# Patient Record
Sex: FEMALE | Race: BLACK OR AFRICAN AMERICAN | NOT HISPANIC OR LATINO | Employment: OTHER | URBAN - METROPOLITAN AREA
[De-identification: names, ages, dates, MRNs, and addresses within clinical notes are randomized per-mention and may not be internally consistent; named-entity substitution may affect disease eponyms.]

---

## 2020-01-06 ENCOUNTER — HOSPITAL ENCOUNTER (EMERGENCY)
Facility: HOSPITAL | Age: 35
Discharge: HOME | End: 2020-01-06
Attending: EMERGENCY MEDICINE
Payer: MEDICAID

## 2020-01-06 VITALS
BODY MASS INDEX: 40.18 KG/M2 | HEIGHT: 66 IN | SYSTOLIC BLOOD PRESSURE: 144 MMHG | TEMPERATURE: 98 F | RESPIRATION RATE: 16 BRPM | WEIGHT: 250 LBS | OXYGEN SATURATION: 100 % | DIASTOLIC BLOOD PRESSURE: 82 MMHG | HEART RATE: 88 BPM

## 2020-01-06 DIAGNOSIS — S80.11XA MULTIPLE LEG CONTUSIONS, RIGHT, INITIAL ENCOUNTER: ICD-10-CM

## 2020-01-06 DIAGNOSIS — S20.211A RIB CONTUSION, RIGHT, INITIAL ENCOUNTER: ICD-10-CM
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
## 2020-01-06 LAB
B-HCG UR QL: NEGATIVE
CTP QC/QA: YES

## 2020-01-06 PROCEDURE — 99284 EMERGENCY DEPT VISIT MOD MDM: CPT | Mod: 25

## 2020-01-06 PROCEDURE — 81025 URINE PREGNANCY TEST: CPT | Performed by: EMERGENCY MEDICINE

## 2020-01-06 RX ORDER — IBUPROFEN 600 MG/1
600 TABLET ORAL EVERY 6 HOURS PRN
Qty: 20 TABLET | Refills: 0 | OUTPATIENT
Start: 2020-01-06 | End: 2021-10-23

## 2020-01-06 RX ORDER — METHOCARBAMOL 750 MG/1
1500 TABLET, FILM COATED ORAL 3 TIMES DAILY
Qty: 30 TABLET | Refills: 0 | Status: SHIPPED | OUTPATIENT
Start: 2020-01-06 | End: 2020-01-11
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
## 2021-10-23 ENCOUNTER — HOSPITAL ENCOUNTER (EMERGENCY)
Facility: HOSPITAL | Age: 36
Discharge: HOME | End: 2021-10-23
Attending: EMERGENCY MEDICINE
Payer: MEDICAID

## 2021-10-23 VITALS
DIASTOLIC BLOOD PRESSURE: 81 MMHG | HEIGHT: 65 IN | HEART RATE: 63 BPM | TEMPERATURE: 99 F | BODY MASS INDEX: 34.82 KG/M2 | WEIGHT: 209 LBS | RESPIRATION RATE: 16 BRPM | OXYGEN SATURATION: 98 % | SYSTOLIC BLOOD PRESSURE: 160 MMHG

## 2021-10-23 DIAGNOSIS — S03.40XA TMJ (SPRAIN OF TEMPOROMANDIBULAR JOINT), INITIAL ENCOUNTER: Primary | ICD-10-CM

## 2021-10-23 LAB
B-HCG UR QL: NEGATIVE
CTP QC/QA: YES

## 2021-10-23 PROCEDURE — 81025 URINE PREGNANCY TEST: CPT | Performed by: EMERGENCY MEDICINE

## 2021-10-23 PROCEDURE — 99284 PR EMERGENCY DEPT VISIT,LEVEL IV: ICD-10-PCS | Mod: ,,, | Performed by: EMERGENCY MEDICINE

## 2021-10-23 PROCEDURE — 99283 EMERGENCY DEPT VISIT LOW MDM: CPT

## 2021-10-23 PROCEDURE — 25000003 PHARM REV CODE 250: Performed by: EMERGENCY MEDICINE

## 2021-10-23 PROCEDURE — 99284 EMERGENCY DEPT VISIT MOD MDM: CPT | Mod: ,,, | Performed by: EMERGENCY MEDICINE

## 2021-10-23 RX ORDER — NAPROXEN 500 MG/1
500 TABLET ORAL 2 TIMES DAILY WITH MEALS
Qty: 30 TABLET | Refills: 0 | Status: SHIPPED | OUTPATIENT
Start: 2021-10-23 | End: 2022-03-21

## 2021-10-23 RX ORDER — NAPROXEN 500 MG/1
500 TABLET ORAL
Status: COMPLETED | OUTPATIENT
Start: 2021-10-23 | End: 2021-10-23

## 2021-10-23 RX ADMIN — NAPROXEN 500 MG: 500 TABLET ORAL at 08:10

## 2021-12-08 ENCOUNTER — HOSPITAL ENCOUNTER (EMERGENCY)
Facility: HOSPITAL | Age: 36
Discharge: HOME | End: 2021-12-08
Attending: EMERGENCY MEDICINE
Payer: MEDICAID

## 2021-12-08 VITALS
RESPIRATION RATE: 16 BRPM | DIASTOLIC BLOOD PRESSURE: 81 MMHG | WEIGHT: 203 LBS | TEMPERATURE: 99 F | SYSTOLIC BLOOD PRESSURE: 139 MMHG | OXYGEN SATURATION: 100 % | BODY MASS INDEX: 33.82 KG/M2 | HEART RATE: 70 BPM | HEIGHT: 65 IN

## 2021-12-08 DIAGNOSIS — S39.012A STRAIN OF LUMBAR REGION, INITIAL ENCOUNTER: ICD-10-CM
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
## 2021-12-08 DIAGNOSIS — S09.90XA INJURY OF HEAD IN ADULT: Primary | ICD-10-CM

## 2021-12-08 PROCEDURE — 87389 HIV-1 AG W/HIV-1&-2 AB AG IA: CPT | Performed by: EMERGENCY MEDICINE

## 2021-12-08 PROCEDURE — 99284 PR EMERGENCY DEPT VISIT,LEVEL IV: ICD-10-PCS | Mod: ,,, | Performed by: EMERGENCY MEDICINE

## 2021-12-08 PROCEDURE — 99284 EMERGENCY DEPT VISIT MOD MDM: CPT | Mod: ,,, | Performed by: EMERGENCY MEDICINE

## 2021-12-08 PROCEDURE — 25000003 PHARM REV CODE 250: Performed by: EMERGENCY MEDICINE

## 2021-12-08 PROCEDURE — 86803 HEPATITIS C AB TEST: CPT | Performed by: EMERGENCY MEDICINE

## 2021-12-08 PROCEDURE — 99283 EMERGENCY DEPT VISIT LOW MDM: CPT

## 2021-12-08 RX ORDER — ACETAMINOPHEN 500 MG
1000 TABLET ORAL
Status: COMPLETED | OUTPATIENT
Start: 2021-12-08 | End: 2021-12-08

## 2021-12-08 RX ADMIN — ACETAMINOPHEN 1000 MG: 500 TABLET ORAL at 07:12

## 2021-12-09 LAB
HCV AB SERPL QL IA: NEGATIVE
HIV 1+2 AB+HIV1 P24 AG SERPL QL IA: NEGATIVE

## 2022-02-23 ENCOUNTER — TELEPHONE (OUTPATIENT)
Dept: SURGERY | Facility: CLINIC | Age: 37
End: 2022-02-23
Payer: MEDICAID

## 2022-02-24 ENCOUNTER — TELEPHONE (OUTPATIENT)
Dept: SURGERY | Facility: CLINIC | Age: 37
End: 2022-02-24
Payer: MEDICAID

## 2022-02-24 NOTE — TELEPHONE ENCOUNTER
----- Message from Vera Soler sent at 2/15/2022  2:30 PM CST -----  Contact: Ciara    ----- Message -----  From: Mariela Bates  Sent: 2/15/2022   2:27 PM CST  To: , Jeremy Urbina  from Dr Cummins  office requesting call back for pt to seen to get a biopsy done please call         Confirmed patient's contact info below:  Contact Name:Ciara [phone]  Phone Number:

## 2022-03-04 ENCOUNTER — HOSPITAL ENCOUNTER (OUTPATIENT)
Dept: RADIOLOGY | Facility: HOSPITAL | Age: 37
Discharge: HOME | End: 2022-03-04
Attending: NURSE PRACTITIONER
Payer: MEDICAID

## 2022-03-04 PROCEDURE — 76642 ULTRASOUND BREAST LIMITED: CPT | Mod: 26,LT,, | Performed by: RADIOLOGY

## 2022-03-04 PROCEDURE — 77066 DX MAMMO INCL CAD BI: CPT | Mod: 26,,, | Performed by: RADIOLOGY

## 2022-03-04 PROCEDURE — 77066 PR MAMMO, CAD, DIAGNOSTIC, BILAT: ICD-10-PCS | Mod: 26,,, | Performed by: RADIOLOGY

## 2022-03-04 PROCEDURE — 76642 PR US BREAST UNILAT LIMITED: ICD-10-PCS | Mod: 26,LT,, | Performed by: RADIOLOGY

## 2022-03-07 ENCOUNTER — TELEPHONE (OUTPATIENT)
Dept: RADIOLOGY | Facility: HOSPITAL | Age: 37
End: 2022-03-07
Payer: MEDICAID

## 2022-03-07 NOTE — TELEPHONE ENCOUNTER
Called patient to schedule double biopsy. Patient stated that she had been waiting for weeks to get scheduled. Report dictated 3/4/2022. Scheduled patient according to her preferences & first availability.

## 2022-03-14 ENCOUNTER — HOSPITAL ENCOUNTER (OUTPATIENT)
Dept: RADIOLOGY | Facility: HOSPITAL | Age: 37
Discharge: HOME | End: 2022-03-14
Attending: NURSE PRACTITIONER
Payer: MEDICAID

## 2022-03-14 DIAGNOSIS — R92.8 ABNORMAL FINDING ON BREAST IMAGING: ICD-10-CM

## 2022-03-14 PROCEDURE — 88305 TISSUE EXAM BY PATHOLOGIST: CPT | Performed by: PATHOLOGY

## 2022-03-14 PROCEDURE — 88305 TISSUE EXAM BY PATHOLOGIST: CPT | Mod: 26,ICN,, | Performed by: PATHOLOGY

## 2022-03-14 PROCEDURE — 88305 TISSUE EXAM BY PATHOLOGIST: ICD-10-PCS | Mod: 26,ICN,, | Performed by: PATHOLOGY

## 2022-03-14 PROCEDURE — 77065 MAMMO DIGITAL DIAGNOSTIC LEFT: ICD-10-PCS | Mod: 26,LT,, | Performed by: RADIOLOGY

## 2022-03-14 PROCEDURE — 19083 US BREAST BIOPSY WITH IMAGING 1ST SITE LEFT: ICD-10-PCS | Mod: LT,,, | Performed by: RADIOLOGY

## 2022-03-14 PROCEDURE — 19083 BX BREAST 1ST LESION US IMAG: CPT | Mod: LT,,, | Performed by: RADIOLOGY

## 2022-03-14 PROCEDURE — 38505 US BIOPSY LYMPH NODE AXILLA: ICD-10-PCS | Mod: LT,,, | Performed by: RADIOLOGY

## 2022-03-14 PROCEDURE — 25000003 PHARM REV CODE 250: Performed by: NURSE PRACTITIONER

## 2022-03-14 PROCEDURE — 88360 TUMOR IMMUNOHISTOCHEM/MANUAL: CPT | Mod: 26,ICN,, | Performed by: PATHOLOGY

## 2022-03-14 PROCEDURE — 77065 DX MAMMO INCL CAD UNI: CPT | Mod: TC,LT

## 2022-03-14 PROCEDURE — 88360 PR  TUMOR IMMUNOHISTOCHEM/MANUAL: ICD-10-PCS | Mod: 26,ICN,, | Performed by: PATHOLOGY

## 2022-03-14 PROCEDURE — 38505 NEEDLE BIOPSY LYMPH NODES: CPT | Mod: LT,,, | Performed by: RADIOLOGY

## 2022-03-14 PROCEDURE — 88360 TUMOR IMMUNOHISTOCHEM/MANUAL: CPT | Performed by: PATHOLOGY

## 2022-03-14 PROCEDURE — 27200939 US BIOPSY LYMPH NODE AXILLA

## 2022-03-14 PROCEDURE — 27200939 US BREAST BIOPSY WITH IMAGING 1ST SITE LEFT

## 2022-03-14 PROCEDURE — 77065 DX MAMMO INCL CAD UNI: CPT | Mod: 26,LT,, | Performed by: RADIOLOGY

## 2022-03-14 RX ORDER — LIDOCAINE HYDROCHLORIDE 10 MG/ML
3 INJECTION INFILTRATION; PERINEURAL ONCE
Status: COMPLETED | OUTPATIENT
Start: 2022-03-14 | End: 2022-03-14

## 2022-03-14 RX ORDER — SODIUM BICARBONATE 42 MG/ML
3 INJECTION, SOLUTION INTRAVENOUS ONCE
Status: COMPLETED | OUTPATIENT
Start: 2022-03-14 | End: 2022-03-14

## 2022-03-14 RX ORDER — LIDOCAINE HYDROCHLORIDE AND EPINEPHRINE 20; 10 MG/ML; UG/ML
10 INJECTION, SOLUTION INFILTRATION; PERINEURAL ONCE
Status: COMPLETED | OUTPATIENT
Start: 2022-03-14 | End: 2022-03-14

## 2022-03-14 RX ADMIN — SODIUM BICARBONATE 3 ML: 42 INJECTION, SOLUTION INTRAVENOUS at 10:03

## 2022-03-14 RX ADMIN — LIDOCAINE HYDROCHLORIDE,EPINEPHRINE BITARTRATE 10 ML: 20; .01 INJECTION, SOLUTION INFILTRATION; PERINEURAL at 10:03

## 2022-03-14 RX ADMIN — LIDOCAINE HYDROCHLORIDE 3 ML: 10 INJECTION, SOLUTION INFILTRATION; PERINEURAL at 10:03

## 2022-03-16 ENCOUNTER — TELEPHONE (OUTPATIENT)
Dept: SURGERY | Facility: CLINIC | Age: 37
End: 2022-03-16
Payer: MEDICAID

## 2022-03-16 DIAGNOSIS — C50.919 BREAST CANCER: Primary | ICD-10-CM

## 2022-03-16 NOTE — NURSING
Oncology Navigation   Intake  Date of Diagnosis: 3/14/2022  Cancer Type: Breast  Internal / External Referral: Internal  Referral Source: Holly Roe  Date of Referral: 3/16/2022  Initial Nurse Navigator Contact: 3/16/2022  Referral to Initial Contact Timeline (days): 0  Date Worked: 3/16/2022  First Appointment Available: 3/21/2022  Appointment Date: 3/21/2022  First Available Date vs. Scheduled Date (days): 0  Multiple appointments: Yes     Treatment  Current Status: Staging work-up    Surgical Oncologist: Dr.Alexa Weiner  Consult Date: 3/21/2022    Medical Oncologist: Dr.Zoe Cruz  Consult Date: 3/21/2022    Radiation Oncologist: Dr.Angela Bess    Procedures: Biopsy  Biopsy Schedule Date: 3/14/2022          Radiation Oncologist: Dr.Angela Bess        Acuity      Follow Up  Follow up in about 5 days (around 3/21/2022) for initial consults.

## 2022-03-16 NOTE — TELEPHONE ENCOUNTER
Called Patient with results of breast & axilla biopsies from 3-14-22. Explained that the biopsies showed Invasive ductal carcinoma. Discussed what this means and that the next step is to meet with a breast surgeon. An appt was made for 3-21-22 with Husam Lee & Maria Alejandra. Reviewed location of breast center. Patient verbalized understanding.

## 2022-03-17 ENCOUNTER — HOSPITAL ENCOUNTER (OUTPATIENT)
Dept: RADIOLOGY | Facility: HOSPITAL | Age: 37
Discharge: HOME | End: 2022-03-17
Attending: SURGERY
Payer: MEDICAID

## 2022-03-17 DIAGNOSIS — C50.919 BREAST CANCER: ICD-10-CM

## 2022-03-17 LAB
FINAL PATHOLOGIC DIAGNOSIS: ABNORMAL
GROSS: ABNORMAL
Lab: ABNORMAL
SUPPLEMENTAL DIAGNOSIS: ABNORMAL

## 2022-03-17 PROCEDURE — A9577 INJ MULTIHANCE: HCPCS | Performed by: SURGERY

## 2022-03-17 PROCEDURE — 25500020 PHARM REV CODE 255: Performed by: SURGERY

## 2022-03-17 PROCEDURE — 77049 MRI BREAST C-+ W/CAD BI: CPT | Mod: 26,,, | Performed by: RADIOLOGY

## 2022-03-17 PROCEDURE — 77049 MRI BREAST W/WO CONTRAST, W/CAD, BILATERAL: ICD-10-PCS | Mod: 26,,, | Performed by: RADIOLOGY

## 2022-03-17 PROCEDURE — 77049 MRI BREAST C-+ W/CAD BI: CPT | Mod: TC

## 2022-03-17 RX ADMIN — GADOBENATE DIMEGLUMINE 20 ML: 529 INJECTION, SOLUTION INTRAVENOUS at 09:03

## 2022-03-21 ENCOUNTER — LAB VISIT (OUTPATIENT)
Dept: LAB | Facility: HOSPITAL | Age: 37
End: 2022-03-21
Attending: INTERNAL MEDICINE
Payer: MEDICAID

## 2022-03-21 ENCOUNTER — DOCUMENTATION ONLY (OUTPATIENT)
Dept: SURGERY | Facility: CLINIC | Age: 37
End: 2022-03-21

## 2022-03-21 ENCOUNTER — OFFICE VISIT (OUTPATIENT)
Dept: SURGERY | Facility: CLINIC | Age: 37
End: 2022-03-21
Payer: MEDICAID

## 2022-03-21 ENCOUNTER — OFFICE VISIT (OUTPATIENT)
Dept: RADIATION ONCOLOGY | Facility: CLINIC | Age: 37
End: 2022-03-21
Payer: MEDICAID

## 2022-03-21 ENCOUNTER — OFFICE VISIT (OUTPATIENT)
Dept: HEMATOLOGY/ONCOLOGY | Facility: CLINIC | Age: 37
End: 2022-03-21
Payer: MEDICAID

## 2022-03-21 VITALS
TEMPERATURE: 98 F | WEIGHT: 222.56 LBS | DIASTOLIC BLOOD PRESSURE: 72 MMHG | HEART RATE: 93 BPM | BODY MASS INDEX: 37.08 KG/M2 | OXYGEN SATURATION: 98 % | HEIGHT: 65 IN | SYSTOLIC BLOOD PRESSURE: 128 MMHG | RESPIRATION RATE: 16 BRPM

## 2022-03-21 VITALS
HEART RATE: 93 BPM | WEIGHT: 222.69 LBS | OXYGEN SATURATION: 98 % | HEIGHT: 65 IN | SYSTOLIC BLOOD PRESSURE: 128 MMHG | BODY MASS INDEX: 37.1 KG/M2 | TEMPERATURE: 98 F | DIASTOLIC BLOOD PRESSURE: 72 MMHG

## 2022-03-21 VITALS
WEIGHT: 222.69 LBS | DIASTOLIC BLOOD PRESSURE: 72 MMHG | BODY MASS INDEX: 37.1 KG/M2 | TEMPERATURE: 98 F | HEIGHT: 65 IN | SYSTOLIC BLOOD PRESSURE: 128 MMHG | RESPIRATION RATE: 16 BRPM | OXYGEN SATURATION: 98 % | HEART RATE: 93 BPM

## 2022-03-21 DIAGNOSIS — C50.912 LEFT BREAST CANCER WITH T3 TUMOR, >5 CM IN GREATEST DIMENSION: Primary | ICD-10-CM

## 2022-03-21 DIAGNOSIS — C50.912 LEFT BREAST CANCER WITH T3 TUMOR, >5 CM IN GREATEST DIMENSION: ICD-10-CM

## 2022-03-21 DIAGNOSIS — Z17.1 MALIGNANT NEOPLASM OF UPPER-OUTER QUADRANT OF LEFT BREAST IN FEMALE, ESTROGEN RECEPTOR NEGATIVE: Primary | ICD-10-CM

## 2022-03-21 DIAGNOSIS — C50.412 MALIGNANT NEOPLASM OF UPPER-OUTER QUADRANT OF LEFT BREAST IN FEMALE, ESTROGEN RECEPTOR NEGATIVE: Primary | ICD-10-CM

## 2022-03-21 DIAGNOSIS — Z17.1 MALIGNANT NEOPLASM OF UPPER-OUTER QUADRANT OF LEFT BREAST IN FEMALE, ESTROGEN RECEPTOR NEGATIVE: ICD-10-CM

## 2022-03-21 DIAGNOSIS — C50.412 MALIGNANT NEOPLASM OF UPPER-OUTER QUADRANT OF LEFT BREAST IN FEMALE, ESTROGEN RECEPTOR NEGATIVE: ICD-10-CM

## 2022-03-21 LAB
ALBUMIN SERPL BCP-MCNC: 3.9 G/DL (ref 3.5–5.2)
ALP SERPL-CCNC: 40 U/L (ref 55–135)
ALT SERPL W/O P-5'-P-CCNC: 11 U/L (ref 10–44)
ANION GAP SERPL CALC-SCNC: 7 MMOL/L (ref 8–16)
AST SERPL-CCNC: 21 U/L (ref 10–40)
BASOPHILS # BLD AUTO: 0.02 K/UL (ref 0–0.2)
BASOPHILS NFR BLD: 0.4 % (ref 0–1.9)
BILIRUB SERPL-MCNC: 0.3 MG/DL (ref 0.1–1)
BUN SERPL-MCNC: 14 MG/DL (ref 6–20)
CALCIUM SERPL-MCNC: 9.5 MG/DL (ref 8.7–10.5)
CHLORIDE SERPL-SCNC: 106 MMOL/L (ref 95–110)
CO2 SERPL-SCNC: 28 MMOL/L (ref 23–29)
CREAT SERPL-MCNC: 0.8 MG/DL (ref 0.5–1.4)
DIFFERENTIAL METHOD: ABNORMAL
EOSINOPHIL # BLD AUTO: 0 K/UL (ref 0–0.5)
EOSINOPHIL NFR BLD: 0.8 % (ref 0–8)
ERYTHROCYTE [DISTWIDTH] IN BLOOD BY AUTOMATED COUNT: 12.2 % (ref 11.5–14.5)
EST. GFR  (AFRICAN AMERICAN): >60 ML/MIN/1.73 M^2
EST. GFR  (NON AFRICAN AMERICAN): >60 ML/MIN/1.73 M^2
GLUCOSE SERPL-MCNC: 75 MG/DL (ref 70–110)
HCG INTACT+B SERPL-ACNC: <7000 MIU/ML
HCT VFR BLD AUTO: 34 % (ref 37–48.5)
HGB BLD-MCNC: 10.7 G/DL (ref 12–16)
IMM GRANULOCYTES # BLD AUTO: 0.01 K/UL (ref 0–0.04)
IMM GRANULOCYTES NFR BLD AUTO: 0.2 % (ref 0–0.5)
LYMPHOCYTES # BLD AUTO: 1.3 K/UL (ref 1–4.8)
LYMPHOCYTES NFR BLD: 26.3 % (ref 18–48)
MCH RBC QN AUTO: 29 PG (ref 27–31)
MCHC RBC AUTO-ENTMCNC: 31.5 G/DL (ref 32–36)
MCV RBC AUTO: 92 FL (ref 82–98)
MONOCYTES # BLD AUTO: 0.4 K/UL (ref 0.3–1)
MONOCYTES NFR BLD: 8.4 % (ref 4–15)
NEUTROPHILS # BLD AUTO: 3.1 K/UL (ref 1.8–7.7)
NEUTROPHILS NFR BLD: 63.9 % (ref 38–73)
NRBC BLD-RTO: 0 /100 WBC
PLATELET # BLD AUTO: 211 K/UL (ref 150–450)
PMV BLD AUTO: 10.2 FL (ref 9.2–12.9)
POTASSIUM SERPL-SCNC: 3.9 MMOL/L (ref 3.5–5.1)
PROT SERPL-MCNC: 6.9 G/DL (ref 6–8.4)
RBC # BLD AUTO: 3.69 M/UL (ref 4–5.4)
SODIUM SERPL-SCNC: 141 MMOL/L (ref 136–145)
WBC # BLD AUTO: 4.91 K/UL (ref 3.9–12.7)

## 2022-03-21 PROCEDURE — 99205 PR OFFICE/OUTPT VISIT, NEW, LEVL V, 60-74 MIN: ICD-10-PCS | Mod: S$PBB,,, | Performed by: INTERNAL MEDICINE

## 2022-03-21 PROCEDURE — 99999 PR PBB SHADOW E&M-EST. PATIENT-LVL IV: ICD-10-PCS | Mod: PBBFAC,,, | Performed by: INTERNAL MEDICINE

## 2022-03-21 PROCEDURE — 99205 OFFICE O/P NEW HI 60 MIN: CPT | Mod: S$PBB,,, | Performed by: SURGERY

## 2022-03-21 PROCEDURE — 3078F DIAST BP <80 MM HG: CPT | Mod: CPTII,,, | Performed by: RADIOLOGY

## 2022-03-21 PROCEDURE — 3074F PR MOST RECENT SYSTOLIC BLOOD PRESSURE < 130 MM HG: ICD-10-PCS | Mod: CPTII,,, | Performed by: RADIOLOGY

## 2022-03-21 PROCEDURE — 99205 OFFICE O/P NEW HI 60 MIN: CPT | Mod: S$PBB,,, | Performed by: INTERNAL MEDICINE

## 2022-03-21 PROCEDURE — 99213 OFFICE O/P EST LOW 20 MIN: CPT | Mod: PBBFAC,27 | Performed by: SURGERY

## 2022-03-21 PROCEDURE — 3074F PR MOST RECENT SYSTOLIC BLOOD PRESSURE < 130 MM HG: ICD-10-PCS | Mod: CPTII,,, | Performed by: INTERNAL MEDICINE

## 2022-03-21 PROCEDURE — 99999 PR PBB SHADOW E&M-EST. PATIENT-LVL III: ICD-10-PCS | Mod: PBBFAC,,, | Performed by: SURGERY

## 2022-03-21 PROCEDURE — 85025 COMPLETE CBC W/AUTO DIFF WBC: CPT | Performed by: INTERNAL MEDICINE

## 2022-03-21 PROCEDURE — 36415 COLL VENOUS BLD VENIPUNCTURE: CPT | Performed by: INTERNAL MEDICINE

## 2022-03-21 PROCEDURE — 99205 OFFICE O/P NEW HI 60 MIN: CPT | Mod: S$PBB,,, | Performed by: RADIOLOGY

## 2022-03-21 PROCEDURE — 99999 PR PBB SHADOW E&M-EST. PATIENT-LVL III: CPT | Mod: PBBFAC,,, | Performed by: SURGERY

## 2022-03-21 PROCEDURE — 3008F PR BODY MASS INDEX (BMI) DOCUMENTED: ICD-10-PCS | Mod: CPTII,,, | Performed by: INTERNAL MEDICINE

## 2022-03-21 PROCEDURE — 3074F PR MOST RECENT SYSTOLIC BLOOD PRESSURE < 130 MM HG: ICD-10-PCS | Mod: CPTII,,, | Performed by: SURGERY

## 2022-03-21 PROCEDURE — 99999 PR PBB SHADOW E&M-EST. PATIENT-LVL III: ICD-10-PCS | Mod: PBBFAC,,, | Performed by: RADIOLOGY

## 2022-03-21 PROCEDURE — 1160F PR REVIEW ALL MEDS BY PRESCRIBER/CLIN PHARMACIST DOCUMENTED: ICD-10-PCS | Mod: CPTII,,, | Performed by: SURGERY

## 2022-03-21 PROCEDURE — 3008F BODY MASS INDEX DOCD: CPT | Mod: CPTII,,, | Performed by: RADIOLOGY

## 2022-03-21 PROCEDURE — 3008F BODY MASS INDEX DOCD: CPT | Mod: CPTII,,, | Performed by: INTERNAL MEDICINE

## 2022-03-21 PROCEDURE — 3074F SYST BP LT 130 MM HG: CPT | Mod: CPTII,,, | Performed by: INTERNAL MEDICINE

## 2022-03-21 PROCEDURE — 3078F DIAST BP <80 MM HG: CPT | Mod: CPTII,,, | Performed by: SURGERY

## 2022-03-21 PROCEDURE — 3078F PR MOST RECENT DIASTOLIC BLOOD PRESSURE < 80 MM HG: ICD-10-PCS | Mod: CPTII,,, | Performed by: RADIOLOGY

## 2022-03-21 PROCEDURE — 1159F MED LIST DOCD IN RCRD: CPT | Mod: CPTII,,, | Performed by: SURGERY

## 2022-03-21 PROCEDURE — 3008F BODY MASS INDEX DOCD: CPT | Mod: CPTII,,, | Performed by: SURGERY

## 2022-03-21 PROCEDURE — 3074F SYST BP LT 130 MM HG: CPT | Mod: CPTII,,, | Performed by: RADIOLOGY

## 2022-03-21 PROCEDURE — 3078F PR MOST RECENT DIASTOLIC BLOOD PRESSURE < 80 MM HG: ICD-10-PCS | Mod: CPTII,,, | Performed by: INTERNAL MEDICINE

## 2022-03-21 PROCEDURE — 1159F PR MEDICATION LIST DOCUMENTED IN MEDICAL RECORD: ICD-10-PCS | Mod: CPTII,,, | Performed by: SURGERY

## 2022-03-21 PROCEDURE — 99205 PR OFFICE/OUTPT VISIT, NEW, LEVL V, 60-74 MIN: ICD-10-PCS | Mod: S$PBB,,, | Performed by: RADIOLOGY

## 2022-03-21 PROCEDURE — 99213 OFFICE O/P EST LOW 20 MIN: CPT | Mod: PBBFAC | Performed by: RADIOLOGY

## 2022-03-21 PROCEDURE — 3008F PR BODY MASS INDEX (BMI) DOCUMENTED: ICD-10-PCS | Mod: CPTII,,, | Performed by: RADIOLOGY

## 2022-03-21 PROCEDURE — 99999 PR PBB SHADOW E&M-EST. PATIENT-LVL III: CPT | Mod: PBBFAC,,, | Performed by: RADIOLOGY

## 2022-03-21 PROCEDURE — 99999 PR PBB SHADOW E&M-EST. PATIENT-LVL IV: CPT | Mod: PBBFAC,,, | Performed by: INTERNAL MEDICINE

## 2022-03-21 PROCEDURE — 3078F DIAST BP <80 MM HG: CPT | Mod: CPTII,,, | Performed by: INTERNAL MEDICINE

## 2022-03-21 PROCEDURE — 1160F RVW MEDS BY RX/DR IN RCRD: CPT | Mod: CPTII,,, | Performed by: SURGERY

## 2022-03-21 PROCEDURE — 99205 PR OFFICE/OUTPT VISIT, NEW, LEVL V, 60-74 MIN: ICD-10-PCS | Mod: S$PBB,,, | Performed by: SURGERY

## 2022-03-21 PROCEDURE — 99214 OFFICE O/P EST MOD 30 MIN: CPT | Mod: PBBFAC,27 | Performed by: INTERNAL MEDICINE

## 2022-03-21 PROCEDURE — 84702 CHORIONIC GONADOTROPIN TEST: CPT | Performed by: INTERNAL MEDICINE

## 2022-03-21 PROCEDURE — 3074F SYST BP LT 130 MM HG: CPT | Mod: CPTII,,, | Performed by: SURGERY

## 2022-03-21 PROCEDURE — 3078F PR MOST RECENT DIASTOLIC BLOOD PRESSURE < 80 MM HG: ICD-10-PCS | Mod: CPTII,,, | Performed by: SURGERY

## 2022-03-21 PROCEDURE — 80053 COMPREHEN METABOLIC PANEL: CPT | Performed by: INTERNAL MEDICINE

## 2022-03-21 PROCEDURE — 3008F PR BODY MASS INDEX (BMI) DOCUMENTED: ICD-10-PCS | Mod: CPTII,,, | Performed by: SURGERY

## 2022-03-21 NOTE — PROGRESS NOTES
Genetics Lay Navigator Note:    Met with patient at her consult with Dr. Weiner today 3/21/2022, to facilitate genetic testing. Set patient up with [facility] genetic counselor over the phone to complete counseling prior to testing. Patient verbalized understanding to all counseling information. [facility] brochure with number to call with questions or concerns provided to patient as well as my card. Encouraged patient to call me or [facility] at any time.     Lab appointment made and patient escorted with [facility] kit to lab for specimen draw and processing. Patient instructed that results will be provided as soon as they are available. No questions or concerns from patient about plan of care.       [employer] Tracking # [id number]    Multi-D Pt. (Nurse Navigator : MERY Roberts RN)  Surgery : Dr. Weiner  Medical Oncology : Dr. Cruz  Radiation Oncology : Dr. Bess

## 2022-03-21 NOTE — PROGRESS NOTES
Subjective:       Patient ID: Laura Kent is a 36 y.o. female.    Chief Complaint: Consult    HPI     Presents for medical oncology opinion for newly diagnosed TNBC    Oncology History:  - self detected an area under her left arm and it initially seemed to go away (noted around winter holidays)  - 3/4/2022 Outside Mammogram:  Findings:  The breasts are heterogeneously dense, which may obscure small masses.   Left  There is a 31 mm x 18 mm x 21 mm irregularly shaped mass with microlobulated margins seen in the left breast at 2 o'clock in the posterior depth with associated left axillary adenopathy. Largest node depicted on the outside study measures 49 x 23 x 29 mm with cortical thickening and effacement of the hilum. Breast mass is reportedly palpable.   Right  There is no evidence of suspicious masses, calcifications, or other abnormal findings in the right breast.  Impression:  Left  Mass: Left breast 31 mm x 18 mm x 21 mm mass at the posterior 2 o'clock position. Assessment: 5 - Highly suggestive of malignancy. Biopsy is recommended.   Right  There is no mammographic evidence of malignancy in the right breast.  BI-RADS Category:   Overall: 5 - Highly Suggestive of Malignancy  Recommendation:  Ultrasound Guided Core Needle Biopsy of the left breast mass and one of the abnormal left axillary nodes is recommended.    - 3/14/2022 Breast biopsy:  1. LEFT BREAST MASS, 2 O'CLOCK, BIOPSY:   Invasive ductal carcinoma, Lev histologic grade 3 (tubule formation:   3, nuclear pleomorphism:  3, mitotic activity:  3).   Comment:  Infiltrating carcinoma occupies the entirety of biopsied material   with the largest continuous focus measuring 13 mm.  Breast biomarkers are   pending and will be issued in a supplemental report.   2. LEFT AXILLARY LYMPH NODE, BIOPSY:   Invasive ductal carcinoma, Lev histologic grade 3 (tubule formation:   3, nuclear pleomorphism:  3, mitotic activity:  3).   Comment:  Infiltrating  carcinoma occupies the entirety of biopsied material   with the largest continuous focus measuring 20 mm.  No lymph node tissue is   identified in the sample.  Tumor histology is essentially identical to that   of part 1.  The findings could represent part of a larger intranodal   metastasis, but an extension from the primary tumor cannot be excluded.   Radiographic correlation is advised.   Note:  Dr. Stephanie Rao also reviewed this case and agrees with the   diagnosis.   BREAST BIOMARKER RESULTS   Estrogen receptor (ER):  Negative (0)   Progesterone receptor (ER):  Negative (0)   HER2 IHC:  Negative (1+)   Ki-67 proliferation index:  80%     - 3/17/2022 Breast MRI:  Findings:  The breasts have heterogeneous fibroglandular tissue. The background parenchymal enhancement is minimal.   Left  There is a 38 mm x 33 mm x 33 mm irregularly shaped mass seen in the left breast at 2 o'clock in the posterior depth, 8.4 cm from the nipple and 1.2 cm from the skin. Associated signal void from a twirl biopsy marker; pathology showed invasive ductal carcinoma.   Posterior to the mass there is abnormal non mass enhancement measuring 29 x 28 mm. The NME is 4 mm from the skin and 2.3 cm from the chest wall. In total the mass and NME measure 54 mm in AP extent.  Overlying skin thickening and edema involving the lateral breast, likely from lymphovascular obstruction. No suspicious skin enhancement. Overall increased vascularity to the left breast.   Four abnormal lymph level 1 and level 2 left axillary lymph nodes. The largest lymph node measures 52 mm x 40 mm x 38 mm which previously underwent biopsy showing metastatic disease. There is an associated radar reflector within the biopsied lymph node.  Right  There is no evidence of suspicious masses, abnormal enhancement, or other abnormal findings in the right breast. No enlarged axillary or internal mammary lymph nodes.   Impression:  Left  Mass: Left breast 38 mm x 33 mm x 33 mm  mass at the posterior 2 o'clock position. Assessment: 6 - Known biopsy, proven malignancy. Associated 29 mm NME extending posteriorly from the mass. In total the mass and NME measure 54 mm in AP extent.  Lymph Node: Abnormal level 1 and 2 left axillary lymph nodes, the largest of which measures 52 mm x 40 mm x 38 mm lymph node and is known biopsy, proven malignancy.   Right  There is no MR evidence of malignancy in the right breast.  BI-RADS Category:   Overall: 6 - Known Biopsy-Proven Malignancy  Recommendation:  Clinical management of known left breast cancer. Patient is established with the breast surgery clinic.     - Additional Imaging needed    PMH:  HTN- around pregnancies; off of blood pressure medication x 2 years  [facility]  Gestational DM  Hyperlipidemia when heavier, managed with diet and followed by cardiology  C- sections x 2  Iron deficiency    GynHx:  Menarche- 7   (16 at 1st pregnancy) (17, 7, 4)  Still with periods - last 2022- last 5-7 days, moderate normal flow  + breast feeding x 1 year  IUD    SH:  Working, , [employer] and [employer]  Single  Good support system  No tobacco  No EtOH  No illicit drugs    FH:  Mom- 57 yo, osteoarthritis   Dad- 58 yo, DM, HTN  Maternal uncle-  of metastatic pancreatic cancer at age 57  Paternal grandmother-  of metastatic bladder cancer- unclear what age but thought to be her 60s  No breast cancer  No uterine cancer, no ovarian cancer  No prostate cancer  No melanoma    Review of Systems   Constitutional: Negative for activity change, appetite change, chills, fatigue, fever and unexpected weight change.   HENT: Negative for nasal congestion, dental problem (wears braces), hearing loss, mouth sores, postnasal drip, rhinorrhea, sinus pressure/congestion, sore throat and trouble swallowing.    Eyes: Negative for visual disturbance.   Respiratory: Positive for cough (for several months, mostly erlin cough). Negative for shortness of  breath and wheezing.    Cardiovascular: Negative for chest pain, palpitations and leg swelling.   Gastrointestinal: Negative for abdominal distention, abdominal pain, blood in stool, change in bowel habit, constipation, diarrhea, nausea, vomiting, reflux and change in bowel habit.   Genitourinary: Negative for bladder incontinence, decreased urine volume, difficulty urinating, dysuria, frequency, menstrual irregularity, menstrual problem and urgency.   Integumentary:  Positive for breast mass and breast tenderness.   Neurological: Positive for dizziness. Negative for weakness and headaches.   Hematological: Positive for adenopathy. Does not bruise/bleed easily.   Psychiatric/Behavioral: Negative for dysphoric mood and sleep disturbance. The patient is not nervous/anxious.    Breast: Positive for mass and tenderness.        Objective:      Physical Exam  Vitals and nursing note reviewed.   Constitutional:       General: She is not in acute distress.     Appearance: Normal appearance. She is well-developed. She is not ill-appearing.      Comments: Presents with her momErin  ECOG= 0  Very pleasant   HENT:      Head: Normocephalic and atraumatic.   Eyes:      General: No scleral icterus.     Extraocular Movements: Extraocular movements intact.      Conjunctiva/sclera: Conjunctivae normal.      Pupils: Pupils are equal, round, and reactive to light.      Right eye: Pupil is round and reactive.      Left eye: Pupil is round and reactive.   Neck:      Thyroid: No thyromegaly.      Vascular: No JVD.      Trachea: No tracheal deviation.   Cardiovascular:      Rate and Rhythm: Normal rate and regular rhythm.      Heart sounds: Normal heart sounds. No murmur heard.    No friction rub. No gallop.   Pulmonary:      Effort: Pulmonary effort is normal. No respiratory distress.      Breath sounds: Normal breath sounds. No wheezing or rales.      Comments: Approx 5 cm left outer quadrant breast mass and 5 cm moveable LN  No  right breast masses or LAD  Chest:      Chest wall: No tenderness.   Breasts:      Right: No supraclavicular adenopathy.      Left: No supraclavicular adenopathy.       Abdominal:      General: Bowel sounds are normal. There is no distension.      Palpations: Abdomen is soft. There is no mass.      Tenderness: There is no abdominal tenderness. There is no guarding or rebound.      Comments: No organomegaly   Musculoskeletal:         General: No swelling or tenderness. Normal range of motion.      Cervical back: Normal range of motion and neck supple.      Right lower leg: No edema.      Left lower leg: No edema.   Lymphadenopathy:      Head:      Right side of head: No submandibular adenopathy.      Left side of head: No submandibular adenopathy.      Cervical: No cervical adenopathy.      Right cervical: No superficial, deep or posterior cervical adenopathy.     Left cervical: No superficial, deep or posterior cervical adenopathy.      Upper Body:      Right upper body: No supraclavicular adenopathy.      Left upper body: No supraclavicular adenopathy.   Skin:     General: Skin is warm and dry.      Coloration: Skin is not jaundiced or pale.      Findings: No erythema, lesion, petechiae or rash.   Neurological:      General: No focal deficit present.      Mental Status: She is alert and oriented to person, place, and time.      Cranial Nerves: No cranial nerve deficit.      Sensory: No sensory deficit.      Motor: No weakness.      Coordination: Coordination normal.      Gait: Gait normal.      Deep Tendon Reflexes: Reflexes are normal and symmetric.   Psychiatric:         Mood and Affect: Mood normal. Mood is not anxious or depressed.         Behavior: Behavior normal.         Thought Content: Thought content normal.         Judgment: Judgment normal.       Labs- pending  Imaging- prending  Assessment:       Problem List Items Addressed This Visit    None     Visit Diagnoses     Left breast cancer with T3 tumor,  >5 cm in greatest dimension    -  Primary    Relevant Orders    Ambulatory referral/consult to Cardiology    CT Chest Abdomen Pelvis W W/O Contrast (XPD)    MRI Brain W WO Contrast    CBC W/ AUTO DIFFERENTIAL    CMP    B-HCG    Echo          Plan:       Counseled on TNBC, large tumor size, + LNs, treiple negative status  She will need further staging and if all negative will proceed with neoadjuvant chemotherapy and immunotherapy  Counseled on rationale and all questions answered    Reviewed next steps:  CT C/A/P, Bone scan  Brain MRI  ECHO. Cardio Onc  Port  Labs including pregnancy and genetics   consult  Dietician referral later date    Route Chart for Scheduling    Med Onc Chart Routing      Follow up with physician . After scans needs to be added in to review and do chemo teaching; cardio onc referral, ECHO   Follow up with ANDREW    Labs B HCG, CBC and CMP   Lab interval:     Imaging CT chest abdomen pelvis, MRI and bone scans   Needs scans asap   Pharmacy appointment    Other referrals

## 2022-03-21 NOTE — PROGRESS NOTES
PATIENT IDENTIFICATION:  Patient Name: Laura Kent  MRN: [id number]  : 1985    DIAGNOSIS: Locally advanced left breast cancer, triple negative    HISTORY OF PRESENT ILLNESS:   The patient is a 36-year-old woman with an advanced left breast cancer.  She presents to multidisciplinary Clinic to discuss management of her malignancy.    The patient palpated a mass in the left breast late .    Outside mammogram was performed on 2022.  Imaging revealed a 3.1 x 1.8 x 2.1 cm mass in the left breast at the 2 o'clock position in the posterior depth with associated left axillary lymphadenopathy.  The largest lymph node measured 4.9 x 2.3 x 2.9 cm with cortical thickening and effacement of the hilum.      The patient underwent biopsy of the left breast mass and axillary lymph node on 3/14/22 which revealed grade 3 invasive ductal carcinoma.  On immunohistochemistry, the tumor was ERPR negative and HER2 Sal negative.  The Ki-67 proliferative index was 80%.    MRI Breast 3/16/22:  Left  There is a 38 mm x 33 mm x 33 mm irregularly shaped mass seen in the left breast at 2 o'clock in the posterior depth, 8.4 cm from the nipple and 1.2 cm from the skin. Associated signal void from a twirl biopsy marker; pathology showed invasive ductal carcinoma.      Posterior to the mass there is abnormal non mass enhancement measuring 29 x 28 mm. The NME is 4 mm from the skin and 2.3 cm from the chest wall. In total the mass and NME measure 54 mm in AP extent.     Overlying skin thickening and edema involving the lateral breast, likely from lymphovascular obstruction. No suspicious skin enhancement. Overall increased vascularity to the left breast.      Four abnormal lymph level 1 and level 2 left axillary lymph nodes. The largest lymph node measures 52 mm x 40 mm x 38 mm which previously underwent biopsy showing metastatic disease. There is an associated radar reflector within the biopsied lymph node.     Right  There is no  evidence of suspicious masses, abnormal enhancement, or other abnormal findings in the right breast. No enlarged axillary or internal mammary lymph nodes.      Impression:  Left  Mass: Left breast 38 mm x 33 mm x 33 mm mass at the posterior 2 o'clock position. Assessment: 6 - Known biopsy, proven malignancy. Associated 29 mm NME extending posteriorly from the mass. In total the mass and NME measure 54 mm in AP extent.  Lymph Node: Abnormal level 1 and 2 left axillary lymph nodes, the largest of which measures 52 mm x 40 mm x 38 mm lymph node and is known biopsy, proven malignancy.      Past GYN Hx: G3, P3. Premenopausal. Has copper IUD in place.    REVIEW OF SYSTEMS:   Review of Systems   Constitutional: Negative for fever, malaise/fatigue and weight loss.   HENT: Negative for ear pain, hearing loss, sinus pain and sore throat.    Eyes: Negative for blurred vision, double vision and pain.   Respiratory: Positive for cough. Negative for hemoptysis, shortness of breath and wheezing.    Cardiovascular: Negative for chest pain, palpitations, orthopnea and leg swelling.   Gastrointestinal: Negative for abdominal pain, blood in stool, constipation, diarrhea, heartburn, nausea and vomiting.   Genitourinary: Negative for dysuria, frequency, hematuria and urgency.   Musculoskeletal: Negative for back pain and joint pain.   Skin: Negative for itching and rash.   Neurological: Positive for dizziness. Negative for tingling, focal weakness, seizures and headaches.   Psychiatric/Behavioral: Negative for depression. The patient is not nervous/anxious.          PAST MEDICAL HISTORY:  Past Medical History:   Diagnosis Date    Anxiety     Breast cancer     Encounter for blood transfusion     Hypertension     Meningitis        PAST SURGICAL HISTORY:  Past Surgical History:   Procedure Laterality Date    BREAST BIOPSY       SECTION         ALLERGIES:   Review of patient's allergies indicates:  No Known  Allergies    MEDICATIONS:  No current outpatient medications on file.     No current facility-administered medications for this visit.       SOCIAL HISTORY:  Social History     Socioeconomic History    Marital status: Single   Tobacco Use    Smoking status: Never Smoker    Smokeless tobacco: Never Used   Substance and Sexual Activity    Alcohol use: Yes     Comment: social    Drug use: Never    Sexual activity: Not Currently       FAMILY HISTORY:  Family History   Problem Relation Age of Onset    Diabetes Father     Hypertension Father     Pancreatic cancer Maternal Uncle     Bladder Cancer Paternal Grandmother          PHYSICAL EXAMINATION:  Vitals:    22 1405   BP: 128/72   Pulse: 93   Resp: 16   Temp: 98.2 °F (36.8 °C)     Body mass index is 37.05 kg/m².    ECO  Physical Exam   Constitutional: She is oriented to person, place, and time. She appears well-developed and well-nourished.   Presents today with her mother   HENT:   Head: Normocephalic and atraumatic.   Eyes: Conjunctivae, EOM and lids are normal.   Neck: Trachea normal.   Cardiovascular: Normal rate and intact distal pulses.    Pulmonary/Chest: Effort normal.   6 cm mass in the upper outer right breast, bulky axillary adenopathy   Abdominal: Soft. Normal appearance.   Musculoskeletal: Normal range of motion.   Neurological: She is alert and oriented to person, place, and time.   Skin: Skin is warm and dry.     Psychiatric: She has a normal mood and affect. Her behavior is normal. Judgment and thought content normal.         ASSESSMENT/PLAN:  Laura was seen today for breast cancer.    Diagnoses and all orders for this visit:    Malignant neoplasm of upper-outer quadrant of left breast in female, estrogen receptor negative      The patient will undergo staging evaluation with CT C/A/P and MRI brain.  If she is non-metastatic then she will proceed with systemic therapy under the care of Dr. Cruz.  She will then undergo surgery with  Dr. Weiner.  The patient should follow up with me post-operatively to discuss radiation plan.  She will require 6 weeks of daily EBRT to the breast/chest wall and lymphatics to prevent recurrence of disease locally and improve survival if she has non-metastatic disease.    The risks and benefits of treatment have been discussed with the patient and she expressed full understanding. she understands the treatment plan and willing to proceed accordingly.    I spent approximately 60 minutes reviewing the available records and evaluating the patient, out of which over 50% of the time was spent face to face with the patient in counseling and coordinating this patient's care.

## 2022-03-21 NOTE — PROGRESS NOTES
Breast Surgery  [facility]  Department of Surgery      REFERRING PROVIDER: Holly Roe, JASMINE  [address] [place] [address]    Chief Complaint: Breast Cancer      Subjective:      Patient ID: Laura Kent is a 36 y.o. female who presents with left breast Invasive Ductal Carcinoma.     She presented for diagnostic breast imaging on 02/02/2022 for new breast symptoms. . This identified mass in the left breast and enlarged axillary lymph nodes.  In the left breast 2 o'clock position there is a mass measuring 3.1 x 1.8 x 02.1 cm and in the left axilla there was adenopathy with the largest node measuring 4.9 x 2.3 x 2.9 cm ultrasound-guided core needle biopsies were performed on 03/14/2022 with pathology revealing invasive ductal carcinoma of the breast and lymph node metastasis.  A radar reflector was placed within the biopsied lymph node  Breast MRI was performed on 03/17/2022 which again identified the 3.8 x 3.3 x 3.3 cm mass in the 2 o'clock position of the left breast as well as associated non mass enhancement for total extent of disease of 5.4 cm.  Abnormal lymph nodes were identified in level 1 and level 2 on the left axilla with the largest node measuring 5.2 x 4.0 x 3.8 cm.     Findings at that time were the following:   Lesion 1:    Location:  Left, 02:00 o'clock   Clip:  Twirl, in expected position  Tumor size:  3.8 cm   Tumor ndgndrndanddndend:nd nd2nd Estrogen Receptor:  Negative   Progesterone Receptor:  Negative   Her-2 scarlet:  Negative   Lymph node status:  Positive, biopsy proven, a radar reflector in the biopsied lymph node     Patient has not noted a change on breast exam.  Patient denies nipple discharge. Patient denies previous breast biopsy. Patient denies a personal history of breast cancer. Family history includes maternal uncle with pancreatic cancer and paternal grandmother with bladder cancer.     GYN History:  Age of menarche was 7. Premenopausal, copper IUD in place.  Patient  "denies hormonal therapy. Patient is . Age of first live birth was 16. Patient did breast feed.    Past Medical History:   Diagnosis Date    Anxiety     Breast cancer     Encounter for blood transfusion     Hypertension     Meningitis      Past Surgical History:   Procedure Laterality Date    BREAST BIOPSY       SECTION       Current Outpatient Medications on File Prior to Visit   Medication Sig Dispense Refill    [DISCONTINUED] naproxen (NAPROSYN) 500 MG tablet Take 1 tablet (500 mg total) by mouth 2 (two) times daily with meals. 30 tablet 0     No current facility-administered medications on file prior to visit.     Social History     Socioeconomic History    Marital status: Single   Tobacco Use    Smoking status: Never Smoker    Smokeless tobacco: Never Used   Substance and Sexual Activity    Alcohol use: Yes     Comment: social    Drug use: Never    Sexual activity: Not Currently     Family History   Problem Relation Age of Onset    Diabetes Father     Hypertension Father     Pancreatic cancer Maternal Uncle     Bladder Cancer Paternal Grandmother         Review of Systems   All other systems reviewed and are negative.    Objective:   /72 (BP Location: Left arm, Patient Position: Sitting, BP Method: Large (Automatic))   Pulse 93   Temp 98.2 °F (36.8 °C) (Oral)   Ht 5' 5" (1.651 m)   Wt 101 kg (222 lb 10.6 oz)   LMP 2022 (Exact Date)   SpO2 98%   BMI 37.05 kg/m²     Physical Exam   Constitutional: She is oriented to person, place, and time. She appears well-developed and well-nourished.   HENT:   Head: Normocephalic and atraumatic.   Cardiovascular: Normal rate.    Pulmonary/Chest: Effort normal. Right breast exhibits no inverted nipple, no mass, no nipple discharge and no skin change. Left breast exhibits mass. Left breast exhibits no inverted nipple, no nipple discharge, no skin change and no tenderness.       Lymphadenopathy:     She has no cervical adenopathy. "     She has axillary adenopathy.        Right axillary: No pectoral and no lateral adenopathy present.        Left axillary: Pectoral and lateral adenopathy present.        Right: No supraclavicular adenopathy present.        Left: No supraclavicular adenopathy present.   Neurological: She is alert and oriented to person, place, and time.   Skin: Skin is warm and dry. No rash noted. No erythema. No pallor.     Psychiatric: She has a normal mood and affect. Her behavior is normal. Judgment and thought content normal.       Radiology review: Images personally reviewed by me in the clinic and shown to the patient during the consultation.     Assessment:       1. Malignant neoplasm of upper-outer quadrant of left breast in female, estrogen receptor negative        Plan:      Laura Kent is a 36 y.o. premenopausal female with left breast cancer, clinical stage IIIC ([id number]), estrogen receptor negative, progesterone receptor negative, HER2 negative    Plan:    Options for management were discussed with the patient and her family.  Her appointment today was part of the multidisciplinary clinic with Medical Oncology and Radiation Oncology.    The due to the large size for breast cancer as well as her involved lymph nodes and triple negative receptor profile she is being recommended for neoadjuvant chemotherapy.  This was discussed by medical oncology in detail.  We discussed that potential benefits of giving chemotherapy before surgery would be potentially decrease in the size of the breast cancer as well as decreasing the size of the lymph node involvement.    We discussed that surgical options would include a lumpectomy versus a mastectomy.  We discussed there is no survival benefit to undergoing a mastectomy compared to lumpectomy.  Based on clinical exam and imaging, she is a borderline candidate for breast conservation.  The mass feels about 5 cm in size and represents about a 4th of her breast.  However she has a  large ptotic breasts.   We discussed that depending on her response to chemotherapy she may become a better lumpectomy candidate.  We will plan to reassess her with a breast MRI on completion of chemotherapy.  Follow-up at the end chemotherapy with MRI to discuss surgical planning.  She is currently considering both options for lumpectomy and mastectomy.  We also discussed the results were genetic testing may impact her decision-making process.     We discussed the option for contralateral prophylactic mastectomy.  We discussed the risk of a consult cancer would be dependent on her genetic testing.  We will revisit this conversation at her follow-up appointment.    Reconstruction after mastectomy was discussed.  Reconstructive generally include implant or autologous tissue reconstruction.  I anticipate she will require radiation.  Thus she would be a poor candidate for implant based reconstruction.  She would likely require a tissue expander and the tissue based reconstruction.  She is unsure if she would like to proceed with mastectomy at this time.  We also discussed the option for oncoplastic reduction.  We will defer plastic surgry consultation until her follow-up appointment.      We also discussed axillary surgery.  We discussed that when the lymph nodes are centrally involved before chemotherapy we often have to proceed with an axilla lymph node dissection as we are unable to adequately determine which lymph nodes a sample of surgery.  We are currently planning to proceed with the next lymph node dissection after neoadjuvant chemotherapy.  However we will re-evaluate the chemotherapy response in the lymph nodes on post chemotherapy MRI.  We discussed that axillary lymph node dissection involved removing all the level 1 and 2 axillary lymph nodes.  This procedure has increased risk of lymphedema upwards of 30%.     Plan for follow-up after chemotherapy with MRI.  We will revisit the discussion for breast  surgery at that time.  She is currently undecided on which surgery she would like and will defer final decision based on post chemotherapy MRI and genetic testing.     Total time spent with the patient: 60 minutes.  40 minutes of face to face consultation and 20 minutes of chart review and coordination of care.

## 2022-03-22 ENCOUNTER — DOCUMENTATION ONLY (OUTPATIENT)
Dept: HEMATOLOGY/ONCOLOGY | Facility: CLINIC | Age: 37
End: 2022-03-22
Payer: MEDICAID

## 2022-03-22 ENCOUNTER — TELEPHONE (OUTPATIENT)
Dept: HEMATOLOGY/ONCOLOGY | Facility: CLINIC | Age: 37
End: 2022-03-22
Payer: MEDICAID

## 2022-03-22 ENCOUNTER — HOSPITAL ENCOUNTER (OUTPATIENT)
Dept: RADIOLOGY | Facility: OTHER | Age: 37
Discharge: HOME | End: 2022-03-22
Attending: INTERNAL MEDICINE
Payer: MEDICAID

## 2022-03-22 DIAGNOSIS — Z32.01 POSITIVE PREGNANCY TEST: Primary | ICD-10-CM

## 2022-03-22 DIAGNOSIS — Z32.01 POSITIVE PREGNANCY TEST: ICD-10-CM

## 2022-03-22 PROCEDURE — 76801 OB US < 14 WKS SINGLE FETUS: CPT | Mod: 26,,, | Performed by: RADIOLOGY

## 2022-03-22 PROCEDURE — 76801 US OB <14 WEEKS, TRANSABDOM & TRANSVAG, SINGLE GESTATION (XPD): ICD-10-PCS | Mod: 26,,, | Performed by: RADIOLOGY

## 2022-03-22 PROCEDURE — 76817 TRANSVAGINAL US OBSTETRIC: CPT | Mod: 26,,, | Performed by: RADIOLOGY

## 2022-03-22 PROCEDURE — 76801 OB US < 14 WKS SINGLE FETUS: CPT | Mod: TC

## 2022-03-22 PROCEDURE — 76817 US OB <14 WEEKS, TRANSABDOM & TRANSVAG, SINGLE GESTATION (XPD): ICD-10-PCS | Mod: 26,,, | Performed by: RADIOLOGY

## 2022-03-22 NOTE — TELEPHONE ENCOUNTER
Spoke with patient and rescheduled ultrasound appointment for 12:45 PM because of expected approaching bad weather.

## 2022-03-22 NOTE — PROGRESS NOTES
Nurse Navigator Note:     Met with patient during her consult with Dr. Weiner.  Patient and I reviewed the information she discussed with Dr. Weiner, including treatment options, diagnosis, and future plans for workup. Patient and I went through the new patient binder, explained some of the information and why it is provided.     Also offered patient consults with our other specialty clinics: Dr. Zepeda for gynecological health during treatment, our breast physical therapy department for pre-op and post-operative assessments, Dr. Reyes for psychological support, and Ashlyn Jernigan for nutritional counseling. Explained to patient that all of these support services are completely optional. Discussed that physical therapy may call patient to offer pre-op appt, and what that appt would entail.     Patient was given a copy of her appointments, Dr. Weiner's card, and my card. Encouraged her to call me if she has any questions or concerns or would like to schedule any additional appointments. Verbalized understanding of all information.

## 2022-03-22 NOTE — TELEPHONE ENCOUNTER
"----- Message from Arturo Reid sent at 3/22/2022 10:54 AM CDT -----  Reschedule Existing Appointment        Appt Date: 3/22    Type of appt: US    Physician: Nancy    Reason for rescheduling? Requesting an earlier time today    Caller: Self    Contact Preference: [phone]              Additional Information:  "Thank you for all that you do for our patients"     "

## 2022-03-22 NOTE — PROGRESS NOTES
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  SW emailed pt resources that will help her to speak with her children about her dx and also called [employer] [facility] to make a referral and LVM.  Sw remains available.

## 2022-03-22 NOTE — NURSING
Oncology Navigation   Intake  Date of Diagnosis: 3/14/2022  Cancer Type: Breast  Internal / External Referral: Internal  Referral Source: Holly Roe  Date of Referral: 3/16/2022  Initial Nurse Navigator Contact: 3/16/2022  Referral to Initial Contact Timeline (days): 0  Date Worked: 3/21/2022  First Appointment Available: 3/21/2022  Appointment Date: 3/21/2022  First Available Date vs. Scheduled Date (days): 0  Multiple appointments: Yes     Treatment  Current Status: Staging work-up    Surgical Oncologist: Dr.Alexa Weiner  Consult Date: 3/21/2022    Medical Oncologist: Dr.Zoe Cruz  Consult Date: 3/21/2022  Chemotherapy: Planned    Radiation Oncologist: Dr.Angela Bess    Procedures: CT; Bone scan; Echo; MRI; Genetic test; Port / PICC  Biopsy Schedule Date: 3/14/2022  Bone Scan Schedule Date: 3/30/2022  CT Schedule Date: 3/30/2022  Echo Schedule Date: 3/30/2022  Genetic Testing Date Sent: 3/21/2022  MRI Schedule Date: 3/17/2022  Port / PICC Schedule Date:  (general surgery referral placed, not scheduled yet)    General Referrals: Social work; Support group  Social Work: message sent to [place]  Social Work Referral Date: 3/22/2022    ER: Negative  FL: Negative  Her2: Negative    Radiation Oncologist: Dr.Angela Bess    Support Systems: Family members     Acuity  Stage: III-IV  Systemic Treatment - predicted or initiated: More than one treatment modality concurrently (chemotherapy, radiation, etc.) (+2)  Treatment Tolerability: Has not started treatment yet/treatment fully completed and side effects resolved  Navigation Acuity: 4     Follow Up  Follow up in about 1 day (around 3/23/2022) for ultrasound results & plan.

## 2022-03-23 ENCOUNTER — HOSPITAL ENCOUNTER (OUTPATIENT)
Dept: RADIOLOGY | Facility: HOSPITAL | Age: 37
Discharge: HOME | End: 2022-03-23
Attending: INTERNAL MEDICINE
Payer: MEDICAID

## 2022-03-23 DIAGNOSIS — C50.912 LEFT BREAST CANCER WITH T3 TUMOR, >5 CM IN GREATEST DIMENSION: ICD-10-CM

## 2022-03-23 PROCEDURE — 70553 MRI BRAIN W WO CONTRAST: ICD-10-PCS | Mod: 26,,, | Performed by: RADIOLOGY

## 2022-03-23 PROCEDURE — A9585 GADOBUTROL INJECTION: HCPCS | Performed by: INTERNAL MEDICINE

## 2022-03-23 PROCEDURE — 70553 MRI BRAIN STEM W/O & W/DYE: CPT | Mod: 26,,, | Performed by: RADIOLOGY

## 2022-03-23 PROCEDURE — 25500020 PHARM REV CODE 255: Performed by: INTERNAL MEDICINE

## 2022-03-23 PROCEDURE — 70553 MRI BRAIN STEM W/O & W/DYE: CPT | Mod: TC

## 2022-03-23 RX ORDER — GADOBUTROL 604.72 MG/ML
10 INJECTION INTRAVENOUS
Status: COMPLETED | OUTPATIENT
Start: 2022-03-23 | End: 2022-03-23

## 2022-03-23 RX ADMIN — GADOBUTROL 10 ML: 604.72 INJECTION INTRAVENOUS at 08:03

## 2022-03-24 ENCOUNTER — TELEPHONE (OUTPATIENT)
Dept: HEMATOLOGY/ONCOLOGY | Facility: CLINIC | Age: 37
End: 2022-03-24
Payer: MEDICAID

## 2022-03-24 ENCOUNTER — LAB VISIT (OUTPATIENT)
Dept: LAB | Facility: HOSPITAL | Age: 37
End: 2022-03-24
Attending: INTERNAL MEDICINE
Payer: MEDICAID

## 2022-03-24 DIAGNOSIS — C50.412 MALIGNANT NEOPLASM OF UPPER-OUTER QUADRANT OF LEFT BREAST IN FEMALE, ESTROGEN RECEPTOR NEGATIVE: ICD-10-CM

## 2022-03-24 DIAGNOSIS — C50.412 MALIGNANT NEOPLASM OF UPPER-OUTER QUADRANT OF LEFT BREAST IN FEMALE, ESTROGEN RECEPTOR NEGATIVE: Primary | ICD-10-CM

## 2022-03-24 DIAGNOSIS — Z17.1 MALIGNANT NEOPLASM OF UPPER-OUTER QUADRANT OF LEFT BREAST IN FEMALE, ESTROGEN RECEPTOR NEGATIVE: ICD-10-CM

## 2022-03-24 DIAGNOSIS — Z17.1 MALIGNANT NEOPLASM OF UPPER-OUTER QUADRANT OF LEFT BREAST IN FEMALE, ESTROGEN RECEPTOR NEGATIVE: Primary | ICD-10-CM

## 2022-03-24 LAB — HCG INTACT+B SERPL-ACNC: <2.4 MIU/ML

## 2022-03-24 PROCEDURE — 84702 CHORIONIC GONADOTROPIN TEST: CPT | Performed by: INTERNAL MEDICINE

## 2022-03-24 PROCEDURE — 36415 COLL VENOUS BLD VENIPUNCTURE: CPT | Performed by: INTERNAL MEDICINE

## 2022-03-24 NOTE — TELEPHONE ENCOUNTER
----- Message from Yessy Cruz MD sent at 3/23/2022  7:40 AM CDT -----  Please make sure patient gets this lab done    ----- Message -----  From: Jennifer Zepeda MD  Sent: 3/22/2022   7:43 PM CDT  To: Yessy Cruz MD    Ultrasound shows no IUP and nothing in adnexa.   HCG >7000 is strange  Can you have her repeat HCG on Thursday and run it stat?  Thanks  ----- Message -----  From: Yessy Cruz MD  Sent: 3/22/2022   7:46 AM CDT  To: Jennifer Zepeda MD    This is the patient with BHG < 7000  Has copper IUD

## 2022-03-30 ENCOUNTER — HOSPITAL ENCOUNTER (OUTPATIENT)
Dept: RADIOLOGY | Facility: HOSPITAL | Age: 37
Discharge: HOME | End: 2022-03-30
Attending: INTERNAL MEDICINE
Payer: MEDICAID

## 2022-03-30 ENCOUNTER — HOSPITAL ENCOUNTER (OUTPATIENT)
Dept: CARDIOLOGY | Facility: HOSPITAL | Age: 37
Discharge: HOME | End: 2022-03-30
Attending: INTERNAL MEDICINE
Payer: MEDICAID

## 2022-03-30 VITALS
BODY MASS INDEX: 36.99 KG/M2 | DIASTOLIC BLOOD PRESSURE: 72 MMHG | HEIGHT: 65 IN | WEIGHT: 222 LBS | SYSTOLIC BLOOD PRESSURE: 130 MMHG | HEART RATE: 75 BPM

## 2022-03-30 DIAGNOSIS — C50.912 LEFT BREAST CANCER WITH T3 TUMOR, >5 CM IN GREATEST DIMENSION: ICD-10-CM

## 2022-03-30 LAB
ASCENDING AORTA: 2.63 CM
AV INDEX (PROSTH): 0.83
AV MEAN GRADIENT: 5 MMHG
AV PEAK GRADIENT: 9 MMHG
AV VALVE AREA: 3.06 CM2
AV VELOCITY RATIO: 0.78
BSA FOR ECHO PROCEDURE: 2.15 M2
CV ECHO LV RWT: 0.34 CM
DOP CALC AO PEAK VEL: 1.53 M/S
DOP CALC AO VTI: 29.19 CM
DOP CALC LVOT AREA: 3.7 CM2
DOP CALC LVOT DIAMETER: 2.17 CM
DOP CALC LVOT PEAK VEL: 1.19 M/S
DOP CALC LVOT STROKE VOLUME: 89.23 CM3
DOP CALCLVOT PEAK VEL VTI: 24.14 CM
E WAVE DECELERATION TIME: 153.66 MSEC
E/A RATIO: 1.62
E/E' RATIO: 7.48 M/S
ECHO LV POSTERIOR WALL: 0.92 CM (ref 0.6–1.1)
EJECTION FRACTION: 60 %
FRACTIONAL SHORTENING: 35 % (ref 28–44)
INTERVENTRICULAR SEPTUM: 0.85 CM (ref 0.6–1.1)
IVRT: 82.78 MSEC
LA MAJOR: 6.05 CM
LA MINOR: 5.88 CM
LA WIDTH: 4.3 CM
LEFT ATRIUM SIZE: 4.1 CM
LEFT ATRIUM VOLUME INDEX MOD: 42.3 ML/M2
LEFT ATRIUM VOLUME INDEX: 43.2 ML/M2
LEFT ATRIUM VOLUME MOD: 87.54 CM3
LEFT ATRIUM VOLUME: 89.37 CM3
LEFT INTERNAL DIMENSION IN SYSTOLE: 3.49 CM (ref 2.1–4)
LEFT VENTRICLE DIASTOLIC VOLUME INDEX: 68.14 ML/M2
LEFT VENTRICLE DIASTOLIC VOLUME: 141.05 ML
LEFT VENTRICLE MASS INDEX: 85 G/M2
LEFT VENTRICLE SYSTOLIC VOLUME INDEX: 24.4 ML/M2
LEFT VENTRICLE SYSTOLIC VOLUME: 50.61 ML
LEFT VENTRICULAR INTERNAL DIMENSION IN DIASTOLE: 5.4 CM (ref 3.5–6)
LEFT VENTRICULAR MASS: 176.27 G
LV LATERAL E/E' RATIO: 7.17 M/S
LV SEPTAL E/E' RATIO: 7.82 M/S
MV A" WAVE DURATION": 8.28 MSEC
MV PEAK A VEL: 0.53 M/S
MV PEAK E VEL: 0.86 M/S
MV STENOSIS PRESSURE HALF TIME: 44.56 MS
MV VALVE AREA P 1/2 METHOD: 4.94 CM2
PISA TR MAX VEL: 1.92 M/S
PULM VEIN S/D RATIO: 0.98
PV PEAK D VEL: 0.65 M/S
PV PEAK S VEL: 0.64 M/S
RA MAJOR: 5.93 CM
RA PRESSURE: 3 MMHG
RA WIDTH: 4.36 CM
RIGHT VENTRICULAR END-DIASTOLIC DIMENSION: 3.71 CM
RV TISSUE DOPPLER FREE WALL SYSTOLIC VELOCITY 1 (APICAL 4 CHAMBER VIEW): 14.45 CM/S
SINUS: 2.77 CM
STJ: 2.26 CM
TDI LATERAL: 0.12 M/S
TDI SEPTAL: 0.11 M/S
TDI: 0.12 M/S
TR MAX PG: 15 MMHG
TRICUSPID ANNULAR PLANE SYSTOLIC EXCURSION: 2.24 CM
TV REST PULMONARY ARTERY PRESSURE: 18 MMHG

## 2022-03-30 PROCEDURE — 93306 ECHO (CUPID ONLY): ICD-10-PCS | Mod: 26,,, | Performed by: INTERNAL MEDICINE

## 2022-03-30 PROCEDURE — 93356 MYOCRD STRAIN IMG SPCKL TRCK: CPT

## 2022-03-30 PROCEDURE — 74177 CT CHEST ABDOMEN PELVIS WITH CONTRAST (XPD): ICD-10-PCS | Mod: 26,,, | Performed by: RADIOLOGY

## 2022-03-30 PROCEDURE — 71260 CT CHEST ABDOMEN PELVIS WITH CONTRAST (XPD): ICD-10-PCS | Mod: 26,,, | Performed by: RADIOLOGY

## 2022-03-30 PROCEDURE — 25500020 PHARM REV CODE 255: Performed by: INTERNAL MEDICINE

## 2022-03-30 PROCEDURE — 93306 TTE W/DOPPLER COMPLETE: CPT | Mod: 26,,, | Performed by: INTERNAL MEDICINE

## 2022-03-30 PROCEDURE — 74177 CT ABD & PELVIS W/CONTRAST: CPT | Mod: TC

## 2022-03-30 PROCEDURE — 93356 MYOCRD STRAIN IMG SPCKL TRCK: CPT | Mod: ,,, | Performed by: INTERNAL MEDICINE

## 2022-03-30 PROCEDURE — 71260 CT THORAX DX C+: CPT | Mod: 26,,, | Performed by: RADIOLOGY

## 2022-03-30 PROCEDURE — 78306 BONE IMAGING WHOLE BODY: CPT | Mod: 26,,, | Performed by: STUDENT IN AN ORGANIZED HEALTH CARE EDUCATION/TRAINING PROGRAM

## 2022-03-30 PROCEDURE — 74177 CT ABD & PELVIS W/CONTRAST: CPT | Mod: 26,,, | Performed by: RADIOLOGY

## 2022-03-30 PROCEDURE — 93356 ECHO (CUPID ONLY): ICD-10-PCS | Mod: ,,, | Performed by: INTERNAL MEDICINE

## 2022-03-30 PROCEDURE — 78306 NM BONE SCAN WHOLE BODY: ICD-10-PCS | Mod: 26,,, | Performed by: STUDENT IN AN ORGANIZED HEALTH CARE EDUCATION/TRAINING PROGRAM

## 2022-03-30 PROCEDURE — 71260 CT THORAX DX C+: CPT | Mod: TC

## 2022-03-30 PROCEDURE — 78306 BONE IMAGING WHOLE BODY: CPT | Mod: TC

## 2022-03-30 PROCEDURE — A9503 TC99M MEDRONATE: HCPCS

## 2022-03-30 RX ADMIN — IOHEXOL 15 ML: 350 INJECTION, SOLUTION INTRAVENOUS at 11:03

## 2022-03-30 RX ADMIN — IOHEXOL 100 ML: 350 INJECTION, SOLUTION INTRAVENOUS at 11:03

## 2022-03-31 ENCOUNTER — TELEPHONE (OUTPATIENT)
Dept: HEMATOLOGY/ONCOLOGY | Facility: CLINIC | Age: 37
End: 2022-03-31
Payer: MEDICAID

## 2022-03-31 ENCOUNTER — OFFICE VISIT (OUTPATIENT)
Dept: HEMATOLOGY/ONCOLOGY | Facility: CLINIC | Age: 37
End: 2022-03-31
Payer: MEDICAID

## 2022-03-31 VITALS
HEART RATE: 74 BPM | WEIGHT: 225.06 LBS | BODY MASS INDEX: 37.5 KG/M2 | TEMPERATURE: 98 F | OXYGEN SATURATION: 98 % | SYSTOLIC BLOOD PRESSURE: 130 MMHG | RESPIRATION RATE: 18 BRPM | HEIGHT: 65 IN | DIASTOLIC BLOOD PRESSURE: 72 MMHG

## 2022-03-31 DIAGNOSIS — C50.412 MALIGNANT NEOPLASM OF UPPER-OUTER QUADRANT OF LEFT BREAST IN FEMALE, ESTROGEN RECEPTOR NEGATIVE: Primary | ICD-10-CM

## 2022-03-31 DIAGNOSIS — Z17.1 MALIGNANT NEOPLASM OF UPPER-OUTER QUADRANT OF LEFT BREAST IN FEMALE, ESTROGEN RECEPTOR NEGATIVE: Primary | ICD-10-CM

## 2022-03-31 PROCEDURE — 1159F PR MEDICATION LIST DOCUMENTED IN MEDICAL RECORD: ICD-10-PCS | Mod: CPTII,,, | Performed by: INTERNAL MEDICINE

## 2022-03-31 PROCEDURE — 99999 PR PBB SHADOW E&M-EST. PATIENT-LVL III: ICD-10-PCS | Mod: PBBFAC,,, | Performed by: INTERNAL MEDICINE

## 2022-03-31 PROCEDURE — 3075F PR MOST RECENT SYSTOLIC BLOOD PRESS GE 130-139MM HG: ICD-10-PCS | Mod: CPTII,,, | Performed by: INTERNAL MEDICINE

## 2022-03-31 PROCEDURE — 3075F SYST BP GE 130 - 139MM HG: CPT | Mod: CPTII,,, | Performed by: INTERNAL MEDICINE

## 2022-03-31 PROCEDURE — 3078F PR MOST RECENT DIASTOLIC BLOOD PRESSURE < 80 MM HG: ICD-10-PCS | Mod: CPTII,,, | Performed by: INTERNAL MEDICINE

## 2022-03-31 PROCEDURE — 99214 PR OFFICE/OUTPT VISIT, EST, LEVL IV, 30-39 MIN: ICD-10-PCS | Mod: S$PBB,,, | Performed by: INTERNAL MEDICINE

## 2022-03-31 PROCEDURE — 99999 PR PBB SHADOW E&M-EST. PATIENT-LVL III: CPT | Mod: PBBFAC,,, | Performed by: INTERNAL MEDICINE

## 2022-03-31 PROCEDURE — 3008F BODY MASS INDEX DOCD: CPT | Mod: CPTII,,, | Performed by: INTERNAL MEDICINE

## 2022-03-31 PROCEDURE — 1159F MED LIST DOCD IN RCRD: CPT | Mod: CPTII,,, | Performed by: INTERNAL MEDICINE

## 2022-03-31 PROCEDURE — 3008F PR BODY MASS INDEX (BMI) DOCUMENTED: ICD-10-PCS | Mod: CPTII,,, | Performed by: INTERNAL MEDICINE

## 2022-03-31 PROCEDURE — 99213 OFFICE O/P EST LOW 20 MIN: CPT | Mod: PBBFAC | Performed by: INTERNAL MEDICINE

## 2022-03-31 PROCEDURE — 3078F DIAST BP <80 MM HG: CPT | Mod: CPTII,,, | Performed by: INTERNAL MEDICINE

## 2022-03-31 PROCEDURE — 99214 OFFICE O/P EST MOD 30 MIN: CPT | Mod: S$PBB,,, | Performed by: INTERNAL MEDICINE

## 2022-03-31 RX ORDER — LIDOCAINE AND PRILOCAINE 25; 25 MG/G; MG/G
CREAM TOPICAL
Qty: 30 G | Refills: 0 | Status: SHIPPED | OUTPATIENT
Start: 2022-03-31 | End: 2022-11-07

## 2022-03-31 RX ORDER — ONDANSETRON HYDROCHLORIDE 8 MG/1
8 TABLET, FILM COATED ORAL EVERY 12 HOURS PRN
Qty: 30 TABLET | Refills: 2 | Status: ON HOLD | OUTPATIENT
Start: 2022-03-31 | End: 2022-11-11 | Stop reason: HOSPADM

## 2022-03-31 RX ORDER — PROMETHAZINE HYDROCHLORIDE 25 MG/1
25 TABLET ORAL EVERY 6 HOURS PRN
Qty: 30 TABLET | Refills: 3 | Status: SHIPPED | OUTPATIENT
Start: 2022-03-31 | End: 2023-02-13 | Stop reason: CLARIF

## 2022-03-31 RX ORDER — DEXAMETHASONE 4 MG/1
4 TABLET ORAL EVERY 6 HOURS
Qty: 120 TABLET | Refills: 0 | Status: SHIPPED | OUTPATIENT
Start: 2022-03-31 | End: 2022-08-16

## 2022-03-31 NOTE — PLAN OF CARE
START ON PATHWAY REGIMEN - Breast    [id number]        Pembrolizumab (Keytruda)       Paclitaxel (Taxol)       Carboplatin (Paraplatin)       Filgrastim-xxxx       Pembrolizumab (Keytruda)       Doxorubicin (Adriamycin)       Cyclophosphamide (Cytoxan)       Pegfilgrastim-xxxx           Additional Orders: In the KEYNOTE-522 trial (Cristiana et al., 2020), G-CSF   was recommended after each chemotherapy cycle beginning 24 hours after the last   dose of chemotherapy. For the paclitaxel + carboplatin cycles, filgrastim was   recommended and it continued daily at least until 72 hours after the last dose   of chemotherapy. For the doxorubicin + cyclophosphamide cycles, pegfilgrastim   could be used. See protocol for details. Assess LVEF prior to initiation of   doxorubicin and as clinically indicated during and after treatment. Doxorubicin   can cause myocardial damage, including acute left ventricular failure. Risk of   cardiomyopathy is generally proportional to cumulative   anthracycline/anthracenedione exposure. Use of concomitant cardiotoxic agents,   previous radiotherapy to the mediastinum, or presence/history of cardiovascular   disease can additionally increase cardiomyopathy risk. See PI for details.   Serious immune-mediated adverse events can occur with pembrolizumab. Please   monitor your patient and refer to the linked immune-mediated adverse reaction   management materials for more information.    **Always confirm dose/schedule in your pharmacy ordering system**    Patient Characteristics:  Preoperative or Nonsurgical Candidate (Clinical Staging), Neoadjuvant Therapy   followed by Surgery, Invasive Disease, Chemotherapy, HER2   Negative/Unknown/Equivocal, ER Negative/Unknown, Platinum Therapy Indicated,   High-Risk Disease Present  Therapeutic Status: Preoperative or Nonsurgical Candidate (Clinical Staging)  AJCC M Category: cM0  AJCC Grade: G3  Breast Surgical Plan: Neoadjuvant Therapy followed by Surgery  ER  Status: Negative (-)  AJCC 8 Stage Grouping: IIIB  HER2 Status: Negative (-)  AJCC T Category: cT2  AJCC N Category: cN1  ID Status: Negative (-)  Type of Therapy: Platinum Therapy Indicated  Intent of Therapy:  Curative Intent, Discussed with Patient

## 2022-03-31 NOTE — PROGRESS NOTES
Subjective:       Patient ID: Laura Kent is a 36 y.o. female.    Chief Complaint: Left breast cancer with T3 tumor, >5 cm in greatest dimensi    HPI     Presents to consent for treatment     - 3/30/2022 ECHO  · Moderate left atrial enlargement.  · The left ventricle is mildly enlarged with normal systolic function.  · The estimated ejection fraction is 60%.  · The left ventricular global longitudinal strain is -17%.  · Normal left ventricular diastolic function.  · Normal right ventricular size with normal right ventricular systolic function.  · Mild mitral regurgitation.  · Normal central venous pressure (3 mmHg).  · The estimated PA systolic pressure is 18 mmHg.    - 3/21/2022 CT C/A/P:  FINDINGS:  Base of Neck: No significant abnormality.  Thoracic soft tissue: A proximally 3.2 x 3.6 x 3.7 cm irregular left breast mass within the lateral aspect with internal biopsy marker, corresponding to findings on prior breast MRI and compatible with malignancy.  Enlarged left axillary lymph nodes, largest measuring approximately 4.2 x 3.6 cm, (series 2, image 27).  CHEST:  -Heart: Normal size. No pericardial effusion.  -Pulmonary vasculature: Pulmonary arteries distribute normally.  There are four pulmonary veins.  -Radha/Mediastinum: No pathologic shelly enlargement.  -Trachea and Proximal airways: Trachea is midline.  Central airways are patent.  No significant bronchial wall thickening or bronchiectasis.  -Lungs/Pleura: Symmetrically expanded without focal consolidation, pneumothorax, or mass.  No pleural effusion or thickening.  -Esophagus: Normal course and caliber.  ABDOMEN:  - Liver: Normal in size and attenuation with no focal hepatic abnormality.  - Gallbladder: No calcified gallstones.  No wall thickening or pericholecystic fluid.  - Bile Ducts: No intra or extrahepatic biliary ductal dilatation.  - Stomach/Duodenum: Small hiatal hernia otherwise unremarkable.  - Spleen: Normal size.  No focal parenchymal  abnormality.  - Pancreas: No mass lesion.  No pancreatic ductal dilatation.  No peripancreatic fat stranding.  - Adrenals: Unremarkable.  - Kidneys/ureters/urinary bladder: Normal in size and location.  Normal enhancement pattern.  No nephrolithiasis.  Ureters are normal in course and caliber.  No hydroureteronephrosis.  - Retroperitoneum: Scattered nonenlarged retroperitoneal lymph nodes.  PELVIS:  - Reproductive: Intrauterine device present.  A proximally 2.8 cm peripherally enhancing right ovarian cyst, likely corpus luteal cyst.  - Other: No pelvic adenopathy, free fluid, or mass.  BOWEL/MESENTERY:  No evidence of bowel obstruction or inflammatory process. Appendix is visualized and unremarkable.  VASCULATURE: Left-sided aortic arch with 3 branch vessels.  No aneurysm and no significant atherosclerosis.  Portal vein, splenic vein, and SMV are patent.  BONES: Lucent approximately 0.6 cm right trochanteric lesion, likely synovial herniation pit.  No acute fracture or bony destructive process.  EXTRATHORACIC/EXTRAPERITONEAL SOFT TISSUES: Unremarkable.  Impression:  In this patient with known breast cancer, there is an approximately 3.7 cm irregular left breast mass with left axillary lymphadenopathy.  No evidence of metastatic disease  Small hiatal hernia.  Additional findings as described above.    - 3/21/2022 Bone scan:  FINDINGS:  There is physiologic distribution of the radiopharmaceutical throughout the skeleton.  Mild degenerative uptake within the bilateral shoulders and knees.  There is normal uptake in the genitourinary system and soft tissues.  Impression:  There is no scintigraphic evidence of osteoblastic metastatic disease.    - 3/21/2022 Brain MRI:  FINDINGS:  Please note there is dental metal artifact distorting the examination.  Allowing for artifacts the brain parenchyma is normal in contour.  Developmental variant with cavum septum pellucidum identified.  The ventricles are otherwise normal in  size without hydrocephalus.  There is no midline shift or significant mass effect.  The major intracranial T2 flow voids are present.  No restricted diffusion within the non distorted brain parenchyma.  There is severe distortion of the susceptibility imaging no parenchymal susceptibility to suggest parenchymal hemorrhage in the non distorted brain parenchyma.  There is suboptimal evaluation of the cerebellum and posterior fossa.  Impression:  Unremarkable MRI brain allowing for artifacts from dental metal as detailed above specifically without abnormal parenchymal enhancement to suggest intracranial metastatic disease in light of history.    Repeat pregnancy test negative    Oncology History:  - self detected an area under her left arm and it initially seemed to go away (noted around winter holidays)  - 3/4/2022 Outside Mammogram:  Findings:  The breasts are heterogeneously dense, which may obscure small masses.   Left  There is a 31 mm x 18 mm x 21 mm irregularly shaped mass with microlobulated margins seen in the left breast at 2 o'clock in the posterior depth with associated left axillary adenopathy. Largest node depicted on the outside study measures 49 x 23 x 29 mm with cortical thickening and effacement of the hilum. Breast mass is reportedly palpable.   Right  There is no evidence of suspicious masses, calcifications, or other abnormal findings in the right breast.  Impression:  Left  Mass: Left breast 31 mm x 18 mm x 21 mm mass at the posterior 2 o'clock position. Assessment: 5 - Highly suggestive of malignancy. Biopsy is recommended.   Right  There is no mammographic evidence of malignancy in the right breast.  BI-RADS Category:   Overall: 5 - Highly Suggestive of Malignancy  Recommendation:  Ultrasound Guided Core Needle Biopsy of the left breast mass and one of the abnormal left axillary nodes is recommended.     - 3/14/2022 Breast biopsy:  1. LEFT BREAST MASS, 2 O'CLOCK, BIOPSY:   Invasive ductal  carcinoma, Lev histologic grade 3 (tubule formation:   3, nuclear pleomorphism:  3, mitotic activity:  3).   Comment:  Infiltrating carcinoma occupies the entirety of biopsied material   with the largest continuous focus measuring 13 mm.  Breast biomarkers are   pending and will be issued in a supplemental report.   2. LEFT AXILLARY LYMPH NODE, BIOPSY:   Invasive ductal carcinoma, Lev histologic grade 3 (tubule formation:   3, nuclear pleomorphism:  3, mitotic activity:  3).   Comment:  Infiltrating carcinoma occupies the entirety of biopsied material   with the largest continuous focus measuring 20 mm.  No lymph node tissue is   identified in the sample.  Tumor histology is essentially identical to that   of part 1.  The findings could represent part of a larger intranodal   metastasis, but an extension from the primary tumor cannot be excluded.   Radiographic correlation is advised.   Note:  Dr. Stephanie Rao also reviewed this case and agrees with the   diagnosis.   BREAST BIOMARKER RESULTS   Estrogen receptor (ER):  Negative (0)   Progesterone receptor (ER):  Negative (0)   HER2 IHC:  Negative (1+)   Ki-67 proliferation index:  80%      - 3/17/2022 Breast MRI:  Findings:  The breasts have heterogeneous fibroglandular tissue. The background parenchymal enhancement is minimal.   Left  There is a 38 mm x 33 mm x 33 mm irregularly shaped mass seen in the left breast at 2 o'clock in the posterior depth, 8.4 cm from the nipple and 1.2 cm from the skin. Associated signal void from a twirl biopsy marker; pathology showed invasive ductal carcinoma.   Posterior to the mass there is abnormal non mass enhancement measuring 29 x 28 mm. The NME is 4 mm from the skin and 2.3 cm from the chest wall. In total the mass and NME measure 54 mm in AP extent.  Overlying skin thickening and edema involving the lateral breast, likely from lymphovascular obstruction. No suspicious skin enhancement. Overall increased  vascularity to the left breast.   Four abnormal lymph level 1 and level 2 left axillary lymph nodes. The largest lymph node measures 52 mm x 40 mm x 38 mm which previously underwent biopsy showing metastatic disease. There is an associated radar reflector within the biopsied lymph node.  Right  There is no evidence of suspicious masses, abnormal enhancement, or other abnormal findings in the right breast. No enlarged axillary or internal mammary lymph nodes.   Impression:  Left  Mass: Left breast 38 mm x 33 mm x 33 mm mass at the posterior 2 o'clock position. Assessment: 6 - Known biopsy, proven malignancy. Associated 29 mm NME extending posteriorly from the mass. In total the mass and NME measure 54 mm in AP extent.  Lymph Node: Abnormal level 1 and 2 left axillary lymph nodes, the largest of which measures 52 mm x 40 mm x 38 mm lymph node and is known biopsy, proven malignancy.   Right  There is no MR evidence of malignancy in the right breast.  BI-RADS Category:   Overall: 6 - Known Biopsy-Proven Malignancy  Recommendation:  Clinical management of known left breast cancer. Patient is established with the breast surgery clinic.      - Additional Imaging needed     PMH:  HTN- around pregnancies; off of blood pressure medication x 2 years  CHF- ECHOs at [facility]  Gestational DM  Hyperlipidemia when heavier, managed with diet and followed by cardiology  C- sections x 2  Iron deficiency     GynHx:  Menarche- 7   (16 at 1st pregnancy) (17, 7, 4)  Still with periods - last 2022- last 5-7 days, moderate normal flow  + breast feeding x 1 year  IUD     SH:  Working, , [employer]  Single  Good support system  No tobacco  No EtOH  No illicit drugs     FH:  Mom- 57 yo, osteoarthritis   Dad- 58 yo, DM, HTN  Maternal uncle-  of metastatic pancreatic cancer at age 57  Paternal grandmother-  of metastatic bladder cancer- unclear what age but thought to be her 60s  No breast cancer  No  uterine cancer, no ovarian cancer  No prostate cancer  No melanoma    Presents to consent     Review of Systems    no changes  Objective:      Physical Exam    No changes  Assessment:       Problem List Items Addressed This Visit     Malignant neoplasm of upper-outer quadrant of left breast in female, estrogen receptor negative - Primary          Plan:       Port placement on Monday    Initiate chemotherapy post  Consented today for below regimen  Reviewed potential side effects and answered all questions  She is not interested in dignicap or fertility preservation    Cardio onc referral  Nausea meds sent      Route Chart for Scheduling  Med Onc Route Chart for Scheduling    Treatment Plan Information   OP PEMBROLIZUMAB CARBOPLATIN (AUC 5) WITH WEEKLY PACLITAXEL FOLLOWED BY PEMBROLIZUMAB DOXORUBICIN CYCLOPHOSPHAMIDE FOLLOWED BY PEMBROLIZUMAB 200 MG Q3W   Yessy Cruz MD   Upcoming Treatment Dates - OP PEMBROLIZUMAB CARBOPLATIN (AUC 5) WITH WEEKLY PACLITAXEL FOLLOWED BY PEMBROLIZUMAB DOXORUBICIN CYCLOPHOSPHAMIDE FOLLOWED BY PEMBROLIZUMAB 200 MG Q3W    4/7/2022       Pre-Medications       DIPHENHYDRAMINE IV ORDERABLE       FAMOTIDINE (PF) 20 MG/2 ML IV SOLN       Chemotherapy       PACLitaxeL (TAXOL) 80 mg/m2 = 174 mg in sodium chloride 0.9% 250 mL chemo infusion       CARBOplatin (PARAPLATIN) in sodium chloride 0.9% 250 mL chemo infusion  4/14/2022       Pre-Medications       DEXAMETHASONE ORDERABLE       DIPHENHYDRAMINE IV ORDERABLE       FAMOTIDINE (PF) 20 MG/2 ML IV SOLN       Chemotherapy       PACLitaxeL (TAXOL) 80 mg/m2 = 174 mg in sodium chloride 0.9% 250 mL chemo infusion  4/21/2022       Pre-Medications       DEXAMETHASONE ORDERABLE       DIPHENHYDRAMINE IV ORDERABLE       FAMOTIDINE (PF) 20 MG/2 ML IV SOLN       Chemotherapy       PACLitaxeL (TAXOL) 80 mg/m2 = 174 mg in sodium chloride 0.9% 250 mL chemo infusion  4/28/2022       Pre-Medications       DIPHENHYDRAMINE IV ORDERABLE       FAMOTIDINE (PF) 20  MG/2 ML IV SOLN       Chemotherapy       PACLitaxeL (TAXOL) 80 mg/m2 = 174 mg in sodium chloride 0.9% 250 mL chemo infusion       CARBOplatin (PARAPLATIN) in sodium chloride 0.9% 250 mL chemo infusion

## 2022-04-01 ENCOUNTER — PATIENT MESSAGE (OUTPATIENT)
Dept: SURGERY | Facility: CLINIC | Age: 37
End: 2022-04-01
Payer: MEDICAID

## 2022-04-01 ENCOUNTER — TELEPHONE (OUTPATIENT)
Dept: SURGERY | Facility: CLINIC | Age: 37
End: 2022-04-01
Payer: MEDICAID

## 2022-04-01 NOTE — TELEPHONE ENCOUNTER
Genetic Lay Navigator Note:    Called patient with the results of genetic testing. Explained that genetic testing resulted with a variant of unknown significance (Intergrated [employer] with [employer] [facility] Report : GENE VARIANT(S) OF UNCERTAIN SIGNIFICANCE INTERPRETATION  AXIN2 c.1615G>A (p.[id number]) Uncertain Clinical Significance RNF43 c.970C>A (p.[id number]) Uncertain Clinical Significance).  Discussed that the results do not indicate any further action is needed at this time, and that the company will notify us if any additional recommendations become available. Explained that we will provide a formal copy of report via [employer] or mail to patient.    Offered patient a phone session with a genetic counselor through [employer], or a in person session with our [facility] genetic counselors. Also discussed that this will remain an available option for patient at any time in the future.     Patient was instructed to call with any additional questions or concerns. Verbalized understanding of all information.    Provider notified of results and that patient was given them.

## 2022-04-03 NOTE — H&P (VIEW-ONLY)
General Surgery Office Visit   History and Physical    Patient Name: Laura Kent  YOB: 1985 (36 y.o.)  MRN: [id number]  Today's Date: 2022    Referring Md:   Yessy Cruz Md  [address] [place] [address]    SUBJECTIVE:     Chief Complaint: Left breast cancer with shelly metastasis     History of Present Illness:  Laura Kent is a 36 y.o. female with medical problems including HLD who presents to the clinic today to arrange for port placement for chemotherapy administration in the setting of left breast cancer. She denies prior history of neck surgery or central line placement.    She denies fever, chills, unintentional weight loss, n/v/d, constipation, hematochezia, dysuria, hematuria, CP, SOB, and all other symptoms.     Patient denies personal history of MI, CVA, lung disease, DM  Denies alcohol, tobacco, and elicit drug use.   Not currently on any anticoagulants    Review of patient's allergies indicates:  No Known Allergies    Past Medical History:   Diagnosis Date    Anxiety     Breast cancer     Encounter for blood transfusion     Hypertension     Meningitis      Past Surgical History:   Procedure Laterality Date    BREAST BIOPSY       SECTION       Family History   Problem Relation Age of Onset    Diabetes Father     Hypertension Father     Pancreatic cancer Maternal Uncle     Bladder Cancer Paternal Grandmother      Social History     Tobacco Use    Smoking status: Never Smoker    Smokeless tobacco: Never Used   Substance Use Topics    Alcohol use: Yes     Comment: social    Drug use: Never        Review of Systems:  Review of Systems   All other systems reviewed and are negative.      OBJECTIVE:     Vital Signs (Most Recent)  There were no vitals taken for this visit.  There is no height or weight on file to calculate BMI.    Physical Exam  Vitals and nursing note reviewed.   Constitutional:       General: She is not in acute distress.      Appearance: Normal appearance. She is obese. She is not ill-appearing.   HENT:      Head: Normocephalic and atraumatic.   Eyes:      General: No scleral icterus.     Extraocular Movements: Extraocular movements intact.      Conjunctiva/sclera: Conjunctivae normal.   Cardiovascular:      Rate and Rhythm: Normal rate.   Pulmonary:      Effort: Pulmonary effort is normal. No respiratory distress.   Abdominal:      General: There is no distension.      Palpations: Abdomen is soft.   Musculoskeletal:      Cervical back: Normal range of motion and neck supple. No rigidity or tenderness.   Lymphadenopathy:      Cervical: No cervical adenopathy.   Skin:     General: Skin is warm and dry.   Neurological:      General: No focal deficit present.      Mental Status: She is alert and oriented to person, place, and time.   Psychiatric:         Mood and Affect: Mood normal.         Behavior: Behavior normal.         Thought Content: Thought content normal.         Judgment: Judgment normal.           Labs: CBC, CMP 3/21: Hb 10.7, otherwise essentially normal     TTE 3/20/22  · Moderate left atrial enlargement.  · The left ventricle is mildly enlarged with normal systolic function.  · The estimated ejection fraction is 60%.  · The left ventricular global longitudinal strain is -17%.  · Normal left ventricular diastolic function.  · Normal right ventricular size with normal right ventricular systolic function.  · Mild mitral regurgitation.  · Normal central venous pressure (3 mmHg).  · The estimated PA systolic pressure is 18 mmHg.    Imaging:   CT CAP 3/21/22: Patent R and L internal jugular veins without evidence of central stenosis      ASSESSMENT/PLAN:     Laura Kent is a 36 y.o. female with medical problems including triple negative breast cancer who presents to the clinic for evaluation for port placement for chemotherapy administration.     -Will plan for right, possible left port-a-cath placement  -Consent obtained in  clinic    Case discussed with Dr. Mcdaniel.    Asael Pringle MD  General Surgery, PGY-1    I have personally taken the history and examined this patient and agree with the resident's note as stated above.         Deo Sin MD

## 2022-04-03 NOTE — PROGRESS NOTES
General Surgery Office Visit   History and Physical    Patient Name: Laura Kent  YOB: 1985 (36 y.o.)  MRN: [id number]  Today's Date: 2022    Referring Md:   Yessy Cruz Md  [address] [place] [address]    SUBJECTIVE:     Chief Complaint: Left breast cancer with shelly metastasis     History of Present Illness:  Laura Kent is a 36 y.o. female with medical problems including HLD who presents to the clinic today to arrange for port placement for chemotherapy administration in the setting of left breast cancer. She denies prior history of neck surgery or central line placement.    She denies fever, chills, unintentional weight loss, n/v/d, constipation, hematochezia, dysuria, hematuria, CP, SOB, and all other symptoms.     Patient denies personal history of MI, CVA, lung disease, DM  Denies alcohol, tobacco, and elicit drug use.   Not currently on any anticoagulants    Review of patient's allergies indicates:  No Known Allergies    Past Medical History:   Diagnosis Date    Anxiety     Breast cancer     Encounter for blood transfusion     Hypertension     Meningitis      Past Surgical History:   Procedure Laterality Date    BREAST BIOPSY       SECTION       Family History   Problem Relation Age of Onset    Diabetes Father     Hypertension Father     Pancreatic cancer Maternal Uncle     Bladder Cancer Paternal Grandmother      Social History     Tobacco Use    Smoking status: Never Smoker    Smokeless tobacco: Never Used   Substance Use Topics    Alcohol use: Yes     Comment: social    Drug use: Never        Review of Systems:  Review of Systems   All other systems reviewed and are negative.      OBJECTIVE:     Vital Signs (Most Recent)  There were no vitals taken for this visit.  There is no height or weight on file to calculate BMI.    Physical Exam  Vitals and nursing note reviewed.   Constitutional:       General: She is not in acute distress.      Appearance: Normal appearance. She is obese. She is not ill-appearing.   HENT:      Head: Normocephalic and atraumatic.   Eyes:      General: No scleral icterus.     Extraocular Movements: Extraocular movements intact.      Conjunctiva/sclera: Conjunctivae normal.   Cardiovascular:      Rate and Rhythm: Normal rate.   Pulmonary:      Effort: Pulmonary effort is normal. No respiratory distress.   Abdominal:      General: There is no distension.      Palpations: Abdomen is soft.   Musculoskeletal:      Cervical back: Normal range of motion and neck supple. No rigidity or tenderness.   Lymphadenopathy:      Cervical: No cervical adenopathy.   Skin:     General: Skin is warm and dry.   Neurological:      General: No focal deficit present.      Mental Status: She is alert and oriented to person, place, and time.   Psychiatric:         Mood and Affect: Mood normal.         Behavior: Behavior normal.         Thought Content: Thought content normal.         Judgment: Judgment normal.           Labs: CBC, CMP 3/21: Hb 10.7, otherwise essentially normal     TTE 3/20/22  · Moderate left atrial enlargement.  · The left ventricle is mildly enlarged with normal systolic function.  · The estimated ejection fraction is 60%.  · The left ventricular global longitudinal strain is -17%.  · Normal left ventricular diastolic function.  · Normal right ventricular size with normal right ventricular systolic function.  · Mild mitral regurgitation.  · Normal central venous pressure (3 mmHg).  · The estimated PA systolic pressure is 18 mmHg.    Imaging:   CT CAP 3/21/22: Patent R and L internal jugular veins without evidence of central stenosis      ASSESSMENT/PLAN:     Laura Kent is a 36 y.o. female with medical problems including triple negative breast cancer who presents to the clinic for evaluation for port placement for chemotherapy administration.     -Will plan for right, possible left port-a-cath placement  -Consent obtained in  clinic    Case discussed with Dr. Mcdaniel.    Asael Pringle MD  General Surgery, PGY-1    I have personally taken the history and examined this patient and agree with the resident's note as stated above.         Deo Sin MD

## 2022-04-04 ENCOUNTER — OFFICE VISIT (OUTPATIENT)
Dept: SURGERY | Facility: CLINIC | Age: 37
End: 2022-04-04
Payer: MEDICAID

## 2022-04-04 VITALS
WEIGHT: 221.81 LBS | SYSTOLIC BLOOD PRESSURE: 135 MMHG | HEART RATE: 69 BPM | HEIGHT: 65 IN | BODY MASS INDEX: 36.96 KG/M2 | DIASTOLIC BLOOD PRESSURE: 72 MMHG

## 2022-04-04 DIAGNOSIS — C50.412 MALIGNANT NEOPLASM OF UPPER-OUTER QUADRANT OF LEFT BREAST IN FEMALE, ESTROGEN RECEPTOR NEGATIVE: Primary | ICD-10-CM

## 2022-04-04 DIAGNOSIS — Z17.1 MALIGNANT NEOPLASM OF UPPER-OUTER QUADRANT OF LEFT BREAST IN FEMALE, ESTROGEN RECEPTOR NEGATIVE: Primary | ICD-10-CM

## 2022-04-04 PROCEDURE — 1159F PR MEDICATION LIST DOCUMENTED IN MEDICAL RECORD: ICD-10-PCS | Mod: CPTII,,, | Performed by: SURGERY

## 2022-04-04 PROCEDURE — 3075F PR MOST RECENT SYSTOLIC BLOOD PRESS GE 130-139MM HG: ICD-10-PCS | Mod: CPTII,,, | Performed by: SURGERY

## 2022-04-04 PROCEDURE — 3008F PR BODY MASS INDEX (BMI) DOCUMENTED: ICD-10-PCS | Mod: CPTII,,, | Performed by: SURGERY

## 2022-04-04 PROCEDURE — 99213 OFFICE O/P EST LOW 20 MIN: CPT | Mod: PBBFAC | Performed by: SURGERY

## 2022-04-04 PROCEDURE — 99214 PR OFFICE/OUTPT VISIT, EST, LEVL IV, 30-39 MIN: ICD-10-PCS | Mod: S$PBB,,, | Performed by: SURGERY

## 2022-04-04 PROCEDURE — 99999 PR PBB SHADOW E&M-EST. PATIENT-LVL III: CPT | Mod: PBBFAC,,, | Performed by: SURGERY

## 2022-04-04 PROCEDURE — 3078F PR MOST RECENT DIASTOLIC BLOOD PRESSURE < 80 MM HG: ICD-10-PCS | Mod: CPTII,,, | Performed by: SURGERY

## 2022-04-04 PROCEDURE — 3078F DIAST BP <80 MM HG: CPT | Mod: CPTII,,, | Performed by: SURGERY

## 2022-04-04 PROCEDURE — 99999 PR PBB SHADOW E&M-EST. PATIENT-LVL III: ICD-10-PCS | Mod: PBBFAC,,, | Performed by: SURGERY

## 2022-04-04 PROCEDURE — 3008F BODY MASS INDEX DOCD: CPT | Mod: CPTII,,, | Performed by: SURGERY

## 2022-04-04 PROCEDURE — 1160F PR REVIEW ALL MEDS BY PRESCRIBER/CLIN PHARMACIST DOCUMENTED: ICD-10-PCS | Mod: CPTII,,, | Performed by: SURGERY

## 2022-04-04 PROCEDURE — 1160F RVW MEDS BY RX/DR IN RCRD: CPT | Mod: CPTII,,, | Performed by: SURGERY

## 2022-04-04 PROCEDURE — 99214 OFFICE O/P EST MOD 30 MIN: CPT | Mod: S$PBB,,, | Performed by: SURGERY

## 2022-04-04 PROCEDURE — 1159F MED LIST DOCD IN RCRD: CPT | Mod: CPTII,,, | Performed by: SURGERY

## 2022-04-04 PROCEDURE — 3075F SYST BP GE 130 - 139MM HG: CPT | Mod: CPTII,,, | Performed by: SURGERY

## 2022-04-05 ENCOUNTER — HOSPITAL ENCOUNTER (OUTPATIENT)
Facility: HOSPITAL | Age: 37
Discharge: HOME | End: 2022-04-05
Attending: SURGERY | Admitting: SURGERY
Payer: MEDICAID

## 2022-04-05 ENCOUNTER — ANESTHESIA EVENT (OUTPATIENT)
Dept: SURGERY | Facility: HOSPITAL | Age: 37
End: 2022-04-05
Payer: MEDICAID

## 2022-04-05 ENCOUNTER — ANESTHESIA (OUTPATIENT)
Dept: SURGERY | Facility: HOSPITAL | Age: 37
End: 2022-04-05
Payer: MEDICAID

## 2022-04-05 VITALS
DIASTOLIC BLOOD PRESSURE: 78 MMHG | TEMPERATURE: 98 F | HEIGHT: 65 IN | BODY MASS INDEX: 36.65 KG/M2 | OXYGEN SATURATION: 94 % | WEIGHT: 220 LBS | HEART RATE: 64 BPM | SYSTOLIC BLOOD PRESSURE: 126 MMHG | RESPIRATION RATE: 20 BRPM

## 2022-04-05 DIAGNOSIS — C50.412 MALIGNANT NEOPLASM OF UPPER-OUTER QUADRANT OF LEFT BREAST IN FEMALE, ESTROGEN RECEPTOR NEGATIVE: Primary | ICD-10-CM

## 2022-04-05 DIAGNOSIS — Z17.1 MALIGNANT NEOPLASM OF UPPER-OUTER QUADRANT OF LEFT BREAST IN FEMALE, ESTROGEN RECEPTOR NEGATIVE: Primary | ICD-10-CM

## 2022-04-05 LAB
B-HCG UR QL: NEGATIVE
CTP QC/QA: YES

## 2022-04-05 PROCEDURE — 71000044 HC DOSC ROUTINE RECOVERY FIRST HOUR: Performed by: SURGERY

## 2022-04-05 PROCEDURE — 71000015 HC POSTOP RECOV 1ST HR: Performed by: SURGERY

## 2022-04-05 PROCEDURE — 81025 URINE PREGNANCY TEST: CPT | Performed by: SURGERY

## 2022-04-05 PROCEDURE — 63600175 PHARM REV CODE 636 W HCPCS: Performed by: SURGERY

## 2022-04-05 PROCEDURE — 36561 PR INSERT TUNNELED CV CATH WITH PORT: ICD-10-PCS | Mod: RT,,, | Performed by: SURGERY

## 2022-04-05 PROCEDURE — 00532 ANES ACCESS CTR VENOUS CRCJ: CPT | Performed by: SURGERY

## 2022-04-05 PROCEDURE — 37000008 HC ANESTHESIA 1ST 15 MINUTES: Performed by: SURGERY

## 2022-04-05 PROCEDURE — 37000009 HC ANESTHESIA EA ADD 15 MINS: Performed by: SURGERY

## 2022-04-05 PROCEDURE — D9220A PRA ANESTHESIA: ICD-10-PCS | Mod: ,,, | Performed by: ANESTHESIOLOGY

## 2022-04-05 PROCEDURE — D9220A PRA ANESTHESIA: Mod: ,,, | Performed by: ANESTHESIOLOGY

## 2022-04-05 PROCEDURE — 63600175 PHARM REV CODE 636 W HCPCS: Performed by: ANESTHESIOLOGY

## 2022-04-05 PROCEDURE — 77001 CHG FLUOROGUIDE CNTRL VEN ACCESS,PLACE,REPLACE,REMOVE: ICD-10-PCS | Mod: 26,,, | Performed by: SURGERY

## 2022-04-05 PROCEDURE — 36000706: Performed by: SURGERY

## 2022-04-05 PROCEDURE — 36000707: Performed by: SURGERY

## 2022-04-05 PROCEDURE — 25000003 PHARM REV CODE 250: Performed by: STUDENT IN AN ORGANIZED HEALTH CARE EDUCATION/TRAINING PROGRAM

## 2022-04-05 PROCEDURE — 25000003 PHARM REV CODE 250: Performed by: SURGERY

## 2022-04-05 PROCEDURE — 63600175 PHARM REV CODE 636 W HCPCS: Performed by: STUDENT IN AN ORGANIZED HEALTH CARE EDUCATION/TRAINING PROGRAM

## 2022-04-05 PROCEDURE — 36561 INSERT TUNNELED CV CATH: CPT | Mod: RT,,, | Performed by: SURGERY

## 2022-04-05 PROCEDURE — 77001 FLUOROGUIDE FOR VEIN DEVICE: CPT | Mod: 26,,, | Performed by: SURGERY

## 2022-04-05 PROCEDURE — C1788 PORT, INDWELLING, IMP: HCPCS | Performed by: SURGERY

## 2022-04-05 DEVICE — KIT POWERPORT SINGLE 8FR: Type: IMPLANTABLE DEVICE | Site: CHEST | Status: NON-FUNCTIONAL

## 2022-04-05 RX ORDER — OXYCODONE HYDROCHLORIDE 5 MG/1
5 TABLET ORAL ONCE AS NEEDED
Status: COMPLETED | OUTPATIENT
Start: 2022-04-05 | End: 2022-04-05

## 2022-04-05 RX ORDER — OXYCODONE HYDROCHLORIDE 5 MG/1
5 TABLET ORAL EVERY 6 HOURS PRN
Qty: 8 TABLET | Refills: 0 | Status: SHIPPED | OUTPATIENT
Start: 2022-04-05 | End: 2022-11-07

## 2022-04-05 RX ORDER — HEPARIN SODIUM (PORCINE) LOCK FLUSH IV SOLN 100 UNIT/ML 100 UNIT/ML
SOLUTION INTRAVENOUS
Status: DISCONTINUED | OUTPATIENT
Start: 2022-04-05 | End: 2022-04-05 | Stop reason: HOSPADM

## 2022-04-05 RX ORDER — LIDOCAINE HYDROCHLORIDE AND EPINEPHRINE 10; 10 MG/ML; UG/ML
INJECTION, SOLUTION INFILTRATION; PERINEURAL
Status: DISCONTINUED | OUTPATIENT
Start: 2022-04-05 | End: 2022-04-05 | Stop reason: HOSPADM

## 2022-04-05 RX ORDER — MIDAZOLAM HYDROCHLORIDE 1 MG/ML
INJECTION, SOLUTION INTRAMUSCULAR; INTRAVENOUS
Status: DISCONTINUED | OUTPATIENT
Start: 2022-04-05 | End: 2022-04-05

## 2022-04-05 RX ORDER — PROPOFOL 10 MG/ML
VIAL (ML) INTRAVENOUS
Status: DISCONTINUED | OUTPATIENT
Start: 2022-04-05 | End: 2022-04-05

## 2022-04-05 RX ORDER — FENTANYL CITRATE 50 UG/ML
INJECTION, SOLUTION INTRAMUSCULAR; INTRAVENOUS
Status: DISCONTINUED | OUTPATIENT
Start: 2022-04-05 | End: 2022-04-05

## 2022-04-05 RX ORDER — PROPOFOL 10 MG/ML
VIAL (ML) INTRAVENOUS CONTINUOUS PRN
Status: DISCONTINUED | OUTPATIENT
Start: 2022-04-05 | End: 2022-04-05

## 2022-04-05 RX ORDER — OXYCODONE HYDROCHLORIDE 5 MG/1
TABLET ORAL
Status: DISCONTINUED
Start: 2022-04-05 | End: 2022-04-05 | Stop reason: HOSPADM

## 2022-04-05 RX ORDER — FENTANYL CITRATE 50 UG/ML
25 INJECTION, SOLUTION INTRAMUSCULAR; INTRAVENOUS EVERY 5 MIN PRN
Status: DISCONTINUED | OUTPATIENT
Start: 2022-04-05 | End: 2022-04-05 | Stop reason: HOSPADM

## 2022-04-05 RX ORDER — BUPIVACAINE HYDROCHLORIDE 2.5 MG/ML
INJECTION, SOLUTION EPIDURAL; INFILTRATION; INTRACAUDAL
Status: DISCONTINUED | OUTPATIENT
Start: 2022-04-05 | End: 2022-04-05 | Stop reason: HOSPADM

## 2022-04-05 RX ORDER — CEFAZOLIN SODIUM/WATER 2 G/20 ML
2 SYRINGE (ML) INTRAVENOUS
Status: COMPLETED | OUTPATIENT
Start: 2022-04-05 | End: 2022-04-05

## 2022-04-05 RX ORDER — KETAMINE HCL IN 0.9 % NACL 50 MG/5 ML
SYRINGE (ML) INTRAVENOUS
Status: DISCONTINUED | OUTPATIENT
Start: 2022-04-05 | End: 2022-04-05

## 2022-04-05 RX ORDER — ONDANSETRON 2 MG/ML
4 INJECTION INTRAMUSCULAR; INTRAVENOUS DAILY PRN
Status: DISCONTINUED | OUTPATIENT
Start: 2022-04-05 | End: 2022-04-05 | Stop reason: HOSPADM

## 2022-04-05 RX ADMIN — Medication 10 MG: at 05:04

## 2022-04-05 RX ADMIN — OXYCODONE 5 MG: 5 TABLET ORAL at 05:04

## 2022-04-05 RX ADMIN — PROPOFOL 100 MCG/KG/MIN: 10 INJECTION, EMULSION INTRAVENOUS at 04:04

## 2022-04-05 RX ADMIN — Medication 2 G: at 04:04

## 2022-04-05 RX ADMIN — SODIUM CHLORIDE: 9 INJECTION, SOLUTION INTRAVENOUS at 04:04

## 2022-04-05 RX ADMIN — ONDANSETRON 4 MG: 2 INJECTION INTRAMUSCULAR; INTRAVENOUS at 05:04

## 2022-04-05 RX ADMIN — MIDAZOLAM HYDROCHLORIDE 1 MG: 1 INJECTION, SOLUTION INTRAMUSCULAR; INTRAVENOUS at 04:04

## 2022-04-05 RX ADMIN — FENTANYL CITRATE 25 MCG: 50 INJECTION, SOLUTION INTRAMUSCULAR; INTRAVENOUS at 05:04

## 2022-04-05 RX ADMIN — FENTANYL CITRATE 25 MCG: 50 INJECTION, SOLUTION INTRAMUSCULAR; INTRAVENOUS at 04:04

## 2022-04-05 RX ADMIN — Medication 20 MG: at 04:04

## 2022-04-05 NOTE — TRANSFER OF CARE
"Anesthesia Transfer of Care Note    Patient: Laura Kent    Procedure(s) Performed: Procedure(s) (LRB):  [id number] (N/A)    Patient location: PACU    Anesthesia Type: MAC    Transport from OR: Transported from OR on 6-10 L/min O2 by face mask with adequate spontaneous ventilation    Post pain: adequate analgesia    Post assessment: no apparent anesthetic complications    Post vital signs: stable    Level of consciousness: awake    Nausea/Vomiting: no nausea/vomiting    Complications: none    Transfer of care protocol was followed      Last vitals:   HR 68 bpm  /76 mmHg  SpO2 100%    Visit Vitals  /68 (BP Location: Right arm, Patient Position: Lying)   Pulse 64   Temp 37 °C (98.6 °F) (Oral)   Resp 16   Ht 5' 5" (1.651 m)   Wt 99.8 kg (220 lb)   SpO2 99%   Breastfeeding No   BMI 36.61 kg/m²     "

## 2022-04-05 NOTE — BRIEF OP NOTE
[facility] - Surgery ([facility])  Brief Operative Note    Surgery Date: 4/5/2022     Surgeon(s) and Role:     * Deo Sin Jr., MD - Primary    Assisting Surgeon: None    Pre-op Diagnosis:  Malignant neoplasm of upper-outer quadrant of left breast in female, estrogen receptor negative [C50.412, Z17.1]    Post-op Diagnosis:  Post-Op Diagnosis Codes:     * Malignant neoplasm of upper-outer quadrant of left breast in female, estrogen receptor negative [C50.412, Z17.1]    Procedure(s) (LRB):  [id number] (N/A)    Anesthesia: Local MAC    Operative Findings: Port placement into right subclavian vein under fluoroscopic guidance without apparent complication.     Estimated Blood Loss: 3 mL         Specimens:   Specimen (24h ago, onward)            None            Discharge Note    OUTCOME: Patient tolerated treatment/procedure well without complication and is now ready for discharge. A chest X-Ray will be performed in PACU prior to discharge.     DISPOSITION: Home or Self Care    FINAL DIAGNOSIS:  Malignant neoplasm of upper-outer quadrant of left breast in female, estrogen receptor negative    FOLLOWUP: With primary care provider    DISCHARGE INSTRUCTIONS:    Discharge Procedure Orders   Diet Adult Regular     Lifting restrictions   Order Comments: Do not lift greater than 10 lbs with the right arm for 2 weeks following surgery     No dressing needed     Notify your health care provider if you experience any of the following:  temperature >100.4     Notify your health care provider if you experience any of the following:  persistent nausea and vomiting or diarrhea     Notify your health care provider if you experience any of the following:  severe uncontrolled pain     Notify your health care provider if you experience any of the following:  redness, tenderness, or signs of infection (pain, swelling, redness, odor or green/yellow discharge around incision site)     Notify your health care provider if you  experience any of the following:  difficulty breathing or increased cough     Notify your health care provider if you experience any of the following:  severe persistent headache     Notify your health care provider if you experience any of the following:  worsening rash     Notify your health care provider if you experience any of the following:  persistent dizziness, light-headedness, or visual disturbances     Notify your health care provider if you experience any of the following:  increased confusion or weakness     Activity as tolerated     Shower on day dressing removed (No bath)        Clinical Reference Documents Added to Patient Instructions       Document    PORTACAT DISCHARGE INSTRUCTIONS (ENGLISH)

## 2022-04-05 NOTE — OP NOTE
DATE: 4/5/2022    PREOPERATIVE DIAGNOSIS: Breast Cancer    POSTOPERATIVE DIAGNOSIS: Breast Cancer    PROCEDURE PERFORMED: Right subclavian Port placement.     ATTENDING SURGEON: Deo Sin M.D.     HOUSESTAFF SURGEON: Asael Pringle M.D. (RES)     ANESTHESIA: Monitored anesthesia care plus local.     ESTIMATED BLOOD LOSS: 10 mL     FINDINGS: A right subclavian port placed with tip at junction of superior   vena cava and right atrium.     SPECIMEN: None.     DRAINS: None.     COMPLICATIONS: None.      OPERATIVE PROCEDURE: The patient was identified in Preoperative Holding,   brought back to the Operating Room, and placed supine on the operating table   and padded appropriately. Monitors were applied. Monitored anesthesia care   was initiated. The left chest was prepped and draped in the standard sterile   surgical fashion. A timeout was performed and all team members present agreed   this was the correct procedure on the correct patient. We also confirmed   administration of appropriate preoperative antibiotics.     After administration of appropriate local anesthesia, the right subclavian vein was cannulated with a hollow-bore needle. A guidewire was placed and confirmed to be in correct position by fluoroscopy. An appropriate area for the pocket was chosen, and the incision was anesthetized with lidocaine. A 4 cm transverse skin incision was made. Subcutaneous tissue was divided with Bovie electrocautery. Additional local was administered for the pocket for the port and then the port pocket was created   with a mixture of Bovie electrocautery and blunt dissection. The port was tunneled from the incision to the cannulation site with the tunneling device. The port was secured to the investing pectoral fascia with two 2-0 Vicryl sutures. The   catheter was measured for length appropriately and cut.  Under fluoroscopic guidance, the split sheath and dilator were placed over the guidewire, and the guidewire and  dilator were then removed. The catheter was placed down the split sheath and the sheath was split and peeled away. Fluoroscopy confirmed appropriate position of the catheter, which aspirated and flushed easily. Heparinized saline was instilled in the catheter. The skin incision was closed in layers with deep buried interrupted 3-0 Vicryl and  running subcuticular 4-0 Monocryl. The cannulation incision was closed with a buried interrupted 4-0 Monocryl stitch. A sterile dressing was applied. The patient was transported to the Recovery Room in stable condition. All sponge, instrument and needle counts were correct at the end of the procedure.  COMPLICATIONS: None.   SPONGE COUNT: Correct.   BLOOD LOSS:10 mL.   FLUIDS: Per Anesthesia.   BLOOD GIVEN: None.   DRAINS: None.   SPECIMENS: None  CONDITION OF THE PATIENT: Good.   I was present for the entire procedure.

## 2022-04-05 NOTE — ANESTHESIA PREPROCEDURE EVALUATION
04/05/2022  Laura Kent is a 36 y.o., female.      Pre-op Assessment    I have reviewed the Patient Summary Reports.     I have reviewed the Nursing Notes.    I have reviewed the Medications.     Review of Systems  Anesthesia Hx:  No problems with previous Anesthesia  History of prior surgery of interest to airway management or planning: Denies Family Hx of Anesthesia complications.   Denies Personal Hx of Anesthesia complications.   Social:  Non-Smoker    Hematology/Oncology:  Hematology Normal      Current/Recent Cancer.   EENT/Dental:EENT/Dental Normal   Cardiovascular:  Cardiovascular Normal     Pulmonary:  Pulmonary Normal    Renal/:  Renal/ Normal     Hepatic/GI:  Hepatic/GI Normal    Musculoskeletal:  Musculoskeletal Normal    Neurological:  Neurology Normal    Endocrine:  Endocrine Normal  Obesity / BMI > 30  Psych:  Psychiatric Normal           Physical Exam  General: Well nourished and Cooperative    Airway:  Mallampati: II   Mouth Opening: Normal  TM Distance: Normal  Tongue: Normal  Neck ROM: Normal ROM    Dental:  Intact, Braces    Chest/Lungs:  Clear to auscultation, Normal Respiratory Rate    Heart:  Rate: Normal  Rhythm: Regular Rhythm  Sounds: Normal        Anesthesia Plan  Type of Anesthesia, risks & benefits discussed:    Anesthesia Type: Gen ETT, Gen Supraglottic Airway, Gen Natural Airway  Intra-op Monitoring Plan: Standard ASA Monitors  Post Op Pain Control Plan: multimodal analgesia  Induction:  IV  Airway Plan: , Post-Induction  Informed Consent: Informed consent signed with the Patient and all parties understand the risks and agree with anesthesia plan.  All questions answered.   ASA Score: 3  Day of Surgery Review of History & Physical: H&P Update referred to the surgeon/provider.    Ready For Surgery From Anesthesia Perspective.     .

## 2022-04-05 NOTE — PATIENT INSTRUCTIONS
No heavy lifting (>10lbs) for 2 weeks from day of surgery.   No pushing or pulling activities such as vacuuming or raking.   Activity as tolerated.   Patient can shower, allow water to run over incisions. No soaking in bath or pools for 2 weeks. Do not pick at surgical glue, it will come off on its own.   No driving while taking narcotics and until you can react quickyl without pain.   No straining, avoid constipation. May take OTC stool softeners as described and laxatives as needed.

## 2022-04-06 ENCOUNTER — TELEPHONE (OUTPATIENT)
Dept: HEMATOLOGY/ONCOLOGY | Facility: CLINIC | Age: 37
End: 2022-04-06
Payer: MEDICAID

## 2022-04-06 NOTE — ANESTHESIA POSTPROCEDURE EVALUATION
Anesthesia Post Evaluation    Patient: Laura Kent    Procedure(s) Performed: Procedure(s) (LRB):  [id number] (N/A)    Final Anesthesia Type: general (Natural airway)      Patient location during evaluation: PACU  Patient participation: Yes- Able to Participate  Level of consciousness: awake and alert  Post-procedure vital signs: reviewed and stable  Pain management: adequate  Airway patency: patent    PONV status at discharge: No PONV  Anesthetic complications: no      Cardiovascular status: hemodynamically stable  Respiratory status: unassisted  Hydration status: euvolemic  Follow-up not needed.          Vitals Value Taken Time   /78 04/05/22 1817   Temp 36.5 °C (97.7 °F) 04/05/22 1830   Pulse 64 04/05/22 1830   Resp 20 04/05/22 1830   SpO2 94 % 04/05/22 1830         No case tracking events are documented in the log.      Pain/Martell Score: Pain Rating Prior to Med Admin: 6 (4/5/2022  5:42 PM)  Pain Rating Post Med Admin: 0 (4/5/2022  6:15 PM)  Martell Score: 10 (4/5/2022  6:00 PM)

## 2022-04-08 ENCOUNTER — TELEPHONE (OUTPATIENT)
Dept: HEMATOLOGY/ONCOLOGY | Facility: CLINIC | Age: 37
End: 2022-04-08
Payer: MEDICAID

## 2022-04-08 ENCOUNTER — PATIENT MESSAGE (OUTPATIENT)
Dept: HEMATOLOGY/ONCOLOGY | Facility: CLINIC | Age: 37
End: 2022-04-08

## 2022-04-08 ENCOUNTER — CLINICAL SUPPORT (OUTPATIENT)
Dept: HEMATOLOGY/ONCOLOGY | Facility: CLINIC | Age: 37
End: 2022-04-08
Payer: MEDICAID

## 2022-04-08 DIAGNOSIS — Z17.1 MALIGNANT NEOPLASM OF UPPER-OUTER QUADRANT OF LEFT BREAST IN FEMALE, ESTROGEN RECEPTOR NEGATIVE: Primary | ICD-10-CM

## 2022-04-08 DIAGNOSIS — C50.412 MALIGNANT NEOPLASM OF UPPER-OUTER QUADRANT OF LEFT BREAST IN FEMALE, ESTROGEN RECEPTOR NEGATIVE: Primary | ICD-10-CM

## 2022-04-08 PROCEDURE — 99211 OFF/OP EST MAY X REQ PHY/QHP: CPT | Mod: PBBFAC

## 2022-04-08 PROCEDURE — 99999 PR PBB SHADOW E&M-EST. PATIENT-LVL I: ICD-10-PCS | Mod: PBBFAC,,,

## 2022-04-08 PROCEDURE — 99999 PR PBB SHADOW E&M-EST. PATIENT-LVL I: CPT | Mod: PBBFAC,,,

## 2022-04-08 NOTE — TELEPHONE ENCOUNTER
----- Message from Holly Roberts RN sent at 4/8/2022  9:36 AM CDT -----  Regarding: New chemo start  Chemo class done, port in, pt is ready to start. Can we get her scheduled please next week?   Thanks  Destiny

## 2022-04-08 NOTE — PROGRESS NOTES
Met with patient in person to discuss upcoming systemic therapy initiation. Discussed patient diagnosis including staging information. Also discussed that therapy regimen prescribed involves the following drugs:  Adriamycin, Cytoxan, Taxol, Carboplatin, & Keytruda, and timeline of therapy administration. Went over what to expect on first day of treatment, including what to bring with you, visitor policy, and infusion suite guidelines. Also discussed supportive and shared services available to patient, including social work, financial counseling, oncology nutrition, and oncology psychology.      Covered with patient potential side effects and symptom management. Reviewed supportive medications that will be given before, during, and after treatment. Also stressed that other side effects are possible outside of those listed. Spent additional time on signs of infection, infection prevention, and proper nutrition/hydration.    Education provided to patient via [email or web address] chemotherapy resource binder & drug specific handouts from [email or web address]. Also provided contact information for clinic and information related to [facility] communication. Discussed communication process for after-hours needs and some common scenarios in which patient should call provider for guidance vs. immediately report to the emergency room.     Finally, patient was given my contact information and role of oncology navigator was discussed. Encouraged patient to call with any questions, concerns, or needs. Patient verbalized understanding of all above information.

## 2022-04-08 NOTE — NURSING
Oncology Navigation   Intake  Date of Diagnosis: 3/14/2022  Cancer Type: Breast  Internal / External Referral: Internal  Referral Source: Holly Roe  Date of Referral: 3/16/2022  Initial Nurse Navigator Contact: 3/16/2022  Referral to Initial Contact Timeline (days): 0  Date Worked: 4/8/2022  First Appointment Available: 3/21/2022  Appointment Date: 3/21/2022  First Available Date vs. Scheduled Date (days): 0  Multiple appointments: Yes     Treatment  Current Status: Active    Surgical Oncologist: Dr.Alexa Weiner  Consult Date: 3/21/2022    Medical Oncologist: Dr.Zoe Cruz  Consult Date: 3/21/2022  Chemotherapy: Planned  Chemotherapy Regimen: AC, Taxol, Carbo  Immunotherapy: Planned  Immunotherapy Name: Keytruda    Radiation Oncologist: Dr.Angela Bess    Procedures: CT; Bone scan; Echo; MRI; Genetic test; Port / PICC  Biopsy Schedule Date: 3/14/2022  Bone Scan Schedule Date: 3/30/2022  CT Schedule Date: 3/30/2022  Echo Schedule Date: 3/30/2022  Genetic Testing Date Sent: 3/21/2022  MRI Schedule Date: 3/17/2022  Port / PICC Schedule Date: 4/5/2022    General Referrals: Integrative medicine  Social Work: message sent to [place]  Social Work Referral Date: 3/22/2022    ER: Negative  MD: Negative  Her2: Negative    Radiation Oncologist: Dr.Angela Bess    Support Systems: Family members     Acuity  Stage: III-IV  Systemic Treatment - predicted or initiated: More than one treatment modality concurrently (chemotherapy, radiation, etc.) (+2)  Treatment Tolerability: Has not started treatment yet/treatment fully completed and side effects resolved  Navigation Acuity: 4     Follow Up  Follow up in about 3 days (around 4/11/2022) for chemo start date.

## 2022-04-11 ENCOUNTER — TELEPHONE (OUTPATIENT)
Dept: HEMATOLOGY/ONCOLOGY | Facility: CLINIC | Age: 37
End: 2022-04-11
Payer: MEDICAID

## 2022-04-13 ENCOUNTER — INFUSION (OUTPATIENT)
Dept: INFUSION THERAPY | Facility: HOSPITAL | Age: 37
End: 2022-04-13
Payer: MEDICAID

## 2022-04-13 VITALS
HEART RATE: 63 BPM | SYSTOLIC BLOOD PRESSURE: 148 MMHG | TEMPERATURE: 99 F | WEIGHT: 224.44 LBS | BODY MASS INDEX: 37.39 KG/M2 | OXYGEN SATURATION: 98 % | HEIGHT: 65 IN | DIASTOLIC BLOOD PRESSURE: 88 MMHG | RESPIRATION RATE: 18 BRPM

## 2022-04-13 DIAGNOSIS — C50.412 MALIGNANT NEOPLASM OF UPPER-OUTER QUADRANT OF LEFT BREAST IN FEMALE, ESTROGEN RECEPTOR NEGATIVE: Primary | ICD-10-CM

## 2022-04-13 DIAGNOSIS — Z17.1 MALIGNANT NEOPLASM OF UPPER-OUTER QUADRANT OF LEFT BREAST IN FEMALE, ESTROGEN RECEPTOR NEGATIVE: Primary | ICD-10-CM

## 2022-04-13 PROCEDURE — 63600175 PHARM REV CODE 636 W HCPCS: Performed by: STUDENT IN AN ORGANIZED HEALTH CARE EDUCATION/TRAINING PROGRAM

## 2022-04-13 PROCEDURE — 96367 TX/PROPH/DG ADDL SEQ IV INF: CPT

## 2022-04-13 PROCEDURE — 25000003 PHARM REV CODE 250: Performed by: STUDENT IN AN ORGANIZED HEALTH CARE EDUCATION/TRAINING PROGRAM

## 2022-04-13 PROCEDURE — 96417 CHEMO IV INFUS EACH ADDL SEQ: CPT

## 2022-04-13 PROCEDURE — 96375 TX/PRO/DX INJ NEW DRUG ADDON: CPT

## 2022-04-13 PROCEDURE — 96413 CHEMO IV INFUSION 1 HR: CPT

## 2022-04-13 RX ORDER — FAMOTIDINE 10 MG/ML
20 INJECTION INTRAVENOUS
Status: CANCELLED | OUTPATIENT
Start: 2022-06-06

## 2022-04-13 RX ORDER — SODIUM CHLORIDE 0.9 % (FLUSH) 0.9 %
10 SYRINGE (ML) INJECTION
Status: DISCONTINUED | OUTPATIENT
Start: 2022-04-13 | End: 2022-04-13 | Stop reason: HOSPADM

## 2022-04-13 RX ORDER — SODIUM CHLORIDE 0.9 % (FLUSH) 0.9 %
10 SYRINGE (ML) INJECTION
Status: CANCELLED | OUTPATIENT
Start: 2022-06-06

## 2022-04-13 RX ORDER — EPINEPHRINE 0.3 MG/.3ML
0.3 INJECTION SUBCUTANEOUS ONCE AS NEEDED
Status: CANCELLED | OUTPATIENT
Start: 2022-05-10

## 2022-04-13 RX ORDER — DIPHENHYDRAMINE HYDROCHLORIDE 50 MG/ML
50 INJECTION INTRAMUSCULAR; INTRAVENOUS ONCE AS NEEDED
Status: DISCONTINUED | OUTPATIENT
Start: 2022-04-13 | End: 2022-04-13 | Stop reason: HOSPADM

## 2022-04-13 RX ORDER — FAMOTIDINE 10 MG/ML
20 INJECTION INTRAVENOUS
Status: CANCELLED | OUTPATIENT
Start: 2022-05-10

## 2022-04-13 RX ORDER — HEPARIN 100 UNIT/ML
500 SYRINGE INTRAVENOUS
Status: DISCONTINUED | OUTPATIENT
Start: 2022-04-13 | End: 2022-04-13 | Stop reason: HOSPADM

## 2022-04-13 RX ORDER — DIPHENHYDRAMINE HYDROCHLORIDE 50 MG/ML
50 INJECTION INTRAMUSCULAR; INTRAVENOUS ONCE AS NEEDED
Status: CANCELLED | OUTPATIENT
Start: 2022-05-10

## 2022-04-13 RX ORDER — EPINEPHRINE 0.3 MG/.3ML
0.3 INJECTION SUBCUTANEOUS ONCE AS NEEDED
Status: DISCONTINUED | OUTPATIENT
Start: 2022-04-13 | End: 2022-04-13 | Stop reason: HOSPADM

## 2022-04-13 RX ORDER — SODIUM CHLORIDE 0.9 % (FLUSH) 0.9 %
10 SYRINGE (ML) INJECTION
Status: CANCELLED | OUTPATIENT
Start: 2022-05-10

## 2022-04-13 RX ORDER — DIPHENHYDRAMINE HYDROCHLORIDE 50 MG/ML
50 INJECTION INTRAMUSCULAR; INTRAVENOUS ONCE AS NEEDED
Status: CANCELLED | OUTPATIENT
Start: 2022-06-06

## 2022-04-13 RX ORDER — FAMOTIDINE 10 MG/ML
20 INJECTION INTRAVENOUS
Status: COMPLETED | OUTPATIENT
Start: 2022-04-13 | End: 2022-04-13

## 2022-04-13 RX ORDER — HEPARIN 100 UNIT/ML
500 SYRINGE INTRAVENOUS
Status: CANCELLED | OUTPATIENT
Start: 2022-05-10

## 2022-04-13 RX ORDER — EPINEPHRINE 0.3 MG/.3ML
0.3 INJECTION SUBCUTANEOUS ONCE AS NEEDED
Status: CANCELLED | OUTPATIENT
Start: 2022-06-06

## 2022-04-13 RX ORDER — HEPARIN 100 UNIT/ML
500 SYRINGE INTRAVENOUS
Status: CANCELLED | OUTPATIENT
Start: 2022-06-06

## 2022-04-13 RX ADMIN — CARBOPLATIN 750 MG: 10 INJECTION INTRAVENOUS at 11:04

## 2022-04-13 RX ADMIN — PACLITAXEL 174 MG: 6 INJECTION, SOLUTION, CONCENTRATE INTRAVENOUS at 09:04

## 2022-04-13 RX ADMIN — SODIUM CHLORIDE 200 MG: 9 INJECTION, SOLUTION INTRAVENOUS at 08:04

## 2022-04-13 RX ADMIN — SODIUM CHLORIDE: 0.9 INJECTION, SOLUTION INTRAVENOUS at 07:04

## 2022-04-13 RX ADMIN — FAMOTIDINE 20 MG: 10 INJECTION INTRAVENOUS at 09:04

## 2022-04-13 RX ADMIN — DIPHENHYDRAMINE HYDROCHLORIDE 50 MG: 50 INJECTION, SOLUTION INTRAMUSCULAR; INTRAVENOUS at 09:04

## 2022-04-13 RX ADMIN — HEPARIN SODIUM (PORCINE) LOCK FLUSH IV SOLN 100 UNIT/ML 500 UNITS: 100 SOLUTION at 12:04

## 2022-04-13 RX ADMIN — APREPITANT 130 MG: 130 INJECTION, EMULSION INTRAVENOUS at 09:04

## 2022-04-13 RX ADMIN — PALONOSETRON HYDROCHLORIDE 0.25 MG: 0.25 INJECTION, SOLUTION INTRAVENOUS at 09:04

## 2022-04-13 NOTE — PLAN OF CARE
Pt ambulatory to clinic today for C1D1 Keytruda/Taxol and Carbo, with mother. Denies any sig complaints today. Port accessed without difficulty. Good blood return. Discussed proper application of EMLA cream. Pt tolerated treatments well. No adverse reaction noted. New Taxol titration parameters used. Port deaccessed after flushing. Pt ambulatory from clinic in [facility].

## 2022-04-18 ENCOUNTER — NURSE TRIAGE (OUTPATIENT)
Dept: ADMINISTRATIVE | Facility: CLINIC | Age: 37
End: 2022-04-18
Payer: MEDICAID

## 2022-04-18 ENCOUNTER — TELEPHONE (OUTPATIENT)
Dept: SURGERY | Facility: CLINIC | Age: 37
End: 2022-04-18
Payer: MEDICAID

## 2022-04-18 NOTE — TELEPHONE ENCOUNTER
"Pt transferred to me from . Pt reports she had chemo on 4/14. States she has "felt like she has the flu" and having intermittent sharp abdominal pains since Friday. Rates pain 8/10. Care advice per protocol. Pt would like provider contacted to see if symptoms are expected as a side effect of chemotherapy. Advised would route high priority asking for outreach from provider, but based on severity of pain, recommend ED. Advised if does not hear back from provider within 30 min, would proceed to ED for eval per protocol.     Reason for Disposition   SEVERE abdominal pain (e.g., excruciating)    Additional Information   Negative: Passed out (i.e., fainted, collapsed and was not responding)   Negative: Shock suspected (e.g., cold/pale/clammy skin, too weak to stand, low BP, rapid pulse)   Negative: Sounds like a life-threatening emergency to the triager    Protocols used: ABDOMINAL PAIN - FEMALE-A-OH      "

## 2022-04-18 NOTE — TELEPHONE ENCOUNTER
Called patient back regarding questions. Notified patient to refrain from lifting anything over 10lbs. For 2 weeks. Patient stated that someone already reached out to her to go over port placement postop instructions.     ----- Message from Holly Roberts RN sent at 4/8/2022  9:37 AM CDT -----  Regarding: Lifting weights after port  Laura had her port placed on the 5th. Is there a limit as to how much she can lift & if so, for how long?   Destiny

## 2022-04-19 ENCOUNTER — TELEPHONE (OUTPATIENT)
Dept: HEMATOLOGY/ONCOLOGY | Facility: CLINIC | Age: 37
End: 2022-04-19
Payer: MEDICAID

## 2022-04-19 ENCOUNTER — HOSPITAL ENCOUNTER (INPATIENT)
Facility: HOSPITAL | Age: 37
LOS: 3 days | DRG: 872 | End: 2022-04-22
Attending: EMERGENCY MEDICINE | Admitting: INTERNAL MEDICINE
Payer: MEDICAID

## 2022-04-19 DIAGNOSIS — R78.81 BACTEREMIA: ICD-10-CM

## 2022-04-19 PROBLEM — J10.1 INFLUENZA A: Status: ACTIVE | Noted: 2022-04-19

## 2022-04-19 LAB
ALBUMIN SERPL BCP-MCNC: 3.7 G/DL (ref 3.5–5.2)
ALP SERPL-CCNC: 40 U/L (ref 55–135)
ALT SERPL W/O P-5'-P-CCNC: 25 U/L (ref 10–44)
ANION GAP SERPL CALC-SCNC: 12 MMOL/L (ref 8–16)
AST SERPL-CCNC: 33 U/L (ref 10–40)
BASOPHILS # BLD AUTO: 0.03 K/UL (ref 0–0.2)
BASOPHILS NFR BLD: 0.9 % (ref 0–1.9)
BILIRUB SERPL-MCNC: 0.5 MG/DL (ref 0.1–1)
BILIRUB UR QL STRIP: NEGATIVE
BUN SERPL-MCNC: 9 MG/DL (ref 6–20)
CALCIUM SERPL-MCNC: 9.2 MG/DL (ref 8.7–10.5)
CHLORIDE SERPL-SCNC: 100 MMOL/L (ref 95–110)
CLARITY UR REFRACT.AUTO: CLEAR
CO2 SERPL-SCNC: 24 MMOL/L (ref 23–29)
COLOR UR AUTO: NORMAL
CREAT SERPL-MCNC: 0.7 MG/DL (ref 0.5–1.4)
CTP QC/QA: YES
DIFFERENTIAL METHOD: ABNORMAL
EOSINOPHIL # BLD AUTO: 0 K/UL (ref 0–0.5)
EOSINOPHIL NFR BLD: 0.6 % (ref 0–8)
ERYTHROCYTE [DISTWIDTH] IN BLOOD BY AUTOMATED COUNT: 11.9 % (ref 11.5–14.5)
EST. GFR  (AFRICAN AMERICAN): >60 ML/MIN/1.73 M^2
EST. GFR  (NON AFRICAN AMERICAN): >60 ML/MIN/1.73 M^2
GLUCOSE SERPL-MCNC: 126 MG/DL (ref 70–110)
GLUCOSE UR QL STRIP: NEGATIVE
HCT VFR BLD AUTO: 32.4 % (ref 37–48.5)
HGB BLD-MCNC: 10.5 G/DL (ref 12–16)
HGB UR QL STRIP: NEGATIVE
IMM GRANULOCYTES # BLD AUTO: 0.03 K/UL (ref 0–0.04)
IMM GRANULOCYTES NFR BLD AUTO: 0.9 % (ref 0–0.5)
KETONES UR QL STRIP: NEGATIVE
LACTATE SERPL-SCNC: 1.2 MMOL/L (ref 0.5–2.2)
LEUKOCYTE ESTERASE UR QL STRIP: NEGATIVE
LYMPHOCYTES # BLD AUTO: 0.4 K/UL (ref 1–4.8)
LYMPHOCYTES NFR BLD: 12 % (ref 18–48)
MCH RBC QN AUTO: 28.5 PG (ref 27–31)
MCHC RBC AUTO-ENTMCNC: 32.4 G/DL (ref 32–36)
MCV RBC AUTO: 88 FL (ref 82–98)
MONOCYTES # BLD AUTO: 0.2 K/UL (ref 0.3–1)
MONOCYTES NFR BLD: 5.9 % (ref 4–15)
NEUTROPHILS # BLD AUTO: 2.6 K/UL (ref 1.8–7.7)
NEUTROPHILS NFR BLD: 79.7 % (ref 38–73)
NITRITE UR QL STRIP: NEGATIVE
NRBC BLD-RTO: 0 /100 WBC
PH UR STRIP: 6 [PH] (ref 5–8)
PLATELET # BLD AUTO: 176 K/UL (ref 150–450)
PMV BLD AUTO: 11.7 FL (ref 9.2–12.9)
POTASSIUM SERPL-SCNC: 3.7 MMOL/L (ref 3.5–5.1)
PROT SERPL-MCNC: 7 G/DL (ref 6–8.4)
PROT UR QL STRIP: NEGATIVE
RBC # BLD AUTO: 3.68 M/UL (ref 4–5.4)
SARS-COV-2 RDRP RESP QL NAA+PROBE: NEGATIVE
SODIUM SERPL-SCNC: 136 MMOL/L (ref 136–145)
SP GR UR STRIP: 1 (ref 1–1.03)
URN SPEC COLLECT METH UR: NORMAL
WBC # BLD AUTO: 3.24 K/UL (ref 3.9–12.7)

## 2022-04-19 PROCEDURE — 85025 COMPLETE CBC W/AUTO DIFF WBC: CPT | Performed by: PHYSICIAN ASSISTANT

## 2022-04-19 PROCEDURE — 81003 URINALYSIS AUTO W/O SCOPE: CPT | Performed by: PHYSICIAN ASSISTANT

## 2022-04-19 PROCEDURE — 25000003 PHARM REV CODE 250: Performed by: PHYSICIAN ASSISTANT

## 2022-04-19 PROCEDURE — 93010 ELECTROCARDIOGRAM REPORT: CPT | Mod: ,,, | Performed by: INTERNAL MEDICINE

## 2022-04-19 PROCEDURE — 99285 EMERGENCY DEPT VISIT HI MDM: CPT | Mod: 25

## 2022-04-19 PROCEDURE — 80053 COMPREHEN METABOLIC PANEL: CPT | Performed by: PHYSICIAN ASSISTANT

## 2022-04-19 PROCEDURE — 87040 BLOOD CULTURE FOR BACTERIA: CPT | Mod: 59 | Performed by: PHYSICIAN ASSISTANT

## 2022-04-19 PROCEDURE — 12000002 HC ACUTE/MED SURGE SEMI-PRIVATE ROOM

## 2022-04-19 PROCEDURE — 96365 THER/PROPH/DIAG IV INF INIT: CPT

## 2022-04-19 PROCEDURE — 99285 PR EMERGENCY DEPT VISIT,LEVEL V: ICD-10-PCS | Mod: ,,, | Performed by: EMERGENCY MEDICINE

## 2022-04-19 PROCEDURE — U0002 COVID-19 LAB TEST NON-CDC: HCPCS | Performed by: PHYSICIAN ASSISTANT

## 2022-04-19 PROCEDURE — 63600175 PHARM REV CODE 636 W HCPCS: Performed by: PHYSICIAN ASSISTANT

## 2022-04-19 PROCEDURE — 96375 TX/PRO/DX INJ NEW DRUG ADDON: CPT

## 2022-04-19 PROCEDURE — 99285 EMERGENCY DEPT VISIT HI MDM: CPT | Mod: ,,, | Performed by: EMERGENCY MEDICINE

## 2022-04-19 PROCEDURE — 83605 ASSAY OF LACTIC ACID: CPT | Performed by: PHYSICIAN ASSISTANT

## 2022-04-19 PROCEDURE — 93010 EKG 12-LEAD: ICD-10-PCS | Mod: ,,, | Performed by: INTERNAL MEDICINE

## 2022-04-19 PROCEDURE — 96366 THER/PROPH/DIAG IV INF ADDON: CPT

## 2022-04-19 PROCEDURE — 93005 ELECTROCARDIOGRAM TRACING: CPT

## 2022-04-19 PROCEDURE — 25000003 PHARM REV CODE 250: Performed by: STUDENT IN AN ORGANIZED HEALTH CARE EDUCATION/TRAINING PROGRAM

## 2022-04-19 RX ORDER — OSELTAMIVIR PHOSPHATE 75 MG/1
75 CAPSULE ORAL 2 TIMES DAILY
Status: DISCONTINUED | OUTPATIENT
Start: 2022-04-19 | End: 2022-04-22 | Stop reason: HOSPADM

## 2022-04-19 RX ORDER — SODIUM CHLORIDE 0.9 % (FLUSH) 0.9 %
10 SYRINGE (ML) INJECTION
Status: DISCONTINUED | OUTPATIENT
Start: 2022-04-19 | End: 2022-04-22 | Stop reason: HOSPADM

## 2022-04-19 RX ORDER — CEFEPIME HYDROCHLORIDE 1 G/50ML
2 INJECTION, SOLUTION INTRAVENOUS
Status: COMPLETED | OUTPATIENT
Start: 2022-04-19 | End: 2022-04-19

## 2022-04-19 RX ORDER — OXYCODONE HYDROCHLORIDE 5 MG/1
5 TABLET ORAL EVERY 6 HOURS PRN
Status: DISCONTINUED | OUTPATIENT
Start: 2022-04-19 | End: 2022-04-22 | Stop reason: HOSPADM

## 2022-04-19 RX ORDER — ACETAMINOPHEN 325 MG/1
650 TABLET ORAL EVERY 6 HOURS PRN
Status: DISCONTINUED | OUTPATIENT
Start: 2022-04-20 | End: 2022-04-22 | Stop reason: HOSPADM

## 2022-04-19 RX ADMIN — VANCOMYCIN HYDROCHLORIDE 2000 MG: 100 INJECTION, POWDER, LYOPHILIZED, FOR SOLUTION INTRAVENOUS at 08:04

## 2022-04-19 RX ADMIN — CEFEPIME HYDROCHLORIDE 2 G: 2 INJECTION, SOLUTION INTRAVENOUS at 10:04

## 2022-04-19 RX ADMIN — SODIUM CHLORIDE, SODIUM LACTATE, POTASSIUM CHLORIDE, AND CALCIUM CHLORIDE 2223 ML: .6; .31; .03; .02 INJECTION, SOLUTION INTRAVENOUS at 07:04

## 2022-04-19 RX ADMIN — OSELTAMIVIR PHOSPHATE 75 MG: 75 CAPSULE ORAL at 11:04

## 2022-04-19 RX ADMIN — ACETAMINOPHEN 650 MG: 325 TABLET ORAL at 11:04

## 2022-04-19 NOTE — TELEPHONE ENCOUNTER
Called patient to check in on her.  She was in bed and drinking tea.  Reminded her to drink plenty of fluids.  She had not yet checked her vital signs but would check in a little while.  Told her I would call her back a little later once she was up to get an update.

## 2022-04-19 NOTE — TELEPHONE ENCOUNTER
----- Message from Adonay Cannon NP sent at 4/19/2022  9:17 AM CDT -----  Nessa, see below.   Also, please make sure she has an appropriate follow up as not scheduled.   Thanks  PJ  ----- Message -----  From: Yessy Cruz MD  Sent: 4/19/2022   7:20 AM CDT  To: Adonay Cannon NP, JONNA Mejia  Please call her this AM and check on her  She tested + for the flu last night and received chemo last week  Please see if she is hydrating, if she has checked her temp and vitals

## 2022-04-19 NOTE — TELEPHONE ENCOUNTER
"----- Message from Arturo Reid sent at 4/19/2022  4:26 PM CDT -----  Consult/Advisory:          Name Of Caller: Self      Contact Preference?: [phone]         Provider Name: Nancy      Does patient feel the need to be seen today? No      What is the nature of the call?: Calling to speak w/ nurse. Stating her fever has been back and forth between 101 and 103. Inquiring if she should go to the ER (again, as she went last night for the flu).          Additional Notes:  "Thank you for all that you do for our patients"      "

## 2022-04-19 NOTE — TELEPHONE ENCOUNTER
Spoke with patient who reports she can't break the fever.  It's been alternating between 101 and 103.  Discussed with Dr. Cruz and advised patient to continue taking Tylenol and drinking lots of fluids.  Patient will keep us posted.

## 2022-04-20 LAB
ALBUMIN SERPL BCP-MCNC: 3.5 G/DL (ref 3.5–5.2)
ALP SERPL-CCNC: 39 U/L (ref 55–135)
ALT SERPL W/O P-5'-P-CCNC: 25 U/L (ref 10–44)
ANION GAP SERPL CALC-SCNC: 9 MMOL/L (ref 8–16)
AST SERPL-CCNC: 30 U/L (ref 10–40)
BASOPHILS # BLD AUTO: 0.01 K/UL (ref 0–0.2)
BASOPHILS NFR BLD: 0.3 % (ref 0–1.9)
BILIRUB SERPL-MCNC: 0.5 MG/DL (ref 0.1–1)
BUN SERPL-MCNC: 7 MG/DL (ref 6–20)
CALCIUM SERPL-MCNC: 9.1 MG/DL (ref 8.7–10.5)
CHLORIDE SERPL-SCNC: 104 MMOL/L (ref 95–110)
CO2 SERPL-SCNC: 26 MMOL/L (ref 23–29)
CREAT SERPL-MCNC: 0.7 MG/DL (ref 0.5–1.4)
DIFFERENTIAL METHOD: ABNORMAL
EOSINOPHIL # BLD AUTO: 0 K/UL (ref 0–0.5)
EOSINOPHIL NFR BLD: 1.3 % (ref 0–8)
ERYTHROCYTE [DISTWIDTH] IN BLOOD BY AUTOMATED COUNT: 12 % (ref 11.5–14.5)
EST. GFR  (AFRICAN AMERICAN): >60 ML/MIN/1.73 M^2
EST. GFR  (NON AFRICAN AMERICAN): >60 ML/MIN/1.73 M^2
GLUCOSE SERPL-MCNC: 110 MG/DL (ref 70–110)
HCT VFR BLD AUTO: 30.2 % (ref 37–48.5)
HGB BLD-MCNC: 9.8 G/DL (ref 12–16)
IMM GRANULOCYTES # BLD AUTO: 0.02 K/UL (ref 0–0.04)
IMM GRANULOCYTES NFR BLD AUTO: 0.7 % (ref 0–0.5)
LACTATE SERPL-SCNC: 0.9 MMOL/L (ref 0.5–2.2)
LYMPHOCYTES # BLD AUTO: 0.5 K/UL (ref 1–4.8)
LYMPHOCYTES NFR BLD: 16.2 % (ref 18–48)
MAGNESIUM SERPL-MCNC: 1.7 MG/DL (ref 1.6–2.6)
MCH RBC QN AUTO: 28 PG (ref 27–31)
MCHC RBC AUTO-ENTMCNC: 32.5 G/DL (ref 32–36)
MCV RBC AUTO: 86 FL (ref 82–98)
MONOCYTES # BLD AUTO: 0.2 K/UL (ref 0.3–1)
MONOCYTES NFR BLD: 6.7 % (ref 4–15)
NEUTROPHILS # BLD AUTO: 2.2 K/UL (ref 1.8–7.7)
NEUTROPHILS NFR BLD: 74.8 % (ref 38–73)
NRBC BLD-RTO: 0 /100 WBC
PLATELET # BLD AUTO: 156 K/UL (ref 150–450)
PMV BLD AUTO: 10.6 FL (ref 9.2–12.9)
POTASSIUM SERPL-SCNC: 3.8 MMOL/L (ref 3.5–5.1)
PROT SERPL-MCNC: 6.5 G/DL (ref 6–8.4)
RBC # BLD AUTO: 3.5 M/UL (ref 4–5.4)
SODIUM SERPL-SCNC: 139 MMOL/L (ref 136–145)
WBC # BLD AUTO: 2.97 K/UL (ref 3.9–12.7)

## 2022-04-20 PROCEDURE — 20600001 HC STEP DOWN PRIVATE ROOM

## 2022-04-20 PROCEDURE — 99223 1ST HOSP IP/OBS HIGH 75: CPT | Mod: ,,, | Performed by: INTERNAL MEDICINE

## 2022-04-20 PROCEDURE — 80053 COMPREHEN METABOLIC PANEL: CPT | Performed by: STUDENT IN AN ORGANIZED HEALTH CARE EDUCATION/TRAINING PROGRAM

## 2022-04-20 PROCEDURE — 85025 COMPLETE CBC W/AUTO DIFF WBC: CPT | Performed by: STUDENT IN AN ORGANIZED HEALTH CARE EDUCATION/TRAINING PROGRAM

## 2022-04-20 PROCEDURE — 36415 COLL VENOUS BLD VENIPUNCTURE: CPT | Performed by: STUDENT IN AN ORGANIZED HEALTH CARE EDUCATION/TRAINING PROGRAM

## 2022-04-20 PROCEDURE — 25000003 PHARM REV CODE 250: Performed by: STUDENT IN AN ORGANIZED HEALTH CARE EDUCATION/TRAINING PROGRAM

## 2022-04-20 PROCEDURE — 25000003 PHARM REV CODE 250: Performed by: INTERNAL MEDICINE

## 2022-04-20 PROCEDURE — 99223 PR INITIAL HOSPITAL CARE,LEVL III: ICD-10-PCS | Mod: ,,, | Performed by: INTERNAL MEDICINE

## 2022-04-20 PROCEDURE — 83605 ASSAY OF LACTIC ACID: CPT | Performed by: PHYSICIAN ASSISTANT

## 2022-04-20 PROCEDURE — 63600175 PHARM REV CODE 636 W HCPCS: Performed by: INTERNAL MEDICINE

## 2022-04-20 PROCEDURE — 27000207 HC ISOLATION

## 2022-04-20 PROCEDURE — 83735 ASSAY OF MAGNESIUM: CPT | Performed by: STUDENT IN AN ORGANIZED HEALTH CARE EDUCATION/TRAINING PROGRAM

## 2022-04-20 RX ADMIN — ACETAMINOPHEN 650 MG: 325 TABLET ORAL at 01:04

## 2022-04-20 RX ADMIN — OSELTAMIVIR PHOSPHATE 75 MG: 75 CAPSULE ORAL at 09:04

## 2022-04-20 RX ADMIN — VANCOMYCIN HYDROCHLORIDE 1500 MG: 1.5 INJECTION, POWDER, LYOPHILIZED, FOR SOLUTION INTRAVENOUS at 09:04

## 2022-04-20 RX ADMIN — VANCOMYCIN HYDROCHLORIDE 1500 MG: 1.5 INJECTION, POWDER, LYOPHILIZED, FOR SOLUTION INTRAVENOUS at 10:04

## 2022-04-20 NOTE — ED NOTES
Telemetry Verification   Patient placed on Telemetry Box  Verified with War Room  Box # [id number]   Monitor Tech Skyla   Rate 95   Rhythm SR

## 2022-04-20 NOTE — PROGRESS NOTES
Pharmacokinetic Initial Assessment: IV Vancomycin    Assessment/Plan:    Initiate intravenous vancomycin with loading dose of 2000 mg once followed by a maintenance dose of vancomycin 1500 mg IV every 12 hours  Desired empiric serum trough concentration is 15 to 20 mcg/mL  Draw vancomycin trough level 60 min prior to fourth dose on 4/21 at approximately 0900  Pharmacy will continue to follow and monitor vancomycin.      Please contact pharmacy at extension [phone] with any questions regarding this assessment.     Thank you for the consult,   Maureen Barrios       Patient brief summary:  Laura Kent is a 36 y.o. female initiated on antimicrobial therapy with IV Vancomycin for treatment of suspected bacteremia    Drug Allergies:   Review of patient's allergies indicates:  No Known Allergies    Actual Body Weight:   99.8 kg    Renal Function:   Estimated Creatinine Clearance: 127.5 mL/min (based on SCr of 0.7 mg/dL).,     Dialysis Method (if applicable):  N/A    CBC (last 72 hours):  Recent Labs   Lab Result Units 04/18/22 2029 04/19/22 1948 04/20/22  0423   WBC K/uL 3.65* 3.24* 2.97*   Hemoglobin g/dL 11.6* 10.5* 9.8*   Hematocrit % 35.0* 32.4* 30.2*   Platelets K/uL 175 176 156   Gran % % 81.2* 79.7* 74.8*   Lymph % % 11.8* 12.0* 16.2*   Mono % % 4.9 5.9 6.7   Eosinophil % % 1.1 0.6 1.3   Basophil % % 0.5 0.9 0.3   Differential Method  Automated Automated Automated       Metabolic Panel (last 72 hours):  Recent Labs   Lab Result Units 04/18/22 2029 04/18/22 2049 04/19/22 1948 04/19/22 2039 04/20/22  0423   Sodium mmol/L 134*  --  136  --  139   Potassium mmol/L 3.7  --  3.7  --  3.8   Chloride mmol/L 99  --  100  --  104   CO2 mmol/L 26  --  24  --  26   Glucose mg/dL 110  --  126*  --  110   Glucose, UA   --  Negative  --  Negative  --    BUN mg/dL 17  --  9  --  7   Creatinine mg/dL 1.0  --  0.7  --  0.7   Albumin g/dL 4.2  --  3.7  --  3.5   Total Bilirubin mg/dL 0.4  --  0.5  --  0.5   Alkaline  Phosphatase U/L 51*  --  40*  --  39*   AST U/L 31  --  33  --  30   ALT U/L 25  --  25  --  25   Magnesium mg/dL  --   --   --   --  1.7       Drug levels (last 3 results):  No results for input(s): VANCOMYCINRA, VANCOMYCINPE, VANCOMYCINTR in the last 72 hours.    Microbiologic Results:  Microbiology Results (last 7 days)       Procedure Component Value Units Date/Time    Blood culture x two cultures. Draw prior to antibiotics. [[id number]] Collected: 04/19/22 1948    Order Status: Completed Specimen: Blood from Peripheral, Antecubital, Right Updated: 04/20/22 0515     Blood Culture, Routine No Growth to date    Narrative:      Aerobic and anaerobic    Blood culture x two cultures. Draw prior to antibiotics. [[id number]] Collected: 04/19/22 1953    Order Status: Completed Specimen: Blood from Peripheral, Forearm, Right Updated: 04/20/22 0515     Blood Culture, Routine No Growth to date    Narrative:      Aerobic and anaerobic

## 2022-04-20 NOTE — ASSESSMENT & PLAN NOTE
Pt presents to the hospital after being called for GPC baccteremia  bcx from 4/18 with GPC in cluster resembling strep  CT abdomen and pelvis on 4/18 w/o acute findings  S/p vanc and cefepime at the ED    Plan:  Will place pt on rocephin  Repeat bcx pending  Follow up sensitivities and specificities and change abx accordingly  If vaginal discharge cont and source of bacteremia undetermined consider perlvic US

## 2022-04-20 NOTE — SUBJECTIVE & OBJECTIVE
Patient information was obtained from patient, relative(s), past medical records, and ER records.     Oncology History:      (Not in a hospital admission)      Patient has no known allergies.     Past Medical History:   Diagnosis Date    Anxiety     Breast cancer     Encounter for blood transfusion     Hypertension     Meningitis      Past Surgical History:   Procedure Laterality Date    BREAST BIOPSY Left      SECTION      INSERTION OF TUNNELED CENTRAL VENOUS CATHETER (CVC) WITH SUBCUTANEOUS PORT N/A 2022    Procedure: [id number]-T-CATH;  Surgeon: Deo Sin Jr., MD;  Location: [facility] OR [address] [place] [address];  Service: General;  Laterality: N/A;     Family History       Problem Relation (Age of Onset)    Bladder Cancer Paternal Grandmother    Diabetes Father    Hypertension Father    Pancreatic cancer Maternal Uncle          Tobacco Use    Smoking status: Never Smoker    Smokeless tobacco: Never Used   Substance and Sexual Activity    Alcohol use: Yes     Comment: social    Drug use: Never    Sexual activity: Not Currently       Review of Systems   Constitutional:  Positive for activity change, chills, fatigue and fever. Negative for diaphoresis.   HENT:  Negative for hearing loss, sinus pressure, sore throat and trouble swallowing.    Eyes:  Negative for photophobia and visual disturbance.   Respiratory:  Positive for shortness of breath. Negative for apnea and chest tightness.    Cardiovascular:  Negative for chest pain and palpitations.   Gastrointestinal:  Positive for abdominal pain. Negative for abdominal distention.   Endocrine: Negative for polyphagia and polyuria.   Genitourinary:  Positive for vaginal discharge. Negative for dysuria, flank pain, frequency and hematuria.   Musculoskeletal:  Negative for arthralgias, back pain and myalgias.   Skin:  Negative for color change, pallor and rash.   Allergic/Immunologic: Negative for immunocompromised state.   Neurological:  Negative for dizziness,  speech difficulty, weakness, numbness and headaches.   Psychiatric/Behavioral:  Negative for agitation and behavioral problems.    All other systems reviewed and are negative.  Objective:     Vital Signs (Most Recent):  Temp: 99.6 °F (37.6 °C) (04/19/22 2011)  Pulse: 90 (04/19/22 2100)  Resp: 16 (04/19/22 2100)  BP: 132/71 (04/19/22 2100)  SpO2: 97 % (04/19/22 2100) Vital Signs (24h Range):  Temp:  [99.6 °F (37.6 °C)-100.6 °F (38.1 °C)] 99.6 °F (37.6 °C)  Pulse:  [] 90  Resp:  [16-18] 16  SpO2:  [96 %-99 %] 97 %  BP: (125-136)/(63-76) 132/71     Weight: 99.8 kg (220 lb)  Body mass index is 36.61 kg/m².  Body surface area is 2.14 meters squared.    ECOG SCORE           [unfilled]    Lines/Drains/Airways       Central Venous Catheter Line  Duration             Port A Cath Single Lumen 04/05/22 1700 right subclavian 14 days              Drain  Duration             Female External Urinary Catheter 04/19/22 2025 <1 day              Peripheral Intravenous Line  Duration                  Peripheral IV - Single Lumen 04/19/22 1948 20 G Right Antecubital <1 day                    Physical Exam  Vitals and nursing note reviewed.   Constitutional:       General: She is not in acute distress.     Appearance: She is well-developed. She is not diaphoretic.   HENT:      Head: Normocephalic and atraumatic.   Eyes:      General: No scleral icterus.     Conjunctiva/sclera: Conjunctivae normal.   Neck:      Vascular: No JVD.      Trachea: No tracheal deviation.   Cardiovascular:      Rate and Rhythm: Normal rate and regular rhythm.      Heart sounds:     No friction rub. No gallop.   Pulmonary:      Effort: Pulmonary effort is normal. No respiratory distress.      Breath sounds: Normal breath sounds. No wheezing.   Abdominal:      General: Bowel sounds are normal. There is no distension.      Palpations: Abdomen is soft. There is no mass.   Musculoskeletal:         General: No deformity. Normal range of motion.      Cervical back:  Normal range of motion and neck supple.   Lymphadenopathy:      Cervical: No cervical adenopathy.   Skin:     General: Skin is warm and dry.      Coloration: Skin is not pale.   Neurological:      Mental Status: She is alert.       Significant Labs:   All pertinent labs from the last 24 hours have been reviewed.    Diagnostic Results:  I have reviewed all pertinent imaging results/findings within the past 24 hours.  I have reviewed and interpreted all pertinent imaging results/findings within the past 24 hours.

## 2022-04-20 NOTE — NURSING
Comment added to dietary order due to patient does not ea pork or beef . New Allergy placed , patient is allergic to strawberries.

## 2022-04-20 NOTE — H&P
[facility] - Emergency Dept  Hematology  Bone Marrow Transplant  H&P    Subjective:     Principal Problem: Bacteremia    HPI: Pt is a 36-year-old female with a history of breast cancer recently initiated on chemotherapy; HTN, anxiety presents to the hospital with positive blood cultures.  Patient was seen in the ED yesterday with fever, body aches, lower abdominal pain.  She tested positive for influenza A.  Blood cultures that were drawn became positive for Gram-positive cocci in chains resembling strep.  Patient was contacted from the ED and advised to return for further management.  On arrival to the ED she was HDS. She reports since last Friday (4/15) she began having intermittent fever ( Tmax 103.1), chills, productive cough,shortness of breath and right lower quadrant abdominal pain that radiates into her back. Also reports occasional clear vaginal discharge since starting of chemotherapy. Denies any change in urination.  Denies nausea, vomiting, diarrhea, new rash.     Pt is currently/recently not sexually active. Denies tobacco, alcohol, or illicit drug use. Lives with her 3 children (older son 18 yrs old). Performs ADLs and IADLs independently.    ED course:  On arrival to the ED she was febrile with temp 100.6, , bp 125/74 and saturating 97% on RA.     Oncology History:  - self detected an area under her left arm and it initially seemed to go away (noted around winter holidays)  - 3/4/2022 Outside Mammogram:  Findings:  The breasts are heterogeneously dense, which may obscure small masses.   Left  There is a 31 mm x 18 mm x 21 mm irregularly shaped mass with microlobulated margins seen in the left breast at 2 o'clock in the posterior depth with associated left axillary adenopathy. Largest node depicted on the outside study measures 49 x 23 x 29 mm with cortical thickening and effacement of the hilum. Breast mass is reportedly palpable.   Right  There is no evidence of suspicious masses, calcifications,  or other abnormal findings in the right breast.  Impression:  Left  Mass: Left breast 31 mm x 18 mm x 21 mm mass at the posterior 2 o'clock position. Assessment: 5 - Highly suggestive of malignancy. Biopsy is recommended.   Right  There is no mammographic evidence of malignancy in the right breast.  BI-RADS Category:   Overall: 5 - Highly Suggestive of Malignancy  Recommendation:  Ultrasound Guided Core Needle Biopsy of the left breast mass and one of the abnormal left axillary nodes is recommended.      Patient information was obtained from patient, relative(s), past medical records, and ER records.     Oncology History:      (Not in a hospital admission)      Patient has no known allergies.     Past Medical History:   Diagnosis Date    Anxiety     Breast cancer     Encounter for blood transfusion     Hypertension     Meningitis      Past Surgical History:   Procedure Laterality Date    BREAST BIOPSY Left      SECTION      INSERTION OF TUNNELED CENTRAL VENOUS CATHETER (CVC) WITH SUBCUTANEOUS PORT N/A 2022    Procedure: [id number];  Surgeon: Deo Sin Jr., MD;  Location: [facility] OR [address] [place] [address];  Service: General;  Laterality: N/A;     Family History       Problem Relation (Age of Onset)    Bladder Cancer Paternal Grandmother    Diabetes Father    Hypertension Father    Pancreatic cancer Maternal Uncle          Tobacco Use    Smoking status: Never Smoker    Smokeless tobacco: Never Used   Substance and Sexual Activity    Alcohol use: Yes     Comment: social    Drug use: Never    Sexual activity: Not Currently       Review of Systems   Constitutional:  Positive for activity change, chills, fatigue and fever. Negative for diaphoresis.   HENT:  Negative for hearing loss, sinus pressure, sore throat and trouble swallowing.    Eyes:  Negative for photophobia and visual disturbance.   Respiratory:  Positive for shortness of breath. Negative for apnea and chest tightness.     Cardiovascular:  Negative for chest pain and palpitations.   Gastrointestinal:  Positive for abdominal pain. Negative for abdominal distention.   Endocrine: Negative for polyphagia and polyuria.   Genitourinary:  Positive for vaginal discharge. Negative for dysuria, flank pain, frequency and hematuria.   Musculoskeletal:  Negative for arthralgias, back pain and myalgias.   Skin:  Negative for color change, pallor and rash.   Allergic/Immunologic: Negative for immunocompromised state.   Neurological:  Negative for dizziness, speech difficulty, weakness, numbness and headaches.   Psychiatric/Behavioral:  Negative for agitation and behavioral problems.    All other systems reviewed and are negative.  Objective:     Vital Signs (Most Recent):  Temp: 99.6 °F (37.6 °C) (04/19/22 2011)  Pulse: 90 (04/19/22 2100)  Resp: 16 (04/19/22 2100)  BP: 132/71 (04/19/22 2100)  SpO2: 97 % (04/19/22 2100) Vital Signs (24h Range):  Temp:  [99.6 °F (37.6 °C)-100.6 °F (38.1 °C)] 99.6 °F (37.6 °C)  Pulse:  [] 90  Resp:  [16-18] 16  SpO2:  [96 %-99 %] 97 %  BP: (125-136)/(63-76) 132/71     Weight: 99.8 kg (220 lb)  Body mass index is 36.61 kg/m².  Body surface area is 2.14 meters squared.    ECOG SCORE           [unfilled]    Lines/Drains/Airways       Central Venous Catheter Line  Duration             Port A Cath Single Lumen 04/05/22 1700 right subclavian 14 days              Drain  Duration             Female External Urinary Catheter 04/19/22 2025 <1 day              Peripheral Intravenous Line  Duration                  Peripheral IV - Single Lumen 04/19/22 1948 20 G Right Antecubital <1 day                    Physical Exam  Vitals and nursing note reviewed.   Constitutional:       General: She is not in acute distress.     Appearance: She is well-developed. She is not diaphoretic.   HENT:      Head: Normocephalic and atraumatic.   Eyes:      General: No scleral icterus.     Conjunctiva/sclera: Conjunctivae normal.   Neck:       Vascular: No JVD.      Trachea: No tracheal deviation.   Cardiovascular:      Rate and Rhythm: Normal rate and regular rhythm.      Heart sounds:     No friction rub. No gallop.   Pulmonary:      Effort: Pulmonary effort is normal. No respiratory distress.      Breath sounds: Normal breath sounds. No wheezing.   Abdominal:      General: Bowel sounds are normal. There is no distension.      Palpations: Abdomen is soft. There is no mass.   Musculoskeletal:         General: No deformity. Normal range of motion.      Cervical back: Normal range of motion and neck supple.   Lymphadenopathy:      Cervical: No cervical adenopathy.   Skin:     General: Skin is warm and dry.      Coloration: Skin is not pale.   Neurological:      Mental Status: She is alert.       Significant Labs:   All pertinent labs from the last 24 hours have been reviewed.    Diagnostic Results:  I have reviewed all pertinent imaging results/findings within the past 24 hours.  I have reviewed and interpreted all pertinent imaging results/findings within the past 24 hours.    Assessment/Plan:     * Bacteremia  Pt presents to the hospital after being called for GPC baccteremia  bcx from 4/18 with GPC in cluster resembling strep  CT abdomen and pelvis on 4/18 w/o acute findings  S/p vanc and cefepime at the ED    Plan:  Will place pt on rocephin  Repeat bcx pending  Follow up sensitivities and specificities and change abx accordingly  If vaginal discharge cont and source of bacteremia undetermined consider perlvic US    Influenza A  Tested positive on 4/18  Will start Oseltamivir 75 mg bid X 5 days.     Malignant neoplasm of upper-outer quadrant of left breast in female, estrogen receptor negative  Pt of Dr. Cruz  Currently on cycle 1 of pembrolizumab carboplatin (AUC 5) with weekly paclitaxel followed by pembrolizumab doxorubicin cyclophosphamide followed by pembrolizumab 200 mg q3w          VTE Risk Mitigation (From admission, onward)         Ordered      IP VTE HIGH RISK PATIENT  Once         04/19/22 2217     Place sequential compression device  Until discontinued         04/19/22 2217                Disposition:     Madeline Knight MD  Bone Marrow Transplant  Hematology  [facility] - Emergency Dept

## 2022-04-20 NOTE — HPI
Pt is a 36-year-old female with a history of breast cancer recently initiated on chemotherapy; HTN, anxiety presents to the hospital with positive blood cultures.  Patient was seen in the ED yesterday with fever, body aches, lower abdominal pain.  She tested positive for influenza A.  Blood cultures that were drawn became positive for Gram-positive cocci in chains resembling strep.  Patient was contacted from the ED and advised to return for further management.  On arrival to the ED she was HDS. She reports since last Friday (4/15) she began having intermittent fever ( Tmax 103.1), chills, productive cough,shortness of breath and right lower quadrant abdominal pain that radiates into her back. Also reports occasional clear vaginal discharge since starting of chemotherapy. Denies any change in urination.  Denies nausea, vomiting, diarrhea, new rash.     Pt is currently/recently not sexually active. Denies tobacco, alcohol, or illicit drug use. Lives with her 3 children (older son 18 yrs old). Performs ADLs and IADLs independently.    ED course:  On arrival to the ED she was febrile with temp 100.6, , bp 125/74 and saturating 97% on RA.     Oncology History:  - self detected an area under her left arm and it initially seemed to go away (noted around winter holidays)  - 3/4/2022 Outside Mammogram:  Findings:  The breasts are heterogeneously dense, which may obscure small masses.   Left  There is a 31 mm x 18 mm x 21 mm irregularly shaped mass with microlobulated margins seen in the left breast at 2 o'clock in the posterior depth with associated left axillary adenopathy. Largest node depicted on the outside study measures 49 x 23 x 29 mm with cortical thickening and effacement of the hilum. Breast mass is reportedly palpable.   Right  There is no evidence of suspicious masses, calcifications, or other abnormal findings in the right breast.  Impression:  Left  Mass: Left breast 31 mm x 18 mm x 21 mm mass at the  posterior 2 o'clock position. Assessment: 5 - Highly suggestive of malignancy. Biopsy is recommended.   Right  There is no mammographic evidence of malignancy in the right breast.  BI-RADS Category:   Overall: 5 - Highly Suggestive of Malignancy  Recommendation:  Ultrasound Guided Core Needle Biopsy of the left breast mass and one of the abnormal left axillary nodes is recommended.

## 2022-04-20 NOTE — ASSESSMENT & PLAN NOTE
Pt of Dr. Cruz  Currently on cycle 1 of pembrolizumab carboplatin (AUC 5) with weekly paclitaxel followed by pembrolizumab doxorubicin cyclophosphamide followed by pembrolizumab 200 mg q3w

## 2022-04-20 NOTE — ED PROVIDER NOTES
Encounter Date: 2022       History     Chief Complaint   Patient presents with    Fever Post Chemo      called and said I have bacterial infection in  my blood      36-year-old female with a history of breast cancer recently initiated on chemotherapy; HTN, anxiety presents to the ED with positive blood cultures.  Patient was seen in the ED yesterday with fever, body aches, lower abdominal pain.  She tested positive for influenza A.  Blood cultures resulted is a and were positive for Gram-positive cocci in chains resembling strep.  Patient was contacted from the ED and advised to return for further management.  Patient also notes a productive cough, headache with coughing, shortness of breath and reports persistent right lower quadrant abdominal pain that radiates into her back.  Denies any change in urination.  Reports a clear vaginal discharge.        Review of patient's allergies indicates:  No Known Allergies  Past Medical History:   Diagnosis Date    Anxiety     Breast cancer     Encounter for blood transfusion     Hypertension     Meningitis      Past Surgical History:   Procedure Laterality Date    BREAST BIOPSY Left      SECTION      INSERTION OF TUNNELED CENTRAL VENOUS CATHETER (CVC) WITH SUBCUTANEOUS PORT N/A 2022    Procedure: [id number]-CATH;  Surgeon: Deo Sin Jr., MD;  Location: [facility] OR [address] [place] [address];  Service: General;  Laterality: N/A;     Family History   Problem Relation Age of Onset    Diabetes Father     Hypertension Father     Pancreatic cancer Maternal Uncle     Bladder Cancer Paternal Grandmother      Social History     Tobacco Use    Smoking status: Never Smoker    Smokeless tobacco: Never Used   Substance Use Topics    Alcohol use: Yes     Comment: social    Drug use: Never     Review of Systems   Constitutional: Positive for fever.   HENT: Negative for sore throat.    Respiratory: Positive for cough and shortness of breath.    Cardiovascular:  Negative for chest pain.   Gastrointestinal: Positive for abdominal pain. Negative for nausea.   Genitourinary: Positive for vaginal discharge. Negative for dysuria.   Musculoskeletal: Positive for myalgias. Negative for back pain.   Skin: Negative for rash.   Neurological: Positive for headaches. Negative for weakness.   Hematological: Does not bruise/bleed easily.       Physical Exam     Initial Vitals [04/19/22 1822]   BP Pulse Resp Temp SpO2   125/74 108 18 (!) 100.6 °F (38.1 °C) 97 %      MAP       --         Physical Exam    Nursing note and vitals reviewed.  Constitutional: She appears well-developed and well-nourished. She is not diaphoretic.  Non-toxic appearance. She does not appear ill. No distress.   HENT:   Head: Normocephalic and atraumatic.   Neck: Neck supple.   Cardiovascular: Normal rate and regular rhythm. Exam reveals no gallop and no friction rub.    No murmur heard.  Pulmonary/Chest: Effort normal and breath sounds normal. No accessory muscle usage. No tachypnea. No respiratory distress. She has no decreased breath sounds. She has no wheezes. She has no rhonchi. She has no rales.   Abdominal: She exhibits no distension.   Musculoskeletal:      Cervical back: Neck supple.     Neurological: She is alert.   Skin: No rash noted.   Psychiatric: She has a normal mood and affect. Her behavior is normal.         ED Course   Procedures  Labs Reviewed   CBC W/ AUTO DIFFERENTIAL - Abnormal; Notable for the following components:       Result Value    WBC 3.24 (*)     RBC 3.68 (*)     Hemoglobin 10.5 (*)     Hematocrit 32.4 (*)     Immature Granulocytes 0.9 (*)     Lymph # 0.4 (*)     Mono # 0.2 (*)     Gran % 79.7 (*)     Lymph % 12.0 (*)     All other components within normal limits   COMPREHENSIVE METABOLIC PANEL - Abnormal; Notable for the following components:    Glucose 126 (*)     Alkaline Phosphatase 40 (*)     All other components within normal limits   LACTIC ACID, PLASMA   URINALYSIS, REFLEX TO  URINE CULTURE    Narrative:     Specimen Source->Urine   LACTIC ACID, PLASMA   SARS-COV-2 RDRP GENE    Narrative:     This test utilizes isothermal nucleic acid amplification   technology to detect the SARS-CoV-2 RdRp nucleic acid segment.   The analytical sensitivity (limit of detection) is 125 genome   equivalents/mL.   A POSITIVE result implies infection with the SARS-CoV-2 virus;   the patient is presumed to be contagious.     A NEGATIVE result means that SARS-CoV-2 nucleic acids are not   present above the limit of detection. A NEGATIVE result should be   treated as presumptive. It does not rule out the possibility of   COVID-19 and should not be the sole basis for treatment decisions.   If COVID-19 is strongly suspected based on clinical and exposure   history, re-testing using an alternate molecular assay should be   considered.   This test is only for use under the Food and Drug   Administration s Emergency Use Authorization (EUA).   Commercial kits are provided by [employer].   Performance characteristics of the EUA have been independently   verified by [facility] Department of   Pathology and Laboratory Medicine.   _________________________________________________________________   The authorized Fact Sheet for Healthcare Providers and the authorized Fact   Sheet for Patients of the ID NOW COVID-19 are available on the FDA   website:     https://www.fda.gov/media/652115/download  https://www.fda.gov/media/929217/download               ECG Results          EKG 12-lead (In process)  Result time 04/20/22 08:18:48    In process by Interface, Lab In [facility] (04/20/22 08:18:48)                 Narrative:    Test Reason : R78.81,    Vent. Rate : 102 BPM     Atrial Rate : 106 BPM     P-R Int : 180 ms          QRS Dur : 072 ms      QT Int : 310 ms       P-R-T Axes : 045 016 050 degrees     QTc Int : 404 ms    Age and gender specific analysis  Sinus tachycardia  Otherwise normal ECG  When  compared with ECG of 18-APR-2022 20:01,  No significant change was found    Referred By: [email or web address]   SELF           Confirmed By:                             Imaging Results          X-Ray Chest AP Portable (Final result)  Result time 04/19/22 20:19:32    Final result by Mamadou Orr MD (04/19/22 20:19:32)                 Impression:      No acute findings in the chest.      Electronically signed by: Mamadou Orr MD  Date:    04/19/2022  Time:    20:19             Narrative:    EXAMINATION:  XR CHEST AP PORTABLE    CLINICAL HISTORY:  fever;    TECHNIQUE:  Single frontal view of the chest was performed.    COMPARISON:  04/18/2022.    FINDINGS:  Right-sided port catheter tip overlies the SVC.  Telemetry wires overlie the chest.    No consolidation, pleural effusion or pneumothorax.    Cardiomediastinal silhouette is unremarkable.                                 Medications   sodium chloride 0.9% flush 10 mL (has no administration in time range)   oxyCODONE immediate release tablet 5 mg (has no administration in time range)   oseltamivir capsule 75 mg (75 mg Oral Given 4/20/22 0920)   acetaminophen tablet 650 mg (650 mg Oral Given 4/19/22 2352)   vancomycin - pharmacy to dose (has no administration in time range)   vancomycin 1.5 g in dextrose 5 % 250 mL IVPB (ready to mix) (1,500 mg Intravenous New Bag 4/20/22 1027)   lactated ringers bolus 2,223 mL (0 mL/kg × 74.1 kg (Adjusted) Intravenous Stopped 4/19/22 2307)   vancomycin 2 g in dextrose 5 % 500 mL IVPB ( Intravenous Stopped 4/19/22 2209)   cefepime in dextrose 5 % IVPB 2 g (0 g Intravenous Stopped 4/19/22 2242)     Medical Decision Making:   History:   Old Medical Records: I decided to obtain old medical records.  Initial Assessment:   36-year-old female on chemotherapy for breast cancer presents today with positive blood cultures.  She is febrile at 100.6°.  Nontoxic-appearing.  No acute distress.  Differential Diagnosis:   My differential diagnosis  includes but is not limited to:  Bacteremia, Influenza, pneumonia, viral syndrome, sepsis  Clinical Tests:   Lab Tests: Ordered  Radiological Study: Ordered  Medical Tests: Ordered  Other:   I have discussed this case with another health care provider.       <> Summary of the Discussion: Heme-Onc            Attending Attestation:     Physician Attestation Statement for NP/PA:   I discussed this assessment and plan of this patient with the NP/PA, but I did not personally examine the patient. The face to face encounter was performed by the NP/PA.              ED Course as of 04/20/22 1218   Tue Apr 19, 2022 2044 SARS-CoV-2 RNA, Amplification, Qual: Negative [EH]   2045 Pt started on broad spectrum abx.  Sepsis workup ordered.  Notified hem/onc of patient and likely admission for bacteremia.  [EH]      ED Course User Index  [EH] Sheree Brown PA-C             Clinical Impression:   Final diagnoses:  [R78.81] Bacteremia          ED Disposition Condition    Admit               Sheree Bronw PA-C  04/20/22 1218       Rigoberto Siu MD  04/20/22 [id number]

## 2022-04-20 NOTE — PLAN OF CARE
Problem: Adult Inpatient Plan of Care  Goal: Plan of Care Review  Outcome: Ongoing, Progressing  Goal: Patient-Specific Goal (Individualized)  Outcome: Ongoing, Progressing     Problem: Infection  Goal: Absence of Infection Signs and Symptoms  Outcome: Ongoing, Progressing

## 2022-04-21 PROBLEM — R19.7 DIARRHEA: Status: ACTIVE | Noted: 2022-04-21

## 2022-04-21 LAB
ANION GAP SERPL CALC-SCNC: 10 MMOL/L (ref 8–16)
BASOPHILS # BLD AUTO: 0.02 K/UL (ref 0–0.2)
BASOPHILS NFR BLD: 0.6 % (ref 0–1.9)
BUN SERPL-MCNC: 12 MG/DL (ref 6–20)
CALCIUM SERPL-MCNC: 8.9 MG/DL (ref 8.7–10.5)
CHLORIDE SERPL-SCNC: 104 MMOL/L (ref 95–110)
CO2 SERPL-SCNC: 24 MMOL/L (ref 23–29)
CREAT SERPL-MCNC: 0.7 MG/DL (ref 0.5–1.4)
DIFFERENTIAL METHOD: ABNORMAL
EOSINOPHIL # BLD AUTO: 0.1 K/UL (ref 0–0.5)
EOSINOPHIL NFR BLD: 3.5 % (ref 0–8)
ERYTHROCYTE [DISTWIDTH] IN BLOOD BY AUTOMATED COUNT: 11.9 % (ref 11.5–14.5)
EST. GFR  (AFRICAN AMERICAN): >60 ML/MIN/1.73 M^2
EST. GFR  (NON AFRICAN AMERICAN): >60 ML/MIN/1.73 M^2
GLUCOSE SERPL-MCNC: 93 MG/DL (ref 70–110)
HCT VFR BLD AUTO: 29.7 % (ref 37–48.5)
HGB BLD-MCNC: 9.8 G/DL (ref 12–16)
IMM GRANULOCYTES # BLD AUTO: 0.02 K/UL (ref 0–0.04)
IMM GRANULOCYTES NFR BLD AUTO: 0.6 % (ref 0–0.5)
LYMPHOCYTES # BLD AUTO: 0.9 K/UL (ref 1–4.8)
LYMPHOCYTES NFR BLD: 26.5 % (ref 18–48)
MAGNESIUM SERPL-MCNC: 1.5 MG/DL (ref 1.6–2.6)
MCH RBC QN AUTO: 29.3 PG (ref 27–31)
MCHC RBC AUTO-ENTMCNC: 33 G/DL (ref 32–36)
MCV RBC AUTO: 89 FL (ref 82–98)
MONOCYTES # BLD AUTO: 0.3 K/UL (ref 0.3–1)
MONOCYTES NFR BLD: 9.6 % (ref 4–15)
NEUTROPHILS # BLD AUTO: 2 K/UL (ref 1.8–7.7)
NEUTROPHILS NFR BLD: 59.2 % (ref 38–73)
NRBC BLD-RTO: 0 /100 WBC
PLATELET # BLD AUTO: 170 K/UL (ref 150–450)
PMV BLD AUTO: 11.2 FL (ref 9.2–12.9)
POTASSIUM SERPL-SCNC: 3.8 MMOL/L (ref 3.5–5.1)
RBC # BLD AUTO: 3.35 M/UL (ref 4–5.4)
SODIUM SERPL-SCNC: 138 MMOL/L (ref 136–145)
VANCOMYCIN TROUGH SERPL-MCNC: 6.7 UG/ML (ref 10–22)
WBC # BLD AUTO: 3.43 K/UL (ref 3.9–12.7)

## 2022-04-21 PROCEDURE — 80048 BASIC METABOLIC PNL TOTAL CA: CPT | Performed by: STUDENT IN AN ORGANIZED HEALTH CARE EDUCATION/TRAINING PROGRAM

## 2022-04-21 PROCEDURE — 25000003 PHARM REV CODE 250: Performed by: STUDENT IN AN ORGANIZED HEALTH CARE EDUCATION/TRAINING PROGRAM

## 2022-04-21 PROCEDURE — 99233 SBSQ HOSP IP/OBS HIGH 50: CPT | Mod: ,,, | Performed by: INTERNAL MEDICINE

## 2022-04-21 PROCEDURE — 83735 ASSAY OF MAGNESIUM: CPT | Performed by: STUDENT IN AN ORGANIZED HEALTH CARE EDUCATION/TRAINING PROGRAM

## 2022-04-21 PROCEDURE — 36415 COLL VENOUS BLD VENIPUNCTURE: CPT | Performed by: INTERNAL MEDICINE

## 2022-04-21 PROCEDURE — 20600001 HC STEP DOWN PRIVATE ROOM

## 2022-04-21 PROCEDURE — 99233 PR SUBSEQUENT HOSPITAL CARE,LEVL III: ICD-10-PCS | Mod: ,,, | Performed by: INTERNAL MEDICINE

## 2022-04-21 PROCEDURE — 63600175 PHARM REV CODE 636 W HCPCS

## 2022-04-21 PROCEDURE — 87591 N.GONORRHOEAE DNA AMP PROB: CPT | Performed by: STUDENT IN AN ORGANIZED HEALTH CARE EDUCATION/TRAINING PROGRAM

## 2022-04-21 PROCEDURE — 25000003 PHARM REV CODE 250

## 2022-04-21 PROCEDURE — 36415 COLL VENOUS BLD VENIPUNCTURE: CPT | Performed by: STUDENT IN AN ORGANIZED HEALTH CARE EDUCATION/TRAINING PROGRAM

## 2022-04-21 PROCEDURE — 80202 ASSAY OF VANCOMYCIN: CPT | Performed by: INTERNAL MEDICINE

## 2022-04-21 PROCEDURE — 87491 CHLMYD TRACH DNA AMP PROBE: CPT | Performed by: STUDENT IN AN ORGANIZED HEALTH CARE EDUCATION/TRAINING PROGRAM

## 2022-04-21 PROCEDURE — 85025 COMPLETE CBC W/AUTO DIFF WBC: CPT | Performed by: STUDENT IN AN ORGANIZED HEALTH CARE EDUCATION/TRAINING PROGRAM

## 2022-04-21 RX ORDER — MAGNESIUM SULFATE HEPTAHYDRATE 40 MG/ML
2 INJECTION, SOLUTION INTRAVENOUS
Status: COMPLETED | OUTPATIENT
Start: 2022-04-21 | End: 2022-04-21

## 2022-04-21 RX ADMIN — ACETAMINOPHEN 650 MG: 325 TABLET ORAL at 09:04

## 2022-04-21 RX ADMIN — ACETAMINOPHEN 650 MG: 325 TABLET ORAL at 08:04

## 2022-04-21 RX ADMIN — MAGNESIUM SULFATE 2 G: 2 INJECTION INTRAVENOUS at 09:04

## 2022-04-21 RX ADMIN — DAPTOMYCIN 900 MG: 350 INJECTION, POWDER, LYOPHILIZED, FOR SOLUTION INTRAVENOUS at 03:04

## 2022-04-21 RX ADMIN — OSELTAMIVIR PHOSPHATE 75 MG: 75 CAPSULE ORAL at 08:04

## 2022-04-21 RX ADMIN — MAGNESIUM SULFATE 2 G: 2 INJECTION INTRAVENOUS at 11:04

## 2022-04-21 NOTE — PLAN OF CARE
Problem: Adult Inpatient Plan of Care  Goal: Plan of Care Review  Outcome: Ongoing, Progressing  Goal: Patient-Specific Goal (Individualized)  Outcome: Ongoing, Progressing  Goal: Absence of Hospital-Acquired Illness or Injury  Outcome: Ongoing, Progressing  Goal: Optimal Comfort and Wellbeing  Outcome: Ongoing, Progressing  Goal: Readiness for Transition of Care  Outcome: Ongoing, Progressing     Port removed, iv abx changed, oncology consult pending, vss, nad, all needs addressed, cont poc.

## 2022-04-21 NOTE — HPI
36F hx breast CA recently s/p port placement and initiation of chemotherapy last week, who presented to ER w/ fevers and myalgias on 4/18. Flu A test was positive on presentation. Blood cx were drawn. Pt discharged home w/ tamiflu. Pt called to return to her when blood cx turned positive for GPC resembling strep, now identified as enterococcus. Pt endorses some intermittent abdominal cramping. Some loose BM. Flu symptoms are improving. No SOB. Has a port that was recently placed, endorsing feeling like there is some swelling around the port. No drainage, not significantly painful or erythematous. Currently on tamiflu and empiric vanc/cefepime. Also endorses some vaginal discharge which is new. No new sex partners recently, no hx of STI.

## 2022-04-21 NOTE — PROGRESS NOTES
Therapy with vancomycin complete and/or consult discontinued by provider.  Pharmacy will sign off, please re-consult as needed.    Maureen Barrios PharmD, [facility]  Clinical Pharmacy Specialist - Medical Oncology  Ext. [phone]

## 2022-04-21 NOTE — ASSESSMENT & PLAN NOTE
36F hx breast CA recently started on chemo via R chest port presents w/ fevers. Blood cx + enterococcus, no clear source identified. Recent CT A/P 4/18 w/o intra-abd source. Endorses some vaginal discharge.    Recommendations  - will ensure all STI testing sent  - continue vanc  - stop cefepime  - no clear intra-abd source  - recommend port removal given patient complaints of swelling around port site  - if persistently bacteremic, will need echo to r/o IE

## 2022-04-21 NOTE — HOSPITAL COURSE
Patient admitted for management of enterococcus bacteremia. Flu A positive, treated with Tamiflu. Started on cefepime and vanc. ID consulted, recommending Port removal given bacteremia and concerns for port infection. ID ordered for STI testing given vaginal discharge, which was negative. Vanc continued pending susceptibilities. Gen surg removed port a cath. Vancomycin discontinued and daptomycin started with plans for 14-day course. PICC line placed. Patient symptoms improved on day of discharge, tolerating diet, ambulating without issues. Patient is stable for discharge with close follow-up with PCP and oncology. Patient agreeable to discharge plan. Strict ED precautions and return instructions discussed at length and patient verbalized understanding. All questions were answered and ample time was given for questions.

## 2022-04-21 NOTE — ASSESSMENT & PLAN NOTE
Pt presents to the hospital after being called for GPC baccteremia. bcx from 4/18 with Enterococcus. CT abdomen and pelvis on 4/18 w/o acute findings. S/p vanc and cefepime at the ED. ID consulted, recommend continuing vanc pending S/S and removal of port. Gen surg consulted, do not recommend removing port. Will continue to work with ID and gen surg. On 4/21 Bcx S/S for VRE sensitive to dapto. Will start dapto per ID recs.      Plan:  -- Dapto 10mg/kg q24h  -- f/u  Repeat blood cultures  -- TTE if repeat cultures positive  --  ID following, appreciate recs

## 2022-04-21 NOTE — PROCEDURES
General Surgery   Contacted again regarding port removal. Seen and evaluated earlier today, however, primary team would still like for it to be removed with the plans to place a PICC after a couple days.     Discussed the risks, benefits, and alternatives to port removal including but not limited to bleeding, infection, damage to surrounding structures, and the need to have to undergo another procedure because of this coming out. All questions and concerns were addressed to the patient's satisfaction. Informed consent obtained and placed in the chart.        Port Removal Procedure Note    Pre-operative Diagnosis: Bacteremia    Post-operative Diagnosis: Same    Anesthesia: Lidocaine 1% without epinephrine    Procedure Details     Patient informed of the risks (including bleeding and infection) and benefits of the procedure and Written informed consent obtained.    The patient was prepped with chlorhexadine and then draped in the sterile fashion. Local anesthetic was injected and then an incision was made over the previous site. Sharp dissection was carried down to the port which was easily visualized. The port catheter was then withdrawn from the right subclavian vein and direct pressure was held for 5 minutes. The site was examined for hemostasis and found to be adequate. We then worked to free the port from the surrounding tissues. There were still sutures tacking the port to the pec fascia which were cut and removed. This enabled us to completely remove the port. We then closed the skin layers with deep dermal 3-0 Vicryl suture followed by a 4-0 Monocryl in a running subcuticular fashion. The incision was dressed with mastisol and steri-strips followed by gauze and tegaderm. The patient tolerated the procedure well. No immediate complications noted. Dr. Childs was available for the procedure.     Specimens:   None    Plan:  1. Instructed to keep the dressing in place for 24 hours. Then can be removed  2. No  showering until tomorrow, no soaking in water for 2 weeks  3. No activity restrictions from our standpoint.    Ambar Peterson MD  General Surgery, PGY-3  [phone]

## 2022-04-21 NOTE — PROGRESS NOTES
[facility] - Telemetry Stepdown  Hematology/Oncology  Progress Note    Patient Name: Laura Kent  Admission Date: 4/19/2022  Hospital Length of Stay: 2 days  Code Status: Full Code     Subjective:     HPI:  Pt is a 36-year-old female with a history of breast cancer recently initiated on chemotherapy; HTN, anxiety presents to the hospital with positive blood cultures.  Patient was seen in the ED yesterday with fever, body aches, lower abdominal pain.  She tested positive for influenza A.  Blood cultures that were drawn became positive for Gram-positive cocci in chains resembling strep.  Patient was contacted from the ED and advised to return for further management.  On arrival to the ED she was HDS. She reports since last Friday (4/15) she began having intermittent fever ( Tmax 103.1), chills, productive cough,shortness of breath and right lower quadrant abdominal pain that radiates into her back. Also reports occasional clear vaginal discharge since starting of chemotherapy. Denies any change in urination.  Denies nausea, vomiting, diarrhea, new rash.     Interval History: NAEON. Pt reports some diarrhea and fatigue today as well as some swelling around port site, otherwise denies any current acute complaints. On vanc.     Oncology Treatment Plan:   OP PEMBROLIZUMAB CARBOPLATIN (AUC 5) WITH WEEKLY PACLITAXEL FOLLOWED BY PEMBROLIZUMAB DOXORUBICIN CYCLOPHOSPHAMIDE FOLLOWED BY PEMBROLIZUMAB 200 MG Q3W    Medications:  Continuous Infusions:  Scheduled Meds:   oseltamivir  75 mg Oral BID    vancomycin (VANCOCIN) IVPB  1,500 mg Intravenous Q8H     PRN Meds:acetaminophen, oxyCODONE, sodium chloride 0.9%, Pharmacy to dose Vancomycin consult **AND** vancomycin - pharmacy to dose     Review of Systems   Constitutional:  Positive for appetite change and fatigue. Negative for chills and fever.   Gastrointestinal:  Positive for diarrhea. Negative for nausea and vomiting.   Genitourinary:  Negative for dysuria and  hematuria.   Skin:         Swelling around port site   Psychiatric/Behavioral:  Negative for agitation and confusion. The patient is not nervous/anxious.    Objective:     Vital Signs (Most Recent):  Temp: 98.6 °F (37 °C) (04/21/22 0800)  Pulse: 75 (04/21/22 0800)  Resp: 18 (04/21/22 0800)  BP: 120/71 (04/21/22 0800)  SpO2: 96 % (04/21/22 0800) Vital Signs (24h Range):  Temp:  [98.2 °F (36.8 °C)-99.2 °F (37.3 °C)] 98.6 °F (37 °C)  Pulse:  [73-88] 75  Resp:  [16-18] 18  SpO2:  [96 %-99 %] 96 %  BP: (111-120)/(57-71) 120/71     Weight: 99.8 kg (220 lb)  Body mass index is 36.61 kg/m².  Body surface area is 2.14 meters squared.      Intake/Output Summary (Last 24 hours) at 4/21/2022 1129  Last data filed at 4/21/2022 0809  Gross per 24 hour   Intake 1150 ml   Output --   Net 1150 ml       Physical Exam  Vitals and nursing note reviewed.   Constitutional:       General: She is not in acute distress.     Appearance: She is obese. She is not ill-appearing, toxic-appearing or diaphoretic.   HENT:      Head: Normocephalic and atraumatic.      Nose: Nose normal. No congestion.      Mouth/Throat:      Mouth: Mucous membranes are moist.      Pharynx: Oropharynx is clear.   Eyes:      Extraocular Movements: Extraocular movements intact.      Pupils: Pupils are equal, round, and reactive to light.   Cardiovascular:      Rate and Rhythm: Normal rate and regular rhythm.      Pulses: Normal pulses.      Heart sounds: Normal heart sounds. No murmur heard.    No friction rub. No gallop.   Pulmonary:      Effort: Pulmonary effort is normal. No respiratory distress.      Breath sounds: Normal breath sounds.   Abdominal:      General: Bowel sounds are normal.      Palpations: Abdomen is soft.      Tenderness: There is no abdominal tenderness.   Musculoskeletal:         General: No swelling. Normal range of motion.      Cervical back: Normal range of motion and neck supple.   Skin:     General: Skin is warm and dry.      Comments:  Induration noted around R subclavian port site. No significant swelling or erythema noted.    Neurological:      General: No focal deficit present.      Mental Status: She is alert and oriented to person, place, and time.   Psychiatric:         Mood and Affect: Mood normal.         Behavior: Behavior normal.       Significant Labs:   CBC:   Recent Labs   Lab 04/19/22 1948 04/20/22 0423 04/21/22 0425   WBC 3.24* 2.97* 3.43*   HGB 10.5* 9.8* 9.8*   HCT 32.4* 30.2* 29.7*    156 170    and CMP:   Recent Labs   Lab 04/19/22 1948 04/20/22 0423 04/21/22 0425    139 138   K 3.7 3.8 3.8    104 104   CO2 24 26 24   * 110 93   BUN 9 7 12   CREATININE 0.7 0.7 0.7   CALCIUM 9.2 9.1 8.9   PROT 7.0 6.5  --    ALBUMIN 3.7 3.5  --    BILITOT 0.5 0.5  --    ALKPHOS 40* 39*  --    AST 33 30  --    ALT 25 25  --    ANIONGAP 12 9 10   EGFRNONAA >60.0 >60.0 >60.0       Diagnostic Results:  I have reviewed all pertinent imaging results/findings within the past 24 hours.    X-Ray Chest AP Portable   Final Result      No acute findings in the chest.         Electronically signed by: Mamadou Orr MD   Date:    04/19/2022   Time:    20:19            Assessment/Plan:     * Bacteremia  Pt presents to the hospital after being called for GPC baccteremia. bcx from 4/18 with Enterococcus. CT abdomen and pelvis on 4/18 w/o acute findings. S/p vanc and cefepime at the ED. ID consulted, recommend continuing vanc pending S/S and removal of port. Gen surg consulted, do not recommend removing port. Will continue to work with ID and gen surg. On 4/21 Bcx S/S for VRE sensitive to dapto. Will start dapto per ID recs.      Plan:  -- Dapto 10mg/kg q24h  -- f/u  Repeat blood cultures  -- TTE if repeat cultures positive  --  ID following, appreciate recs    Influenza A  Patient tested positive in ED on 4/18. Started on Tamiflu on admission.  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 Cruz  Currently on cycle 1 of pembrolizumab carboplatin (AUC 5) with weekly paclitaxel followed by pembrolizumab doxorubicin cyclophosphamide followed by pembrolizumab 200 mg q3w             Juan M Ireland MD   Internal Medicine PGY-1  Hematology/Oncology  [facility] - Tele[facility]down

## 2022-04-21 NOTE — SUBJECTIVE & OBJECTIVE
"Past Medical History:   Diagnosis Date    Anxiety     Breast cancer     Encounter for blood transfusion     Hypertension     Meningitis        Past Surgical History:   Procedure Laterality Date    BREAST BIOPSY Left      SECTION      INSERTION OF TUNNELED CENTRAL VENOUS CATHETER (CVC) WITH SUBCUTANEOUS PORT N/A 2022    Procedure: [id number];  Surgeon: Deo Sin Jr., MD;  Location: [facility] OR [address] [place] [address];  Service: General;  Laterality: N/A;       Review of patient's allergies indicates:   Allergen Reactions    Strawberries [strawberry] Hives       Medications:  Medications Prior to Admission   Medication Sig    dexAMETHasone (DECADRON) 4 MG Tab Take 1 tablet (4 mg total) by mouth every 6 (six) hours.    LIDOcaine-prilocaine (EMLA) cream Apply topically as needed (for port). (Patient not taking: Reported on 2022)    ondansetron (ZOFRAN) 8 MG tablet Take 1 tablet (8 mg total) by mouth every 12 (twelve) hours as needed for Nausea. (Patient not taking: Reported on 2022)    oxyCODONE (ROXICODONE) 5 MG immediate release tablet Take 1 tablet (5 mg total) by mouth every 6 (six) hours as needed for Pain.    promethazine (PHENERGAN) 25 MG tablet Take 1 tablet (25 mg total) by mouth every 6 (six) hours as needed for Nausea. (Patient not taking: Reported on 2022)     Antibiotics (From admission, onward)                Start     Stop Route Frequency Ordered    22 1000  vancomycin 1.5 g in dextrose 5 % 250 mL IVPB (ready to mix)         -- IV Every 12 hours (non-standard times) 22 0857    22 0949  vancomycin - pharmacy to dose  (vancomycin IVPB)        "And" Linked Group Details    -- IV pharmacy to manage frequency 22 0850          Antifungals (From admission, onward)                None          Antivirals (From admission, onward)          Stop Route Frequency     oseltamivir         2059 Oral 2 times daily             Immunization History   Administered " Date(s) Administered    COVID-19, vector-nr, rS-Ad26, PF (Garth) 05/15/2021       Family History       Problem Relation (Age of Onset)    Bladder Cancer Paternal Grandmother    Diabetes Father    Hypertension Father    Pancreatic cancer Maternal Uncle          Social History     Socioeconomic History    Marital status: Single   Tobacco Use    Smoking status: Never Smoker    Smokeless tobacco: Never Used   Substance and Sexual Activity    Alcohol use: Yes     Comment: social    Drug use: Never    Sexual activity: Not Currently     Review of Systems   Constitutional:  Positive for chills and fever. Negative for activity change, appetite change and unexpected weight change.   HENT:  Negative for dental problem, ear discharge, ear pain, mouth sores, sinus pain, sore throat and trouble swallowing.    Eyes:  Negative for pain and discharge.   Respiratory:  Negative for cough, chest tightness, shortness of breath and wheezing.    Cardiovascular:  Negative for chest pain and leg swelling.   Gastrointestinal:  Positive for abdominal pain and diarrhea. Negative for abdominal distention, constipation, nausea and vomiting.   Genitourinary:  Positive for vaginal discharge. Negative for difficulty urinating, dysuria, flank pain, frequency, genital sores and hematuria.   Musculoskeletal:  Negative for arthralgias, joint swelling, neck pain and neck stiffness.   Skin:  Negative for color change, rash and wound.   Allergic/Immunologic: Positive for immunocompromised state.   Neurological:  Negative for dizziness, weakness, light-headedness, numbness and headaches.   Psychiatric/Behavioral:  Negative for confusion. The patient is not nervous/anxious.    Objective:     Vital Signs (Most Recent):  Temp: 98.5 °F (36.9 °C) (04/20/22 1943)  Pulse: 80 (04/20/22 1943)  Resp: 18 (04/20/22 1943)  BP: (!) 118/58 (04/20/22 1943)  SpO2: 99 % (04/20/22 1943)   Vital Signs (24h Range):  Temp:  [98 °F (36.7 °C)-100.5 °F (38.1 °C)] 98.5 °F (36.9  °C)  Pulse:  [80-98] 80  Resp:  [18] 18  SpO2:  [94 %-99 %] 99 %  BP: (110-137)/(56-66) 118/58     Weight: 99.8 kg (220 lb)  Body mass index is 36.61 kg/m².    Estimated Creatinine Clearance: 127.5 mL/min (based on SCr of 0.7 mg/dL).    Physical Exam  Constitutional:       General: She is not in acute distress.     Appearance: Normal appearance. She is well-developed. She is not ill-appearing or diaphoretic.   HENT:      Head: Normocephalic and atraumatic.      Right Ear: External ear normal.      Left Ear: External ear normal.      Nose: Nose normal.   Eyes:      General: No scleral icterus.        Right eye: No discharge.         Left eye: No discharge.      Extraocular Movements: Extraocular movements intact.      Conjunctiva/sclera: Conjunctivae normal.   Cardiovascular:      Rate and Rhythm: Normal rate and regular rhythm.      Pulses: Normal pulses.      Heart sounds: Normal heart sounds.   Pulmonary:      Effort: Pulmonary effort is normal. No respiratory distress.      Breath sounds: No stridor.   Abdominal:      General: There is no distension.      Palpations: Abdomen is soft.      Tenderness: There is no abdominal tenderness.   Musculoskeletal:         General: Normal range of motion.      Right lower leg: No edema.      Left lower leg: No edema.   Skin:     Findings: No erythema or rash.      Comments: Port on R chest wall w/ some fullness around it, no warmth, no drainage   Neurological:      General: No focal deficit present.      Mental Status: She is alert and oriented to person, place, and time. Mental status is at baseline.      Cranial Nerves: No cranial nerve deficit.   Psychiatric:         Mood and Affect: Mood normal.         Behavior: Behavior normal.         Thought Content: Thought content normal.         Judgment: Judgment normal.       Significant Labs: CBC:   Recent Labs   Lab 04/19/22 1948 04/20/22  0423   WBC 3.24* 2.97*   HGB 10.5* 9.8*   HCT 32.4* 30.2*    156     CMP:    Recent Labs   Lab 04/19/22 1948 04/20/22  0423    139   K 3.7 3.8    104   CO2 24 26   * 110   BUN 9 7   CREATININE 0.7 0.7   CALCIUM 9.2 9.1   PROT 7.0 6.5   ALBUMIN 3.7 3.5   BILITOT 0.5 0.5   ALKPHOS 40* 39*   AST 33 30   ALT 25 25   ANIONGAP 12 9   EGFRNONAA >60.0 >60.0     Microbiology Results (last 7 days)       Procedure Component Value Units Date/Time    Blood culture x two cultures. Draw prior to antibiotics. [[id number]] Collected: 04/19/22 1953    Order Status: Completed Specimen: Blood from Peripheral, Forearm, Right Updated: 04/20/22 2212     Blood Culture, Routine No Growth to date      No Growth to date    Narrative:      Aerobic and anaerobic    Blood culture x two cultures. Draw prior to antibiotics. [[id number]] Collected: 04/19/22 1948    Order Status: Completed Specimen: Blood from Peripheral, Antecubital, Right Updated: 04/20/22 2212     Blood Culture, Routine No Growth to date      No Growth to date    Narrative:      Aerobic and anaerobic            Significant Imaging: I have reviewed all pertinent imaging results/findings within the past 24 hours.

## 2022-04-21 NOTE — PROGRESS NOTES
Admit Assessment    Patient Identification  Laura Kent   :  1985  Admit Date:  2022  Attending Provider:  Jessenia Meíja MD              Referral:   Pt was admitted to [facility], medical oncology with a diagnosis of Bacteremia, and was admitted this hospital stay due to Bacteremia [R78.81].       is involved. Ms. Kent was referred to the Social Work Department via routine referal.  Patient presents as a 36 y.o. year old female.    Persons interviewed: Patient    Living Situation:      Resides at [address] [place] [address] [id number] [place] [id number], phone: [phone] (home).  Pt lives with her three children 19, 9, and 6.    (RETIRED) Functional Status Prior  Ambulation Prior: 0-->independent  Transferrin-->independent  Toiletin-->independent    Current or Past Agencies and Description of Services/Supplies    DME: N/A    Home Health: N/A    IV Infusion: N/A    Nutrition: Oral    Outpatient Pharmacy:   No Pharmacies Listed    Patient Preference of agencies include: None noted    Patient informed of right to choose providers or agencies.  Patient provides permission to release any necessary information to [facility] and to Non[facility] agencies as needed to facilitate patient care, treatment planning, and patient discharge planning.  Written and verbal resources provided.    Coping:  Patient has strong support from her 19 year old child and from family and friends.  Patient said she severiano by reading devotional passages and listening to music.       Adjustment to Diagnosis and Treatment:  Patient has had a difficult time adjusting to her illness. She was fiercly independent and said she is the one others relied on.  She said she has a difficult time asking and accepting help from others.  She expressed fear about having to rely on others more and more.      Emotional/Behavioral/Cognitive Issues: Patient was forthcoming and tearful during this  assessment.      History/Current Symptoms of Anxiety/Depression: Patient denied history of anxiety/depression, but is experiencing current symptoms of both.   History/Current Substance Use:   Social History     Tobacco Use    Smoking status: Never Smoker    Smokeless tobacco: Never Used   Substance and Sexual Activity    Alcohol use: Yes     Comment: social    Drug use: Never    Sexual activity: Not Currently       Indications of Abuse/Neglect: No  Abuse Screen: Not indicated.   Feels Unsafe at Home or Work/School: no  Feels Threatened by Someone: no  Does Anyone Try to Keep You From Having Contact with Others or Doing Things Outside Your Home?: no  Physical Signs of Abuse Present: no    Financial:  Payer/Plan Subscr  Sex Relation Sub. Ins. ID Effective Group Num   1. [place] ROBERT VAL[place] 1985 Female Self [id number]* 18                                    [address]   2. [place] ROBERT VALENC[place] ANT* 1985 Female Self [id number]* 2/1/15 [id number]                                   [address]     Other identified concerns/needs:  Patient expressed financial concerns.     Plan: Discharge home.  SW will assist pt in completing a [employer] application which, if she qualifies will alleviate her current financial stress.      Interventions/Referrals:   outlined social work services available through the [facility] and provided name and contact information for any future patient needs.      Patient/caregiver engaged in treatment planning process.     providing psychosocial and supportive counseling, resources, education, assistance and discharge planning as appropriate.  Patient/caregiver state understanding of  available resources,  following, remains available.

## 2022-04-21 NOTE — CONSULTS
"General Surgery consulted for "Pt admitted with enterococcus bacteremia. Recent port placed by Gen surg on 4/5. Would like port removed per ID"  Patient 37 yo female with breast cancer s/p R IJ port placement on 4/5 for chemotherapy. She presented to ED with fevers, body aches and abdominal pain. Positive for the flu being treated for Tamiflu. BCx 4/18 : 1/2 positive for enterococcus .BCx 4/19: 0/2 negative   ID consulted. State 'no clear source identified' for positive blood cultures. They recommend port removal for 'complaints of swelling'    Exam: Right chest portacath in place. Incision well healed. No erythema. No drainage. No swelling. No tenderness. No evidence of infection.     Assessment/Plan:  Agree with ID that unclear etiology of bacteremia, as enterococcus abnormal for port associated infection. In addition, only 1/4 cultures positive and exam benign with no evidence of port infection.   Would not recommend removal of port at this time. Recommend continuing treatment with antibiotics.     Tennille Guzman MD   General Surgery- PGYV  [phone]             "

## 2022-04-21 NOTE — NURSING
Pt does not meet criteria for cdiff test d/t being on Vancomycin. Physician notified, iv antibiotic changed to daptomycin, verbalized will re-evaluate if need to.

## 2022-04-21 NOTE — SUBJECTIVE & OBJECTIVE
Interval History: NAEON. Pt reports some diarrhea and fatigue today as well as some swelling around port site, otherwise denies any current acute complaints. On vanc.     Oncology Treatment Plan:   OP PEMBROLIZUMAB CARBOPLATIN (AUC 5) WITH WEEKLY PACLITAXEL FOLLOWED BY PEMBROLIZUMAB DOXORUBICIN CYCLOPHOSPHAMIDE FOLLOWED BY PEMBROLIZUMAB 200 MG Q3W    Medications:  Continuous Infusions:  Scheduled Meds:   oseltamivir  75 mg Oral BID    vancomycin (VANCOCIN) IVPB  1,500 mg Intravenous Q8H     PRN Meds:acetaminophen, oxyCODONE, sodium chloride 0.9%, Pharmacy to dose Vancomycin consult **AND** vancomycin - pharmacy to dose     Review of Systems   Constitutional:  Positive for appetite change and fatigue. Negative for chills and fever.   Gastrointestinal:  Positive for diarrhea. Negative for nausea and vomiting.   Genitourinary:  Negative for dysuria and hematuria.   Skin:         Swelling around port site   Psychiatric/Behavioral:  Negative for agitation and confusion. The patient is not nervous/anxious.    Objective:     Vital Signs (Most Recent):  Temp: 98.6 °F (37 °C) (04/21/22 0800)  Pulse: 75 (04/21/22 0800)  Resp: 18 (04/21/22 0800)  BP: 120/71 (04/21/22 0800)  SpO2: 96 % (04/21/22 0800) Vital Signs (24h Range):  Temp:  [98.2 °F (36.8 °C)-99.2 °F (37.3 °C)] 98.6 °F (37 °C)  Pulse:  [73-88] 75  Resp:  [16-18] 18  SpO2:  [96 %-99 %] 96 %  BP: (111-120)/(57-71) 120/71     Weight: 99.8 kg (220 lb)  Body mass index is 36.61 kg/m².  Body surface area is 2.14 meters squared.      Intake/Output Summary (Last 24 hours) at 4/21/2022 1129  Last data filed at 4/21/2022 0809  Gross per 24 hour   Intake 1150 ml   Output --   Net 1150 ml       Physical Exam  Vitals and nursing note reviewed.   Constitutional:       General: She is not in acute distress.     Appearance: She is obese. She is not ill-appearing, toxic-appearing or diaphoretic.   HENT:      Head: Normocephalic and atraumatic.      Nose: Nose normal. No congestion.       Mouth/Throat:      Mouth: Mucous membranes are moist.      Pharynx: Oropharynx is clear.   Eyes:      Extraocular Movements: Extraocular movements intact.      Pupils: Pupils are equal, round, and reactive to light.   Cardiovascular:      Rate and Rhythm: Normal rate and regular rhythm.      Pulses: Normal pulses.      Heart sounds: Normal heart sounds. No murmur heard.    No friction rub. No gallop.   Pulmonary:      Effort: Pulmonary effort is normal. No respiratory distress.      Breath sounds: Normal breath sounds.   Abdominal:      General: Bowel sounds are normal.      Palpations: Abdomen is soft.      Tenderness: There is no abdominal tenderness.   Musculoskeletal:         General: No swelling. Normal range of motion.      Cervical back: Normal range of motion and neck supple.   Skin:     General: Skin is warm and dry.      Comments: Induration noted around R subclavian port site. No significant swelling or erythema noted.    Neurological:      General: No focal deficit present.      Mental Status: She is alert and oriented to person, place, and time.   Psychiatric:         Mood and Affect: Mood normal.         Behavior: Behavior normal.       Significant Labs:   CBC:   Recent Labs   Lab 04/19/22 1948 04/20/22 0423 04/21/22 0425   WBC 3.24* 2.97* 3.43*   HGB 10.5* 9.8* 9.8*   HCT 32.4* 30.2* 29.7*    156 170    and CMP:   Recent Labs   Lab 04/19/22 1948 04/20/22 0423 04/21/22 0425    139 138   K 3.7 3.8 3.8    104 104   CO2 24 26 24   * 110 93   BUN 9 7 12   CREATININE 0.7 0.7 0.7   CALCIUM 9.2 9.1 8.9   PROT 7.0 6.5  --    ALBUMIN 3.7 3.5  --    BILITOT 0.5 0.5  --    ALKPHOS 40* 39*  --    AST 33 30  --    ALT 25 25  --    ANIONGAP 12 9 10   EGFRNONAA >60.0 >60.0 >60.0       Diagnostic Results:  I have reviewed all pertinent imaging results/findings within the past 24 hours.    X-Ray Chest AP Portable   Final Result      No acute findings in the chest.          Electronically signed by: Mamadou Orr MD   Date:    04/19/2022   Time:    20:19

## 2022-04-21 NOTE — CONSULTS
[facility] - Telemetry Stepdown  Infectious Disease  Consult Note    Patient Name: Laura Kent  MRN: [id number]  Admission Date: 4/19/2022  Hospital Length of Stay: 1 days  Attending Physician: Jessenia Mejía MD  Primary Care Provider: Primary Doctor No     Isolation Status: Contact and Droplet    Patient information was obtained from patient and ER records.      Inpatient consult to Infectious Diseases  Consult performed by: Klarissa Colorado DO  Consult ordered by: Dharmesh Floyd MD        Assessment/Plan:     * Bacteremia  36F hx breast CA recently started on chemo via R chest port presents w/ fevers. Blood cx + enterococcus, no clear source identified. Recent CT A/P 4/18 w/o intra-abd source. Endorses some vaginal discharge.    Recommendations  - will ensure all STI testing sent  - continue vanc  - stop cefepime  - no clear intra-abd source  - recommend port removal given patient complaints of swelling around port site  - if persistently bacteremic, will need echo to r/o IE    Influenza A  Continue tamiflu x 5 days total        Thank you for your consult. I will follow-up with patient. Please contact us if you have any additional questions.    Patrizia Colorado DO  Transplant Infectious Disease      Subjective:     Principal Problem: Bacteremia    HPI: 36F hx breast CA recently s/p port placement and initiation of chemotherapy last week, who presented to ER w/ fevers and myalgias on 4/18. Flu A test was positive on presentation. Blood cx were drawn. Pt discharged home w/ tamiflu. Pt called to return to her when blood cx turned positive for GPC resembling strep, now identified as enterococcus. Pt endorses some intermittent abdominal cramping. Some loose BM. Flu symptoms are improving. No SOB. Has a port that was recently placed, endorsing feeling like there is some swelling around the port. No drainage, not significantly painful or erythematous. Currently on tamiflu and empiric vanc/cefepime. Also endorses  "some vaginal discharge which is new. No new sex partners recently, no hx of STI.       Past Medical History:   Diagnosis Date    Anxiety     Breast cancer     Encounter for blood transfusion     Hypertension     Meningitis        Past Surgical History:   Procedure Laterality Date    BREAST BIOPSY Left      SECTION      INSERTION OF TUNNELED CENTRAL VENOUS CATHETER (CVC) WITH SUBCUTANEOUS PORT N/A 2022    Procedure: [id number]-[id number];  Surgeon: Deo Sin Jr., MD;  Location: [facility] OR [address] [place] [address];  Service: General;  Laterality: N/A;       Review of patient's allergies indicates:   Allergen Reactions    Strawberries [strawberry] Hives       Medications:  Medications Prior to Admission   Medication Sig    dexAMETHasone (DECADRON) 4 MG Tab Take 1 tablet (4 mg total) by mouth every 6 (six) hours.    LIDOcaine-prilocaine (EMLA) cream Apply topically as needed (for port). (Patient not taking: Reported on 2022)    ondansetron (ZOFRAN) 8 MG tablet Take 1 tablet (8 mg total) by mouth every 12 (twelve) hours as needed for Nausea. (Patient not taking: Reported on 2022)    oxyCODONE (ROXICODONE) 5 MG immediate release tablet Take 1 tablet (5 mg total) by mouth every 6 (six) hours as needed for Pain.    promethazine (PHENERGAN) 25 MG tablet Take 1 tablet (25 mg total) by mouth every 6 (six) hours as needed for Nausea. (Patient not taking: Reported on 2022)     Antibiotics (From admission, onward)                Start     Stop Route Frequency Ordered    22 1000  vancomycin 1.5 g in dextrose 5 % 250 mL IVPB (ready to mix)         -- IV Every 12 hours (non-standard times) 22 0857    22 0949  vancomycin - pharmacy to dose  (vancomycin IVPB)        "And" Linked Group Details    -- IV pharmacy to manage frequency 22 0850          Antifungals (From admission, onward)                None          Antivirals (From admission, onward)          Stop Route Frequency     " oseltamivir         04/24 2059 Oral 2 times daily             Immunization History   Administered Date(s) Administered    COVID-19, vector-nr, rS-Ad26, PF ([employer]) 05/15/2021       Family History       Problem Relation (Age of Onset)    Bladder Cancer Paternal Grandmother    Diabetes Father    Hypertension Father    Pancreatic cancer Maternal Uncle          Social History     Socioeconomic History    Marital status: Single   Tobacco Use    Smoking status: Never Smoker    Smokeless tobacco: Never Used   Substance and Sexual Activity    Alcohol use: Yes     Comment: social    Drug use: Never    Sexual activity: Not Currently     Review of Systems   Constitutional:  Positive for chills and fever. Negative for activity change, appetite change and unexpected weight change.   HENT:  Negative for dental problem, ear discharge, ear pain, mouth sores, sinus pain, sore throat and trouble swallowing.    Eyes:  Negative for pain and discharge.   Respiratory:  Negative for cough, chest tightness, shortness of breath and wheezing.    Cardiovascular:  Negative for chest pain and leg swelling.   Gastrointestinal:  Positive for abdominal pain and diarrhea. Negative for abdominal distention, constipation, nausea and vomiting.   Genitourinary:  Positive for vaginal discharge. Negative for difficulty urinating, dysuria, flank pain, frequency, genital sores and hematuria.   Musculoskeletal:  Negative for arthralgias, joint swelling, neck pain and neck stiffness.   Skin:  Negative for color change, rash and wound.   Allergic/Immunologic: Positive for immunocompromised state.   Neurological:  Negative for dizziness, weakness, light-headedness, numbness and headaches.   Psychiatric/Behavioral:  Negative for confusion. The patient is not nervous/anxious.    Objective:     Vital Signs (Most Recent):  Temp: 98.5 °F (36.9 °C) (04/20/22 1943)  Pulse: 80 (04/20/22 1943)  Resp: 18 (04/20/22 1943)  BP: (!) 118/58 (04/20/22 1943)  SpO2: 99  % (04/20/22 1943)   Vital Signs (24h Range):  Temp:  [98 °F (36.7 °C)-100.5 °F (38.1 °C)] 98.5 °F (36.9 °C)  Pulse:  [80-98] 80  Resp:  [18] 18  SpO2:  [94 %-99 %] 99 %  BP: (110-137)/(56-66) 118/58     Weight: 99.8 kg (220 lb)  Body mass index is 36.61 kg/m².    Estimated Creatinine Clearance: 127.5 mL/min (based on SCr of 0.7 mg/dL).    Physical Exam  Constitutional:       General: She is not in acute distress.     Appearance: Normal appearance. She is well-developed. She is not ill-appearing or diaphoretic.   HENT:      Head: Normocephalic and atraumatic.      Right Ear: External ear normal.      Left Ear: External ear normal.      Nose: Nose normal.   Eyes:      General: No scleral icterus.        Right eye: No discharge.         Left eye: No discharge.      Extraocular Movements: Extraocular movements intact.      Conjunctiva/sclera: Conjunctivae normal.   Cardiovascular:      Rate and Rhythm: Normal rate and regular rhythm.      Pulses: Normal pulses.      Heart sounds: Normal heart sounds.   Pulmonary:      Effort: Pulmonary effort is normal. No respiratory distress.      Breath sounds: No stridor.   Abdominal:      General: There is no distension.      Palpations: Abdomen is soft.      Tenderness: There is no abdominal tenderness.   Musculoskeletal:         General: Normal range of motion.      Right lower leg: No edema.      Left lower leg: No edema.   Skin:     Findings: No erythema or rash.      Comments: Port on R chest wall w/ some fullness around it, no warmth, no drainage   Neurological:      General: No focal deficit present.      Mental Status: She is alert and oriented to person, place, and time. Mental status is at baseline.      Cranial Nerves: No cranial nerve deficit.   Psychiatric:         Mood and Affect: Mood normal.         Behavior: Behavior normal.         Thought Content: Thought content normal.         Judgment: Judgment normal.       Significant Labs: CBC:   Recent Labs   Lab  04/19/22 1948 04/20/22 0423   WBC 3.24* 2.97*   HGB 10.5* 9.8*   HCT 32.4* 30.2*    156     CMP:   Recent Labs   Lab 04/19/22 1948 04/20/22 0423    139   K 3.7 3.8    104   CO2 24 26   * 110   BUN 9 7   CREATININE 0.7 0.7   CALCIUM 9.2 9.1   PROT 7.0 6.5   ALBUMIN 3.7 3.5   BILITOT 0.5 0.5   ALKPHOS 40* 39*   AST 33 30   ALT 25 25   ANIONGAP 12 9   EGFRNONAA >60.0 >60.0     Microbiology Results (last 7 days)       Procedure Component Value Units Date/Time    Blood culture x two cultures. Draw prior to antibiotics. [[id number]] Collected: 04/19/22 1953    Order Status: Completed Specimen: Blood from Peripheral, Forearm, Right Updated: 04/20/22 2212     Blood Culture, Routine No Growth to date      No Growth to date    Narrative:      Aerobic and anaerobic    Blood culture x two cultures. Draw prior to antibiotics. [[id number]] Collected: 04/19/22 1948    Order Status: Completed Specimen: Blood from Peripheral, Antecubital, Right Updated: 04/20/22 2212     Blood Culture, Routine No Growth to date      No Growth to date    Narrative:      Aerobic and anaerobic            Significant Imaging: I have reviewed all pertinent imaging results/findings within the past 24 hours.

## 2022-04-21 NOTE — PROGRESS NOTES
Pharmacokinetic Assessment Follow Up: IV Vancomycin    Vancomycin serum concentration assessment(s):    The trough level was drawn correctly and can be used to guide therapy at this time. The measurement is below the desired definitive target range of 15 to 20 mcg/mL.    Vancomycin Regimen Plan:    Change regimen to Vancomycin 1500 mg mg IV every 8 hours with next serum trough concentration measured at 0900 prior to scheduled dose on 4/22    Drug levels (last 3 results):  Recent Labs   Lab Result Units 04/21/22  0858   Vancomycin-Trough ug/mL 6.7*       Pharmacy will continue to follow and monitor vancomycin.    Please contact pharmacy at extension [phone] for questions regarding this assessment.    Thank you for the consult,   Maureen Barrios       Patient brief summary:  Laura Kent is a 36 y.o. female initiated on antimicrobial therapy with IV Vancomycin for treatment of bacteremia    The patient's current regimen is 1500 mg every 8 hours.     Drug Allergies:   Review of patient's allergies indicates:   Allergen Reactions    Strawberries [strawberry] Hives       Actual Body Weight:   99.8 kg    Renal Function:   Estimated Creatinine Clearance: 127.5 mL/min (based on SCr of 0.7 mg/dL).,     Dialysis Method (if applicable):  N/A    CBC (last 72 hours):  Recent Labs   Lab Result Units 04/18/22 2029 04/19/22 1948 04/20/22 0423 04/21/22  0425   WBC K/uL 3.65* 3.24* 2.97* 3.43*   Hemoglobin g/dL 11.6* 10.5* 9.8* 9.8*   Hematocrit % 35.0* 32.4* 30.2* 29.7*   Platelets K/uL 175 176 156 170   Gran % % 81.2* 79.7* 74.8* 59.2   Lymph % % 11.8* 12.0* 16.2* 26.5   Mono % % 4.9 5.9 6.7 9.6   Eosinophil % % 1.1 0.6 1.3 3.5   Basophil % % 0.5 0.9 0.3 0.6   Differential Method  Automated Automated Automated Automated       Metabolic Panel (last 72 hours):  Recent Labs   Lab Result Units 04/18/22 2029 04/18/22 2049 04/19/22 1948 04/19/22 2039 04/20/22 0423 04/21/22  0425   Sodium mmol/L 134*  --  136  --  139 138    Potassium mmol/L 3.7  --  3.7  --  3.8 3.8   Chloride mmol/L 99  --  100  --  104 104   CO2 mmol/L 26  --  24  --  26 24   Glucose mg/dL 110  --  126*  --  110 93   Glucose, UA   --  Negative  --  Negative  --   --    BUN mg/dL 17  --  9  --  7 12   Creatinine mg/dL 1.0  --  0.7  --  0.7 0.7   Albumin g/dL 4.2  --  3.7  --  3.5  --    Total Bilirubin mg/dL 0.4  --  0.5  --  0.5  --    Alkaline Phosphatase U/L 51*  --  40*  --  39*  --    AST U/L 31  --  33  --  30  --    ALT U/L 25  --  25  --  25  --    Magnesium mg/dL  --   --   --   --  1.7 1.5*       Vancomycin Administrations:  vancomycin given in the last 96 hours                     vancomycin 1.5 g in dextrose 5 % 250 mL IVPB (ready to mix) (mg) 1,500 mg New Bag 04/20/22 2108     1,500 mg New Bag  1027    vancomycin 2 g in dextrose 5 % 500 mL IVPB (mg) 2,000 mg New Bag 04/19/22 2007                    Microbiologic Results:  Microbiology Results (last 7 days)       Procedure Component Value Units Date/Time    Clostridium difficile EIA [[id number]]     Order Status: No result Specimen: Stool     Blood culture x two cultures. Draw prior to antibiotics. [[id number]] Collected: 04/19/22 1953    Order Status: Completed Specimen: Blood from Peripheral, Forearm, Right Updated: 04/20/22 2212     Blood Culture, Routine No Growth to date      No Growth to date    Narrative:      Aerobic and anaerobic    Blood culture x two cultures. Draw prior to antibiotics. [[id number]] Collected: 04/19/22 1948    Order Status: Completed Specimen: Blood from Peripheral, Antecubital, Right Updated: 04/20/22 2212     Blood Culture, Routine No Growth to date      No Growth to date    Narrative:      Aerobic and anaerobic

## 2022-04-21 NOTE — ASSESSMENT & PLAN NOTE
Patient tested positive in ED on 4/18. Started on Tamiflu on admission.  -- continue Tamiflu 75mg BID x5 days

## 2022-04-22 VITALS
HEART RATE: 75 BPM | BODY MASS INDEX: 36.65 KG/M2 | HEIGHT: 65 IN | DIASTOLIC BLOOD PRESSURE: 75 MMHG | OXYGEN SATURATION: 98 % | SYSTOLIC BLOOD PRESSURE: 124 MMHG | RESPIRATION RATE: 18 BRPM | TEMPERATURE: 98 F | WEIGHT: 220 LBS

## 2022-04-22 PROBLEM — F54 PSYCHOLOGICAL FACTORS AFFECTING MEDICAL CONDITION: Status: ACTIVE | Noted: 2022-04-22

## 2022-04-22 LAB
ALBUMIN SERPL BCP-MCNC: 3.6 G/DL (ref 3.5–5.2)
ALP SERPL-CCNC: 51 U/L (ref 55–135)
ALT SERPL W/O P-5'-P-CCNC: 32 U/L (ref 10–44)
ANION GAP SERPL CALC-SCNC: 7 MMOL/L (ref 8–16)
AST SERPL-CCNC: 30 U/L (ref 10–40)
BASOPHILS # BLD AUTO: 0.01 K/UL (ref 0–0.2)
BASOPHILS NFR BLD: 0.3 % (ref 0–1.9)
BILIRUB SERPL-MCNC: 0.3 MG/DL (ref 0.1–1)
BUN SERPL-MCNC: 13 MG/DL (ref 6–20)
C TRACH DNA SPEC QL NAA+PROBE: NOT DETECTED
CALCIUM SERPL-MCNC: 8.9 MG/DL (ref 8.7–10.5)
CHLORIDE SERPL-SCNC: 106 MMOL/L (ref 95–110)
CK SERPL-CCNC: 54 U/L (ref 20–180)
CO2 SERPL-SCNC: 26 MMOL/L (ref 23–29)
CREAT SERPL-MCNC: 0.7 MG/DL (ref 0.5–1.4)
DIFFERENTIAL METHOD: ABNORMAL
EOSINOPHIL # BLD AUTO: 0.1 K/UL (ref 0–0.5)
EOSINOPHIL NFR BLD: 2 % (ref 0–8)
ERYTHROCYTE [DISTWIDTH] IN BLOOD BY AUTOMATED COUNT: 12 % (ref 11.5–14.5)
EST. GFR  (AFRICAN AMERICAN): >60 ML/MIN/1.73 M^2
EST. GFR  (NON AFRICAN AMERICAN): >60 ML/MIN/1.73 M^2
GLUCOSE SERPL-MCNC: 91 MG/DL (ref 70–110)
HCT VFR BLD AUTO: 33.9 % (ref 37–48.5)
HGB BLD-MCNC: 11.1 G/DL (ref 12–16)
IMM GRANULOCYTES # BLD AUTO: 0.02 K/UL (ref 0–0.04)
IMM GRANULOCYTES NFR BLD AUTO: 0.7 % (ref 0–0.5)
LYMPHOCYTES # BLD AUTO: 1 K/UL (ref 1–4.8)
LYMPHOCYTES NFR BLD: 33.6 % (ref 18–48)
MAGNESIUM SERPL-MCNC: 2 MG/DL (ref 1.6–2.6)
MCH RBC QN AUTO: 28.6 PG (ref 27–31)
MCHC RBC AUTO-ENTMCNC: 32.7 G/DL (ref 32–36)
MCV RBC AUTO: 87 FL (ref 82–98)
MONOCYTES # BLD AUTO: 0.3 K/UL (ref 0.3–1)
MONOCYTES NFR BLD: 10.8 % (ref 4–15)
N GONORRHOEA DNA SPEC QL NAA+PROBE: NOT DETECTED
NEUTROPHILS # BLD AUTO: 1.6 K/UL (ref 1.8–7.7)
NEUTROPHILS NFR BLD: 52.6 % (ref 38–73)
NRBC BLD-RTO: 0 /100 WBC
PHOSPHATE SERPL-MCNC: 3.8 MG/DL (ref 2.7–4.5)
PLATELET # BLD AUTO: 188 K/UL (ref 150–450)
PMV BLD AUTO: 10.7 FL (ref 9.2–12.9)
POTASSIUM SERPL-SCNC: 4 MMOL/L (ref 3.5–5.1)
PROT SERPL-MCNC: 7 G/DL (ref 6–8.4)
RBC # BLD AUTO: 3.88 M/UL (ref 4–5.4)
SODIUM SERPL-SCNC: 139 MMOL/L (ref 136–145)
WBC # BLD AUTO: 2.95 K/UL (ref 3.9–12.7)

## 2022-04-22 PROCEDURE — 83735 ASSAY OF MAGNESIUM: CPT

## 2022-04-22 PROCEDURE — C1751 CATH, INF, PER/CENT/MIDLINE: HCPCS

## 2022-04-22 PROCEDURE — 90791 PR PSYCHIATRIC DIAGNOSTIC EVALUATION: ICD-10-PCS | Mod: ,,, | Performed by: PSYCHOLOGIST

## 2022-04-22 PROCEDURE — 36415 COLL VENOUS BLD VENIPUNCTURE: CPT | Performed by: STUDENT IN AN ORGANIZED HEALTH CARE EDUCATION/TRAINING PROGRAM

## 2022-04-22 PROCEDURE — 82550 ASSAY OF CK (CPK): CPT | Performed by: STUDENT IN AN ORGANIZED HEALTH CARE EDUCATION/TRAINING PROGRAM

## 2022-04-22 PROCEDURE — 85025 COMPLETE CBC W/AUTO DIFF WBC: CPT

## 2022-04-22 PROCEDURE — 99239 PR HOSPITAL DISCHARGE DAY,>30 MIN: ICD-10-PCS | Mod: ,,, | Performed by: INTERNAL MEDICINE

## 2022-04-22 PROCEDURE — 90791 PSYCH DIAGNOSTIC EVALUATION: CPT | Mod: ,,, | Performed by: PSYCHOLOGIST

## 2022-04-22 PROCEDURE — 80053 COMPREHEN METABOLIC PANEL: CPT

## 2022-04-22 PROCEDURE — 25000003 PHARM REV CODE 250

## 2022-04-22 PROCEDURE — 25000003 PHARM REV CODE 250: Performed by: STUDENT IN AN ORGANIZED HEALTH CARE EDUCATION/TRAINING PROGRAM

## 2022-04-22 PROCEDURE — 76937 US GUIDE VASCULAR ACCESS: CPT

## 2022-04-22 PROCEDURE — 99239 HOSP IP/OBS DSCHRG MGMT >30: CPT | Mod: ,,, | Performed by: INTERNAL MEDICINE

## 2022-04-22 PROCEDURE — 36573 INSJ PICC RS&I 5 YR+: CPT

## 2022-04-22 PROCEDURE — 63600175 PHARM REV CODE 636 W HCPCS

## 2022-04-22 PROCEDURE — 84100 ASSAY OF PHOSPHORUS: CPT

## 2022-04-22 PROCEDURE — 36415 COLL VENOUS BLD VENIPUNCTURE: CPT

## 2022-04-22 RX ORDER — SODIUM CHLORIDE 0.9 % (FLUSH) 0.9 %
10 SYRINGE (ML) INJECTION
Status: DISCONTINUED | OUTPATIENT
Start: 2022-04-22 | End: 2022-04-22 | Stop reason: HOSPADM

## 2022-04-22 RX ORDER — OSELTAMIVIR PHOSPHATE 75 MG/1
75 CAPSULE ORAL 2 TIMES DAILY
Qty: 3 CAPSULE | Refills: 0 | Status: SHIPPED | OUTPATIENT
Start: 2022-04-22 | End: 2022-04-24

## 2022-04-22 RX ORDER — SODIUM CHLORIDE 0.9 % (FLUSH) 0.9 %
10 SYRINGE (ML) INJECTION EVERY 6 HOURS
Status: DISCONTINUED | OUTPATIENT
Start: 2022-04-22 | End: 2022-04-22 | Stop reason: HOSPADM

## 2022-04-22 RX ORDER — ENOXAPARIN SODIUM 100 MG/ML
40 INJECTION SUBCUTANEOUS EVERY 24 HOURS
Status: DISCONTINUED | OUTPATIENT
Start: 2022-04-22 | End: 2022-04-22 | Stop reason: HOSPADM

## 2022-04-22 RX ADMIN — DAPTOMYCIN 900 MG: 350 INJECTION, POWDER, LYOPHILIZED, FOR SOLUTION INTRAVENOUS at 01:04

## 2022-04-22 RX ADMIN — OSELTAMIVIR PHOSPHATE 75 MG: 75 CAPSULE ORAL at 08:04

## 2022-04-22 NOTE — PROCEDURES
"Laura Kent is a 36 y.o. female patient.    Temp: 98 °F (36.7 °C) (04/22/22 1137)  Pulse: 73 (04/22/22 1137)  Resp: 18 (04/22/22 1137)  BP: 122/74 (04/22/22 1137)  SpO2: 98 % (04/22/22 1137)  Weight: 99.8 kg (220 lb) (04/20/22 0430)  Height: 5' 5" (165.1 cm) (04/20/22 0430)    PICC  Date/Time: 4/22/2022 2:33 PM  Performed by: Peggy Campbell RN  Assisting provider: Phill Hendricks RN  Post-operative diagnosis: PICC  Consent Done: Yes  Time out: Immediately prior to procedure a time out was called to verify the correct patient, procedure, equipment, support staff and site/side marked as required  Indications: vascular access and med administration  Anesthesia: local infiltration  Local anesthetic: lidocaine 1% without epinephrine  Anesthetic Total (mL): 3  Description of findings: PICC  Preparation: skin prepped with chlorhexidine (without alcohol)  Skin prep agent dried: skin prep agent completely dried prior to procedure  Sterile barriers: all five maximum sterile barriers used - cap, mask, sterile gown, sterile gloves, and large sterile sheet  Hand hygiene: hand hygiene performed prior to central venous catheter insertion  Location details: right basilic  Catheter type: double lumen  Catheter size: 5 Fr  Catheter Length: 41cm    Ultrasound guidance: yes  Vessel Caliber: medium and patent, compressibility normal  Needle advanced into vessel with real time Ultrasound guidance.  Guidewire confirmed in vessel.  Image recorded and saved.  Sterile sheath used.  Number of attempts: 1  Post-procedure: blood return through all ports and sterile dressing applied  Technical procedures used: 3CG    Assessment: placement verified by x-ray  Complications: none          Name   4/22/2022    "

## 2022-04-22 NOTE — SUBJECTIVE & OBJECTIVE
Interval History:   NAEON. Patient feeling better today. Port a cath removed 4/21 successfully. Receiving IV Daptomycin now, anticipating 14 days IV abx. Bcx 48h clear, will consult for PICC placement. Anticipating discharge tomorrow 4/23.    Oncology Treatment Plan:   OP PEMBROLIZUMAB CARBOPLATIN (AUC 5) WITH WEEKLY PACLITAXEL FOLLOWED BY PEMBROLIZUMAB DOXORUBICIN CYCLOPHOSPHAMIDE FOLLOWED BY PEMBROLIZUMAB 200 MG Q3W    Medications:  Continuous Infusions:  Scheduled Meds:   DAPTOmycin (CUBICIN)  IV  900 mg Intravenous Q24H    enoxaparin  40 mg Subcutaneous Daily    oseltamivir  75 mg Oral BID     PRN Meds:acetaminophen, oxyCODONE, sodium chloride 0.9%     Review of Systems   Constitutional:  Negative for chills and fatigue.   Respiratory:  Negative for shortness of breath.    Gastrointestinal:  Negative for abdominal pain.   Genitourinary:  Negative for decreased urine volume and dysuria.   Objective:     Vital Signs (Most Recent):  Temp: 98 °F (36.7 °C) (04/22/22 1137)  Pulse: 73 (04/22/22 1137)  Resp: 18 (04/22/22 1137)  BP: 122/74 (04/22/22 1137)  SpO2: 98 % (04/22/22 1137) Vital Signs (24h Range):  Temp:  [97.9 °F (36.6 °C)-98.3 °F (36.8 °C)] 98 °F (36.7 °C)  Pulse:  [72-81] 73  Resp:  [18-20] 18  SpO2:  [95 %-98 %] 98 %  BP: (106-122)/(57-74) 122/74     Weight: 99.8 kg (220 lb)  Body mass index is 36.61 kg/m².  Body surface area is 2.14 meters squared.      Intake/Output Summary (Last 24 hours) at 4/22/2022 1217  Last data filed at 4/22/2022 0800  Gross per 24 hour   Intake 50 ml   Output --   Net 50 ml       Physical Exam  Vitals and nursing note reviewed.   Constitutional:       General: She is not in acute distress.     Appearance: She is obese. She is not ill-appearing, toxic-appearing or diaphoretic.   HENT:      Head: Normocephalic and atraumatic.      Nose: Nose normal. No congestion.      Mouth/Throat:      Mouth: Mucous membranes are moist.      Pharynx: Oropharynx is clear.   Eyes:      Extraocular  Movements: Extraocular movements intact.      Pupils: Pupils are equal, round, and reactive to light.   Cardiovascular:      Rate and Rhythm: Normal rate and regular rhythm.      Pulses: Normal pulses.      Heart sounds: Normal heart sounds. No murmur heard.    No friction rub. No gallop.   Pulmonary:      Effort: Pulmonary effort is normal. No respiratory distress.      Breath sounds: Normal breath sounds.   Abdominal:      General: Bowel sounds are normal.      Palpations: Abdomen is soft.      Tenderness: There is no abdominal tenderness.   Musculoskeletal:         General: No swelling. Normal range of motion.      Cervical back: Normal range of motion and neck supple.   Skin:     General: Skin is warm and dry.      Comments: Clean dressing placed at previous port site   Neurological:      General: No focal deficit present.      Mental Status: She is alert and oriented to person, place, and time.   Psychiatric:         Mood and Affect: Mood normal.         Behavior: Behavior normal.       Significant Labs:   CBC:   Recent Labs   Lab 04/21/22 0425 04/22/22  0832   WBC 3.43* 2.95*   HGB 9.8* 11.1*   HCT 29.7* 33.9*    188   , CMP:   Recent Labs   Lab 04/21/22 0425 04/22/22  0832    139   K 3.8 4.0    106   CO2 24 26   GLU 93 91   BUN 12 13   CREATININE 0.7 0.7   CALCIUM 8.9 8.9   PROT  --  7.0   ALBUMIN  --  3.6   BILITOT  --  0.3   ALKPHOS  --  51*   AST  --  30   ALT  --  32   ANIONGAP 10 7*   EGFRNONAA >60.0 >60.0   , and All pertinent labs from the last 24 hours have been reviewed.    Diagnostic Results:  I have reviewed all pertinent imaging results/findings within the past 24 hours.

## 2022-04-22 NOTE — PROGRESS NOTES
[facility] - Telemetry Stepdown  Hematology/Oncology  Progress Note    Patient Name: Laura Kent  Admission Date: 4/19/2022  Hospital Length of Stay: 3 days  Code Status: Full Code     Subjective:     HPI:  Pt is a 36-year-old female with a history of breast cancer recently initiated on chemotherapy; HTN, anxiety presents to the hospital with positive blood cultures.  Patient was seen in the ED yesterday with fever, body aches, lower abdominal pain.  She tested positive for influenza A.  Blood cultures that were drawn became positive for Gram-positive cocci in chains resembling strep.  Patient was contacted from the ED and advised to return for further management.    On arrival to the ED she was HDS. She reports since last Friday (4/15) she began having intermittent fever ( Tmax 103.1), chills, productive cough,shortness of breath and right lower quadrant abdominal pain that radiates into her back. Also reports occasional clear vaginal discharge since starting of chemotherapy. Denies any change in urination.  Denies nausea, vomiting, diarrhea, new rash.       Interval History:   NAEON. Patient feeling better today. Port a cath removed 4/21 successfully. Receiving IV Daptomycin now, anticipating 14 days IV abx. Bcx 48h clear, will consult for PICC placement. Anticipating discharge tomorrow 4/23.    Oncology Treatment Plan:   OP PEMBROLIZUMAB CARBOPLATIN (AUC 5) WITH WEEKLY PACLITAXEL FOLLOWED BY PEMBROLIZUMAB DOXORUBICIN CYCLOPHOSPHAMIDE FOLLOWED BY PEMBROLIZUMAB 200 MG Q3W    Medications:  Continuous Infusions:  Scheduled Meds:   DAPTOmycin (CUBICIN)  IV  900 mg Intravenous Q24H    enoxaparin  40 mg Subcutaneous Daily    oseltamivir  75 mg Oral BID     PRN Meds:acetaminophen, oxyCODONE, sodium chloride 0.9%     Review of Systems   Constitutional:  Negative for chills and fatigue.   Respiratory:  Negative for shortness of breath.    Gastrointestinal:  Negative for abdominal pain.   Genitourinary:   Negative for decreased urine volume and dysuria.   Objective:     Vital Signs (Most Recent):  Temp: 98 °F (36.7 °C) (04/22/22 1137)  Pulse: 73 (04/22/22 1137)  Resp: 18 (04/22/22 1137)  BP: 122/74 (04/22/22 1137)  SpO2: 98 % (04/22/22 1137) Vital Signs (24h Range):  Temp:  [97.9 °F (36.6 °C)-98.3 °F (36.8 °C)] 98 °F (36.7 °C)  Pulse:  [72-81] 73  Resp:  [18-20] 18  SpO2:  [95 %-98 %] 98 %  BP: (106-122)/(57-74) 122/74     Weight: 99.8 kg (220 lb)  Body mass index is 36.61 kg/m².  Body surface area is 2.14 meters squared.      Intake/Output Summary (Last 24 hours) at 4/22/2022 1217  Last data filed at 4/22/2022 0800  Gross per 24 hour   Intake 50 ml   Output --   Net 50 ml       Physical Exam  Vitals and nursing note reviewed.   Constitutional:       General: She is not in acute distress.     Appearance: She is obese. She is not ill-appearing, toxic-appearing or diaphoretic.   HENT:      Head: Normocephalic and atraumatic.      Nose: Nose normal. No congestion.      Mouth/Throat:      Mouth: Mucous membranes are moist.      Pharynx: Oropharynx is clear.   Eyes:      Extraocular Movements: Extraocular movements intact.      Pupils: Pupils are equal, round, and reactive to light.   Cardiovascular:      Rate and Rhythm: Normal rate and regular rhythm.      Pulses: Normal pulses.      Heart sounds: Normal heart sounds. No murmur heard.    No friction rub. No gallop.   Pulmonary:      Effort: Pulmonary effort is normal. No respiratory distress.      Breath sounds: Normal breath sounds.   Abdominal:      General: Bowel sounds are normal.      Palpations: Abdomen is soft.      Tenderness: There is no abdominal tenderness.   Musculoskeletal:         General: No swelling. Normal range of motion.      Cervical back: Normal range of motion and neck supple.   Skin:     General: Skin is warm and dry.      Comments: Clean dressing placed at previous port site   Neurological:      General: No focal deficit present.      Mental  Status: She is alert and oriented to person, place, and time.   Psychiatric:         Mood and Affect: Mood normal.         Behavior: Behavior normal.       Significant Labs:   CBC:   Recent Labs   Lab 04/21/22 0425 04/22/22  0832   WBC 3.43* 2.95*   HGB 9.8* 11.1*   HCT 29.7* 33.9*    188   , CMP:   Recent Labs   Lab 04/21/22  0425 04/22/22  0832    139   K 3.8 4.0    106   CO2 24 26   GLU 93 91   BUN 12 13   CREATININE 0.7 0.7   CALCIUM 8.9 8.9   PROT  --  7.0   ALBUMIN  --  3.6   BILITOT  --  0.3   ALKPHOS  --  51*   AST  --  30   ALT  --  32   ANIONGAP 10 7*   EGFRNONAA >60.0 >60.0   , and All pertinent labs from the last 24 hours have been reviewed.    Diagnostic Results:  I have reviewed all pertinent imaging results/findings within the past 24 hours.    Assessment/Plan:     * Bacteremia  Pt presents to the hospital after being called for GPC baccteremia. bcx from 4/18 with Enterococcus. CT abdomen and pelvis on 4/18 w/o acute findings. S/p vanc and cefepime at the ED. ID consulted, recommend continuing vanc pending S/S and removal of port. Gen surg consulted, do not recommend removing port. Will continue to work with ID and gen surg. On 4/21 Bcx S/S for VRE sensitive to dapto. Will start dapto per ID recs.      Plan:  -- Continue Dapto 10mg/kg q24h (day 2)  -- Port a cath successfully removed 4/21  -- Anticipating 14 day course IV Daptomycin  -- Repeat Bcx (4/19) NGTD for >48h, will consult PICC team (required for outpatient abx and chemotherapy)  -- TTE if repeat cultures positive  --  ID following, appreciate recs    Influenza A  Patient tested positive in ED on 4/18. Started on Tamiflu on admission.    -- continue Tamiflu 75mg BID x5 days    Malignant neoplasm of upper-outer quadrant of left breast in female, estrogen receptor negative  Pt of Dr. Cruz  Currently on cycle 1 of pembrolizumab carboplatin (AUC 5) with weekly paclitaxel followed by pembrolizumab doxorubicin cyclophosphamide  followed by pembrolizumab 200 mg q3w           Dharmesh Floyd MD PGY-1  Hematology/Oncology  [facility] - Telemetry Stepdown

## 2022-04-22 NOTE — PLAN OF CARE
Problem: Adult Inpatient Plan of Care  Goal: Plan of Care Review  Outcome: Adequate for Care Transition  Goal: Patient-Specific Goal (Individualized)  Outcome: Adequate for Care Transition  Goal: Absence of Hospital-Acquired Illness or Injury  Outcome: Adequate for Care Transition  Goal: Optimal Comfort and Wellbeing  Outcome: Adequate for Care Transition  Goal: Readiness for Transition of Care  Outcome: Adequate for Care Transition

## 2022-04-22 NOTE — ASSESSMENT & PLAN NOTE
Pt presents to the hospital after being called for GPC baccteremia. bcx from 4/18 with Enterococcus. CT abdomen and pelvis on 4/18 w/o acute findings. S/p vanc and cefepime at the ED. ID consulted, recommend continuing vanc pending S/S and removal of port. Gen surg consulted, do not recommend removing port. Will continue to work with ID and gen surg. On 4/21 Bcx S/S for VRE sensitive to dapto. Will start dapto per ID recs.      Plan:  -- Continue Dapto 10mg/kg q24h (day 2)  -- Port a cath successfully removed 4/21  -- Anticipating 14 day course IV Daptomycin  -- Repeat Bcx (4/19) NGTD for >48h, will consult PICC team (required for outpatient abx and chemotherapy)  -- TTE if repeat cultures positive  --  ID following, appreciate recs

## 2022-04-22 NOTE — PROGRESS NOTES
Patient is a potential discharge for the weekend. She will be in need of IV antibiotics. Still awaiting line placement today. Referral made to [facility]. Aware that she is a weekend discharge. They will run the benefits and then coordinate bedside teaching prior to discharge.

## 2022-04-22 NOTE — PROGRESS NOTES
PICC line placed today. Patient is able to go today instead of tomorrow. Notified [facility] to see about getting teaching done today. They will be able to accommodate the patient. Notified team, her nurse and the patient. All aware she can be discharged after education takes place.

## 2022-04-22 NOTE — PLAN OF CARE
[facility]   Infectious Diseases   Fellow Plan of Care Note    Patient had port a cath removal upon rounds. Reviewed vitals - afebrile in 24 hours. Initial BCx on 4/18 with 1/4 aerobic bottle with VRE. Repeat BCx on 4/19 NGTD. Agreed with port removal and change abx to daptomycin. Please check baseline CPK/CK. Okay to complete 5 days of Tamiflu. Also, given reported vaginal discharge, will check urine GC/CT. Transplant ID will follow.       Dimitri Campbell MD, MPH  [facility] Infectious Diseases Fellow

## 2022-04-22 NOTE — PLAN OF CARE
[facility] - Telemetry Stepdown      HOME HEALTH ORDERS  FACE TO FACE ENCOUNTER    Patient Name: Laura Kent  YOB: 1985    PCP: Primary Doctor No   PCP Address: None  PCP Phone Number: None  PCP Fax: None    Encounter Date: 4/19/22    Admit to Home Health    Diagnoses:  Active Hospital Problems    Diagnosis  POA    *Bacteremia [R78.81]  Yes    Influenza A [J10.1]  Unknown    Malignant neoplasm of upper-outer quadrant of left breast in female, estrogen receptor negative [C50.412, Z17.1]  Not Applicable      Resolved Hospital Problems   No resolved problems to display.       Follow Up Appointments:  Future Appointments   Date Time Provider Department Center   5/2/2022  7:00 AM NURSE 6, [facility]   5/9/2022  7:00 AM NURSE 6, [facility]   5/16/2022  7:00 AM NURSE 10, [facility]   5/23/2022  7:00 AM [facility]URSE 6, [facility]   5/30/2022  7:00 AM NURSE 10, [facility]   6/6/2022 11:00 AM NURSE 11, [facility]   6/13/2022  7:00 AM NURSE 10, [facility]   6/20/2022  7:00 AM NURSE 6, [facility]   6/27/2022  7:00 AM NURSE 10, [facility]       Allergies:  Review of patient's allergies indicates:   Allergen Reactions    Strawberries [strawberry] Hives       Medications: Review discharge medications with patient and family and provide education.    Current Facility-Administered Medications   Medication Dose Route Frequency Provider Last Rate Last Admin    acetaminophen tablet 650 mg  650 mg Oral Q6H PRN Madeline Knight MD   650 mg at 04/21/22 2022    DAPTOmycin (CUBICIN) 900 mg in sodium chloride 0.9% IVPB  900 mg Intravenous Q24H Juan M Ireland MD   Stopped at 04/21/22 1556    enoxaparin injection 40 mg  40 mg Subcutaneous Daily Dharmesh Floyd MD        oseltamivir capsule 75 mg  75 mg  Oral BID Madeline Knight MD   75 mg at 04/22/22 0800    oxyCODONE immediate release tablet 5 mg  5 mg Oral Q6H PRN Madeline Knight MD        sodium chloride 0.9% flush 10 mL  10 mL Intravenous PRN Madeline Knight MD         Current Discharge Medication List      CONTINUE these medications which have NOT CHANGED    Details   dexAMETHasone (DECADRON) 4 MG Tab Take 1 tablet (4 mg total) by mouth every 6 (six) hours.  Qty: 120 tablet, Refills: 0    Associated Diagnoses: Malignant neoplasm of upper-outer quadrant of left breast in female, estrogen receptor negative      LIDOcaine-prilocaine (EMLA) cream Apply topically as needed (for port).  Qty: 30 g, Refills: 0    Associated Diagnoses: Malignant neoplasm of upper-outer quadrant of left breast in female, estrogen receptor negative      ondansetron (ZOFRAN) 8 MG tablet Take 1 tablet (8 mg total) by mouth every 12 (twelve) hours as needed for Nausea.  Qty: 30 tablet, Refills: 2    Associated Diagnoses: Malignant neoplasm of upper-outer quadrant of left breast in female, estrogen receptor negative      oxyCODONE (ROXICODONE) 5 MG immediate release tablet Take 1 tablet (5 mg total) by mouth every 6 (six) hours as needed for Pain.  Qty: 8 tablet, Refills: 0    Comments: Quantity prescribed more than 7 day supply? No      promethazine (PHENERGAN) 25 MG tablet Take 1 tablet (25 mg total) by mouth every 6 (six) hours as needed for Nausea.  Qty: 30 tablet, Refills: 3    Associated Diagnoses: Malignant neoplasm of upper-outer quadrant of left breast in female, estrogen receptor negative               I have seen and examined this patient within the last 30 days. My clinical findings that support the need for the home health skilled services and home bound status are the following:no   Medical restrictions requiring assistance of another human to leave home due to  IV infusion Needs.     Diet:   regular diet    Labs:  Weekly CBC, CMP, and CPK   Fax results to [phone]  [phone]    Referrals/ Consults  N/A    Activities:   activity as tolerated    Nursing:   Agency to admit patient within 24 hours of hospital discharge unless specified on physician order or at patient request    SN to complete comprehensive assessment including routine vital signs. Instruct on disease process and s/s of complications to report to MD. Review/verify medication list sent home with the patient at time of discharge  and instruct patient/caregiver as needed. Frequency may be adjusted depending on start of care date.     Skilled nurse to perform up to 3 visits PRN for symptoms related to diagnosis    Notify MD if SBP > 160 or < 90; DBP > 90 or < 50; HR > 120 or < 50; Temp > 101; O2 < 88%    Ok to schedule additional visits based on staff availability and patient request on consecutive days within the home health episode.    When multiple disciplines ordered:    Start of Care occurs on Sunday - Wednesday schedule remaining discipline evaluations as ordered on separate consecutive days following the start of care.    Thursday SOC -schedule subsequent evaluations Friday and Monday the following week.     Friday - Saturday SOC - schedule subsequent discipline evaluations on consecutive days starting Monday of the following week.    For all post-discharge communication and subsequent orders please contact patient's primary care physician. If unable to reach primary care physician or do not receive response within 30 minutes, please contact [facility] for clinical staff order clarification.    Miscellaneous   Home Infusion Therapy:   SN to perform Infusion Therapy/Central Line Care.  Review Central Line Care & Central Line Flush with patient.    Administer (drug and dose): Daptomycin 900 mg IV q24h   Last dose given: 4/22/22  Home dose due: 4/23/22  End date: 5/5/22    Scrub the Hub: Prior to accessing the line, always perform a 30 second alcohol scrub  Each lumen of the central line is to be flushed  at least daily with 10 mL Normal Saline and 3 mL Heparin flush (10 units/mL)  Skilled Nurse (SN) may draw blood from IV access  Blood Draw Procedure:   - Aspirate at least 5 mL of blood   - Discard   - Obtain specimen   - Change injection cap   - Flush with 20 mL Normal Saline followed by a                 3-5 mL Heparin flush (10 units/mL)  Central :   - Sterile dressing changes are done weekly and as needed.   - Use chlor-hexadine scrub to cleanse site, apply Biopatch to insertion site,       apply securement device dressing   - Injection caps are changed weekly and after EVERY lab draw.   - If sterile gauze is under dressing to control oozing,                 dressing change must be performed every 24 hours until gauze is not needed.    Home Health Aide:  Home Health Aide Weekly    Wound Care Orders  no    I certify that this patient is confined to her home and needs IV antibiotic administration.

## 2022-04-22 NOTE — HPI
Pt is a 36-year-old female with a history of breast cancer recently initiated on chemotherapy; HTN, anxiety presents to the hospital with positive blood cultures.  Patient was seen in the ED yesterday with fever, body aches, lower abdominal pain.  She tested positive for influenza A.  Blood cultures that were drawn became positive for Gram-positive cocci in chains resembling strep.  Patient was contacted from the ED and advised to return for further management.    On arrival to the ED she was HDS. She reports since last Friday (4/15) she began having intermittent fever ( Tmax 103.1), chills, productive cough,shortness of breath and right lower quadrant abdominal pain that radiates into her back. Also reports occasional clear vaginal discharge since starting of chemotherapy. Denies any change in urination.  Denies nausea, vomiting, diarrhea, new rash.

## 2022-04-22 NOTE — PROGRESS NOTES
[facility] Home IV ABX Education/Discharge Planning Note:      [facility] educator met with patient and family at bedside and discussed discharge plan for home IVABX. Ms Laura Kent will discharge home with family support, but will self infuse. Patient will infuse medication via Elastomeric Pump. Patient was educated on [facility].A.[facility] procedure & [facility] S.A[facility] mat provided.  Patient education checklist reviewed and acknowledged by patient and she is agreeable to discharge with home infusion plan of care. IV administration process using aspetic technique was reviewed with successful return demonstration by patient. Patient feels comfortable with infusion & is aware she will have to self infuse medication at home without nursing support. Patient will discharge home with IV Daptomycin 900mg Q24 for estimated end of therapy date 5/5/2022. Dosing schedule time is 1:00pm daily; patient's first home dose will be administered by patient on 4/23/22 @ 1pm. Extension set to beplaced to double lumen PICC by bedside nurse, as patient will be self infusing. Heme/Onc clinic will follow patient for weekly dressing changes and lab draws; no home health agency set up.    Additional education materials also provided. Patient also provided with [facility]'s home education video for future referencing. Time allotted for questions; questions addressed appropriately. Patients home IV ABX and supplies will be delivered to the patient's home on 4/22/22. Provided patient with [facility] support number [phone]. Patient is ready discharge home from [facility] perspective. Patient's discharge planning team and bedside nurse updated with the information above.         Please do not hesitiate reach out for any additional needs in the interim.    Joann Worrell MS, RDN, LDN  Clinical Dietitian  [facility]   Desk: [phone]  Other Phone: [phone]  [email or web address]

## 2022-04-22 NOTE — PLAN OF CARE
Problem: Adult Inpatient Plan of Care  Goal: Plan of Care Review  Outcome: Ongoing, Progressing  Goal: Patient-Specific Goal (Individualized)  Outcome: Ongoing, Progressing     Problem: Infection  Goal: Absence of Infection Signs and Symptoms  Outcome: Ongoing, Progressing     No acute events overnight. Urine sample collected and sent to lab. Port incision dressing dry and intact. No complaints of pain. VSS. Family member at bedside.

## 2022-04-22 NOTE — CONSULTS
Double lumen PICC to RIGHT BRACHIAL vein.  41cm in length, 37cm arm circumference and 0cm exposed.   Lot # [id number].

## 2022-04-23 NOTE — ASSESSMENT & PLAN NOTE
Patient reporting some increase in anxiety due to illness/financial burdens of illness.   Discussed coping strategies. Not currently interested in group support programs or meditation.  [facility] interested in [facility] and will be linked.   She knows how to contact me if she is interested in additional individual therapy.

## 2022-04-23 NOTE — HPI
Laura Kent, a 36 y.o. female, for initial evaluation visit.  Met with patient.    Chief Complaint/Reason for Encounter: adaptation to disease and treatment, anxiety

## 2022-04-23 NOTE — DISCHARGE SUMMARY
[facility] - Telemetry Stepdown  Hematology/Oncology  Discharge Summary      Patient Name: Laura Kent  MRN: [id number]  Admission Date: 4/19/2022  Hospital Length of Stay: 3 days  Discharge Date and Time:  04/23/2022 10:25 AM  Attending Physician: Ajnu att. providers found   Discharging Provider: Dharmesh Floyd MD  Primary Care Provider: Primary Doctor Anju    HPI: Pt is a 36-year-old female with a history of breast cancer recently initiated on chemotherapy; HTN, anxiety presents to the hospital with positive blood cultures.  Patient was seen in the ED yesterday with fever, body aches, lower abdominal pain.  She tested positive for influenza A.  Blood cultures that were drawn became positive for Gram-positive cocci in chains resembling strep.  Patient was contacted from the ED and advised to return for further management.    On arrival to the ED she was HDS. She reports since last Friday (4/15) she began having intermittent fever ( Tmax 103.1), chills, productive cough,shortness of breath and right lower quadrant abdominal pain that radiates into her back. Also reports occasional clear vaginal discharge since starting of chemotherapy. Denies any change in urination.  Denies nausea, vomiting, diarrhea, new rash.       * No surgery found *     Hospital Course: Patient admitted for management of enterococcus bacteremia. Started on cefepime and vanc. ID consulted, recommending Port removal given bacteremia and concerns for port infection. ID ordered for STI testing given vaginal discharge, which was negative. Vanc continued pending susceptibilities. Gen surg removed port a cath. Vancomycin discontinued and daptomycin started with plans for 14-day course. PICC line placed. Patient symptoms improved on day of discharge, tolerating diet, ambulating without issues. Patient is stable for discharge with close follow-up with PCP and oncology. Patient agreeable to discharge plan. Strict ED precautions and return instructions  discussed at length and patient verbalized understanding. All questions were answered and ample time was given for questions.         Goals of Care Treatment Preferences:  Code Status: Full Code      Consults:   Consults (From admission, onward)        Status Ordering Provider     Inpatient consult to PICC team ([facility])  Once        Provider:  (Not yet assigned)    Completed YAMILKA MOCK     Inpatient consult to General Surgery  Once        Provider:  (Not yet assigned)    Completed YAMILKA MOCK     Inpatient consult to Midline team  Once        Provider:  (Not yet assigned)    Completed MARCIE GAUTAM     Inpatient consult to Infectious Diseases  Once        Provider:  (Not yet assigned)    Completed PRISCILA HUERTAS          Significant Diagnostic Studies: Labs:   CMP   Recent Labs   Lab 04/22/22  0832      K 4.0      CO2 26   GLU 91   BUN 13   CREATININE 0.7   CALCIUM 8.9   PROT 7.0   ALBUMIN 3.6   BILITOT 0.3   ALKPHOS 51*   AST 30   ALT 32   ANIONGAP 7*   ESTGFRAFRICA >60.0   EGFRNONAA >60.0    and CBC   Recent Labs   Lab 04/22/22  0832   WBC 2.95*   HGB 11.1*   HCT 33.9*          Pending Diagnostic Studies:     None        Final Active Diagnoses:    Diagnosis Date Noted POA    PRINCIPAL PROBLEM:  Bacteremia [R78.81] 04/19/2022 Yes    Psychological factors affecting medical condition [F54] 04/22/2022 Unknown    Influenza A [J10.1] 04/19/2022 Unknown    Malignant neoplasm of upper-outer quadrant of left breast in female, estrogen receptor negative [C50.412, Z17.1] 03/21/2022 Not Applicable      Problems Resolved During this Admission:      Discharged Condition: fair    Disposition: Home or Self Care    Follow Up:    Patient Instructions:   No discharge procedures on file.  Medications:  Reconciled Home Medications:      Medication List      START taking these medications    oseltamivir 75 MG capsule  Commonly known as: TAMIFLU  Take 1 capsule (75 mg total) by mouth 2 (two) times daily. for  2 days     sodium chloride 0.9% SolP 50 mL with DAPTOmycin 500 mg SolR 900 mg  Inject 900 mg into the vein once daily.        CONTINUE taking these medications    dexAMETHasone 4 MG Tab  Commonly known as: DECADRON  Take 1 tablet (4 mg total) by mouth every 6 (six) hours.     LIDOcaine-prilocaine cream  Commonly known as: EMLA  Apply topically as needed (for port).     ondansetron 8 MG tablet  Commonly known as: ZOFRAN  Take 1 tablet (8 mg total) by mouth every 12 (twelve) hours as needed for Nausea.     oxyCODONE 5 MG immediate release tablet  Commonly known as: ROXICODONE  Take 1 tablet (5 mg total) by mouth every 6 (six) hours as needed for Pain.     promethazine 25 MG tablet  Commonly known as: PHENERGAN  Take 1 tablet (25 mg total) by mouth every 6 (six) hours as needed for Nausea.            Dharmesh Floyd MD PGY-1  Hematology/Oncology  [facility] - Telemetry Stepdown

## 2022-04-23 NOTE — PLAN OF CARE
[facility]   Infectious Diseases   Fellow Plan of Care Note     Chart reviewed. Patient is clinically improved after port removal. Will continue daptomycin 900 mg IV q24h until 5/4/22 for VRE faecium bacteremia. Continue Tamiflu 75 mg BID until 4/24. Urine GC/CT pending - please notify us if abnormal.     ID will sign off. OPAT instruction as below:      Infection: Enterococcal faecium bacteremia    Discharge antibiotics:   Daptomycin 900 mg IV q 24 hours     End date of IV antibiotics: 5/4/2022    Weekly outpatient laboratory on Monday or Tuesday while on IV antibiotics.    CBC   CMP    CPK    Fax laboratory results to [facility] at [phone] with attn: Dr. Colorado    Outpatient Infectious Diseases clinic follow up will be arranged and found in patient calendar.    Prior to discharge, please ensure the patient's follow-up has been scheduled.  If there is still no follow-up scheduled in Infectious Diseases clinic, please send an EPIC message to Brenda Banks in Infectious Diseases.      Dimitri Campbell MD, MPH  [facility] Infectious Diseases Fellow  4/22/2022 7:04 PM

## 2022-04-23 NOTE — PROGRESS NOTES
Sent message today ( 4/23) to primary oncologist staff to inform them of her discharge. Let them know that she went home on IV antibiotics. Let them know the need to have weekly labs ( cbc, cmp and cpk) and that results needed to be faxed to Dr. Colorado in infectious disease at [phone]. Patient coming weekly as well as dressing changes for PICC line.
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
## 2022-04-24 LAB
BACTERIA BLD CULT: NORMAL
BACTERIA BLD CULT: NORMAL

## 2022-04-25 ENCOUNTER — TELEPHONE (OUTPATIENT)
Dept: HEMATOLOGY/ONCOLOGY | Facility: CLINIC | Age: 37
End: 2022-04-25
Payer: MEDICAID

## 2022-04-25 ENCOUNTER — PATIENT MESSAGE (OUTPATIENT)
Dept: HEMATOLOGY/ONCOLOGY | Facility: CLINIC | Age: 37
End: 2022-04-25
Payer: MEDICAID

## 2022-04-25 NOTE — TELEPHONE ENCOUNTER
----- Message from Beltran Reyes, PhD sent at 4/22/2022 10:04 PM CDT -----  Please provide patient with info about the yoga program.  Thanks!

## 2022-04-26 DIAGNOSIS — R78.81 BACTEREMIA: Primary | ICD-10-CM

## 2022-04-27 LAB — INTEGRATED BRAC ANALYSIS: NORMAL

## 2022-04-28 ENCOUNTER — DOCUMENTATION ONLY (OUTPATIENT)
Dept: HEMATOLOGY/ONCOLOGY | Facility: CLINIC | Age: 37
End: 2022-04-28
Payer: MEDICAID

## 2022-04-28 NOTE — PROGRESS NOTES
[facility] and called and spoke with Karla BURR.  She said pt needs to have outstanding medical bills --even receipts for over-the-counter medical necessities would suffice.  Pt has Medicaid therefore no bills.  Chanda called pt and explained the above to her.  She will purchase necessities and send SW receipt to provide for [employer] documentation.  Pt's finances are very tight with two young children.  CHANDA offered to reimburse pt for these purchased supplies out of [employer].  SW remains available.

## 2022-04-29 ENCOUNTER — INFUSION (OUTPATIENT)
Dept: INFUSION THERAPY | Facility: HOSPITAL | Age: 37
End: 2022-04-29
Payer: MEDICAID

## 2022-04-29 DIAGNOSIS — O20.0 THREATENED ABORTION: ICD-10-CM

## 2022-04-29 DIAGNOSIS — R78.81 BACTEREMIA: ICD-10-CM

## 2022-04-29 DIAGNOSIS — C50.412 MALIGNANT NEOPLASM OF UPPER-OUTER QUADRANT OF LEFT BREAST IN FEMALE, ESTROGEN RECEPTOR NEGATIVE: Primary | ICD-10-CM

## 2022-04-29 DIAGNOSIS — Z17.1 MALIGNANT NEOPLASM OF UPPER-OUTER QUADRANT OF LEFT BREAST IN FEMALE, ESTROGEN RECEPTOR NEGATIVE: Primary | ICD-10-CM

## 2022-04-29 LAB
ALBUMIN SERPL BCP-MCNC: 3.7 G/DL (ref 3.5–5.2)
ALP SERPL-CCNC: 43 U/L (ref 55–135)
ALT SERPL W/O P-5'-P-CCNC: 23 U/L (ref 10–44)
ANION GAP SERPL CALC-SCNC: 10 MMOL/L (ref 8–16)
AST SERPL-CCNC: 25 U/L (ref 10–40)
BASOPHILS # BLD AUTO: 0.02 K/UL (ref 0–0.2)
BASOPHILS NFR BLD: 0.7 % (ref 0–1.9)
BILIRUB SERPL-MCNC: 0.3 MG/DL (ref 0.1–1)
BUN SERPL-MCNC: 10 MG/DL (ref 6–20)
CALCIUM SERPL-MCNC: 9.1 MG/DL (ref 8.7–10.5)
CHLORIDE SERPL-SCNC: 107 MMOL/L (ref 95–110)
CK SERPL-CCNC: 83 U/L (ref 20–180)
CO2 SERPL-SCNC: 27 MMOL/L (ref 23–29)
CREAT SERPL-MCNC: 0.8 MG/DL (ref 0.5–1.4)
DIFFERENTIAL METHOD: ABNORMAL
EOSINOPHIL # BLD AUTO: 0.1 K/UL (ref 0–0.5)
EOSINOPHIL NFR BLD: 1.7 % (ref 0–8)
ERYTHROCYTE [DISTWIDTH] IN BLOOD BY AUTOMATED COUNT: 12.3 % (ref 11.5–14.5)
EST. GFR  (AFRICAN AMERICAN): >60 ML/MIN/1.73 M^2
EST. GFR  (NON AFRICAN AMERICAN): >60 ML/MIN/1.73 M^2
GLUCOSE SERPL-MCNC: 107 MG/DL (ref 70–110)
HCG INTACT+B SERPL-ACNC: <2.4 MIU/ML
HCT VFR BLD AUTO: 29.8 % (ref 37–48.5)
HGB BLD-MCNC: 9.7 G/DL (ref 12–16)
IMM GRANULOCYTES # BLD AUTO: 0.01 K/UL (ref 0–0.04)
IMM GRANULOCYTES NFR BLD AUTO: 0.3 % (ref 0–0.5)
LYMPHOCYTES # BLD AUTO: 1 K/UL (ref 1–4.8)
LYMPHOCYTES NFR BLD: 34 % (ref 18–48)
MCH RBC QN AUTO: 28.5 PG (ref 27–31)
MCHC RBC AUTO-ENTMCNC: 32.6 G/DL (ref 32–36)
MCV RBC AUTO: 88 FL (ref 82–98)
MONOCYTES # BLD AUTO: 0.3 K/UL (ref 0.3–1)
MONOCYTES NFR BLD: 8.6 % (ref 4–15)
NEUTROPHILS # BLD AUTO: 1.6 K/UL (ref 1.8–7.7)
NEUTROPHILS NFR BLD: 54.7 % (ref 38–73)
NRBC BLD-RTO: 0 /100 WBC
PLATELET # BLD AUTO: 171 K/UL (ref 150–450)
PMV BLD AUTO: 9.8 FL (ref 9.2–12.9)
POTASSIUM SERPL-SCNC: 3.6 MMOL/L (ref 3.5–5.1)
PROT SERPL-MCNC: 6.6 G/DL (ref 6–8.4)
RBC # BLD AUTO: 3.4 M/UL (ref 4–5.4)
SODIUM SERPL-SCNC: 144 MMOL/L (ref 136–145)
WBC # BLD AUTO: 2.91 K/UL (ref 3.9–12.7)

## 2022-04-29 PROCEDURE — A4216 STERILE WATER/SALINE, 10 ML: HCPCS | Performed by: INTERNAL MEDICINE

## 2022-04-29 PROCEDURE — 36592 COLLECT BLOOD FROM PICC: CPT

## 2022-04-29 PROCEDURE — 85025 COMPLETE CBC W/AUTO DIFF WBC: CPT | Performed by: INTERNAL MEDICINE

## 2022-04-29 PROCEDURE — 25000003 PHARM REV CODE 250: Performed by: INTERNAL MEDICINE

## 2022-04-29 PROCEDURE — 84702 CHORIONIC GONADOTROPIN TEST: CPT | Performed by: OBSTETRICS & GYNECOLOGY

## 2022-04-29 PROCEDURE — 36415 COLL VENOUS BLD VENIPUNCTURE: CPT | Performed by: INTERNAL MEDICINE

## 2022-04-29 PROCEDURE — 80053 COMPREHEN METABOLIC PANEL: CPT | Performed by: INTERNAL MEDICINE

## 2022-04-29 PROCEDURE — 82550 ASSAY OF CK (CPK): CPT | Performed by: INTERNAL MEDICINE

## 2022-04-29 RX ORDER — SODIUM CHLORIDE 0.9 % (FLUSH) 0.9 %
10 SYRINGE (ML) INJECTION
Status: DISCONTINUED | OUTPATIENT
Start: 2022-04-29 | End: 2022-04-29 | Stop reason: HOSPADM

## 2022-04-29 RX ADMIN — Medication 10 ML: at 02:04

## 2022-05-02 ENCOUNTER — INFUSION (OUTPATIENT)
Dept: INFUSION THERAPY | Facility: HOSPITAL | Age: 37
End: 2022-05-02
Attending: INTERNAL MEDICINE
Payer: MEDICAID

## 2022-05-02 VITALS
BODY MASS INDEX: 37.69 KG/M2 | DIASTOLIC BLOOD PRESSURE: 71 MMHG | RESPIRATION RATE: 18 BRPM | HEIGHT: 65 IN | WEIGHT: 226.19 LBS | TEMPERATURE: 99 F | HEART RATE: 73 BPM | SYSTOLIC BLOOD PRESSURE: 121 MMHG

## 2022-05-02 NOTE — NURSING
Pt arrived for Taxol C1D8.  Labs done from PICC on 4/29.  Message sent to Dr. Rebollar to sign orders.  Dr. Cruz states pt needs to be seen following hospital discharge for infusion (port was removed and pt now has a PICC line).  Nessa called pt to scheduled MD appt and pt will need to be rescheduled for Taxol C1D8.  Pt discharged to home.

## 2022-05-03 ENCOUNTER — OFFICE VISIT (OUTPATIENT)
Dept: HEMATOLOGY/ONCOLOGY | Facility: CLINIC | Age: 37
End: 2022-05-03
Payer: MEDICAID

## 2022-05-03 VITALS
BODY MASS INDEX: 37.39 KG/M2 | OXYGEN SATURATION: 99 % | HEART RATE: 84 BPM | HEIGHT: 65 IN | RESPIRATION RATE: 18 BRPM | TEMPERATURE: 98 F | DIASTOLIC BLOOD PRESSURE: 87 MMHG | WEIGHT: 224.44 LBS | SYSTOLIC BLOOD PRESSURE: 133 MMHG

## 2022-05-03 DIAGNOSIS — D64.81 ANTINEOPLASTIC CHEMOTHERAPY INDUCED ANEMIA: ICD-10-CM

## 2022-05-03 DIAGNOSIS — D70.1 CHEMOTHERAPY INDUCED NEUTROPENIA: ICD-10-CM

## 2022-05-03 DIAGNOSIS — R78.81 BACTEREMIA: ICD-10-CM

## 2022-05-03 DIAGNOSIS — Z17.1 MALIGNANT NEOPLASM OF UPPER-OUTER QUADRANT OF LEFT BREAST IN FEMALE, ESTROGEN RECEPTOR NEGATIVE: Primary | ICD-10-CM

## 2022-05-03 DIAGNOSIS — K59.00 CONSTIPATION, UNSPECIFIED CONSTIPATION TYPE: ICD-10-CM

## 2022-05-03 DIAGNOSIS — T45.1X5A ANTINEOPLASTIC CHEMOTHERAPY INDUCED ANEMIA: ICD-10-CM

## 2022-05-03 DIAGNOSIS — K21.9 GASTROESOPHAGEAL REFLUX DISEASE WITHOUT ESOPHAGITIS: ICD-10-CM

## 2022-05-03 DIAGNOSIS — C50.412 MALIGNANT NEOPLASM OF UPPER-OUTER QUADRANT OF LEFT BREAST IN FEMALE, ESTROGEN RECEPTOR NEGATIVE: Primary | ICD-10-CM

## 2022-05-03 DIAGNOSIS — T45.1X5A CHEMOTHERAPY INDUCED NEUTROPENIA: ICD-10-CM

## 2022-05-03 PROCEDURE — 3079F PR MOST RECENT DIASTOLIC BLOOD PRESSURE 80-89 MM HG: ICD-10-PCS | Mod: CPTII,,, | Performed by: NURSE PRACTITIONER

## 2022-05-03 PROCEDURE — 1159F MED LIST DOCD IN RCRD: CPT | Mod: CPTII,,, | Performed by: NURSE PRACTITIONER

## 2022-05-03 PROCEDURE — 3008F PR BODY MASS INDEX (BMI) DOCUMENTED: ICD-10-PCS | Mod: CPTII,,, | Performed by: NURSE PRACTITIONER

## 2022-05-03 PROCEDURE — 1111F PR DISCHARGE MEDS RECONCILED W/ CURRENT OUTPATIENT MED LIST: ICD-10-PCS | Mod: CPTII,,, | Performed by: NURSE PRACTITIONER

## 2022-05-03 PROCEDURE — 3075F SYST BP GE 130 - 139MM HG: CPT | Mod: CPTII,,, | Performed by: NURSE PRACTITIONER

## 2022-05-03 PROCEDURE — 99999 PR PBB SHADOW E&M-EST. PATIENT-LVL III: CPT | Mod: PBBFAC,,, | Performed by: NURSE PRACTITIONER

## 2022-05-03 PROCEDURE — 3079F DIAST BP 80-89 MM HG: CPT | Mod: CPTII,,, | Performed by: NURSE PRACTITIONER

## 2022-05-03 PROCEDURE — 99213 OFFICE O/P EST LOW 20 MIN: CPT | Mod: PBBFAC | Performed by: NURSE PRACTITIONER

## 2022-05-03 PROCEDURE — 99215 PR OFFICE/OUTPT VISIT, EST, LEVL V, 40-54 MIN: ICD-10-PCS | Mod: S$PBB,,, | Performed by: NURSE PRACTITIONER

## 2022-05-03 PROCEDURE — 1111F DSCHRG MED/CURRENT MED MERGE: CPT | Mod: CPTII,,, | Performed by: NURSE PRACTITIONER

## 2022-05-03 PROCEDURE — 99999 PR PBB SHADOW E&M-EST. PATIENT-LVL III: ICD-10-PCS | Mod: PBBFAC,,, | Performed by: NURSE PRACTITIONER

## 2022-05-03 PROCEDURE — 3008F BODY MASS INDEX DOCD: CPT | Mod: CPTII,,, | Performed by: NURSE PRACTITIONER

## 2022-05-03 PROCEDURE — 99215 OFFICE O/P EST HI 40 MIN: CPT | Mod: S$PBB,,, | Performed by: NURSE PRACTITIONER

## 2022-05-03 PROCEDURE — 3075F PR MOST RECENT SYSTOLIC BLOOD PRESS GE 130-139MM HG: ICD-10-PCS | Mod: CPTII,,, | Performed by: NURSE PRACTITIONER

## 2022-05-03 PROCEDURE — 1159F PR MEDICATION LIST DOCUMENTED IN MEDICAL RECORD: ICD-10-PCS | Mod: CPTII,,, | Performed by: NURSE PRACTITIONER

## 2022-05-03 NOTE — Clinical Note
Rtc 1 week. virtual- Dr. Cruz to clear/resume and set next chemo. Please cancel all chemo appts for now.  Add tsh and t4 to labs this Friday.  thanks

## 2022-05-03 NOTE — PROGRESS NOTES
"Subjective:       Patient ID: Laura Kent is a 36 y.o. female.    Chief Complaint: Malignant neoplasm of upper-outer quadrant of left breast i    Here for OOH f/u.     Recent admission for flu and positive blood cultures- see below.   Currently receiving IV antibiotics through PICC.   Overall feels well today.   Appetite good.   Feels breast mass smaller.   Occasional heartburn.   +constipated. Tried MOM and a stool softener.   Seeing ID tomorrow - tentative last day of antibiotics is this Friday.   PICC dressing changes done here.       Hospital admit   "HPI: Pt is a 36-year-old female with a history of breast cancer recently initiated on chemotherapy; HTN, anxiety presents to the hospital with positive blood cultures.  Patient was seen in the ED yesterday with fever, body aches, lower abdominal pain.  She tested positive for influenza A.  Blood cultures that were drawn became positive for Gram-positive cocci in chains resembling strep.  Patient was contacted from the ED and advised to return for further management.     On arrival to the ED she was HDS. She reports since last Friday (4/15) she began having intermittent fever ( Tmax 103.1), chills, productive cough,shortness of breath and right lower quadrant abdominal pain that radiates into her back. Also reports occasional clear vaginal discharge since starting of chemotherapy. Denies any change in urination.  Denies nausea, vomiting, diarrhea, new rash.   Hospital Course: Patient admitted for management of enterococcus bacteremia. Started on cefepime and vanc. ID consulted, recommending Port removal given bacteremia and concerns for port infection. ID ordered for STI testing given vaginal discharge, which was negative. Vanc continued pending susceptibilities. Gen surg removed port a cath. Vancomycin discontinued and daptomycin started with plans for 14-day course. PICC line placed. Patient symptoms improved on day of discharge, tolerating diet, " "ambulating without issues. Patient is stable for discharge with close follow-up with PCP and oncology. Patient agreeable to discharge plan. Strict ED precautions and return instructions discussed at length and patient verbalized understanding. All questions were answered and ample time was given for questions. "                 Oncology History:  - self detected an area under her left arm and it initially seemed to go away (noted around winter holidays)  - 3/4/2022 Outside Mammogram:  Findings:  The breasts are heterogeneously dense, which may obscure small masses.   Left  There is a 31 mm x 18 mm x 21 mm irregularly shaped mass with microlobulated margins seen in the left breast at 2 o'clock in the posterior depth with associated left axillary adenopathy. Largest node depicted on the outside study measures 49 x 23 x 29 mm with cortical thickening and effacement of the hilum. Breast mass is reportedly palpable.   Right  There is no evidence of suspicious masses, calcifications, or other abnormal findings in the right breast.  Impression:  Left  Mass: Left breast 31 mm x 18 mm x 21 mm mass at the posterior 2 o'clock position. Assessment: 5 - Highly suggestive of malignancy. Biopsy is recommended.   Right  There is no mammographic evidence of malignancy in the right breast.  BI-RADS Category:   Overall: 5 - Highly Suggestive of Malignancy  Recommendation:  Ultrasound Guided Core Needle Biopsy of the left breast mass and one of the abnormal left axillary nodes is recommended.     - 3/14/2022 Breast biopsy:  1. LEFT BREAST MASS, 2 O'CLOCK, BIOPSY:   Invasive ductal carcinoma, [place] histologic grade 3 (tubule formation:   3, nuclear pleomorphism:  3, mitotic activity:  3).   Comment:  Infiltrating carcinoma occupies the entirety of biopsied material   with the largest continuous focus measuring 13 mm.  Breast biomarkers are   pending and will be issued in a supplemental report.   2. LEFT AXILLARY LYMPH NODE, BIOPSY: "   Invasive ductal carcinoma, [place] histologic grade 3 (tubule formation:   3, nuclear pleomorphism:  3, mitotic activity:  3).   Comment:  Infiltrating carcinoma occupies the entirety of biopsied material   with the largest continuous focus measuring 20 mm.  No lymph node tissue is   identified in the sample.  Tumor histology is essentially identical to that   of part 1.  The findings could represent part of a larger intranodal   metastasis, but an extension from the primary tumor cannot be excluded.   Radiographic correlation is advised.   Note:  Dr. Stephanie Rao also reviewed this case and agrees with the   diagnosis.   BREAST BIOMARKER RESULTS   Estrogen receptor (ER):  Negative (0)   Progesterone receptor (ER):  Negative (0)   HER2 IHC:  Negative (1+)   Ki-67 proliferation index:  80%      - 3/17/2022 Breast MRI:  Findings:  The breasts have heterogeneous fibroglandular tissue. The background parenchymal enhancement is minimal.   Left  There is a 38 mm x 33 mm x 33 mm irregularly shaped mass seen in the left breast at 2 o'clock in the posterior depth, 8.4 cm from the nipple and 1.2 cm from the skin. Associated signal void from a twirl biopsy marker; pathology showed invasive ductal carcinoma.   Posterior to the mass there is abnormal non mass enhancement measuring 29 x 28 mm. The NME is 4 mm from the skin and 2.3 cm from the chest wall. In total the mass and NME measure 54 mm in AP extent.  Overlying skin thickening and edema involving the lateral breast, likely from lymphovascular obstruction. No suspicious skin enhancement. Overall increased vascularity to the left breast.   Four abnormal lymph level 1 and level 2 left axillary lymph nodes. The largest lymph node measures 52 mm x 40 mm x 38 mm which previously underwent biopsy showing metastatic disease. There is an associated radar reflector within the biopsied lymph node.  Right  There is no evidence of suspicious masses, abnormal enhancement, or other  abnormal findings in the right breast. No enlarged axillary or internal mammary lymph nodes.   Impression:  Left  Mass: Left breast 38 mm x 33 mm x 33 mm mass at the posterior 2 o'clock position. Assessment: 6 - Known biopsy, proven malignancy. Associated 29 mm NME extending posteriorly from the mass. In total the mass and NME measure 54 mm in AP extent.  Lymph Node: Abnormal level 1 and 2 left axillary lymph nodes, the largest of which measures 52 mm x 40 mm x 38 mm lymph node and is known biopsy, proven malignancy.   Right  There is no MR evidence of malignancy in the right breast.  BI-RADS Category:   Overall: 6 - Known Biopsy-Proven Malignancy  Recommendation:  Clinical management of known left breast cancer. Patient is established with the breast surgery clinic.      - Additional Imaging needed     - 3/21/2022 CT C/A/P:  FINDINGS:  Base of Neck: No significant abnormality.  Thoracic soft tissue: A proximally 3.2 x 3.6 x 3.7 cm irregular left breast mass within the lateral aspect with internal biopsy marker, corresponding to findings on prior breast MRI and compatible with malignancy.  Enlarged left axillary lymph nodes, largest measuring approximately 4.2 x 3.6 cm, (series 2, image 27).  CHEST:  -Heart: Normal size. No pericardial effusion.  -Pulmonary vasculature: Pulmonary arteries distribute normally.  There are four pulmonary veins.  -Radha/Mediastinum: No pathologic shelly enlargement.  -Trachea and Proximal airways: Trachea is midline.  Central airways are patent.  No significant bronchial wall thickening or bronchiectasis.  -Lungs/Pleura: Symmetrically expanded without focal consolidation, pneumothorax, or mass.  No pleural effusion or thickening.  -Esophagus: Normal course and caliber.  ABDOMEN:  - Liver: Normal in size and attenuation with no focal hepatic abnormality.  - Gallbladder: No calcified gallstones.  No wall thickening or pericholecystic fluid.  - Bile Ducts: No intra or extrahepatic biliary  ductal dilatation.  - Stomach/Duodenum: Small hiatal hernia otherwise unremarkable.  - Spleen: Normal size.  No focal parenchymal abnormality.  - Pancreas: No mass lesion.  No pancreatic ductal dilatation.  No peripancreatic fat stranding.  - Adrenals: Unremarkable.  - Kidneys/ureters/urinary bladder: Normal in size and location.  Normal enhancement pattern.  No nephrolithiasis.  Ureters are normal in course and caliber.  No hydroureteronephrosis.  - Retroperitoneum: Scattered nonenlarged retroperitoneal lymph nodes.  PELVIS:  - Reproductive: Intrauterine device present.  A proximally 2.8 cm peripherally enhancing right ovarian cyst, likely corpus luteal cyst.  - Other: No pelvic adenopathy, free fluid, or mass.  BOWEL/MESENTERY:  No evidence of bowel obstruction or inflammatory process. Appendix is visualized and unremarkable.  VASCULATURE: Left-sided aortic arch with 3 branch vessels.  No aneurysm and no significant atherosclerosis.  Portal vein, splenic vein, and SMV are patent.  BONES: Lucent approximately 0.6 cm right trochanteric lesion, likely synovial herniation pit.  No acute fracture or bony destructive process.  EXTRATHORACIC/EXTRAPERITONEAL SOFT TISSUES: Unremarkable.  Impression:  In this patient with known breast cancer, there is an approximately 3.7 cm irregular left breast mass with left axillary lymphadenopathy.  No evidence of metastatic disease  Small hiatal hernia.  Additional findings as described above.    - 3/21/2022 Bone scan:  FINDINGS:  There is physiologic distribution of the radiopharmaceutical throughout the skeleton.  Mild degenerative uptake within the bilateral shoulders and knees.  There is normal uptake in the genitourinary system and soft tissues.  Impression:  There is no scintigraphic evidence of osteoblastic metastatic disease.    - 3/21/2022 Brain MRI:  FINDINGS:  Please note there is dental metal artifact distorting the examination.  Allowing for artifacts the brain parenchyma  is normal in contour.  Developmental variant with cavum septum pellucidum identified.  The ventricles are otherwise normal in size without hydrocephalus.  There is no midline shift or significant mass effect.  The major intracranial T2 flow voids are present.  No restricted diffusion within the non distorted brain parenchyma.  There is severe distortion of the susceptibility imaging no parenchymal susceptibility to suggest parenchymal hemorrhage in the non distorted brain parenchyma.  There is suboptimal evaluation of the cerebellum and posterior fossa.  Impression:  Unremarkable MRI brain allowing for artifacts from dental metal as detailed above specifically without abnormal parenchymal enhancement to suggest intracranial metastatic disease in light of history.    Repeat pregnancy test negative    - 3/30/2022 ECHO  · Moderate left atrial enlargement.  · The left ventricle is mildly enlarged with normal systolic function.  · The estimated ejection fraction is 60%.  · The left ventricular global longitudinal strain is -17%.  · Normal left ventricular diastolic function.  · Normal right ventricular size with normal right ventricular systolic function.  · Mild mitral regurgitation.  · Normal central venous pressure (3 mmHg).  · The estimated PA systolic pressure is 18 mmHg.    C1D1 Pembro/Carbo/Taxol 2022.    Admitted- see above    PMH:  HTN- around pregnancies; off of blood pressure medication x 2 years  CHF- ECHOs at [facility]  Gestational DM  Hyperlipidemia when heavier, managed with diet and followed by cardiology  C- sections x 2  Iron deficiency     GynHx:  Menarche- 7   (16 at 1st pregnancy) (17, 9, 4)  Still with periods - last 2022- last 5-7 days, moderate normal flow  + breast feeding x 1 year  IUD     SH:  Working, , [employer]  Single  Good support system  No tobacco  No EtOH  No illicit drugs     FH:  Mom- 55 yo, osteoarthritis   Dad- 58 yo, DM, HTN  Maternal uncle-   of metastatic pancreatic cancer at age 57  Paternal grandmother-  of metastatic bladder cancer- unclear what age but thought to be her 60s  No breast cancer  No uterine cancer, no ovarian cancer  No prostate cancer  No melanoma    Presents to consent     Review of Systems   Constitutional:        See above   All other systems reviewed and are negative.    Objective:      Physical Exam  Vitals reviewed.   Constitutional:       General: She is not in acute distress.     Appearance: Normal appearance.   HENT:      Nose: Nose normal.      Mouth/Throat:      Mouth: Mucous membranes are moist.   Eyes:      General: No scleral icterus.     Conjunctiva/sclera: Conjunctivae normal.      Pupils: Pupils are equal, round, and reactive to light.   Cardiovascular:      Rate and Rhythm: Normal rate and regular rhythm.   Pulmonary:      Effort: Pulmonary effort is normal.      Breath sounds: Normal breath sounds. No wheezing.      Comments: +left breast mass  Abdominal:      General: Abdomen is flat. Bowel sounds are normal. There is no distension.      Palpations: Abdomen is soft. There is no mass.      Tenderness: There is no abdominal tenderness.   Musculoskeletal:         General: No swelling. Normal range of motion.      Cervical back: Normal range of motion and neck supple.      Comments: No spinal or paraspinal tenderness.  PICC right arm.    Skin:     General: Skin is warm and dry.   Neurological:      Mental Status: She is alert and oriented to person, place, and time.      Motor: No weakness.   Psychiatric:         Mood and Affect: Mood normal.       Labs: most recent reviewed.   Assessment:       Problem List Items Addressed This Visit        ID    Bacteremia       Oncology    Malignant neoplasm of upper-outer quadrant of left breast in female, estrogen receptor negative - Primary    Relevant Orders    TSH    T4, Free      Other Visit Diagnoses     Chemotherapy induced neutropenia        Antineoplastic chemotherapy  induced anemia        Constipation, unspecified constipation type        Gastroesophageal reflux disease without esophagitis              Plan:         Continue to see ID for IV antibiotics. PICC dressing changes being done here.   Repeat labs this Friday as scheduled.   TSH T4 need to be added to this weeks labs.  Miralax prn  Prilosec x 4 week trial.   Resume chemo when cleared by ID. Will again request to cancel upcoming chemo appts.   RTC in 1 week to see Dr. Cruz- judy ok. Need to clear and set next chemo .       Route Chart for Scheduling    Med Onc Chart Routing  Urgent    Follow up with physician 1 week. Judy- Dr. Cruz. to clear/resume and set next chemo. Please cancel all chemo appts for now.    Follow up with ANDREW    Labs    Lab interval:  Add tsh and t4 to labs this friday. thanks   Imaging    Pharmacy appointment    Other referrals            Urgent Care Oncology Visit    Date and Time: 05/03/2022 5:08 PM   Patient MRN: [id number]   Chief Complaint:   Chief Complaint   Patient presents with    Malignant neoplasm of upper-outer quadrant of left breast i     Reason for Urgent Care Visit: out of hospital  Intervention: education provided and labs ordered  Dispo: return to clinic as previous  UrgOnc appointment addressed via: OOH   This UrgOnc visit was in person  Time spent handling UC issue: 30 minutes   Was this virtual or in person UrgOnc visit appropriate? yes    Patient is in agreement with the proposed treatment plan. All questions were answered to the patient's satisfaction. Pt knows to call clinic for any new or worsening symptoms and if anything is needed before the next clinic visit.      JUICE Hester-MARLON  Hematology & Oncology  [facility] [place] [address]  ph. [phone]  Fax. [phone]     Face to Face time with patient: 30 minutes  45  minutes of total time spent on the encounter, which includes face to face time and non-face to face time preparing to  see the patient (eg, review of tests), Obtaining and/or reviewing separately obtained history, Documenting clinical information in the electronic or other health record, Independently interpreting results (not separately reported) and communicating results to the patient/family/caregiver, or Care coordination (not separately reported).

## 2022-05-04 ENCOUNTER — OFFICE VISIT (OUTPATIENT)
Dept: INFECTIOUS DISEASES | Facility: CLINIC | Age: 37
End: 2022-05-04
Payer: MEDICAID

## 2022-05-04 VITALS
BODY MASS INDEX: 37.57 KG/M2 | TEMPERATURE: 98 F | DIASTOLIC BLOOD PRESSURE: 82 MMHG | SYSTOLIC BLOOD PRESSURE: 119 MMHG | HEIGHT: 65 IN | HEART RATE: 82 BPM | WEIGHT: 225.5 LBS

## 2022-05-04 DIAGNOSIS — H00.15 CHALAZION LEFT LOWER EYELID: Primary | ICD-10-CM

## 2022-05-04 DIAGNOSIS — R78.81 BACTEREMIA: ICD-10-CM

## 2022-05-04 PROCEDURE — 99215 OFFICE O/P EST HI 40 MIN: CPT | Mod: S$PBB,,, | Performed by: INTERNAL MEDICINE

## 2022-05-04 PROCEDURE — 3079F DIAST BP 80-89 MM HG: CPT | Mod: CPTII,,, | Performed by: INTERNAL MEDICINE

## 2022-05-04 PROCEDURE — 99999 PR PBB SHADOW E&M-EST. PATIENT-LVL III: ICD-10-PCS | Mod: PBBFAC,,, | Performed by: INTERNAL MEDICINE

## 2022-05-04 PROCEDURE — 99999 PR PBB SHADOW E&M-EST. PATIENT-LVL III: CPT | Mod: PBBFAC,,, | Performed by: INTERNAL MEDICINE

## 2022-05-04 PROCEDURE — 1159F PR MEDICATION LIST DOCUMENTED IN MEDICAL RECORD: ICD-10-PCS | Mod: CPTII,,, | Performed by: INTERNAL MEDICINE

## 2022-05-04 PROCEDURE — 1111F DSCHRG MED/CURRENT MED MERGE: CPT | Mod: CPTII,,, | Performed by: INTERNAL MEDICINE

## 2022-05-04 PROCEDURE — 3008F PR BODY MASS INDEX (BMI) DOCUMENTED: ICD-10-PCS | Mod: CPTII,,, | Performed by: INTERNAL MEDICINE

## 2022-05-04 PROCEDURE — 3074F PR MOST RECENT SYSTOLIC BLOOD PRESSURE < 130 MM HG: ICD-10-PCS | Mod: CPTII,,, | Performed by: INTERNAL MEDICINE

## 2022-05-04 PROCEDURE — 3008F BODY MASS INDEX DOCD: CPT | Mod: CPTII,,, | Performed by: INTERNAL MEDICINE

## 2022-05-04 PROCEDURE — 3074F SYST BP LT 130 MM HG: CPT | Mod: CPTII,,, | Performed by: INTERNAL MEDICINE

## 2022-05-04 PROCEDURE — 1159F MED LIST DOCD IN RCRD: CPT | Mod: CPTII,,, | Performed by: INTERNAL MEDICINE

## 2022-05-04 PROCEDURE — 99215 PR OFFICE/OUTPT VISIT, EST, LEVL V, 40-54 MIN: ICD-10-PCS | Mod: S$PBB,,, | Performed by: INTERNAL MEDICINE

## 2022-05-04 PROCEDURE — 1111F PR DISCHARGE MEDS RECONCILED W/ CURRENT OUTPATIENT MED LIST: ICD-10-PCS | Mod: CPTII,,, | Performed by: INTERNAL MEDICINE

## 2022-05-04 PROCEDURE — 3079F PR MOST RECENT DIASTOLIC BLOOD PRESSURE 80-89 MM HG: ICD-10-PCS | Mod: CPTII,,, | Performed by: INTERNAL MEDICINE

## 2022-05-04 PROCEDURE — 99213 OFFICE O/P EST LOW 20 MIN: CPT | Mod: PBBFAC | Performed by: INTERNAL MEDICINE

## 2022-05-04 RX ORDER — OMEPRAZOLE 10 MG/1
10 CAPSULE, DELAYED RELEASE ORAL DAILY
Status: ON HOLD | COMMUNITY
End: 2022-09-19 | Stop reason: HOSPADM

## 2022-05-04 RX ORDER — ERYTHROMYCIN 5 MG/G
OINTMENT OPHTHALMIC EVERY 8 HOURS
Qty: 1 G | Refills: 1 | Status: SHIPPED | OUTPATIENT
Start: 2022-05-04 | End: 2022-05-14

## 2022-05-04 RX ORDER — IBUPROFEN 200 MG
200 TABLET ORAL EVERY 6 HOURS PRN
Status: ON HOLD | COMMUNITY
End: 2022-09-19 | Stop reason: HOSPADM

## 2022-05-04 NOTE — PROGRESS NOTES
Subjective:     Patient ID: Laura Kent is a 36 y.o. female    Chief Complaint: bacteremia    HPI: 36F hx breast CA recently started on chemo via R chest port presents w/ fevers. Blood cx + VRE, no clear source identified. Recent CT A/P 4/18 w/o intra-abd source. Endorsed pain and swelling around port site. Port was removed. She presents today for follow up appointment, end of care 5/5/22. She is doing well. Endorses some numbness of her arm that is positional w/ PICC line. Woke up with L lower eyelid swelling, pain and discharge today. Oncology plans to resume chemo once cleared by ID.       Immunization History   Administered Date(s) Administered    COVID-19, vector-nr, [id number], PF (Bellybaloo) 05/15/2021        Review of Systems   Constitutional: Negative for chills, decreased appetite, fever, malaise/fatigue and night sweats.   HENT: Negative for congestion and sore throat.    Eyes: Positive for discharge and redness. Negative for blurred vision, double vision, vision loss in left eye, vision loss in right eye and visual disturbance.   Cardiovascular: Negative for chest pain, dyspnea on exertion, irregular heartbeat and leg swelling.   Respiratory: Negative for cough, hemoptysis, shortness of breath and sputum production.    Hematologic/Lymphatic: Negative for adenopathy. Does not bruise/bleed easily.   Skin: Negative for rash and suspicious lesions.   Musculoskeletal: Negative for arthritis, joint pain, muscle weakness and myalgias.   Gastrointestinal: Negative for abdominal pain, constipation, diarrhea, heartburn, nausea and vomiting.   Genitourinary: Negative for dysuria, flank pain, frequency and genital sores.   Neurological: Negative for dizziness, focal weakness, numbness, paresthesias, sensory change, tremors and weakness.   Psychiatric/Behavioral: Negative for altered mental status and depression. The patient is not nervous/anxious.    Allergic/Immunologic: Negative for environmental allergies and  persistent infections.        Past Medical History:   Diagnosis Date    Anxiety     Breast cancer     Encounter for blood transfusion     Hypertension     Meningitis      Past Surgical History:   Procedure Laterality Date    BREAST BIOPSY Left      SECTION      INSERTION OF TUNNELED CENTRAL VENOUS CATHETER (CVC) WITH SUBCUTANEOUS PORT N/A 2022    Procedure: [id number];  Surgeon: Deo Sin Jr., MD;  Location: [facility] OR [address] [place] [address];  Service: General;  Laterality: N/A;     Family History   Problem Relation Age of Onset    Diabetes Father     Hypertension Father     Pancreatic cancer Maternal Uncle     Bladder Cancer Paternal Grandmother      Social History     Tobacco Use    Smoking status: Never Smoker    Smokeless tobacco: Never Used   Substance Use Topics    Alcohol use: Yes     Comment: social    Drug use: Never       Objective:     Physical Exam  Constitutional:       General: She is not in acute distress.     Appearance: Normal appearance. She is well-developed. She is not ill-appearing or diaphoretic.   HENT:      Head: Normocephalic and atraumatic.      Right Ear: External ear normal.      Left Ear: External ear normal.      Nose: Nose normal.   Eyes:      General: No scleral icterus.        Right eye: No discharge.         Left eye: No discharge.      Extraocular Movements: Extraocular movements intact.      Conjunctiva/sclera: Conjunctivae normal.      Comments: L lower eyelid swelling and redness   Pulmonary:      Effort: Pulmonary effort is normal. No respiratory distress.      Breath sounds: No stridor.   Musculoskeletal:         General: Normal range of motion.   Skin:     Findings: No erythema or rash.   Neurological:      General: No focal deficit present.      Mental Status: She is alert and oriented to person, place, and time. Mental status is at baseline.      Cranial Nerves: No cranial nerve deficit.   Psychiatric:         Mood and Affect: Mood normal.          Behavior: Behavior normal.         Thought Content: Thought content normal.         Judgment: Judgment normal.         Data:    All data, including recent labs, radiology, and pathology, has been independently reviewed.    Labs:    Recent Labs   Lab Result Units 04/20/22  0423 04/21/22  0425 04/22/22  0832 04/29/22  1420   WBC K/uL 2.97* 3.43* 2.95* 2.91*   Hemoglobin g/dL 9.8* 9.8* 11.1* 9.7*   Hematocrit % 30.2* 29.7* 33.9* 29.8*   Sodium mmol/L 139 138 139 144   Potassium mmol/L 3.8 3.8 4.0 3.6   Chloride mmol/L 104 104 106 107   BUN mg/dL 7 12 13 10   Creatinine mg/dL 0.7 0.7 0.7 0.8   AST U/L 30  --  30 25   ALT U/L 25  --  32 23   Alkaline Phosphatase U/L 39*  --  51* 43*   Total Bilirubin mg/dL 0.5  --  0.3 0.3        Radiology:    No results found in the last 24 hours.     Assessment:    1. Chalazion left lower eyelid  erythromycin (ROMYCIN) ophthalmic ointment   2. Bacteremia  Completing final doses of daptomycin  PICC left in place as patient would like to use it for chemo  Ok to proceed w/ chemotherapy from ID standpoint.   Follow up as needed        Follow up as needed    The total time for evaluation and management services performed on 5/4/22 was greater than 40 minutes.     Patrizia Colorado DO  Transplant Infectious Disease

## 2022-05-05 ENCOUNTER — TELEPHONE (OUTPATIENT)
Dept: HEMATOLOGY/ONCOLOGY | Facility: CLINIC | Age: 37
End: 2022-05-05
Payer: MEDICAID

## 2022-05-05 NOTE — TELEPHONE ENCOUNTER
Spoke with patient to let her know that virtual appointment has been scheduled for Monday, 5/9/22 at 7:30 AM.

## 2022-05-06 ENCOUNTER — INFUSION (OUTPATIENT)
Dept: INFUSION THERAPY | Facility: HOSPITAL | Age: 37
End: 2022-05-06
Payer: MEDICAID

## 2022-05-06 DIAGNOSIS — C50.412 MALIGNANT NEOPLASM OF UPPER-OUTER QUADRANT OF LEFT BREAST IN FEMALE, ESTROGEN RECEPTOR NEGATIVE: ICD-10-CM

## 2022-05-06 DIAGNOSIS — R78.81 BACTEREMIA: Primary | ICD-10-CM

## 2022-05-06 DIAGNOSIS — Z17.1 MALIGNANT NEOPLASM OF UPPER-OUTER QUADRANT OF LEFT BREAST IN FEMALE, ESTROGEN RECEPTOR NEGATIVE: ICD-10-CM

## 2022-05-06 LAB
ALBUMIN SERPL BCP-MCNC: 4 G/DL (ref 3.5–5.2)
ALP SERPL-CCNC: 46 U/L (ref 55–135)
ALT SERPL W/O P-5'-P-CCNC: 27 U/L (ref 10–44)
ANION GAP SERPL CALC-SCNC: 6 MMOL/L (ref 8–16)
AST SERPL-CCNC: 26 U/L (ref 10–40)
BASOPHILS # BLD AUTO: 0.03 K/UL (ref 0–0.2)
BASOPHILS NFR BLD: 1.1 % (ref 0–1.9)
BILIRUB SERPL-MCNC: 0.4 MG/DL (ref 0.1–1)
BUN SERPL-MCNC: 14 MG/DL (ref 6–20)
CALCIUM SERPL-MCNC: 9.3 MG/DL (ref 8.7–10.5)
CHLORIDE SERPL-SCNC: 106 MMOL/L (ref 95–110)
CO2 SERPL-SCNC: 26 MMOL/L (ref 23–29)
CREAT SERPL-MCNC: 0.7 MG/DL (ref 0.5–1.4)
DIFFERENTIAL METHOD: ABNORMAL
EOSINOPHIL # BLD AUTO: 0.1 K/UL (ref 0–0.5)
EOSINOPHIL NFR BLD: 2.9 % (ref 0–8)
ERYTHROCYTE [DISTWIDTH] IN BLOOD BY AUTOMATED COUNT: 12.7 % (ref 11.5–14.5)
EST. GFR  (AFRICAN AMERICAN): >60 ML/MIN/1.73 M^2
EST. GFR  (NON AFRICAN AMERICAN): >60 ML/MIN/1.73 M^2
GLUCOSE SERPL-MCNC: 84 MG/DL (ref 70–110)
HCT VFR BLD AUTO: 31.1 % (ref 37–48.5)
HGB BLD-MCNC: 10 G/DL (ref 12–16)
IMM GRANULOCYTES # BLD AUTO: 0.01 K/UL (ref 0–0.04)
IMM GRANULOCYTES NFR BLD AUTO: 0.4 % (ref 0–0.5)
LYMPHOCYTES # BLD AUTO: 1 K/UL (ref 1–4.8)
LYMPHOCYTES NFR BLD: 37.9 % (ref 18–48)
MCH RBC QN AUTO: 28.4 PG (ref 27–31)
MCHC RBC AUTO-ENTMCNC: 32.2 G/DL (ref 32–36)
MCV RBC AUTO: 88 FL (ref 82–98)
MONOCYTES # BLD AUTO: 0.3 K/UL (ref 0.3–1)
MONOCYTES NFR BLD: 12.1 % (ref 4–15)
NEUTROPHILS # BLD AUTO: 1.2 K/UL (ref 1.8–7.7)
NEUTROPHILS NFR BLD: 45.6 % (ref 38–73)
NRBC BLD-RTO: 0 /100 WBC
PLATELET # BLD AUTO: 154 K/UL (ref 150–450)
PMV BLD AUTO: 10.1 FL (ref 9.2–12.9)
POTASSIUM SERPL-SCNC: 3.7 MMOL/L (ref 3.5–5.1)
PROT SERPL-MCNC: 6.8 G/DL (ref 6–8.4)
RBC # BLD AUTO: 3.52 M/UL (ref 4–5.4)
SODIUM SERPL-SCNC: 138 MMOL/L (ref 136–145)
T4 FREE SERPL-MCNC: 0.76 NG/DL (ref 0.71–1.51)
TSH SERPL DL<=0.005 MIU/L-ACNC: 0.97 UIU/ML (ref 0.4–4)
WBC # BLD AUTO: 2.72 K/UL (ref 3.9–12.7)

## 2022-05-06 PROCEDURE — A4216 STERILE WATER/SALINE, 10 ML: HCPCS | Performed by: STUDENT IN AN ORGANIZED HEALTH CARE EDUCATION/TRAINING PROGRAM

## 2022-05-06 PROCEDURE — 84439 ASSAY OF FREE THYROXINE: CPT | Performed by: NURSE PRACTITIONER

## 2022-05-06 PROCEDURE — 36592 COLLECT BLOOD FROM PICC: CPT

## 2022-05-06 PROCEDURE — 85025 COMPLETE CBC W/AUTO DIFF WBC: CPT | Performed by: INTERNAL MEDICINE

## 2022-05-06 PROCEDURE — 25000003 PHARM REV CODE 250: Performed by: STUDENT IN AN ORGANIZED HEALTH CARE EDUCATION/TRAINING PROGRAM

## 2022-05-06 PROCEDURE — 84443 ASSAY THYROID STIM HORMONE: CPT | Performed by: NURSE PRACTITIONER

## 2022-05-06 PROCEDURE — 80053 COMPREHEN METABOLIC PANEL: CPT | Performed by: INTERNAL MEDICINE

## 2022-05-06 PROCEDURE — 63600175 PHARM REV CODE 636 W HCPCS: Performed by: STUDENT IN AN ORGANIZED HEALTH CARE EDUCATION/TRAINING PROGRAM

## 2022-05-06 RX ORDER — HEPARIN 100 UNIT/ML
500 SYRINGE INTRAVENOUS
Status: CANCELLED | OUTPATIENT
Start: 2022-05-06

## 2022-05-06 RX ORDER — SODIUM CHLORIDE 0.9 % (FLUSH) 0.9 %
10 SYRINGE (ML) INJECTION
Status: CANCELLED | OUTPATIENT
Start: 2022-05-06

## 2022-05-06 RX ORDER — HEPARIN 100 UNIT/ML
500 SYRINGE INTRAVENOUS
Status: COMPLETED | OUTPATIENT
Start: 2022-05-06 | End: 2022-05-06

## 2022-05-06 RX ORDER — HEPARIN 100 UNIT/ML
500 SYRINGE INTRAVENOUS
Status: DISCONTINUED | OUTPATIENT
Start: 2022-05-06 | End: 2022-05-06 | Stop reason: HOSPADM

## 2022-05-06 RX ORDER — SODIUM CHLORIDE 0.9 % (FLUSH) 0.9 %
10 SYRINGE (ML) INJECTION
Status: DISCONTINUED | OUTPATIENT
Start: 2022-05-06 | End: 2022-05-06 | Stop reason: HOSPADM

## 2022-05-06 RX ORDER — SODIUM CHLORIDE 0.9 % (FLUSH) 0.9 %
10 SYRINGE (ML) INJECTION
Status: COMPLETED | OUTPATIENT
Start: 2022-05-06 | End: 2022-05-06

## 2022-05-06 RX ADMIN — Medication 10 ML: at 03:05

## 2022-05-06 RX ADMIN — HEPARIN SODIUM (PORCINE) LOCK FLUSH IV SOLN 100 UNIT/ML 500 UNITS: 100 SOLUTION at 03:05

## 2022-05-06 NOTE — NURSING
Pt here for lab draw and sterile dressing change from PICC line. Labs drawn, picc line flushed and hep locked. Sterile dressing change done per protocol. Tolerated procedure without difficulty.

## 2022-05-09 ENCOUNTER — OFFICE VISIT (OUTPATIENT)
Dept: HEMATOLOGY/ONCOLOGY | Facility: CLINIC | Age: 37
End: 2022-05-09
Payer: MEDICAID

## 2022-05-09 DIAGNOSIS — Z17.1 MALIGNANT NEOPLASM OF UPPER-OUTER QUADRANT OF LEFT BREAST IN FEMALE, ESTROGEN RECEPTOR NEGATIVE: ICD-10-CM

## 2022-05-09 DIAGNOSIS — H01.015 ULCERATIVE BLEPHARITIS OF LEFT LOWER EYELID: Primary | ICD-10-CM

## 2022-05-09 DIAGNOSIS — C50.412 MALIGNANT NEOPLASM OF UPPER-OUTER QUADRANT OF LEFT BREAST IN FEMALE, ESTROGEN RECEPTOR NEGATIVE: ICD-10-CM

## 2022-05-09 DIAGNOSIS — R78.81 BACTEREMIA: ICD-10-CM

## 2022-05-09 PROCEDURE — 1159F MED LIST DOCD IN RCRD: CPT | Mod: CPTII,95,, | Performed by: INTERNAL MEDICINE

## 2022-05-09 PROCEDURE — 1160F RVW MEDS BY RX/DR IN RCRD: CPT | Mod: CPTII,95,, | Performed by: INTERNAL MEDICINE

## 2022-05-09 PROCEDURE — 1111F PR DISCHARGE MEDS RECONCILED W/ CURRENT OUTPATIENT MED LIST: ICD-10-PCS | Mod: CPTII,95,, | Performed by: INTERNAL MEDICINE

## 2022-05-09 PROCEDURE — 99214 OFFICE O/P EST MOD 30 MIN: CPT | Mod: 95,,, | Performed by: INTERNAL MEDICINE

## 2022-05-09 PROCEDURE — 99214 PR OFFICE/OUTPT VISIT, EST, LEVL IV, 30-39 MIN: ICD-10-PCS | Mod: 95,,, | Performed by: INTERNAL MEDICINE

## 2022-05-09 PROCEDURE — 1111F DSCHRG MED/CURRENT MED MERGE: CPT | Mod: CPTII,95,, | Performed by: INTERNAL MEDICINE

## 2022-05-09 PROCEDURE — 1159F PR MEDICATION LIST DOCUMENTED IN MEDICAL RECORD: ICD-10-PCS | Mod: CPTII,95,, | Performed by: INTERNAL MEDICINE

## 2022-05-09 PROCEDURE — 1160F PR REVIEW ALL MEDS BY PRESCRIBER/CLIN PHARMACIST DOCUMENTED: ICD-10-PCS | Mod: CPTII,95,, | Performed by: INTERNAL MEDICINE

## 2022-05-09 NOTE — PROGRESS NOTES
Subjective:       Patient ID: Laura Kent is a 36 y.o. female.    Chief Complaint: No chief complaint on file.    HPI     The patient location is: home  The chief complaint leading to consultation is: follow up hospital    Visit type: audiovisual    Face to Face time with patient: 20 minutes  30 minutes of total time spent on the encounter, which includes face to face time and non-face to face time preparing to see the patient (eg, review of tests), Obtaining and/or reviewing separately obtained history, Documenting clinical information in the electronic or other health record, Independently interpreting results (not separately reported) and communicating results to the patient/family/caregiver, or Care coordination (not separately reported).   Each patient to whom he or she provides medical services by telemedicine is:  (1) informed of the relationship between the physician and patient and the respective role of any other health care provider with respect to management of the patient; and (2) notified that he or she may decline to receive medical services by telemedicine and may withdraw from such care at any time.    Notes:     Finished IV abx Thurs  Dry cough- mild mucous production  Hoarseness  Constipation- Miralax, MOM  blepharitis    Admitted 4/19- 4/23/2022 with bacteremia from port    Hospital Course: Patient admitted for management of enterococcus bacteremia. Started on cefepime and vanc. ID consulted, recommending Port removal given bacteremia and concerns for port infection. ID ordered for STI testing given vaginal discharge, which was negative. Vanc continued pending susceptibilities. Gen surg removed port a cath. Vancomycin discontinued and daptomycin started with plans for 14-day course. PICC line placed.     She hopes to maintain PICC    Review of Systems   Constitutional: Negative for appetite change.   HENT: Negative for mouth sores.    Eyes: Negative for visual disturbance.    Respiratory: Positive for cough. Negative for shortness of breath.    Cardiovascular: Negative for chest pain.   Gastrointestinal: Negative for abdominal pain and diarrhea.   Musculoskeletal: Negative for back pain.   Integumentary:  Negative for rash.   Neurological: Positive for headaches.   Hematological: Negative for adenopathy.   Psychiatric/Behavioral: The patient is not nervous/anxious.          Objective:      Physical Exam  Virtual visit  Assessment:       Problem List Items Addressed This Visit     Malignant neoplasm of upper-outer quadrant of left breast in female, estrogen receptor negative    Bacteremia      Other Visit Diagnoses     Ulcerative blepharitis of left lower eyelid    -  Primary    Relevant Medications    ciprofloxacin HCl (CILOXAN) 0.3 % Oint          Plan:     Has completed IV antibiotics  Can restart chemotherapy and will schedule   Plan is to use PICC line for now  Cleared by ID    Route Chart for Scheduling    Med Onc Chart Routing      Follow up with physician . Restart chemo this week   Follow up with ANDREW . 3 weeks after chemo restart see ANDREW and labs and chemo   Labs    Imaging    Pharmacy appointment    Other referrals          Treatment Plan Information   OP PEMBROLIZUMAB CARBOPLATIN (AUC 5) WITH WEEKLY PACLITAXEL FOLLOWED BY PEMBROLIZUMAB DOXORUBICIN CYCLOPHOSPHAMIDE FOLLOWED BY PEMBROLIZUMAB 200 MG Q3W   Yessy Cruz MD   Upcoming Treatment Dates - OP PEMBROLIZUMAB CARBOPLATIN (AUC 5) WITH WEEKLY PACLITAXEL FOLLOWED BY PEMBROLIZUMAB DOXORUBICIN CYCLOPHOSPHAMIDE FOLLOWED BY PEMBROLIZUMAB 200 MG Q3W    5/10/2022       Pre-Medications       dexamethasone (DECADRON) 10 mg in sodium chloride 0.9% 50 mL IVPB       diphenhydramine (BENADRYL) 50 mg in NS 50 mL IVPB       famotidine (PF) injection 20 mg       Chemotherapy       PACLitaxeL (TAXOL) 80 mg/m2 = 174 mg in sodium chloride 0.9% 250 mL chemo infusion  5/17/2022       Pre-Medications       dexamethasone (DECADRON) 10 mg in  sodium chloride 0.9% 50 mL IVPB       diphenhydramine (BENADRYL) 50 mg in NS 50 mL IVPB       famotidine (PF) injection 20 mg       Chemotherapy       PACLitaxeL (TAXOL) 80 mg/m2 = 174 mg in sodium chloride 0.9% 250 mL chemo infusion  5/24/2022       Pre-Medications       diphenhydramine (BENADRYL) 50 mg in NS 50 mL IVPB       famotidine (PF) injection 20 mg       Chemotherapy       PACLitaxeL (TAXOL) 80 mg/m2 = 174 mg in sodium chloride 0.9% 250 mL chemo infusion       CARBOplatin (PARAPLATIN) in sodium chloride 0.9% 250 mL chemo infusion  5/31/2022       Pre-Medications       dexamethasone (DECADRON) 10 mg in sodium chloride 0.9% 50 mL IVPB       diphenhydramine (BENADRYL) 50 mg in NS 50 mL IVPB       famotidine (PF) injection 20 mg       Chemotherapy       PACLitaxeL (TAXOL) 80 mg/m2 = 174 mg in sodium chloride 0.9% 250 mL chemo infusion    Therapy Plan Information  Flushes  heparin, porcine (PF) 100 unit/mL injection flush 500 Units  500 Units, Intravenous, Every visit  sodium chloride 0.9% flush 10 mL  10 mL, Intravenous, Every visit  heparin, porcine (PF) 100 unit/mL injection flush 500 Units  500 Units, Intravenous, Every visit  sodium chloride 0.9% flush 10 mL  10 mL, Intravenous, Every visit

## 2022-05-10 ENCOUNTER — PATIENT MESSAGE (OUTPATIENT)
Dept: HEMATOLOGY/ONCOLOGY | Facility: CLINIC | Age: 37
End: 2022-05-10
Payer: MEDICAID

## 2022-05-10 ENCOUNTER — TELEPHONE (OUTPATIENT)
Dept: HEMATOLOGY/ONCOLOGY | Facility: CLINIC | Age: 37
End: 2022-05-10
Payer: MEDICAID

## 2022-05-11 ENCOUNTER — PATIENT MESSAGE (OUTPATIENT)
Dept: HEMATOLOGY/ONCOLOGY | Facility: CLINIC | Age: 37
End: 2022-05-11
Payer: MEDICAID

## 2022-05-18 ENCOUNTER — TELEPHONE (OUTPATIENT)
Dept: HEMATOLOGY/ONCOLOGY | Facility: CLINIC | Age: 37
End: 2022-05-18
Payer: MEDICAID

## 2022-05-18 DIAGNOSIS — Z17.1 MALIGNANT NEOPLASM OF UPPER-OUTER QUADRANT OF LEFT BREAST IN FEMALE, ESTROGEN RECEPTOR NEGATIVE: Primary | ICD-10-CM

## 2022-05-18 DIAGNOSIS — C50.412 MALIGNANT NEOPLASM OF UPPER-OUTER QUADRANT OF LEFT BREAST IN FEMALE, ESTROGEN RECEPTOR NEGATIVE: Primary | ICD-10-CM

## 2022-05-18 DIAGNOSIS — T45.1X5A CHEMOTHERAPY INDUCED NEUTROPENIA: ICD-10-CM

## 2022-05-18 DIAGNOSIS — D70.1 CHEMOTHERAPY INDUCED NEUTROPENIA: ICD-10-CM

## 2022-05-20 ENCOUNTER — PATIENT MESSAGE (OUTPATIENT)
Dept: HEMATOLOGY/ONCOLOGY | Facility: CLINIC | Age: 37
End: 2022-05-20
Payer: MEDICAID

## 2022-05-20 ENCOUNTER — INFUSION (OUTPATIENT)
Dept: INFUSION THERAPY | Facility: HOSPITAL | Age: 37
End: 2022-05-20
Attending: INTERNAL MEDICINE
Payer: MEDICAID

## 2022-05-20 DIAGNOSIS — Z17.1 MALIGNANT NEOPLASM OF UPPER-OUTER QUADRANT OF LEFT BREAST IN FEMALE, ESTROGEN RECEPTOR NEGATIVE: Primary | ICD-10-CM

## 2022-05-20 DIAGNOSIS — C50.412 MALIGNANT NEOPLASM OF UPPER-OUTER QUADRANT OF LEFT BREAST IN FEMALE, ESTROGEN RECEPTOR NEGATIVE: Primary | ICD-10-CM

## 2022-05-20 LAB
ALBUMIN SERPL BCP-MCNC: 4.1 G/DL (ref 3.5–5.2)
ALP SERPL-CCNC: 44 U/L (ref 55–135)
ALT SERPL W/O P-5'-P-CCNC: 18 U/L (ref 10–44)
ANION GAP SERPL CALC-SCNC: 6 MMOL/L (ref 8–16)
AST SERPL-CCNC: 20 U/L (ref 10–40)
BASOPHILS # BLD AUTO: 0.02 K/UL (ref 0–0.2)
BASOPHILS NFR BLD: 0.5 % (ref 0–1.9)
BILIRUB SERPL-MCNC: 0.3 MG/DL (ref 0.1–1)
BUN SERPL-MCNC: 17 MG/DL (ref 6–20)
CALCIUM SERPL-MCNC: 9.1 MG/DL (ref 8.7–10.5)
CHLORIDE SERPL-SCNC: 107 MMOL/L (ref 95–110)
CO2 SERPL-SCNC: 24 MMOL/L (ref 23–29)
CREAT SERPL-MCNC: 0.8 MG/DL (ref 0.5–1.4)
DIFFERENTIAL METHOD: ABNORMAL
EOSINOPHIL # BLD AUTO: 0.2 K/UL (ref 0–0.5)
EOSINOPHIL NFR BLD: 3.8 % (ref 0–8)
ERYTHROCYTE [DISTWIDTH] IN BLOOD BY AUTOMATED COUNT: 13.1 % (ref 11.5–14.5)
EST. GFR  (AFRICAN AMERICAN): >60 ML/MIN/1.73 M^2
EST. GFR  (NON AFRICAN AMERICAN): >60 ML/MIN/1.73 M^2
GLUCOSE SERPL-MCNC: 88 MG/DL (ref 70–110)
HCT VFR BLD AUTO: 34.8 % (ref 37–48.5)
HGB BLD-MCNC: 11.7 G/DL (ref 12–16)
IMM GRANULOCYTES # BLD AUTO: 0.01 K/UL (ref 0–0.04)
IMM GRANULOCYTES NFR BLD AUTO: 0.3 % (ref 0–0.5)
LYMPHOCYTES # BLD AUTO: 1.2 K/UL (ref 1–4.8)
LYMPHOCYTES NFR BLD: 30 % (ref 18–48)
MCH RBC QN AUTO: 29.7 PG (ref 27–31)
MCHC RBC AUTO-ENTMCNC: 33.6 G/DL (ref 32–36)
MCV RBC AUTO: 88 FL (ref 82–98)
MONOCYTES # BLD AUTO: 0.3 K/UL (ref 0.3–1)
MONOCYTES NFR BLD: 7.3 % (ref 4–15)
NEUTROPHILS # BLD AUTO: 2.3 K/UL (ref 1.8–7.7)
NEUTROPHILS NFR BLD: 58.1 % (ref 38–73)
NRBC BLD-RTO: 0 /100 WBC
PLATELET # BLD AUTO: 164 K/UL (ref 150–450)
PMV BLD AUTO: 10.2 FL (ref 9.2–12.9)
POTASSIUM SERPL-SCNC: 3.8 MMOL/L (ref 3.5–5.1)
PROT SERPL-MCNC: 7.2 G/DL (ref 6–8.4)
RBC # BLD AUTO: 3.94 M/UL (ref 4–5.4)
SODIUM SERPL-SCNC: 137 MMOL/L (ref 136–145)
T4 SERPL-MCNC: 6.5 UG/DL (ref 4.5–11.5)
TSH SERPL DL<=0.005 MIU/L-ACNC: 0.83 UIU/ML (ref 0.4–4)
WBC # BLD AUTO: 3.97 K/UL (ref 3.9–12.7)

## 2022-05-20 PROCEDURE — 25000003 PHARM REV CODE 250: Performed by: STUDENT IN AN ORGANIZED HEALTH CARE EDUCATION/TRAINING PROGRAM

## 2022-05-20 PROCEDURE — 80053 COMPREHEN METABOLIC PANEL: CPT | Performed by: INTERNAL MEDICINE

## 2022-05-20 PROCEDURE — 84436 ASSAY OF TOTAL THYROXINE: CPT | Performed by: INTERNAL MEDICINE

## 2022-05-20 PROCEDURE — A4216 STERILE WATER/SALINE, 10 ML: HCPCS | Performed by: STUDENT IN AN ORGANIZED HEALTH CARE EDUCATION/TRAINING PROGRAM

## 2022-05-20 PROCEDURE — 85025 COMPLETE CBC W/AUTO DIFF WBC: CPT | Performed by: INTERNAL MEDICINE

## 2022-05-20 PROCEDURE — 84443 ASSAY THYROID STIM HORMONE: CPT | Performed by: INTERNAL MEDICINE

## 2022-05-20 PROCEDURE — 36592 COLLECT BLOOD FROM PICC: CPT

## 2022-05-20 PROCEDURE — 63600175 PHARM REV CODE 636 W HCPCS: Performed by: STUDENT IN AN ORGANIZED HEALTH CARE EDUCATION/TRAINING PROGRAM

## 2022-05-20 RX ORDER — SODIUM CHLORIDE 0.9 % (FLUSH) 0.9 %
10 SYRINGE (ML) INJECTION
Status: CANCELLED | OUTPATIENT
Start: 2022-05-20

## 2022-05-20 RX ORDER — HEPARIN 100 UNIT/ML
500 SYRINGE INTRAVENOUS
Status: COMPLETED | OUTPATIENT
Start: 2022-05-20 | End: 2022-05-20

## 2022-05-20 RX ORDER — SODIUM CHLORIDE 0.9 % (FLUSH) 0.9 %
10 SYRINGE (ML) INJECTION
Status: COMPLETED | OUTPATIENT
Start: 2022-05-20 | End: 2022-05-20

## 2022-05-20 RX ORDER — HEPARIN 100 UNIT/ML
500 SYRINGE INTRAVENOUS
Status: CANCELLED | OUTPATIENT
Start: 2022-05-20

## 2022-05-20 RX ADMIN — HEPARIN 500 UNITS: 100 SYRINGE at 08:05

## 2022-05-20 RX ADMIN — Medication 10 ML: at 08:05

## 2022-05-20 NOTE — NURSING
Pt here for labs from PICC and dressing change.  David ordered blood work and sent to lab.  Changed PICC dressing using sterile technique.  Both lumens flushed with NS and heparin.  Pt ambulated out of unit unassisted.

## 2022-05-23 ENCOUNTER — INFUSION (OUTPATIENT)
Dept: INFUSION THERAPY | Facility: HOSPITAL | Age: 37
End: 2022-05-23
Payer: MEDICAID

## 2022-05-23 VITALS
RESPIRATION RATE: 18 BRPM | DIASTOLIC BLOOD PRESSURE: 78 MMHG | WEIGHT: 225.5 LBS | BODY MASS INDEX: 37.57 KG/M2 | TEMPERATURE: 98 F | HEIGHT: 65 IN | SYSTOLIC BLOOD PRESSURE: 136 MMHG | HEART RATE: 79 BPM

## 2022-05-23 DIAGNOSIS — Z17.1 MALIGNANT NEOPLASM OF UPPER-OUTER QUADRANT OF LEFT BREAST IN FEMALE, ESTROGEN RECEPTOR NEGATIVE: Primary | ICD-10-CM

## 2022-05-23 DIAGNOSIS — C50.412 MALIGNANT NEOPLASM OF UPPER-OUTER QUADRANT OF LEFT BREAST IN FEMALE, ESTROGEN RECEPTOR NEGATIVE: Primary | ICD-10-CM

## 2022-05-23 PROCEDURE — 63600175 PHARM REV CODE 636 W HCPCS: Performed by: INTERNAL MEDICINE

## 2022-05-23 PROCEDURE — A4216 STERILE WATER/SALINE, 10 ML: HCPCS | Performed by: STUDENT IN AN ORGANIZED HEALTH CARE EDUCATION/TRAINING PROGRAM

## 2022-05-23 PROCEDURE — 25000003 PHARM REV CODE 250: Performed by: STUDENT IN AN ORGANIZED HEALTH CARE EDUCATION/TRAINING PROGRAM

## 2022-05-23 PROCEDURE — 96367 TX/PROPH/DG ADDL SEQ IV INF: CPT

## 2022-05-23 PROCEDURE — 63600175 PHARM REV CODE 636 W HCPCS: Performed by: STUDENT IN AN ORGANIZED HEALTH CARE EDUCATION/TRAINING PROGRAM

## 2022-05-23 PROCEDURE — 96375 TX/PRO/DX INJ NEW DRUG ADDON: CPT

## 2022-05-23 PROCEDURE — 96413 CHEMO IV INFUSION 1 HR: CPT

## 2022-05-23 PROCEDURE — 25000003 PHARM REV CODE 250: Performed by: INTERNAL MEDICINE

## 2022-05-23 RX ORDER — FAMOTIDINE 10 MG/ML
20 INJECTION INTRAVENOUS
Status: COMPLETED | OUTPATIENT
Start: 2022-05-23 | End: 2022-05-23

## 2022-05-23 RX ORDER — SODIUM CHLORIDE 0.9 % (FLUSH) 0.9 %
10 SYRINGE (ML) INJECTION
Status: DISCONTINUED | OUTPATIENT
Start: 2022-05-23 | End: 2022-05-23 | Stop reason: HOSPADM

## 2022-05-23 RX ORDER — DIPHENHYDRAMINE HYDROCHLORIDE 50 MG/ML
50 INJECTION INTRAMUSCULAR; INTRAVENOUS ONCE AS NEEDED
Status: DISCONTINUED | OUTPATIENT
Start: 2022-05-23 | End: 2022-05-23 | Stop reason: HOSPADM

## 2022-05-23 RX ORDER — EPINEPHRINE 0.3 MG/.3ML
0.3 INJECTION SUBCUTANEOUS ONCE AS NEEDED
Status: DISCONTINUED | OUTPATIENT
Start: 2022-05-23 | End: 2022-05-23 | Stop reason: HOSPADM

## 2022-05-23 RX ORDER — HEPARIN 100 UNIT/ML
500 SYRINGE INTRAVENOUS
Status: DISCONTINUED | OUTPATIENT
Start: 2022-05-23 | End: 2022-05-23 | Stop reason: HOSPADM

## 2022-05-23 RX ADMIN — DEXAMETHASONE SODIUM PHOSPHATE 10 MG: 4 INJECTION, SOLUTION INTRA-ARTICULAR; INTRALESIONAL; INTRAMUSCULAR; INTRAVENOUS; SOFT TISSUE at 09:05

## 2022-05-23 RX ADMIN — HEPARIN 500 UNITS: 100 SYRINGE at 11:05

## 2022-05-23 RX ADMIN — PACLITAXEL 174 MG: 6 INJECTION, SOLUTION, CONCENTRATE INTRAVENOUS at 10:05

## 2022-05-23 RX ADMIN — DIPHENHYDRAMINE HYDROCHLORIDE 50 MG: 50 INJECTION, SOLUTION INTRAMUSCULAR; INTRAVENOUS at 08:05

## 2022-05-23 RX ADMIN — SODIUM CHLORIDE: 0.9 INJECTION, SOLUTION INTRAVENOUS at 08:05

## 2022-05-23 RX ADMIN — Medication 10 ML: at 11:05

## 2022-05-23 RX ADMIN — FAMOTIDINE 20 MG: 10 INJECTION INTRAVENOUS at 08:05

## 2022-05-27 ENCOUNTER — INFUSION (OUTPATIENT)
Dept: INFUSION THERAPY | Facility: HOSPITAL | Age: 37
End: 2022-05-27
Attending: INTERNAL MEDICINE
Payer: MEDICAID

## 2022-05-27 ENCOUNTER — OFFICE VISIT (OUTPATIENT)
Dept: HEMATOLOGY/ONCOLOGY | Facility: CLINIC | Age: 37
End: 2022-05-27
Payer: MEDICAID

## 2022-05-27 VITALS
TEMPERATURE: 98 F | HEART RATE: 85 BPM | DIASTOLIC BLOOD PRESSURE: 74 MMHG | BODY MASS INDEX: 37.2 KG/M2 | SYSTOLIC BLOOD PRESSURE: 125 MMHG | HEIGHT: 65 IN | WEIGHT: 223.31 LBS

## 2022-05-27 DIAGNOSIS — C50.412 MALIGNANT NEOPLASM OF UPPER-OUTER QUADRANT OF LEFT BREAST IN FEMALE, ESTROGEN RECEPTOR NEGATIVE: ICD-10-CM

## 2022-05-27 DIAGNOSIS — F41.9 ANXIETY: Primary | ICD-10-CM

## 2022-05-27 DIAGNOSIS — Z17.1 MALIGNANT NEOPLASM OF UPPER-OUTER QUADRANT OF LEFT BREAST IN FEMALE, ESTROGEN RECEPTOR NEGATIVE: Primary | ICD-10-CM

## 2022-05-27 DIAGNOSIS — N95.1 MENOPAUSAL SYMPTOMS: ICD-10-CM

## 2022-05-27 DIAGNOSIS — Z17.1 MALIGNANT NEOPLASM OF UPPER-OUTER QUADRANT OF LEFT BREAST IN FEMALE, ESTROGEN RECEPTOR NEGATIVE: ICD-10-CM

## 2022-05-27 DIAGNOSIS — C50.412 MALIGNANT NEOPLASM OF UPPER-OUTER QUADRANT OF LEFT BREAST IN FEMALE, ESTROGEN RECEPTOR NEGATIVE: Primary | ICD-10-CM

## 2022-05-27 LAB
ALBUMIN SERPL BCP-MCNC: 4.1 G/DL (ref 3.5–5.2)
ALP SERPL-CCNC: 43 U/L (ref 55–135)
ALT SERPL W/O P-5'-P-CCNC: 17 U/L (ref 10–44)
ANION GAP SERPL CALC-SCNC: 6 MMOL/L (ref 8–16)
AST SERPL-CCNC: 24 U/L (ref 10–40)
BILIRUB SERPL-MCNC: 0.4 MG/DL (ref 0.1–1)
BUN SERPL-MCNC: 19 MG/DL (ref 6–20)
CALCIUM SERPL-MCNC: 9.2 MG/DL (ref 8.7–10.5)
CHLORIDE SERPL-SCNC: 104 MMOL/L (ref 95–110)
CO2 SERPL-SCNC: 26 MMOL/L (ref 23–29)
CREAT SERPL-MCNC: 0.7 MG/DL (ref 0.5–1.4)
ERYTHROCYTE [DISTWIDTH] IN BLOOD BY AUTOMATED COUNT: 12.9 % (ref 11.5–14.5)
EST. GFR  (AFRICAN AMERICAN): >60 ML/MIN/1.73 M^2
EST. GFR  (NON AFRICAN AMERICAN): >60 ML/MIN/1.73 M^2
GLUCOSE SERPL-MCNC: 81 MG/DL (ref 70–110)
HCT VFR BLD AUTO: 30.2 % (ref 37–48.5)
HGB BLD-MCNC: 10.6 G/DL (ref 12–16)
IMM GRANULOCYTES # BLD AUTO: 0.01 K/UL (ref 0–0.04)
MCH RBC QN AUTO: 30.3 PG (ref 27–31)
MCHC RBC AUTO-ENTMCNC: 35.1 G/DL (ref 32–36)
MCV RBC AUTO: 86 FL (ref 82–98)
NEUTROPHILS # BLD AUTO: 0.9 K/UL (ref 1.8–7.7)
PLATELET # BLD AUTO: 225 K/UL (ref 150–450)
PMV BLD AUTO: 10.3 FL (ref 9.2–12.9)
POTASSIUM SERPL-SCNC: 3.8 MMOL/L (ref 3.5–5.1)
PROT SERPL-MCNC: 6.9 G/DL (ref 6–8.4)
RBC # BLD AUTO: 3.5 M/UL (ref 4–5.4)
SODIUM SERPL-SCNC: 136 MMOL/L (ref 136–145)
T4 SERPL-MCNC: 7.5 UG/DL (ref 4.5–11.5)
TSH SERPL DL<=0.005 MIU/L-ACNC: 0.66 UIU/ML (ref 0.4–4)
WBC # BLD AUTO: 2.01 K/UL (ref 3.9–12.7)

## 2022-05-27 PROCEDURE — 80053 COMPREHEN METABOLIC PANEL: CPT | Performed by: INTERNAL MEDICINE

## 2022-05-27 PROCEDURE — 3074F SYST BP LT 130 MM HG: CPT | Mod: CPTII,,, | Performed by: OBSTETRICS & GYNECOLOGY

## 2022-05-27 PROCEDURE — 84443 ASSAY THYROID STIM HORMONE: CPT | Performed by: INTERNAL MEDICINE

## 2022-05-27 PROCEDURE — 99999 PR PBB SHADOW E&M-EST. PATIENT-LVL IV: ICD-10-PCS | Mod: PBBFAC,,, | Performed by: OBSTETRICS & GYNECOLOGY

## 2022-05-27 PROCEDURE — 25000003 PHARM REV CODE 250: Performed by: STUDENT IN AN ORGANIZED HEALTH CARE EDUCATION/TRAINING PROGRAM

## 2022-05-27 PROCEDURE — 3078F DIAST BP <80 MM HG: CPT | Mod: CPTII,,, | Performed by: OBSTETRICS & GYNECOLOGY

## 2022-05-27 PROCEDURE — 99214 OFFICE O/P EST MOD 30 MIN: CPT | Mod: PBBFAC | Performed by: OBSTETRICS & GYNECOLOGY

## 2022-05-27 PROCEDURE — 1159F MED LIST DOCD IN RCRD: CPT | Mod: CPTII,,, | Performed by: OBSTETRICS & GYNECOLOGY

## 2022-05-27 PROCEDURE — 36591 DRAW BLOOD OFF VENOUS DEVICE: CPT

## 2022-05-27 PROCEDURE — 3078F PR MOST RECENT DIASTOLIC BLOOD PRESSURE < 80 MM HG: ICD-10-PCS | Mod: CPTII,,, | Performed by: OBSTETRICS & GYNECOLOGY

## 2022-05-27 PROCEDURE — 3074F PR MOST RECENT SYSTOLIC BLOOD PRESSURE < 130 MM HG: ICD-10-PCS | Mod: CPTII,,, | Performed by: OBSTETRICS & GYNECOLOGY

## 2022-05-27 PROCEDURE — 99203 PR OFFICE/OUTPT VISIT, NEW, LEVL III, 30-44 MIN: ICD-10-PCS | Mod: S$PBB,,, | Performed by: OBSTETRICS & GYNECOLOGY

## 2022-05-27 PROCEDURE — 99203 OFFICE O/P NEW LOW 30 MIN: CPT | Mod: S$PBB,,, | Performed by: OBSTETRICS & GYNECOLOGY

## 2022-05-27 PROCEDURE — 1159F PR MEDICATION LIST DOCUMENTED IN MEDICAL RECORD: ICD-10-PCS | Mod: CPTII,,, | Performed by: OBSTETRICS & GYNECOLOGY

## 2022-05-27 PROCEDURE — A4216 STERILE WATER/SALINE, 10 ML: HCPCS | Performed by: STUDENT IN AN ORGANIZED HEALTH CARE EDUCATION/TRAINING PROGRAM

## 2022-05-27 PROCEDURE — 99999 PR PBB SHADOW E&M-EST. PATIENT-LVL IV: CPT | Mod: PBBFAC,,, | Performed by: OBSTETRICS & GYNECOLOGY

## 2022-05-27 PROCEDURE — 84436 ASSAY OF TOTAL THYROXINE: CPT | Performed by: INTERNAL MEDICINE

## 2022-05-27 PROCEDURE — 85027 COMPLETE CBC AUTOMATED: CPT | Performed by: INTERNAL MEDICINE

## 2022-05-27 PROCEDURE — 36415 COLL VENOUS BLD VENIPUNCTURE: CPT | Performed by: INTERNAL MEDICINE

## 2022-05-27 PROCEDURE — 3008F PR BODY MASS INDEX (BMI) DOCUMENTED: ICD-10-PCS | Mod: CPTII,,, | Performed by: OBSTETRICS & GYNECOLOGY

## 2022-05-27 PROCEDURE — 63600175 PHARM REV CODE 636 W HCPCS: Performed by: STUDENT IN AN ORGANIZED HEALTH CARE EDUCATION/TRAINING PROGRAM

## 2022-05-27 PROCEDURE — 3008F BODY MASS INDEX DOCD: CPT | Mod: CPTII,,, | Performed by: OBSTETRICS & GYNECOLOGY

## 2022-05-27 RX ORDER — SODIUM CHLORIDE 0.9 % (FLUSH) 0.9 %
10 SYRINGE (ML) INJECTION
Status: COMPLETED | OUTPATIENT
Start: 2022-05-27 | End: 2022-05-27

## 2022-05-27 RX ORDER — HEPARIN 100 UNIT/ML
500 SYRINGE INTRAVENOUS
Status: COMPLETED | OUTPATIENT
Start: 2022-05-27 | End: 2022-05-27

## 2022-05-27 RX ORDER — SODIUM CHLORIDE 0.9 % (FLUSH) 0.9 %
10 SYRINGE (ML) INJECTION
Status: CANCELLED | OUTPATIENT
Start: 2022-05-27

## 2022-05-27 RX ORDER — HEPARIN 100 UNIT/ML
500 SYRINGE INTRAVENOUS
Status: CANCELLED | OUTPATIENT
Start: 2022-05-27

## 2022-05-27 RX ORDER — VENLAFAXINE HYDROCHLORIDE 37.5 MG/1
37.5 CAPSULE, EXTENDED RELEASE ORAL DAILY
Qty: 30 CAPSULE | Refills: 6 | Status: SHIPPED | OUTPATIENT
Start: 2022-05-27 | End: 2023-01-18

## 2022-05-27 RX ADMIN — HEPARIN 500 UNITS: 100 SYRINGE at 02:05

## 2022-05-27 RX ADMIN — Medication 10 ML: at 02:05

## 2022-05-27 NOTE — NURSING
Pt tolerated Labs from PICC and dressing changed today. NAD. PICC lumens flushed + blood return present, flushed. Hep locked capped and clamped. declined AVS. Uses my [facility]. Discharged home. Ambulated independently.

## 2022-05-27 NOTE — PROGRESS NOTES
"Integrative Health and Medicine Initial Visit    This 36 y.o.  female seeks an integrative approach to discuss what she can do to address side effects related to her breast cancer treatment. She was referred by her Oncologist.    HPI  She has a Triple negative breast cancer. She was admitted for bacteremia after port placement.   She reports hot flashes during the day and at night time. She reports broken sleep and gets up multiple times per night   She is having regular periods  She has been treated for anxiety n the past- medication made her sleepy. Uncertain what the medication was      Sleep  How many hours of sleep per night? 5 hours  Do you have trouble falling asleep, staying asleep or waking up earlier than you need to? yes- having hot flashes 2 per night  Do you have daytime fatigue? yes  Do you need medication for sleep? no  Do you use any supplements or other interventions for sleep? no    Resilience  Rate your current level of stress- moderate  How do you manage stress? "put it out of my head"   Purpose  Do you feel you have a vision or a life purpose? Have 3 kids, 19, 9, and 6        Nutrition   Food allergies or sensitivities: no  Do you adhere to a particular type of diet? no  What type of diet do you follow? none  Do you have any concerns with your eating habits? no  Are you concerned with your level of alcohol intake? no    Exercise  How would you describe your physical activity level? moderate  Do you work at a sedentary job? Yes- was an [employer]   What do you do for physical activity? Cardio- step and dance    Past Medical History  Past Medical History:   Diagnosis Date    Anxiety     Breast cancer     Encounter for blood transfusion     Hypertension     Meningitis         Past Surgical History   Past Surgical History:   Procedure Laterality Date    BREAST BIOPSY Left      SECTION      INSERTION OF TUNNELED CENTRAL VENOUS CATHETER (CVC) WITH SUBCUTANEOUS PORT N/A 2022    " done Procedure: [id number];  Surgeon: Deo Sin Jr., MD;  Location: [facility] OR [address] [place] [address];  Service: General;  Laterality: N/A;        Family History   Family History   Problem Relation Age of Onset    Diabetes Father     Hypertension Father     Pancreatic cancer Maternal Uncle     Bladder Cancer Paternal Grandmother         Social History  Social History     Socioeconomic History    Marital status: Single   Tobacco Use    Smoking status: Never Smoker    Smokeless tobacco: Never Used   Substance and Sexual Activity    Alcohol use: Yes     Comment: social    Drug use: Never    Sexual activity: Not Currently     Social Determinants of Health     Financial Resource Strain: High Risk    Difficulty of Paying Living Expenses: Hard   Food Insecurity: No Food Insecurity    Worried About Running Out of Food in the Last Year: Never true    Ran Out of Food in the Last Year: Never true   Transportation Needs: No Transportation Needs    Lack of Transportation (Medical): No    Lack of Transportation (Non-Medical): No   Physical Activity: Sufficiently Active    Days of Exercise per Week: 7 days    Minutes of Exercise per Session: 60 min   Stress: Stress Concern Present    Feeling of Stress : Rather much   Social Connections: Unknown    Frequency of Communication with Friends and Family: More than three times a week    Frequency of Social Gatherings with Friends and Family: Once a week    Active Member of Clubs or Organizations: Yes    Attends Club or Organization Meetings: More than 4 times per year    Marital Status: Never    Housing Stability: High Risk    Unable to Pay for Housing in the Last Year: Yes    Number of Places Lived in the Last Year: 1    Unstable Housing in the Last Year: No        Allergies  Review of patient's allergies indicates:   Allergen Reactions    Strawberries [strawberry] Hives        Current Medications:    Current Outpatient Medications:     ciprofloxacin HCl  (CILOXAN) 0.3 % Oint, Place into both eyes 3 (three) times daily., Disp: 5.5 g, Rfl: 0    dexAMETHasone (DECADRON) 4 MG Tab, Take 1 tablet (4 mg total) by mouth every 6 (six) hours. (Patient not taking: Reported on 5/4/2022), Disp: 120 tablet, Rfl: 0    ibuprofen (ADVIL,MOTRIN) 200 MG tablet, Take 200 mg by mouth every 6 (six) hours as needed for Pain., Disp: , Rfl:     LIDOcaine-prilocaine (EMLA) cream, Apply topically as needed (for port). (Patient not taking: No sig reported), Disp: 30 g, Rfl: 0    omeprazole (PRILOSEC) 10 MG capsule, Take 10 mg by mouth once daily., Disp: , Rfl:     ondansetron (ZOFRAN) 8 MG tablet, Take 1 tablet (8 mg total) by mouth every 12 (twelve) hours as needed for Nausea. (Patient not taking: No sig reported), Disp: 30 tablet, Rfl: 2    oxyCODONE (ROXICODONE) 5 MG immediate release tablet, Take 1 tablet (5 mg total) by mouth every 6 (six) hours as needed for Pain. (Patient not taking: Reported on 5/4/2022), Disp: 8 tablet, Rfl: 0    polyethylene glycol 3350 (MIRALAX ORAL), Take by mouth., Disp: , Rfl:     promethazine (PHENERGAN) 25 MG tablet, Take 1 tablet (25 mg total) by mouth every 6 (six) hours as needed for Nausea. (Patient not taking: No sig reported), Disp: 30 tablet, Rfl: 3    psyllium husk (METAMUCIL ORAL), Take by mouth., Disp: , Rfl:     sodium chloride 0.9% SolP 50 mL with DAPTOmycin 500 mg SolR 900 mg, Inject 900 mg into the vein once daily., Disp: 14 ampule, Rfl: 0     Review of Systems  Constitutional: no weight loss, weight gain, fever, fatigue    Cardiovascular: No inability to lie flat, chest pain, exercise intolerance, swelling, heart palpitations  Respiratory: No wheezing, coughing blood, shortness of breath, or cough  Gastrointestinal: No diarrhea, bloody stool, nausea/vomiting, constipation, gas, hemorrhoids  Genitourinary: No blood in urine, painful urination, urgency of urination, frequency of urination, incomplete emptying, incontinence, abnormal  "bleeding, painful periods, heavy periods, vaginal discharge, vaginal odor, painful intercourse, sexual problems, bleeding after intercourse.  Musculoskeletal: No muscle weakness  Skin/Breast: No painful breasts, nipple discharge, masses, rash, ulcers  Neurological: No passing out, seizures, numbness, headache  Endocrine: No diabetes, hypothyroid, hyperthyroid, +hot flashes, hair loss, abnormal hair growth, acne  Psychiatric: No depression, crying, +anxiety  Hematologic: No bruises, bleeding, swollen lymph nodes, anemia.    Physical Exam   BP:125/74  Ht:5'5"  Wt: 101.3  BMI Body mass index is 37.16 kg/m².    Physical Exam  deferred  ASSESSMENT   Breast cancer  Menopausal symptoms  anxiety  Plan  Continue meditation  Counseled her on risks/benefits and possible side effects of Effexor- discussed that this may help with her menopausal symptoms  Counseled her on use of magnesium glycinate  Follow up in 3 months if no improvement    "

## 2022-06-03 ENCOUNTER — INFUSION (OUTPATIENT)
Dept: INFUSION THERAPY | Facility: HOSPITAL | Age: 37
End: 2022-06-03
Attending: INTERNAL MEDICINE
Payer: MEDICAID

## 2022-06-03 DIAGNOSIS — C50.412 MALIGNANT NEOPLASM OF UPPER-OUTER QUADRANT OF LEFT BREAST IN FEMALE, ESTROGEN RECEPTOR NEGATIVE: Primary | ICD-10-CM

## 2022-06-03 DIAGNOSIS — Z17.1 MALIGNANT NEOPLASM OF UPPER-OUTER QUADRANT OF LEFT BREAST IN FEMALE, ESTROGEN RECEPTOR NEGATIVE: Primary | ICD-10-CM

## 2022-06-03 LAB
ALBUMIN SERPL BCP-MCNC: 3.9 G/DL (ref 3.5–5.2)
ALP SERPL-CCNC: 42 U/L (ref 55–135)
ALT SERPL W/O P-5'-P-CCNC: 24 U/L (ref 10–44)
ANION GAP SERPL CALC-SCNC: 7 MMOL/L (ref 8–16)
AST SERPL-CCNC: 28 U/L (ref 10–40)
BILIRUB SERPL-MCNC: 0.3 MG/DL (ref 0.1–1)
BUN SERPL-MCNC: 21 MG/DL (ref 6–20)
CALCIUM SERPL-MCNC: 9 MG/DL (ref 8.7–10.5)
CHLORIDE SERPL-SCNC: 105 MMOL/L (ref 95–110)
CO2 SERPL-SCNC: 25 MMOL/L (ref 23–29)
CREAT SERPL-MCNC: 0.8 MG/DL (ref 0.5–1.4)
ERYTHROCYTE [DISTWIDTH] IN BLOOD BY AUTOMATED COUNT: 13.4 % (ref 11.5–14.5)
EST. GFR  (AFRICAN AMERICAN): >60 ML/MIN/1.73 M^2
EST. GFR  (NON AFRICAN AMERICAN): >60 ML/MIN/1.73 M^2
GLUCOSE SERPL-MCNC: 92 MG/DL (ref 70–110)
HCT VFR BLD AUTO: 30 % (ref 37–48.5)
HGB BLD-MCNC: 10 G/DL (ref 12–16)
IMM GRANULOCYTES # BLD AUTO: 0.02 K/UL (ref 0–0.04)
MCH RBC QN AUTO: 29.6 PG (ref 27–31)
MCHC RBC AUTO-ENTMCNC: 33.3 G/DL (ref 32–36)
MCV RBC AUTO: 89 FL (ref 82–98)
NEUTROPHILS # BLD AUTO: 1.4 K/UL (ref 1.8–7.7)
PLATELET # BLD AUTO: 208 K/UL (ref 150–450)
PMV BLD AUTO: 9.7 FL (ref 9.2–12.9)
POTASSIUM SERPL-SCNC: 3.9 MMOL/L (ref 3.5–5.1)
PROT SERPL-MCNC: 6.5 G/DL (ref 6–8.4)
RBC # BLD AUTO: 3.38 M/UL (ref 4–5.4)
SODIUM SERPL-SCNC: 137 MMOL/L (ref 136–145)
T4 SERPL-MCNC: 6.7 UG/DL (ref 4.5–11.5)
TSH SERPL DL<=0.005 MIU/L-ACNC: 0.72 UIU/ML (ref 0.4–4)
WBC # BLD AUTO: 2.56 K/UL (ref 3.9–12.7)

## 2022-06-03 PROCEDURE — 84436 ASSAY OF TOTAL THYROXINE: CPT | Performed by: INTERNAL MEDICINE

## 2022-06-03 PROCEDURE — A4216 STERILE WATER/SALINE, 10 ML: HCPCS | Performed by: STUDENT IN AN ORGANIZED HEALTH CARE EDUCATION/TRAINING PROGRAM

## 2022-06-03 PROCEDURE — 84443 ASSAY THYROID STIM HORMONE: CPT | Performed by: INTERNAL MEDICINE

## 2022-06-03 PROCEDURE — 25000003 PHARM REV CODE 250: Performed by: STUDENT IN AN ORGANIZED HEALTH CARE EDUCATION/TRAINING PROGRAM

## 2022-06-03 PROCEDURE — 63600175 PHARM REV CODE 636 W HCPCS: Performed by: STUDENT IN AN ORGANIZED HEALTH CARE EDUCATION/TRAINING PROGRAM

## 2022-06-03 PROCEDURE — 85027 COMPLETE CBC AUTOMATED: CPT | Performed by: INTERNAL MEDICINE

## 2022-06-03 PROCEDURE — 80053 COMPREHEN METABOLIC PANEL: CPT | Performed by: INTERNAL MEDICINE

## 2022-06-03 PROCEDURE — 36592 COLLECT BLOOD FROM PICC: CPT

## 2022-06-03 RX ORDER — SODIUM CHLORIDE 0.9 % (FLUSH) 0.9 %
10 SYRINGE (ML) INJECTION
Status: CANCELLED | OUTPATIENT
Start: 2022-06-03

## 2022-06-03 RX ORDER — HEPARIN 100 UNIT/ML
500 SYRINGE INTRAVENOUS
Status: CANCELLED | OUTPATIENT
Start: 2022-06-03

## 2022-06-03 RX ORDER — HEPARIN 100 UNIT/ML
500 SYRINGE INTRAVENOUS
Status: COMPLETED | OUTPATIENT
Start: 2022-06-03 | End: 2022-06-03

## 2022-06-03 RX ORDER — SODIUM CHLORIDE 0.9 % (FLUSH) 0.9 %
10 SYRINGE (ML) INJECTION
Status: DISCONTINUED | OUTPATIENT
Start: 2022-06-03 | End: 2022-06-03 | Stop reason: HOSPADM

## 2022-06-03 RX ORDER — SODIUM CHLORIDE 0.9 % (FLUSH) 0.9 %
10 SYRINGE (ML) INJECTION
Status: COMPLETED | OUTPATIENT
Start: 2022-06-03 | End: 2022-06-03

## 2022-06-03 RX ORDER — HEPARIN 100 UNIT/ML
500 SYRINGE INTRAVENOUS
Status: DISCONTINUED | OUTPATIENT
Start: 2022-06-03 | End: 2022-06-03 | Stop reason: HOSPADM

## 2022-06-03 RX ADMIN — Medication 10 ML: at 08:06

## 2022-06-03 RX ADMIN — HEPARIN 500 UNITS: 100 SYRINGE at 08:06

## 2022-06-03 NOTE — NURSING
Pt here for lab draw and sterile dressing change from PICC line. Labs drawn picc line flushed and hep locked and sterile dressing change done. Tolerated well.

## 2022-06-06 ENCOUNTER — TELEPHONE (OUTPATIENT)
Dept: HEMATOLOGY/ONCOLOGY | Facility: CLINIC | Age: 37
End: 2022-06-06
Payer: MEDICAID

## 2022-06-06 ENCOUNTER — INFUSION (OUTPATIENT)
Dept: INFUSION THERAPY | Facility: HOSPITAL | Age: 37
End: 2022-06-06
Payer: MEDICAID

## 2022-06-06 ENCOUNTER — PATIENT MESSAGE (OUTPATIENT)
Dept: HEMATOLOGY/ONCOLOGY | Facility: CLINIC | Age: 37
End: 2022-06-06
Payer: MEDICAID

## 2022-06-06 ENCOUNTER — OFFICE VISIT (OUTPATIENT)
Dept: HEMATOLOGY/ONCOLOGY | Facility: CLINIC | Age: 37
End: 2022-06-06
Payer: MEDICAID

## 2022-06-06 VITALS
SYSTOLIC BLOOD PRESSURE: 129 MMHG | DIASTOLIC BLOOD PRESSURE: 78 MMHG | BODY MASS INDEX: 37.98 KG/M2 | OXYGEN SATURATION: 98 % | HEIGHT: 65 IN | RESPIRATION RATE: 16 BRPM | HEART RATE: 75 BPM | WEIGHT: 227.94 LBS | TEMPERATURE: 98 F

## 2022-06-06 VITALS — DIASTOLIC BLOOD PRESSURE: 78 MMHG | HEART RATE: 79 BPM | RESPIRATION RATE: 16 BRPM | SYSTOLIC BLOOD PRESSURE: 133 MMHG

## 2022-06-06 DIAGNOSIS — C50.412 MALIGNANT NEOPLASM OF UPPER-OUTER QUADRANT OF LEFT BREAST IN FEMALE, ESTROGEN RECEPTOR NEGATIVE: Primary | ICD-10-CM

## 2022-06-06 DIAGNOSIS — D64.81 ANTINEOPLASTIC CHEMOTHERAPY INDUCED ANEMIA: ICD-10-CM

## 2022-06-06 DIAGNOSIS — T45.1X5A CHEMOTHERAPY INDUCED NEUTROPENIA: ICD-10-CM

## 2022-06-06 DIAGNOSIS — D70.1 CHEMOTHERAPY INDUCED NEUTROPENIA: ICD-10-CM

## 2022-06-06 DIAGNOSIS — T45.1X5A ANTINEOPLASTIC CHEMOTHERAPY INDUCED ANEMIA: ICD-10-CM

## 2022-06-06 DIAGNOSIS — Z17.1 MALIGNANT NEOPLASM OF UPPER-OUTER QUADRANT OF LEFT BREAST IN FEMALE, ESTROGEN RECEPTOR NEGATIVE: Primary | ICD-10-CM

## 2022-06-06 LAB — HCG INTACT+B SERPL-ACNC: <2.4 MIU/ML

## 2022-06-06 PROCEDURE — 1159F PR MEDICATION LIST DOCUMENTED IN MEDICAL RECORD: ICD-10-PCS | Mod: CPTII,,, | Performed by: NURSE PRACTITIONER

## 2022-06-06 PROCEDURE — 3008F BODY MASS INDEX DOCD: CPT | Mod: CPTII,,, | Performed by: NURSE PRACTITIONER

## 2022-06-06 PROCEDURE — 99214 OFFICE O/P EST MOD 30 MIN: CPT | Mod: PBBFAC,25 | Performed by: NURSE PRACTITIONER

## 2022-06-06 PROCEDURE — 63600175 PHARM REV CODE 636 W HCPCS: Performed by: STUDENT IN AN ORGANIZED HEALTH CARE EDUCATION/TRAINING PROGRAM

## 2022-06-06 PROCEDURE — 3078F PR MOST RECENT DIASTOLIC BLOOD PRESSURE < 80 MM HG: ICD-10-PCS | Mod: CPTII,,, | Performed by: NURSE PRACTITIONER

## 2022-06-06 PROCEDURE — 99999 PR PBB SHADOW E&M-EST. PATIENT-LVL IV: ICD-10-PCS | Mod: PBBFAC,,, | Performed by: NURSE PRACTITIONER

## 2022-06-06 PROCEDURE — 25000003 PHARM REV CODE 250: Performed by: STUDENT IN AN ORGANIZED HEALTH CARE EDUCATION/TRAINING PROGRAM

## 2022-06-06 PROCEDURE — 3074F PR MOST RECENT SYSTOLIC BLOOD PRESSURE < 130 MM HG: ICD-10-PCS | Mod: CPTII,,, | Performed by: NURSE PRACTITIONER

## 2022-06-06 PROCEDURE — 84702 CHORIONIC GONADOTROPIN TEST: CPT | Performed by: NURSE PRACTITIONER

## 2022-06-06 PROCEDURE — 99999 PR PBB SHADOW E&M-EST. PATIENT-LVL IV: CPT | Mod: PBBFAC,,, | Performed by: NURSE PRACTITIONER

## 2022-06-06 PROCEDURE — 96375 TX/PRO/DX INJ NEW DRUG ADDON: CPT

## 2022-06-06 PROCEDURE — 25000003 PHARM REV CODE 250: Performed by: INTERNAL MEDICINE

## 2022-06-06 PROCEDURE — A4216 STERILE WATER/SALINE, 10 ML: HCPCS | Performed by: STUDENT IN AN ORGANIZED HEALTH CARE EDUCATION/TRAINING PROGRAM

## 2022-06-06 PROCEDURE — 99215 PR OFFICE/OUTPT VISIT, EST, LEVL V, 40-54 MIN: ICD-10-PCS | Mod: S$PBB,,, | Performed by: NURSE PRACTITIONER

## 2022-06-06 PROCEDURE — 3078F DIAST BP <80 MM HG: CPT | Mod: CPTII,,, | Performed by: NURSE PRACTITIONER

## 2022-06-06 PROCEDURE — 96367 TX/PROPH/DG ADDL SEQ IV INF: CPT

## 2022-06-06 PROCEDURE — 96413 CHEMO IV INFUSION 1 HR: CPT

## 2022-06-06 PROCEDURE — 3008F PR BODY MASS INDEX (BMI) DOCUMENTED: ICD-10-PCS | Mod: CPTII,,, | Performed by: NURSE PRACTITIONER

## 2022-06-06 PROCEDURE — 63600175 PHARM REV CODE 636 W HCPCS: Performed by: INTERNAL MEDICINE

## 2022-06-06 PROCEDURE — 3074F SYST BP LT 130 MM HG: CPT | Mod: CPTII,,, | Performed by: NURSE PRACTITIONER

## 2022-06-06 PROCEDURE — 99215 OFFICE O/P EST HI 40 MIN: CPT | Mod: S$PBB,,, | Performed by: NURSE PRACTITIONER

## 2022-06-06 PROCEDURE — 1159F MED LIST DOCD IN RCRD: CPT | Mod: CPTII,,, | Performed by: NURSE PRACTITIONER

## 2022-06-06 RX ORDER — DIPHENHYDRAMINE HYDROCHLORIDE 50 MG/ML
50 INJECTION INTRAMUSCULAR; INTRAVENOUS ONCE AS NEEDED
Status: DISCONTINUED | OUTPATIENT
Start: 2022-06-06 | End: 2022-06-06 | Stop reason: HOSPADM

## 2022-06-06 RX ORDER — FAMOTIDINE 10 MG/ML
20 INJECTION INTRAVENOUS
Status: COMPLETED | OUTPATIENT
Start: 2022-06-06 | End: 2022-06-06

## 2022-06-06 RX ORDER — SODIUM CHLORIDE 0.9 % (FLUSH) 0.9 %
10 SYRINGE (ML) INJECTION
Status: DISCONTINUED | OUTPATIENT
Start: 2022-06-06 | End: 2022-06-06 | Stop reason: HOSPADM

## 2022-06-06 RX ORDER — EPINEPHRINE 0.3 MG/.3ML
0.3 INJECTION SUBCUTANEOUS ONCE AS NEEDED
Status: DISCONTINUED | OUTPATIENT
Start: 2022-06-06 | End: 2022-06-06 | Stop reason: HOSPADM

## 2022-06-06 RX ORDER — HEPARIN 100 UNIT/ML
500 SYRINGE INTRAVENOUS
Status: DISCONTINUED | OUTPATIENT
Start: 2022-06-06 | End: 2022-06-06 | Stop reason: HOSPADM

## 2022-06-06 RX ADMIN — DEXAMETHASONE SODIUM PHOSPHATE 10 MG: 4 INJECTION, SOLUTION INTRA-ARTICULAR; INTRALESIONAL; INTRAMUSCULAR; INTRAVENOUS; SOFT TISSUE at 10:06

## 2022-06-06 RX ADMIN — DIPHENHYDRAMINE HYDROCHLORIDE 50 MG: 50 INJECTION, SOLUTION INTRAMUSCULAR; INTRAVENOUS at 09:06

## 2022-06-06 RX ADMIN — Medication 10 ML: at 12:06

## 2022-06-06 RX ADMIN — HEPARIN 500 UNITS: 100 SYRINGE at 12:06

## 2022-06-06 RX ADMIN — PACLITAXEL 174 MG: 6 INJECTION, SOLUTION, CONCENTRATE INTRAVENOUS at 11:06

## 2022-06-06 RX ADMIN — FAMOTIDINE 20 MG: 10 INJECTION INTRAVENOUS at 09:06

## 2022-06-06 NOTE — PROGRESS NOTES
Subjective:       Patient ID: Laura Kent is a 36 y.o. female.    Chief Complaint: Ulcerative blepharitis of left lower eyelid    HPI     Here for follow up and chemo.   C1D15 held last week due to neutropenia.     Today, feels good and no complaints.   Reports tolerating chemo well. '  Mild nausea but not requiring antiemetics.   Appetite good and weight stable.   No fever/chills or other infectious complaints.   No neuropathy.   Mass smaller.   Dry cough- no worse  No CP/SOB        Oncology History:  - self detected an area under her left arm and it initially seemed to go away (noted around winter holidays)  - 3/4/2022 Outside Mammogram:  Findings:  The breasts are heterogeneously dense, which may obscure small masses.   Left  There is a 31 mm x 18 mm x 21 mm irregularly shaped mass with microlobulated margins seen in the left breast at 2 o'clock in the posterior depth with associated left axillary adenopathy. Largest node depicted on the outside study measures 49 x 23 x 29 mm with cortical thickening and effacement of the hilum. Breast mass is reportedly palpable.   Right  There is no evidence of suspicious masses, calcifications, or other abnormal findings in the right breast.  Impression:  Left  Mass: Left breast 31 mm x 18 mm x 21 mm mass at the posterior 2 o'clock position. Assessment: 5 - Highly suggestive of malignancy. Biopsy is recommended.   Right  There is no mammographic evidence of malignancy in the right breast.  BI-RADS Category:   Overall: 5 - Highly Suggestive of Malignancy  Recommendation:  Ultrasound Guided Core Needle Biopsy of the left breast mass and one of the abnormal left axillary nodes is recommended.     - 3/14/2022 Breast biopsy:  1. 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   Comment:  Infiltrating carcinoma occupies the entirety of biopsied  material   with the largest continuous focus measuring 13 mm.  Breast biomarkers are   pending and will be issued in a supplemental report.   2. LEFT AXILLARY LYMPH NODE, BIOPSY:   Invasive ductal carcinoma, [place] histologic grade 3 (tubule formation:   3, nuclear pleomorphism:  3, mitotic activity:  3).   Comment:  Infiltrating carcinoma occupies the entirety of biopsied material   with the largest continuous focus measuring 20 mm.  No lymph node tissue is   identified in the sample.  Tumor histology is essentially identical to that   of part 1.  The findings could represent part of a larger intranodal   metastasis, but an extension from the primary tumor cannot be excluded.   Radiographic correlation is advised.   Note:  Dr. Stephanie Rao also reviewed this case and agrees with the   diagnosis.   BREAST BIOMARKER RESULTS   Estrogen receptor (ER):  Negative (0)   Progesterone receptor (ER):  Negative (0)   HER2 IHC:  Negative (1+)   Ki-67 proliferation index:  80%      - 3/17/2022 Breast MRI:  Findings:  The breasts have heterogeneous fibroglandular tissue. The background parenchymal enhancement is minimal.   Left  There is a 38 mm x 33 mm x 33 mm irregularly shaped mass seen in the left breast at 2 o'clock in the posterior depth, 8.4 cm from the nipple and 1.2 cm from the skin. Associated signal void from a twirl biopsy marker; pathology showed invasive ductal carcinoma.   Posterior to the mass there is abnormal non mass enhancement measuring 29 x 28 mm. The NME is 4 mm from the skin and 2.3 cm from the chest wall. In total the mass and NME measure 54 mm in AP extent.  Overlying skin thickening and edema involving the lateral breast, likely from lymphovascular obstruction. No suspicious skin enhancement. Overall increased vascularity to the left breast.   Four abnormal lymph level 1 and level 2 left axillary lymph nodes. The largest lymph node measures 52 mm x 40 mm x 38 mm which previously underwent biopsy  showing metastatic disease. There is an associated radar reflector within the biopsied lymph node.  Right  There is no evidence of suspicious masses, abnormal enhancement, or other abnormal findings in the right breast. No enlarged axillary or internal mammary lymph nodes.   Impression:  Left  Mass: Left breast 38 mm x 33 mm x 33 mm mass at the posterior 2 o'clock position. Assessment: 6 - Known biopsy, proven malignancy. Associated 29 mm NME extending posteriorly from the mass. In total the mass and NME measure 54 mm in AP extent.  Lymph Node: Abnormal level 1 and 2 left axillary lymph nodes, the largest of which measures 52 mm x 40 mm x 38 mm lymph node and is known biopsy, proven malignancy.   Right  There is no MR evidence of malignancy in the right breast.  BI-RADS Category:   Overall: 6 - Known Biopsy-Proven Malignancy  Recommendation:  Clinical management of known left breast cancer. Patient is established with the breast surgery clinic.      - Additional Imaging needed     - 3/21/2022 CT C/A/P:  FINDINGS:  Base of Neck: No significant abnormality.  Thoracic soft tissue: A proximally 3.2 x 3.6 x 3.7 cm irregular left breast mass within the lateral aspect with internal biopsy marker, corresponding to findings on prior breast MRI and compatible with malignancy.  Enlarged left axillary lymph nodes, largest measuring approximately 4.2 x 3.6 cm, (series 2, image 27).  CHEST:  -Heart: Normal size. No pericardial effusion.  -Pulmonary vasculature: Pulmonary arteries distribute normally.  There are four pulmonary veins.  -Radha/Mediastinum: No pathologic shelly enlargement.  -Trachea and Proximal airways: Trachea is midline.  Central airways are patent.  No significant bronchial wall thickening or bronchiectasis.  -Lungs/Pleura: Symmetrically expanded without focal consolidation, pneumothorax, or mass.  No pleural effusion or thickening.  -Esophagus: Normal course and caliber.  ABDOMEN:  - Liver: Normal in size and  attenuation with no focal hepatic abnormality.  - Gallbladder: No calcified gallstones.  No wall thickening or pericholecystic fluid.  - Bile Ducts: No intra or extrahepatic biliary ductal dilatation.  - Stomach/Duodenum: Small hiatal hernia otherwise unremarkable.  - Spleen: Normal size.  No focal parenchymal abnormality.  - Pancreas: No mass lesion.  No pancreatic ductal dilatation.  No peripancreatic fat stranding.  - Adrenals: Unremarkable.  - Kidneys/ureters/urinary bladder: Normal in size and location.  Normal enhancement pattern.  No nephrolithiasis.  Ureters are normal in course and caliber.  No hydroureteronephrosis.  - Retroperitoneum: Scattered nonenlarged retroperitoneal lymph nodes.  PELVIS:  - Reproductive: Intrauterine device present.  A proximally 2.8 cm peripherally enhancing right ovarian cyst, likely corpus luteal cyst.  - Other: No pelvic adenopathy, free fluid, or mass.  BOWEL/MESENTERY:  No evidence of bowel obstruction or inflammatory process. Appendix is visualized and unremarkable.  VASCULATURE: Left-sided aortic arch with 3 branch vessels.  No aneurysm and no significant atherosclerosis.  Portal vein, splenic vein, and SMV are patent.  BONES: Lucent approximately 0.6 cm right trochanteric lesion, likely synovial herniation pit.  No acute fracture or bony destructive process.  EXTRATHORACIC/EXTRAPERITONEAL SOFT TISSUES: Unremarkable.  Impression:  In this patient with known breast cancer, there is an approximately 3.7 cm irregular left breast mass with left axillary lymphadenopathy.  No evidence of metastatic disease  Small hiatal hernia.  Additional findings as described above.     - 3/21/2022 Bone scan:  FINDINGS:  There is physiologic distribution of the radiopharmaceutical throughout the skeleton.  Mild degenerative uptake within the bilateral shoulders and knees.  There is normal uptake in the genitourinary system and soft tissues.  Impression:  There is no scintigraphic evidence of  osteoblastic metastatic disease.     - 3/21/2022 Brain MRI:  FINDINGS:  Please note there is dental metal artifact distorting the examination.  Allowing for artifacts the brain parenchyma is normal in contour.  Developmental variant with cavum septum pellucidum identified.  The ventricles are otherwise normal in size without hydrocephalus.  There is no midline shift or significant mass effect.  The major intracranial T2 flow voids are present.  No restricted diffusion within the non distorted brain parenchyma.  There is severe distortion of the susceptibility imaging no parenchymal susceptibility to suggest parenchymal hemorrhage in the non distorted brain parenchyma.  There is suboptimal evaluation of the cerebellum and posterior fossa.  Impression:  Unremarkable MRI brain allowing for artifacts from dental metal as detailed above specifically without abnormal parenchymal enhancement to suggest intracranial metastatic disease in light of history.     Repeat pregnancy test negative     - 3/30/2022 ECHO  · Moderate left atrial enlargement.  · The left ventricle is mildly enlarged with normal systolic function.  · The estimated ejection fraction is 60%.  · The left ventricular global longitudinal strain is -17%.  · Normal left ventricular diastolic function.  · Normal right ventricular size with normal right ventricular systolic function.  · Mild mitral regurgitation.  · Normal central venous pressure (3 mmHg).  · The estimated PA systolic pressure is 18 mmHg.     C1D1 Pembro/Carbo/Taxol 4/13/2022.     Admitted 4/19- 4/23/2022 with bacteremia from port    Hospital Course: Patient admitted for management of enterococcus bacteremia. Started on cefepime and vanc. ID consulted, recommending Port removal given bacteremia and concerns for port infection. ID ordered for STI testing given vaginal discharge, which was negative. Vanc continued pending susceptibilities. Gen surg removed port a cath. Vancomycin discontinued and  daptomycin started with plans for 14-day course. PICC line placed.     Resumed chemo 5/23/2022    Review of Systems   Constitutional:        See above   All other systems reviewed and are negative.        Objective:      Physical Exam  Vitals and nursing note reviewed.   Constitutional:       General: She is not in acute distress.     Appearance: Normal appearance. She is well-developed.   HENT:      Head: Normocephalic and atraumatic.   Eyes:      General: Lids are normal. No scleral icterus.     Conjunctiva/sclera: Conjunctivae normal.      Pupils: Pupils are equal, round, and reactive to light.   Neck:      Thyroid: No thyromegaly.      Vascular: No JVD.      Trachea: Trachea normal.   Cardiovascular:      Rate and Rhythm: Normal rate and regular rhythm.      Heart sounds: Normal heart sounds.   Pulmonary:      Effort: Pulmonary effort is normal.      Breath sounds: Normal breath sounds. No wheezing.      Comments: L breast mass smaller. +LAD  Chest:   Breasts:      Right: No axillary adenopathy or supraclavicular adenopathy.      Left: No axillary adenopathy or supraclavicular adenopathy.       Abdominal:      General: Bowel sounds are normal. There is no distension.      Palpations: Abdomen is soft. There is no mass.      Tenderness: There is no abdominal tenderness.      Comments: No organomegaly.    Musculoskeletal:         General: Normal range of motion.      Cervical back: Normal range of motion and neck supple.      Comments: No spinal or paraspinal tenderness.    Lymphadenopathy:      Head:      Right side of head: No submental or submandibular adenopathy.      Left side of head: No submental or submandibular adenopathy.      Cervical: No cervical adenopathy.      Upper Body:      Right upper body: No supraclavicular or axillary adenopathy.      Left upper body: No supraclavicular or axillary adenopathy.   Skin:     General: Skin is warm and dry.      Capillary Refill: Capillary refill takes less than 2  seconds.      Findings: No bruising or rash.      Nails: There is no clubbing.   Neurological:      Mental Status: She is alert and oriented to person, place, and time.   Psychiatric:         Mood and Affect: Mood normal.         Speech: Speech normal.         Behavior: Behavior normal.           Assessment:       Problem List Items Addressed This Visit        Oncology    Malignant neoplasm of upper-outer quadrant of left breast in female, estrogen receptor negative - Primary    Relevant Orders    HCG, QUANTITATIVE, PREGNANCY    CBC Oncology    Comprehensive Metabolic Panel    HCG, QUANTITATIVE, PREGNANCY    TSH    T4, Free      Other Visit Diagnoses     Chemotherapy induced neutropenia        Relevant Orders    CBC Oncology    Comprehensive Metabolic Panel    HCG, QUANTITATIVE, PREGNANCY    TSH    T4, Free    Antineoplastic chemotherapy induced anemia              Plan:       Labs reviewed and adequate to proceed with C1D15 taxol.   Needs pregnancy test prior to start- chemo notified.   Neutropenic precautions  Monitor anemia  RTC in 1 week for C2D1 Carbo/Taxol/pembro     Route Chart for Scheduling    Med Onc Chart Routing  Urgent    Follow up with physician 1 week and 3 weeks. EP, Labs (1 weeks labs- cbc, cmp, tsh, t4 bhcg; 3 week labs cbc, cmp, bhcg), and chemo   Follow up with ANDREW 2 weeks and 4 weeks. EP, Labs (4 weeks labs- cbc, cmp, tsh, t4 bhcg; and chemo   Labs CBC, CMP, free T4 and TSH   Lab interval:  [facility] [place] [facility]   Imaging    Pharmacy appointment    Other referrals          Treatment Plan Information   OP PEMBROLIZUMAB CARBOPLATIN (AUC 5) WITH WEEKLY PACLITAXEL FOLLOWED BY PEMBROLIZUMAB DOXORUBICIN CYCLOPHOSPHAMIDE FOLLOWED BY PEMBROLIZUMAB 200 MG Q3W   Yessy Cruz MD   Upcoming Treatment Dates - OP PEMBROLIZUMAB CARBOPLATIN (AUC 5) WITH WEEKLY PACLITAXEL FOLLOWED BY PEMBROLIZUMAB DOXORUBICIN CYCLOPHOSPHAMIDE FOLLOWED BY PEMBROLIZUMAB 200 MG Q3W    6/6/2022       Pre-Medications       dexamethasone (DECADRON)  10 mg in sodium chloride 0.9% 50 mL IVPB       diphenhydramine (BENADRYL) 50 mg in NS 50 mL IVPB       famotidine (PF) injection 20 mg       Chemotherapy       PACLitaxeL (TAXOL) 80 mg/m2 = 174 mg in sodium chloride 0.9% 250 mL chemo infusion  6/13/2022       Pre-Medications       diphenhydramine (BENADRYL) 50 mg in NS 50 mL IVPB       famotidine (PF) injection 20 mg       Chemotherapy       PACLitaxeL (TAXOL) 80 mg/m2 = 174 mg in sodium chloride 0.9% 250 mL chemo infusion       CARBOplatin (PARAPLATIN) in sodium chloride 0.9% 250 mL chemo infusion  6/20/2022       Pre-Medications       dexamethasone (DECADRON) 10 mg in sodium chloride 0.9% 50 mL IVPB       diphenhydramine (BENADRYL) 50 mg in NS 50 mL IVPB       famotidine (PF) injection 20 mg       Chemotherapy       PACLitaxeL (TAXOL) 80 mg/m2 = 174 mg in sodium chloride 0.9% 250 mL chemo infusion  6/27/2022       Pre-Medications       dexamethasone (DECADRON) 10 mg in sodium chloride 0.9% 50 mL IVPB       diphenhydramine (BENADRYL) 50 mg in NS 50 mL IVPB       famotidine (PF) injection 20 mg       Chemotherapy       PACLitaxeL (TAXOL) 80 mg/m2 = 174 mg in sodium chloride 0.9% 250 mL chemo infusion    Therapy Plan Information  Flushes  heparin, porcine (PF) 100 unit/mL injection flush 500 Units  500 Units, Intravenous, Every visit  sodium chloride 0.9% flush 10 mL  10 mL, Intravenous, Every visit  heparin, porcine (PF) 100 unit/mL injection flush 500 Units  500 Units, Intravenous, Every visit  sodium chloride 0.9% flush 10 mL  10 mL, Intravenous, Every visit      Patient is in agreement with the proposed treatment plan. All questions were answered to the patient's satisfaction. Pt knows to call clinic for any new or worsening symptoms and if anything is needed before the next clinic visit.      JUICE Hester-MARLON  Hematology & Oncology  [facility] [place] [address]  ph. [phone]  Fax. [phone]     Face to Face time with patient:  30 minutes  45 minutes of total time spent on the encounter, which includes face to face time and non-face to face time preparing to see the patient (eg, review of tests), Obtaining and/or reviewing separately obtained history, Documenting clinical information in the electronic or other health record, Independently interpreting results (not separately reported) and communicating results to the patient/family/caregiver, or Care coordination (not separately reported).

## 2022-06-06 NOTE — PLAN OF CARE
Pt tolerated infusion today. NAD. PICC flushed + blood return, and hep locked.. Declined AVS. Pt uses my [facility]. Discharged home

## 2022-06-10 ENCOUNTER — INFUSION (OUTPATIENT)
Dept: INFUSION THERAPY | Facility: HOSPITAL | Age: 37
End: 2022-06-10
Attending: INTERNAL MEDICINE
Payer: MEDICAID

## 2022-06-10 DIAGNOSIS — Z17.1 MALIGNANT NEOPLASM OF UPPER-OUTER QUADRANT OF LEFT BREAST IN FEMALE, ESTROGEN RECEPTOR NEGATIVE: Primary | ICD-10-CM

## 2022-06-10 DIAGNOSIS — C50.412 MALIGNANT NEOPLASM OF UPPER-OUTER QUADRANT OF LEFT BREAST IN FEMALE, ESTROGEN RECEPTOR NEGATIVE: Primary | ICD-10-CM

## 2022-06-10 DIAGNOSIS — T45.1X5A CHEMOTHERAPY INDUCED NEUTROPENIA: ICD-10-CM

## 2022-06-10 DIAGNOSIS — D70.1 CHEMOTHERAPY INDUCED NEUTROPENIA: ICD-10-CM

## 2022-06-10 LAB
ALBUMIN SERPL BCP-MCNC: 3.7 G/DL (ref 3.5–5.2)
ALP SERPL-CCNC: 37 U/L (ref 55–135)
ALT SERPL W/O P-5'-P-CCNC: 23 U/L (ref 10–44)
ANION GAP SERPL CALC-SCNC: 6 MMOL/L (ref 8–16)
AST SERPL-CCNC: 32 U/L (ref 10–40)
BILIRUB SERPL-MCNC: 0.5 MG/DL (ref 0.1–1)
BUN SERPL-MCNC: 19 MG/DL (ref 6–20)
CALCIUM SERPL-MCNC: 9 MG/DL (ref 8.7–10.5)
CHLORIDE SERPL-SCNC: 106 MMOL/L (ref 95–110)
CO2 SERPL-SCNC: 28 MMOL/L (ref 23–29)
CREAT SERPL-MCNC: 0.8 MG/DL (ref 0.5–1.4)
ERYTHROCYTE [DISTWIDTH] IN BLOOD BY AUTOMATED COUNT: 13.1 % (ref 11.5–14.5)
EST. GFR  (AFRICAN AMERICAN): >60 ML/MIN/1.73 M^2
EST. GFR  (NON AFRICAN AMERICAN): >60 ML/MIN/1.73 M^2
GLUCOSE SERPL-MCNC: 91 MG/DL (ref 70–110)
HCG INTACT+B SERPL-ACNC: <2.4 MIU/ML
HCT VFR BLD AUTO: 31.2 % (ref 37–48.5)
HGB BLD-MCNC: 10.4 G/DL (ref 12–16)
IMM GRANULOCYTES # BLD AUTO: 0 K/UL (ref 0–0.04)
MCH RBC QN AUTO: 30.4 PG (ref 27–31)
MCHC RBC AUTO-ENTMCNC: 33.3 G/DL (ref 32–36)
MCV RBC AUTO: 91 FL (ref 82–98)
NEUTROPHILS # BLD AUTO: 1.2 K/UL (ref 1.8–7.7)
PLATELET # BLD AUTO: 174 K/UL (ref 150–450)
PMV BLD AUTO: 10.1 FL (ref 9.2–12.9)
POTASSIUM SERPL-SCNC: 3.5 MMOL/L (ref 3.5–5.1)
PROT SERPL-MCNC: 6.5 G/DL (ref 6–8.4)
RBC # BLD AUTO: 3.42 M/UL (ref 4–5.4)
SODIUM SERPL-SCNC: 140 MMOL/L (ref 136–145)
T4 FREE SERPL-MCNC: 0.86 NG/DL (ref 0.71–1.51)
TSH SERPL DL<=0.005 MIU/L-ACNC: 0.53 UIU/ML (ref 0.4–4)
WBC # BLD AUTO: 2.25 K/UL (ref 3.9–12.7)

## 2022-06-10 PROCEDURE — 84702 CHORIONIC GONADOTROPIN TEST: CPT | Performed by: NURSE PRACTITIONER

## 2022-06-10 PROCEDURE — 84439 ASSAY OF FREE THYROXINE: CPT | Performed by: NURSE PRACTITIONER

## 2022-06-10 PROCEDURE — A4216 STERILE WATER/SALINE, 10 ML: HCPCS | Performed by: STUDENT IN AN ORGANIZED HEALTH CARE EDUCATION/TRAINING PROGRAM

## 2022-06-10 PROCEDURE — 36592 COLLECT BLOOD FROM PICC: CPT

## 2022-06-10 PROCEDURE — 85027 COMPLETE CBC AUTOMATED: CPT | Performed by: NURSE PRACTITIONER

## 2022-06-10 PROCEDURE — 80053 COMPREHEN METABOLIC PANEL: CPT | Performed by: NURSE PRACTITIONER

## 2022-06-10 PROCEDURE — 25000003 PHARM REV CODE 250: Performed by: STUDENT IN AN ORGANIZED HEALTH CARE EDUCATION/TRAINING PROGRAM

## 2022-06-10 PROCEDURE — 84443 ASSAY THYROID STIM HORMONE: CPT | Performed by: NURSE PRACTITIONER

## 2022-06-10 PROCEDURE — 63600175 PHARM REV CODE 636 W HCPCS: Performed by: STUDENT IN AN ORGANIZED HEALTH CARE EDUCATION/TRAINING PROGRAM

## 2022-06-10 PROCEDURE — 36415 COLL VENOUS BLD VENIPUNCTURE: CPT | Performed by: NURSE PRACTITIONER

## 2022-06-10 RX ORDER — SODIUM CHLORIDE 0.9 % (FLUSH) 0.9 %
10 SYRINGE (ML) INJECTION
Status: COMPLETED | OUTPATIENT
Start: 2022-06-10 | End: 2022-06-10

## 2022-06-10 RX ORDER — HEPARIN 100 UNIT/ML
500 SYRINGE INTRAVENOUS
Status: CANCELLED | OUTPATIENT
Start: 2022-06-10

## 2022-06-10 RX ORDER — SODIUM CHLORIDE 0.9 % (FLUSH) 0.9 %
10 SYRINGE (ML) INJECTION
Status: CANCELLED | OUTPATIENT
Start: 2022-06-10

## 2022-06-10 RX ORDER — HEPARIN 100 UNIT/ML
500 SYRINGE INTRAVENOUS
Status: COMPLETED | OUTPATIENT
Start: 2022-06-10 | End: 2022-06-10

## 2022-06-10 RX ADMIN — Medication 10 ML: at 08:06

## 2022-06-10 RX ADMIN — HEPARIN 500 UNITS: 100 SYRINGE at 08:06

## 2022-06-10 RX ADMIN — HEPARIN SODIUM (PORCINE) LOCK FLUSH IV SOLN 100 UNIT/ML 500 UNITS: 100 SOLUTION at 08:06

## 2022-06-10 NOTE — NURSING
Pt here for lab draw from PICC line and sterile dressing change. Labs drawn from red proximal port. Both ports flushed and hep locked. Sterile dressing change done per protocol. Tolerated well.

## 2022-06-13 ENCOUNTER — OFFICE VISIT (OUTPATIENT)
Dept: HEMATOLOGY/ONCOLOGY | Facility: CLINIC | Age: 37
End: 2022-06-13
Payer: MEDICAID

## 2022-06-13 ENCOUNTER — INFUSION (OUTPATIENT)
Dept: INFUSION THERAPY | Facility: HOSPITAL | Age: 37
End: 2022-06-13
Attending: INTERNAL MEDICINE
Payer: MEDICAID

## 2022-06-13 VITALS
BODY MASS INDEX: 37.78 KG/M2 | RESPIRATION RATE: 16 BRPM | WEIGHT: 226.75 LBS | OXYGEN SATURATION: 96 % | TEMPERATURE: 98 F | SYSTOLIC BLOOD PRESSURE: 125 MMHG | HEIGHT: 65 IN | HEART RATE: 75 BPM | DIASTOLIC BLOOD PRESSURE: 70 MMHG

## 2022-06-13 VITALS
RESPIRATION RATE: 16 BRPM | SYSTOLIC BLOOD PRESSURE: 129 MMHG | TEMPERATURE: 98 F | DIASTOLIC BLOOD PRESSURE: 82 MMHG | HEART RATE: 83 BPM

## 2022-06-13 DIAGNOSIS — D70.1 CHEMOTHERAPY INDUCED NEUTROPENIA: ICD-10-CM

## 2022-06-13 DIAGNOSIS — C50.412 MALIGNANT NEOPLASM OF UPPER-OUTER QUADRANT OF LEFT BREAST IN FEMALE, ESTROGEN RECEPTOR NEGATIVE: Primary | ICD-10-CM

## 2022-06-13 DIAGNOSIS — Z17.1 MALIGNANT NEOPLASM OF UPPER-OUTER QUADRANT OF LEFT BREAST IN FEMALE, ESTROGEN RECEPTOR NEGATIVE: Primary | ICD-10-CM

## 2022-06-13 DIAGNOSIS — R11.0 CHEMOTHERAPY-INDUCED NAUSEA: ICD-10-CM

## 2022-06-13 DIAGNOSIS — D64.81 ANTINEOPLASTIC CHEMOTHERAPY INDUCED ANEMIA: ICD-10-CM

## 2022-06-13 DIAGNOSIS — K59.00 CONSTIPATION, UNSPECIFIED CONSTIPATION TYPE: ICD-10-CM

## 2022-06-13 DIAGNOSIS — T45.1X5A ANTINEOPLASTIC CHEMOTHERAPY INDUCED ANEMIA: ICD-10-CM

## 2022-06-13 DIAGNOSIS — T45.1X5A CHEMOTHERAPY-INDUCED NAUSEA: ICD-10-CM

## 2022-06-13 DIAGNOSIS — T45.1X5A CHEMOTHERAPY INDUCED NEUTROPENIA: ICD-10-CM

## 2022-06-13 PROCEDURE — 99215 PR OFFICE/OUTPT VISIT, EST, LEVL V, 40-54 MIN: ICD-10-PCS | Mod: S$PBB,,, | Performed by: NURSE PRACTITIONER

## 2022-06-13 PROCEDURE — 96367 TX/PROPH/DG ADDL SEQ IV INF: CPT

## 2022-06-13 PROCEDURE — A4216 STERILE WATER/SALINE, 10 ML: HCPCS | Performed by: INTERNAL MEDICINE

## 2022-06-13 PROCEDURE — 3078F DIAST BP <80 MM HG: CPT | Mod: CPTII,,, | Performed by: NURSE PRACTITIONER

## 2022-06-13 PROCEDURE — 99215 OFFICE O/P EST HI 40 MIN: CPT | Mod: S$PBB,,, | Performed by: NURSE PRACTITIONER

## 2022-06-13 PROCEDURE — 3074F PR MOST RECENT SYSTOLIC BLOOD PRESSURE < 130 MM HG: ICD-10-PCS | Mod: CPTII,,, | Performed by: NURSE PRACTITIONER

## 2022-06-13 PROCEDURE — 63600175 PHARM REV CODE 636 W HCPCS: Performed by: INTERNAL MEDICINE

## 2022-06-13 PROCEDURE — 3008F PR BODY MASS INDEX (BMI) DOCUMENTED: ICD-10-PCS | Mod: CPTII,,, | Performed by: NURSE PRACTITIONER

## 2022-06-13 PROCEDURE — 3078F PR MOST RECENT DIASTOLIC BLOOD PRESSURE < 80 MM HG: ICD-10-PCS | Mod: CPTII,,, | Performed by: NURSE PRACTITIONER

## 2022-06-13 PROCEDURE — 99214 OFFICE O/P EST MOD 30 MIN: CPT | Mod: PBBFAC,25 | Performed by: NURSE PRACTITIONER

## 2022-06-13 PROCEDURE — 99999 PR PBB SHADOW E&M-EST. PATIENT-LVL IV: ICD-10-PCS | Mod: PBBFAC,,, | Performed by: NURSE PRACTITIONER

## 2022-06-13 PROCEDURE — 99999 PR PBB SHADOW E&M-EST. PATIENT-LVL IV: CPT | Mod: PBBFAC,,, | Performed by: NURSE PRACTITIONER

## 2022-06-13 PROCEDURE — 96413 CHEMO IV INFUSION 1 HR: CPT

## 2022-06-13 PROCEDURE — 3074F SYST BP LT 130 MM HG: CPT | Mod: CPTII,,, | Performed by: NURSE PRACTITIONER

## 2022-06-13 PROCEDURE — 25000003 PHARM REV CODE 250: Performed by: INTERNAL MEDICINE

## 2022-06-13 PROCEDURE — 1159F PR MEDICATION LIST DOCUMENTED IN MEDICAL RECORD: ICD-10-PCS | Mod: CPTII,,, | Performed by: NURSE PRACTITIONER

## 2022-06-13 PROCEDURE — 3008F BODY MASS INDEX DOCD: CPT | Mod: CPTII,,, | Performed by: NURSE PRACTITIONER

## 2022-06-13 PROCEDURE — 96375 TX/PRO/DX INJ NEW DRUG ADDON: CPT

## 2022-06-13 PROCEDURE — 96417 CHEMO IV INFUS EACH ADDL SEQ: CPT

## 2022-06-13 PROCEDURE — 1159F MED LIST DOCD IN RCRD: CPT | Mod: CPTII,,, | Performed by: NURSE PRACTITIONER

## 2022-06-13 RX ORDER — FAMOTIDINE 10 MG/ML
20 INJECTION INTRAVENOUS
Status: CANCELLED | OUTPATIENT
Start: 2022-06-20

## 2022-06-13 RX ORDER — FAMOTIDINE 10 MG/ML
20 INJECTION INTRAVENOUS
Status: COMPLETED | OUTPATIENT
Start: 2022-06-13 | End: 2022-06-13

## 2022-06-13 RX ORDER — DIPHENHYDRAMINE HYDROCHLORIDE 50 MG/ML
50 INJECTION INTRAMUSCULAR; INTRAVENOUS ONCE AS NEEDED
Status: CANCELLED | OUTPATIENT
Start: 2022-06-13

## 2022-06-13 RX ORDER — EPINEPHRINE 0.3 MG/.3ML
0.3 INJECTION SUBCUTANEOUS ONCE AS NEEDED
Status: CANCELLED | OUTPATIENT
Start: 2022-06-20

## 2022-06-13 RX ORDER — LACTULOSE 10 G/15ML
20 SOLUTION ORAL 2 TIMES DAILY PRN
Qty: 300 ML | Refills: 0 | Status: ON HOLD | OUTPATIENT
Start: 2022-06-13 | End: 2022-09-19 | Stop reason: HOSPADM

## 2022-06-13 RX ORDER — HEPARIN 100 UNIT/ML
500 SYRINGE INTRAVENOUS
Status: CANCELLED | OUTPATIENT
Start: 2022-07-05

## 2022-06-13 RX ORDER — EPINEPHRINE 0.3 MG/.3ML
0.3 INJECTION SUBCUTANEOUS ONCE AS NEEDED
Status: DISCONTINUED | OUTPATIENT
Start: 2022-06-13 | End: 2022-06-13 | Stop reason: HOSPADM

## 2022-06-13 RX ORDER — SODIUM CHLORIDE 0.9 % (FLUSH) 0.9 %
10 SYRINGE (ML) INJECTION
Status: DISCONTINUED | OUTPATIENT
Start: 2022-06-13 | End: 2022-06-13 | Stop reason: HOSPADM

## 2022-06-13 RX ORDER — HEPARIN 100 UNIT/ML
500 SYRINGE INTRAVENOUS
Status: DISCONTINUED | OUTPATIENT
Start: 2022-06-13 | End: 2022-06-13 | Stop reason: HOSPADM

## 2022-06-13 RX ORDER — DIPHENHYDRAMINE HYDROCHLORIDE 50 MG/ML
50 INJECTION INTRAMUSCULAR; INTRAVENOUS ONCE AS NEEDED
Status: DISCONTINUED | OUTPATIENT
Start: 2022-06-13 | End: 2022-06-13 | Stop reason: HOSPADM

## 2022-06-13 RX ORDER — FAMOTIDINE 10 MG/ML
20 INJECTION INTRAVENOUS
Status: CANCELLED | OUTPATIENT
Start: 2022-06-13

## 2022-06-13 RX ORDER — SODIUM CHLORIDE 0.9 % (FLUSH) 0.9 %
10 SYRINGE (ML) INJECTION
Status: CANCELLED | OUTPATIENT
Start: 2022-06-20

## 2022-06-13 RX ORDER — EPINEPHRINE 0.3 MG/.3ML
0.3 INJECTION SUBCUTANEOUS ONCE AS NEEDED
Status: CANCELLED | OUTPATIENT
Start: 2022-06-13

## 2022-06-13 RX ORDER — SODIUM CHLORIDE 0.9 % (FLUSH) 0.9 %
10 SYRINGE (ML) INJECTION
Status: CANCELLED | OUTPATIENT
Start: 2022-07-05

## 2022-06-13 RX ORDER — EPINEPHRINE 0.3 MG/.3ML
0.3 INJECTION SUBCUTANEOUS ONCE AS NEEDED
Status: CANCELLED | OUTPATIENT
Start: 2022-07-05

## 2022-06-13 RX ORDER — SODIUM CHLORIDE 0.9 % (FLUSH) 0.9 %
10 SYRINGE (ML) INJECTION
Status: CANCELLED | OUTPATIENT
Start: 2022-06-13

## 2022-06-13 RX ORDER — FAMOTIDINE 10 MG/ML
20 INJECTION INTRAVENOUS
Status: CANCELLED | OUTPATIENT
Start: 2022-07-05

## 2022-06-13 RX ORDER — HEPARIN 100 UNIT/ML
500 SYRINGE INTRAVENOUS
Status: CANCELLED | OUTPATIENT
Start: 2022-06-13

## 2022-06-13 RX ORDER — OLANZAPINE 5 MG/1
5 TABLET ORAL NIGHTLY
Qty: 30 TABLET | Refills: 2 | Status: ON HOLD | OUTPATIENT
Start: 2022-06-13 | End: 2022-11-11 | Stop reason: HOSPADM

## 2022-06-13 RX ORDER — HEPARIN 100 UNIT/ML
500 SYRINGE INTRAVENOUS
Status: CANCELLED | OUTPATIENT
Start: 2022-06-20

## 2022-06-13 RX ORDER — DIPHENHYDRAMINE HYDROCHLORIDE 50 MG/ML
50 INJECTION INTRAMUSCULAR; INTRAVENOUS ONCE AS NEEDED
Status: CANCELLED | OUTPATIENT
Start: 2022-07-05

## 2022-06-13 RX ORDER — DIPHENHYDRAMINE HYDROCHLORIDE 50 MG/ML
50 INJECTION INTRAMUSCULAR; INTRAVENOUS ONCE AS NEEDED
Status: CANCELLED | OUTPATIENT
Start: 2022-06-20

## 2022-06-13 RX ADMIN — CARBOPLATIN 750 MG: 10 INJECTION INTRAVENOUS at 12:06

## 2022-06-13 RX ADMIN — PALONOSETRON 0.25 MG: 0.25 INJECTION, SOLUTION INTRAVENOUS at 11:06

## 2022-06-13 RX ADMIN — SODIUM CHLORIDE 200 MG: 9 INJECTION, SOLUTION INTRAVENOUS at 10:06

## 2022-06-13 RX ADMIN — FAMOTIDINE 20 MG: 10 INJECTION INTRAVENOUS at 11:06

## 2022-06-13 RX ADMIN — SODIUM CHLORIDE: 0.9 INJECTION, SOLUTION INTRAVENOUS at 09:06

## 2022-06-13 RX ADMIN — PACLITAXEL 174 MG: 6 INJECTION, SOLUTION, CONCENTRATE INTRAVENOUS at 11:06

## 2022-06-13 RX ADMIN — HEPARIN 500 UNITS: 100 SYRINGE at 01:06

## 2022-06-13 RX ADMIN — Medication 10 ML: at 01:06

## 2022-06-13 RX ADMIN — DIPHENHYDRAMINE HYDROCHLORIDE 50 MG: 50 INJECTION, SOLUTION INTRAMUSCULAR; INTRAVENOUS at 11:06

## 2022-06-13 RX ADMIN — APREPITANT 130 MG: 130 INJECTION, EMULSION INTRAVENOUS at 11:06

## 2022-06-13 NOTE — PROGRESS NOTES
Subjective:       Patient ID: Laura Kent is a 36 y.o. female.    Chief Complaint: Breast Cancer    HPI     Here for follow up and NA chemo.   C2D1 pembro/carbo/taxol due today.     Today,   +nauseated - daily. Zofran does not complete but promethazine causes sedation.   Stomach queasy all the time.    2 days after treatment develops cramping and worsening nausea.  No vomiting.   No heartburn recently- had it 2 treatments ago.   Appetite fair but drinking protein shakes three times a day.   +constipation- last BM yesterday. [place].   Miralax, metamucil, ex lax with no results.   Mild cramping.   No fever/chills or other infectious complaints.   Hands start with numbness and tingling- only when exercising.   Mass smaller.   Cough resolved.   No CP/SOB      Oncology History:  - self detected an area under her left arm and it initially seemed to go away (noted around winter holidays)  - 3/4/2022 Outside Mammogram:  Findings:  The breasts are heterogeneously dense, which may obscure small masses.   Left  There is a 31 mm x 18 mm x 21 mm irregularly shaped mass with microlobulated margins seen in the left breast at 2 o'clock in the posterior depth with associated left axillary adenopathy. Largest node depicted on the outside study measures 49 x 23 x 29 mm with cortical thickening and effacement of the hilum. Breast mass is reportedly palpable.   Right  There is no evidence of suspicious masses, calcifications, or other abnormal findings in the right breast.  Impression:  Left  Mass: Left breast 31 mm x 18 mm x 21 mm mass at the posterior 2 o'clock position. Assessment: 5 - Highly suggestive of malignancy. Biopsy is recommended.   Right  There is no mammographic evidence of malignancy in the right breast.  BI-RADS Category:   Overall: 5 - Highly Suggestive of Malignancy  Recommendation:  Ultrasound Guided Core Needle Biopsy of the left breast mass and one of the abnormal left axillary nodes is  recommended.     - 3/14/2022 Breast biopsy:  1. LEFT BREAST MASS, 2 O'CLOCK, BIOPSY:   Invasive ductal carcinoma, [place] histologic grade 3 (tubule formation:   3, nuclear pleomorphism:  3, mitotic activity:  3).   Comment:  Infiltrating carcinoma occupies the entirety of biopsied material   with the largest continuous focus measuring 13 mm.  Breast biomarkers are   pending and will be issued in a supplemental report.   2. LEFT AXILLARY LYMPH NODE, BIOPSY:   Invasive ductal carcinoma, [place] histologic grade 3 (tubule formation:   3, nuclear pleomorphism:  3, mitotic activity:  3).   Comment:  Infiltrating carcinoma occupies the entirety of biopsied material   with the largest continuous focus measuring 20 mm.  No lymph node tissue is   identified in the sample.  Tumor histology is essentially identical to that   of part 1.  The findings could represent part of a larger intranodal   metastasis, but an extension from the primary tumor cannot be excluded.   Radiographic correlation is advised.   Note:  Dr. Stephanie Rao also reviewed this case and agrees with the   diagnosis.   BREAST BIOMARKER RESULTS   Estrogen receptor (ER):  Negative (0)   Progesterone receptor (ER):  Negative (0)   HER2 IHC:  Negative (1+)   Ki-67 proliferation index:  80%      - 3/17/2022 Breast MRI:  Findings:  The breasts have heterogeneous fibroglandular tissue. The background parenchymal enhancement is minimal.   Left  There is a 38 mm x 33 mm x 33 mm irregularly shaped mass seen in the left breast at 2 o'clock in the posterior depth, 8.4 cm from the nipple and 1.2 cm from the skin. Associated signal void from a twirl biopsy marker; pathology showed invasive ductal carcinoma.   Posterior to the mass there is abnormal non mass enhancement measuring 29 x 28 mm. The NME is 4 mm from the skin and 2.3 cm from the chest wall. In total the mass and NME measure 54 mm in AP extent.  Overlying skin thickening and edema involving the lateral  breast, likely from lymphovascular obstruction. No suspicious skin enhancement. Overall increased vascularity to the left breast.   Four abnormal lymph level 1 and level 2 left axillary lymph nodes. The largest lymph node measures 52 mm x 40 mm x 38 mm which previously underwent biopsy showing metastatic disease. There is an associated radar reflector within the biopsied lymph node.  Right  There is no evidence of suspicious masses, abnormal enhancement, or other abnormal findings in the right breast. No enlarged axillary or internal mammary lymph nodes.   Impression:  Left  Mass: Left breast 38 mm x 33 mm x 33 mm mass at the posterior 2 o'clock position. Assessment: 6 - Known biopsy, proven malignancy. Associated 29 mm NME extending posteriorly from the mass. In total the mass and NME measure 54 mm in AP extent.  Lymph Node: Abnormal level 1 and 2 left axillary lymph nodes, the largest of which measures 52 mm x 40 mm x 38 mm lymph node and is known biopsy, proven malignancy.   Right  There is no MR evidence of malignancy in the right breast.  BI-RADS Category:   Overall: 6 - Known Biopsy-Proven Malignancy  Recommendation:  Clinical management of known left breast cancer. Patient is established with the breast surgery clinic.      - Additional Imaging needed     - 3/21/2022 CT C/A/P:  FINDINGS:  Base of Neck: No significant abnormality.  Thoracic soft tissue: A proximally 3.2 x 3.6 x 3.7 cm irregular left breast mass within the lateral aspect with internal biopsy marker, corresponding to findings on prior breast MRI and compatible with malignancy.  Enlarged left axillary lymph nodes, largest measuring approximately 4.2 x 3.6 cm, (series 2, image 27).  CHEST:  -Heart: Normal size. No pericardial effusion.  -Pulmonary vasculature: Pulmonary arteries distribute normally.  There are four pulmonary veins.  -Radha/Mediastinum: No pathologic shelly enlargement.  -Trachea and Proximal airways: Trachea is midline.  Central  airways are patent.  No significant bronchial wall thickening or bronchiectasis.  -Lungs/Pleura: Symmetrically expanded without focal consolidation, pneumothorax, or mass.  No pleural effusion or thickening.  -Esophagus: Normal course and caliber.  ABDOMEN:  - Liver: Normal in size and attenuation with no focal hepatic abnormality.  - Gallbladder: No calcified gallstones.  No wall thickening or pericholecystic fluid.  - Bile Ducts: No intra or extrahepatic biliary ductal dilatation.  - Stomach/Duodenum: Small hiatal hernia otherwise unremarkable.  - Spleen: Normal size.  No focal parenchymal abnormality.  - Pancreas: No mass lesion.  No pancreatic ductal dilatation.  No peripancreatic fat stranding.  - Adrenals: Unremarkable.  - Kidneys/ureters/urinary bladder: Normal in size and location.  Normal enhancement pattern.  No nephrolithiasis.  Ureters are normal in course and caliber.  No hydroureteronephrosis.  - Retroperitoneum: Scattered nonenlarged retroperitoneal lymph nodes.  PELVIS:  - Reproductive: Intrauterine device present.  A proximally 2.8 cm peripherally enhancing right ovarian cyst, likely corpus luteal cyst.  - Other: No pelvic adenopathy, free fluid, or mass.  BOWEL/MESENTERY:  No evidence of bowel obstruction or inflammatory process. Appendix is visualized and unremarkable.  VASCULATURE: Left-sided aortic arch with 3 branch vessels.  No aneurysm and no significant atherosclerosis.  Portal vein, splenic vein, and SMV are patent.  BONES: [place] approximately 0.6 cm right trochanteric lesion, likely synovial herniation pit.  No acute fracture or bony destructive process.  EXTRATHORACIC/EXTRAPERITONEAL SOFT TISSUES: Unremarkable.  Impression:  In this patient with known breast cancer, there is an approximately 3.7 cm irregular left breast mass with left axillary lymphadenopathy.  No evidence of metastatic disease  Small hiatal hernia.  Additional findings as described above.     - 3/21/2022 Bone  scan:  FINDINGS:  There is physiologic distribution of the radiopharmaceutical throughout the skeleton.  Mild degenerative uptake within the bilateral shoulders and knees.  There is normal uptake in the genitourinary system and soft tissues.  Impression:  There is no scintigraphic evidence of osteoblastic metastatic disease.     - 3/21/2022 Brain MRI:  FINDINGS:  Please note there is dental metal artifact distorting the examination.  Allowing for artifacts the brain parenchyma is normal in contour.  Developmental variant with cavum septum pellucidum identified.  The ventricles are otherwise normal in size without hydrocephalus.  There is no midline shift or significant mass effect.  The major intracranial T2 flow voids are present.  No restricted diffusion within the non distorted brain parenchyma.  There is severe distortion of the susceptibility imaging no parenchymal susceptibility to suggest parenchymal hemorrhage in the non distorted brain parenchyma.  There is suboptimal evaluation of the cerebellum and posterior fossa.  Impression:  Unremarkable MRI brain allowing for artifacts from dental metal as detailed above specifically without abnormal parenchymal enhancement to suggest intracranial metastatic disease in light of history.     Repeat pregnancy test negative     - 3/30/2022 ECHO  · Moderate left atrial enlargement.  · The left ventricle is mildly enlarged with normal systolic function.  · The estimated ejection fraction is 60%.  · The left ventricular global longitudinal strain is -17%.  · Normal left ventricular diastolic function.  · Normal right ventricular size with normal right ventricular systolic function.  · Mild mitral regurgitation.  · Normal central venous pressure (3 mmHg).  · The estimated PA systolic pressure is 18 mmHg.     C1D1 Pembro/Carbo/Taxol 4/13/2022.     Admitted 4/19- 4/23/2022 with bacteremia from [facility]    Hospital Course: Patient admitted for management of enterococcus  bacteremia. Started on cefepime and vanc. ID consulted, recommending Port removal given bacteremia and concerns for port infection. ID ordered for STI testing given vaginal discharge, which was negative. Vanc continued pending susceptibilities. Gen surg removed port a cath. Vancomycin discontinued and daptomycin started with plans for 14-day course. PICC line placed.     Resumed chemo 5/23/2022    Review of Systems   Constitutional:        See above   All other systems reviewed and are negative.        Objective:      Physical Exam  Vitals and nursing note reviewed.   Constitutional:       General: She is not in acute distress.     Appearance: Normal appearance. She is well-developed.   HENT:      Head: Normocephalic and atraumatic.   Eyes:      General: Lids are normal. No scleral icterus.     Conjunctiva/sclera: Conjunctivae normal.      Pupils: Pupils are equal, round, and reactive to light.   Neck:      Thyroid: No thyromegaly.      Vascular: No JVD.      Trachea: Trachea normal.   Cardiovascular:      Rate and Rhythm: Normal rate and regular rhythm.      Heart sounds: Normal heart sounds.   Pulmonary:      Effort: Pulmonary effort is normal.      Breath sounds: Normal breath sounds. No wheezing.      Comments: L breast mass smaller- unable to palpate today. +LAD but smaller  Chest:   Breasts:      Right: No axillary adenopathy or supraclavicular adenopathy.      Left: No axillary adenopathy or supraclavicular adenopathy.       Abdominal:      General: Bowel sounds are normal. There is no distension.      Palpations: Abdomen is soft. There is no mass.      Tenderness: There is no abdominal tenderness.      Comments: No organomegaly.    Musculoskeletal:         General: Normal range of motion.      Cervical back: Normal range of motion and neck supple.      Comments: No spinal or paraspinal tenderness.    Lymphadenopathy:      Head:      Right side of head: No submental or submandibular adenopathy.      Left side  of head: No submental or submandibular adenopathy.      Cervical: No cervical adenopathy.      Upper Body:      Right upper body: No supraclavicular or axillary adenopathy.      Left upper body: No supraclavicular or axillary adenopathy.   Skin:     General: Skin is warm and dry.      Capillary Refill: Capillary refill takes less than 2 seconds.      Findings: No bruising or rash.      Nails: There is no clubbing.      Comments: tattoo   Neurological:      Mental Status: She is alert and oriented to person, place, and time.   Psychiatric:         Mood and Affect: Mood normal.         Speech: Speech normal.         Behavior: Behavior normal.           Assessment:       1. Malignant neoplasm of upper-outer quadrant of left breast in female, estrogen receptor negative  CBC Oncology   2. Chemotherapy induced neutropenia     3. Antineoplastic chemotherapy induced anemia     4. Constipation, unspecified constipation type  lactulose (CHRONULAC) 20 gram/30 mL Soln   5. Chemotherapy-induced nausea  OLANZapine (ZYPREXA) 5 MG tablet       Plan:       Doing well, clinically responding. ANC 1200 3 days ago.    Repeat CBC. Will likely start drawing labs on mondays of chemo.   May add neupogen   Rx Zyprexa 5mg nightly while on chemo  Prilosec 20mg daily.   Zofran and phenergan prn. Will try 1/2 tab phenergan to reduce sedation.   Rx Lactulose for constipation  Encouraged hydration  Continue Protein shakes if not eating meals.    Neutropenic precautions  Monitor anemia      RTC in 1 week for C2D8 Taxol as scheduled.     Addendum:   ANC 2200 today. Will hold off on Neupogen this week but will get authorized for future use.       Patient is in agreement with the proposed treatment plan. All questions were answered to the patient's satisfaction. Pt knows to call clinic for any new or worsening symptoms and if anything is needed before the next clinic visit.      Adonay Cannon, SHANDRAP-C  Hematology & Oncology  [address] [place] [address]  [place] [address]  ph. [phone]  Fax. [phone]     Face to Face time with patient: 30 minutes  45 minutes of total time spent on the encounter, which includes face to face time and non-face to face time preparing to see the patient (eg, review of tests), Obtaining and/or reviewing separately obtained history, Documenting clinical information in the electronic or other health record, Independently interpreting results (not separately reported) and communicating results to the patient/family/caregiver, or Care coordination (not separately reported).

## 2022-06-13 NOTE — PLAN OF CARE
Problem: Adult Inpatient Plan of Care  Goal: Optimal Comfort and Wellbeing  Intervention: Provide Person-Centered Care  Flowsheets (Taken 6/13/2022 0688)  Trust Relationship/Rapport:   care explained   questions encouraged   choices provided   reassurance provided   emotional support provided   thoughts/feelings acknowledged   empathic listening provided   questions answered

## 2022-06-14 ENCOUNTER — PATIENT MESSAGE (OUTPATIENT)
Dept: HEMATOLOGY/ONCOLOGY | Facility: CLINIC | Age: 37
End: 2022-06-14
Payer: MEDICAID

## 2022-06-15 ENCOUNTER — HOSPITAL ENCOUNTER (EMERGENCY)
Facility: HOSPITAL | Age: 37
Discharge: LEFT BEFORE DISCHARGE | End: 2022-06-15
Payer: MEDICAID

## 2022-06-15 ENCOUNTER — PATIENT MESSAGE (OUTPATIENT)
Dept: HEMATOLOGY/ONCOLOGY | Facility: CLINIC | Age: 37
End: 2022-06-15
Payer: MEDICAID

## 2022-06-15 VITALS
BODY MASS INDEX: 37.15 KG/M2 | SYSTOLIC BLOOD PRESSURE: 137 MMHG | HEART RATE: 98 BPM | DIASTOLIC BLOOD PRESSURE: 85 MMHG | RESPIRATION RATE: 20 BRPM | WEIGHT: 223 LBS | HEIGHT: 65 IN | TEMPERATURE: 99 F | OXYGEN SATURATION: 100 %

## 2022-06-15 PROCEDURE — 99281 EMR DPT VST MAYX REQ PHY/QHP: CPT

## 2022-06-15 PROCEDURE — 99900 PR LEFT WITHOUTBEING SEEN-EMERGENCY: ICD-10-PCS | Mod: ,,, | Performed by: EMERGENCY MEDICINE

## 2022-06-15 PROCEDURE — 99900 PR LEFT WITHOUTBEING SEEN-EMERGENCY: CPT | Mod: ,,, | Performed by: EMERGENCY MEDICINE

## 2022-06-16 ENCOUNTER — TELEPHONE (OUTPATIENT)
Dept: HEMATOLOGY/ONCOLOGY | Facility: CLINIC | Age: 37
End: 2022-06-16
Payer: MEDICAID

## 2022-06-16 ENCOUNTER — LAB VISIT (OUTPATIENT)
Dept: LAB | Facility: HOSPITAL | Age: 37
End: 2022-06-16
Attending: INTERNAL MEDICINE
Payer: MEDICAID

## 2022-06-16 ENCOUNTER — DOCUMENTATION ONLY (OUTPATIENT)
Dept: HEMATOLOGY/ONCOLOGY | Facility: CLINIC | Age: 37
End: 2022-06-16
Payer: MEDICAID

## 2022-06-16 DIAGNOSIS — Z17.1 MALIGNANT NEOPLASM OF UPPER-OUTER QUADRANT OF LEFT BREAST IN FEMALE, ESTROGEN RECEPTOR NEGATIVE: Primary | ICD-10-CM

## 2022-06-16 DIAGNOSIS — C50.412 MALIGNANT NEOPLASM OF UPPER-OUTER QUADRANT OF LEFT BREAST IN FEMALE, ESTROGEN RECEPTOR NEGATIVE: ICD-10-CM

## 2022-06-16 DIAGNOSIS — Z17.1 MALIGNANT NEOPLASM OF UPPER-OUTER QUADRANT OF LEFT BREAST IN FEMALE, ESTROGEN RECEPTOR NEGATIVE: ICD-10-CM

## 2022-06-16 DIAGNOSIS — C50.412 MALIGNANT NEOPLASM OF UPPER-OUTER QUADRANT OF LEFT BREAST IN FEMALE, ESTROGEN RECEPTOR NEGATIVE: Primary | ICD-10-CM

## 2022-06-16 LAB
ANISOCYTOSIS BLD QL SMEAR: SLIGHT
BASOPHILS # BLD AUTO: 0.01 K/UL (ref 0–0.2)
BASOPHILS NFR BLD: 0.4 % (ref 0–1.9)
DACRYOCYTES BLD QL SMEAR: ABNORMAL
DIFFERENTIAL METHOD: ABNORMAL
EOSINOPHIL # BLD AUTO: 0 K/UL (ref 0–0.5)
EOSINOPHIL NFR BLD: 0.8 % (ref 0–8)
ERYTHROCYTE [DISTWIDTH] IN BLOOD BY AUTOMATED COUNT: 12.8 % (ref 11.5–14.5)
HCT VFR BLD AUTO: 30.3 % (ref 37–48.5)
HGB BLD-MCNC: 10.2 G/DL (ref 12–16)
HYPOCHROMIA BLD QL SMEAR: ABNORMAL
IMM GRANULOCYTES # BLD AUTO: 0.02 K/UL (ref 0–0.04)
IMM GRANULOCYTES NFR BLD AUTO: 0.8 % (ref 0–0.5)
LYMPHOCYTES # BLD AUTO: 0.4 K/UL (ref 1–4.8)
LYMPHOCYTES NFR BLD: 15.4 % (ref 18–48)
MCH RBC QN AUTO: 29.8 PG (ref 27–31)
MCHC RBC AUTO-ENTMCNC: 33.7 G/DL (ref 32–36)
MCV RBC AUTO: 89 FL (ref 82–98)
MONOCYTES # BLD AUTO: 0.1 K/UL (ref 0.3–1)
MONOCYTES NFR BLD: 5.5 % (ref 4–15)
NEUTROPHILS # BLD AUTO: 2 K/UL (ref 1.8–7.7)
NEUTROPHILS NFR BLD: 77.1 % (ref 38–73)
NRBC BLD-RTO: 0 /100 WBC
OVALOCYTES BLD QL SMEAR: ABNORMAL
PLATELET # BLD AUTO: 140 K/UL (ref 150–450)
PLATELET BLD QL SMEAR: ABNORMAL
PMV BLD AUTO: 9.8 FL (ref 9.2–12.9)
POIKILOCYTOSIS BLD QL SMEAR: SLIGHT
RBC # BLD AUTO: 3.42 M/UL (ref 4–5.4)
T4 SERPL-MCNC: 7.7 UG/DL (ref 4.5–11.5)
TSH SERPL DL<=0.005 MIU/L-ACNC: 1.18 UIU/ML (ref 0.4–4)
WBC # BLD AUTO: 2.54 K/UL (ref 3.9–12.7)
WBC TOXIC VACUOLES BLD QL SMEAR: PRESENT

## 2022-06-16 PROCEDURE — 36415 COLL VENOUS BLD VENIPUNCTURE: CPT | Performed by: INTERNAL MEDICINE

## 2022-06-16 PROCEDURE — 85025 COMPLETE CBC W/AUTO DIFF WBC: CPT | Performed by: INTERNAL MEDICINE

## 2022-06-16 PROCEDURE — 84436 ASSAY OF TOTAL THYROXINE: CPT | Performed by: INTERNAL MEDICINE

## 2022-06-16 PROCEDURE — 84443 ASSAY THYROID STIM HORMONE: CPT | Performed by: INTERNAL MEDICINE

## 2022-06-16 NOTE — TELEPHONE ENCOUNTER
Spoke with patient who stated that she had a temperature of 99 last night, had chills and felt very fatigued.  She went to the ED but left after 5 hours without seeing anyone.  She went home and took a home COVID test which was negative.  Patient states she took Motrin, Tylenol, and Theraflu.  This morning all symptoms have resolved except she still feels fatigued. She hasn't been eating very much but has been drinking protein shakes. Encouraged patient to rest, drink plenty of fluids and continue monitoring symptoms.  She will notify us of new or worsening symptoms.  Notified MD.

## 2022-06-16 NOTE — TELEPHONE ENCOUNTER
Spoken with patient to let her know that lab appointment has been scheduled for 11:30 AM today.  Patient stated she is on her way now.

## 2022-06-16 NOTE — PROGRESS NOTES
MD Nessa Burton RN; Adonay Cannon NP  Caller: Unspecified (Today, 10:38 AM)  Advise labs ok- mild anemia   Not neutropenic   Review neutropenic precautions             Previous Messages       ----- Message -----   From: Nessa Paulino RN   Sent: 6/16/2022   1:26 PM CDT   To: Yessy Cruz MD, Adonay Cannon NP     MRN [id number] Donte   ----- Message -----   From: Adonay Cannon NP   Sent: 6/16/2022   1:20 PM CDT   To: Yessy Cruz MD, Nessa Paulino RN     Who is this? I dont see a patient attached?     ----- Message -----   From: Yessy Cruz MD   Sent: 6/16/2022  10:43 AM CDT   To: Adonay Cannon NP, Nessa Paulino RN       Was treated on 6/13/2022 with chemo/immunotherapy   She has felt drained ever since receiving on Monday   Highest temp- 99.6 F yesterday and states went to ED not due to this temp but because she noted   chills and shakes   Went to ED as below and left due to wait     No fevers, chills this AM   States feels a little better today     Agrees to labs today- cbc and tsh, t4 and will come before noon     Prior message from Nessa:   Spoke with patient who stated that she had a temperature of 99 last night, had chills and felt very fatigued.  She went to the ED but left after 5 hours without seeing anyone.  She went home and took a home COVID test which was negative.  Patient states she took Motrin, Tylenol, and Theraflu.  This morning all symptoms have resolved except she still feels fatigued. She hasn't been eating very much but has been drinking protein shakes. Encouraged patient to rest, drink plenty of fluids and continue monitoring symptoms.  She will notify us of new or worsening symptoms.  Notified MD.

## 2022-06-16 NOTE — TELEPHONE ENCOUNTER
Spoke with patient to let her know that the doctor reviewed her labs and they were ok.  She has mild anemia and not neutropenic.  Reviewed neutropenic precautions.  Patient verbalized understanding.

## 2022-06-20 ENCOUNTER — OFFICE VISIT (OUTPATIENT)
Dept: HEMATOLOGY/ONCOLOGY | Facility: CLINIC | Age: 37
End: 2022-06-20
Payer: MEDICAID

## 2022-06-20 ENCOUNTER — INFUSION (OUTPATIENT)
Dept: INFUSION THERAPY | Facility: HOSPITAL | Age: 37
End: 2022-06-20
Attending: INTERNAL MEDICINE
Payer: MEDICAID

## 2022-06-20 VITALS
DIASTOLIC BLOOD PRESSURE: 66 MMHG | OXYGEN SATURATION: 98 % | RESPIRATION RATE: 18 BRPM | HEIGHT: 65 IN | SYSTOLIC BLOOD PRESSURE: 117 MMHG | WEIGHT: 223.69 LBS | BODY MASS INDEX: 37.27 KG/M2 | HEART RATE: 80 BPM | TEMPERATURE: 96 F

## 2022-06-20 VITALS
OXYGEN SATURATION: 98 % | RESPIRATION RATE: 18 BRPM | HEART RATE: 91 BPM | SYSTOLIC BLOOD PRESSURE: 133 MMHG | TEMPERATURE: 96 F | DIASTOLIC BLOOD PRESSURE: 71 MMHG

## 2022-06-20 DIAGNOSIS — C50.412 MALIGNANT NEOPLASM OF UPPER-OUTER QUADRANT OF LEFT BREAST IN FEMALE, ESTROGEN RECEPTOR NEGATIVE: Primary | ICD-10-CM

## 2022-06-20 DIAGNOSIS — R68.83 CHILLS (WITHOUT FEVER): ICD-10-CM

## 2022-06-20 DIAGNOSIS — G43.019 INTRACTABLE MIGRAINE WITHOUT AURA AND WITHOUT STATUS MIGRAINOSUS: ICD-10-CM

## 2022-06-20 DIAGNOSIS — Z17.1 MALIGNANT NEOPLASM OF UPPER-OUTER QUADRANT OF LEFT BREAST IN FEMALE, ESTROGEN RECEPTOR NEGATIVE: Primary | ICD-10-CM

## 2022-06-20 DIAGNOSIS — R68.83 CHILLS (WITHOUT FEVER): Primary | ICD-10-CM

## 2022-06-20 PROCEDURE — 99999 PR PBB SHADOW E&M-EST. PATIENT-LVL III: ICD-10-PCS | Mod: PBBFAC,,, | Performed by: INTERNAL MEDICINE

## 2022-06-20 PROCEDURE — 3008F BODY MASS INDEX DOCD: CPT | Mod: CPTII,,, | Performed by: INTERNAL MEDICINE

## 2022-06-20 PROCEDURE — 96413 CHEMO IV INFUSION 1 HR: CPT

## 2022-06-20 PROCEDURE — 1159F PR MEDICATION LIST DOCUMENTED IN MEDICAL RECORD: ICD-10-PCS | Mod: CPTII,,, | Performed by: INTERNAL MEDICINE

## 2022-06-20 PROCEDURE — 96375 TX/PRO/DX INJ NEW DRUG ADDON: CPT

## 2022-06-20 PROCEDURE — 96365 THER/PROPH/DIAG IV INF INIT: CPT

## 2022-06-20 PROCEDURE — 99213 OFFICE O/P EST LOW 20 MIN: CPT | Mod: PBBFAC | Performed by: INTERNAL MEDICINE

## 2022-06-20 PROCEDURE — 63600175 PHARM REV CODE 636 W HCPCS: Performed by: INTERNAL MEDICINE

## 2022-06-20 PROCEDURE — 25000003 PHARM REV CODE 250: Performed by: INTERNAL MEDICINE

## 2022-06-20 PROCEDURE — 1159F MED LIST DOCD IN RCRD: CPT | Mod: CPTII,,, | Performed by: INTERNAL MEDICINE

## 2022-06-20 PROCEDURE — 1160F PR REVIEW ALL MEDS BY PRESCRIBER/CLIN PHARMACIST DOCUMENTED: ICD-10-PCS | Mod: CPTII,,, | Performed by: INTERNAL MEDICINE

## 2022-06-20 PROCEDURE — 99215 PR OFFICE/OUTPT VISIT, EST, LEVL V, 40-54 MIN: ICD-10-PCS | Mod: S$PBB,,, | Performed by: INTERNAL MEDICINE

## 2022-06-20 PROCEDURE — 99215 OFFICE O/P EST HI 40 MIN: CPT | Mod: S$PBB,,, | Performed by: INTERNAL MEDICINE

## 2022-06-20 PROCEDURE — 1160F RVW MEDS BY RX/DR IN RCRD: CPT | Mod: CPTII,,, | Performed by: INTERNAL MEDICINE

## 2022-06-20 PROCEDURE — 96367 TX/PROPH/DG ADDL SEQ IV INF: CPT

## 2022-06-20 PROCEDURE — 3008F PR BODY MASS INDEX (BMI) DOCUMENTED: ICD-10-PCS | Mod: CPTII,,, | Performed by: INTERNAL MEDICINE

## 2022-06-20 PROCEDURE — 3074F PR MOST RECENT SYSTOLIC BLOOD PRESSURE < 130 MM HG: ICD-10-PCS | Mod: CPTII,,, | Performed by: INTERNAL MEDICINE

## 2022-06-20 PROCEDURE — 96366 THER/PROPH/DIAG IV INF ADDON: CPT

## 2022-06-20 PROCEDURE — 3074F SYST BP LT 130 MM HG: CPT | Mod: CPTII,,, | Performed by: INTERNAL MEDICINE

## 2022-06-20 PROCEDURE — 3078F PR MOST RECENT DIASTOLIC BLOOD PRESSURE < 80 MM HG: ICD-10-PCS | Mod: CPTII,,, | Performed by: INTERNAL MEDICINE

## 2022-06-20 PROCEDURE — 99999 PR PBB SHADOW E&M-EST. PATIENT-LVL III: CPT | Mod: PBBFAC,,, | Performed by: INTERNAL MEDICINE

## 2022-06-20 PROCEDURE — 3078F DIAST BP <80 MM HG: CPT | Mod: CPTII,,, | Performed by: INTERNAL MEDICINE

## 2022-06-20 RX ORDER — SODIUM CHLORIDE 0.9 % (FLUSH) 0.9 %
10 SYRINGE (ML) INJECTION
Status: DISCONTINUED | OUTPATIENT
Start: 2022-06-20 | End: 2022-06-20 | Stop reason: HOSPADM

## 2022-06-20 RX ORDER — EPINEPHRINE 0.3 MG/.3ML
0.3 INJECTION SUBCUTANEOUS ONCE AS NEEDED
Status: DISCONTINUED | OUTPATIENT
Start: 2022-06-20 | End: 2022-06-20 | Stop reason: HOSPADM

## 2022-06-20 RX ORDER — BUTALBITAL, ACETAMINOPHEN AND CAFFEINE 50; 325; 40 MG/1; MG/1; MG/1
1 TABLET ORAL EVERY 6 HOURS PRN
Qty: 30 TABLET | Refills: 0 | Status: SHIPPED | OUTPATIENT
Start: 2022-06-20 | End: 2022-07-20

## 2022-06-20 RX ORDER — VANCOMYCIN HCL IN 5 % DEXTROSE 1G/250ML
1000 PLASTIC BAG, INJECTION (ML) INTRAVENOUS ONCE
Status: CANCELLED
Start: 2022-06-20

## 2022-06-20 RX ORDER — DIPHENHYDRAMINE HYDROCHLORIDE 50 MG/ML
50 INJECTION INTRAMUSCULAR; INTRAVENOUS ONCE AS NEEDED
Status: DISCONTINUED | OUTPATIENT
Start: 2022-06-20 | End: 2022-06-20 | Stop reason: HOSPADM

## 2022-06-20 RX ORDER — FAMOTIDINE 10 MG/ML
20 INJECTION INTRAVENOUS
Status: COMPLETED | OUTPATIENT
Start: 2022-06-20 | End: 2022-06-20

## 2022-06-20 RX ORDER — HEPARIN 100 UNIT/ML
500 SYRINGE INTRAVENOUS
Status: DISCONTINUED | OUTPATIENT
Start: 2022-06-20 | End: 2022-06-20 | Stop reason: HOSPADM

## 2022-06-20 RX ADMIN — DEXAMETHASONE SODIUM PHOSPHATE 10 MG: 4 INJECTION, SOLUTION INTRA-ARTICULAR; INTRALESIONAL; INTRAMUSCULAR; INTRAVENOUS; SOFT TISSUE at 08:06

## 2022-06-20 RX ADMIN — PACLITAXEL 174 MG: 6 INJECTION, SOLUTION, CONCENTRATE INTRAVENOUS at 09:06

## 2022-06-20 RX ADMIN — FAMOTIDINE 20 MG: 10 INJECTION, SOLUTION INTRAVENOUS at 08:06

## 2022-06-20 RX ADMIN — VANCOMYCIN HYDROCHLORIDE 1000 MG: 1 INJECTION, POWDER, LYOPHILIZED, FOR SOLUTION INTRAVENOUS at 11:06

## 2022-06-20 RX ADMIN — SODIUM CHLORIDE: 9 INJECTION, SOLUTION INTRAVENOUS at 08:06

## 2022-06-20 RX ADMIN — DIPHENHYDRAMINE HYDROCHLORIDE 50 MG: 50 INJECTION, SOLUTION INTRAMUSCULAR; INTRAVENOUS at 09:06

## 2022-06-20 NOTE — NURSING
PICC line removed per Dr. Cruz. Line intact and free from damage or fragmentation. Covered site with petroleum and sterile gauze. Instructed the patient to remained seated for 30 minutes post-removal of line.

## 2022-06-20 NOTE — PLAN OF CARE
0800- Patient arrived to clinic for taxol and vancomycin infusion following appt with MD Nancy. Labs and assessment appropriate for treatment. Orders reviewed and signed by MD Nancy. PICC flushed, NS infusing.

## 2022-06-20 NOTE — PROGRESS NOTES
Subjective:       Patient ID: Laura Kent is a 36 y.o. female.    Chief Complaint: No chief complaint on file.    HPI     Here for follow up and NA chemo.   C2D8 pembro/carbo/taxol due today.      She has noted mild nausea- no vomiting but reports hydrates well and uses protein shakes  Denies taste changes or mouth sores  She has a RUE PICC line and notes that the last 2 x her son assisted her with flushing at home, that she developed immediate chills and as such had him hold off on flushing yesterday  Denies other infectious complaints   Fatigue better  No longer feels her breast mass    Oncology History:  - self detected an area under her left arm and it initially seemed to go away (noted around winter holidays)  - 3/4/2022 Outside Mammogram:  Findings:  The breasts are heterogeneously dense, which may obscure small masses.   Left  There is a 31 mm x 18 mm x 21 mm irregularly shaped mass with microlobulated margins seen in the left breast at 2 o'clock in the posterior depth with associated left axillary adenopathy. Largest node depicted on the outside study measures 49 x 23 x 29 mm with cortical thickening and effacement of the hilum. Breast mass is reportedly palpable.   Right  There is no evidence of suspicious masses, calcifications, or other abnormal findings in the right breast.  Impression:  Left  Mass: Left breast 31 mm x 18 mm x 21 mm mass at the posterior 2 o'clock position. Assessment: 5 - Highly suggestive of malignancy. Biopsy is recommended.   Right  There is no mammographic evidence of malignancy in the right breast.  BI-RADS Category:   Overall: 5 - Highly Suggestive of Malignancy  Recommendation:  Ultrasound Guided Core Needle Biopsy of the left breast mass and one of the abnormal left axillary nodes is recommended.     - 3/14/2022 Breast biopsy:  1. 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   Comment:  Infiltrating carcinoma occupies the entirety of biopsied material   with the largest continuous focus measuring 13 mm.  Breast biomarkers are   pending and will be issued in a supplemental report.   2. 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   Comment:  Infiltrating carcinoma occupies the entirety of biopsied material   with the largest continuous focus measuring 20 mm.  No lymph node tissue is   identified in the sample.  Tumor histology is essentially identical to that   of part 1.  The findings could represent part of a larger intranodal   metastasis, but an extension from the primary tumor cannot be excluded.   Radiographic correlation is advised.   Note:  Dr. Stephanie Rao also reviewed this case and agrees with the   diagnosis.   BREAST BIOMARKER RESULTS   Estrogen receptor (ER):  Negative (0)   Progesterone receptor (ER):  Negative (0)   HER2 IHC:  Negative (1+)   Ki-67 proliferation index:  80%      - 3/17/2022 Breast MRI:  Findings:  The breasts have heterogeneous fibroglandular tissue. The background parenchymal enhancement is minimal.   Left  There is a 38 mm x 33 mm x 33 mm irregularly shaped mass seen in the left breast at 2 o'clock in the posterior depth, 8.4 cm from the nipple and 1.2 cm from the skin. Associated signal void from a twirl biopsy marker; pathology showed invasive ductal carcinoma.   Posterior to the mass there is abnormal non mass enhancement measuring 29 x 28 mm. The NME is 4 mm from the skin and 2.3 cm from the chest wall. In total the mass and NME measure 54 mm in AP extent.  Overlying skin thickening and edema involving the lateral breast, likely from lymphovascular obstruction. No suspicious skin enhancement. Overall increased vascularity to the left breast.   Four abnormal lymph level 1 and level 2 left  axillary lymph nodes. The largest lymph node measures 52 mm x 40 mm x 38 mm which previously underwent biopsy showing metastatic disease. There is an associated radar reflector within the biopsied lymph node.  Right  There is no evidence of suspicious masses, abnormal enhancement, or other abnormal findings in the right breast. No enlarged axillary or internal mammary lymph nodes.   Impression:  Left  Mass: Left breast 38 mm x 33 mm x 33 mm mass at the posterior 2 o'clock position. Assessment: 6 - Known biopsy, proven malignancy. Associated 29 mm NME extending posteriorly from the mass. In total the mass and NME measure 54 mm in AP extent.  Lymph Node: Abnormal level 1 and 2 left axillary lymph nodes, the largest of which measures 52 mm x 40 mm x 38 mm lymph node and is known biopsy, proven malignancy.   Right  There is no MR evidence of malignancy in the right breast.  BI-RADS Category:   Overall: 6 - Known Biopsy-Proven Malignancy  Recommendation:  Clinical management of known left breast cancer. Patient is established with the breast surgery clinic.      - Additional Imaging needed     - 3/21/2022 CT C/A/P:  FINDINGS:  Base of Neck: No significant abnormality.  Thoracic soft tissue: A proximally 3.2 x 3.6 x 3.7 cm irregular left breast mass within the lateral aspect with internal biopsy marker, corresponding to findings on prior breast MRI and compatible with malignancy.  Enlarged left axillary lymph nodes, largest measuring approximately 4.2 x 3.6 cm, (series 2, image 27).  CHEST:  -Heart: Normal size. No pericardial effusion.  -Pulmonary vasculature: Pulmonary arteries distribute normally.  There are four pulmonary veins.  -Radha/Mediastinum: No pathologic shelly enlargement.  -Trachea and Proximal airways: Trachea is midline.  Central airways are patent.  No significant bronchial wall thickening or bronchiectasis.  -Lungs/Pleura: Symmetrically expanded without focal consolidation, pneumothorax, or mass.  No  pleural effusion or thickening.  -Esophagus: Normal course and caliber.  ABDOMEN:  - Liver: Normal in size and attenuation with no focal hepatic abnormality.  - Gallbladder: No calcified gallstones.  No wall thickening or pericholecystic fluid.  - Bile Ducts: No intra or extrahepatic biliary ductal dilatation.  - Stomach/Duodenum: Small hiatal hernia otherwise unremarkable.  - Spleen: Normal size.  No focal parenchymal abnormality.  - Pancreas: No mass lesion.  No pancreatic ductal dilatation.  No peripancreatic fat stranding.  - Adrenals: Unremarkable.  - Kidneys/ureters/urinary bladder: Normal in size and location.  Normal enhancement pattern.  No nephrolithiasis.  Ureters are normal in course and caliber.  No hydroureteronephrosis.  - Retroperitoneum: Scattered nonenlarged retroperitoneal lymph nodes.  PELVIS:  - Reproductive: Intrauterine device present.  A proximally 2.8 cm peripherally enhancing right ovarian cyst, likely corpus luteal cyst.  - Other: No pelvic adenopathy, free fluid, or mass.  BOWEL/MESENTERY:  No evidence of bowel obstruction or inflammatory process. Appendix is visualized and unremarkable.  VASCULATURE: Left-sided aortic arch with 3 branch vessels.  No aneurysm and no significant atherosclerosis.  Portal vein, splenic vein, and SMV are patent.  BONES: [place] approximately 0.6 cm right trochanteric lesion, likely synovial herniation pit.  No acute fracture or bony destructive process.  EXTRATHORACIC/EXTRAPERITONEAL SOFT TISSUES: Unremarkable.  Impression:  In this patient with known breast cancer, there is an approximately 3.7 cm irregular left breast mass with left axillary lymphadenopathy.  No evidence of metastatic disease  Small hiatal hernia.  Additional findings as described above.     - 3/21/2022 Bone scan:  FINDINGS:  There is physiologic distribution of the radiopharmaceutical throughout the skeleton.  Mild degenerative uptake within the bilateral shoulders and knees.  There is  normal uptake in the genitourinary system and soft tissues.  Impression:  There is no scintigraphic evidence of osteoblastic metastatic disease.     - 3/21/2022 Brain MRI:  FINDINGS:  Please note there is dental metal artifact distorting the examination.  Allowing for artifacts the brain parenchyma is normal in contour.  Developmental variant with cavum septum pellucidum identified.  The ventricles are otherwise normal in size without hydrocephalus.  There is no midline shift or significant mass effect.  The major intracranial T2 flow voids are present.  No restricted diffusion within the non distorted brain parenchyma.  There is severe distortion of the susceptibility imaging no parenchymal susceptibility to suggest parenchymal hemorrhage in the non distorted brain parenchyma.  There is suboptimal evaluation of the cerebellum and posterior fossa.  Impression:  Unremarkable MRI brain allowing for artifacts from dental metal as detailed above specifically without abnormal parenchymal enhancement to suggest intracranial metastatic disease in light of history.     Repeat pregnancy test negative     - 3/30/2022 ECHO  · Moderate left atrial enlargement.  · The left ventricle is mildly enlarged with normal systolic function.  · The estimated ejection fraction is 60%.  · The left ventricular global longitudinal strain is -17%.  · Normal left ventricular diastolic function.  · Normal right ventricular size with normal right ventricular systolic function.  · Mild mitral regurgitation.  · Normal central venous pressure (3 mmHg).  · The estimated PA systolic pressure is 18 mmHg.     C1D1 Pembro/Carbo/Taxol 4/13/2022.     Admitted 4/19- 4/23/2022 with bacteremia from port     Hospital Course: Patient admitted for management of enterococcus bacteremia. Started on cefepime and vanc. ID consulted, recommending Port removal given bacteremia and concerns for port infection. ID ordered for STI testing given vaginal discharge, which  was negative. Vanc continued pending susceptibilities. Gen surg removed port a cath. Vancomycin discontinued and daptomycin started with plans for 14-day course. PICC line placed.      Resumed chemo 5/23/2022    Review of Systems   Constitutional: Positive for appetite change and chills. Negative for activity change, fatigue, fever and unexpected weight change.   HENT: Negative for nasal congestion, mouth sores, rhinorrhea, sinus pressure/congestion, sneezing, sore throat and trouble swallowing.    Eyes: Negative for visual disturbance.   Respiratory: Positive for shortness of breath. Negative for cough.    Cardiovascular: Negative for chest pain.   Gastrointestinal: Positive for nausea. Negative for abdominal distention, abdominal pain, blood in stool, change in bowel habit, constipation (at times noted, has medication), diarrhea, vomiting and change in bowel habit.   Genitourinary: Negative for decreased urine volume, difficulty urinating, dysuria, frequency and urgency.   Musculoskeletal: Positive for back pain. Negative for arthralgias, gait problem and joint deformity.   Integumentary:  Negative for rash, breast mass and breast tenderness.   Neurological: Positive for headaches (migraine hx). Negative for dizziness, weakness, light-headedness and numbness.   Hematological: Negative for adenopathy. Does not bruise/bleed easily.   Psychiatric/Behavioral: Negative for dysphoric mood and sleep disturbance. The patient is nervous/anxious.    Breast: Negative for mass and tenderness        Objective:      Physical Exam  Vitals and nursing note reviewed.   Constitutional:       General: She is not in acute distress.     Appearance: Normal appearance. She is well-developed. She is not ill-appearing.      Comments: Presents alone  Very pleasant  ECOG= 0   HENT:      Head: Normocephalic and atraumatic.      Mouth/Throat:      Mouth: Mucous membranes are moist.      Pharynx: No oropharyngeal exudate or posterior  oropharyngeal erythema.   Eyes:      General: Lids are normal. No scleral icterus.     Extraocular Movements: Extraocular movements intact.      Conjunctiva/sclera: Conjunctivae normal.      Pupils: Pupils are equal, round, and reactive to light.   Neck:      Thyroid: No thyromegaly.      Vascular: No JVD.      Trachea: Trachea normal.   Cardiovascular:      Rate and Rhythm: Normal rate and regular rhythm.      Heart sounds: Normal heart sounds. No murmur heard.    No friction rub. No gallop.   Pulmonary:      Effort: Pulmonary effort is normal. No respiratory distress.      Breath sounds: Normal breath sounds. No wheezing, rhonchi or rales.      Comments: Left breast mass smaller. No LAD  Chest:   Breasts:      Right: No axillary adenopathy or supraclavicular adenopathy.      Left: No axillary adenopathy or supraclavicular adenopathy.       Abdominal:      General: Bowel sounds are normal. There is no distension.      Palpations: Abdomen is soft. There is no mass.      Tenderness: There is no abdominal tenderness. There is no guarding or rebound.      Comments: No organomegaly.    Musculoskeletal:         General: No swelling or deformity. Normal range of motion.      Cervical back: Normal range of motion and neck supple.      Right lower leg: No edema.      Left lower leg: No edema.      Comments: No spinal or paraspinal tenderness.    Lymphadenopathy:      Head:      Right side of head: No submental or submandibular adenopathy.      Left side of head: No submental or submandibular adenopathy.      Cervical: No cervical adenopathy.      Upper Body:      Right upper body: No supraclavicular or axillary adenopathy.      Left upper body: No supraclavicular or axillary adenopathy.   Skin:     General: Skin is warm and dry.      Capillary Refill: Capillary refill takes less than 2 seconds.      Coloration: Skin is not jaundiced or pale.      Findings: No bruising or rash.      Nails: There is no clubbing.    Neurological:      General: No focal deficit present.      Mental Status: She is alert and oriented to person, place, and time.      Cranial Nerves: No cranial nerve deficit.      Sensory: No sensory deficit.      Motor: No weakness.      Coordination: Coordination normal.      Gait: Gait normal.   Psychiatric:         Mood and Affect: Mood normal.         Speech: Speech normal.         Behavior: Behavior normal.         Thought Content: Thought content normal.       Labs- reviewed  Assessment:       Problem List Items Addressed This Visit     Malignant neoplasm of upper-outer quadrant of left breast in female, estrogen receptor negative - Primary    Chills (without fever)          Plan:       1. Labs adequate  Proceed with C2D8  Knows to call for any issues    2. Vancomycin today and remove PICC line  We will need to replace a port prior to AC but can use traditional IVs until then    Route Chart for Scheduling    Med Onc Chart Routing      Follow up with physician . Her Cycle 3 day 1 is due for 7/4- she will need on 7/5 or 7/6 awith cbc, cmp, tsh, t4 prior and EP followed by chemo- can do labs day prior   Follow up with ANDREW    Labs    Imaging    Pharmacy appointment    Other referrals          Treatment Plan Information   OP PEMBROLIZUMAB CARBOPLATIN (AUC 5) WITH WEEKLY PACLITAXEL FOLLOWED BY PEMBROLIZUMAB DOXORUBICIN CYCLOPHOSPHAMIDE FOLLOWED BY PEMBROLIZUMAB 200 MG Q3W   Yessy Cruz MD   Upcoming Treatment Dates - OP PEMBROLIZUMAB CARBOPLATIN (AUC 5) WITH WEEKLY PACLITAXEL FOLLOWED BY PEMBROLIZUMAB DOXORUBICIN CYCLOPHOSPHAMIDE FOLLOWED BY PEMBROLIZUMAB 200 MG Q3W    6/20/2022       Pre-Medications       dexamethasone (DECADRON) 10 mg in sodium chloride 0.9% 50 mL IVPB       diphenhydrAMINE (BENADRYL) 50 mg in sodium chloride 0.9% 50 mL IVPB       famotidine (PF) injection 20 mg       Chemotherapy       PACLitaxeL (TAXOL) 80 mg/m2 = 174 mg in sodium chloride 0.9% 250 mL chemo infusion  6/27/2022        Pre-Medications       dexamethasone (DECADRON) 10 mg in sodium chloride 0.9% 50 mL IVPB       diphenhydrAMINE (BENADRYL) 50 mg in sodium chloride 0.9% 50 mL IVPB       famotidine (PF) injection 20 mg       Chemotherapy       PACLitaxeL (TAXOL) 80 mg/m2 = 174 mg in sodium chloride 0.9% 250 mL chemo infusion  7/4/2022       Pre-Medications       diphenhydrAMINE (BENADRYL) 50 mg in sodium chloride 0.9% 50 mL IVPB       famotidine (PF) injection 20 mg       Chemotherapy       PACLitaxeL (TAXOL) 80 mg/m2 = 174 mg in sodium chloride 0.9% 250 mL chemo infusion       CARBOplatin (PARAPLATIN) 750 mg in sodium chloride 0.9% 250 mL chemo infusion  7/11/2022       Pre-Medications       dexamethasone (DECADRON) 10 mg in sodium chloride 0.9% 50 mL IVPB       diphenhydrAMINE (BENADRYL) 50 mg in sodium chloride 0.9% 50 mL IVPB       famotidine (PF) injection 20 mg       Chemotherapy       PACLitaxeL (TAXOL) 80 mg/m2 = 174 mg in sodium chloride 0.9% 250 mL chemo infusion    Supportive Plan Information  OP FILGRASTIM 300 MCG   Yessy Cruz MD   Upcoming Treatment Dates - OP FILGRASTIM 300 MCG    6/20/2022       Medications       filgrastim-sndz (ZARXIO) injection 300 mcg/0.5 mL (Preferred Regimen)  6/21/2022       Medications       filgrastim-sndz (ZARXIO) injection 300 mcg/0.5 mL (Preferred Regimen)  6/22/2022       Medications       filgrastim-sndz (ZARXIO) injection 300 mcg/0.5 mL (Preferred Regimen)  6/23/2022       Medications       filgrastim-sndz (ZARXIO) injection 300 mcg/0.5 mL (Preferred Regimen)    Therapy Plan Information  Flushes  heparin, porcine (PF) 100 unit/mL injection flush 500 Units  500 Units, Intravenous, Every visit  sodium chloride 0.9% flush 10 mL  10 mL, Intravenous, Every visit  heparin, porcine (PF) 100 unit/mL injection flush 500 Units  500 Units, Intravenous, Every visit  sodium chloride 0.9% flush 10 mL  10 mL, Intravenous, Every visit

## 2022-06-20 NOTE — PLAN OF CARE
Patient tolerated treatment with no complications. PICC removed at chairside by Jeny Mckenzie RN. Patient discharged in NAD post 30 min period following PICC removal. Patient ambulated away independently.

## 2022-06-21 ENCOUNTER — PATIENT MESSAGE (OUTPATIENT)
Dept: HEMATOLOGY/ONCOLOGY | Facility: CLINIC | Age: 37
End: 2022-06-21
Payer: MEDICAID

## 2022-06-21 ENCOUNTER — TELEPHONE (OUTPATIENT)
Dept: HEMATOLOGY/ONCOLOGY | Facility: CLINIC | Age: 37
End: 2022-06-21
Payer: MEDICAID

## 2022-06-21 NOTE — TELEPHONE ENCOUNTER
"----- Message from Arturo Reid sent at 6/20/2022 12:54 PM CDT -----  Consult/Advisory:          Name Of Caller: [facility] #[id number] - [place], [place]          Contact Preference?: [phone]       Fax: [phone]      Provider Name: Alker      Does patient feel the need to be seen today? No      What is the nature of the call?: Calling to speak w/ nurse about diagnosis code errors for OLANZapine (ZYPREXA) 5 MG tablet refill. Stating neither R11.0 nor T45.1X5A are working in their system.        Additional Notes:  "Thank you for all that you do for our patients"      "

## 2022-06-22 ENCOUNTER — PATIENT MESSAGE (OUTPATIENT)
Dept: HEMATOLOGY/ONCOLOGY | Facility: CLINIC | Age: 37
End: 2022-06-22

## 2022-06-22 ENCOUNTER — TELEPHONE (OUTPATIENT)
Dept: HEMATOLOGY/ONCOLOGY | Facility: CLINIC | Age: 37
End: 2022-06-22
Payer: MEDICAID

## 2022-06-22 NOTE — TELEPHONE ENCOUNTER
"Spoke with patient who reports that her urine has been darker and she feels a "weird" sensation when she urinates. Denies urgency/frequency, pain, fever, chills, or blood in urine.  Patient received IV vancomycin two days ago.  Suggested patient contact gynecologist for HIV/STD concerns.    "

## 2022-06-23 ENCOUNTER — PATIENT MESSAGE (OUTPATIENT)
Dept: HEMATOLOGY/ONCOLOGY | Facility: CLINIC | Age: 37
End: 2022-06-23
Payer: MEDICAID

## 2022-06-24 ENCOUNTER — TELEPHONE (OUTPATIENT)
Dept: HEMATOLOGY/ONCOLOGY | Facility: CLINIC | Age: 37
End: 2022-06-24
Payer: MEDICAID

## 2022-06-24 ENCOUNTER — INFUSION (OUTPATIENT)
Dept: INFUSION THERAPY | Facility: HOSPITAL | Age: 37
End: 2022-06-24
Attending: INTERNAL MEDICINE
Payer: MEDICAID

## 2022-06-24 DIAGNOSIS — Z17.1 MALIGNANT NEOPLASM OF UPPER-OUTER QUADRANT OF LEFT BREAST IN FEMALE, ESTROGEN RECEPTOR NEGATIVE: Primary | ICD-10-CM

## 2022-06-24 DIAGNOSIS — C50.412 MALIGNANT NEOPLASM OF UPPER-OUTER QUADRANT OF LEFT BREAST IN FEMALE, ESTROGEN RECEPTOR NEGATIVE: Primary | ICD-10-CM

## 2022-06-24 LAB
ERYTHROCYTE [DISTWIDTH] IN BLOOD BY AUTOMATED COUNT: 13.1 % (ref 11.5–14.5)
HCT VFR BLD AUTO: 28.2 % (ref 37–48.5)
HGB BLD-MCNC: 9.2 G/DL (ref 12–16)
IMM GRANULOCYTES # BLD AUTO: 0.01 K/UL (ref 0–0.04)
MCH RBC QN AUTO: 30.5 PG (ref 27–31)
MCHC RBC AUTO-ENTMCNC: 32.6 G/DL (ref 32–36)
MCV RBC AUTO: 93 FL (ref 82–98)
NEUTROPHILS # BLD AUTO: 1 K/UL (ref 1.8–7.7)
PLATELET # BLD AUTO: 228 K/UL (ref 150–450)
PMV BLD AUTO: 10.2 FL (ref 9.2–12.9)
RBC # BLD AUTO: 3.02 M/UL (ref 4–5.4)
WBC # BLD AUTO: 2.03 K/UL (ref 3.9–12.7)

## 2022-06-24 PROCEDURE — 85027 COMPLETE CBC AUTOMATED: CPT | Performed by: INTERNAL MEDICINE

## 2022-06-24 PROCEDURE — 36415 COLL VENOUS BLD VENIPUNCTURE: CPT

## 2022-06-24 RX ORDER — SODIUM CHLORIDE 0.9 % (FLUSH) 0.9 %
10 SYRINGE (ML) INJECTION
Status: DISCONTINUED | OUTPATIENT
Start: 2022-06-24 | End: 2022-06-24 | Stop reason: HOSPADM

## 2022-06-24 RX ORDER — HEPARIN 100 UNIT/ML
500 SYRINGE INTRAVENOUS
Status: CANCELLED | OUTPATIENT
Start: 2022-06-24

## 2022-06-24 RX ORDER — HEPARIN 100 UNIT/ML
500 SYRINGE INTRAVENOUS
Status: DISCONTINUED | OUTPATIENT
Start: 2022-06-24 | End: 2022-06-24 | Stop reason: HOSPADM

## 2022-06-24 RX ORDER — SODIUM CHLORIDE 0.9 % (FLUSH) 0.9 %
10 SYRINGE (ML) INJECTION
Status: CANCELLED | OUTPATIENT
Start: 2022-06-24

## 2022-06-24 NOTE — TELEPHONE ENCOUNTER
Received call from Sisi in lab that sample was contaminated.  CMP, tsh, t4 needs to be re-drawn.  Patient contacted but she is no longer nearby.  Will come at 7 AM on Monday 6/27 prior to EP visit for labs.  Appointment scheduled.

## 2022-06-27 ENCOUNTER — OFFICE VISIT (OUTPATIENT)
Dept: HEMATOLOGY/ONCOLOGY | Facility: CLINIC | Age: 37
End: 2022-06-27
Payer: MEDICAID

## 2022-06-27 VITALS
HEART RATE: 79 BPM | OXYGEN SATURATION: 98 % | RESPIRATION RATE: 16 BRPM | DIASTOLIC BLOOD PRESSURE: 66 MMHG | SYSTOLIC BLOOD PRESSURE: 122 MMHG | TEMPERATURE: 98 F | HEIGHT: 65 IN | BODY MASS INDEX: 38.24 KG/M2 | WEIGHT: 229.5 LBS

## 2022-06-27 DIAGNOSIS — Z17.1 MALIGNANT NEOPLASM OF UPPER-OUTER QUADRANT OF LEFT BREAST IN FEMALE, ESTROGEN RECEPTOR NEGATIVE: Primary | ICD-10-CM

## 2022-06-27 DIAGNOSIS — D64.81 ANTINEOPLASTIC CHEMOTHERAPY INDUCED ANEMIA: ICD-10-CM

## 2022-06-27 DIAGNOSIS — R74.01 ELEVATED TRANSAMINASE LEVEL: ICD-10-CM

## 2022-06-27 DIAGNOSIS — D70.1 CHEMOTHERAPY INDUCED NEUTROPENIA: ICD-10-CM

## 2022-06-27 DIAGNOSIS — T45.1X5A CHEMOTHERAPY INDUCED NEUTROPENIA: ICD-10-CM

## 2022-06-27 DIAGNOSIS — T45.1X5A ANTINEOPLASTIC CHEMOTHERAPY INDUCED ANEMIA: ICD-10-CM

## 2022-06-27 DIAGNOSIS — C50.412 MALIGNANT NEOPLASM OF UPPER-OUTER QUADRANT OF LEFT BREAST IN FEMALE, ESTROGEN RECEPTOR NEGATIVE: Primary | ICD-10-CM

## 2022-06-27 PROCEDURE — 99999 PR PBB SHADOW E&M-EST. PATIENT-LVL IV: ICD-10-PCS | Mod: PBBFAC,,, | Performed by: NURSE PRACTITIONER

## 2022-06-27 PROCEDURE — 99215 PR OFFICE/OUTPT VISIT, EST, LEVL V, 40-54 MIN: ICD-10-PCS | Mod: S$PBB,,, | Performed by: NURSE PRACTITIONER

## 2022-06-27 PROCEDURE — 99215 OFFICE O/P EST HI 40 MIN: CPT | Mod: S$PBB,,, | Performed by: NURSE PRACTITIONER

## 2022-06-27 PROCEDURE — 1159F PR MEDICATION LIST DOCUMENTED IN MEDICAL RECORD: ICD-10-PCS | Mod: CPTII,,, | Performed by: NURSE PRACTITIONER

## 2022-06-27 PROCEDURE — 3078F PR MOST RECENT DIASTOLIC BLOOD PRESSURE < 80 MM HG: ICD-10-PCS | Mod: CPTII,,, | Performed by: NURSE PRACTITIONER

## 2022-06-27 PROCEDURE — 3074F PR MOST RECENT SYSTOLIC BLOOD PRESSURE < 130 MM HG: ICD-10-PCS | Mod: CPTII,,, | Performed by: NURSE PRACTITIONER

## 2022-06-27 PROCEDURE — 1159F MED LIST DOCD IN RCRD: CPT | Mod: CPTII,,, | Performed by: NURSE PRACTITIONER

## 2022-06-27 PROCEDURE — 3074F SYST BP LT 130 MM HG: CPT | Mod: CPTII,,, | Performed by: NURSE PRACTITIONER

## 2022-06-27 PROCEDURE — 3008F BODY MASS INDEX DOCD: CPT | Mod: CPTII,,, | Performed by: NURSE PRACTITIONER

## 2022-06-27 PROCEDURE — 3078F DIAST BP <80 MM HG: CPT | Mod: CPTII,,, | Performed by: NURSE PRACTITIONER

## 2022-06-27 PROCEDURE — 99999 PR PBB SHADOW E&M-EST. PATIENT-LVL IV: CPT | Mod: PBBFAC,,, | Performed by: NURSE PRACTITIONER

## 2022-06-27 PROCEDURE — 99214 OFFICE O/P EST MOD 30 MIN: CPT | Mod: PBBFAC | Performed by: NURSE PRACTITIONER

## 2022-06-27 PROCEDURE — 3008F PR BODY MASS INDEX (BMI) DOCUMENTED: ICD-10-PCS | Mod: CPTII,,, | Performed by: NURSE PRACTITIONER

## 2022-06-27 NOTE — PROGRESS NOTES
Subjective:       Patient ID: Laura Kent is a 36 y.o. female.    Chief Complaint: Malignant neoplasm of upper-outer quadrant of left breast i    HPI     Here for follow up and NA chemo.   C2D15 pembro/carbo/taxol due today.   PICC removed   Tolerated last weeks treatment well.   Had more energy.   No f/c.   No n/v.   +constipation relieved with OTC softeners. Last BM was yesterday.   No mouth sores  +numbness and tingling in fingers and right big toe.   Neuropathy Not limiting ADL's  No trips or falls.   Appetite good.   Fiorcet helped with HA. No headaches since last week.        Oncology History:  - self detected an area under her left arm and it initially seemed to go away (noted around winter holidays)  - 3/4/2022 Outside Mammogram:  Findings:  The breasts are heterogeneously dense, which may obscure small masses.   Left  There is a 31 mm x 18 mm x 21 mm irregularly shaped mass with microlobulated margins seen in the left breast at 2 o'clock in the posterior depth with associated left axillary adenopathy. Largest node depicted on the outside study measures 49 x 23 x 29 mm with cortical thickening and effacement of the hilum. Breast mass is reportedly palpable.   Right  There is no evidence of suspicious masses, calcifications, or other abnormal findings in the right breast.  Impression:  Left  Mass: Left breast 31 mm x 18 mm x 21 mm mass at the posterior 2 o'clock position. Assessment: 5 - Highly suggestive of malignancy. Biopsy is recommended.   Right  There is no mammographic evidence of malignancy in the right breast.  BI-RADS Category:   Overall: 5 - Highly Suggestive of Malignancy  Recommendation:  Ultrasound Guided Core Needle Biopsy of the left breast mass and one of the abnormal left axillary nodes is recommended.     - 3/14/2022 Breast biopsy:  1. LEFT BREAST MASS, 2 O'CLOCK, BIOPSY:   Invasive ductal carcinoma, Lev histologic grade 3 (tubule formation:   3, nuclear pleomorphism:  3,  mitotic activity:  3).   Comment:  Infiltrating carcinoma occupies the entirety of biopsied material   with the largest continuous focus measuring 13 mm.  Breast biomarkers are   pending and will be issued in a supplemental report.   2. LEFT AXILLARY LYMPH NODE, BIOPSY:   Invasive ductal carcinoma, Hancock histologic grade 3 (tubule formation:   3, nuclear pleomorphism:  3, mitotic activity:  3).   Comment:  Infiltrating carcinoma occupies the entirety of biopsied material   with the largest continuous focus measuring 20 mm.  No lymph node tissue is   identified in the sample.  Tumor histology is essentially identical to that   of part 1.  The findings could represent part of a larger intranodal   metastasis, but an extension from the primary tumor cannot be excluded.   Radiographic correlation is advised.   Note:  Dr. Stephanie Rao also reviewed this case and agrees with the   diagnosis.   BREAST BIOMARKER RESULTS   Estrogen receptor (ER):  Negative (0)   Progesterone receptor (ER):  Negative (0)   HER2 IHC:  Negative (1+)   Ki-67 proliferation index:  80%      - 3/17/2022 Breast MRI:  Findings:  The breasts have heterogeneous fibroglandular tissue. The background parenchymal enhancement is minimal.   Left  There is a 38 mm x 33 mm x 33 mm irregularly shaped mass seen in the left breast at 2 o'clock in the posterior depth, 8.4 cm from the nipple and 1.2 cm from the skin. Associated signal void from a twirl biopsy marker; pathology showed invasive ductal carcinoma.   Posterior to the mass there is abnormal non mass enhancement measuring 29 x 28 mm. The NME is 4 mm from the skin and 2.3 cm from the chest wall. In total the mass and NME measure 54 mm in AP extent.  Overlying skin thickening and edema involving the lateral breast, likely from lymphovascular obstruction. No suspicious skin enhancement. Overall increased vascularity to the left breast.   Four abnormal lymph level 1 and level 2 left axillary lymph  nodes. The largest lymph node measures 52 mm x 40 mm x 38 mm which previously underwent biopsy showing metastatic disease. There is an associated radar reflector within the biopsied lymph node.  Right  There is no evidence of suspicious masses, abnormal enhancement, or other abnormal findings in the right breast. No enlarged axillary or internal mammary lymph nodes.   Impression:  Left  Mass: Left breast 38 mm x 33 mm x 33 mm mass at the posterior 2 o'clock position. Assessment: 6 - Known biopsy, proven malignancy. Associated 29 mm NME extending posteriorly from the mass. In total the mass and NME measure 54 mm in AP extent.  Lymph Node: Abnormal level 1 and 2 left axillary lymph nodes, the largest of which measures 52 mm x 40 mm x 38 mm lymph node and is known biopsy, proven malignancy.   Right  There is no MR evidence of malignancy in the right breast.  BI-RADS Category:   Overall: 6 - Known Biopsy-Proven Malignancy  Recommendation:  Clinical management of known left breast cancer. Patient is established with the breast surgery clinic.      - Additional Imaging needed     - 3/21/2022 CT C/A/P:  FINDINGS:  Base of Neck: No significant abnormality.  Thoracic soft tissue: A proximally 3.2 x 3.6 x 3.7 cm irregular left breast mass within the lateral aspect with internal biopsy marker, corresponding to findings on prior breast MRI and compatible with malignancy.  Enlarged left axillary lymph nodes, largest measuring approximately 4.2 x 3.6 cm, (series 2, image 27).  CHEST:  -Heart: Normal size. No pericardial effusion.  -Pulmonary vasculature: Pulmonary arteries distribute normally.  There are four pulmonary veins.  -Radha/Mediastinum: No pathologic shelly enlargement.  -Trachea and Proximal airways: Trachea is midline.  Central airways are patent.  No significant bronchial wall thickening or bronchiectasis.  -Lungs/Pleura: Symmetrically expanded without focal consolidation, pneumothorax, or mass.  No pleural effusion or  thickening.  -Esophagus: Normal course and caliber.  ABDOMEN:  - Liver: Normal in size and attenuation with no focal hepatic abnormality.  - Gallbladder: No calcified gallstones.  No wall thickening or pericholecystic fluid.  - Bile Ducts: No intra or extrahepatic biliary ductal dilatation.  - Stomach/Duodenum: Small hiatal hernia otherwise unremarkable.  - Spleen: Normal size.  No focal parenchymal abnormality.  - Pancreas: No mass lesion.  No pancreatic ductal dilatation.  No peripancreatic fat stranding.  - Adrenals: Unremarkable.  - Kidneys/ureters/urinary bladder: Normal in size and location.  Normal enhancement pattern.  No nephrolithiasis.  Ureters are normal in course and caliber.  No hydroureteronephrosis.  - Retroperitoneum: Scattered nonenlarged retroperitoneal lymph nodes.  PELVIS:  - Reproductive: Intrauterine device present.  A proximally 2.8 cm peripherally enhancing right ovarian cyst, likely corpus luteal cyst.  - Other: No pelvic adenopathy, free fluid, or mass.  BOWEL/MESENTERY:  No evidence of bowel obstruction or inflammatory process. Appendix is visualized and unremarkable.  VASCULATURE: Left-sided aortic arch with 3 branch vessels.  No aneurysm and no significant atherosclerosis.  Portal vein, splenic vein, and SMV are patent.  BONES: Lucent approximately 0.6 cm right trochanteric lesion, likely synovial herniation pit.  No acute fracture or bony destructive process.  EXTRATHORACIC/EXTRAPERITONEAL SOFT TISSUES: Unremarkable.  Impression:  In this patient with known breast cancer, there is an approximately 3.7 cm irregular left breast mass with left axillary lymphadenopathy.  No evidence of metastatic disease  Small hiatal hernia.  Additional findings as described above.     - 3/21/2022 Bone scan:  FINDINGS:  There is physiologic distribution of the radiopharmaceutical throughout the skeleton.  Mild degenerative uptake within the bilateral shoulders and knees.  There is normal uptake in the  genitourinary system and soft tissues.  Impression:  There is no scintigraphic evidence of osteoblastic metastatic disease.     - 3/21/2022 Brain MRI:  FINDINGS:  Please note there is dental metal artifact distorting the examination.  Allowing for artifacts the brain parenchyma is normal in contour.  Developmental variant with cavum septum pellucidum identified.  The ventricles are otherwise normal in size without hydrocephalus.  There is no midline shift or significant mass effect.  The major intracranial T2 flow voids are present.  No restricted diffusion within the non distorted brain parenchyma.  There is severe distortion of the susceptibility imaging no parenchymal susceptibility to suggest parenchymal hemorrhage in the non distorted brain parenchyma.  There is suboptimal evaluation of the cerebellum and posterior fossa.  Impression:  Unremarkable MRI brain allowing for artifacts from dental metal as detailed above specifically without abnormal parenchymal enhancement to suggest intracranial metastatic disease in light of history.     Repeat pregnancy test negative     - 3/30/2022 ECHO  · Moderate left atrial enlargement.  · The left ventricle is mildly enlarged with normal systolic function.  · The estimated ejection fraction is 60%.  · The left ventricular global longitudinal strain is -17%.  · Normal left ventricular diastolic function.  · Normal right ventricular size with normal right ventricular systolic function.  · Mild mitral regurgitation.  · Normal central venous pressure (3 mmHg).  · The estimated PA systolic pressure is 18 mmHg.     C1D1 Pembro/Carbo/Taxol 4/13/2022.     Admitted 4/19- 4/23/2022 with bacteremia from port     Hospital Course: Patient admitted for management of enterococcus bacteremia. Started on cefepime and vanc. ID consulted, recommending Port removal given bacteremia and concerns for port infection. ID ordered for STI testing given vaginal discharge, which was negative. Vanc  continued pending susceptibilities. Gen surg removed port a cath. Vancomycin discontinued and daptomycin started with plans for 14-day course. PICC line placed.      Resumed chemo 5/23/2022    PICC removed 6/2022    Review of Systems   Constitutional:        See above   All other systems reviewed and are negative.        Objective:      Physical Exam  Vitals and nursing note reviewed.   Constitutional:       General: She is not in acute distress.     Appearance: Normal appearance. She is well-developed. She is not ill-appearing.      Comments: Presents alone  Very pleasant  ECOG= 0   HENT:      Head: Normocephalic and atraumatic.      Comments: alopecia     Mouth/Throat:      Mouth: Mucous membranes are moist.      Pharynx: No oropharyngeal exudate or posterior oropharyngeal erythema.   Eyes:      General: Lids are normal. No scleral icterus.     Extraocular Movements: Extraocular movements intact.      Conjunctiva/sclera: Conjunctivae normal.      Pupils: Pupils are equal, round, and reactive to light.   Neck:      Thyroid: No thyromegaly.      Vascular: No JVD.      Trachea: Trachea normal.   Cardiovascular:      Rate and Rhythm: Normal rate and regular rhythm.      Heart sounds: Normal heart sounds. No murmur heard.    No friction rub. No gallop.   Pulmonary:      Effort: Pulmonary effort is normal. No respiratory distress.      Breath sounds: Normal breath sounds. No wheezing, rhonchi or rales.      Comments: Left breast mass smaller. No LAD  Chest:   Breasts:      Right: No axillary adenopathy or supraclavicular adenopathy.      Left: No axillary adenopathy or supraclavicular adenopathy.       Abdominal:      General: Bowel sounds are normal. There is no distension.      Palpations: Abdomen is soft. There is no mass.      Tenderness: There is no abdominal tenderness. There is no guarding or rebound.      Comments: No organomegaly.    Musculoskeletal:         General: No swelling or deformity. Normal range of  motion.      Cervical back: Normal range of motion and neck supple.      Right lower leg: No edema.      Left lower leg: No edema.      Comments: No spinal or paraspinal tenderness.    Lymphadenopathy:      Head:      Right side of head: No submental or submandibular adenopathy.      Left side of head: No submental or submandibular adenopathy.      Cervical: No cervical adenopathy.      Upper Body:      Right upper body: No supraclavicular or axillary adenopathy.      Left upper body: No supraclavicular or axillary adenopathy.   Skin:     General: Skin is warm and dry.      Capillary Refill: Capillary refill takes less than 2 seconds.      Coloration: Skin is not jaundiced or pale.      Findings: No bruising or rash.      Nails: There is no clubbing.   Neurological:      General: No focal deficit present.      Mental Status: She is alert and oriented to person, place, and time.      Cranial Nerves: No cranial nerve deficit.      Sensory: No sensory deficit.      Motor: No weakness.      Coordination: Coordination normal.      Gait: Gait normal.   Psychiatric:         Mood and Affect: Mood normal.         Speech: Speech normal.         Behavior: Behavior normal.         Thought Content: Thought content normal.       Labs- reviewed  Assessment:       Problem List Items Addressed This Visit        Oncology    Malignant neoplasm of upper-outer quadrant of left breast in female, estrogen receptor negative - Primary    Relevant Orders    CBC Oncology    Comprehensive Metabolic Panel    TSH    T4, Free    HCG, Quantitative    Chemotherapy induced neutropenia    Relevant Orders    CBC Oncology    Comprehensive Metabolic Panel    TSH    T4, Free    HCG, Quantitative      Other Visit Diagnoses     Antineoplastic chemotherapy induced anemia        Relevant Orders    CBC Oncology    Comprehensive Metabolic Panel    TSH    T4, Free    Ambulatory Referral/Consult to Oncology Social Work    HCG, Quantitative    Elevated  transaminase level              Plan:       Overall doing well. Reports feeling better since PICC removed.   Labs reviewed.   Hold C2D15 due to elevated transaminases.   Avoid tylenol.   We will need to replace a port prior to AC but can use traditional IVs until then.   Recheck labs next week and resume treatment if ok.     Route Chart for Scheduling    Med Onc Chart Routing  Urgent    Follow up with physician . Her Cycle 2 day 15 is due for 7/4- she will need on 7/5 or 7/6 with cbc, cmp, hcg prior and EP followed by chemo- can do labs day prior   Follow up with ANDREW    Infusion scheduling note    Injection scheduling note    Labs    Imaging    Pharmacy appointment    Other referrals          Treatment Plan Information   OP PEMBROLIZUMAB CARBOPLATIN (AUC 5) WITH WEEKLY PACLITAXEL FOLLOWED BY PEMBROLIZUMAB DOXORUBICIN CYCLOPHOSPHAMIDE FOLLOWED BY PEMBROLIZUMAB 200 MG Q3W   Yessy Cruz MD   Upcoming Treatment Dates - OP PEMBROLIZUMAB CARBOPLATIN (AUC 5) WITH WEEKLY PACLITAXEL FOLLOWED BY PEMBROLIZUMAB DOXORUBICIN CYCLOPHOSPHAMIDE FOLLOWED BY PEMBROLIZUMAB 200 MG Q3W    7/5/2022       Pre-Medications       dexamethasone (DECADRON) 10 mg in sodium chloride 0.9% 50 mL IVPB       diphenhydrAMINE (BENADRYL) 50 mg in sodium chloride 0.9% 50 mL IVPB       famotidine (PF) injection 20 mg       Chemotherapy       PACLitaxeL (TAXOL) 80 mg/m2 = 174 mg in sodium chloride 0.9% 250 mL chemo infusion  7/12/2022       Pre-Medications       diphenhydrAMINE (BENADRYL) 50 mg in sodium chloride 0.9% 50 mL IVPB       famotidine (PF) injection 20 mg       Chemotherapy       PACLitaxeL (TAXOL) 80 mg/m2 = 174 mg in sodium chloride 0.9% 250 mL chemo infusion       CARBOplatin (PARAPLATIN) 750 mg in sodium chloride 0.9% 250 mL chemo infusion       Antiemetics       aprepitant (CINVANTI) injection 130 mg       palonosetron (ALOXI) 0.25 mg with Dexamethasone (DECADRON) 12 mg in NS 50 mL IVPB       Immunotherapy       pembrolizumab (KEYTRUDA)  200 mg in sodium chloride 0.9% 108 mL infusion  7/19/2022       Pre-Medications       dexamethasone (DECADRON) 10 mg in sodium chloride 0.9% 50 mL IVPB       diphenhydrAMINE (BENADRYL) 50 mg in sodium chloride 0.9% 50 mL IVPB       famotidine (PF) injection 20 mg       Chemotherapy       PACLitaxeL (TAXOL) 80 mg/m2 = 174 mg in sodium chloride 0.9% 250 mL chemo infusion  7/26/2022       Pre-Medications       dexamethasone (DECADRON) 10 mg in sodium chloride 0.9% 50 mL IVPB       diphenhydrAMINE (BENADRYL) 50 mg in sodium chloride 0.9% 50 mL IVPB       famotidine (PF) injection 20 mg       Chemotherapy       PACLitaxeL (TAXOL) 80 mg/m2 = 174 mg in sodium chloride 0.9% 250 mL chemo infusion    Supportive Plan Information  OP FILGRASTIM 300 MCG   Yessy Cruz MD   Upcoming Treatment Dates - OP FILGRASTIM 300 MCG    6/20/2022       Medications       tbo-filgrastim (GRANIX) injection 480 mcg/0.8 mL  6/21/2022       Medications       tbo-filgrastim (GRANIX) injection 480 mcg/0.8 mL  6/22/2022       Medications       tbo-filgrastim (GRANIX) injection 480 mcg/0.8 mL  6/23/2022       Medications       tbo-filgrastim (GRANIX) injection 480 mcg/0.8 mL    Therapy Plan Information  Flushes  heparin, porcine (PF) 100 unit/mL injection flush 500 Units  500 Units, Intravenous, Every visit  sodium chloride 0.9% flush 10 mL  10 mL, Intravenous, Every visit  heparin, porcine (PF) 100 unit/mL injection flush 500 Units  500 Units, Intravenous, Every visit  sodium chloride 0.9% flush 10 mL  10 mL, Intravenous, Every visit        Patient is in agreement with the proposed treatment plan. All questions were answered to the patient's satisfaction. Pt knows to call clinic for any new or worsening symptoms and if anything is needed before the next clinic visit.      SHANDRA HesterP-C  Hematology & Oncology  [facility] [place] [address]  ph. [phone]  Fax. [phone]     Face to Face time with patient: 25  minutes  30 minutes of total time spent on the encounter, which includes face to face time and non-face to face time preparing to see the patient (eg, review of tests), Obtaining and/or reviewing separately obtained history, Documenting clinical information in the electronic or other health record, Independently interpreting results (not separately reported) and communicating results to the patient/family/caregiver, or Care coordination (not separately reported).

## 2022-06-29 ENCOUNTER — DOCUMENTATION ONLY (OUTPATIENT)
Dept: HEMATOLOGY/ONCOLOGY | Facility: CLINIC | Age: 37
End: 2022-06-29
Payer: MEDICAID

## 2022-06-29 NOTE — PROGRESS NOTES
"In response to workque message CHANDA reached out to pt.  She said she needed to know if [employer] was going to pay next month's rent as it did in June.  CHANDA explained to pt that she would email Karla to try to get the answer to her question.  Chanda sent email. In addition, pt asked for a dr's letter saying she cannot work right now due to treatment so she can submit it as documentation for [employer]NAP benefits.  Chanda explained that dr can write a letter noting diagnosis and treatment course but letter will not explicatly state "unable to work."  Pt verbalized understanding and expressed gratitude.  CHANDA submitted letter to Dr. Cruz for signature.      "

## 2022-07-01 ENCOUNTER — DOCUMENTATION ONLY (OUTPATIENT)
Dept: HEMATOLOGY/ONCOLOGY | Facility: CLINIC | Age: 37
End: 2022-07-01
Payer: MEDICAID

## 2022-07-05 ENCOUNTER — LAB VISIT (OUTPATIENT)
Dept: LAB | Facility: HOSPITAL | Age: 37
End: 2022-07-05
Payer: MEDICAID

## 2022-07-05 ENCOUNTER — TELEPHONE (OUTPATIENT)
Dept: HEMATOLOGY/ONCOLOGY | Facility: CLINIC | Age: 37
End: 2022-07-05
Payer: MEDICAID

## 2022-07-05 DIAGNOSIS — C50.412 MALIGNANT NEOPLASM OF UPPER-OUTER QUADRANT OF LEFT BREAST IN FEMALE, ESTROGEN RECEPTOR NEGATIVE: ICD-10-CM

## 2022-07-05 DIAGNOSIS — Z17.1 MALIGNANT NEOPLASM OF UPPER-OUTER QUADRANT OF LEFT BREAST IN FEMALE, ESTROGEN RECEPTOR NEGATIVE: ICD-10-CM

## 2022-07-05 DIAGNOSIS — T45.1X5A ANTINEOPLASTIC CHEMOTHERAPY INDUCED ANEMIA: ICD-10-CM

## 2022-07-05 DIAGNOSIS — D70.1 CHEMOTHERAPY INDUCED NEUTROPENIA: ICD-10-CM

## 2022-07-05 DIAGNOSIS — D64.81 ANTINEOPLASTIC CHEMOTHERAPY INDUCED ANEMIA: ICD-10-CM

## 2022-07-05 DIAGNOSIS — T45.1X5A CHEMOTHERAPY INDUCED NEUTROPENIA: ICD-10-CM

## 2022-07-05 LAB
ALBUMIN SERPL BCP-MCNC: 4 G/DL (ref 3.5–5.2)
ALP SERPL-CCNC: 57 U/L (ref 55–135)
ALT SERPL W/O P-5'-P-CCNC: 117 U/L (ref 10–44)
ANION GAP SERPL CALC-SCNC: 8 MMOL/L (ref 8–16)
AST SERPL-CCNC: 31 U/L (ref 10–40)
BILIRUB SERPL-MCNC: 0.3 MG/DL (ref 0.1–1)
BUN SERPL-MCNC: 18 MG/DL (ref 6–20)
CALCIUM SERPL-MCNC: 9.4 MG/DL (ref 8.7–10.5)
CHLORIDE SERPL-SCNC: 105 MMOL/L (ref 95–110)
CO2 SERPL-SCNC: 26 MMOL/L (ref 23–29)
CREAT SERPL-MCNC: 0.8 MG/DL (ref 0.5–1.4)
ERYTHROCYTE [DISTWIDTH] IN BLOOD BY AUTOMATED COUNT: 14.7 % (ref 11.5–14.5)
EST. GFR  (AFRICAN AMERICAN): >60 ML/MIN/1.73 M^2
EST. GFR  (NON AFRICAN AMERICAN): >60 ML/MIN/1.73 M^2
GLUCOSE SERPL-MCNC: 99 MG/DL (ref 70–110)
HCG INTACT+B SERPL-ACNC: <2.4 MIU/ML
HCT VFR BLD AUTO: 30.8 % (ref 37–48.5)
HGB BLD-MCNC: 10.4 G/DL (ref 12–16)
IMM GRANULOCYTES # BLD AUTO: 0.01 K/UL (ref 0–0.04)
MCH RBC QN AUTO: 30.4 PG (ref 27–31)
MCHC RBC AUTO-ENTMCNC: 33.8 G/DL (ref 32–36)
MCV RBC AUTO: 90 FL (ref 82–98)
NEUTROPHILS # BLD AUTO: 1.1 K/UL (ref 1.8–7.7)
PLATELET # BLD AUTO: 218 K/UL (ref 150–450)
PMV BLD AUTO: 9.6 FL (ref 9.2–12.9)
POTASSIUM SERPL-SCNC: 4.2 MMOL/L (ref 3.5–5.1)
PROT SERPL-MCNC: 6.9 G/DL (ref 6–8.4)
RBC # BLD AUTO: 3.42 M/UL (ref 4–5.4)
SODIUM SERPL-SCNC: 139 MMOL/L (ref 136–145)
WBC # BLD AUTO: 2.99 K/UL (ref 3.9–12.7)

## 2022-07-05 PROCEDURE — 85027 COMPLETE CBC AUTOMATED: CPT | Performed by: NURSE PRACTITIONER

## 2022-07-05 PROCEDURE — 36415 COLL VENOUS BLD VENIPUNCTURE: CPT | Performed by: NURSE PRACTITIONER

## 2022-07-05 PROCEDURE — 80053 COMPREHEN METABOLIC PANEL: CPT | Performed by: NURSE PRACTITIONER

## 2022-07-05 PROCEDURE — 84702 CHORIONIC GONADOTROPIN TEST: CPT | Performed by: NURSE PRACTITIONER

## 2022-07-05 NOTE — TELEPHONE ENCOUNTER
Spoke with pt regarding canceled appts for meditation. Pt declined offer for rescheduling due to chemo treatments date changed to Wednesday. And will keep next two session scheduled.

## 2022-07-05 NOTE — TELEPHONE ENCOUNTER
----- Message from Adonay Cannon NP sent at 7/5/2022 11:24 AM CDT -----  Regarding: appts  Her appts have not been scheduled appropriately. Please assist.   PJ

## 2022-07-06 ENCOUNTER — INFUSION (OUTPATIENT)
Dept: INFUSION THERAPY | Facility: HOSPITAL | Age: 37
End: 2022-07-06
Attending: INTERNAL MEDICINE
Payer: MEDICAID

## 2022-07-06 ENCOUNTER — DOCUMENTATION ONLY (OUTPATIENT)
Dept: HEMATOLOGY/ONCOLOGY | Facility: CLINIC | Age: 37
End: 2022-07-06
Payer: MEDICAID

## 2022-07-06 VITALS
DIASTOLIC BLOOD PRESSURE: 88 MMHG | WEIGHT: 225.44 LBS | HEIGHT: 65 IN | RESPIRATION RATE: 18 BRPM | HEART RATE: 65 BPM | SYSTOLIC BLOOD PRESSURE: 155 MMHG | BODY MASS INDEX: 37.56 KG/M2 | TEMPERATURE: 98 F

## 2022-07-06 DIAGNOSIS — C50.412 MALIGNANT NEOPLASM OF UPPER-OUTER QUADRANT OF LEFT BREAST IN FEMALE, ESTROGEN RECEPTOR NEGATIVE: Primary | ICD-10-CM

## 2022-07-06 DIAGNOSIS — Z17.1 MALIGNANT NEOPLASM OF UPPER-OUTER QUADRANT OF LEFT BREAST IN FEMALE, ESTROGEN RECEPTOR NEGATIVE: Primary | ICD-10-CM

## 2022-07-06 PROCEDURE — 96375 TX/PRO/DX INJ NEW DRUG ADDON: CPT

## 2022-07-06 PROCEDURE — 96413 CHEMO IV INFUSION 1 HR: CPT

## 2022-07-06 PROCEDURE — 63600175 PHARM REV CODE 636 W HCPCS: Performed by: INTERNAL MEDICINE

## 2022-07-06 PROCEDURE — 25000003 PHARM REV CODE 250: Performed by: INTERNAL MEDICINE

## 2022-07-06 PROCEDURE — 96367 TX/PROPH/DG ADDL SEQ IV INF: CPT

## 2022-07-06 RX ORDER — SODIUM CHLORIDE 0.9 % (FLUSH) 0.9 %
10 SYRINGE (ML) INJECTION
Status: DISCONTINUED | OUTPATIENT
Start: 2022-07-06 | End: 2022-07-06 | Stop reason: HOSPADM

## 2022-07-06 RX ORDER — FAMOTIDINE 10 MG/ML
20 INJECTION INTRAVENOUS
Status: COMPLETED | OUTPATIENT
Start: 2022-07-06 | End: 2022-07-06

## 2022-07-06 RX ORDER — DIPHENHYDRAMINE HYDROCHLORIDE 50 MG/ML
50 INJECTION INTRAMUSCULAR; INTRAVENOUS ONCE AS NEEDED
Status: DISCONTINUED | OUTPATIENT
Start: 2022-07-06 | End: 2022-07-06 | Stop reason: HOSPADM

## 2022-07-06 RX ORDER — EPINEPHRINE 0.3 MG/.3ML
0.3 INJECTION SUBCUTANEOUS ONCE AS NEEDED
Status: DISCONTINUED | OUTPATIENT
Start: 2022-07-06 | End: 2022-07-06 | Stop reason: HOSPADM

## 2022-07-06 RX ORDER — HEPARIN 100 UNIT/ML
500 SYRINGE INTRAVENOUS
Status: DISCONTINUED | OUTPATIENT
Start: 2022-07-06 | End: 2022-07-06 | Stop reason: HOSPADM

## 2022-07-06 RX ADMIN — PACLITAXEL 174 MG: 6 INJECTION, SOLUTION, CONCENTRATE INTRAVENOUS at 10:07

## 2022-07-06 RX ADMIN — DIPHENHYDRAMINE HYDROCHLORIDE 50 MG: 50 INJECTION, SOLUTION INTRAMUSCULAR; INTRAVENOUS at 09:07

## 2022-07-06 RX ADMIN — FAMOTIDINE 20 MG: 10 INJECTION, SOLUTION INTRAVENOUS at 09:07

## 2022-07-06 RX ADMIN — SODIUM CHLORIDE: 9 INJECTION, SOLUTION INTRAVENOUS at 09:07

## 2022-07-06 RX ADMIN — DEXAMETHASONE SODIUM PHOSPHATE 10 MG: 4 INJECTION, SOLUTION INTRA-ARTICULAR; INTRALESIONAL; INTRAMUSCULAR; INTRAVENOUS; SOFT TISSUE at 09:07

## 2022-07-06 NOTE — PROGRESS NOTES
Labs reviewed and discussed with Dr. Cruz and pharmacy  Maureen Barrios. Plan to proceed with day 15 taxol. If liver enzymes elevate, then will need dose reduction.   NATE

## 2022-07-06 NOTE — PLAN OF CARE
0824-Labs , hx, and medications reviewed. Assessment completed. Currently waiting on provider to confirm labs okay for treatment today. Chair reclined and warm blanket and snack offered.

## 2022-07-06 NOTE — PLAN OF CARE
1133-Patient tolerated treatment well. PIV removed and site dressed. Discharged without complaints or S/S of adverse event.   Instructed to call provider for any questions or concerns. AVS declined, reviewed upcoming appointments- patient states will be out of town next week returning on 7/18.

## 2022-07-14 ENCOUNTER — TELEPHONE (OUTPATIENT)
Dept: HEMATOLOGY/ONCOLOGY | Facility: CLINIC | Age: 37
End: 2022-07-14
Payer: MEDICAID

## 2022-07-17 NOTE — PROGRESS NOTES
Subjective:       Patient ID: Laura Kent is a 36 y.o. female.    Chief Complaint: Breast Cancer    HPI  Coming in for C3D1, no new complaints today.  Needs port placed prior to Cycle 5.     Here for follow up and NA chemo.   C3D1 pembro/carbo/taxol due today.   PICC removed 0620/22  Last chemo was July 6th, tolerated this well  Mild fatigue. Able to do her adls  No f/c.   No n/v.   No sob, chest pain, leg swelling  +constipation relieved with OTC softeners  No mouth sores  +numbness and tingling in fingers and right big toe.   Neuropathy Not limiting ADL's  No trips or falls.   Appetite good. Drinking well  Fiorcet helped with HA.     Per Dr. Cruz's note:  Oncology History:  - self detected an area under her left arm and it initially seemed to go away (noted around winter holidays)  - 3/4/2022 Outside Mammogram:  Findings:  The breasts are heterogeneously dense, which may obscure small masses.   Left  There is a 31 mm x 18 mm x 21 mm irregularly shaped mass with microlobulated margins seen in the left breast at 2 o'clock in the posterior depth with associated left axillary adenopathy. Largest node depicted on the outside study measures 49 x 23 x 29 mm with cortical thickening and effacement of the hilum. Breast mass is reportedly palpable.   Right  There is no evidence of suspicious masses, calcifications, or other abnormal findings in the right breast.  Impression:  Left  Mass: Left breast 31 mm x 18 mm x 21 mm mass at the posterior 2 o'clock position. Assessment: 5 - Highly suggestive of malignancy. Biopsy is recommended.   Right  There is no mammographic evidence of malignancy in the right breast.  BI-RADS Category:   Overall: 5 - Highly Suggestive of Malignancy  Recommendation:  Ultrasound Guided Core Needle Biopsy of the left breast mass and one of the abnormal left axillary nodes is recommended.     - 3/14/2022 Breast biopsy:  1. LEFT BREAST MASS, 2 O'CLOCK, BIOPSY:   Invasive ductal carcinoma,  [place] histologic grade 3 (tubule formation:   3, nuclear pleomorphism:  3, mitotic activity:  3).   Comment:  Infiltrating carcinoma occupies the entirety of biopsied material   with the largest continuous focus measuring 13 mm.  Breast biomarkers are   pending and will be issued in a supplemental report.   2. LEFT AXILLARY LYMPH NODE, BIOPSY:   Invasive ductal carcinoma, [place] histologic grade 3 (tubule formation:   3, nuclear pleomorphism:  3, mitotic activity:  3).   Comment:  Infiltrating carcinoma occupies the entirety of biopsied material   with the largest continuous focus measuring 20 mm.  No lymph node tissue is   identified in the sample.  Tumor histology is essentially identical to that   of part 1.  The findings could represent part of a larger intranodal   metastasis, but an extension from the primary tumor cannot be excluded.   Radiographic correlation is advised.   Note:  Dr. Stephanie Rao also reviewed this case and agrees with the   diagnosis.   BREAST BIOMARKER RESULTS   Estrogen receptor (ER):  Negative (0)   Progesterone receptor (ER):  Negative (0)   HER2 IHC:  Negative (1+)   Ki-67 proliferation index:  80%      - 3/17/2022 Breast MRI:  Findings:  The breasts have heterogeneous fibroglandular tissue. The background parenchymal enhancement is minimal.   Left  There is a 38 mm x 33 mm x 33 mm irregularly shaped mass seen in the left breast at 2 o'clock in the posterior depth, 8.4 cm from the nipple and 1.2 cm from the skin. Associated signal void from a twirl biopsy marker; pathology showed invasive ductal carcinoma.   Posterior to the mass there is abnormal non mass enhancement measuring 29 x 28 mm. The NME is 4 mm from the skin and 2.3 cm from the chest wall. In total the mass and NME measure 54 mm in AP extent.  Overlying skin thickening and edema involving the lateral breast, likely from lymphovascular obstruction. No suspicious skin enhancement. Overall increased vascularity to the  left breast.   Four abnormal lymph level 1 and level 2 left axillary lymph nodes. The largest lymph node measures 52 mm x 40 mm x 38 mm which previously underwent biopsy showing metastatic disease. There is an associated radar reflector within the biopsied lymph node.  Right  There is no evidence of suspicious masses, abnormal enhancement, or other abnormal findings in the right breast. No enlarged axillary or internal mammary lymph nodes.   Impression:  Left  Mass: Left breast 38 mm x 33 mm x 33 mm mass at the posterior 2 o'clock position. Assessment: 6 - Known biopsy, proven malignancy. Associated 29 mm NME extending posteriorly from the mass. In total the mass and NME measure 54 mm in AP extent.  Lymph Node: Abnormal level 1 and 2 left axillary lymph nodes, the largest of which measures 52 mm x 40 mm x 38 mm lymph node and is known biopsy, proven malignancy.   Right  There is no MR evidence of malignancy in the right breast.  BI-RADS Category:   Overall: 6 - Known Biopsy-Proven Malignancy  Recommendation:  Clinical management of known left breast cancer. Patient is established with the breast surgery clinic.      - Additional Imaging needed     - 3/21/2022 CT C/A/P:  FINDINGS:  Base of Neck: No significant abnormality.  Thoracic soft tissue: A proximally 3.2 x 3.6 x 3.7 cm irregular left breast mass within the lateral aspect with internal biopsy marker, corresponding to findings on prior breast MRI and compatible with malignancy.  Enlarged left axillary lymph nodes, largest measuring approximately 4.2 x 3.6 cm, (series 2, image 27).  CHEST:  -Heart: Normal size. No pericardial effusion.  -Pulmonary vasculature: Pulmonary arteries distribute normally.  There are four pulmonary veins.  -Radha/Mediastinum: No pathologic shelly enlargement.  -Trachea and Proximal airways: Trachea is midline.  Central airways are patent.  No significant bronchial wall thickening or bronchiectasis.  -Lungs/Pleura: Symmetrically expanded  without focal consolidation, pneumothorax, or mass.  No pleural effusion or thickening.  -Esophagus: Normal course and caliber.  ABDOMEN:  - Liver: Normal in size and attenuation with no focal hepatic abnormality.  - Gallbladder: No calcified gallstones.  No wall thickening or pericholecystic fluid.  - Bile Ducts: No intra or extrahepatic biliary ductal dilatation.  - Stomach/Duodenum: Small hiatal hernia otherwise unremarkable.  - Spleen: Normal size.  No focal parenchymal abnormality.  - Pancreas: No mass lesion.  No pancreatic ductal dilatation.  No peripancreatic fat stranding.  - Adrenals: Unremarkable.  - Kidneys/ureters/urinary bladder: Normal in size and location.  Normal enhancement pattern.  No nephrolithiasis.  Ureters are normal in course and caliber.  No hydroureteronephrosis.  - Retroperitoneum: Scattered nonenlarged retroperitoneal lymph nodes.  PELVIS:  - Reproductive: Intrauterine device present.  A proximally 2.8 cm peripherally enhancing right ovarian cyst, likely corpus luteal cyst.  - Other: No pelvic adenopathy, free fluid, or mass.  BOWEL/MESENTERY:  No evidence of bowel obstruction or inflammatory process. Appendix is visualized and unremarkable.  VASCULATURE: Left-sided aortic arch with 3 branch vessels.  No aneurysm and no significant atherosclerosis.  Portal vein, splenic vein, and SMV are patent.  BONES: Lucent approximately 0.6 cm right trochanteric lesion, likely synovial herniation pit.  No acute fracture or bony destructive process.  EXTRATHORACIC/EXTRAPERITONEAL SOFT TISSUES: Unremarkable.  Impression:  In this patient with known breast cancer, there is an approximately 3.7 cm irregular left breast mass with left axillary lymphadenopathy.  No evidence of metastatic disease  Small hiatal hernia.  Additional findings as described above.     - 3/21/2022 Bone scan:  FINDINGS:  There is physiologic distribution of the radiopharmaceutical throughout the skeleton.  Mild degenerative uptake  within the bilateral shoulders and knees.  There is normal uptake in the genitourinary system and soft tissues.  Impression:  There is no scintigraphic evidence of osteoblastic metastatic disease.     - 3/21/2022 Brain MRI:  FINDINGS:  Please note there is dental metal artifact distorting the examination.  Allowing for artifacts the brain parenchyma is normal in contour.  Developmental variant with cavum septum pellucidum identified.  The ventricles are otherwise normal in size without hydrocephalus.  There is no midline shift or significant mass effect.  The major intracranial T2 flow voids are present.  No restricted diffusion within the non distorted brain parenchyma.  There is severe distortion of the susceptibility imaging no parenchymal susceptibility to suggest parenchymal hemorrhage in the non distorted brain parenchyma.  There is suboptimal evaluation of the cerebellum and posterior fossa.  Impression:  Unremarkable MRI brain allowing for artifacts from dental metal as detailed above specifically without abnormal parenchymal enhancement to suggest intracranial metastatic disease in light of history.     Repeat pregnancy test negative     - 3/30/2022 ECHO  · Moderate left atrial enlargement.  · The left ventricle is mildly enlarged with normal systolic function.  · The estimated ejection fraction is 60%.  · The left ventricular global longitudinal strain is -17%.  · Normal left ventricular diastolic function.  · Normal right ventricular size with normal right ventricular systolic function.  · Mild mitral regurgitation.  · Normal central venous pressure (3 mmHg).  · The estimated PA systolic pressure is 18 mmHg.     C1D1 Pembro/Carbo/Taxol 4/13/2022.     Admitted 4/19- 4/23/2022 with bacteremia from port     Hospital Course: Patient admitted for management of enterococcus bacteremia. Started on cefepime and vanc. ID consulted, recommending Port removal given bacteremia and concerns for port infection. ID  ordered for STI testing given vaginal discharge, which was negative. Vanc continued pending susceptibilities. Gen surg removed port a cath. Vancomycin discontinued and daptomycin started with plans for 14-day course. PICC line placed.      Resumed chemo 5/23/2022    PICC removed 6/2022    Review of Systems   Constitutional:        See above   All other systems reviewed and are negative.        Objective:      Physical Exam  Vitals and nursing note reviewed.   Constitutional:       General: She is not in acute distress.     Appearance: Normal appearance. She is well-developed. She is not ill-appearing.      Comments: Presents alone  Very pleasant  ECOG= 0   HENT:      Head: Normocephalic and atraumatic.      Comments: alopecia     Mouth/Throat:      Mouth: Mucous membranes are moist.   Eyes:      General: Lids are normal. No scleral icterus.     Extraocular Movements: Extraocular movements intact.      Conjunctiva/sclera: Conjunctivae normal.      Pupils: Pupils are equal, round, and reactive to light.   Neck:      Vascular: No JVD.      Trachea: Trachea normal.   Cardiovascular:      Rate and Rhythm: Normal rate and regular rhythm.      Heart sounds: Normal heart sounds. No murmur heard.    No friction rub. No gallop.      Comments: Normal rate to auscultation  Pulmonary:      Effort: Pulmonary effort is normal. No respiratory distress.      Breath sounds: Normal breath sounds. No wheezing, rhonchi or rales.      Comments: Left breast mass smaller. No LAD  Chest:   Breasts:      Right: No axillary adenopathy or supraclavicular adenopathy.      Left: No axillary adenopathy or supraclavicular adenopathy.       Abdominal:      General: Bowel sounds are normal. There is no distension.      Palpations: Abdomen is soft. There is no mass.      Tenderness: There is no abdominal tenderness. There is no guarding or rebound.      Comments: No organomegaly.    Musculoskeletal:         General: No swelling or deformity. Normal  range of motion.      Cervical back: Normal range of motion and neck supple.      Right lower leg: No edema.      Left lower leg: No edema.      Comments:     Lymphadenopathy:      Head:      Right side of head: No submental or submandibular adenopathy.      Left side of head: No submental or submandibular adenopathy.      Cervical: No cervical adenopathy.      Upper Body:      Right upper body: No supraclavicular or axillary adenopathy.      Left upper body: No supraclavicular or axillary adenopathy.   Skin:     General: Skin is warm and dry.      Coloration: Skin is not jaundiced or pale.      Findings: No rash.      Nails: There is no clubbing.   Neurological:      General: No focal deficit present.      Mental Status: She is alert and oriented to person, place, and time.      Cranial Nerves: No cranial nerve deficit.      Sensory: No sensory deficit.      Motor: No weakness.      Coordination: Coordination normal.      Gait: Gait normal.   Psychiatric:         Mood and Affect: Mood normal.         Speech: Speech normal.         Behavior: Behavior normal.         Thought Content: Thought content normal.       Labs- reviewed  Assessment:       Problem List Items Addressed This Visit        Oncology    Malignant neoplasm of upper-outer quadrant of left breast in female, estrogen receptor negative - Primary          Plan:       Overall doing well with no new complaints  Labs reviewed.    Cleared for Cycle 3, Day 1   Avoid tylenol d/t hx of elevated lfts   We will need to replace a port prior to AC but can use traditional IVs until then.     Route Chart for Scheduling    Med Onc Chart Routing      Follow up with physician 3 weeks. Already scheduled with Dr. Cruz for 08/08/22   Follow up with ANDREW    Infusion scheduling note cycle 3, day 8 already scheduled for 07/25/22   Injection scheduling note    Labs    Lab interval:  Next labs scheduled for 07/25/22 already   Imaging    Pharmacy appointment    Other referrals           Treatment Plan Information   OP PEMBROLIZUMAB CARBOPLATIN (AUC 5) WITH WEEKLY PACLITAXEL FOLLOWED BY PEMBROLIZUMAB DOXORUBICIN CYCLOPHOSPHAMIDE FOLLOWED BY PEMBROLIZUMAB 200 MG Q3W   Yessy Cruz MD   Upcoming Treatment Dates - OP PEMBROLIZUMAB CARBOPLATIN (AUC 5) WITH WEEKLY PACLITAXEL FOLLOWED BY PEMBROLIZUMAB DOXORUBICIN CYCLOPHOSPHAMIDE FOLLOWED BY PEMBROLIZUMAB 200 MG Q3W    7/25/2022       Pre-Medications       dexamethasone (DECADRON) 10 mg in sodium chloride 0.9% 50 mL IVPB       diphenhydrAMINE (BENADRYL) 50 mg in sodium chloride 0.9% 50 mL IVPB       famotidine (PF) injection 20 mg       Chemotherapy       PACLitaxeL (TAXOL) 80 mg/m2 = 174 mg in sodium chloride 0.9% 250 mL chemo infusion  8/1/2022       Pre-Medications       dexamethasone (DECADRON) 10 mg in sodium chloride 0.9% 50 mL IVPB       diphenhydrAMINE (BENADRYL) 50 mg in sodium chloride 0.9% 50 mL IVPB       famotidine (PF) injection 20 mg       Chemotherapy       PACLitaxeL (TAXOL) 80 mg/m2 = 174 mg in sodium chloride 0.9% 250 mL chemo infusion  8/8/2022       Pre-Medications       diphenhydrAMINE (BENADRYL) 50 mg in sodium chloride 0.9% 50 mL IVPB       famotidine (PF) injection 20 mg       Chemotherapy       PACLitaxeL (TAXOL) 80 mg/m2 = 174 mg in sodium chloride 0.9% 250 mL chemo infusion       CARBOplatin (PARAPLATIN) 750 mg in sodium chloride 0.9% 250 mL chemo infusion       Antiemetics       aprepitant (CINVANTI) injection 130 mg       palonosetron (ALOXI) 0.25 mg with Dexamethasone (DECADRON) 12 mg in NS 50 mL IVPB       Immunotherapy       pembrolizumab (KEYTRUDA) 200 mg in sodium chloride 0.9% 108 mL infusion  8/15/2022       Pre-Medications       dexamethasone (DECADRON) 10 mg in sodium chloride 0.9% 50 mL IVPB       diphenhydrAMINE (BENADRYL) 50 mg in sodium chloride 0.9% 50 mL IVPB       famotidine (PF) injection 20 mg       Chemotherapy       PACLitaxeL (TAXOL) 80 mg/m2 = 174 mg in sodium chloride 0.9% 250 mL chemo  infusion    Supportive Plan Information  OP FILGRASTIM 300 MCG   Yessy Cruz MD   Upcoming Treatment Dates - OP FILGRASTIM 300 MCG    6/20/2022       Medications       tbo-filgrastim (GRANIX) injection 480 mcg/0.8 mL  6/21/2022       Medications       tbo-filgrastim (GRANIX) injection 480 mcg/0.8 mL  6/22/2022       Medications       tbo-filgrastim (GRANIX) injection 480 mcg/0.8 mL  6/23/2022       Medications       tbo-filgrastim (GRANIX) injection 480 mcg/0.8 mL    Therapy Plan Information  Flushes  heparin, porcine (PF) 100 unit/mL injection flush 500 Units  500 Units, Intravenous, Every visit  sodium chloride 0.9% flush 10 mL  10 mL, Intravenous, Every visit  heparin, porcine (PF) 100 unit/mL injection flush 500 Units  500 Units, Intravenous, Every visit  sodium chloride 0.9% flush 10 mL  10 mL, Intravenous, Every visit      Patient is in agreement with the proposed treatment plan. All questions were answered to the patient's satisfaction. Pt knows to call clinic for any new or worsening symptoms and if anything is needed before the next clinic visit.    Edna Barnard NP    MDM includes  :    - Acute or chronic illness or injury that poses a threat to life or bodily function  - Independent review and explanation of 3+ results from unique tests  - Discussion of management and ordering 3+ unique tests  - Extensive discussion of treatment and management  - Prescription drug management  - Drug therapy requiring intensive monitoring for toxicity

## 2022-07-18 ENCOUNTER — INFUSION (OUTPATIENT)
Dept: INFUSION THERAPY | Facility: HOSPITAL | Age: 37
End: 2022-07-18
Attending: INTERNAL MEDICINE
Payer: MEDICAID

## 2022-07-18 ENCOUNTER — TELEPHONE (OUTPATIENT)
Dept: HEMATOLOGY/ONCOLOGY | Facility: CLINIC | Age: 37
End: 2022-07-18

## 2022-07-18 ENCOUNTER — OFFICE VISIT (OUTPATIENT)
Dept: HEMATOLOGY/ONCOLOGY | Facility: CLINIC | Age: 37
End: 2022-07-18
Payer: MEDICAID

## 2022-07-18 VITALS
WEIGHT: 227.5 LBS | HEIGHT: 65 IN | RESPIRATION RATE: 18 BRPM | OXYGEN SATURATION: 98 % | BODY MASS INDEX: 37.9 KG/M2 | TEMPERATURE: 98 F | SYSTOLIC BLOOD PRESSURE: 110 MMHG | HEART RATE: 110 BPM | DIASTOLIC BLOOD PRESSURE: 63 MMHG

## 2022-07-18 VITALS
OXYGEN SATURATION: 98 % | RESPIRATION RATE: 18 BRPM | TEMPERATURE: 98 F | SYSTOLIC BLOOD PRESSURE: 126 MMHG | HEART RATE: 84 BPM | DIASTOLIC BLOOD PRESSURE: 78 MMHG

## 2022-07-18 DIAGNOSIS — C50.412 MALIGNANT NEOPLASM OF UPPER-OUTER QUADRANT OF LEFT BREAST IN FEMALE, ESTROGEN RECEPTOR NEGATIVE: Primary | ICD-10-CM

## 2022-07-18 DIAGNOSIS — Z17.1 MALIGNANT NEOPLASM OF UPPER-OUTER QUADRANT OF LEFT BREAST IN FEMALE, ESTROGEN RECEPTOR NEGATIVE: Primary | ICD-10-CM

## 2022-07-18 PROCEDURE — 99999 PR PBB SHADOW E&M-EST. PATIENT-LVL IV: CPT | Mod: PBBFAC,,, | Performed by: NURSE PRACTITIONER

## 2022-07-18 PROCEDURE — 96375 TX/PRO/DX INJ NEW DRUG ADDON: CPT

## 2022-07-18 PROCEDURE — 63600175 PHARM REV CODE 636 W HCPCS: Mod: JG | Performed by: STUDENT IN AN ORGANIZED HEALTH CARE EDUCATION/TRAINING PROGRAM

## 2022-07-18 PROCEDURE — 99214 OFFICE O/P EST MOD 30 MIN: CPT | Mod: PBBFAC,25 | Performed by: NURSE PRACTITIONER

## 2022-07-18 PROCEDURE — 96367 TX/PROPH/DG ADDL SEQ IV INF: CPT

## 2022-07-18 PROCEDURE — 3008F PR BODY MASS INDEX (BMI) DOCUMENTED: ICD-10-PCS | Mod: CPTII,,, | Performed by: NURSE PRACTITIONER

## 2022-07-18 PROCEDURE — 3078F DIAST BP <80 MM HG: CPT | Mod: CPTII,,, | Performed by: NURSE PRACTITIONER

## 2022-07-18 PROCEDURE — 1159F PR MEDICATION LIST DOCUMENTED IN MEDICAL RECORD: ICD-10-PCS | Mod: CPTII,,, | Performed by: NURSE PRACTITIONER

## 2022-07-18 PROCEDURE — 3074F PR MOST RECENT SYSTOLIC BLOOD PRESSURE < 130 MM HG: ICD-10-PCS | Mod: CPTII,,, | Performed by: NURSE PRACTITIONER

## 2022-07-18 PROCEDURE — 1160F PR REVIEW ALL MEDS BY PRESCRIBER/CLIN PHARMACIST DOCUMENTED: ICD-10-PCS | Mod: CPTII,,, | Performed by: NURSE PRACTITIONER

## 2022-07-18 PROCEDURE — 96413 CHEMO IV INFUSION 1 HR: CPT

## 2022-07-18 PROCEDURE — 3074F SYST BP LT 130 MM HG: CPT | Mod: CPTII,,, | Performed by: NURSE PRACTITIONER

## 2022-07-18 PROCEDURE — 3078F PR MOST RECENT DIASTOLIC BLOOD PRESSURE < 80 MM HG: ICD-10-PCS | Mod: CPTII,,, | Performed by: NURSE PRACTITIONER

## 2022-07-18 PROCEDURE — 99215 PR OFFICE/OUTPT VISIT, EST, LEVL V, 40-54 MIN: ICD-10-PCS | Mod: S$PBB,,, | Performed by: NURSE PRACTITIONER

## 2022-07-18 PROCEDURE — 25000003 PHARM REV CODE 250: Performed by: STUDENT IN AN ORGANIZED HEALTH CARE EDUCATION/TRAINING PROGRAM

## 2022-07-18 PROCEDURE — 1160F RVW MEDS BY RX/DR IN RCRD: CPT | Mod: CPTII,,, | Performed by: NURSE PRACTITIONER

## 2022-07-18 PROCEDURE — 3008F BODY MASS INDEX DOCD: CPT | Mod: CPTII,,, | Performed by: NURSE PRACTITIONER

## 2022-07-18 PROCEDURE — 1159F MED LIST DOCD IN RCRD: CPT | Mod: CPTII,,, | Performed by: NURSE PRACTITIONER

## 2022-07-18 PROCEDURE — 99999 PR PBB SHADOW E&M-EST. PATIENT-LVL IV: ICD-10-PCS | Mod: PBBFAC,,, | Performed by: NURSE PRACTITIONER

## 2022-07-18 PROCEDURE — 99215 OFFICE O/P EST HI 40 MIN: CPT | Mod: S$PBB,,, | Performed by: NURSE PRACTITIONER

## 2022-07-18 RX ORDER — FAMOTIDINE 10 MG/ML
20 INJECTION INTRAVENOUS
Status: COMPLETED | OUTPATIENT
Start: 2022-07-18 | End: 2022-07-18

## 2022-07-18 RX ORDER — FAMOTIDINE 10 MG/ML
20 INJECTION INTRAVENOUS
Status: CANCELLED | OUTPATIENT
Start: 2022-07-18

## 2022-07-18 RX ORDER — SODIUM CHLORIDE 0.9 % (FLUSH) 0.9 %
10 SYRINGE (ML) INJECTION
Status: CANCELLED | OUTPATIENT
Start: 2022-07-18

## 2022-07-18 RX ORDER — HEPARIN 100 UNIT/ML
500 SYRINGE INTRAVENOUS
Status: CANCELLED | OUTPATIENT
Start: 2022-07-18

## 2022-07-18 RX ORDER — HEPARIN 100 UNIT/ML
500 SYRINGE INTRAVENOUS
Status: DISCONTINUED | OUTPATIENT
Start: 2022-07-18 | End: 2022-07-18 | Stop reason: HOSPADM

## 2022-07-18 RX ORDER — EPINEPHRINE 0.3 MG/.3ML
0.3 INJECTION SUBCUTANEOUS ONCE AS NEEDED
Status: CANCELLED | OUTPATIENT
Start: 2022-07-18

## 2022-07-18 RX ORDER — DIPHENHYDRAMINE HYDROCHLORIDE 50 MG/ML
50 INJECTION INTRAMUSCULAR; INTRAVENOUS ONCE AS NEEDED
Status: CANCELLED | OUTPATIENT
Start: 2022-07-18

## 2022-07-18 RX ORDER — EPINEPHRINE 0.3 MG/.3ML
0.3 INJECTION SUBCUTANEOUS ONCE AS NEEDED
Status: DISCONTINUED | OUTPATIENT
Start: 2022-07-18 | End: 2022-07-18 | Stop reason: HOSPADM

## 2022-07-18 RX ORDER — DIPHENHYDRAMINE HYDROCHLORIDE 50 MG/ML
50 INJECTION INTRAMUSCULAR; INTRAVENOUS ONCE AS NEEDED
Status: DISCONTINUED | OUTPATIENT
Start: 2022-07-18 | End: 2022-07-18 | Stop reason: HOSPADM

## 2022-07-18 RX ORDER — SODIUM CHLORIDE 0.9 % (FLUSH) 0.9 %
10 SYRINGE (ML) INJECTION
Status: DISCONTINUED | OUTPATIENT
Start: 2022-07-18 | End: 2022-07-18 | Stop reason: HOSPADM

## 2022-07-18 RX ADMIN — PACLITAXEL 174 MG: 6 INJECTION, SOLUTION, CONCENTRATE INTRAVENOUS at 10:07

## 2022-07-18 RX ADMIN — DIPHENHYDRAMINE HYDROCHLORIDE 50 MG: 50 INJECTION, SOLUTION INTRAMUSCULAR; INTRAVENOUS at 10:07

## 2022-07-18 RX ADMIN — APREPITANT 130 MG: 130 INJECTION, EMULSION INTRAVENOUS at 10:07

## 2022-07-18 RX ADMIN — SODIUM CHLORIDE 200 MG: 9 INJECTION, SOLUTION INTRAVENOUS at 09:07

## 2022-07-18 RX ADMIN — FAMOTIDINE 20 MG: 10 INJECTION, SOLUTION INTRAVENOUS at 10:07

## 2022-07-18 RX ADMIN — SODIUM CHLORIDE: 9 INJECTION, SOLUTION INTRAVENOUS at 09:07

## 2022-07-18 RX ADMIN — PALONOSETRON 0.25 MG: 0.25 INJECTION, SOLUTION INTRAVENOUS at 10:07

## 2022-07-18 NOTE — PLAN OF CARE
0900- Patient arrived to clinic for keytruda/taxol/carbo following labs and appt with JASMINE Barnard. Labs and assessment appropriate for treatment. Orders reviewed and signed by MD Keturah. PIV started, flushes easily, NS infusing.

## 2022-07-18 NOTE — TELEPHONE ENCOUNTER
Patient not tolerating taxol infusion through peripheral iv.  Continued discomfort at iv site.  Nursing staff unable to obtain another piv.   Only received the full dose of Keytruda today 07/18. Orders for port placement placed.      Edna Barnard NP

## 2022-07-18 NOTE — NURSING
1059- Taxol started, patient started complaining of pain at IV site. Taxol infusion stopped. PIV flushed easily and blood return was noted. Site was assessed with no abnormalities noted. Patient said that PIV felt much better after being flushed. Taxol and NS restarted.    1105- Taxol and NS infusions restarted. Patient started to c/o same pain as before. Infusions stopped, PIV removed.     Multiple attempts made to regain IV access with no success. JASMINE Cannon, JASMINE Barnard, and MD Keturah notified. Per Kassandra, patient will be set up for port placement before next infusion. Patient made aware.

## 2022-07-18 NOTE — PLAN OF CARE
Patient received Keytruda and pre-meds only. (See nursing note). Patient given time to sleep-off benadryl before driving self home. After 1.5 hours, patient awake and alert, stated that she was no longer drowsy and able to drive self home. Patient aware that she should be contacted for port placement soon, ambulated away from clinic in NAD.

## 2022-07-20 ENCOUNTER — TELEPHONE (OUTPATIENT)
Dept: VASCULAR SURGERY | Facility: CLINIC | Age: 37
End: 2022-07-20
Payer: MEDICAID

## 2022-07-20 DIAGNOSIS — C50.412 MALIGNANT NEOPLASM OF UPPER-OUTER QUADRANT OF LEFT BREAST IN FEMALE, ESTROGEN RECEPTOR NEGATIVE: Primary | ICD-10-CM

## 2022-07-20 DIAGNOSIS — Z17.1 MALIGNANT NEOPLASM OF UPPER-OUTER QUADRANT OF LEFT BREAST IN FEMALE, ESTROGEN RECEPTOR NEGATIVE: Primary | ICD-10-CM

## 2022-07-20 NOTE — TELEPHONE ENCOUNTER
Contacted patient to schedule portacath placement with Dr. Dinero. Procedure scheduled Monday 8/1/22 @ 1015. Pre-op instructions given including time and location of arrival, no need for COVID-19 testing, fasting starting at midnight before procedure, two showers with antibacterial soap, morning medications, updates to visitor policy, etc. Pt repeated instructions to nurse and verbalized understanding.

## 2022-07-21 ENCOUNTER — TELEPHONE (OUTPATIENT)
Dept: HEMATOLOGY/ONCOLOGY | Facility: CLINIC | Age: 37
End: 2022-07-21
Payer: MEDICAID

## 2022-07-21 NOTE — TELEPHONE ENCOUNTER
Pt was unable to complete cycle 3, day one of keytruda, carbo, taxol due to piv pain.  She only completed the keytruda.  See nursing note.  It was decided she needed a port.   Scheduled for port placement 08/01/22.  Will see if we can get this moved up and then reschedule her next chemo adding in the carbo with next infusion and will message pharmacy to see about how to get patient back on track with her schedule.  This plan was discussed with Dr. Arielle Barnard, NP

## 2022-07-22 ENCOUNTER — TELEPHONE (OUTPATIENT)
Dept: HEMATOLOGY/ONCOLOGY | Facility: CLINIC | Age: 37
End: 2022-07-22
Payer: MEDICAID

## 2022-07-22 NOTE — TELEPHONE ENCOUNTER
Called patient and spoke with her.  She is doing generally well today with no new complaints.  She has her port placement scheduled for 08/01/22.  Said she could not get it done sooner d/t lack of help with kids and them starting school.  She is ok with trying to get her taxol through a piv this coming Monday 07/25 as she says she had tolerated that fine in the past.  Spoke with Dr. Guzmán about this. Ok to try to give taxol through piv on 07/25.   Will keep labs and infusion scheduled for that day.   She missed her carbo and taxol this week.  Per Dr Guzmán, could possibly change schedule to 07/25 C3,D1 just taxol, 08/01 (port placed) 08/02: C3, D8  taxol,  08/08 C3, D15 taxol  08/15 C4, D1 (keytruda, carbo, taxol).      Edna Barnard, NP

## 2022-07-25 ENCOUNTER — INFUSION (OUTPATIENT)
Dept: INFUSION THERAPY | Facility: HOSPITAL | Age: 37
End: 2022-07-25
Attending: INTERNAL MEDICINE
Payer: MEDICAID

## 2022-07-25 VITALS
RESPIRATION RATE: 18 BRPM | DIASTOLIC BLOOD PRESSURE: 70 MMHG | WEIGHT: 231.69 LBS | HEART RATE: 66 BPM | HEIGHT: 65 IN | SYSTOLIC BLOOD PRESSURE: 124 MMHG | BODY MASS INDEX: 38.6 KG/M2 | TEMPERATURE: 98 F

## 2022-07-25 DIAGNOSIS — Z17.1 MALIGNANT NEOPLASM OF UPPER-OUTER QUADRANT OF LEFT BREAST IN FEMALE, ESTROGEN RECEPTOR NEGATIVE: Primary | ICD-10-CM

## 2022-07-25 DIAGNOSIS — C50.412 MALIGNANT NEOPLASM OF UPPER-OUTER QUADRANT OF LEFT BREAST IN FEMALE, ESTROGEN RECEPTOR NEGATIVE: Primary | ICD-10-CM

## 2022-07-25 PROCEDURE — 25000003 PHARM REV CODE 250: Performed by: NURSE PRACTITIONER

## 2022-07-25 PROCEDURE — 96367 TX/PROPH/DG ADDL SEQ IV INF: CPT

## 2022-07-25 PROCEDURE — 63600175 PHARM REV CODE 636 W HCPCS: Performed by: NURSE PRACTITIONER

## 2022-07-25 PROCEDURE — 96375 TX/PRO/DX INJ NEW DRUG ADDON: CPT

## 2022-07-25 PROCEDURE — 96413 CHEMO IV INFUSION 1 HR: CPT

## 2022-07-25 RX ORDER — HEPARIN 100 UNIT/ML
500 SYRINGE INTRAVENOUS
Status: DISCONTINUED | OUTPATIENT
Start: 2022-07-25 | End: 2022-07-25 | Stop reason: HOSPADM

## 2022-07-25 RX ORDER — EPINEPHRINE 0.3 MG/.3ML
0.3 INJECTION SUBCUTANEOUS ONCE AS NEEDED
Status: CANCELLED | OUTPATIENT
Start: 2022-07-25

## 2022-07-25 RX ORDER — DIPHENHYDRAMINE HYDROCHLORIDE 50 MG/ML
50 INJECTION INTRAMUSCULAR; INTRAVENOUS ONCE AS NEEDED
Status: DISCONTINUED | OUTPATIENT
Start: 2022-07-25 | End: 2022-07-25 | Stop reason: HOSPADM

## 2022-07-25 RX ORDER — EPINEPHRINE 0.3 MG/.3ML
0.3 INJECTION SUBCUTANEOUS ONCE AS NEEDED
Status: DISCONTINUED | OUTPATIENT
Start: 2022-07-25 | End: 2022-07-25 | Stop reason: HOSPADM

## 2022-07-25 RX ORDER — SODIUM CHLORIDE 0.9 % (FLUSH) 0.9 %
10 SYRINGE (ML) INJECTION
Status: DISCONTINUED | OUTPATIENT
Start: 2022-07-25 | End: 2022-07-25 | Stop reason: HOSPADM

## 2022-07-25 RX ORDER — HEPARIN 100 UNIT/ML
500 SYRINGE INTRAVENOUS
Status: CANCELLED | OUTPATIENT
Start: 2022-07-25

## 2022-07-25 RX ORDER — DIPHENHYDRAMINE HYDROCHLORIDE 50 MG/ML
50 INJECTION INTRAMUSCULAR; INTRAVENOUS ONCE AS NEEDED
Status: CANCELLED | OUTPATIENT
Start: 2022-07-25

## 2022-07-25 RX ORDER — FAMOTIDINE 10 MG/ML
20 INJECTION INTRAVENOUS
Status: COMPLETED | OUTPATIENT
Start: 2022-07-25 | End: 2022-07-25

## 2022-07-25 RX ORDER — SODIUM CHLORIDE 0.9 % (FLUSH) 0.9 %
10 SYRINGE (ML) INJECTION
Status: CANCELLED | OUTPATIENT
Start: 2022-07-25

## 2022-07-25 RX ORDER — FAMOTIDINE 10 MG/ML
20 INJECTION INTRAVENOUS
Status: CANCELLED | OUTPATIENT
Start: 2022-07-25

## 2022-07-25 RX ADMIN — DEXAMETHASONE SODIUM PHOSPHATE 10 MG: 4 INJECTION, SOLUTION INTRA-ARTICULAR; INTRALESIONAL; INTRAMUSCULAR; INTRAVENOUS; SOFT TISSUE at 10:07

## 2022-07-25 RX ADMIN — FAMOTIDINE 20 MG: 10 INJECTION, SOLUTION INTRAVENOUS at 10:07

## 2022-07-25 RX ADMIN — SODIUM CHLORIDE: 9 INJECTION, SOLUTION INTRAVENOUS at 10:07

## 2022-07-25 RX ADMIN — PACLITAXEL 174 MG: 6 INJECTION, SOLUTION, CONCENTRATE INTRAVENOUS at 11:07

## 2022-07-25 RX ADMIN — DIPHENHYDRAMINE HYDROCHLORIDE 50 MG: 50 INJECTION, SOLUTION INTRAMUSCULAR; INTRAVENOUS at 10:07

## 2022-07-25 NOTE — PLAN OF CARE
1242 pt tolerated taxol through a PIV. Pt is scheduled for a port placement next month. VSS, pt voiced no new complaints or concerns at this time. NAD noted. Pt d/c home.

## 2022-08-01 ENCOUNTER — HOSPITAL ENCOUNTER (OUTPATIENT)
Facility: HOSPITAL | Age: 37
Discharge: HOME | End: 2022-08-01
Attending: SURGERY | Admitting: SURGERY
Payer: MEDICAID

## 2022-08-01 VITALS
HEART RATE: 96 BPM | OXYGEN SATURATION: 100 % | WEIGHT: 230 LBS | TEMPERATURE: 98 F | DIASTOLIC BLOOD PRESSURE: 69 MMHG | BODY MASS INDEX: 38.32 KG/M2 | HEIGHT: 65 IN | SYSTOLIC BLOOD PRESSURE: 133 MMHG | RESPIRATION RATE: 18 BRPM

## 2022-08-01 DIAGNOSIS — C50.919 BREAST CANCER: ICD-10-CM

## 2022-08-01 LAB
B-HCG UR QL: NEGATIVE
CTP QC/QA: YES

## 2022-08-01 PROCEDURE — 77001 CHG FLUOROGUIDE CNTRL VEN ACCESS,PLACE,REPLACE,REMOVE: ICD-10-PCS | Mod: 26,,, | Performed by: SURGERY

## 2022-08-01 PROCEDURE — 25000003 PHARM REV CODE 250: Performed by: SURGERY

## 2022-08-01 PROCEDURE — 63600175 PHARM REV CODE 636 W HCPCS: Performed by: SURGERY

## 2022-08-01 PROCEDURE — 36561 PR INSERT TUNNELED CV CATH WITH PORT: ICD-10-PCS | Mod: RT,,, | Performed by: SURGERY

## 2022-08-01 PROCEDURE — 36561 INSERT TUNNELED CV CATH: CPT | Performed by: SURGERY

## 2022-08-01 PROCEDURE — C1788 PORT, INDWELLING, IMP: HCPCS | Performed by: SURGERY

## 2022-08-01 PROCEDURE — 81025 URINE PREGNANCY TEST: CPT | Performed by: SURGERY

## 2022-08-01 PROCEDURE — 77001 FLUOROGUIDE FOR VEIN DEVICE: CPT | Mod: 26,,, | Performed by: SURGERY

## 2022-08-01 PROCEDURE — 99152 MOD SED SAME PHYS/QHP 5/>YRS: CPT | Performed by: SURGERY

## 2022-08-01 PROCEDURE — 36561 INSERT TUNNELED CV CATH: CPT | Mod: RT,,, | Performed by: SURGERY

## 2022-08-01 PROCEDURE — 77001 FLUOROGUIDE FOR VEIN DEVICE: CPT | Performed by: SURGERY

## 2022-08-01 DEVICE — POWERPORT M.R.I. IMPLANTABLE PORT WITH ATTACHABLE 8F CHRONOFLEX OPEN-ENDED SINGLE-LUMEN VENOUS CATHETER INTERMEDIATE KIT  (WITH SUTURE PLUGS)
Type: IMPLANTABLE DEVICE | Site: CHEST | Status: NON-FUNCTIONAL
Brand: POWERPORT M.R.I., CHRONOFLEX

## 2022-08-01 RX ORDER — CEFAZOLIN SODIUM/WATER 2 G/20 ML
2 SYRINGE (ML) INTRAVENOUS
Status: DISCONTINUED | OUTPATIENT
Start: 2022-08-01 | End: 2022-08-01 | Stop reason: HOSPADM

## 2022-08-01 RX ORDER — LIDOCAINE HYDROCHLORIDE 20 MG/ML
INJECTION, SOLUTION INFILTRATION; PERINEURAL
Status: DISCONTINUED | OUTPATIENT
Start: 2022-08-01 | End: 2022-08-01 | Stop reason: HOSPADM

## 2022-08-01 RX ORDER — FENTANYL CITRATE 50 UG/ML
INJECTION, SOLUTION INTRAMUSCULAR; INTRAVENOUS
Status: DISCONTINUED | OUTPATIENT
Start: 2022-08-01 | End: 2022-08-01 | Stop reason: HOSPADM

## 2022-08-01 RX ORDER — HEPARIN SOD,PORCINE/0.9 % NACL 1000/500ML
INTRAVENOUS SOLUTION INTRAVENOUS
Status: DISCONTINUED | OUTPATIENT
Start: 2022-08-01 | End: 2022-08-01 | Stop reason: HOSPADM

## 2022-08-01 RX ORDER — MIDAZOLAM HYDROCHLORIDE 1 MG/ML
INJECTION, SOLUTION INTRAMUSCULAR; INTRAVENOUS
Status: DISCONTINUED | OUTPATIENT
Start: 2022-08-01 | End: 2022-08-01 | Stop reason: HOSPADM

## 2022-08-01 RX ORDER — CEFAZOLIN SODIUM 2 G/50ML
SOLUTION INTRAVENOUS
Status: DISCONTINUED | OUTPATIENT
Start: 2022-08-01 | End: 2022-08-01 | Stop reason: HOSPADM

## 2022-08-01 RX ORDER — SODIUM CHLORIDE 9 MG/ML
INJECTION, SOLUTION INTRAVENOUS CONTINUOUS
Status: DISCONTINUED | OUTPATIENT
Start: 2022-08-01 | End: 2022-08-01 | Stop reason: HOSPADM

## 2022-08-01 RX ORDER — HEPARIN SODIUM 1000 [USP'U]/ML
INJECTION, SOLUTION INTRAVENOUS; SUBCUTANEOUS
Status: DISCONTINUED | OUTPATIENT
Start: 2022-08-01 | End: 2022-08-01 | Stop reason: HOSPADM

## 2022-08-01 NOTE — NURSING
Patient returned from procedure via wheelchair, ambulated to recliner without difficulty, reoriented to environment, nutrition offered, will continue to monitor.

## 2022-08-01 NOTE — PATIENT INSTRUCTIONS
VASCULAR SURGERY DISCHARGE INSTRUCTIONS    Woundcare:  - No dressing needed. Shower daily and pad your incision site dry  - Leave sutures in place  - It is normal to notice some bruising at your incision site in the first 1-3 days after surgery    Activity:  - Activity is good for you. Try to walk frequently and increase walking distance every day  - No heavy lifting for 2 weeks  - No baths, swimming in pools, lakes, jacuzzi etc. for 2 weeks     Diet:  -Resume your pre-operative home diet    Call Vascular Surgery Office at [phone] if you experience:  -Increased redness, warmth, tenderness, or draining pus from your incision  -Increased pain/swelling at your incision  -Worsening fevers, chills, nausea/vomiting  -Pain, weakness, coldness, or numbness in your legs  -Uncontrolled pain  -Your call will be returned within 24 hours and further instructions will be provided    Go to ER/Urgent Care if you experience:  -Worsening shortness of breath or chest pain  -Sudden severe pain and swelling at your incision site

## 2022-08-01 NOTE — OP NOTE
[facility] - Cath Lab  Surgery Department  Operative Note    SUMMARY     Date of Procedure: 8/1/2022     Procedure: Procedure(s) (LRB):  INSERTION, SINGLE LUMEN CATHETER WITH PORT, WITH FLUOROSCOPIC GUIDANCE (N/A)     Surgeon(s) and Role:     * Ryder Dinero MD - Primary    Assisting Surgeon: None    Pre-Operative Diagnosis: Malignant neoplasm of upper-outer quadrant of left breast in female, estrogen receptor negative [C50.412, Z17.1]    Post-Operative Diagnosis: Post-Op Diagnosis Codes:     * Malignant neoplasm of upper-outer quadrant of left breast in female, estrogen receptor negative [C50.412, Z17.1]    Anesthesia: RN IV Sedation    Operative Findings (including complications, if any): Portacath placed in right chest via RIJ access site. Catheter tip in appropriate position based off of intraoperative fluoroscopy. Port draws back and flushes well. Hemostasis achieved.    Description of Technical Procedures: Our attention was first turned to the right chest. Ultrasound was used to ensure the right internal jugular vein was patent. Ultrasound was used for guidance and an 18-gauge hollow needle was used to access the vessel. Satisfactory blood return was noted. A guide wire was inserted through the access needle and fluoroscopy and ultrasound were used to confirm the wire was in the correct position. Following this, the anterior right chest was inspected for an adequate port site. Local anesthetic was injected into the chosen site. A 3cm incision was made with a 15-blade scalpel and the port pocket was created using both electrocautery and blunt dissection. A skin nick was made along the wire in the neck using an 11-blade scalpel. The catheter was then connected to the port and tunneled from the port pocket to the left neck incision. The port was then sewn into place using two interrupted 3-0 Vicryl sutures. The catheter was measured and cut to length using an over the chest method and fluoroscopy. A dilator and  [facility] Medicine  Discharge Summary    Discharge to [facility].    Patient Name: Anabella Aranda  MRN: [id number]  Admission Date: 11/25/2019  Hospital Length of Stay: 2 days  Discharge Date and Time: No discharge date for patient encounter.  Attending Physician: Herberth Leblanc DO   Discharging Provider: Herberth Leblanc DO  Primary Care Provider: Shon Brambila MD      HPI:   70 y.o. female with past medical history of arthritis, C. difficile colitis, chronic kidney disease, stage 3, dementia, hepatitis b, hypertension, major depression, chron's  Disease, s/p left TKA with Dr. Lindsay on 11/16/19 with wound dehiscence complication.  The patient presents to ED complaining of intermittent, aching mid abdominal pain with associated feces from vagina.  Per patient's son reports symptoms started about three days ago.  Patient's GI physician, Dr. Carrera, was notified of patient symptoms and recommended patient report to ED.  Denies nausea, vomiting, fever, chills, cough, similar past episodes, aggravating or alleviating factors.  Patient's son also noticed that wound vac to left knee is not functioning properly.  ED findings: H/H 10.0/31.2, Sed rate 79, glucose 144, Alk Phos 150, Lipase 64, CRP 86.3, CT Abdomen showed no acute intra-abdominal abnormalities identified. On exam, patient AAOx3 with intermittent confusion, states she lives alone and her son lives close and helps with her care.  She does report having a home health nurse also. She currently reports no abdominal pain. Patient admitted to hospital medicine, under observation status, for further medical management.    Procedure(s) (LRB):  EGD (ESOPHAGOGASTRODUODENOSCOPY) (N/A)  SIGMOIDOSCOPY, FLEXIBLE (N/A)      Hospital Course:   11/26 pt seen, will place a picc line and consult nutrition for possible need for TPN, await GI input regarding scope to evaluate for possible fistula. Ortho on the case, will place a  sheath were inserted into the vein over the existing guidewire under direct fluoroscopy. The catheter was passed into the vessel through the introducer sheath. The catheter was confirmed to be in proper position in the SVC using fluoroscopy. The introducer sheath was then peeled away and the catheter insured to be in good position. The catheter was aspirated and blood return was good. Injectable saline was used to flush the catheter, followed by 5cc of heparinized saline. The port incision was closed using interrupted 3-0 vicryl sutures followed by a running 4-0 monocryl subcuticular suture. The neck incision was closed with an interrupted 4-0 monocryl. Dermabond glue was applied to both incisions. All counts were correct at the end of the procedure. Patient tolerated the procedure well. An upright chest xray was performed in the cath lab which confirmed adequate catheter tip placement and no evidence of pneumothorax.      Estimated Blood Loss (EBL): 5mL           Implants:   Implant Name Type Inv. Item Serial No.  Lot No. LRB No. Used Action   KIT POWERPORT SINGLE 8FR - [id number]  KIT POWERPORT SINGLE 8FR  [employer] [id number] N/A 1 Implanted       Specimens:   Specimen (24h ago, onward)            None                  Condition: Good    Disposition: PACU - hemodynamically stable.   wound vac on the pt as well.. Adding flagyl to cover for intestinal micros  11/27 pt seen doing ok, got PRBC transfused, had EGD today, will f/u on official results  11/28 pt seen, she had an EGD that was normal and a sigmoidoscopy, that showed:\  Impression:           - Decubitus ulceration found on perianal exam.                        - Inflammation was found in the rectum. This was                         moderate in severity, improved compared to previous                         examinations. Biopsied.                        - The monie-terminal ileum appears endoscopically                         much less inflammed and much better. This was                         biopsied to assess disease activty.                        - The rectum is characterized by linear ulcers and                         inflammatory polyps, however the endoscopic                         appearance is much improved.                        - no evidence of fistula found despite instillation                         of methylene blue.  Recommendation:                             - Refer to a colo-rectal surgeon (Dr. Worley) at                         appointment to be scheduled for consideration of                         end ileostomy creation.    Will consult [facility] because pt is on tpn and wound vac, discussed with pt and will discuss also with family.  Replace pos and mg    Pt seen on day of discharge, doing well, wound vac being changed today by ortho.  Discussed case with the pt's son, he is on board with the plan to discharge his mother to [facility] for continuing care, TPN, and the GI service will arrange for colorectal F/U appt as discussed and recommended after her EGD and sigmoidoscopy.     Consults:   Consults (From admission, onward)        Status Ordering Provider     Inpatient consult to Orthopedic Surgery  Once     Provider:  Ghassan Lindsay MD    Acknowledged ALLEN LENZ     Inpatient consult to PICC team ([facility])   Once     Provider:  (Not yet assigned)    Acknowledged ENID TREADWELL     Inpatient consult to Registered Dietitian/Nutritionist  Once     Provider:  (Not yet assigned)    Completed ENID TREADWELL     Inpatient consult to Social Work  Once     Provider:  (Not yet assigned)    Acknowledged ENID TREADWELL     IP consult to case management  Once     Provider:  (Not yet assigned)    Acknowledged INNOCENT-DARONBARBARAEMILEVADIM.          No new Assessment & Plan notes have been filed under this hospital service since the last note was generated.  Service: Hospital Medicine    Final Active Diagnoses:    Diagnosis Date Noted POA    PRINCIPAL PROBLEM:  Vaginal fistula [N82.8] 11/25/2019 Yes    Crohn's disease of small and large intestines with complication [K50.819] 10/25/2019 Yes     Chronic    Open wound of left knee [S81.002A] 11/26/2019 Yes    Tachycardia [R00.0] 11/25/2019 Yes    Wound dehiscence, surgical, sequela [T81.31XS]  Not Applicable    History of left knee replacement [Z96.652] 10/16/2019 Not Applicable    Alzheimers disease [G30.9, F02.80]  Yes     Chronic    Chronic kidney disease, stage 3 [N18.3]  Yes     Chronic    GERD (gastroesophageal reflux disease) [K21.9] 09/24/2019 Yes      Problems Resolved During this Admission:       Discharged Condition: stable    Disposition: Long Term Care    Follow Up:    Patient Instructions:   No discharge procedures on file.    Significant Diagnostic Studies: Labs:   BMP:   Recent Labs   Lab 11/28/19 0530 11/29/19 0540   GLU  --  71   NA  --  143   K  --  3.1*   CL  --  114*   CO2  --  20*   BUN  --  4*   CREATININE  --  0.7   CALCIUM  --  6.9*   MG 1.5* 2.0   , CMP   Recent Labs   Lab 11/29/19  0540      K 3.1*   *   CO2 20*   GLU 71   BUN 4*   CREATININE 0.7   CALCIUM 6.9*   ANIONGAP 9   ESTGFRAFRICA >60   EGFRNONAA >60   , CBC   Recent Labs   Lab 11/28/19 0530 11/29/19  0540   WBC 3.62* 3.17*   HGB 9.5* 9.5*   HCT 29.9* 29.7*    273   , Lipid  Panel   Lab Results   Component Value Date    CHOL 223 (H) 08/10/2019    HDL 62 08/10/2019    LDLCALC 145.2 08/10/2019    TRIG 79 08/10/2019    CHOLHDL 27.8 08/10/2019   , Troponin No results for input(s): TROPONINI in the last 168 hours. and A1C:   Recent Labs   Lab 11/01/19  1150   HGBA1C 5.7*       Pending Diagnostic Studies:     Procedure Component Value Units Date/Time    Specimen to Pathology, Surgery Gastrointestinal tract [[id number]] Collected:  11/27/19 1700    Order Status:  Sent Lab Status:  In process Updated:  11/27/19 1700         Medications:  Transfer Medications (for Discharge Readmit only):   Current Facility-Administered Medications   Medication Dose Route Frequency Provider Last Rate Last Dose    0.9%  NaCl infusion (for blood administration)   Intravenous Q24H PRN Herberth Leblanc DO        0.9%  NaCl infusion   Intravenous Continuous Leonila De La O NP 75 mL/hr at 11/28/19 1958      acetaminophen tablet 650 mg  650 mg Oral Q4H PRN Leonila De La O NP        Amino acid 4.25% - dextrose 10% (CLINIMIX-E) solution with additives (1L provides 42.5 gm AA, 100 gm CHO (340 kcal/L dextrose), Na 35, K 30, Mg 5, Ca 4.5, Acetate 70, Cl 39, Phos 15)   Intravenous Continuous Herberth Leblanc DO        aspirin EC tablet 81 mg  81 mg Oral Daily Leonila De La O NP   81 mg at 11/29/19 0945    azaTHIOprine tablet 50 mg  50 mg Oral Daily Herberth Leblanc DO   50 mg at 11/29/19 0945    cefazolin (ANCEF) 2 gram in dextrose 5% 50 mL IVPB (premix)  2 g Intravenous Q8H Ghassan Lindsay  mL/hr at 11/29/19 0614 2 g at 11/29/19 0614    donepezil tablet 10 mg  10 mg Oral QHS Leonila De La O NP   10 mg at 11/28/19 2058    enoxaparin injection 40 mg  40 mg Subcutaneous Daily Leonila De La O NP   40 mg at 11/28/19 1725    fat emulsion 20% infusion 250 mL  250 mL Intravenous Daily Herberth Leblanc DO        ferrous sulfate EC tablet 325 mg  325 mg Oral BID Leonila De La O, NP   325 mg at 11/29/19 1751    lorazepam  (ATIVAN) injection 0.5 mg  0.5 mg Intravenous On Call Procedure Herberth Cheeksushant, DO   0.5 mg at 11/26/19 1757    magnesium sulfate 2g in water 50mL IVPB (premix)  2 g Intravenous Once Herberthannel Sotoyung, DO   2 g at 11/29/19 1219    memantine tablet 10 mg  10 mg Oral Daily Leonila De La O NP   10 mg at 11/29/19 0946    metoprolol injection 5 mg  5 mg Intravenous Q12H PRN Leonila De La O NP   5 mg at 11/26/19 2106    metroNIDAZOLE tablet 500 mg  500 mg Oral Q8H Herberth Deana, DO   500 mg at 11/29/19 0945    ondansetron injection 4 mg  4 mg Intravenous Q8H PRN Leonila De La O NP        pantoprazole EC tablet 40 mg  40 mg Oral Daily Leonila De La O NP   40 mg at 11/29/19 0945    potassium phosphate 15 mmol in dextrose 5 % 250 mL infusion  15 mmol Intravenous BID Herberth Leblnac, DO 62.5 mL/hr at 11/29/19 1219 15 mmol at 11/29/19 1219    predniSONE tablet 10 mg  10 mg Oral Daily Og Carrera MD   10 mg at 11/29/19 0945    predniSONE tablet 10 mg  10 mg Oral Daily Herberth Brittanyyung, DO   10 mg at 11/29/19 0946    [START ON 12/3/2019] predniSONE tablet 5 mg  5 mg Oral Daily Herberth Leblanc, DO        QUEtiapine tablet 100 mg  100 mg Oral QHS Leonila De La O NP   100 mg at 11/28/19 2058    sodium chloride 0.9% flush 10 mL  10 mL Intravenous PRN Leonila De La O NP        sodium chloride 0.9% flush 10 mL  10 mL Intravenous Q6H Herberth Cheekni, DO   10 mL at 11/29/19 1200    And    sodium chloride 0.9% flush 10 mL  10 mL Intravenous PRN Herberth Cheekni, DO   10 mL at 11/28/19 1418       Indwelling Lines/Drains at time of discharge:   Lines/Drains/Airways     Peripherally Inserted Central Catheter Line                 PICC Double Lumen 11/26/19 1149 right brachial 3 days          Drain                 Urethral Catheter 11/25/19 1850 Non-latex;Straight-tip 3 days          Pressure Ulcer                 Negative Pressure Wound Therapy  10 days         Pressure Injury 11/26/19 0435 Right Buttocks Stage 2 3 days                Time  spent on the discharge of patient: 65 minutes  Patient was seen and examined on the date of discharge and determined to be suitable for discharge.       Herberth CORNELIUS. RICHA. CPE.  Department of Hospital Medicine  [facility]  Department of [facility] Medicine  [facility]

## 2022-08-01 NOTE — DISCHARGE INSTRUCTIONS
Vascular Access Port Implantation     Port implantation is surgery to place (implant) a port under the skin. For vascular access, it is placed into a vein. The port allows medicines or nutrition to be sent right into your bloodstream. Blood can also be taken or given through the port. During the procedure, a long, thin tube called a catheter is threaded into one of your large veins. The tube is then attached to the port. This usually sits under the skin of your chest and causes a small bump. To use the port, a special needle is passed through your skin and into the port. The needle can stay in your skin for up to 7 days, if needed. A port can stay in place for weeks or months or longer.      Why is a vascular access port needed?  A vascular access port may allow healthcare providers to give you:  Chemotherapy or other cancer-fighting drugs  IV treatments, such as antibiotics or nutrition  Hemodialysis (for kidney failure)  The port may also be used to draw blood.    Before the procedure  Follow any instructions you are given on how to prepare.  Tell your provider about any medicines you are taking. This includes:  All prescription medicines  Over-the-counter medicines such as aspirin or ibuprofen  Herbs, vitamins, and other supplements  Also be sure your provider knows:  If you are pregnant or think you may be pregnant  If you are allergic to any medicines or substances, especially local anesthetics or iodine  Your full medical history, including why you will need the port  If you plan on doing any contact sports    During the procedure  Before the procedure, an IV may be put into a vein in your arm or hand. This gives you fluids and medicines. You may be given medicine through the IV to help you relax during the procedure. This is called sedation. But some surgeons place ports using general anesthesia.  The chest is used most often for the port. In some cases, your belly (abdomen) or arm will be used instead.  The  skin over the insertion area is numbed with local anesthetic.  Ultrasound or X-rays are used to help the healthcare provider guide the catheter into the proper location during the procedure.  A cut (incision) is made in the skin where the port will be placed. A small pocket for the port is formed under the skin.  A second small incision is made in the skin near the first incision. A tunnel under the skin is created. The catheter is put through the tunnel and into the blood vessel.  The skin is closed over the port. It is held shut with stitches (sutures) or surgical glue or tape. The second small incision is also closed.  A chest X-ray may be done to make sure the port is placed properly.    After the procedure  You may be taken to a recovery room where youll recover from the sedation. Nurses will check on you as you rest. If you have pain, nurses can give you medicine. If you are not staying in the hospital overnight, you will be sent home a few hours after the procedure is done. A healthcare provider will tell you when you can go home. An adult family member or friend will need to drive you home.    Recovering at home  Take pain medicine as directed by your healthcare provider.  Take it easy for 24 hours after the procedure. Avoid physical activity and heavy lifting until your healthcare provider says its OK.  Keep the port clean and dry. Ask when you can shower again. You will need to keep the port dry by covering it when you shower.  Care for the insertion site as you are directed.  Dont swim, bathe, or do other activities that cause water to cover the insertion site.  To keep the port from getting blocked with blood clots, flush it as often as directed. You should be shown the proper way to flush the port before you go home. It is important to follow these directions.     Risks and possible complications of implantation  Bleeding  Infection of the insertion site  Damage to a blood vessel  Nerve injury or  irritation  Collapsed lung (for chest port placements)  Skin breakdown over the port  Risks and possible complications of having a port  Blocked  port or catheter  Leakage or breakage of the port or catheter  The port moves out of position  Blood clot  Skin or bloodstream infection  Skin breakdown over the port      When to seek medical care  Call your healthcare provider right away if you have any of the following:  A fever of 100.4°F (38.0°C) or higher  You can't access or use the port properly  You can't flush the port or get a blood return  The skin near the port is red, warm, swollen, or broken  You have shoulder pain on the side where the port is located  You feel a heart flutter or racing heart   Swollen arm, if the port is placed in your arm

## 2022-08-01 NOTE — H&P
[facility] - Short Stay Cardiac Unit  General Surgery  History & Physical    Patient Name: Laura Kent  MRN: [id number]  Admission Date: 8/1/2022  Attending Physician: Ryder Dinero MD   Primary Care Provider: Primary Doctor No    Patient information was obtained from patient and past medical records.     Subjective:     Chief Complaint/Reason for Admission: Portacath placement    History of Present Illness:  Patient is a 36 y.o. female with a PMH of HTN, anxiety, and left sided breast cancer known to involve the left axillary lymph node basin. She has now received neoadjuvant chemotherapy via a right sided subclavian vein portacath which was placed in March of 2021. It was subsequently removed two weeks later due to a bloodstream infection. After this she received her treatments via a right arm PICC line, but this was remoevd one month ago due to evidence of another bloodstream infection. Has not been on any antibiotics since that time. Is in need of port placement for further therapy. Denies fever, chills, N/V, or recent illness.    No current facility-administered medications on file prior to encounter.     Current Outpatient Medications on File Prior to Encounter   Medication Sig    ciprofloxacin HCl (CILOXAN) 0.3 % Oint Place into both eyes 3 (three) times daily.    dexAMETHasone (DECADRON) 4 MG Tab Take 1 tablet (4 mg total) by mouth every 6 (six) hours.    ibuprofen (ADVIL,MOTRIN) 200 MG tablet Take 200 mg by mouth every 6 (six) hours as needed for Pain.    lactulose (CHRONULAC) 20 gram/30 mL Soln Take 30 mLs (20 g total) by mouth 2 (two) times daily as needed (constipation).    LIDOcaine-prilocaine (EMLA) cream Apply topically as needed (for port).    OLANZapine (ZYPREXA) 5 MG tablet Take 1 tablet (5 mg total) by mouth every evening. While on chemotherapy    omeprazole (PRILOSEC) 10 MG capsule Take 10 mg by mouth once daily.    ondansetron (ZOFRAN) 8 MG tablet Take 1 tablet (8 mg total) by  mouth every 12 (twelve) hours as needed for Nausea.    oxyCODONE (ROXICODONE) 5 MG immediate release tablet Take 1 tablet (5 mg total) by mouth every 6 (six) hours as needed for Pain.    polyethylene glycol 3350 (MIRALAX ORAL) Take by mouth.    promethazine (PHENERGAN) 25 MG tablet Take 1 tablet (25 mg total) by mouth every 6 (six) hours as needed for Nausea.    psyllium husk (METAMUCIL ORAL) Take by mouth.    venlafaxine (EFFEXOR XR) 37.5 MG 24 hr capsule Take 1 capsule (37.5 mg total) by mouth once daily.       Review of patient's allergies indicates:   Allergen Reactions    Strawberries [strawberry] Hives       Past Medical History:   Diagnosis Date    Anxiety     Breast cancer     Encounter for blood transfusion     Hypertension     Meningitis      Past Surgical History:   Procedure Laterality Date    BREAST BIOPSY Left      SECTION      INSERTION OF TUNNELED CENTRAL VENOUS CATHETER (CVC) WITH SUBCUTANEOUS PORT N/A 2022    Procedure: [id number];  Surgeon: Deo Sin Jr., MD;  Location: [facility] OR [address] [place] [address];  Service: General;  Laterality: N/A;     Family History     Problem Relation (Age of Onset)    Bladder Cancer Paternal Grandmother    Diabetes Father    Hypertension Father    Pancreatic cancer Maternal Uncle        Tobacco Use    Smoking status: Never Smoker    Smokeless tobacco: Never Used   Substance and Sexual Activity    Alcohol use: Yes     Comment: social    Drug use: Never    Sexual activity: Not Currently     Review of Systems   Constitutional: Negative.    HENT: Negative.    Respiratory: Negative.    Cardiovascular: Negative.    Gastrointestinal: Negative.    Endocrine: Negative.    Genitourinary: Negative.    Musculoskeletal: Negative.    Neurological: Negative.    Hematological: Negative.    Psychiatric/Behavioral: Negative.      Objective:     Vital Signs (Most Recent):    Vital Signs (24h Range):           There is no height or weight on file to  calculate BMI.    Physical Exam  Vitals and nursing note reviewed.   Constitutional:       Appearance: Normal appearance. She is not ill-appearing.   HENT:      Head: Normocephalic.      Mouth/Throat:      Mouth: Mucous membranes are moist.      Pharynx: Oropharynx is clear.   Eyes:      General: No scleral icterus.     Extraocular Movements: Extraocular movements intact.      Conjunctiva/sclera: Conjunctivae normal.   Cardiovascular:      Rate and Rhythm: Normal rate.      Pulses: Normal pulses.   Pulmonary:      Effort: Pulmonary effort is normal. No respiratory distress.      Breath sounds: No wheezing.   Chest:      Comments: Scar in right chest from previous port placement  Abdominal:      General: Abdomen is flat. Bowel sounds are normal. There is no distension.      Palpations: Abdomen is soft.   Musculoskeletal:         General: No swelling or tenderness. Normal range of motion.      Cervical back: Normal range of motion.   Skin:     General: Skin is warm and dry.      Coloration: Skin is not jaundiced or pale.   Neurological:      General: No focal deficit present.      Mental Status: She is alert and oriented to person, place, and time.   Psychiatric:         Mood and Affect: Mood normal.         Significant Labs:  I have reviewed all pertinent lab results within the past 24 hours.    Significant Diagnostics:  I have reviewed all pertinent imaging results/findings within the past 24 hours.    Assessment/Plan:     There are no hospital problems to display for this patient.    VTE Risk Mitigation (From admission, onward)         Ordered     IP VTE HIGH RISK PATIENT  Once         08/01/22 0854     Place sequential compression device  Until discontinued         08/01/22 0854              Assessment / Plan: 36 year old female with left sided breast cancer. Here today for portacath placement    - Plan for right sided port placement today in the cath lab  - Consent in chart  - NPO  - COVID testing  [facility]      Kumar Farrar MD  General Surgery  [facility] - Short Stay Cardiac Unit

## 2022-08-01 NOTE — BRIEF OP NOTE
[facility] - Cath Lab  Brief Operative Note    Surgery Date: 8/1/2022     Surgeon(s) and Role:     * Ryder Dinero MD - Primary    Assisting Surgeon: None    Pre-op Diagnosis:  Malignant neoplasm of upper-outer quadrant of left breast in female, estrogen receptor negative [C50.412, Z17.1]    Post-op Diagnosis:  Post-Op Diagnosis Codes:     * Malignant neoplasm of upper-outer quadrant of left breast in female, estrogen receptor negative [C50.412, Z17.1]    Procedure(s) (LRB):  INSERTION, SINGLE LUMEN CATHETER WITH PORT, WITH FLUOROSCOPIC GUIDANCE (N/A)    Anesthesia: RN IV Sedation    Operative Findings: Portacath placed in right chest via RIJ access site. Catheter tip in appropriate position based off of intraoperative fluoroscopy. Port draws back and flushes well. Hemostasis achieved.    Estimated Blood Loss: 5mL         Specimens:   Specimen (24h ago, onward)            None            Discharge Note    OUTCOME: Patient tolerated treatment/procedure well without complication and is now ready for discharge.    DISPOSITION: Home or Self Care    FINAL DIAGNOSIS:  Malignant neoplasm of upper-outer quadrant of left breast in female, estrogen receptor negative    FOLLOWUP: None    DISCHARGE INSTRUCTIONS:    Discharge Procedure Orders   Diet Adult Regular     No driving until:   Order Comments: No Driving while taking narcotic pain medication     Notify your health care provider if you experience any of the following:  temperature >100.4     Notify your health care provider if you experience any of the following:  persistent nausea and vomiting or diarrhea     Notify your health care provider if you experience any of the following:  severe uncontrolled pain     Notify your health care provider if you experience any of the following:  redness, tenderness, or signs of infection (pain, swelling, redness, odor or green/yellow discharge around incision site)     Activity as tolerated

## 2022-08-02 ENCOUNTER — INFUSION (OUTPATIENT)
Dept: INFUSION THERAPY | Facility: HOSPITAL | Age: 37
End: 2022-08-02
Payer: MEDICAID

## 2022-08-02 VITALS
OXYGEN SATURATION: 100 % | TEMPERATURE: 98 F | RESPIRATION RATE: 18 BRPM | DIASTOLIC BLOOD PRESSURE: 77 MMHG | HEART RATE: 71 BPM | SYSTOLIC BLOOD PRESSURE: 148 MMHG

## 2022-08-02 DIAGNOSIS — Z17.1 MALIGNANT NEOPLASM OF UPPER-OUTER QUADRANT OF LEFT BREAST IN FEMALE, ESTROGEN RECEPTOR NEGATIVE: Primary | ICD-10-CM

## 2022-08-02 DIAGNOSIS — C50.412 MALIGNANT NEOPLASM OF UPPER-OUTER QUADRANT OF LEFT BREAST IN FEMALE, ESTROGEN RECEPTOR NEGATIVE: Primary | ICD-10-CM

## 2022-08-02 PROCEDURE — 25000003 PHARM REV CODE 250: Performed by: STUDENT IN AN ORGANIZED HEALTH CARE EDUCATION/TRAINING PROGRAM

## 2022-08-02 PROCEDURE — 96413 CHEMO IV INFUSION 1 HR: CPT

## 2022-08-02 PROCEDURE — A4216 STERILE WATER/SALINE, 10 ML: HCPCS | Performed by: STUDENT IN AN ORGANIZED HEALTH CARE EDUCATION/TRAINING PROGRAM

## 2022-08-02 PROCEDURE — 63600175 PHARM REV CODE 636 W HCPCS: Performed by: STUDENT IN AN ORGANIZED HEALTH CARE EDUCATION/TRAINING PROGRAM

## 2022-08-02 PROCEDURE — 96367 TX/PROPH/DG ADDL SEQ IV INF: CPT

## 2022-08-02 PROCEDURE — 96375 TX/PRO/DX INJ NEW DRUG ADDON: CPT

## 2022-08-02 RX ORDER — SODIUM CHLORIDE 0.9 % (FLUSH) 0.9 %
10 SYRINGE (ML) INJECTION
Status: DISCONTINUED | OUTPATIENT
Start: 2022-08-02 | End: 2022-08-02 | Stop reason: HOSPADM

## 2022-08-02 RX ORDER — FAMOTIDINE 10 MG/ML
20 INJECTION INTRAVENOUS
Status: COMPLETED | OUTPATIENT
Start: 2022-08-02 | End: 2022-08-02

## 2022-08-02 RX ORDER — EPINEPHRINE 0.3 MG/.3ML
0.3 INJECTION SUBCUTANEOUS ONCE AS NEEDED
Status: DISCONTINUED | OUTPATIENT
Start: 2022-08-02 | End: 2022-08-02 | Stop reason: HOSPADM

## 2022-08-02 RX ORDER — DIPHENHYDRAMINE HYDROCHLORIDE 50 MG/ML
50 INJECTION INTRAMUSCULAR; INTRAVENOUS ONCE AS NEEDED
Status: DISCONTINUED | OUTPATIENT
Start: 2022-08-02 | End: 2022-08-02 | Stop reason: HOSPADM

## 2022-08-02 RX ORDER — HEPARIN 100 UNIT/ML
500 SYRINGE INTRAVENOUS
Status: DISCONTINUED | OUTPATIENT
Start: 2022-08-02 | End: 2022-08-02 | Stop reason: HOSPADM

## 2022-08-02 RX ORDER — LIDOCAINE AND PRILOCAINE 25; 25 MG/G; MG/G
CREAM TOPICAL
Qty: 30 G | Refills: 0 | Status: ON HOLD | OUTPATIENT
Start: 2022-08-02 | End: 2022-09-19 | Stop reason: HOSPADM

## 2022-08-02 RX ADMIN — FAMOTIDINE 20 MG: 10 INJECTION, SOLUTION INTRAVENOUS at 08:08

## 2022-08-02 RX ADMIN — HEPARIN 500 UNITS: 100 SYRINGE at 11:08

## 2022-08-02 RX ADMIN — DEXAMETHASONE SODIUM PHOSPHATE 10 MG: 4 INJECTION, SOLUTION INTRA-ARTICULAR; INTRALESIONAL; INTRAMUSCULAR; INTRAVENOUS; SOFT TISSUE at 09:08

## 2022-08-02 RX ADMIN — Medication 10 ML: at 11:08

## 2022-08-02 RX ADMIN — DIPHENHYDRAMINE HYDROCHLORIDE 50 MG: 50 INJECTION, SOLUTION INTRAMUSCULAR; INTRAVENOUS at 08:08

## 2022-08-02 RX ADMIN — PACLITAXEL 174 MG: 6 INJECTION, SOLUTION, CONCENTRATE INTRAVENOUS at 10:08

## 2022-08-02 NOTE — PLAN OF CARE
Patient tolerated treatment with no complications. Port flushed and deaccessed. Patient ambulated away from clinic in [facility].

## 2022-08-02 NOTE — PLAN OF CARE
0800- Patient arrived to clinic for taxol infusion following lab appt. Assessment appropriate for treatment, labs pending. Port accessed, + blood return noted, flushes easily, NS infusing.

## 2022-08-05 NOTE — PROGRESS NOTES
Subjective:       Patient ID: Laura Kent is a 36 y.o. female.    Chief Complaint: No chief complaint on file.    HPI     Here for follow up and NA chemo. Reports increased fatigue yesterday and today- otherwise had been feeling well  Slept more as a result  No fevers, chills or infectious complaints  Denies pain issues    C2D15 pembro/carbo/taxol due today.   PICC removed   Tolerated last weeks treatment well.   Had more energy.   No f/c.   No n/v.   +constipation relieved with OTC softeners. Last BM was yesterday.   No mouth sores  +numbness and tingling in fingers and right big toe.   Neuropathy Not limiting ADL's  No trips or falls.   Appetite good.   Fiorcet helped with HA. No headaches since last week.           Oncology History:  - self detected an area under her left arm and it initially seemed to go away (noted around winter holidays)  - 3/4/2022 Outside Mammogram:  Findings:  The breasts are heterogeneously dense, which may obscure small masses.   Left  There is a 31 mm x 18 mm x 21 mm irregularly shaped mass with microlobulated margins seen in the left breast at 2 o'clock in the posterior depth with associated left axillary adenopathy. Largest node depicted on the outside study measures 49 x 23 x 29 mm with cortical thickening and effacement of the hilum. Breast mass is reportedly palpable.   Right  There is no evidence of suspicious masses, calcifications, or other abnormal findings in the right breast.  Impression:  Left  Mass: Left breast 31 mm x 18 mm x 21 mm mass at the posterior 2 o'clock position. Assessment: 5 - Highly suggestive of malignancy. Biopsy is recommended.   Right  There is no mammographic evidence of malignancy in the right breast.  BI-RADS Category:   Overall: 5 - Highly Suggestive of Malignancy  Recommendation:  Ultrasound Guided Core Needle Biopsy of the left breast mass and one of the abnormal left axillary nodes is recommended.     - 3/14/2022 Breast biopsy:  1. LEFT  BREAST MASS, 2 O'CLOCK, BIOPSY:   Invasive ductal carcinoma, [place] histologic grade 3 (tubule formation:   3, nuclear pleomorphism:  3, mitotic activity:  3).   Comment:  Infiltrating carcinoma occupies the entirety of biopsied material   with the largest continuous focus measuring 13 mm.  Breast biomarkers are   pending and will be issued in a supplemental report.   2. LEFT AXILLARY LYMPH NODE, BIOPSY:   Invasive ductal carcinoma, [place] histologic grade 3 (tubule formation:   3, nuclear pleomorphism:  3, mitotic activity:  3).   Comment:  Infiltrating carcinoma occupies the entirety of biopsied material   with the largest continuous focus measuring 20 mm.  No lymph node tissue is   identified in the sample.  Tumor histology is essentially identical to that   of part 1.  The findings could represent part of a larger intranodal   metastasis, but an extension from the primary tumor cannot be excluded.   Radiographic correlation is advised.   Note:  Dr. Stephanie Rao also reviewed this case and agrees with the   diagnosis.   BREAST BIOMARKER RESULTS   Estrogen receptor (ER):  Negative (0)   Progesterone receptor (ER):  Negative (0)   HER2 IHC:  Negative (1+)   Ki-67 proliferation index:  80%      - 3/17/2022 Breast MRI:  Findings:  The breasts have heterogeneous fibroglandular tissue. The background parenchymal enhancement is minimal.   Left  There is a 38 mm x 33 mm x 33 mm irregularly shaped mass seen in the left breast at 2 o'clock in the posterior depth, 8.4 cm from the nipple and 1.2 cm from the skin. Associated signal void from a twirl biopsy marker; pathology showed invasive ductal carcinoma.   Posterior to the mass there is abnormal non mass enhancement measuring 29 x 28 mm. The NME is 4 mm from the skin and 2.3 cm from the chest wall. In total the mass and NME measure 54 mm in AP extent.  Overlying skin thickening and edema involving the lateral breast, likely from lymphovascular obstruction. No  suspicious skin enhancement. Overall increased vascularity to the left breast.   Four abnormal lymph level 1 and level 2 left axillary lymph nodes. The largest lymph node measures 52 mm x 40 mm x 38 mm which previously underwent biopsy showing metastatic disease. There is an associated radar reflector within the biopsied lymph node.  Right  There is no evidence of suspicious masses, abnormal enhancement, or other abnormal findings in the right breast. No enlarged axillary or internal mammary lymph nodes.   Impression:  Left  Mass: Left breast 38 mm x 33 mm x 33 mm mass at the posterior 2 o'clock position. Assessment: 6 - Known biopsy, proven malignancy. Associated 29 mm NME extending posteriorly from the mass. In total the mass and NME measure 54 mm in AP extent.  Lymph Node: Abnormal level 1 and 2 left axillary lymph nodes, the largest of which measures 52 mm x 40 mm x 38 mm lymph node and is known biopsy, proven malignancy.   Right  There is no MR evidence of malignancy in the right breast.  BI-RADS Category:   Overall: 6 - Known Biopsy-Proven Malignancy  Recommendation:  Clinical management of known left breast cancer. Patient is established with the breast surgery clinic.      - Additional Imaging needed     - 3/21/2022 CT C/A/P:  FINDINGS:  Base of Neck: No significant abnormality.  Thoracic soft tissue: A proximally 3.2 x 3.6 x 3.7 cm irregular left breast mass within the lateral aspect with internal biopsy marker, corresponding to findings on prior breast MRI and compatible with malignancy.  Enlarged left axillary lymph nodes, largest measuring approximately 4.2 x 3.6 cm, (series 2, image 27).  CHEST:  -Heart: Normal size. No pericardial effusion.  -Pulmonary vasculature: Pulmonary arteries distribute normally.  There are four pulmonary veins.  -Radha/Mediastinum: No pathologic shelly enlargement.  -Trachea and Proximal airways: Trachea is midline.  Central airways are patent.  No significant bronchial wall  thickening or bronchiectasis.  -Lungs/Pleura: Symmetrically expanded without focal consolidation, pneumothorax, or mass.  No pleural effusion or thickening.  -Esophagus: Normal course and caliber.  ABDOMEN:  - Liver: Normal in size and attenuation with no focal hepatic abnormality.  - Gallbladder: No calcified gallstones.  No wall thickening or pericholecystic fluid.  - Bile Ducts: No intra or extrahepatic biliary ductal dilatation.  - Stomach/Duodenum: Small hiatal hernia otherwise unremarkable.  - Spleen: Normal size.  No focal parenchymal abnormality.  - Pancreas: No mass lesion.  No pancreatic ductal dilatation.  No peripancreatic fat stranding.  - Adrenals: Unremarkable.  - Kidneys/ureters/urinary bladder: Normal in size and location.  Normal enhancement pattern.  No nephrolithiasis.  Ureters are normal in course and caliber.  No hydroureteronephrosis.  - Retroperitoneum: Scattered nonenlarged retroperitoneal lymph nodes.  PELVIS:  - Reproductive: Intrauterine device present.  A proximally 2.8 cm peripherally enhancing right ovarian cyst, likely corpus luteal cyst.  - Other: No pelvic adenopathy, free fluid, or mass.  BOWEL/MESENTERY:  No evidence of bowel obstruction or inflammatory process. Appendix is visualized and unremarkable.  VASCULATURE: Left-sided aortic arch with 3 branch vessels.  No aneurysm and no significant atherosclerosis.  Portal vein, splenic vein, and SMV are patent.  BONES: [place] approximately 0.6 cm right trochanteric lesion, likely synovial herniation pit.  No acute fracture or bony destructive process.  EXTRATHORACIC/EXTRAPERITONEAL SOFT TISSUES: Unremarkable.  Impression:  In this patient with known breast cancer, there is an approximately 3.7 cm irregular left breast mass with left axillary lymphadenopathy.  No evidence of metastatic disease  Small hiatal hernia.  Additional findings as described above.     - 3/21/2022 Bone scan:  FINDINGS:  There is physiologic distribution of the  radiopharmaceutical throughout the skeleton.  Mild degenerative uptake within the bilateral shoulders and knees.  There is normal uptake in the genitourinary system and soft tissues.  Impression:  There is no scintigraphic evidence of osteoblastic metastatic disease.     - 3/21/2022 Brain MRI:  FINDINGS:  Please note there is dental metal artifact distorting the examination.  Allowing for artifacts the brain parenchyma is normal in contour.  Developmental variant with cavum septum pellucidum identified.  The ventricles are otherwise normal in size without hydrocephalus.  There is no midline shift or significant mass effect.  The major intracranial T2 flow voids are present.  No restricted diffusion within the non distorted brain parenchyma.  There is severe distortion of the susceptibility imaging no parenchymal susceptibility to suggest parenchymal hemorrhage in the non distorted brain parenchyma.  There is suboptimal evaluation of the cerebellum and posterior fossa.  Impression:  Unremarkable MRI brain allowing for artifacts from dental metal as detailed above specifically without abnormal parenchymal enhancement to suggest intracranial metastatic disease in light of history.     Repeat pregnancy test negative     - 3/30/2022 ECHO  · Moderate left atrial enlargement.  · The left ventricle is mildly enlarged with normal systolic function.  · The estimated ejection fraction is 60%.  · The left ventricular global longitudinal strain is -17%.  · Normal left ventricular diastolic function.  · Normal right ventricular size with normal right ventricular systolic function.  · Mild mitral regurgitation.  · Normal central venous pressure (3 mmHg).  · The estimated PA systolic pressure is 18 mmHg.     C1D1 Pembro/Carbo/Taxol 4/13/2022.     Admitted 4/19- 4/23/2022 with bacteremia from [facility] Course: Patient admitted for management of enterococcus bacteremia. Started on cefepime and vanc. ID consulted, recommending  Port removal given bacteremia and concerns for port infection. ID ordered for STI testing given vaginal discharge, which was negative. Vanc continued pending susceptibilities. Gen surg removed port a cath. Vancomycin discontinued and daptomycin started with plans for 14-day course. PICC line placed.      Resumed chemo 5/23/2022     PICC removed 6/2022    Review of Systems   Constitutional: Positive for fatigue. Negative for activity change, appetite change, chills, fever and unexpected weight change.   HENT: Negative for nasal congestion, mouth sores, rhinorrhea, sinus pressure/congestion, sneezing, sore throat and trouble swallowing.    Eyes: Negative for visual disturbance.   Respiratory: Negative for cough and shortness of breath.    Cardiovascular: Negative for chest pain.   Gastrointestinal: Negative for abdominal distention, abdominal pain, blood in stool, change in bowel habit, constipation, diarrhea, nausea, vomiting, reflux and change in bowel habit.   Genitourinary: Negative for decreased urine volume, difficulty urinating, dysuria, frequency and urgency.   Musculoskeletal: Negative for arthralgias, back pain, gait problem and joint deformity.   Integumentary:  Positive for rash. Negative for breast mass and breast tenderness.   Neurological: Negative for dizziness, weakness, light-headedness, numbness and headaches.   Hematological: Negative for adenopathy. Does not bruise/bleed easily.   Psychiatric/Behavioral: Negative for dysphoric mood and sleep disturbance. The patient is nervous/anxious.    Breast: Negative for mass and tenderness        Objective:      Physical Exam  Vitals and nursing note reviewed.   Constitutional:       General: She is not in acute distress.     Appearance: Normal appearance. She is well-developed. She is not ill-appearing.      Comments: Presents alone  Very pleasant  ECOG= 0   HENT:      Head: Normocephalic and atraumatic.   Eyes:      General: Lids are normal. No scleral  icterus.     Extraocular Movements: Extraocular movements intact.      Conjunctiva/sclera: Conjunctivae normal.      Pupils: Pupils are equal, round, and reactive to light.   Neck:      Thyroid: No thyromegaly.      Vascular: No JVD.      Trachea: Trachea normal.   Cardiovascular:      Rate and Rhythm: Normal rate and regular rhythm.      Heart sounds: Normal heart sounds. No murmur heard.    No friction rub. No gallop.   Pulmonary:      Effort: Pulmonary effort is normal. No respiratory distress.      Breath sounds: Normal breath sounds. No wheezing, rhonchi or rales.      Comments: Left breast mass smaller. No LAD  Right sided new port clean and dry  Chest:   Breasts:      Right: No axillary adenopathy or supraclavicular adenopathy.      Left: No axillary adenopathy or supraclavicular adenopathy.       Abdominal:      General: Bowel sounds are normal. There is no distension.      Palpations: Abdomen is soft. There is no mass.      Tenderness: There is no abdominal tenderness. There is no guarding or rebound.      Comments: No organomegaly.    Musculoskeletal:         General: No swelling or deformity. Normal range of motion.      Cervical back: Normal range of motion and neck supple.      Right lower leg: No edema.      Left lower leg: No edema.      Comments: No spinal or paraspinal tenderness.    Lymphadenopathy:      Head:      Right side of head: No submental or submandibular adenopathy.      Left side of head: No submental or submandibular adenopathy.      Cervical: No cervical adenopathy.      Upper Body:      Right upper body: No supraclavicular or axillary adenopathy.      Left upper body: No supraclavicular or axillary adenopathy.   Skin:     General: Skin is warm and dry.      Capillary Refill: Capillary refill takes less than 2 seconds.      Coloration: Skin is not jaundiced or pale.      Findings: No bruising or rash.      Nails: There is no clubbing.   Neurological:      General: No focal deficit  present.      Mental Status: She is alert and oriented to person, place, and time.      Cranial Nerves: No cranial nerve deficit.      Sensory: No sensory deficit.      Motor: No weakness.      Coordination: Coordination normal.      Gait: Gait normal.   Psychiatric:         Mood and Affect: Mood normal.         Speech: Speech normal.         Behavior: Behavior normal.         Thought Content: Thought content normal.       Labs- reviewed  Assessment:       Problem List Items Addressed This Visit     Malignant neoplasm of upper-outer quadrant of left breast in female, estrogen receptor negative - Primary      Other Visit Diagnoses     Other acne        Relevant Medications    doxycycline (VIBRAMYCIN) 100 MG Cap          Plan:       Labs adequate to proceed with chemotherapy  Overall tolerating well  Fatigue explained by ANC= 1000- adequate for treatment, may need growth factor next cycle  Completes C3 today    RTC 1 week to initiate C4    Acne - doxycycline sent for face and back    Route Chart for Scheduling    Med Onc Chart Routing  Urgent    Follow up with physician    Follow up with ANDREW . Needs a cbc, cmp, preg test, tsh and t4 abd EP with PJ for start of C4- chemo needs to reflect C4d1 which is Carbo, taxol and pembro   Infusion scheduling note    Injection scheduling note    Labs    Imaging    Pharmacy appointment    Other referrals          Treatment Plan Information   OP PEMBROLIZUMAB CARBOPLATIN (AUC 5) WITH WEEKLY PACLITAXEL FOLLOWED BY PEMBROLIZUMAB DOXORUBICIN CYCLOPHOSPHAMIDE FOLLOWED BY PEMBROLIZUMAB 200 MG Q3W   Yessy Cruz MD   Upcoming Treatment Dates - OP PEMBROLIZUMAB CARBOPLATIN (AUC 5) WITH WEEKLY PACLITAXEL FOLLOWED BY PEMBROLIZUMAB DOXORUBICIN CYCLOPHOSPHAMIDE FOLLOWED BY PEMBROLIZUMAB 200 MG Q3W    8/8/2022       Pre-Medications       dexamethasone (DECADRON) 10 mg in sodium chloride 0.9% 50 mL IVPB       diphenhydramine (BENADRYL) 50 mg in NS 50 mL IVPB       famotidine (PF) injection 20  mg       Chemotherapy       PACLitaxeL (TAXOL) 80 mg/m2 = 174 mg in sodium chloride 0.9% 250 mL chemo infusion  8/15/2022       Pre-Medications       diphenhydrAMINE (BENADRYL) 50 mg in sodium chloride 0.9% 50 mL IVPB       famotidine (PF) injection 20 mg       Chemotherapy       PACLitaxeL (TAXOL) 80 mg/m2 = 174 mg in sodium chloride 0.9% 250 mL chemo infusion       CARBOplatin (PARAPLATIN) 750 mg in sodium chloride 0.9% 250 mL chemo infusion       Antiemetics       aprepitant (CINVANTI) injection 130 mg       palonosetron (ALOXI) 0.25 mg with Dexamethasone (DECADRON) 12 mg in NS 50 mL IVPB       Immunotherapy       pembrolizumab (KEYTRUDA) 200 mg in sodium chloride 0.9% 108 mL infusion  8/22/2022       Pre-Medications       dexamethasone (DECADRON) 10 mg in sodium chloride 0.9% 50 mL IVPB       diphenhydrAMINE (BENADRYL) 50 mg in sodium chloride 0.9% 50 mL IVPB       famotidine (PF) injection 20 mg       Chemotherapy       PACLitaxeL (TAXOL) 80 mg/m2 = 174 mg in sodium chloride 0.9% 250 mL chemo infusion  8/29/2022       Pre-Medications       dexamethasone (DECADRON) 10 mg in sodium chloride 0.9% 50 mL IVPB       diphenhydrAMINE (BENADRYL) 50 mg in sodium chloride 0.9% 50 mL IVPB       famotidine (PF) injection 20 mg       Chemotherapy       PACLitaxeL (TAXOL) 80 mg/m2 = 174 mg in sodium chloride 0.9% 250 mL chemo infusion    Supportive Plan Information  OP FILGRASTIM 300 MCG   Yessy Cruz MD   Upcoming Treatment Dates - OP FILGRASTIM 300 MCG    6/20/2022       Medications       tbo-filgrastim (GRANIX) injection 480 mcg/0.8 mL  6/21/2022       Medications       tbo-filgrastim (GRANIX) injection 480 mcg/0.8 mL  6/22/2022       Medications       tbo-filgrastim (GRANIX) injection 480 mcg/0.8 mL  6/23/2022       Medications       tbo-filgrastim (GRANIX) injection 480 mcg/0.8 mL    Therapy Plan Information  Flushes  heparin, porcine (PF) 100 unit/mL injection flush 500 Units  500 Units, Intravenous, Every  visit  sodium chloride 0.9% flush 10 mL  10 mL, Intravenous, Every visit  heparin, porcine (PF) 100 unit/mL injection flush 500 Units  500 Units, Intravenous, Every visit  sodium chloride 0.9% flush 10 mL  10 mL, Intravenous, Every visit

## 2022-08-08 ENCOUNTER — INFUSION (OUTPATIENT)
Dept: INFUSION THERAPY | Facility: HOSPITAL | Age: 37
End: 2022-08-08
Payer: MEDICAID

## 2022-08-08 ENCOUNTER — OFFICE VISIT (OUTPATIENT)
Dept: HEMATOLOGY/ONCOLOGY | Facility: CLINIC | Age: 37
End: 2022-08-08
Payer: MEDICAID

## 2022-08-08 VITALS
SYSTOLIC BLOOD PRESSURE: 126 MMHG | OXYGEN SATURATION: 99 % | HEART RATE: 74 BPM | DIASTOLIC BLOOD PRESSURE: 65 MMHG | RESPIRATION RATE: 16 BRPM | TEMPERATURE: 98 F

## 2022-08-08 VITALS
HEIGHT: 65 IN | OXYGEN SATURATION: 99 % | SYSTOLIC BLOOD PRESSURE: 121 MMHG | RESPIRATION RATE: 17 BRPM | BODY MASS INDEX: 38.91 KG/M2 | DIASTOLIC BLOOD PRESSURE: 65 MMHG | WEIGHT: 233.56 LBS | HEART RATE: 67 BPM | TEMPERATURE: 98 F

## 2022-08-08 DIAGNOSIS — L70.8 OTHER ACNE: ICD-10-CM

## 2022-08-08 DIAGNOSIS — C50.412 MALIGNANT NEOPLASM OF UPPER-OUTER QUADRANT OF LEFT BREAST IN FEMALE, ESTROGEN RECEPTOR NEGATIVE: Primary | ICD-10-CM

## 2022-08-08 DIAGNOSIS — Z17.1 MALIGNANT NEOPLASM OF UPPER-OUTER QUADRANT OF LEFT BREAST IN FEMALE, ESTROGEN RECEPTOR NEGATIVE: Primary | ICD-10-CM

## 2022-08-08 PROCEDURE — 96413 CHEMO IV INFUSION 1 HR: CPT

## 2022-08-08 PROCEDURE — 96375 TX/PRO/DX INJ NEW DRUG ADDON: CPT

## 2022-08-08 PROCEDURE — 3078F PR MOST RECENT DIASTOLIC BLOOD PRESSURE < 80 MM HG: ICD-10-PCS | Mod: CPTII,,, | Performed by: INTERNAL MEDICINE

## 2022-08-08 PROCEDURE — 1159F PR MEDICATION LIST DOCUMENTED IN MEDICAL RECORD: ICD-10-PCS | Mod: CPTII,,, | Performed by: INTERNAL MEDICINE

## 2022-08-08 PROCEDURE — 96367 TX/PROPH/DG ADDL SEQ IV INF: CPT

## 2022-08-08 PROCEDURE — 1160F PR REVIEW ALL MEDS BY PRESCRIBER/CLIN PHARMACIST DOCUMENTED: ICD-10-PCS | Mod: CPTII,,, | Performed by: INTERNAL MEDICINE

## 2022-08-08 PROCEDURE — 99999 PR PBB SHADOW E&M-EST. PATIENT-LVL IV: ICD-10-PCS | Mod: PBBFAC,,, | Performed by: INTERNAL MEDICINE

## 2022-08-08 PROCEDURE — 3078F DIAST BP <80 MM HG: CPT | Mod: CPTII,,, | Performed by: INTERNAL MEDICINE

## 2022-08-08 PROCEDURE — 99214 OFFICE O/P EST MOD 30 MIN: CPT | Mod: PBBFAC,25 | Performed by: INTERNAL MEDICINE

## 2022-08-08 PROCEDURE — 3008F PR BODY MASS INDEX (BMI) DOCUMENTED: ICD-10-PCS | Mod: CPTII,,, | Performed by: INTERNAL MEDICINE

## 2022-08-08 PROCEDURE — 99999 PR PBB SHADOW E&M-EST. PATIENT-LVL IV: CPT | Mod: PBBFAC,,, | Performed by: INTERNAL MEDICINE

## 2022-08-08 PROCEDURE — 3074F SYST BP LT 130 MM HG: CPT | Mod: CPTII,,, | Performed by: INTERNAL MEDICINE

## 2022-08-08 PROCEDURE — 3074F PR MOST RECENT SYSTOLIC BLOOD PRESSURE < 130 MM HG: ICD-10-PCS | Mod: CPTII,,, | Performed by: INTERNAL MEDICINE

## 2022-08-08 PROCEDURE — 81025 URINE PREGNANCY TEST: CPT | Mod: PBBFAC | Performed by: INTERNAL MEDICINE

## 2022-08-08 PROCEDURE — 1159F MED LIST DOCD IN RCRD: CPT | Mod: CPTII,,, | Performed by: INTERNAL MEDICINE

## 2022-08-08 PROCEDURE — 63600175 PHARM REV CODE 636 W HCPCS: Performed by: INTERNAL MEDICINE

## 2022-08-08 PROCEDURE — 99214 OFFICE O/P EST MOD 30 MIN: CPT | Mod: S$PBB,,, | Performed by: INTERNAL MEDICINE

## 2022-08-08 PROCEDURE — 99214 PR OFFICE/OUTPT VISIT, EST, LEVL IV, 30-39 MIN: ICD-10-PCS | Mod: S$PBB,,, | Performed by: INTERNAL MEDICINE

## 2022-08-08 PROCEDURE — 1160F RVW MEDS BY RX/DR IN RCRD: CPT | Mod: CPTII,,, | Performed by: INTERNAL MEDICINE

## 2022-08-08 PROCEDURE — A4216 STERILE WATER/SALINE, 10 ML: HCPCS | Performed by: INTERNAL MEDICINE

## 2022-08-08 PROCEDURE — 25000003 PHARM REV CODE 250: Performed by: INTERNAL MEDICINE

## 2022-08-08 PROCEDURE — 3008F BODY MASS INDEX DOCD: CPT | Mod: CPTII,,, | Performed by: INTERNAL MEDICINE

## 2022-08-08 RX ORDER — FAMOTIDINE 10 MG/ML
20 INJECTION INTRAVENOUS
Status: CANCELLED | OUTPATIENT
Start: 2022-08-08

## 2022-08-08 RX ORDER — DIPHENHYDRAMINE HYDROCHLORIDE 50 MG/ML
50 INJECTION INTRAMUSCULAR; INTRAVENOUS ONCE AS NEEDED
Status: CANCELLED | OUTPATIENT
Start: 2022-08-08

## 2022-08-08 RX ORDER — DIPHENHYDRAMINE HYDROCHLORIDE 50 MG/ML
50 INJECTION INTRAMUSCULAR; INTRAVENOUS ONCE AS NEEDED
Status: DISCONTINUED | OUTPATIENT
Start: 2022-08-08 | End: 2022-08-08 | Stop reason: HOSPADM

## 2022-08-08 RX ORDER — SODIUM CHLORIDE 0.9 % (FLUSH) 0.9 %
10 SYRINGE (ML) INJECTION
Status: DISCONTINUED | OUTPATIENT
Start: 2022-08-08 | End: 2022-08-08 | Stop reason: HOSPADM

## 2022-08-08 RX ORDER — EPINEPHRINE 0.3 MG/.3ML
0.3 INJECTION SUBCUTANEOUS ONCE AS NEEDED
Status: CANCELLED | OUTPATIENT
Start: 2022-08-08

## 2022-08-08 RX ORDER — SODIUM CHLORIDE 0.9 % (FLUSH) 0.9 %
10 SYRINGE (ML) INJECTION
Status: CANCELLED | OUTPATIENT
Start: 2022-08-08

## 2022-08-08 RX ORDER — HEPARIN 100 UNIT/ML
500 SYRINGE INTRAVENOUS
Status: CANCELLED | OUTPATIENT
Start: 2022-08-08

## 2022-08-08 RX ORDER — DOXYCYCLINE 100 MG/1
100 CAPSULE ORAL 2 TIMES DAILY
Qty: 14 CAPSULE | Refills: 1 | Status: ON HOLD | OUTPATIENT
Start: 2022-08-08 | End: 2022-09-19 | Stop reason: HOSPADM

## 2022-08-08 RX ORDER — HEPARIN 100 UNIT/ML
500 SYRINGE INTRAVENOUS
Status: DISCONTINUED | OUTPATIENT
Start: 2022-08-08 | End: 2022-08-08 | Stop reason: HOSPADM

## 2022-08-08 RX ORDER — FAMOTIDINE 10 MG/ML
20 INJECTION INTRAVENOUS
Status: COMPLETED | OUTPATIENT
Start: 2022-08-08 | End: 2022-08-08

## 2022-08-08 RX ORDER — EPINEPHRINE 0.3 MG/.3ML
0.3 INJECTION SUBCUTANEOUS ONCE AS NEEDED
Status: DISCONTINUED | OUTPATIENT
Start: 2022-08-08 | End: 2022-08-08 | Stop reason: HOSPADM

## 2022-08-08 RX ADMIN — HEPARIN 500 UNITS: 100 SYRINGE at 11:08

## 2022-08-08 RX ADMIN — SODIUM CHLORIDE: 0.9 INJECTION, SOLUTION INTRAVENOUS at 09:08

## 2022-08-08 RX ADMIN — Medication 10 ML: at 11:08

## 2022-08-08 RX ADMIN — PACLITAXEL 174 MG: 6 INJECTION, SOLUTION, CONCENTRATE INTRAVENOUS at 10:08

## 2022-08-08 RX ADMIN — DEXAMETHASONE SODIUM PHOSPHATE 10 MG: 4 INJECTION, SOLUTION INTRA-ARTICULAR; INTRALESIONAL; INTRAMUSCULAR; INTRAVENOUS; SOFT TISSUE at 09:08

## 2022-08-08 RX ADMIN — FAMOTIDINE 20 MG: 10 INJECTION, SOLUTION INTRAVENOUS at 09:08

## 2022-08-08 RX ADMIN — DIPHENHYDRAMINE HYDROCHLORIDE 50 MG: 50 INJECTION, SOLUTION INTRAMUSCULAR; INTRAVENOUS at 09:08

## 2022-08-08 NOTE — PLAN OF CARE
1135-Pt tolerated Taxol infusion well, no complications or side effects, POC and meds discussed with pt, pt aware of upcoming appts, pt knows to call MD with any questions or concerns. Pt ambulated off unit, no distress noted.

## 2022-08-15 ENCOUNTER — LAB VISIT (OUTPATIENT)
Dept: LAB | Facility: HOSPITAL | Age: 37
End: 2022-08-15
Attending: INTERNAL MEDICINE
Payer: MEDICAID

## 2022-08-15 ENCOUNTER — OFFICE VISIT (OUTPATIENT)
Dept: HEMATOLOGY/ONCOLOGY | Facility: CLINIC | Age: 37
End: 2022-08-15
Payer: MEDICAID

## 2022-08-15 ENCOUNTER — INFUSION (OUTPATIENT)
Dept: INFUSION THERAPY | Facility: HOSPITAL | Age: 37
End: 2022-08-15
Attending: INTERNAL MEDICINE
Payer: MEDICAID

## 2022-08-15 VITALS
DIASTOLIC BLOOD PRESSURE: 85 MMHG | WEIGHT: 233.56 LBS | HEART RATE: 80 BPM | SYSTOLIC BLOOD PRESSURE: 134 MMHG | HEIGHT: 65 IN | BODY MASS INDEX: 38.91 KG/M2 | TEMPERATURE: 99 F | OXYGEN SATURATION: 97 %

## 2022-08-15 VITALS
RESPIRATION RATE: 18 BRPM | HEIGHT: 65 IN | HEART RATE: 75 BPM | WEIGHT: 231.69 LBS | SYSTOLIC BLOOD PRESSURE: 132 MMHG | BODY MASS INDEX: 38.6 KG/M2 | DIASTOLIC BLOOD PRESSURE: 72 MMHG | TEMPERATURE: 98 F

## 2022-08-15 DIAGNOSIS — D70.1 CHEMOTHERAPY INDUCED NEUTROPENIA: ICD-10-CM

## 2022-08-15 DIAGNOSIS — G62.0 CHEMOTHERAPY-INDUCED NEUROPATHY: ICD-10-CM

## 2022-08-15 DIAGNOSIS — R11.0 CHEMOTHERAPY-INDUCED NAUSEA: ICD-10-CM

## 2022-08-15 DIAGNOSIS — T45.1X5A CHEMOTHERAPY INDUCED NEUTROPENIA: ICD-10-CM

## 2022-08-15 DIAGNOSIS — Z17.1 MALIGNANT NEOPLASM OF UPPER-OUTER QUADRANT OF LEFT BREAST IN FEMALE, ESTROGEN RECEPTOR NEGATIVE: ICD-10-CM

## 2022-08-15 DIAGNOSIS — C50.412 MALIGNANT NEOPLASM OF UPPER-OUTER QUADRANT OF LEFT BREAST IN FEMALE, ESTROGEN RECEPTOR NEGATIVE: Primary | ICD-10-CM

## 2022-08-15 DIAGNOSIS — T45.1X5A CHEMOTHERAPY-INDUCED NEUROPATHY: ICD-10-CM

## 2022-08-15 DIAGNOSIS — T45.1X5A CHEMOTHERAPY-INDUCED NAUSEA: ICD-10-CM

## 2022-08-15 DIAGNOSIS — Z17.1 MALIGNANT NEOPLASM OF UPPER-OUTER QUADRANT OF LEFT BREAST IN FEMALE, ESTROGEN RECEPTOR NEGATIVE: Primary | ICD-10-CM

## 2022-08-15 DIAGNOSIS — C50.412 MALIGNANT NEOPLASM OF UPPER-OUTER QUADRANT OF LEFT BREAST IN FEMALE, ESTROGEN RECEPTOR NEGATIVE: ICD-10-CM

## 2022-08-15 DIAGNOSIS — D64.81 ANTINEOPLASTIC CHEMOTHERAPY INDUCED ANEMIA: ICD-10-CM

## 2022-08-15 DIAGNOSIS — T45.1X5A ANTINEOPLASTIC CHEMOTHERAPY INDUCED ANEMIA: ICD-10-CM

## 2022-08-15 LAB
ALBUMIN SERPL BCP-MCNC: 4 G/DL (ref 3.5–5.2)
ALP SERPL-CCNC: 46 U/L (ref 55–135)
ALT SERPL W/O P-5'-P-CCNC: 20 U/L (ref 10–44)
ANION GAP SERPL CALC-SCNC: 8 MMOL/L (ref 8–16)
AST SERPL-CCNC: 22 U/L (ref 10–40)
BILIRUB SERPL-MCNC: 0.4 MG/DL (ref 0.1–1)
BUN SERPL-MCNC: 19 MG/DL (ref 6–20)
CALCIUM SERPL-MCNC: 9.4 MG/DL (ref 8.7–10.5)
CHLORIDE SERPL-SCNC: 108 MMOL/L (ref 95–110)
CO2 SERPL-SCNC: 24 MMOL/L (ref 23–29)
CREAT SERPL-MCNC: 0.8 MG/DL (ref 0.5–1.4)
ERYTHROCYTE [DISTWIDTH] IN BLOOD BY AUTOMATED COUNT: 13.7 % (ref 11.5–14.5)
EST. GFR  (NO RACE VARIABLE): >60 ML/MIN/1.73 M^2
GLUCOSE SERPL-MCNC: 100 MG/DL (ref 70–110)
HCT VFR BLD AUTO: 29.9 % (ref 37–48.5)
HGB BLD-MCNC: 10.5 G/DL (ref 12–16)
IMM GRANULOCYTES # BLD AUTO: 0.06 K/UL (ref 0–0.04)
MCH RBC QN AUTO: 31.7 PG (ref 27–31)
MCHC RBC AUTO-ENTMCNC: 35.1 G/DL (ref 32–36)
MCV RBC AUTO: 90 FL (ref 82–98)
NEUTROPHILS # BLD AUTO: 2 K/UL (ref 1.8–7.7)
PLATELET # BLD AUTO: 221 K/UL (ref 150–450)
PMV BLD AUTO: 9.7 FL (ref 9.2–12.9)
POTASSIUM SERPL-SCNC: 4.2 MMOL/L (ref 3.5–5.1)
PROT SERPL-MCNC: 6.8 G/DL (ref 6–8.4)
RBC # BLD AUTO: 3.31 M/UL (ref 4–5.4)
SODIUM SERPL-SCNC: 140 MMOL/L (ref 136–145)
T4 SERPL-MCNC: 7.9 UG/DL (ref 4.5–11.5)
TSH SERPL DL<=0.005 MIU/L-ACNC: 0.84 UIU/ML (ref 0.4–4)
WBC # BLD AUTO: 3.65 K/UL (ref 3.9–12.7)

## 2022-08-15 PROCEDURE — 85027 COMPLETE CBC AUTOMATED: CPT | Performed by: INTERNAL MEDICINE

## 2022-08-15 PROCEDURE — 3079F DIAST BP 80-89 MM HG: CPT | Mod: CPTII,,, | Performed by: NURSE PRACTITIONER

## 2022-08-15 PROCEDURE — 3079F PR MOST RECENT DIASTOLIC BLOOD PRESSURE 80-89 MM HG: ICD-10-PCS | Mod: CPTII,,, | Performed by: NURSE PRACTITIONER

## 2022-08-15 PROCEDURE — 84436 ASSAY OF TOTAL THYROXINE: CPT | Performed by: INTERNAL MEDICINE

## 2022-08-15 PROCEDURE — 25000003 PHARM REV CODE 250: Performed by: NURSE PRACTITIONER

## 2022-08-15 PROCEDURE — 96417 CHEMO IV INFUS EACH ADDL SEQ: CPT

## 2022-08-15 PROCEDURE — 84443 ASSAY THYROID STIM HORMONE: CPT | Performed by: INTERNAL MEDICINE

## 2022-08-15 PROCEDURE — 80053 COMPREHEN METABOLIC PANEL: CPT | Performed by: INTERNAL MEDICINE

## 2022-08-15 PROCEDURE — 36415 COLL VENOUS BLD VENIPUNCTURE: CPT | Performed by: INTERNAL MEDICINE

## 2022-08-15 PROCEDURE — 99999 PR PBB SHADOW E&M-EST. PATIENT-LVL III: CPT | Mod: PBBFAC,,, | Performed by: NURSE PRACTITIONER

## 2022-08-15 PROCEDURE — 3075F SYST BP GE 130 - 139MM HG: CPT | Mod: CPTII,,, | Performed by: NURSE PRACTITIONER

## 2022-08-15 PROCEDURE — 99213 OFFICE O/P EST LOW 20 MIN: CPT | Mod: PBBFAC,25 | Performed by: NURSE PRACTITIONER

## 2022-08-15 PROCEDURE — 96367 TX/PROPH/DG ADDL SEQ IV INF: CPT

## 2022-08-15 PROCEDURE — 96413 CHEMO IV INFUSION 1 HR: CPT

## 2022-08-15 PROCEDURE — 63600175 PHARM REV CODE 636 W HCPCS: Performed by: NURSE PRACTITIONER

## 2022-08-15 PROCEDURE — 3008F PR BODY MASS INDEX (BMI) DOCUMENTED: ICD-10-PCS | Mod: CPTII,,, | Performed by: NURSE PRACTITIONER

## 2022-08-15 PROCEDURE — 99215 PR OFFICE/OUTPT VISIT, EST, LEVL V, 40-54 MIN: ICD-10-PCS | Mod: S$PBB,,, | Performed by: NURSE PRACTITIONER

## 2022-08-15 PROCEDURE — 99999 PR PBB SHADOW E&M-EST. PATIENT-LVL III: ICD-10-PCS | Mod: PBBFAC,,, | Performed by: NURSE PRACTITIONER

## 2022-08-15 PROCEDURE — 3008F BODY MASS INDEX DOCD: CPT | Mod: CPTII,,, | Performed by: NURSE PRACTITIONER

## 2022-08-15 PROCEDURE — 96375 TX/PRO/DX INJ NEW DRUG ADDON: CPT

## 2022-08-15 PROCEDURE — 99215 OFFICE O/P EST HI 40 MIN: CPT | Mod: S$PBB,,, | Performed by: NURSE PRACTITIONER

## 2022-08-15 PROCEDURE — A4216 STERILE WATER/SALINE, 10 ML: HCPCS | Performed by: NURSE PRACTITIONER

## 2022-08-15 PROCEDURE — 3075F PR MOST RECENT SYSTOLIC BLOOD PRESS GE 130-139MM HG: ICD-10-PCS | Mod: CPTII,,, | Performed by: NURSE PRACTITIONER

## 2022-08-15 RX ORDER — DIPHENHYDRAMINE HYDROCHLORIDE 50 MG/ML
50 INJECTION INTRAMUSCULAR; INTRAVENOUS ONCE AS NEEDED
Status: CANCELLED | OUTPATIENT
Start: 2022-08-15

## 2022-08-15 RX ORDER — DIPHENHYDRAMINE HYDROCHLORIDE 50 MG/ML
50 INJECTION INTRAMUSCULAR; INTRAVENOUS ONCE AS NEEDED
Status: DISCONTINUED | OUTPATIENT
Start: 2022-08-15 | End: 2022-08-15 | Stop reason: HOSPADM

## 2022-08-15 RX ORDER — FAMOTIDINE 10 MG/ML
20 INJECTION INTRAVENOUS
Status: CANCELLED | OUTPATIENT
Start: 2022-08-15

## 2022-08-15 RX ORDER — EPINEPHRINE 0.3 MG/.3ML
0.3 INJECTION SUBCUTANEOUS ONCE AS NEEDED
Status: DISCONTINUED | OUTPATIENT
Start: 2022-08-15 | End: 2022-08-15 | Stop reason: HOSPADM

## 2022-08-15 RX ORDER — FAMOTIDINE 10 MG/ML
20 INJECTION INTRAVENOUS
Status: COMPLETED | OUTPATIENT
Start: 2022-08-15 | End: 2022-08-15

## 2022-08-15 RX ORDER — SODIUM CHLORIDE 0.9 % (FLUSH) 0.9 %
10 SYRINGE (ML) INJECTION
Status: DISCONTINUED | OUTPATIENT
Start: 2022-08-15 | End: 2022-08-15 | Stop reason: HOSPADM

## 2022-08-15 RX ORDER — SODIUM CHLORIDE 0.9 % (FLUSH) 0.9 %
10 SYRINGE (ML) INJECTION
Status: CANCELLED | OUTPATIENT
Start: 2022-08-15

## 2022-08-15 RX ORDER — HEPARIN 100 UNIT/ML
500 SYRINGE INTRAVENOUS
Status: CANCELLED | OUTPATIENT
Start: 2022-08-15

## 2022-08-15 RX ORDER — HEPARIN 100 UNIT/ML
500 SYRINGE INTRAVENOUS
Status: DISCONTINUED | OUTPATIENT
Start: 2022-08-15 | End: 2022-08-15 | Stop reason: HOSPADM

## 2022-08-15 RX ORDER — EPINEPHRINE 0.3 MG/.3ML
0.3 INJECTION SUBCUTANEOUS ONCE AS NEEDED
Status: CANCELLED | OUTPATIENT
Start: 2022-08-15

## 2022-08-15 RX ADMIN — SODIUM CHLORIDE 200 MG: 9 INJECTION, SOLUTION INTRAVENOUS at 10:08

## 2022-08-15 RX ADMIN — Medication 10 ML: at 01:08

## 2022-08-15 RX ADMIN — DIPHENHYDRAMINE HYDROCHLORIDE 50 MG: 50 INJECTION, SOLUTION INTRAMUSCULAR; INTRAVENOUS at 10:08

## 2022-08-15 RX ADMIN — CARBOPLATIN 750 MG: 10 INJECTION INTRAVENOUS at 12:08

## 2022-08-15 RX ADMIN — HEPARIN 500 UNITS: 100 SYRINGE at 01:08

## 2022-08-15 RX ADMIN — APREPITANT 130 MG: 130 INJECTION, EMULSION INTRAVENOUS at 11:08

## 2022-08-15 RX ADMIN — FAMOTIDINE 20 MG: 10 INJECTION INTRAVENOUS at 10:08

## 2022-08-15 RX ADMIN — PACLITAXEL 174 MG: 6 INJECTION, SOLUTION, CONCENTRATE INTRAVENOUS at 11:08

## 2022-08-15 RX ADMIN — DEXAMETHASONE SODIUM PHOSPHATE 0.25 MG: 4 INJECTION, SOLUTION INTRA-ARTICULAR; INTRALESIONAL; INTRAMUSCULAR; INTRAVENOUS; SOFT TISSUE at 11:08

## 2022-08-15 NOTE — PROGRESS NOTES
"Subjective:       Patient ID: Laura Kent is a 36 y.o. female.    Chief Complaint: Malignant neoplasm of upper-outer quadrant of left breast i    HPI     Here for follow up and NA chemo.   Tolerating treatments fairly well.   Today - reports mild nausea and occasional dizziness - usually if stands too quickly.   She feels "over did it" yesterday as worked. [employer]   Reports hydrating well.   Softer stools - started a few days ago when started antibiotic. Took doxy for acne to face and back.   Good energy level but tired today form working yesterday and 17 hours Saturday.   Sleeping well.   No fevers, chills or infectious complaints  Denies pain issues  minimal numbness and tingling in fingers and right toe- this has improved and not limiting ADL's  No trips or falls.   Appetite good.   No further HA reported.             Oncology History:  - self detected an area under her left arm and it initially seemed to go away (noted around winter holidays)  - 3/4/2022 Outside Mammogram:  Findings:  The breasts are heterogeneously dense, which may obscure small masses.   Left  There is a 31 mm x 18 mm x 21 mm irregularly shaped mass with microlobulated margins seen in the left breast at 2 o'clock in the posterior depth with associated left axillary adenopathy. Largest node depicted on the outside study measures 49 x 23 x 29 mm with cortical thickening and effacement of the hilum. Breast mass is reportedly palpable.   Right  There is no evidence of suspicious masses, calcifications, or other abnormal findings in the right breast.  Impression:  Left  Mass: Left breast 31 mm x 18 mm x 21 mm mass at the posterior 2 o'clock position. Assessment: 5 - Highly suggestive of malignancy. Biopsy is recommended.   Right  There is no mammographic evidence of malignancy in the right breast.  BI-RADS Category:   Overall: 5 - Highly Suggestive of Malignancy  Recommendation:  Ultrasound Guided Core Needle Biopsy of the left " breast mass and one of the abnormal left axillary nodes is recommended.     - 3/14/2022 Breast biopsy:  1. LEFT BREAST MASS, 2 O'CLOCK, BIOPSY:   Invasive ductal carcinoma, [place] histologic grade 3 (tubule formation:   3, nuclear pleomorphism:  3, mitotic activity:  3).   Comment:  Infiltrating carcinoma occupies the entirety of biopsied material   with the largest continuous focus measuring 13 mm.  Breast biomarkers are   pending and will be issued in a supplemental report.   2. LEFT AXILLARY LYMPH NODE, BIOPSY:   Invasive ductal carcinoma, [place] histologic grade 3 (tubule formation:   3, nuclear pleomorphism:  3, mitotic activity:  3).   Comment:  Infiltrating carcinoma occupies the entirety of biopsied material   with the largest continuous focus measuring 20 mm.  No lymph node tissue is   identified in the sample.  Tumor histology is essentially identical to that   of part 1.  The findings could represent part of a larger intranodal   metastasis, but an extension from the primary tumor cannot be excluded.   Radiographic correlation is advised.   Note:  Dr. Stephanie Rao also reviewed this case and agrees with the   diagnosis.   BREAST BIOMARKER RESULTS   Estrogen receptor (ER):  Negative (0)   Progesterone receptor (ER):  Negative (0)   HER2 IHC:  Negative (1+)   Ki-67 proliferation index:  80%      - 3/17/2022 Breast MRI:  Findings:  The breasts have heterogeneous fibroglandular tissue. The background parenchymal enhancement is minimal.   Left  There is a 38 mm x 33 mm x 33 mm irregularly shaped mass seen in the left breast at 2 o'clock in the posterior depth, 8.4 cm from the nipple and 1.2 cm from the skin. Associated signal void from a twirl biopsy marker; pathology showed invasive ductal carcinoma.   Posterior to the mass there is abnormal non mass enhancement measuring 29 x 28 mm. The NME is 4 mm from the skin and 2.3 cm from the chest wall. In total the mass and NME measure 54 mm in AP  extent.  Overlying skin thickening and edema involving the lateral breast, likely from lymphovascular obstruction. No suspicious skin enhancement. Overall increased vascularity to the left breast.   Four abnormal lymph level 1 and level 2 left axillary lymph nodes. The largest lymph node measures 52 mm x 40 mm x 38 mm which previously underwent biopsy showing metastatic disease. There is an associated radar reflector within the biopsied lymph node.  Right  There is no evidence of suspicious masses, abnormal enhancement, or other abnormal findings in the right breast. No enlarged axillary or internal mammary lymph nodes.   Impression:  Left  Mass: Left breast 38 mm x 33 mm x 33 mm mass at the posterior 2 o'clock position. Assessment: 6 - Known biopsy, proven malignancy. Associated 29 mm NME extending posteriorly from the mass. In total the mass and NME measure 54 mm in AP extent.  Lymph Node: Abnormal level 1 and 2 left axillary lymph nodes, the largest of which measures 52 mm x 40 mm x 38 mm lymph node and is known biopsy, proven malignancy.   Right  There is no MR evidence of malignancy in the right breast.  BI-RADS Category:   Overall: 6 - Known Biopsy-Proven Malignancy  Recommendation:  Clinical management of known left breast cancer. Patient is established with the breast surgery clinic.      - Additional Imaging needed     - 3/21/2022 CT C/A/P:  FINDINGS:  Base of Neck: No significant abnormality.  Thoracic soft tissue: A proximally 3.2 x 3.6 x 3.7 cm irregular left breast mass within the lateral aspect with internal biopsy marker, corresponding to findings on prior breast MRI and compatible with malignancy.  Enlarged left axillary lymph nodes, largest measuring approximately 4.2 x 3.6 cm, (series 2, image 27).  CHEST:  -Heart: Normal size. No pericardial effusion.  -Pulmonary vasculature: Pulmonary arteries distribute normally.  There are four pulmonary veins.  -Radha/Mediastinum: No pathologic shelly  enlargement.  -Trachea and Proximal airways: Trachea is midline.  Central airways are patent.  No significant bronchial wall thickening or bronchiectasis.  -Lungs/Pleura: Symmetrically expanded without focal consolidation, pneumothorax, or mass.  No pleural effusion or thickening.  -Esophagus: Normal course and caliber.  ABDOMEN:  - Liver: Normal in size and attenuation with no focal hepatic abnormality.  - Gallbladder: No calcified gallstones.  No wall thickening or pericholecystic fluid.  - Bile Ducts: No intra or extrahepatic biliary ductal dilatation.  - Stomach/Duodenum: Small hiatal hernia otherwise unremarkable.  - Spleen: Normal size.  No focal parenchymal abnormality.  - Pancreas: No mass lesion.  No pancreatic ductal dilatation.  No peripancreatic fat stranding.  - Adrenals: Unremarkable.  - Kidneys/ureters/urinary bladder: Normal in size and location.  Normal enhancement pattern.  No nephrolithiasis.  Ureters are normal in course and caliber.  No hydroureteronephrosis.  - Retroperitoneum: Scattered nonenlarged retroperitoneal lymph nodes.  PELVIS:  - Reproductive: Intrauterine device present.  A proximally 2.8 cm peripherally enhancing right ovarian cyst, likely corpus luteal cyst.  - Other: No pelvic adenopathy, free fluid, or mass.  BOWEL/MESENTERY:  No evidence of bowel obstruction or inflammatory process. Appendix is visualized and unremarkable.  VASCULATURE: Left-sided aortic arch with 3 branch vessels.  No aneurysm and no significant atherosclerosis.  Portal vein, splenic vein, and SMV are patent.  BONES: Lucent approximately 0.6 cm right trochanteric lesion, likely synovial herniation pit.  No acute fracture or bony destructive process.  EXTRATHORACIC/EXTRAPERITONEAL SOFT TISSUES: Unremarkable.  Impression:  In this patient with known breast cancer, there is an approximately 3.7 cm irregular left breast mass with left axillary lymphadenopathy.  No evidence of metastatic disease  Small hiatal  hernia.  Additional findings as described above.     - 3/21/2022 Bone scan:  FINDINGS:  There is physiologic distribution of the radiopharmaceutical throughout the skeleton.  Mild degenerative uptake within the bilateral shoulders and knees.  There is normal uptake in the genitourinary system and soft tissues.  Impression:  There is no scintigraphic evidence of osteoblastic metastatic disease.     - 3/21/2022 Brain MRI:  FINDINGS:  Please note there is dental metal artifact distorting the examination.  Allowing for artifacts the brain parenchyma is normal in contour.  Developmental variant with cavum septum pellucidum identified.  The ventricles are otherwise normal in size without hydrocephalus.  There is no midline shift or significant mass effect.  The major intracranial T2 flow voids are present.  No restricted diffusion within the non distorted brain parenchyma.  There is severe distortion of the susceptibility imaging no parenchymal susceptibility to suggest parenchymal hemorrhage in the non distorted brain parenchyma.  There is suboptimal evaluation of the cerebellum and posterior fossa.  Impression:  Unremarkable MRI brain allowing for artifacts from dental metal as detailed above specifically without abnormal parenchymal enhancement to suggest intracranial metastatic disease in light of history.     Repeat pregnancy test negative     - 3/30/2022 ECHO  · Moderate left atrial enlargement.  · The left ventricle is mildly enlarged with normal systolic function.  · The estimated ejection fraction is 60%.  · The left ventricular global longitudinal strain is -17%.  · Normal left ventricular diastolic function.  · Normal right ventricular size with normal right ventricular systolic function.  · Mild mitral regurgitation.  · Normal central venous pressure (3 mmHg).  · The estimated PA systolic pressure is 18 mmHg.     C1D1 Pembro/Carbo/Taxol 4/13/2022.     Admitted 4/19- 4/23/2022 with bacteremia from  port     Hospital Course: Patient admitted for management of enterococcus bacteremia. Started on cefepime and vanc. ID consulted, recommending Port removal given bacteremia and concerns for port infection. ID ordered for STI testing given vaginal discharge, which was negative. Vanc continued pending susceptibilities. Gen surg removed port a cath. Vancomycin discontinued and daptomycin started with plans for 14-day course. PICC line placed.      Resumed chemo 5/23/2022     PICC removed 6/2022    Review of Systems   Constitutional:        See above   All other systems reviewed and are negative.        Objective:      Physical Exam  Vitals and nursing note reviewed.   Constitutional:       General: She is not in acute distress.     Appearance: Normal appearance. She is well-developed. She is not ill-appearing.      Comments: Presents alone  Very pleasant  ECOG= 0  No dizziness upon standing - tranfers to exam table independently and with ease.   BP sitting- 122/88 (manual)  BP standing 135/88 (manual)    HENT:      Head: Normocephalic and atraumatic.   Eyes:      General: Lids are normal. No scleral icterus.     Extraocular Movements: Extraocular movements intact.      Conjunctiva/sclera: Conjunctivae normal.      Pupils: Pupils are equal, round, and reactive to light.   Neck:      Thyroid: No thyromegaly.      Vascular: No JVD.      Trachea: Trachea normal.   Cardiovascular:      Rate and Rhythm: Normal rate and regular rhythm.      Heart sounds: Normal heart sounds. No murmur heard.    No friction rub. No gallop.   Pulmonary:      Effort: Pulmonary effort is normal. No respiratory distress.      Breath sounds: Normal breath sounds. No wheezing, rhonchi or rales.      Comments: Left breast mass smaller. No LAD  Right sided  port clean and dry - incision healed.   Chest:   Breasts:      Right: No axillary adenopathy or supraclavicular adenopathy.      Left: No axillary adenopathy or supraclavicular adenopathy.        Abdominal:      General: Bowel sounds are normal. There is no distension.      Palpations: Abdomen is soft. There is no mass.      Tenderness: There is no abdominal tenderness. There is no guarding or rebound.      Comments: No organomegaly.    Musculoskeletal:         General: No swelling or deformity. Normal range of motion.      Cervical back: Normal range of motion and neck supple.      Right lower leg: No edema.      Left lower leg: No edema.      Comments: No spinal or paraspinal tenderness.    Lymphadenopathy:      Head:      Right side of head: No submental or submandibular adenopathy.      Left side of head: No submental or submandibular adenopathy.      Cervical: No cervical adenopathy.      Upper Body:      Right upper body: No supraclavicular or axillary adenopathy.      Left upper body: No supraclavicular or axillary adenopathy.   Skin:     General: Skin is warm and dry.      Capillary Refill: Capillary refill takes less than 2 seconds.      Coloration: Skin is not jaundiced or pale.      Findings: No bruising or rash.      Nails: There is no clubbing.      Comments: acne to back   Neurological:      General: No focal deficit present.      Mental Status: She is alert and oriented to person, place, and time.      Sensory: Sensory deficit present.      Motor: No weakness.      Coordination: Coordination normal.      Gait: Gait normal.   Psychiatric:         Mood and Affect: Mood normal.         Speech: Speech normal.         Behavior: Behavior normal.         Thought Content: Thought content normal.       Labs- reviewed  Assessment:       Problem List Items Addressed This Visit        Oncology    Malignant neoplasm of upper-outer quadrant of left breast in female, estrogen receptor negative - Primary    Relevant Orders    CBC Oncology    Comprehensive Metabolic Panel    TSH    T4, Free    Chemotherapy induced neutropenia      Other Visit Diagnoses     Chemotherapy-induced neuropathy         Antineoplastic chemotherapy induced anemia        Chemotherapy-induced nausea        Relevant Medications    dexAMETHasone (DECADRON) 4 MG Tab          Plan:         Overall tolerating treatment well.   Labs reviewed.   Proceed with C4D1 Carbo, Taxol, pembro   RTC in 1 week and 2 weeks for taxol.   AC portion to start in 3 weeks- Rx decadron to [facility]  Nutrition and hydration discussed.   Neutropenic precautions discussed  Monitor anemia  Will need to set up with plastics as well as breast surgery when we closer to completion.     Route Chart for Scheduling    Med Onc Chart Routing  Urgent    Follow up with physician . See Dr. Cruz in 3 weeks with cbc, cmp, tsh, t4 and for AC/pembro   Follow up with ANDREW    Infusion scheduling note 1 week, 2 weeks for taxol; 3 weeks for AC-pembro   Injection scheduling note    Labs    Lab interval:  Cbc, cmp, 1 week and 2 weeks; cbc, cmp, tsh, t 4 in 3 weeks.    Imaging    Pharmacy appointment    Other referrals          Treatment Plan Information   OP PEMBROLIZUMAB CARBOPLATIN (AUC 5) WITH WEEKLY PACLITAXEL FOLLOWED BY PEMBROLIZUMAB DOXORUBICIN CYCLOPHOSPHAMIDE FOLLOWED BY PEMBROLIZUMAB 200 MG Q3W   Yessy Cruz MD   Upcoming Treatment Dates - OP PEMBROLIZUMAB CARBOPLATIN (AUC 5) WITH WEEKLY PACLITAXEL FOLLOWED BY PEMBROLIZUMAB DOXORUBICIN CYCLOPHOSPHAMIDE FOLLOWED BY PEMBROLIZUMAB 200 MG Q3W    8/22/2022       Pre-Medications       dexamethasone (DECADRON) 10 mg in sodium chloride 0.9% 50 mL IVPB       diphenhydramine (BENADRYL) 50 mg in NS 50 mL IVPB       famotidine (PF) injection 20 mg       Chemotherapy       PACLitaxeL (TAXOL) 80 mg/m2 = 174 mg in sodium chloride 0.9% 250 mL chemo infusion  8/29/2022       Pre-Medications       dexamethasone (DECADRON) 10 mg in sodium chloride 0.9% 50 mL IVPB       diphenhydramine (BENADRYL) 50 mg in NS 50 mL IVPB       famotidine (PF) injection 20 mg       Chemotherapy       PACLitaxeL (TAXOL) 80 mg/m2 = 174 mg in sodium chloride 0.9%  250 mL chemo infusion  9/5/2022       Chemotherapy       DOXOrubicin chemo injection 130 mg       cyclophosphamide 600 mg/m2 = 1,300 mg in sodium chloride 0.9% 250 mL chemo infusion       Antiemetics       aprepitant (CINVANTI) injection 130 mg       palonosetron 0.25mg/dexamethasone 12mg in NS IVPB       Immunotherapy       pembrolizumab (KEYTRUDA) 200 mg in sodium chloride 0.9% 108 mL infusion  9/26/2022       Chemotherapy       DOXOrubicin chemo injection 130 mg       cyclophosphamide 600 mg/m2 = 1,300 mg in sodium chloride 0.9% 250 mL chemo infusion       Antiemetics       aprepitant (CINVANTI) injection 130 mg       palonosetron 0.25mg/dexamethasone 12mg in NS IVPB       Immunotherapy       pembrolizumab (KEYTRUDA) 200 mg in sodium chloride 0.9% 108 mL infusion    Supportive Plan Information  OP FILGRASTIM 300 MCG   Yessy Cruz MD   Upcoming Treatment Dates - OP FILGRASTIM 300 MCG    6/20/2022       Medications       tbo-filgrastim (GRANIX) injection 480 mcg/0.8 mL  6/21/2022       Medications       tbo-filgrastim (GRANIX) injection 480 mcg/0.8 mL  6/22/2022       Medications       tbo-filgrastim (GRANIX) injection 480 mcg/0.8 mL  6/23/2022       Medications       tbo-filgrastim (GRANIX) injection 480 mcg/0.8 mL    Therapy Plan Information  Flushes  heparin, porcine (PF) 100 unit/mL injection flush 500 Units  500 Units, Intravenous, Every visit  sodium chloride 0.9% flush 10 mL  10 mL, Intravenous, Every visit  heparin, porcine (PF) 100 unit/mL injection flush 500 Units  500 Units, Intravenous, Every visit  sodium chloride 0.9% flush 10 mL  10 mL, Intravenous, Every visit    Patient is in agreement with the proposed treatment plan. All questions were answered to the patient's satisfaction. Pt knows to call clinic for any new or worsening symptoms and if anything is needed before the next clinic visit.      Adonay Cannon, FNP-C  Hematology & Oncology  [facility] [place] [address]  ph.  [phone]  Fax. [phone]     Face to Face time with patient: 30minutes  45minutes of total time spent on the encounter, which includes face to face time and non-face to face time preparing to see the patient (eg, review of tests), Obtaining and/or reviewing separately obtained history, Documenting clinical information in the electronic or other health record, Independently interpreting results (not separately reported) and communicating results to the patient/family/caregiver, or Care coordination (not separately reported).

## 2022-08-16 RX ORDER — DEXAMETHASONE 4 MG/1
4 TABLET ORAL 2 TIMES DAILY
Qty: 24 TABLET | Refills: 0 | Status: ON HOLD | OUTPATIENT
Start: 2022-08-16 | End: 2022-09-19 | Stop reason: HOSPADM

## 2022-08-19 ENCOUNTER — PATIENT MESSAGE (OUTPATIENT)
Dept: HEMATOLOGY/ONCOLOGY | Facility: CLINIC | Age: 37
End: 2022-08-19
Payer: MEDICAID

## 2022-08-19 ENCOUNTER — TELEPHONE (OUTPATIENT)
Dept: HEMATOLOGY/ONCOLOGY | Facility: CLINIC | Age: 37
End: 2022-08-19
Payer: MEDICAID

## 2022-08-19 NOTE — TELEPHONE ENCOUNTER
Spoke with patient who reports that she has had some nausea, dizziness and everything has a metallic taste.  She states she is taking zofran for the nausea and it helps.  Suggested drinking plenty of fluids, chewing gum or mints, using plastic utensils and eating citrus to help with foods having a metallic taste.  She's not sure what to do about the dizziness.  It's not constant but she says a few times while standing to cook she started to feel lightheaded and overheated.  Denies headaches, heart palpitations. Told her I would discuss with the NP to advise.  Reminded her that if she develops new or worsening symptoms she should go to the ER for evaluation.

## 2022-08-22 ENCOUNTER — LAB VISIT (OUTPATIENT)
Dept: LAB | Facility: HOSPITAL | Age: 37
End: 2022-08-22
Attending: INTERNAL MEDICINE
Payer: MEDICAID

## 2022-08-22 ENCOUNTER — INFUSION (OUTPATIENT)
Dept: INFUSION THERAPY | Facility: HOSPITAL | Age: 37
End: 2022-08-22
Attending: INTERNAL MEDICINE
Payer: MEDICAID

## 2022-08-22 VITALS
TEMPERATURE: 99 F | BODY MASS INDEX: 38.44 KG/M2 | DIASTOLIC BLOOD PRESSURE: 77 MMHG | RESPIRATION RATE: 18 BRPM | HEART RATE: 75 BPM | WEIGHT: 230.69 LBS | SYSTOLIC BLOOD PRESSURE: 132 MMHG | HEIGHT: 65 IN

## 2022-08-22 DIAGNOSIS — Z17.1 MALIGNANT NEOPLASM OF UPPER-OUTER QUADRANT OF LEFT BREAST IN FEMALE, ESTROGEN RECEPTOR NEGATIVE: Primary | ICD-10-CM

## 2022-08-22 DIAGNOSIS — C50.412 MALIGNANT NEOPLASM OF UPPER-OUTER QUADRANT OF LEFT BREAST IN FEMALE, ESTROGEN RECEPTOR NEGATIVE: Primary | ICD-10-CM

## 2022-08-22 DIAGNOSIS — C50.412 MALIGNANT NEOPLASM OF UPPER-OUTER QUADRANT OF LEFT BREAST IN FEMALE, ESTROGEN RECEPTOR NEGATIVE: ICD-10-CM

## 2022-08-22 DIAGNOSIS — Z17.1 MALIGNANT NEOPLASM OF UPPER-OUTER QUADRANT OF LEFT BREAST IN FEMALE, ESTROGEN RECEPTOR NEGATIVE: ICD-10-CM

## 2022-08-22 LAB
ALBUMIN SERPL BCP-MCNC: 4.1 G/DL (ref 3.5–5.2)
ALP SERPL-CCNC: 47 U/L (ref 55–135)
ALT SERPL W/O P-5'-P-CCNC: 24 U/L (ref 10–44)
ANION GAP SERPL CALC-SCNC: 7 MMOL/L (ref 8–16)
AST SERPL-CCNC: 26 U/L (ref 10–40)
BASOPHILS # BLD AUTO: 0.02 K/UL (ref 0–0.2)
BASOPHILS NFR BLD: 0.7 % (ref 0–1.9)
BILIRUB SERPL-MCNC: 0.3 MG/DL (ref 0.1–1)
BUN SERPL-MCNC: 13 MG/DL (ref 6–20)
CALCIUM SERPL-MCNC: 9.3 MG/DL (ref 8.7–10.5)
CHLORIDE SERPL-SCNC: 105 MMOL/L (ref 95–110)
CO2 SERPL-SCNC: 27 MMOL/L (ref 23–29)
CREAT SERPL-MCNC: 0.8 MG/DL (ref 0.5–1.4)
DIFFERENTIAL METHOD: ABNORMAL
EOSINOPHIL # BLD AUTO: 0 K/UL (ref 0–0.5)
EOSINOPHIL NFR BLD: 0.4 % (ref 0–8)
ERYTHROCYTE [DISTWIDTH] IN BLOOD BY AUTOMATED COUNT: 13.1 % (ref 11.5–14.5)
EST. GFR  (NO RACE VARIABLE): >60 ML/MIN/1.73 M^2
GLUCOSE SERPL-MCNC: 99 MG/DL (ref 70–110)
HCG INTACT+B SERPL-ACNC: <2.4 MIU/ML
HCT VFR BLD AUTO: 30.1 % (ref 37–48.5)
HGB BLD-MCNC: 10.3 G/DL (ref 12–16)
IMM GRANULOCYTES # BLD AUTO: 0.02 K/UL (ref 0–0.04)
IMM GRANULOCYTES NFR BLD AUTO: 0.7 % (ref 0–0.5)
LYMPHOCYTES # BLD AUTO: 1.2 K/UL (ref 1–4.8)
LYMPHOCYTES NFR BLD: 42.4 % (ref 18–48)
MCH RBC QN AUTO: 31.7 PG (ref 27–31)
MCHC RBC AUTO-ENTMCNC: 34.2 G/DL (ref 32–36)
MCV RBC AUTO: 93 FL (ref 82–98)
MONOCYTES # BLD AUTO: 0.2 K/UL (ref 0.3–1)
MONOCYTES NFR BLD: 8.6 % (ref 4–15)
NEUTROPHILS # BLD AUTO: 1.3 K/UL (ref 1.8–7.7)
NEUTROPHILS NFR BLD: 47.2 % (ref 38–73)
NRBC BLD-RTO: 0 /100 WBC
PLATELET # BLD AUTO: 220 K/UL (ref 150–450)
PMV BLD AUTO: 10 FL (ref 9.2–12.9)
POTASSIUM SERPL-SCNC: 4.2 MMOL/L (ref 3.5–5.1)
PROT SERPL-MCNC: 6.9 G/DL (ref 6–8.4)
RBC # BLD AUTO: 3.25 M/UL (ref 4–5.4)
SODIUM SERPL-SCNC: 139 MMOL/L (ref 136–145)
WBC # BLD AUTO: 2.78 K/UL (ref 3.9–12.7)

## 2022-08-22 PROCEDURE — 96367 TX/PROPH/DG ADDL SEQ IV INF: CPT

## 2022-08-22 PROCEDURE — 63600175 PHARM REV CODE 636 W HCPCS: Performed by: NURSE PRACTITIONER

## 2022-08-22 PROCEDURE — 85025 COMPLETE CBC W/AUTO DIFF WBC: CPT | Performed by: INTERNAL MEDICINE

## 2022-08-22 PROCEDURE — 25000003 PHARM REV CODE 250: Performed by: NURSE PRACTITIONER

## 2022-08-22 PROCEDURE — 80053 COMPREHEN METABOLIC PANEL: CPT | Performed by: INTERNAL MEDICINE

## 2022-08-22 PROCEDURE — 96413 CHEMO IV INFUSION 1 HR: CPT

## 2022-08-22 PROCEDURE — A4216 STERILE WATER/SALINE, 10 ML: HCPCS | Performed by: NURSE PRACTITIONER

## 2022-08-22 PROCEDURE — 96375 TX/PRO/DX INJ NEW DRUG ADDON: CPT

## 2022-08-22 PROCEDURE — 36415 COLL VENOUS BLD VENIPUNCTURE: CPT | Performed by: INTERNAL MEDICINE

## 2022-08-22 PROCEDURE — 84702 CHORIONIC GONADOTROPIN TEST: CPT | Performed by: INTERNAL MEDICINE

## 2022-08-22 RX ORDER — FAMOTIDINE 10 MG/ML
20 INJECTION INTRAVENOUS
Status: COMPLETED | OUTPATIENT
Start: 2022-08-22 | End: 2022-08-22

## 2022-08-22 RX ORDER — EPINEPHRINE 0.3 MG/.3ML
0.3 INJECTION SUBCUTANEOUS ONCE AS NEEDED
Status: DISCONTINUED | OUTPATIENT
Start: 2022-08-22 | End: 2022-08-22 | Stop reason: HOSPADM

## 2022-08-22 RX ORDER — DIPHENHYDRAMINE HYDROCHLORIDE 50 MG/ML
50 INJECTION INTRAMUSCULAR; INTRAVENOUS ONCE AS NEEDED
Status: DISCONTINUED | OUTPATIENT
Start: 2022-08-22 | End: 2022-08-22 | Stop reason: HOSPADM

## 2022-08-22 RX ORDER — HEPARIN 100 UNIT/ML
500 SYRINGE INTRAVENOUS
Status: DISCONTINUED | OUTPATIENT
Start: 2022-08-22 | End: 2022-08-22 | Stop reason: HOSPADM

## 2022-08-22 RX ORDER — FAMOTIDINE 10 MG/ML
20 INJECTION INTRAVENOUS
Status: CANCELLED | OUTPATIENT
Start: 2022-08-22

## 2022-08-22 RX ORDER — EPINEPHRINE 0.3 MG/.3ML
0.3 INJECTION SUBCUTANEOUS ONCE AS NEEDED
Status: CANCELLED | OUTPATIENT
Start: 2022-08-22

## 2022-08-22 RX ORDER — HEPARIN 100 UNIT/ML
500 SYRINGE INTRAVENOUS
Status: CANCELLED | OUTPATIENT
Start: 2022-08-22

## 2022-08-22 RX ORDER — SODIUM CHLORIDE 0.9 % (FLUSH) 0.9 %
10 SYRINGE (ML) INJECTION
Status: DISCONTINUED | OUTPATIENT
Start: 2022-08-22 | End: 2022-08-22 | Stop reason: HOSPADM

## 2022-08-22 RX ORDER — DIPHENHYDRAMINE HYDROCHLORIDE 50 MG/ML
50 INJECTION INTRAMUSCULAR; INTRAVENOUS ONCE AS NEEDED
Status: CANCELLED | OUTPATIENT
Start: 2022-08-22

## 2022-08-22 RX ORDER — SODIUM CHLORIDE 0.9 % (FLUSH) 0.9 %
10 SYRINGE (ML) INJECTION
Status: CANCELLED | OUTPATIENT
Start: 2022-08-22

## 2022-08-22 RX ADMIN — PACLITAXEL 174 MG: 6 INJECTION, SOLUTION, CONCENTRATE INTRAVENOUS at 11:08

## 2022-08-22 RX ADMIN — DIPHENHYDRAMINE HYDROCHLORIDE 50 MG: 50 INJECTION, SOLUTION INTRAMUSCULAR; INTRAVENOUS at 10:08

## 2022-08-22 RX ADMIN — Medication 10 ML: at 01:08

## 2022-08-22 RX ADMIN — HEPARIN 500 UNITS: 100 SYRINGE at 01:08

## 2022-08-22 RX ADMIN — DEXAMETHASONE SODIUM PHOSPHATE 10 MG: 4 INJECTION, SOLUTION INTRA-ARTICULAR; INTRALESIONAL; INTRAMUSCULAR; INTRAVENOUS; SOFT TISSUE at 11:08

## 2022-08-22 RX ADMIN — FAMOTIDINE 20 MG: 10 INJECTION INTRAVENOUS at 10:08

## 2022-08-22 RX ADMIN — SODIUM CHLORIDE: 0.9 INJECTION, SOLUTION INTRAVENOUS at 10:08

## 2022-08-22 NOTE — PLAN OF CARE
1301-Patient tolerated treatment well. Port was flushed and heparin locked, then de-accessed.  Discharged without complaints or S/S of adverse event.   Instructed to call provider for any questions or concerns.

## 2022-08-22 NOTE — PLAN OF CARE
1008-Labs , hx, and medications reviewed. Assessment completed. Discussed plan of care with patient. Patient in agreement. Chair reclined and warm blanket and snack offered.

## 2022-08-29 ENCOUNTER — INFUSION (OUTPATIENT)
Dept: INFUSION THERAPY | Facility: HOSPITAL | Age: 37
End: 2022-08-29
Attending: INTERNAL MEDICINE
Payer: MEDICAID

## 2022-08-29 ENCOUNTER — OFFICE VISIT (OUTPATIENT)
Dept: HEMATOLOGY/ONCOLOGY | Facility: CLINIC | Age: 37
End: 2022-08-29
Payer: MEDICAID

## 2022-08-29 VITALS
HEART RATE: 96 BPM | DIASTOLIC BLOOD PRESSURE: 73 MMHG | SYSTOLIC BLOOD PRESSURE: 124 MMHG | WEIGHT: 233.44 LBS | TEMPERATURE: 98 F | BODY MASS INDEX: 38.89 KG/M2 | RESPIRATION RATE: 18 BRPM | HEIGHT: 65 IN | OXYGEN SATURATION: 97 %

## 2022-08-29 VITALS
OXYGEN SATURATION: 98 % | HEART RATE: 85 BPM | SYSTOLIC BLOOD PRESSURE: 137 MMHG | DIASTOLIC BLOOD PRESSURE: 85 MMHG | TEMPERATURE: 99 F | RESPIRATION RATE: 16 BRPM

## 2022-08-29 DIAGNOSIS — D64.81 ANTINEOPLASTIC CHEMOTHERAPY INDUCED ANEMIA: ICD-10-CM

## 2022-08-29 DIAGNOSIS — T45.1X5A CHEMOTHERAPY-INDUCED NEUROPATHY: ICD-10-CM

## 2022-08-29 DIAGNOSIS — T45.1X5A ANTINEOPLASTIC CHEMOTHERAPY INDUCED ANEMIA: ICD-10-CM

## 2022-08-29 DIAGNOSIS — R21 RASH: ICD-10-CM

## 2022-08-29 DIAGNOSIS — D70.1 CHEMOTHERAPY INDUCED NEUTROPENIA: ICD-10-CM

## 2022-08-29 DIAGNOSIS — Z17.1 MALIGNANT NEOPLASM OF UPPER-OUTER QUADRANT OF LEFT BREAST IN FEMALE, ESTROGEN RECEPTOR NEGATIVE: Primary | ICD-10-CM

## 2022-08-29 DIAGNOSIS — C50.412 MALIGNANT NEOPLASM OF UPPER-OUTER QUADRANT OF LEFT BREAST IN FEMALE, ESTROGEN RECEPTOR NEGATIVE: Primary | ICD-10-CM

## 2022-08-29 DIAGNOSIS — G62.0 CHEMOTHERAPY-INDUCED NEUROPATHY: ICD-10-CM

## 2022-08-29 DIAGNOSIS — T45.1X5A CHEMOTHERAPY INDUCED NEUTROPENIA: ICD-10-CM

## 2022-08-29 PROCEDURE — 99215 OFFICE O/P EST HI 40 MIN: CPT | Mod: S$PBB,,, | Performed by: NURSE PRACTITIONER

## 2022-08-29 PROCEDURE — 3008F BODY MASS INDEX DOCD: CPT | Mod: CPTII,,, | Performed by: NURSE PRACTITIONER

## 2022-08-29 PROCEDURE — 3008F PR BODY MASS INDEX (BMI) DOCUMENTED: ICD-10-PCS | Mod: CPTII,,, | Performed by: NURSE PRACTITIONER

## 2022-08-29 PROCEDURE — 3078F DIAST BP <80 MM HG: CPT | Mod: CPTII,,, | Performed by: NURSE PRACTITIONER

## 2022-08-29 PROCEDURE — 25000003 PHARM REV CODE 250: Performed by: NURSE PRACTITIONER

## 2022-08-29 PROCEDURE — 1159F MED LIST DOCD IN RCRD: CPT | Mod: CPTII,,, | Performed by: NURSE PRACTITIONER

## 2022-08-29 PROCEDURE — 99214 OFFICE O/P EST MOD 30 MIN: CPT | Mod: PBBFAC,25 | Performed by: NURSE PRACTITIONER

## 2022-08-29 PROCEDURE — A4216 STERILE WATER/SALINE, 10 ML: HCPCS | Performed by: NURSE PRACTITIONER

## 2022-08-29 PROCEDURE — 99215 PR OFFICE/OUTPT VISIT, EST, LEVL V, 40-54 MIN: ICD-10-PCS | Mod: S$PBB,,, | Performed by: NURSE PRACTITIONER

## 2022-08-29 PROCEDURE — 1159F PR MEDICATION LIST DOCUMENTED IN MEDICAL RECORD: ICD-10-PCS | Mod: CPTII,,, | Performed by: NURSE PRACTITIONER

## 2022-08-29 PROCEDURE — 3078F PR MOST RECENT DIASTOLIC BLOOD PRESSURE < 80 MM HG: ICD-10-PCS | Mod: CPTII,,, | Performed by: NURSE PRACTITIONER

## 2022-08-29 PROCEDURE — 99999 PR PBB SHADOW E&M-EST. PATIENT-LVL IV: CPT | Mod: PBBFAC,,, | Performed by: NURSE PRACTITIONER

## 2022-08-29 PROCEDURE — 99999 PR PBB SHADOW E&M-EST. PATIENT-LVL IV: ICD-10-PCS | Mod: PBBFAC,,, | Performed by: NURSE PRACTITIONER

## 2022-08-29 PROCEDURE — 3074F PR MOST RECENT SYSTOLIC BLOOD PRESSURE < 130 MM HG: ICD-10-PCS | Mod: CPTII,,, | Performed by: NURSE PRACTITIONER

## 2022-08-29 PROCEDURE — 96375 TX/PRO/DX INJ NEW DRUG ADDON: CPT

## 2022-08-29 PROCEDURE — 3074F SYST BP LT 130 MM HG: CPT | Mod: CPTII,,, | Performed by: NURSE PRACTITIONER

## 2022-08-29 PROCEDURE — 63600175 PHARM REV CODE 636 W HCPCS: Performed by: NURSE PRACTITIONER

## 2022-08-29 PROCEDURE — 96367 TX/PROPH/DG ADDL SEQ IV INF: CPT

## 2022-08-29 PROCEDURE — 96413 CHEMO IV INFUSION 1 HR: CPT

## 2022-08-29 RX ORDER — EPINEPHRINE 0.3 MG/.3ML
0.3 INJECTION SUBCUTANEOUS ONCE AS NEEDED
Status: CANCELLED | OUTPATIENT
Start: 2022-08-29

## 2022-08-29 RX ORDER — HEPARIN 100 UNIT/ML
500 SYRINGE INTRAVENOUS
Status: CANCELLED | OUTPATIENT
Start: 2022-08-29

## 2022-08-29 RX ORDER — SODIUM CHLORIDE 0.9 % (FLUSH) 0.9 %
10 SYRINGE (ML) INJECTION
Status: CANCELLED | OUTPATIENT
Start: 2022-08-29

## 2022-08-29 RX ORDER — HEPARIN 100 UNIT/ML
500 SYRINGE INTRAVENOUS
Status: DISCONTINUED | OUTPATIENT
Start: 2022-08-29 | End: 2022-08-29 | Stop reason: HOSPADM

## 2022-08-29 RX ORDER — FLUCONAZOLE 150 MG/1
150 TABLET ORAL ONCE
Qty: 1 TABLET | Refills: 0 | Status: SHIPPED | OUTPATIENT
Start: 2022-08-29 | End: 2022-08-29

## 2022-08-29 RX ORDER — DIPHENHYDRAMINE HYDROCHLORIDE 50 MG/ML
50 INJECTION INTRAMUSCULAR; INTRAVENOUS ONCE AS NEEDED
Status: DISCONTINUED | OUTPATIENT
Start: 2022-08-29 | End: 2022-08-29 | Stop reason: HOSPADM

## 2022-08-29 RX ORDER — FAMOTIDINE 10 MG/ML
20 INJECTION INTRAVENOUS
Status: COMPLETED | OUTPATIENT
Start: 2022-08-29 | End: 2022-08-29

## 2022-08-29 RX ORDER — FAMOTIDINE 10 MG/ML
20 INJECTION INTRAVENOUS
Status: CANCELLED | OUTPATIENT
Start: 2022-08-29

## 2022-08-29 RX ORDER — EPINEPHRINE 0.3 MG/.3ML
0.3 INJECTION SUBCUTANEOUS ONCE AS NEEDED
Status: DISCONTINUED | OUTPATIENT
Start: 2022-08-29 | End: 2022-08-29 | Stop reason: HOSPADM

## 2022-08-29 RX ORDER — MUPIROCIN 20 MG/G
OINTMENT TOPICAL DAILY
Qty: 30 G | Refills: 0 | Status: SHIPPED | OUTPATIENT
Start: 2022-08-29 | End: 2022-11-07

## 2022-08-29 RX ORDER — DIPHENHYDRAMINE HYDROCHLORIDE 50 MG/ML
50 INJECTION INTRAMUSCULAR; INTRAVENOUS ONCE AS NEEDED
Status: CANCELLED | OUTPATIENT
Start: 2022-08-29

## 2022-08-29 RX ORDER — SODIUM CHLORIDE 0.9 % (FLUSH) 0.9 %
10 SYRINGE (ML) INJECTION
Status: DISCONTINUED | OUTPATIENT
Start: 2022-08-29 | End: 2022-08-29 | Stop reason: HOSPADM

## 2022-08-29 RX ADMIN — DIPHENHYDRAMINE HYDROCHLORIDE 50 MG: 50 INJECTION, SOLUTION INTRAMUSCULAR; INTRAVENOUS at 10:08

## 2022-08-29 RX ADMIN — DEXAMETHASONE SODIUM PHOSPHATE 10 MG: 4 INJECTION, SOLUTION INTRA-ARTICULAR; INTRALESIONAL; INTRAMUSCULAR; INTRAVENOUS; SOFT TISSUE at 10:08

## 2022-08-29 RX ADMIN — HEPARIN SODIUM (PORCINE) LOCK FLUSH IV SOLN 100 UNIT/ML 500 UNITS: 100 SOLUTION at 01:08

## 2022-08-29 RX ADMIN — SODIUM CHLORIDE: 0.9 INJECTION, SOLUTION INTRAVENOUS at 10:08

## 2022-08-29 RX ADMIN — PACLITAXEL 174 MG: 6 INJECTION, SOLUTION, CONCENTRATE INTRAVENOUS at 12:08

## 2022-08-29 RX ADMIN — FAMOTIDINE 20 MG: 10 INJECTION INTRAVENOUS at 10:08

## 2022-08-29 RX ADMIN — Medication 10 ML: at 01:08

## 2022-08-29 NOTE — PROGRESS NOTES
Subjective:       Patient ID: Laura Kent is a 36 y.o. female.    Chief Complaint: Malignant neoplasm of upper-outer quadrant of left breast i    HPI     Here for follow up and NA chemo.   Due for C4D15 Taxol    Headache today. Restarted last week after chemo. Of note, they had improved the last visit. Headaches seems to come after initial chemo. Taking Fiorcet with relief.   Runny nose started this morning- emotional today and feels related to that.   Dry nasal passages.    No fever/chills. No other infectious complaints.   No sore throat.   Not sleeping at night. Does not want a Rx.   Rash to back not improved. Back itches. No other rashes.   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 Having a down day today. No suicidal or homicidal ideation.   No further dizziness.          Oncology History:  - self detected an area under her left arm and it initially seemed to go away (noted around winter holidays)  - 3/4/2022 Outside Mammogram:  Findings:  The breasts are heterogeneously dense, which may obscure small masses.   Left  There is a 31 mm x 18 mm x 21 mm irregularly shaped mass with microlobulated margins seen in the left breast at 2 o'clock in the posterior depth with associated left axillary adenopathy. Largest node depicted on the outside study measures 49 x 23 x 29 mm with cortical thickening and effacement of the hilum. Breast mass is reportedly palpable.   Right  There is no evidence of suspicious masses, calcifications, or other abnormal findings in the right breast.  Impression:  Left  Mass: Left breast 31 mm x 18 mm x 21 mm mass at the posterior 2 o'clock position. Assessment: 5 - Highly suggestive of malignancy. Biopsy is recommended.   Right  There is no mammographic evidence of malignancy in the right breast.  BI-RADS Category:   Overall: 5 - Highly Suggestive of Malignancy  Recommendation:  Ultrasound Guided Core Needle Biopsy of the left breast mass and one of the  abnormal left axillary nodes is recommended.     - 3/14/2022 Breast biopsy:  1. LEFT BREAST MASS, 2 O'CLOCK, BIOPSY:   Invasive ductal carcinoma, Tad histologic grade 3 (tubule formation:   3, nuclear pleomorphism:  3, mitotic activity:  3).   Comment:  Infiltrating carcinoma occupies the entirety of biopsied material   with the largest continuous focus measuring 13 mm.  Breast biomarkers are   pending and will be issued in a supplemental report.   2. LEFT AXILLARY LYMPH NODE, BIOPSY:   Invasive ductal carcinoma, Tad histologic grade 3 (tubule formation:   3, nuclear pleomorphism:  3, mitotic activity:  3).   Comment:  Infiltrating carcinoma occupies the entirety of biopsied material   with the largest continuous focus measuring 20 mm.  No lymph node tissue is   identified in the sample.  Tumor histology is essentially identical to that   of part 1.  The findings could represent part of a larger intranodal   metastasis, but an extension from the primary tumor cannot be excluded.   Radiographic correlation is advised.   Note:  Dr. Stephanie Rao also reviewed this case and agrees with the   diagnosis.   BREAST BIOMARKER RESULTS   Estrogen receptor (ER):  Negative (0)   Progesterone receptor (ER):  Negative (0)   HER2 IHC:  Negative (1+)   Ki-67 proliferation index:  80%      - 3/17/2022 Breast MRI:  Findings:  The breasts have heterogeneous fibroglandular tissue. The background parenchymal enhancement is minimal.   Left  There is a 38 mm x 33 mm x 33 mm irregularly shaped mass seen in the left breast at 2 o'clock in the posterior depth, 8.4 cm from the nipple and 1.2 cm from the skin. Associated signal void from a twirl biopsy marker; pathology showed invasive ductal carcinoma.   Posterior to the mass there is abnormal non mass enhancement measuring 29 x 28 mm. The NME is 4 mm from the skin and 2.3 cm from the chest wall. In total the mass and NME measure 54 mm in AP extent.  Overlying skin thickening  and edema involving the lateral breast, likely from lymphovascular obstruction. No suspicious skin enhancement. Overall increased vascularity to the left breast.   Four abnormal lymph level 1 and level 2 left axillary lymph nodes. The largest lymph node measures 52 mm x 40 mm x 38 mm which previously underwent biopsy showing metastatic disease. There is an associated radar reflector within the biopsied lymph node.  Right  There is no evidence of suspicious masses, abnormal enhancement, or other abnormal findings in the right breast. No enlarged axillary or internal mammary lymph nodes.   Impression:  Left  Mass: Left breast 38 mm x 33 mm x 33 mm mass at the posterior 2 o'clock position. Assessment: 6 - Known biopsy, proven malignancy. Associated 29 mm NME extending posteriorly from the mass. In total the mass and NME measure 54 mm in AP extent.  Lymph Node: Abnormal level 1 and 2 left axillary lymph nodes, the largest of which measures 52 mm x 40 mm x 38 mm lymph node and is known biopsy, proven malignancy.   Right  There is no MR evidence of malignancy in the right breast.  BI-RADS Category:   Overall: 6 - Known Biopsy-Proven Malignancy  Recommendation:  Clinical management of known left breast cancer. Patient is established with the breast surgery clinic.      - Additional Imaging needed     - 3/21/2022 CT C/A/P:  FINDINGS:  Base of Neck: No significant abnormality.  Thoracic soft tissue: A proximally 3.2 x 3.6 x 3.7 cm irregular left breast mass within the lateral aspect with internal biopsy marker, corresponding to findings on prior breast MRI and compatible with malignancy.  Enlarged left axillary lymph nodes, largest measuring approximately 4.2 x 3.6 cm, (series 2, image 27).  CHEST:  -Heart: Normal size. No pericardial effusion.  -Pulmonary vasculature: Pulmonary arteries distribute normally.  There are four pulmonary veins.  -Radha/Mediastinum: No pathologic shelly enlargement.  -Trachea and Proximal airways:  Trachea is midline.  Central airways are patent.  No significant bronchial wall thickening or bronchiectasis.  -Lungs/Pleura: Symmetrically expanded without focal consolidation, pneumothorax, or mass.  No pleural effusion or thickening.  -Esophagus: Normal course and caliber.  ABDOMEN:  - Liver: Normal in size and attenuation with no focal hepatic abnormality.  - Gallbladder: No calcified gallstones.  No wall thickening or pericholecystic fluid.  - Bile Ducts: No intra or extrahepatic biliary ductal dilatation.  - Stomach/Duodenum: Small hiatal hernia otherwise unremarkable.  - Spleen: Normal size.  No focal parenchymal abnormality.  - Pancreas: No mass lesion.  No pancreatic ductal dilatation.  No peripancreatic fat stranding.  - Adrenals: Unremarkable.  - Kidneys/ureters/urinary bladder: Normal in size and location.  Normal enhancement pattern.  No nephrolithiasis.  Ureters are normal in course and caliber.  No hydroureteronephrosis.  - Retroperitoneum: Scattered nonenlarged retroperitoneal lymph nodes.  PELVIS:  - Reproductive: Intrauterine device present.  A proximally 2.8 cm peripherally enhancing right ovarian cyst, likely corpus luteal cyst.  - Other: No pelvic adenopathy, free fluid, or mass.  BOWEL/MESENTERY:  No evidence of bowel obstruction or inflammatory process. Appendix is visualized and unremarkable.  VASCULATURE: Left-sided aortic arch with 3 branch vessels.  No aneurysm and no significant atherosclerosis.  Portal vein, splenic vein, and SMV are patent.  BONES: Lucent approximately 0.6 cm right trochanteric lesion, likely synovial herniation pit.  No acute fracture or bony destructive process.  EXTRATHORACIC/EXTRAPERITONEAL SOFT TISSUES: Unremarkable.  Impression:  In this patient with known breast cancer, there is an approximately 3.7 cm irregular left breast mass with left axillary lymphadenopathy.  No evidence of metastatic disease  Small hiatal hernia.  Additional findings as described above.      - 3/21/2022 Bone scan:  FINDINGS:  There is physiologic distribution of the radiopharmaceutical throughout the skeleton.  Mild degenerative uptake within the bilateral shoulders and knees.  There is normal uptake in the genitourinary system and soft tissues.  Impression:  There is no scintigraphic evidence of osteoblastic metastatic disease.     - 3/21/2022 Brain MRI:  FINDINGS:  Please note there is dental metal artifact distorting the examination.  Allowing for artifacts the brain parenchyma is normal in contour.  Developmental variant with cavum septum pellucidum identified.  The ventricles are otherwise normal in size without hydrocephalus.  There is no midline shift or significant mass effect.  The major intracranial T2 flow voids are present.  No restricted diffusion within the non distorted brain parenchyma.  There is severe distortion of the susceptibility imaging no parenchymal susceptibility to suggest parenchymal hemorrhage in the non distorted brain parenchyma.  There is suboptimal evaluation of the cerebellum and posterior fossa.  Impression:  Unremarkable MRI brain allowing for artifacts from dental metal as detailed above specifically without abnormal parenchymal enhancement to suggest intracranial metastatic disease in light of history.     Repeat pregnancy test negative     - 3/30/2022 ECHO  Moderate left atrial enlargement.  The left ventricle is mildly enlarged with normal systolic function.  The estimated ejection fraction is 60%.  The left ventricular global longitudinal strain is -17%.  Normal left ventricular diastolic function.  Normal right ventricular size with normal right ventricular systolic function.  Mild mitral regurgitation.  Normal central venous pressure (3 mmHg).  The estimated PA systolic pressure is 18 mmHg.     C1D1 Pembro/Carbo/Taxol 4/13/2022.     Admitted 4/19- 4/23/2022 with bacteremia from [facility]     Hospital Course: Patient admitted for management of enterococcus  bacteremia. Started on cefepime and vanc. ID consulted, recommending Port removal given bacteremia and concerns for port infection. ID ordered for STI testing given vaginal discharge, which was negative. Vanc continued pending susceptibilities. Gen surg removed port a cath. Vancomycin discontinued and daptomycin started with plans for 14-day course. PICC line placed.      Resumed chemo 5/23/2022     PICC removed 6/2022    Review of Systems   Constitutional:         See above   All other systems reviewed and are negative.      Objective:      Physical Exam  Vitals and nursing note reviewed.   Constitutional:       General: She is not in acute distress.     Appearance: Normal appearance. She is well-developed. She is not ill-appearing.      Comments: Presents alone  Very pleasant  ECOG= 0  Tearful today.    HENT:      Head: Normocephalic and atraumatic.   Eyes:      General: Lids are normal. No scleral icterus.     Extraocular Movements: Extraocular movements intact.      Conjunctiva/sclera: Conjunctivae normal.      Pupils: Pupils are equal, round, and reactive to light.   Neck:      Thyroid: No thyromegaly.      Vascular: No JVD.      Trachea: Trachea normal.   Cardiovascular:      Rate and Rhythm: Normal rate and regular rhythm.      Heart sounds: Normal heart sounds. No murmur heard.    No friction rub. No gallop.   Pulmonary:      Effort: Pulmonary effort is normal. No respiratory distress.      Breath sounds: Normal breath sounds. No wheezing, rhonchi or rales.      Comments: Left breast mass smaller. No LAD  Right sided  port clean and dry - incision healed.   Abdominal:      General: Bowel sounds are normal. There is no distension.      Palpations: Abdomen is soft. There is no mass.      Tenderness: There is no abdominal tenderness. There is no guarding or rebound.      Comments: No organomegaly.    Musculoskeletal:         General: No swelling or deformity. Normal range of motion.      Cervical back: Normal  range of motion and neck supple.      Right lower leg: No edema.      Left lower leg: No edema.      Comments: No spinal or paraspinal tenderness.    Lymphadenopathy:      Head:      Right side of head: No submental or submandibular adenopathy.      Left side of head: No submental or submandibular adenopathy.      Cervical: No cervical adenopathy.      Upper Body:      Right upper body: No supraclavicular or axillary adenopathy.      Left upper body: No supraclavicular or axillary adenopathy.   Skin:     General: Skin is warm and dry.      Capillary Refill: Capillary refill takes less than 2 seconds.      Coloration: Skin is not jaundiced or pale.      Findings: No bruising or rash.      Nails: There is no clubbing.      Comments: acne to back   Neurological:      General: No focal deficit present.      Mental Status: She is alert and oriented to person, place, and time.      Sensory: Sensory deficit present.      Motor: No weakness.      Coordination: Coordination normal.      Gait: Gait normal.   Psychiatric:         Mood and Affect: Mood normal.         Speech: Speech normal.         Behavior: Behavior normal.         Thought Content: Thought content normal.         Labs- reviewed  Assessment:       Problem List Items Addressed This Visit          Oncology    Malignant neoplasm of upper-outer quadrant of left breast in female, estrogen receptor negative - Primary    Relevant Medications    mupirocin (BACTROBAN) 2 % ointment    fluconazole (DIFLUCAN) 150 MG Tab    Other Relevant Orders    CBC Oncology    Comprehensive Metabolic Panel    HCG, Quantitative    Chemotherapy induced neutropenia     Other Visit Diagnoses       Chemotherapy-induced neuropathy        Antineoplastic chemotherapy induced anemia        Rash                Plan:       Overall tolerating treatment well. Mood dysphoric today.   Labs reviewed.   Proceed with C4D15 Taxol  AC portion to start in 1 weeks- Rx decadron to [facility]  Trial diflucan for  rash  Cortaid to rash as well.   Bactroban to nasal passages.   Nutrition and hydration discussed.   Neutropenic precautions discussed  Monitor anemia  Will need to set up with plastics as well as breast surgery when we closer to completion.   Offered referral to psych but opts out. Reassurance given.      Route Chart for Scheduling    Med Onc Chart Routing  Urgent    Follow up with physician . See Dr. Cruz (needs MD)  in 1 weeks with cbc, cmp, hcg for AC/pembro   Follow up with ANDREW    Infusion scheduling note    Injection scheduling note    Labs    Imaging    Pharmacy appointment    Other referrals        Patient is in agreement with the proposed treatment plan. All questions were answered to the patient's satisfaction. Pt knows to call clinic for any new or worsening symptoms and if anything is needed before the next clinic visit.      JUICE Hester-MARLON  Hematology & Oncology  [address] [place] [id number]  ph. [phone]  Fax. [phone]     Face to Face time with patient: 30 minutes  45 minutes of total time spent on the encounter, which includes face to face time and non-face to face time preparing to see the patient (eg, review of tests), Obtaining and/or reviewing separately obtained history, Documenting clinical information in the electronic or other health record, Independently interpreting results (not separately reported) and communicating results to the patient/family/caregiver, or Care coordination (not separately reported).

## 2022-08-29 NOTE — PLAN OF CARE
1315-Pt tolerated Taxol infusion well, no complications or side effects, POC and meds discussed with pt, pt aware of upcoming appts, pt knows to call MD with any questions or concerns. Pt ambulated off unit, no distress noted.

## 2022-09-03 ENCOUNTER — HOSPITAL ENCOUNTER (INPATIENT)
Facility: HOSPITAL | Age: 37
LOS: 16 days | Discharge: HOME | DRG: 439 | End: 2022-09-19
Attending: STUDENT IN AN ORGANIZED HEALTH CARE EDUCATION/TRAINING PROGRAM | Admitting: INTERNAL MEDICINE
Payer: MEDICAID

## 2022-09-03 ENCOUNTER — PATIENT MESSAGE (OUTPATIENT)
Dept: HEMATOLOGY/ONCOLOGY | Facility: CLINIC | Age: 37
End: 2022-09-03
Payer: MEDICAID

## 2022-09-03 DIAGNOSIS — R07.9 CHEST PAIN: ICD-10-CM

## 2022-09-03 DIAGNOSIS — Z17.1 MALIGNANT NEOPLASM OF UPPER-OUTER QUADRANT OF LEFT BREAST IN FEMALE, ESTROGEN RECEPTOR NEGATIVE: ICD-10-CM

## 2022-09-03 DIAGNOSIS — C50.412 MALIGNANT NEOPLASM OF UPPER-OUTER QUADRANT OF LEFT BREAST IN FEMALE, ESTROGEN RECEPTOR NEGATIVE: ICD-10-CM

## 2022-09-03 DIAGNOSIS — K85.90 ACUTE PANCREATITIS: ICD-10-CM

## 2022-09-03 DIAGNOSIS — K85.90 ACUTE PANCREATITIS WITHOUT INFECTION OR NECROSIS, UNSPECIFIED PANCREATITIS TYPE: Primary | ICD-10-CM

## 2022-09-03 DIAGNOSIS — R19.7 DIARRHEA, UNSPECIFIED TYPE: ICD-10-CM

## 2022-09-03 DIAGNOSIS — K52.9 COLITIS: ICD-10-CM

## 2022-09-03 DIAGNOSIS — Z91.89 AT RISK FOR LONG QT SYNDROME: ICD-10-CM

## 2022-09-03 DIAGNOSIS — R94.31 QT PROLONGATION: ICD-10-CM

## 2022-09-03 LAB
ALBUMIN SERPL BCP-MCNC: 4.1 G/DL (ref 3.5–5.2)
ALP SERPL-CCNC: 55 U/L (ref 55–135)
ALT SERPL W/O P-5'-P-CCNC: 24 U/L (ref 10–44)
ANION GAP SERPL CALC-SCNC: 11 MMOL/L (ref 8–16)
ANISOCYTOSIS BLD QL SMEAR: SLIGHT
AST SERPL-CCNC: 22 U/L (ref 10–40)
B-HCG UR QL: NEGATIVE
BACTERIA #/AREA URNS AUTO: ABNORMAL /HPF
BASOPHILS # BLD AUTO: 0.02 K/UL (ref 0–0.2)
BASOPHILS NFR BLD: 0.6 % (ref 0–1.9)
BILIRUB SERPL-MCNC: 0.6 MG/DL (ref 0.1–1)
BILIRUB UR QL STRIP: NEGATIVE
BUN SERPL-MCNC: 14 MG/DL (ref 6–20)
BUN SERPL-MCNC: 15 MG/DL (ref 6–30)
CALCIUM SERPL-MCNC: 9.7 MG/DL (ref 8.7–10.5)
CHLORIDE SERPL-SCNC: 100 MMOL/L (ref 95–110)
CHLORIDE SERPL-SCNC: 102 MMOL/L (ref 95–110)
CLARITY UR REFRACT.AUTO: ABNORMAL
CO2 SERPL-SCNC: 22 MMOL/L (ref 23–29)
COLOR UR AUTO: YELLOW
CREAT SERPL-MCNC: 0.7 MG/DL (ref 0.5–1.4)
CREAT SERPL-MCNC: 0.8 MG/DL (ref 0.5–1.4)
CTP QC/QA: YES
DIFFERENTIAL METHOD: ABNORMAL
DOHLE BOD BLD QL SMEAR: PRESENT
EOSINOPHIL # BLD AUTO: 0 K/UL (ref 0–0.5)
EOSINOPHIL NFR BLD: 0.3 % (ref 0–8)
ERYTHROCYTE [DISTWIDTH] IN BLOOD BY AUTOMATED COUNT: 13.8 % (ref 11.5–14.5)
EST. GFR  (NO RACE VARIABLE): >60 ML/MIN/1.73 M^2
GLUCOSE SERPL-MCNC: 129 MG/DL (ref 70–110)
GLUCOSE SERPL-MCNC: 134 MG/DL (ref 70–110)
GLUCOSE UR QL STRIP: NEGATIVE
HCG INTACT+B SERPL-ACNC: 2.5 MIU/ML
HCT VFR BLD AUTO: 30 % (ref 37–48.5)
HCT VFR BLD CALC: 30 %PCV (ref 36–54)
HCV AB SERPL QL IA: NORMAL
HGB BLD-MCNC: 10.9 G/DL (ref 12–16)
HGB UR QL STRIP: NEGATIVE
HIV 1+2 AB+HIV1 P24 AG SERPL QL IA: NORMAL
IMM GRANULOCYTES # BLD AUTO: 0.02 K/UL (ref 0–0.04)
IMM GRANULOCYTES NFR BLD AUTO: 0.6 % (ref 0–0.5)
KETONES UR QL STRIP: NEGATIVE
LEUKOCYTE ESTERASE UR QL STRIP: ABNORMAL
LIPASE SERPL-CCNC: 219 U/L (ref 4–60)
LYMPHOCYTES # BLD AUTO: 0.9 K/UL (ref 1–4.8)
LYMPHOCYTES NFR BLD: 26.1 % (ref 18–48)
MCH RBC QN AUTO: 32.5 PG (ref 27–31)
MCHC RBC AUTO-ENTMCNC: 36.3 G/DL (ref 32–36)
MCV RBC AUTO: 90 FL (ref 82–98)
MICROSCOPIC COMMENT: ABNORMAL
MONOCYTES # BLD AUTO: 0.2 K/UL (ref 0.3–1)
MONOCYTES NFR BLD: 7.3 % (ref 4–15)
NEUTROPHILS # BLD AUTO: 2.1 K/UL (ref 1.8–7.7)
NEUTROPHILS NFR BLD: 65.1 % (ref 38–73)
NITRITE UR QL STRIP: NEGATIVE
NRBC BLD-RTO: 0 /100 WBC
PH UR STRIP: 6 [PH] (ref 5–8)
PLATELET # BLD AUTO: 220 K/UL (ref 150–450)
PLATELET BLD QL SMEAR: ABNORMAL
PMV BLD AUTO: 10.3 FL (ref 9.2–12.9)
POC IONIZED CALCIUM: 1.17 MMOL/L (ref 1.06–1.42)
POC TCO2 (MEASURED): 24 MMOL/L (ref 23–29)
POTASSIUM BLD-SCNC: 3.6 MMOL/L (ref 3.5–5.1)
POTASSIUM SERPL-SCNC: 3.6 MMOL/L (ref 3.5–5.1)
PROT SERPL-MCNC: 7.6 G/DL (ref 6–8.4)
PROT UR QL STRIP: NEGATIVE
RBC # BLD AUTO: 3.35 M/UL (ref 4–5.4)
RBC #/AREA URNS AUTO: 1 /HPF (ref 0–4)
SAMPLE: ABNORMAL
SODIUM BLD-SCNC: 136 MMOL/L (ref 136–145)
SODIUM SERPL-SCNC: 135 MMOL/L (ref 136–145)
SP GR UR STRIP: 1.02 (ref 1–1.03)
SQUAMOUS #/AREA URNS AUTO: 9 /HPF
URN SPEC COLLECT METH UR: ABNORMAL
WBC # BLD AUTO: 3.29 K/UL (ref 3.9–12.7)
WBC #/AREA URNS AUTO: 13 /HPF (ref 0–5)

## 2022-09-03 PROCEDURE — 86803 HEPATITIS C AB TEST: CPT | Performed by: PHYSICIAN ASSISTANT

## 2022-09-03 PROCEDURE — 80047 BASIC METABLC PNL IONIZED CA: CPT

## 2022-09-03 PROCEDURE — 87086 URINE CULTURE/COLONY COUNT: CPT | Performed by: EMERGENCY MEDICINE

## 2022-09-03 PROCEDURE — 87389 HIV-1 AG W/HIV-1&-2 AB AG IA: CPT | Performed by: PHYSICIAN ASSISTANT

## 2022-09-03 PROCEDURE — 25500020 PHARM REV CODE 255: Performed by: STUDENT IN AN ORGANIZED HEALTH CARE EDUCATION/TRAINING PROGRAM

## 2022-09-03 PROCEDURE — 80053 COMPREHEN METABOLIC PANEL: CPT | Performed by: EMERGENCY MEDICINE

## 2022-09-03 PROCEDURE — 81025 URINE PREGNANCY TEST: CPT | Performed by: STUDENT IN AN ORGANIZED HEALTH CARE EDUCATION/TRAINING PROGRAM

## 2022-09-03 PROCEDURE — 83690 ASSAY OF LIPASE: CPT | Performed by: EMERGENCY MEDICINE

## 2022-09-03 PROCEDURE — 84702 CHORIONIC GONADOTROPIN TEST: CPT | Performed by: STUDENT IN AN ORGANIZED HEALTH CARE EDUCATION/TRAINING PROGRAM

## 2022-09-03 PROCEDURE — 85025 COMPLETE CBC W/AUTO DIFF WBC: CPT | Performed by: EMERGENCY MEDICINE

## 2022-09-03 PROCEDURE — 81001 URINALYSIS AUTO W/SCOPE: CPT | Performed by: EMERGENCY MEDICINE

## 2022-09-03 PROCEDURE — 99285 EMERGENCY DEPT VISIT HI MDM: CPT | Mod: 25

## 2022-09-03 PROCEDURE — 12000002 HC ACUTE/MED SURGE SEMI-PRIVATE ROOM

## 2022-09-03 PROCEDURE — 99285 EMERGENCY DEPT VISIT HI MDM: CPT | Mod: ,,, | Performed by: STUDENT IN AN ORGANIZED HEALTH CARE EDUCATION/TRAINING PROGRAM

## 2022-09-03 PROCEDURE — 99285 PR EMERGENCY DEPT VISIT,LEVEL V: ICD-10-PCS | Mod: ,,, | Performed by: STUDENT IN AN ORGANIZED HEALTH CARE EDUCATION/TRAINING PROGRAM

## 2022-09-03 RX ORDER — IBUPROFEN 200 MG
16 TABLET ORAL
Status: DISCONTINUED | OUTPATIENT
Start: 2022-09-04 | End: 2022-09-19 | Stop reason: HOSPADM

## 2022-09-03 RX ORDER — NALOXONE HCL 0.4 MG/ML
0.02 VIAL (ML) INJECTION
Status: DISCONTINUED | OUTPATIENT
Start: 2022-09-04 | End: 2022-09-19 | Stop reason: HOSPADM

## 2022-09-03 RX ORDER — VENLAFAXINE HYDROCHLORIDE 37.5 MG/1
37.5 CAPSULE, EXTENDED RELEASE ORAL DAILY
Status: DISCONTINUED | OUTPATIENT
Start: 2022-09-04 | End: 2022-09-06

## 2022-09-03 RX ORDER — MORPHINE SULFATE 4 MG/ML
4 INJECTION, SOLUTION INTRAMUSCULAR; INTRAVENOUS
Status: COMPLETED | OUTPATIENT
Start: 2022-09-03 | End: 2022-09-04

## 2022-09-03 RX ORDER — SODIUM CHLORIDE 9 MG/ML
INJECTION, SOLUTION INTRAVENOUS CONTINUOUS
Status: DISCONTINUED | OUTPATIENT
Start: 2022-09-04 | End: 2022-09-12

## 2022-09-03 RX ORDER — ONDANSETRON 2 MG/ML
4 INJECTION INTRAMUSCULAR; INTRAVENOUS EVERY 6 HOURS PRN
Status: DISCONTINUED | OUTPATIENT
Start: 2022-09-04 | End: 2022-09-05

## 2022-09-03 RX ORDER — ONDANSETRON 2 MG/ML
4 INJECTION INTRAMUSCULAR; INTRAVENOUS
Status: COMPLETED | OUTPATIENT
Start: 2022-09-03 | End: 2022-09-04

## 2022-09-03 RX ORDER — ENOXAPARIN SODIUM 100 MG/ML
40 INJECTION SUBCUTANEOUS EVERY 24 HOURS
Status: DISCONTINUED | OUTPATIENT
Start: 2022-09-04 | End: 2022-09-19 | Stop reason: HOSPADM

## 2022-09-03 RX ORDER — GLUCAGON 1 MG
1 KIT INJECTION
Status: DISCONTINUED | OUTPATIENT
Start: 2022-09-04 | End: 2022-09-19 | Stop reason: HOSPADM

## 2022-09-03 RX ORDER — IBUPROFEN 200 MG
24 TABLET ORAL
Status: DISCONTINUED | OUTPATIENT
Start: 2022-09-04 | End: 2022-09-19 | Stop reason: HOSPADM

## 2022-09-03 RX ORDER — SODIUM CHLORIDE 0.9 % (FLUSH) 0.9 %
10 SYRINGE (ML) INJECTION EVERY 12 HOURS PRN
Status: DISCONTINUED | OUTPATIENT
Start: 2022-09-04 | End: 2022-09-19 | Stop reason: HOSPADM

## 2022-09-03 RX ORDER — MORPHINE SULFATE 2 MG/ML
2 INJECTION, SOLUTION INTRAMUSCULAR; INTRAVENOUS EVERY 6 HOURS PRN
Status: DISCONTINUED | OUTPATIENT
Start: 2022-09-04 | End: 2022-09-04

## 2022-09-03 RX ADMIN — IOHEXOL 100 ML: 350 INJECTION, SOLUTION INTRAVENOUS at 10:09

## 2022-09-04 PROBLEM — K85.90 ACUTE PANCREATITIS: Status: ACTIVE | Noted: 2022-09-04

## 2022-09-04 PROBLEM — R10.9 ABDOMINAL PAIN: Status: ACTIVE | Noted: 2022-09-04

## 2022-09-04 LAB
ALBUMIN SERPL BCP-MCNC: 3.7 G/DL (ref 3.5–5.2)
ALP SERPL-CCNC: 47 U/L (ref 55–135)
ALT SERPL W/O P-5'-P-CCNC: 21 U/L (ref 10–44)
ANION GAP SERPL CALC-SCNC: 9 MMOL/L (ref 8–16)
AST SERPL-CCNC: 18 U/L (ref 10–40)
BASOPHILS # BLD AUTO: 0.01 K/UL (ref 0–0.2)
BASOPHILS NFR BLD: 0.3 % (ref 0–1.9)
BILIRUB SERPL-MCNC: 0.6 MG/DL (ref 0.1–1)
BUN SERPL-MCNC: 12 MG/DL (ref 6–20)
CALCIUM SERPL-MCNC: 9.3 MG/DL (ref 8.7–10.5)
CHLORIDE SERPL-SCNC: 103 MMOL/L (ref 95–110)
CHOLEST SERPL-MCNC: 189 MG/DL (ref 120–199)
CHOLEST/HDLC SERPL: 6.3 {RATIO} (ref 2–5)
CO2 SERPL-SCNC: 24 MMOL/L (ref 23–29)
CREAT SERPL-MCNC: 0.8 MG/DL (ref 0.5–1.4)
DIFFERENTIAL METHOD: ABNORMAL
EOSINOPHIL # BLD AUTO: 0 K/UL (ref 0–0.5)
EOSINOPHIL NFR BLD: 0.7 % (ref 0–8)
ERYTHROCYTE [DISTWIDTH] IN BLOOD BY AUTOMATED COUNT: 13.6 % (ref 11.5–14.5)
EST. GFR  (NO RACE VARIABLE): >60 ML/MIN/1.73 M^2
GLUCOSE SERPL-MCNC: 118 MG/DL (ref 70–110)
HCT VFR BLD AUTO: 26.4 % (ref 37–48.5)
HDLC SERPL-MCNC: 30 MG/DL (ref 40–75)
HDLC SERPL: 15.9 % (ref 20–50)
HGB BLD-MCNC: 9.5 G/DL (ref 12–16)
IMM GRANULOCYTES # BLD AUTO: 0.01 K/UL (ref 0–0.04)
IMM GRANULOCYTES NFR BLD AUTO: 0.3 % (ref 0–0.5)
LDLC SERPL CALC-MCNC: 143.4 MG/DL (ref 63–159)
LYMPHOCYTES # BLD AUTO: 0.9 K/UL (ref 1–4.8)
LYMPHOCYTES NFR BLD: 28.1 % (ref 18–48)
MAGNESIUM SERPL-MCNC: 1.8 MG/DL (ref 1.6–2.6)
MCH RBC QN AUTO: 32.4 PG (ref 27–31)
MCHC RBC AUTO-ENTMCNC: 36 G/DL (ref 32–36)
MCV RBC AUTO: 90 FL (ref 82–98)
MONOCYTES # BLD AUTO: 0.4 K/UL (ref 0.3–1)
MONOCYTES NFR BLD: 13.2 % (ref 4–15)
NEUTROPHILS # BLD AUTO: 1.7 K/UL (ref 1.8–7.7)
NEUTROPHILS NFR BLD: 57.4 % (ref 38–73)
NONHDLC SERPL-MCNC: 159 MG/DL
NRBC BLD-RTO: 0 /100 WBC
PHOSPHATE SERPL-MCNC: 3.7 MG/DL (ref 2.7–4.5)
PLATELET # BLD AUTO: 172 K/UL (ref 150–450)
PMV BLD AUTO: 9.8 FL (ref 9.2–12.9)
POTASSIUM SERPL-SCNC: 3.6 MMOL/L (ref 3.5–5.1)
PROT SERPL-MCNC: 6.7 G/DL (ref 6–8.4)
RBC # BLD AUTO: 2.93 M/UL (ref 4–5.4)
SODIUM SERPL-SCNC: 136 MMOL/L (ref 136–145)
TRIGL SERPL-MCNC: 78 MG/DL (ref 30–150)
WBC # BLD AUTO: 3.02 K/UL (ref 3.9–12.7)

## 2022-09-04 PROCEDURE — 25000003 PHARM REV CODE 250

## 2022-09-04 PROCEDURE — 20600001 HC STEP DOWN PRIVATE ROOM

## 2022-09-04 PROCEDURE — 25000003 PHARM REV CODE 250: Performed by: STUDENT IN AN ORGANIZED HEALTH CARE EDUCATION/TRAINING PROGRAM

## 2022-09-04 PROCEDURE — 87040 BLOOD CULTURE FOR BACTERIA: CPT | Mod: 59 | Performed by: STUDENT IN AN ORGANIZED HEALTH CARE EDUCATION/TRAINING PROGRAM

## 2022-09-04 PROCEDURE — 93010 EKG 12-LEAD: ICD-10-PCS | Mod: ,,, | Performed by: INTERNAL MEDICINE

## 2022-09-04 PROCEDURE — 93005 ELECTROCARDIOGRAM TRACING: CPT

## 2022-09-04 PROCEDURE — 83735 ASSAY OF MAGNESIUM: CPT | Performed by: STUDENT IN AN ORGANIZED HEALTH CARE EDUCATION/TRAINING PROGRAM

## 2022-09-04 PROCEDURE — 80053 COMPREHEN METABOLIC PANEL: CPT | Performed by: STUDENT IN AN ORGANIZED HEALTH CARE EDUCATION/TRAINING PROGRAM

## 2022-09-04 PROCEDURE — 99223 1ST HOSP IP/OBS HIGH 75: CPT | Mod: ,,, | Performed by: INTERNAL MEDICINE

## 2022-09-04 PROCEDURE — 87040 BLOOD CULTURE FOR BACTERIA: CPT | Mod: 59

## 2022-09-04 PROCEDURE — 63600175 PHARM REV CODE 636 W HCPCS

## 2022-09-04 PROCEDURE — 84100 ASSAY OF PHOSPHORUS: CPT | Performed by: STUDENT IN AN ORGANIZED HEALTH CARE EDUCATION/TRAINING PROGRAM

## 2022-09-04 PROCEDURE — 80061 LIPID PANEL: CPT | Performed by: STUDENT IN AN ORGANIZED HEALTH CARE EDUCATION/TRAINING PROGRAM

## 2022-09-04 PROCEDURE — 93010 ELECTROCARDIOGRAM REPORT: CPT | Mod: ,,, | Performed by: INTERNAL MEDICINE

## 2022-09-04 PROCEDURE — 85025 COMPLETE CBC W/AUTO DIFF WBC: CPT | Performed by: STUDENT IN AN ORGANIZED HEALTH CARE EDUCATION/TRAINING PROGRAM

## 2022-09-04 PROCEDURE — 63600175 PHARM REV CODE 636 W HCPCS: Performed by: STUDENT IN AN ORGANIZED HEALTH CARE EDUCATION/TRAINING PROGRAM

## 2022-09-04 PROCEDURE — 99223 PR INITIAL HOSPITAL CARE,LEVL III: ICD-10-PCS | Mod: ,,, | Performed by: INTERNAL MEDICINE

## 2022-09-04 RX ORDER — CEFEPIME HYDROCHLORIDE 1 G/50ML
2 INJECTION, SOLUTION INTRAVENOUS
Status: DISCONTINUED | OUTPATIENT
Start: 2022-09-04 | End: 2022-09-07

## 2022-09-04 RX ORDER — MORPHINE SULFATE 4 MG/ML
4 INJECTION, SOLUTION INTRAMUSCULAR; INTRAVENOUS EVERY 4 HOURS PRN
Status: DISCONTINUED | OUTPATIENT
Start: 2022-09-04 | End: 2022-09-05

## 2022-09-04 RX ORDER — ACETAMINOPHEN 325 MG/1
650 TABLET ORAL EVERY 6 HOURS PRN
Status: DISCONTINUED | OUTPATIENT
Start: 2022-09-04 | End: 2022-09-07

## 2022-09-04 RX ADMIN — MORPHINE SULFATE 4 MG: 4 INJECTION INTRAVENOUS at 09:09

## 2022-09-04 RX ADMIN — ACETAMINOPHEN 650 MG: 325 TABLET ORAL at 11:09

## 2022-09-04 RX ADMIN — CEFEPIME HYDROCHLORIDE 2 G: 2 INJECTION, SOLUTION INTRAVENOUS at 05:09

## 2022-09-04 RX ADMIN — ENOXAPARIN SODIUM 40 MG: 100 INJECTION SUBCUTANEOUS at 05:09

## 2022-09-04 RX ADMIN — SODIUM CHLORIDE: 0.9 INJECTION, SOLUTION INTRAVENOUS at 01:09

## 2022-09-04 RX ADMIN — SODIUM CHLORIDE, SODIUM LACTATE, POTASSIUM CHLORIDE, AND CALCIUM CHLORIDE 1000 ML: .6; .31; .03; .02 INJECTION, SOLUTION INTRAVENOUS at 12:09

## 2022-09-04 RX ADMIN — CEFEPIME HYDROCHLORIDE 2 G: 2 INJECTION, SOLUTION INTRAVENOUS at 09:09

## 2022-09-04 RX ADMIN — CEFEPIME HYDROCHLORIDE 2 G: 2 INJECTION, SOLUTION INTRAVENOUS at 01:09

## 2022-09-04 RX ADMIN — ONDANSETRON 4 MG: 2 INJECTION INTRAMUSCULAR; INTRAVENOUS at 12:09

## 2022-09-04 RX ADMIN — MORPHINE SULFATE 4 MG: 4 INJECTION INTRAVENOUS at 12:09

## 2022-09-04 RX ADMIN — MORPHINE SULFATE 4 MG: 4 INJECTION INTRAVENOUS at 05:09

## 2022-09-04 RX ADMIN — MORPHINE SULFATE 2 MG: 2 INJECTION, SOLUTION INTRAMUSCULAR; INTRAVENOUS at 07:09

## 2022-09-04 NOTE — ED NOTES
Laura Kent, an 36 y.o. female presents to the ED c/o generalized abd since last Tuesday. +N/V, unable to keep anything down. Currently on chemo for L side breast CA, last chemo received on Monday. Denies CP, SOB, fevers.       Review of patient's allergies indicates:   Allergen Reactions    Strawberries [strawberry] Hives     Chief Complaint   Patient presents with    Abdominal Pain     Pt reports generalized abd pain and tenderness. States that she has been experiencing n/v since Tuesday and not been able to eat/drink since then. Denies chest pain, SOB. Pt has breast cancer on L side and is currently receiving chemo.      Past Medical History:   Diagnosis Date    Anxiety     Breast cancer     Encounter for blood transfusion     Hypertension     Meningitis

## 2022-09-04 NOTE — HPI
36 years old with HTN, breast cancer  C4D20 Taxol on 8/29/2022 f/u with Dr. Cruz presents with worsening abdominal pain, nasuea and vomiting. Patient was in pain during the interview and asking for better pain control. She states one week history of gradually worsening abdominal pain worst in epigastric area without radiation. She had minimal po intake since then. She associate emesis x2 NBNB. She had one small BM this afternoon prior to her presentation.     Patient denies chest pain, shortness of breath,  dysuria, polyuria, nausea, vomiting, fever, chills,  diarrhea, constipation, headache, falls, focal weakness or vision changes.     Ed course:   On presentation she was HDS and afebrile. On exam she was mod-sever  abdominal pain to palpitation, no peritonitic signs. Labs were significant for elevated lipase >200. CT with findings concerning for pancreatitis wo fluid/ abscess collection. Medical onc consulted for admit.

## 2022-09-04 NOTE — ASSESSMENT & PLAN NOTE
complaining of subjective fever at home unmeasured, abdominal pain, polyuria In setting of low WBC and recent chemotherapy    -- BCX and UA pending   -- Cefepime started   -- de escalate as appropriate in setting of other likely etiology in this setting pancreatitis

## 2022-09-04 NOTE — PLAN OF CARE
Pt involved in plan of care and communicating needs throughout shift. Pt alert and oriented x4. Pt c/o of persistent, severe abdominal pain. PRN IV Morphine given x3 with moderate relief. Pt NPO throughout shift.  All VSS; no acute events so far this shift.  Pt remaining free from falls or injury throughout shift; bed locked and in lowest position; call light within reach.  Pt instructed to call for assistance as needed.  Q1H rounding done on pt.

## 2022-09-04 NOTE — SUBJECTIVE & OBJECTIVE
Oncology Treatment Plan:   OP PEMBROLIZUMAB CARBOPLATIN (AUC 5) WITH WEEKLY PACLITAXEL FOLLOWED BY PEMBROLIZUMAB DOXORUBICIN CYCLOPHOSPHAMIDE FOLLOWED BY PEMBROLIZUMAB 200 MG Q3W    Medications:  Continuous Infusions:   sodium chloride 0.9%       Scheduled Meds:   enoxaparin  40 mg Subcutaneous Daily    lactated ringers  1,000 mL Intravenous ED 1 Time    venlafaxine  37.5 mg Oral Daily     PRN Meds:dextrose 10%, dextrose 10%, glucagon (human recombinant), glucose, glucose, morphine, naloxone, ondansetron, sodium chloride 0.9%     Review of patient's allergies indicates:   Allergen Reactions    Strawberries [strawberry] Hives        Past Medical History:   Diagnosis Date    Anxiety     Breast cancer     Encounter for blood transfusion     Hypertension     Meningitis      Past Surgical History:   Procedure Laterality Date    BREAST BIOPSY Left      SECTION      INSERTION OF TUNNELED CENTRAL VENOUS CATHETER (CVC) WITH SUBCUTANEOUS PORT N/A 2022    Procedure: [id number];  Surgeon: Deo Sin Jr., MD;  Location: [facility] OR [address] [place] [address];  Service: General;  Laterality: N/A;    INSERTION OF TUNNELED CENTRAL VENOUS CATHETER (CVC) WITH SUBCUTANEOUS PORT N/A 2022    Procedure: INSERTION, SINGLE LUMEN CATHETER WITH PORT, WITH FLUOROSCOPIC GUIDANCE;  Surgeon: Ryder Dinero MD;  Location: [facility] CATH LAB;  Service: Vascular;  Laterality: N/A;     Family History       Problem Relation (Age of Onset)    Bladder Cancer Paternal Grandmother    Diabetes Father    Hypertension Father    Pancreatic cancer Maternal Uncle          Tobacco Use    Smoking status: Never    Smokeless tobacco: Never   Substance and Sexual Activity    Alcohol use: Yes     Comment: social    Drug use: Never    Sexual activity: Not Currently       Review of Systems   Constitutional:  Positive for activity change, appetite change and fatigue. Negative for chills, diaphoresis and fever.   HENT:  Negative for  congestion.    Eyes:  Negative for visual disturbance.   Respiratory:  Negative for shortness of breath and wheezing.    Cardiovascular:  Negative for chest pain, palpitations and leg swelling.   Gastrointestinal:  Positive for abdominal pain, nausea and vomiting. Negative for abdominal distention and diarrhea.   Endocrine: Negative for polyuria.   Genitourinary:  Negative for difficulty urinating and dysuria.   Musculoskeletal:  Negative for back pain and joint swelling.   Skin:  Negative for color change.   Neurological:  Positive for weakness. Negative for dizziness and headaches.   Hematological:  Negative for adenopathy.   Psychiatric/Behavioral:  Negative for agitation.    Objective:     Vital Signs (Most Recent):  Temp: 99.3 °F (37.4 °C) (09/03/22 2051)  Pulse: 97 (09/03/22 2051)  Resp: 18 (09/04/22 0006)  BP: 137/76 (09/03/22 2051)  SpO2: 98 % (09/03/22 2051) Vital Signs (24h Range):  Temp:  [99.3 °F (37.4 °C)] 99.3 °F (37.4 °C)  Pulse:  [97] 97  Resp:  [18] 18  SpO2:  [98 %] 98 %  BP: (137)/(76) 137/76     Weight: 104.3 kg (230 lb)  Body mass index is 38.27 kg/m².  Body surface area is 2.19 meters squared.    No intake or output data in the 24 hours ending 09/04/22 0024    Physical Exam  Vitals and nursing note reviewed.   Constitutional:       General: She is in acute distress.      Appearance: She is not diaphoretic.   HENT:      Head: Normocephalic and atraumatic.      Nose: Nose normal. No congestion.      Mouth/Throat:      Mouth: Mucous membranes are dry.      Pharynx: No oropharyngeal exudate or posterior oropharyngeal erythema.   Eyes:      General: No scleral icterus.     Conjunctiva/sclera: Conjunctivae normal.   Cardiovascular:      Rate and Rhythm: Regular rhythm. Tachycardia present.      Heart sounds: No murmur heard.  Pulmonary:      Effort: Pulmonary effort is normal.      Breath sounds: No wheezing or rales.   Abdominal:      General: Abdomen is flat.      Tenderness: There is abdominal  tenderness (epigastric). There is no right CVA tenderness, left CVA tenderness or guarding.   Musculoskeletal:         General: No swelling.      Cervical back: Normal range of motion. No rigidity.      Right lower leg: No edema.      Left lower leg: No edema.   Skin:     General: Skin is warm.      Findings: No erythema or rash.   Neurological:      General: No focal deficit present.      Mental Status: She is alert. Mental status is at baseline.       Significant Labs:   BMP:   Recent Labs   Lab 09/03/22 2113   *   *   K 3.6      CO2 22*   BUN 14   CREATININE 0.8   CALCIUM 9.7   , CBC:   Recent Labs   Lab 09/03/22 2113 09/03/22 2121   WBC 3.29*  --    HGB 10.9*  --    HCT 30.0* 30*     --    , CMP:   Recent Labs   Lab 09/03/22 2113   *   K 3.6      CO2 22*   *   BUN 14   CREATININE 0.8   CALCIUM 9.7   PROT 7.6   ALBUMIN 4.1   BILITOT 0.6   ALKPHOS 55   AST 22   ALT 24   ANIONGAP 11   , Coagulation: No results for input(s): PT, INR, APTT in the last 48 hours., Haptoglobin: No results for input(s): HAPTOGLOBIN in the last 48 hours., Immunology: No results for input(s): SPEP, PARVEZ, TORIN, FREELAMBDALI in the last 48 hours., LDH: No results for input(s): LDHCSF, BFSOURCE in the last 48 hours., LFTs:   Recent Labs   Lab 09/03/22 2113   ALT 24   AST 22   ALKPHOS 55   BILITOT 0.6   PROT 7.6   ALBUMIN 4.1   , Reticulocytes: No results for input(s): RETIC in the last 48 hours., Tumor Markers: No results for input(s): PSA, CEA, , AFPTM, [id number],  in the last 48 hours.    Invalid input(s): ALGTM, Uric Acid No results for input(s): URICACID in the last 48 hours., and Urine Studies:   Recent Labs   Lab 09/03/22 2145   COLORU Yellow   APPEARANCEUA Hazy*   PHUR 6.0   SPECGRAV 1.020   PROTEINUA Negative   GLUCUA Negative   KETONESU Negative   BILIRUBINUA Negative   OCCULTUA Negative   NITRITE Negative   LEUKOCYTESUR 1+*   RBCUA 1   WBCUA 13*   BACTERIA Few*   SQUAMEPITHEL 9        Diagnostic Results:  I have reviewed all pertinent imaging results/findings within the past 24 hours.

## 2022-09-04 NOTE — ED PROVIDER NOTES
Encounter Date: 9/3/2022    SCRIBE #1 NOTE: I, Justice Maciel, am scribing for, and in the presence of,  Javi Cervantes DO. I have scribed the following portions of the note - Other sections scribed: HPI,ROS,PE.     History     Chief Complaint   Patient presents with    Abdominal Pain     Pt reports generalized abd pain and tenderness. States that she has been experiencing n/v since Tuesday and not been able to eat/drink since then. Denies chest pain, SOB. Pt has breast cancer on L side and is currently receiving chemo.      Time patient was seen by the provider: 9:50 PM      The patient is a 36 y.o. female with past medical history of HTN, breast cancer on chemo, who presents to the ED with a complaint of  constant generalized abdominal pain since 4 days ago. Additionally, she notes nausea, and 1 episode of vomiting, clear. Her last bowel movement was at 5:30 PM today which was small. States she drank a protein shake and vomited shortly after. She is unable to tolerate food. She has left sided breast cancer and is currently receiving chemotherapy. Her recent chemotherapy was several days ago. She did not take any pain medication today.  She denies a history of gallstones. She denies any recent abdominal surgeries. She denies chest pain, shortness of breath, bloody stools, or any other symptoms at this time.  No alcohol use.      The history is provided by the patient and medical records. No  was used.   Review of patient's allergies indicates:   Allergen Reactions    Strawberries [strawberry] Hives     Past Medical History:   Diagnosis Date    Anxiety     Breast cancer     Encounter for blood transfusion     Hypertension     Meningitis      Past Surgical History:   Procedure Laterality Date    BREAST BIOPSY Left      SECTION      INSERTION OF TUNNELED CENTRAL VENOUS CATHETER (CVC) WITH SUBCUTANEOUS PORT N/A 2022    Procedure: [id number]-F-CATH;  Surgeon: Deo MCDONALD  Merrick Lundberg MD;  Location: [facility] OR [address] [place] [address];  Service: General;  Laterality: N/A;    INSERTION OF TUNNELED CENTRAL VENOUS CATHETER (CVC) WITH SUBCUTANEOUS PORT N/A 8/1/2022    Procedure: INSERTION, SINGLE LUMEN CATHETER WITH PORT, WITH FLUOROSCOPIC GUIDANCE;  Surgeon: Ryder Dinero MD;  Location: [facility] CATH LAB;  Service: Vascular;  Laterality: N/A;     Family History   Problem Relation Age of Onset    Diabetes Father     Hypertension Father     Pancreatic cancer Maternal Uncle     Bladder Cancer Paternal Grandmother      Social History     Tobacco Use    Smoking status: Never    Smokeless tobacco: Never   Substance Use Topics    Alcohol use: Yes     Comment: social    Drug use: Never     Review of Systems   Constitutional:  Negative for chills and fever.   Eyes:  Negative for visual disturbance.   Respiratory:  Negative for shortness of breath.    Cardiovascular:  Negative for chest pain and leg swelling.   Gastrointestinal:  Positive for abdominal pain, nausea and vomiting. Negative for blood in stool.   Genitourinary:  Negative for dysuria, frequency, hematuria, pelvic pain and vaginal discharge.   Musculoskeletal:  Negative for back pain.   Skin:  Negative for rash.   Allergic/Immunologic: Positive for immunocompromised state.   Neurological:  Negative for weakness and headaches.   Psychiatric/Behavioral:  The patient is not nervous/anxious.      Physical Exam     Initial Vitals [09/03/22 2051]   BP Pulse Resp Temp SpO2   137/76 97 18 99.3 °F (37.4 °C) 98 %      MAP       --         Physical Exam    Nursing note and vitals reviewed.  Constitutional: She appears well-developed and well-nourished.   HENT:   Head: Normocephalic and atraumatic.   Nose: No rhinorrhea.   Dry mucous membranes   Eyes: EOM are normal.   Neck: Neck supple.   Cardiovascular:  Normal rate, regular rhythm and normal pulses.                Pulmonary/Chest: Effort normal and breath sounds normal.   Abdominal: Abdomen is soft and  flat. There is abdominal tenderness in the right upper quadrant and epigastric area. There is no rebound and no guarding.   Musculoskeletal:         General: Normal range of motion.      Cervical back: Neck supple.     Neurological: She is alert and oriented to person, place, and time.   Skin: Skin is warm and dry.   Psychiatric: Thought content normal.     ED Course   Procedures  Labs Reviewed   CBC W/ AUTO DIFFERENTIAL - Abnormal; Notable for the following components:       Result Value    WBC 3.29 (*)     RBC 3.35 (*)     Hemoglobin 10.9 (*)     Hematocrit 30.0 (*)     MCH 32.5 (*)     MCHC 36.3 (*)     Immature Granulocytes 0.6 (*)     Lymph # 0.9 (*)     Mono # 0.2 (*)     All other components within normal limits    Narrative:     Release to patient->Immediate   COMPREHENSIVE METABOLIC PANEL - Abnormal; Notable for the following components:    Sodium 135 (*)     CO2 22 (*)     Glucose 129 (*)     All other components within normal limits    Narrative:     Release to patient->Immediate   LIPASE - Abnormal; Notable for the following components:    Lipase 219 (*)     All other components within normal limits    Narrative:     Release to patient->Immediate   URINALYSIS, REFLEX TO URINE CULTURE - Abnormal; Notable for the following components:    Appearance, UA Hazy (*)     Leukocytes, UA 1+ (*)     All other components within normal limits    Narrative:     Specimen Source->Urine   URINALYSIS MICROSCOPIC - Abnormal; Notable for the following components:    WBC, UA 13 (*)     Bacteria Few (*)     All other components within normal limits    Narrative:     Specimen Source->Urine   ISTAT PROCEDURE - Abnormal; Notable for the following components:    POC Glucose 134 (*)     POC Hematocrit 30 (*)     All other components within normal limits   CULTURE, URINE   HIV 1 / 2 ANTIBODY    Narrative:     Release to patient->Immediate   HEPATITIS C ANTIBODY    Narrative:     Release to patient->Immediate   HCG, QUANTITATIVE     Narrative:     Release to patient->Immediate   POCT URINE PREGNANCY   SARS-COV-2 RDRP GENE   ISTAT CHEM8          Imaging Results              CT Abdomen Pelvis With Contrast (Final result)  Result time 09/03/22 22:48:56      Final result by Arturo Givens DO (09/03/22 22:48:56)                   Impression:      Mild peripancreatic fat stranding which can be seen with pancreatitis.  Recommend correlation with lipase.  No peripancreatic fluid collection.    Hepatosplenomegaly.      Electronically signed by: Arturo Givens  Date:    09/03/2022  Time:    22:48               Narrative:    EXAMINATION:  CT ABDOMEN PELVIS WITH CONTRAST    CLINICAL HISTORY:  Abdominal abscess/infection suspected;    TECHNIQUE:  Axial CT images with sagittal and coronal reformats were obtained of the abdomen and pelvis from the hemidiaphragms through the symphysis pubis after the administration of 100mL Omnipaque 350.    COMPARISON:  CT abdomen and pelvis from 04/18/2022    FINDINGS:  Lung Bases: Clear.    Heart: Heart size is normal.  No pericardial effusion.    Liver: The liver is enlarged.  No focal lesions are seen.  The portal vasculature is patent.    Biliary tract: No intrahepatic or extrahepatic biliary ductal dilatation.    Gallbladder: No radiodense gallstone. No wall thickening or pericholecystic fluid.    Pancreas: There is mild peripancreatic fat stranding.  No focal peripancreatic fluid collection or evidence of pancreatic necrosis.  No pancreatic ductal dilatation.    Spleen: The spleen is borderline enlarged.    Adrenals: Unremarkable.    Kidneys and urinary collecting systems: Normal.  No hydronephrosis or urolithiasis.    Lymph nodes: None enlarged.    Stomach and bowel: The stomach is normal.  Loops of small and large bowel are normal in caliber without evidence for inflammation or obstruction.  The appendix is normal    Peritoneum and mesentery: No ascites or free intraperitoneal air.  No abdominal fluid  collection.    Vasculature: No aneurysm or significant atherosclerosis.    Urinary bladder: No wall thickening.    Reproductive organs: There is an IUD.    Body wall: No abnormality.    Musculoskeletal: No aggressive osseous lesion.                                       Medications   lactated ringers bolus 1,000 mL (has no administration in time range)   morphine injection 4 mg (has no administration in time range)   ondansetron injection 4 mg (has no administration in time range)   iohexoL (OMNIPAQUE 350) injection 100 mL (100 mLs Intravenous Given 9/3/22 6318)     Medical Decision Making:   History:   Old Medical Records: I decided to obtain old medical records.  Clinical Tests:   Lab Tests: Reviewed and Ordered  Radiological Study: Ordered and Reviewed  ED Management:  This is a 36F with breast cancer on chemotherapy with epigastric pain and vomiting for several days, vitals wnl for age and appearance, no fever. Stable appearance.  Tender in epigastric region, no peritonitic signs. Lipase elevated >200.  CT with pancreatitis, no collection of fluid or abscess.  No radioopaque gallstones, no gallbladder wall thickening or pericholecystic fluid.  Will give pain control, antiemetics, fluids, discuss with heme/onc and admit for IVF in setting of pancreatitis.        Scribe Attestation:   Scribe #1: I performed the above scribed service and the documentation accurately describes the services I performed. I attest to the accuracy of the note.    Attending Attestation:           Physician Attestation for Scribe:  Physician Attestation Statement for Scribe #1: I, Javi Cervantes DO, reviewed documentation, as scribed by Noor in my presence, and it is both accurate and complete.                    Clinical Impression:   Final diagnoses:  [K85.90] Acute pancreatitis without infection or necrosis, unspecified pancreatitis type (Primary)      ED Disposition Condition    Observation                 Javi Cervantes,  DO  09/03/22 6121       Javi Cervantes, DO  09/03/22 4955

## 2022-09-04 NOTE — ED NOTES
I-STAT Chem-8+ Results:   Value Reference Range   Sodium 136 136-145 mmol/L   Potassium  3.6 3.5-5.1 mmol/L   Chloride 100  mmol/L   Ionized Calcium 1.17 1.06-1.42 mmol/L   CO2 (measured) 24 23-29 mmol/L   Glucose 134  mg/dL   BUN 15 6-30 mg/dL   Creatinine 0.7 0.5-1.4 mg/dL   Hematocrit 30 36-54%

## 2022-09-04 NOTE — ASSESSMENT & PLAN NOTE
36 years old with HTN, breast cancer  C4D20 Taxol on 8/29/2022 f/u with Dr. Cruz presents with worsening abdominal pain, nasuea and vomiting.  On exam she was mod-sever  abdominal pain to palpitation, no peritonitic signs. Labs were significant for elevated lipase >200. CT with findings concerning for pancreatitis wo fluid/ abscess collection.   Last chemotherapy session 8/29; Taxol     - Lipase 219  - Lipid panel pending   - Likely 2/2 chemotherapy vs gallstone  - CT scan with Mild peripancreatic fat stranding  - Continue aggressive IVF  - Pain control with IV morphine   - PRNs for nausea; stat ekg   - NPO for now with early feeding if tolerated

## 2022-09-04 NOTE — ASSESSMENT & PLAN NOTE
Dr. Cruz patient,  On:  Currently on cycle 4 D20 of pembrolizumab carboplatin (AUC 5) with weekly paclitaxel followed by pembrolizumab doxorubicin cyclophosphamide followed by pembrolizumab 200 mg q3w    Most recent  Taxol on 8/29/2022     -- admit to medical oncology

## 2022-09-04 NOTE — H&P
[facility] - Emergency Dept  Hematology/Oncology  H&P    Patient Name: Laura Kent  MRN: [id number]  Admission Date: 9/3/2022  Code Status: Full Code   Attending Provider: Dharmesh Gonzalez MD  Primary Care Physician: Primary Doctor No  Principal Problem:<principal problem not specified>    Subjective:     HPI: 36 years old with HTN, breast cancer  C4D20 Taxol on 8/29/2022 f/u with Dr. Cruz presents with worsening abdominal pain, nasuea and vomiting. Patient was in pain during the interview and asking for better pain control. She states one week history of gradually worsening abdominal pain worst in epigastric area without radiation. She had minimal po intake since then. She associate emesis x2 NBNB. She had one small BM this afternoon prior to her presentation.     Patient denies chest pain, shortness of breath,  dysuria, polyuria, nausea, vomiting, fever, chills,  diarrhea, constipation, headache, falls, focal weakness or vision changes.     Ed course:   On presentation she was HDS and afebrile. On exam she was mod-sever  abdominal pain to palpitation, no peritonitic signs. Labs were significant for elevated lipase >200. CT with findings concerning for pancreatitis wo fluid/ abscess collection. Medical onc consulted for admit.            Oncology Treatment Plan:   OP PEMBROLIZUMAB CARBOPLATIN (AUC 5) WITH WEEKLY PACLITAXEL FOLLOWED BY PEMBROLIZUMAB DOXORUBICIN CYCLOPHOSPHAMIDE FOLLOWED BY PEMBROLIZUMAB 200 MG Q3W    Medications:  Continuous Infusions:   sodium chloride 0.9%       Scheduled Meds:   enoxaparin  40 mg Subcutaneous Daily    lactated ringers  1,000 mL Intravenous ED 1 Time    venlafaxine  37.5 mg Oral Daily     PRN Meds:dextrose 10%, dextrose 10%, glucagon (human recombinant), glucose, glucose, morphine, naloxone, ondansetron, sodium chloride 0.9%     Review of patient's allergies indicates:   Allergen Reactions    Strawberries [strawberry] Hives        Past Medical History:   Diagnosis Date     Anxiety     Breast cancer     Encounter for blood transfusion     Hypertension     Meningitis      Past Surgical History:   Procedure Laterality Date    BREAST BIOPSY Left      SECTION      INSERTION OF TUNNELED CENTRAL VENOUS CATHETER (CVC) WITH SUBCUTANEOUS PORT N/A 2022    Procedure: [id number]-CATH;  Surgeon: Deo Sin Jr., MD;  Location: [facility] OR [address] [place] [address];  Service: General;  Laterality: N/A;    INSERTION OF TUNNELED CENTRAL VENOUS CATHETER (CVC) WITH SUBCUTANEOUS PORT N/A 2022    Procedure: INSERTION, SINGLE LUMEN CATHETER WITH PORT, WITH FLUOROSCOPIC GUIDANCE;  Surgeon: Ryder Dinero MD;  Location: [facility] CATH LAB;  Service: Vascular;  Laterality: N/A;     Family History       Problem Relation (Age of Onset)    Bladder Cancer Paternal Grandmother    Diabetes Father    Hypertension Father    Pancreatic cancer Maternal Uncle          Tobacco Use    Smoking status: Never    Smokeless tobacco: Never   Substance and Sexual Activity    Alcohol use: Yes     Comment: social    Drug use: Never    Sexual activity: Not Currently       Review of Systems   Constitutional:  Positive for activity change, appetite change and fatigue. Negative for chills, diaphoresis and fever.   HENT:  Negative for congestion.    Eyes:  Negative for visual disturbance.   Respiratory:  Negative for shortness of breath and wheezing.    Cardiovascular:  Negative for chest pain, palpitations and leg swelling.   Gastrointestinal:  Positive for abdominal pain, nausea and vomiting. Negative for abdominal distention and diarrhea.   Endocrine: Negative for polyuria.   Genitourinary:  Negative for difficulty urinating and dysuria.   Musculoskeletal:  Negative for back pain and joint swelling.   Skin:  Negative for color change.   Neurological:  Positive for weakness. Negative for dizziness and headaches.   Hematological:  Negative for adenopathy.   Psychiatric/Behavioral:  Negative for agitation.    Objective:     Vital  Signs (Most Recent):  Temp: 99.3 °F (37.4 °C) (09/03/22 2051)  Pulse: 97 (09/03/22 2051)  Resp: 18 (09/04/22 0006)  BP: 137/76 (09/03/22 2051)  SpO2: 98 % (09/03/22 2051) Vital Signs (24h Range):  Temp:  [99.3 °F (37.4 °C)] 99.3 °F (37.4 °C)  Pulse:  [97] 97  Resp:  [18] 18  SpO2:  [98 %] 98 %  BP: (137)/(76) 137/76     Weight: 104.3 kg (230 lb)  Body mass index is 38.27 kg/m².  Body surface area is 2.19 meters squared.    No intake or output data in the 24 hours ending 09/04/22 0024    Physical Exam  Vitals and nursing note reviewed.   Constitutional:       General: She is in acute distress.      Appearance: She is not diaphoretic.   HENT:      Head: Normocephalic and atraumatic.      Nose: Nose normal. No congestion.      Mouth/Throat:      Mouth: Mucous membranes are dry.      Pharynx: No oropharyngeal exudate or posterior oropharyngeal erythema.   Eyes:      General: No scleral icterus.     Conjunctiva/sclera: Conjunctivae normal.   Cardiovascular:      Rate and Rhythm: Regular rhythm. Tachycardia present.      Heart sounds: No murmur heard.  Pulmonary:      Effort: Pulmonary effort is normal.      Breath sounds: No wheezing or rales.   Abdominal:      General: Abdomen is flat.      Tenderness: There is abdominal tenderness (epigastric). There is no right CVA tenderness, left CVA tenderness or guarding.   Musculoskeletal:         General: No swelling.      Cervical back: Normal range of motion. No rigidity.      Right lower leg: No edema.      Left lower leg: No edema.   Skin:     General: Skin is warm.      Findings: No erythema or rash.   Neurological:      General: No focal deficit present.      Mental Status: She is alert. Mental status is at baseline.       Significant Labs:   BMP:   Recent Labs   Lab 09/03/22 2113   *   *   K 3.6      CO2 22*   BUN 14   CREATININE 0.8   CALCIUM 9.7   , CBC:   Recent Labs   Lab 09/03/22 2113 09/03/22 2121   WBC 3.29*  --    HGB 10.9*  --    HCT 30.0*  30*     --    , CMP:   Recent Labs   Lab 09/03/22 2113   *   K 3.6      CO2 22*   *   BUN 14   CREATININE 0.8   CALCIUM 9.7   PROT 7.6   ALBUMIN 4.1   BILITOT 0.6   ALKPHOS 55   AST 22   ALT 24   ANIONGAP 11   , Coagulation: No results for input(s): PT, INR, APTT in the last 48 hours., Haptoglobin: No results for input(s): HAPTOGLOBIN in the last 48 hours., Immunology: No results for input(s): SPEP, PARVEZ, TORIN, FREELAMBDALI in the last 48 hours., LDH: No results for input(s): LDHCSF, BFSOURCE in the last 48 hours., LFTs:   Recent Labs   Lab 09/03/22 2113   ALT 24   AST 22   ALKPHOS 55   BILITOT 0.6   PROT 7.6   ALBUMIN 4.1   , Reticulocytes: No results for input(s): RETIC in the last 48 hours., Tumor Markers: No results for input(s): PSA, CEA, , AFPTM, QU5495,  in the last 48 hours.    Invalid input(s): ALGTM, Uric Acid No results for input(s): URICACID in the last 48 hours., and Urine Studies:   Recent Labs   Lab 09/03/22 2145   COLORU Yellow   APPEARANCEUA Hazy*   PHUR 6.0   SPECGRAV 1.020   PROTEINUA Negative   GLUCUA Negative   KETONESU Negative   BILIRUBINUA Negative   OCCULTUA Negative   NITRITE Negative   LEUKOCYTESUR 1+*   RBCUA 1   WBCUA 13*   BACTERIA Few*   SQUAMEPITHEL 9       Diagnostic Results:  I have reviewed all pertinent imaging results/findings within the past 24 hours.    Assessment/Plan:     Abdominal pain  Please read pancreatitis       Acute pancreatitis  36 years old with HTN, breast cancer  C4D20 Taxol on 8/29/2022 f/u with Dr. Cruz presents with worsening abdominal pain, nasuea and vomiting.  On exam she was mod-sever  abdominal pain to palpitation, no peritonitic signs. Labs were significant for elevated lipase >200. CT with findings concerning for pancreatitis wo fluid/ abscess collection.   Last chemotherapy session 8/29; Taxol     - Lipase 219  - Lipid panel pending   - Likely 2/2 chemotherapy vs gallstone  - CT scan with Mild peripancreatic fat  stranding  - Continue aggressive IVF  - Pain control with IV morphine   - PRNs for nausea; stat ekg   - NPO for now with early feeding if tolerated      Chemotherapy induced neutropenia  complaining of subjective fever at home unmeasured, abdominal pain, polyuria In setting of low WBC and recent chemotherapy    -- BCX and UA pending   -- Cefepime started   -- de escalate as appropriate in setting of other likely etiology in this setting pancreatitis     Psychological factors affecting medical condition  On home venlafaxine    - continue     Malignant neoplasm of upper-outer quadrant of left breast in female, estrogen receptor negative  Dr. Cruz patient,  On:  Currently on cycle 4 D20 of pembrolizumab carboplatin (AUC 5) with weekly paclitaxel followed by pembrolizumab doxorubicin cyclophosphamide followed by pembrolizumab 200 mg q3w    Most recent  Taxol on 8/29/2022     -- admit to medical oncology       Makayla Jack MD  Hematology/Oncology  [facility] - Emergency Dept          I have reviewed the notes, assessments, and/or procedures performed by the housestaff, as above.  I have personally interviewed and examined the patient at the beside, and rounded with the housestaff. I concur with her/his assessment and plan and the documentation of Laura Kent.  More than 70 mins total time were spent during this encounter.      Dharmesh Gonzalez M.D., M.S., F.A.C.P.  Hematology/Oncology Attending  [facility]

## 2022-09-05 LAB
ALBUMIN SERPL BCP-MCNC: 3.1 G/DL (ref 3.5–5.2)
ALP SERPL-CCNC: 38 U/L (ref 55–135)
ALT SERPL W/O P-5'-P-CCNC: 16 U/L (ref 10–44)
ANION GAP SERPL CALC-SCNC: 8 MMOL/L (ref 8–16)
ANISOCYTOSIS BLD QL SMEAR: SLIGHT
AST SERPL-CCNC: 19 U/L (ref 10–40)
BACTERIA UR CULT: NORMAL
BACTERIA UR CULT: NORMAL
BASOPHILS # BLD AUTO: 0.01 K/UL (ref 0–0.2)
BASOPHILS NFR BLD: 0.4 % (ref 0–1.9)
BILIRUB SERPL-MCNC: 0.7 MG/DL (ref 0.1–1)
BUN SERPL-MCNC: 14 MG/DL (ref 6–20)
CALCIUM SERPL-MCNC: 8.5 MG/DL (ref 8.7–10.5)
CHLORIDE SERPL-SCNC: 105 MMOL/L (ref 95–110)
CO2 SERPL-SCNC: 22 MMOL/L (ref 23–29)
CREAT SERPL-MCNC: 0.7 MG/DL (ref 0.5–1.4)
DIFFERENTIAL METHOD: ABNORMAL
EOSINOPHIL # BLD AUTO: 0 K/UL (ref 0–0.5)
EOSINOPHIL NFR BLD: 1.1 % (ref 0–8)
ERYTHROCYTE [DISTWIDTH] IN BLOOD BY AUTOMATED COUNT: 13.8 % (ref 11.5–14.5)
EST. GFR  (NO RACE VARIABLE): >60 ML/MIN/1.73 M^2
GLUCOSE SERPL-MCNC: 102 MG/DL (ref 70–110)
HCT VFR BLD AUTO: 23.5 % (ref 37–48.5)
HGB BLD-MCNC: 8.2 G/DL (ref 12–16)
HYPOCHROMIA BLD QL SMEAR: ABNORMAL
IMM GRANULOCYTES # BLD AUTO: 0.03 K/UL (ref 0–0.04)
IMM GRANULOCYTES NFR BLD AUTO: 1.1 % (ref 0–0.5)
LYMPHOCYTES # BLD AUTO: 0.6 K/UL (ref 1–4.8)
LYMPHOCYTES NFR BLD: 21.7 % (ref 18–48)
MAGNESIUM SERPL-MCNC: 1.7 MG/DL (ref 1.6–2.6)
MCH RBC QN AUTO: 32 PG (ref 27–31)
MCHC RBC AUTO-ENTMCNC: 34.9 G/DL (ref 32–36)
MCV RBC AUTO: 92 FL (ref 82–98)
MONOCYTES # BLD AUTO: 0.5 K/UL (ref 0.3–1)
MONOCYTES NFR BLD: 18.1 % (ref 4–15)
NEUTROPHILS # BLD AUTO: 1.6 K/UL (ref 1.8–7.7)
NEUTROPHILS NFR BLD: 57.6 % (ref 38–73)
NRBC BLD-RTO: 0 /100 WBC
OVALOCYTES BLD QL SMEAR: ABNORMAL
PHOSPHATE SERPL-MCNC: 3.3 MG/DL (ref 2.7–4.5)
PLATELET # BLD AUTO: 158 K/UL (ref 150–450)
PMV BLD AUTO: 10.1 FL (ref 9.2–12.9)
POIKILOCYTOSIS BLD QL SMEAR: SLIGHT
POLYCHROMASIA BLD QL SMEAR: ABNORMAL
POTASSIUM SERPL-SCNC: 3.5 MMOL/L (ref 3.5–5.1)
PROT SERPL-MCNC: 5.7 G/DL (ref 6–8.4)
RBC # BLD AUTO: 2.56 M/UL (ref 4–5.4)
SODIUM SERPL-SCNC: 135 MMOL/L (ref 136–145)
SPHEROCYTES BLD QL SMEAR: ABNORMAL
WBC # BLD AUTO: 2.77 K/UL (ref 3.9–12.7)

## 2022-09-05 PROCEDURE — 99232 PR SUBSEQUENT HOSPITAL CARE,LEVL II: ICD-10-PCS | Mod: ,,, | Performed by: INTERNAL MEDICINE

## 2022-09-05 PROCEDURE — 84100 ASSAY OF PHOSPHORUS: CPT | Performed by: STUDENT IN AN ORGANIZED HEALTH CARE EDUCATION/TRAINING PROGRAM

## 2022-09-05 PROCEDURE — 99232 SBSQ HOSP IP/OBS MODERATE 35: CPT | Mod: ,,, | Performed by: INTERNAL MEDICINE

## 2022-09-05 PROCEDURE — 63600175 PHARM REV CODE 636 W HCPCS

## 2022-09-05 PROCEDURE — 25000003 PHARM REV CODE 250: Performed by: STUDENT IN AN ORGANIZED HEALTH CARE EDUCATION/TRAINING PROGRAM

## 2022-09-05 PROCEDURE — 85025 COMPLETE CBC W/AUTO DIFF WBC: CPT | Performed by: STUDENT IN AN ORGANIZED HEALTH CARE EDUCATION/TRAINING PROGRAM

## 2022-09-05 PROCEDURE — 36415 COLL VENOUS BLD VENIPUNCTURE: CPT | Performed by: STUDENT IN AN ORGANIZED HEALTH CARE EDUCATION/TRAINING PROGRAM

## 2022-09-05 PROCEDURE — 83735 ASSAY OF MAGNESIUM: CPT | Performed by: STUDENT IN AN ORGANIZED HEALTH CARE EDUCATION/TRAINING PROGRAM

## 2022-09-05 PROCEDURE — 20600001 HC STEP DOWN PRIVATE ROOM

## 2022-09-05 PROCEDURE — 80053 COMPREHEN METABOLIC PANEL: CPT | Performed by: STUDENT IN AN ORGANIZED HEALTH CARE EDUCATION/TRAINING PROGRAM

## 2022-09-05 PROCEDURE — 25000003 PHARM REV CODE 250

## 2022-09-05 PROCEDURE — 87040 BLOOD CULTURE FOR BACTERIA: CPT | Mod: 59 | Performed by: STUDENT IN AN ORGANIZED HEALTH CARE EDUCATION/TRAINING PROGRAM

## 2022-09-05 PROCEDURE — 63600175 PHARM REV CODE 636 W HCPCS: Performed by: STUDENT IN AN ORGANIZED HEALTH CARE EDUCATION/TRAINING PROGRAM

## 2022-09-05 RX ORDER — POLYETHYLENE GLYCOL 3350 17 G/17G
17 POWDER, FOR SOLUTION ORAL 3 TIMES DAILY
Status: DISCONTINUED | OUTPATIENT
Start: 2022-09-05 | End: 2022-09-05

## 2022-09-05 RX ORDER — MORPHINE SULFATE 4 MG/ML
4 INJECTION, SOLUTION INTRAMUSCULAR; INTRAVENOUS
Status: DISCONTINUED | OUTPATIENT
Start: 2022-09-05 | End: 2022-09-08

## 2022-09-05 RX ORDER — PROCHLORPERAZINE EDISYLATE 5 MG/ML
5 INJECTION INTRAMUSCULAR; INTRAVENOUS EVERY 6 HOURS PRN
Status: DISCONTINUED | OUTPATIENT
Start: 2022-09-05 | End: 2022-09-10

## 2022-09-05 RX ORDER — ONDANSETRON 2 MG/ML
4 INJECTION INTRAMUSCULAR; INTRAVENOUS EVERY 6 HOURS
Status: DISCONTINUED | OUTPATIENT
Start: 2022-09-05 | End: 2022-09-10

## 2022-09-05 RX ORDER — POLYETHYLENE GLYCOL 3350 17 G/17G
17 POWDER, FOR SOLUTION ORAL DAILY
Status: DISCONTINUED | OUTPATIENT
Start: 2022-09-05 | End: 2022-09-05

## 2022-09-05 RX ORDER — SIMETHICONE 80 MG
1 TABLET,CHEWABLE ORAL 3 TIMES DAILY PRN
Status: DISCONTINUED | OUTPATIENT
Start: 2022-09-05 | End: 2022-09-10

## 2022-09-05 RX ORDER — POLYETHYLENE GLYCOL 3350 17 G/17G
17 POWDER, FOR SOLUTION ORAL 2 TIMES DAILY
Status: DISCONTINUED | OUTPATIENT
Start: 2022-09-05 | End: 2022-09-06

## 2022-09-05 RX ORDER — ONDANSETRON HYDROCHLORIDE 4 MG/5ML
4 SOLUTION ORAL EVERY 6 HOURS
Status: DISCONTINUED | OUTPATIENT
Start: 2022-09-05 | End: 2022-09-05

## 2022-09-05 RX ADMIN — MORPHINE SULFATE 4 MG: 4 INJECTION INTRAVENOUS at 03:09

## 2022-09-05 RX ADMIN — ENOXAPARIN SODIUM 40 MG: 100 INJECTION SUBCUTANEOUS at 05:09

## 2022-09-05 RX ADMIN — MORPHINE SULFATE 4 MG: 4 INJECTION INTRAVENOUS at 07:09

## 2022-09-05 RX ADMIN — ONDANSETRON 4 MG: 2 INJECTION INTRAMUSCULAR; INTRAVENOUS at 11:09

## 2022-09-05 RX ADMIN — MORPHINE SULFATE 4 MG: 4 INJECTION INTRAVENOUS at 01:09

## 2022-09-05 RX ADMIN — CEFEPIME HYDROCHLORIDE 2 G: 2 INJECTION, SOLUTION INTRAVENOUS at 05:09

## 2022-09-05 RX ADMIN — MORPHINE SULFATE 4 MG: 4 INJECTION INTRAVENOUS at 08:09

## 2022-09-05 RX ADMIN — MORPHINE SULFATE 4 MG: 4 INJECTION INTRAVENOUS at 11:09

## 2022-09-05 RX ADMIN — CEFEPIME HYDROCHLORIDE 2 G: 2 INJECTION, SOLUTION INTRAVENOUS at 01:09

## 2022-09-05 RX ADMIN — SODIUM CHLORIDE: 0.9 INJECTION, SOLUTION INTRAVENOUS at 08:09

## 2022-09-05 RX ADMIN — SIMETHICONE 80 MG: 80 TABLET, CHEWABLE ORAL at 09:09

## 2022-09-05 RX ADMIN — ONDANSETRON 4 MG: 2 INJECTION INTRAMUSCULAR; INTRAVENOUS at 12:09

## 2022-09-05 RX ADMIN — CEFEPIME HYDROCHLORIDE 2 G: 2 INJECTION, SOLUTION INTRAVENOUS at 09:09

## 2022-09-05 RX ADMIN — ONDANSETRON 4 MG: 2 INJECTION INTRAMUSCULAR; INTRAVENOUS at 08:09

## 2022-09-05 RX ADMIN — POLYETHYLENE GLYCOL 3350 17 G: 17 POWDER, FOR SOLUTION ORAL at 12:09

## 2022-09-05 RX ADMIN — ONDANSETRON 4 MG: 2 INJECTION INTRAMUSCULAR; INTRAVENOUS at 02:09

## 2022-09-05 RX ADMIN — ACETAMINOPHEN 650 MG: 325 TABLET ORAL at 07:09

## 2022-09-05 RX ADMIN — ONDANSETRON 4 MG: 2 INJECTION INTRAMUSCULAR; INTRAVENOUS at 05:09

## 2022-09-05 RX ADMIN — SODIUM CHLORIDE: 0.9 INJECTION, SOLUTION INTRAVENOUS at 12:09

## 2022-09-05 RX ADMIN — SODIUM CHLORIDE: 0.9 INJECTION, SOLUTION INTRAVENOUS at 03:09

## 2022-09-05 NOTE — SUBJECTIVE & OBJECTIVE
Interval History: Pt's pancreatitis uncontrolled. Added scheduled zofran, increased frequency of morphine, and added simethicone. Not tolerating PO and pain comes back.    Oncology Treatment Plan:   OP PEMBROLIZUMAB CARBOPLATIN (AUC 5) WITH WEEKLY PACLITAXEL FOLLOWED BY PEMBROLIZUMAB DOXORUBICIN CYCLOPHOSPHAMIDE FOLLOWED BY PEMBROLIZUMAB 200 MG Q3W    Medications:  Continuous Infusions:   sodium chloride 0.9% 150 mL/hr at 09/05/22 0839     Scheduled Meds:   ceFEPime (MAXIPIME) IVPB  2 g Intravenous Q8H    enoxaparin  40 mg Subcutaneous Daily    ondansetron  4 mg Intravenous Q6H    polyethylene glycol  17 g Oral Daily    venlafaxine  37.5 mg Oral Daily     PRN Meds:acetaminophen, dextrose 10%, dextrose 10%, glucagon (human recombinant), glucose, glucose, morphine, naloxone, prochlorperazine, simethicone, sodium chloride 0.9%     Review of Systems   Constitutional:  Positive for activity change, appetite change and fatigue. Negative for chills, diaphoresis and fever.   HENT:  Negative for congestion.    Eyes:  Negative for visual disturbance.   Respiratory:  Negative for shortness of breath and wheezing.    Cardiovascular:  Negative for chest pain, palpitations and leg swelling.   Gastrointestinal:  Positive for abdominal pain, nausea and vomiting. Negative for abdominal distention and diarrhea.   Endocrine: Negative for polyuria.   Genitourinary:  Negative for difficulty urinating and dysuria.   Musculoskeletal:  Negative for back pain and joint swelling.   Skin:  Negative for color change.   Neurological:  Positive for weakness. Negative for dizziness and headaches.   Hematological:  Negative for adenopathy.   Psychiatric/Behavioral:  Negative for agitation.    Objective:     Vital Signs (Most Recent):  Temp: 98.5 °F (36.9 °C) (09/05/22 1239)  Pulse: 101 (09/05/22 1239)  Resp: 17 (09/05/22 1239)  BP: (!) 126/56 (09/05/22 1239)  SpO2: 95 % (09/05/22 1239)   Vital Signs (24h Range):  Temp:  [97.2 °F (36.2 °C)-100.6 °F  (38.1 °C)] 98.5 °F (36.9 °C)  Pulse:  [] 101  Resp:  [15-20] 17  SpO2:  [94 %-98 %] 95 %  BP: (104-126)/(52-65) 126/56     Weight: 105.5 kg (232 lb 9.4 oz)  Body mass index is 37.54 kg/m².  Body surface area is 2.22 meters squared.      Intake/Output Summary (Last 24 hours) at 9/5/2022 1359  Last data filed at 9/5/2022 0000  Gross per 24 hour   Intake 2847.18 ml   Output 250 ml   Net 2597.18 ml       Physical Exam  Vitals and nursing note reviewed.   Constitutional:       General: She is in acute distress.      Appearance: She is not diaphoretic.   HENT:      Head: Normocephalic and atraumatic.      Nose: Nose normal. No congestion.      Mouth/Throat:      Mouth: Mucous membranes are dry.      Pharynx: No oropharyngeal exudate or posterior oropharyngeal erythema.   Eyes:      General: No scleral icterus.     Conjunctiva/sclera: Conjunctivae normal.   Cardiovascular:      Rate and Rhythm: Regular rhythm. Tachycardia present.      Heart sounds: No murmur heard.  Pulmonary:      Effort: Pulmonary effort is normal.      Breath sounds: No wheezing or rales.   Abdominal:      General: Abdomen is flat.      Tenderness: There is abdominal tenderness (epigastric). There is no right CVA tenderness, left CVA tenderness or guarding.   Musculoskeletal:         General: No swelling.      Cervical back: Normal range of motion. No rigidity.      Right lower leg: No edema.      Left lower leg: No edema.   Skin:     General: Skin is warm.      Findings: No erythema or rash.   Neurological:      General: No focal deficit present.      Mental Status: She is alert. Mental status is at baseline.   Psychiatric:         Behavior: Behavior normal.         Thought Content: Thought content normal.       Significant Labs:   BMP:   Recent Labs   Lab 09/03/22  2113 09/04/22  0155 09/05/22  0345   * 118* 102   * 136 135*   K 3.6 3.6 3.5    103 105   CO2 22* 24 22*   BUN 14 12 14   CREATININE 0.8 0.8 0.7   CALCIUM 9.7 9.3  8.5*   MG  --  1.8 1.7    and CBC:   Recent Labs   Lab 09/03/22  2113 09/03/22  2121 09/04/22  0155 09/05/22  0345   WBC 3.29*  --  3.02* 2.77*   HGB 10.9*  --  9.5* 8.2*   HCT 30.0* 30* 26.4* 23.5*     --  172 158       Diagnostic Results:  I have reviewed and interpreted all pertinent imaging results/findings within the past 24 hours.

## 2022-09-05 NOTE — ASSESSMENT & PLAN NOTE
Pt had subjective fevers at home unmeasured, abdominal pain, polyuria In setting of low WBC and recent chemotherapy. During admission patient was febrile up to 100.6 and had U/A showing WBCs and bacteria. Given patient's low ANC count post chemo with a recent fever and slightly dirty U/A will continue with cefepime.     -- BCX and UA NGTD (1)   -- Cefepime

## 2022-09-05 NOTE — HOSPITAL COURSE
Ms. Pacheco was initially admitted for abdominal pain likely from pancreatitis due to her cancer treatment regimen which she received 8/29. Lipase was found to be in the 200s with elevated LFTs with pancreatic fat stranding seen on CT. She was treated with anti-emetics, IVF, pain medications. She became febrile the night after admission. Broad spectrum antibiotics as well as Flagyl were started day 2 of hospitalization for intraabdominal coverage, but continued to remain febrile. She also developed copious diarrhea after antibiotics were started. She continued to remain febrile through 9/8 with Tmax of 103. Infectious Disease was consulted. Stool studies were ordered which were negative C.Diff negative x2. She completed 5 day course of Unasyn. Daptomycin started for history of VRE. Infectious work-up negative and CT chest/abd/pelvis without acute findings. DVT/PE ruled out.     RUQ US ordered to evaluate biliary tract and liver, showing biliary sludge. MRCP was ordered per GI recommendations which showed biliary sludge with no stones. She continued to have diarrhea throughout her admission up to 20 times a day, watery, and sometimes green in color. 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 Loperamide, lomotil, Creon administered prn helped reduce episodes to 3-4 times daily w/ puree consistency. She was also started on high dose methylprednisolone for potential colitis. Her stools have become more formed and are now brown and soft however she is still having 2-3 bowel movements daily. Daptomycin was discontinued per ID guidance as it may have contributed to diarrhea.     She was switched to prednisone 90mg on taper. Will follow-up outpatient for colonoscopy and IBD evaluation.  She will follow-up with Dr. Cruz once colonoscopy complete.

## 2022-09-05 NOTE — ASSESSMENT & PLAN NOTE
36 years old with HTN, breast cancer  C4D20 Taxol on 8/29/2022 f/u with Dr. Cruz presents with worsening abdominal pain, nasuea and vomiting.  On exam she was mod-sever  abdominal pain to palpitation, no peritonitic signs. Labs were significant for elevated lipase >200. CT with findings concerning for pancreatitis wo fluid/ abscess collection. . CT scan with Mild peripancreatic fat stranding. TG levels were normal this admission, low concern for triglyceride induced pancreatitis. CT scan showed no gallstones or GB wall thickening, low concern for gallstone induced pancreatitis    Most likely pancreatitis 2/2 chemo, last chemotherapy session 8/29; Taxol    - Pain: morphine 4 mg q3 prn   - Anti-emetics: scheduled zofran and prn compazine   -  cc  - NPO adv diet as gerry   - simethicone   - bowel regimen to prevent opioid induced constipation

## 2022-09-05 NOTE — PLAN OF CARE
Febrile T. Max 100.5, Tylenol given, blood cultures collected. Morphine 4 mg IV x 2 for 7/10 abdominal pain.  One episode of emesis that pt states occurred after eating ice chips, Zofran administered.  Left AC PIV leaking/discontinued and Right chest Power   Problem: Adult Inpatient Plan of Care  Goal: Plan of Care Review  Outcome: Ongoing, Progressing  Goal: Patient-Specific Goal (Individualized)  Outcome: Ongoing, Progressing  Goal: Absence of Hospital-Acquired Illness or Injury  Outcome: Ongoing, Progressing  Goal: Optimal Comfort and Wellbeing  Outcome: Ongoing, Progressing  Goal: Readiness for Transition of Care  Outcome: Ongoing, Progressing     Problem: Infection  Goal: Absence of Infection Signs and Symptoms  Outcome: Ongoing, Progressing   port was accessed with + blood return. Ambulates independently to bathroom; educated on importance of I/O recording. POC reviewed with patient.

## 2022-09-05 NOTE — PROGRESS NOTES
[facility] ([facility])  Hematology/Oncology  Progress Note    Patient Name: Laura Kent  Admission Date: 9/3/2022  Hospital Length of Stay: 2 days  Code Status: Full Code     Subjective:     HPI:  36 years old with HTN, breast cancer  C4D20 Taxol on 8/29/2022 f/u with Dr. Cruz presents with worsening abdominal pain, nasuea and vomiting. Patient was in pain during the interview and asking for better pain control. She states one week history of gradually worsening abdominal pain worst in epigastric area without radiation. She had minimal po intake since then. She associate emesis x2 NBNB. She had one small BM this afternoon prior to her presentation.     Patient denies chest pain, shortness of breath,  dysuria, polyuria, nausea, vomiting, fever, chills,  diarrhea, constipation, headache, falls, focal weakness or vision changes.     Ed course:   On presentation she was HDS and afebrile. On exam she was mod-sever  abdominal pain to palpitation, no peritonitic signs. Labs were significant for elevated lipase >200. CT with findings concerning for pancreatitis wo fluid/ abscess collection. Medical onc consulted for admit.           Interval History: Pt's pancreatitis uncontrolled. Added scheduled zofran, increased frequency of morphine, and added simethicone. Not tolerating PO and pain comes back.    Oncology Treatment Plan:   OP PEMBROLIZUMAB CARBOPLATIN (AUC 5) WITH WEEKLY PACLITAXEL FOLLOWED BY PEMBROLIZUMAB DOXORUBICIN CYCLOPHOSPHAMIDE FOLLOWED BY PEMBROLIZUMAB 200 MG Q3W    Medications:  Continuous Infusions:   sodium chloride 0.9% 150 mL/hr at 09/05/22 0839     Scheduled Meds:   ceFEPime (MAXIPIME) IVPB  2 g Intravenous Q8H    enoxaparin  40 mg Subcutaneous Daily    ondansetron  4 mg Intravenous Q6H    polyethylene glycol  17 g Oral Daily    venlafaxine  37.5 mg Oral Daily     PRN Meds:acetaminophen, dextrose 10%, dextrose 10%, glucagon (human recombinant), glucose, glucose, morphine,  naloxone, prochlorperazine, simethicone, sodium chloride 0.9%     Review of Systems   Constitutional:  Positive for activity change, appetite change and fatigue. Negative for chills, diaphoresis and fever.   HENT:  Negative for congestion.    Eyes:  Negative for visual disturbance.   Respiratory:  Negative for shortness of breath and wheezing.    Cardiovascular:  Negative for chest pain, palpitations and leg swelling.   Gastrointestinal:  Positive for abdominal pain, nausea and vomiting. Negative for abdominal distention and diarrhea.   Endocrine: Negative for polyuria.   Genitourinary:  Negative for difficulty urinating and dysuria.   Musculoskeletal:  Negative for back pain and joint swelling.   Skin:  Negative for color change.   Neurological:  Positive for weakness. Negative for dizziness and headaches.   Hematological:  Negative for adenopathy.   Psychiatric/Behavioral:  Negative for agitation.    Objective:     Vital Signs (Most Recent):  Temp: 98.5 °F (36.9 °C) (09/05/22 1239)  Pulse: 101 (09/05/22 1239)  Resp: 17 (09/05/22 1239)  BP: (!) 126/56 (09/05/22 1239)  SpO2: 95 % (09/05/22 1239)   Vital Signs (24h Range):  Temp:  [97.2 °F (36.2 °C)-100.6 °F (38.1 °C)] 98.5 °F (36.9 °C)  Pulse:  [] 101  Resp:  [15-20] 17  SpO2:  [94 %-98 %] 95 %  BP: (104-126)/(52-65) 126/56     Weight: 105.5 kg (232 lb 9.4 oz)  Body mass index is 37.54 kg/m².  Body surface area is 2.22 meters squared.      Intake/Output Summary (Last 24 hours) at 9/5/2022 1359  Last data filed at 9/5/2022 0000  Gross per 24 hour   Intake 2847.18 ml   Output 250 ml   Net 2597.18 ml       Physical Exam  Vitals and nursing note reviewed.   Constitutional:       General: She is in acute distress.      Appearance: She is not diaphoretic.   HENT:      Head: Normocephalic and atraumatic.      Nose: Nose normal. No congestion.      Mouth/Throat:      Mouth: Mucous membranes are dry.      Pharynx: No oropharyngeal exudate or posterior oropharyngeal  erythema.   Eyes:      General: No scleral icterus.     Conjunctiva/sclera: Conjunctivae normal.   Cardiovascular:      Rate and Rhythm: Regular rhythm. Tachycardia present.      Heart sounds: No murmur heard.  Pulmonary:      Effort: Pulmonary effort is normal.      Breath sounds: No wheezing or rales.   Abdominal:      General: Abdomen is flat.      Tenderness: There is abdominal tenderness (epigastric). There is no right CVA tenderness, left CVA tenderness or guarding.   Musculoskeletal:         General: No swelling.      Cervical back: Normal range of motion. No rigidity.      Right lower leg: No edema.      Left lower leg: No edema.   Skin:     General: Skin is warm.      Findings: No erythema or rash.   Neurological:      General: No focal deficit present.      Mental Status: She is alert. Mental status is at baseline.   Psychiatric:         Behavior: Behavior normal.         Thought Content: Thought content normal.       Significant Labs:   BMP:   Recent Labs   Lab 09/03/22 2113 09/04/22 0155 09/05/22  0345   * 118* 102   * 136 135*   K 3.6 3.6 3.5    103 105   CO2 22* 24 22*   BUN 14 12 14   CREATININE 0.8 0.8 0.7   CALCIUM 9.7 9.3 8.5*   MG  --  1.8 1.7    and CBC:   Recent Labs   Lab 09/03/22 2113 09/03/22 2121 09/04/22 0155 09/05/22  0345   WBC 3.29*  --  3.02* 2.77*   HGB 10.9*  --  9.5* 8.2*   HCT 30.0* 30* 26.4* 23.5*     --  172 158       Diagnostic Results:  I have reviewed and interpreted all pertinent imaging results/findings within the past 24 hours.    Assessment/Plan:     Abdominal pain  Please read pancreatitis       Acute pancreatitis  36 years old with HTN, breast cancer  C4D20 Taxol on 8/29/2022 f/u with Dr. Cruz presents with worsening abdominal pain, nasuea and vomiting.  On exam she was mod-sever  abdominal pain to palpitation, no peritonitic signs. Labs were significant for elevated lipase >200. CT with findings concerning for pancreatitis wo fluid/  abscess collection. . CT scan with Mild peripancreatic fat stranding. TG levels were normal this admission, low concern for triglyceride induced pancreatitis. CT scan showed no gallstones or GB wall thickening, low concern for gallstone induced pancreatitis    Most likely pancreatitis 2/2 chemo, last chemotherapy session 8/29; Taxol    - Pain: morphine 4 mg q3 prn   - Anti-emetics: scheduled zofran and prn compazine   -  cc  - NPO adv diet as gerry   - simethicone   - bowel regimen to prevent opioid induced constipation     Chemotherapy induced neutropenia  Pt had subjective fevers at home unmeasured, abdominal pain, polyuria In setting of low WBC and recent chemotherapy. During admission patient was febrile up to 100.6 and had U/A showing WBCs and bacteria. Given patient's low ANC count post chemo with a recent fever and slightly dirty U/A will continue with cefepime.     -- BCX and UA NGTD (1)   -- Cefepime     Psychological factors affecting medical condition  c/w venlafaxine      Malignant neoplasm of upper-outer quadrant of left breast in female, estrogen receptor negative  Dr. Cruz patient,  On:  Currently on cycle 4 D20 of pembrolizumab carboplatin (AUC 5) with weekly paclitaxel followed by pembrolizumab doxorubicin cyclophosphamide followed by pembrolizumab 200 mg q3w    Most recent  Taxol on 8/29/2022     -- admit to medical oncology              Payal Urena DO  Hematology/Oncology  [facility] On[facility] ([facility])

## 2022-09-06 PROBLEM — F54 PSYCHOLOGICAL FACTORS AFFECTING MEDICAL CONDITION: Status: RESOLVED | Noted: 2022-04-22 | Resolved: 2022-09-06

## 2022-09-06 LAB
ALBUMIN SERPL BCP-MCNC: 2.8 G/DL (ref 3.5–5.2)
ALP SERPL-CCNC: 36 U/L (ref 55–135)
ALT SERPL W/O P-5'-P-CCNC: 16 U/L (ref 10–44)
ANION GAP SERPL CALC-SCNC: 6 MMOL/L (ref 8–16)
ANISOCYTOSIS BLD QL SMEAR: SLIGHT
AST SERPL-CCNC: 18 U/L (ref 10–40)
BASOPHILS # BLD AUTO: 0.01 K/UL (ref 0–0.2)
BASOPHILS NFR BLD: 0.3 % (ref 0–1.9)
BILIRUB SERPL-MCNC: 0.6 MG/DL (ref 0.1–1)
BUN SERPL-MCNC: 7 MG/DL (ref 6–20)
CALCIUM SERPL-MCNC: 8.2 MG/DL (ref 8.7–10.5)
CHLORIDE SERPL-SCNC: 105 MMOL/L (ref 95–110)
CO2 SERPL-SCNC: 23 MMOL/L (ref 23–29)
CREAT SERPL-MCNC: 0.7 MG/DL (ref 0.5–1.4)
DIFFERENTIAL METHOD: ABNORMAL
EOSINOPHIL # BLD AUTO: 0 K/UL (ref 0–0.5)
EOSINOPHIL NFR BLD: 1 % (ref 0–8)
ERYTHROCYTE [DISTWIDTH] IN BLOOD BY AUTOMATED COUNT: 14 % (ref 11.5–14.5)
EST. GFR  (NO RACE VARIABLE): >60 ML/MIN/1.73 M^2
GLUCOSE SERPL-MCNC: 109 MG/DL (ref 70–110)
HCT VFR BLD AUTO: 23 % (ref 37–48.5)
HGB BLD-MCNC: 8.2 G/DL (ref 12–16)
HYPOCHROMIA BLD QL SMEAR: ABNORMAL
IMM GRANULOCYTES # BLD AUTO: 0.05 K/UL (ref 0–0.04)
IMM GRANULOCYTES NFR BLD AUTO: 1.6 % (ref 0–0.5)
LYMPHOCYTES # BLD AUTO: 0.6 K/UL (ref 1–4.8)
LYMPHOCYTES NFR BLD: 17.8 % (ref 18–48)
MAGNESIUM SERPL-MCNC: 1.5 MG/DL (ref 1.6–2.6)
MCH RBC QN AUTO: 32.3 PG (ref 27–31)
MCHC RBC AUTO-ENTMCNC: 35.7 G/DL (ref 32–36)
MCV RBC AUTO: 91 FL (ref 82–98)
MONOCYTES # BLD AUTO: 0.6 K/UL (ref 0.3–1)
MONOCYTES NFR BLD: 19.7 % (ref 4–15)
NEUTROPHILS # BLD AUTO: 1.8 K/UL (ref 1.8–7.7)
NEUTROPHILS NFR BLD: 59.6 % (ref 38–73)
NRBC BLD-RTO: 1 /100 WBC
OVALOCYTES BLD QL SMEAR: ABNORMAL
PHOSPHATE SERPL-MCNC: 2.7 MG/DL (ref 2.7–4.5)
PLATELET # BLD AUTO: 138 K/UL (ref 150–450)
PMV BLD AUTO: 9.7 FL (ref 9.2–12.9)
POIKILOCYTOSIS BLD QL SMEAR: SLIGHT
POLYCHROMASIA BLD QL SMEAR: ABNORMAL
POTASSIUM SERPL-SCNC: 3.4 MMOL/L (ref 3.5–5.1)
PROT SERPL-MCNC: 5.4 G/DL (ref 6–8.4)
RBC # BLD AUTO: 2.54 M/UL (ref 4–5.4)
SODIUM SERPL-SCNC: 134 MMOL/L (ref 136–145)
SPHEROCYTES BLD QL SMEAR: ABNORMAL
WBC # BLD AUTO: 3.09 K/UL (ref 3.9–12.7)

## 2022-09-06 PROCEDURE — 84100 ASSAY OF PHOSPHORUS: CPT | Performed by: STUDENT IN AN ORGANIZED HEALTH CARE EDUCATION/TRAINING PROGRAM

## 2022-09-06 PROCEDURE — 63600175 PHARM REV CODE 636 W HCPCS

## 2022-09-06 PROCEDURE — 25000003 PHARM REV CODE 250

## 2022-09-06 PROCEDURE — 99233 PR SUBSEQUENT HOSPITAL CARE,LEVL III: ICD-10-PCS | Mod: ,,, | Performed by: STUDENT IN AN ORGANIZED HEALTH CARE EDUCATION/TRAINING PROGRAM

## 2022-09-06 PROCEDURE — 83735 ASSAY OF MAGNESIUM: CPT | Performed by: STUDENT IN AN ORGANIZED HEALTH CARE EDUCATION/TRAINING PROGRAM

## 2022-09-06 PROCEDURE — 80053 COMPREHEN METABOLIC PANEL: CPT | Performed by: STUDENT IN AN ORGANIZED HEALTH CARE EDUCATION/TRAINING PROGRAM

## 2022-09-06 PROCEDURE — 85025 COMPLETE CBC W/AUTO DIFF WBC: CPT | Performed by: STUDENT IN AN ORGANIZED HEALTH CARE EDUCATION/TRAINING PROGRAM

## 2022-09-06 PROCEDURE — 99233 SBSQ HOSP IP/OBS HIGH 50: CPT | Mod: ,,, | Performed by: STUDENT IN AN ORGANIZED HEALTH CARE EDUCATION/TRAINING PROGRAM

## 2022-09-06 PROCEDURE — S0030 INJECTION, METRONIDAZOLE: HCPCS

## 2022-09-06 PROCEDURE — 20600001 HC STEP DOWN PRIVATE ROOM

## 2022-09-06 PROCEDURE — 25000003 PHARM REV CODE 250: Performed by: STUDENT IN AN ORGANIZED HEALTH CARE EDUCATION/TRAINING PROGRAM

## 2022-09-06 PROCEDURE — 63600175 PHARM REV CODE 636 W HCPCS: Performed by: STUDENT IN AN ORGANIZED HEALTH CARE EDUCATION/TRAINING PROGRAM

## 2022-09-06 RX ORDER — POTASSIUM CHLORIDE 20 MEQ/1
40 TABLET, EXTENDED RELEASE ORAL ONCE
Status: DISCONTINUED | OUTPATIENT
Start: 2022-09-06 | End: 2022-09-08

## 2022-09-06 RX ORDER — POLYETHYLENE GLYCOL 3350 17 G/17G
17 POWDER, FOR SOLUTION ORAL 2 TIMES DAILY PRN
Status: DISCONTINUED | OUTPATIENT
Start: 2022-09-06 | End: 2022-09-19

## 2022-09-06 RX ORDER — METRONIDAZOLE 500 MG/100ML
500 INJECTION, SOLUTION INTRAVENOUS
Status: DISCONTINUED | OUTPATIENT
Start: 2022-09-06 | End: 2022-09-07

## 2022-09-06 RX ORDER — MAGNESIUM SULFATE HEPTAHYDRATE 40 MG/ML
2 INJECTION, SOLUTION INTRAVENOUS ONCE
Status: DISCONTINUED | OUTPATIENT
Start: 2022-09-06 | End: 2022-09-16

## 2022-09-06 RX ADMIN — MORPHINE SULFATE 4 MG: 4 INJECTION INTRAVENOUS at 01:09

## 2022-09-06 RX ADMIN — ONDANSETRON 4 MG: 2 INJECTION INTRAMUSCULAR; INTRAVENOUS at 11:09

## 2022-09-06 RX ADMIN — MORPHINE SULFATE 4 MG: 4 INJECTION INTRAVENOUS at 05:09

## 2022-09-06 RX ADMIN — CEFEPIME HYDROCHLORIDE 2 G: 2 INJECTION, SOLUTION INTRAVENOUS at 09:09

## 2022-09-06 RX ADMIN — ONDANSETRON 4 MG: 2 INJECTION INTRAMUSCULAR; INTRAVENOUS at 05:09

## 2022-09-06 RX ADMIN — SODIUM CHLORIDE: 0.9 INJECTION, SOLUTION INTRAVENOUS at 09:09

## 2022-09-06 RX ADMIN — ACETAMINOPHEN 650 MG: 325 TABLET ORAL at 11:09

## 2022-09-06 RX ADMIN — ONDANSETRON 4 MG: 2 INJECTION INTRAMUSCULAR; INTRAVENOUS at 12:09

## 2022-09-06 RX ADMIN — MORPHINE SULFATE 4 MG: 4 INJECTION INTRAVENOUS at 10:09

## 2022-09-06 RX ADMIN — METRONIDAZOLE 500 MG: 500 INJECTION, SOLUTION INTRAVENOUS at 12:09

## 2022-09-06 RX ADMIN — CEFEPIME HYDROCHLORIDE 2 G: 2 INJECTION, SOLUTION INTRAVENOUS at 10:09

## 2022-09-06 RX ADMIN — SIMETHICONE 80 MG: 80 TABLET, CHEWABLE ORAL at 10:09

## 2022-09-06 RX ADMIN — MORPHINE SULFATE 4 MG: 4 INJECTION INTRAVENOUS at 06:09

## 2022-09-06 RX ADMIN — CEFEPIME HYDROCHLORIDE 2 G: 2 INJECTION, SOLUTION INTRAVENOUS at 01:09

## 2022-09-06 RX ADMIN — ENOXAPARIN SODIUM 40 MG: 100 INJECTION SUBCUTANEOUS at 06:09

## 2022-09-06 RX ADMIN — ONDANSETRON 4 MG: 2 INJECTION INTRAMUSCULAR; INTRAVENOUS at 06:09

## 2022-09-06 RX ADMIN — ACETAMINOPHEN 650 MG: 325 TABLET ORAL at 01:09

## 2022-09-06 RX ADMIN — METRONIDAZOLE 500 MG: 500 INJECTION, SOLUTION INTRAVENOUS at 06:09

## 2022-09-06 NOTE — ASSESSMENT & PLAN NOTE
36 years old with HTN, breast cancer  C4D20 Taxol on 8/29/2022 f/u with Dr. Cruz presents with worsening abdominal pain, nasuea and vomiting.  On exam she was mod-sever  abdominal pain to palpitation, no peritonitic signs. Labs were significant for elevated lipase >200. CT with findings concerning for pancreatitis wo fluid/ abscess collection. . CT scan with Mild peripancreatic fat stranding. TG levels were normal this admission, low concern for triglyceride induced pancreatitis. CT scan showed no gallstones or GB wall thickening, low concern for gallstone induced pancreatitis    Most likely pancreatitis 2/2 chemo, last chemotherapy session 8/29; Taxol    - Pain: morphine 4 mg q3 prn   - Anti-emetics: scheduled zofran and prn compazine   -  cc  - CLD adv diet as gerry   - simethicone   - bowel regimen to prevent opioid induced constipation

## 2022-09-06 NOTE — PROGRESS NOTES
[facility] - Oncology ([facility])  Hematology/Oncology  Progress Note    Patient Name: Laura Kent  Admission Date: 9/3/2022  Hospital Length of Stay: 3 days  Code Status: Full Code     Subjective:     HPI:  36 years old with HTN, breast cancer  C4D20 Taxol on 8/29/2022 f/u with Dr. Cruz presents with worsening abdominal pain, nasuea and vomiting. Patient was in pain during the interview and asking for better pain control. She states one week history of gradually worsening abdominal pain worst in epigastric area without radiation. She had minimal po intake since then. She associate emesis x2 NBNB. She had one small BM this afternoon prior to her presentation.     Patient denies chest pain, shortness of breath,  dysuria, polyuria, nausea, vomiting, fever, chills,  diarrhea, constipation, headache, falls, focal weakness or vision changes.     Ed course:   On presentation she was HDS and afebrile. On exam she was mod-sever  abdominal pain to palpitation, no peritonitic signs. Labs were significant for elevated lipase >200. CT with findings concerning for pancreatitis wo fluid/ abscess collection. Medical onc consulted for admit.           Interval History: N/V improving, pain better controlled, increased PO intake. Will adv diet as gerry. Patient febrile overnight and again this AM. Increased coverage with flagyl for possible intraabdominal infection. Will monitor fevers post flagyl.     Oncology Treatment Plan:   OP PEMBROLIZUMAB CARBOPLATIN (AUC 5) WITH WEEKLY PACLITAXEL FOLLOWED BY PEMBROLIZUMAB DOXORUBICIN CYCLOPHOSPHAMIDE FOLLOWED BY PEMBROLIZUMAB 200 MG Q3W    Medications:  Continuous Infusions:   sodium chloride 0.9% 150 mL/hr at 09/06/22 0911     Scheduled Meds:   ceFEPime (MAXIPIME) IVPB  2 g Intravenous Q8H    enoxaparin  40 mg Subcutaneous Daily    metronidazole  500 mg Intravenous Q8H    ondansetron  4 mg Intravenous Q6H     PRN Meds:acetaminophen, dextrose 10%, dextrose 10%, glucagon (human  recombinant), glucose, glucose, morphine, naloxone, polyethylene glycol, prochlorperazine, simethicone, sodium chloride 0.9%     Review of Systems   Constitutional:  Positive for activity change, appetite change and fatigue. Negative for chills, diaphoresis and fever.   HENT:  Negative for congestion.    Eyes:  Negative for visual disturbance.   Respiratory:  Negative for shortness of breath and wheezing.    Cardiovascular:  Negative for chest pain, palpitations and leg swelling.   Gastrointestinal:  Positive for abdominal pain, nausea and vomiting. Negative for abdominal distention and diarrhea.   Endocrine: Negative for polyuria.   Genitourinary:  Negative for difficulty urinating and dysuria.   Musculoskeletal:  Negative for back pain and joint swelling.   Skin:  Negative for color change.   Neurological:  Positive for weakness. Negative for dizziness and headaches.   Hematological:  Negative for adenopathy.   Psychiatric/Behavioral:  Negative for agitation.    Objective:     Vital Signs (Most Recent):  Temp: (!) 101.2 °F (38.4 °C) (09/06/22 1306)  Pulse: 94 (09/06/22 1130)  Resp: 19 (09/06/22 1306)  BP: 128/60 (09/06/22 1130)  SpO2: 100 % (09/06/22 1130)   Vital Signs (24h Range):  Temp:  [99.2 °F (37.3 °C)-102.2 °F (39 °C)] 101.2 °F (38.4 °C)  Pulse:  [] 94  Resp:  [16-19] 19  SpO2:  [95 %-100 %] 100 %  BP: (113-129)/(59-68) 128/60     Weight: 105.5 kg (232 lb 9.4 oz)  Body mass index is 37.54 kg/m².  Body surface area is 2.22 meters squared.      Intake/Output Summary (Last 24 hours) at 9/6/2022 1446  Last data filed at 9/6/2022 0911  Gross per 24 hour   Intake 100 ml   Output 1800 ml   Net -1700 ml       Physical Exam    Significant Labs:   BMP:   Recent Labs   Lab 09/05/22  0345 09/06/22  0352    109   * 134*   K 3.5 3.4*    105   CO2 22* 23   BUN 14 7   CREATININE 0.7 0.7   CALCIUM 8.5* 8.2*   MG 1.7 1.5*    and CBC:   Recent Labs   Lab 09/05/22  0345 09/06/22  0352   WBC 2.77*  3.09*   HGB 8.2* 8.2*   HCT 23.5* 23.0*    138*       Diagnostic Results:  I have reviewed and interpreted all pertinent imaging results/findings within the past 24 hours.    Assessment/Plan:     * Acute pancreatitis  36 years old with HTN, breast cancer  C4D20 Taxol on 8/29/2022 f/u with Dr. Cruz presents with worsening abdominal pain, nasuea and vomiting.  On exam she was mod-sever  abdominal pain to palpitation, no peritonitic signs. Labs were significant for elevated lipase >200. CT with findings concerning for pancreatitis wo fluid/ abscess collection. . CT scan with Mild peripancreatic fat stranding. TG levels were normal this admission, low concern for triglyceride induced pancreatitis. CT scan showed no gallstones or GB wall thickening, low concern for gallstone induced pancreatitis    Most likely pancreatitis 2/2 chemo, last chemotherapy session 8/29; Taxol    - Pain: morphine 4 mg q3 prn   - Anti-emetics: scheduled zofran and prn compazine   -  cc  - CLD adv diet as gerry   - simethicone   - bowel regimen to prevent opioid induced constipation     Abdominal pain  Please read pancreatitis       Chemotherapy induced neutropenia  Pt had subjective fevers at home unmeasured, abdominal pain, polyuria In setting of low WBC and recent chemotherapy. During admission patient was febrile intermittently and had U/A showing WBCs and bacteria. Given patient's low ANC count post chemo with a recent fever and slightly dirty U/A will continue with cefepime and add flagyl    -- BCX and UA NGTD (1)   -- Cefepime   -- flagyl IV    Malignant neoplasm of upper-outer quadrant of left breast in female, estrogen receptor negative  Dr. Crzu patient,  On:  Currently on cycle 4 D20 of pembrolizumab carboplatin (AUC 5) with weekly paclitaxel followed by pembrolizumab doxorubicin cyclophosphamide followed by pembrolizumab 200 mg q3w    Most recent  Taxol on 8/29/2022     -- admit to medical oncology              [facility]  DO Romel  Hematology/Oncology  [facility] - Oncology ([facility])

## 2022-09-06 NOTE — SUBJECTIVE & OBJECTIVE
Interval History: N/V improving, pain better controlled, increased PO intake. Will adv diet as gerry. Patient febrile overnight and again this AM. Increased coverage with flagyl for possible intraabdominal infection. Will monitor fevers post flagyl.     Oncology Treatment Plan:   OP PEMBROLIZUMAB CARBOPLATIN (AUC 5) WITH WEEKLY PACLITAXEL FOLLOWED BY PEMBROLIZUMAB DOXORUBICIN CYCLOPHOSPHAMIDE FOLLOWED BY PEMBROLIZUMAB 200 MG Q3W    Medications:  Continuous Infusions:   sodium chloride 0.9% 150 mL/hr at 09/06/22 0911     Scheduled Meds:   ceFEPime (MAXIPIME) IVPB  2 g Intravenous Q8H    enoxaparin  40 mg Subcutaneous Daily    metronidazole  500 mg Intravenous Q8H    ondansetron  4 mg Intravenous Q6H     PRN Meds:acetaminophen, dextrose 10%, dextrose 10%, glucagon (human recombinant), glucose, glucose, morphine, naloxone, polyethylene glycol, prochlorperazine, simethicone, sodium chloride 0.9%     Review of Systems   Constitutional:  Positive for activity change, appetite change and fatigue. Negative for chills, diaphoresis and fever.   HENT:  Negative for congestion.    Eyes:  Negative for visual disturbance.   Respiratory:  Negative for shortness of breath and wheezing.    Cardiovascular:  Negative for chest pain, palpitations and leg swelling.   Gastrointestinal:  Positive for abdominal pain, nausea and vomiting. Negative for abdominal distention and diarrhea.   Endocrine: Negative for polyuria.   Genitourinary:  Negative for difficulty urinating and dysuria.   Musculoskeletal:  Negative for back pain and joint swelling.   Skin:  Negative for color change.   Neurological:  Positive for weakness. Negative for dizziness and headaches.   Hematological:  Negative for adenopathy.   Psychiatric/Behavioral:  Negative for agitation.    Objective:     Vital Signs (Most Recent):  Temp: (!) 101.2 °F (38.4 °C) (09/06/22 1306)  Pulse: 94 (09/06/22 1130)  Resp: 19 (09/06/22 1306)  BP: 128/60 (09/06/22 1130)  SpO2: 100 % (09/06/22  1130)   Vital Signs (24h Range):  Temp:  [99.2 °F (37.3 °C)-102.2 °F (39 °C)] 101.2 °F (38.4 °C)  Pulse:  [] 94  Resp:  [16-19] 19  SpO2:  [95 %-100 %] 100 %  BP: (113-129)/(59-68) 128/60     Weight: 105.5 kg (232 lb 9.4 oz)  Body mass index is 37.54 kg/m².  Body surface area is 2.22 meters squared.      Intake/Output Summary (Last 24 hours) at 9/6/2022 1446  Last data filed at 9/6/2022 0911  Gross per 24 hour   Intake 100 ml   Output 1800 ml   Net -1700 ml       Physical Exam    Significant Labs:   BMP:   Recent Labs   Lab 09/05/22  0345 09/06/22  0352    109   * 134*   K 3.5 3.4*    105   CO2 22* 23   BUN 14 7   CREATININE 0.7 0.7   CALCIUM 8.5* 8.2*   MG 1.7 1.5*    and CBC:   Recent Labs   Lab 09/05/22  0345 09/06/22  0352   WBC 2.77* 3.09*   HGB 8.2* 8.2*   HCT 23.5* 23.0*    138*       Diagnostic Results:  I have reviewed and interpreted all pertinent imaging results/findings within the past 24 hours.

## 2022-09-06 NOTE — PLAN OF CARE
Plan of care reviewed with pt at the start of shift. Tmax 102.2; medical staff notified. Pt has redness to right inner eye and left outer eye; medical staff into evaluate. Pt requiring pain medication for abdominal pain. Pt tolerating ice chips. Pt ambulating to the bathroom with out difficult. No complaints of nausea and not vomiting this shift

## 2022-09-06 NOTE — CARE UPDATE
"RAPID RESPONSE NURSE CHART REVIEW        Chart Reviewed: 09/06/2022, 4:36 AM    MRN: [id number]  Bed: 859/859 A    Dx: <principal problem not specified>    Laura Kent has a past medical history of Anxiety, Breast cancer, Encounter for blood transfusion, Hypertension, and Meningitis.    Last VS: BP (!) 118/59 (BP Location: Left arm, Patient Position: Lying)   Pulse 96   Temp 99.5 °F (37.5 °C) (Oral)   Resp 17   Ht 5' 6" (1.676 m)   Wt 105.5 kg (232 lb 9.4 oz)   SpO2 97%   Breastfeeding No   BMI 37.54 kg/m²     24H Vital Sign Range:  Temp:  [97.2 °F (36.2 °C)-102.2 °F (39 °C)]   Pulse:  []   Resp:  [16-18]   BP: (113-129)/(56-68)   SpO2:  [95 %-99 %]     Level of Consciousness (AVPU): alert    Recent Labs     09/04/22  0155 09/05/22  0345 09/06/22  0352   WBC 3.02* 2.77* 3.09*   HGB 9.5* 8.2* 8.2*   HCT 26.4* 23.5* 23.0*    158 138*       Recent Labs     09/03/22 2113 09/04/22  0155 09/05/22  0345 09/06/22  0352   * 136 135*  --    K 3.6 3.6 3.5 3.4*    103 105 105   CO2 22* 24 22* 23   CREATININE 0.8 0.8 0.7 0.7   * 118* 102 109   PHOS  --  3.7 3.3 2.7   MG  --  1.8 1.7 1.5*        No results for input(s): PH, PCO2, PO2, HCO3, POCSATURATED, BE in the last 72 hours.     OXYGEN:        O2 Device (Oxygen Therapy): room air    MEWS score: 2    Charge RNJesika and Bedside RN Traci  contacted for fever and tachycardia. No additional concerns verbalized at this time. Instructed to call [phone] for further concerns or assistance.    Lisa Cutler RN      "

## 2022-09-06 NOTE — NURSING
Temperature obtained 101.2 oral. This RN notified MD regarding increasing temp. 101.2 obtained orally. DR. Urena notified and no new orders noted at this time.

## 2022-09-06 NOTE — ASSESSMENT & PLAN NOTE
Pt had subjective fevers at home unmeasured, abdominal pain, polyuria In setting of low WBC and recent chemotherapy. During admission patient was febrile intermittently and had U/A showing WBCs and bacteria. Given patient's low ANC count post chemo with a recent fever and slightly dirty U/A will continue with cefepime and add flagyl    -- BCX and UA NGTD (1)   -- Cefepime   -- flagyl IV

## 2022-09-07 ENCOUNTER — TELEPHONE (OUTPATIENT)
Dept: HEMATOLOGY/ONCOLOGY | Facility: CLINIC | Age: 37
End: 2022-09-07
Payer: MEDICAID

## 2022-09-07 LAB
ALBUMIN SERPL BCP-MCNC: 3.1 G/DL (ref 3.5–5.2)
ALP SERPL-CCNC: 39 U/L (ref 55–135)
ALT SERPL W/O P-5'-P-CCNC: 17 U/L (ref 10–44)
ANION GAP SERPL CALC-SCNC: 8 MMOL/L (ref 8–16)
ANISOCYTOSIS BLD QL SMEAR: SLIGHT
AST SERPL-CCNC: 21 U/L (ref 10–40)
BASOPHILS # BLD AUTO: ABNORMAL K/UL (ref 0–0.2)
BASOPHILS NFR BLD: 0 % (ref 0–1.9)
BILIRUB SERPL-MCNC: 0.6 MG/DL (ref 0.1–1)
BUN SERPL-MCNC: 4 MG/DL (ref 6–20)
CALCIUM SERPL-MCNC: 8.6 MG/DL (ref 8.7–10.5)
CHLORIDE SERPL-SCNC: 106 MMOL/L (ref 95–110)
CO2 SERPL-SCNC: 23 MMOL/L (ref 23–29)
CREAT SERPL-MCNC: 0.7 MG/DL (ref 0.5–1.4)
DIFFERENTIAL METHOD: ABNORMAL
DOHLE BOD BLD QL SMEAR: PRESENT
EOSINOPHIL # BLD AUTO: ABNORMAL K/UL (ref 0–0.5)
EOSINOPHIL NFR BLD: 0 % (ref 0–8)
ERYTHROCYTE [DISTWIDTH] IN BLOOD BY AUTOMATED COUNT: 14.2 % (ref 11.5–14.5)
EST. GFR  (NO RACE VARIABLE): >60 ML/MIN/1.73 M^2
GLUCOSE SERPL-MCNC: 127 MG/DL (ref 70–110)
HCT VFR BLD AUTO: 25.8 % (ref 37–48.5)
HGB BLD-MCNC: 9 G/DL (ref 12–16)
HYPOCHROMIA BLD QL SMEAR: ABNORMAL
IMM GRANULOCYTES # BLD AUTO: ABNORMAL K/UL (ref 0–0.04)
IMM GRANULOCYTES NFR BLD AUTO: ABNORMAL % (ref 0–0.5)
LYMPHOCYTES # BLD AUTO: ABNORMAL K/UL (ref 1–4.8)
LYMPHOCYTES NFR BLD: 25 % (ref 18–48)
MAGNESIUM SERPL-MCNC: 1.6 MG/DL (ref 1.6–2.6)
MCH RBC QN AUTO: 31.7 PG (ref 27–31)
MCHC RBC AUTO-ENTMCNC: 34.9 G/DL (ref 32–36)
MCV RBC AUTO: 91 FL (ref 82–98)
METAMYELOCYTES NFR BLD MANUAL: 8 %
MONOCYTES # BLD AUTO: ABNORMAL K/UL (ref 0.3–1)
MONOCYTES NFR BLD: 10 % (ref 4–15)
MYELOCYTES NFR BLD MANUAL: 2 %
NEUTROPHILS NFR BLD: 31 % (ref 38–73)
NEUTS BAND NFR BLD MANUAL: 22 %
NRBC BLD-RTO: 1 /100 WBC
OVALOCYTES BLD QL SMEAR: ABNORMAL
PLATELET # BLD AUTO: 171 K/UL (ref 150–450)
PLATELET BLD QL SMEAR: ABNORMAL
PMV BLD AUTO: 10 FL (ref 9.2–12.9)
POIKILOCYTOSIS BLD QL SMEAR: SLIGHT
POLYCHROMASIA BLD QL SMEAR: ABNORMAL
POTASSIUM SERPL-SCNC: 3.3 MMOL/L (ref 3.5–5.1)
PROMYELOCYTES NFR BLD MANUAL: 2 %
PROT SERPL-MCNC: 6 G/DL (ref 6–8.4)
RBC # BLD AUTO: 2.84 M/UL (ref 4–5.4)
SARS-COV-2 RDRP RESP QL NAA+PROBE: NEGATIVE
SCHISTOCYTES BLD QL SMEAR: PRESENT
SMUDGE CELLS BLD QL SMEAR: PRESENT
SODIUM SERPL-SCNC: 137 MMOL/L (ref 136–145)
TOXIC GRANULES BLD QL SMEAR: PRESENT
WBC # BLD AUTO: 3.53 K/UL (ref 3.9–12.7)

## 2022-09-07 PROCEDURE — 85027 COMPLETE CBC AUTOMATED: CPT | Performed by: STUDENT IN AN ORGANIZED HEALTH CARE EDUCATION/TRAINING PROGRAM

## 2022-09-07 PROCEDURE — 25000003 PHARM REV CODE 250: Performed by: STUDENT IN AN ORGANIZED HEALTH CARE EDUCATION/TRAINING PROGRAM

## 2022-09-07 PROCEDURE — U0002 COVID-19 LAB TEST NON-CDC: HCPCS

## 2022-09-07 PROCEDURE — 63600175 PHARM REV CODE 636 W HCPCS

## 2022-09-07 PROCEDURE — 63600175 PHARM REV CODE 636 W HCPCS: Performed by: STUDENT IN AN ORGANIZED HEALTH CARE EDUCATION/TRAINING PROGRAM

## 2022-09-07 PROCEDURE — 25000003 PHARM REV CODE 250

## 2022-09-07 PROCEDURE — 99233 SBSQ HOSP IP/OBS HIGH 50: CPT | Mod: ,,, | Performed by: STUDENT IN AN ORGANIZED HEALTH CARE EDUCATION/TRAINING PROGRAM

## 2022-09-07 PROCEDURE — 20600001 HC STEP DOWN PRIVATE ROOM

## 2022-09-07 PROCEDURE — 83735 ASSAY OF MAGNESIUM: CPT | Performed by: STUDENT IN AN ORGANIZED HEALTH CARE EDUCATION/TRAINING PROGRAM

## 2022-09-07 PROCEDURE — 80053 COMPREHEN METABOLIC PANEL: CPT | Performed by: STUDENT IN AN ORGANIZED HEALTH CARE EDUCATION/TRAINING PROGRAM

## 2022-09-07 PROCEDURE — 99233 PR SUBSEQUENT HOSPITAL CARE,LEVL III: ICD-10-PCS | Mod: ,,, | Performed by: STUDENT IN AN ORGANIZED HEALTH CARE EDUCATION/TRAINING PROGRAM

## 2022-09-07 PROCEDURE — 85007 BL SMEAR W/DIFF WBC COUNT: CPT | Performed by: STUDENT IN AN ORGANIZED HEALTH CARE EDUCATION/TRAINING PROGRAM

## 2022-09-07 PROCEDURE — S0030 INJECTION, METRONIDAZOLE: HCPCS

## 2022-09-07 RX ORDER — IBUPROFEN 200 MG
400 TABLET ORAL EVERY 6 HOURS PRN
Status: DISCONTINUED | OUTPATIENT
Start: 2022-09-07 | End: 2022-09-10

## 2022-09-07 RX ORDER — CIPROFLOXACIN 500 MG/1
500 TABLET ORAL EVERY 12 HOURS
Status: DISCONTINUED | OUTPATIENT
Start: 2022-09-07 | End: 2022-09-08

## 2022-09-07 RX ORDER — METRONIDAZOLE 500 MG/1
500 TABLET ORAL EVERY 8 HOURS
Status: DISCONTINUED | OUTPATIENT
Start: 2022-09-07 | End: 2022-09-08

## 2022-09-07 RX ADMIN — CEFEPIME HYDROCHLORIDE 2 G: 2 INJECTION, SOLUTION INTRAVENOUS at 09:09

## 2022-09-07 RX ADMIN — ENOXAPARIN SODIUM 40 MG: 100 INJECTION SUBCUTANEOUS at 05:09

## 2022-09-07 RX ADMIN — MORPHINE SULFATE 4 MG: 4 INJECTION INTRAVENOUS at 06:09

## 2022-09-07 RX ADMIN — IBUPROFEN 400 MG: 200 TABLET, FILM COATED ORAL at 04:09

## 2022-09-07 RX ADMIN — CEFEPIME HYDROCHLORIDE 2 G: 2 INJECTION, SOLUTION INTRAVENOUS at 01:09

## 2022-09-07 RX ADMIN — MORPHINE SULFATE 4 MG: 4 INJECTION INTRAVENOUS at 09:09

## 2022-09-07 RX ADMIN — MORPHINE SULFATE 4 MG: 4 INJECTION INTRAVENOUS at 03:09

## 2022-09-07 RX ADMIN — METRONIDAZOLE 500 MG: 500 TABLET ORAL at 09:09

## 2022-09-07 RX ADMIN — ONDANSETRON 4 MG: 2 INJECTION INTRAMUSCULAR; INTRAVENOUS at 05:09

## 2022-09-07 RX ADMIN — ACETAMINOPHEN 650 MG: 325 TABLET ORAL at 07:09

## 2022-09-07 RX ADMIN — CIPROFLOXACIN HYDROCHLORIDE 500 MG: 500 TABLET, FILM COATED ORAL at 10:09

## 2022-09-07 RX ADMIN — METRONIDAZOLE 500 MG: 500 INJECTION, SOLUTION INTRAVENOUS at 01:09

## 2022-09-07 RX ADMIN — MORPHINE SULFATE 4 MG: 4 INJECTION INTRAVENOUS at 12:09

## 2022-09-07 RX ADMIN — CIPROFLOXACIN HYDROCHLORIDE 500 MG: 500 TABLET, FILM COATED ORAL at 11:09

## 2022-09-07 RX ADMIN — METRONIDAZOLE 500 MG: 500 TABLET ORAL at 02:09

## 2022-09-07 RX ADMIN — MORPHINE SULFATE 4 MG: 4 INJECTION INTRAVENOUS at 07:09

## 2022-09-07 RX ADMIN — MORPHINE SULFATE 4 MG: 4 INJECTION INTRAVENOUS at 04:09

## 2022-09-07 RX ADMIN — SODIUM CHLORIDE: 0.9 INJECTION, SOLUTION INTRAVENOUS at 11:09

## 2022-09-07 RX ADMIN — ONDANSETRON 4 MG: 2 INJECTION INTRAMUSCULAR; INTRAVENOUS at 12:09

## 2022-09-07 NOTE — PLAN OF CARE
"Pt involved in plan of care and communicating needs throughout shift. Pt alert and oriented x4. Pt had several BM during shift. M.D. aware. Pt c/o of abdominal cramping and pain. Pt states "cramping is worse after having BM." PRN IV Morphine given every 3 hours as needed. US of bilateral lower extremity veins done today. TMAX 101.1. All VSS; no acute events so far this shift.  Pt remaining free from falls or injury throughout shift; bed locked and in lowest position; call light within reach.  Pt instructed to call for assistance as needed.  Q1H rounding done on pt. WCTM.   "

## 2022-09-07 NOTE — TELEPHONE ENCOUNTER
Returned call to pt, pt currently admitted and is in room 859, reassured her I would let Dr Cruz know if she did not already and that we would reschedule her appointments upon discharge.

## 2022-09-07 NOTE — ASSESSMENT & PLAN NOTE
36 years old with HTN, breast cancer  C4D20 Taxol on 8/29/2022 f/u with Dr. Cruz presents with worsening abdominal pain, nasuea and vomiting.  On exam she was mod-sever  abdominal pain to palpitation, no peritonitic signs. Labs were significant for elevated lipase >200. CT with findings concerning for pancreatitis wo fluid/ abscess collection. . CT scan with Mild peripancreatic fat stranding. TG levels were normal this admission, low concern for triglyceride induced pancreatitis. CT scan showed no gallstones or GB wall thickening, low concern for gallstone induced pancreatitis. Patient is clinically improving with n/v, po intake, and pain.     Most likely pancreatitis 2/2 chemo, last chemotherapy session 8/29; Taxol    - Pain: morphine 4 mg q3 prn   - Anti-emetics: scheduled zofran and prn compazine   -  cc  - Soft diet, adv diet as gerry   - simethicone   - bowel regimen to prevent opioid induced constipation

## 2022-09-07 NOTE — PROGRESS NOTES
Admit Assessment    Patient Identification  Laura Kent   :  1985  Admit Date:  9/3/2022  Attending Provider:  Aj Rebollar MD              Referral:   Pt was admitted to  with a diagnosis of Acute pancreatitis, and was admitted this hospital stay due to Chest pain [R07.9]  QT prolongation [R94.31]  Acute pancreatitis without infection or necrosis, unspecified pancreatitis type [K85.90].   is involved was referred to the Social Work Department via (Ref[facility]).  Patient presents as a 36 y.o. year old not  female.    Persons interviewed: Patient    Living Situation:      Resides at [address] [place] [address], phone: [phone] (home).      (RETIRED) Functional Status Prior  Ambulation Prior: 0-->independent  Transferrin-->independent  Toiletin-->independent    Current or Past Agencies and Description of Services/Supplies    DME  Agency Name: None   Agency Phone Number: N/A       Home Health  Agency Name: None  Agency Phone Number: N/A  Services: N/A    IV Infusion  Agency Name:  Unsure  Agency Phone Number: N/A    Nutrition: Minimal diet at this time, Soft diet as tolerated.    Outpatient Pharmacy:     [id number] [address] AT [facility] [address]  [address]  [facility] [address]  Phone: [phone] Fax: [phone]    [facility] [address] [address]  Phone: [phone] Fax: [phone]      Patient Preference of agencies: None at this time. Infusion needed in the past, 18 y/o son able to provide cleaning ot equipment.     Patient/Caregiver informed of right to choose providers or agencies.  Patient provides permission to release any necessary information to [facility] and to Non[facility] agencies as needed to facilitate patient care, treatment planning, and patient discharge planning.  Written and verbal resources  provided.      Coping   Patient reports feeling overwhelmed at times, currently with a flat affect.        Adjustment to Diagnosis and Treatment  Patient feels like she has significant support. Sister is able to assist with children and financially at times.    Emotional/Behavioral/Cognitive Issues   No concerns at this time.         History/Current Symptoms of Anxiety/Depression: No  History/Current Substance Use:   Social History     Tobacco Use    Smoking status: Never    Smokeless tobacco: Never   Substance and Sexual Activity    Alcohol use: Yes     Comment: social    Drug use: Never    Sexual activity: Not Currently       Indications of Abuse/Neglect: No  Abuse Screen (yes response referral indicated)  Feels Unsafe at Home or Work/School: no  Physical Signs of Abuse Present: no    Financial:  Payer/Plan Subscr  Sex Relation Sub. Ins. ID Effective Group Num   1. [place] ROBERT VALENC[place] ANT* 1985 Female Self [id number]* 16                                    [address]   2. [place] ROBERT VALENC[place] ANT* 1985 Female Self [id number]* 2/1/15 [id number]                                   [address]          Other identified concerns/needs: Financial assistance with bills    Plan: Patient will d/c to home.    Interventions/Referrals: Possible referrals to financial coordinators, SSDI assistance.    Patient/caregiver engaged in treatment planning process.     providing psychosocial and supportive counseling, resources, education, assistance and discharge planning as appropriate.  Patient/caregiver state understanding of  available resources,  following, remains available.

## 2022-09-07 NOTE — PLAN OF CARE
Plan of care reviewed with patient at the start of shift. Tmax 101 this shift and controlled with tylenol. Pt receiving pain medication on request for abdominal pain. No nausea or vomiting. Pt ambulating independently to the bathroom. Pt asking family and friends not to come and visit her as she does not feel up to visiting with people..

## 2022-09-07 NOTE — ASSESSMENT & PLAN NOTE
Pt had subjective fevers at home unmeasured, abdominal pain, polyuria In setting of low WBC and recent chemotherapy. During admission patient was febrile intermittently and had U/A showing WBCs and bacteria. Given patient's low ANC count post chemo with a recent fever and slightly dirty U/A will continue with cefepime and add flagyl    Overall fever curve is downtrending, and cultures have been negative. Can consider drug fever with antibiotic use. Patient is clinically improving with negative infectious work up so far. High concern would be staph however given negative cultures and no symptoms, of low concern. Port site does not look infected. Given pt is tolerating PO will transition to PO antibiotics and monitor diet.    -- BCX NGTD   -- cipro PO   -- flagyl PO

## 2022-09-07 NOTE — PROGRESS NOTES
[facility] ([facility])  Hematology/Oncology  Progress Note    Patient Name: Laura Kent  Admission Date: 9/3/2022  Hospital Length of Stay: 4 days  Code Status: Full Code     Subjective:     HPI:  36 years old with HTN, breast cancer  C4D20 Taxol on 8/29/2022 f/u with Dr. Cruz presents with worsening abdominal pain, nasuea and vomiting. Patient was in pain during the interview and asking for better pain control. She states one week history of gradually worsening abdominal pain worst in epigastric area without radiation. She had minimal po intake since then. She associate emesis x2 NBNB. She had one small BM this afternoon prior to her presentation.     Patient denies chest pain, shortness of breath,  dysuria, polyuria, nausea, vomiting, fever, chills,  diarrhea, constipation, headache, falls, focal weakness or vision changes.     Ed course:   On presentation she was HDS and afebrile. On exam she was mod-sever  abdominal pain to palpitation, no peritonitic signs. Labs were significant for elevated lipase >200. CT with findings concerning for pancreatitis wo fluid/ abscess collection. Medical onc consulted for admit.           No new subjective & objective note has been filed under this hospital service since the last note was generated.    Assessment/Plan:     * Acute pancreatitis  36 years old with HTN, breast cancer  C4D20 Taxol on 8/29/2022 f/u with Dr. Cruz presents with worsening abdominal pain, nasuea and vomiting.  On exam she was mod-sever  abdominal pain to palpitation, no peritonitic signs. Labs were significant for elevated lipase >200. CT with findings concerning for pancreatitis wo fluid/ abscess collection. . CT scan with Mild peripancreatic fat stranding. TG levels were normal this admission, low concern for triglyceride induced pancreatitis. CT scan showed no gallstones or GB wall thickening, low concern for gallstone induced pancreatitis. Patient is clinically improving with  n/v, po intake, and pain.     Most likely pancreatitis 2/2 chemo, last chemotherapy session 8/29; Taxol    - Pain: morphine 4 mg q3 prn   - Anti-emetics: scheduled zofran and prn compazine   -  cc  - Soft diet, adv diet as gerry   - simethicone   - bowel regimen to prevent opioid induced constipation     Abdominal pain  Please read pancreatitis       Chemotherapy induced neutropenia  Pt had subjective fevers at home unmeasured, abdominal pain, polyuria In setting of low WBC and recent chemotherapy. During admission patient was febrile intermittently and had U/A showing WBCs and bacteria. Given patient's low ANC count post chemo with a recent fever and slightly dirty U/A will continue with cefepime and add flagyl    Overall fever curve is downtrending, and cultures have been negative. Can consider drug fever with antibiotic use. Patient is clinically improving with negative infectious work up so far. High concern would be staph however given negative cultures and no symptoms, of low concern. Port site does not look infected. Given pt is tolerating PO will transition to PO antibiotics and monitor diet.    -- BCX NGTD   -- cipro PO   -- flagyl PO    Malignant neoplasm of upper-outer quadrant of left breast in female, estrogen receptor negative  Dr. Cruz patient,  On:  Currently on cycle 4 D20 of pembrolizumab carboplatin (AUC 5) with weekly paclitaxel followed by pembrolizumab doxorubicin cyclophosphamide followed by pembrolizumab 200 mg q3w    Most recent  Taxol on 8/29/2022     -- admit to medical oncology              Payal Urena DO  Hematology/Oncology  [facility] ([facility])

## 2022-09-08 PROBLEM — R19.5 LOOSE STOOLS: Status: ACTIVE | Noted: 2022-09-08

## 2022-09-08 LAB
ALBUMIN SERPL BCP-MCNC: 2.9 G/DL (ref 3.5–5.2)
ALP SERPL-CCNC: 37 U/L (ref 55–135)
ALT SERPL W/O P-5'-P-CCNC: 38 U/L (ref 10–44)
ANION GAP SERPL CALC-SCNC: 7 MMOL/L (ref 8–16)
ANISOCYTOSIS BLD QL SMEAR: SLIGHT
AST SERPL-CCNC: 64 U/L (ref 10–40)
BASOPHILS # BLD AUTO: 0.02 K/UL (ref 0–0.2)
BASOPHILS NFR BLD: 0.6 % (ref 0–1.9)
BILIRUB SERPL-MCNC: 0.5 MG/DL (ref 0.1–1)
BUN SERPL-MCNC: 4 MG/DL (ref 6–20)
C DIFF GDH STL QL: NEGATIVE
C DIFF TOX A+B STL QL IA: NEGATIVE
CALCIUM SERPL-MCNC: 8.4 MG/DL (ref 8.7–10.5)
CHLORIDE SERPL-SCNC: 107 MMOL/L (ref 95–110)
CO2 SERPL-SCNC: 22 MMOL/L (ref 23–29)
CREAT SERPL-MCNC: 0.7 MG/DL (ref 0.5–1.4)
D DIMER PPP IA.FEU-MCNC: 1.93 MG/L FEU
DIFFERENTIAL METHOD: ABNORMAL
DOHLE BOD BLD QL SMEAR: PRESENT
EOSINOPHIL # BLD AUTO: 0 K/UL (ref 0–0.5)
EOSINOPHIL NFR BLD: 0.9 % (ref 0–8)
ERYTHROCYTE [DISTWIDTH] IN BLOOD BY AUTOMATED COUNT: 14.6 % (ref 11.5–14.5)
EST. GFR  (NO RACE VARIABLE): >60 ML/MIN/1.73 M^2
GLUCOSE SERPL-MCNC: 118 MG/DL (ref 70–110)
HCT VFR BLD AUTO: 24.8 % (ref 37–48.5)
HGB BLD-MCNC: 8.9 G/DL (ref 12–16)
HYPOCHROMIA BLD QL SMEAR: ABNORMAL
IMM GRANULOCYTES # BLD AUTO: 0.1 K/UL (ref 0–0.04)
IMM GRANULOCYTES NFR BLD AUTO: 2.9 % (ref 0–0.5)
LYMPHOCYTES # BLD AUTO: 0.4 K/UL (ref 1–4.8)
LYMPHOCYTES NFR BLD: 12.1 % (ref 18–48)
MAGNESIUM SERPL-MCNC: 1.6 MG/DL (ref 1.6–2.6)
MCH RBC QN AUTO: 32.6 PG (ref 27–31)
MCHC RBC AUTO-ENTMCNC: 35.9 G/DL (ref 32–36)
MCV RBC AUTO: 91 FL (ref 82–98)
MONOCYTES # BLD AUTO: 0.5 K/UL (ref 0.3–1)
MONOCYTES NFR BLD: 15 % (ref 4–15)
NEUTROPHILS # BLD AUTO: 2.3 K/UL (ref 1.8–7.7)
NEUTROPHILS NFR BLD: 68.5 % (ref 38–73)
NRBC BLD-RTO: 0 /100 WBC
PLATELET # BLD AUTO: 146 K/UL (ref 150–450)
PLATELET BLD QL SMEAR: ABNORMAL
PMV BLD AUTO: 9.5 FL (ref 9.2–12.9)
POIKILOCYTOSIS BLD QL SMEAR: SLIGHT
POLYCHROMASIA BLD QL SMEAR: ABNORMAL
POTASSIUM SERPL-SCNC: 3.3 MMOL/L (ref 3.5–5.1)
PROT SERPL-MCNC: 5.7 G/DL (ref 6–8.4)
RBC # BLD AUTO: 2.73 M/UL (ref 4–5.4)
SODIUM SERPL-SCNC: 136 MMOL/L (ref 136–145)
TOXIC GRANULES BLD QL SMEAR: PRESENT
WBC # BLD AUTO: 3.4 K/UL (ref 3.9–12.7)

## 2022-09-08 PROCEDURE — 80053 COMPREHEN METABOLIC PANEL: CPT | Performed by: STUDENT IN AN ORGANIZED HEALTH CARE EDUCATION/TRAINING PROGRAM

## 2022-09-08 PROCEDURE — 36415 COLL VENOUS BLD VENIPUNCTURE: CPT | Performed by: STUDENT IN AN ORGANIZED HEALTH CARE EDUCATION/TRAINING PROGRAM

## 2022-09-08 PROCEDURE — 99233 PR SUBSEQUENT HOSPITAL CARE,LEVL III: ICD-10-PCS | Mod: ,,, | Performed by: STUDENT IN AN ORGANIZED HEALTH CARE EDUCATION/TRAINING PROGRAM

## 2022-09-08 PROCEDURE — 20600001 HC STEP DOWN PRIVATE ROOM

## 2022-09-08 PROCEDURE — 25000003 PHARM REV CODE 250

## 2022-09-08 PROCEDURE — 27000207 HC ISOLATION

## 2022-09-08 PROCEDURE — 87449 NOS EACH ORGANISM AG IA: CPT

## 2022-09-08 PROCEDURE — 99233 SBSQ HOSP IP/OBS HIGH 50: CPT | Mod: ,,, | Performed by: STUDENT IN AN ORGANIZED HEALTH CARE EDUCATION/TRAINING PROGRAM

## 2022-09-08 PROCEDURE — 25000003 PHARM REV CODE 250: Performed by: STUDENT IN AN ORGANIZED HEALTH CARE EDUCATION/TRAINING PROGRAM

## 2022-09-08 PROCEDURE — 85025 COMPLETE CBC W/AUTO DIFF WBC: CPT | Performed by: STUDENT IN AN ORGANIZED HEALTH CARE EDUCATION/TRAINING PROGRAM

## 2022-09-08 PROCEDURE — 63600175 PHARM REV CODE 636 W HCPCS: Performed by: STUDENT IN AN ORGANIZED HEALTH CARE EDUCATION/TRAINING PROGRAM

## 2022-09-08 PROCEDURE — 87040 BLOOD CULTURE FOR BACTERIA: CPT | Mod: 59

## 2022-09-08 PROCEDURE — 87040 BLOOD CULTURE FOR BACTERIA: CPT | Mod: 59 | Performed by: STUDENT IN AN ORGANIZED HEALTH CARE EDUCATION/TRAINING PROGRAM

## 2022-09-08 PROCEDURE — 85379 FIBRIN DEGRADATION QUANT: CPT

## 2022-09-08 PROCEDURE — 81001 URINALYSIS AUTO W/SCOPE: CPT

## 2022-09-08 PROCEDURE — 63600175 PHARM REV CODE 636 W HCPCS

## 2022-09-08 PROCEDURE — 83735 ASSAY OF MAGNESIUM: CPT | Performed by: STUDENT IN AN ORGANIZED HEALTH CARE EDUCATION/TRAINING PROGRAM

## 2022-09-08 RX ORDER — CIPROFLOXACIN 500 MG/1
500 TABLET ORAL EVERY 12 HOURS
Status: DISCONTINUED | OUTPATIENT
Start: 2022-09-08 | End: 2022-09-08

## 2022-09-08 RX ORDER — DICYCLOMINE HYDROCHLORIDE 10 MG/1
10 CAPSULE ORAL 4 TIMES DAILY PRN
Status: DISCONTINUED | OUTPATIENT
Start: 2022-09-08 | End: 2022-09-10

## 2022-09-08 RX ORDER — POTASSIUM CHLORIDE 750 MG/1
30 CAPSULE, EXTENDED RELEASE ORAL ONCE
Status: COMPLETED | OUTPATIENT
Start: 2022-09-08 | End: 2022-09-08

## 2022-09-08 RX ORDER — CEFEPIME HYDROCHLORIDE 1 G/50ML
2 INJECTION, SOLUTION INTRAVENOUS
Status: DISCONTINUED | OUTPATIENT
Start: 2022-09-08 | End: 2022-09-08

## 2022-09-08 RX ORDER — OXYCODONE HYDROCHLORIDE 10 MG/1
10 TABLET ORAL EVERY 6 HOURS PRN
Status: DISCONTINUED | OUTPATIENT
Start: 2022-09-08 | End: 2022-09-12

## 2022-09-08 RX ADMIN — ENOXAPARIN SODIUM 40 MG: 100 INJECTION SUBCUTANEOUS at 05:09

## 2022-09-08 RX ADMIN — MORPHINE SULFATE 4 MG: 4 INJECTION INTRAVENOUS at 07:09

## 2022-09-08 RX ADMIN — MORPHINE SULFATE 4 MG: 4 INJECTION INTRAVENOUS at 01:09

## 2022-09-08 RX ADMIN — CEFEPIME HYDROCHLORIDE 2 G: 2 INJECTION, SOLUTION INTRAVENOUS at 03:09

## 2022-09-08 RX ADMIN — SODIUM CHLORIDE: 0.9 INJECTION, SOLUTION INTRAVENOUS at 06:09

## 2022-09-08 RX ADMIN — POTASSIUM CHLORIDE 30 MEQ: 10 CAPSULE, COATED, EXTENDED RELEASE ORAL at 06:09

## 2022-09-08 RX ADMIN — ONDANSETRON 4 MG: 2 INJECTION INTRAMUSCULAR; INTRAVENOUS at 11:09

## 2022-09-08 RX ADMIN — CEFEPIME HYDROCHLORIDE 2 G: 2 INJECTION, SOLUTION INTRAVENOUS at 11:09

## 2022-09-08 RX ADMIN — ONDANSETRON 4 MG: 2 INJECTION INTRAMUSCULAR; INTRAVENOUS at 06:09

## 2022-09-08 RX ADMIN — PIPERACILLIN SODIUM AND TAZOBACTAM SODIUM 4.5 G: 4; .5 INJECTION, POWDER, LYOPHILIZED, FOR SOLUTION INTRAVENOUS at 06:09

## 2022-09-08 RX ADMIN — POTASSIUM CHLORIDE 30 MEQ: 10 CAPSULE, COATED, EXTENDED RELEASE ORAL at 07:09

## 2022-09-08 RX ADMIN — IBUPROFEN 400 MG: 200 TABLET, FILM COATED ORAL at 11:09

## 2022-09-08 RX ADMIN — OXYCODONE HYDROCHLORIDE 10 MG: 10 TABLET ORAL at 05:09

## 2022-09-08 RX ADMIN — ONDANSETRON 4 MG: 2 INJECTION INTRAMUSCULAR; INTRAVENOUS at 05:09

## 2022-09-08 RX ADMIN — IBUPROFEN 400 MG: 200 TABLET, FILM COATED ORAL at 01:09

## 2022-09-08 RX ADMIN — IBUPROFEN 400 MG: 200 TABLET, FILM COATED ORAL at 03:09

## 2022-09-08 RX ADMIN — ONDANSETRON 4 MG: 2 INJECTION INTRAMUSCULAR; INTRAVENOUS at 12:09

## 2022-09-08 RX ADMIN — PROCHLORPERAZINE EDISYLATE 5 MG: 5 INJECTION INTRAMUSCULAR; INTRAVENOUS at 12:09

## 2022-09-08 RX ADMIN — PROCHLORPERAZINE EDISYLATE 5 MG: 5 INJECTION INTRAMUSCULAR; INTRAVENOUS at 10:09

## 2022-09-08 RX ADMIN — METRONIDAZOLE 500 MG: 500 TABLET ORAL at 06:09

## 2022-09-08 RX ADMIN — METRONIDAZOLE 500 MG: 500 TABLET ORAL at 01:09

## 2022-09-08 RX ADMIN — OXYCODONE HYDROCHLORIDE 10 MG: 10 TABLET ORAL at 11:09

## 2022-09-08 NOTE — ASSESSMENT & PLAN NOTE
36 years old with HTN, breast cancer  C4D20 Taxol on 8/29/2022 f/u with Dr. Cruz presents with worsening abdominal pain, nasuea and vomiting.  On exam she was mod-sever  abdominal pain to palpitation, no peritonitic signs. Labs were significant for elevated lipase >200. CT with findings concerning for pancreatitis wo fluid/ abscess collection. . CT scan with Mild peripancreatic fat stranding. TG levels were normal this admission, low concern for triglyceride induced pancreatitis. CT scan showed no gallstones or GB wall thickening, low concern for gallstone induced pancreatitis. Patient is clinically improving with n/v, po intake, and pain.     Most likely pancreatitis 2/2 chemo, last chemotherapy session 8/29; Taxol    - Pain: morphine IV changed to oxy PO. Added dicyclomine   - Anti-emetics: scheduled zofran and prn compazine   -  cc  - Pureed diet, adv diet as gerry   - simethicone   - bowel regimen to prevent opioid induced constipation

## 2022-09-08 NOTE — PROGRESS NOTES
[facility] - Oncology ([facility])  Hematology/Oncology  Progress Note    Patient Name: Laura Kent  Admission Date: 9/3/2022  Hospital Length of Stay: 5 days  Code Status: Full Code     Subjective:     HPI:  36 years old with HTN, breast cancer  C4D20 Taxol on 8/29/2022 f/u with Dr. Cruz presents with worsening abdominal pain, nasuea and vomiting. Patient was in pain during the interview and asking for better pain control. She states one week history of gradually worsening abdominal pain worst in epigastric area without radiation. She had minimal po intake since then. She associate emesis x2 NBNB. She had one small BM this afternoon prior to her presentation.     Patient denies chest pain, shortness of breath,  dysuria, polyuria, nausea, vomiting, fever, chills,  diarrhea, constipation, headache, falls, focal weakness or vision changes.     Ed course:   On presentation she was HDS and afebrile. On exam she was mod-sever  abdominal pain to palpitation, no peritonitic signs. Labs were significant for elevated lipase >200. CT with findings concerning for pancreatitis wo fluid/ abscess collection. Medical onc consulted for admit.           Interval History: DVT PE was negative, and fever curve down trending. Started on dicyclomine for abdominal pain. Patient having loose stools since starting antibiotics, c diff studies ordered. Patient's n/v is also downtrending and amenable to advancing diet.     Oncology Treatment Plan:   OP PEMBROLIZUMAB CARBOPLATIN (AUC 5) WITH WEEKLY PACLITAXEL FOLLOWED BY PEMBROLIZUMAB DOXORUBICIN CYCLOPHOSPHAMIDE FOLLOWED BY PEMBROLIZUMAB 200 MG Q3W    Medications:  Continuous Infusions:   sodium chloride 0.9% 150 mL/hr at 09/08/22 0628     Scheduled Meds:   ciprofloxacin HCl  500 mg Oral Q12H    enoxaparin  40 mg Subcutaneous Daily    magnesium sulfate IVPB  2 g Intravenous Once    metroNIDAZOLE  500 mg Oral Q8H    ondansetron  4 mg Intravenous Q6H    potassium chloride  40  mEq Oral Once     PRN Meds:dextrose 10%, dextrose 10%, dicyclomine, glucagon (human recombinant), glucose, glucose, ibuprofen, naloxone, oxyCODONE, polyethylene glycol, prochlorperazine, simethicone, sodium chloride 0.9%     Review of Systems   Constitutional:  Positive for activity change, appetite change and fatigue. Negative for chills, diaphoresis and fever.   HENT:  Negative for congestion.    Eyes:  Negative for visual disturbance.   Respiratory:  Negative for shortness of breath and wheezing.    Cardiovascular:  Negative for chest pain, palpitations and leg swelling.   Gastrointestinal:  Positive for abdominal pain, nausea and vomiting. Negative for abdominal distention and diarrhea.   Endocrine: Negative for polyuria.   Genitourinary:  Negative for difficulty urinating and dysuria.   Musculoskeletal:  Negative for back pain and joint swelling.   Skin:  Negative for color change.   Neurological:  Positive for weakness. Negative for dizziness and headaches.   Hematological:  Negative for adenopathy.   Psychiatric/Behavioral:  Negative for agitation.    Objective:     Vital Signs (Most Recent):  Temp: 100 °F (37.8 °C) (09/08/22 1159)  Pulse: 102 (09/08/22 1159)  Resp: 16 (09/08/22 1159)  BP: 128/64 (09/08/22 1159)  SpO2: 95 % (09/08/22 1159) Vital Signs (24h Range):  Temp:  [98.9 °F (37.2 °C)-101.1 °F (38.4 °C)] 100 °F (37.8 °C)  Pulse:  [] 102  Resp:  [16-19] 16  SpO2:  [93 %-99 %] 95 %  BP: (107-128)/(55-66) 128/64     Weight: 105.5 kg (232 lb 9.4 oz)  Body mass index is 37.54 kg/m².  Body surface area is 2.22 meters squared.      Intake/Output Summary (Last 24 hours) at 9/8/2022 1357  Last data filed at 9/8/2022 0345  Gross per 24 hour   Intake 3013.47 ml   Output 400 ml   Net 2613.47 ml       Physical Exam  Vitals and nursing note reviewed.   Constitutional:       General: She is in acute distress.      Appearance: She is not diaphoretic.   HENT:      Head: Normocephalic and atraumatic.      Nose:  Nose normal. No congestion.      Mouth/Throat:      Mouth: Mucous membranes are dry.      Pharynx: No oropharyngeal exudate or posterior oropharyngeal erythema.   Eyes:      General: No scleral icterus.     Conjunctiva/sclera: Conjunctivae normal.   Cardiovascular:      Rate and Rhythm: Regular rhythm. Tachycardia present.      Heart sounds: No murmur heard.  Pulmonary:      Effort: Pulmonary effort is normal.      Breath sounds: No wheezing or rales.   Abdominal:      General: Abdomen is flat.      Tenderness: There is abdominal tenderness (epigastric). There is no right CVA tenderness, left CVA tenderness or guarding.   Musculoskeletal:         General: No swelling.      Cervical back: Normal range of motion. No rigidity.      Right lower leg: No edema.      Left lower leg: No edema.   Skin:     General: Skin is warm.      Findings: No erythema or rash.   Neurological:      General: No focal deficit present.      Mental Status: She is alert. Mental status is at baseline.   Psychiatric:         Behavior: Behavior normal.         Thought Content: Thought content normal.       Significant Labs:   BMP:   Recent Labs   Lab 09/07/22  0404 09/08/22  1146   * 118*    136   K 3.3* 3.3*    107   CO2 23 22*   BUN 4* 4*   CREATININE 0.7 0.7   CALCIUM 8.6* 8.4*   MG 1.6 1.6    and CBC:   Recent Labs   Lab 09/07/22  0404 09/08/22  1146   WBC 3.53* 3.40*   HGB 9.0* 8.9*   HCT 25.8* 24.8*    146*       Diagnostic Results:  I have reviewed and interpreted all pertinent imaging results/findings within the past 24 hours.      DVT PE WNL    Assessment/Plan:     * Acute pancreatitis  36 years old with HTN, breast cancer  C4D20 Taxol on 8/29/2022 f/u with Dr. Cruz presents with worsening abdominal pain, nasuea and vomiting.  On exam she was mod-sever  abdominal pain to palpitation, no peritonitic signs. Labs were significant for elevated lipase >200. CT with findings concerning for pancreatitis wo fluid/  abscess collection. . CT scan with Mild peripancreatic fat stranding. TG levels were normal this admission, low concern for triglyceride induced pancreatitis. CT scan showed no gallstones or GB wall thickening, low concern for gallstone induced pancreatitis. Patient is clinically improving with n/v, po intake, and pain.     Most likely pancreatitis 2/2 chemo, last chemotherapy session 8/29; Taxol    - Pain: morphine IV changed to oxy PO. Added dicyclomine   - Anti-emetics: scheduled zofran and prn compazine   -  cc  - Pureed diet, adv diet as gerry   - simethicone   - bowel regimen to prevent opioid induced constipation     Abdominal pain  Please read pancreatitis       Chemotherapy induced neutropenia  Pt had subjective fevers at home unmeasured, abdominal pain, polyuria In setting of low WBC and recent chemotherapy. During admission patient was febrile intermittently and had U/A showing WBCs and bacteria. Given patient's low ANC count post chemo with a recent fever and slightly dirty U/A will continue with cefepime and add flagyl    Overall fever curve is downtrending, and cultures have been negative. Can consider drug fever with antibiotic use. Patient is clinically improving with negative infectious work up so far. High concern would be staph however given negative cultures and no symptoms, of low concern. Port site does not look infected. Given pt is tolerating PO will transition to PO antibiotics and monitor diet.    -- BCX NGTD   -- cipro PO   -- flagyl PO    Malignant neoplasm of upper-outer quadrant of left breast in female, estrogen receptor negative  Dr. Cruz patient,  On:  Currently on cycle 4 D20 of pembrolizumab carboplatin (AUC 5) with weekly paclitaxel followed by pembrolizumab doxorubicin cyclophosphamide followed by pembrolizumab 200 mg q3w    Most recent  Taxol on 8/29/2022     -- admit to medical oncology              Payal Urena, DO  Hematology/Oncology  [facility] - Oncology  ([facility])

## 2022-09-08 NOTE — PLAN OF CARE
Pt involved in plan of care and communicating needs throughout shift. Pt alert and oriented x4. TMAX 103. Ibuprofen given. PRN OXY given with moderate relief. PRN Compazine given for breakthrough nausea. C-diff sample collected and sent to lab. IV Abx given as ordered. Potassium replaced. All VSS; no acute events so far this shift.  Pt remaining free from falls or injury throughout shift; bed locked and in lowest position; call light within reach.  Pt instructed to call for assistance as needed.  Q1H rounding done on pt. WCTM.

## 2022-09-08 NOTE — SUBJECTIVE & OBJECTIVE
Interval History: DVT PE was negative, and fever curve down trending. Started on dicyclomine for abdominal pain. Patient having loose stools since starting antibiotics, c diff studies ordered. Patient's n/v is also downtrending and amenable to advancing diet.     Oncology Treatment Plan:   OP PEMBROLIZUMAB CARBOPLATIN (AUC 5) WITH WEEKLY PACLITAXEL FOLLOWED BY PEMBROLIZUMAB DOXORUBICIN CYCLOPHOSPHAMIDE FOLLOWED BY PEMBROLIZUMAB 200 MG Q3W    Medications:  Continuous Infusions:   sodium chloride 0.9% 150 mL/hr at 09/08/22 0628     Scheduled Meds:   ciprofloxacin HCl  500 mg Oral Q12H    enoxaparin  40 mg Subcutaneous Daily    magnesium sulfate IVPB  2 g Intravenous Once    metroNIDAZOLE  500 mg Oral Q8H    ondansetron  4 mg Intravenous Q6H    potassium chloride  40 mEq Oral Once     PRN Meds:dextrose 10%, dextrose 10%, dicyclomine, glucagon (human recombinant), glucose, glucose, ibuprofen, naloxone, oxyCODONE, polyethylene glycol, prochlorperazine, simethicone, sodium chloride 0.9%     Review of Systems   Constitutional:  Positive for activity change, appetite change and fatigue. Negative for chills, diaphoresis and fever.   HENT:  Negative for congestion.    Eyes:  Negative for visual disturbance.   Respiratory:  Negative for shortness of breath and wheezing.    Cardiovascular:  Negative for chest pain, palpitations and leg swelling.   Gastrointestinal:  Positive for abdominal pain, nausea and vomiting. Negative for abdominal distention and diarrhea.   Endocrine: Negative for polyuria.   Genitourinary:  Negative for difficulty urinating and dysuria.   Musculoskeletal:  Negative for back pain and joint swelling.   Skin:  Negative for color change.   Neurological:  Positive for weakness. Negative for dizziness and headaches.   Hematological:  Negative for adenopathy.   Psychiatric/Behavioral:  Negative for agitation.    Objective:     Vital Signs (Most Recent):  Temp: 100 °F (37.8 °C) (09/08/22 1159)  Pulse: 102  (09/08/22 1159)  Resp: 16 (09/08/22 1159)  BP: 128/64 (09/08/22 1159)  SpO2: 95 % (09/08/22 1159) Vital Signs (24h Range):  Temp:  [98.9 °F (37.2 °C)-101.1 °F (38.4 °C)] 100 °F (37.8 °C)  Pulse:  [] 102  Resp:  [16-19] 16  SpO2:  [93 %-99 %] 95 %  BP: (107-128)/(55-66) 128/64     Weight: 105.5 kg (232 lb 9.4 oz)  Body mass index is 37.54 kg/m².  Body surface area is 2.22 meters squared.      Intake/Output Summary (Last 24 hours) at 9/8/2022 1357  Last data filed at 9/8/2022 0345  Gross per 24 hour   Intake 3013.47 ml   Output 400 ml   Net 2613.47 ml       Physical Exam  Vitals and nursing note reviewed.   Constitutional:       General: She is in acute distress.      Appearance: She is not diaphoretic.   HENT:      Head: Normocephalic and atraumatic.      Nose: Nose normal. No congestion.      Mouth/Throat:      Mouth: Mucous membranes are dry.      Pharynx: No oropharyngeal exudate or posterior oropharyngeal erythema.   Eyes:      General: No scleral icterus.     Conjunctiva/sclera: Conjunctivae normal.   Cardiovascular:      Rate and Rhythm: Regular rhythm. Tachycardia present.      Heart sounds: No murmur heard.  Pulmonary:      Effort: Pulmonary effort is normal.      Breath sounds: No wheezing or rales.   Abdominal:      General: Abdomen is flat.      Tenderness: There is abdominal tenderness (epigastric). There is no right CVA tenderness, left CVA tenderness or guarding.   Musculoskeletal:         General: No swelling.      Cervical back: Normal range of motion. No rigidity.      Right lower leg: No edema.      Left lower leg: No edema.   Skin:     General: Skin is warm.      Findings: No erythema or rash.   Neurological:      General: No focal deficit present.      Mental Status: She is alert. Mental status is at baseline.   Psychiatric:         Behavior: Behavior normal.         Thought Content: Thought content normal.       Significant Labs:   BMP:   Recent Labs   Lab 09/07/22  0404 09/08/22  1146    * 118*    136   K 3.3* 3.3*    107   CO2 23 22*   BUN 4* 4*   CREATININE 0.7 0.7   CALCIUM 8.6* 8.4*   MG 1.6 1.6    and CBC:   Recent Labs   Lab 09/07/22  0404 09/08/22  1146   WBC 3.53* 3.40*   HGB 9.0* 8.9*   HCT 25.8* 24.8*    146*       Diagnostic Results:  I have reviewed and interpreted all pertinent imaging results/findings within the past 24 hours.      DVT PE WNL

## 2022-09-08 NOTE — PLAN OF CARE
No acute events this shift. AAAOx4. Voids without difficulty. T-max of 100.9. MD notified. PRN ibuprofen administered. Did not sustain fever. All other VSS. Patient complaint of pain. PRN morphine administered x2. Moderate relief obtained. All abx and fluids continued as ordered. Bed in lowest position. Side rails up x2. All possessions and call light within reach. Non-skid socks worn when out of bed. Patient instructed to call for assistance as needed and verbalized understanding. All needs of patient currently met. Will continue to monitor with frequent rounding.

## 2022-09-08 NOTE — PROGRESS NOTES
Pharmacokinetic Initial Assessment: IV Vancomycin    Assessment/Plan:    Initiate intravenous vancomycin with loading dose of 2000 mg once followed by a maintenance dose of vancomycin 1500 mg IV every 12 hours  Desired empiric serum trough concentration is 10 to 20 mcg/mL  Draw vancomycin trough level 60 min prior to fourth dose on 09/10/22 at approximately 07:00.  Pharmacy will continue to follow and monitor vancomycin.      Please contact pharmacy at extension [phone] with any questions regarding this assessment.     Thank you for the consult,   Srini Underwood       Patient brief summary:  Laura Kent is a 36 y.o. female initiated on antimicrobial therapy with IV Vancomycin for treatment of suspected intra-abdominal infection    Drug Allergies:   Review of patient's allergies indicates:   Allergen Reactions    Strawberries [strawberry] Hives       Actual Body Weight:   105.5 kg    Renal Function:   Estimated Creatinine Clearance: 133.8 mL/min (based on SCr of 0.7 mg/dL).,       CBC (last 72 hours):  Recent Labs   Lab Result Units 09/06/22  0352 09/07/22  0404 09/08/22  1146   WBC K/uL 3.09* 3.53* 3.40*   Hemoglobin g/dL 8.2* 9.0* 8.9*   Hematocrit % 23.0* 25.8* 24.8*   Platelets K/uL 138* 171 146*   Gran % % 59.6 31.0* 68.5   Lymph % % 17.8* 25.0 12.1*   Mono % % 19.7* 10.0 15.0   Eosinophil % % 1.0 0.0 0.9   Basophil % % 0.3 0.0 0.6   Differential Method  Automated Manual Automated       Metabolic Panel (last 72 hours):  Recent Labs   Lab Result Units 09/06/22  0352 09/07/22  0404 09/08/22  1146   Sodium mmol/L 134* 137 136   Potassium mmol/L 3.4* 3.3* 3.3*   Chloride mmol/L 105 106 107   CO2 mmol/L 23 23 22*   Glucose mg/dL 109 127* 118*   BUN mg/dL 7 4* 4*   Creatinine mg/dL 0.7 0.7 0.7   Albumin g/dL 2.8* 3.1* 2.9*   Total Bilirubin mg/dL 0.6 0.6 0.5   Alkaline Phosphatase U/L 36* 39* 37*   AST U/L 18 21 64*   ALT U/L 16 17 38   Magnesium mg/dL 1.5* 1.6 1.6   Phosphorus mg/dL 2.7  --   --        Drug  levels (last 3 results):  No results for input(s): VANCOMYCINRA, VANCORANDOM, VANCOMYCINPE, VANCOPEAK, VANCOMYCINTR, VANCOTROUGH in the last 72 hours.    Microbiologic Results:  Microbiology Results (last 7 days)       Procedure Component Value Units Date/Time    Blood culture [[id number]] Collected: 09/08/22 1800    Order Status: Sent Specimen: Blood Updated: 09/08/22 1807    Blood culture [[id number]] Collected: 09/08/22 1756    Order Status: Sent Specimen: Blood Updated: 09/08/22 1807    Clostridium difficile EIA [[id number]] Collected: 09/08/22 1151    Order Status: Sent Specimen: Stool Updated: 09/08/22 1425    Blood culture [[id number]] Collected: 09/08/22 1147    Order Status: Sent Specimen: Blood Updated: 09/08/22 1155    Narrative:      Rt wrist    Blood culture [[id number]] Collected: 09/08/22 1147    Order Status: Sent Specimen: Blood Updated: 09/08/22 1155    Narrative:      Lft AC    Blood culture [[id number]] Collected: 09/05/22 2347    Order Status: Completed Specimen: Blood Updated: 09/08/22 0613     Blood Culture, Routine No Growth to date      No Growth to date      No Growth to date    Blood culture [[id number]] Collected: 09/05/22 2347    Order Status: Completed Specimen: Blood Updated: 09/08/22 0613     Blood Culture, Routine No Growth to date      No Growth to date      No Growth to date    Blood culture [[id number]] Collected: 09/04/22 0155    Order Status: Completed Specimen: Blood Updated: 09/08/22 0612     Blood Culture, Routine No Growth to date      No Growth to date      No Growth to date      No Growth to date      No Growth to date    Blood culture [[id number]] Collected: 09/04/22 0156    Order Status: Completed Specimen: Blood Updated: 09/08/22 0612     Blood Culture, Routine No Growth to date      No Growth to date      No Growth to date      No Growth to date      No Growth to date    Blood culture [[id number]] Collected: 09/04/22 2146    Order Status: Completed Specimen: Blood Updated:  09/07/22 2312     Blood Culture, Routine No Growth to date      No Growth to date      No Growth to date      No Growth to date    Blood culture [[id number]] Collected: 09/04/22 2148    Order Status: Completed Specimen: Blood Updated: 09/07/22 2312     Blood Culture, Routine No Growth to date      No Growth to date      No Growth to date      No Growth to date    Urine culture [[id number]] Collected: 09/03/22 2145    Order Status: Completed Specimen: Urine Updated: 09/05/22 1210     Urine Culture, Routine Multiple organisms isolated. None in predominance.  Repeat if      clinically necessary.    Narrative:      Specimen Source->Urine

## 2022-09-09 LAB
ALBUMIN SERPL BCP-MCNC: 2.6 G/DL (ref 3.5–5.2)
ALP SERPL-CCNC: 34 U/L (ref 55–135)
ALT SERPL W/O P-5'-P-CCNC: 64 U/L (ref 10–44)
ANION GAP SERPL CALC-SCNC: 6 MMOL/L (ref 8–16)
ANISOCYTOSIS BLD QL SMEAR: SLIGHT
AST SERPL-CCNC: 106 U/L (ref 10–40)
BACTERIA #/AREA URNS AUTO: NORMAL /HPF
BACTERIA BLD CULT: NORMAL
BASOPHILS # BLD AUTO: 0.02 K/UL (ref 0–0.2)
BASOPHILS NFR BLD: 0.7 % (ref 0–1.9)
BILIRUB SERPL-MCNC: 0.6 MG/DL (ref 0.1–1)
BILIRUB UR QL STRIP: NEGATIVE
BUN SERPL-MCNC: 4 MG/DL (ref 6–20)
CALCIUM SERPL-MCNC: 8.3 MG/DL (ref 8.7–10.5)
CHLORIDE SERPL-SCNC: 107 MMOL/L (ref 95–110)
CLARITY UR REFRACT.AUTO: CLEAR
CO2 SERPL-SCNC: 23 MMOL/L (ref 23–29)
COLOR UR AUTO: YELLOW
CREAT SERPL-MCNC: 0.6 MG/DL (ref 0.5–1.4)
DIFFERENTIAL METHOD: ABNORMAL
DOHLE BOD BLD QL SMEAR: PRESENT
EOSINOPHIL # BLD AUTO: 0 K/UL (ref 0–0.5)
EOSINOPHIL NFR BLD: 1 % (ref 0–8)
ERYTHROCYTE [DISTWIDTH] IN BLOOD BY AUTOMATED COUNT: 14.6 % (ref 11.5–14.5)
EST. GFR  (NO RACE VARIABLE): >60 ML/MIN/1.73 M^2
GLUCOSE SERPL-MCNC: 110 MG/DL (ref 70–110)
GLUCOSE UR QL STRIP: NEGATIVE
HCT VFR BLD AUTO: 21.8 % (ref 37–48.5)
HGB BLD-MCNC: 7.7 G/DL (ref 12–16)
HGB UR QL STRIP: NEGATIVE
HYALINE CASTS UR QL AUTO: 1 /LPF
HYPOCHROMIA BLD QL SMEAR: ABNORMAL
IMM GRANULOCYTES # BLD AUTO: 0.06 K/UL (ref 0–0.04)
IMM GRANULOCYTES NFR BLD AUTO: 2 % (ref 0–0.5)
KETONES UR QL STRIP: ABNORMAL
LEUKOCYTE ESTERASE UR QL STRIP: ABNORMAL
LYMPHOCYTES # BLD AUTO: 0.4 K/UL (ref 1–4.8)
LYMPHOCYTES NFR BLD: 13.6 % (ref 18–48)
MAGNESIUM SERPL-MCNC: 1.6 MG/DL (ref 1.6–2.6)
MCH RBC QN AUTO: 31.4 PG (ref 27–31)
MCHC RBC AUTO-ENTMCNC: 35.3 G/DL (ref 32–36)
MCV RBC AUTO: 89 FL (ref 82–98)
MICROSCOPIC COMMENT: NORMAL
MONOCYTES # BLD AUTO: 0.5 K/UL (ref 0.3–1)
MONOCYTES NFR BLD: 15.9 % (ref 4–15)
NEUTROPHILS # BLD AUTO: 2 K/UL (ref 1.8–7.7)
NEUTROPHILS NFR BLD: 66.8 % (ref 38–73)
NITRITE UR QL STRIP: NEGATIVE
NRBC BLD-RTO: 1 /100 WBC
PH UR STRIP: 6 [PH] (ref 5–8)
PLATELET # BLD AUTO: 131 K/UL (ref 150–450)
PLATELET BLD QL SMEAR: ABNORMAL
PMV BLD AUTO: 9.4 FL (ref 9.2–12.9)
POIKILOCYTOSIS BLD QL SMEAR: SLIGHT
POLYCHROMASIA BLD QL SMEAR: ABNORMAL
POTASSIUM SERPL-SCNC: 3.3 MMOL/L (ref 3.5–5.1)
PROT SERPL-MCNC: 5.2 G/DL (ref 6–8.4)
PROT UR QL STRIP: NEGATIVE
RBC # BLD AUTO: 2.45 M/UL (ref 4–5.4)
RBC #/AREA URNS AUTO: 1 /HPF (ref 0–4)
SODIUM SERPL-SCNC: 136 MMOL/L (ref 136–145)
SP GR UR STRIP: 1.01 (ref 1–1.03)
SPHEROCYTES BLD QL SMEAR: ABNORMAL
SQUAMOUS #/AREA URNS AUTO: 1 /HPF
TOXIC GRANULES BLD QL SMEAR: PRESENT
URN SPEC COLLECT METH UR: ABNORMAL
WBC # BLD AUTO: 2.95 K/UL (ref 3.9–12.7)
WBC #/AREA URNS AUTO: 4 /HPF (ref 0–5)

## 2022-09-09 PROCEDURE — 25000003 PHARM REV CODE 250: Performed by: STUDENT IN AN ORGANIZED HEALTH CARE EDUCATION/TRAINING PROGRAM

## 2022-09-09 PROCEDURE — 25000003 PHARM REV CODE 250

## 2022-09-09 PROCEDURE — 99233 PR SUBSEQUENT HOSPITAL CARE,LEVL III: ICD-10-PCS | Mod: ,,, | Performed by: INTERNAL MEDICINE

## 2022-09-09 PROCEDURE — 80053 COMPREHEN METABOLIC PANEL: CPT | Performed by: STUDENT IN AN ORGANIZED HEALTH CARE EDUCATION/TRAINING PROGRAM

## 2022-09-09 PROCEDURE — 63600175 PHARM REV CODE 636 W HCPCS: Performed by: STUDENT IN AN ORGANIZED HEALTH CARE EDUCATION/TRAINING PROGRAM

## 2022-09-09 PROCEDURE — 99233 SBSQ HOSP IP/OBS HIGH 50: CPT | Mod: ,,, | Performed by: INTERNAL MEDICINE

## 2022-09-09 PROCEDURE — 83735 ASSAY OF MAGNESIUM: CPT | Performed by: STUDENT IN AN ORGANIZED HEALTH CARE EDUCATION/TRAINING PROGRAM

## 2022-09-09 PROCEDURE — 85025 COMPLETE CBC W/AUTO DIFF WBC: CPT | Performed by: STUDENT IN AN ORGANIZED HEALTH CARE EDUCATION/TRAINING PROGRAM

## 2022-09-09 PROCEDURE — 20600001 HC STEP DOWN PRIVATE ROOM

## 2022-09-09 PROCEDURE — 63600175 PHARM REV CODE 636 W HCPCS

## 2022-09-09 RX ORDER — LOPERAMIDE HYDROCHLORIDE 2 MG/1
2 CAPSULE ORAL 4 TIMES DAILY PRN
Status: DISCONTINUED | OUTPATIENT
Start: 2022-09-09 | End: 2022-09-12

## 2022-09-09 RX ORDER — DICYCLOMINE HYDROCHLORIDE 10 MG/1
10 CAPSULE ORAL 3 TIMES DAILY
Status: DISCONTINUED | OUTPATIENT
Start: 2022-09-09 | End: 2022-09-17

## 2022-09-09 RX ORDER — POTASSIUM CHLORIDE 20 MEQ/1
40 TABLET, EXTENDED RELEASE ORAL ONCE
Status: COMPLETED | OUTPATIENT
Start: 2022-09-09 | End: 2022-09-09

## 2022-09-09 RX ADMIN — ENOXAPARIN SODIUM 40 MG: 100 INJECTION SUBCUTANEOUS at 04:09

## 2022-09-09 RX ADMIN — VANCOMYCIN HYDROCHLORIDE 1500 MG: 1.5 INJECTION, POWDER, LYOPHILIZED, FOR SOLUTION INTRAVENOUS at 08:09

## 2022-09-09 RX ADMIN — DICYCLOMINE HYDROCHLORIDE 10 MG: 10 CAPSULE ORAL at 04:09

## 2022-09-09 RX ADMIN — PROCHLORPERAZINE EDISYLATE 5 MG: 5 INJECTION INTRAMUSCULAR; INTRAVENOUS at 06:09

## 2022-09-09 RX ADMIN — PIPERACILLIN SODIUM AND TAZOBACTAM SODIUM 4.5 G: 4; .5 INJECTION, POWDER, LYOPHILIZED, FOR SOLUTION INTRAVENOUS at 02:09

## 2022-09-09 RX ADMIN — PIPERACILLIN SODIUM AND TAZOBACTAM SODIUM 4.5 G: 4; .5 INJECTION, POWDER, LYOPHILIZED, FOR SOLUTION INTRAVENOUS at 06:09

## 2022-09-09 RX ADMIN — IBUPROFEN 400 MG: 200 TABLET, FILM COATED ORAL at 05:09

## 2022-09-09 RX ADMIN — DICYCLOMINE HYDROCHLORIDE 10 MG: 10 CAPSULE ORAL at 08:09

## 2022-09-09 RX ADMIN — LOPERAMIDE HYDROCHLORIDE 2 MG: 2 CAPSULE ORAL at 05:09

## 2022-09-09 RX ADMIN — SODIUM CHLORIDE: 0.9 INJECTION, SOLUTION INTRAVENOUS at 05:09

## 2022-09-09 RX ADMIN — POTASSIUM CHLORIDE 40 MEQ: 1500 TABLET, EXTENDED RELEASE ORAL at 08:09

## 2022-09-09 RX ADMIN — OXYCODONE HYDROCHLORIDE 10 MG: 10 TABLET ORAL at 08:09

## 2022-09-09 NOTE — PROGRESS NOTES
[facility] - Oncology ([facility])  Hematology/Oncology  Progress Note    Patient Name: Laura Kent  Admission Date: 9/3/2022  Hospital Length of Stay: 6 days  Code Status: Full Code     Subjective:     HPI:  36 years old with HTN, breast cancer  C4D20 Taxol on 8/29/2022 f/u with Dr. Cruz presents with worsening abdominal pain, nasuea and vomiting. Patient was in pain during the interview and asking for better pain control. She states one week history of gradually worsening abdominal pain worst in epigastric area without radiation. She had minimal po intake since then. She associate emesis x2 NBNB. She had one small BM this afternoon prior to her presentation.     Patient denies chest pain, shortness of breath,  dysuria, polyuria, nausea, vomiting, fever, chills,  diarrhea, constipation, headache, falls, focal weakness or vision changes.     Ed course:   On presentation she was HDS and afebrile. On exam she was mod-sever  abdominal pain to palpitation, no peritonitic signs. Labs were significant for elevated lipase >200. CT with findings concerning for pancreatitis wo fluid/ abscess collection. Medical onc consulted for admit.           Interval History: Patient with fever of 103 last night. Re ordered septic work up with CT chest abdo pelvis, repeat blood cultures and c diff studies.     Oncology Treatment Plan:   OP PEMBROLIZUMAB CARBOPLATIN (AUC 5) WITH WEEKLY PACLITAXEL FOLLOWED BY PEMBROLIZUMAB DOXORUBICIN CYCLOPHOSPHAMIDE FOLLOWED BY PEMBROLIZUMAB 200 MG Q3W    Medications:  Continuous Infusions:   sodium chloride 0.9% 150 mL/hr at 09/08/22 0628     Scheduled Meds:   enoxaparin  40 mg Subcutaneous Daily    magnesium sulfate IVPB  2 g Intravenous Once    ondansetron  4 mg Intravenous Q6H    piperacillin-tazobactam (ZOSYN) IVPB  4.5 g Intravenous Q8H    [START ON 9/9/2022] vancomycin (VANCOCIN) IVPB  15 mg/kg Intravenous Q12H    vancomycin (VANCOCIN) IVPB  20 mg/kg Intravenous Once     PRN  Meds:dextrose 10%, dextrose 10%, dicyclomine, glucagon (human recombinant), glucose, glucose, ibuprofen, naloxone, oxyCODONE, polyethylene glycol, prochlorperazine, simethicone, sodium chloride 0.9%, Pharmacy to dose Vancomycin consult **AND** vancomycin - pharmacy to dose     Review of Systems   Constitutional:  Positive for activity change, appetite change and fatigue. Negative for chills, diaphoresis and fever.   HENT:  Negative for congestion.    Eyes:  Negative for visual disturbance.   Respiratory:  Negative for shortness of breath and wheezing.    Cardiovascular:  Negative for chest pain, palpitations and leg swelling.   Gastrointestinal:  Positive for abdominal pain (improving), diarrhea and nausea (improving). Negative for abdominal distention and vomiting.   Endocrine: Negative for polyuria.   Genitourinary:  Negative for difficulty urinating and dysuria.   Musculoskeletal:  Negative for back pain and joint swelling.   Skin:  Negative for color change.   Neurological:  Positive for weakness. Negative for dizziness and headaches.   Hematological:  Negative for adenopathy.   Psychiatric/Behavioral:  Negative for agitation.    Objective:     Vital Signs (Most Recent):  Temp: 99.6 °F (37.6 °C) (09/08/22 1722)  Pulse: 110 (09/08/22 1543)  Resp: 17 (09/08/22 1722)  BP: 134/63 (09/08/22 1543)  SpO2: (!) 94 % (09/08/22 1543)   Vital Signs (24h Range):  Temp:  [99.2 °F (37.3 °C)-103 °F (39.4 °C)] 99.6 °F (37.6 °C)  Pulse:  [] 110  Resp:  [16-19] 17  SpO2:  [94 %-99 %] 94 %  BP: (113-134)/(56-64) 134/63     Weight: 105.5 kg (232 lb 9.4 oz)  Body mass index is 37.54 kg/m².  Body surface area is 2.22 meters squared.      Intake/Output Summary (Last 24 hours) at 9/8/2022 2137  Last data filed at 9/8/2022 1819  Gross per 24 hour   Intake 1560 ml   Output 900 ml   Net 660 ml       Physical Exam  Vitals and nursing note reviewed.   Constitutional:       General: She is not in acute distress.     Appearance: She is  not diaphoretic.   HENT:      Head: Normocephalic and atraumatic.      Nose: Nose normal. No congestion.      Mouth/Throat:      Mouth: Mucous membranes are dry.      Pharynx: No oropharyngeal exudate or posterior oropharyngeal erythema.   Eyes:      General: No scleral icterus.     Conjunctiva/sclera: Conjunctivae normal.   Cardiovascular:      Rate and Rhythm: Regular rhythm. Tachycardia present.      Heart sounds: No murmur heard.  Pulmonary:      Effort: Pulmonary effort is normal.      Breath sounds: No wheezing or rales.   Abdominal:      General: Abdomen is flat.      Tenderness: There is abdominal tenderness (epigastric). There is no right CVA tenderness, left CVA tenderness or guarding.   Musculoskeletal:         General: No swelling.      Cervical back: Normal range of motion. No rigidity.      Right lower leg: No edema.      Left lower leg: No edema.   Skin:     General: Skin is warm.      Findings: No erythema or rash.   Neurological:      General: No focal deficit present.      Mental Status: She is alert. Mental status is at baseline.   Psychiatric:         Behavior: Behavior normal.         Thought Content: Thought content normal.       Significant Labs:   All pertinent labs from the last 24 hours have been reviewed.    Diagnostic Results:  I have reviewed all pertinent imaging results/findings within the past 24 hours.    Assessment/Plan:     * Acute pancreatitis  36 years old with HTN, breast cancer  C4D20 Taxol on 8/29/2022 f/u with Dr. Cruz presents with worsening abdominal pain, nasuea and vomiting.  On exam she was mod-sever  abdominal pain to palpitation, no peritonitic signs. Labs were significant for elevated lipase >200. CT with findings concerning for pancreatitis wo fluid/ abscess collection. . CT scan with Mild peripancreatic fat stranding. TG levels were normal this admission, low concern for triglyceride induced pancreatitis. CT scan showed no gallstones or GB wall thickening, low  concern for gallstone induced pancreatitis. Patient is clinically improving with n/v, po intake, and pain.     Most likely pancreatitis 2/2 chemo, last chemotherapy session 8/29; Taxol    - Pain: morphine IV changed to oxy PO.    - Anti-emetics: scheduled zofran and prn compazine   -  cc  - Pureed diet, adv diet as gerry   - simethicone   - bowel regimen to prevent opioid induced constipation     Loose stools  - See chemotherapy induced neutropenia    Abdominal pain  Please read pancreatitis       Chemotherapy induced neutropenia  Pt had subjective fevers at home unmeasured, abdominal pain, polyuria In setting of low WBC and recent chemotherapy. During admission patient was febrile intermittently and had U/A showing WBCs and bacteria. Initially started treatment with cefepime. Flagyl added due to ongoing fevers. Fever curve was initially downtrending with negative cultures. However she had fever as high as 103. Will restart septic work up as fever is currently of unknown origin and add vancomycin.     - Broad spectrum antibiotics  - CT chest abdo pelvis  - Cdiff studies negative  - Repeat blood cultures, NGTD  - If current work up unrevealing, will consult infectious disease    Malignant neoplasm of upper-outer quadrant of left breast in female, estrogen receptor negative  Dr. Cruz patient,  On:  Currently on cycle 4 D20 of pembrolizumab carboplatin (AUC 5) with weekly paclitaxel followed by pembrolizumab doxorubicin cyclophosphamide followed by pembrolizumab 200 mg q3w    Most recent  Taxol on 8/29/2022     -- admit to medical oncology              Garth Rodgers MD  Hematology/Oncology  [facility] - Oncology ([facility])      ATTENDING NOTE, ONCOLOGY INPATIENT TEAM    As above; events of last 24 hours noted.  Patient seen and examined, chart reviewed.  She spiked to 103 last night.  Cultures were drawn.  Today she appears slightly uncomfortable, complaining of cramps with defecation.  She also complains of  loose BMs.  Lungs are clear to auscultation.  Abdomen is soft, nontender.  Bowel sounds are present  Labs reviewed.   C diff is negative.  Hemoglobin is 7.7 grams/dL to get the new patient here [place] she is the 1 that the acetabulum was recovery so she had a large lytic lesion on left by but then she had something on the acetabulum on the right side was that her    PLAN  Follow CBC and electrolytes daily  Follow blood cultures  Continue broad-spectrum antibiotics for now until the culture results are available  Patient is and was advised to remain ambulatory.  If she remains ambulatory we will discontinue the enoxaparin tomorrow  We will transfuse 1 unit of PRBCs today    Enoch Goodwin MD

## 2022-09-09 NOTE — SUBJECTIVE & OBJECTIVE
Interval History: Patient with fever of 103 last night. Re ordered septic work up with CT chest abdo pelvis, repeat blood cultures and c diff studies.     Oncology Treatment Plan:   OP PEMBROLIZUMAB CARBOPLATIN (AUC 5) WITH WEEKLY PACLITAXEL FOLLOWED BY PEMBROLIZUMAB DOXORUBICIN CYCLOPHOSPHAMIDE FOLLOWED BY PEMBROLIZUMAB 200 MG Q3W    Medications:  Continuous Infusions:   sodium chloride 0.9% 150 mL/hr at 09/08/22 0628     Scheduled Meds:   enoxaparin  40 mg Subcutaneous Daily    magnesium sulfate IVPB  2 g Intravenous Once    ondansetron  4 mg Intravenous Q6H    piperacillin-tazobactam (ZOSYN) IVPB  4.5 g Intravenous Q8H    [START ON 9/9/2022] vancomycin (VANCOCIN) IVPB  15 mg/kg Intravenous Q12H    vancomycin (VANCOCIN) IVPB  20 mg/kg Intravenous Once     PRN Meds:dextrose 10%, dextrose 10%, dicyclomine, glucagon (human recombinant), glucose, glucose, ibuprofen, naloxone, oxyCODONE, polyethylene glycol, prochlorperazine, simethicone, sodium chloride 0.9%, Pharmacy to dose Vancomycin consult **AND** vancomycin - pharmacy to dose     Review of Systems   Constitutional:  Positive for activity change, appetite change and fatigue. Negative for chills, diaphoresis and fever.   HENT:  Negative for congestion.    Eyes:  Negative for visual disturbance.   Respiratory:  Negative for shortness of breath and wheezing.    Cardiovascular:  Negative for chest pain, palpitations and leg swelling.   Gastrointestinal:  Positive for abdominal pain (improving), diarrhea and nausea (improving). Negative for abdominal distention and vomiting.   Endocrine: Negative for polyuria.   Genitourinary:  Negative for difficulty urinating and dysuria.   Musculoskeletal:  Negative for back pain and joint swelling.   Skin:  Negative for color change.   Neurological:  Positive for weakness. Negative for dizziness and headaches.   Hematological:  Negative for adenopathy.   Psychiatric/Behavioral:  Negative for agitation.    Objective:     Vital  Signs (Most Recent):  Temp: 99.6 °F (37.6 °C) (09/08/22 1722)  Pulse: 110 (09/08/22 1543)  Resp: 17 (09/08/22 1722)  BP: 134/63 (09/08/22 1543)  SpO2: (!) 94 % (09/08/22 1543)   Vital Signs (24h Range):  Temp:  [99.2 °F (37.3 °C)-103 °F (39.4 °C)] 99.6 °F (37.6 °C)  Pulse:  [] 110  Resp:  [16-19] 17  SpO2:  [94 %-99 %] 94 %  BP: (113-134)/(56-64) 134/63     Weight: 105.5 kg (232 lb 9.4 oz)  Body mass index is 37.54 kg/m².  Body surface area is 2.22 meters squared.      Intake/Output Summary (Last 24 hours) at 9/8/2022 2138  Last data filed at 9/8/2022 1819  Gross per 24 hour   Intake 1560 ml   Output 900 ml   Net 660 ml       Physical Exam  Vitals and nursing note reviewed.   Constitutional:       General: She is not in acute distress.     Appearance: She is not diaphoretic.   HENT:      Head: Normocephalic and atraumatic.      Nose: Nose normal. No congestion.      Mouth/Throat:      Mouth: Mucous membranes are dry.      Pharynx: No oropharyngeal exudate or posterior oropharyngeal erythema.   Eyes:      General: No scleral icterus.     Conjunctiva/sclera: Conjunctivae normal.   Cardiovascular:      Rate and Rhythm: Regular rhythm. Tachycardia present.      Heart sounds: No murmur heard.  Pulmonary:      Effort: Pulmonary effort is normal.      Breath sounds: No wheezing or rales.   Abdominal:      General: Abdomen is flat.      Tenderness: There is abdominal tenderness (epigastric). There is no right CVA tenderness, left CVA tenderness or guarding.   Musculoskeletal:         General: No swelling.      Cervical back: Normal range of motion. No rigidity.      Right lower leg: No edema.      Left lower leg: No edema.   Skin:     General: Skin is warm.      Findings: No erythema or rash.   Neurological:      General: No focal deficit present.      Mental Status: She is alert. Mental status is at baseline.   Psychiatric:         Behavior: Behavior normal.         Thought Content: Thought content normal.        Significant Labs:   All pertinent labs from the last 24 hours have been reviewed.    Diagnostic Results:  I have reviewed all pertinent imaging results/findings within the past 24 hours.

## 2022-09-09 NOTE — PLAN OF CARE
No acute events this shift. Patient AAOx4. T-max of 100.6. MD notified. PRN ibuprofen administered. Did not sustain fever. All other VSS. Patient complaint of nausea. PRN compazine administered. Moderate relief obtained. All abx and fluids continued as ordered. Bed in lowest position. Side rails up x2. All possessions and call light within reach. Non-skid socks worn when out of bed. Patient instructed to call for assistance and verbalized understanding. All needs of patient currently met. Will continue to monitor with frequent rounding.

## 2022-09-09 NOTE — ASSESSMENT & PLAN NOTE
Pt had subjective fevers at home unmeasured, abdominal pain, polyuria In setting of low WBC and recent chemotherapy. During admission patient was febrile intermittently and had U/A showing WBCs and bacteria. Initially started treatment with cefepime. Flagyl added due to ongoing fevers. Fever curve was initially downtrending with negative cultures. However she had fever as high as 103. Will restart septic work up as fever is currently of unknown origin and add vancomycin.     - Broad spectrum antibiotics  - CT chest abdo pelvis  - Cdiff studies negative  - Repeat blood cultures, NGTD  - If current work up unrevealing, will consult infectious disease

## 2022-09-09 NOTE — ASSESSMENT & PLAN NOTE
36 years old with HTN, breast cancer  C4D20 Taxol on 8/29/2022 f/u with Dr. Cruz presents with worsening abdominal pain, nasuea and vomiting.  On exam she was mod-sever  abdominal pain to palpitation, no peritonitic signs. Labs were significant for elevated lipase >200. CT with findings concerning for pancreatitis wo fluid/ abscess collection. . CT scan with Mild peripancreatic fat stranding. TG levels were normal this admission, low concern for triglyceride induced pancreatitis. CT scan showed no gallstones or GB wall thickening, low concern for gallstone induced pancreatitis. Patient is clinically improving with n/v, po intake, and pain.     Most likely pancreatitis 2/2 chemo, last chemotherapy session 8/29; Taxol    - Pain: morphine IV changed to oxy PO.    - Anti-emetics: scheduled zofran and prn compazine   -  cc  - Pureed diet, adv diet as gerry   - simethicone   - bowel regimen to prevent opioid induced constipation

## 2022-09-10 PROBLEM — R79.89 ELEVATED LFTS: Status: ACTIVE | Noted: 2022-09-10

## 2022-09-10 LAB
ALBUMIN SERPL BCP-MCNC: 2.7 G/DL (ref 3.5–5.2)
ALP SERPL-CCNC: 40 U/L (ref 55–135)
ALT SERPL W/O P-5'-P-CCNC: 231 U/L (ref 10–44)
ANION GAP SERPL CALC-SCNC: 9 MMOL/L (ref 8–16)
ANISOCYTOSIS BLD QL SMEAR: SLIGHT
AST SERPL-CCNC: 391 U/L (ref 10–40)
BASOPHILS # BLD AUTO: 0.03 K/UL (ref 0–0.2)
BASOPHILS NFR BLD: 0.8 % (ref 0–1.9)
BILIRUB SERPL-MCNC: 0.6 MG/DL (ref 0.1–1)
BUN SERPL-MCNC: 4 MG/DL (ref 6–20)
CALCIUM SERPL-MCNC: 8.2 MG/DL (ref 8.7–10.5)
CHLORIDE SERPL-SCNC: 108 MMOL/L (ref 95–110)
CO2 SERPL-SCNC: 18 MMOL/L (ref 23–29)
CREAT SERPL-MCNC: 0.6 MG/DL (ref 0.5–1.4)
DIFFERENTIAL METHOD: ABNORMAL
EOSINOPHIL # BLD AUTO: 0 K/UL (ref 0–0.5)
EOSINOPHIL NFR BLD: 0.8 % (ref 0–8)
ERYTHROCYTE [DISTWIDTH] IN BLOOD BY AUTOMATED COUNT: 14.6 % (ref 11.5–14.5)
EST. GFR  (NO RACE VARIABLE): >60 ML/MIN/1.73 M^2
GLUCOSE SERPL-MCNC: 115 MG/DL (ref 70–110)
HBV CORE AB SERPL QL IA: NORMAL
HBV SURFACE AG SERPL QL IA: NORMAL
HCT VFR BLD AUTO: 22.2 % (ref 37–48.5)
HGB BLD-MCNC: 7.9 G/DL (ref 12–16)
HYPOCHROMIA BLD QL SMEAR: ABNORMAL
IMM GRANULOCYTES # BLD AUTO: 0.17 K/UL (ref 0–0.04)
IMM GRANULOCYTES NFR BLD AUTO: 4.4 % (ref 0–0.5)
LYMPHOCYTES # BLD AUTO: 0.5 K/UL (ref 1–4.8)
LYMPHOCYTES NFR BLD: 13.2 % (ref 18–48)
MAGNESIUM SERPL-MCNC: 1.6 MG/DL (ref 1.6–2.6)
MCH RBC QN AUTO: 32 PG (ref 27–31)
MCHC RBC AUTO-ENTMCNC: 35.6 G/DL (ref 32–36)
MCV RBC AUTO: 90 FL (ref 82–98)
MONOCYTES # BLD AUTO: 0.6 K/UL (ref 0.3–1)
MONOCYTES NFR BLD: 14.8 % (ref 4–15)
NEUTROPHILS # BLD AUTO: 2.5 K/UL (ref 1.8–7.7)
NEUTROPHILS NFR BLD: 66 % (ref 38–73)
NRBC BLD-RTO: 1 /100 WBC
OVALOCYTES BLD QL SMEAR: ABNORMAL
PLATELET # BLD AUTO: 122 K/UL (ref 150–450)
PLATELET BLD QL SMEAR: ABNORMAL
PMV BLD AUTO: 10 FL (ref 9.2–12.9)
POIKILOCYTOSIS BLD QL SMEAR: SLIGHT
POLYCHROMASIA BLD QL SMEAR: ABNORMAL
POTASSIUM SERPL-SCNC: 3.3 MMOL/L (ref 3.5–5.1)
PROT SERPL-MCNC: 5.2 G/DL (ref 6–8.4)
RBC # BLD AUTO: 2.47 M/UL (ref 4–5.4)
SODIUM SERPL-SCNC: 135 MMOL/L (ref 136–145)
WBC # BLD AUTO: 3.85 K/UL (ref 3.9–12.7)

## 2022-09-10 PROCEDURE — 85025 COMPLETE CBC W/AUTO DIFF WBC: CPT | Performed by: STUDENT IN AN ORGANIZED HEALTH CARE EDUCATION/TRAINING PROGRAM

## 2022-09-10 PROCEDURE — 63600175 PHARM REV CODE 636 W HCPCS: Performed by: STUDENT IN AN ORGANIZED HEALTH CARE EDUCATION/TRAINING PROGRAM

## 2022-09-10 PROCEDURE — 63600175 PHARM REV CODE 636 W HCPCS

## 2022-09-10 PROCEDURE — 83735 ASSAY OF MAGNESIUM: CPT | Performed by: STUDENT IN AN ORGANIZED HEALTH CARE EDUCATION/TRAINING PROGRAM

## 2022-09-10 PROCEDURE — 80053 COMPREHEN METABOLIC PANEL: CPT | Performed by: STUDENT IN AN ORGANIZED HEALTH CARE EDUCATION/TRAINING PROGRAM

## 2022-09-10 PROCEDURE — 87040 BLOOD CULTURE FOR BACTERIA: CPT | Mod: 59

## 2022-09-10 PROCEDURE — 87340 HEPATITIS B SURFACE AG IA: CPT

## 2022-09-10 PROCEDURE — 99233 SBSQ HOSP IP/OBS HIGH 50: CPT | Mod: ,,, | Performed by: INTERNAL MEDICINE

## 2022-09-10 PROCEDURE — 86038 ANTINUCLEAR ANTIBODIES: CPT

## 2022-09-10 PROCEDURE — 86039 ANTINUCLEAR ANTIBODIES (ANA): CPT

## 2022-09-10 PROCEDURE — 25000003 PHARM REV CODE 250

## 2022-09-10 PROCEDURE — 99233 PR SUBSEQUENT HOSPITAL CARE,LEVL III: ICD-10-PCS | Mod: ,,, | Performed by: INTERNAL MEDICINE

## 2022-09-10 PROCEDURE — 86704 HEP B CORE ANTIBODY TOTAL: CPT

## 2022-09-10 PROCEDURE — 25000003 PHARM REV CODE 250: Performed by: STUDENT IN AN ORGANIZED HEALTH CARE EDUCATION/TRAINING PROGRAM

## 2022-09-10 PROCEDURE — 86256 FLUORESCENT ANTIBODY TITER: CPT

## 2022-09-10 PROCEDURE — 86235 NUCLEAR ANTIGEN ANTIBODY: CPT

## 2022-09-10 PROCEDURE — 99223 1ST HOSP IP/OBS HIGH 75: CPT | Mod: ,,, | Performed by: INTERNAL MEDICINE

## 2022-09-10 PROCEDURE — 20600001 HC STEP DOWN PRIVATE ROOM

## 2022-09-10 PROCEDURE — 86592 SYPHILIS TEST NON-TREP QUAL: CPT

## 2022-09-10 PROCEDURE — 99223 PR INITIAL HOSPITAL CARE,LEVL III: ICD-10-PCS | Mod: ,,, | Performed by: INTERNAL MEDICINE

## 2022-09-10 PROCEDURE — 36415 COLL VENOUS BLD VENIPUNCTURE: CPT

## 2022-09-10 RX ORDER — SIMETHICONE 80 MG
1 TABLET,CHEWABLE ORAL
Status: DISCONTINUED | OUTPATIENT
Start: 2022-09-10 | End: 2022-09-19 | Stop reason: HOSPADM

## 2022-09-10 RX ORDER — IBUPROFEN 600 MG/1
600 TABLET ORAL EVERY 6 HOURS PRN
Status: DISCONTINUED | OUTPATIENT
Start: 2022-09-10 | End: 2022-09-12

## 2022-09-10 RX ORDER — PROCHLORPERAZINE EDISYLATE 5 MG/ML
5 INJECTION INTRAMUSCULAR; INTRAVENOUS EVERY 6 HOURS
Status: DISCONTINUED | OUTPATIENT
Start: 2022-09-10 | End: 2022-09-11

## 2022-09-10 RX ORDER — ONDANSETRON 2 MG/ML
4 INJECTION INTRAMUSCULAR; INTRAVENOUS EVERY 6 HOURS PRN
Status: DISCONTINUED | OUTPATIENT
Start: 2022-09-10 | End: 2022-09-11

## 2022-09-10 RX ADMIN — SODIUM CHLORIDE: 0.9 INJECTION, SOLUTION INTRAVENOUS at 05:09

## 2022-09-10 RX ADMIN — PROCHLORPERAZINE EDISYLATE 5 MG: 5 INJECTION INTRAMUSCULAR; INTRAVENOUS at 12:09

## 2022-09-10 RX ADMIN — PROCHLORPERAZINE EDISYLATE 5 MG: 5 INJECTION INTRAMUSCULAR; INTRAVENOUS at 04:09

## 2022-09-10 RX ADMIN — OXYCODONE HYDROCHLORIDE 10 MG: 10 TABLET ORAL at 10:09

## 2022-09-10 RX ADMIN — SODIUM CHLORIDE: 0.9 INJECTION, SOLUTION INTRAVENOUS at 08:09

## 2022-09-10 RX ADMIN — AMPICILLIN AND SULBACTAM 3 G: 2; 1 INJECTION, POWDER, FOR SOLUTION INTRAMUSCULAR; INTRAVENOUS at 09:09

## 2022-09-10 RX ADMIN — ENOXAPARIN SODIUM 40 MG: 100 INJECTION SUBCUTANEOUS at 05:09

## 2022-09-10 RX ADMIN — DICYCLOMINE HYDROCHLORIDE 10 MG: 10 CAPSULE ORAL at 08:09

## 2022-09-10 RX ADMIN — PROCHLORPERAZINE EDISYLATE 5 MG: 5 INJECTION INTRAMUSCULAR; INTRAVENOUS at 05:09

## 2022-09-10 RX ADMIN — IBUPROFEN 600 MG: 600 TABLET ORAL at 04:09

## 2022-09-10 RX ADMIN — LOPERAMIDE HYDROCHLORIDE 2 MG: 2 CAPSULE ORAL at 09:09

## 2022-09-10 RX ADMIN — OXYCODONE HYDROCHLORIDE 10 MG: 10 TABLET ORAL at 04:09

## 2022-09-10 RX ADMIN — SIMETHICONE 80 MG: 80 TABLET, CHEWABLE ORAL at 02:09

## 2022-09-10 RX ADMIN — PIPERACILLIN SODIUM AND TAZOBACTAM SODIUM 4.5 G: 4; .5 INJECTION, POWDER, LYOPHILIZED, FOR SOLUTION INTRAVENOUS at 10:09

## 2022-09-10 RX ADMIN — PIPERACILLIN SODIUM AND TAZOBACTAM SODIUM 4.5 G: 4; .5 INJECTION, POWDER, LYOPHILIZED, FOR SOLUTION INTRAVENOUS at 02:09

## 2022-09-10 RX ADMIN — LOPERAMIDE HYDROCHLORIDE 2 MG: 2 CAPSULE ORAL at 08:09

## 2022-09-10 RX ADMIN — LOPERAMIDE HYDROCHLORIDE 2 MG: 2 CAPSULE ORAL at 02:09

## 2022-09-10 RX ADMIN — DICYCLOMINE HYDROCHLORIDE 10 MG: 10 CAPSULE ORAL at 02:09

## 2022-09-10 RX ADMIN — VANCOMYCIN HYDROCHLORIDE 1500 MG: 1.5 INJECTION, POWDER, LYOPHILIZED, FOR SOLUTION INTRAVENOUS at 08:09

## 2022-09-10 RX ADMIN — AMPICILLIN AND SULBACTAM 3 G: 2; 1 INJECTION, POWDER, FOR SOLUTION INTRAMUSCULAR; INTRAVENOUS at 03:09

## 2022-09-10 RX ADMIN — SIMETHICONE 80 MG: 80 TABLET, CHEWABLE ORAL at 06:09

## 2022-09-10 RX ADMIN — DICYCLOMINE HYDROCHLORIDE 10 MG: 10 CAPSULE ORAL at 09:09

## 2022-09-10 RX ADMIN — SIMETHICONE 80 MG: 80 TABLET, CHEWABLE ORAL at 10:09

## 2022-09-10 NOTE — ASSESSMENT & PLAN NOTE
Patient has been having more loose stools this admission since starting antibiotics.     - c diff negative   - bowel regimen held       - See chemotherapy induced neutropenia

## 2022-09-10 NOTE — PLAN OF CARE
Rounded on pt Q1H and pt ambulating throughout room w/o complications. VSS throughout shift. Changes to the POC include:   -zofran d/c'd and scheduled compazine initiated   -pt has a fever of 102.2F during shift - motrin increased and provided to pt and 2x BC ordered for pt collection pending  -ID d/c'd zosyn and vanco - ampicillin started  -Hep B labs drawn - negative  -pt continues to have dry heaving and diarrhea throughout shift  -liver enzymes elevated - ultrasound ordered to view liver

## 2022-09-10 NOTE — SUBJECTIVE & OBJECTIVE
Interval History: Patient still fevering so ID was consulted given negative infectious workup so far. Recommended to de-escalate antibiotics given negative MRSA and psuedomonas workup. Placed on Unasyn. Patient with increasing transminitis, RUQ US to evaluate biliary tracks and liver.     Oncology Treatment Plan:   OP PEMBROLIZUMAB CARBOPLATIN (AUC 5) WITH WEEKLY PACLITAXEL FOLLOWED BY PEMBROLIZUMAB DOXORUBICIN CYCLOPHOSPHAMIDE FOLLOWED BY PEMBROLIZUMAB 200 MG Q3W    Medications:  Continuous Infusions:   sodium chloride 0.9% 150 mL/hr at 09/10/22 1023     Scheduled Meds:   ampicillin-sulbactim (UNASYN) IVPB  3 g Intravenous Q6H    dicyclomine  10 mg Oral TID    enoxaparin  40 mg Subcutaneous Daily    magnesium sulfate IVPB  2 g Intravenous Once    prochlorperazine  5 mg Intravenous Q6H    simethicone  1 tablet Oral TID PC     PRN Meds:dextrose 10%, dextrose 10%, glucagon (human recombinant), glucose, glucose, ibuprofen, loperamide, naloxone, ondansetron, oxyCODONE, polyethylene glycol, sodium chloride 0.9%     Review of Systems   Constitutional:  Positive for activity change, appetite change and fatigue. Negative for chills, diaphoresis and fever.   HENT:  Negative for congestion.    Eyes:  Negative for visual disturbance.   Respiratory:  Negative for shortness of breath and wheezing.    Cardiovascular:  Negative for chest pain, palpitations and leg swelling.   Gastrointestinal:  Positive for abdominal pain (improving), diarrhea and nausea (improving). Negative for abdominal distention and vomiting.   Endocrine: Negative for polyuria.   Genitourinary:  Negative for difficulty urinating and dysuria.   Musculoskeletal:  Negative for back pain and joint swelling.   Skin:  Negative for color change.   Neurological:  Positive for weakness. Negative for dizziness and headaches.   Hematological:  Negative for adenopathy.   Psychiatric/Behavioral:  Negative for agitation.    Objective:     Vital Signs (Most Recent):  Temp:  99.5 °F (37.5 °C) (09/10/22 1216)  Pulse: 102 (09/10/22 1216)  Resp: 20 (09/10/22 1216)  BP: (!) 128/59 (09/10/22 1216)  SpO2: 96 % (09/10/22 1216)   Vital Signs (24h Range):  Temp:  [98.8 °F (37.1 °C)-102.8 °F (39.3 °C)] 99.5 °F (37.5 °C)  Pulse:  [] 102  Resp:  [16-20] 20  SpO2:  [94 %-97 %] 96 %  BP: (117-139)/(56-68) 128/59     Weight: 105.5 kg (232 lb 9.4 oz)  Body mass index is 37.54 kg/m².  Body surface area is 2.22 meters squared.      Intake/Output Summary (Last 24 hours) at 9/10/2022 1426  Last data filed at 9/10/2022 1023  Gross per 24 hour   Intake 3014.61 ml   Output 3 ml   Net 3011.61 ml       Physical Exam  Vitals and nursing note reviewed.   Constitutional:       General: She is not in acute distress.     Appearance: She is not diaphoretic.   HENT:      Head: Normocephalic and atraumatic.      Nose: Nose normal. No congestion.      Mouth/Throat:      Mouth: Mucous membranes are dry.      Pharynx: No oropharyngeal exudate or posterior oropharyngeal erythema.   Eyes:      General: No scleral icterus.     Conjunctiva/sclera: Conjunctivae normal.   Cardiovascular:      Rate and Rhythm: Regular rhythm. Tachycardia present.      Heart sounds: No murmur heard.  Pulmonary:      Effort: Pulmonary effort is normal.      Breath sounds: No wheezing or rales.   Abdominal:      General: Abdomen is flat.      Tenderness: There is abdominal tenderness (epigastric). There is no right CVA tenderness, left CVA tenderness or guarding.   Musculoskeletal:         General: No swelling.      Cervical back: Normal range of motion. No rigidity.      Right lower leg: No edema.      Left lower leg: No edema.   Skin:     General: Skin is warm.      Findings: No erythema or rash.   Neurological:      General: No focal deficit present.      Mental Status: She is alert. Mental status is at baseline.   Psychiatric:         Behavior: Behavior normal.         Thought Content: Thought content normal.       Significant Labs:    CBC:   Recent Labs   Lab 09/09/22 0334 09/10/22  0422   WBC 2.95* 3.85*   HGB 7.7* 7.9*   HCT 21.8* 22.2*   * 122*    and CMP:   Recent Labs   Lab 09/09/22  0334 09/10/22  0422    135*   K 3.3* 3.3*    108   CO2 23 18*    115*   BUN 4* 4*   CREATININE 0.6 0.6   CALCIUM 8.3* 8.2*   PROT 5.2* 5.2*   ALBUMIN 2.6* 2.7*   BILITOT 0.6 0.6   ALKPHOS 34* 40*   * 391*   ALT 64* 231*   ANIONGAP 6* 9       Diagnostic Results:  I have reviewed and interpreted all pertinent imaging results/findings within the past 24 hours.

## 2022-09-10 NOTE — PLAN OF CARE
Pt AAOx4.  C/o abdomen pain, relieved with prn meds.  Educated on medications given.  IV fluids and antibiotics given.  Two episodes of emesis.  Pt notified nurse of slight blood in emesis.  Physician notified.  Will continue to monitor.

## 2022-09-10 NOTE — ASSESSMENT & PLAN NOTE
Pt had subjective fevers at home unmeasured, abdominal pain, polyuria In setting of low WBC and recent chemotherapy. During admission patient was febrile intermittently and had U/A showing WBCs and bacteria. Initially started treatment with cefepime. Flagyl added due to ongoing fevers. Fever curve was initially downtrending with negative cultures. However she had fever as high as 103. Will restart septic work up as fever is currently of unknown origin and add vancomycin. Consulted ID and per recommendations have deescalated abx to unasyn given pt has has negative tersting for pseudomonas and MRSA. Can also consider chemo causing fever.     - Unasyn   - CT chest abdo pelvis  - Cdiff studies negative  - Repeat blood cultures, NGTD  - ID contsulted, appreciate recs

## 2022-09-10 NOTE — ASSESSMENT & PLAN NOTE
36 years old with HTN, breast cancer  C4D20 Taxol on 8/29/2022 f/u with Dr. Cruz presents with worsening abdominal pain, nasuea and vomiting.  On exam she was mod-sever  abdominal pain to palpitation, no peritonitic signs. Labs were significant for elevated lipase >200. CT with findings concerning for pancreatitis wo fluid/ abscess collection. . CT scan with Mild peripancreatic fat stranding. TG levels were normal this admission, low concern for triglyceride induced pancreatitis. CT scan showed no gallstones or GB wall thickening, low concern for gallstone induced pancreatitis. Patient is clinically improving with n/v, po intake, and pain.     Most likely pancreatitis 2/2 chemo, last chemotherapy session 8/29; Taxol    - Pain: morphine IV changed to oxy PO.    - Anti-emetics: scheduled compazine and prn zofran    -  cc  - Diet fully advanced  - simethicone   - bowel regimen to prevent opioid induced constipation

## 2022-09-10 NOTE — PROGRESS NOTES
VANCOMYCIN DOSING BY PHARMACY DISCONTINUATION NOTE     The pharmacy consult for vancomycin dosing has been discontinued.      Vancomycin Dosing by Pharmacy Consult will sign-off. Please reconsult if necessary. Thank you for allowing us to participate in this patient's care.         Addis Oliver, ChynaD

## 2022-09-10 NOTE — PROGRESS NOTES
[facility] ([facility])  Hematology/Oncology  Progress Note    Patient Name: Laura Kent  Admission Date: 9/3/2022  Hospital Length of Stay: 7 days  Code Status: Full Code     Subjective:     HPI:  36 years old with HTN, breast cancer  C4D20 Taxol on 8/29/2022 f/u with Dr. Cruz presents with worsening abdominal pain, nasuea and vomiting. Patient was in pain during the interview and asking for better pain control. She states one week history of gradually worsening abdominal pain worst in epigastric area without radiation. She had minimal po intake since then. She associate emesis x2 NBNB. She had one small BM this afternoon prior to her presentation.     Patient denies chest pain, shortness of breath,  dysuria, polyuria, nausea, vomiting, fever, chills,  diarrhea, constipation, headache, falls, focal weakness or vision changes.     Ed course:   On presentation she was HDS and afebrile. On exam she was mod-sever  abdominal pain to palpitation, no peritonitic signs. Labs were significant for elevated lipase >200. CT with findings concerning for pancreatitis wo fluid/ abscess collection. Medical onc consulted for admit.           Interval History: Patient still fevering so ID was consulted given negative infectious workup so far. Recommended to de-escalate antibiotics given negative MRSA and psuedomonas workup. Placed on Unasyn. Patient with increasing transminitis, RUQ US to evaluate biliary tracks and liver.     Oncology Treatment Plan:   OP PEMBROLIZUMAB CARBOPLATIN (AUC 5) WITH WEEKLY PACLITAXEL FOLLOWED BY PEMBROLIZUMAB DOXORUBICIN CYCLOPHOSPHAMIDE FOLLOWED BY PEMBROLIZUMAB 200 MG Q3W    Medications:  Continuous Infusions:   sodium chloride 0.9% 150 mL/hr at 09/10/22 1023     Scheduled Meds:   ampicillin-sulbactim (UNASYN) IVPB  3 g Intravenous Q6H    dicyclomine  10 mg Oral TID    enoxaparin  40 mg Subcutaneous Daily    magnesium sulfate IVPB  2 g Intravenous Once    prochlorperazine  5  mg Intravenous Q6H    simethicone  1 tablet Oral TID PC     PRN Meds:dextrose 10%, dextrose 10%, glucagon (human recombinant), glucose, glucose, ibuprofen, loperamide, naloxone, ondansetron, oxyCODONE, polyethylene glycol, sodium chloride 0.9%     Review of Systems   Constitutional:  Positive for activity change, appetite change and fatigue. Negative for chills, diaphoresis and fever.   HENT:  Negative for congestion.    Eyes:  Negative for visual disturbance.   Respiratory:  Negative for shortness of breath and wheezing.    Cardiovascular:  Negative for chest pain, palpitations and leg swelling.   Gastrointestinal:  Positive for abdominal pain (improving), diarrhea and nausea (improving). Negative for abdominal distention and vomiting.   Endocrine: Negative for polyuria.   Genitourinary:  Negative for difficulty urinating and dysuria.   Musculoskeletal:  Negative for back pain and joint swelling.   Skin:  Negative for color change.   Neurological:  Positive for weakness. Negative for dizziness and headaches.   Hematological:  Negative for adenopathy.   Psychiatric/Behavioral:  Negative for agitation.    Objective:     Vital Signs (Most Recent):  Temp: 99.5 °F (37.5 °C) (09/10/22 1216)  Pulse: 102 (09/10/22 1216)  Resp: 20 (09/10/22 1216)  BP: (!) 128/59 (09/10/22 1216)  SpO2: 96 % (09/10/22 1216)   Vital Signs (24h Range):  Temp:  [98.8 °F (37.1 °C)-102.8 °F (39.3 °C)] 99.5 °F (37.5 °C)  Pulse:  [] 102  Resp:  [16-20] 20  SpO2:  [94 %-97 %] 96 %  BP: (117-139)/(56-68) 128/59     Weight: 105.5 kg (232 lb 9.4 oz)  Body mass index is 37.54 kg/m².  Body surface area is 2.22 meters squared.      Intake/Output Summary (Last 24 hours) at 9/10/2022 1426  Last data filed at 9/10/2022 1023  Gross per 24 hour   Intake 3014.61 ml   Output 3 ml   Net 3011.61 ml       Physical Exam  Vitals and nursing note reviewed.   Constitutional:       General: She is not in acute distress.     Appearance: She is not diaphoretic.    HENT:      Head: Normocephalic and atraumatic.      Nose: Nose normal. No congestion.      Mouth/Throat:      Mouth: Mucous membranes are dry.      Pharynx: No oropharyngeal exudate or posterior oropharyngeal erythema.   Eyes:      General: No scleral icterus.     Conjunctiva/sclera: Conjunctivae normal.   Cardiovascular:      Rate and Rhythm: Regular rhythm. Tachycardia present.      Heart sounds: No murmur heard.  Pulmonary:      Effort: Pulmonary effort is normal.      Breath sounds: No wheezing or rales.   Abdominal:      General: Abdomen is flat.      Tenderness: There is abdominal tenderness (epigastric). There is no right CVA tenderness, left CVA tenderness or guarding.   Musculoskeletal:         General: No swelling.      Cervical back: Normal range of motion. No rigidity.      Right lower leg: No edema.      Left lower leg: No edema.   Skin:     General: Skin is warm.      Findings: No erythema or rash.   Neurological:      General: No focal deficit present.      Mental Status: She is alert. Mental status is at baseline.   Psychiatric:         Behavior: Behavior normal.         Thought Content: Thought content normal.       Significant Labs:   CBC:   Recent Labs   Lab 09/09/22  0334 09/10/22  0422   WBC 2.95* 3.85*   HGB 7.7* 7.9*   HCT 21.8* 22.2*   * 122*    and CMP:   Recent Labs   Lab 09/09/22 0334 09/10/22  0422    135*   K 3.3* 3.3*    108   CO2 23 18*    115*   BUN 4* 4*   CREATININE 0.6 0.6   CALCIUM 8.3* 8.2*   PROT 5.2* 5.2*   ALBUMIN 2.6* 2.7*   BILITOT 0.6 0.6   ALKPHOS 34* 40*   * 391*   ALT 64* 231*   ANIONGAP 6* 9       Diagnostic Results:  I have reviewed and interpreted all pertinent imaging results/findings within the past 24 hours.    Assessment/Plan:     * Acute pancreatitis  36 years old with HTN, breast cancer  C4D20 Taxol on 8/29/2022 f/u with Dr. Cruz presents with worsening abdominal pain, nasuea and vomiting.  On exam she was mod-sever   abdominal pain to palpitation, no peritonitic signs. Labs were significant for elevated lipase >200. CT with findings concerning for pancreatitis wo fluid/ abscess collection. . CT scan with Mild peripancreatic fat stranding. TG levels were normal this admission, low concern for triglyceride induced pancreatitis. CT scan showed no gallstones or GB wall thickening, low concern for gallstone induced pancreatitis. Patient is clinically improving with n/v, po intake, and pain.     Most likely pancreatitis 2/2 chemo, last chemotherapy session 8/29; Taxol    - Pain: morphine IV changed to oxy PO.    - Anti-emetics: scheduled compazine and prn zofran    -  cc  - Diet fully advanced  - simethicone   - bowel regimen to prevent opioid induced constipation     Loose stools  Patient has been having more loose stools this admission since starting antibiotics.     - c diff negative   - bowel regimen held       - See chemotherapy induced neutropenia    Abdominal pain  Please read pancreatitis       Chemotherapy induced neutropenia  Pt had subjective fevers at home unmeasured, abdominal pain, polyuria In setting of low WBC and recent chemotherapy. During admission patient was febrile intermittently and had U/A showing WBCs and bacteria. Initially started treatment with cefepime. Flagyl added due to ongoing fevers. Fever curve was initially downtrending with negative cultures. However she had fever as high as 103. Will restart septic work up as fever is currently of unknown origin and add vancomycin. Consulted ID and per recommendations have deescalated abx to unasyn given pt has has negative tersting for pseudomonas and MRSA. Can also consider chemo causing fever.     - Unasyn   - CT chest abdo pelvis  - Cdiff studies negative  - Repeat blood cultures, NGTD  - ID contsulted, appreciate recs     Malignant neoplasm of upper-outer quadrant of left breast in female, estrogen receptor negative  Dr. Cruz patient,  On:  Currently  on cycle 4 D20 of pembrolizumab carboplatin (AUC 5) with weekly paclitaxel followed by pembrolizumab doxorubicin cyclophosphamide followed by pembrolizumab 200 mg q3w    Most recent  Taxol on 8/29/2022     -- admit to medical oncology              Payal Urena DO  Hematology/Oncology  [facility] ([facility])

## 2022-09-11 LAB
ALBUMIN SERPL BCP-MCNC: 2.5 G/DL (ref 3.5–5.2)
ALP SERPL-CCNC: 39 U/L (ref 55–135)
ALT SERPL W/O P-5'-P-CCNC: 234 U/L (ref 10–44)
ANION GAP SERPL CALC-SCNC: 10 MMOL/L (ref 8–16)
ANISOCYTOSIS BLD QL SMEAR: SLIGHT
AST SERPL-CCNC: 263 U/L (ref 10–40)
BACTERIA BLD CULT: NORMAL
BACTERIA BLD CULT: NORMAL
BASOPHILS # BLD AUTO: 0.01 K/UL (ref 0–0.2)
BASOPHILS NFR BLD: 0.2 % (ref 0–1.9)
BILIRUB SERPL-MCNC: 0.6 MG/DL (ref 0.1–1)
BUN SERPL-MCNC: 4 MG/DL (ref 6–20)
CALCIUM SERPL-MCNC: 7.9 MG/DL (ref 8.7–10.5)
CHLORIDE SERPL-SCNC: 110 MMOL/L (ref 95–110)
CO2 SERPL-SCNC: 22 MMOL/L (ref 23–29)
CREAT SERPL-MCNC: 1 MG/DL (ref 0.5–1.4)
DIFFERENTIAL METHOD: ABNORMAL
DOHLE BOD BLD QL SMEAR: PRESENT
EOSINOPHIL # BLD AUTO: 0 K/UL (ref 0–0.5)
EOSINOPHIL NFR BLD: 0.2 % (ref 0–8)
ERYTHROCYTE [DISTWIDTH] IN BLOOD BY AUTOMATED COUNT: 14.7 % (ref 11.5–14.5)
EST. GFR  (NO RACE VARIABLE): >60 ML/MIN/1.73 M^2
GLUCOSE SERPL-MCNC: 105 MG/DL (ref 70–110)
HCT VFR BLD AUTO: 22.7 % (ref 37–48.5)
HGB BLD-MCNC: 7.7 G/DL (ref 12–16)
HYPOCHROMIA BLD QL SMEAR: ABNORMAL
IMM GRANULOCYTES # BLD AUTO: 0.06 K/UL (ref 0–0.04)
IMM GRANULOCYTES NFR BLD AUTO: 1.5 % (ref 0–0.5)
LIPASE SERPL-CCNC: 54 U/L (ref 4–60)
LYMPHOCYTES # BLD AUTO: 0.4 K/UL (ref 1–4.8)
LYMPHOCYTES NFR BLD: 10.7 % (ref 18–48)
MAGNESIUM SERPL-MCNC: 1.6 MG/DL (ref 1.6–2.6)
MCH RBC QN AUTO: 31.3 PG (ref 27–31)
MCHC RBC AUTO-ENTMCNC: 33.9 G/DL (ref 32–36)
MCV RBC AUTO: 92 FL (ref 82–98)
MONOCYTES # BLD AUTO: 0.6 K/UL (ref 0.3–1)
MONOCYTES NFR BLD: 15.1 % (ref 4–15)
NEUTROPHILS # BLD AUTO: 3 K/UL (ref 1.8–7.7)
NEUTROPHILS NFR BLD: 72.3 % (ref 38–73)
NRBC BLD-RTO: 0 /100 WBC
OVALOCYTES BLD QL SMEAR: ABNORMAL
PLATELET # BLD AUTO: 118 K/UL (ref 150–450)
PMV BLD AUTO: 9.8 FL (ref 9.2–12.9)
POIKILOCYTOSIS BLD QL SMEAR: SLIGHT
POLYCHROMASIA BLD QL SMEAR: ABNORMAL
POTASSIUM SERPL-SCNC: 3.3 MMOL/L (ref 3.5–5.1)
PROT SERPL-MCNC: 5 G/DL (ref 6–8.4)
RBC # BLD AUTO: 2.46 M/UL (ref 4–5.4)
SODIUM SERPL-SCNC: 142 MMOL/L (ref 136–145)
TOXIC GRANULES BLD QL SMEAR: PRESENT
WBC # BLD AUTO: 4.11 K/UL (ref 3.9–12.7)

## 2022-09-11 PROCEDURE — 85025 COMPLETE CBC W/AUTO DIFF WBC: CPT | Performed by: STUDENT IN AN ORGANIZED HEALTH CARE EDUCATION/TRAINING PROGRAM

## 2022-09-11 PROCEDURE — 25000003 PHARM REV CODE 250

## 2022-09-11 PROCEDURE — 25000003 PHARM REV CODE 250: Performed by: STUDENT IN AN ORGANIZED HEALTH CARE EDUCATION/TRAINING PROGRAM

## 2022-09-11 PROCEDURE — 99233 SBSQ HOSP IP/OBS HIGH 50: CPT | Mod: ,,, | Performed by: INTERNAL MEDICINE

## 2022-09-11 PROCEDURE — 63600175 PHARM REV CODE 636 W HCPCS: Performed by: STUDENT IN AN ORGANIZED HEALTH CARE EDUCATION/TRAINING PROGRAM

## 2022-09-11 PROCEDURE — 20600001 HC STEP DOWN PRIVATE ROOM

## 2022-09-11 PROCEDURE — 99233 PR SUBSEQUENT HOSPITAL CARE,LEVL III: ICD-10-PCS | Mod: ,,, | Performed by: INTERNAL MEDICINE

## 2022-09-11 PROCEDURE — 83735 ASSAY OF MAGNESIUM: CPT | Performed by: STUDENT IN AN ORGANIZED HEALTH CARE EDUCATION/TRAINING PROGRAM

## 2022-09-11 PROCEDURE — 87529 HSV DNA AMP PROBE: CPT | Performed by: INTERNAL MEDICINE

## 2022-09-11 PROCEDURE — 83690 ASSAY OF LIPASE: CPT | Performed by: INTERNAL MEDICINE

## 2022-09-11 PROCEDURE — 80053 COMPREHEN METABOLIC PANEL: CPT | Performed by: STUDENT IN AN ORGANIZED HEALTH CARE EDUCATION/TRAINING PROGRAM

## 2022-09-11 PROCEDURE — 36415 COLL VENOUS BLD VENIPUNCTURE: CPT | Performed by: INTERNAL MEDICINE

## 2022-09-11 PROCEDURE — 63600175 PHARM REV CODE 636 W HCPCS

## 2022-09-11 RX ORDER — ONDANSETRON 2 MG/ML
4 INJECTION INTRAMUSCULAR; INTRAVENOUS EVERY 6 HOURS
Status: DISCONTINUED | OUTPATIENT
Start: 2022-09-11 | End: 2022-09-17

## 2022-09-11 RX ORDER — PROCHLORPERAZINE EDISYLATE 5 MG/ML
5 INJECTION INTRAMUSCULAR; INTRAVENOUS EVERY 6 HOURS PRN
Status: DISCONTINUED | OUTPATIENT
Start: 2022-09-11 | End: 2022-09-19 | Stop reason: HOSPADM

## 2022-09-11 RX ORDER — ONDANSETRON 2 MG/ML
4 INJECTION INTRAMUSCULAR; INTRAVENOUS EVERY 6 HOURS
Status: DISCONTINUED | OUTPATIENT
Start: 2022-09-12 | End: 2022-09-11

## 2022-09-11 RX ADMIN — OXYCODONE HYDROCHLORIDE 10 MG: 10 TABLET ORAL at 09:09

## 2022-09-11 RX ADMIN — LOPERAMIDE HYDROCHLORIDE 2 MG: 2 CAPSULE ORAL at 02:09

## 2022-09-11 RX ADMIN — SODIUM CHLORIDE: 0.9 INJECTION, SOLUTION INTRAVENOUS at 12:09

## 2022-09-11 RX ADMIN — PROCHLORPERAZINE EDISYLATE 5 MG: 5 INJECTION INTRAMUSCULAR; INTRAVENOUS at 12:09

## 2022-09-11 RX ADMIN — DICYCLOMINE HYDROCHLORIDE 10 MG: 10 CAPSULE ORAL at 09:09

## 2022-09-11 RX ADMIN — IBUPROFEN 600 MG: 600 TABLET ORAL at 09:09

## 2022-09-11 RX ADMIN — SIMETHICONE 80 MG: 80 TABLET, CHEWABLE ORAL at 06:09

## 2022-09-11 RX ADMIN — LOPERAMIDE HYDROCHLORIDE 2 MG: 2 CAPSULE ORAL at 09:09

## 2022-09-11 RX ADMIN — PROCHLORPERAZINE EDISYLATE 5 MG: 5 INJECTION INTRAMUSCULAR; INTRAVENOUS at 11:09

## 2022-09-11 RX ADMIN — AMPICILLIN AND SULBACTAM 3 G: 2; 1 INJECTION, POWDER, FOR SOLUTION INTRAMUSCULAR; INTRAVENOUS at 09:09

## 2022-09-11 RX ADMIN — ENOXAPARIN SODIUM 40 MG: 100 INJECTION SUBCUTANEOUS at 04:09

## 2022-09-11 RX ADMIN — DICYCLOMINE HYDROCHLORIDE 10 MG: 10 CAPSULE ORAL at 02:09

## 2022-09-11 RX ADMIN — AMPICILLIN AND SULBACTAM 3 G: 2; 1 INJECTION, POWDER, FOR SOLUTION INTRAMUSCULAR; INTRAVENOUS at 02:09

## 2022-09-11 RX ADMIN — OXYCODONE HYDROCHLORIDE 10 MG: 10 TABLET ORAL at 02:09

## 2022-09-11 RX ADMIN — IBUPROFEN 600 MG: 600 TABLET ORAL at 06:09

## 2022-09-11 RX ADMIN — PROCHLORPERAZINE EDISYLATE 5 MG: 5 INJECTION INTRAMUSCULAR; INTRAVENOUS at 06:09

## 2022-09-11 RX ADMIN — SODIUM CHLORIDE: 0.9 INJECTION, SOLUTION INTRAVENOUS at 09:09

## 2022-09-11 RX ADMIN — SIMETHICONE 80 MG: 80 TABLET, CHEWABLE ORAL at 02:09

## 2022-09-11 RX ADMIN — SIMETHICONE 80 MG: 80 TABLET, CHEWABLE ORAL at 09:09

## 2022-09-11 NOTE — ASSESSMENT & PLAN NOTE
Prevention Guidelines, Women Ages 65 and Older  Screening tests and vaccines are an important part of managing your health. Health counseling is essential, too. Below are guidelines for these, for women ages 65 and older. Talk with your healthcare provider to make sure youre up to date on what you need.  Screening Who needs it How often   Type 2 diabetes or prediabetes All adults beginning at age 45 and adults without symptoms at any age who are overweight or obese and have 1 or more additional risk factors for diabetes At least every 3 years   Alcohol misuse All women in this age group At routine exams   Blood pressure All women in this age group Every 2 years if your blood pressure is less than 120/80 mm Hg; yearly if your systolic blood pressure is 120 to 139 mm Hg, or your diastolic blood pressure reading is 80 to 89 mm Hg   Breast cancer All women in this age group Yearly mammogram and clinical breast exam1   Cervical cancer Only women who had abnormal screening results before age 65 Talk with your healthcare provider   Chlamydia Women at increased risk for infection At routine exams   Colorectal cancer All women in this age group1 Flexible sigmoidoscopy every 5 years, or colonoscopy every 10 years, or double-contrast barium enema every 5 years; yearly fecal occult blood test or fecal immunochemical test; or a stool DNA test as often as your healthcare provider advises; talk with your healthcare provider about which tests are best for you   Depression All women in this age group At routine exams   Gonorrhea Sexually active women at increased risk for infection At routine exams   Hepatitis C Anyone at increased risk; 1 time for those born between 1945 and 1965 At routine exams   High cholesterol or triglycerides All women in this age group who are at risk for coronary artery disease At least every 5 years   HIV Women at increased risk for infection - talk with your healthcare provider At routine exams   Lung  36-year-old female with history of breast cancer, last taxol 8/29/2022, presented with nausea, vomiting, abdominal pain 9/3/2022, diagnosed with acute pancreatitis. Patient with ongoing fevers with new transaminitis - negative blood/urine cultures, HIV, HepC, CT CAP notable for peripancreatic fat stranding, but no areas of necrosis.     Recommendations:  - Discontinue vancomycin IV given no cultures with MRSA  - Discontinue pip-tazo IV given no cultures with Pseudomonas  - De-escalate to amp-sulbactam IV  - RUQ US   - Sent HepBsAg, HepBcAb, RPR, CMV PCR, HSV PCR   cancer Adults age 55 to 80 who have smoked Yearly screening in smokers with 30 pack-year history of smoking or who quit within 15 years   Obesity All women in this age group At routine exams   Osteoporosis All women in this age group Bone density test at age 65, then follow-up as advised by your healthcare provider   Syphilis Women at increased risk for infection - talk with your healthcare provider At routine exams   Thyroid-Stimulating Hormone (TSH) All women in this age group Every 5 years   Tuberculosis Women at increased risk for infection - talk with your healthcare provider Ask your healthcare provider   Vision All women in this age group Every 1 to 2 years; if you have a chronic health condition, ask your healthcare provider if you need exams more often   Vaccine Who needs it How often   Chickenpox (varicella) All women in this age group who have no record of this infection or vaccine 2 doses; second dose should be given at least 4 weeks after the first dose   Hepatitis A Women at increased risk for infection - talk with your healthcare provider 2 doses given 6 months apart   Hepatitis B Women at increased risk for infection - talk with your healthcare provider 3 doses over 6 months; second dose should be given 1 month after the first dose; the third dose should be given at least 2 months after the second dose and at least 4 months after the first dose   Haemophilus influenza Type B (HIB) Women at increased risk for infection - talk with your healthcare provider 1 to 3 doses   Influenza (flu) All women in this age group Once a year   Pneumococcal conjugate vaccine (PCV13) and pneumococcal polysaccharide vaccine (PPSV23) All women in this age group 1 dose of each vaccine   Tetanus/diphtheria/pertussis (Td/Tdap) booster All women in this age group Td every 10 years, or a one-time dose of Tdap instead of a Td booster after age 18, then Td every 10 years   Zoster All women in this age group 1 dose   Counseling  Who needs it How often   Diet and exercise Women who are overweight or obese When diagnosed, and then at routine exams   Fall prevention (exercise and vitamin D supplements) All women in this age group At routine exams   Sexually transmitted infection prevention Women at increased risk for infection - talk with your healthcare provider At routine exams   Use of daily aspirin Women ages 55 and up in this age group who are at risk for cardiovascular health problems such as stroke When your risk is known   Use of tobacco and the health effects it can cause All women in this age group Every exam   1[employer]  Date Last Reviewed: 8/9/2015  © 1701-0760 [employer]. [address] [place] [address], [place] [address]. All rights reserved. This information is not intended as a substitute for professional medical care. Always follow your healthcare professional's instructions.

## 2022-09-11 NOTE — ASSESSMENT & PLAN NOTE
Pt had subjective fevers at home unmeasured, abdominal pain, polyuria In setting of low WBC and recent chemotherapy. During admission patient was febrile intermittently and had U/A showing WBCs and bacteria. Initially started treatment with cefepime. Flagyl added due to ongoing fevers. Fever curve was initially downtrending with negative cultures. However she had fever as high as 103. Will restart septic work up as fever is currently of unknown origin and add vancomycin. Consulted ID and per recommendations have deescalated abx to unasyn given pt has has negative testing for pseudomonas and MRSA. Can also consider chemo causing fever.     - Unasyn   - Cdiff studies negative  - Repeat blood cultures, NGTD  - ID contsulted, appreciate recs

## 2022-09-11 NOTE — SUBJECTIVE & OBJECTIVE
"Interval History: Ms. Kent feels "about the same" today. She states her epigastric pain resolved, but has lower abdominal pain. She reports loose bowel movement, but needs to strain and has dyschezia, associated with some nausea and emesis.     Review of Systems   Constitutional:  Positive for appetite change. Negative for chills and fever.   HENT:  Negative for rhinorrhea and sore throat.    Respiratory:  Negative for cough and shortness of breath.    Cardiovascular:  Negative for chest pain and leg swelling.   Gastrointestinal:  Positive for abdominal pain, diarrhea, nausea and vomiting.   Genitourinary:  Negative for dysuria and hematuria.   Musculoskeletal:  Negative for arthralgias, back pain and myalgias.   Skin:  Negative for rash.   Allergic/Immunologic: Positive for immunocompromised state.   Neurological:  Negative for headaches.   Hematological:  Does not bruise/bleed easily.   Psychiatric/Behavioral:  Negative for behavioral problems.    All other systems reviewed and are negative.  Objective:     Vital Signs (Most Recent):  Temp: 98.5 °F (36.9 °C) (09/11/22 1127)  Pulse: 99 (09/11/22 1127)  Resp: 16 (09/11/22 1127)  BP: 123/62 (09/11/22 1127)  SpO2: 95 % (09/11/22 1127) Vital Signs (24h Range):  Temp:  [98.5 °F (36.9 °C)-102.2 °F (39 °C)] 98.5 °F (36.9 °C)  Pulse:  [] 99  Resp:  [16-18] 16  SpO2:  [94 %-95 %] 95 %  BP: (120-145)/(62-73) 123/62     Weight: 105.5 kg (232 lb 9.4 oz)  Body mass index is 37.54 kg/m².    Estimated Creatinine Clearance: 93.7 mL/min (based on SCr of 1 mg/dL).    Physical Exam  Vitals and nursing note reviewed.   Constitutional:       General: She is not in acute distress.     Appearance: She is well-developed. She is obese. She is not toxic-appearing.   HENT:      Head: Normocephalic and atraumatic.      Right Ear: External ear normal.      Left Ear: External ear normal.      Nose: Nose normal.      Mouth/Throat:      Mouth: Mucous membranes are moist.   Eyes:      " General: No scleral icterus.     Extraocular Movements: Extraocular movements intact.      Conjunctiva/sclera: Conjunctivae normal.   Cardiovascular:      Rate and Rhythm: Normal rate and regular rhythm.      Heart sounds: No murmur heard.  Pulmonary:      Effort: Pulmonary effort is normal. No respiratory distress.      Breath sounds: Normal breath sounds.   Abdominal:      General: There is no distension.      Palpations: Abdomen is soft.      Tenderness: There is no abdominal tenderness.      Comments: Hyperactive bowel sounds   Musculoskeletal:         General: Normal range of motion.      Cervical back: Neck supple.      Right lower leg: No edema.      Left lower leg: No edema.   Skin:     General: Skin is warm and dry.      Capillary Refill: Capillary refill takes less than 2 seconds.      Findings: No erythema or rash.   Neurological:      Mental Status: She is alert and oriented to person, place, and time. Mental status is at baseline.   Psychiatric:         Mood and Affect: Mood normal.         Behavior: Behavior normal.       Significant Labs:   Microbiology Results (last 7 days)       Procedure Component Value Units Date/Time    Blood culture [[id number]] Collected: 09/08/22 1147    Order Status: Completed Specimen: Blood Updated: 09/11/22 1412     Blood Culture, Routine No Growth to date      No Growth to date      No Growth to date      No Growth to date    Narrative:      Rt wrist    Blood culture [[id number]] Collected: 09/08/22 1147    Order Status: Completed Specimen: Blood Updated: 09/11/22 1412     Blood Culture, Routine No Growth to date      No Growth to date      No Growth to date      No Growth to date    Narrative:      Lft AC    Blood culture [[id number]] Collected: 09/05/22 2347    Order Status: Completed Specimen: Blood Updated: 09/11/22 0612     Blood Culture, Routine No growth after 5 days.    Blood culture [[id number]] Collected: 09/05/22 2347    Order Status: Completed Specimen: Blood  Updated: 09/11/22 0612     Blood Culture, Routine No growth after 5 days.    Blood culture [[id number]] Collected: 09/10/22 1746    Order Status: Completed Specimen: Blood Updated: 09/11/22 0115     Blood Culture, Routine No Growth to date    Narrative:      Please collect from 2 different sites    Blood culture [[id number]] Collected: 09/10/22 1747    Order Status: Completed Specimen: Blood Updated: 09/11/22 0115     Blood Culture, Routine No Growth to date    Narrative:      Please collect from 2 different sites    Blood culture [[id number]] Collected: 09/08/22 1800    Order Status: Completed Specimen: Blood Updated: 09/10/22 2022     Blood Culture, Routine No Growth to date      No Growth to date      No Growth to date    Narrative:      Please collect from 2 different sites    Blood culture [[id number]] Collected: 09/08/22 1756    Order Status: Completed Specimen: Blood Updated: 09/10/22 2022     Blood Culture, Routine No Growth to date      No Growth to date      No Growth to date    Narrative:      Please collect from 2 different sites    Blood culture [[id number]] Collected: 09/04/22 2146    Order Status: Completed Specimen: Blood Updated: 09/09/22 2312     Blood Culture, Routine No growth after 5 days.    Blood culture [[id number]] Collected: 09/04/22 2148    Order Status: Completed Specimen: Blood Updated: 09/09/22 2312     Blood Culture, Routine No growth after 5 days.    Blood culture [[id number]] Collected: 09/04/22 0155    Order Status: Completed Specimen: Blood Updated: 09/09/22 0612     Blood Culture, Routine No growth after 5 days.    Blood culture [[id number]] Collected: 09/04/22 0156    Order Status: Completed Specimen: Blood Updated: 09/09/22 0612     Blood Culture, Routine No growth after 5 days.    Clostridium difficile EIA [[id number]] Collected: 09/08/22 1151    Order Status: Completed Specimen: Stool Updated: 09/08/22 2244     C. diff Antigen Negative     C difficile Toxins A+B, EIA Negative      Comment: Testing not recommended for children <24 months old.       Urine culture [[id number]] Collected: 09/03/22 2145    Order Status: Completed Specimen: Urine Updated: 09/05/22 1210     Urine Culture, Routine Multiple organisms isolated. None in predominance.  Repeat if      clinically necessary.    Narrative:      Specimen Source->Urine          All pertinent labs within the past 24 hours have been reviewed.  Recent Lab Results         09/11/22  0515   09/10/22  1747   09/10/22  1746   09/10/22  1503        Albumin 2.5             Alkaline Phosphatase 39                          Anion Gap 10             Aniso Slight                          Baso # 0.01             Basophil % 0.2             BILIRUBIN TOTAL 0.6  Comment: For infants and newborns, interpretation of results should be based  on gestational age, weight and in agreement with clinical  observations.    Premature Infant recommended reference ranges:  Up to 24 hours.............<8.0 mg/dL  Up to 48 hours............<12.0 mg/dL  3-5 days..................<15.0 mg/dL  6-29 days.................<15.0 mg/dL               Blood Culture, Routine   No Growth to date  [P]   No Growth to date  [P]         BUN 4             Calcium 7.9             Chloride 110             CO2 22             Creatinine 1.0             Differential Method Automated             Dohle Bodies Present             eGFR >60.0             Eos # 0.0             Eosinophil % 0.2             Glucose 105             Gran # (ANC) 3.0             Gran % 72.3             Hematocrit 22.7             Hemoglobin 7.7             Hep B Core Total Ab       Non-reactive       Hepatitis B Surface Ag       Non-reactive       Hypo Occasional             Immature Grans (Abs) 0.06  Comment: Mild elevation in immature granulocytes is non specific and   can be seen in a variety of conditions including stress response,   acute inflammation, trauma and pregnancy. Correlation with other   laboratory  and clinical findings is essential.               Immature Granulocytes 1.5             Lipase 54             Lymph # 0.4             Lymph % 10.7             Magnesium 1.6             MCH 31.3             MCHC 33.9             MCV 92             Mono # 0.6             Mono % 15.1             MPV 9.8             nRBC 0             Ovalocytes Occasional             Platelets 118             Poikilocytosis Slight             Poly Occasional             Potassium 3.3             PROTEIN TOTAL 5.0             RBC 2.46             RDW 14.7             Sodium 142             Toxic Granulation Present             WBC 4.11                      [P] - Preliminary Result               Significant Imaging: I have reviewed all pertinent imaging results/findings within the past 24 hours.

## 2022-09-11 NOTE — PROGRESS NOTES
[facility] ([facility])  Hematology/Oncology  Progress Note    Patient Name: Laura Kent  Admission Date: 9/3/2022  Hospital Length of Stay: 8 days  Code Status: Full Code     Subjective:     HPI:  36 years old with HTN, breast cancer  C4D20 Taxol on 8/29/2022 f/u with Dr. Cruz presents with worsening abdominal pain, nasuea and vomiting. Patient was in pain during the interview and asking for better pain control. She states one week history of gradually worsening abdominal pain worst in epigastric area without radiation. She had minimal po intake since then. She associate emesis x2 NBNB. She had one small BM this afternoon prior to her presentation.     Patient denies chest pain, shortness of breath,  dysuria, polyuria, nausea, vomiting, fever, chills,  diarrhea, constipation, headache, falls, focal weakness or vision changes.     Ed course:   On presentation she was HDS and afebrile. On exam she was mod-sever  abdominal pain to palpitation, no peritonitic signs. Labs were significant for elevated lipase >200. CT with findings concerning for pancreatitis wo fluid/ abscess collection. Medical onc consulted for admit.           Interval History: Patient having green BM's and emesis, pain better but still on IVF. US abdomen showed biliary sludge, consulted GI for comment on biliary sludge per ID recs.     Oncology Treatment Plan:   OP PEMBROLIZUMAB CARBOPLATIN (AUC 5) WITH WEEKLY PACLITAXEL FOLLOWED BY PEMBROLIZUMAB DOXORUBICIN CYCLOPHOSPHAMIDE FOLLOWED BY PEMBROLIZUMAB 200 MG Q3W    Medications:  Continuous Infusions:   sodium chloride 0.9% 150 mL/hr at 09/11/22 1242     Scheduled Meds:   ampicillin-sulbactim (UNASYN) IVPB  3 g Intravenous Q6H    dicyclomine  10 mg Oral TID    enoxaparin  40 mg Subcutaneous Daily    magnesium sulfate IVPB  2 g Intravenous Once    prochlorperazine  5 mg Intravenous Q6H    simethicone  1 tablet Oral TID PC     PRN Meds:dextrose 10%, dextrose 10%, glucagon  (human recombinant), glucose, glucose, ibuprofen, loperamide, naloxone, ondansetron, oxyCODONE, polyethylene glycol, sodium chloride 0.9%     Review of Systems   Constitutional:  Positive for activity change, appetite change and fatigue. Negative for chills, diaphoresis and fever.   HENT:  Negative for congestion.    Eyes:  Negative for visual disturbance.   Respiratory:  Negative for shortness of breath and wheezing.    Cardiovascular:  Negative for chest pain, palpitations and leg swelling.   Gastrointestinal:  Positive for abdominal pain (improving), diarrhea and nausea (improving). Negative for abdominal distention and vomiting.   Endocrine: Negative for polyuria.   Genitourinary:  Negative for difficulty urinating and dysuria.   Musculoskeletal:  Negative for back pain and joint swelling.   Skin:  Negative for color change.   Neurological:  Positive for weakness. Negative for dizziness and headaches.   Hematological:  Negative for adenopathy.   Psychiatric/Behavioral:  Negative for agitation.    Objective:     Vital Signs (Most Recent):  Temp: 98.5 °F (36.9 °C) (09/11/22 1127)  Pulse: 99 (09/11/22 1127)  Resp: 16 (09/11/22 1127)  BP: 123/62 (09/11/22 1127)  SpO2: 95 % (09/11/22 1127) Vital Signs (24h Range):  Temp:  [98.5 °F (36.9 °C)-102.2 °F (39 °C)] 98.5 °F (36.9 °C)  Pulse:  [] 99  Resp:  [16-18] 16  SpO2:  [94 %-95 %] 95 %  BP: (120-145)/(62-73) 123/62     Weight: 105.5 kg (232 lb 9.4 oz)  Body mass index is 37.54 kg/m².  Body surface area is 2.22 meters squared.      Intake/Output Summary (Last 24 hours) at 9/11/2022 1351  Last data filed at 9/11/2022 0600  Gross per 24 hour   Intake 2716.8 ml   Output --   Net 2716.8 ml       Physical Exam    Significant Labs:   BMP:   Recent Labs   Lab 09/10/22  0422 09/11/22  0515   * 105   * 142   K 3.3* 3.3*    110   CO2 18* 22*   BUN 4* 4*   CREATININE 0.6 1.0   CALCIUM 8.2* 7.9*   MG 1.6 1.6   , CBC:   Recent Labs   Lab 09/10/22  9061  09/11/22  0515   WBC 3.85* 4.11   HGB 7.9* 7.7*   HCT 22.2* 22.7*   * 118*   , and LFTs:   Recent Labs   Lab 09/10/22  0422 09/11/22  0515   * 234*   * 263*   ALKPHOS 40* 39*   BILITOT 0.6 0.6   PROT 5.2* 5.0*   ALBUMIN 2.7* 2.5*       Diagnostic Results:  I have reviewed and interpreted all pertinent imaging results/findings within the past 24 hours.    US Abdomen    Impression:     1. Biliary sludge.  No sonographic evidence for cholecystitis.  2. Mild hepatosplenomegaly was better demonstrated on previous CT.     Electronically signed by resident: Asael Weinstein  Date:                                            09/11/2022  Time:                                           09:38     Electronically signed by: Edna Cohen MD  Date:                                            09/11/2022  Time:                                           10:26    Assessment/Plan:     * Acute pancreatitis  36 years old with HTN, breast cancer  C4D20 Taxol on 8/29/2022 f/u with Dr. Cruz presents with worsening abdominal pain, nasuea and vomiting.  On exam she was mod-sever  abdominal pain to palpitation, no peritonitic signs. Labs were significant for elevated lipase >200. CT with findings concerning for pancreatitis wo fluid/ abscess collection. . CT scan with Mild peripancreatic fat stranding. TG levels were normal this admission, low concern for triglyceride induced pancreatitis. CT scan showed no gallstones or GB wall thickening, low concern for gallstone induced pancreatitis. Patient is clinically improving with n/v, po intake, and pain.     Most likely pancreatitis 2/2 chemo, last chemotherapy session 8/29; Taxol    - Pain: morphine IV changed to oxy PO.    - Anti-emetics: scheduled compazine and prn zofran. Patient still having n/v     -  cc  - Diet fully advanced  - simethicone     Elevated LFTs  Patient having increasing LFTs this admission. CTAP this admission did not show liver mets or  biliary obstruction. US abdomen ordered to better visualize biliary tree. AST: ALT ratio < 2 so low concern for EtOH hepatitis. Patient switched from acetaminophen to advil. HBV surface and core ab negative    - US abdomen showed biliary sludge, consulted GI per ID recs   - RPR pending  - TORIN and anti-smooth muscle pending       Loose stools  Patient has been having more loose stools this admission since starting antibiotics. Now stools are causing intense abdominal cramps and stools are now green     - c diff negative   - bowel regimen held       - See chemotherapy induced neutropenia    Abdominal pain  Please read pancreatitis       Chemotherapy induced neutropenia  Pt had subjective fevers at home unmeasured, abdominal pain, polyuria In setting of low WBC and recent chemotherapy. During admission patient was febrile intermittently and had U/A showing WBCs and bacteria. Initially started treatment with cefepime. Flagyl added due to ongoing fevers. Fever curve was initially downtrending with negative cultures. However she had fever as high as 103. Will restart septic work up as fever is currently of unknown origin and add vancomycin. Consulted ID and per recommendations have deescalated abx to unasyn given pt has has negative testing for pseudomonas and MRSA. Can also consider chemo causing fever.     - Unasyn   - Cdiff studies negative  - Repeat blood cultures, NGTD  - ID contsulted, appreciate recs     Malignant neoplasm of upper-outer quadrant of left breast in female, estrogen receptor negative  Dr. Cruz patient,  On:  Currently on cycle 4 D20 of pembrolizumab carboplatin (AUC 5) with weekly paclitaxel followed by pembrolizumab doxorubicin cyclophosphamide followed by pembrolizumab 200 mg q3w    Most recent Taxol on 8/29/2022     -- admit to medical oncology              Payal Urena DO  Hematology/Oncology  [facility] ([facility])

## 2022-09-11 NOTE — SUBJECTIVE & OBJECTIVE
Past Medical History:   Diagnosis Date    Anxiety     Breast cancer     Encounter for blood transfusion     Hypertension     Meningitis        Past Surgical History:   Procedure Laterality Date    BREAST BIOPSY Left      SECTION      INSERTION OF TUNNELED CENTRAL VENOUS CATHETER (CVC) WITH SUBCUTANEOUS PORT N/A 2022    Procedure: [id number];  Surgeon: Deo Sin Jr., MD;  Location: [facility] OR [address] [place] [address];  Service: General;  Laterality: N/A;    INSERTION OF TUNNELED CENTRAL VENOUS CATHETER (CVC) WITH SUBCUTANEOUS PORT N/A 2022    Procedure: INSERTION, SINGLE LUMEN CATHETER WITH PORT, WITH FLUOROSCOPIC GUIDANCE;  Surgeon: Ryder Dinero MD;  Location: [facility] CATH LAB;  Service: Vascular;  Laterality: N/A;       Review of patient's allergies indicates:   Allergen Reactions    Strawberries [strawberry] Hives       Medications:  Medications Prior to Admission   Medication Sig    ciprofloxacin HCl (CILOXAN) 0.3 % Oint Place into both eyes 3 (three) times daily.    dexAMETHasone (DECADRON) 4 MG Tab Take 1 tablet (4 mg total) by mouth 2 (two) times daily. For 3 days after each chemo (when starts adriamycin).    doxycycline (VIBRAMYCIN) 100 MG Cap Take 1 capsule (100 mg total) by mouth 2 (two) times daily.    ibuprofen (ADVIL,MOTRIN) 200 MG tablet Take 200 mg by mouth every 6 (six) hours as needed for Pain.    lactulose (CHRONULAC) 20 gram/30 mL Soln Take 30 mLs (20 g total) by mouth 2 (two) times daily as needed (constipation).    LIDOcaine-prilocaine (EMLA) cream Apply topically as needed (for port).    LIDOcaine-prilocaine (EMLA) cream Apply topically as needed (discomfort).    mupirocin (BACTROBAN) 2 % ointment Apply topically once daily.    OLANZapine (ZYPREXA) 5 MG tablet Take 1 tablet (5 mg total) by mouth every evening. While on chemotherapy    omeprazole (PRILOSEC) 10 MG capsule Take 10 mg by mouth once daily.    ondansetron (ZOFRAN) 8 MG tablet Take 1 tablet (8 mg total) by mouth every  12 (twelve) hours as needed for Nausea.    oxyCODONE (ROXICODONE) 5 MG immediate release tablet Take 1 tablet (5 mg total) by mouth every 6 (six) hours as needed for Pain.    polyethylene glycol 3350 (MIRALAX ORAL) Take by mouth.    promethazine (PHENERGAN) 25 MG tablet Take 1 tablet (25 mg total) by mouth every 6 (six) hours as needed for Nausea.    psyllium husk (METAMUCIL ORAL) Take by mouth.    venlafaxine (EFFEXOR XR) 37.5 MG 24 hr capsule Take 1 capsule (37.5 mg total) by mouth once daily.     Antibiotics (From admission, onward)      Start     Stop Route Frequency Ordered    09/10/22 1515  ampicillin-sulbactam 3 g in sodium chloride 0.9 % 100 mL IVPB (ready to mix system)         -- IV Every 6 hours (non-standard times) 09/10/22 1405          Antifungals (From admission, onward)      None          Antivirals (From admission, onward)      None             Immunization History   Administered Date(s) Administered    COVID-19, vector-nr, [id number], PF ([employer]) 05/15/2021       Family History       Problem Relation (Age of Onset)    Bladder Cancer Paternal Grandmother    Diabetes Father    Hypertension Father    Pancreatic cancer Maternal Uncle          Social History     Socioeconomic History    Marital status: Single   Tobacco Use    Smoking status: Never    Smokeless tobacco: Never   Substance and Sexual Activity    Alcohol use: Yes     Comment: social    Drug use: Never    Sexual activity: Not Currently     Social Determinants of Health     Financial Resource Strain: High Risk    Difficulty of Paying Living Expenses: Very hard   Food Insecurity: No Food Insecurity    Worried About Running Out of Food in the Last Year: Never true    Ran Out of Food in the Last Year: Never true   Transportation Needs: No Transportation Needs    Lack of Transportation (Medical): No    Lack of Transportation (Non-Medical): No   Physical Activity: Inactive    Days of Exercise per Week: 0 days    Minutes of Exercise per Session: 0  min   Stress: Stress Concern Present    Feeling of Stress : Rather much   Social Connections: Unknown    Frequency of Communication with Friends and Family: More than three times a week    Frequency of Social Gatherings with Friends and Family: Never    Active Member of Clubs or Organizations: Yes    Attends Club or Organization Meetings: More than 4 times per year    Marital Status: Never    Housing Stability: High Risk    Unable to Pay for Housing in the Last Year: Yes    Number of Places Lived in the Last Year: 1    Unstable Housing in the Last Year: No     Review of Systems   Constitutional:  Positive for appetite change and fever. Negative for chills and diaphoresis.   HENT:  Negative for rhinorrhea and sore throat.    Respiratory:  Negative for cough and shortness of breath.    Cardiovascular:  Negative for chest pain and leg swelling.   Gastrointestinal:  Positive for abdominal pain and diarrhea. Negative for nausea and vomiting.   Genitourinary:  Negative for dysuria and hematuria.   Musculoskeletal:  Negative for arthralgias and myalgias.   Skin:  Negative for rash.   Neurological:  Negative for headaches.   Objective:     Vital Signs (Most Recent):  Temp: 100.1 °F (37.8 °C) (09/10/22 1918)  Pulse: 98 (09/10/22 1918)  Resp: 18 (09/10/22 1918)  BP: 120/66 (09/10/22 1918)  SpO2: 95 % (09/10/22 1918) Vital Signs (24h Range):  Temp:  [99 °F (37.2 °C)-102.2 °F (39 °C)] 100.1 °F (37.8 °C)  Pulse:  [] 98  Resp:  [16-20] 18  SpO2:  [94 %-97 %] 95 %  BP: (117-145)/(56-73) 120/66     Weight: 105.5 kg (232 lb 9.4 oz)  Body mass index is 37.54 kg/m².    Estimated Creatinine Clearance: 156.1 mL/min (based on SCr of 0.6 mg/dL).    Physical Exam  Vitals reviewed.   Constitutional:       General: She is not in acute distress.     Appearance: She is well-developed. She is obese. She is ill-appearing. She is not toxic-appearing or diaphoretic.   HENT:      Head: Normocephalic and atraumatic.   Eyes:       Conjunctiva/sclera: Conjunctivae normal.   Pulmonary:      Effort: Pulmonary effort is normal. No respiratory distress.   Abdominal:      General: There is no distension.      Palpations: Abdomen is soft.      Tenderness: There is no abdominal tenderness.   Musculoskeletal:         General: Normal range of motion.   Skin:     General: Skin is warm and dry.      Findings: No erythema or rash.   Neurological:      Mental Status: She is alert and oriented to person, place, and time.   Psychiatric:         Behavior: Behavior normal.       Significant Labs: All pertinent labs within the past 24 hours have been reviewed.    Significant Imaging: I have reviewed all pertinent imaging results/findings within the past 24 hours.

## 2022-09-11 NOTE — ASSESSMENT & PLAN NOTE
36 years old with HTN, breast cancer  C4D20 Taxol on 8/29/2022 f/u with Dr. Cruz presents with worsening abdominal pain, nasuea and vomiting.  On exam she was mod-sever  abdominal pain to palpitation, no peritonitic signs. Labs were significant for elevated lipase >200. CT with findings concerning for pancreatitis wo fluid/ abscess collection. . CT scan with Mild peripancreatic fat stranding. TG levels were normal this admission, low concern for triglyceride induced pancreatitis. CT scan showed no gallstones or GB wall thickening, low concern for gallstone induced pancreatitis. Patient is clinically improving with n/v, po intake, and pain.     Most likely pancreatitis 2/2 chemo, last chemotherapy session 8/29; Taxol    - Pain: morphine IV changed to oxy PO.    - Anti-emetics: scheduled compazine and prn zofran. Patient still having n/v     -  cc  - Diet fully advanced  - simethicone

## 2022-09-11 NOTE — NURSING
Shift summary    Patient complained of vomiting.  She stated she had 4 episodes of green vomitus.  She also informed staff of 4 watery green bowel movements.as well.  She complained of pain as well.  Medication was effective. Patients tmax 100.1.  Temp began to decrease.  It dropped to 99.  During 0400 vitals it was 100.  Will continue to monitor.

## 2022-09-11 NOTE — PROGRESS NOTES
[facility]   Infectious Diseases  Progress Note    Patient Name: Laura Kent  MRN: [id number]  Admission Date: 9/3/2022  Length of Stay: 8 days  Attending Physician: Aj Rebollar MD  Primary Care Provider: Primary Doctor No    Isolation Status: No active isolations  Assessment/Plan:      * Acute pancreatitis  36-year-old female with history of breast cancer, last taxol 8/29/2022, presented with nausea, vomiting, abdominal pain 9/3/2022, diagnosed with acute pancreatitis. Patient with ongoing fevers with new transaminitis - negative blood/urine cultures, HIV, HepC, CT CAP notable for peripancreatic fat stranding, but no areas of necrosis.     Recommendations:  - Continue ampicillin-sulbactam while in house. If discharge, can switch to Amoxicillin-clavulanate 875 mg PO BID. End date 9/21/22.  - Given biliary sludge on US, recent pancreatitis, and new liver injury, consider GI/hepatology consult. Would review current medications with clinical pharmacy.    - Follow up RPR, CMV PCR, HSV PCR (viral etiology for hepatitis). Hep B and Hep C negative.         Anticipated Disposition: pending clinical improvement.    Thank you for your consult. I will sign off. Please contact us if you have any additional questions.    Dimitri Campbell MD  Infectious Diseases  [facility]     Subjective:     Principal Problem:Acute pancreatitis    HPI: Ms. Laura Kent is a 36 y.o.  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 Patient with persistent fevers - she was initially on cefepime, then vanc / metronidazole added, then transitioned to pip-tazo / vancomycin. All cultures have  "been NGTD. CT CAP notable for increasing peripancreatic fat stranding with multiple prominent lymph nodes. Labs notable for transaminitis. Patient reports feeling fevers. She has a right chest port, but no surrounding pain, redness, swelling. Reports abdominal pain was initially epigastric, now bilateral lower abdominal. She is having multiple episodes of diarrhea. Otherwise, no headaches, neck stiffness, SOB, cough, hematuria, dysuria, rashes.    Interval History: Ms. Kent feels "about the same" today. She states her epigastric pain resolved, but has lower abdominal pain. She reports loose bowel movement, but needs to strain and has dyschezia, associated with some nausea and emesis.     Review of Systems   Constitutional:  Positive for appetite change. Negative for chills and fever.   HENT:  Negative for rhinorrhea and sore throat.    Respiratory:  Negative for cough and shortness of breath.    Cardiovascular:  Negative for chest pain and leg swelling.   Gastrointestinal:  Positive for abdominal pain, diarrhea, nausea and vomiting.   Genitourinary:  Negative for dysuria and hematuria.   Musculoskeletal:  Negative for arthralgias, back pain and myalgias.   Skin:  Negative for rash.   Allergic/Immunologic: Positive for immunocompromised state.   Neurological:  Negative for headaches.   Hematological:  Does not bruise/bleed easily.   Psychiatric/Behavioral:  Negative for behavioral problems.    All other systems reviewed and are negative.  Objective:     Vital Signs (Most Recent):  Temp: 98.5 °F (36.9 °C) (09/11/22 1127)  Pulse: 99 (09/11/22 1127)  Resp: 16 (09/11/22 1127)  BP: 123/62 (09/11/22 1127)  SpO2: 95 % (09/11/22 1127) Vital Signs (24h Range):  Temp:  [98.5 °F (36.9 °C)-102.2 °F (39 °C)] 98.5 °F (36.9 °C)  Pulse:  [] 99  Resp:  [16-18] 16  SpO2:  [94 %-95 %] 95 %  BP: (120-145)/(62-73) 123/62     Weight: 105.5 kg (232 lb 9.4 oz)  Body mass index is 37.54 kg/m².    Estimated Creatinine Clearance: 93.7 " mL/min (based on SCr of 1 mg/dL).    Physical Exam  Vitals and nursing note reviewed.   Constitutional:       General: She is not in acute distress.     Appearance: She is well-developed. She is obese. She is not toxic-appearing.   HENT:      Head: Normocephalic and atraumatic.      Right Ear: External ear normal.      Left Ear: External ear normal.      Nose: Nose normal.      Mouth/Throat:      Mouth: Mucous membranes are moist.   Eyes:      General: No scleral icterus.     Extraocular Movements: Extraocular movements intact.      Conjunctiva/sclera: Conjunctivae normal.   Cardiovascular:      Rate and Rhythm: Normal rate and regular rhythm.      Heart sounds: No murmur heard.  Pulmonary:      Effort: Pulmonary effort is normal. No respiratory distress.      Breath sounds: Normal breath sounds.   Abdominal:      General: There is no distension.      Palpations: Abdomen is soft.      Tenderness: There is no abdominal tenderness.      Comments: Hyperactive bowel sounds   Musculoskeletal:         General: Normal range of motion.      Cervical back: Neck supple.      Right lower leg: No edema.      Left lower leg: No edema.   Skin:     General: Skin is warm and dry.      Capillary Refill: Capillary refill takes less than 2 seconds.      Findings: No erythema or rash.   Neurological:      Mental Status: She is alert and oriented to person, place, and time. Mental status is at baseline.   Psychiatric:         Mood and Affect: Mood normal.         Behavior: Behavior normal.       Significant Labs:   Microbiology Results (last 7 days)       Procedure Component Value Units Date/Time    Blood culture [[id number]] Collected: 09/08/22 1147    Order Status: Completed Specimen: Blood Updated: 09/11/22 1412     Blood Culture, Routine No Growth to date      No Growth to date      No Growth to date      No Growth to date    Narrative:      Rt wrist    Blood culture [[id number]] Collected: 09/08/22 1147    Order Status: Completed  Specimen: Blood Updated: 09/11/22 1412     Blood Culture, Routine No Growth to date      No Growth to date      No Growth to date      No Growth to date    Narrative:      Lft AC    Blood culture [[id number]] Collected: 09/05/22 2347    Order Status: Completed Specimen: Blood Updated: 09/11/22 0612     Blood Culture, Routine No growth after 5 days.    Blood culture [[id number]] Collected: 09/05/22 2347    Order Status: Completed Specimen: Blood Updated: 09/11/22 0612     Blood Culture, Routine No growth after 5 days.    Blood culture [[id number]] Collected: 09/10/22 1746    Order Status: Completed Specimen: Blood Updated: 09/11/22 0115     Blood Culture, Routine No Growth to date    Narrative:      Please collect from 2 different sites    Blood culture [[id number]] Collected: 09/10/22 1747    Order Status: Completed Specimen: Blood Updated: 09/11/22 0115     Blood Culture, Routine No Growth to date    Narrative:      Please collect from 2 different sites    Blood culture [[id number]] Collected: 09/08/22 1800    Order Status: Completed Specimen: Blood Updated: 09/10/22 2022     Blood Culture, Routine No Growth to date      No Growth to date      No Growth to date    Narrative:      Please collect from 2 different sites    Blood culture [[id number]] Collected: 09/08/22 1756    Order Status: Completed Specimen: Blood Updated: 09/10/22 2022     Blood Culture, Routine No Growth to date      No Growth to date      No Growth to date    Narrative:      Please collect from 2 different sites    Blood culture [[id number]] Collected: 09/04/22 2146    Order Status: Completed Specimen: Blood Updated: 09/09/22 2312     Blood Culture, Routine No growth after 5 days.    Blood culture [[id number]] Collected: 09/04/22 2148    Order Status: Completed Specimen: Blood Updated: 09/09/22 2312     Blood Culture, Routine No growth after 5 days.    Blood culture [[id number]] Collected: 09/04/22 0155    Order Status: Completed Specimen: Blood Updated:  09/09/22 0612     Blood Culture, Routine No growth after 5 days.    Blood culture [[id number]] Collected: 09/04/22 0156    Order Status: Completed Specimen: Blood Updated: 09/09/22 0612     Blood Culture, Routine No growth after 5 days.    Clostridium difficile EIA [[id number]] Collected: 09/08/22 1151    Order Status: Completed Specimen: Stool Updated: 09/08/22 2244     C. diff Antigen Negative     C difficile Toxins A+B, EIA Negative     Comment: Testing not recommended for children <24 months old.       Urine culture [[id number]] Collected: 09/03/22 2145    Order Status: Completed Specimen: Urine Updated: 09/05/22 1210     Urine Culture, Routine Multiple organisms isolated. None in predominance.  Repeat if      clinically necessary.    Narrative:      Specimen Source->Urine          All pertinent labs within the past 24 hours have been reviewed.  Recent Lab Results         09/11/22  0515   09/10/22  1747   09/10/22  1746   09/10/22  1503        Albumin 2.5             Alkaline Phosphatase 39                          Anion Gap 10             Aniso Slight                          Baso # 0.01             Basophil % 0.2             BILIRUBIN TOTAL 0.6  Comment: For infants and newborns, interpretation of results should be based  on gestational age, weight and in agreement with clinical  observations.    Premature Infant recommended reference ranges:  Up to 24 hours.............<8.0 mg/dL  Up to 48 hours............<12.0 mg/dL  3-5 days..................<15.0 mg/dL  6-29 days.................<15.0 mg/dL               Blood Culture, Routine   No Growth to date  [P]   No Growth to date  [P]         BUN 4             Calcium 7.9             Chloride 110             CO2 22             Creatinine 1.0             Differential Method Automated             Dohle Bodies Present             eGFR >60.0             Eos # 0.0             Eosinophil % 0.2             Glucose 105             Gran # (ANC) 3.0              Gran % 72.3             Hematocrit 22.7             Hemoglobin 7.7             Hep B Core Total Ab       Non-reactive       Hepatitis B Surface Ag       Non-reactive       Hypo Occasional             Immature Grans (Abs) 0.06  Comment: Mild elevation in immature granulocytes is non specific and   can be seen in a variety of conditions including stress response,   acute inflammation, trauma and pregnancy. Correlation with other   laboratory and clinical findings is essential.               Immature Granulocytes 1.5             Lipase 54             Lymph # 0.4             Lymph % 10.7             Magnesium 1.6             MCH 31.3             MCHC 33.9             MCV 92             Mono # 0.6             Mono % 15.1             MPV 9.8             nRBC 0             Ovalocytes Occasional             Platelets 118             Poikilocytosis Slight             Poly Occasional             Potassium 3.3             PROTEIN TOTAL 5.0             RBC 2.46             RDW 14.7             Sodium 142             Toxic Granulation Present             WBC 4.11                      [P] - Preliminary Result               Significant Imaging: I have reviewed all pertinent imaging results/findings within the past 24 hours.

## 2022-09-11 NOTE — SUBJECTIVE & OBJECTIVE
Interval History: Patient having green BM's and emesis, pain better but still on IVF. US abdomen showed biliary sludge, consulted GI for comment on biliary sludge per ID recs.     Oncology Treatment Plan:   OP PEMBROLIZUMAB CARBOPLATIN (AUC 5) WITH WEEKLY PACLITAXEL FOLLOWED BY PEMBROLIZUMAB DOXORUBICIN CYCLOPHOSPHAMIDE FOLLOWED BY PEMBROLIZUMAB 200 MG Q3W    Medications:  Continuous Infusions:   sodium chloride 0.9% 150 mL/hr at 09/11/22 1242     Scheduled Meds:   ampicillin-sulbactim (UNASYN) IVPB  3 g Intravenous Q6H    dicyclomine  10 mg Oral TID    enoxaparin  40 mg Subcutaneous Daily    magnesium sulfate IVPB  2 g Intravenous Once    prochlorperazine  5 mg Intravenous Q6H    simethicone  1 tablet Oral TID PC     PRN Meds:dextrose 10%, dextrose 10%, glucagon (human recombinant), glucose, glucose, ibuprofen, loperamide, naloxone, ondansetron, oxyCODONE, polyethylene glycol, sodium chloride 0.9%     Review of Systems   Constitutional:  Positive for activity change, appetite change and fatigue. Negative for chills, diaphoresis and fever.   HENT:  Negative for congestion.    Eyes:  Negative for visual disturbance.   Respiratory:  Negative for shortness of breath and wheezing.    Cardiovascular:  Negative for chest pain, palpitations and leg swelling.   Gastrointestinal:  Positive for abdominal pain (improving), diarrhea and nausea (improving). Negative for abdominal distention and vomiting.   Endocrine: Negative for polyuria.   Genitourinary:  Negative for difficulty urinating and dysuria.   Musculoskeletal:  Negative for back pain and joint swelling.   Skin:  Negative for color change.   Neurological:  Positive for weakness. Negative for dizziness and headaches.   Hematological:  Negative for adenopathy.   Psychiatric/Behavioral:  Negative for agitation.    Objective:     Vital Signs (Most Recent):  Temp: 98.5 °F (36.9 °C) (09/11/22 1127)  Pulse: 99 (09/11/22 1127)  Resp: 16 (09/11/22 1127)  BP: 123/62 (09/11/22  1127)  SpO2: 95 % (09/11/22 1127) Vital Signs (24h Range):  Temp:  [98.5 °F (36.9 °C)-102.2 °F (39 °C)] 98.5 °F (36.9 °C)  Pulse:  [] 99  Resp:  [16-18] 16  SpO2:  [94 %-95 %] 95 %  BP: (120-145)/(62-73) 123/62     Weight: 105.5 kg (232 lb 9.4 oz)  Body mass index is 37.54 kg/m².  Body surface area is 2.22 meters squared.      Intake/Output Summary (Last 24 hours) at 9/11/2022 1351  Last data filed at 9/11/2022 0600  Gross per 24 hour   Intake 2716.8 ml   Output --   Net 2716.8 ml       Physical Exam    Significant Labs:   BMP:   Recent Labs   Lab 09/10/22  0422 09/11/22  0515   * 105   * 142   K 3.3* 3.3*    110   CO2 18* 22*   BUN 4* 4*   CREATININE 0.6 1.0   CALCIUM 8.2* 7.9*   MG 1.6 1.6   , CBC:   Recent Labs   Lab 09/10/22  0422 09/11/22  0515   WBC 3.85* 4.11   HGB 7.9* 7.7*   HCT 22.2* 22.7*   * 118*   , and LFTs:   Recent Labs   Lab 09/10/22  0422 09/11/22  0515   * 234*   * 263*   ALKPHOS 40* 39*   BILITOT 0.6 0.6   PROT 5.2* 5.0*   ALBUMIN 2.7* 2.5*       Diagnostic Results:  I have reviewed and interpreted all pertinent imaging results/findings within the past 24 hours.    US Abdomen    Impression:     1. Biliary sludge.  No sonographic evidence for cholecystitis.  2. Mild hepatosplenomegaly was better demonstrated on previous CT.     Electronically signed by resident: Asael Weinstein  Date:                                            09/11/2022  Time:                                           09:38     Electronically signed by: Edna Cohen MD  Date:                                            09/11/2022  Time:                                           10:26

## 2022-09-11 NOTE — PLAN OF CARE
Rounded on pt Q1H and pt ambulating throughout the room. Changes to the POC include:   -pt had a fever during shift - ibuprofen provided, ID following, no new interventions  -pt has results of ultrasound showing sludge in bilary duct (GI/Hepatology consulted)  -pt continues on pain medication and nausea control w/ imodium for loose stools  -no pending d/c plans

## 2022-09-11 NOTE — CONSULTS
[facility])  Infectious Disease  Consult Note    Patient Name: Laura Kent  MRN: [id number]  Admission Date: 9/3/2022  Hospital Length of Stay: 7 days  Attending Physician: Aj Rebollar MD  Primary Care Provider: Primary Doctor No     Isolation Status: No active isolations    Patient information was obtained from patient and past medical records.      Inpatient consult to Infectious Diseases  Consult performed by: Naima Chen MD  Consult ordered by: Payal Urena DO        Assessment/Plan:     * Acute pancreatitis  36-year-old female with history of breast cancer, last taxol 8/29/2022, presented with nausea, vomiting, abdominal pain 9/3/2022, diagnosed with acute pancreatitis. Patient with ongoing fevers with new transaminitis - negative blood/urine cultures, HIV, HepC, CT CAP notable for peripancreatic fat stranding, but no areas of necrosis.     Recommendations:  - Discontinue vancomycin IV given no cultures with MRSA  - Discontinue pip-tazo IV given no cultures with Pseudomonas  - De-escalate to amp-sulbactam IV  - RUQ US   - Sent HepBsAg, HepBcAb, RPR, CMV PCR, HSV PCR        Thank you for your consult. I will follow-up with patient. Please contact us if you have any additional questions.    Naima Chen MD  Infectious Disease  [facility])    Subjective:     Principal Problem: Acute pancreatitis    HPI: 36-year-old female with history of breast cancer, last taxol 8/29/2022, presented with nausea, vomiting, abdominal pain 9/3/2022, diagnosed with acute pancreatitis. Patient with persistent fevers - she was initially on cefepime, then vanc / metronidazole added, then transitioned to pip-tazo / vancomycin. All cultures have been NGTD. CT CAP notable for increasing peripancreatic fat stranding with multiple prominent lymph nodes. Labs notable for transaminitis. Patient reports feeling fevers. She has a right chest port, but no surrounding pain, redness, swelling.  Reports abdominal pain was initially epigastric, now bilateral lower abdominal. She is having multiple episodes of diarrhea. Otherwise, no headaches, neck stiffness, SOB, cough, hematuria, dysuria, rashes.      Past Medical History:   Diagnosis Date    Anxiety     Breast cancer     Encounter for blood transfusion     Hypertension     Meningitis        Past Surgical History:   Procedure Laterality Date    BREAST BIOPSY Left      SECTION      INSERTION OF TUNNELED CENTRAL VENOUS CATHETER (CVC) WITH SUBCUTANEOUS PORT N/A 2022    Procedure: [id number];  Surgeon: Deo Sin Jr., MD;  Location: [facility] OR [address] [place] [address];  Service: General;  Laterality: N/A;    INSERTION OF TUNNELED CENTRAL VENOUS CATHETER (CVC) WITH SUBCUTANEOUS PORT N/A 2022    Procedure: INSERTION, SINGLE LUMEN CATHETER WITH PORT, WITH FLUOROSCOPIC GUIDANCE;  Surgeon: Ryder Dinero MD;  Location: [facility] CATH LAB;  Service: Vascular;  Laterality: N/A;       Review of patient's allergies indicates:   Allergen Reactions    Strawberries [strawberry] Hives       Medications:  Medications Prior to Admission   Medication Sig    ciprofloxacin HCl (CILOXAN) 0.3 % Oint Place into both eyes 3 (three) times daily.    dexAMETHasone (DECADRON) 4 MG Tab Take 1 tablet (4 mg total) by mouth 2 (two) times daily. For 3 days after each chemo (when starts adriamycin).    doxycycline (VIBRAMYCIN) 100 MG Cap Take 1 capsule (100 mg total) by mouth 2 (two) times daily.    ibuprofen (ADVIL,MOTRIN) 200 MG tablet Take 200 mg by mouth every 6 (six) hours as needed for Pain.    lactulose (CHRONULAC) 20 gram/30 mL Soln Take 30 mLs (20 g total) by mouth 2 (two) times daily as needed (constipation).    LIDOcaine-prilocaine (EMLA) cream Apply topically as needed (for port).    LIDOcaine-prilocaine (EMLA) cream Apply topically as needed (discomfort).    mupirocin (BACTROBAN) 2 % ointment Apply topically once daily.    OLANZapine (ZYPREXA) 5 MG  tablet Take 1 tablet (5 mg total) by mouth every evening. While on chemotherapy    omeprazole (PRILOSEC) 10 MG capsule Take 10 mg by mouth once daily.    ondansetron (ZOFRAN) 8 MG tablet Take 1 tablet (8 mg total) by mouth every 12 (twelve) hours as needed for Nausea.    oxyCODONE (ROXICODONE) 5 MG immediate release tablet Take 1 tablet (5 mg total) by mouth every 6 (six) hours as needed for Pain.    polyethylene glycol 3350 (MIRALAX ORAL) Take by mouth.    promethazine (PHENERGAN) 25 MG tablet Take 1 tablet (25 mg total) by mouth every 6 (six) hours as needed for Nausea.    psyllium husk (METAMUCIL ORAL) Take by mouth.    venlafaxine (EFFEXOR XR) 37.5 MG 24 hr capsule Take 1 capsule (37.5 mg total) by mouth once daily.     Antibiotics (From admission, onward)      Start     Stop Route Frequency Ordered    09/10/22 1515  ampicillin-sulbactam 3 g in sodium chloride 0.9 % 100 mL IVPB (ready to mix system)         -- IV Every 6 hours (non-standard times) 09/10/22 1405          Antifungals (From admission, onward)      None          Antivirals (From admission, onward)      None             Immunization History   Administered Date(s) Administered    COVID-19, vector-nr, [id number], PF ([employer]) 05/15/2021       Family History       Problem Relation (Age of Onset)    Bladder Cancer Paternal Grandmother    Diabetes Father    Hypertension Father    Pancreatic cancer Maternal Uncle          Social History     Socioeconomic History    Marital status: Single   Tobacco Use    Smoking status: Never    Smokeless tobacco: Never   Substance and Sexual Activity    Alcohol use: Yes     Comment: social    Drug use: Never    Sexual activity: Not Currently     Social Determinants of Health     Financial Resource Strain: High Risk    Difficulty of Paying Living Expenses: Very hard   Food Insecurity: No Food Insecurity    Worried About Running Out of Food in the Last Year: Never true    Ran Out of Food in the Last Year:  Never true   Transportation Needs: No Transportation Needs    Lack of Transportation (Medical): No    Lack of Transportation (Non-Medical): No   Physical Activity: Inactive    Days of Exercise per Week: 0 days    Minutes of Exercise per Session: 0 min   Stress: Stress Concern Present    Feeling of Stress : Rather much   Social Connections: Unknown    Frequency of Communication with Friends and Family: More than three times a week    Frequency of Social Gatherings with Friends and Family: Never    Active Member of Clubs or Organizations: Yes    Attends Club or Organization Meetings: More than 4 times per year    Marital Status: Never    Housing Stability: High Risk    Unable to Pay for Housing in the Last Year: Yes    Number of Places Lived in the Last Year: 1    Unstable Housing in the Last Year: No     Review of Systems   Constitutional:  Positive for appetite change and fever. Negative for chills and diaphoresis.   HENT:  Negative for rhinorrhea and sore throat.    Respiratory:  Negative for cough and shortness of breath.    Cardiovascular:  Negative for chest pain and leg swelling.   Gastrointestinal:  Positive for abdominal pain and diarrhea. Negative for nausea and vomiting.   Genitourinary:  Negative for dysuria and hematuria.   Musculoskeletal:  Negative for arthralgias and myalgias.   Skin:  Negative for rash.   Neurological:  Negative for headaches.   Objective:     Vital Signs (Most Recent):  Temp: 100.1 °F (37.8 °C) (09/10/22 1918)  Pulse: 98 (09/10/22 1918)  Resp: 18 (09/10/22 1918)  BP: 120/66 (09/10/22 1918)  SpO2: 95 % (09/10/22 1918) Vital Signs (24h Range):  Temp:  [99 °F (37.2 °C)-102.2 °F (39 °C)] 100.1 °F (37.8 °C)  Pulse:  [] 98  Resp:  [16-20] 18  SpO2:  [94 %-97 %] 95 %  BP: (117-145)/(56-73) 120/66     Weight: 105.5 kg (232 lb 9.4 oz)  Body mass index is 37.54 kg/m².    Estimated Creatinine Clearance: 156.1 mL/min (based on SCr of 0.6 mg/dL).    Physical Exam  Vitals  reviewed.   Constitutional:       General: She is not in acute distress.     Appearance: She is well-developed. She is obese. She is ill-appearing. She is not toxic-appearing or diaphoretic.   HENT:      Head: Normocephalic and atraumatic.   Eyes:      Conjunctiva/sclera: Conjunctivae normal.   Pulmonary:      Effort: Pulmonary effort is normal. No respiratory distress.   Abdominal:      General: There is no distension.      Palpations: Abdomen is soft.      Tenderness: There is no abdominal tenderness.   Musculoskeletal:         General: Normal range of motion.   Skin:     General: Skin is warm and dry.      Findings: No erythema or rash.   Neurological:      Mental Status: She is alert and oriented to person, place, and time.   Psychiatric:         Behavior: Behavior normal.       Significant Labs: All pertinent labs within the past 24 hours have been reviewed.    Significant Imaging: I have reviewed all pertinent imaging results/findings within the past 24 hours.

## 2022-09-11 NOTE — HPI
Ms. Laura Kent is a 36 y.o.  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 Patient with persistent fevers - she was initially on cefepime, then vanc / metronidazole added, then transitioned to pip-tazo / vancomycin. All cultures have been NGTD. CT CAP notable for increasing peripancreatic fat stranding with multiple prominent lymph nodes. Labs notable for transaminitis. Patient reports feeling fevers. She has a right chest port, but no surrounding pain, redness, swelling. Reports abdominal pain was initially epigastric, now bilateral lower abdominal. She is having multiple episodes of diarrhea. Otherwise, no headaches, neck stiffness, SOB, cough, hematuria, dysuria, rashes.

## 2022-09-11 NOTE — ASSESSMENT & PLAN NOTE
Patient has been having more loose stools this admission since starting antibiotics. Now stools are causing intense abdominal cramps and stools are now green     - c diff negative   - bowel regimen held       - See chemotherapy induced neutropenia

## 2022-09-11 NOTE — ASSESSMENT & PLAN NOTE
Patient having increasing LFTs this admission. CTAP this admission did not show liver mets or biliary obstruction. US abdomen ordered to better visualize biliary tree. AST: ALT ratio < 2 so low concern for EtOH hepatitis. Patient switched from acetaminophen to advil. HBV surface and core ab negative    - US abdomen showed biliary sludge, consulted GI per ID recs   - RPR pending  - TORIN and anti-smooth muscle pending

## 2022-09-12 ENCOUNTER — TELEPHONE (OUTPATIENT)
Dept: GASTROENTEROLOGY | Facility: HOSPITAL | Age: 37
End: 2022-09-12
Payer: MEDICAID

## 2022-09-12 LAB
ALBUMIN SERPL BCP-MCNC: 2.6 G/DL (ref 3.5–5.2)
ALP SERPL-CCNC: 38 U/L (ref 55–135)
ALT SERPL W/O P-5'-P-CCNC: 226 U/L (ref 10–44)
ANA PATTERN 1: NORMAL
ANA SER QL IF: POSITIVE
ANA TITR SER IF: NORMAL {TITER}
ANION GAP SERPL CALC-SCNC: 10 MMOL/L (ref 8–16)
ANISOCYTOSIS BLD QL SMEAR: SLIGHT
AST SERPL-CCNC: 188 U/L (ref 10–40)
BASOPHILS # BLD AUTO: 0.01 K/UL (ref 0–0.2)
BASOPHILS NFR BLD: 0.3 % (ref 0–1.9)
BILIRUB SERPL-MCNC: 0.6 MG/DL (ref 0.1–1)
BUN SERPL-MCNC: 4 MG/DL (ref 6–20)
CALCIUM SERPL-MCNC: 8.2 MG/DL (ref 8.7–10.5)
CHLORIDE SERPL-SCNC: 110 MMOL/L (ref 95–110)
CMV DNA SPEC QL NAA+PROBE: NOT DETECTED
CO2 SERPL-SCNC: 23 MMOL/L (ref 23–29)
CREAT SERPL-MCNC: 0.8 MG/DL (ref 0.5–1.4)
CYTOMEGALOVIRUS LOG (IU/ML): NOT DETECTED LOGIU/ML
CYTOMEGALOVIRUS PCR, QUANT: NOT DETECTED IU/ML
DACRYOCYTES BLD QL SMEAR: ABNORMAL
DIFFERENTIAL METHOD: ABNORMAL
DOHLE BOD BLD QL SMEAR: PRESENT
EOSINOPHIL # BLD AUTO: 0 K/UL (ref 0–0.5)
EOSINOPHIL NFR BLD: 0.5 % (ref 0–8)
ERYTHROCYTE [DISTWIDTH] IN BLOOD BY AUTOMATED COUNT: 14.9 % (ref 11.5–14.5)
EST. GFR  (NO RACE VARIABLE): >60 ML/MIN/1.73 M^2
GLUCOSE SERPL-MCNC: 122 MG/DL (ref 70–110)
HCT VFR BLD AUTO: 22.7 % (ref 37–48.5)
HGB BLD-MCNC: 7.7 G/DL (ref 12–16)
HYPOCHROMIA BLD QL SMEAR: ABNORMAL
IMM GRANULOCYTES # BLD AUTO: 0.07 K/UL (ref 0–0.04)
IMM GRANULOCYTES NFR BLD AUTO: 1.8 % (ref 0–0.5)
LYMPHOCYTES # BLD AUTO: 0.5 K/UL (ref 1–4.8)
LYMPHOCYTES NFR BLD: 13.3 % (ref 18–48)
MAGNESIUM SERPL-MCNC: 1.7 MG/DL (ref 1.6–2.6)
MCH RBC QN AUTO: 31.6 PG (ref 27–31)
MCHC RBC AUTO-ENTMCNC: 33.9 G/DL (ref 32–36)
MCV RBC AUTO: 93 FL (ref 82–98)
MONOCYTES # BLD AUTO: 0.6 K/UL (ref 0.3–1)
MONOCYTES NFR BLD: 15.4 % (ref 4–15)
NEUTROPHILS # BLD AUTO: 2.7 K/UL (ref 1.8–7.7)
NEUTROPHILS NFR BLD: 68.7 % (ref 38–73)
NRBC BLD-RTO: 0 /100 WBC
OVALOCYTES BLD QL SMEAR: ABNORMAL
PLATELET # BLD AUTO: 120 K/UL (ref 150–450)
PLATELET BLD QL SMEAR: ABNORMAL
PMV BLD AUTO: 10.2 FL (ref 9.2–12.9)
POIKILOCYTOSIS BLD QL SMEAR: SLIGHT
POLYCHROMASIA BLD QL SMEAR: ABNORMAL
POTASSIUM SERPL-SCNC: 3.2 MMOL/L (ref 3.5–5.1)
PROT SERPL-MCNC: 5.3 G/DL (ref 6–8.4)
RBC # BLD AUTO: 2.44 M/UL (ref 4–5.4)
RPR SER QL: NORMAL
SMOOTH MUSCLE AB TITR SER IF: NORMAL {TITER}
SODIUM SERPL-SCNC: 143 MMOL/L (ref 136–145)
SPHEROCYTES BLD QL SMEAR: ABNORMAL
TOXIC GRANULES BLD QL SMEAR: PRESENT
WBC # BLD AUTO: 3.9 K/UL (ref 3.9–12.7)

## 2022-09-12 PROCEDURE — 25000003 PHARM REV CODE 250

## 2022-09-12 PROCEDURE — 99233 SBSQ HOSP IP/OBS HIGH 50: CPT | Mod: ,,, | Performed by: INTERNAL MEDICINE

## 2022-09-12 PROCEDURE — 86753 PROTOZOA ANTIBODY NOS: CPT | Performed by: INTERNAL MEDICINE

## 2022-09-12 PROCEDURE — 99222 1ST HOSP IP/OBS MODERATE 55: CPT | Mod: ,,, | Performed by: INTERNAL MEDICINE

## 2022-09-12 PROCEDURE — 85025 COMPLETE CBC W/AUTO DIFF WBC: CPT | Performed by: STUDENT IN AN ORGANIZED HEALTH CARE EDUCATION/TRAINING PROGRAM

## 2022-09-12 PROCEDURE — 36415 COLL VENOUS BLD VENIPUNCTURE: CPT | Performed by: STUDENT IN AN ORGANIZED HEALTH CARE EDUCATION/TRAINING PROGRAM

## 2022-09-12 PROCEDURE — 87507 IADNA-DNA/RNA PROBE TQ 12-25: CPT | Performed by: INTERNAL MEDICINE

## 2022-09-12 PROCEDURE — 20600001 HC STEP DOWN PRIVATE ROOM

## 2022-09-12 PROCEDURE — 87209 SMEAR COMPLEX STAIN: CPT | Performed by: INTERNAL MEDICINE

## 2022-09-12 PROCEDURE — 25000003 PHARM REV CODE 250: Performed by: INTERNAL MEDICINE

## 2022-09-12 PROCEDURE — 25000003 PHARM REV CODE 250: Performed by: STUDENT IN AN ORGANIZED HEALTH CARE EDUCATION/TRAINING PROGRAM

## 2022-09-12 PROCEDURE — 99233 PR SUBSEQUENT HOSPITAL CARE,LEVL III: ICD-10-PCS | Mod: ,,, | Performed by: INTERNAL MEDICINE

## 2022-09-12 PROCEDURE — 25500020 PHARM REV CODE 255: Performed by: INTERNAL MEDICINE

## 2022-09-12 PROCEDURE — A9585 GADOBUTROL INJECTION: HCPCS | Performed by: INTERNAL MEDICINE

## 2022-09-12 PROCEDURE — 63600175 PHARM REV CODE 636 W HCPCS: Performed by: STUDENT IN AN ORGANIZED HEALTH CARE EDUCATION/TRAINING PROGRAM

## 2022-09-12 PROCEDURE — 63600175 PHARM REV CODE 636 W HCPCS

## 2022-09-12 PROCEDURE — 86682 HELMINTH ANTIBODY: CPT | Performed by: INTERNAL MEDICINE

## 2022-09-12 PROCEDURE — 80053 COMPREHEN METABOLIC PANEL: CPT | Performed by: STUDENT IN AN ORGANIZED HEALTH CARE EDUCATION/TRAINING PROGRAM

## 2022-09-12 PROCEDURE — 87177 OVA AND PARASITES SMEARS: CPT | Performed by: INTERNAL MEDICINE

## 2022-09-12 PROCEDURE — 63600175 PHARM REV CODE 636 W HCPCS: Performed by: INTERNAL MEDICINE

## 2022-09-12 PROCEDURE — 99222 PR INITIAL HOSPITAL CARE,LEVL II: ICD-10-PCS | Mod: ,,, | Performed by: INTERNAL MEDICINE

## 2022-09-12 PROCEDURE — 87329 GIARDIA AG IA: CPT | Performed by: INTERNAL MEDICINE

## 2022-09-12 PROCEDURE — 83735 ASSAY OF MAGNESIUM: CPT | Performed by: STUDENT IN AN ORGANIZED HEALTH CARE EDUCATION/TRAINING PROGRAM

## 2022-09-12 RX ORDER — OXYCODONE HYDROCHLORIDE 10 MG/1
10 TABLET ORAL EVERY 4 HOURS PRN
Status: DISCONTINUED | OUTPATIENT
Start: 2022-09-12 | End: 2022-09-19 | Stop reason: HOSPADM

## 2022-09-12 RX ORDER — SODIUM CHLORIDE, SODIUM LACTATE, POTASSIUM CHLORIDE, CALCIUM CHLORIDE 600; 310; 30; 20 MG/100ML; MG/100ML; MG/100ML; MG/100ML
INJECTION, SOLUTION INTRAVENOUS CONTINUOUS
Status: ACTIVE | OUTPATIENT
Start: 2022-09-12 | End: 2022-09-13

## 2022-09-12 RX ORDER — IBUPROFEN 600 MG/1
600 TABLET ORAL EVERY 6 HOURS PRN
Status: DISCONTINUED | OUTPATIENT
Start: 2022-09-12 | End: 2022-09-19

## 2022-09-12 RX ORDER — GADOBUTROL 604.72 MG/ML
10 INJECTION INTRAVENOUS
Status: COMPLETED | OUTPATIENT
Start: 2022-09-12 | End: 2022-09-12

## 2022-09-12 RX ORDER — LOPERAMIDE HYDROCHLORIDE 2 MG/1
2 CAPSULE ORAL EVERY 4 HOURS PRN
Status: DISCONTINUED | OUTPATIENT
Start: 2022-09-12 | End: 2022-09-13

## 2022-09-12 RX ORDER — POTASSIUM CHLORIDE 20 MEQ/1
40 TABLET, EXTENDED RELEASE ORAL
Status: COMPLETED | OUTPATIENT
Start: 2022-09-12 | End: 2022-09-12

## 2022-09-12 RX ADMIN — SODIUM CHLORIDE, SODIUM LACTATE, POTASSIUM CHLORIDE, AND CALCIUM CHLORIDE: .6; .31; .03; .02 INJECTION, SOLUTION INTRAVENOUS at 06:09

## 2022-09-12 RX ADMIN — POTASSIUM CHLORIDE 40 MEQ: 1500 TABLET, EXTENDED RELEASE ORAL at 09:09

## 2022-09-12 RX ADMIN — AMPICILLIN AND SULBACTAM 3 G: 2; 1 INJECTION, POWDER, FOR SOLUTION INTRAMUSCULAR; INTRAVENOUS at 09:09

## 2022-09-12 RX ADMIN — IBUPROFEN 600 MG: 600 TABLET ORAL at 04:09

## 2022-09-12 RX ADMIN — DICYCLOMINE HYDROCHLORIDE 10 MG: 10 CAPSULE ORAL at 03:09

## 2022-09-12 RX ADMIN — AMPICILLIN AND SULBACTAM 3 G: 2; 1 INJECTION, POWDER, FOR SOLUTION INTRAMUSCULAR; INTRAVENOUS at 04:09

## 2022-09-12 RX ADMIN — OXYCODONE HYDROCHLORIDE 10 MG: 10 TABLET ORAL at 10:09

## 2022-09-12 RX ADMIN — ONDANSETRON 4 MG: 2 INJECTION INTRAMUSCULAR; INTRAVENOUS at 01:09

## 2022-09-12 RX ADMIN — OXYCODONE HYDROCHLORIDE 10 MG: 10 TABLET ORAL at 09:09

## 2022-09-12 RX ADMIN — DICYCLOMINE HYDROCHLORIDE 10 MG: 10 CAPSULE ORAL at 08:09

## 2022-09-12 RX ADMIN — GADOBUTROL 10 ML: 604.72 INJECTION INTRAVENOUS at 10:09

## 2022-09-12 RX ADMIN — ONDANSETRON 4 MG: 2 INJECTION INTRAMUSCULAR; INTRAVENOUS at 06:09

## 2022-09-12 RX ADMIN — SIMETHICONE 80 MG: 80 TABLET, CHEWABLE ORAL at 09:09

## 2022-09-12 RX ADMIN — ENOXAPARIN SODIUM 40 MG: 100 INJECTION SUBCUTANEOUS at 05:09

## 2022-09-12 RX ADMIN — ONDANSETRON 4 MG: 2 INJECTION INTRAMUSCULAR; INTRAVENOUS at 12:09

## 2022-09-12 RX ADMIN — SODIUM CHLORIDE: 0.9 INJECTION, SOLUTION INTRAVENOUS at 05:09

## 2022-09-12 RX ADMIN — LOPERAMIDE HYDROCHLORIDE 2 MG: 2 CAPSULE ORAL at 08:09

## 2022-09-12 RX ADMIN — DAPTOMYCIN 1055 MG: 350 INJECTION, POWDER, LYOPHILIZED, FOR SOLUTION INTRAVENOUS at 05:09

## 2022-09-12 RX ADMIN — AMPICILLIN AND SULBACTAM 3 G: 2; 1 INJECTION, POWDER, FOR SOLUTION INTRAMUSCULAR; INTRAVENOUS at 03:09

## 2022-09-12 RX ADMIN — OXYCODONE HYDROCHLORIDE 10 MG: 10 TABLET ORAL at 01:09

## 2022-09-12 RX ADMIN — ONDANSETRON 4 MG: 2 INJECTION INTRAMUSCULAR; INTRAVENOUS at 05:09

## 2022-09-12 RX ADMIN — OXYCODONE HYDROCHLORIDE 10 MG: 10 TABLET ORAL at 03:09

## 2022-09-12 RX ADMIN — DICYCLOMINE HYDROCHLORIDE 10 MG: 10 CAPSULE ORAL at 09:09

## 2022-09-12 RX ADMIN — LOPERAMIDE HYDROCHLORIDE 2 MG: 2 CAPSULE ORAL at 09:09

## 2022-09-12 RX ADMIN — POTASSIUM CHLORIDE 40 MEQ: 1500 TABLET, EXTENDED RELEASE ORAL at 12:09

## 2022-09-12 RX ADMIN — AMPICILLIN AND SULBACTAM 3 G: 2; 1 INJECTION, POWDER, FOR SOLUTION INTRAMUSCULAR; INTRAVENOUS at 10:09

## 2022-09-12 RX ADMIN — SODIUM CHLORIDE: 0.9 INJECTION, SOLUTION INTRAVENOUS at 01:09

## 2022-09-12 RX ADMIN — ONDANSETRON 4 MG: 2 INJECTION INTRAMUSCULAR; INTRAVENOUS at 11:09

## 2022-09-12 NOTE — HPI
Ms Kent is a 37yo F with PMH of non-metastatic breast cancer treated with paclitaxel (C4D15 on 8/29/2022) who presented with nausea, vomiting, abdominal pain 9/3/2022 and diagnosed with  acute pancreatitis, lipase elevated at 219. Pt with transient fevers and with new transaminitis on 9/08/2022. AST/ALT peaked at 391/234 (9/10-9/11). Pt stated that she was experiencing acute/generalized 10/10 intensity, abdominal pain s/p prior Paclitaxel infusion. BMs described as liquid and green in color with 10-15 motions daily. Appetite is decreased , secondary to abdominal discomfort. HIV and hepatitis panel negative. Ucx and Bcx negtaive. CT CAP notable for peripancreatic fat stranding, but no areas of necrosis. No notable Gall bladder pathology or ductal dilatation. US liver positive for gall bladder sludge without wall thickening, visible stones, or ductal dilatation. Since admission, pt noted that her pain has decreased to 7/10 and she is tolerating a lite PO diet. Pancreatic enzymes normalized. Liver enzymes now normalizing. GI was consulted to comment on US findings of GB sludge.

## 2022-09-12 NOTE — PROGRESS NOTES
ATTENDING NOTE, ONCOLOGY INPATIENT TEAM    Events of last 72 hours noted.  Patient seen and examined, chart reviewed.  Appears comfortable, in NAD, however, she continues to have loose bowel movement and states that she has been to the bathroom 5 times since midnight so far today.  Lungs are clear to auscultation.  Abdomen is soft, with mild periumbilical tenderness  Labs reviewed.  Bilirubin is 0.6 mg/dL, AST is 188 units/liter and ALT is 226 units/liter    PLAN  Continue daily CBC and CMP given the elevated transaminases  Imodium p.r.n.  Will continue Unasyn for another 48 hours.  If she continues to spike we will recommend that her port be removed  Continue enoxaparin  We will follow    Enoch Goodwin MD

## 2022-09-12 NOTE — SUBJECTIVE & OBJECTIVE
Interval History: Ms. Kent feels about the same. She continues to have lower abdominal pain and loose bowel movement.     Review of Systems   Constitutional:  Positive for appetite change. Negative for chills and fever.   HENT:  Negative for rhinorrhea and sore throat.    Respiratory:  Negative for cough and shortness of breath.    Cardiovascular:  Negative for chest pain and leg swelling.   Gastrointestinal:  Positive for abdominal pain, diarrhea, nausea and vomiting.   Genitourinary:  Negative for dysuria and hematuria.   Musculoskeletal:  Negative for arthralgias, back pain and myalgias.   Skin:  Negative for rash.   Allergic/Immunologic: Positive for immunocompromised state.   Neurological:  Negative for headaches.   Hematological:  Does not bruise/bleed easily.   Psychiatric/Behavioral:  Negative for behavioral problems.    All other systems reviewed and are negative.  Objective:     Vital Signs (Most Recent):  Temp: 97.9 °F (36.6 °C) (09/12/22 1148)  Pulse: 82 (09/12/22 1148)  Resp: 16 (09/12/22 1148)  BP: 128/73 (09/12/22 1148)  SpO2: 96 % (09/12/22 1148) Vital Signs (24h Range):  Temp:  [97.9 °F (36.6 °C)-101.4 °F (38.6 °C)] 97.9 °F (36.6 °C)  Pulse:  [] 82  Resp:  [16-18] 16  SpO2:  [95 %-99 %] 96 %  BP: (114-135)/(58-73) 128/73     Weight: 105.5 kg (232 lb 9.4 oz)  Body mass index is 37.54 kg/m².    Estimated Creatinine Clearance: 117.1 mL/min (based on SCr of 0.8 mg/dL).    Physical Exam  Vitals and nursing note reviewed.   Constitutional:       General: She is not in acute distress.     Appearance: She is well-developed. She is obese. She is not toxic-appearing.   HENT:      Head: Normocephalic and atraumatic.      Right Ear: External ear normal.      Left Ear: External ear normal.      Nose: Nose normal.      Mouth/Throat:      Mouth: Mucous membranes are moist.   Eyes:      General: No scleral icterus.     Extraocular Movements: Extraocular movements intact.      Conjunctiva/sclera: Conjunctivae  normal.   Cardiovascular:      Rate and Rhythm: Normal rate and regular rhythm.      Heart sounds: No murmur heard.  Pulmonary:      Effort: Pulmonary effort is normal. No respiratory distress.      Breath sounds: Normal breath sounds.   Abdominal:      General: There is no distension.      Palpations: Abdomen is soft.      Tenderness: There is no abdominal tenderness.      Comments: Hyperactive bowel sounds   Musculoskeletal:         General: Normal range of motion.      Cervical back: Neck supple.      Right lower leg: No edema.      Left lower leg: No edema.   Skin:     General: Skin is warm and dry.      Capillary Refill: Capillary refill takes less than 2 seconds.      Findings: No erythema or rash.   Neurological:      Mental Status: She is alert and oriented to person, place, and time. Mental status is at baseline.   Psychiatric:         Mood and Affect: Mood normal.         Behavior: Behavior normal.       Significant Labs:   Microbiology Results (last 7 days)       Procedure Component Value Units Date/Time    Blood culture [[id number]] Collected: 09/08/22 1147    Order Status: Completed Specimen: Blood Updated: 09/12/22 1412     Blood Culture, Routine No Growth to date      No Growth to date      No Growth to date      No Growth to date      No Growth to date    Narrative:      Lft AC    Blood culture [[id number]] Collected: 09/08/22 1147    Order Status: Completed Specimen: Blood Updated: 09/12/22 1412     Blood Culture, Routine No Growth to date      No Growth to date      No Growth to date      No Growth to date      No Growth to date    Narrative:      Rt wrist    Blood culture [[id number]] Collected: 09/08/22 1800    Order Status: Completed Specimen: Blood Updated: 09/11/22 2022     Blood Culture, Routine No Growth to date      No Growth to date      No Growth to date      No Growth to date    Narrative:      Please collect from 2 different sites    Blood culture [[id number]] Collected: 09/08/22 8106     Order Status: Completed Specimen: Blood Updated: 09/11/22 2022     Blood Culture, Routine No Growth to date      No Growth to date      No Growth to date      No Growth to date    Narrative:      Please collect from 2 different sites    Blood culture [[id number]] Collected: 09/10/22 1746    Order Status: Completed Specimen: Blood Updated: 09/11/22 2012     Blood Culture, Routine No Growth to date      No Growth to date    Narrative:      Please collect from 2 different sites    Blood culture [[id number]] Collected: 09/10/22 1747    Order Status: Completed Specimen: Blood Updated: 09/11/22 2012     Blood Culture, Routine No Growth to date      No Growth to date    Narrative:      Please collect from 2 different sites    Blood culture [[id number]] Collected: 09/05/22 2347    Order Status: Completed Specimen: Blood Updated: 09/11/22 0612     Blood Culture, Routine No growth after 5 days.    Blood culture [[id number]] Collected: 09/05/22 2347    Order Status: Completed Specimen: Blood Updated: 09/11/22 0612     Blood Culture, Routine No growth after 5 days.    Blood culture [[id number]] Collected: 09/04/22 2146    Order Status: Completed Specimen: Blood Updated: 09/09/22 2312     Blood Culture, Routine No growth after 5 days.    Blood culture [[id number]] Collected: 09/04/22 2148    Order Status: Completed Specimen: Blood Updated: 09/09/22 2312     Blood Culture, Routine No growth after 5 days.    Blood culture [[id number]] Collected: 09/04/22 0155    Order Status: Completed Specimen: Blood Updated: 09/09/22 0612     Blood Culture, Routine No growth after 5 days.    Blood culture [[id number]] Collected: 09/04/22 0156    Order Status: Completed Specimen: Blood Updated: 09/09/22 0612     Blood Culture, Routine No growth after 5 days.    Clostridium difficile EIA [[id number]] Collected: 09/08/22 1151    Order Status: Completed Specimen: Stool Updated: 09/08/22 2244     C. diff Antigen Negative     C difficile Toxins A+B, EIA  Negative     Comment: Testing not recommended for children <24 months old.             All pertinent labs within the past 24 hours have been reviewed.  Recent Lab Results         09/12/22  0625        Albumin 2.6       Alkaline Phosphatase 38              Anion Gap 10       Aniso Slight              Baso # 0.01       Basophil % 0.3       BILIRUBIN TOTAL 0.6  Comment: For infants and newborns, interpretation of results should be based  on gestational age, weight and in agreement with clinical  observations.    Premature Infant recommended reference ranges:  Up to 24 hours.............<8.0 mg/dL  Up to 48 hours............<12.0 mg/dL  3-5 days..................<15.0 mg/dL  6-29 days.................<15.0 mg/dL         BUN 4       Calcium 8.2       Chloride 110       CO2 23       Creatinine 0.8       Differential Method Automated       Dohle Bodies Present       eGFR >60.0       Eos # 0.0       Eosinophil % 0.5       Glucose 122       Gran # (ANC) 2.7       Gran % 68.7       Hematocrit 22.7       Hemoglobin 7.7       Hypo Occasional       Immature Grans (Abs) 0.07  Comment: Mild elevation in immature granulocytes is non specific and   can be seen in a variety of conditions including stress response,   acute inflammation, trauma and pregnancy. Correlation with other   laboratory and clinical findings is essential.         Immature Granulocytes 1.8       Lymph # 0.5       Lymph % 13.3       Magnesium 1.7       MCH 31.6       MCHC 33.9       MCV 93       Mono # 0.6       Mono % 15.4       MPV 10.2       nRBC 0       Ovalocytes Occasional       Platelet Estimate Decreased       Platelets 120       Poikilocytosis Slight       Poly Occasional       Potassium 3.2       PROTEIN TOTAL 5.3       RBC 2.44       RDW 14.9       Sodium 143       Spherocytes Occasional       Teardrop Cells Occasional       Toxic Granulation Present       WBC 3.90               Significant Imaging: I have reviewed all pertinent imaging  results/findings within the past 24 hours.

## 2022-09-12 NOTE — PROGRESS NOTES
[facility] ([facility])  Hematology/Oncology  Progress Note    Patient Name: Laura Kent  Admission Date: 9/3/2022  Hospital Length of Stay: 9 days  Code Status: Full Code     Subjective:     HPI:  36 years old with HTN, breast cancer  C4D20 Taxol on 8/29/2022 f/u with Dr. Cruz presents with worsening abdominal pain, nasuea and vomiting. Patient was in pain during the interview and asking for better pain control. She states one week history of gradually worsening abdominal pain worst in epigastric area without radiation. She had minimal po intake since then. She associate emesis x2 NBNB. She had one small BM this afternoon prior to her presentation.     Patient denies chest pain, shortness of breath,  dysuria, polyuria, nausea, vomiting, fever, chills,  diarrhea, constipation, headache, falls, focal weakness or vision changes.     Ed course:   On presentation she was HDS and afebrile. On exam she was mod-sever  abdominal pain to palpitation, no peritonitic signs. Labs were significant for elevated lipase >200. CT with findings concerning for pancreatitis wo fluid/ abscess collection. Medical onc consulted for admit.           Interval History: Appears comfortable at bedside, states her abdominal pain is improving, however still gets occasional myalgia, fever, chills. Her temperature overnight was 100.8. She was also experiencing around 5-8 episodes of loose stools overnight, green in appearance. States the loperamide has cut it down from 15 times a day to around 5-8. Denies any headache, neck pain, chest pain, cough, dyspnea, urinary symptoms.     Oncology Treatment Plan:   OP PEMBROLIZUMAB CARBOPLATIN (AUC 5) WITH WEEKLY PACLITAXEL FOLLOWED BY PEMBROLIZUMAB DOXORUBICIN CYCLOPHOSPHAMIDE FOLLOWED BY PEMBROLIZUMAB 200 MG Q3W    Medications:  Continuous Infusions:   sodium chloride 0.9% 150 mL/hr at 09/12/22 1342     Scheduled Meds:   ampicillin-sulbactim (UNASYN) IVPB  3 g Intravenous Q6H     dicyclomine  10 mg Oral TID    enoxaparin  40 mg Subcutaneous Daily    magnesium sulfate IVPB  2 g Intravenous Once    ondansetron  4 mg Intravenous Q6H    simethicone  1 tablet Oral TID PC     PRN Meds:dextrose 10%, dextrose 10%, glucagon (human recombinant), glucose, glucose, ibuprofen, loperamide, naloxone, oxyCODONE, polyethylene glycol, prochlorperazine, sodium chloride 0.9%       Objective:     Vital Signs (Most Recent):  Temp: 97.9 °F (36.6 °C) (09/12/22 1148)  Pulse: 82 (09/12/22 1148)  Resp: 16 (09/12/22 1148)  BP: 128/73 (09/12/22 1148)  SpO2: 96 % (09/12/22 1148) Vital Signs (24h Range):  Temp:  [97.9 °F (36.6 °C)-101.4 °F (38.6 °C)] 97.9 °F (36.6 °C)  Pulse:  [] 82  Resp:  [16-18] 16  SpO2:  [95 %-99 %] 96 %  BP: (114-135)/(58-73) 128/73     Weight: 105.5 kg (232 lb 9.4 oz)  Body mass index is 37.54 kg/m².  Body surface area is 2.22 meters squared.      Intake/Output Summary (Last 24 hours) at 9/12/2022 1405  Last data filed at 9/12/2022 0211  Gross per 24 hour   Intake 2572.81 ml   Output --   Net 2572.81 ml       Physical Exam  HENT:      Head: Normocephalic.      Nose: Nose normal.      Mouth/Throat:      Mouth: Mucous membranes are moist.   Eyes:      Pupils: Pupils are equal, round, and reactive to light.   Cardiovascular:      Rate and Rhythm: Normal rate.   Pulmonary:      Effort: Pulmonary effort is normal.   Abdominal:      General: Abdomen is flat.      Tenderness: There is abdominal tenderness.   Musculoskeletal:         General: Normal range of motion.      Cervical back: Normal range of motion.   Skin:     General: Skin is warm.   Neurological:      General: No focal deficit present.      Mental Status: She is alert and oriented to person, place, and time.   Psychiatric:         Mood and Affect: Mood normal.       Significant Labs:   All pertinent labs from the last 24 hours have been reviewed.    Diagnostic Results:  I have reviewed all pertinent imaging results/findings within the  past 24 hours.    Assessment/Plan:     * Acute pancreatitis  36 years old with HTN, breast cancer  C4D20 Taxol on 8/29/2022 f/u with Dr. Cruz presents with worsening abdominal pain, nasuea and vomiting.  On exam she was mod-sever  abdominal pain to palpitation, no peritonitic signs. Labs were significant for elevated lipase >200. CT with findings concerning for pancreatitis wo fluid/ abscess collection. . CT scan with Mild peripancreatic fat stranding. TG levels were normal this admission, low concern for triglyceride induced pancreatitis. CT scan showed no gallstones or GB wall thickening, low concern for gallstone induced pancreatitis. Patient is clinically improving with n/v, po intake, and pain.     Most likely pancreatitis 2/2 chemo, last chemotherapy session 8/29; Taxol    - Pain: morphine IV changed to oxy PO.    - Anti-emetics: scheduled compazine and prn zofran. Improving  -  cc  - Diet advancing as tolerated  - simethicone     Elevated LFTs  Patient having increasing LFTs this admission. CTAP this admission did not show liver mets or biliary obstruction. US abdomen ordered to better visualize biliary tree. AST: ALT ratio < 2 so low concern for EtOH hepatitis. Patient switched from acetaminophen to advil. HBV surface and core ab negative    - US abdomen showed biliary sludge, consulted GI per ID recs   - Advance diet as tolerated  - Per GI recs, Unasyn through admission, augmentin 875 po bid through 8/21/22 on discharge  - RPR pending  - TORIN and anti-smooth muscle pending     Loose stools  Patient has been having more loose stools this admission since starting antibiotics. Now stools are causing intense abdominal cramps and stools are now green     - c diff negative   - bowel regimen held   - See chemotherapy induced neutropenia    Abdominal pain  Please read pancreatitis       Chemotherapy induced neutropenia  Pt had subjective fevers at home unmeasured, abdominal pain, polyuria In setting of low  WBC and recent chemotherapy. During admission patient was febrile intermittently and had U/A showing WBCs and bacteria. Initially started treatment with cefepime. Flagyl added due to ongoing fevers. Fever curve was initially downtrending with negative cultures. However she had fever as high as 103. Will restart septic work up as fever is currently of unknown origin and add vancomycin. Consulted ID and per recommendations have deescalated abx to unasyn given pt has has negative testing for pseudomonas and MRSA. Can also consider chemo causing fever.     - Unasyn   - Cdiff studies negative  - Repeat blood cultures, NGTD  - ID contsulted, appreciate recs   - If she continues to fever over the next 24 hours on Unasyn will consider port removal    Malignant neoplasm of upper-outer quadrant of left breast in female, estrogen receptor negative  Dr. Cruz patient,  On:  Currently on cycle 4 D20 of pembrolizumab carboplatin (AUC 5) with weekly paclitaxel followed by pembrolizumab doxorubicin cyclophosphamide followed by pembrolizumab 200 mg q3w    Most recent Taxol on 8/29/2022     -- admit to medical oncology              Cristina Wilkerson DO  Hematology/Oncology  [facility] ([facility])      ATTENDING NOTE, ONCOLOGY INPATIENT TEAM    Patient seen and examined, chart reviewed.  Please refer to my note of same day for our assessment and plan.      Enoch Goodwin MD

## 2022-09-12 NOTE — NURSING
Shift Summary    Patients tmax was 100.8.  Treated medication effective.  Patient complained of pain once.  Pain medication effective.  Rested fairly well.  Small loose stools.  No emesis reported.  Will continue to monitor

## 2022-09-12 NOTE — ASSESSMENT & PLAN NOTE
36-year-old female with history of breast cancer, last taxol 8/29/2022, presented with nausea, vomiting, abdominal pain 9/3/2022, diagnosed with acute pancreatitis. Patient with ongoing fevers with new transaminitis - negative blood/urine cultures, HIV, HepC, CT CAP notable for peripancreatic fat stranding, but no areas of necrosis, suggestive of pancreatitis. Patient had persistent fever, but now with lower abdominal and diarrhea that are not well explained by pancreatitis (CMV viral load not detected).     Recommendations:  - Continue ampicillin-sulbactam for now.   - Follow up HSV PCR (viral etiology for hepatitis). RPR, CMV, Hep B, and Hep C negative. Autoimmune work up in process.

## 2022-09-12 NOTE — PROGRESS NOTES
[facility]   Infectious Disease  Progress Note    Patient Name: Laura Kent  MRN: [id number]  Admission Date: 9/3/2022  Length of Stay: 9 days  Attending Physician: Enoch Goodwin MD  Primary Care Provider: Primary Doctor No    Isolation Status: No active isolations  Assessment/Plan:      * Acute pancreatitis  36-year-old female with history of breast cancer, last taxol 8/29/2022, presented with nausea, vomiting, abdominal pain 9/3/2022, diagnosed with acute pancreatitis. Patient with ongoing fevers with new transaminitis - negative blood/urine cultures, HIV, HepC, CT CAP notable for peripancreatic fat stranding, but no areas of necrosis, suggestive of pancreatitis. Patient had persistent fever, but now with lower abdominal and diarrhea that are not well explained by pancreatitis (CMV viral load not detected).     Recommendations:  - Continue ampicillin-sulbactam for now.   - Follow up HSV PCR (viral etiology for hepatitis). RPR, CMV, Hep B, and Hep C negative. Autoimmune work up in process.         Anticipated Disposition: Pending clinical improvement.     Thank you for your consult. I will follow-up with patient. Please contact us if you have any additional questions.    Dimitri Campbell MD  Infectious Diseases  [facility]     Subjective:     Principal Problem:Acute pancreatitis    HPI: Ms. Laura Kent is a 36 y.o.  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 Patient with persistent fevers - she was initially on cefepime, then vanc / metronidazole added, then transitioned to pip-tazo / vancomycin. All cultures have been NGTD. CT CAP notable for  increasing peripancreatic fat stranding with multiple prominent lymph nodes. Labs notable for transaminitis. Patient reports feeling fevers. She has a right chest port, but no surrounding pain, redness, swelling. Reports abdominal pain was initially epigastric, now bilateral lower abdominal. She is having multiple episodes of diarrhea. Otherwise, no headaches, neck stiffness, SOB, cough, hematuria, dysuria, rashes.    Interval History: Ms. Kent feels about the same. She continues to have lower abdominal pain and loose bowel movement.     Review of Systems   Constitutional:  Positive for appetite change. Negative for chills and fever.   HENT:  Negative for rhinorrhea and sore throat.    Respiratory:  Negative for cough and shortness of breath.    Cardiovascular:  Negative for chest pain and leg swelling.   Gastrointestinal:  Positive for abdominal pain, diarrhea, nausea and vomiting.   Genitourinary:  Negative for dysuria and hematuria.   Musculoskeletal:  Negative for arthralgias, back pain and myalgias.   Skin:  Negative for rash.   Allergic/Immunologic: Positive for immunocompromised state.   Neurological:  Negative for headaches.   Hematological:  Does not bruise/bleed easily.   Psychiatric/Behavioral:  Negative for behavioral problems.    All other systems reviewed and are negative.  Objective:     Vital Signs (Most Recent):  Temp: 97.9 °F (36.6 °C) (09/12/22 1148)  Pulse: 82 (09/12/22 1148)  Resp: 16 (09/12/22 1148)  BP: 128/73 (09/12/22 1148)  SpO2: 96 % (09/12/22 1148) Vital Signs (24h Range):  Temp:  [97.9 °F (36.6 °C)-101.4 °F (38.6 °C)] 97.9 °F (36.6 °C)  Pulse:  [] 82  Resp:  [16-18] 16  SpO2:  [95 %-99 %] 96 %  BP: (114-135)/(58-73) 128/73     Weight: 105.5 kg (232 lb 9.4 oz)  Body mass index is 37.54 kg/m².    Estimated Creatinine Clearance: 117.1 mL/min (based on SCr of 0.8 mg/dL).    Physical Exam  Vitals and nursing note reviewed.   Constitutional:       General: She is not in acute  distress.     Appearance: She is well-developed. She is obese. She is not toxic-appearing.   HENT:      Head: Normocephalic and atraumatic.      Right Ear: External ear normal.      Left Ear: External ear normal.      Nose: Nose normal.      Mouth/Throat:      Mouth: Mucous membranes are moist.   Eyes:      General: No scleral icterus.     Extraocular Movements: Extraocular movements intact.      Conjunctiva/sclera: Conjunctivae normal.   Cardiovascular:      Rate and Rhythm: Normal rate and regular rhythm.      Heart sounds: No murmur heard.  Pulmonary:      Effort: Pulmonary effort is normal. No respiratory distress.      Breath sounds: Normal breath sounds.   Abdominal:      General: There is no distension.      Palpations: Abdomen is soft.      Tenderness: There is no abdominal tenderness.      Comments: Hyperactive bowel sounds   Musculoskeletal:         General: Normal range of motion.      Cervical back: Neck supple.      Right lower leg: No edema.      Left lower leg: No edema.   Skin:     General: Skin is warm and dry.      Capillary Refill: Capillary refill takes less than 2 seconds.      Findings: No erythema or rash.   Neurological:      Mental Status: She is alert and oriented to person, place, and time. Mental status is at baseline.   Psychiatric:         Mood and Affect: Mood normal.         Behavior: Behavior normal.       Significant Labs:   Microbiology Results (last 7 days)       Procedure Component Value Units Date/Time    Blood culture [[id number]] Collected: 09/08/22 1147    Order Status: Completed Specimen: Blood Updated: 09/12/22 1412     Blood Culture, Routine No Growth to date      No Growth to date      No Growth to date      No Growth to date      No Growth to date    Narrative:      Lft AC    Blood culture [[id number]] Collected: 09/08/22 1147    Order Status: Completed Specimen: Blood Updated: 09/12/22 1412     Blood Culture, Routine No Growth to date      No Growth to date      No Growth  to date      No Growth to date      No Growth to date    Narrative:      Rt wrist    Blood culture [[id number]] Collected: 09/08/22 1800    Order Status: Completed Specimen: Blood Updated: 09/11/22 2022     Blood Culture, Routine No Growth to date      No Growth to date      No Growth to date      No Growth to date    Narrative:      Please collect from 2 different sites    Blood culture [[id number]] Collected: 09/08/22 1756    Order Status: Completed Specimen: Blood Updated: 09/11/22 2022     Blood Culture, Routine No Growth to date      No Growth to date      No Growth to date      No Growth to date    Narrative:      Please collect from 2 different sites    Blood culture [[id number]] Collected: 09/10/22 1746    Order Status: Completed Specimen: Blood Updated: 09/11/22 2012     Blood Culture, Routine No Growth to date      No Growth to date    Narrative:      Please collect from 2 different sites    Blood culture [[id number]] Collected: 09/10/22 1747    Order Status: Completed Specimen: Blood Updated: 09/11/22 2012     Blood Culture, Routine No Growth to date      No Growth to date    Narrative:      Please collect from 2 different sites    Blood culture [[id number]] Collected: 09/05/22 2347    Order Status: Completed Specimen: Blood Updated: 09/11/22 0612     Blood Culture, Routine No growth after 5 days.    Blood culture [[id number]] Collected: 09/05/22 2347    Order Status: Completed Specimen: Blood Updated: 09/11/22 0612     Blood Culture, Routine No growth after 5 days.    Blood culture [[id number]] Collected: 09/04/22 2146    Order Status: Completed Specimen: Blood Updated: 09/09/22 2312     Blood Culture, Routine No growth after 5 days.    Blood culture [[id number]] Collected: 09/04/22 2148    Order Status: Completed Specimen: Blood Updated: 09/09/22 2312     Blood Culture, Routine No growth after 5 days.    Blood culture [[id number]] Collected: 09/04/22 0155    Order Status: Completed Specimen: Blood Updated:  09/09/22 0612     Blood Culture, Routine No growth after 5 days.    Blood culture [[id number]] Collected: 09/04/22 0156    Order Status: Completed Specimen: Blood Updated: 09/09/22 0612     Blood Culture, Routine No growth after 5 days.    Clostridium difficile EIA [[id number]] Collected: 09/08/22 1151    Order Status: Completed Specimen: Stool Updated: 09/08/22 2244     C. diff Antigen Negative     C difficile Toxins A+B, EIA Negative     Comment: Testing not recommended for children <24 months old.             All pertinent labs within the past 24 hours have been reviewed.  Recent Lab Results         09/12/22  0625        Albumin 2.6       Alkaline Phosphatase 38              Anion Gap 10       Aniso Slight              Baso # 0.01       Basophil % 0.3       BILIRUBIN TOTAL 0.6  Comment: For infants and newborns, interpretation of results should be based  on gestational age, weight and in agreement with clinical  observations.    Premature Infant recommended reference ranges:  Up to 24 hours.............<8.0 mg/dL  Up to 48 hours............<12.0 mg/dL  3-5 days..................<15.0 mg/dL  6-29 days.................<15.0 mg/dL         BUN 4       Calcium 8.2       Chloride 110       CO2 23       Creatinine 0.8       Differential Method Automated       Dohle Bodies Present       eGFR >60.0       Eos # 0.0       Eosinophil % 0.5       Glucose 122       Gran # (ANC) 2.7       Gran % 68.7       Hematocrit 22.7       Hemoglobin 7.7       Hypo Occasional       Immature Grans (Abs) 0.07  Comment: Mild elevation in immature granulocytes is non specific and   can be seen in a variety of conditions including stress response,   acute inflammation, trauma and pregnancy. Correlation with other   laboratory and clinical findings is essential.         Immature Granulocytes 1.8       Lymph # 0.5       Lymph % 13.3       Magnesium 1.7       MCH 31.6       MCHC 33.9       MCV 93       Mono # 0.6       Mono % 15.4        MPV 10.2       nRBC 0       Ovalocytes Occasional       Platelet Estimate Decreased       Platelets 120       Poikilocytosis Slight       Poly Occasional       Potassium 3.2       PROTEIN TOTAL 5.3       RBC 2.44       RDW 14.9       Sodium 143       Spherocytes Occasional       Teardrop Cells Occasional       Toxic Granulation Present       WBC 3.90               Significant Imaging: I have reviewed all pertinent imaging results/findings within the past 24 hours.

## 2022-09-12 NOTE — ASSESSMENT & PLAN NOTE
35yo F presenting with N/V/D in setting of elevated lipases and transaminases due to and acute pancreatitis likely secondary to recent Paclitaxel infusion. Physical exam findings of epigastric pain. CT CAP notable for peripancreatic fat stranding, but no areas of necrosis. US Liver negative for findings suspicious of cholecystitis, cholelithiasis, and choledocholithiasis. GB sludge seen on imaging without presence of gallstones. Lipase now corrected into normal range. Transaminitis trending down.     Recommendations:   - Recommend IV fluids and bowel rests  - Advance diet as tolerated   - Monitor/trend CMP and transaminases  - Continue ampicillin-sulbactam while in house. Amoxicillin-clavulanate 875 mg PO BID on discharge through 9/21/22.  - Avoid fatty foods, fried foods and alcohol.  - Monitor/maintain hydration status.

## 2022-09-12 NOTE — ASSESSMENT & PLAN NOTE
Pt had subjective fevers at home unmeasured, abdominal pain, polyuria In setting of low WBC and recent chemotherapy. During admission patient was febrile intermittently and had U/A showing WBCs and bacteria. Initially started treatment with cefepime. Flagyl added due to ongoing fevers. Fever curve was initially downtrending with negative cultures. However she had fever as high as 103. Will restart septic work up as fever is currently of unknown origin and add vancomycin. Consulted ID and per recommendations have deescalated abx to unasyn given pt has has negative testing for pseudomonas and MRSA. Can also consider chemo causing fever.     - Unasyn   - Cdiff studies negative  - Repeat blood cultures, NGTD  - ID contsulted, appreciate recs   - If she continues to fever over the next 24 hours on Unasyn will consider port removal

## 2022-09-12 NOTE — SUBJECTIVE & OBJECTIVE
Interval History: Appears comfortable at bedside, states her abdominal pain is improving, however still gets occasional myalgia, fever, chills. Her temperature overnight was 100.8. She was also experiencing around 5-8 episodes of loose stools overnight, green in appearance. States the loperamide has cut it down from 15 times a day to around 5-8. Denies any headache, neck pain, chest pain, cough, dyspnea, urinary symptoms.     Oncology Treatment Plan:   OP PEMBROLIZUMAB CARBOPLATIN (AUC 5) WITH WEEKLY PACLITAXEL FOLLOWED BY PEMBROLIZUMAB DOXORUBICIN CYCLOPHOSPHAMIDE FOLLOWED BY PEMBROLIZUMAB 200 MG Q3W    Medications:  Continuous Infusions:   sodium chloride 0.9% 150 mL/hr at 09/12/22 1342     Scheduled Meds:   ampicillin-sulbactim (UNASYN) IVPB  3 g Intravenous Q6H    dicyclomine  10 mg Oral TID    enoxaparin  40 mg Subcutaneous Daily    magnesium sulfate IVPB  2 g Intravenous Once    ondansetron  4 mg Intravenous Q6H    simethicone  1 tablet Oral TID PC     PRN Meds:dextrose 10%, dextrose 10%, glucagon (human recombinant), glucose, glucose, ibuprofen, loperamide, naloxone, oxyCODONE, polyethylene glycol, prochlorperazine, sodium chloride 0.9%       Objective:     Vital Signs (Most Recent):  Temp: 97.9 °F (36.6 °C) (09/12/22 1148)  Pulse: 82 (09/12/22 1148)  Resp: 16 (09/12/22 1148)  BP: 128/73 (09/12/22 1148)  SpO2: 96 % (09/12/22 1148) Vital Signs (24h Range):  Temp:  [97.9 °F (36.6 °C)-101.4 °F (38.6 °C)] 97.9 °F (36.6 °C)  Pulse:  [] 82  Resp:  [16-18] 16  SpO2:  [95 %-99 %] 96 %  BP: (114-135)/(58-73) 128/73     Weight: 105.5 kg (232 lb 9.4 oz)  Body mass index is 37.54 kg/m².  Body surface area is 2.22 meters squared.      Intake/Output Summary (Last 24 hours) at 9/12/2022 1405  Last data filed at 9/12/2022 0211  Gross per 24 hour   Intake 2572.81 ml   Output --   Net 2572.81 ml       Physical Exam  HENT:      Head: Normocephalic.      Nose: Nose normal.      Mouth/Throat:      Mouth: Mucous membranes  are moist.   Eyes:      Pupils: Pupils are equal, round, and reactive to light.   Cardiovascular:      Rate and Rhythm: Normal rate.   Pulmonary:      Effort: Pulmonary effort is normal.   Abdominal:      General: Abdomen is flat.      Tenderness: There is abdominal tenderness.   Musculoskeletal:         General: Normal range of motion.      Cervical back: Normal range of motion.   Skin:     General: Skin is warm.   Neurological:      General: No focal deficit present.      Mental Status: She is alert and oriented to person, place, and time.   Psychiatric:         Mood and Affect: Mood normal.       Significant Labs:   All pertinent labs from the last 24 hours have been reviewed.    Diagnostic Results:  I have reviewed all pertinent imaging results/findings within the past 24 hours.

## 2022-09-12 NOTE — CONSULTS
[facility] - [facility] ([facility])  Gastroenterology  Consult Note    Patient Name: Laura Kent  MRN: [id number]  Admission Date: 9/3/2022  Hospital Length of Stay: 9 days  Code Status: Full Code   Attending Provider: Enoch Goodwin MD   Consulting Provider: Yousuf Carr MD  Primary Care Physician: Primary Doctor No  Principal Problem:Acute pancreatitis    Inpatient consult to Gastroenterology  Consult performed by: Yousuf Carr MD  Consult ordered by: Payal Urena DO  Reason for consult: GB sludge  Assessment/Recommendations: GB Sludge present on imaging with no additional concerning pathology. Pancreatitis likely related to recent Paclitaxel infusion. See recommendations.        Subjective:     HPI:  Ms Kent is a 35yo F with PMH of non-metastatic breast cancer treated with paclitaxel (C4D15 on 8/29/2022) who presented with nausea, vomiting, abdominal pain 9/3/2022 and diagnosed with  acute pancreatitis, lipase elevated at 219. Pt with transient fevers and with new transaminitis on 9/08/2022. AST/ALT peaked at 391/234 (9/10-9/11). Pt stated that she was experiencing acute/generalized 10/10 intensity, abdominal pain s/p prior Paclitaxel infusion. BMs described as liquid and green in color with 10-15 motions daily. Appetite is decreased , secondary to abdominal discomfort. HIV and hepatitis panel negative. Ucx and Bcx negtaive. CT CAP notable for peripancreatic fat stranding, but no areas of necrosis. No notable Gall bladder pathology or ductal dilatation. US liver positive for gall bladder sludge without wall thickening, visible stones, or ductal dilatation. Since admission, pt noted that her pain has decreased to 7/10 and she is tolerating a lite PO diet. Pancreatic enzymes normalized. Liver enzymes now normalizing. GI was consulted to comment on US findings of GB sludge.       Past Medical History:   Diagnosis Date    Anxiety     Breast cancer     Encounter for blood transfusion     Hypertension      Meningitis        Past Surgical History:   Procedure Laterality Date    BREAST BIOPSY Left      SECTION      INSERTION OF TUNNELED CENTRAL VENOUS CATHETER (CVC) WITH SUBCUTANEOUS PORT N/A 2022    Procedure: [id number];  Surgeon: Deo Sin Jr., MD;  Location: [facility] OR [address] [place] [address];  Service: General;  Laterality: N/A;    INSERTION OF TUNNELED CENTRAL VENOUS CATHETER (CVC) WITH SUBCUTANEOUS PORT N/A 2022    Procedure: INSERTION, SINGLE LUMEN CATHETER WITH PORT, WITH FLUOROSCOPIC GUIDANCE;  Surgeon: Ryder Dinero MD;  Location: [facility] CATH LAB;  Service: Vascular;  Laterality: N/A;       Review of patient's allergies indicates:   Allergen Reactions    Strawberries [strawberry] Hives     Family History       Problem Relation (Age of Onset)    Bladder Cancer Paternal Grandmother    Diabetes Father    Hypertension Father    Pancreatic cancer Maternal Uncle          Tobacco Use    Smoking status: Never    Smokeless tobacco: Never   Substance and Sexual Activity    Alcohol use: Yes     Comment: social    Drug use: Never    Sexual activity: Not Currently     Review of Systems   Constitutional:  Positive for appetite change. Negative for chills and fever.   HENT:  Negative for rhinorrhea and sore throat.    Respiratory:  Negative for cough and shortness of breath.    Cardiovascular:  Negative for chest pain and leg swelling.   Gastrointestinal:  Positive for abdominal pain, diarrhea and nausea. Negative for vomiting.   Genitourinary:  Negative for dysuria and hematuria.   Musculoskeletal:  Negative for arthralgias, back pain and myalgias.   Skin:  Negative for rash.   Allergic/Immunologic: Positive for immunocompromised state.   Neurological:  Negative for headaches.   Hematological:  Does not bruise/bleed easily.   Psychiatric/Behavioral:  Negative for behavioral problems.    All other systems reviewed and are negative.  Objective:     Vital Signs (Most Recent):  Temp: 98.4 °F (36.9 °C)  (09/12/22 0726)  Pulse: 85 (09/12/22 0726)  Resp: 16 (09/12/22 0924)  BP: 114/60 (09/12/22 0726)  SpO2: 98 % (09/12/22 0726) Vital Signs (24h Range):  Temp:  [98.4 °F (36.9 °C)-101.4 °F (38.6 °C)] 98.4 °F (36.9 °C)  Pulse:  [] 85  Resp:  [16-18] 16  SpO2:  [95 %-99 %] 98 %  BP: (114-135)/(58-72) 114/60     Weight: 105.5 kg (232 lb 9.4 oz) (09/04/22 0220)  Body mass index is 37.54 kg/m².      Intake/Output Summary (Last 24 hours) at 9/12/2022 1052  Last data filed at 9/12/2022 0211  Gross per 24 hour   Intake 2572.81 ml   Output no documentation   Net 2572.81 ml       Lines/Drains/Airways       Central Venous Catheter Line       Name Duration    Port A Cath Single Lumen right atrial No Data         PowerPort A Cath Single Lumen 09/05/22 0323 right subclavian 7 days                    Physical Exam  Vitals and nursing note reviewed.   Constitutional:       General: She is not in acute distress.     Appearance: She is well-developed. She is obese. She is not toxic-appearing.   HENT:      Head: Normocephalic and atraumatic.      Right Ear: External ear normal.      Left Ear: External ear normal.      Nose: Nose normal.      Mouth/Throat:      Mouth: Mucous membranes are moist.   Eyes:      General: No scleral icterus.     Extraocular Movements: Extraocular movements intact.      Conjunctiva/sclera: Conjunctivae normal.   Cardiovascular:      Rate and Rhythm: Normal rate and regular rhythm.      Heart sounds: No murmur heard.  Pulmonary:      Effort: Pulmonary effort is normal. No respiratory distress.      Breath sounds: Normal breath sounds.   Abdominal:      General: Bowel sounds are normal. There is no distension.      Palpations: Abdomen is soft. There is no mass.      Tenderness: There is abdominal tenderness. There is no right CVA tenderness, left CVA tenderness or guarding.      Comments: Hyperactive bowel sounds   Musculoskeletal:         General: Normal range of motion.      Cervical back: Neck supple.       Right lower leg: No edema.      Left lower leg: No edema.   Skin:     General: Skin is warm and dry.      Capillary Refill: Capillary refill takes less than 2 seconds.      Findings: No erythema or rash.   Neurological:      Mental Status: She is alert and oriented to person, place, and time. Mental status is at baseline.   Psychiatric:         Mood and Affect: Mood normal.         Behavior: Behavior normal.       Significant Labs:  CBC:   Recent Labs   Lab 09/11/22  0515 09/12/22  0625   WBC 4.11 3.90   HGB 7.7* 7.7*   HCT 22.7* 22.7*   * 120*     CMP:   Recent Labs   Lab 09/12/22  0625   *   CALCIUM 8.2*   ALBUMIN 2.6*   PROT 5.3*      K 3.2*   CO2 23      BUN 4*   CREATININE 0.8   ALKPHOS 38*   *   *   BILITOT 0.6         Assessment/Plan:     * Acute pancreatitis  37yo F presenting with N/V/D in setting of elevated lipases and transaminases due to and acute pancreatitis likely secondary to recent Paclitaxel infusion. Diarrhea occurrences reported >10/day. Physical exam findings of epigastric pain. CT CAP notable for peripancreatic fat stranding, but no areas of necrosis. US Liver negative for findings suspicious of cholecystitis, cholelithiasis, and choledocholithiasis. GB sludge seen on imaging without presence of gallstones. Lipase now corrected into normal range. Transaminitis trending down.   Recommendations:   - Recommend IV fluids and bowel rest  - Clear liquids and advance diet as tolerated   - Recommend loperamide 2mg 2-4hr daily for severe chemo related diarrhea.  - Trend/maintain hydration status.  - Trend CMP and transaminases  - Continue ampicillin-sulbactam while in house. Amoxicillin-clavulanate 875 mg PO BID on discharge through 9/21/22.  - Avoid fatty foods, fried foods and alcohol.        Thank you for your consult. I will sign off. Please contact us if you have any additional questions.    Yousuf Carr MD  Gastroenterology  [facility] - Oncology  ([facility])

## 2022-09-12 NOTE — ASSESSMENT & PLAN NOTE
Patient having increasing LFTs this admission. CTAP this admission did not show liver mets or biliary obstruction. US abdomen ordered to better visualize biliary tree. AST: ALT ratio < 2 so low concern for EtOH hepatitis. Patient switched from acetaminophen to advil. HBV surface and core ab negative    - US abdomen showed biliary sludge, consulted GI per ID recs   - Advance diet as tolerated  - Per GI recs, Unasyn through admission, augmentin 875 po bid through 8/21/22 on discharge  - RPR pending  - TORIN and anti-smooth muscle pending

## 2022-09-12 NOTE — SUBJECTIVE & OBJECTIVE
Past Medical History:   Diagnosis Date    Anxiety     Breast cancer     Encounter for blood transfusion     Hypertension     Meningitis        Past Surgical History:   Procedure Laterality Date    BREAST BIOPSY Left      SECTION      INSERTION OF TUNNELED CENTRAL VENOUS CATHETER (CVC) WITH SUBCUTANEOUS PORT N/A 2022    Procedure: [id number];  Surgeon: Deo Sin Jr., MD;  Location: [facility] OR [address] [place] [address];  Service: General;  Laterality: N/A;    INSERTION OF TUNNELED CENTRAL VENOUS CATHETER (CVC) WITH SUBCUTANEOUS PORT N/A 2022    Procedure: INSERTION, SINGLE LUMEN CATHETER WITH PORT, WITH FLUOROSCOPIC GUIDANCE;  Surgeon: Ryder Dinero MD;  Location: [facility] CATH LAB;  Service: Vascular;  Laterality: N/A;       Review of patient's allergies indicates:   Allergen Reactions    Strawberries [strawberry] Hives     Family History       Problem Relation (Age of Onset)    Bladder Cancer Paternal Grandmother    Diabetes Father    Hypertension Father    Pancreatic cancer Maternal Uncle          Tobacco Use    Smoking status: Never    Smokeless tobacco: Never   Substance and Sexual Activity    Alcohol use: Yes     Comment: social    Drug use: Never    Sexual activity: Not Currently     Review of Systems   Constitutional:  Positive for appetite change. Negative for chills and fever.   HENT:  Negative for rhinorrhea and sore throat.    Respiratory:  Negative for cough and shortness of breath.    Cardiovascular:  Negative for chest pain and leg swelling.   Gastrointestinal:  Positive for abdominal pain, diarrhea and nausea. Negative for vomiting.   Genitourinary:  Negative for dysuria and hematuria.   Musculoskeletal:  Negative for arthralgias, back pain and myalgias.   Skin:  Negative for rash.   Allergic/Immunologic: Positive for immunocompromised state.   Neurological:  Negative for headaches.   Hematological:  Does not bruise/bleed easily.   Psychiatric/Behavioral:  Negative for behavioral  problems.    All other systems reviewed and are negative.  Objective:     Vital Signs (Most Recent):  Temp: 98.4 °F (36.9 °C) (09/12/22 0726)  Pulse: 85 (09/12/22 0726)  Resp: 16 (09/12/22 0924)  BP: 114/60 (09/12/22 0726)  SpO2: 98 % (09/12/22 0726) Vital Signs (24h Range):  Temp:  [98.4 °F (36.9 °C)-101.4 °F (38.6 °C)] 98.4 °F (36.9 °C)  Pulse:  [] 85  Resp:  [16-18] 16  SpO2:  [95 %-99 %] 98 %  BP: (114-135)/(58-72) 114/60     Weight: 105.5 kg (232 lb 9.4 oz) (09/04/22 0220)  Body mass index is 37.54 kg/m².      Intake/Output Summary (Last 24 hours) at 9/12/2022 1052  Last data filed at 9/12/2022 0211  Gross per 24 hour   Intake 2572.81 ml   Output no documentation   Net 2572.81 ml       Lines/Drains/Airways       Central Venous Catheter Line       Name Duration    Port A Cath Single Lumen right atrial No Data         PowerPort A Cath Single Lumen 09/05/22 0323 right subclavian 7 days                    Physical Exam  Vitals and nursing note reviewed.   Constitutional:       General: She is not in acute distress.     Appearance: She is well-developed. She is obese. She is not toxic-appearing.   HENT:      Head: Normocephalic and atraumatic.      Right Ear: External ear normal.      Left Ear: External ear normal.      Nose: Nose normal.      Mouth/Throat:      Mouth: Mucous membranes are moist.   Eyes:      General: No scleral icterus.     Extraocular Movements: Extraocular movements intact.      Conjunctiva/sclera: Conjunctivae normal.   Cardiovascular:      Rate and Rhythm: Normal rate and regular rhythm.      Heart sounds: No murmur heard.  Pulmonary:      Effort: Pulmonary effort is normal. No respiratory distress.      Breath sounds: Normal breath sounds.   Abdominal:      General: Bowel sounds are normal. There is no distension.      Palpations: Abdomen is soft. There is no mass.      Tenderness: There is abdominal tenderness. There is no right CVA tenderness, left CVA tenderness or guarding.       Comments: Hyperactive bowel sounds   Musculoskeletal:         General: Normal range of motion.      Cervical back: Neck supple.      Right lower leg: No edema.      Left lower leg: No edema.   Skin:     General: Skin is warm and dry.      Capillary Refill: Capillary refill takes less than 2 seconds.      Findings: No erythema or rash.   Neurological:      Mental Status: She is alert and oriented to person, place, and time. Mental status is at baseline.   Psychiatric:         Mood and Affect: Mood normal.         Behavior: Behavior normal.       Significant Labs:  CBC:   Recent Labs   Lab 09/11/22  0515 09/12/22  0625   WBC 4.11 3.90   HGB 7.7* 7.7*   HCT 22.7* 22.7*   * 120*     CMP:   Recent Labs   Lab 09/12/22  0625   *   CALCIUM 8.2*   ALBUMIN 2.6*   PROT 5.3*      K 3.2*   CO2 23      BUN 4*   CREATININE 0.8   ALKPHOS 38*   *   *   BILITOT 0.6

## 2022-09-12 NOTE — PLAN OF CARE
Rounded on pt Q1H and ambulates self around the room. VSS throughout shift. 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

## 2022-09-12 NOTE — PROGRESS NOTES
Chanda sent a message to Karla Landers of [facility] asking her to consider paying October rent for this pt.  SW remains available.

## 2022-09-13 LAB
ALBUMIN SERPL BCP-MCNC: 2.5 G/DL (ref 3.5–5.2)
ALP SERPL-CCNC: 34 U/L (ref 55–135)
ALT SERPL W/O P-5'-P-CCNC: 191 U/L (ref 10–44)
ANION GAP SERPL CALC-SCNC: 12 MMOL/L (ref 8–16)
ANION GAP SERPL CALC-SCNC: 8 MMOL/L (ref 8–16)
ANTI SM ANTIBODY: 0.07 RATIO (ref 0–0.99)
ANTI SM/RNP ANTIBODY: 0.06 RATIO (ref 0–0.99)
ANTI-SM INTERPRETATION: NEGATIVE
ANTI-SM/RNP INTERPRETATION: NEGATIVE
ANTI-SSA ANTIBODY: 0.06 RATIO (ref 0–0.99)
ANTI-SSA INTERPRETATION: NEGATIVE
ANTI-SSB ANTIBODY: 0.04 RATIO (ref 0–0.99)
ANTI-SSB INTERPRETATION: NEGATIVE
AST SERPL-CCNC: 130 U/L (ref 10–40)
BACTERIA BLD CULT: NORMAL
BASOPHILS # BLD AUTO: 0 K/UL (ref 0–0.2)
BASOPHILS NFR BLD: 0 % (ref 0–1.9)
BILIRUB SERPL-MCNC: 0.5 MG/DL (ref 0.1–1)
BUN SERPL-MCNC: 4 MG/DL (ref 6–20)
BUN SERPL-MCNC: 4 MG/DL (ref 6–20)
CALCIUM SERPL-MCNC: 8.4 MG/DL (ref 8.7–10.5)
CALCIUM SERPL-MCNC: 8.9 MG/DL (ref 8.7–10.5)
CHLORIDE SERPL-SCNC: 108 MMOL/L (ref 95–110)
CHLORIDE SERPL-SCNC: 109 MMOL/L (ref 95–110)
CK SERPL-CCNC: 49 U/L (ref 20–180)
CO2 SERPL-SCNC: 23 MMOL/L (ref 23–29)
CO2 SERPL-SCNC: 23 MMOL/L (ref 23–29)
CREAT SERPL-MCNC: 0.8 MG/DL (ref 0.5–1.4)
CREAT SERPL-MCNC: 0.8 MG/DL (ref 0.5–1.4)
CRYPTOSP AG STL QL IA: NEGATIVE
DIFFERENTIAL METHOD: ABNORMAL
DSDNA AB SER-ACNC: NORMAL [IU]/ML
EOSINOPHIL # BLD AUTO: 0 K/UL (ref 0–0.5)
EOSINOPHIL NFR BLD: 0.3 % (ref 0–8)
ERYTHROCYTE [DISTWIDTH] IN BLOOD BY AUTOMATED COUNT: 15.2 % (ref 11.5–14.5)
EST. GFR  (NO RACE VARIABLE): >60 ML/MIN/1.73 M^2
EST. GFR  (NO RACE VARIABLE): >60 ML/MIN/1.73 M^2
G LAMBLIA AG STL QL IA: NEGATIVE
GLUCOSE SERPL-MCNC: 110 MG/DL (ref 70–110)
GLUCOSE SERPL-MCNC: 99 MG/DL (ref 70–110)
HCT VFR BLD AUTO: 21.1 % (ref 37–48.5)
HGB BLD-MCNC: 7.3 G/DL (ref 12–16)
IMM GRANULOCYTES # BLD AUTO: 0.07 K/UL (ref 0–0.04)
IMM GRANULOCYTES NFR BLD AUTO: 1.8 % (ref 0–0.5)
LYMPHOCYTES # BLD AUTO: 0.7 K/UL (ref 1–4.8)
LYMPHOCYTES NFR BLD: 16.8 % (ref 18–48)
MAGNESIUM SERPL-MCNC: 1.7 MG/DL (ref 1.6–2.6)
MCH RBC QN AUTO: 32 PG (ref 27–31)
MCHC RBC AUTO-ENTMCNC: 34.6 G/DL (ref 32–36)
MCV RBC AUTO: 93 FL (ref 82–98)
MONOCYTES # BLD AUTO: 0.7 K/UL (ref 0.3–1)
MONOCYTES NFR BLD: 17 % (ref 4–15)
NEUTROPHILS # BLD AUTO: 2.6 K/UL (ref 1.8–7.7)
NEUTROPHILS NFR BLD: 64.1 % (ref 38–73)
NRBC BLD-RTO: 1 /100 WBC
PLATELET # BLD AUTO: 116 K/UL (ref 150–450)
PLATELET BLD QL SMEAR: ABNORMAL
PMV BLD AUTO: 9.9 FL (ref 9.2–12.9)
POTASSIUM SERPL-SCNC: 3.2 MMOL/L (ref 3.5–5.1)
POTASSIUM SERPL-SCNC: 3.2 MMOL/L (ref 3.5–5.1)
PROT SERPL-MCNC: 5.2 G/DL (ref 6–8.4)
RBC # BLD AUTO: 2.28 M/UL (ref 4–5.4)
SODIUM SERPL-SCNC: 140 MMOL/L (ref 136–145)
SODIUM SERPL-SCNC: 143 MMOL/L (ref 136–145)
WBC # BLD AUTO: 3.99 K/UL (ref 3.9–12.7)

## 2022-09-13 PROCEDURE — 63600175 PHARM REV CODE 636 W HCPCS: Performed by: STUDENT IN AN ORGANIZED HEALTH CARE EDUCATION/TRAINING PROGRAM

## 2022-09-13 PROCEDURE — 80053 COMPREHEN METABOLIC PANEL: CPT | Performed by: STUDENT IN AN ORGANIZED HEALTH CARE EDUCATION/TRAINING PROGRAM

## 2022-09-13 PROCEDURE — 85025 COMPLETE CBC W/AUTO DIFF WBC: CPT | Performed by: STUDENT IN AN ORGANIZED HEALTH CARE EDUCATION/TRAINING PROGRAM

## 2022-09-13 PROCEDURE — 99233 PR SUBSEQUENT HOSPITAL CARE,LEVL III: ICD-10-PCS | Mod: ,,, | Performed by: INTERNAL MEDICINE

## 2022-09-13 PROCEDURE — 25000003 PHARM REV CODE 250: Performed by: INTERNAL MEDICINE

## 2022-09-13 PROCEDURE — 82550 ASSAY OF CK (CPK): CPT | Performed by: STUDENT IN AN ORGANIZED HEALTH CARE EDUCATION/TRAINING PROGRAM

## 2022-09-13 PROCEDURE — 25000003 PHARM REV CODE 250: Performed by: STUDENT IN AN ORGANIZED HEALTH CARE EDUCATION/TRAINING PROGRAM

## 2022-09-13 PROCEDURE — 94761 N-INVAS EAR/PLS OXIMETRY MLT: CPT

## 2022-09-13 PROCEDURE — 99233 SBSQ HOSP IP/OBS HIGH 50: CPT | Mod: ,,, | Performed by: INTERNAL MEDICINE

## 2022-09-13 PROCEDURE — 63600175 PHARM REV CODE 636 W HCPCS: Performed by: INTERNAL MEDICINE

## 2022-09-13 PROCEDURE — 83735 ASSAY OF MAGNESIUM: CPT | Performed by: STUDENT IN AN ORGANIZED HEALTH CARE EDUCATION/TRAINING PROGRAM

## 2022-09-13 PROCEDURE — 20600001 HC STEP DOWN PRIVATE ROOM

## 2022-09-13 PROCEDURE — 25000003 PHARM REV CODE 250

## 2022-09-13 PROCEDURE — 80048 BASIC METABOLIC PNL TOTAL CA: CPT | Mod: XB

## 2022-09-13 PROCEDURE — 63600175 PHARM REV CODE 636 W HCPCS

## 2022-09-13 RX ORDER — NYSTATIN 100000 [USP'U]/ML
500000 SUSPENSION ORAL 4 TIMES DAILY
Status: DISCONTINUED | OUTPATIENT
Start: 2022-09-13 | End: 2022-09-19

## 2022-09-13 RX ORDER — POTASSIUM CHLORIDE 20 MEQ/1
40 TABLET, EXTENDED RELEASE ORAL ONCE
Status: COMPLETED | OUTPATIENT
Start: 2022-09-13 | End: 2022-09-13

## 2022-09-13 RX ORDER — LOPERAMIDE HYDROCHLORIDE 2 MG/1
2 CAPSULE ORAL EVERY 4 HOURS PRN
Status: DISCONTINUED | OUTPATIENT
Start: 2022-09-13 | End: 2022-09-15

## 2022-09-13 RX ADMIN — DICYCLOMINE HYDROCHLORIDE 10 MG: 10 CAPSULE ORAL at 08:09

## 2022-09-13 RX ADMIN — NYSTATIN 500000 UNITS: 500000 SUSPENSION ORAL at 08:09

## 2022-09-13 RX ADMIN — ENOXAPARIN SODIUM 40 MG: 100 INJECTION SUBCUTANEOUS at 04:09

## 2022-09-13 RX ADMIN — NYSTATIN 500000 UNITS: 500000 SUSPENSION ORAL at 04:09

## 2022-09-13 RX ADMIN — LOPERAMIDE HYDROCHLORIDE 2 MG: 2 CAPSULE ORAL at 08:09

## 2022-09-13 RX ADMIN — AMPICILLIN AND SULBACTAM 3 G: 2; 1 INJECTION, POWDER, FOR SOLUTION INTRAMUSCULAR; INTRAVENOUS at 02:09

## 2022-09-13 RX ADMIN — OXYCODONE HYDROCHLORIDE 10 MG: 10 TABLET ORAL at 08:09

## 2022-09-13 RX ADMIN — PROCHLORPERAZINE EDISYLATE 5 MG: 5 INJECTION INTRAMUSCULAR; INTRAVENOUS at 09:09

## 2022-09-13 RX ADMIN — ONDANSETRON 4 MG: 2 INJECTION INTRAMUSCULAR; INTRAVENOUS at 12:09

## 2022-09-13 RX ADMIN — LOPERAMIDE HYDROCHLORIDE 4 MG: 2 CAPSULE ORAL at 06:09

## 2022-09-13 RX ADMIN — ONDANSETRON 4 MG: 2 INJECTION INTRAMUSCULAR; INTRAVENOUS at 05:09

## 2022-09-13 RX ADMIN — ONDANSETRON 4 MG: 2 INJECTION INTRAMUSCULAR; INTRAVENOUS at 06:09

## 2022-09-13 RX ADMIN — OXYCODONE HYDROCHLORIDE 10 MG: 10 TABLET ORAL at 02:09

## 2022-09-13 RX ADMIN — OXYCODONE HYDROCHLORIDE 10 MG: 10 TABLET ORAL at 11:09

## 2022-09-13 RX ADMIN — LOPERAMIDE HYDROCHLORIDE 2 MG: 2 CAPSULE ORAL at 02:09

## 2022-09-13 RX ADMIN — POTASSIUM CHLORIDE 40 MEQ: 1500 TABLET, EXTENDED RELEASE ORAL at 02:09

## 2022-09-13 RX ADMIN — DICYCLOMINE HYDROCHLORIDE 10 MG: 10 CAPSULE ORAL at 02:09

## 2022-09-13 RX ADMIN — DAPTOMYCIN 1055 MG: 350 INJECTION, POWDER, LYOPHILIZED, FOR SOLUTION INTRAVENOUS at 04:09

## 2022-09-13 RX ADMIN — AMPICILLIN AND SULBACTAM 3 G: 2; 1 INJECTION, POWDER, FOR SOLUTION INTRAMUSCULAR; INTRAVENOUS at 03:09

## 2022-09-13 RX ADMIN — POTASSIUM BICARBONATE 60 MEQ: 391 TABLET, EFFERVESCENT ORAL at 08:09

## 2022-09-13 RX ADMIN — OXYCODONE HYDROCHLORIDE 10 MG: 10 TABLET ORAL at 06:09

## 2022-09-13 RX ADMIN — AMPICILLIN AND SULBACTAM 3 G: 2; 1 INJECTION, POWDER, FOR SOLUTION INTRAMUSCULAR; INTRAVENOUS at 10:09

## 2022-09-13 RX ADMIN — ALUMINUM HYDROXIDE, MAGNESIUM HYDROXIDE, AND DIMETHICONE 10 ML: 400; 400; 40 SUSPENSION ORAL at 08:09

## 2022-09-13 RX ADMIN — ONDANSETRON 4 MG: 2 INJECTION INTRAMUSCULAR; INTRAVENOUS at 11:09

## 2022-09-13 RX ADMIN — AMPICILLIN AND SULBACTAM 3 G: 2; 1 INJECTION, POWDER, FOR SOLUTION INTRAMUSCULAR; INTRAVENOUS at 08:09

## 2022-09-13 NOTE — PROGRESS NOTES
[facility] ([facility])  Hematology/Oncology  Progress Note    Patient Name: Laura Kent  Admission Date: 9/3/2022  Hospital Length of Stay: 10 days  Code Status: Full Code     Subjective:     HPI:  36 years old with HTN, breast cancer  C4D20 Taxol on 8/29/2022 f/u with Dr. Cruz presents with worsening abdominal pain, nasuea and vomiting. Patient was in pain during the interview and asking for better pain control. She states one week history of gradually worsening abdominal pain worst in epigastric area without radiation. She had minimal po intake since then. She associate emesis x2 NBNB. She had one small BM this afternoon prior to her presentation.     Patient denies chest pain, shortness of breath,  dysuria, polyuria, nausea, vomiting, fever, chills,  diarrhea, constipation, headache, falls, focal weakness or vision changes.     Ed course:   On presentation she was HDS and afebrile. On exam she was mod-sever  abdominal pain to palpitation, no peritonitic signs. Labs were significant for elevated lipase >200. CT with findings concerning for pancreatitis wo fluid/ abscess collection. Medical onc consulted for admit.           Interval History: No acute events overnight. Patient still with multiple stool studies pending as well as GI evaluation. However she is doing well on current antibiotics. Will keep intermittent IVF.     Oncology Treatment Plan:   OP PEMBROLIZUMAB CARBOPLATIN (AUC 5) WITH WEEKLY PACLITAXEL FOLLOWED BY PEMBROLIZUMAB DOXORUBICIN CYCLOPHOSPHAMIDE FOLLOWED BY PEMBROLIZUMAB 200 MG Q3W    Medications:  Continuous Infusions:   lactated ringers 100 mL/hr at 09/12/22 1821     Scheduled Meds:   ampicillin-sulbactim (UNASYN) IVPB  3 g Intravenous Q6H    DAPTOmycin (CUBICIN) IV (PEDS and ADULTS)  10 mg/kg Intravenous Q24H    dicyclomine  10 mg Oral TID    enoxaparin  40 mg Subcutaneous Daily    magnesium sulfate IVPB  2 g Intravenous Once    ondansetron  4 mg Intravenous Q6H     simethicone  1 tablet Oral TID PC     PRN Meds:dextrose 10%, dextrose 10%, glucagon (human recombinant), glucose, glucose, ibuprofen, loperamide, naloxone, oxyCODONE, polyethylene glycol, prochlorperazine, sodium chloride 0.9%     Review of Systems   Constitutional:  Positive for appetite change. Negative for chills and fever.   HENT:  Negative for rhinorrhea and sore throat.    Respiratory:  Negative for cough and shortness of breath.    Cardiovascular:  Negative for chest pain and leg swelling.   Gastrointestinal:  Positive for abdominal pain, diarrhea, nausea and vomiting.   Genitourinary:  Negative for dysuria and hematuria.   Musculoskeletal:  Negative for arthralgias, back pain and myalgias.   Skin:  Negative for rash.   Allergic/Immunologic: Positive for immunocompromised state.   Neurological:  Negative for headaches.   Hematological:  Does not bruise/bleed easily.   Psychiatric/Behavioral:  Negative for behavioral problems.    All other systems reviewed and are negative.  Objective:     Vital Signs (Most Recent):  Temp: 100.2 °F (37.9 °C) (09/12/22 1959)  Pulse: 95 (09/12/22 1959)  Resp: 19 (09/12/22 1959)  BP: (!) 145/67 (09/12/22 1959)  SpO2: (!) 94 % (09/12/22 1959)   Vital Signs (24h Range):  Temp:  [97.9 °F (36.6 °C)-100.8 °F (38.2 °C)] 100.2 °F (37.9 °C)  Pulse:  [] 95  Resp:  [16-19] 19  SpO2:  [94 %-99 %] 94 %  BP: (114-145)/(60-73) 145/67     Weight: 105.5 kg (232 lb 9.4 oz)  Body mass index is 37.54 kg/m².  Body surface area is 2.22 meters squared.      Intake/Output Summary (Last 24 hours) at 9/12/2022 2152  Last data filed at 9/12/2022 1821  Gross per 24 hour   Intake 3103.22 ml   Output --   Net 3103.22 ml       Physical Exam  HENT:      Head: Normocephalic.      Nose: Nose normal.      Mouth/Throat:      Mouth: Mucous membranes are moist.   Eyes:      Pupils: Pupils are equal, round, and reactive to light.   Cardiovascular:      Rate and Rhythm: Normal rate.   Pulmonary:      Effort:  Pulmonary effort is normal.   Abdominal:      General: Abdomen is flat.      Tenderness: There is abdominal tenderness.   Musculoskeletal:         General: Normal range of motion.      Cervical back: Normal range of motion.   Skin:     General: Skin is warm.   Neurological:      General: No focal deficit present.      Mental Status: She is alert and oriented to person, place, and time.   Psychiatric:         Mood and Affect: Mood normal.       Significant Labs:   All pertinent labs from the last 24 hours have been reviewed.    Diagnostic Results:  I have reviewed all pertinent imaging results/findings within the past 24 hours.    Assessment/Plan:     * Acute pancreatitis  36 years old with HTN, breast cancer  C4D20 Taxol on 8/29/2022 f/u with Dr. Cruz presents with worsening abdominal pain, nasuea and vomiting.  On exam she was mod-sever  abdominal pain to palpitation, no peritonitic signs. Labs were significant for elevated lipase >200. CT with findings concerning for pancreatitis wo fluid/ abscess collection. . CT scan with Mild peripancreatic fat stranding. TG levels were normal this admission, low concern for triglyceride induced pancreatitis. CT scan showed no gallstones or GB wall thickening, low concern for gallstone induced pancreatitis. Patient is clinically improving with n/v, po intake, and pain.     Most likely pancreatitis 2/2 chemo, last chemotherapy session 8/29; Taxol    - Pain: morphine IV changed to oxy PO.    - Anti-emetics: scheduled compazine and prn zofran. Improving  -  cc  - Diet advancing as tolerated  - simethicone     Elevated LFTs  Patient having increasing LFTs this admission. CTAP this admission did not show liver mets or biliary obstruction. US abdomen ordered to better visualize biliary tree. AST: ALT ratio < 2 so low concern for EtOH hepatitis. Patient switched from acetaminophen to advil. HBV surface and core ab negative    - US abdomen showed biliary sludge, consulted GI  per ID recs   - Advance diet as tolerated  - Per GI recs, Unasyn through admission, augmentin 875 po bid through 8/21/22 on discharge  - RPR pending  - TORIN and anti-smooth muscle pending     Loose stools  Patient has been having more loose stools this admission since starting antibiotics. Now stools are causing intense abdominal cramps and stools are now green     - c diff negative   - bowel regimen held   - See chemotherapy induced neutropenia  - O&P, culture, giardia, cryptosporidium, entamoeba, GI pathogen panel, strongy IgG per ID    Abdominal pain  Please read pancreatitis       Chemotherapy induced neutropenia  Pt had subjective fevers at home unmeasured, abdominal pain, polyuria In setting of low WBC and recent chemotherapy. During admission patient was febrile intermittently and had U/A showing WBCs and bacteria. Initially started treatment with cefepime. Flagyl added due to ongoing fevers. Fever curve was initially downtrending with negative cultures. However she had fever as high as 103. Will restart septic work up as fever is currently of unknown origin and add vancomycin. Consulted ID and per recommendations have deescalated abx to unasyn given pt has has negative testing for pseudomonas and MRSA. Can also consider chemo causing fever.     - Unasyn   - Cdiff studies negative  - Repeat blood cultures, NGTD  - ID contsulted, appreciate recs   - If she continues to fever over the next 24 hours on Unasyn will consider port removal    Malignant neoplasm of upper-outer quadrant of left breast in female, estrogen receptor negative  Dr. Cruz patient,  On:  Currently on cycle 4 D20 of pembrolizumab carboplatin (AUC 5) with weekly paclitaxel followed by pembrolizumab doxorubicin cyclophosphamide followed by pembrolizumab 200 mg q3w    Most recent Taxol on 8/29/2022     -- admit to medical oncology              Garth Rodgers MD  Hematology/Oncology  [facility] ([facility])      ATTENDING NOTE, ONCOLOGY  INPATIENT TEAM    As above; events of last 24 hours noted.  Patient seen and examined, chart reviewed.  Appears comfortable, in NAD, however, she complains of watery diarrhea.  Lungs are clear to auscultation.  Abdomen is soft, nontender.  Labs reviewed.    PLAN  Follow CBC and CMP daily  Recheck stools for C diff  Will talk to ID regarding her antibiotic coverage  DVT prophylaxis  PT/OT  Continue Imodium and Bentyl for symptoms  We will follow.    Enoch Goodwin MD

## 2022-09-13 NOTE — ASSESSMENT & PLAN NOTE
36-year-old female with history of breast cancer, last taxol 8/29/2022, presented with nausea, vomiting, abdominal pain 9/3/2022, diagnosed with acute pancreatitis. Patient with ongoing fevers with new transaminitis - negative blood/urine cultures, HIV, HepC, CT CAP notable for peripancreatic fat stranding, but no areas of necrosis, suggestive of pancreatitis. Patient had persistent fever, but now with lower abdominal and diarrhea that are not well explained by pancreatitis (CMV viral load not detected). Afebrile since addition of daptomycin.     Recommendations:  - Started datpomycin yesterday and now afebrile. Will continue.   - Continue ampicillin-sulbactam.   - Follow up HSV PCR (viral etiology for hepatitis). RPR, CMV, Hep B, and Hep C negative. Autoimmune work up in process.   - Will discuss with GI to determine if colonoscopy is helpful.

## 2022-09-13 NOTE — PLAN OF CARE
Rounded on pt Q1H and pt ambulating in hallway and in room. VSS throughout shift. Changes to the POC include:   -imodium increased to 2 tablets up to 8 tablets daily  -IVF d/c'd  -no emesis on shift  -PO K+ replaced w/o difficulty  -MRCP negative for surgical intervention  -pt has thrush - nystatin and dukes solution ordered  -pt has been refusing gas x   -pt continues to request pain medication and heat packs  Pending d/c once diarrhea stabilized, afebrile during shift

## 2022-09-13 NOTE — SUBJECTIVE & OBJECTIVE
Subjective:     Interval History: No acute events overnight. Pt continued to have multiple episodes diarrhea but remained afebrile over past 24hrs. Continued breakthrough abdominal pain reported.     Review of Systems   Constitutional:  Positive for appetite change. Negative for chills and fever.   HENT:  Negative for rhinorrhea and sore throat.    Respiratory:  Negative for cough and shortness of breath.    Cardiovascular:  Negative for chest pain and leg swelling.   Gastrointestinal:  Positive for abdominal pain, diarrhea, nausea and vomiting.   Genitourinary:  Negative for dysuria and hematuria.   Musculoskeletal:  Negative for arthralgias, back pain and myalgias.   Skin:  Negative for rash.   Allergic/Immunologic: Positive for immunocompromised state.   Neurological:  Negative for headaches.   Hematological:  Does not bruise/bleed easily.   Psychiatric/Behavioral:  Negative for behavioral problems.    All other systems reviewed and are negative.  Objective:     Vital Signs (Most Recent):  Temp: 98.3 °F (36.8 °C) (09/13/22 1136)  Pulse: 105 (09/13/22 1136)  Resp: 17 (09/13/22 1136)  BP: 124/71 (09/13/22 1136)  SpO2: 96 % (09/13/22 1136) Vital Signs (24h Range):  Temp:  [97.5 °F (36.4 °C)-100.2 °F (37.9 °C)] 98.3 °F (36.8 °C)  Pulse:  [] 105  Resp:  [15-20] 17  SpO2:  [94 %-99 %] 96 %  BP: (124-145)/(67-85) 124/71     Weight: 105.5 kg (232 lb 9.4 oz) (09/04/22 0220)  Body mass index is 37.54 kg/m².      Intake/Output Summary (Last 24 hours) at 9/13/2022 1140  Last data filed at 9/12/2022 1821  Gross per 24 hour   Intake 2005.27 ml   Output no documentation   Net 2005.27 ml       Lines/Drains/Airways       Central Venous Catheter Line       Name Duration    Port A Cath Single Lumen right atrial No Data         PowerPort A Cath Single Lumen 09/05/22 0323 right subclavian 8 days                    Physical Exam  HENT:      Head: Normocephalic.      Nose: Nose normal.      Mouth/Throat:      Mouth: Mucous  membranes are moist.   Eyes:      Pupils: Pupils are equal, round, and reactive to light.   Cardiovascular:      Rate and Rhythm: Normal rate.   Pulmonary:      Effort: Pulmonary effort is normal.   Abdominal:      General: Abdomen is flat.      Tenderness: There is abdominal tenderness.   Musculoskeletal:         General: Normal range of motion.      Cervical back: Normal range of motion.   Skin:     General: Skin is warm.   Neurological:      General: No focal deficit present.      Mental Status: She is alert and oriented to person, place, and time.   Psychiatric:         Mood and Affect: Mood normal.       Significant Labs:  CBC:   Recent Labs   Lab 09/12/22  0625 09/13/22  0325   WBC 3.90 3.99   HGB 7.7* 7.3*   HCT 22.7* 21.1*   * 116*     CMP:   Recent Labs   Lab 09/13/22 0325      CALCIUM 8.4*   ALBUMIN 2.5*   PROT 5.2*      K 3.2*   CO2 23      BUN 4*   CREATININE 0.8   ALKPHOS 34*   *   *   BILITOT 0.5

## 2022-09-13 NOTE — PROGRESS NOTES
[facility]   Infectious Diseases  Progress Note    Patient Name: Laura Kent  MRN: [id number]  Admission Date: 9/3/2022  Length of Stay: 10 days  Attending Physician: Enoch Goodwin MD  Primary Care Provider: Primary Doctor No    Isolation Status: No active isolations  Assessment/Plan:      * Acute pancreatitis  36-year-old female with history of breast cancer, last taxol 8/29/2022, presented with nausea, vomiting, abdominal pain 9/3/2022, diagnosed with acute pancreatitis. Patient with ongoing fevers with new transaminitis - negative blood/urine cultures, HIV, HepC, CT CAP notable for peripancreatic fat stranding, but no areas of necrosis, suggestive of pancreatitis. Patient had persistent fever, but now with lower abdominal and diarrhea that are not well explained by pancreatitis (CMV viral load not detected). Afebrile since addition of daptomycin.     Recommendations:  - Started datpomycin yesterday and now afebrile. Will continue.   - Continue ampicillin-sulbactam.   - Follow up HSV PCR (viral etiology for hepatitis). RPR, CMV, Hep B, and Hep C negative. Autoimmune work up in process.   - Will discuss with GI to determine if colonoscopy is helpful.         Anticipated Disposition: Overall improving. TBD.    Thank you for your consult. I will follow-up with patient. Please contact us if you have any additional questions.    Dimitri Campbell MD  Infectious Diseases  [facility]     Subjective:     Principal Problem:Acute pancreatitis    HPI: Ms. Laura Kent is a 36 y.o.  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 Patient with persistent fevers - she  was initially on cefepime, then vanc / metronidazole added, then transitioned to pip-tazo / vancomycin. All cultures have been NGTD. CT CAP notable for increasing peripancreatic fat stranding with multiple prominent lymph nodes. Labs notable for transaminitis. Patient reports feeling fevers. She has a right chest port, but no surrounding pain, redness, swelling. Reports abdominal pain was initially epigastric, now bilateral lower abdominal. She is having multiple episodes of diarrhea. Otherwise, no headaches, neck stiffness, SOB, cough, hematuria, dysuria, rashes.    Interval History: Patient feels better today. She continues to have abdominal pain and diarrhea, but has been afebrile since addition of daptomycin.     Review of Systems   Constitutional:  Positive for appetite change. Negative for chills and fever.   HENT:  Negative for rhinorrhea and sore throat.    Respiratory:  Negative for cough and shortness of breath.    Cardiovascular:  Negative for chest pain and leg swelling.   Gastrointestinal:  Positive for abdominal pain, diarrhea, nausea and vomiting.   Genitourinary:  Negative for dysuria and hematuria.   Musculoskeletal:  Negative for arthralgias, back pain and myalgias.   Skin:  Negative for rash.   Allergic/Immunologic: Positive for immunocompromised state.   Neurological:  Negative for headaches.   Hematological:  Does not bruise/bleed easily.   Psychiatric/Behavioral:  Negative for behavioral problems.    All other systems reviewed and are negative.  Objective:     Vital Signs (Most Recent):  Temp: 99.1 °F (37.3 °C) (09/13/22 1513)  Pulse: 89 (09/13/22 1513)  Resp: 18 (09/13/22 1513)  BP: 134/67 (09/13/22 1513)  SpO2: 97 % (09/13/22 1513) Vital Signs (24h Range):  Temp:  [97.5 °F (36.4 °C)-100.2 °F (37.9 °C)] 99.1 °F (37.3 °C)  Pulse:  [] 89  Resp:  [15-20] 18  SpO2:  [94 %-99 %] 97 %  BP: (124-145)/(67-85) 134/67     Weight: 105.5 kg (232 lb 9.4 oz)  Body mass index is 37.54  kg/m².    Estimated Creatinine Clearance: 117.1 mL/min (based on SCr of 0.8 mg/dL).    Physical Exam  Vitals and nursing note reviewed.   Constitutional:       General: She is not in acute distress.     Appearance: She is well-developed. She is obese. She is not toxic-appearing.   HENT:      Head: Normocephalic and atraumatic.      Right Ear: External ear normal.      Left Ear: External ear normal.      Nose: Nose normal.      Mouth/Throat:      Mouth: Mucous membranes are moist.   Eyes:      General: No scleral icterus.     Extraocular Movements: Extraocular movements intact.      Conjunctiva/sclera: Conjunctivae normal.   Cardiovascular:      Rate and Rhythm: Normal rate and regular rhythm.      Heart sounds: No murmur heard.  Pulmonary:      Effort: Pulmonary effort is normal. No respiratory distress.      Breath sounds: Normal breath sounds.   Abdominal:      General: There is no distension.      Palpations: Abdomen is soft.      Tenderness: There is no abdominal tenderness.      Comments: Hyperactive bowel sounds   Musculoskeletal:         General: Normal range of motion.      Cervical back: Neck supple.      Right lower leg: No edema.      Left lower leg: No edema.   Skin:     General: Skin is warm and dry.      Capillary Refill: Capillary refill takes less than 2 seconds.      Findings: No erythema or rash.   Neurological:      Mental Status: She is alert and oriented to person, place, and time. Mental status is at baseline.   Psychiatric:         Mood and Affect: Mood normal.         Behavior: Behavior normal.       Significant Labs:   Microbiology Results (last 7 days)       Procedure Component Value Units Date/Time    Blood culture [[id number]] Collected: 09/08/22 1147    Order Status: Completed Specimen: Blood Updated: 09/13/22 1412     Blood Culture, Routine No growth after 5 days.    Narrative:      Rt wrist    Blood culture [[id number]] Collected: 09/08/22 1147    Order Status: Completed Specimen: Blood  Updated: 09/13/22 1412     Blood Culture, Routine No growth after 5 days.    Narrative:      Lft AC    Blood culture [[id number]] Collected: 09/08/22 1800    Order Status: Completed Specimen: Blood Updated: 09/12/22 2022     Blood Culture, Routine No Growth to date      No Growth to date      No Growth to date      No Growth to date      No Growth to date    Narrative:      Please collect from 2 different sites    Blood culture [[id number]] Collected: 09/08/22 1756    Order Status: Completed Specimen: Blood Updated: 09/12/22 2022     Blood Culture, Routine No Growth to date      No Growth to date      No Growth to date      No Growth to date      No Growth to date    Narrative:      Please collect from 2 different sites    Blood culture [[id number]] Collected: 09/10/22 1746    Order Status: Completed Specimen: Blood Updated: 09/12/22 2012     Blood Culture, Routine No Growth to date      No Growth to date      No Growth to date    Narrative:      Please collect from 2 different sites    Blood culture [[id number]] Collected: 09/10/22 1747    Order Status: Completed Specimen: Blood Updated: 09/12/22 2012     Blood Culture, Routine No Growth to date      No Growth to date      No Growth to date    Narrative:      Please collect from 2 different sites    E. coli 0157 antigen [[id number]] Collected: 09/12/22 1732    Order Status: No result Specimen: Stool Updated: 09/12/22 1733    Stool culture [[id number]] Collected: 09/12/22 1732    Order Status: Sent Specimen: Stool Updated: 09/12/22 1732    Blood culture [[id number]] Collected: 09/05/22 2347    Order Status: Completed Specimen: Blood Updated: 09/11/22 0612     Blood Culture, Routine No growth after 5 days.    Blood culture [[id number]] Collected: 09/05/22 2347    Order Status: Completed Specimen: Blood Updated: 09/11/22 0612     Blood Culture, Routine No growth after 5 days.    Blood culture [[id number]] Collected: 09/04/22 2146    Order Status: Completed Specimen: Blood  Updated: 09/09/22 2312     Blood Culture, Routine No growth after 5 days.    Blood culture [[id number]] Collected: 09/04/22 2148    Order Status: Completed Specimen: Blood Updated: 09/09/22 2312     Blood Culture, Routine No growth after 5 days.    Blood culture [[id number]] Collected: 09/04/22 0155    Order Status: Completed Specimen: Blood Updated: 09/09/22 0612     Blood Culture, Routine No growth after 5 days.    Blood culture [[id number]] Collected: 09/04/22 0156    Order Status: Completed Specimen: Blood Updated: 09/09/22 0612     Blood Culture, Routine No growth after 5 days.    Clostridium difficile EIA [[id number]] Collected: 09/08/22 1151    Order Status: Completed Specimen: Stool Updated: 09/08/22 2244     C. diff Antigen Negative     C difficile Toxins A+B, EIA Negative     Comment: Testing not recommended for children <24 months old.             All pertinent labs within the past 24 hours have been reviewed.  Recent Lab Results         09/13/22  0325   09/12/22  1723        Albumin 2.5         Alkaline Phosphatase 34                  Anion Gap 8                  Baso # 0.00         Basophil % 0.0         BILIRUBIN TOTAL 0.5  Comment: For infants and newborns, interpretation of results should be based  on gestational age, weight and in agreement with clinical  observations.    Premature Infant recommended reference ranges:  Up to 24 hours.............<8.0 mg/dL  Up to 48 hours............<12.0 mg/dL  3-5 days..................<15.0 mg/dL  6-29 days.................<15.0 mg/dL           BUN 4         Calcium 8.4         Chloride 109         CO2 23         CPK 49         Creatinine 0.8         Cryptosporidium Antigen   Negative       Differential Method Automated         eGFR >60.0         Eos # 0.0         Eosinophil % 0.3         Giardia Antigen - EIA   Negative       Glucose 110         Gran # (ANC) 2.6         Gran % 64.1         Hematocrit 21.1         Hemoglobin 7.3         Immature Grans  (Abs) 0.07  Comment: Mild elevation in immature granulocytes is non specific and   can be seen in a variety of conditions including stress response,   acute inflammation, trauma and pregnancy. Correlation with other   laboratory and clinical findings is essential.           Immature Granulocytes 1.8         Lymph # 0.7         Lymph % 16.8         Magnesium 1.7         MCH 32.0         MCHC 34.6         MCV 93         Mono # 0.7         Mono % 17.0         MPV 9.9         nRBC 1         Platelet Estimate Decreased         Platelets 116         Potassium 3.2         PROTEIN TOTAL 5.2         RBC 2.28         RDW 15.2         Sodium 140         WBC 3.99                 Significant Imaging: I have reviewed all pertinent imaging results/findings within the past 24 hours.

## 2022-09-13 NOTE — ASSESSMENT & PLAN NOTE
35yo F presenting with N/V/D in setting of elevated lipases and transaminases due to and acute pancreatitis likely secondary to recent Paclitaxel infusion. Physical exam findings of epigastric pain. CT CAP notable for peripancreatic fat stranding, but no areas of necrosis. US Liver negative for findings suspicious of cholecystitis, cholelithiasis, and choledocholithiasis. GB sludge seen on imaging without presence of gallstones. Lipase now corrected into normal range. Transaminitis trending down. MRCP negative for gallstones or signs of biliary obstruction.   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  - Avoid fatty foods, fried foods and alcohol.  - Monitor/maintain hydration status.  - Outpatient referral to [facility]

## 2022-09-13 NOTE — SUBJECTIVE & OBJECTIVE
Interval History: Patient feels better today. She continues to have abdominal pain and diarrhea, but has been afebrile since addition of daptomycin.     Review of Systems   Constitutional:  Positive for appetite change. Negative for chills and fever.   HENT:  Negative for rhinorrhea and sore throat.    Respiratory:  Negative for cough and shortness of breath.    Cardiovascular:  Negative for chest pain and leg swelling.   Gastrointestinal:  Positive for abdominal pain, diarrhea, nausea and vomiting.   Genitourinary:  Negative for dysuria and hematuria.   Musculoskeletal:  Negative for arthralgias, back pain and myalgias.   Skin:  Negative for rash.   Allergic/Immunologic: Positive for immunocompromised state.   Neurological:  Negative for headaches.   Hematological:  Does not bruise/bleed easily.   Psychiatric/Behavioral:  Negative for behavioral problems.    All other systems reviewed and are negative.  Objective:     Vital Signs (Most Recent):  Temp: 99.1 °F (37.3 °C) (09/13/22 1513)  Pulse: 89 (09/13/22 1513)  Resp: 18 (09/13/22 1513)  BP: 134/67 (09/13/22 1513)  SpO2: 97 % (09/13/22 1513) Vital Signs (24h Range):  Temp:  [97.5 °F (36.4 °C)-100.2 °F (37.9 °C)] 99.1 °F (37.3 °C)  Pulse:  [] 89  Resp:  [15-20] 18  SpO2:  [94 %-99 %] 97 %  BP: (124-145)/(67-85) 134/67     Weight: 105.5 kg (232 lb 9.4 oz)  Body mass index is 37.54 kg/m².    Estimated Creatinine Clearance: 117.1 mL/min (based on SCr of 0.8 mg/dL).    Physical Exam  Vitals and nursing note reviewed.   Constitutional:       General: She is not in acute distress.     Appearance: She is well-developed. She is obese. She is not toxic-appearing.   HENT:      Head: Normocephalic and atraumatic.      Right Ear: External ear normal.      Left Ear: External ear normal.      Nose: Nose normal.      Mouth/Throat:      Mouth: Mucous membranes are moist.   Eyes:      General: No scleral icterus.     Extraocular Movements: Extraocular movements intact.       Conjunctiva/sclera: Conjunctivae normal.   Cardiovascular:      Rate and Rhythm: Normal rate and regular rhythm.      Heart sounds: No murmur heard.  Pulmonary:      Effort: Pulmonary effort is normal. No respiratory distress.      Breath sounds: Normal breath sounds.   Abdominal:      General: There is no distension.      Palpations: Abdomen is soft.      Tenderness: There is no abdominal tenderness.      Comments: Hyperactive bowel sounds   Musculoskeletal:         General: Normal range of motion.      Cervical back: Neck supple.      Right lower leg: No edema.      Left lower leg: No edema.   Skin:     General: Skin is warm and dry.      Capillary Refill: Capillary refill takes less than 2 seconds.      Findings: No erythema or rash.   Neurological:      Mental Status: She is alert and oriented to person, place, and time. Mental status is at baseline.   Psychiatric:         Mood and Affect: Mood normal.         Behavior: Behavior normal.       Significant Labs:   Microbiology Results (last 7 days)       Procedure Component Value Units Date/Time    Blood culture [[id number]] Collected: 09/08/22 1147    Order Status: Completed Specimen: Blood Updated: 09/13/22 1412     Blood Culture, Routine No growth after 5 days.    Narrative:      Rt wrist    Blood culture [[id number]] Collected: 09/08/22 1147    Order Status: Completed Specimen: Blood Updated: 09/13/22 1412     Blood Culture, Routine No growth after 5 days.    Narrative:      Lft AC    Blood culture [[id number]] Collected: 09/08/22 1800    Order Status: Completed Specimen: Blood Updated: 09/12/22 2022     Blood Culture, Routine No Growth to date      No Growth to date      No Growth to date      No Growth to date      No Growth to date    Narrative:      Please collect from 2 different sites    Blood culture [[id number]] Collected: 09/08/22 1756    Order Status: Completed Specimen: Blood Updated: 09/12/22 2022     Blood Culture, Routine No Growth to date      No  Growth to date      No Growth to date      No Growth to date      No Growth to date    Narrative:      Please collect from 2 different sites    Blood culture [[id number]] Collected: 09/10/22 1746    Order Status: Completed Specimen: Blood Updated: 09/12/22 2012     Blood Culture, Routine No Growth to date      No Growth to date      No Growth to date    Narrative:      Please collect from 2 different sites    Blood culture [[id number]] Collected: 09/10/22 1747    Order Status: Completed Specimen: Blood Updated: 09/12/22 2012     Blood Culture, Routine No Growth to date      No Growth to date      No Growth to date    Narrative:      Please collect from 2 different sites    E. coli 0157 antigen [[id number]] Collected: 09/12/22 1732    Order Status: No result Specimen: Stool Updated: 09/12/22 1733    Stool culture [[id number]] Collected: 09/12/22 1732    Order Status: Sent Specimen: Stool Updated: 09/12/22 1732    Blood culture [[id number]] Collected: 09/05/22 2347    Order Status: Completed Specimen: Blood Updated: 09/11/22 0612     Blood Culture, Routine No growth after 5 days.    Blood culture [[id number]] Collected: 09/05/22 2347    Order Status: Completed Specimen: Blood Updated: 09/11/22 0612     Blood Culture, Routine No growth after 5 days.    Blood culture [[id number]] Collected: 09/04/22 2146    Order Status: Completed Specimen: Blood Updated: 09/09/22 2312     Blood Culture, Routine No growth after 5 days.    Blood culture [[id number]] Collected: 09/04/22 2148    Order Status: Completed Specimen: Blood Updated: 09/09/22 2312     Blood Culture, Routine No growth after 5 days.    Blood culture [[id number]] Collected: 09/04/22 0155    Order Status: Completed Specimen: Blood Updated: 09/09/22 0612     Blood Culture, Routine No growth after 5 days.    Blood culture [[id number]] Collected: 09/04/22 0156    Order Status: Completed Specimen: Blood Updated: 09/09/22 0612     Blood Culture, Routine No growth after 5 days.     Clostridium difficile EIA [[id number]] Collected: 09/08/22 1151    Order Status: Completed Specimen: Stool Updated: 09/08/22 7534     C. diff Antigen Negative     C difficile Toxins A+B, EIA Negative     Comment: Testing not recommended for children <24 months old.             All pertinent labs within the past 24 hours have been reviewed.  Recent Lab Results         09/13/22  0325   09/12/22  1723        Albumin 2.5         Alkaline Phosphatase 34                  Anion Gap 8                  Baso # 0.00         Basophil % 0.0         BILIRUBIN TOTAL 0.5  Comment: For infants and newborns, interpretation of results should be based  on gestational age, weight and in agreement with clinical  observations.    Premature Infant recommended reference ranges:  Up to 24 hours.............<8.0 mg/dL  Up to 48 hours............<12.0 mg/dL  3-5 days..................<15.0 mg/dL  6-29 days.................<15.0 mg/dL           BUN 4         Calcium 8.4         Chloride 109         CO2 23         CPK 49         Creatinine 0.8         Cryptosporidium Antigen   Negative       Differential Method Automated         eGFR >60.0         Eos # 0.0         Eosinophil % 0.3         Giardia Antigen - EIA   Negative       Glucose 110         Gran # (ANC) 2.6         Gran % 64.1         Hematocrit 21.1         Hemoglobin 7.3         Immature Grans (Abs) 0.07  Comment: Mild elevation in immature granulocytes is non specific and   can be seen in a variety of conditions including stress response,   acute inflammation, trauma and pregnancy. Correlation with other   laboratory and clinical findings is essential.           Immature Granulocytes 1.8         Lymph # 0.7         Lymph % 16.8         Magnesium 1.7         MCH 32.0         MCHC 34.6         MCV 93         Mono # 0.7         Mono % 17.0         MPV 9.9         nRBC 1         Platelet Estimate Decreased         Platelets 116         Potassium 3.2         PROTEIN TOTAL 5.2          RBC 2.28         RDW 15.2         Sodium 140         WBC 3.99                 Significant Imaging: I have reviewed all pertinent imaging results/findings within the past 24 hours.

## 2022-09-13 NOTE — PLAN OF CARE
Patient A/O x4 during shift. VSS on RAIR. PRN pain medication given see eMAR. Scheduled IV zofran given per order see eMAR. Patient went down for MRI of abdomen during this shift. Results still pending. Patient independent and ambulatory to bathroom/BSC. Call light and personal belongings remain in reach. Bed locked and in lowest position throughout shift.     Problem: Adult Inpatient Plan of Care  Goal: Absence of Hospital-Acquired Illness or Injury  Outcome: Ongoing, Progressing  Goal: Readiness for Transition of Care  Outcome: Ongoing, Progressing     Problem: Infection  Goal: Absence of Infection Signs and Symptoms  Outcome: Ongoing, Progressing

## 2022-09-13 NOTE — SUBJECTIVE & OBJECTIVE
Interval History: No acute events overnight. Patient still with multiple stool studies pending as well as GI evaluation. However she is doing well on current antibiotics. Will keep intermittent IVF.     Oncology Treatment Plan:   OP PEMBROLIZUMAB CARBOPLATIN (AUC 5) WITH WEEKLY PACLITAXEL FOLLOWED BY PEMBROLIZUMAB DOXORUBICIN CYCLOPHOSPHAMIDE FOLLOWED BY PEMBROLIZUMAB 200 MG Q3W    Medications:  Continuous Infusions:   lactated ringers 100 mL/hr at 09/12/22 1821     Scheduled Meds:   ampicillin-sulbactim (UNASYN) IVPB  3 g Intravenous Q6H    DAPTOmycin (CUBICIN) IV (PEDS and ADULTS)  10 mg/kg Intravenous Q24H    dicyclomine  10 mg Oral TID    enoxaparin  40 mg Subcutaneous Daily    magnesium sulfate IVPB  2 g Intravenous Once    ondansetron  4 mg Intravenous Q6H    simethicone  1 tablet Oral TID PC     PRN Meds:dextrose 10%, dextrose 10%, glucagon (human recombinant), glucose, glucose, ibuprofen, loperamide, naloxone, oxyCODONE, polyethylene glycol, prochlorperazine, sodium chloride 0.9%     Review of Systems   Constitutional:  Positive for appetite change. Negative for chills and fever.   HENT:  Negative for rhinorrhea and sore throat.    Respiratory:  Negative for cough and shortness of breath.    Cardiovascular:  Negative for chest pain and leg swelling.   Gastrointestinal:  Positive for abdominal pain, diarrhea, nausea and vomiting.   Genitourinary:  Negative for dysuria and hematuria.   Musculoskeletal:  Negative for arthralgias, back pain and myalgias.   Skin:  Negative for rash.   Allergic/Immunologic: Positive for immunocompromised state.   Neurological:  Negative for headaches.   Hematological:  Does not bruise/bleed easily.   Psychiatric/Behavioral:  Negative for behavioral problems.    All other systems reviewed and are negative.  Objective:     Vital Signs (Most Recent):  Temp: 100.2 °F (37.9 °C) (09/12/22 1959)  Pulse: 95 (09/12/22 1959)  Resp: 19 (09/12/22 1959)  BP: (!) 145/67 (09/12/22 1959)  SpO2:  (!) 94 % (09/12/22 1959)   Vital Signs (24h Range):  Temp:  [97.9 °F (36.6 °C)-100.8 °F (38.2 °C)] 100.2 °F (37.9 °C)  Pulse:  [] 95  Resp:  [16-19] 19  SpO2:  [94 %-99 %] 94 %  BP: (114-145)/(60-73) 145/67     Weight: 105.5 kg (232 lb 9.4 oz)  Body mass index is 37.54 kg/m².  Body surface area is 2.22 meters squared.      Intake/Output Summary (Last 24 hours) at 9/12/2022 2152  Last data filed at 9/12/2022 1821  Gross per 24 hour   Intake 3103.22 ml   Output --   Net 3103.22 ml       Physical Exam  HENT:      Head: Normocephalic.      Nose: Nose normal.      Mouth/Throat:      Mouth: Mucous membranes are moist.   Eyes:      Pupils: Pupils are equal, round, and reactive to light.   Cardiovascular:      Rate and Rhythm: Normal rate.   Pulmonary:      Effort: Pulmonary effort is normal.   Abdominal:      General: Abdomen is flat.      Tenderness: There is abdominal tenderness.   Musculoskeletal:         General: Normal range of motion.      Cervical back: Normal range of motion.   Skin:     General: Skin is warm.   Neurological:      General: No focal deficit present.      Mental Status: She is alert and oriented to person, place, and time.   Psychiatric:         Mood and Affect: Mood normal.       Significant Labs:   All pertinent labs from the last 24 hours have been reviewed.    Diagnostic Results:  I have reviewed all pertinent imaging results/findings within the past 24 hours.

## 2022-09-13 NOTE — ASSESSMENT & PLAN NOTE
Patient has been having more loose stools this admission since starting antibiotics. Now stools are causing intense abdominal cramps and stools are now green     - c diff negative   - bowel regimen held   - See chemotherapy induced neutropenia  - O&P, culture, giardia, cryptosporidium, entamoeba, GI pathogen panel, strongy IgG per ID

## 2022-09-13 NOTE — ASSESSMENT & PLAN NOTE
36 years old with HTN, breast cancer  C4D20 Taxol on 8/29/2022 f/u with Dr. Cruz presents with worsening abdominal pain, nasuea and vomiting.  On exam she was mod-sever  abdominal pain to palpitation, no peritonitic signs. Labs were significant for elevated lipase >200. CT with findings concerning for pancreatitis wo fluid/ abscess collection. . CT scan with Mild peripancreatic fat stranding. TG levels were normal this admission, low concern for triglyceride induced pancreatitis. CT scan showed no gallstones or GB wall thickening, low concern for gallstone induced pancreatitis. Patient is clinically improving with n/v, po intake, and pain.     Most likely pancreatitis 2/2 chemo, last chemotherapy session 8/29; Taxol    - Pain: morphine IV changed to oxy PO.    - Anti-emetics: scheduled compazine and prn zofran. Improving  -  cc  - Diet advancing as tolerated  - simethicone

## 2022-09-13 NOTE — PROGRESS NOTES
[facility] ([facility])  Gastroenterology  Progress Note    Patient Name: Laura Kent  MRN: [id number]  Admission Date: 9/3/2022  Hospital Length of Stay: 10 days  Code Status: Full Code   Attending Provider: Enoch Goodwin MD  Consulting Provider: Yousuf Carr MD  Primary Care Physician: Primary Doctor No  Principal Problem: Acute pancreatitis      Subjective:     Interval History: No acute events overnight. Pt continued to have multiple episodes diarrhea but remained afebrile over past 24hrs. Continued breakthrough abdominal pain reported.     Review of Systems   Constitutional:  Positive for appetite change. Negative for chills and fever.   HENT:  Negative for rhinorrhea and sore throat.    Respiratory:  Negative for cough and shortness of breath.    Cardiovascular:  Negative for chest pain and leg swelling.   Gastrointestinal:  Positive for abdominal pain, diarrhea, nausea and vomiting.   Genitourinary:  Negative for dysuria and hematuria.   Musculoskeletal:  Negative for arthralgias, back pain and myalgias.   Skin:  Negative for rash.   Allergic/Immunologic: Positive for immunocompromised state.   Neurological:  Negative for headaches.   Hematological:  Does not bruise/bleed easily.   Psychiatric/Behavioral:  Negative for behavioral problems.    All other systems reviewed and are negative.  Objective:     Vital Signs (Most Recent):  Temp: 98.3 °F (36.8 °C) (09/13/22 1136)  Pulse: 105 (09/13/22 1136)  Resp: 17 (09/13/22 1136)  BP: 124/71 (09/13/22 1136)  SpO2: 96 % (09/13/22 1136) Vital Signs (24h Range):  Temp:  [97.5 °F (36.4 °C)-100.2 °F (37.9 °C)] 98.3 °F (36.8 °C)  Pulse:  [] 105  Resp:  [15-20] 17  SpO2:  [94 %-99 %] 96 %  BP: (124-145)/(67-85) 124/71     Weight: 105.5 kg (232 lb 9.4 oz) (09/04/22 0220)  Body mass index is 37.54 kg/m².      Intake/Output Summary (Last 24 hours) at 9/13/2022 1140  Last data filed at 9/12/2022 1821  Gross per 24 hour   Intake 2005.27 ml   Output no  documentation   Net 2005.27 ml       Lines/Drains/Airways       Central Venous Catheter Line       Name Duration    Port A Cath Single Lumen right atrial No Data         PowerPort A Cath Single Lumen 09/05/22 0323 right subclavian 8 days                    Physical Exam  HENT:      Head: Normocephalic.      Nose: Nose normal.      Mouth/Throat:      Mouth: Mucous membranes are moist.   Eyes:      Pupils: Pupils are equal, round, and reactive to light.   Cardiovascular:      Rate and Rhythm: Normal rate.   Pulmonary:      Effort: Pulmonary effort is normal.   Abdominal:      General: Abdomen is flat.      Tenderness: There is abdominal tenderness.   Musculoskeletal:         General: Normal range of motion.      Cervical back: Normal range of motion.   Skin:     General: Skin is warm.   Neurological:      General: No focal deficit present.      Mental Status: She is alert and oriented to person, place, and time.   Psychiatric:         Mood and Affect: Mood normal.       Significant Labs:  CBC:   Recent Labs   Lab 09/12/22  0625 09/13/22  0325   WBC 3.90 3.99   HGB 7.7* 7.3*   HCT 22.7* 21.1*   * 116*     CMP:   Recent Labs   Lab 09/13/22  0325      CALCIUM 8.4*   ALBUMIN 2.5*   PROT 5.2*      K 3.2*   CO2 23      BUN 4*   CREATININE 0.8   ALKPHOS 34*   *   *   BILITOT 0.5             Assessment/Plan:     * Acute pancreatitis  37yo F presenting with N/V/D in setting of elevated lipases and transaminases due to and acute pancreatitis likely secondary to recent Paclitaxel infusion. Physical exam findings of epigastric pain. CT CAP notable for peripancreatic fat stranding, but no areas of necrosis. US Liver negative for findings suspicious of cholecystitis, cholelithiasis, and choledocholithiasis. GB sludge seen on imaging without presence of gallstones. Lipase now corrected into normal range. Transaminitis trending down. MRCP negative for gallstones or signs of biliary obstruction.    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  - Avoid fatty foods, fried foods and alcohol.  - Monitor/maintain hydration status.  - Outpatient referral to [facility]        Thank you for your consult. I will sign off. Please contact us if you have any additional questions.    Yousuf Carr MD  Gastroenterology  [facility] - Oncology ([facility])

## 2022-09-14 LAB
ALBUMIN SERPL BCP-MCNC: 2.7 G/DL (ref 3.5–5.2)
ALP SERPL-CCNC: 35 U/L (ref 55–135)
ALT SERPL W/O P-5'-P-CCNC: 169 U/L (ref 10–44)
ANION GAP SERPL CALC-SCNC: 13 MMOL/L (ref 8–16)
ANISOCYTOSIS BLD QL SMEAR: SLIGHT
AST SERPL-CCNC: 86 U/L (ref 10–40)
BASOPHILS # BLD AUTO: 0.01 K/UL (ref 0–0.2)
BASOPHILS NFR BLD: 0.2 % (ref 0–1.9)
BILIRUB SERPL-MCNC: 0.7 MG/DL (ref 0.1–1)
BUN SERPL-MCNC: 5 MG/DL (ref 6–20)
C DIFF GDH STL QL: NEGATIVE
C DIFF TOX A+B STL QL IA: NEGATIVE
CALCIUM SERPL-MCNC: 8.7 MG/DL (ref 8.7–10.5)
CHLORIDE SERPL-SCNC: 106 MMOL/L (ref 95–110)
CO2 SERPL-SCNC: 23 MMOL/L (ref 23–29)
CREAT SERPL-MCNC: 0.8 MG/DL (ref 0.5–1.4)
DIFFERENTIAL METHOD: ABNORMAL
EOSINOPHIL # BLD AUTO: 0 K/UL (ref 0–0.5)
EOSINOPHIL NFR BLD: 0.5 % (ref 0–8)
ERYTHROCYTE [DISTWIDTH] IN BLOOD BY AUTOMATED COUNT: 15.7 % (ref 11.5–14.5)
EST. GFR  (NO RACE VARIABLE): >60 ML/MIN/1.73 M^2
GLUCOSE SERPL-MCNC: 107 MG/DL (ref 70–110)
HCT VFR BLD AUTO: 21.8 % (ref 37–48.5)
HGB BLD-MCNC: 7.5 G/DL (ref 12–16)
HSV-1 DNA BY PCR: NEGATIVE
HSV-2 DNA BY PCR: NEGATIVE
HYPOCHROMIA BLD QL SMEAR: ABNORMAL
IMM GRANULOCYTES # BLD AUTO: 0.05 K/UL (ref 0–0.04)
IMM GRANULOCYTES NFR BLD AUTO: 1.1 % (ref 0–0.5)
LYMPHOCYTES # BLD AUTO: 0.5 K/UL (ref 1–4.8)
LYMPHOCYTES NFR BLD: 12.1 % (ref 18–48)
MAGNESIUM SERPL-MCNC: 1.7 MG/DL (ref 1.6–2.6)
MCH RBC QN AUTO: 31.6 PG (ref 27–31)
MCHC RBC AUTO-ENTMCNC: 34.4 G/DL (ref 32–36)
MCV RBC AUTO: 92 FL (ref 82–98)
MONOCYTES # BLD AUTO: 0.7 K/UL (ref 0.3–1)
MONOCYTES NFR BLD: 15.7 % (ref 4–15)
NEUTROPHILS # BLD AUTO: 3.1 K/UL (ref 1.8–7.7)
NEUTROPHILS NFR BLD: 70.4 % (ref 38–73)
NRBC BLD-RTO: 0 /100 WBC
OVALOCYTES BLD QL SMEAR: ABNORMAL
PLATELET # BLD AUTO: 130 K/UL (ref 150–450)
PMV BLD AUTO: 9.9 FL (ref 9.2–12.9)
POIKILOCYTOSIS BLD QL SMEAR: SLIGHT
POLYCHROMASIA BLD QL SMEAR: ABNORMAL
POTASSIUM SERPL-SCNC: 3.2 MMOL/L (ref 3.5–5.1)
PROT SERPL-MCNC: 5.6 G/DL (ref 6–8.4)
RBC # BLD AUTO: 2.37 M/UL (ref 4–5.4)
SODIUM SERPL-SCNC: 142 MMOL/L (ref 136–145)
SPHEROCYTES BLD QL SMEAR: ABNORMAL
STRONGYLOIDES ANTIBODY IGG: NEGATIVE
WBC # BLD AUTO: 4.39 K/UL (ref 3.9–12.7)

## 2022-09-14 PROCEDURE — 93010 ELECTROCARDIOGRAM REPORT: CPT | Mod: ,,, | Performed by: INTERNAL MEDICINE

## 2022-09-14 PROCEDURE — 99233 SBSQ HOSP IP/OBS HIGH 50: CPT | Mod: ,,, | Performed by: INTERNAL MEDICINE

## 2022-09-14 PROCEDURE — 25000003 PHARM REV CODE 250

## 2022-09-14 PROCEDURE — 99233 PR SUBSEQUENT HOSPITAL CARE,LEVL III: ICD-10-PCS | Mod: ,,, | Performed by: INTERNAL MEDICINE

## 2022-09-14 PROCEDURE — 63600175 PHARM REV CODE 636 W HCPCS: Performed by: STUDENT IN AN ORGANIZED HEALTH CARE EDUCATION/TRAINING PROGRAM

## 2022-09-14 PROCEDURE — 93005 ELECTROCARDIOGRAM TRACING: CPT

## 2022-09-14 PROCEDURE — 87449 NOS EACH ORGANISM AG IA: CPT | Performed by: STUDENT IN AN ORGANIZED HEALTH CARE EDUCATION/TRAINING PROGRAM

## 2022-09-14 PROCEDURE — 63600175 PHARM REV CODE 636 W HCPCS

## 2022-09-14 PROCEDURE — 80053 COMPREHEN METABOLIC PANEL: CPT | Performed by: STUDENT IN AN ORGANIZED HEALTH CARE EDUCATION/TRAINING PROGRAM

## 2022-09-14 PROCEDURE — 82656 EL-1 FECAL QUAL/SEMIQ: CPT | Performed by: STUDENT IN AN ORGANIZED HEALTH CARE EDUCATION/TRAINING PROGRAM

## 2022-09-14 PROCEDURE — 83735 ASSAY OF MAGNESIUM: CPT | Performed by: STUDENT IN AN ORGANIZED HEALTH CARE EDUCATION/TRAINING PROGRAM

## 2022-09-14 PROCEDURE — 63600175 PHARM REV CODE 636 W HCPCS: Performed by: INTERNAL MEDICINE

## 2022-09-14 PROCEDURE — 25000003 PHARM REV CODE 250: Performed by: INTERNAL MEDICINE

## 2022-09-14 PROCEDURE — 85025 COMPLETE CBC W/AUTO DIFF WBC: CPT | Performed by: STUDENT IN AN ORGANIZED HEALTH CARE EDUCATION/TRAINING PROGRAM

## 2022-09-14 PROCEDURE — 27000207 HC ISOLATION

## 2022-09-14 PROCEDURE — 93010 EKG 12-LEAD: ICD-10-PCS | Mod: ,,, | Performed by: INTERNAL MEDICINE

## 2022-09-14 PROCEDURE — 25000003 PHARM REV CODE 250: Performed by: STUDENT IN AN ORGANIZED HEALTH CARE EDUCATION/TRAINING PROGRAM

## 2022-09-14 PROCEDURE — 20600001 HC STEP DOWN PRIVATE ROOM

## 2022-09-14 RX ORDER — POTASSIUM CHLORIDE 7.45 MG/ML
10 INJECTION INTRAVENOUS
Status: COMPLETED | OUTPATIENT
Start: 2022-09-14 | End: 2022-09-14

## 2022-09-14 RX ADMIN — NYSTATIN 500000 UNITS: 500000 SUSPENSION ORAL at 09:09

## 2022-09-14 RX ADMIN — ENOXAPARIN SODIUM 40 MG: 100 INJECTION SUBCUTANEOUS at 04:09

## 2022-09-14 RX ADMIN — PROCHLORPERAZINE EDISYLATE 5 MG: 5 INJECTION INTRAMUSCULAR; INTRAVENOUS at 02:09

## 2022-09-14 RX ADMIN — NYSTATIN 500000 UNITS: 500000 SUSPENSION ORAL at 08:09

## 2022-09-14 RX ADMIN — DICYCLOMINE HYDROCHLORIDE 10 MG: 10 CAPSULE ORAL at 08:09

## 2022-09-14 RX ADMIN — OXYCODONE HYDROCHLORIDE 10 MG: 10 TABLET ORAL at 07:09

## 2022-09-14 RX ADMIN — ONDANSETRON 4 MG: 2 INJECTION INTRAMUSCULAR; INTRAVENOUS at 05:09

## 2022-09-14 RX ADMIN — AMPICILLIN AND SULBACTAM 3 G: 2; 1 INJECTION, POWDER, FOR SOLUTION INTRAMUSCULAR; INTRAVENOUS at 03:09

## 2022-09-14 RX ADMIN — LOPERAMIDE HYDROCHLORIDE 2 MG: 2 CAPSULE ORAL at 07:09

## 2022-09-14 RX ADMIN — POTASSIUM CHLORIDE 10 MEQ: 7.46 INJECTION, SOLUTION INTRAVENOUS at 04:09

## 2022-09-14 RX ADMIN — POTASSIUM CHLORIDE 10 MEQ: 7.46 INJECTION, SOLUTION INTRAVENOUS at 05:09

## 2022-09-14 RX ADMIN — ONDANSETRON 4 MG: 2 INJECTION INTRAMUSCULAR; INTRAVENOUS at 12:09

## 2022-09-14 RX ADMIN — ALUMINUM HYDROXIDE, MAGNESIUM HYDROXIDE, AND DIMETHICONE 10 ML: 400; 400; 40 SUSPENSION ORAL at 09:09

## 2022-09-14 RX ADMIN — NYSTATIN 500000 UNITS: 500000 SUSPENSION ORAL at 12:09

## 2022-09-14 RX ADMIN — POTASSIUM CHLORIDE 10 MEQ: 7.46 INJECTION, SOLUTION INTRAVENOUS at 01:09

## 2022-09-14 RX ADMIN — OXYCODONE HYDROCHLORIDE 10 MG: 10 TABLET ORAL at 01:09

## 2022-09-14 RX ADMIN — AMPICILLIN AND SULBACTAM 3 G: 2; 1 INJECTION, POWDER, FOR SOLUTION INTRAMUSCULAR; INTRAVENOUS at 10:09

## 2022-09-14 RX ADMIN — NYSTATIN 500000 UNITS: 500000 SUSPENSION ORAL at 04:09

## 2022-09-14 RX ADMIN — DAPTOMYCIN 1055 MG: 350 INJECTION, POWDER, LYOPHILIZED, FOR SOLUTION INTRAVENOUS at 04:09

## 2022-09-14 RX ADMIN — POTASSIUM CHLORIDE 10 MEQ: 7.46 INJECTION, SOLUTION INTRAVENOUS at 07:09

## 2022-09-14 RX ADMIN — DICYCLOMINE HYDROCHLORIDE 10 MG: 10 CAPSULE ORAL at 09:09

## 2022-09-14 RX ADMIN — PROCHLORPERAZINE EDISYLATE 5 MG: 5 INJECTION INTRAMUSCULAR; INTRAVENOUS at 09:09

## 2022-09-14 RX ADMIN — DICYCLOMINE HYDROCHLORIDE 10 MG: 10 CAPSULE ORAL at 04:09

## 2022-09-14 NOTE — ASSESSMENT & PLAN NOTE
Patient has been having more loose stools this admission since starting antibiotics. Now stools are causing intense abdominal cramps and stools are now green     - c diff negative, repeat today  - bowel regimen held   - See chemotherapy induced neutropenia  - O&P, culture, giardia, cryptosporidium, entamoeba, GI pathogen panel, strongy IgG per ID  - Fecal elastin pending

## 2022-09-14 NOTE — PLAN OF CARE
Patient A/Ox4 during shift. VSS on RAIR. Patient ambulatory and independent with ADL's. 1 episode of emesis throughout the night. PRN medication given with relief see eMAR. PRN pain medication given see eMAR. Potassium administered per order see eMAR. No further complaints vocalized during shift. Personal belongings and call light remain in reach. Bed locked and in lowest position throughout night.     Problem: Adult Inpatient Plan of Care  Goal: Plan of Care Review  Outcome: Ongoing, Progressing  Goal: Absence of Hospital-Acquired Illness or Injury  Outcome: Ongoing, Progressing  Goal: Readiness for Transition of Care  Outcome: Ongoing, Progressing     Problem: Infection  Goal: Absence of Infection Signs and Symptoms  Outcome: Ongoing, Progressing

## 2022-09-14 NOTE — ASSESSMENT & PLAN NOTE
Pt had subjective fevers at home unmeasured, abdominal pain, polyuria In setting of low WBC and recent chemotherapy. During admission patient was febrile intermittently and had U/A showing WBCs and bacteria. Initially started treatment with cefepime. Flagyl added due to ongoing fevers. Fever curve was initially downtrending with negative cultures. However she had fever as high as 103. Will restart septic work up as fever is currently of unknown origin and add vancomycin. Consulted ID and per recommendations have deescalated abx to unasyn given pt has has negative testing for pseudomonas and MRSA. Can also consider chemo causing fever.     - Unasyn   - Cdiff studies negative  - Repeat blood cultures, NGTD  - ID contsulted, appreciate recs   - No port removal needed at this time per ID  - D/C Unasyn (9/14), continue Dapto

## 2022-09-14 NOTE — PROGRESS NOTES
ATTENDING NOTE, ONCOLOGY INPATIENT TEAM    As above; events of last 24 hours noted.  Patient seen and examined, chart reviewed.  Appears comfortable, in NAD, states that the diarrhea is somewhat better today  Lungs are clear to auscultation.  Abdomen is soft, nontender.  Labs reviewed.  K is 3.2 mEq per L creatinine is 0.8 mg per dL, bilirubin is 0.7 mg/dL, AST is 86 units/liter and ALT is 169 units/liter.    PLAN  Recheck C diff today  Follow CMP daily  Unasyn discontinued per ID service  Continue daptomycin  Enoxaparin for DVT prophylaxis   We will follow    Enoch Goodwin MD

## 2022-09-14 NOTE — ASSESSMENT & PLAN NOTE
36 years old with HTN, breast cancer  C4D20 Taxol on 8/29/2022 f/u with Dr. Cruz presents with worsening abdominal pain, nasuea and vomiting.  On exam she was mod-sever  abdominal pain to palpitation, no peritonitic signs. Labs were significant for elevated lipase >200. CT with findings concerning for pancreatitis wo fluid/ abscess collection. . CT scan with Mild peripancreatic fat stranding. TG levels were normal this admission, low concern for triglyceride induced pancreatitis. CT scan showed no gallstones or GB wall thickening, low concern for gallstone induced pancreatitis. Patient is clinically improving with n/v, po intake, and pain.     Most likely pancreatitis 2/2 chemo, last chemotherapy session 8/29; Taxol    - Pain: morphine IV changed to oxy PO.    - Anti-emetics: scheduled compazine and prn zofran. Able to tolerate juice and fluids.  -  cc as needed  - Diet advancing as tolerated  - simethicone

## 2022-09-14 NOTE — TREATMENT PLAN
Gastroenterology Treatment Plan    Laura Kent is a 36 y.o. female admitted to hospital 9/3/2022 (Hospital Day: 12) due to Acute pancreatitis.     Interval History  Lab findings significant for continued improvement in transaminitis. MRCP performed on 9/13/22 negative for ductal dilatation & filling defects.     Objective  Temp:  [98.3 °F (36.8 °C)-100 °F (37.8 °C)] 98.6 °F (37 °C) (09/14 1100)  Pulse:  [89-99] 95 (09/14 1100)  BP: (129-143)/(67-79) 132/79 (09/14 1100)  Resp:  [14-20] 18 (09/14 1344)  SpO2:  [95 %-98 %] 98 % (09/14 1100)      Laboratory    Recent Labs   Lab 09/12/22  0625 09/13/22  0325 09/14/22  0332   HGB 7.7* 7.3* 7.5*         Assessment and Plan  Pt has been afebrile for >48hrs. Continue bowel regimen of loperamide for ongoing diarrhea. Consider addition of cholestyramine. If diarrhea continues to be refractory to loperamide, consider switching to octreotide. Continue fluid resuscitation and follow up stool labs [facility] ID recs  - We are signing-off. Please call with any questions.    Thank you for involving us in the care of Laura Kent. Please call with any additional questions, concerns or changes in the patient's clinical status.        Yousuf Carr MD, PGY-2  GI Consults  [facility]

## 2022-09-14 NOTE — PROGRESS NOTES
[facility] ([facility])  Hematology/Oncology  Progress Note    Patient Name: Laura Kent  Admission Date: 9/3/2022  Hospital Length of Stay: 11 days  Code Status: Full Code     Subjective:     HPI:  36 years old with HTN, breast cancer  C4D20 Taxol on 8/29/2022 f/u with Dr. Cruz presents with worsening abdominal pain, nasuea and vomiting. Patient was in pain during the interview and asking for better pain control. She states one week history of gradually worsening abdominal pain worst in epigastric area without radiation. She had minimal po intake since then. She associate emesis x2 NBNB. She had one small BM this afternoon prior to her presentation.     Patient denies chest pain, shortness of breath,  dysuria, polyuria, nausea, vomiting, fever, chills,  diarrhea, constipation, headache, falls, focal weakness or vision changes.     Ed course:   On presentation she was HDS and afebrile. On exam she was mod-sever  abdominal pain to palpitation, no peritonitic signs. Labs were significant for elevated lipase >200. CT with findings concerning for pancreatitis wo fluid/ abscess collection. Medical onc consulted for admit.           Interval History: No acute events overnight, she received loperamide twice yesterday evening as well as this morning. She had 3-4 soft bowel movements since last night, states her stools are more formed now. Had some mild epigastric aching pain this morning which improved with pain medications. Denies any other symptoms. ID will deescalate Unasyn today as she has remained afebrile.     Oncology Treatment Plan:   OP PEMBROLIZUMAB CARBOPLATIN (AUC 5) WITH WEEKLY PACLITAXEL FOLLOWED BY PEMBROLIZUMAB DOXORUBICIN CYCLOPHOSPHAMIDE FOLLOWED BY PEMBROLIZUMAB 200 MG Q3W    Medications:  Continuous Infusions:  Scheduled Meds:   DAPTOmycin (CUBICIN) IV (PEDS and ADULTS)  10 mg/kg Intravenous Q24H    dicyclomine  10 mg Oral TID    duke's soln (benadryl 30 mL, mylanta 30 mL, LIDOcaine  30 mL, nystatin 30 mL) 120 mL  10 mL Oral QID    enoxaparin  40 mg Subcutaneous Daily    magnesium sulfate IVPB  2 g Intravenous Once    nystatin  500,000 Units Oral QID    ondansetron  4 mg Intravenous Q6H    simethicone  1 tablet Oral TID PC     PRN Meds:dextrose 10%, dextrose 10%, glucagon (human recombinant), glucose, glucose, ibuprofen, loperamide, naloxone, oxyCODONE, polyethylene glycol, prochlorperazine, sodium chloride 0.9%       Objective:     Vital Signs (Most Recent):  Temp: 98.6 °F (37 °C) (09/14/22 1100)  Pulse: 95 (09/14/22 1100)  Resp: 16 (09/14/22 1100)  BP: 132/79 (09/14/22 1100)  SpO2: 98 % (09/14/22 1100) Vital Signs (24h Range):  Temp:  [98.3 °F (36.8 °C)-100 °F (37.8 °C)] 98.6 °F (37 °C)  Pulse:  [89-99] 95  Resp:  [14-20] 16  SpO2:  [95 %-98 %] 98 %  BP: (129-143)/(67-79) 132/79     Weight: 105.5 kg (232 lb 9.4 oz)  Body mass index is 37.54 kg/m².  Body surface area is 2.22 meters squared.      Intake/Output Summary (Last 24 hours) at 9/14/2022 1240  Last data filed at 9/13/2022 2353  Gross per 24 hour   Intake 579.5 ml   Output 400 ml   Net 179.5 ml       Physical Exam  HENT:      Head: Normocephalic.      Mouth/Throat:      Mouth: Mucous membranes are moist.   Eyes:      Pupils: Pupils are equal, round, and reactive to light.   Cardiovascular:      Rate and Rhythm: Normal rate.   Pulmonary:      Effort: Pulmonary effort is normal.   Abdominal:      General: Bowel sounds are normal. There is distension (slightly).      Palpations: Abdomen is soft.      Tenderness: There is abdominal tenderness (diffuse generalized on palpation).   Musculoskeletal:         General: Normal range of motion.      Cervical back: Normal range of motion.   Skin:     General: Skin is warm.   Neurological:      General: No focal deficit present.      Mental Status: She is alert.       Significant Labs:   All pertinent labs from the last 24 hours have been reviewed.    Diagnostic Results:  I have reviewed all pertinent  imaging results/findings within the past 24 hours.    Assessment/Plan:     * Acute pancreatitis  36 years old with HTN, breast cancer  C4D20 Taxol on 8/29/2022 f/u with Dr. Cruz presents with worsening abdominal pain, nasuea and vomiting.  On exam she was mod-sever  abdominal pain to palpitation, no peritonitic signs. Labs were significant for elevated lipase >200. CT with findings concerning for pancreatitis wo fluid/ abscess collection. . CT scan with Mild peripancreatic fat stranding. TG levels were normal this admission, low concern for triglyceride induced pancreatitis. CT scan showed no gallstones or GB wall thickening, low concern for gallstone induced pancreatitis. Patient is clinically improving with n/v, po intake, and pain.     Most likely pancreatitis 2/2 chemo, last chemotherapy session 8/29; Taxol    - Pain: morphine IV changed to oxy PO.    - Anti-emetics: scheduled compazine and prn zofran. Able to tolerate juice and fluids.  -  cc as needed  - Diet advancing as tolerated  - simethicone     Elevated LFTs  Patient having increasing LFTs this admission. CTAP this admission did not show liver mets or biliary obstruction. US abdomen ordered to better visualize biliary tree. AST: ALT ratio < 2 so low concern for EtOH hepatitis. Patient switched from acetaminophen to advil. HBV surface and core ab negative    - US abdomen showed biliary sludge, consulted GI per ID recs   - Advance diet as tolerated  - Per GI recs, Unasyn through admission, augmentin 875 po bid through 8/21/22 on discharge  - [facility] outpatient referral  - RPR negative  - TORIN and anti-smooth muscle negative    Loose stools  Patient has been having more loose stools this admission since starting antibiotics. Now stools are causing intense abdominal cramps and stools are now green     - c diff negative, repeat today  - bowel regimen held   - See chemotherapy induced neutropenia  - O&P, culture, giardia, cryptosporidium, entamoeba, GI  pathogen panel, strongy IgG per ID  - Fecal elastin pending    Abdominal pain  Please read pancreatitis. Resolving with pain medications, stool studies negative.    Chemotherapy induced neutropenia  Pt had subjective fevers at home unmeasured, abdominal pain, polyuria In setting of low WBC and recent chemotherapy. During admission patient was febrile intermittently and had U/A showing WBCs and bacteria. Initially started treatment with cefepime. Flagyl added due to ongoing fevers. Fever curve was initially downtrending with negative cultures. However she had fever as high as 103. Will restart septic work up as fever is currently of unknown origin and add vancomycin. Consulted ID and per recommendations have deescalated abx to unasyn given pt has has negative testing for pseudomonas and MRSA. Can also consider chemo causing fever.     - Unasyn   - Cdiff studies negative  - Repeat blood cultures, NGTD  - ID contsulted, appreciate recs   - No port removal needed at this time per ID  - D/C Unasyn (9/14), continue Dapto     Malignant neoplasm of upper-outer quadrant of left breast in female, estrogen receptor negative  Dr. Cruz patient,  On:  Currently on cycle 4 D20 of pembrolizumab carboplatin (AUC 5) with weekly paclitaxel followed by pembrolizumab doxorubicin cyclophosphamide followed by pembrolizumab 200 mg q3w    Most recent Taxol on 8/29/2022     -- admit to medical oncology              Cristina Wilkerson DO  Hematology/Oncology  [facility] ([facility])        ATTENDING NOTE, ONCOLOGY INPATIENT TEAM    Patient seen and examined, chart reviewed.  Please refer to my note of same day for our assessment and plan    Enoch Goodwin MD

## 2022-09-14 NOTE — SUBJECTIVE & OBJECTIVE
Interval History: No acute events overnight, she received loperamide twice yesterday evening as well as this morning. She had 3-4 soft bowel movements since last night, states her stools are more formed now. Had some mild epigastric aching pain this morning which improved with pain medications. Denies any other symptoms. ID will deescalate Unasyn today as she has remained afebrile.     Oncology Treatment Plan:   OP PEMBROLIZUMAB CARBOPLATIN (AUC 5) WITH WEEKLY PACLITAXEL FOLLOWED BY PEMBROLIZUMAB DOXORUBICIN CYCLOPHOSPHAMIDE FOLLOWED BY PEMBROLIZUMAB 200 MG Q3W    Medications:  Continuous Infusions:  Scheduled Meds:   DAPTOmycin (CUBICIN) IV (PEDS and ADULTS)  10 mg/kg Intravenous Q24H    dicyclomine  10 mg Oral TID    duke's soln (benadryl 30 mL, mylanta 30 mL, LIDOcaine 30 mL, nystatin 30 mL) 120 mL  10 mL Oral QID    enoxaparin  40 mg Subcutaneous Daily    magnesium sulfate IVPB  2 g Intravenous Once    nystatin  500,000 Units Oral QID    ondansetron  4 mg Intravenous Q6H    simethicone  1 tablet Oral TID PC     PRN Meds:dextrose 10%, dextrose 10%, glucagon (human recombinant), glucose, glucose, ibuprofen, loperamide, naloxone, oxyCODONE, polyethylene glycol, prochlorperazine, sodium chloride 0.9%       Objective:     Vital Signs (Most Recent):  Temp: 98.6 °F (37 °C) (09/14/22 1100)  Pulse: 95 (09/14/22 1100)  Resp: 16 (09/14/22 1100)  BP: 132/79 (09/14/22 1100)  SpO2: 98 % (09/14/22 1100) Vital Signs (24h Range):  Temp:  [98.3 °F (36.8 °C)-100 °F (37.8 °C)] 98.6 °F (37 °C)  Pulse:  [89-99] 95  Resp:  [14-20] 16  SpO2:  [95 %-98 %] 98 %  BP: (129-143)/(67-79) 132/79     Weight: 105.5 kg (232 lb 9.4 oz)  Body mass index is 37.54 kg/m².  Body surface area is 2.22 meters squared.      Intake/Output Summary (Last 24 hours) at 9/14/2022 1240  Last data filed at 9/13/2022 2353  Gross per 24 hour   Intake 579.5 ml   Output 400 ml   Net 179.5 ml       Physical Exam  HENT:      Head: Normocephalic.      Mouth/Throat:       Mouth: Mucous membranes are moist.   Eyes:      Pupils: Pupils are equal, round, and reactive to light.   Cardiovascular:      Rate and Rhythm: Normal rate.   Pulmonary:      Effort: Pulmonary effort is normal.   Abdominal:      General: Bowel sounds are normal. There is distension (slightly).      Palpations: Abdomen is soft.      Tenderness: There is abdominal tenderness (diffuse generalized on palpation).   Musculoskeletal:         General: Normal range of motion.      Cervical back: Normal range of motion.   Skin:     General: Skin is warm.   Neurological:      General: No focal deficit present.      Mental Status: She is alert.       Significant Labs:   All pertinent labs from the last 24 hours have been reviewed.    Diagnostic Results:  I have reviewed all pertinent imaging results/findings within the past 24 hours.

## 2022-09-15 LAB
ALBUMIN SERPL BCP-MCNC: 2.6 G/DL (ref 3.5–5.2)
ALP SERPL-CCNC: 38 U/L (ref 55–135)
ALT SERPL W/O P-5'-P-CCNC: 133 U/L (ref 10–44)
ANION GAP SERPL CALC-SCNC: 9 MMOL/L (ref 8–16)
ANISOCYTOSIS BLD QL SMEAR: SLIGHT
AST SERPL-CCNC: 54 U/L (ref 10–40)
BACTERIA BLD CULT: NORMAL
BACTERIA BLD CULT: NORMAL
BASOPHILS # BLD AUTO: ABNORMAL K/UL (ref 0–0.2)
BASOPHILS NFR BLD: 0 % (ref 0–1.9)
BILIRUB SERPL-MCNC: 0.8 MG/DL (ref 0.1–1)
BUN SERPL-MCNC: 6 MG/DL (ref 6–20)
CALCIUM SERPL-MCNC: 8.6 MG/DL (ref 8.7–10.5)
CAMPY SP DNA.DIARRHEA STL QL NAA+PROBE: NOT DETECTED
CHLORIDE SERPL-SCNC: 103 MMOL/L (ref 95–110)
CO2 SERPL-SCNC: 22 MMOL/L (ref 23–29)
CREAT SERPL-MCNC: 0.8 MG/DL (ref 0.5–1.4)
CRYPTOSP DNA SPEC QL NAA+PROBE: NOT DETECTED
DIFFERENTIAL METHOD: ABNORMAL
DOHLE BOD BLD QL SMEAR: PRESENT
E COLI O157H7 DNA SPEC QL NAA+PROBE: NOT DETECTED
E HISTOLYT AB SER IA-ACNC: NEGATIVE
E HISTOLYT DNA SPEC QL NAA+PROBE: NOT DETECTED
EOSINOPHIL # BLD AUTO: ABNORMAL K/UL (ref 0–0.5)
EOSINOPHIL NFR BLD: 0 % (ref 0–8)
ERYTHROCYTE [DISTWIDTH] IN BLOOD BY AUTOMATED COUNT: 15.4 % (ref 11.5–14.5)
EST. GFR  (NO RACE VARIABLE): >60 ML/MIN/1.73 M^2
G LAMBLIA DNA SPEC QL NAA+PROBE: NOT DETECTED
GIANT PLATELETS BLD QL SMEAR: PRESENT
GLUCOSE SERPL-MCNC: 102 MG/DL (ref 70–110)
GPP - ADENOVIRUS 40/41: NOT DETECTED
GPP - ENTEROTOXIGENIC E COLI (ETEC): NOT DETECTED
GPP - SHIGELLA: NOT DETECTED
HCT VFR BLD AUTO: 21.2 % (ref 37–48.5)
HGB BLD-MCNC: 7.3 G/DL (ref 12–16)
HYPOCHROMIA BLD QL SMEAR: ABNORMAL
IMM GRANULOCYTES # BLD AUTO: ABNORMAL K/UL (ref 0–0.04)
IMM GRANULOCYTES NFR BLD AUTO: ABNORMAL % (ref 0–0.5)
LACTATE PLASV-SCNC: NOT DETECTED MMOL/L
LYMPHOCYTES # BLD AUTO: ABNORMAL K/UL (ref 1–4.8)
LYMPHOCYTES NFR BLD: 15 % (ref 18–48)
MAGNESIUM SERPL-MCNC: 1.7 MG/DL (ref 1.6–2.6)
MCH RBC QN AUTO: 31.9 PG (ref 27–31)
MCHC RBC AUTO-ENTMCNC: 34.4 G/DL (ref 32–36)
MCV RBC AUTO: 93 FL (ref 82–98)
METAMYELOCYTES NFR BLD MANUAL: 1 %
MONOCYTES # BLD AUTO: ABNORMAL K/UL (ref 0.3–1)
MONOCYTES NFR BLD: 11 % (ref 4–15)
NEUTROPHILS NFR BLD: 56 % (ref 38–73)
NEUTS BAND NFR BLD MANUAL: 17 %
NOROVIRUS RNA STL QL NAA+PROBE: NOT DETECTED
NRBC BLD-RTO: 0 /100 WBC
O+P STL MICRO: NORMAL
OVALOCYTES BLD QL SMEAR: ABNORMAL
PLATELET # BLD AUTO: 151 K/UL (ref 150–450)
PLATELET BLD QL SMEAR: ABNORMAL
PMV BLD AUTO: 9.9 FL (ref 9.2–12.9)
POIKILOCYTOSIS BLD QL SMEAR: SLIGHT
POLYCHROMASIA BLD QL SMEAR: ABNORMAL
POTASSIUM SERPL-SCNC: 3.4 MMOL/L (ref 3.5–5.1)
PROT SERPL-MCNC: 5.6 G/DL (ref 6–8.4)
RBC # BLD AUTO: 2.29 M/UL (ref 4–5.4)
RV DSRNA STL QL NAA+PROBE: NOT DETECTED
SALMONELLA DNA SPEC QL NAA+PROBE: NOT DETECTED
SCHISTOCYTES BLD QL SMEAR: PRESENT
SODIUM SERPL-SCNC: 134 MMOL/L (ref 136–145)
SPHEROCYTES BLD QL SMEAR: ABNORMAL
TOXIC GRANULES BLD QL SMEAR: PRESENT
V CHOLERAE DNA SPEC QL NAA+PROBE: NOT DETECTED
WBC # BLD AUTO: 4.49 K/UL (ref 3.9–12.7)
Y ENTERO RECN STL QL NAA+PROBE: NOT DETECTED

## 2022-09-15 PROCEDURE — 99233 PR SUBSEQUENT HOSPITAL CARE,LEVL III: ICD-10-PCS | Mod: ,,, | Performed by: INTERNAL MEDICINE

## 2022-09-15 PROCEDURE — 99233 SBSQ HOSP IP/OBS HIGH 50: CPT | Mod: ,,, | Performed by: INTERNAL MEDICINE

## 2022-09-15 PROCEDURE — 83735 ASSAY OF MAGNESIUM: CPT | Performed by: STUDENT IN AN ORGANIZED HEALTH CARE EDUCATION/TRAINING PROGRAM

## 2022-09-15 PROCEDURE — 63600175 PHARM REV CODE 636 W HCPCS: Performed by: STUDENT IN AN ORGANIZED HEALTH CARE EDUCATION/TRAINING PROGRAM

## 2022-09-15 PROCEDURE — 85007 BL SMEAR W/DIFF WBC COUNT: CPT | Performed by: STUDENT IN AN ORGANIZED HEALTH CARE EDUCATION/TRAINING PROGRAM

## 2022-09-15 PROCEDURE — 25000003 PHARM REV CODE 250: Performed by: STUDENT IN AN ORGANIZED HEALTH CARE EDUCATION/TRAINING PROGRAM

## 2022-09-15 PROCEDURE — 25000003 PHARM REV CODE 250: Performed by: INTERNAL MEDICINE

## 2022-09-15 PROCEDURE — 63600175 PHARM REV CODE 636 W HCPCS

## 2022-09-15 PROCEDURE — 25000003 PHARM REV CODE 250

## 2022-09-15 PROCEDURE — 80053 COMPREHEN METABOLIC PANEL: CPT | Performed by: STUDENT IN AN ORGANIZED HEALTH CARE EDUCATION/TRAINING PROGRAM

## 2022-09-15 PROCEDURE — 85027 COMPLETE CBC AUTOMATED: CPT | Performed by: STUDENT IN AN ORGANIZED HEALTH CARE EDUCATION/TRAINING PROGRAM

## 2022-09-15 PROCEDURE — 20600001 HC STEP DOWN PRIVATE ROOM

## 2022-09-15 PROCEDURE — 63600175 PHARM REV CODE 636 W HCPCS: Performed by: INTERNAL MEDICINE

## 2022-09-15 RX ORDER — LOPERAMIDE HYDROCHLORIDE 2 MG/1
4 CAPSULE ORAL EVERY 6 HOURS PRN
Status: DISCONTINUED | OUTPATIENT
Start: 2022-09-15 | End: 2022-09-15

## 2022-09-15 RX ORDER — LOPERAMIDE HYDROCHLORIDE 2 MG/1
4 CAPSULE ORAL 4 TIMES DAILY PRN
Status: DISCONTINUED | OUTPATIENT
Start: 2022-09-15 | End: 2022-09-16

## 2022-09-15 RX ADMIN — OXYCODONE HYDROCHLORIDE 10 MG: 10 TABLET ORAL at 11:09

## 2022-09-15 RX ADMIN — DICYCLOMINE HYDROCHLORIDE 10 MG: 10 CAPSULE ORAL at 09:09

## 2022-09-15 RX ADMIN — ONDANSETRON 4 MG: 2 INJECTION INTRAMUSCULAR; INTRAVENOUS at 11:09

## 2022-09-15 RX ADMIN — NYSTATIN 500000 UNITS: 500000 SUSPENSION ORAL at 09:09

## 2022-09-15 RX ADMIN — ONDANSETRON 4 MG: 2 INJECTION INTRAMUSCULAR; INTRAVENOUS at 12:09

## 2022-09-15 RX ADMIN — OXYCODONE HYDROCHLORIDE 10 MG: 10 TABLET ORAL at 06:09

## 2022-09-15 RX ADMIN — LOPERAMIDE HYDROCHLORIDE 2 MG: 2 CAPSULE ORAL at 02:09

## 2022-09-15 RX ADMIN — ENOXAPARIN SODIUM 40 MG: 100 INJECTION SUBCUTANEOUS at 04:09

## 2022-09-15 RX ADMIN — DICYCLOMINE HYDROCHLORIDE 10 MG: 10 CAPSULE ORAL at 04:09

## 2022-09-15 RX ADMIN — LOPERAMIDE HYDROCHLORIDE 4 MG: 2 CAPSULE ORAL at 11:09

## 2022-09-15 RX ADMIN — ONDANSETRON 4 MG: 2 INJECTION INTRAMUSCULAR; INTRAVENOUS at 06:09

## 2022-09-15 RX ADMIN — LOPERAMIDE HYDROCHLORIDE 4 MG: 2 CAPSULE ORAL at 09:09

## 2022-09-15 RX ADMIN — LOPERAMIDE HYDROCHLORIDE 4 MG: 2 CAPSULE ORAL at 03:09

## 2022-09-15 RX ADMIN — DAPTOMYCIN 1055 MG: 350 INJECTION, POWDER, LYOPHILIZED, FOR SOLUTION INTRAVENOUS at 05:09

## 2022-09-15 RX ADMIN — ALUMINUM HYDROXIDE, MAGNESIUM HYDROXIDE, AND DIMETHICONE 10 ML: 400; 400; 40 SUSPENSION ORAL at 09:09

## 2022-09-15 NOTE — SUBJECTIVE & OBJECTIVE
Interval History: Reports feeling slightly better, denies new symptoms, denies fever, still has some diarrhea    Review of Systems    Review of Systems   Constitutional:  Positive for appetite change. Negative for chills and fever.   HENT:  Negative for rhinorrhea and sore throat.    Respiratory:  Negative for cough and shortness of breath.    Cardiovascular:  Negative for chest pain and leg swelling.   Gastrointestinal:  Positive for abdominal pain, diarrhea  Genitourinary:  Negative for dysuria and hematuria.   Musculoskeletal:  Negative for arthralgias, back pain and myalgias.   Skin:  Negative for rash.   Allergic/Immunologic: Positive for immunocompromised state.   Neurological:  Negative for headaches.   Hematological:  Does not bruise/bleed easily.   Psychiatric/Behavioral:  Negative for behavioral problems.    All other systems reviewed and are negative.  Objective:     Vital Signs (Most Recent):  Temp: 100 °F (37.8 °C) (09/14/22 1939)  Pulse: 80 (09/14/22 1939)  Resp: 20 (09/14/22 1939)  BP: 138/74 (09/14/22 1939)  SpO2: 96 % (09/14/22 1939) Vital Signs (24h Range):  Temp:  [98.4 °F (36.9 °C)-100 °F (37.8 °C)] 100 °F (37.8 °C)  Pulse:  [80-99] 80  Resp:  [14-20] 20  SpO2:  [94 %-98 %] 96 %  BP: (129-143)/(69-79) 138/74     Weight: 105.5 kg (232 lb 9.4 oz)  Body mass index is 37.54 kg/m².    Estimated Creatinine Clearance: 117.1 mL/min (based on SCr of 0.8 mg/dL).    Physical Exam  Constitutional:       Appearance: Normal appearance. She is not ill-appearing or toxic-appearing.   HENT:      Head: Normocephalic and atraumatic.      Nose: Nose normal.      Mouth/Throat:      Mouth: Mucous membranes are moist.      Pharynx: Oropharynx is clear.   Eyes:      Extraocular Movements: Extraocular movements intact.      Conjunctiva/sclera: Conjunctivae normal.      Pupils: Pupils are equal, round, and reactive to light.   Cardiovascular:      Rate and Rhythm: Normal rate and regular rhythm.      Pulses: Normal  pulses.      Heart sounds: Normal heart sounds.   Pulmonary:      Effort: Pulmonary effort is normal.      Breath sounds: Normal breath sounds.   Abdominal:      General: Bowel sounds are normal. There is distension.      Tenderness: There is abdominal tenderness. There is no guarding or rebound.   Musculoskeletal:         General: No swelling or deformity. Normal range of motion.      Cervical back: Normal range of motion and neck supple.   Skin:     General: Skin is warm and dry.   Neurological:      Mental Status: She is alert and oriented to person, place, and time. Mental status is at baseline.   Psychiatric:         Mood and Affect: Mood normal.         Behavior: Behavior normal.         Thought Content: Thought content normal.         Judgment: Judgment normal.       Significant Labs: Blood Culture:   Recent Labs   Lab 09/08/22  1147 09/08/22  1756 09/08/22  1800 09/10/22  1746 09/10/22  1747   LABBLOO No growth after 5 days.  No growth after 5 days. No growth after 5 days. No growth after 5 days. No Growth to date  No Growth to date  No Growth to date  No Growth to date  No Growth to date No Growth to date  No Growth to date  No Growth to date  No Growth to date  No Growth to date     BMP:   Recent Labs   Lab 09/14/22  0332         K 3.2*      CO2 23   BUN 5*   CREATININE 0.8   CALCIUM 8.7   MG 1.7     CBC:   Recent Labs   Lab 09/13/22  0325 09/14/22  0332   WBC 3.99 4.39   HGB 7.3* 7.5*   HCT 21.1* 21.8*   * 130*     CMP:   Recent Labs   Lab 09/13/22  0325 09/13/22  1839 09/14/22  0332    143 142   K 3.2* 3.2* 3.2*    108 106   CO2 23 23 23    99 107   BUN 4* 4* 5*   CREATININE 0.8 0.8 0.8   CALCIUM 8.4* 8.9 8.7   PROT 5.2*  --  5.6*   ALBUMIN 2.5*  --  2.7*   BILITOT 0.5  --  0.7   ALKPHOS 34*  --  35*   *  --  86*   *  --  169*   ANIONGAP 8 12 13     Microbiology Results (last 7 days)       Procedure Component Value Units Date/Time     Clostridium difficile EIA [[id number]] Collected: 09/14/22 1353    Order Status: Completed Specimen: Stool Updated: 09/14/22 2107     C. diff Antigen Negative     C difficile Toxins A+B, EIA Negative     Comment: Testing not recommended for children <24 months old.       Narrative:      [id number]    Blood culture [[id number]] Collected: 09/10/22 1746    Order Status: Completed Specimen: Blood Updated: 09/14/22 2012     Blood Culture, Routine No Growth to date      No Growth to date      No Growth to date      No Growth to date      No Growth to date    Narrative:      Please collect from 2 different sites    Blood culture [[id number]] Collected: 09/10/22 1747    Order Status: Completed Specimen: Blood Updated: 09/14/22 2012     Blood Culture, Routine No Growth to date      No Growth to date      No Growth to date      No Growth to date      No Growth to date    Narrative:      Please collect from 2 different sites    Blood culture [[id number]] Collected: 09/08/22 1756    Order Status: Completed Specimen: Blood Updated: 09/13/22 2022     Blood Culture, Routine No growth after 5 days.    Narrative:      Please collect from 2 different sites    Blood culture [[id number]] Collected: 09/08/22 1800    Order Status: Completed Specimen: Blood Updated: 09/13/22 2022     Blood Culture, Routine No growth after 5 days.    Narrative:      Please collect from 2 different sites    Blood culture [[id number]] Collected: 09/08/22 1147    Order Status: Completed Specimen: Blood Updated: 09/13/22 1412     Blood Culture, Routine No growth after 5 days.    Narrative:      Rt wrist    Blood culture [[id number]] Collected: 09/08/22 1147    Order Status: Completed Specimen: Blood Updated: 09/13/22 1412     Blood Culture, Routine No growth after 5 days.    Narrative:      Lft AC    E. coli 0157 antigen [[id number]] Collected: 09/12/22 1732    Order Status: No result Specimen: Stool Updated: 09/12/22 1733    Stool culture [[id number]] Collected: 09/12/22  1732    Order Status: Sent Specimen: Stool Updated: 09/12/22 1732    Blood culture [[id number]] Collected: 09/05/22 2347    Order Status: Completed Specimen: Blood Updated: 09/11/22 0612     Blood Culture, Routine No growth after 5 days.    Blood culture [[id number]] Collected: 09/05/22 2347    Order Status: Completed Specimen: Blood Updated: 09/11/22 0612     Blood Culture, Routine No growth after 5 days.    Blood culture [[id number]] Collected: 09/04/22 2146    Order Status: Completed Specimen: Blood Updated: 09/09/22 2312     Blood Culture, Routine No growth after 5 days.    Blood culture [[id number]] Collected: 09/04/22 2148    Order Status: Completed Specimen: Blood Updated: 09/09/22 2312     Blood Culture, Routine No growth after 5 days.    Blood culture [[id number]] Collected: 09/04/22 0155    Order Status: Completed Specimen: Blood Updated: 09/09/22 0612     Blood Culture, Routine No growth after 5 days.    Blood culture [[id number]] Collected: 09/04/22 0156    Order Status: Completed Specimen: Blood Updated: 09/09/22 0612     Blood Culture, Routine No growth after 5 days.    Clostridium difficile EIA [[id number]] Collected: 09/08/22 1151    Order Status: Completed Specimen: Stool Updated: 09/08/22 2244     C. diff Antigen Negative     C difficile Toxins A+B, EIA Negative     Comment: Testing not recommended for children <24 months old.             Pathology Results  (Last 10 years)                 03/14/22 1057  Specimen to Pathology, Radiology Breast, needle biopsy (Abnormal) Edited Result - FINAL    Narrative:  Left breast mass 2 o'clock   Formalin:  9:55a   Retrieved: 9:53a   Left axillary node   Formalin: 10:01a   Retrieved: 10:00a   Release to patient->Immediate             All pertinent labs within the past 24 hours have been reviewed.  Recent Lab Results         09/14/22  1353   09/14/22  0332        Albumin   2.7       Alkaline Phosphatase   35       ALT   169       Anion Gap   13       Aniso   Slight        AST   86       Baso #   0.01       Basophil %   0.2       BILIRUBIN TOTAL   0.7  Comment: For infants and newborns, interpretation of results should be based  on gestational age, weight and in agreement with clinical  observations.    Premature Infant recommended reference ranges:  Up to 24 hours.............<8.0 mg/dL  Up to 48 hours............<12.0 mg/dL  3-5 days..................<15.0 mg/dL  6-29 days.................<15.0 mg/dL         BUN   5       C difficile Toxins A+B, EIA Negative  Comment: Testing not recommended for children <24 months old.         C. diff Antigen Negative         Calcium   8.7       Chloride   106       CO2   23       Creatinine   0.8       Differential Method   Automated       eGFR   >60.0       Eos #   0.0       Eosinophil %   0.5       Glucose   107       Gran # (ANC)   3.1       Gran %   70.4       Hematocrit   21.8       Hemoglobin   7.5       Hypo   Occasional       Immature Grans (Abs)   0.05  Comment: Mild elevation in immature granulocytes is non specific and   can be seen in a variety of conditions including stress response,   acute inflammation, trauma and pregnancy. Correlation with other   laboratory and clinical findings is essential.         Immature Granulocytes   1.1       Lymph #   0.5       Lymph %   12.1       Magnesium   1.7       MCH   31.6       MCHC   34.4       MCV   92       Mono #   0.7       Mono %   15.7       MPV   9.9       nRBC   0       Ovalocytes   Occasional       Platelets   130       Poikilocytosis   Slight       Poly   Occasional       Potassium   3.2       PROTEIN TOTAL   5.6       RBC   2.37       RDW   15.7       Sodium   142       Spherocytes   Occasional       WBC   4.39               Significant Imaging: I have reviewed all pertinent imaging results/findings within the past 24 hours.

## 2022-09-15 NOTE — ASSESSMENT & PLAN NOTE
36 years old with HTN, breast cancer  C4D20 Taxol on 8/29/2022 f/u with Dr. Cruz presents with worsening abdominal pain, nasuea and vomiting.  On exam she was mod-sever  abdominal pain to palpitation, no peritonitic signs. Labs were significant for elevated lipase >200. CT with findings concerning for pancreatitis wo fluid/ abscess collection. . CT scan with Mild peripancreatic fat stranding. TG levels were normal this admission, low concern for triglyceride induced pancreatitis. CT scan showed no gallstones or GB wall thickening, low concern for gallstone induced pancreatitis. Patient is clinically improving with n/v, po intake, and pain.     Most likely pancreatitis 2/2 chemo, last chemotherapy session 8/29; Taxol    - Pain: morphine IV changed to oxy PO.    - Anti-emetics: scheduled compazine and prn zofran. Able to tolerate juice and fluids.  - Diet advancing as tolerated, tolerating liquids only right now.  - simethicone

## 2022-09-15 NOTE — SUBJECTIVE & OBJECTIVE
Interval History: Ms. Kent feels better today. She denies any diarrhea, but has periumbilical abdominal pain (unlike previous lower abdominal pain).     Review of Systems   Constitutional:  Negative for chills and fever.   HENT:  Negative for rhinorrhea and sore throat.    Respiratory:  Negative for cough and shortness of breath.    Cardiovascular:  Negative for chest pain and leg swelling.   Gastrointestinal:  Positive for abdominal pain. Negative for diarrhea, nausea and vomiting.   Genitourinary:  Negative for dysuria and hematuria.   Musculoskeletal:  Negative for arthralgias, back pain and myalgias.   Skin:  Negative for rash.   Allergic/Immunologic: Positive for immunocompromised state.   Neurological:  Negative for headaches.   Hematological:  Does not bruise/bleed easily.   Psychiatric/Behavioral:  Negative for behavioral problems.    All other systems reviewed and are negative.  Objective:     Vital Signs (Most Recent):  Temp: 98.1 °F (36.7 °C) (09/15/22 1555)  Pulse: 88 (09/15/22 1555)  Resp: 16 (09/15/22 1555)  BP: 139/82 (09/15/22 1555)  SpO2: 99 % (09/15/22 1555) Vital Signs (24h Range):  Temp:  [98.1 °F (36.7 °C)-100 °F (37.8 °C)] 98.1 °F (36.7 °C)  Pulse:  [] 88  Resp:  [16-20] 16  SpO2:  [94 %-99 %] 99 %  BP: (130-139)/(65-82) 139/82     Weight: 105.5 kg (232 lb 9.4 oz)  Body mass index is 37.54 kg/m².    Estimated Creatinine Clearance: 117.1 mL/min (based on SCr of 0.8 mg/dL).    Physical Exam  Vitals and nursing note reviewed.   Constitutional:       General: She is not in acute distress.     Appearance: She is well-developed. She is obese. She is not toxic-appearing.   HENT:      Head: Normocephalic and atraumatic.      Right Ear: External ear normal.      Left Ear: External ear normal.      Nose: Nose normal.      Mouth/Throat:      Mouth: Mucous membranes are moist.   Eyes:      General: No scleral icterus.     Extraocular Movements: Extraocular movements intact.      Conjunctiva/sclera:  Conjunctivae normal.   Cardiovascular:      Rate and Rhythm: Normal rate and regular rhythm.      Heart sounds: No murmur heard.  Pulmonary:      Effort: Pulmonary effort is normal. No respiratory distress.      Breath sounds: Normal breath sounds.   Abdominal:      General: There is no distension.      Palpations: Abdomen is soft.      Tenderness: There is abdominal tenderness.      Comments: Bowel sounds present   Musculoskeletal:         General: Normal range of motion.      Cervical back: Neck supple.      Right lower leg: No edema.      Left lower leg: No edema.   Skin:     General: Skin is warm and dry.      Capillary Refill: Capillary refill takes less than 2 seconds.      Findings: No erythema or rash.   Neurological:      Mental Status: She is alert and oriented to person, place, and time. Mental status is at baseline.   Psychiatric:         Mood and Affect: Mood normal.         Behavior: Behavior normal.         Thought Content: Thought content normal.       Significant Labs:   Microbiology Results (last 7 days)       Procedure Component Value Units Date/Time    Clostridium difficile EIA [[id number]] Collected: 09/14/22 1353    Order Status: Completed Specimen: Stool Updated: 09/14/22 2107     C. diff Antigen Negative     C difficile Toxins A+B, EIA Negative     Comment: Testing not recommended for children <24 months old.       Narrative:      [id number]    Blood culture [[id number]] Collected: 09/10/22 1746    Order Status: Completed Specimen: Blood Updated: 09/14/22 2012     Blood Culture, Routine No Growth to date      No Growth to date      No Growth to date      No Growth to date      No Growth to date    Narrative:      Please collect from 2 different sites    Blood culture [[id number]] Collected: 09/10/22 1747    Order Status: Completed Specimen: Blood Updated: 09/14/22 2012     Blood Culture, Routine No Growth to date      No Growth to date      No Growth to date      No Growth to date      No Growth to  date    Narrative:      Please collect from 2 different sites    Blood culture [[id number]] Collected: 09/08/22 1756    Order Status: Completed Specimen: Blood Updated: 09/13/22 2022     Blood Culture, Routine No growth after 5 days.    Narrative:      Please collect from 2 different sites    Blood culture [[id number]] Collected: 09/08/22 1800    Order Status: Completed Specimen: Blood Updated: 09/13/22 2022     Blood Culture, Routine No growth after 5 days.    Narrative:      Please collect from 2 different sites    Blood culture [[id number]] Collected: 09/08/22 1147    Order Status: Completed Specimen: Blood Updated: 09/13/22 1412     Blood Culture, Routine No growth after 5 days.    Narrative:      Rt wrist    Blood culture [[id number]] Collected: 09/08/22 1147    Order Status: Completed Specimen: Blood Updated: 09/13/22 1412     Blood Culture, Routine No growth after 5 days.    Narrative:      Lft AC    E. coli 0157 antigen [[id number]] Collected: 09/12/22 1732    Order Status: No result Specimen: Stool Updated: 09/12/22 1733    Stool culture [[id number]] Collected: 09/12/22 1732    Order Status: Sent Specimen: Stool Updated: 09/12/22 1732    Blood culture [[id number]] Collected: 09/05/22 2347    Order Status: Completed Specimen: Blood Updated: 09/11/22 0612     Blood Culture, Routine No growth after 5 days.    Blood culture [[id number]] Collected: 09/05/22 2347    Order Status: Completed Specimen: Blood Updated: 09/11/22 0612     Blood Culture, Routine No growth after 5 days.    Blood culture [[id number]] Collected: 09/04/22 2146    Order Status: Completed Specimen: Blood Updated: 09/09/22 2312     Blood Culture, Routine No growth after 5 days.    Blood culture [[id number]] Collected: 09/04/22 2148    Order Status: Completed Specimen: Blood Updated: 09/09/22 2312     Blood Culture, Routine No growth after 5 days.    Blood culture [[id number]] Collected: 09/04/22 0155    Order Status: Completed Specimen: Blood Updated:  09/09/22 0612     Blood Culture, Routine No growth after 5 days.    Blood culture [[id number]] Collected: 09/04/22 0156    Order Status: Completed Specimen: Blood Updated: 09/09/22 0612     Blood Culture, Routine No growth after 5 days.    Clostridium difficile EIA [[id number]] Collected: 09/08/22 1151    Order Status: Completed Specimen: Stool Updated: 09/08/22 2244     C. diff Antigen Negative     C difficile Toxins A+B, EIA Negative     Comment: Testing not recommended for children <24 months old.             All pertinent labs within the past 24 hours have been reviewed.  Recent Lab Results         09/15/22  0408        Albumin 2.6       Alkaline Phosphatase 38              Anion Gap 9       Aniso Slight       AST 54       Bands 17.0       Baso # CANCELED  Comment: Result canceled by the ancillary.       Basophil % 0.0       BILIRUBIN TOTAL 0.8  Comment: For infants and newborns, interpretation of results should be based  on gestational age, weight and in agreement with clinical  observations.    Premature Infant recommended reference ranges:  Up to 24 hours.............<8.0 mg/dL  Up to 48 hours............<12.0 mg/dL  3-5 days..................<15.0 mg/dL  6-29 days.................<15.0 mg/dL         BUN 6       Calcium 8.6       Chloride 103       CO2 22       Creatinine 0.8       Differential Method Manual  Comment: CORRECTED RESULT; previously reported as Automated on 09/15/2022 at   05:30.    [C]       Dohle Bodies Present       eGFR >60.0       Eos # CANCELED  Comment: Result canceled by the ancillary.       Eosinophil % 0.0       Large/Giant Platelets Present       Glucose 102       Gran % 56.0       Hematocrit 21.2       Hemoglobin 7.3       Hypo Occasional       Immature Grans (Abs) CANCELED  Comment: Mild elevation in immature granulocytes is non specific and   can be seen in a variety of conditions including stress response,   acute inflammation, trauma and pregnancy. Correlation with other    laboratory and clinical findings is essential.    Result canceled by the ancillary.         Immature Granulocytes CANCELED  Comment: Result canceled by the ancillary.       Lymph # CANCELED  Comment: Result canceled by the ancillary.       Lymph % 15.0       Magnesium 1.7       MCH 31.9       MCHC 34.4       MCV 93       Metamyelocytes 1.0       Mono # CANCELED  Comment: Result canceled by the ancillary.       Mono % 11.0       MPV 9.9       nRBC 0       Ovalocytes Occasional       Platelet Estimate Appears normal       Platelets 151       Poikilocytosis Slight       Poly Occasional       Potassium 3.4       PROTEIN TOTAL 5.6       RBC 2.29       RDW 15.4       Schistocytes Present       Sodium 134       Spherocytes Occasional       Toxic Granulation Present       WBC 4.49                [C] - Corrected Result               Significant Imaging: I have reviewed all pertinent imaging results/findings within the past 24 hours.

## 2022-09-15 NOTE — SUBJECTIVE & OBJECTIVE
Interval History: No acute events overnight, had one episode of vomiting yesterday after eating watermelon, no episodes overnight but has been nauseous w/ water. Abdominal pain localized to eduard-umbilical region described as soreness, worse with straining when using bathroom. Has only received imodium once yesterday. Will change order today to insure she gets it after each episode of diarrhea. C.Diff negative again.    Oncology Treatment Plan:   OP PEMBROLIZUMAB CARBOPLATIN (AUC 5) WITH WEEKLY PACLITAXEL FOLLOWED BY PEMBROLIZUMAB DOXORUBICIN CYCLOPHOSPHAMIDE FOLLOWED BY PEMBROLIZUMAB 200 MG Q3W    Medications:  Continuous Infusions:  Scheduled Meds:   DAPTOmycin (CUBICIN) IV (PEDS and ADULTS)  10 mg/kg Intravenous Q24H    dicyclomine  10 mg Oral TID    duke's soln (benadryl 30 mL, mylanta 30 mL, LIDOcaine 30 mL, nystatin 30 mL) 120 mL  10 mL Oral QID    enoxaparin  40 mg Subcutaneous Daily    magnesium sulfate IVPB  2 g Intravenous Once    nystatin  500,000 Units Oral QID    ondansetron  4 mg Intravenous Q6H    simethicone  1 tablet Oral TID PC     PRN Meds:dextrose 10%, dextrose 10%, glucagon (human recombinant), glucose, glucose, ibuprofen, loperamide, naloxone, oxyCODONE, polyethylene glycol, prochlorperazine, sodium chloride 0.9%       Objective:     Vital Signs (Most Recent):  Temp: 98.8 °F (37.1 °C) (09/15/22 1136)  Pulse: 87 (09/15/22 1136)  Resp: 16 (09/15/22 1155)  BP: 130/67 (09/15/22 1136)  SpO2: 97 % (09/15/22 1136) Vital Signs (24h Range):  Temp:  [98.1 °F (36.7 °C)-100 °F (37.8 °C)] 98.8 °F (37.1 °C)  Pulse:  [] 87  Resp:  [16-20] 16  SpO2:  [94 %-97 %] 97 %  BP: (130-143)/(65-77) 130/67     Weight: 105.5 kg (232 lb 9.4 oz)  Body mass index is 37.54 kg/m².  Body surface area is 2.22 meters squared.      Intake/Output Summary (Last 24 hours) at 9/15/2022 1336  Last data filed at 9/15/2022 0359  Gross per 24 hour   Intake 640 ml   Output --   Net 640 ml       Physical Exam  HENT:      Head:  Normocephalic.      Nose: Nose normal.      Mouth/Throat:      Mouth: Mucous membranes are moist.   Eyes:      Pupils: Pupils are equal, round, and reactive to light.   Cardiovascular:      Rate and Rhythm: Normal rate.   Pulmonary:      Effort: Pulmonary effort is normal.   Abdominal:      General: Bowel sounds are normal. There is no distension.      Tenderness: There is abdominal tenderness (periumbilical, LLQ).   Musculoskeletal:         General: Normal range of motion.   Skin:     General: Skin is warm.   Neurological:      General: No focal deficit present.      Mental Status: She is alert.       Significant Labs:   All pertinent labs from the last 24 hours have been reviewed.    Diagnostic Results:  I have reviewed all pertinent imaging results/findings within the past 24 hours.

## 2022-09-15 NOTE — ASSESSMENT & PLAN NOTE
36-year-old female with history of breast cancer, last taxol 8/29/2022, presented with nausea, vomiting, abdominal pain 9/3/2022, diagnosed with acute pancreatitis. Patient with ongoing fevers with new transaminitis - negative blood/urine cultures, HIV, HepC, CT CAP notable for peripancreatic fat stranding, but no areas of necrosis, suggestive of pancreatitis. Patient had persistent fever, but now with lower abdominal and diarrhea that are not well explained by pancreatitis (CMV viral load not detected). Afebrile since addition of daptomycin. Infectious work up so far unrevealing.      Recommendations:  - Can transition daptomycin to linezolid 600 mg PO BID to complete two week course of antibiotic, end date: 9/26/22.  - RPR, CMV, HSV PCR, Hep B, and Hep C negative.   - Defer to Primary team/GI to further manage GI symptoms.

## 2022-09-15 NOTE — ASSESSMENT & PLAN NOTE
36-year-old female with history of breast cancer, last taxol 8/29/2022, presented with nausea, vomiting, abdominal pain 9/3/2022, diagnosed with acute pancreatitis. Patient with ongoing fevers with new transaminitis - negative blood/urine cultures, HIV, HepC, CT CAP notable for peripancreatic fat stranding, but no areas of necrosis, suggestive of pancreatitis. Patient had persistent fever, but now with lower abdominal and diarrhea that are not well explained by pancreatitis (CMV viral load not detected). Afebrile since addition of daptomycin. C diff negative, Bcx with NGTD. No new symptoms of infection.     Recommendations:  - Continue daptomycin, discontinue ampicillin-sulbactam, C diff negative. No need to remove port.   - Pending HSV PCR (viral etiology for hepatitis). RPR, CMV, Hep B, and Hep C negative.

## 2022-09-15 NOTE — PROGRESS NOTES
[facility] - Oncology \[facility] [place] [facility]\"")  Infectious Disease  Progress Note    Patient Name: Laura Kent  MRN: [id number]  Admission Date: 9/3/2022  Length of Stay: 11 days  Attending Physician: Enoch Goodwin MD  Primary Care Provider: Primary Doctor No    Isolation Status: Special Contact  Assessment/Plan:      * Acute pancreatitis  36-year-old female with history of breast cancer, last taxol 8/29/2022, presented with nausea, vomiting, abdominal pain 9/3/2022, diagnosed with acute pancreatitis. Patient with ongoing fevers with new transaminitis - negative blood/urine cultures, HIV, HepC, CT CAP notable for peripancreatic fat stranding, but no areas of necrosis, suggestive of pancreatitis. Patient had persistent fever, but now with lower abdominal and diarrhea that are not well explained by pancreatitis (CMV viral load not detected). Afebrile since addition of daptomycin. C diff negative, Bcx with NGTD. No new symptoms of infection.     Recommendations:  - Continue daptomycin, discontinue ampicillin-sulbactam, C diff negative. No need to remove port.   - Pending HSV PCR (viral etiology for hepatitis). RPR, CMV, Hep B, and Hep C negative.         Anticipated Disposition: per primary     Thank you for your consult. I will follow-up with patient. Please contact us if you have any additional questions.    Wilian Trujillo MD  Infectious Disease  [facility] - Oncology ([facility])    Subjective:     Principal Problem:Acute pancreatitis    HPI: Ms. Laura Kent is a 36 y.o.  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 Patient with persistent fevers - she was initially on cefepime, then vanc / metronidazole added, then transitioned to pip-tazo /  vancomycin. All cultures have been NGTD. CT CAP notable for increasing peripancreatic fat stranding with multiple prominent lymph nodes. Labs notable for transaminitis. Patient reports feeling fevers. She has a right chest port, but no surrounding pain, redness, swelling. Reports abdominal pain was initially epigastric, now bilateral lower abdominal. She is having multiple episodes of diarrhea. Otherwise, no headaches, neck stiffness, SOB, cough, hematuria, dysuria, rashes.    Interval History: Reports feeling slightly better, denies new symptoms, denies fever, still has some diarrhea    Review of Systems    Review of Systems   Constitutional:  Positive for appetite change. Negative for chills and fever.   HENT:  Negative for rhinorrhea and sore throat.    Respiratory:  Negative for cough and shortness of breath.    Cardiovascular:  Negative for chest pain and leg swelling.   Gastrointestinal:  Positive for abdominal pain, diarrhea  Genitourinary:  Negative for dysuria and hematuria.   Musculoskeletal:  Negative for arthralgias, back pain and myalgias.   Skin:  Negative for rash.   Allergic/Immunologic: Positive for immunocompromised state.   Neurological:  Negative for headaches.   Hematological:  Does not bruise/bleed easily.   Psychiatric/Behavioral:  Negative for behavioral problems.    All other systems reviewed and are negative.  Objective:     Vital Signs (Most Recent):  Temp: 100 °F (37.8 °C) (09/14/22 1939)  Pulse: 80 (09/14/22 1939)  Resp: 20 (09/14/22 1939)  BP: 138/74 (09/14/22 1939)  SpO2: 96 % (09/14/22 1939) Vital Signs (24h Range):  Temp:  [98.4 °F (36.9 °C)-100 °F (37.8 °C)] 100 °F (37.8 °C)  Pulse:  [80-99] 80  Resp:  [14-20] 20  SpO2:  [94 %-98 %] 96 %  BP: (129-143)/(69-79) 138/74     Weight: 105.5 kg (232 lb 9.4 oz)  Body mass index is 37.54 kg/m².    Estimated Creatinine Clearance: 117.1 mL/min (based on SCr of 0.8 mg/dL).    Physical Exam  Constitutional:       Appearance: Normal appearance.  She is not ill-appearing or toxic-appearing.   HENT:      Head: Normocephalic and atraumatic.      Nose: Nose normal.      Mouth/Throat:      Mouth: Mucous membranes are moist.      Pharynx: Oropharynx is clear.   Eyes:      Extraocular Movements: Extraocular movements intact.      Conjunctiva/sclera: Conjunctivae normal.      Pupils: Pupils are equal, round, and reactive to light.   Cardiovascular:      Rate and Rhythm: Normal rate and regular rhythm.      Pulses: Normal pulses.      Heart sounds: Normal heart sounds.   Pulmonary:      Effort: Pulmonary effort is normal.      Breath sounds: Normal breath sounds.   Abdominal:      General: Bowel sounds are normal. There is distension.      Tenderness: There is abdominal tenderness. There is no guarding or rebound.   Musculoskeletal:         General: No swelling or deformity. Normal range of motion.      Cervical back: Normal range of motion and neck supple.   Skin:     General: Skin is warm and dry.   Neurological:      Mental Status: She is alert and oriented to person, place, and time. Mental status is at baseline.   Psychiatric:         Mood and Affect: Mood normal.         Behavior: Behavior normal.         Thought Content: Thought content normal.         Judgment: Judgment normal.       Significant Labs: Blood Culture:   Recent Labs   Lab 09/08/22  1147 09/08/22  1756 09/08/22  1800 09/10/22  1746 09/10/22  1747   LABBLOO No growth after 5 days.  No growth after 5 days. No growth after 5 days. No growth after 5 days. No Growth to date  No Growth to date  No Growth to date  No Growth to date  No Growth to date No Growth to date  No Growth to date  No Growth to date  No Growth to date  No Growth to date     BMP:   Recent Labs   Lab 09/14/22  0332         K 3.2*      CO2 23   BUN 5*   CREATININE 0.8   CALCIUM 8.7   MG 1.7     CBC:   Recent Labs   Lab 09/13/22  0325 09/14/22  0332   WBC 3.99 4.39   HGB 7.3* 7.5*   HCT 21.1* 21.8*   PLT  116* 130*     CMP:   Recent Labs   Lab 09/13/22  0325 09/13/22  1839 09/14/22  0332    143 142   K 3.2* 3.2* 3.2*    108 106   CO2 23 23 23    99 107   BUN 4* 4* 5*   CREATININE 0.8 0.8 0.8   CALCIUM 8.4* 8.9 8.7   PROT 5.2*  --  5.6*   ALBUMIN 2.5*  --  2.7*   BILITOT 0.5  --  0.7   ALKPHOS 34*  --  35*   *  --  86*   *  --  169*   ANIONGAP 8 12 13     Microbiology Results (last 7 days)       Procedure Component Value Units Date/Time    Clostridium difficile EIA [[id number]] Collected: 09/14/22 1353    Order Status: Completed Specimen: Stool Updated: 09/14/22 2107     C. diff Antigen Negative     C difficile Toxins A+B, EIA Negative     Comment: Testing not recommended for children <24 months old.       Narrative:      [id number]    Blood culture [[id number]] Collected: 09/10/22 1746    Order Status: Completed Specimen: Blood Updated: 09/14/22 2012     Blood Culture, Routine No Growth to date      No Growth to date      No Growth to date      No Growth to date      No Growth to date    Narrative:      Please collect from 2 different sites    Blood culture [[id number]] Collected: 09/10/22 1747    Order Status: Completed Specimen: Blood Updated: 09/14/22 2012     Blood Culture, Routine No Growth to date      No Growth to date      No Growth to date      No Growth to date      No Growth to date    Narrative:      Please collect from 2 different sites    Blood culture [[id number]] Collected: 09/08/22 1756    Order Status: Completed Specimen: Blood Updated: 09/13/22 2022     Blood Culture, Routine No growth after 5 days.    Narrative:      Please collect from 2 different sites    Blood culture [[id number]] Collected: 09/08/22 1800    Order Status: Completed Specimen: Blood Updated: 09/13/22 2022     Blood Culture, Routine No growth after 5 days.    Narrative:      Please collect from 2 different sites    Blood culture [[id number]] Collected: 09/08/22 1147    Order Status: Completed Specimen: Blood  Updated: 09/13/22 1412     Blood Culture, Routine No growth after 5 days.    Narrative:      Rt wrist    Blood culture [[id number]] Collected: 09/08/22 1147    Order Status: Completed Specimen: Blood Updated: 09/13/22 1412     Blood Culture, Routine No growth after 5 days.    Narrative:      Lft AC    E. coli 0157 antigen [[id number]] Collected: 09/12/22 1732    Order Status: No result Specimen: Stool Updated: 09/12/22 1733    Stool culture [[id number]] Collected: 09/12/22 1732    Order Status: Sent Specimen: Stool Updated: 09/12/22 1732    Blood culture [[id number]] Collected: 09/05/22 2347    Order Status: Completed Specimen: Blood Updated: 09/11/22 0612     Blood Culture, Routine No growth after 5 days.    Blood culture [[id number]] Collected: 09/05/22 2347    Order Status: Completed Specimen: Blood Updated: 09/11/22 0612     Blood Culture, Routine No growth after 5 days.    Blood culture [[id number]] Collected: 09/04/22 2146    Order Status: Completed Specimen: Blood Updated: 09/09/22 2312     Blood Culture, Routine No growth after 5 days.    Blood culture [[id number]] Collected: 09/04/22 2148    Order Status: Completed Specimen: Blood Updated: 09/09/22 2312     Blood Culture, Routine No growth after 5 days.    Blood culture [[id number]] Collected: 09/04/22 0155    Order Status: Completed Specimen: Blood Updated: 09/09/22 0612     Blood Culture, Routine No growth after 5 days.    Blood culture [[id number]] Collected: 09/04/22 0156    Order Status: Completed Specimen: Blood Updated: 09/09/22 0612     Blood Culture, Routine No growth after 5 days.    Clostridium difficile EIA [[id number]] Collected: 09/08/22 1151    Order Status: Completed Specimen: Stool Updated: 09/08/22 2244     C. diff Antigen Negative     C difficile Toxins A+B, EIA Negative     Comment: Testing not recommended for children <24 months old.             Pathology Results  (Last 10 years)                 03/14/22 1057  Specimen to Pathology, Radiology  Breast, needle biopsy (Abnormal) Edited Result - FINAL    Narrative:  Left breast mass 2 o'clock   Formalin:  9:55a   Retrieved: 9:53a   Left axillary node   Formalin: 10:01a   Retrieved: 10:00a   Release to patient->Immediate             All pertinent labs within the past 24 hours have been reviewed.  Recent Lab Results         09/14/22  1353   09/14/22  0332        Albumin   2.7       Alkaline Phosphatase   35       ALT   169       Anion Gap   13       Aniso   Slight       AST   86       Baso #   0.01       Basophil %   0.2       BILIRUBIN TOTAL   0.7  Comment: For infants and newborns, interpretation of results should be based  on gestational age, weight and in agreement with clinical  observations.    Premature Infant recommended reference ranges:  Up to 24 hours.............<8.0 mg/dL  Up to 48 hours............<12.0 mg/dL  3-5 days..................<15.0 mg/dL  6-29 days.................<15.0 mg/dL         BUN   5       C difficile Toxins A+B, EIA Negative  Comment: Testing not recommended for children <24 months old.         C. diff Antigen Negative         Calcium   8.7       Chloride   106       CO2   23       Creatinine   0.8       Differential Method   Automated       eGFR   >60.0       Eos #   0.0       Eosinophil %   0.5       Glucose   107       Gran # (ANC)   3.1       Gran %   70.4       Hematocrit   21.8       Hemoglobin   7.5       Hypo   Occasional       Immature Grans (Abs)   0.05  Comment: Mild elevation in immature granulocytes is non specific and   can be seen in a variety of conditions including stress response,   acute inflammation, trauma and pregnancy. Correlation with other   laboratory and clinical findings is essential.         Immature Granulocytes   1.1       Lymph #   0.5       Lymph %   12.1       Magnesium   1.7       MCH   31.6       MCHC   34.4       MCV   92       Mono #   0.7       Mono %   15.7       MPV   9.9       nRBC   0       Ovalocytes   Occasional       Platelets    130       Poikilocytosis   Slight       Poly   Occasional       Potassium   3.2       PROTEIN TOTAL   5.6       RBC   2.37       RDW   15.7       Sodium   142       Spherocytes   Occasional       WBC   4.39               Significant Imaging: I have reviewed all pertinent imaging results/findings within the past 24 hours.

## 2022-09-15 NOTE — ASSESSMENT & PLAN NOTE
Patient has been having more loose stools this admission since starting antibiotics. Now stools are causing intense abdominal cramps and stools are now green     - c diff negative, repeat negative  - bowel regimen held   - See chemotherapy induced neutropenia  - O&P, culture, giardia, cryptosporidium, entamoeba, GI pathogen panel, strongy IgG per ID  - Fecal elastin pending

## 2022-09-15 NOTE — PROGRESS NOTES
[facility] ([facility])  Hematology/Oncology  Progress Note    Patient Name: Laura Kent  Admission Date: 9/3/2022  Hospital Length of Stay: 12 days  Code Status: Full Code     Subjective:     HPI:  36 years old with HTN, breast cancer  C4D20 Taxol on 8/29/2022 f/u with Dr. Cruz presents with worsening abdominal pain, nasuea and vomiting. Patient was in pain during the interview and asking for better pain control. She states one week history of gradually worsening abdominal pain worst in epigastric area without radiation. She had minimal po intake since then. She associate emesis x2 NBNB. She had one small BM this afternoon prior to her presentation.     Patient denies chest pain, shortness of breath,  dysuria, polyuria, nausea, vomiting, fever, chills,  diarrhea, constipation, headache, falls, focal weakness or vision changes.     Ed course:   On presentation she was HDS and afebrile. On exam she was mod-sever  abdominal pain to palpitation, no peritonitic signs. Labs were significant for elevated lipase >200. CT with findings concerning for pancreatitis wo fluid/ abscess collection. Medical onc consulted for admit.           Interval History: No acute events overnight, had one episode of vomiting yesterday after eating watermelon, no episodes overnight but has been nauseous w/ water. Abdominal pain localized to eduard-umbilical region described as soreness, worse with straining when using bathroom. Has only received imodium once yesterday. Will change order today to insure she gets it after each episode of diarrhea. C.Diff negative again.    Oncology Treatment Plan:   OP PEMBROLIZUMAB CARBOPLATIN (AUC 5) WITH WEEKLY PACLITAXEL FOLLOWED BY PEMBROLIZUMAB DOXORUBICIN CYCLOPHOSPHAMIDE FOLLOWED BY PEMBROLIZUMAB 200 MG Q3W    Medications:  Continuous Infusions:  Scheduled Meds:   DAPTOmycin (CUBICIN) IV (PEDS and ADULTS)  10 mg/kg Intravenous Q24H    dicyclomine  10 mg Oral TID    duke's soln  (benadryl 30 mL, mylanta 30 mL, LIDOcaine 30 mL, nystatin 30 mL) 120 mL  10 mL Oral QID    enoxaparin  40 mg Subcutaneous Daily    magnesium sulfate IVPB  2 g Intravenous Once    nystatin  500,000 Units Oral QID    ondansetron  4 mg Intravenous Q6H    simethicone  1 tablet Oral TID PC     PRN Meds:dextrose 10%, dextrose 10%, glucagon (human recombinant), glucose, glucose, ibuprofen, loperamide, naloxone, oxyCODONE, polyethylene glycol, prochlorperazine, sodium chloride 0.9%       Objective:     Vital Signs (Most Recent):  Temp: 98.8 °F (37.1 °C) (09/15/22 1136)  Pulse: 87 (09/15/22 1136)  Resp: 16 (09/15/22 1155)  BP: 130/67 (09/15/22 1136)  SpO2: 97 % (09/15/22 1136) Vital Signs (24h Range):  Temp:  [98.1 °F (36.7 °C)-100 °F (37.8 °C)] 98.8 °F (37.1 °C)  Pulse:  [] 87  Resp:  [16-20] 16  SpO2:  [94 %-97 %] 97 %  BP: (130-143)/(65-77) 130/67     Weight: 105.5 kg (232 lb 9.4 oz)  Body mass index is 37.54 kg/m².  Body surface area is 2.22 meters squared.      Intake/Output Summary (Last 24 hours) at 9/15/2022 1336  Last data filed at 9/15/2022 0359  Gross per 24 hour   Intake 640 ml   Output --   Net 640 ml       Physical Exam  HENT:      Head: Normocephalic.      Nose: Nose normal.      Mouth/Throat:      Mouth: Mucous membranes are moist.   Eyes:      Pupils: Pupils are equal, round, and reactive to light.   Cardiovascular:      Rate and Rhythm: Normal rate.   Pulmonary:      Effort: Pulmonary effort is normal.   Abdominal:      General: Bowel sounds are normal. There is no distension.      Tenderness: There is abdominal tenderness (periumbilical, LLQ).   Musculoskeletal:         General: Normal range of motion.   Skin:     General: Skin is warm.   Neurological:      General: No focal deficit present.      Mental Status: She is alert.       Significant Labs:   All pertinent labs from the last 24 hours have been reviewed.    Diagnostic Results:  I have reviewed all pertinent imaging results/findings within the  past 24 hours.    Assessment/Plan:     * Acute pancreatitis  36 years old with HTN, breast cancer  C4D20 Taxol on 8/29/2022 f/u with Dr. Cruz presents with worsening abdominal pain, nasuea and vomiting.  On exam she was mod-sever  abdominal pain to palpitation, no peritonitic signs. Labs were significant for elevated lipase >200. CT with findings concerning for pancreatitis wo fluid/ abscess collection. . CT scan with Mild peripancreatic fat stranding. TG levels were normal this admission, low concern for triglyceride induced pancreatitis. CT scan showed no gallstones or GB wall thickening, low concern for gallstone induced pancreatitis. Patient is clinically improving with n/v, po intake, and pain.     Most likely pancreatitis 2/2 chemo, last chemotherapy session 8/29; Taxol    - Pain: morphine IV changed to oxy PO.    - Anti-emetics: scheduled compazine and prn zofran. Able to tolerate juice and fluids.  - Diet advancing as tolerated, tolerating liquids only right now.  - simethicone     Elevated LFTs  Patient having increasing LFTs this admission. CTAP this admission did not show liver mets or biliary obstruction. US abdomen ordered to better visualize biliary tree. AST: ALT ratio < 2 so low concern for EtOH hepatitis. Patient switched from acetaminophen to advil. HBV surface and core ab negative    - US abdomen showed biliary sludge, consulted GI per ID recs   - Advance diet as tolerated  - [facility] outpatient referral  - RPR negative  - TORIN and anti-smooth muscle negative    Loose stools  Patient has been having more loose stools this admission since starting antibiotics. Now stools are causing intense abdominal cramps and stools are now green     - c diff negative, repeat negative  - bowel regimen held   - See chemotherapy induced neutropenia  - O&P, culture, giardia, cryptosporidium, entamoeba, GI pathogen panel, strongy IgG per ID  - Fecal elastin pending    Abdominal pain  Please read pancreatitis. Resolving  with pain medications, stool studies negative.    Chemotherapy induced neutropenia  Pt had subjective fevers at home unmeasured, abdominal pain, polyuria In setting of low WBC and recent chemotherapy. During admission patient was febrile intermittently and had U/A showing WBCs and bacteria. Initially started treatment with cefepime. Flagyl added due to ongoing fevers. Fever curve was initially downtrending with negative cultures. However she had fever as high as 103. Will restart septic work up as fever is currently of unknown origin and add vancomycin. Consulted ID and per recommendations have deescalated abx to unasyn given pt has has negative testing for pseudomonas and MRSA. Can also consider chemo causing fever.     - Cdiff studies negative x2  - Repeat blood cultures, NGTD  - ID contsulted, appreciate recs   - No port removal needed at this time per ID  - D/C Unasyn (9/14), continue Dapto will switch to Linezolid at discharge for VRE coverage    Malignant neoplasm of upper-outer quadrant of left breast in female, estrogen receptor negative  Dr. Cruz patient,  On:  Currently on cycle 4 D20 of pembrolizumab carboplatin (AUC 5) with weekly paclitaxel followed by pembrolizumab doxorubicin cyclophosphamide followed by pembrolizumab 200 mg q3w    Most recent Taxol on 8/29/2022     -- admit to medical oncology              Cristina Wilkerson DO  Hematology/Oncology  [facility] ([facility])      ATTENDING NOTE, ONCOLOGY INPATIENT TEAM      Patient seen and examined, chart reviewed.  Please refer to my note of same day.    Enoch Goodwin MD

## 2022-09-15 NOTE — ASSESSMENT & PLAN NOTE
Pt had subjective fevers at home unmeasured, abdominal pain, polyuria In setting of low WBC and recent chemotherapy. During admission patient was febrile intermittently and had U/A showing WBCs and bacteria. Initially started treatment with cefepime. Flagyl added due to ongoing fevers. Fever curve was initially downtrending with negative cultures. However she had fever as high as 103. Will restart septic work up as fever is currently of unknown origin and add vancomycin. Consulted ID and per recommendations have deescalated abx to unasyn given pt has has negative testing for pseudomonas and MRSA. Can also consider chemo causing fever.     - Cdiff studies negative x2  - Repeat blood cultures, NGTD  - ID contsulted, appreciate recs   - No port removal needed at this time per ID  - D/C Unasyn (9/14), continue Dapto will switch to Linezolid at discharge for VRE coverage

## 2022-09-15 NOTE — ASSESSMENT & PLAN NOTE
Patient having increasing LFTs this admission. CTAP this admission did not show liver mets or biliary obstruction. US abdomen ordered to better visualize biliary tree. AST: ALT ratio < 2 so low concern for EtOH hepatitis. Patient switched from acetaminophen to advil. HBV surface and core ab negative    - US abdomen showed biliary sludge, consulted GI per ID recs   - Advance diet as tolerated  - [facility] outpatient referral  - RPR negative  - TORIN and anti-smooth muscle negative

## 2022-09-15 NOTE — PROGRESS NOTES
[facility]   Infectious Disease  Progress Note    Patient Name: Laura Kent  MRN: [id number]  Admission Date: 9/3/2022  Length of Stay: 12 days  Attending Physician: Enoch Goodwin MD  Primary Care Provider: Primary Doctor No    Isolation Status: No active isolations  Assessment/Plan:      * Acute pancreatitis  36-year-old female with history of breast cancer, last taxol 8/29/2022, presented with nausea, vomiting, abdominal pain 9/3/2022, diagnosed with acute pancreatitis. Patient with ongoing fevers with new transaminitis - negative blood/urine cultures, HIV, HepC, CT CAP notable for peripancreatic fat stranding, but no areas of necrosis, suggestive of pancreatitis. Patient had persistent fever, but now with lower abdominal and diarrhea that are not well explained by pancreatitis (CMV viral load not detected). Afebrile since addition of daptomycin. Infectious work up so far unrevealing.      Recommendations:  - Can transition daptomycin to linezolid 600 mg PO BID to complete two week course of antibiotic, end date: 9/26/22.  - RPR, CMV, HSV PCR, Hep B, and Hep C negative.   - Defer to Primary team/GI to further manage GI symptoms.      Anticipated Disposition: Clinically unchanged.     Thank you for your consult. I will sign off. Please contact us if you have any additional questions.    Dimitri Campbell MD  Infectious Diseases  [facility]     Subjective:     Principal Problem:Acute pancreatitis    HPI: Ms. Laura Kent is a 36 y.o.  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 Patient with persistent fevers - she was initially on cefepime, then vanc /  metronidazole added, then transitioned to pip-tazo / vancomycin. All cultures have been NGTD. CT CAP notable for increasing peripancreatic fat stranding with multiple prominent lymph nodes. Labs notable for transaminitis. Patient reports feeling fevers. She has a right chest port, but no surrounding pain, redness, swelling. Reports abdominal pain was initially epigastric, now bilateral lower abdominal. She is having multiple episodes of diarrhea. Otherwise, no headaches, neck stiffness, SOB, cough, hematuria, dysuria, rashes.    Interval History: Ms. Kent feels better today. She denies any diarrhea, but has periumbilical abdominal pain (unlike previous lower abdominal pain).     Review of Systems   Constitutional:  Negative for chills and fever.   HENT:  Negative for rhinorrhea and sore throat.    Respiratory:  Negative for cough and shortness of breath.    Cardiovascular:  Negative for chest pain and leg swelling.   Gastrointestinal:  Positive for abdominal pain. Negative for diarrhea, nausea and vomiting.   Genitourinary:  Negative for dysuria and hematuria.   Musculoskeletal:  Negative for arthralgias, back pain and myalgias.   Skin:  Negative for rash.   Allergic/Immunologic: Positive for immunocompromised state.   Neurological:  Negative for headaches.   Hematological:  Does not bruise/bleed easily.   Psychiatric/Behavioral:  Negative for behavioral problems.    All other systems reviewed and are negative.  Objective:     Vital Signs (Most Recent):  Temp: 98.1 °F (36.7 °C) (09/15/22 1555)  Pulse: 88 (09/15/22 1555)  Resp: 16 (09/15/22 1555)  BP: 139/82 (09/15/22 1555)  SpO2: 99 % (09/15/22 1555) Vital Signs (24h Range):  Temp:  [98.1 °F (36.7 °C)-100 °F (37.8 °C)] 98.1 °F (36.7 °C)  Pulse:  [] 88  Resp:  [16-20] 16  SpO2:  [94 %-99 %] 99 %  BP: (130-139)/(65-82) 139/82     Weight: 105.5 kg (232 lb 9.4 oz)  Body mass index is 37.54 kg/m².    Estimated Creatinine Clearance: 117.1 mL/min (based on SCr of  0.8 mg/dL).    Physical Exam  Vitals and nursing note reviewed.   Constitutional:       General: She is not in acute distress.     Appearance: She is well-developed. She is obese. She is not toxic-appearing.   HENT:      Head: Normocephalic and atraumatic.      Right Ear: External ear normal.      Left Ear: External ear normal.      Nose: Nose normal.      Mouth/Throat:      Mouth: Mucous membranes are moist.   Eyes:      General: No scleral icterus.     Extraocular Movements: Extraocular movements intact.      Conjunctiva/sclera: Conjunctivae normal.   Cardiovascular:      Rate and Rhythm: Normal rate and regular rhythm.      Heart sounds: No murmur heard.  Pulmonary:      Effort: Pulmonary effort is normal. No respiratory distress.      Breath sounds: Normal breath sounds.   Abdominal:      General: There is no distension.      Palpations: Abdomen is soft.      Tenderness: There is abdominal tenderness.      Comments: Bowel sounds present   Musculoskeletal:         General: Normal range of motion.      Cervical back: Neck supple.      Right lower leg: No edema.      Left lower leg: No edema.   Skin:     General: Skin is warm and dry.      Capillary Refill: Capillary refill takes less than 2 seconds.      Findings: No erythema or rash.   Neurological:      Mental Status: She is alert and oriented to person, place, and time. Mental status is at baseline.   Psychiatric:         Mood and Affect: Mood normal.         Behavior: Behavior normal.         Thought Content: Thought content normal.       Significant Labs:   Microbiology Results (last 7 days)       Procedure Component Value Units Date/Time    Clostridium difficile EIA [[id number]] Collected: 09/14/22 1353    Order Status: Completed Specimen: Stool Updated: 09/14/22 2088     C. diff Antigen Negative     C difficile Toxins A+B, EIA Negative     Comment: Testing not recommended for children <24 months old.       Narrative:      [id number]    Blood culture [[id number]]  Collected: 09/10/22 1746    Order Status: Completed Specimen: Blood Updated: 09/14/22 2012     Blood Culture, Routine No Growth to date      No Growth to date      No Growth to date      No Growth to date      No Growth to date    Narrative:      Please collect from 2 different sites    Blood culture [[id number]] Collected: 09/10/22 1747    Order Status: Completed Specimen: Blood Updated: 09/14/22 2012     Blood Culture, Routine No Growth to date      No Growth to date      No Growth to date      No Growth to date      No Growth to date    Narrative:      Please collect from 2 different sites    Blood culture [[id number]] Collected: 09/08/22 1756    Order Status: Completed Specimen: Blood Updated: 09/13/22 2022     Blood Culture, Routine No growth after 5 days.    Narrative:      Please collect from 2 different sites    Blood culture [[id number]] Collected: 09/08/22 1800    Order Status: Completed Specimen: Blood Updated: 09/13/22 2022     Blood Culture, Routine No growth after 5 days.    Narrative:      Please collect from 2 different sites    Blood culture [[id number]] Collected: 09/08/22 1147    Order Status: Completed Specimen: Blood Updated: 09/13/22 1412     Blood Culture, Routine No growth after 5 days.    Narrative:      Rt wrist    Blood culture [[id number]] Collected: 09/08/22 1147    Order Status: Completed Specimen: Blood Updated: 09/13/22 1412     Blood Culture, Routine No growth after 5 days.    Narrative:      Lft AC    E. coli 0157 antigen [[id number]] Collected: 09/12/22 1732    Order Status: No result Specimen: Stool Updated: 09/12/22 1733    Stool culture [[id number]] Collected: 09/12/22 1732    Order Status: Sent Specimen: Stool Updated: 09/12/22 1732    Blood culture [[id number]] Collected: 09/05/22 2347    Order Status: Completed Specimen: Blood Updated: 09/11/22 0612     Blood Culture, Routine No growth after 5 days.    Blood culture [[id number]] Collected: 09/05/22 2347    Order Status: Completed  Specimen: Blood Updated: 09/11/22 0612     Blood Culture, Routine No growth after 5 days.    Blood culture [[id number]] Collected: 09/04/22 2146    Order Status: Completed Specimen: Blood Updated: 09/09/22 2312     Blood Culture, Routine No growth after 5 days.    Blood culture [[id number]] Collected: 09/04/22 2148    Order Status: Completed Specimen: Blood Updated: 09/09/22 2312     Blood Culture, Routine No growth after 5 days.    Blood culture [[id number]] Collected: 09/04/22 0155    Order Status: Completed Specimen: Blood Updated: 09/09/22 0612     Blood Culture, Routine No growth after 5 days.    Blood culture [[id number]] Collected: 09/04/22 0156    Order Status: Completed Specimen: Blood Updated: 09/09/22 0612     Blood Culture, Routine No growth after 5 days.    Clostridium difficile EIA [[id number]] Collected: 09/08/22 1151    Order Status: Completed Specimen: Stool Updated: 09/08/22 2244     C. diff Antigen Negative     C difficile Toxins A+B, EIA Negative     Comment: Testing not recommended for children <24 months old.             All pertinent labs within the past 24 hours have been reviewed.  Recent Lab Results         09/15/22  0408        Albumin 2.6       Alkaline Phosphatase 38              Anion Gap 9       Aniso Slight       AST 54       Bands 17.0       Baso # CANCELED  Comment: Result canceled by the ancillary.       Basophil % 0.0       BILIRUBIN TOTAL 0.8  Comment: For infants and newborns, interpretation of results should be based  on gestational age, weight and in agreement with clinical  observations.    Premature Infant recommended reference ranges:  Up to 24 hours.............<8.0 mg/dL  Up to 48 hours............<12.0 mg/dL  3-5 days..................<15.0 mg/dL  6-29 days.................<15.0 mg/dL         BUN 6       Calcium 8.6       Chloride 103       CO2 22       Creatinine 0.8       Differential Method Manual  Comment: CORRECTED RESULT; previously reported as Automated on  09/15/2022 at   05:30.    [C]       Dohle Bodies Present       eGFR >60.0       Eos # CANCELED  Comment: Result canceled by the ancillary.       Eosinophil % 0.0       Large/Giant Platelets Present       Glucose 102       Gran % 56.0       Hematocrit 21.2       Hemoglobin 7.3       Hypo Occasional       Immature Grans (Abs) CANCELED  Comment: Mild elevation in immature granulocytes is non specific and   can be seen in a variety of conditions including stress response,   acute inflammation, trauma and pregnancy. Correlation with other   laboratory and clinical findings is essential.    Result canceled by the ancillary.         Immature Granulocytes CANCELED  Comment: Result canceled by the ancillary.       Lymph # CANCELED  Comment: Result canceled by the ancillary.       Lymph % 15.0       Magnesium 1.7       MCH 31.9       MCHC 34.4       MCV 93       Metamyelocytes 1.0       Mono # CANCELED  Comment: Result canceled by the ancillary.       Mono % 11.0       MPV 9.9       nRBC 0       Ovalocytes Occasional       Platelet Estimate Appears normal       Platelets 151       Poikilocytosis Slight       Poly Occasional       Potassium 3.4       PROTEIN TOTAL 5.6       RBC 2.29       RDW 15.4       Schistocytes Present       Sodium 134       Spherocytes Occasional       Toxic Granulation Present       WBC 4.49                [C] - Corrected Result               Significant Imaging: I have reviewed all pertinent imaging results/findings within the past 24 hours.

## 2022-09-15 NOTE — PROGRESS NOTES
ATTENDING NOTE, ONCOLOGY INPATIENT TEAM    Events of last 24 hours noted.  Patient seen and examined, chart reviewed.  Appears comfortable, in NAD, however, she is still complaining of diarrhea.  She states that she has not been receiving the Imodium as frequently as it is intended to be  Lungs are clear to auscultation.  Abdomen is soft, with minimal periumbilical tenderness.  No rebound.  Labs reviewed.    PLAN    Repeat CBC and CMP daily for now.  I suspect she will need blood transfusions by tomorrow.  Patient should be on loperamide 2 pills after each loose bowel movement, up to 8 pills a day.  Continue daptomycin and will talk to the ID service regarding switching to lanezolide.  DVT prophylaxis with enoxaparin  We will follow      Enoch Goodwin MD

## 2022-09-16 LAB
ALBUMIN SERPL BCP-MCNC: 2.6 G/DL (ref 3.5–5.2)
ALP SERPL-CCNC: 41 U/L (ref 55–135)
ALT SERPL W/O P-5'-P-CCNC: 107 U/L (ref 10–44)
ANION GAP SERPL CALC-SCNC: 14 MMOL/L (ref 8–16)
AST SERPL-CCNC: 51 U/L (ref 10–40)
BASOPHILS # BLD AUTO: 0.01 K/UL (ref 0–0.2)
BASOPHILS NFR BLD: 0.2 % (ref 0–1.9)
BILIRUB SERPL-MCNC: 0.9 MG/DL (ref 0.1–1)
BUN SERPL-MCNC: 6 MG/DL (ref 6–20)
CALCIUM SERPL-MCNC: 8.8 MG/DL (ref 8.7–10.5)
CHLORIDE SERPL-SCNC: 104 MMOL/L (ref 95–110)
CO2 SERPL-SCNC: 21 MMOL/L (ref 23–29)
CREAT SERPL-MCNC: 0.8 MG/DL (ref 0.5–1.4)
DIFFERENTIAL METHOD: ABNORMAL
EOSINOPHIL # BLD AUTO: 0 K/UL (ref 0–0.5)
EOSINOPHIL NFR BLD: 0.5 % (ref 0–8)
ERYTHROCYTE [DISTWIDTH] IN BLOOD BY AUTOMATED COUNT: 15.2 % (ref 11.5–14.5)
EST. GFR  (NO RACE VARIABLE): >60 ML/MIN/1.73 M^2
GLUCOSE SERPL-MCNC: 94 MG/DL (ref 70–110)
HCT VFR BLD AUTO: 21.6 % (ref 37–48.5)
HGB BLD-MCNC: 7.4 G/DL (ref 12–16)
HYPOCHROMIA BLD QL SMEAR: ABNORMAL
IMM GRANULOCYTES # BLD AUTO: 0.06 K/UL (ref 0–0.04)
IMM GRANULOCYTES NFR BLD AUTO: 1.4 % (ref 0–0.5)
LYMPHOCYTES # BLD AUTO: 0.6 K/UL (ref 1–4.8)
LYMPHOCYTES NFR BLD: 13 % (ref 18–48)
MAGNESIUM SERPL-MCNC: 1.6 MG/DL (ref 1.6–2.6)
MCH RBC QN AUTO: 31.6 PG (ref 27–31)
MCHC RBC AUTO-ENTMCNC: 34.3 G/DL (ref 32–36)
MCV RBC AUTO: 92 FL (ref 82–98)
MONOCYTES # BLD AUTO: 0.7 K/UL (ref 0.3–1)
MONOCYTES NFR BLD: 16.2 % (ref 4–15)
NEUTROPHILS # BLD AUTO: 3 K/UL (ref 1.8–7.7)
NEUTROPHILS NFR BLD: 68.7 % (ref 38–73)
NRBC BLD-RTO: 0 /100 WBC
PLATELET # BLD AUTO: 177 K/UL (ref 150–450)
PLATELET BLD QL SMEAR: ABNORMAL
PMV BLD AUTO: 10 FL (ref 9.2–12.9)
POTASSIUM SERPL-SCNC: 3.2 MMOL/L (ref 3.5–5.1)
PROT SERPL-MCNC: 5.5 G/DL (ref 6–8.4)
RBC # BLD AUTO: 2.34 M/UL (ref 4–5.4)
ROULEAUX BLD QL SMEAR: PRESENT
SODIUM SERPL-SCNC: 139 MMOL/L (ref 136–145)
TOXIC GRANULES BLD QL SMEAR: PRESENT
WBC # BLD AUTO: 4.32 K/UL (ref 3.9–12.7)

## 2022-09-16 PROCEDURE — 63600175 PHARM REV CODE 636 W HCPCS

## 2022-09-16 PROCEDURE — 63600175 PHARM REV CODE 636 W HCPCS: Performed by: STUDENT IN AN ORGANIZED HEALTH CARE EDUCATION/TRAINING PROGRAM

## 2022-09-16 PROCEDURE — 63600175 PHARM REV CODE 636 W HCPCS: Performed by: INTERNAL MEDICINE

## 2022-09-16 PROCEDURE — 83993 ASSAY FOR CALPROTECTIN FECAL: CPT | Performed by: STUDENT IN AN ORGANIZED HEALTH CARE EDUCATION/TRAINING PROGRAM

## 2022-09-16 PROCEDURE — 99233 SBSQ HOSP IP/OBS HIGH 50: CPT | Mod: ,,, | Performed by: INTERNAL MEDICINE

## 2022-09-16 PROCEDURE — 83735 ASSAY OF MAGNESIUM: CPT | Performed by: STUDENT IN AN ORGANIZED HEALTH CARE EDUCATION/TRAINING PROGRAM

## 2022-09-16 PROCEDURE — 25000003 PHARM REV CODE 250: Performed by: STUDENT IN AN ORGANIZED HEALTH CARE EDUCATION/TRAINING PROGRAM

## 2022-09-16 PROCEDURE — 85025 COMPLETE CBC W/AUTO DIFF WBC: CPT | Performed by: STUDENT IN AN ORGANIZED HEALTH CARE EDUCATION/TRAINING PROGRAM

## 2022-09-16 PROCEDURE — 99233 PR SUBSEQUENT HOSPITAL CARE,LEVL III: ICD-10-PCS | Mod: ,,, | Performed by: INTERNAL MEDICINE

## 2022-09-16 PROCEDURE — 25000003 PHARM REV CODE 250

## 2022-09-16 PROCEDURE — 25000003 PHARM REV CODE 250: Performed by: INTERNAL MEDICINE

## 2022-09-16 PROCEDURE — 80053 COMPREHEN METABOLIC PANEL: CPT | Performed by: STUDENT IN AN ORGANIZED HEALTH CARE EDUCATION/TRAINING PROGRAM

## 2022-09-16 PROCEDURE — 20600001 HC STEP DOWN PRIVATE ROOM

## 2022-09-16 RX ORDER — HEPARIN 100 UNIT/ML
300 SYRINGE INTRAVENOUS
Status: DISCONTINUED | OUTPATIENT
Start: 2022-09-16 | End: 2022-09-19 | Stop reason: HOSPADM

## 2022-09-16 RX ORDER — MAGNESIUM SULFATE 1 G/100ML
1 INJECTION INTRAVENOUS ONCE
Status: COMPLETED | OUTPATIENT
Start: 2022-09-16 | End: 2022-09-16

## 2022-09-16 RX ORDER — POTASSIUM CHLORIDE 7.45 MG/ML
10 INJECTION INTRAVENOUS
Status: COMPLETED | OUTPATIENT
Start: 2022-09-16 | End: 2022-09-16

## 2022-09-16 RX ORDER — METHYLPREDNISOLONE SOD SUCC 125 MG
1 VIAL (EA) INJECTION DAILY
Status: DISCONTINUED | OUTPATIENT
Start: 2022-09-16 | End: 2022-09-19

## 2022-09-16 RX ADMIN — POTASSIUM CHLORIDE 10 MEQ: 10 INJECTION INTRAVENOUS at 10:09

## 2022-09-16 RX ADMIN — MAGNESIUM SULFATE HEPTAHYDRATE 1 G: 500 INJECTION, SOLUTION INTRAMUSCULAR; INTRAVENOUS at 09:09

## 2022-09-16 RX ADMIN — DICYCLOMINE HYDROCHLORIDE 10 MG: 10 CAPSULE ORAL at 03:09

## 2022-09-16 RX ADMIN — ONDANSETRON 4 MG: 2 INJECTION INTRAMUSCULAR; INTRAVENOUS at 06:09

## 2022-09-16 RX ADMIN — OXYCODONE HYDROCHLORIDE 10 MG: 10 TABLET ORAL at 09:09

## 2022-09-16 RX ADMIN — NYSTATIN 500000 UNITS: 500000 SUSPENSION ORAL at 08:09

## 2022-09-16 RX ADMIN — ONDANSETRON 4 MG: 2 INJECTION INTRAMUSCULAR; INTRAVENOUS at 11:09

## 2022-09-16 RX ADMIN — ENOXAPARIN SODIUM 40 MG: 100 INJECTION SUBCUTANEOUS at 05:09

## 2022-09-16 RX ADMIN — DAPTOMYCIN 1055 MG: 350 INJECTION, POWDER, LYOPHILIZED, FOR SOLUTION INTRAVENOUS at 05:09

## 2022-09-16 RX ADMIN — POTASSIUM CHLORIDE 10 MEQ: 10 INJECTION INTRAVENOUS at 08:09

## 2022-09-16 RX ADMIN — ONDANSETRON 4 MG: 2 INJECTION INTRAMUSCULAR; INTRAVENOUS at 12:09

## 2022-09-16 RX ADMIN — NYSTATIN 500000 UNITS: 500000 SUSPENSION ORAL at 12:09

## 2022-09-16 RX ADMIN — DICYCLOMINE HYDROCHLORIDE 10 MG: 10 CAPSULE ORAL at 08:09

## 2022-09-16 RX ADMIN — POTASSIUM CHLORIDE 10 MEQ: 10 INJECTION INTRAVENOUS at 09:09

## 2022-09-16 RX ADMIN — METHYLPREDNISOLONE SODIUM SUCCINATE 105.5 MG: 125 INJECTION, POWDER, FOR SOLUTION INTRAMUSCULAR; INTRAVENOUS at 12:09

## 2022-09-16 RX ADMIN — POTASSIUM CHLORIDE 10 MEQ: 10 INJECTION INTRAVENOUS at 12:09

## 2022-09-16 NOTE — SUBJECTIVE & OBJECTIVE
Interval History: No acute events overnight, she had 4 episodes of diarrhea again, puree consistency. Abdominalpain still present. VSS, labs without severe abnormality. She appears comfortable, ambulating around her room.     Oncology Treatment Plan:   OP PEMBROLIZUMAB CARBOPLATIN (AUC 5) WITH WEEKLY PACLITAXEL FOLLOWED BY PEMBROLIZUMAB DOXORUBICIN CYCLOPHOSPHAMIDE FOLLOWED BY PEMBROLIZUMAB 200 MG Q3W    Medications:  Continuous Infusions:  Scheduled Meds:   DAPTOmycin (CUBICIN) IV (PEDS and ADULTS)  10 mg/kg Intravenous Q24H    dicyclomine  10 mg Oral TID    duke's soln (benadryl 30 mL, mylanta 30 mL, LIDOcaine 30 mL, nystatin 30 mL) 120 mL  10 mL Oral QID    enoxaparin  40 mg Subcutaneous Daily    methylPREDNISolone sodium succinate injection  1 mg/kg Intravenous Daily    nystatin  500,000 Units Oral QID    ondansetron  4 mg Intravenous Q6H    simethicone  1 tablet Oral TID PC     PRN Meds:dextrose 10%, dextrose 10%, glucagon (human recombinant), glucose, glucose, heparin, porcine (PF), ibuprofen, naloxone, oxyCODONE, polyethylene glycol, prochlorperazine, sodium chloride 0.9%       Objective:     Vital Signs (Most Recent):  Temp: 98.9 °F (37.2 °C) (09/16/22 0826)  Pulse: 102 (09/16/22 0826)  Resp: 17 (09/16/22 0953)  BP: 131/75 (09/16/22 0826)  SpO2: 98 % (09/16/22 0826) Vital Signs (24h Range):  Temp:  [98.1 °F (36.7 °C)-99.6 °F (37.6 °C)] 98.9 °F (37.2 °C)  Pulse:  [] 102  Resp:  [16-20] 17  SpO2:  [94 %-99 %] 98 %  BP: (122-145)/(62-82) 131/75     Weight: 105.5 kg (232 lb 9.4 oz)  Body mass index is 37.54 kg/m².  Body surface area is 2.22 meters squared.      Intake/Output Summary (Last 24 hours) at 9/16/2022 1142  Last data filed at 9/16/2022 0600  Gross per 24 hour   Intake 383.47 ml   Output --   Net 383.47 ml       Physical Exam  Constitutional:       Comments: Standing, ambulating on exam   HENT:      Head: Normocephalic.      Right Ear: Tympanic membrane normal.      Nose: Nose normal.       Mouth/Throat:      Mouth: Mucous membranes are moist.   Eyes:      Pupils: Pupils are equal, round, and reactive to light.   Cardiovascular:      Rate and Rhythm: Normal rate.   Pulmonary:      Effort: Pulmonary effort is normal.   Abdominal:      General: Abdomen is flat.      Tenderness: There is abdominal tenderness (midline).   Musculoskeletal:         General: Normal range of motion.      Cervical back: Normal range of motion.   Skin:     General: Skin is warm.      Capillary Refill: Capillary refill takes less than 2 seconds.   Neurological:      General: No focal deficit present.      Mental Status: She is alert.       Significant Labs:   All pertinent labs from the last 24 hours have been reviewed.    Diagnostic Results:  I have reviewed all pertinent imaging results/findings within the past 24 hours.

## 2022-09-16 NOTE — PROGRESS NOTES
[facility] ([facility])  Hematology/Oncology  Progress Note    Patient Name: Laura Kent  Admission Date: 9/3/2022  Hospital Length of Stay: 13 days  Code Status: Full Code     Subjective:     HPI:  36 years old with HTN, breast cancer  C4D20 Taxol on 8/29/2022 f/u with Dr. Cruz presents with worsening abdominal pain, nasuea and vomiting. Patient was in pain during the interview and asking for better pain control. She states one week history of gradually worsening abdominal pain worst in epigastric area without radiation. She had minimal po intake since then. She associate emesis x2 NBNB. She had one small BM this afternoon prior to her presentation.     Patient denies chest pain, shortness of breath,  dysuria, polyuria, nausea, vomiting, fever, chills,  diarrhea, constipation, headache, falls, focal weakness or vision changes.     Ed course:   On presentation she was HDS and afebrile. On exam she was mod-sever  abdominal pain to palpitation, no peritonitic signs. Labs were significant for elevated lipase >200. CT with findings concerning for pancreatitis wo fluid/ abscess collection. Medical onc consulted for admit.           Interval History: No acute events overnight, she had 4 episodes of diarrhea again, puree consistency. Abdominalpain still present. VSS, labs without severe abnormality. She appears comfortable, ambulating around her room.     Oncology Treatment Plan:   OP PEMBROLIZUMAB CARBOPLATIN (AUC 5) WITH WEEKLY PACLITAXEL FOLLOWED BY PEMBROLIZUMAB DOXORUBICIN CYCLOPHOSPHAMIDE FOLLOWED BY PEMBROLIZUMAB 200 MG Q3W    Medications:  Continuous Infusions:  Scheduled Meds:   DAPTOmycin (CUBICIN) IV (PEDS and ADULTS)  10 mg/kg Intravenous Q24H    dicyclomine  10 mg Oral TID    duke's soln (benadryl 30 mL, mylanta 30 mL, LIDOcaine 30 mL, nystatin 30 mL) 120 mL  10 mL Oral QID    enoxaparin  40 mg Subcutaneous Daily    methylPREDNISolone sodium succinate injection  1 mg/kg Intravenous  Daily    nystatin  500,000 Units Oral QID    ondansetron  4 mg Intravenous Q6H    simethicone  1 tablet Oral TID PC     PRN Meds:dextrose 10%, dextrose 10%, glucagon (human recombinant), glucose, glucose, heparin, porcine (PF), ibuprofen, naloxone, oxyCODONE, polyethylene glycol, prochlorperazine, sodium chloride 0.9%       Objective:     Vital Signs (Most Recent):  Temp: 98.9 °F (37.2 °C) (09/16/22 0826)  Pulse: 102 (09/16/22 0826)  Resp: 17 (09/16/22 0953)  BP: 131/75 (09/16/22 0826)  SpO2: 98 % (09/16/22 0826) Vital Signs (24h Range):  Temp:  [98.1 °F (36.7 °C)-99.6 °F (37.6 °C)] 98.9 °F (37.2 °C)  Pulse:  [] 102  Resp:  [16-20] 17  SpO2:  [94 %-99 %] 98 %  BP: (122-145)/(62-82) 131/75     Weight: 105.5 kg (232 lb 9.4 oz)  Body mass index is 37.54 kg/m².  Body surface area is 2.22 meters squared.      Intake/Output Summary (Last 24 hours) at 9/16/2022 1142  Last data filed at 9/16/2022 0600  Gross per 24 hour   Intake 383.47 ml   Output --   Net 383.47 ml       Physical Exam  Constitutional:       Comments: Standing, ambulating on exam   HENT:      Head: Normocephalic.      Right Ear: Tympanic membrane normal.      Nose: Nose normal.      Mouth/Throat:      Mouth: Mucous membranes are moist.   Eyes:      Pupils: Pupils are equal, round, and reactive to light.   Cardiovascular:      Rate and Rhythm: Normal rate.   Pulmonary:      Effort: Pulmonary effort is normal.   Abdominal:      General: Abdomen is flat.      Tenderness: There is abdominal tenderness (midline).   Musculoskeletal:         General: Normal range of motion.      Cervical back: Normal range of motion.   Skin:     General: Skin is warm.      Capillary Refill: Capillary refill takes less than 2 seconds.   Neurological:      General: No focal deficit present.      Mental Status: She is alert.       Significant Labs:   All pertinent labs from the last 24 hours have been reviewed.    Diagnostic Results:  I have reviewed all pertinent imaging  results/findings within the past 24 hours.    Assessment/Plan:     * Acute pancreatitis  36 years old with HTN, breast cancer  C4D20 Taxol on 8/29/2022 f/u with Dr. Cruz presents with worsening abdominal pain, nasuea and vomiting.  On exam she was mod-sever  abdominal pain to palpitation, no peritonitic signs. Labs were significant for elevated lipase >200. CT with findings concerning for pancreatitis wo fluid/ abscess collection. . CT scan with Mild peripancreatic fat stranding. TG levels were normal this admission, low concern for triglyceride induced pancreatitis. CT scan showed no gallstones or GB wall thickening, low concern for gallstone induced pancreatitis. Patient is clinically improving with n/v, po intake, and pain.     Most likely pancreatitis 2/2 chemo, last chemotherapy session 8/29; Taxol    - Pain: morphine IV changed to oxy PO.    - Anti-emetics: scheduled compazine and prn zofran. Able to tolerate juice and fluids.  - Diet advancing as tolerated, tolerating liquids only right now.  - simethicone     Elevated LFTs  Patient having increasing LFTs this admission. CTAP this admission did not show liver mets or biliary obstruction. US abdomen ordered to better visualize biliary tree. AST: ALT ratio < 2 so low concern for EtOH hepatitis. Patient switched from acetaminophen to advil. HBV surface and core ab negative    - US abdomen showed biliary sludge, consulted GI per ID recs   - Advance diet as tolerated  - [facility] outpatient referral  - RPR negative  - TORIN and anti-smooth muscle negative  - Trending down    Loose stools  Patient has been having more loose stools this admission since starting antibiotics. Now stools are causing intense abdominal cramps and stools are now green     - c diff negative, repeat negative  - bowel regimen held   - See chemotherapy induced neutropenia  - O&P, culture, giardia, cryptosporidium, entamoeba, GI pathogen panel, strongy IgG all within normal limits.   - Fecal elastin  WNL  - Continues to have diarrhea through 9/16 up to 4 times daily. Concern for chemotherapy induced colitis. Will reconsult GI for possible scope, have IBD team on board. Stopping loperamide 9/16  - Starting Methylprednisolone 1mg/kg daily for empiric treatment  - Tentative colonoscopy next week    Abdominal pain  Please read pancreatitis. Resolving with pain medications, stool studies negative.    Chemotherapy induced neutropenia  Pt had subjective fevers at home unmeasured, abdominal pain, polyuria In setting of low WBC and recent chemotherapy. During admission patient was febrile intermittently and had U/A showing WBCs and bacteria. Initially started treatment with cefepime. Flagyl added due to ongoing fevers. Fever curve was initially downtrending with negative cultures. However she had fever as high as 103. Will restart septic work up as fever is currently of unknown origin and add vancomycin. Consulted ID and per recommendations have deescalated abx to unasyn given pt has has negative testing for pseudomonas and MRSA. Can also consider chemo causing fever.     - Cdiff studies negative x2  - Repeat blood cultures, NGTD  - ID contsulted, appreciate recs   - No port removal needed at this time per ID  - D/C Unasyn (9/14), continue Dapto will switch to Linezolid at discharge for VRE coverage until 9/26    Malignant neoplasm of upper-outer quadrant of left breast in female, estrogen receptor negative  Dr. Cruz patient,  On:  Currently on cycle 4 D20 of pembrolizumab carboplatin (AUC 5) with weekly paclitaxel followed by pembrolizumab doxorubicin cyclophosphamide followed by pembrolizumab 200 mg q3w    Most recent Taxol on 8/29/2022     -- admit to medical oncology              Cristina Wilkerson,   Hematology/Oncology  [facility] ([facility])

## 2022-09-16 NOTE — ASSESSMENT & PLAN NOTE
Patient has been having more loose stools this admission since starting antibiotics. Now stools are causing intense abdominal cramps and stools are now green     - c diff negative, repeat negative  - bowel regimen held   - See chemotherapy induced neutropenia  - O&P, culture, giardia, cryptosporidium, entamoeba, GI pathogen panel, strongy IgG all within normal limits.   - Fecal elastin WNL  - Continues to have diarrhea through 9/16 up to 4 times daily. Concern for chemotherapy induced colitis. Will reconsult GI for possible scope, have IBD team on board. Stopping loperamide 9/16  - Starting Methylprednisolone 1mg/kg daily for empiric treatment  - Tentative colonoscopy next week

## 2022-09-16 NOTE — PLAN OF CARE
Patient AAOx4. Complaints of abd pain; PRN oxycodone administered x1. Started on solu-medrol today. Imodium discontinued. Reported multiple loose BM this shift. Stool sample collected to test for calprotectin. K and mag replaced via IV. Daptomycin continued. Tolerating regular diet. Ambulates independently. Bed in low locked position, call light and personal items within reach. Instructed to call if needed.

## 2022-09-16 NOTE — ASSESSMENT & PLAN NOTE
Patient having increasing LFTs this admission. CTAP this admission did not show liver mets or biliary obstruction. US abdomen ordered to better visualize biliary tree. AST: ALT ratio < 2 so low concern for EtOH hepatitis. Patient switched from acetaminophen to advil. HBV surface and core ab negative    - US abdomen showed biliary sludge, consulted GI per ID recs   - Advance diet as tolerated  - [facility] outpatient referral  - RPR negative  - TORIN and anti-smooth muscle negative  - Trending down

## 2022-09-16 NOTE — ASSESSMENT & PLAN NOTE
Pt had subjective fevers at home unmeasured, abdominal pain, polyuria In setting of low WBC and recent chemotherapy. During admission patient was febrile intermittently and had U/A showing WBCs and bacteria. Initially started treatment with cefepime. Flagyl added due to ongoing fevers. Fever curve was initially downtrending with negative cultures. However she had fever as high as 103. Will restart septic work up as fever is currently of unknown origin and add vancomycin. Consulted ID and per recommendations have deescalated abx to unasyn given pt has has negative testing for pseudomonas and MRSA. Can also consider chemo causing fever.     - Cdiff studies negative x2  - Repeat blood cultures, NGTD  - ID contsulted, appreciate recs   - No port removal needed at this time per ID  - D/C Unasyn (9/14), continue Dapto will switch to Linezolid at discharge for VRE coverage until 9/26

## 2022-09-16 NOTE — PLAN OF CARE
Pt VSS.  Pt had one episode of nausea.  Scheduled ondansetron IV given.  No c/o diarrhea.  Call light within reach, bed in lowest position, wheels locked.

## 2022-09-17 PROBLEM — K52.9 COLITIS: Status: ACTIVE | Noted: 2022-09-17

## 2022-09-17 PROBLEM — D63.0 ANEMIA IN NEOPLASTIC DISEASE: Status: ACTIVE | Noted: 2022-09-17

## 2022-09-17 LAB
ALBUMIN SERPL BCP-MCNC: 2.8 G/DL (ref 3.5–5.2)
ALP SERPL-CCNC: 52 U/L (ref 55–135)
ALT SERPL W/O P-5'-P-CCNC: 117 U/L (ref 10–44)
ANION GAP SERPL CALC-SCNC: 11 MMOL/L (ref 8–16)
ANISOCYTOSIS BLD QL SMEAR: SLIGHT
AST SERPL-CCNC: 65 U/L (ref 10–40)
BASO STIPL BLD QL SMEAR: ABNORMAL
BASOPHILS # BLD AUTO: 0.03 K/UL (ref 0–0.2)
BASOPHILS NFR BLD: 0.5 % (ref 0–1.9)
BILIRUB SERPL-MCNC: 0.6 MG/DL (ref 0.1–1)
BUN SERPL-MCNC: 6 MG/DL (ref 6–20)
CALCIUM SERPL-MCNC: 8.9 MG/DL (ref 8.7–10.5)
CHLORIDE SERPL-SCNC: 106 MMOL/L (ref 95–110)
CO2 SERPL-SCNC: 22 MMOL/L (ref 23–29)
CREAT SERPL-MCNC: 0.8 MG/DL (ref 0.5–1.4)
DIFFERENTIAL METHOD: ABNORMAL
EOSINOPHIL # BLD AUTO: 0 K/UL (ref 0–0.5)
EOSINOPHIL NFR BLD: 0 % (ref 0–8)
ERYTHROCYTE [DISTWIDTH] IN BLOOD BY AUTOMATED COUNT: 15.3 % (ref 11.5–14.5)
EST. GFR  (NO RACE VARIABLE): >60 ML/MIN/1.73 M^2
GLUCOSE SERPL-MCNC: 165 MG/DL (ref 70–110)
HCT VFR BLD AUTO: 23.7 % (ref 37–48.5)
HGB BLD-MCNC: 8.2 G/DL (ref 12–16)
HYPOCHROMIA BLD QL SMEAR: ABNORMAL
IMM GRANULOCYTES # BLD AUTO: 0.19 K/UL (ref 0–0.04)
IMM GRANULOCYTES NFR BLD AUTO: 3.1 % (ref 0–0.5)
LYMPHOCYTES # BLD AUTO: 0.6 K/UL (ref 1–4.8)
LYMPHOCYTES NFR BLD: 9 % (ref 18–48)
MAGNESIUM SERPL-MCNC: 2 MG/DL (ref 1.6–2.6)
MCH RBC QN AUTO: 31.8 PG (ref 27–31)
MCHC RBC AUTO-ENTMCNC: 34.6 G/DL (ref 32–36)
MCV RBC AUTO: 92 FL (ref 82–98)
MONOCYTES # BLD AUTO: 0.6 K/UL (ref 0.3–1)
MONOCYTES NFR BLD: 9.5 % (ref 4–15)
NEUTROPHILS # BLD AUTO: 4.8 K/UL (ref 1.8–7.7)
NEUTROPHILS NFR BLD: 77.9 % (ref 38–73)
NRBC BLD-RTO: 0 /100 WBC
PLATELET # BLD AUTO: 227 K/UL (ref 150–450)
PLATELET BLD QL SMEAR: ABNORMAL
PMV BLD AUTO: 9.9 FL (ref 9.2–12.9)
POLYCHROMASIA BLD QL SMEAR: ABNORMAL
POTASSIUM SERPL-SCNC: 3.8 MMOL/L (ref 3.5–5.1)
PROT SERPL-MCNC: 6.3 G/DL (ref 6–8.4)
RBC # BLD AUTO: 2.58 M/UL (ref 4–5.4)
SODIUM SERPL-SCNC: 139 MMOL/L (ref 136–145)
WBC # BLD AUTO: 6.19 K/UL (ref 3.9–12.7)

## 2022-09-17 PROCEDURE — 85025 COMPLETE CBC W/AUTO DIFF WBC: CPT | Performed by: STUDENT IN AN ORGANIZED HEALTH CARE EDUCATION/TRAINING PROGRAM

## 2022-09-17 PROCEDURE — 25000003 PHARM REV CODE 250

## 2022-09-17 PROCEDURE — 83735 ASSAY OF MAGNESIUM: CPT | Performed by: STUDENT IN AN ORGANIZED HEALTH CARE EDUCATION/TRAINING PROGRAM

## 2022-09-17 PROCEDURE — 20600001 HC STEP DOWN PRIVATE ROOM

## 2022-09-17 PROCEDURE — 63600175 PHARM REV CODE 636 W HCPCS

## 2022-09-17 PROCEDURE — 99233 SBSQ HOSP IP/OBS HIGH 50: CPT | Mod: ,,, | Performed by: STUDENT IN AN ORGANIZED HEALTH CARE EDUCATION/TRAINING PROGRAM

## 2022-09-17 PROCEDURE — 25000003 PHARM REV CODE 250: Performed by: STUDENT IN AN ORGANIZED HEALTH CARE EDUCATION/TRAINING PROGRAM

## 2022-09-17 PROCEDURE — 25000003 PHARM REV CODE 250: Performed by: INTERNAL MEDICINE

## 2022-09-17 PROCEDURE — 94761 N-INVAS EAR/PLS OXIMETRY MLT: CPT

## 2022-09-17 PROCEDURE — 80053 COMPREHEN METABOLIC PANEL: CPT | Performed by: STUDENT IN AN ORGANIZED HEALTH CARE EDUCATION/TRAINING PROGRAM

## 2022-09-17 PROCEDURE — 63600175 PHARM REV CODE 636 W HCPCS: Performed by: STUDENT IN AN ORGANIZED HEALTH CARE EDUCATION/TRAINING PROGRAM

## 2022-09-17 PROCEDURE — 99233 PR SUBSEQUENT HOSPITAL CARE,LEVL III: ICD-10-PCS | Mod: ,,, | Performed by: STUDENT IN AN ORGANIZED HEALTH CARE EDUCATION/TRAINING PROGRAM

## 2022-09-17 PROCEDURE — 63600175 PHARM REV CODE 636 W HCPCS: Performed by: INTERNAL MEDICINE

## 2022-09-17 RX ORDER — ONDANSETRON 2 MG/ML
4 INJECTION INTRAMUSCULAR; INTRAVENOUS EVERY 6 HOURS PRN
Status: DISCONTINUED | OUTPATIENT
Start: 2022-09-17 | End: 2022-09-19 | Stop reason: HOSPADM

## 2022-09-17 RX ORDER — LOPERAMIDE HYDROCHLORIDE 2 MG/1
2 CAPSULE ORAL 3 TIMES DAILY PRN
Status: DISCONTINUED | OUTPATIENT
Start: 2022-09-17 | End: 2022-09-18

## 2022-09-17 RX ORDER — PANTOPRAZOLE SODIUM 40 MG/1
40 TABLET, DELAYED RELEASE ORAL DAILY
Status: DISCONTINUED | OUTPATIENT
Start: 2022-09-17 | End: 2022-09-19 | Stop reason: HOSPADM

## 2022-09-17 RX ORDER — DICYCLOMINE HYDROCHLORIDE 10 MG/1
10 CAPSULE ORAL 3 TIMES DAILY PRN
Status: DISCONTINUED | OUTPATIENT
Start: 2022-09-17 | End: 2022-09-19 | Stop reason: HOSPADM

## 2022-09-17 RX ADMIN — PANTOPRAZOLE SODIUM 40 MG: 40 TABLET, DELAYED RELEASE ORAL at 11:09

## 2022-09-17 RX ADMIN — OXYCODONE HYDROCHLORIDE 10 MG: 10 TABLET ORAL at 03:09

## 2022-09-17 RX ADMIN — DAPTOMYCIN 1055 MG: 350 INJECTION, POWDER, LYOPHILIZED, FOR SOLUTION INTRAVENOUS at 04:09

## 2022-09-17 RX ADMIN — ENOXAPARIN SODIUM 40 MG: 100 INJECTION SUBCUTANEOUS at 04:09

## 2022-09-17 RX ADMIN — DICYCLOMINE HYDROCHLORIDE 10 MG: 10 CAPSULE ORAL at 09:09

## 2022-09-17 RX ADMIN — ONDANSETRON 4 MG: 2 INJECTION INTRAMUSCULAR; INTRAVENOUS at 05:09

## 2022-09-17 RX ADMIN — METHYLPREDNISOLONE SODIUM SUCCINATE 105.5 MG: 125 INJECTION, POWDER, FOR SOLUTION INTRAMUSCULAR; INTRAVENOUS at 09:09

## 2022-09-17 NOTE — SUBJECTIVE & OBJECTIVE
Interval History: She had a couple episodes of diarrhea yesterday, all of  since has resolved overnight. She slept well and did not have any diarrhea throughout the night. Was also able to eat fruit, grilled cheese sandwich without nausea and vomiting.     Oncology Treatment Plan:   OP PEMBROLIZUMAB CARBOPLATIN (AUC 5) WITH WEEKLY PACLITAXEL FOLLOWED BY PEMBROLIZUMAB DOXORUBICIN CYCLOPHOSPHAMIDE FOLLOWED BY PEMBROLIZUMAB 200 MG Q3W    Medications:  Continuous Infusions:  Scheduled Meds:   DAPTOmycin (CUBICIN) IV (PEDS and ADULTS)  10 mg/kg Intravenous Q24H    dicyclomine  10 mg Oral TID    duke's soln (benadryl 30 mL, mylanta 30 mL, LIDOcaine 30 mL, nystatin 30 mL) 120 mL  10 mL Oral QID    enoxaparin  40 mg Subcutaneous Daily    methylPREDNISolone sodium succinate injection  1 mg/kg Intravenous Daily    nystatin  500,000 Units Oral QID    ondansetron  4 mg Intravenous Q6H    simethicone  1 tablet Oral TID PC     PRN Meds:dextrose 10%, dextrose 10%, glucagon (human recombinant), glucose, glucose, heparin, porcine (PF), ibuprofen, naloxone, oxyCODONE, polyethylene glycol, prochlorperazine, sodium chloride 0.9%       Objective:     Vital Signs (Most Recent):  Temp: 98.3 °F (36.8 °C) (09/17/22 0734)  Pulse: 65 (09/17/22 0734)  Resp: 17 (09/17/22 0734)  BP: (!) 158/94 (09/17/22 0734)  SpO2: 97 % (09/17/22 0734)   Vital Signs (24h Range):  Temp:  [97.9 °F (36.6 °C)-98.8 °F (37.1 °C)] 98.3 °F (36.8 °C)  Pulse:  [] 65  Resp:  [17-18] 17  SpO2:  [93 %-97 %] 97 %  BP: (130-158)/(75-94) 158/94     Weight: 105.5 kg (232 lb 9.4 oz)  Body mass index is 37.54 kg/m².  Body surface area is 2.22 meters squared.      Intake/Output Summary (Last 24 hours) at 9/17/2022 0832  Last data filed at 9/17/2022 0500  Gross per 24 hour   Intake 750 ml   Output 100 ml   Net 650 ml       Physical Exam  HENT:      Head: Normocephalic.      Nose: Nose normal.      Mouth/Throat:      Mouth: Mucous membranes are moist.   Cardiovascular:       Rate and Rhythm: Normal rate.   Pulmonary:      Effort: Pulmonary effort is normal.   Abdominal:      General: Abdomen is flat.      Tenderness: There is abdominal tenderness (mild midline).   Musculoskeletal:         General: Normal range of motion.      Cervical back: Normal range of motion.   Skin:     General: Skin is warm.   Neurological:      General: No focal deficit present.      Mental Status: She is alert and oriented to person, place, and time.   Psychiatric:         Mood and Affect: Mood normal.       Significant Labs:   All pertinent labs from the last 24 hours have been reviewed.    Diagnostic Results:  I have reviewed all pertinent imaging results/findings within the past 24 hours.

## 2022-09-17 NOTE — ASSESSMENT & PLAN NOTE
Patient has been having more loose stools this admission since starting antibiotics. Now stools are causing intense abdominal cramps and stools are now green     - c diff negative, repeat negative  - bowel regimen held   - See chemotherapy induced neutropenia  - O&P, culture, giardia, cryptosporidium, entamoeba, GI pathogen panel, strongy IgG all within normal limits.   - Fecal elastin WNL  - Continues to have diarrhea through 9/16 up to 4 times daily. Concern for chemotherapy induced colitis. Will reconsult GI for possible scope, have IBD team on board. Stopping loperamide 9/16.  - Starting Methylprednisolone 1mg/kg daily for empiric treatment(9/16), diarrhea resolving  - Fecal calprotectin and lactoferrin pending  - Tentative colonoscopy next week

## 2022-09-17 NOTE — PROGRESS NOTES
[facility] ([facility])  Hematology/Oncology  Progress Note    Patient Name: Laura Kent  Admission Date: 9/3/2022  Hospital Length of Stay: 14 days  Code Status: Full Code     Subjective:     HPI:  36 years old with HTN, breast cancer  C4D20 Taxol on 8/29/2022 f/u with Dr. Cruz presents with worsening abdominal pain, nasuea and vomiting. Patient was in pain during the interview and asking for better pain control. She states one week history of gradually worsening abdominal pain worst in epigastric area without radiation. She had minimal po intake since then. She associate emesis x2 NBNB. She had one small BM this afternoon prior to her presentation.     Patient denies chest pain, shortness of breath,  dysuria, polyuria, nausea, vomiting, fever, chills,  diarrhea, constipation, headache, falls, focal weakness or vision changes.     Ed course:   On presentation she was HDS and afebrile. On exam she was mod-sever  abdominal pain to palpitation, no peritonitic signs. Labs were significant for elevated lipase >200. CT with findings concerning for pancreatitis wo fluid/ abscess collection. Medical onc consulted for admit.           Interval History: She had a couple episodes of diarrhea yesterday, all of  since has resolved overnight. She slept well and did not have any diarrhea throughout the night. Was also able to eat fruit, grilled cheese sandwich without nausea and vomiting.     Oncology Treatment Plan:   OP PEMBROLIZUMAB CARBOPLATIN (AUC 5) WITH WEEKLY PACLITAXEL FOLLOWED BY PEMBROLIZUMAB DOXORUBICIN CYCLOPHOSPHAMIDE FOLLOWED BY PEMBROLIZUMAB 200 MG Q3W    Medications:  Continuous Infusions:  Scheduled Meds:   DAPTOmycin (CUBICIN) IV (PEDS and ADULTS)  10 mg/kg Intravenous Q24H    dicyclomine  10 mg Oral TID    duke's soln (benadryl 30 mL, mylanta 30 mL, LIDOcaine 30 mL, nystatin 30 mL) 120 mL  10 mL Oral QID    enoxaparin  40 mg Subcutaneous Daily    methylPREDNISolone sodium succinate  injection  1 mg/kg Intravenous Daily    nystatin  500,000 Units Oral QID    ondansetron  4 mg Intravenous Q6H    simethicone  1 tablet Oral TID PC     PRN Meds:dextrose 10%, dextrose 10%, glucagon (human recombinant), glucose, glucose, heparin, porcine (PF), ibuprofen, naloxone, oxyCODONE, polyethylene glycol, prochlorperazine, sodium chloride 0.9%       Objective:     Vital Signs (Most Recent):  Temp: 98.3 °F (36.8 °C) (09/17/22 0734)  Pulse: 65 (09/17/22 0734)  Resp: 17 (09/17/22 0734)  BP: (!) 158/94 (09/17/22 0734)  SpO2: 97 % (09/17/22 0734)   Vital Signs (24h Range):  Temp:  [97.9 °F (36.6 °C)-98.8 °F (37.1 °C)] 98.3 °F (36.8 °C)  Pulse:  [] 65  Resp:  [17-18] 17  SpO2:  [93 %-97 %] 97 %  BP: (130-158)/(75-94) 158/94     Weight: 105.5 kg (232 lb 9.4 oz)  Body mass index is 37.54 kg/m².  Body surface area is 2.22 meters squared.      Intake/Output Summary (Last 24 hours) at 9/17/2022 0832  Last data filed at 9/17/2022 0500  Gross per 24 hour   Intake 750 ml   Output 100 ml   Net 650 ml       Physical Exam  HENT:      Head: Normocephalic.      Nose: Nose normal.      Mouth/Throat:      Mouth: Mucous membranes are moist.   Cardiovascular:      Rate and Rhythm: Normal rate.   Pulmonary:      Effort: Pulmonary effort is normal.   Abdominal:      General: Abdomen is flat.      Tenderness: There is abdominal tenderness (mild midline).   Musculoskeletal:         General: Normal range of motion.      Cervical back: Normal range of motion.   Skin:     General: Skin is warm.   Neurological:      General: No focal deficit present.      Mental Status: She is alert and oriented to person, place, and time.   Psychiatric:         Mood and Affect: Mood normal.       Significant Labs:   All pertinent labs from the last 24 hours have been reviewed.    Diagnostic Results:  I have reviewed all pertinent imaging results/findings within the past 24 hours.    Assessment/Plan:     * Acute pancreatitis  36 years old with HTN,  breast cancer  C4D20 Taxol on 8/29/2022 f/u with Dr. Cruz presents with worsening abdominal pain, nasuea and vomiting.  On exam she was mod-sever  abdominal pain to palpitation, no peritonitic signs. Labs were significant for elevated lipase >200. CT with findings concerning for pancreatitis wo fluid/ abscess collection. . CT scan with Mild peripancreatic fat stranding. TG levels were normal this admission, low concern for triglyceride induced pancreatitis. CT scan showed no gallstones or GB wall thickening, low concern for gallstone induced pancreatitis. Patient is clinically improving with n/v, po intake, and pain.     Most likely pancreatitis 2/2 chemo, last chemotherapy session 8/29; Taxol    - Pain: morphine IV changed to oxy PO.    - Anti-emetics: scheduled compazine and prn zofran. Able to tolerate juice and fluids.  - Diet advancing as tolerated, tolerating liquids only right now.  - simethicone     Elevated LFTs  Patient having increasing LFTs this admission. CTAP this admission did not show liver mets or biliary obstruction. US abdomen ordered to better visualize biliary tree. AST: ALT ratio < 2 so low concern for EtOH hepatitis. Patient switched from acetaminophen to advil. HBV surface and core ab negative    - US abdomen showed biliary sludge, consulted GI per ID recs   - Advance diet as tolerated  - [facility] outpatient referral  - RPR negative  - TORIN and anti-smooth muscle negative  - Trending down    Loose stools  Patient has been having more loose stools this admission since starting antibiotics. Now stools are causing intense abdominal cramps and stools are now green     - c diff negative, repeat negative  - bowel regimen held   - See chemotherapy induced neutropenia  - O&P, culture, giardia, cryptosporidium, entamoeba, GI pathogen panel, strongy IgG all within normal limits.   - Fecal elastin WNL  - Continues to have diarrhea through 9/16 up to 4 times daily. Concern for chemotherapy induced colitis.  Will reconsult GI for possible scope, have IBD team on board. Stopping loperamide 9/16.  - Starting Methylprednisolone 1mg/kg daily for empiric treatment(9/16), diarrhea resolving  - Fecal calprotectin and lactoferrin pending  - Tentative colonoscopy next week    Abdominal pain  Please read pancreatitis. Resolving with pain medications and steroids, stool studies negative.     Chemotherapy induced neutropenia  Pt had subjective fevers at home unmeasured, abdominal pain, polyuria In setting of low WBC and recent chemotherapy. During admission patient was febrile intermittently and had U/A showing WBCs and bacteria. Initially started treatment with cefepime. Flagyl added due to ongoing fevers. Fever curve was initially downtrending with negative cultures. However she had fever as high as 103. Will restart septic work up as fever is currently of unknown origin and add vancomycin. Consulted ID and per recommendations have deescalated abx to unasyn given pt has has negative testing for pseudomonas and MRSA. Can also consider chemo causing fever.     - Cdiff studies negative x2  - Repeat blood cultures, NGTD  - ID contsulted, appreciate recs   - No port removal needed at this time per ID  - D/C Unasyn (9/14), continue Dapto will switch to Linezolid at discharge for VRE coverage until 9/26    Malignant neoplasm of upper-outer quadrant of left breast in female, estrogen receptor negative  Dr. Cruz patient,  On:  Currently on cycle 4 D20 of pembrolizumab carboplatin (AUC 5) with weekly paclitaxel followed by pembrolizumab doxorubicin cyclophosphamide followed by pembrolizumab 200 mg q3w    Most recent Taxol on 8/29/2022     -- admit to medical oncology              Cristina Wilkerson,   Hematology/Oncology  [facility] ([facility])

## 2022-09-17 NOTE — PLAN OF CARE
Patient AAOx4. No complaints of N/V. Antibiotics and steroids continued. Multiple BMs today but reports that they are more formed than yesterday. Imodium and dicyclomine changed to PRN. Tolerating regular diet. Ambulates independently. Bed in low locked position, call light and personal items within reach. Instructed to call if needed.

## 2022-09-17 NOTE — ASSESSMENT & PLAN NOTE
Patient has been having more loose stools this admission since starting antibiotics. Now stools are causing intense abdominal cramps and stools are now green     - c diff negative, repeat negative  - bowel regimen held   - See chemotherapy induced neutropenia  - O&P, culture, giardia, cryptosporidium, entamoeba, GI pathogen panel, strongy IgG all within normal limits.   - Fecal elastin WNL  - Continues to have diarrhea through 9/16 up to 4 times daily. Concern for chemotherapy induced colitis. Will reconsult GI for possible scope, have IBD team on board.  - Starting Methylprednisolone 1mg/kg daily for empiric treatment(9/16), initially resolving and appetite returning however still having 4-7 episodes of diarrhea.   - Fecal calprotectin and lactoferrin pending  - Tentative colonoscopy next week  - PPI for high dose steroids

## 2022-09-17 NOTE — PLAN OF CARE
Patient A/O x4. VSS on RAIR. Patient ambulatory and independent. PRN pain medication given this am. No vomiting reported this shift. Personal belongings and call light within reach. Bed locked and in lowest position throughout shift.     Problem: Adult Inpatient Plan of Care  Goal: Optimal Comfort and Wellbeing  Outcome: Ongoing, Progressing  Goal: Readiness for Transition of Care  Outcome: Ongoing, Progressing     Problem: Infection  Goal: Absence of Infection Signs and Symptoms  Outcome: Ongoing, Progressing     Problem: Skin Injury Risk Increased  Goal: Skin Health and Integrity  Outcome: Ongoing, Progressing

## 2022-09-18 LAB
ALBUMIN SERPL BCP-MCNC: 2.8 G/DL (ref 3.5–5.2)
ALP SERPL-CCNC: 75 U/L (ref 55–135)
ALT SERPL W/O P-5'-P-CCNC: 192 U/L (ref 10–44)
ANION GAP SERPL CALC-SCNC: 8 MMOL/L (ref 8–16)
ANISOCYTOSIS BLD QL SMEAR: SLIGHT
AST SERPL-CCNC: 135 U/L (ref 10–40)
BASO STIPL BLD QL SMEAR: ABNORMAL
BASOPHILS # BLD AUTO: 0.02 K/UL (ref 0–0.2)
BASOPHILS NFR BLD: 0.3 % (ref 0–1.9)
BILIRUB SERPL-MCNC: 0.6 MG/DL (ref 0.1–1)
BUN SERPL-MCNC: 10 MG/DL (ref 6–20)
CALCIUM SERPL-MCNC: 9 MG/DL (ref 8.7–10.5)
CHLORIDE SERPL-SCNC: 107 MMOL/L (ref 95–110)
CK SERPL-CCNC: 67 U/L (ref 20–180)
CO2 SERPL-SCNC: 26 MMOL/L (ref 23–29)
CREAT SERPL-MCNC: 0.9 MG/DL (ref 0.5–1.4)
DIFFERENTIAL METHOD: ABNORMAL
DOHLE BOD BLD QL SMEAR: PRESENT
EOSINOPHIL # BLD AUTO: 0 K/UL (ref 0–0.5)
EOSINOPHIL NFR BLD: 0 % (ref 0–8)
ERYTHROCYTE [DISTWIDTH] IN BLOOD BY AUTOMATED COUNT: 15.8 % (ref 11.5–14.5)
EST. GFR  (NO RACE VARIABLE): >60 ML/MIN/1.73 M^2
GLUCOSE SERPL-MCNC: 125 MG/DL (ref 70–110)
HCT VFR BLD AUTO: 24.8 % (ref 37–48.5)
HGB BLD-MCNC: 8.5 G/DL (ref 12–16)
HYPOCHROMIA BLD QL SMEAR: ABNORMAL
IMM GRANULOCYTES # BLD AUTO: 0.3 K/UL (ref 0–0.04)
IMM GRANULOCYTES NFR BLD AUTO: 4.5 % (ref 0–0.5)
LYMPHOCYTES # BLD AUTO: 0.8 K/UL (ref 1–4.8)
LYMPHOCYTES NFR BLD: 12.1 % (ref 18–48)
MAGNESIUM SERPL-MCNC: 1.8 MG/DL (ref 1.6–2.6)
MCH RBC QN AUTO: 32.2 PG (ref 27–31)
MCHC RBC AUTO-ENTMCNC: 34.3 G/DL (ref 32–36)
MCV RBC AUTO: 94 FL (ref 82–98)
MONOCYTES # BLD AUTO: 0.7 K/UL (ref 0.3–1)
MONOCYTES NFR BLD: 10.4 % (ref 4–15)
NEUTROPHILS # BLD AUTO: 4.9 K/UL (ref 1.8–7.7)
NEUTROPHILS NFR BLD: 72.7 % (ref 38–73)
NRBC BLD-RTO: 0 /100 WBC
OVALOCYTES BLD QL SMEAR: ABNORMAL
PLATELET # BLD AUTO: 244 K/UL (ref 150–450)
PLATELET BLD QL SMEAR: ABNORMAL
PMV BLD AUTO: 10 FL (ref 9.2–12.9)
POIKILOCYTOSIS BLD QL SMEAR: SLIGHT
POLYCHROMASIA BLD QL SMEAR: ABNORMAL
POTASSIUM SERPL-SCNC: 3.4 MMOL/L (ref 3.5–5.1)
PROT SERPL-MCNC: 6 G/DL (ref 6–8.4)
RBC # BLD AUTO: 2.64 M/UL (ref 4–5.4)
SODIUM SERPL-SCNC: 141 MMOL/L (ref 136–145)
TOXIC GRANULES BLD QL SMEAR: PRESENT
WBC # BLD AUTO: 6.7 K/UL (ref 3.9–12.7)

## 2022-09-18 PROCEDURE — 83735 ASSAY OF MAGNESIUM: CPT | Performed by: STUDENT IN AN ORGANIZED HEALTH CARE EDUCATION/TRAINING PROGRAM

## 2022-09-18 PROCEDURE — 99233 PR SUBSEQUENT HOSPITAL CARE,LEVL III: ICD-10-PCS | Mod: ,,, | Performed by: STUDENT IN AN ORGANIZED HEALTH CARE EDUCATION/TRAINING PROGRAM

## 2022-09-18 PROCEDURE — 25000003 PHARM REV CODE 250: Performed by: STUDENT IN AN ORGANIZED HEALTH CARE EDUCATION/TRAINING PROGRAM

## 2022-09-18 PROCEDURE — 63600175 PHARM REV CODE 636 W HCPCS: Performed by: STUDENT IN AN ORGANIZED HEALTH CARE EDUCATION/TRAINING PROGRAM

## 2022-09-18 PROCEDURE — 99233 SBSQ HOSP IP/OBS HIGH 50: CPT | Mod: ,,, | Performed by: STUDENT IN AN ORGANIZED HEALTH CARE EDUCATION/TRAINING PROGRAM

## 2022-09-18 PROCEDURE — 82550 ASSAY OF CK (CPK): CPT | Performed by: STUDENT IN AN ORGANIZED HEALTH CARE EDUCATION/TRAINING PROGRAM

## 2022-09-18 PROCEDURE — 85025 COMPLETE CBC W/AUTO DIFF WBC: CPT | Performed by: STUDENT IN AN ORGANIZED HEALTH CARE EDUCATION/TRAINING PROGRAM

## 2022-09-18 PROCEDURE — 20600001 HC STEP DOWN PRIVATE ROOM

## 2022-09-18 PROCEDURE — 80053 COMPREHEN METABOLIC PANEL: CPT | Performed by: STUDENT IN AN ORGANIZED HEALTH CARE EDUCATION/TRAINING PROGRAM

## 2022-09-18 RX ORDER — DIPHENOXYLATE HYDROCHLORIDE AND ATROPINE SULFATE 2.5; .025 MG/1; MG/1
1 TABLET ORAL 4 TIMES DAILY PRN
Status: DISCONTINUED | OUTPATIENT
Start: 2022-09-18 | End: 2022-09-19

## 2022-09-18 RX ORDER — LOPERAMIDE HYDROCHLORIDE 2 MG/1
4 CAPSULE ORAL EVERY 6 HOURS
Status: DISCONTINUED | OUTPATIENT
Start: 2022-09-18 | End: 2022-09-19

## 2022-09-18 RX ORDER — POTASSIUM CHLORIDE 20 MEQ/1
40 TABLET, EXTENDED RELEASE ORAL ONCE
Status: COMPLETED | OUTPATIENT
Start: 2022-09-18 | End: 2022-09-18

## 2022-09-18 RX ADMIN — LOPERAMIDE HYDROCHLORIDE 2 MG: 2 CAPSULE ORAL at 08:09

## 2022-09-18 RX ADMIN — PANCRELIPASE 2 CAPSULE: 24000; 76000; 120000 CAPSULE, DELAYED RELEASE PELLETS ORAL at 01:09

## 2022-09-18 RX ADMIN — METHYLPREDNISOLONE SODIUM SUCCINATE 105.5 MG: 125 INJECTION, POWDER, FOR SOLUTION INTRAMUSCULAR; INTRAVENOUS at 08:09

## 2022-09-18 RX ADMIN — LOPERAMIDE HYDROCHLORIDE 4 MG: 2 CAPSULE ORAL at 05:09

## 2022-09-18 RX ADMIN — OXYCODONE HYDROCHLORIDE 10 MG: 10 TABLET ORAL at 04:09

## 2022-09-18 RX ADMIN — LOPERAMIDE HYDROCHLORIDE 2 MG: 2 CAPSULE ORAL at 04:09

## 2022-09-18 RX ADMIN — PANTOPRAZOLE SODIUM 40 MG: 40 TABLET, DELAYED RELEASE ORAL at 08:09

## 2022-09-18 RX ADMIN — PANCRELIPASE 2 CAPSULE: 24000; 76000; 120000 CAPSULE, DELAYED RELEASE PELLETS ORAL at 04:09

## 2022-09-18 RX ADMIN — LOPERAMIDE HYDROCHLORIDE 4 MG: 2 CAPSULE ORAL at 11:09

## 2022-09-18 RX ADMIN — POTASSIUM CHLORIDE 40 MEQ: 1500 TABLET, EXTENDED RELEASE ORAL at 08:09

## 2022-09-18 RX ADMIN — ENOXAPARIN SODIUM 40 MG: 100 INJECTION SUBCUTANEOUS at 04:09

## 2022-09-18 NOTE — ASSESSMENT & PLAN NOTE
Patient having increasing LFTs this admission. CTAP this admission did not show liver mets or biliary obstruction. US abdomen ordered to better visualize biliary tree. AST: ALT ratio < 2 so low concern for EtOH hepatitis. Patient switched from acetaminophen to advil. HBV surface and core ab negative    - US abdomen showed biliary sludge, consulted GI per ID recs   - Advance diet as tolerated  - [facility] outpatient referral  - RPR negative  - TORIN and anti-smooth muscle negative  - Elevated 9/18, possible antibiotic vs high dose steroids

## 2022-09-18 NOTE — PLAN OF CARE
Rounded on pt Q1H and prompted to turn Q2H, pt did not walk in hallway during shift. VSS throughout shift. Changes to the POC include:   -scheduled imodium  -no pain meds needed throughout shift  -IV steroids provided  -IV antibiotics d/c'd d/t pt being afebrile  -pt to d/c once diarrhea under control    Problem: Adult Inpatient Plan of Care  Goal: Plan of Care Review  Outcome: Ongoing, Progressing  Goal: Patient-Specific Goal (Individualized)  Outcome: Ongoing, Progressing  Goal: Absence of Hospital-Acquired Illness or Injury  Outcome: Ongoing, Progressing  Goal: Optimal Comfort and Wellbeing  Outcome: Ongoing, Progressing  Goal: Readiness for Transition of Care  Outcome: Ongoing, Progressing     Problem: Infection  Goal: Absence of Infection Signs and Symptoms  Outcome: Ongoing, Progressing     Problem: Skin Injury Risk Increased  Goal: Skin Health and Integrity  Outcome: Ongoing, Progressing

## 2022-09-18 NOTE — SUBJECTIVE & OBJECTIVE
Interval History: Had another 7 episodes of first formed then watery diarrhea. Abdominal pain improving, has been eating more. Denies any fevers or chills. VSS, labs with no significant abnormalities.    Oncology Treatment Plan:   OP PEMBROLIZUMAB CARBOPLATIN (AUC 5) WITH WEEKLY PACLITAXEL FOLLOWED BY PEMBROLIZUMAB DOXORUBICIN CYCLOPHOSPHAMIDE FOLLOWED BY PEMBROLIZUMAB 200 MG Q3W    Medications:  Continuous Infusions:  Scheduled Meds:   DAPTOmycin (CUBICIN) IV (PEDS and ADULTS)  10 mg/kg Intravenous Q24H    duke's soln (benadryl 30 mL, mylanta 30 mL, LIDOcaine 30 mL, nystatin 30 mL) 120 mL  10 mL Oral QID    enoxaparin  40 mg Subcutaneous Daily    methylPREDNISolone sodium succinate injection  1 mg/kg Intravenous Daily    nystatin  500,000 Units Oral QID    pantoprazole  40 mg Oral Daily    simethicone  1 tablet Oral TID PC     PRN Meds:dextrose 10%, dextrose 10%, dicyclomine, glucagon (human recombinant), glucose, glucose, heparin, porcine (PF), ibuprofen, loperamide, naloxone, ondansetron, oxyCODONE, polyethylene glycol, prochlorperazine, sodium chloride 0.9%       Objective:     Vital Signs (Most Recent):  Temp: 99.1 °F (37.3 °C) (09/18/22 0752)  Pulse: 77 (09/18/22 0752)  Resp: 15 (09/18/22 0752)  BP: 119/65 (09/18/22 0752)  SpO2: 98 % (09/18/22 0752) Vital Signs (24h Range):  Temp:  [98.1 °F (36.7 °C)-99.1 °F (37.3 °C)] 99.1 °F (37.3 °C)  Pulse:  [61-90] 77  Resp:  [15-18] 15  SpO2:  [95 %-98 %] 98 %  BP: (119-168)/(65-81) 119/65     Weight: 105.5 kg (232 lb 9.4 oz)  Body mass index is 37.54 kg/m².  Body surface area is 2.22 meters squared.      Intake/Output Summary (Last 24 hours) at 9/18/2022 0901  Last data filed at 9/17/2022 1809  Gross per 24 hour   Intake 446.9 ml   Output --   Net 446.9 ml       Physical Exam  Constitutional:       Comments: Comfortable in bed   HENT:      Head: Normocephalic.      Mouth/Throat:      Mouth: Mucous membranes are moist.   Eyes:      Pupils: Pupils are equal, round, and  reactive to light.   Cardiovascular:      Rate and Rhythm: Normal rate.      Pulses: Normal pulses.   Pulmonary:      Effort: Pulmonary effort is normal.   Abdominal:      Palpations: Abdomen is soft.      Tenderness: There is abdominal tenderness (mild midline).   Musculoskeletal:         General: Normal range of motion.      Cervical back: Normal range of motion.   Skin:     General: Skin is warm.   Neurological:      General: No focal deficit present.      Mental Status: She is alert and oriented to person, place, and time.       Significant Labs:   All pertinent labs from the last 24 hours have been reviewed.    Diagnostic Results:  I have reviewed all pertinent imaging results/findings within the past 24 hours.

## 2022-09-18 NOTE — ASSESSMENT & PLAN NOTE
Patient has been having more loose stools this admission since starting antibiotics. Now stools are causing intense abdominal cramps and stools are now green     - c diff negative, repeat negative  - bowel regimen held   - See chemotherapy induced neutropenia  - O&P, culture, giardia, cryptosporidium, entamoeba, GI pathogen panel, strongy IgG all within normal limits.   - Fecal elastin WNL  - Continues to have diarrhea through 9/16 up to 4 times daily. Concern for chemotherapy induced colitis. Will reconsult GI for possible scope, have IBD team on board.  - Starting Methylprednisolone 1mg/kg daily for empiric treatment(9/16), initially resolving and appetite returning however still having 4-7 episodes of diarrhea.   - Scheduled loperamide 4 times daily with lomotil prn, adding creon for possible pancreatic insufficiency, cholestyramine per GI recs  - PPI for high dose steroids  - Fecal calprotectin and lactoferrin pending  - Tentative colonoscopy this week  - Will obtain TB test in anticipation for TNF-a inhibitor

## 2022-09-18 NOTE — PROGRESS NOTES
[facility] - Oncology ([facility])  Hematology/Oncology  Progress Note    Patient Name: Laura Kent  Admission Date: 9/3/2022  Hospital Length of Stay: 15 days  Code Status: Full Code     Subjective:     HPI:  36 years old with HTN, breast cancer  C4D20 Taxol on 8/29/2022 f/u with Dr. Cruz presents with worsening abdominal pain, nasuea and vomiting. Patient was in pain during the interview and asking for better pain control. She states one week history of gradually worsening abdominal pain worst in epigastric area without radiation. She had minimal po intake since then. She associate emesis x2 NBNB. She had one small BM this afternoon prior to her presentation.     Patient denies chest pain, shortness of breath,  dysuria, polyuria, nausea, vomiting, fever, chills,  diarrhea, constipation, headache, falls, focal weakness or vision changes.     Ed course:   On presentation she was HDS and afebrile. On exam she was mod-sever  abdominal pain to palpitation, no peritonitic signs. Labs were significant for elevated lipase >200. CT with findings concerning for pancreatitis wo fluid/ abscess collection. Medical onc consulted for admit.           Interval History: Had another 7 episodes of first formed then watery diarrhea. Abdominal pain improving, has been eating more. Denies any fevers or chills. VSS, labs with no significant abnormalities.    Oncology Treatment Plan:   OP PEMBROLIZUMAB CARBOPLATIN (AUC 5) WITH WEEKLY PACLITAXEL FOLLOWED BY PEMBROLIZUMAB DOXORUBICIN CYCLOPHOSPHAMIDE FOLLOWED BY PEMBROLIZUMAB 200 MG Q3W    Medications:  Continuous Infusions:  Scheduled Meds:   DAPTOmycin (CUBICIN) IV (PEDS and ADULTS)  10 mg/kg Intravenous Q24H    duke's soln (benadryl 30 mL, mylanta 30 mL, LIDOcaine 30 mL, nystatin 30 mL) 120 mL  10 mL Oral QID    enoxaparin  40 mg Subcutaneous Daily    methylPREDNISolone sodium succinate injection  1 mg/kg Intravenous Daily    nystatin  500,000 Units Oral QID    pantoprazole   40 mg Oral Daily    simethicone  1 tablet Oral TID PC     PRN Meds:dextrose 10%, dextrose 10%, dicyclomine, glucagon (human recombinant), glucose, glucose, heparin, porcine (PF), ibuprofen, loperamide, naloxone, ondansetron, oxyCODONE, polyethylene glycol, prochlorperazine, sodium chloride 0.9%       Objective:     Vital Signs (Most Recent):  Temp: 99.1 °F (37.3 °C) (09/18/22 0752)  Pulse: 77 (09/18/22 0752)  Resp: 15 (09/18/22 0752)  BP: 119/65 (09/18/22 0752)  SpO2: 98 % (09/18/22 0752) Vital Signs (24h Range):  Temp:  [98.1 °F (36.7 °C)-99.1 °F (37.3 °C)] 99.1 °F (37.3 °C)  Pulse:  [61-90] 77  Resp:  [15-18] 15  SpO2:  [95 %-98 %] 98 %  BP: (119-168)/(65-81) 119/65     Weight: 105.5 kg (232 lb 9.4 oz)  Body mass index is 37.54 kg/m².  Body surface area is 2.22 meters squared.      Intake/Output Summary (Last 24 hours) at 9/18/2022 0901  Last data filed at 9/17/2022 1809  Gross per 24 hour   Intake 446.9 ml   Output --   Net 446.9 ml       Physical Exam  Constitutional:       Comments: Comfortable in bed   HENT:      Head: Normocephalic.      Mouth/Throat:      Mouth: Mucous membranes are moist.   Eyes:      Pupils: Pupils are equal, round, and reactive to light.   Cardiovascular:      Rate and Rhythm: Normal rate.      Pulses: Normal pulses.   Pulmonary:      Effort: Pulmonary effort is normal.   Abdominal:      Palpations: Abdomen is soft.      Tenderness: There is abdominal tenderness (mild midline).   Musculoskeletal:         General: Normal range of motion.      Cervical back: Normal range of motion.   Skin:     General: Skin is warm.   Neurological:      General: No focal deficit present.      Mental Status: She is alert and oriented to person, place, and time.       Significant Labs:   All pertinent labs from the last 24 hours have been reviewed.    Diagnostic Results:  I have reviewed all pertinent imaging results/findings within the past 24 hours.    Assessment/Plan:     * Acute pancreatitis  36 years old  with HTN, breast cancer  C4D20 Taxol on 8/29/2022 f/u with Dr. Cruz presents with worsening abdominal pain, nasuea and vomiting.  On exam she was mod-sever  abdominal pain to palpitation, no peritonitic signs. Labs were significant for elevated lipase >200. CT with findings concerning for pancreatitis wo fluid/ abscess collection. . CT scan with Mild peripancreatic fat stranding. TG levels were normal this admission, low concern for triglyceride induced pancreatitis. CT scan showed no gallstones or GB wall thickening, low concern for gallstone induced pancreatitis. Patient is clinically improving with n/v, po intake, and pain.     Most likely pancreatitis 2/2 chemo, last chemotherapy session 8/29; Taxol    - Pain: morphine IV changed to oxy PO.    - Anti-emetics: scheduled compazine and prn zofran. Able to tolerate juice and fluids.  - Diet advancing as tolerated, tolerating liquids only right now.  - simethicone     Colitis  See loose stools    Elevated LFTs  Patient having increasing LFTs this admission. CTAP this admission did not show liver mets or biliary obstruction. US abdomen ordered to better visualize biliary tree. AST: ALT ratio < 2 so low concern for EtOH hepatitis. Patient switched from acetaminophen to advil. HBV surface and core ab negative    - US abdomen showed biliary sludge, consulted GI per ID recs   - Advance diet as tolerated  - [facility] outpatient referral  - RPR negative  - TORIN and anti-smooth muscle negative  - Elevated 9/18, possible antibiotic vs high dose steroids. Will hold Dapto and see how the patient does (9/18)    Loose stools  Patient has been having more loose stools this admission since starting antibiotics. Now stools are causing intense abdominal cramps and stools are now green     - c diff negative, repeat negative  - bowel regimen held   - See chemotherapy induced neutropenia  - O&P, culture, giardia, cryptosporidium, entamoeba, GI pathogen panel, strongy IgG all within normal  limits.   - Fecal elastin WNL  - Continues to have diarrhea through 9/16 up to 4 times daily. Concern for chemotherapy induced colitis. Will reconsult GI for possible scope, have IBD team on board.  - Scheduled loperamide 4 times daily with lomotil prn, adding creon for possible pancreatic insufficiency   - Starting Methylprednisolone 1mg/kg daily for empiric treatment(9/16), initially resolving and appetite returning however still having 4-7 episodes of diarrhea.   - Fecal calprotectin and lactoferrin pending  - Tentative colonoscopy next week  - PPI for high dose steroids    Abdominal pain  Please read pancreatitis. Resolving with pain medications and steroids, stool studies negative.     Chemotherapy induced neutropenia  Pt had subjective fevers at home unmeasured, abdominal pain, polyuria In setting of low WBC and recent chemotherapy. During admission patient was febrile intermittently and had U/A showing WBCs and bacteria. Initially started treatment with cefepime. Flagyl added due to ongoing fevers. Fever curve was initially downtrending with negative cultures. However she had fever as high as 103. Will restart septic work up as fever is currently of unknown origin and add vancomycin. Consulted ID and per recommendations have deescalated abx to unasyn given pt has has negative testing for pseudomonas and MRSA. Can also consider chemo causing fever.     - Cdiff studies negative x2  - Repeat blood cultures, NGTD  - ID contsulted, appreciate recs   - No port removal needed at this time per ID  - Completed course of Unasyn (9/14)  - D/C Dapto as her cultures have been negative, WBC wnl, afebrile. Will monitor for signs of infection.    Malignant neoplasm of upper-outer quadrant of left breast in female, estrogen receptor negative  Dr. Cruz patient,  On:  Currently on cycle 4 D20 of pembrolizumab carboplatin (AUC 5) with weekly paclitaxel followed by pembrolizumab doxorubicin cyclophosphamide followed by  pembrolizumab 200 mg q3w    Most recent Taxol on 8/29/2022     -- admit to medical oncology            Cristina Wilkerson DO  Hematology/Oncology  [facility] - Oncology ([facility])

## 2022-09-18 NOTE — ASSESSMENT & PLAN NOTE
Pt had subjective fevers at home unmeasured, abdominal pain, polyuria In setting of low WBC and recent chemotherapy. During admission patient was febrile intermittently and had U/A showing WBCs and bacteria. Initially started treatment with cefepime. Flagyl added due to ongoing fevers. Fever curve was initially downtrending with negative cultures. However she had fever as high as 103. Will restart septic work up as fever is currently of unknown origin and add vancomycin. Consulted ID and per recommendations have deescalated abx to unasyn given pt has has negative testing for pseudomonas and MRSA. Can also consider chemo causing fever.     - Cdiff studies negative x2  - Repeat blood cultures, NGTD  - ID contsulted, appreciate recs   - No port removal needed at this time per ID  - Completed course of Unasyn (9/14)  - D/C Dapto as her cultures have been negative and can cause diarrhea, WBC wnl, afebrile. Will monitor for signs of infection.

## 2022-09-18 NOTE — ASSESSMENT & PLAN NOTE
Patient having increasing LFTs this admission. CTAP this admission did not show liver mets or biliary obstruction. US abdomen ordered to better visualize biliary tree. AST: ALT ratio < 2 so low concern for EtOH hepatitis. Patient switched from acetaminophen to advil. HBV surface and core ab negative    - US abdomen showed biliary sludge, consulted GI per ID recs   - Advance diet as tolerated  - [facility] outpatient referral  - RPR negative  - TORIN and anti-smooth muscle negative  - Elevated 9/18, possible antibiotic vs high dose steroids. Will hold Dapto and see how the patient does (9/18)

## 2022-09-18 NOTE — PLAN OF CARE
Patient A/O x4 during shift. VSS on RAIR. Patient ambulatory and independent with ADLS. Three BM's reported during shift. Patient states BM are more formed and less liquid. PRN medications given for pain and loose stools see EMAR. Call light and personal items in reach. Bed locked and in lowest position throughout night.     Problem: Adult Inpatient Plan of Care  Goal: Readiness for Transition of Care  Outcome: Ongoing, Progressing     Problem: Infection  Goal: Absence of Infection Signs and Symptoms  Outcome: Ongoing, Progressing     Problem: Skin Injury Risk Increased  Goal: Skin Health and Integrity  Outcome: Ongoing, Progressing

## 2022-09-19 VITALS
WEIGHT: 232.56 LBS | BODY MASS INDEX: 37.38 KG/M2 | TEMPERATURE: 99 F | HEIGHT: 66 IN | RESPIRATION RATE: 18 BRPM | OXYGEN SATURATION: 98 % | SYSTOLIC BLOOD PRESSURE: 127 MMHG | DIASTOLIC BLOOD PRESSURE: 74 MMHG | HEART RATE: 92 BPM

## 2022-09-19 DIAGNOSIS — K52.9 COLITIS: ICD-10-CM

## 2022-09-19 DIAGNOSIS — R19.7 DIARRHEA, UNSPECIFIED TYPE: Primary | ICD-10-CM

## 2022-09-19 LAB
ALBUMIN SERPL BCP-MCNC: 2.7 G/DL (ref 3.5–5.2)
ALP SERPL-CCNC: 63 U/L (ref 55–135)
ALT SERPL W/O P-5'-P-CCNC: 156 U/L (ref 10–44)
ANION GAP SERPL CALC-SCNC: 8 MMOL/L (ref 8–16)
ANISOCYTOSIS BLD QL SMEAR: SLIGHT
AST SERPL-CCNC: 63 U/L (ref 10–40)
BASOPHILS NFR BLD: 0 % (ref 0–1.9)
BILIRUB SERPL-MCNC: 0.4 MG/DL (ref 0.1–1)
BUN SERPL-MCNC: 11 MG/DL (ref 6–20)
CALCIUM SERPL-MCNC: 8.6 MG/DL (ref 8.7–10.5)
CHLORIDE SERPL-SCNC: 106 MMOL/L (ref 95–110)
CO2 SERPL-SCNC: 26 MMOL/L (ref 23–29)
CREAT SERPL-MCNC: 0.8 MG/DL (ref 0.5–1.4)
DACRYOCYTES BLD QL SMEAR: ABNORMAL
DIFFERENTIAL METHOD: ABNORMAL
ELASTASE 1, FECAL: 94 MCG/G
EOSINOPHIL NFR BLD: 0 % (ref 0–8)
ERYTHROCYTE [DISTWIDTH] IN BLOOD BY AUTOMATED COUNT: 15.8 % (ref 11.5–14.5)
EST. GFR  (NO RACE VARIABLE): >60 ML/MIN/1.73 M^2
GLUCOSE SERPL-MCNC: 112 MG/DL (ref 70–110)
HCT VFR BLD AUTO: 24.3 % (ref 37–48.5)
HGB BLD-MCNC: 8.1 G/DL (ref 12–16)
HYPOCHROMIA BLD QL SMEAR: ABNORMAL
IMM GRANULOCYTES # BLD AUTO: ABNORMAL K/UL (ref 0–0.04)
IMM GRANULOCYTES NFR BLD AUTO: ABNORMAL % (ref 0–0.5)
LYMPHOCYTES NFR BLD: 27 % (ref 18–48)
MAGNESIUM SERPL-MCNC: 1.7 MG/DL (ref 1.6–2.6)
MCH RBC QN AUTO: 31.5 PG (ref 27–31)
MCHC RBC AUTO-ENTMCNC: 33.3 G/DL (ref 32–36)
MCV RBC AUTO: 95 FL (ref 82–98)
METAMYELOCYTES NFR BLD MANUAL: 2 %
MONOCYTES NFR BLD: 2 % (ref 4–15)
MYELOCYTES NFR BLD MANUAL: 2 %
NEUTROPHILS NFR BLD: 61 % (ref 38–73)
NEUTS BAND NFR BLD MANUAL: 6 %
NRBC BLD-RTO: 1 /100 WBC
OVALOCYTES BLD QL SMEAR: ABNORMAL
PLATELET # BLD AUTO: 222 K/UL (ref 150–450)
PLATELET BLD QL SMEAR: ABNORMAL
PMV BLD AUTO: 10.4 FL (ref 9.2–12.9)
POIKILOCYTOSIS BLD QL SMEAR: SLIGHT
POLYCHROMASIA BLD QL SMEAR: ABNORMAL
POTASSIUM SERPL-SCNC: 3.3 MMOL/L (ref 3.5–5.1)
PROT SERPL-MCNC: 5.7 G/DL (ref 6–8.4)
RBC # BLD AUTO: 2.57 M/UL (ref 4–5.4)
SODIUM SERPL-SCNC: 140 MMOL/L (ref 136–145)
SPHEROCYTES BLD QL SMEAR: ABNORMAL
TOXIC GRANULES BLD QL SMEAR: PRESENT
WBC # BLD AUTO: 6.71 K/UL (ref 3.9–12.7)

## 2022-09-19 PROCEDURE — 85027 COMPLETE CBC AUTOMATED: CPT | Performed by: STUDENT IN AN ORGANIZED HEALTH CARE EDUCATION/TRAINING PROGRAM

## 2022-09-19 PROCEDURE — 63600175 PHARM REV CODE 636 W HCPCS: Performed by: STUDENT IN AN ORGANIZED HEALTH CARE EDUCATION/TRAINING PROGRAM

## 2022-09-19 PROCEDURE — 99238 PR HOSPITAL DISCHARGE DAY,<30 MIN: ICD-10-PCS | Mod: ,,, | Performed by: INTERNAL MEDICINE

## 2022-09-19 PROCEDURE — 80053 COMPREHEN METABOLIC PANEL: CPT | Performed by: STUDENT IN AN ORGANIZED HEALTH CARE EDUCATION/TRAINING PROGRAM

## 2022-09-19 PROCEDURE — 25000003 PHARM REV CODE 250: Performed by: STUDENT IN AN ORGANIZED HEALTH CARE EDUCATION/TRAINING PROGRAM

## 2022-09-19 PROCEDURE — 85007 BL SMEAR W/DIFF WBC COUNT: CPT | Performed by: STUDENT IN AN ORGANIZED HEALTH CARE EDUCATION/TRAINING PROGRAM

## 2022-09-19 PROCEDURE — 86481 TB AG RESPONSE T-CELL SUSP: CPT | Performed by: STUDENT IN AN ORGANIZED HEALTH CARE EDUCATION/TRAINING PROGRAM

## 2022-09-19 PROCEDURE — 99238 HOSP IP/OBS DSCHRG MGMT 30/<: CPT | Mod: ,,, | Performed by: INTERNAL MEDICINE

## 2022-09-19 PROCEDURE — 83735 ASSAY OF MAGNESIUM: CPT | Performed by: STUDENT IN AN ORGANIZED HEALTH CARE EDUCATION/TRAINING PROGRAM

## 2022-09-19 RX ORDER — PANTOPRAZOLE SODIUM 40 MG/1
40 TABLET, DELAYED RELEASE ORAL DAILY
Qty: 30 TABLET | Refills: 11 | Status: ON HOLD | OUTPATIENT
Start: 2022-09-20 | End: 2022-11-11 | Stop reason: HOSPADM

## 2022-09-19 RX ORDER — DICYCLOMINE HYDROCHLORIDE 10 MG/1
10 CAPSULE ORAL 3 TIMES DAILY PRN
Qty: 30 CAPSULE | Refills: 1 | Status: SHIPPED | OUTPATIENT
Start: 2022-09-19 | End: 2022-10-19

## 2022-09-19 RX ORDER — PREDNISONE 20 MG/1
TABLET ORAL
Qty: 68 TABLET | Refills: 0 | Status: SHIPPED | OUTPATIENT
Start: 2022-09-19 | End: 2022-10-03 | Stop reason: SDUPTHER

## 2022-09-19 RX ORDER — POTASSIUM CHLORIDE 20 MEQ/1
40 TABLET, EXTENDED RELEASE ORAL
Status: COMPLETED | OUTPATIENT
Start: 2022-09-19 | End: 2022-09-19

## 2022-09-19 RX ORDER — LOPERAMIDE HYDROCHLORIDE 2 MG/1
4 CAPSULE ORAL EVERY 6 HOURS PRN
Qty: 90 CAPSULE | Refills: 3 | Status: SHIPPED | OUTPATIENT
Start: 2022-09-19 | End: 2022-09-19 | Stop reason: SDUPTHER

## 2022-09-19 RX ORDER — LOPERAMIDE HYDROCHLORIDE 2 MG/1
4 CAPSULE ORAL EVERY 6 HOURS PRN
Status: DISCONTINUED | OUTPATIENT
Start: 2022-09-19 | End: 2022-09-19

## 2022-09-19 RX ORDER — CHOLESTYRAMINE 4 G/9G
1 POWDER, FOR SUSPENSION ORAL 2 TIMES DAILY
Status: DISCONTINUED | OUTPATIENT
Start: 2022-09-19 | End: 2022-09-19

## 2022-09-19 RX ORDER — LOPERAMIDE HYDROCHLORIDE 2 MG/1
4 CAPSULE ORAL EVERY 6 HOURS PRN
Qty: 90 CAPSULE | Refills: 3 | Status: SHIPPED | OUTPATIENT
Start: 2022-09-19 | End: 2022-11-07

## 2022-09-19 RX ORDER — PREDNISONE 20 MG/1
TABLET ORAL
Qty: 68 TABLET | Refills: 0 | Status: SHIPPED | OUTPATIENT
Start: 2022-09-19 | End: 2022-09-19 | Stop reason: SDUPTHER

## 2022-09-19 RX ORDER — DICYCLOMINE HYDROCHLORIDE 10 MG/1
10 CAPSULE ORAL 3 TIMES DAILY PRN
Qty: 30 CAPSULE | Refills: 1 | Status: SHIPPED | OUTPATIENT
Start: 2022-09-19 | End: 2022-09-19 | Stop reason: SDUPTHER

## 2022-09-19 RX ORDER — PANTOPRAZOLE SODIUM 40 MG/1
40 TABLET, DELAYED RELEASE ORAL DAILY
Qty: 30 TABLET | Refills: 11 | Status: SHIPPED | OUTPATIENT
Start: 2022-09-20 | End: 2022-09-19 | Stop reason: SDUPTHER

## 2022-09-19 RX ADMIN — HEPARIN 300 UNITS: 100 SYRINGE at 03:09

## 2022-09-19 RX ADMIN — PANTOPRAZOLE SODIUM 40 MG: 40 TABLET, DELAYED RELEASE ORAL at 08:09

## 2022-09-19 RX ADMIN — POTASSIUM CHLORIDE 40 MEQ: 1500 TABLET, EXTENDED RELEASE ORAL at 08:09

## 2022-09-19 RX ADMIN — POTASSIUM CHLORIDE 40 MEQ: 1500 TABLET, EXTENDED RELEASE ORAL at 10:09

## 2022-09-19 RX ADMIN — METHYLPREDNISOLONE SODIUM SUCCINATE 105.5 MG: 125 INJECTION, POWDER, FOR SOLUTION INTRAMUSCULAR; INTRAVENOUS at 08:09

## 2022-09-19 RX ADMIN — PANCRELIPASE 2 CAPSULE: 24000; 76000; 120000 CAPSULE, DELAYED RELEASE PELLETS ORAL at 08:09

## 2022-09-19 RX ADMIN — LOPERAMIDE HYDROCHLORIDE 4 MG: 2 CAPSULE ORAL at 05:09

## 2022-09-19 RX ADMIN — PANCRELIPASE 2 CAPSULE: 24000; 76000; 120000 CAPSULE, DELAYED RELEASE PELLETS ORAL at 01:09

## 2022-09-19 NOTE — PROGRESS NOTES
[facility] - Oncology ([facility])  Hematology/Oncology  Progress Note    Patient Name: Laura Kent  Admission Date: 9/3/2022  Hospital Length of Stay: 16 days  Code Status: Full Code     Subjective:     HPI:  36 years old with HTN, breast cancer  C4D20 Taxol on 8/29/2022 f/u with Dr. Cruz presents with worsening abdominal pain, nasuea and vomiting. Patient was in pain during the interview and asking for better pain control. She states one week history of gradually worsening abdominal pain worst in epigastric area without radiation. She had minimal po intake since then. She associate emesis x2 NBNB. She had one small BM this afternoon prior to her presentation.     Patient denies chest pain, shortness of breath,  dysuria, polyuria, nausea, vomiting, fever, chills,  diarrhea, constipation, headache, falls, focal weakness or vision changes.     Ed course:   On presentation she was HDS and afebrile. On exam she was mod-sever  abdominal pain to palpitation, no peritonitic signs. Labs were significant for elevated lipase >200. CT with findings concerning for pancreatitis wo fluid/ abscess collection. Medical onc consulted for admit.           Interval History: Similar diarrhea to previous days while on imodium, 3-4 soft bowel movements. Denies any abdominal pain, denies any fevers or chills. Able to eat without vomiting. She would like to go home. GI following, plan for scope early this week.     Oncology Treatment Plan:   OP PEMBROLIZUMAB CARBOPLATIN (AUC 5) WITH WEEKLY PACLITAXEL FOLLOWED BY PEMBROLIZUMAB DOXORUBICIN CYCLOPHOSPHAMIDE FOLLOWED BY PEMBROLIZUMAB 200 MG Q3W    Medications:  Continuous Infusions:  Scheduled Meds:   duke's soln (benadryl 30 mL, mylanta 30 mL, LIDOcaine 30 mL, nystatin 30 mL) 120 mL  10 mL Oral QID    enoxaparin  40 mg Subcutaneous Daily    lipase-protease-amylase 24,000-76,000-120,000 units  2 capsule Oral TID WM    loperamide  4 mg Oral Q6H    methylPREDNISolone sodium succinate  injection  1 mg/kg Intravenous Daily    nystatin  500,000 Units Oral QID    pantoprazole  40 mg Oral Daily    potassium chloride  40 mEq Oral Q2H    simethicone  1 tablet Oral TID PC     PRN Meds:dextrose 10%, dextrose 10%, dicyclomine, diphenoxylate-atropine 2.5-0.025 mg, glucagon (human recombinant), glucose, glucose, heparin, porcine (PF), ibuprofen, naloxone, ondansetron, oxyCODONE, polyethylene glycol, prochlorperazine, sodium chloride 0.9%       Objective:     Vital Signs (Most Recent):  Temp: 98.1 °F (36.7 °C) (09/19/22 0803)  Pulse: 85 (09/19/22 0803)  Resp: 17 (09/19/22 0803)  BP: 137/77 (09/19/22 0803)  SpO2: 98 % (09/19/22 0803) Vital Signs (24h Range):  Temp:  [97.8 °F (36.6 °C)-98.6 °F (37 °C)] 98.1 °F (36.7 °C)  Pulse:  [75-90] 85  Resp:  [15-18] 17  SpO2:  [94 %-99 %] 98 %  BP: (123-147)/(71-85) 137/77     Weight: 105.5 kg (232 lb 9.4 oz)  Body mass index is 37.54 kg/m².  Body surface area is 2.22 meters squared.    No intake or output data in the 24 hours ending 09/19/22 0804    Physical Exam  HENT:      Head: Normocephalic.      Nose: Nose normal.      Mouth/Throat:      Mouth: Mucous membranes are moist.   Eyes:      Pupils: Pupils are equal, round, and reactive to light.   Cardiovascular:      Rate and Rhythm: Normal rate.      Comments: Chest port in place  Abdominal:      General: Abdomen is flat. Bowel sounds are normal. There is no distension.   Musculoskeletal:         General: Normal range of motion.      Cervical back: Normal range of motion.   Skin:     General: Skin is warm.   Neurological:      General: No focal deficit present.      Mental Status: She is alert and oriented to person, place, and time.   Psychiatric:         Mood and Affect: Mood normal.       Significant Labs:   All pertinent labs from the last 24 hours have been reviewed.    Diagnostic Results:  I have reviewed all pertinent imaging results/findings within the past 24 hours.    Assessment/Plan:     * Acute  pancreatitis  36 years old with HTN, breast cancer  C4D20 Taxol on 8/29/2022 f/u with Dr. Cruz presents with worsening abdominal pain, nasuea and vomiting.  On exam she was mod-sever  abdominal pain to palpitation, no peritonitic signs. Labs were significant for elevated lipase >200. CT with findings concerning for pancreatitis wo fluid/ abscess collection. . CT scan with Mild peripancreatic fat stranding. TG levels were normal this admission, low concern for triglyceride induced pancreatitis. CT scan showed no gallstones or GB wall thickening, low concern for gallstone induced pancreatitis. Patient is clinically improving with n/v, po intake, and pain.    Most likely pancreatitis 2/2 chemo, last chemotherapy session 8/29; Taxol    - Pain: morphine IV changed to oxy PO.    - Anti-emetics: scheduled compazine and prn zofran. Able to tolerate juice and fluids.  - Diet advancing as tolerated, tolerating liquids only right now.  - simethicone     Colitis  See diarrhea    Elevated LFTs  Patient having increasing LFTs this admission. CTAP this admission did not show liver mets or biliary obstruction. US abdomen ordered to better visualize biliary tree. AST: ALT ratio < 2 so low concern for EtOH hepatitis. Patient switched from acetaminophen to advil. HBV surface and core ab negative    - US abdomen showed biliary sludge, consulted GI per ID recs   - Advance diet as tolerated  - [facility] outpatient referral  - RPR negative  - TORIN and anti-smooth muscle negative  - Elevated 9/18, possible antibiotic vs high dose steroids. Will hold Dapto and see how the patient does (9/18)    Diarrhea  Patient has been having more loose stools this admission since starting antibiotics. Now stools are causing intense abdominal cramps and stools are now green     - c diff negative, repeat negative  - bowel regimen held   - See chemotherapy induced neutropenia  - O&P, culture, giardia, cryptosporidium, entamoeba, GI pathogen panel, strongy IgG  all within normal limits.   - Fecal elastin WNL  - Continues to have diarrhea through 9/16 up to 4 times daily. Concern for chemotherapy induced colitis. Will reconsult GI for possible scope, have IBD team on board.  - Starting Methylprednisolone 1mg/kg daily for empiric treatment(9/16), initially resolving and appetite returning however still having 4-7 episodes of diarrhea. Will start steroid taper 90mg prednisone tapering by 10mg q3d  - Scheduled loperamide 4 times daily with lomotil prn, adding creon for possible pancreatic insufficiency,   - Cholestyramine per GI recs, however pt refused  - PPI for high dose steroids  - Fecal calprotectin and lactoferrin pending  - Tentative colonoscopy this week  - Will obtain TB test in anticipation for TNF-a inhibitor       Abdominal pain  Please read pancreatitis. Resolving with pain medications and steroids, stool studies negative.     Chemotherapy induced neutropenia  Pt had subjective fevers at home unmeasured, abdominal pain, polyuria In setting of low WBC and recent chemotherapy. During admission patient was febrile intermittently and had U/A showing WBCs and bacteria. Initially started treatment with cefepime. Flagyl added due to ongoing fevers. Fever curve was initially downtrending with negative cultures. However she had fever as high as 103. Will restart septic work up as fever is currently of unknown origin and add vancomycin. Consulted ID and per recommendations have deescalated abx to unasyn given pt has has negative testing for pseudomonas and MRSA. Can also consider chemo causing fever.     - Cdiff studies negative x2  - Repeat blood cultures, NGTD  - ID contsulted, appreciate recs   - No port removal needed at this time per ID  - Completed course of Unasyn (9/14)  - D/C Dapto as her cultures have been negative and can cause diarrhea, WBC wnl, afebrile. Will monitor for signs of infection.    Malignant neoplasm of upper-outer quadrant of left breast in female,  estrogen receptor negative  Dr. Cruz patient,  On:  Currently on cycle 4 D20 of pembrolizumab carboplatin (AUC 5) with weekly paclitaxel followed by pembrolizumab doxorubicin cyclophosphamide followed by pembrolizumab 200 mg q3w    Most recent Taxol on 8/29/2022     -- admit to medical oncology            Cristina Wilkerson DO  Hematology/Oncology  [facility] - Oncology ([facility])

## 2022-09-19 NOTE — PLAN OF CARE
Pt adequate for d/c. Pt received orders for d/c. VERITO printed and reviewed with pt, no further questions at this time. Port central line d/c'd per policy - catheter intact - no s/sx of infection at site. Pt belongings collected and d/c'd with pt with the assist of transport. Pt in stable condition on d/c.

## 2022-09-19 NOTE — PROGRESS NOTES
[facility] - Oncology ([facility])  Gastroenterology  Progress Note    Patient Name: Laura Kent  MRN: [id number]  Admission Date: 9/3/2022  Hospital Length of Stay: 16 days  Code Status: Full Code   Attending Provider: Niko Smith MD  Consulting Provider: Rafa Burrows MD  Primary Care Physician: Primary Doctor No  Principal Problem: Acute pancreatitis      Subjective:     Interval History: GI re-contacted with regards to evaluation of suspected immunotherapy induced colitis. Patient reports onset of diarrhea upon admission to the hospital associated with passage of up to 20 non-bloody liquid stools daily with lower abdominal cramping. She denies prior diarrhea associated with chemotherapy regimen, family history of IBD, gastric, or colon cancer, and undergoing prior colonoscopy. Since initiation of steroids on 09/16, patient reports decreased frequency of stools. She is currently having 3-4 semi-formed stools daily without blood. Patient would like to be discharged if possible.     Review of Systems   Constitutional:  Positive for fatigue. Negative for fever.   Gastrointestinal:  Positive for diarrhea. Negative for blood in stool, constipation, nausea, rectal pain and vomiting.   Neurological:  Positive for weakness.   Objective:     Vital Signs (Most Recent):  Temp: 98.6 °F (37 °C) (09/19/22 1109)  Pulse: 92 (09/19/22 1109)  Resp: 18 (09/19/22 1109)  BP: 127/74 (09/19/22 1109)  SpO2: 98 % (09/19/22 1109) Vital Signs (24h Range):  Temp:  [97.8 °F (36.6 °C)-98.6 °F (37 °C)] 98.6 °F (37 °C)  Pulse:  [79-92] 92  Resp:  [15-18] 18  SpO2:  [95 %-99 %] 98 %  BP: (123-142)/(71-83) 127/74     Weight: 105.5 kg (232 lb 9.4 oz) (09/04/22 0220)  Body mass index is 37.54 kg/m².    No intake or output data in the 24 hours ending 09/19/22 1241    Lines/Drains/Airways       Central Venous Catheter Line  Duration             Port A Cath Single Lumen right atrial -- days         PowerPort A Cath Single Lumen  09/05/22 0323 right subclavian 14 days                    Physical Exam  Constitutional:       Appearance: Normal appearance.   Eyes:      Conjunctiva/sclera: Conjunctivae normal.   Cardiovascular:      Rate and Rhythm: Normal rate and regular rhythm.      Pulses: Normal pulses.      Heart sounds: Normal heart sounds.   Pulmonary:      Effort: Pulmonary effort is normal. No respiratory distress.      Breath sounds: Normal breath sounds.   Abdominal:      General: Bowel sounds are normal. There is no distension.      Palpations: Abdomen is soft.      Tenderness: There is abdominal tenderness in the right lower quadrant and left lower quadrant.   Skin:     General: Skin is warm and dry.      Findings: No bruising or rash.   Neurological:      Mental Status: She is alert and oriented to person, place, and time.       Significant Labs:  All pertinent lab results from the last 24 hours have been reviewed.      Significant Imaging:  Imaging results within the past 24 hours have been reviewed.    Assessment/Plan:     Diarrhea  This is a 36 year old female with PMH significant for invasive ductal carcinoma of left breast (diagnosed in 03/2022, status post initiation of KEYTRUDA and PACLITAXEL in 04/2022, with last infusion on 08/29 and treatment course complicated by Enterococcus bacteremia in 04/2022 attributed to chemotherapy port infection) and obesity who was admitted to [facility] on 09/03/2022 for management of acute pancreatitis secondary to chemotherapy agents (imaging and labratory work-up without evidence of undelrying gallstones, biliary/pancreatic mass, lipid abnromalities, or hypercalcemia). 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 She is status post initiation of SOLUMEDROL on 09/16 with improvement in symptoms.     Recommendations:     -Will arrange outpatient  colonoscopy for further evaluation of immunotherapy induced colitis.   -Continuation of steroids per medical oncology.   -Consider addition of IMODIUM QID PRN and/or BENTYL PRN for additional relief of diarrhea and cramping.         Thank you for your consult. I will sign off. Please contact us if you have any additional questions.    Rafa Burrows MD  Gastroenterology  [facility] - Oncology ([facility])

## 2022-09-19 NOTE — PROGRESS NOTES
SW informed by rounding SW that pt may d/c today. SW met with pt bedside.  She has no d/c needs.  She plans to call SW when she is settled at home to discuss financial resources.  SW remains available.

## 2022-09-19 NOTE — ASSESSMENT & PLAN NOTE
Patient having increasing LFTs this admission. CTAP this admission did not show liver mets or biliary obstruction. US abdomen ordered to better visualize biliary tree. AST: ALT ratio < 2 so low concern for EtOH hepatitis. Patient switched from acetaminophen to advil. HBV surface and core ab negative    - US abdomen showed biliary sludge, consulted GI per ID recs   - Advance diet as tolerated  - RPR negative  - TORIN and anti-smooth muscle negative  - Elevated 9/18, possible antibiotic vs high dose steroids. D/c Dapto 9/18, LFTs improving

## 2022-09-19 NOTE — PLAN OF CARE
Patient A/O x 4. VSS on RAIR. Patient ambulatory and independent with ADLs. No PRN medications given at this time. Scheduled imodium given per order. Call light and personal belongings within reach. Bed locked and in lowest position throughout night.     Problem: Adult Inpatient Plan of Care  Goal: Optimal Comfort and Wellbeing  Outcome: Ongoing, Progressing  Goal: Readiness for Transition of Care  Outcome: Ongoing, Progressing     Problem: Infection  Goal: Absence of Infection Signs and Symptoms  Outcome: Ongoing, Progressing

## 2022-09-19 NOTE — ASSESSMENT & PLAN NOTE
This is a 36 year old female with PMH significant for invasive ductal carcinoma of left breast (diagnosed in 03/2022, status post initiation of KEYTRUDA and PACLITAXEL in 04/2022, with last infusion on 08/29 and treatment course complicated by Enterococcus bacteremia in 04/2022 attributed to chemotherapy port infection) and obesity who was admitted to [facility] on 09/03/2022 for management of acute pancreatitis secondary to chemotherapy agents (imaging and labratory work-up without evidence of undelrying gallstones, biliary/pancreatic mass, lipid abnromalities, or hypercalcemia). 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 She is status post initiation of SOLUMEDROL on 09/16 with improvement in symptoms.     Recommendations:     -Will arrange outpatient colonoscopy for further evaluation of immunotherapy induced colitis.   -Continuation of steroids per medical oncology.   -Consider addition of IMODIUM QID PRN and/or BENTYL PRN for additional relief of diarrhea and cramping.

## 2022-09-19 NOTE — PLAN OF CARE
Rounded on pt Q1H and prompted to reposition/walk in hallway; pt is ambulatory by self. VSS throughout shift. Changes to the POC include:   -pt to get scope performed of GI system in outpatient setting  -pt to d/c on 9/19/22.               Problem: Adult Inpatient Plan of Care  Goal: Plan of Care Review  9/19/2022 1449 by Nita Telles RN  Outcome: Ongoing, Progressing  9/19/2022 1446 by Nita Telles RN  Outcome: Ongoing, Progressing  Goal: Patient-Specific Goal (Individualized)  9/19/2022 1449 by Nita Telles RN  Outcome: Ongoing, Progressing  9/19/2022 1446 by Nita Telles RN  Outcome: Ongoing, Progressing  Goal: Absence of Hospital-Acquired Illness or Injury  9/19/2022 1449 by Nita Telles RN  Outcome: Ongoing, Progressing  9/19/2022 1446 by Nita Telles RN  Outcome: Ongoing, Progressing  Goal: Optimal Comfort and Wellbeing  9/19/2022 1449 by Nita Telles RN  Outcome: Ongoing, Progressing  9/19/2022 1446 by Nita Telles RN  Outcome: Ongoing, Progressing  Goal: Readiness for Transition of Care  9/19/2022 1449 by Nita Telles RN  Outcome: Ongoing, Progressing  9/19/2022 1446 by Nita Telles RN  Outcome: Ongoing, Progressing     Problem: Infection  Goal: Absence of Infection Signs and Symptoms  9/19/2022 1449 by Nita Telles RN  Outcome: Ongoing, Progressing  9/19/2022 1446 by Nita Telles RN  Outcome: Ongoing, Progressing     Problem: Skin Injury Risk Increased  Goal: Skin Health and Integrity  9/19/2022 1449 by Nita Telles RN  Outcome: Ongoing, Progressing  9/19/2022 1446 by Nita Telles RN  Outcome: Ongoing, Progressing

## 2022-09-19 NOTE — ASSESSMENT & PLAN NOTE
Patient has been having more loose stools this admission since starting antibiotics. Now stools are causing intense abdominal cramps and stools are now green     - c diff negative, repeat negative  - bowel regimen held   - See chemotherapy induced neutropenia  - O&P, culture, giardia, cryptosporidium, entamoeba, GI pathogen panel, strongy IgG all within normal limits.   - Fecal elastin WNL  - Continues to have diarrhea through 9/16 up to 4 times daily. Concern for chemotherapy induced colitis. Will reconsult GI for possible scope, have IBD team on board.  - Starting Methylprednisolone 1mg/kg daily for empiric treatment(9/16), initially resolving and appetite returning however still having 4-7 episodes of diarrhea. Will start steroid taper 90mg prednisone tapering by 10mg q3d  - Scheduled loperamide 4 times daily with lomotil prn, adding creon for possible pancreatic insufficiency.   - Cholestyramine per GI recs, however pt refused  - PPI for high dose steroids  - Fecal calprotectin and lactoferrin pending  - Tentative colonoscopy this week  - Will obtain TB test in anticipation for TNF-a inhibitor

## 2022-09-19 NOTE — DISCHARGE SUMMARY
[facility] - Oncology ([facility])  Hematology/Oncology  Discharge Summary      Patient Name: Laura Kent  MRN: [id number]  Admission Date: 9/3/2022  Hospital Length of Stay: 16 days  Discharge Date and Time:  09/19/2022 1:41 PM  Attending Physician: Niko Smith MD   Discharging Provider: Cristina Wilkerson DO  Primary Care Provider: Primary Doctor No    HPI: 36 years old with HTN, breast cancer  [id number] Taxol on 8/29/2022 f/u with Dr. Cruz presents with worsening abdominal pain, nasuea and vomiting. Patient was in pain during the interview and asking for better pain control. She states one week history of gradually worsening abdominal pain worst in epigastric area without radiation. She had minimal po intake since then. She associate emesis x2 NBNB. She had one small BM this afternoon prior to her presentation.     Patient denies chest pain, shortness of breath,  dysuria, polyuria, nausea, vomiting, fever, chills,  diarrhea, constipation, headache, falls, focal weakness or vision changes.     Ed course:   On presentation she was HDS and afebrile. On exam she was mod-sever  abdominal pain to palpitation, no peritonitic signs. Labs were significant for elevated lipase >200. CT with findings concerning for pancreatitis wo fluid/ abscess collection. Medical onc consulted for admit.           * No surgery found *     Hospital Course: Ms. Pacheco was initially admitted for abdominal pain likely from pancreatitis due to her cancer treatment regimen which she received 8/29. Lipase was found to be in the 200s with elevated LFTs with pancreatic fat stranding seen on CT. She was treated with anti-emetics, IVF, pain medications. She became febrile the night after admission. Broad spectrum antibiotics as well as Flagyl were started day 2 of hospitalization for intraabdominal coverage, but continued to remain febrile. She also developed copious diarrhea after antibiotics were started. She continued to remain febrile through  9/8 with Tmax of 103. Infectious Disease was consulted. Stool studies were ordered which were negative C.Diff negative x2. She completed 5 day course of Unasyn. Daptomycin started for history of VRE. Infectious work-up negative and CT chest/abd/pelvis without acute findings. DVT/PE ruled out.     RUQ US ordered to evaluate biliary tract and liver, showing biliary sludge. MRCP was ordered per GI recommendations which showed biliary sludge with no stones. She continued to have diarrhea throughout her admission up to 20 times a day, watery, and sometimes green in color. 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 Loperamide, lomotil, Creon administered prn helped reduce episodes to 3-4 times daily w/ puree consistency. She was also started on high dose methylprednisolone for potential colitis. Her stools have become more formed and are now brown and soft however she is still having 2-3 bowel movements daily. Daptomycin was discontinued per ID guidance as it may have contributed to diarrhea.     She was switched to prednisone 90mg on taper. Will follow-up outpatient for colonoscopy and IBD evaluation.  She will follow-up with Dr. Cruz once colonoscopy complete.              Goals of Care Treatment Preferences:  Code Status: Full Code    Vitals:    09/19/22 1109   BP: 127/74   Pulse: 92   Resp: 18   Temp: 98.6 °F (37 °C)     Weight: 105.5 kg (232 lb 9.4 oz)  Body mass index is 37.54 kg/m².  Body surface area is 2.22 meters squared.     No intake or output data in the 24 hours ending 09/19/22 0804     Physical Exam  HENT:      Head: Normocephalic.      Nose: Nose normal.      Mouth/Throat:      Mouth: Mucous membranes are moist.   Eyes:      Pupils: Pupils are equal, round, and reactive to light.   Cardiovascular:      Rate and Rhythm: Normal rate.      Comments: Chest port in place  Abdominal:      General: Abdomen is flat. Bowel sounds are normal. There is no distension.    Musculoskeletal:         General: Normal range of motion.      Cervical back: Normal range of motion.   Skin:     General: Skin is warm.   Neurological:      General: No focal deficit present.      Mental Status: She is alert and oriented to person, place, and time.   Psychiatric:         Mood and Affect: Mood normal.     Consults:   Consults (From admission, onward)        Status Ordering Provider     Inpatient consult to Gastroenterology  Once        Provider:  (Not yet assigned)    Completed AMANDA PAPPAS     Inpatient consult to Infectious Diseases  Once        Provider:  (Not yet assigned)    Completed AMANDA PAPPAS          Significant Diagnostic Studies:     MRCP:     Mildly distended gallbladder. No cholelithiasis.  Normal caliber intra and extrahepatic biliary ducts.  Normal tapering of common bile duct at the level of the ampulla.  No focal stricture or filling defect.     Pancreatic duct measures within normal limits. No ductal strictures present.     Impression:     Satisfactory MRCP evaluation.  No abnormal biliary duct dilatation.  No cholelithiasis or choledocholithiasis.     Multiple prominent loops of air/fluid-filled small bowel in the mid abdomen, incompletely characterized.  Could represent ileus.  Recommend correlation with clinical history and exam.     Hepatosplenomegaly.     Small bilateral dependent pleural effusions.    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]  09/17/2022 Yes    Elevated LFTs [R79.89] 09/10/2022 Yes    Diarrhea [R19.7] 09/08/2022 Yes    Abdominal pain [R10.9] 09/04/2022 Yes    Chemotherapy induced neutropenia [D70.1, T45.1X5A] 06/13/2022 Yes    Malignant neoplasm of upper-outer quadrant of left breast in female, estrogen receptor negative [C50.412, Z17.1] 03/21/2022 Not Applicable      Problems Resolved During this Admission:    Diagnosis Date Noted Date Resolved POA    Psychological factors affecting medical condition [F54] 04/22/2022 09/06/2022 Yes      Discharged Condition: good    Disposition: Home or Self Care    Follow Up:    Patient Instructions:      Ambulatory referral/consult to Gastroenterology   Standing Status: Future   Referral Priority: Routine Referral Type: Consultation   Referral Reason: Specialty Services Required   Referred to Provider: SIDRA HERNANDEZ Requested Specialty: Gastroenterology   Number of Visits Requested: 1     Medications:  Reconciled Home Medications:      Medication List      START taking these medications    dicyclomine 10 MG capsule  Commonly known as: BENTYL  Take 1 capsule (10 mg total) by mouth 3 (three) times daily as needed (abdominal cramping).     lipase-protease-amylase 24,000-76,000-120,000 units 24,000-76,000 -120,000 unit capsule  Commonly known as: PANLIPASE  Take 2 capsules by mouth 3 (three) times daily with meals.     loperamide 2 mg capsule  Commonly known as: IMODIUM  Take 2 capsules (4 mg total) by mouth every 6 (six) hours as needed for Diarrhea (Use as second choice).     pantoprazole 40 MG tablet  Commonly known as: PROTONIX  Take 1 tablet (40 mg total) by mouth once daily while you are taking prednisone. You can stop when you finish your prednisone.  Start taking on: September 20, 2022     predniSONE 20 MG tablet  Commonly known as: DELTASONE  Take 4.5 tabs(90mg) by mouth daily x3 days, THEN 4 tabs(80mg) daily x3 days, THEN 3.5 tabs(70mg) daily x3 days, THEN 3 tabs(60mg) daily x3 days,  THEN 2.5 tabs(50mg) daily x3 days, THEN 2 tabs(40mg) daily x3 days, THEN 1.5 tabs(30mg) daily x3 days, THEN 1 tab(20mg) daily x3 days, THEN 0.5 tab(10mg) daily x3 days.  Start taking on: September 19, 2022        CHANGE how you take these medications    LIDOcaine-prilocaine cream  Commonly known as: EMLA  Apply topically as needed (for port).  What changed: Another medication with the same name was removed. Continue taking this medication, and follow the directions you see here.        CONTINUE taking these medications    ibuprofen 200 MG tablet  Commonly known as: ADVIL,MOTRIN  Take 200 mg by mouth every 6 (six) hours as needed for Pain.     mupirocin 2 % ointment  Commonly known as: BACTROBAN  Apply topically once daily.     OLANZapine 5 MG tablet  Commonly known as: ZyPREXA  Take 1 tablet (5 mg total) by mouth every evening. While on chemotherapy     ondansetron 8 MG tablet  Commonly known as: ZOFRAN  Take 1 tablet (8 mg total) by mouth every 12 (twelve) hours as needed for Nausea.     oxyCODONE 5 MG immediate release tablet  Commonly known as: ROXICODONE  Take 1 tablet (5 mg total) by mouth every 6 (six) hours as needed for Pain.     promethazine 25 MG tablet  Commonly known as: PHENERGAN  Take 1 tablet (25 mg total) by mouth every 6 (six) hours as needed for Nausea.     venlafaxine 37.5 MG 24 hr capsule  Commonly known as: EFFEXOR XR  Take 1 capsule (37.5 mg total) by mouth once daily.        STOP taking these medications    ciprofloxacin HCl 0.3 % Oint  Commonly known as: CILOXAN     dexAMETHasone 4 MG Tab  Commonly known as: DECADRON     doxycycline 100 MG Cap  Commonly known as: VIBRAMYCIN     lactulose 20 gram/30 mL Soln  Commonly known as: CHRONULAC     METAMUCIL ORAL     MIRALAX ORAL     omeprazole 10 MG capsule  Commonly known as: PRILOSEC            Cristina Wilkerson DO  Hematology/Oncology  [facility] - Oncology ([facility])

## 2022-09-19 NOTE — SUBJECTIVE & OBJECTIVE
Interval History: Similar diarrhea to previous days while on imodium, 3-4 soft bowel movements. Denies any abdominal pain, denies any fevers or chills. Able to eat without vomiting. She would like to go home. GI following, plan for scope early this week.     Oncology Treatment Plan:   OP PEMBROLIZUMAB CARBOPLATIN (AUC 5) WITH WEEKLY PACLITAXEL FOLLOWED BY PEMBROLIZUMAB DOXORUBICIN CYCLOPHOSPHAMIDE FOLLOWED BY PEMBROLIZUMAB 200 MG Q3W    Medications:  Continuous Infusions:  Scheduled Meds:   duke's soln (benadryl 30 mL, mylanta 30 mL, LIDOcaine 30 mL, nystatin 30 mL) 120 mL  10 mL Oral QID    enoxaparin  40 mg Subcutaneous Daily    lipase-protease-amylase 24,000-76,000-120,000 units  2 capsule Oral TID WM    loperamide  4 mg Oral Q6H    methylPREDNISolone sodium succinate injection  1 mg/kg Intravenous Daily    nystatin  500,000 Units Oral QID    pantoprazole  40 mg Oral Daily    potassium chloride  40 mEq Oral Q2H    simethicone  1 tablet Oral TID PC     PRN Meds:dextrose 10%, dextrose 10%, dicyclomine, diphenoxylate-atropine 2.5-0.025 mg, glucagon (human recombinant), glucose, glucose, heparin, porcine (PF), ibuprofen, naloxone, ondansetron, oxyCODONE, polyethylene glycol, prochlorperazine, sodium chloride 0.9%       Objective:     Vital Signs (Most Recent):  Temp: 98.1 °F (36.7 °C) (09/19/22 0803)  Pulse: 85 (09/19/22 0803)  Resp: 17 (09/19/22 0803)  BP: 137/77 (09/19/22 0803)  SpO2: 98 % (09/19/22 0803) Vital Signs (24h Range):  Temp:  [97.8 °F (36.6 °C)-98.6 °F (37 °C)] 98.1 °F (36.7 °C)  Pulse:  [75-90] 85  Resp:  [15-18] 17  SpO2:  [94 %-99 %] 98 %  BP: (123-147)/(71-85) 137/77     Weight: 105.5 kg (232 lb 9.4 oz)  Body mass index is 37.54 kg/m².  Body surface area is 2.22 meters squared.    No intake or output data in the 24 hours ending 09/19/22 0804    Physical Exam  HENT:      Head: Normocephalic.      Nose: Nose normal.      Mouth/Throat:      Mouth: Mucous membranes are moist.   Eyes:      Pupils: Pupils  are equal, round, and reactive to light.   Cardiovascular:      Rate and Rhythm: Normal rate.      Comments: Chest port in place  Abdominal:      General: Abdomen is flat. Bowel sounds are normal. There is no distension.   Musculoskeletal:         General: Normal range of motion.      Cervical back: Normal range of motion.   Skin:     General: Skin is warm.   Neurological:      General: No focal deficit present.      Mental Status: She is alert and oriented to person, place, and time.   Psychiatric:         Mood and Affect: Mood normal.       Significant Labs:   All pertinent labs from the last 24 hours have been reviewed.    Diagnostic Results:  I have reviewed all pertinent imaging results/findings within the past 24 hours.

## 2022-09-19 NOTE — SUBJECTIVE & OBJECTIVE
Subjective:     Interval History: GI re-contacted with regards to evaluation of suspected immunotherapy induced colitis. Patient reports onset of diarrhea upon admission to the hospital associated with passage of up to 20 non-bloody liquid stools daily with lower abdominal cramping. She denies prior diarrhea associated with chemotherapy regimen, family history of IBD, gastric, or colon cancer, and undergoing prior colonoscopy. Since initiation of steroids on 09/16, patient reports decreased frequency of stools. She is currently having 3-4 semi-formed stools daily without blood. Patient would like to be discharged if possible.     Review of Systems   Constitutional:  Positive for fatigue. Negative for fever.   Gastrointestinal:  Positive for diarrhea. Negative for blood in stool, constipation, nausea, rectal pain and vomiting.   Neurological:  Positive for weakness.   Objective:     Vital Signs (Most Recent):  Temp: 98.6 °F (37 °C) (09/19/22 1109)  Pulse: 92 (09/19/22 1109)  Resp: 18 (09/19/22 1109)  BP: 127/74 (09/19/22 1109)  SpO2: 98 % (09/19/22 1109) Vital Signs (24h Range):  Temp:  [97.8 °F (36.6 °C)-98.6 °F (37 °C)] 98.6 °F (37 °C)  Pulse:  [79-92] 92  Resp:  [15-18] 18  SpO2:  [95 %-99 %] 98 %  BP: (123-142)/(71-83) 127/74     Weight: 105.5 kg (232 lb 9.4 oz) (09/04/22 0220)  Body mass index is 37.54 kg/m².    No intake or output data in the 24 hours ending 09/19/22 1241    Lines/Drains/Airways       Central Venous Catheter Line  Duration             Port A Cath Single Lumen right atrial -- days         PowerPort A Cath Single Lumen 09/05/22 0323 right subclavian 14 days                    Physical Exam  Constitutional:       Appearance: Normal appearance.   Eyes:      Conjunctiva/sclera: Conjunctivae normal.   Cardiovascular:      Rate and Rhythm: Normal rate and regular rhythm.      Pulses: Normal pulses.      Heart sounds: Normal heart sounds.   Pulmonary:      Effort: Pulmonary effort is normal. No  respiratory distress.      Breath sounds: Normal breath sounds.   Abdominal:      General: Bowel sounds are normal. There is no distension.      Palpations: Abdomen is soft.      Tenderness: There is abdominal tenderness in the right lower quadrant and left lower quadrant.   Skin:     General: Skin is warm and dry.      Findings: No bruising or rash.   Neurological:      Mental Status: She is alert and oriented to person, place, and time.       Significant Labs:  All pertinent lab results from the last 24 hours have been reviewed.      Significant Imaging:  Imaging results within the past 24 hours have been reviewed.

## 2022-09-20 ENCOUNTER — TELEPHONE (OUTPATIENT)
Dept: ENDOSCOPY | Facility: HOSPITAL | Age: 37
End: 2022-09-20
Payer: MEDICAID

## 2022-09-20 ENCOUNTER — PATIENT MESSAGE (OUTPATIENT)
Dept: ENDOSCOPY | Facility: HOSPITAL | Age: 37
End: 2022-09-20
Payer: MEDICAID

## 2022-09-20 DIAGNOSIS — R19.7 DIARRHEA, UNSPECIFIED TYPE: Primary | ICD-10-CM

## 2022-09-20 DIAGNOSIS — K52.9 COLITIS: ICD-10-CM

## 2022-09-20 RX ORDER — SOD SULF/POT CHLORIDE/MAG SULF 1.479 G
12 TABLET ORAL DAILY
Qty: 24 TABLET | Refills: 0 | Status: SHIPPED | OUTPATIENT
Start: 2022-09-20 | End: 2022-11-07

## 2022-09-20 NOTE — TELEPHONE ENCOUNTER
----- Message from Rafa Burrows MD sent at 9/19/2022  4:34 PM CDT -----  Hello,     Can you assist with patient with scheduling an urgent outpatient colonoscopy?    Thanks,     Rafa

## 2022-09-21 LAB
CALPROTECTIN STL-MCNT: 699.4 MCG/G
M TB IFN-G BLD-IMP: NEGATIVE

## 2022-09-22 ENCOUNTER — ANESTHESIA (OUTPATIENT)
Dept: ENDOSCOPY | Facility: HOSPITAL | Age: 37
End: 2022-09-22
Payer: MEDICAID

## 2022-09-22 ENCOUNTER — ANESTHESIA EVENT (OUTPATIENT)
Dept: ENDOSCOPY | Facility: HOSPITAL | Age: 37
End: 2022-09-22
Payer: MEDICAID

## 2022-09-22 ENCOUNTER — HOSPITAL ENCOUNTER (OUTPATIENT)
Facility: HOSPITAL | Age: 37
Discharge: HOME | End: 2022-09-22
Attending: INTERNAL MEDICINE | Admitting: INTERNAL MEDICINE
Payer: MEDICAID

## 2022-09-22 VITALS
BODY MASS INDEX: 40.84 KG/M2 | HEIGHT: 60 IN | DIASTOLIC BLOOD PRESSURE: 85 MMHG | TEMPERATURE: 98 F | RESPIRATION RATE: 16 BRPM | SYSTOLIC BLOOD PRESSURE: 152 MMHG | WEIGHT: 208 LBS | HEART RATE: 57 BPM | OXYGEN SATURATION: 99 %

## 2022-09-22 DIAGNOSIS — K52.9 COLITIS: Primary | ICD-10-CM

## 2022-09-22 LAB
B-HCG UR QL: NEGATIVE
CTP QC/QA: YES

## 2022-09-22 PROCEDURE — 45380 COLONOSCOPY AND BIOPSY: CPT | Mod: ,,, | Performed by: INTERNAL MEDICINE

## 2022-09-22 PROCEDURE — 88305 TISSUE EXAM BY PATHOLOGIST: CPT | Mod: 26,,, | Performed by: STUDENT IN AN ORGANIZED HEALTH CARE EDUCATION/TRAINING PROGRAM

## 2022-09-22 PROCEDURE — 37000008 HC ANESTHESIA 1ST 15 MINUTES: Performed by: INTERNAL MEDICINE

## 2022-09-22 PROCEDURE — 27201012 HC FORCEPS, HOT/COLD, DISP: Performed by: INTERNAL MEDICINE

## 2022-09-22 PROCEDURE — 88305 TISSUE EXAM BY PATHOLOGIST: CPT | Mod: 59 | Performed by: STUDENT IN AN ORGANIZED HEALTH CARE EDUCATION/TRAINING PROGRAM

## 2022-09-22 PROCEDURE — 25000003 PHARM REV CODE 250: Performed by: STUDENT IN AN ORGANIZED HEALTH CARE EDUCATION/TRAINING PROGRAM

## 2022-09-22 PROCEDURE — 45380 PR COLONOSCOPY,BIOPSY: ICD-10-PCS | Mod: ,,, | Performed by: INTERNAL MEDICINE

## 2022-09-22 PROCEDURE — 63600175 PHARM REV CODE 636 W HCPCS: Performed by: STUDENT IN AN ORGANIZED HEALTH CARE EDUCATION/TRAINING PROGRAM

## 2022-09-22 PROCEDURE — 88305 TISSUE EXAM BY PATHOLOGIST: ICD-10-PCS | Mod: 26,,, | Performed by: STUDENT IN AN ORGANIZED HEALTH CARE EDUCATION/TRAINING PROGRAM

## 2022-09-22 PROCEDURE — 37000009 HC ANESTHESIA EA ADD 15 MINS: Performed by: INTERNAL MEDICINE

## 2022-09-22 PROCEDURE — E9220 PRA ENDO ANESTHESIA: ICD-10-PCS | Mod: ,,, | Performed by: STUDENT IN AN ORGANIZED HEALTH CARE EDUCATION/TRAINING PROGRAM

## 2022-09-22 PROCEDURE — 45380 COLONOSCOPY AND BIOPSY: CPT | Performed by: INTERNAL MEDICINE

## 2022-09-22 PROCEDURE — E9220 PRA ENDO ANESTHESIA: HCPCS | Mod: ,,, | Performed by: STUDENT IN AN ORGANIZED HEALTH CARE EDUCATION/TRAINING PROGRAM

## 2022-09-22 RX ORDER — SODIUM CHLORIDE 9 MG/ML
INJECTION, SOLUTION INTRAVENOUS CONTINUOUS
Status: DISCONTINUED | OUTPATIENT
Start: 2022-09-22 | End: 2022-09-22 | Stop reason: HOSPADM

## 2022-09-22 RX ORDER — LIDOCAINE HYDROCHLORIDE 10 MG/ML
INJECTION, SOLUTION INTRAVENOUS
Status: DISCONTINUED | OUTPATIENT
Start: 2022-09-22 | End: 2022-09-22

## 2022-09-22 RX ORDER — HEPARIN 100 UNIT/ML
5 SYRINGE INTRAVENOUS ONCE
Status: DISCONTINUED | OUTPATIENT
Start: 2022-09-22 | End: 2022-09-22

## 2022-09-22 RX ORDER — PROPOFOL 10 MG/ML
VIAL (ML) INTRAVENOUS CONTINUOUS PRN
Status: DISCONTINUED | OUTPATIENT
Start: 2022-09-22 | End: 2022-09-22

## 2022-09-22 RX ORDER — PROPOFOL 10 MG/ML
VIAL (ML) INTRAVENOUS
Status: DISCONTINUED | OUTPATIENT
Start: 2022-09-22 | End: 2022-09-22

## 2022-09-22 RX ADMIN — Medication 100 MG: at 01:09

## 2022-09-22 RX ADMIN — LIDOCAINE HYDROCHLORIDE 50 MG: 10 INJECTION, SOLUTION INTRAVENOUS at 01:09

## 2022-09-22 RX ADMIN — PROPOFOL 200 MCG/KG/MIN: 10 INJECTION, EMULSION INTRAVENOUS at 01:09

## 2022-09-22 RX ADMIN — SODIUM CHLORIDE: 9 INJECTION, SOLUTION INTRAVENOUS at 01:09

## 2022-09-22 NOTE — PROVATION PATIENT INSTRUCTIONS
Discharge Summary/Instructions after an Endoscopic Procedure  Patient Name: Laura Kent  Patient MRN: [id number]  Patient YOB: 1985  Thursday, September 22, 2022  Davi Chery MD  Dear patient,  As a result of recent federal legislation (The Federal Cures Act), you may   receive lab or pathology results from your procedure in your [facility]   account before your physician is able to contact you. Your physician or   their representative will relay the results to you with their   recommendations at their soonest availability.  Thank you,  RESTRICTIONS:  During your procedure today, you received medications for sedation.  These   medications may affect your judgment, balance and coordination.  Therefore,   for 24 hours, you have the following restrictions:   - DO NOT drive a car, operate machinery, make legal/financial decisions,   sign important papers or drink alcohol.    ACTIVITY:  Today: no heavy lifting, straining or running due to procedural   sedation/anesthesia.  The following day: return to full activity including work.  DIET:  Eat and drink normally unless instructed otherwise.     TREATMENT FOR COMMON SIDE EFFECTS:  - Mild abdominal pain, nausea, belching, bloating or excessive gas:  rest,   eat lightly and use a heating pad.  - Sore Throat: treat with throat lozenges and/or gargle with warm salt   water.  - Because air was used during the procedure, expelling large amounts of air   from your rectum or belching is normal.  - If a bowel prep was taken, you may not have a bowel movement for 1-3 days.    This is normal.  SYMPTOMS TO WATCH FOR AND REPORT TO YOUR PHYSICIAN:  1. Abdominal pain or bloating, other than gas cramps.  2. Chest pain.  3. Back pain.  4. Signs of infection such as: chills or fever occurring within 24 hours   after the procedure.  5. Rectal bleeding, which would show as bright red, maroon, or black stools.   (A tablespoon of blood from the rectum is not serious, especially  if   hemorrhoids are present.)  6. Vomiting.  7. Weakness or dizziness.  GO DIRECTLY TO THE NEAREST EMERGENCY ROOM IF YOU HAVE ANY OF THE FOLLOWING:      Difficulty breathing              Chills and/or fever over 101 F   Persistent vomiting and/or vomiting blood   Severe abdominal pain   Severe chest pain   Black, tarry stools   Bleeding- more than one tablespoon   Any other symptom or condition that you feel may need urgent attention  Your doctor recommends these additional instructions:  If any biopsies were taken, your doctors clinic will contact you in 1 to 2   weeks with any results.  - Discharge patient to home.   - Patient has a contact number available for emergencies.  The signs and   symptoms of potential delayed complications were discussed with the   patient.  Return to normal activities tomorrow.  Written discharge   instructions were provided to the patient.   - Resume previous diet.   - Continue present medications.   - Await pathology results.   - Repeat colonoscopy (date not yet determined) to assess disease activity.  For questions, problems or results please call your physician - Davi Chery MD at Work:  [phone].  [facility], EMERGENCY ROOM PHONE NUMBER: [phone]  IF A COMPLICATION OR EMERGENCY SITUATION ARISES AND YOU ARE UNABLE TO REACH   YOUR PHYSICIAN - GO DIRECTLY TO THE EMERGENCY ROOM.  Davi Chery MD  9/22/2022 2:01:16 PM  This report has been verified and signed electronically.  Dear patient,  As a result of recent federal legislation (The Federal Cures Act), you may   receive lab or pathology results from your procedure in your [facility]   account before your physician is able to contact you. Your physician or   their representative will relay the results to you with their   recommendations at their soonest availability.  Thank you,  PROVATION

## 2022-09-22 NOTE — TRANSFER OF CARE
Anesthesia Transfer of Care Note    Patient: Laura Kent    Procedure(s) Performed: Procedure(s) (LRB):  COLONOSCOPY (N/A)    Patient location: PACU    Anesthesia Type: general    Transport from OR: Transported from OR on room air with adequate spontaneous ventilation    Post pain: adequate analgesia    Post assessment: no apparent anesthetic complications    Post vital signs: stable    Level of consciousness: responds to stimulation    Nausea/Vomiting: no nausea/vomiting    Complications: none    Transfer of care protocol was followed      Last vitals:   Visit Vitals  /79 (BP Location: Left arm, Patient Position: Lying)   Pulse 76   Temp 36.7 °C (98.1 °F) (Oral)   Resp 16   Ht 5' (1.524 m)   Wt 94.3 kg (208 lb)   LMP 08/17/2022 (Approximate)   SpO2 100%   Breastfeeding No   BMI 40.62 kg/m²

## 2022-09-22 NOTE — ANESTHESIA POSTPROCEDURE EVALUATION
Anesthesia Post Evaluation    Patient: Laura Kent    Procedure(s) Performed: Procedure(s) (LRB):  COLONOSCOPY (N/A)    Final Anesthesia Type: general      Patient location during evaluation: PACU  Patient participation: Yes- Able to Participate  Level of consciousness: awake and alert  Post-procedure vital signs: reviewed and stable  Pain management: adequate  Airway patency: patent    PONV status at discharge: No PONV  Anesthetic complications: no      Cardiovascular status: blood pressure returned to baseline and hemodynamically stable  Respiratory status: unassisted, spontaneous ventilation and room air  Hydration status: euvolemic  Follow-up not needed.          Vitals Value Taken Time   /85 09/22/22 1428   Temp 36.6 °C (97.9 °F) 09/22/22 1357   Pulse 57 09/22/22 1428   Resp 16 09/22/22 1428   SpO2 99 % 09/22/22 1428         Event Time   Out of Recovery 14:56:34         Pain/Martell Score: Martell Score: 10 (9/22/2022  2:28 PM)

## 2022-09-22 NOTE — H&P
Short Stay Endoscopy History and Physical    PCP - Primary Doctor No  Referring Physician - Rafa Burrows MD  [address] [place] [address]    Procedure - Colonoscopy  ASA - per anesthesia  Mallampati - per anesthesia  History of Anesthesia problems - no  Family history Anesthesia problems -  no   Plan of anesthesia - General    HPI  36 y.o. female  Reason for procedure: Colitis- ICI, f/u      ROS:  Constitutional: No fevers, chills, No weight loss  CV: No chest pain  Pulm: No cough, No shortness of breath  GI: see HPI    Medical History:  has a past medical history of Anxiety, Breast cancer, Encounter for blood transfusion, Hypertension, and Meningitis.    Surgical History:  has a past surgical history that includes Breast biopsy (Left);  section; Insertion of tunneled central venous catheter (CVC) with subcutaneous port (N/A, 2022); and Insertion of tunneled central venous catheter (CVC) with subcutaneous port (N/A, 2022).    Family History: family history includes Bladder Cancer in her paternal grandmother; Diabetes in her father; Hypertension in her father; Pancreatic cancer in her maternal uncle..    Social History:  reports that she has never smoked. She has never used smokeless tobacco. She reports current alcohol use. She reports that she does not use drugs.    Review of patient's allergies indicates:   Allergen Reactions    Strawberries [strawberry] Hives       Medications:   Medications Prior to Admission   Medication Sig Dispense Refill Last Dose    dicyclomine (BENTYL) 10 MG capsule Take 1 capsule (10 mg total) by mouth 3 (three) times daily as needed (abdominal cramping). 30 capsule 1     LIDOcaine-prilocaine (EMLA) cream Apply topically as needed (for port). 30 g 0     lipase-protease-amylase 24,000-76,000-120,000 units (PANLIPASE) 24,000-76,000 -120,000 unit capsule Take 2 capsules by mouth 3 (three) times daily with meals. 180 capsule 11     loperamide  (IMODIUM) 2 mg capsule Take 2 capsules (4 mg total) by mouth every 6 (six) hours as needed for Diarrhea (Use as second choice). 90 capsule 3     mupirocin (BACTROBAN) 2 % ointment Apply topically once daily. 30 g 0     OLANZapine (ZYPREXA) 5 MG tablet Take 1 tablet (5 mg total) by mouth every evening. While on chemotherapy 30 tablet 2     ondansetron (ZOFRAN) 8 MG tablet Take 1 tablet (8 mg total) by mouth every 12 (twelve) hours as needed for Nausea. 30 tablet 2     oxyCODONE (ROXICODONE) 5 MG immediate release tablet Take 1 tablet (5 mg total) by mouth every 6 (six) hours as needed for Pain. 8 tablet 0     pantoprazole (PROTONIX) 40 MG tablet Take 1 tablet (40 mg total) by mouth once daily while you are taking prednisone. You can stop when you finish your prednisone. 30 tablet 11     predniSONE (DELTASONE) 20 MG tablet Take 4.5 tabs(90mg) by mouth daily x3 days, THEN 4 tabs(80mg) daily x3 days, THEN 3.5 tabs(70mg) daily x3 days, THEN 3 tabs(60mg) daily x3 days, THEN 2.5 tabs(50mg) daily x3 days, THEN 2 tabs(40mg) daily x3 days, THEN 1.5 tabs(30mg) daily x3 days, THEN 1 tab(20mg) daily x3 days, THEN 0.5 tab(10mg) daily x3 days. 68 tablet 0     promethazine (PHENERGAN) 25 MG tablet Take 1 tablet (25 mg total) by mouth every 6 (six) hours as needed for Nausea. 30 tablet 3     sod sulf-pot chloride-mag sulf (SUTAB) 1.479-0.188- 0.225 gram tablet Take 12 tablets by mouth once daily. Take according to package instructions with indicated amount of water. 24 tablet 0     venlafaxine (EFFEXOR XR) 37.5 MG 24 hr capsule Take 1 capsule (37.5 mg total) by mouth once daily. 30 capsule 6        Physical Exam:    Vital Signs: There were no vitals filed for this visit.    General Appearance: Well appearing in no acute distress  Abdomen: Soft, non tender, non distended with normal bowel sounds, no masses    Labs:  Lab Results   Component Value Date    WBC 6.71 09/19/2022    HGB 8.1 (L) 09/19/2022    HCT 24.3 (L) 09/19/2022    PLT  222 09/19/2022    CHOL 189 09/04/2022    TRIG 78 09/04/2022    HDL 30 (L) 09/04/2022     (H) 09/19/2022    AST 63 (H) 09/19/2022     09/19/2022    K 3.3 (L) 09/19/2022     09/19/2022    CREATININE 0.8 09/19/2022    BUN 11 09/19/2022    CO2 26 09/19/2022    TSH 1.489 08/29/2022       I have explained the risks and benefits of this endoscopic procedure to the patient including but not limited to bleeding, inflammation, infection, perforation, and death.      Davi Chery MD

## 2022-09-22 NOTE — PROGRESS NOTES
Attempted port access multiple times. Patient reports difficulty in chemo lab at times. Patient informed to tell surgeon of issues regarding port. IV attempt per Anes.

## 2022-09-22 NOTE — ANESTHESIA PREPROCEDURE EVALUATION
2022  Laura Kent is a 36 y.o., female.      Past Medical History:   Diagnosis Date    Anxiety     Breast cancer     Encounter for blood transfusion     Hypertension     Meningitis      Past Surgical History:   Procedure Laterality Date    BREAST BIOPSY Left      SECTION      INSERTION OF TUNNELED CENTRAL VENOUS CATHETER (CVC) WITH SUBCUTANEOUS PORT N/A 2022    Procedure: [id number];  Surgeon: Deo Sin Jr., MD;  Location: [facility] OR [address] [place] [address];  Service: General;  Laterality: N/A;    INSERTION OF TUNNELED CENTRAL VENOUS CATHETER (CVC) WITH SUBCUTANEOUS PORT N/A 2022    Procedure: INSERTION, SINGLE LUMEN CATHETER WITH PORT, WITH FLUOROSCOPIC GUIDANCE;  Surgeon: Ryder Dinero MD;  Location: [facility] CATH LAB;  Service: Vascular;  Laterality: N/A;           Pre-op Assessment    I have reviewed the Patient Summary Reports.     I have reviewed the Nursing Notes. I have reviewed the NPO Status.   I have reviewed the Medications.     Review of Systems  Anesthesia Hx:  No problems with previous Anesthesia  Denies Family Hx of Anesthesia complications.   Denies Personal Hx of Anesthesia complications.   Social:  Non-Smoker    Hematology/Oncology:        Hematology Comments: Breast Cx   EENT/Dental:EENT/Dental Normal   Cardiovascular:   Hypertension    Pulmonary:  Pulmonary Normal    Renal/:  Renal/ Normal     Hepatic/GI:   Elevated liver enzymes   Musculoskeletal:  Musculoskeletal Normal    Neurological:  Neurology Normal    Endocrine:  Endocrine Normal    Dermatological:  Skin Normal    Psych:  Psychiatric Normal           Physical Exam  General: Well nourished, Cooperative, Alert and Oriented    Airway:  Mallampati: II / I  Mouth Opening: Normal  TM Distance: Normal  Tongue: Normal  Neck ROM: Normal ROM    Dental:  Intact        Anesthesia Plan  Type of  Anesthesia, risks & benefits discussed:    Anesthesia Type: Gen Natural Airway  Intra-op Monitoring Plan: Standard ASA Monitors  Post Op Pain Control Plan: multimodal analgesia  Induction:  IV  Informed Consent: Informed consent signed with the Patient and all parties understand the risks and agree with anesthesia plan.  All questions answered.   ASA Score: 2  Day of Surgery Review of History & Physical: H&P Update referred to the surgeon/provider.    Ready For Surgery From Anesthesia Perspective.     .

## 2022-09-26 ENCOUNTER — TELEPHONE (OUTPATIENT)
Dept: HEMATOLOGY/ONCOLOGY | Facility: CLINIC | Age: 37
End: 2022-09-26

## 2022-09-26 NOTE — TELEPHONE ENCOUNTER
"----- Message from Arturo Jeanette sent at 9/26/2022  9:17 AM CDT -----  Reschedule Existing Appointment        Appt Date: 9/26    Type of appt: F/u    Physician: Nancy    Reason for rescheduling? Requesting to r/s today's appt time for much earlier. Stating she has an eye appt at 2 pm in [facility].    Caller: Self    Contact Preference: [phone]             Additional Information:  "Thank you for all that you do for our patients"     "

## 2022-09-26 NOTE — TELEPHONE ENCOUNTER
Called pt , informed her that we could not accommodate an earlier appt on Dr. Cruz's schedule today, she replied ok, she felt her eye appt more important at this time, informed her I would talk with Dr Cruz and then call her back.   Pt agreeable

## 2022-09-27 ENCOUNTER — OFFICE VISIT (OUTPATIENT)
Dept: GASTROENTEROLOGY | Facility: CLINIC | Age: 37
End: 2022-09-27
Payer: MEDICAID

## 2022-09-27 VITALS
BODY MASS INDEX: 36.99 KG/M2 | WEIGHT: 222 LBS | SYSTOLIC BLOOD PRESSURE: 123 MMHG | HEART RATE: 101 BPM | DIASTOLIC BLOOD PRESSURE: 82 MMHG | HEIGHT: 65 IN

## 2022-09-27 DIAGNOSIS — K85.30 DRUG-INDUCED ACUTE PANCREATITIS WITHOUT INFECTION OR NECROSIS: Primary | ICD-10-CM

## 2022-09-27 LAB
COMMENT: NORMAL
FINAL PATHOLOGIC DIAGNOSIS: NORMAL
GROSS: NORMAL
Lab: NORMAL
MICROSCOPIC EXAM: NORMAL

## 2022-09-27 PROCEDURE — 1111F DSCHRG MED/CURRENT MED MERGE: CPT | Mod: CPTII,,, | Performed by: INTERNAL MEDICINE

## 2022-09-27 PROCEDURE — 3074F SYST BP LT 130 MM HG: CPT | Mod: CPTII,,, | Performed by: INTERNAL MEDICINE

## 2022-09-27 PROCEDURE — 3074F PR MOST RECENT SYSTOLIC BLOOD PRESSURE < 130 MM HG: ICD-10-PCS | Mod: CPTII,,, | Performed by: INTERNAL MEDICINE

## 2022-09-27 PROCEDURE — 3079F PR MOST RECENT DIASTOLIC BLOOD PRESSURE 80-89 MM HG: ICD-10-PCS | Mod: CPTII,,, | Performed by: INTERNAL MEDICINE

## 2022-09-27 PROCEDURE — 99999 PR PBB SHADOW E&M-EST. PATIENT-LVL III: ICD-10-PCS | Mod: PBBFAC,,, | Performed by: INTERNAL MEDICINE

## 2022-09-27 PROCEDURE — 99215 PR OFFICE/OUTPT VISIT, EST, LEVL V, 40-54 MIN: ICD-10-PCS | Mod: S$PBB,,, | Performed by: INTERNAL MEDICINE

## 2022-09-27 PROCEDURE — 3079F DIAST BP 80-89 MM HG: CPT | Mod: CPTII,,, | Performed by: INTERNAL MEDICINE

## 2022-09-27 PROCEDURE — 99213 OFFICE O/P EST LOW 20 MIN: CPT | Mod: PBBFAC | Performed by: INTERNAL MEDICINE

## 2022-09-27 PROCEDURE — 3008F PR BODY MASS INDEX (BMI) DOCUMENTED: ICD-10-PCS | Mod: CPTII,,, | Performed by: INTERNAL MEDICINE

## 2022-09-27 PROCEDURE — 3008F BODY MASS INDEX DOCD: CPT | Mod: CPTII,,, | Performed by: INTERNAL MEDICINE

## 2022-09-27 PROCEDURE — 99999 PR PBB SHADOW E&M-EST. PATIENT-LVL III: CPT | Mod: PBBFAC,,, | Performed by: INTERNAL MEDICINE

## 2022-09-27 PROCEDURE — 1159F PR MEDICATION LIST DOCUMENTED IN MEDICAL RECORD: ICD-10-PCS | Mod: CPTII,,, | Performed by: INTERNAL MEDICINE

## 2022-09-27 PROCEDURE — 1111F PR DISCHARGE MEDS RECONCILED W/ CURRENT OUTPATIENT MED LIST: ICD-10-PCS | Mod: CPTII,,, | Performed by: INTERNAL MEDICINE

## 2022-09-27 PROCEDURE — 1159F MED LIST DOCD IN RCRD: CPT | Mod: CPTII,,, | Performed by: INTERNAL MEDICINE

## 2022-09-27 PROCEDURE — 99215 OFFICE O/P EST HI 40 MIN: CPT | Mod: S$PBB,,, | Performed by: INTERNAL MEDICINE

## 2022-09-27 RX ORDER — FLUCONAZOLE 150 MG/1
150 TABLET ORAL
COMMUNITY
Start: 2022-08-29 | End: 2022-11-07

## 2022-09-27 NOTE — TELEPHONE ENCOUNTER
Returned call to patient , stated she was having difficulty seeing and is why she wanted to keep her appt. Physician mentioned to her that the steroids and or blood sugar may be affecting her eyesight .  Laura was  given a new eyewear rx, and was told to wait 2 weeks after completing steroids ,if vision not better she may need to change RX again.    Informed her I would update Dr. Cruz and that I rescheduled her appointment to Monday 10/3 at 2pm, stressed importance of keeping her appointment especially since she was just discharged from hospital and her health and care was very important to us. She agreed to appointment date and time.

## 2022-09-27 NOTE — PROGRESS NOTES
Advanced Endoscopy / Pancreaticobiliary Service    Reason for visit (Chief Complaint): Recent acute pancreatitis possibly related to chemotherapy medications    Referring provider/PCP: Brian Macias MD  [address] [place] [address]    History of Present Illness: Ms. Kent is a 37yo female with a h/o invasive ductal carcinoma of left breast (diagnosed in 03/2022, status post initiation of KEYTRUDA and PACLITAXEL in 04/2022, with last infusion on 08/29 and treatment course complicated by Enterococcus bacteremia in 04/2022 attributed to chemotherapy port infection) and obesity who was admitted to [facility] on 09/03/2022 for management of acute pancreatitis likely secondary to chemotherapy agents (imaging and labratory work-up without evidence of undelrying gallstones, biliary/pancreatic mass, lipid abnromalities, or hypercalcemia - though one abdo US revealed GB sludge). 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     She had a colonoscopy that suggested response to steroids.    Regarding her pancreatitis episode, it was deemed likely medication related given onset soon after her last infusion of chemotherapy in the end of August 2022. She demonstrated CT imaging of pancreatic inflammation as well as had elevated lipase 3x ULN. She had severe upper abdominal pain consistent with pancreas type of pain. She has since felt well without any further episodes of similar pain she had felt. She has now developed some irregular bowel patterns and is tapering down on her prednisone.     A fecal elastase was tested as well, which was low at 94 and she has been on Creon. Given her pancreas imaging looks unremarkable, she may not require this medication in the setting of immune mediated colitis, and this may be contributing to her now irregular BMs.    Largely negative  Upper GI Endoscopy 11/09/2021  1:22 PM [facility]   [address] [place] [address] [phone]     Endoscopy Department   _______________________________________________________________________________   Patient Name: Edson Thornton           Procedure Date: 11/9/2021 1:22 PM   MRN: [id number]                       Account Number: [id number]   YOB: 1967              Admit Type: Inpatient   Age: 54                               Room: Travel Cart   Gender: Male                          Note Status: Finalized   Attending MD: Hernando Rodriguez MD       Pause for the Cause: time out performed   Total Sedation Time:                     _______________________________________________________________________________       Procedure:             Upper GI endoscopy   Indications:           Replace PEG-J tube due to malfunctioning gastrostomy                          tube   Providers:             Hernando Rodriguez MD, Pamela Patel RN   Referring MD:             Requesting Provider:   Celine Ball   Medicines:             Midazolam 3 mg IV, Fentanyl 100 micrograms IV   Complications:         No immediate complications. Estimated blood loss:                          Minimal.   _______________________________________________________________________________   Procedure:             Pre-Anesthesia Assessment:                          - Prior to the procedure, a History and Physical was                          performed, and patient medications and allergies were                          reviewed. The patient is unable to give consent                          secondary to the patient's altered mental status. The                          risks and benefits of the procedure and the sedation                          options and risks were discussed with the patient's                          spouse. All questions were answered and informed                           consent was obtained. Patient identification and                          proposed procedure were verified by the physician and                          the nurse ICU. Mental Status Examination: alert but                          confused. Airway Examination: tracheostomy via                          ventilator. Respiratory Examination: poor air                          movement. CV Examination: normal. Prophylactic                          Antibiotics: The patient does not require prophylactic                          antibiotics. Prior Anticoagulants: The patient has                          taken no anticoagulant or antiplatelet agents. ASA                          Grade Assessment: III - A patient with severe systemic                          disease. After reviewing the risks and benefits, the                          patient was deemed in satisfactory condition to                          undergo the procedure. The anesthesia plan was to use                          moderate sedation / analgesia (conscious sedation).                          Immediately prior to administration of medications,                          the patient was re-assessed for adequacy to receive                          sedatives. The heart rate, respiratory rate, oxygen                          saturations, blood pressure, adequacy of pulmonary                          ventilation, and response to care were monitored                          throughout the procedure. The physical status of the                          patient was re-assessed after the procedure.                          After obtaining informed consent, the endoscope was                          passed under direct vision. Throughout the procedure,                          the patient's blood pressure, pulse, and oxygen                          saturations were monitored continuously. The Endoscope                          was introduced through the mouth, and  work up for pancreatitis: HIV and hepatitis panel negative. Ucx and Bcx negtaive. CT CAP notable for peripancreatic fat stranding, but no areas of necrosis. No notable Gall bladder pathology or ductal dilatation. US liver positive for gall bladder sludge without wall thickening, visible stones, or ductal dilatation.    Family h/o pancreas cancer in maternal uncle; son had 2 episodes of pancreatitis as an infant at 7 and 11 months old, none since then.     Social hx: no history of smoking or alcohol use/abuse.       Past Medical History:   Diagnosis Date    Anxiety     Breast cancer     Encounter for blood transfusion     Hypertension     Meningitis        Past Surgical History:   Procedure Laterality Date    BREAST BIOPSY Left      SECTION      COLONOSCOPY N/A 2022    Procedure: COLONOSCOPY;  Surgeon: Davi Chery MD;  Location: [facility] (4TH FLR);  Service: Endoscopy;  Laterality: N/A;  case request entered from referral - Per DR. KATHLEEN Chery Pt to be scheduled for urgent colonoscopy on 22/ fully vaccinated / prep ins for Sutab on portal - ERW  see micro for negative C-diff- ERW    INSERTION OF TUNNELED CENTRAL VENOUS CATHETER (CVC) WITH SUBCUTANEOUS PORT N/A 2022    Procedure: [id number];  Surgeon: Deo Sin Jr., MD;  Location: [facility] OR [address] [place] [address];  Service: General;  Laterality: N/A;    INSERTION OF TUNNELED CENTRAL VENOUS CATHETER (CVC) WITH SUBCUTANEOUS PORT N/A 2022    Procedure: INSERTION, SINGLE LUMEN CATHETER WITH PORT, WITH FLUOROSCOPIC GUIDANCE;  Surgeon: Ryder Dinero MD;  Location: [facility] CATH LAB;  Service: Vascular;  Laterality: N/A;       Family History   Problem Relation Age of Onset    Diabetes Father     Hypertension Father     Pancreatic cancer Maternal Uncle     Bladder Cancer Paternal Grandmother        Social History     Socioeconomic History    Marital status: Single   Tobacco Use    Smoking status: Never    Smokeless tobacco: Never   Substance and  Sexual Activity    Alcohol use: Not Currently     Comment: social    Drug use: Never    Sexual activity: Not Currently     Social Determinants of Health     Financial Resource Strain: High Risk    Difficulty of Paying Living Expenses: Very hard   Food Insecurity: No Food Insecurity    Worried About Running Out of Food in the Last Year: Never true    Ran Out of Food in the Last Year: Never true   Transportation Needs: No Transportation Needs    Lack of Transportation (Medical): No    Lack of Transportation (Non-Medical): No   Physical Activity: Inactive    Days of Exercise per Week: 0 days    Minutes of Exercise per Session: 0 min   Stress: Stress Concern Present    Feeling of Stress : Rather much   Social Connections: Unknown    Frequency of Communication with Friends and Family: More than three times a week    Frequency of Social Gatherings with Friends and Family: Never    Active Member of Clubs or Organizations: Yes    Attends Club or Organization Meetings: More than 4 times per year    Marital Status: Never    Housing Stability: High Risk    Unable to Pay for Housing in the Last Year: Yes    Number of Places Lived in the Last Year: 1    Unstable Housing in the Last Year: No       ROS otherwise unremarkable outside of the aforementioned symptoms in HPI.    There were no vitals filed for this visit.      Physical Exam:  General: Well-developed, well-appearing, no acute distress  Neuro: alert and oriented to person, place, time; normal appearing gait  Eyes: No scleral icterus  CVS: regular  Abdomen: soft, non-distended, non-tender, no rebound tenderness or guarding      Laboratory:   Reviewed in chart/records and relevant findings are summarized above in HPI.    Imaging:  Reviewed in chart/records and relevant findings are summarized above in HPI.      Assessment/Plan:    37yo female with a h/o invasive ductal carcinoma of left breast (diagnosed in 03/2022, status post initiation of KEYTRUDA and PACLITAXEL  advanced to the                          proximal jejunum. After obtaining informed consent,                          the endoscope was passed under direct vision.                          Throughout the procedure, the patient's blood                          pressure, pulse, and oxygen saturations were monitored                          continuously.The upper GI endoscopy was accomplished                          without difficulty. The patient tolerated the                          procedure well.                                                                                     Findings:        The esophagus was normal.        There was evidence of an intact gastrostomy with a coiled GJ-tube        present in the stomach. Clips seen in the gastric body adjacent to the        gastrostomy site. Using fluoroscopic and endoscopic guidance, three        quadrant gastropexy performed using T-tags.        The prevoiusly placed gastrojejujunal feeding tube was removed. A        pediatric upper endoscope was advanced through gastrostomy tract to the        proximal jejunum. A 0.035 inch Glide wire was advanced into the jejunum        and the endoscope removed. An 18 Fr by 45 cm gastrojejunal feeding tube        was advanced into the jejunum over the wire and position confirmed        fluoroscopically. The internal balloon was inflated with 10 mL of water.        The external bumper was cinched to 3 cm at the skin.        The examined duodenum was normal.                                                                                     Impression:            - Diagnostic EGD performed showing coiled GJ tube in                          the stomach and previously placed clips in the gastric                          body near the gastrostomy                          - Three quadrant gastropexy performed with T-tags to                          secure the imature tract first                          - GJ tube exchanged for a  in 04/2022, with last infusion on 08/29 and treatment course complicated by Enterococcus bacteremia in 04/2022 attributed to chemotherapy port infection) and obesity who was admitted to [facility] on 09/03/2022 for management of acute pancreatitis likely secondary to chemotherapy agents (imaging and labratory work-up without evidence of undelrying gallstones, biliary/pancreatic mass, lipid abnromalities, or hypercalcemia - though one abdo US revealed GB sludge) and developed immune mediated colitis s/p steroid initiation currently on taper.    # One episode of acute pancreatitis possibly secondary to chemotherapy agents, only finding otherwise was gallbladder sludge on US  # Immune mediated colitis on steroid taper  # Marginally decreased fecal elastase now with irregular bowel habits leaning toward constipation    Recs:  - given recent bout of pancreatitis with largely unremarkable work up for first episode may have been induced by chemotherapy agents, would recommend avoidance of those most recently used agents as per her Oncologist  - we reviewed the several possible causes of pancreatitis though time was limited due to the patient's late arrival.  - given her marginally decreased fecal elastase level and development of mild constipation with steroid therapy for immune mediated colitis, I think she unlikely has clinically relevant pancreas insufficiency and can consider discontinuing the Creon.  - given her son's history of pancreatitis as an infant, if the patient develops another bout of pancreatitis in the future, one can test her for the hereditary pancreatitis gene panel  - if another bout of pancreatitis occurs, can check IgG4 levels and do EUS to evaluate for gallstones/sludge and image the pancreas parenchyma (fortunately the imaging completed did not reveal significant abnormalities of the pancreas outside of evidence of inflammation).  - can f/up as needed and if another bout of pancreatitis occurs in her  new 18 Fr x 45 cm one piece                          GJ tube with the tip in the jejunum confirmed                          fluoroscopically                          - No specimens collected.   Recommendation:        - GJ tube may be used immediately as before                          - T-tag buttons around gastrostomy site should remain                          in place for 10-14 days and then the suture should be                          cut underneath the white buttons and discarded                          - Heparin can be resumed                                                                                       Hernando Rodriguez MD         future.      Total visit time was 40 minutes, more than 50% of which was spent in face-to-face counseling with patient regarding the evaluation, management, and treatment options for first bout of pancreatitis possibly chemotherapy induced, and for coordinating care.    Isaac Arias MD  [facility] [place] [facility]

## 2022-10-03 ENCOUNTER — PATIENT MESSAGE (OUTPATIENT)
Dept: HEMATOLOGY/ONCOLOGY | Facility: CLINIC | Age: 37
End: 2022-10-03
Payer: MEDICAID

## 2022-10-03 ENCOUNTER — LAB VISIT (OUTPATIENT)
Dept: LAB | Facility: HOSPITAL | Age: 37
End: 2022-10-03
Attending: INTERNAL MEDICINE
Payer: MEDICAID

## 2022-10-03 ENCOUNTER — PATIENT MESSAGE (OUTPATIENT)
Dept: GASTROENTEROLOGY | Facility: CLINIC | Age: 37
End: 2022-10-03
Payer: MEDICAID

## 2022-10-03 ENCOUNTER — OFFICE VISIT (OUTPATIENT)
Dept: HEMATOLOGY/ONCOLOGY | Facility: CLINIC | Age: 37
End: 2022-10-03
Payer: MEDICAID

## 2022-10-03 VITALS
WEIGHT: 217.13 LBS | OXYGEN SATURATION: 99 % | DIASTOLIC BLOOD PRESSURE: 78 MMHG | SYSTOLIC BLOOD PRESSURE: 122 MMHG | BODY MASS INDEX: 36.18 KG/M2 | TEMPERATURE: 98 F | HEIGHT: 65 IN | RESPIRATION RATE: 19 BRPM | HEART RATE: 110 BPM

## 2022-10-03 DIAGNOSIS — C50.412 MALIGNANT NEOPLASM OF UPPER-OUTER QUADRANT OF LEFT BREAST IN FEMALE, ESTROGEN RECEPTOR NEGATIVE: ICD-10-CM

## 2022-10-03 DIAGNOSIS — Z87.19 HX OF PANCREATITIS: ICD-10-CM

## 2022-10-03 DIAGNOSIS — Z17.1 MALIGNANT NEOPLASM OF UPPER-OUTER QUADRANT OF LEFT BREAST IN FEMALE, ESTROGEN RECEPTOR NEGATIVE: ICD-10-CM

## 2022-10-03 DIAGNOSIS — D70.1 CHEMOTHERAPY INDUCED NEUTROPENIA: ICD-10-CM

## 2022-10-03 DIAGNOSIS — K85.30 DRUG-INDUCED ACUTE PANCREATITIS, UNSPECIFIED COMPLICATION STATUS: ICD-10-CM

## 2022-10-03 DIAGNOSIS — D63.0 ANEMIA IN NEOPLASTIC DISEASE: ICD-10-CM

## 2022-10-03 DIAGNOSIS — Z87.19 HX OF PANCREATITIS: Primary | ICD-10-CM

## 2022-10-03 DIAGNOSIS — R10.13 EPIGASTRIC PAIN: Primary | ICD-10-CM

## 2022-10-03 DIAGNOSIS — T45.1X5A CHEMOTHERAPY INDUCED NEUTROPENIA: ICD-10-CM

## 2022-10-03 LAB
ALBUMIN SERPL BCP-MCNC: 3.7 G/DL (ref 3.5–5.2)
ALBUMIN SERPL BCP-MCNC: 3.7 G/DL (ref 3.5–5.2)
ALP SERPL-CCNC: 58 U/L (ref 55–135)
ALP SERPL-CCNC: 58 U/L (ref 55–135)
ALT SERPL W/O P-5'-P-CCNC: 44 U/L (ref 10–44)
ALT SERPL W/O P-5'-P-CCNC: 44 U/L (ref 10–44)
AMYLASE SERPL-CCNC: 541 U/L (ref 20–110)
ANION GAP SERPL CALC-SCNC: 9 MMOL/L (ref 8–16)
ANION GAP SERPL CALC-SCNC: 9 MMOL/L (ref 8–16)
AST SERPL-CCNC: 28 U/L (ref 10–40)
AST SERPL-CCNC: 28 U/L (ref 10–40)
BILIRUB SERPL-MCNC: 0.7 MG/DL (ref 0.1–1)
BILIRUB SERPL-MCNC: 0.7 MG/DL (ref 0.1–1)
BUN SERPL-MCNC: 10 MG/DL (ref 6–20)
BUN SERPL-MCNC: 10 MG/DL (ref 6–20)
CALCIUM SERPL-MCNC: 9.3 MG/DL (ref 8.7–10.5)
CALCIUM SERPL-MCNC: 9.3 MG/DL (ref 8.7–10.5)
CHLORIDE SERPL-SCNC: 103 MMOL/L (ref 95–110)
CHLORIDE SERPL-SCNC: 103 MMOL/L (ref 95–110)
CO2 SERPL-SCNC: 25 MMOL/L (ref 23–29)
CO2 SERPL-SCNC: 25 MMOL/L (ref 23–29)
CREAT SERPL-MCNC: 0.8 MG/DL (ref 0.5–1.4)
CREAT SERPL-MCNC: 0.8 MG/DL (ref 0.5–1.4)
ERYTHROCYTE [DISTWIDTH] IN BLOOD BY AUTOMATED COUNT: 15.2 % (ref 11.5–14.5)
EST. GFR  (NO RACE VARIABLE): >60 ML/MIN/1.73 M^2
EST. GFR  (NO RACE VARIABLE): >60 ML/MIN/1.73 M^2
GLUCOSE SERPL-MCNC: 118 MG/DL (ref 70–110)
GLUCOSE SERPL-MCNC: 118 MG/DL (ref 70–110)
HCT VFR BLD AUTO: 31.9 % (ref 37–48.5)
HGB BLD-MCNC: 10.6 G/DL (ref 12–16)
IMM GRANULOCYTES # BLD AUTO: 0.02 K/UL (ref 0–0.04)
LIPASE SERPL-CCNC: 1186 U/L (ref 4–60)
MCH RBC QN AUTO: 32.3 PG (ref 27–31)
MCHC RBC AUTO-ENTMCNC: 33.2 G/DL (ref 32–36)
MCV RBC AUTO: 97 FL (ref 82–98)
NEUTROPHILS # BLD AUTO: 2.8 K/UL (ref 1.8–7.7)
PLATELET # BLD AUTO: 168 K/UL (ref 150–450)
PMV BLD AUTO: 9.6 FL (ref 9.2–12.9)
POTASSIUM SERPL-SCNC: 3.8 MMOL/L (ref 3.5–5.1)
POTASSIUM SERPL-SCNC: 3.8 MMOL/L (ref 3.5–5.1)
PROT SERPL-MCNC: 6.8 G/DL (ref 6–8.4)
PROT SERPL-MCNC: 6.8 G/DL (ref 6–8.4)
RBC # BLD AUTO: 3.28 M/UL (ref 4–5.4)
SODIUM SERPL-SCNC: 137 MMOL/L (ref 136–145)
SODIUM SERPL-SCNC: 137 MMOL/L (ref 136–145)
WBC # BLD AUTO: 4.47 K/UL (ref 3.9–12.7)

## 2022-10-03 PROCEDURE — 1111F PR DISCHARGE MEDS RECONCILED W/ CURRENT OUTPATIENT MED LIST: ICD-10-PCS | Mod: CPTII,,, | Performed by: INTERNAL MEDICINE

## 2022-10-03 PROCEDURE — 1160F RVW MEDS BY RX/DR IN RCRD: CPT | Mod: CPTII,,, | Performed by: INTERNAL MEDICINE

## 2022-10-03 PROCEDURE — 36415 COLL VENOUS BLD VENIPUNCTURE: CPT | Performed by: INTERNAL MEDICINE

## 2022-10-03 PROCEDURE — 85027 COMPLETE CBC AUTOMATED: CPT | Performed by: INTERNAL MEDICINE

## 2022-10-03 PROCEDURE — 80053 COMPREHEN METABOLIC PANEL: CPT | Performed by: INTERNAL MEDICINE

## 2022-10-03 PROCEDURE — 99999 PR PBB SHADOW E&M-EST. PATIENT-LVL IV: CPT | Mod: PBBFAC,,, | Performed by: INTERNAL MEDICINE

## 2022-10-03 PROCEDURE — 3008F BODY MASS INDEX DOCD: CPT | Mod: CPTII,,, | Performed by: INTERNAL MEDICINE

## 2022-10-03 PROCEDURE — 3008F PR BODY MASS INDEX (BMI) DOCUMENTED: ICD-10-PCS | Mod: CPTII,,, | Performed by: INTERNAL MEDICINE

## 2022-10-03 PROCEDURE — 99214 OFFICE O/P EST MOD 30 MIN: CPT | Mod: PBBFAC | Performed by: INTERNAL MEDICINE

## 2022-10-03 PROCEDURE — 1159F PR MEDICATION LIST DOCUMENTED IN MEDICAL RECORD: ICD-10-PCS | Mod: CPTII,,, | Performed by: INTERNAL MEDICINE

## 2022-10-03 PROCEDURE — 1111F DSCHRG MED/CURRENT MED MERGE: CPT | Mod: CPTII,,, | Performed by: INTERNAL MEDICINE

## 2022-10-03 PROCEDURE — 3078F PR MOST RECENT DIASTOLIC BLOOD PRESSURE < 80 MM HG: ICD-10-PCS | Mod: CPTII,,, | Performed by: INTERNAL MEDICINE

## 2022-10-03 PROCEDURE — 99999 PR PBB SHADOW E&M-EST. PATIENT-LVL IV: ICD-10-PCS | Mod: PBBFAC,,, | Performed by: INTERNAL MEDICINE

## 2022-10-03 PROCEDURE — 1159F MED LIST DOCD IN RCRD: CPT | Mod: CPTII,,, | Performed by: INTERNAL MEDICINE

## 2022-10-03 PROCEDURE — 99215 PR OFFICE/OUTPT VISIT, EST, LEVL V, 40-54 MIN: ICD-10-PCS | Mod: S$PBB,,, | Performed by: INTERNAL MEDICINE

## 2022-10-03 PROCEDURE — 82150 ASSAY OF AMYLASE: CPT | Performed by: INTERNAL MEDICINE

## 2022-10-03 PROCEDURE — 3074F SYST BP LT 130 MM HG: CPT | Mod: CPTII,,, | Performed by: INTERNAL MEDICINE

## 2022-10-03 PROCEDURE — 3074F PR MOST RECENT SYSTOLIC BLOOD PRESSURE < 130 MM HG: ICD-10-PCS | Mod: CPTII,,, | Performed by: INTERNAL MEDICINE

## 2022-10-03 PROCEDURE — 1160F PR REVIEW ALL MEDS BY PRESCRIBER/CLIN PHARMACIST DOCUMENTED: ICD-10-PCS | Mod: CPTII,,, | Performed by: INTERNAL MEDICINE

## 2022-10-03 PROCEDURE — 3078F DIAST BP <80 MM HG: CPT | Mod: CPTII,,, | Performed by: INTERNAL MEDICINE

## 2022-10-03 PROCEDURE — 83690 ASSAY OF LIPASE: CPT | Performed by: INTERNAL MEDICINE

## 2022-10-03 PROCEDURE — 99215 OFFICE O/P EST HI 40 MIN: CPT | Mod: S$PBB,,, | Performed by: INTERNAL MEDICINE

## 2022-10-03 RX ORDER — PREDNISONE 20 MG/1
60 TABLET ORAL DAILY
Qty: 21 TABLET | Refills: 0 | Status: SHIPPED | OUTPATIENT
Start: 2022-10-03 | End: 2022-10-10

## 2022-10-03 NOTE — PROGRESS NOTES
Subjective:       Patient ID: Laura Kent is a 36 y.o. female.    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  Normal background signal.  Two punctate nonenhancing T2 hyperintensities consistent with cysts.  Portal and hepatic veins are patent.  SPLEEN: Enlarged measuring 12.0 cm in craniocaudal dimension.  No focal lesion.  PANCREAS: Normal signal/enhancement.  No significant peripancreatic inflammatory changes.  No solid mass.  ADRENAL: Normal.  KIDNEY: No solid renal mass.  No hydronephrosis.  RETROPERITONEUM: No adenopathy.  MISCELLANEOUS: Multiple loops of prominent air/fluid-filled small bowel in the mid abdomen measuring up to 3.3 cm in maximum diameter, partially visualized.  Small volume bilateral dependent pleural fluid.  No aneurysm.  No focal suspicious osseous lesion.  MRCP:  Mildly distended gallbladder. No cholelithiasis.  Normal caliber intra and extrahepatic biliary ducts.  Normal tapering of common bile duct at the level of the ampulla.  No focal stricture or filling defect.  Pancreatic duct measures within normal limits. No ductal strictures present.  Impression:  Satisfactory MRCP evaluation.  No abnormal biliary duct dilatation.  No cholelithiasis or choledocholithiasis.  Multiple prominent loops of air/fluid-filled small bowel in the mid abdomen, incompletely characterized.  Could represent ileus.  Recommend correlation with  clinical history and exam.  Hepatosplenomegaly.  Small bilateral dependent pleural effusions.  Additional findings as above    - 9/8/2022 CT Abdomen:  FINDINGS:  Heart: Normal in size.  Trace pericardial effusion  Lung Bases: 0.5 cm pulmonary nodule in the left lower lobe (axial series 2, image 31), stable in comparison to CT 03/30/2022.  trace bilateral effusions.  Liver: Enlarged in size, measures 20.1 cm in craniocaudal dimension.  No focal lesion.  Gallbladder: No calcified gallstones.  Bile Ducts: No evidence of dilated ducts.  Pancreas: Mild peripancreatic fat stranding slightly increased compared to September 3.  Multiple prominent lymph nodes, likely reactive.  Spleen: Enlarged in size, measures 14.7 cm in AP dimension.  Adrenals: Unremarkable.  Kidneys/ Ureters: Both kidneys are normal in size and location.  No nephrolithiasis or hydronephrosis.  No solid renal masses.  Both ureters are normal in course and caliber with no hydroureter.  Bladder: No evidence of wall thickening.  Reproductive organs: IUD device in place.  GI Tract/Mesentery: The stomach is unremarkable.  No evidence of bowel obstruction or inflammation.  The appendix is unremarkable.  Peritoneal Space: No ascites. No free air.  Retroperitoneum: Scattered nonenlarged para-aortic nodes noted.  Abdominal wall: Scattered foci of air in the subcutaneous tissue along the anterior abdominal wall, likely representing sites of injection.  Vasculature: No significant atherosclerosis or aneurysm.  Bones: No acute fracture.  Impression:  Slightly increased mild peripancreatic fat stranding with surrounding multiple prominent lymph nodes, allowing for limited evaluation due to lack of IV contrast.  Findings consistent with the clinical history of pancreatitis, no loculated fluid collections.  Stable 0.5 cm pulmonary nodule in the left lower lobe.  Hepatosplenomegaly.  Small pericardial effusion and trace bilateral pleural effusion.  Additional findings,  as above.    Admitted 9/3-9/16/2022   Cancelled last week's follow up appointment as had eye appointment the same day   Per discharge note:  Hospital Course: Ms. Pacheco was initially admitted for abdominal pain likely from pancreatitis due to her cancer treatment regimen which she received 8/29. Lipase was found to be in the 200s with elevated LFTs with pancreatic fat stranding seen on CT. She was treated with anti-emetics, IVF, pain medications. She became febrile the night after admission. Broad spectrum antibiotics as well as Flagyl were started day 2 of hospitalization for intraabdominal coverage, but continued to remain febrile. She also developed copious diarrhea after antibiotics were started. She continued to remain febrile through 9/8 with Tmax of 103. Infectious Disease was consulted. Stool studies were ordered which were negative C.Diff negative x2. She completed 5 day course of Unasyn. Daptomycin started for history of VRE. Infectious work-up negative and CT chest/abd/pelvis without acute findings. DVT/PE ruled out.   RUQ US ordered to evaluate biliary tract and liver, showing biliary sludge. MRCP was ordered per GI recommendations which showed biliary sludge with no stones. She continued to have diarrhea throughout her admission up to 20 times a day, watery, and sometimes green in color. 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 Loperamide, lomotil, Creon administered prn helped reduce episodes to 3-4 times daily w/ puree consistency. She was also started on high dose methylprednisolone for potential colitis. Her stools have become more formed and are now brown and soft however she is still having 2-3 bowel movements daily. Daptomycin was discontinued per ID guidance as it may have contributed to diarrhea.   She was switched to prednisone 90mg on taper. Will follow-up outpatient for colonoscopy and IBD evaluation.  She will follow-up with Dr. Cruz once  colonoscopy complete.         Oncology History:  - self detected an area under her left arm and it initially seemed to go away (noted around winter holidays)  - 3/4/2022 Outside Mammogram:  Findings:  The breasts are heterogeneously dense, which may obscure small masses.   Left  There is a 31 mm x 18 mm x 21 mm irregularly shaped mass with microlobulated margins seen in the left breast at 2 o'clock in the posterior depth with associated left axillary adenopathy. Largest node depicted on the outside study measures 49 x 23 x 29 mm with cortical thickening and effacement of the hilum. Breast mass is reportedly palpable.   Right  There is no evidence of suspicious masses, calcifications, or other abnormal findings in the right breast.  Impression:  Left  Mass: Left breast 31 mm x 18 mm x 21 mm mass at the posterior 2 o'clock position. Assessment: 5 - Highly suggestive of malignancy. Biopsy is recommended.   Right  There is no mammographic evidence of malignancy in the right breast.  BI-RADS Category:   Overall: 5 - Highly Suggestive of Malignancy  Recommendation:  Ultrasound Guided Core Needle Biopsy of the left breast mass and one of the abnormal left axillary nodes is recommended.     - 3/14/2022 Breast biopsy:  1. 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   Comment:  Infiltrating carcinoma occupies the entirety of biopsied material   with the largest continuous focus measuring 13 mm.  Breast biomarkers are   pending and will be issued in a supplemental report.   2. LEFT AXILLARY LYMPH NODE, BIOPSY:   Invasive ductal carcinoma, [place] histologic grade 3 (tubule formation:   3, nuclear pleomorphism:  3, mitotic activity:  3).   Comment:  Infiltrating carcinoma occupies the entirety of biopsied material   with the largest continuous focus measuring 20 mm.  No lymph node tissue is    identified in the sample.  Tumor histology is essentially identical to that   of part 1.  The findings could represent part of a larger intranodal   metastasis, but an extension from the primary tumor cannot be excluded.   Radiographic correlation is advised.   Note:  Dr. Stephanie Rao also reviewed this case and agrees with the   diagnosis.   BREAST BIOMARKER RESULTS   Estrogen receptor (ER):  Negative (0)   Progesterone receptor (ER):  Negative (0)   HER2 IHC:  Negative (1+)   Ki-67 proliferation index:  80%      - 3/17/2022 Breast MRI:  Findings:  The breasts have heterogeneous fibroglandular tissue. The background parenchymal enhancement is minimal.   Left  There is a 38 mm x 33 mm x 33 mm irregularly shaped mass seen in the left breast at 2 o'clock in the posterior depth, 8.4 cm from the nipple and 1.2 cm from the skin. Associated signal void from a twirl biopsy marker; pathology showed invasive ductal carcinoma.   Posterior to the mass there is abnormal non mass enhancement measuring 29 x 28 mm. The NME is 4 mm from the skin and 2.3 cm from the chest wall. In total the mass and NME measure 54 mm in AP extent.  Overlying skin thickening and edema involving the lateral breast, likely from lymphovascular obstruction. No suspicious skin enhancement. Overall increased vascularity to the left breast.   Four abnormal lymph level 1 and level 2 left axillary lymph nodes. The largest lymph node measures 52 mm x 40 mm x 38 mm which previously underwent biopsy showing metastatic disease. There is an associated radar reflector within the biopsied lymph node.  Right  There is no evidence of suspicious masses, abnormal enhancement, or other abnormal findings in the right breast. No enlarged axillary or internal mammary lymph nodes.   Impression:  Left  Mass: Left breast 38 mm x 33 mm x 33 mm mass at the posterior 2 o'clock position. Assessment: 6 - Known biopsy, proven malignancy. Associated 29 mm NME extending posteriorly  from the mass. In total the mass and NME measure 54 mm in AP extent.  Lymph Node: Abnormal level 1 and 2 left axillary lymph nodes, the largest of which measures 52 mm x 40 mm x 38 mm lymph node and is known biopsy, proven malignancy.   Right  There is no MR evidence of malignancy in the right breast.  BI-RADS Category:   Overall: 6 - Known Biopsy-Proven Malignancy  Recommendation:  Clinical management of known left breast cancer. Patient is established with the breast surgery clinic.      - 3/21/2022 CT C/A/P:  FINDINGS:  Base of Neck: No significant abnormality.  Thoracic soft tissue: A proximally 3.2 x 3.6 x 3.7 cm irregular left breast mass within the lateral aspect with internal biopsy marker, corresponding to findings on prior breast MRI and compatible with malignancy.  Enlarged left axillary lymph nodes, largest measuring approximately 4.2 x 3.6 cm, (series 2, image 27).  CHEST:  -Heart: Normal size. No pericardial effusion.  -Pulmonary vasculature: Pulmonary arteries distribute normally.  There are four pulmonary veins.  -Radha/Mediastinum: No pathologic shelly enlargement.  -Trachea and Proximal airways: Trachea is midline.  Central airways are patent.  No significant bronchial wall thickening or bronchiectasis.  -Lungs/Pleura: Symmetrically expanded without focal consolidation, pneumothorax, or mass.  No pleural effusion or thickening.  -Esophagus: Normal course and caliber.  ABDOMEN:  - Liver: Normal in size and attenuation with no focal hepatic abnormality.  - Gallbladder: No calcified gallstones.  No wall thickening or pericholecystic fluid.  - Bile Ducts: No intra or extrahepatic biliary ductal dilatation.  - Stomach/Duodenum: Small hiatal hernia otherwise unremarkable.  - Spleen: Normal size.  No focal parenchymal abnormality.  - Pancreas: No mass lesion.  No pancreatic ductal dilatation.  No peripancreatic fat stranding.  - Adrenals: Unremarkable.  - Kidneys/ureters/urinary bladder: Normal in size and  location.  Normal enhancement pattern.  No nephrolithiasis.  Ureters are normal in course and caliber.  No hydroureteronephrosis.  - Retroperitoneum: Scattered nonenlarged retroperitoneal lymph nodes.  PELVIS:  - Reproductive: Intrauterine device present.  A proximally 2.8 cm peripherally enhancing right ovarian cyst, likely corpus luteal cyst.  - Other: No pelvic adenopathy, free fluid, or mass.  BOWEL/MESENTERY:  No evidence of bowel obstruction or inflammatory process. Appendix is visualized and unremarkable.  VASCULATURE: Left-sided aortic arch with 3 branch vessels.  No aneurysm and no significant atherosclerosis.  Portal vein, splenic vein, and SMV are patent.  BONES: [place] approximately 0.6 cm right trochanteric lesion, likely synovial herniation pit.  No acute fracture or bony destructive process.  EXTRATHORACIC/EXTRAPERITONEAL SOFT TISSUES: Unremarkable.  Impression:  In this patient with known breast cancer, there is an approximately 3.7 cm irregular left breast mass with left axillary lymphadenopathy.  No evidence of metastatic disease  Small hiatal hernia.  Additional findings as described above.     - 3/21/2022 Bone scan:  FINDINGS:  There is physiologic distribution of the radiopharmaceutical throughout the skeleton.  Mild degenerative uptake within the bilateral shoulders and knees.  There is normal uptake in the genitourinary system and soft tissues.  Impression:  There is no scintigraphic evidence of osteoblastic metastatic disease.     - 3/21/2022 Brain MRI:  FINDINGS:  Please note there is dental metal artifact distorting the examination.  Allowing for artifacts the brain parenchyma is normal in contour.  Developmental variant with cavum septum pellucidum identified.  The ventricles are otherwise normal in size without hydrocephalus.  There is no midline shift or significant mass effect.  The major intracranial T2 flow voids are present.  No restricted diffusion within the non distorted brain  parenchyma.  There is severe distortion of the susceptibility imaging no parenchymal susceptibility to suggest parenchymal hemorrhage in the non distorted brain parenchyma.  There is suboptimal evaluation of the cerebellum and posterior fossa.  Impression:  Unremarkable MRI brain allowing for artifacts from dental metal as detailed above specifically without abnormal parenchymal enhancement to suggest intracranial metastatic disease in light of history.     Repeat pregnancy test negative     PMH:  HTN- around pregnancies; off of blood pressure medication x 2 years  [facility]  Gestational DM  Hyperlipidemia when heavier, managed with diet and followed by cardiology  C- sections x 2  Iron deficiency     GynHx:  Menarche- 7   (16 at 1st pregnancy) (17, 7, 4)  Still with periods - last 2022- last 5-7 days, moderate normal flow  + breast feeding x 1 year  IUD     SH:  Working, , [employer] and [employer]  Single  Good support system  No tobacco  No EtOH  No illicit drugs     FH:  Mom- 57 yo, osteoarthritis   Dad- 56 yo, DM, HTN  Maternal uncle-  of metastatic pancreatic cancer at age 57  Paternal grandmother-  of metastatic bladder cancer- unclear what age but thought to be her 60s  No breast cancer  No uterine cancer, no ovarian cancer  No prostate cancer  No melanoma    Review of Systems   Constitutional:  Negative for appetite change and unexpected weight change.   HENT:  Positive for trouble swallowing. Negative for mouth sores and sore throat.    Eyes:  Positive for visual disturbance.   Respiratory:  Negative for cough and shortness of breath.    Cardiovascular:  Negative for chest pain, palpitations and leg swelling.   Gastrointestinal:  Positive for abdominal pain and reflux. Negative for abdominal distention, change in bowel habit, constipation, diarrhea, nausea, vomiting and change in bowel habit.   Genitourinary:  Negative for decreased urine volume, frequency and urgency.    Musculoskeletal:  Negative for back pain.   Integumentary:  Negative for rash.   Neurological:  Negative for headaches.   Hematological:  Negative for adenopathy.   Psychiatric/Behavioral:  The patient is not nervous/anxious.        Objective:      Physical Exam  Vitals and nursing note reviewed.   Constitutional:       General: She is not in acute distress.     Appearance: Normal appearance. She is well-developed. She is ill-appearing.      Comments: Presents alone  Very pleasant  ECOG= 0  Tearful today.    HENT:      Head: Normocephalic and atraumatic.   Eyes:      General: Lids are normal. No scleral icterus.     Extraocular Movements: Extraocular movements intact.      Conjunctiva/sclera: Conjunctivae normal.      Pupils: Pupils are equal, round, and reactive to light.   Neck:      Thyroid: No thyromegaly.      Vascular: No JVD.      Trachea: Trachea normal.   Cardiovascular:      Rate and Rhythm: Normal rate and regular rhythm.      Heart sounds: Normal heart sounds. No murmur heard.    No friction rub. No gallop.   Pulmonary:      Effort: Pulmonary effort is normal. No respiratory distress.      Breath sounds: Normal breath sounds. No wheezing, rhonchi or rales.      Comments: Left breast mass much smaller. No LAD  Right sided  port clean and dry - incision healed.   Abdominal:      General: Bowel sounds are normal. There is no distension.      Palpations: Abdomen is soft. There is no mass.      Tenderness: There is no abdominal tenderness. There is no guarding or rebound.      Comments: Pain mid epigastric   Musculoskeletal:         General: No swelling or deformity. Normal range of motion.      Cervical back: Normal range of motion and neck supple.      Right lower leg: No edema.      Left lower leg: No edema.      Comments: No spinal or paraspinal tenderness.    Lymphadenopathy:      Head:      Right side of head: No submental or submandibular adenopathy.      Left side of head: No submental or  submandibular adenopathy.      Cervical: No cervical adenopathy.      Upper Body:      Right upper body: No supraclavicular or axillary adenopathy.      Left upper body: No supraclavicular or axillary adenopathy.   Skin:     General: Skin is warm and dry.      Capillary Refill: Capillary refill takes less than 2 seconds.      Coloration: Skin is not jaundiced or pale.      Findings: No bruising or rash.      Nails: There is no clubbing.      Comments: acne to back   Neurological:      General: No focal deficit present.      Mental Status: She is alert and oriented to person, place, and time.      Sensory: Sensory deficit present.      Motor: No weakness.      Coordination: Coordination normal.      Gait: Gait normal.   Psychiatric:         Mood and Affect: Mood normal.         Speech: Speech normal.         Behavior: Behavior normal.         Thought Content: Thought content normal.     Labs- reviewed  Assessment:       Problem List Items Addressed This Visit       Malignant neoplasm of upper-outer quadrant of left breast in female, estrogen receptor negative    Anemia in neoplastic disease    Acute pancreatitis    Relevant Medications    predniSONE (DELTASONE) 20 MG tablet    Abdominal pain - Primary    Relevant Orders    CT Abdomen Pelvis W Wo Contrast       Plan:     Offered the following:  ED- declines as no one to watch her kids today  CT scan tomorrow- possible readmit  Subsequently lipase and amylase returned- called patient and she will restart steroids and agrees to go to ED if symptoms worsen overnight    Route Chart for Scheduling    Med Onc Chart Routing      Follow up with physician    Follow up with ANDREW 1 week. labs prior, cbc, cmp, amylase, lipase- assess to restart chemo   Infusion scheduling note    Injection scheduling note    Labs    Imaging    Pharmacy appointment    Other referrals          Treatment Plan Information   OP PEMBROLIZUMAB CARBOPLATIN (AUC 5) WITH WEEKLY PACLITAXEL FOLLOWED BY  PEMBROLIZUMAB DOXORUBICIN CYCLOPHOSPHAMIDE FOLLOWED BY PEMBROLIZUMAB 200 MG Q3W   Yessy Cruz MD   Upcoming Treatment Dates - OP PEMBROLIZUMAB CARBOPLATIN (AUC 5) WITH WEEKLY PACLITAXEL FOLLOWED BY PEMBROLIZUMAB DOXORUBICIN CYCLOPHOSPHAMIDE FOLLOWED BY PEMBROLIZUMAB 200 MG Q3W    9/5/2022       Chemotherapy       DOXOrubicin chemo injection 130 mg       cyclophosphamide 600 mg/m2 = 1,300 mg in sodium chloride 0.9% 250 mL chemo infusion       Antiemetics       aprepitant (CINVANTI) injection 130 mg       palonosetron 0.25mg/dexamethasone 12mg in NS IVPB       Immunotherapy       pembrolizumab (KEYTRUDA) 200 mg in sodium chloride 0.9% 108 mL infusion  9/26/2022       Chemotherapy       DOXOrubicin chemo injection 130 mg       cyclophosphamide 600 mg/m2 = 1,300 mg in sodium chloride 0.9% 250 mL chemo infusion       Antiemetics       aprepitant (CINVANTI) injection 130 mg       palonosetron 0.25mg/dexamethasone 12mg in NS IVPB       Immunotherapy       pembrolizumab (KEYTRUDA) 200 mg in sodium chloride 0.9% 108 mL infusion  10/17/2022       Chemotherapy       DOXOrubicin chemo injection 130 mg       cyclophosphamide 600 mg/m2 = 1,300 mg in sodium chloride 0.9% 250 mL chemo infusion       Antiemetics       aprepitant (CINVANTI) injection 130 mg       palonosetron 0.25mg/dexamethasone 12mg in NS IVPB       Immunotherapy       pembrolizumab (KEYTRUDA) 200 mg in sodium chloride 0.9% 108 mL infusion  11/7/2022       Chemotherapy       DOXOrubicin chemo injection 130 mg       cyclophosphamide 600 mg/m2 = 1,300 mg in sodium chloride 0.9% 250 mL chemo infusion       Antiemetics       aprepitant (CINVANTI) injection 130 mg       palonosetron 0.25mg/dexamethasone 12mg in NS IVPB       Immunotherapy       pembrolizumab (KEYTRUDA) 200 mg in sodium chloride 0.9% 108 mL infusion    Supportive Plan Information  OP FILGRASTIM 300 MCG   Yessy Cruz MD   Upcoming Treatment Dates - OP FILGRASTIM 300 MCG    6/20/2022        Medications       tbo-filgrastim (GRANIX) injection 480 mcg/0.8 mL  6/21/2022       Medications       tbo-filgrastim (GRANIX) injection 480 mcg/0.8 mL  6/22/2022       Medications       tbo-filgrastim (GRANIX) injection 480 mcg/0.8 mL  6/23/2022       Medications       tbo-filgrastim (GRANIX) injection 480 mcg/0.8 mL    Therapy Plan Information  Flushes  heparin, porcine (PF) 100 unit/mL injection flush 500 Units  500 Units, Intravenous, Every visit  sodium chloride 0.9% flush 10 mL  10 mL, Intravenous, Every visit  heparin, porcine (PF) 100 unit/mL injection flush 500 Units  500 Units, Intravenous, Every visit  sodium chloride 0.9% flush 10 mL  10 mL, Intravenous, Every visit

## 2022-10-04 ENCOUNTER — DOCUMENTATION ONLY (OUTPATIENT)
Dept: HEMATOLOGY/ONCOLOGY | Facility: CLINIC | Age: 37
End: 2022-10-04
Payer: MEDICAID

## 2022-10-04 NOTE — PROGRESS NOTES
Pt called asking if Luis Carlososman Moeller would pay her October rent.  SW messaged MERY Landers.  Luis Carlos will pay.  Pt informed.

## 2022-10-05 ENCOUNTER — HOSPITAL ENCOUNTER (OUTPATIENT)
Dept: RADIOLOGY | Facility: HOSPITAL | Age: 37
Discharge: HOME | End: 2022-10-05
Attending: INTERNAL MEDICINE
Payer: MEDICAID

## 2022-10-05 DIAGNOSIS — C50.412 MALIGNANT NEOPLASM OF UPPER-OUTER QUADRANT OF LEFT BREAST IN FEMALE, ESTROGEN RECEPTOR NEGATIVE: Primary | ICD-10-CM

## 2022-10-05 DIAGNOSIS — Z87.19 HX OF PANCREATITIS: ICD-10-CM

## 2022-10-05 DIAGNOSIS — R10.13 EPIGASTRIC PAIN: ICD-10-CM

## 2022-10-05 DIAGNOSIS — Z17.1 MALIGNANT NEOPLASM OF UPPER-OUTER QUADRANT OF LEFT BREAST IN FEMALE, ESTROGEN RECEPTOR NEGATIVE: Primary | ICD-10-CM

## 2022-10-05 PROCEDURE — 25500020 PHARM REV CODE 255: Performed by: INTERNAL MEDICINE

## 2022-10-05 PROCEDURE — 74178 CT ABD&PLV WO CNTR FLWD CNTR: CPT | Mod: TC

## 2022-10-05 RX ADMIN — IOHEXOL 100 ML: 350 INJECTION, SOLUTION INTRAVENOUS at 02:10

## 2022-10-05 RX ADMIN — IOHEXOL 30 ML: 350 INJECTION, SOLUTION INTRAVENOUS at 01:10

## 2022-10-06 ENCOUNTER — TELEPHONE (OUTPATIENT)
Dept: HEMATOLOGY/ONCOLOGY | Facility: CLINIC | Age: 37
End: 2022-10-06
Payer: MEDICAID

## 2022-10-06 NOTE — TELEPHONE ENCOUNTER
----- Message from Yessy Cruz MD sent at 10/6/2022  5:41 PM CDT -----  Called patient and her phone goes to a message that says no voicemail set up  Called mom and her phone goes to vm  She has no evidence for pancreatitis  Can we see her next week and reassess?        Appt with me 10/12.   PJ

## 2022-10-07 ENCOUNTER — TELEPHONE (OUTPATIENT)
Dept: HEMATOLOGY/ONCOLOGY | Facility: CLINIC | Age: 37
End: 2022-10-07
Payer: MEDICAID

## 2022-10-07 NOTE — TELEPHONE ENCOUNTER
MD Adonay Burton NP  Cc: Aurora García, JONNA  I did tell her esophagitis possible as well with the steroid           Previous Messages     ----- Message -----   From: Adonay Cannon NP   Sent: 10/6/2022   5:48 PM CDT   To: Yessy Cruz MD, Aurora García, RN     She has an appt with me on 10/12 and ill reassess. Thanks, PJ   ----- Message -----   From: Yessy Cruz MD   Sent: 10/6/2022   5:41 PM CDT   To: Aurora García, JONNA, Adonay Cannon NP     Called patient and her phone goes to a message that says no voicemail set up   Called mom and her phone goes to vm   She has no evidence for pancreatitis   Can we see her next week and reassess?

## 2022-10-12 ENCOUNTER — LAB VISIT (OUTPATIENT)
Dept: LAB | Facility: HOSPITAL | Age: 37
End: 2022-10-12
Payer: MEDICAID

## 2022-10-12 ENCOUNTER — OFFICE VISIT (OUTPATIENT)
Dept: HEMATOLOGY/ONCOLOGY | Facility: CLINIC | Age: 37
End: 2022-10-12
Payer: MEDICAID

## 2022-10-12 VITALS
TEMPERATURE: 98 F | SYSTOLIC BLOOD PRESSURE: 142 MMHG | OXYGEN SATURATION: 98 % | BODY MASS INDEX: 36.8 KG/M2 | HEART RATE: 83 BPM | WEIGHT: 220.88 LBS | DIASTOLIC BLOOD PRESSURE: 86 MMHG | RESPIRATION RATE: 19 BRPM | HEIGHT: 65 IN

## 2022-10-12 DIAGNOSIS — Z17.1 MALIGNANT NEOPLASM OF UPPER-OUTER QUADRANT OF LEFT BREAST IN FEMALE, ESTROGEN RECEPTOR NEGATIVE: Primary | ICD-10-CM

## 2022-10-12 DIAGNOSIS — Z87.19 HX OF PANCREATITIS: ICD-10-CM

## 2022-10-12 DIAGNOSIS — R10.13 EPIGASTRIC PAIN: ICD-10-CM

## 2022-10-12 DIAGNOSIS — C50.412 MALIGNANT NEOPLASM OF UPPER-OUTER QUADRANT OF LEFT BREAST IN FEMALE, ESTROGEN RECEPTOR NEGATIVE: ICD-10-CM

## 2022-10-12 DIAGNOSIS — Z17.1 MALIGNANT NEOPLASM OF UPPER-OUTER QUADRANT OF LEFT BREAST IN FEMALE, ESTROGEN RECEPTOR NEGATIVE: ICD-10-CM

## 2022-10-12 DIAGNOSIS — C50.412 MALIGNANT NEOPLASM OF UPPER-OUTER QUADRANT OF LEFT BREAST IN FEMALE, ESTROGEN RECEPTOR NEGATIVE: Primary | ICD-10-CM

## 2022-10-12 DIAGNOSIS — K85.30 DRUG-INDUCED ACUTE PANCREATITIS, UNSPECIFIED COMPLICATION STATUS: ICD-10-CM

## 2022-10-12 LAB
ALBUMIN SERPL BCP-MCNC: 3.8 G/DL (ref 3.5–5.2)
ALP SERPL-CCNC: 59 U/L (ref 55–135)
ALT SERPL W/O P-5'-P-CCNC: 20 U/L (ref 10–44)
AMYLASE SERPL-CCNC: 141 U/L (ref 20–110)
ANION GAP SERPL CALC-SCNC: 8 MMOL/L (ref 8–16)
AST SERPL-CCNC: 12 U/L (ref 10–40)
BILIRUB SERPL-MCNC: 0.4 MG/DL (ref 0.1–1)
BUN SERPL-MCNC: 16 MG/DL (ref 6–20)
CALCIUM SERPL-MCNC: 9.8 MG/DL (ref 8.7–10.5)
CHLORIDE SERPL-SCNC: 104 MMOL/L (ref 95–110)
CO2 SERPL-SCNC: 25 MMOL/L (ref 23–29)
CREAT SERPL-MCNC: 0.8 MG/DL (ref 0.5–1.4)
ERYTHROCYTE [DISTWIDTH] IN BLOOD BY AUTOMATED COUNT: 13.5 % (ref 11.5–14.5)
EST. GFR  (NO RACE VARIABLE): >60 ML/MIN/1.73 M^2
GLUCOSE SERPL-MCNC: 92 MG/DL (ref 70–110)
HCT VFR BLD AUTO: 32.4 % (ref 37–48.5)
HGB BLD-MCNC: 10.5 G/DL (ref 12–16)
IMM GRANULOCYTES # BLD AUTO: 0.05 K/UL (ref 0–0.04)
LIPASE SERPL-CCNC: 209 U/L (ref 4–60)
MCH RBC QN AUTO: 32.1 PG (ref 27–31)
MCHC RBC AUTO-ENTMCNC: 32.4 G/DL (ref 32–36)
MCV RBC AUTO: 99 FL (ref 82–98)
NEUTROPHILS # BLD AUTO: 5.5 K/UL (ref 1.8–7.7)
PLATELET # BLD AUTO: 197 K/UL (ref 150–450)
PMV BLD AUTO: 9.6 FL (ref 9.2–12.9)
POTASSIUM SERPL-SCNC: 4 MMOL/L (ref 3.5–5.1)
PROT SERPL-MCNC: 6.7 G/DL (ref 6–8.4)
RBC # BLD AUTO: 3.27 M/UL (ref 4–5.4)
SODIUM SERPL-SCNC: 137 MMOL/L (ref 136–145)
WBC # BLD AUTO: 7.09 K/UL (ref 3.9–12.7)

## 2022-10-12 PROCEDURE — 99214 OFFICE O/P EST MOD 30 MIN: CPT | Mod: PBBFAC | Performed by: NURSE PRACTITIONER

## 2022-10-12 PROCEDURE — 3008F PR BODY MASS INDEX (BMI) DOCUMENTED: ICD-10-PCS | Mod: CPTII,,, | Performed by: NURSE PRACTITIONER

## 2022-10-12 PROCEDURE — 1159F PR MEDICATION LIST DOCUMENTED IN MEDICAL RECORD: ICD-10-PCS | Mod: CPTII,,, | Performed by: NURSE PRACTITIONER

## 2022-10-12 PROCEDURE — 99999 PR PBB SHADOW E&M-EST. PATIENT-LVL IV: ICD-10-PCS | Mod: PBBFAC,,, | Performed by: NURSE PRACTITIONER

## 2022-10-12 PROCEDURE — 99999 PR PBB SHADOW E&M-EST. PATIENT-LVL IV: CPT | Mod: PBBFAC,,, | Performed by: NURSE PRACTITIONER

## 2022-10-12 PROCEDURE — 3008F BODY MASS INDEX DOCD: CPT | Mod: CPTII,,, | Performed by: NURSE PRACTITIONER

## 2022-10-12 PROCEDURE — 85027 COMPLETE CBC AUTOMATED: CPT | Performed by: NURSE PRACTITIONER

## 2022-10-12 PROCEDURE — 1111F DSCHRG MED/CURRENT MED MERGE: CPT | Mod: CPTII,,, | Performed by: NURSE PRACTITIONER

## 2022-10-12 PROCEDURE — 3077F SYST BP >= 140 MM HG: CPT | Mod: CPTII,,, | Performed by: NURSE PRACTITIONER

## 2022-10-12 PROCEDURE — 1159F MED LIST DOCD IN RCRD: CPT | Mod: CPTII,,, | Performed by: NURSE PRACTITIONER

## 2022-10-12 PROCEDURE — 99215 OFFICE O/P EST HI 40 MIN: CPT | Mod: S$PBB,,, | Performed by: NURSE PRACTITIONER

## 2022-10-12 PROCEDURE — 99215 PR OFFICE/OUTPT VISIT, EST, LEVL V, 40-54 MIN: ICD-10-PCS | Mod: S$PBB,,, | Performed by: NURSE PRACTITIONER

## 2022-10-12 PROCEDURE — 82150 ASSAY OF AMYLASE: CPT | Performed by: NURSE PRACTITIONER

## 2022-10-12 PROCEDURE — 80053 COMPREHEN METABOLIC PANEL: CPT | Performed by: NURSE PRACTITIONER

## 2022-10-12 PROCEDURE — 3079F PR MOST RECENT DIASTOLIC BLOOD PRESSURE 80-89 MM HG: ICD-10-PCS | Mod: CPTII,,, | Performed by: NURSE PRACTITIONER

## 2022-10-12 PROCEDURE — 1111F PR DISCHARGE MEDS RECONCILED W/ CURRENT OUTPATIENT MED LIST: ICD-10-PCS | Mod: CPTII,,, | Performed by: NURSE PRACTITIONER

## 2022-10-12 PROCEDURE — 36415 COLL VENOUS BLD VENIPUNCTURE: CPT | Performed by: NURSE PRACTITIONER

## 2022-10-12 PROCEDURE — 3079F DIAST BP 80-89 MM HG: CPT | Mod: CPTII,,, | Performed by: NURSE PRACTITIONER

## 2022-10-12 PROCEDURE — 3077F PR MOST RECENT SYSTOLIC BLOOD PRESSURE >= 140 MM HG: ICD-10-PCS | Mod: CPTII,,, | Performed by: NURSE PRACTITIONER

## 2022-10-12 PROCEDURE — 83690 ASSAY OF LIPASE: CPT | Performed by: NURSE PRACTITIONER

## 2022-10-12 RX ORDER — PREDNISONE 20 MG/1
TABLET ORAL
Qty: 18 TABLET | Refills: 0 | Status: SHIPPED | OUTPATIENT
Start: 2022-10-12 | End: 2022-10-27

## 2022-10-12 NOTE — PROGRESS NOTES
Subjective:       Patient ID: Laura Kent is a 36 y.o. female.    Chief Complaint: Abdominal Pain    HPI      Here for follow up  In the interval,   Elevated lipase and amylase  CT abd/pelvis 10/5/2022 - She had no evidence for pancreatitis   Restarted steroids.  Taking prednisone 20 mg 3 tabs daily    Today, feels good overall.   Does not want anymore treatments as scared.   No abdominal pain  Appetite good and weight stable.   Normal stools and color  No N/V    Breast tumor continues to shrink- she no longer feels      Previously,   Most recent imaging  - 9/16/2022 MRI Abdomen:  FINDINGS:  ABDOMEN:  LIVER: Enlarged measuring 19.8 cm in craniocaudal dimension.  Normal background signal.  Two punctate nonenhancing T2 hyperintensities consistent with cysts.  Portal and hepatic veins are patent.  SPLEEN: Enlarged measuring 12.0 cm in craniocaudal dimension.  No focal lesion.  PANCREAS: Normal signal/enhancement.  No significant peripancreatic inflammatory changes.  No solid mass.  ADRENAL: Normal.  KIDNEY: No solid renal mass.  No hydronephrosis.  RETROPERITONEUM: No adenopathy.  MISCELLANEOUS: Multiple loops of prominent air/fluid-filled small bowel in the mid abdomen measuring up to 3.3 cm in maximum diameter, partially visualized.  Small volume bilateral dependent pleural fluid.  No aneurysm.  No focal suspicious osseous lesion.  MRCP:  Mildly distended gallbladder. No cholelithiasis.  Normal caliber intra and extrahepatic biliary ducts.  Normal tapering of common bile duct at the level of the ampulla.  No focal stricture or filling defect.  Pancreatic duct measures within normal limits. No ductal strictures present.  Impression:  Satisfactory MRCP evaluation.  No abnormal biliary duct dilatation.  No cholelithiasis or choledocholithiasis.  Multiple prominent loops of air/fluid-filled small bowel in the mid abdomen, incompletely characterized.  Could represent ileus.  Recommend correlation with clinical  history and exam.  Hepatosplenomegaly.  Small bilateral dependent pleural effusions.  Additional findings as above    - 9/8/2022 CT Abdomen:  FINDINGS:  Heart: Normal in size.  Trace pericardial effusion  Lung Bases: 0.5 cm pulmonary nodule in the left lower lobe (axial series 2, image 31), stable in comparison to CT 03/30/2022.  trace bilateral effusions.  Liver: Enlarged in size, measures 20.1 cm in craniocaudal dimension.  No focal lesion.  Gallbladder: No calcified gallstones.  Bile Ducts: No evidence of dilated ducts.  Pancreas: Mild peripancreatic fat stranding slightly increased compared to September 3.  Multiple prominent lymph nodes, likely reactive.  Spleen: Enlarged in size, measures 14.7 cm in AP dimension.  Adrenals: Unremarkable.  Kidneys/ Ureters: Both kidneys are normal in size and location.  No nephrolithiasis or hydronephrosis.  No solid renal masses.  Both ureters are normal in course and caliber with no hydroureter.  Bladder: No evidence of wall thickening.  Reproductive organs: IUD device in place.  GI Tract/Mesentery: The stomach is unremarkable.  No evidence of bowel obstruction or inflammation.  The appendix is unremarkable.  Peritoneal Space: No ascites. No free air.  Retroperitoneum: Scattered nonenlarged para-aortic nodes noted.  Abdominal wall: Scattered foci of air in the subcutaneous tissue along the anterior abdominal wall, likely representing sites of injection.  Vasculature: No significant atherosclerosis or aneurysm.  Bones: No acute fracture.  Impression:  Slightly increased mild peripancreatic fat stranding with surrounding multiple prominent lymph nodes, allowing for limited evaluation due to lack of IV contrast.  Findings consistent with the clinical history of pancreatitis, no loculated fluid collections.  Stable 0.5 cm pulmonary nodule in the left lower lobe.  Hepatosplenomegaly.  Small pericardial effusion and trace bilateral pleural effusion.  Additional findings, as  above.    Admitted 9/3-9/16/2022   Cancelled last week's follow up appointment as had eye appointment the same day   Per discharge note:  Hospital Course: Ms. Pacheco was initially admitted for abdominal pain likely from pancreatitis due to her cancer treatment regimen which she received 8/29. Lipase was found to be in the 200s with elevated LFTs with pancreatic fat stranding seen on CT. She was treated with anti-emetics, IVF, pain medications. She became febrile the night after admission. Broad spectrum antibiotics as well as Flagyl were started day 2 of hospitalization for intraabdominal coverage, but continued to remain febrile. She also developed copious diarrhea after antibiotics were started. She continued to remain febrile through 9/8 with Tmax of 103. Infectious Disease was consulted. Stool studies were ordered which were negative C.Diff negative x2. She completed 5 day course of Unasyn. Daptomycin started for history of VRE. Infectious work-up negative and CT chest/abd/pelvis without acute findings. DVT/PE ruled out.   RUQ US ordered to evaluate biliary tract and liver, showing biliary sludge. MRCP was ordered per GI recommendations which showed biliary sludge with no stones. She continued to have diarrhea throughout her admission up to 20 times a day, watery, and sometimes green in color. 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 Loperamide, lomotil, Creon administered prn helped reduce episodes to 3-4 times daily w/ puree consistency. She was also started on high dose methylprednisolone for potential colitis. Her stools have become more formed and are now brown and soft however she is still having 2-3 bowel movements daily. Daptomycin was discontinued per ID guidance as it may have contributed to diarrhea.   She was switched to prednisone 90mg on taper. Will follow-up outpatient for colonoscopy and IBD evaluation.  She will follow-up with Dr. Cruz once  colonoscopy complete.         Oncology History:  - self detected an area under her left arm and it initially seemed to go away (noted around winter holidays)  - 3/4/2022 Outside Mammogram:  Findings:  The breasts are heterogeneously dense, which may obscure small masses.   Left  There is a 31 mm x 18 mm x 21 mm irregularly shaped mass with microlobulated margins seen in the left breast at 2 o'clock in the posterior depth with associated left axillary adenopathy. Largest node depicted on the outside study measures 49 x 23 x 29 mm with cortical thickening and effacement of the hilum. Breast mass is reportedly palpable.   Right  There is no evidence of suspicious masses, calcifications, or other abnormal findings in the right breast.  Impression:  Left  Mass: Left breast 31 mm x 18 mm x 21 mm mass at the posterior 2 o'clock position. Assessment: 5 - Highly suggestive of malignancy. Biopsy is recommended.   Right  There is no mammographic evidence of malignancy in the right breast.  BI-RADS Category:   Overall: 5 - Highly Suggestive of Malignancy  Recommendation:  Ultrasound Guided Core Needle Biopsy of the left breast mass and one of the abnormal left axillary nodes is recommended.     - 3/14/2022 Breast biopsy:  1. 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   Comment:  Infiltrating carcinoma occupies the entirety of biopsied material   with the largest continuous focus measuring 13 mm.  Breast biomarkers are   pending and will be issued in a supplemental report.   2. LEFT AXILLARY LYMPH NODE, BIOPSY:   Invasive ductal carcinoma, [place] histologic grade 3 (tubule formation:   3, nuclear pleomorphism:  3, mitotic activity:  3).   Comment:  Infiltrating carcinoma occupies the entirety of biopsied material   with the largest continuous focus measuring 20 mm.  No lymph node tissue is    identified in the sample.  Tumor histology is essentially identical to that   of part 1.  The findings could represent part of a larger intranodal   metastasis, but an extension from the primary tumor cannot be excluded.   Radiographic correlation is advised.   Note:  Dr. Stephanie Rao also reviewed this case and agrees with the   diagnosis.   BREAST BIOMARKER RESULTS   Estrogen receptor (ER):  Negative (0)   Progesterone receptor (ER):  Negative (0)   HER2 IHC:  Negative (1+)   Ki-67 proliferation index:  80%      - 3/17/2022 Breast MRI:  Findings:  The breasts have heterogeneous fibroglandular tissue. The background parenchymal enhancement is minimal.   Left  There is a 38 mm x 33 mm x 33 mm irregularly shaped mass seen in the left breast at 2 o'clock in the posterior depth, 8.4 cm from the nipple and 1.2 cm from the skin. Associated signal void from a twirl biopsy marker; pathology showed invasive ductal carcinoma.   Posterior to the mass there is abnormal non mass enhancement measuring 29 x 28 mm. The NME is 4 mm from the skin and 2.3 cm from the chest wall. In total the mass and NME measure 54 mm in AP extent.  Overlying skin thickening and edema involving the lateral breast, likely from lymphovascular obstruction. No suspicious skin enhancement. Overall increased vascularity to the left breast.   Four abnormal lymph level 1 and level 2 left axillary lymph nodes. The largest lymph node measures 52 mm x 40 mm x 38 mm which previously underwent biopsy showing metastatic disease. There is an associated radar reflector within the biopsied lymph node.  Right  There is no evidence of suspicious masses, abnormal enhancement, or other abnormal findings in the right breast. No enlarged axillary or internal mammary lymph nodes.   Impression:  Left  Mass: Left breast 38 mm x 33 mm x 33 mm mass at the posterior 2 o'clock position. Assessment: 6 - Known biopsy, proven malignancy. Associated 29 mm NME extending posteriorly  from the mass. In total the mass and NME measure 54 mm in AP extent.  Lymph Node: Abnormal level 1 and 2 left axillary lymph nodes, the largest of which measures 52 mm x 40 mm x 38 mm lymph node and is known biopsy, proven malignancy.   Right  There is no MR evidence of malignancy in the right breast.  BI-RADS Category:   Overall: 6 - Known Biopsy-Proven Malignancy  Recommendation:  Clinical management of known left breast cancer. Patient is established with the breast surgery clinic.      - 3/21/2022 CT C/A/P:  FINDINGS:  Base of Neck: No significant abnormality.  Thoracic soft tissue: A proximally 3.2 x 3.6 x 3.7 cm irregular left breast mass within the lateral aspect with internal biopsy marker, corresponding to findings on prior breast MRI and compatible with malignancy.  Enlarged left axillary lymph nodes, largest measuring approximately 4.2 x 3.6 cm, (series 2, image 27).  CHEST:  -Heart: Normal size. No pericardial effusion.  -Pulmonary vasculature: Pulmonary arteries distribute normally.  There are four pulmonary veins.  -Radha/Mediastinum: No pathologic shelly enlargement.  -Trachea and Proximal airways: Trachea is midline.  Central airways are patent.  No significant bronchial wall thickening or bronchiectasis.  -Lungs/Pleura: Symmetrically expanded without focal consolidation, pneumothorax, or mass.  No pleural effusion or thickening.  -Esophagus: Normal course and caliber.  ABDOMEN:  - Liver: Normal in size and attenuation with no focal hepatic abnormality.  - Gallbladder: No calcified gallstones.  No wall thickening or pericholecystic fluid.  - Bile Ducts: No intra or extrahepatic biliary ductal dilatation.  - Stomach/Duodenum: Small hiatal hernia otherwise unremarkable.  - Spleen: Normal size.  No focal parenchymal abnormality.  - Pancreas: No mass lesion.  No pancreatic ductal dilatation.  No peripancreatic fat stranding.  - Adrenals: Unremarkable.  - Kidneys/ureters/urinary bladder: Normal in size and  location.  Normal enhancement pattern.  No nephrolithiasis.  Ureters are normal in course and caliber.  No hydroureteronephrosis.  - Retroperitoneum: Scattered nonenlarged retroperitoneal lymph nodes.  PELVIS:  - Reproductive: Intrauterine device present.  A proximally 2.8 cm peripherally enhancing right ovarian cyst, likely corpus luteal cyst.  - Other: No pelvic adenopathy, free fluid, or mass.  BOWEL/MESENTERY:  No evidence of bowel obstruction or inflammatory process. Appendix is visualized and unremarkable.  VASCULATURE: Left-sided aortic arch with 3 branch vessels.  No aneurysm and no significant atherosclerosis.  Portal vein, splenic vein, and SMV are patent.  BONES: [place] approximately 0.6 cm right trochanteric lesion, likely synovial herniation pit.  No acute fracture or bony destructive process.  EXTRATHORACIC/EXTRAPERITONEAL SOFT TISSUES: Unremarkable.  Impression:  In this patient with known breast cancer, there is an approximately 3.7 cm irregular left breast mass with left axillary lymphadenopathy.  No evidence of metastatic disease  Small hiatal hernia.  Additional findings as described above.     - 3/21/2022 Bone scan:  FINDINGS:  There is physiologic distribution of the radiopharmaceutical throughout the skeleton.  Mild degenerative uptake within the bilateral shoulders and knees.  There is normal uptake in the genitourinary system and soft tissues.  Impression:  There is no scintigraphic evidence of osteoblastic metastatic disease.     - 3/21/2022 Brain MRI:  FINDINGS:  Please note there is dental metal artifact distorting the examination.  Allowing for artifacts the brain parenchyma is normal in contour.  Developmental variant with cavum septum pellucidum identified.  The ventricles are otherwise normal in size without hydrocephalus.  There is no midline shift or significant mass effect.  The major intracranial T2 flow voids are present.  No restricted diffusion within the non distorted brain  parenchyma.  There is severe distortion of the susceptibility imaging no parenchymal susceptibility to suggest parenchymal hemorrhage in the non distorted brain parenchyma.  There is suboptimal evaluation of the cerebellum and posterior fossa.  Impression:  Unremarkable MRI brain allowing for artifacts from dental metal as detailed above specifically without abnormal parenchymal enhancement to suggest intracranial metastatic disease in light of history.     Repeat pregnancy test negative     PMH:  HTN- around pregnancies; off of blood pressure medication x 2 years  CHF- ECHOs at [facility]  Gestational DM  Hyperlipidemia when heavier, managed with diet and followed by cardiology  C- sections x 2  Iron deficiency     GynHx:  Menarche- 7   (16 at 1st pregnancy) (17, 7, 4)  Still with periods - last 2022- last 5-7 days, moderate normal flow  + breast feeding x 1 year  IUD     SH:  Working, , [employer]  Single  Good support system  No tobacco  No EtOH  No illicit drugs     FH:  Mom- 57 yo, osteoarthritis   Dad- 58 yo, DM, HTN  Maternal uncle-  of metastatic pancreatic cancer at age 57  Paternal grandmother-  of metastatic bladder cancer- unclear what age but thought to be her 60s  No breast cancer  No uterine cancer, no ovarian cancer  No prostate cancer  No melanoma    Review of Systems   Constitutional:         See above   All other systems reviewed and are negative.      Objective:      Physical Exam  Vitals and nursing note reviewed.   Constitutional:       General: She is not in acute distress.     Appearance: Normal appearance. She is well-developed. She is not ill-appearing.      Comments: Presents alone  Very pleasant  ECOG= 0   HENT:      Head: Normocephalic and atraumatic.   Eyes:      General: Lids are normal. No scleral icterus.     Extraocular Movements: Extraocular movements intact.      Conjunctiva/sclera: Conjunctivae normal.      Pupils: Pupils are equal, round, and  reactive to light.   Neck:      Thyroid: No thyromegaly.      Vascular: No JVD.      Trachea: Trachea normal.   Cardiovascular:      Rate and Rhythm: Normal rate and regular rhythm.      Heart sounds: Normal heart sounds. No murmur heard.    No friction rub. No gallop.   Pulmonary:      Effort: Pulmonary effort is normal. No respiratory distress.      Breath sounds: Normal breath sounds. No wheezing, rhonchi or rales.      Comments: Left breast mass much smaller. No LAD  Right sided  port clean and dry - incision healed.   Abdominal:      General: Bowel sounds are normal. There is no distension.      Palpations: Abdomen is soft. There is no mass.      Tenderness: There is no abdominal tenderness. There is no guarding or rebound.      Comments: Pain mid epigastric   Musculoskeletal:         General: No swelling or deformity. Normal range of motion.      Cervical back: Normal range of motion and neck supple.      Right lower leg: No edema.      Left lower leg: No edema.      Comments: No spinal or paraspinal tenderness.    Lymphadenopathy:      Head:      Right side of head: No submental or submandibular adenopathy.      Left side of head: No submental or submandibular adenopathy.      Cervical: No cervical adenopathy.      Upper Body:      Right upper body: No supraclavicular or axillary adenopathy.      Left upper body: No supraclavicular or axillary adenopathy.   Skin:     General: Skin is warm and dry.      Capillary Refill: Capillary refill takes less than 2 seconds.      Coloration: Skin is not jaundiced or pale.      Findings: No bruising or rash.      Nails: There is no clubbing.      Comments: acne to back   Neurological:      General: No focal deficit present.      Mental Status: She is alert and oriented to person, place, and time.      Motor: No weakness.      Coordination: Coordination normal.      Gait: Gait normal.   Psychiatric:         Mood and Affect: Mood normal.         Speech: Speech normal.          Behavior: Behavior normal.         Thought Content: Thought content normal.     Labs- reviewed  Assessment:       Problem List Items Addressed This Visit          Oncology    Malignant neoplasm of upper-outer quadrant of left breast in female, estrogen receptor negative - Primary       GI    Acute pancreatitis    Relevant Medications    predniSONE (DELTASONE) 20 MG tablet    Other Relevant Orders    CBC Oncology    Comprehensive Metabolic Panel    LIPASE    AMYLASE    Abdominal pain     Other Visit Diagnoses       Hx of pancreatitis                Plan:       Labs reviewed   Amylase and lipase improved.   Abd pain improved  Continue to hold treatment. Clinically has response to treatment. May send to surgery.  Reduce prednisone to 40mg daily x 5 days then 20mg x 5 days then 10mg x 5 days  RTC 1 week with labs and to set plan proceed with AC (without pembro as immunotherapy likely culprit of pancreatitis) vs surgery (Dr. Weiner)  ED for any new or worsening symptoms.     Route Chart for Scheduling    Med Onc Chart Routing  Urgent    Follow up with physician 1 week. cbc, cmp lipase and amylase; see larned as virtual day after to determine if reusming chemo or sending to surgery.   Follow up with ANDREW    Infusion scheduling note    Injection scheduling note    Labs    Imaging    Pharmacy appointment    Other referrals        Patient is in agreement with the proposed treatment plan. All questions were answered to the patient's satisfaction. Pt knows to call clinic for any new or worsening symptoms and if anything is needed before the next clinic visit.      JUICE Hester-MARLON  Hematology & Oncology  [facility] [place] [address]  ph. [phone]  Fax. [phone]     Face to Face time with patient: 25 minutes  45 minutes of total time spent on the encounter, which includes face to face time and non-face to face time preparing to see the patient (eg, review of tests), Obtaining and/or reviewing  separately obtained history, Documenting clinical information in the electronic or other health record, Independently interpreting results (not separately reported) and communicating results to the patient/family/caregiver, or Care coordination (not separately reported).

## 2022-10-17 ENCOUNTER — OFFICE VISIT (OUTPATIENT)
Dept: HEMATOLOGY/ONCOLOGY | Facility: CLINIC | Age: 37
End: 2022-10-17
Payer: MEDICAID

## 2022-10-17 ENCOUNTER — LAB VISIT (OUTPATIENT)
Dept: LAB | Facility: HOSPITAL | Age: 37
End: 2022-10-17
Attending: NURSE PRACTITIONER
Payer: MEDICAID

## 2022-10-17 DIAGNOSIS — K85.00 IDIOPATHIC ACUTE PANCREATITIS WITHOUT INFECTION OR NECROSIS: Primary | ICD-10-CM

## 2022-10-17 DIAGNOSIS — R10.10 PAIN OF UPPER ABDOMEN: ICD-10-CM

## 2022-10-17 DIAGNOSIS — Z17.1 MALIGNANT NEOPLASM OF UPPER-OUTER QUADRANT OF LEFT BREAST IN FEMALE, ESTROGEN RECEPTOR NEGATIVE: ICD-10-CM

## 2022-10-17 DIAGNOSIS — C50.412 MALIGNANT NEOPLASM OF UPPER-OUTER QUADRANT OF LEFT BREAST IN FEMALE, ESTROGEN RECEPTOR NEGATIVE: ICD-10-CM

## 2022-10-17 DIAGNOSIS — K85.30 DRUG-INDUCED ACUTE PANCREATITIS, UNSPECIFIED COMPLICATION STATUS: ICD-10-CM

## 2022-10-17 LAB
ALBUMIN SERPL BCP-MCNC: 3.8 G/DL (ref 3.5–5.2)
ALP SERPL-CCNC: 59 U/L (ref 55–135)
ALT SERPL W/O P-5'-P-CCNC: 17 U/L (ref 10–44)
AMYLASE SERPL-CCNC: 466 U/L (ref 20–110)
ANION GAP SERPL CALC-SCNC: 7 MMOL/L (ref 8–16)
AST SERPL-CCNC: 14 U/L (ref 10–40)
BILIRUB SERPL-MCNC: 0.4 MG/DL (ref 0.1–1)
BUN SERPL-MCNC: 17 MG/DL (ref 6–20)
CALCIUM SERPL-MCNC: 9 MG/DL (ref 8.7–10.5)
CHLORIDE SERPL-SCNC: 105 MMOL/L (ref 95–110)
CO2 SERPL-SCNC: 29 MMOL/L (ref 23–29)
CREAT SERPL-MCNC: 0.7 MG/DL (ref 0.5–1.4)
ERYTHROCYTE [DISTWIDTH] IN BLOOD BY AUTOMATED COUNT: 13.5 % (ref 11.5–14.5)
EST. GFR  (NO RACE VARIABLE): >60 ML/MIN/1.73 M^2
GLUCOSE SERPL-MCNC: 117 MG/DL (ref 70–110)
HCT VFR BLD AUTO: 33.2 % (ref 37–48.5)
HGB BLD-MCNC: 10.5 G/DL (ref 12–16)
IMM GRANULOCYTES # BLD AUTO: 0.06 K/UL (ref 0–0.04)
LIPASE SERPL-CCNC: 1404 U/L (ref 4–60)
MCH RBC QN AUTO: 32 PG (ref 27–31)
MCHC RBC AUTO-ENTMCNC: 31.6 G/DL (ref 32–36)
MCV RBC AUTO: 101 FL (ref 82–98)
NEUTROPHILS # BLD AUTO: 3.2 K/UL (ref 1.8–7.7)
PLATELET # BLD AUTO: 202 K/UL (ref 150–450)
PMV BLD AUTO: 9.8 FL (ref 9.2–12.9)
POTASSIUM SERPL-SCNC: 3.1 MMOL/L (ref 3.5–5.1)
PROT SERPL-MCNC: 6.4 G/DL (ref 6–8.4)
RBC # BLD AUTO: 3.28 M/UL (ref 4–5.4)
SODIUM SERPL-SCNC: 141 MMOL/L (ref 136–145)
WBC # BLD AUTO: 5.38 K/UL (ref 3.9–12.7)

## 2022-10-17 PROCEDURE — 1159F MED LIST DOCD IN RCRD: CPT | Mod: CPTII,95,, | Performed by: INTERNAL MEDICINE

## 2022-10-17 PROCEDURE — 1111F DSCHRG MED/CURRENT MED MERGE: CPT | Mod: CPTII,95,, | Performed by: INTERNAL MEDICINE

## 2022-10-17 PROCEDURE — 99214 OFFICE O/P EST MOD 30 MIN: CPT | Mod: 95,,, | Performed by: INTERNAL MEDICINE

## 2022-10-17 PROCEDURE — 1160F RVW MEDS BY RX/DR IN RCRD: CPT | Mod: CPTII,95,, | Performed by: INTERNAL MEDICINE

## 2022-10-17 PROCEDURE — 99214 PR OFFICE/OUTPT VISIT, EST, LEVL IV, 30-39 MIN: ICD-10-PCS | Mod: 95,,, | Performed by: INTERNAL MEDICINE

## 2022-10-17 PROCEDURE — 1160F PR REVIEW ALL MEDS BY PRESCRIBER/CLIN PHARMACIST DOCUMENTED: ICD-10-PCS | Mod: CPTII,95,, | Performed by: INTERNAL MEDICINE

## 2022-10-17 PROCEDURE — 82150 ASSAY OF AMYLASE: CPT | Performed by: NURSE PRACTITIONER

## 2022-10-17 PROCEDURE — 85027 COMPLETE CBC AUTOMATED: CPT | Performed by: NURSE PRACTITIONER

## 2022-10-17 PROCEDURE — 83690 ASSAY OF LIPASE: CPT | Performed by: NURSE PRACTITIONER

## 2022-10-17 PROCEDURE — 1111F PR DISCHARGE MEDS RECONCILED W/ CURRENT OUTPATIENT MED LIST: ICD-10-PCS | Mod: CPTII,95,, | Performed by: INTERNAL MEDICINE

## 2022-10-17 PROCEDURE — 1159F PR MEDICATION LIST DOCUMENTED IN MEDICAL RECORD: ICD-10-PCS | Mod: CPTII,95,, | Performed by: INTERNAL MEDICINE

## 2022-10-17 PROCEDURE — 36415 COLL VENOUS BLD VENIPUNCTURE: CPT | Performed by: NURSE PRACTITIONER

## 2022-10-17 PROCEDURE — 80053 COMPREHEN METABOLIC PANEL: CPT | Performed by: NURSE PRACTITIONER

## 2022-10-17 RX ORDER — PREDNISONE 20 MG/1
40 TABLET ORAL DAILY
Qty: 20 TABLET | Refills: 1 | Status: ON HOLD | OUTPATIENT
Start: 2022-10-17 | End: 2022-11-11 | Stop reason: HOSPADM

## 2022-10-17 NOTE — PROGRESS NOTES
Subjective:       Patient ID: Laura Kent is a 36 y.o. female.    Chief Complaint: Breast Cancer    HPI    The patient location is: home  The chief complaint leading to consultation is: follow up labs and symptoms    Visit type: audiovisual    Face to Face time with patient: 12 minutes  40 minutes of total time spent on the encounter, which includes face to face time and non-face to face time preparing to see the patient (eg, review of tests), Obtaining and/or reviewing separately obtained history, Documenting clinical information in the electronic or other health record, Independently interpreting results (not separately reported) and communicating results to the patient/family/caregiver, or Care coordination (not separately reported).   Each patient to whom he or she provides medical services by telemedicine is:  (1) informed of the relationship between the physician and patient and the respective role of any other health care provider with respect to management of the patient; and (2) notified that he or she may decline to receive medical services by telemedicine and may withdraw from such care at any time.    Notes:     Abdominal pain restarted this AM  Pain free x 2 weeks prior when on higher steroid dose (40 mg)  No N/V  No diarrhea  No fevers, chills or infectious complaints  Started Pred 20 mg qd on Saturday  Reviewed pancreatic enzymes trending back up    - 10/7/2022 CT Abd/Pelvis:  FINDINGS:  Liver: Normal. No mass. Gallbladder and bile ducts: Normal. No calcified stones. No ductal dilation. Pancreas: Normal. No ductal dilation. Spleen: Normal. No splenomegaly. Adrenal glands: Normal. No mass. Kidneys and ureters: Normal. No hydronephrosis. Stomach and bowel: Visualized stomach and bowel are unremarkable. No obstruction. No mucosal thickening. Intraperitoneal space: Unremarkable. No free air. No significant fluid collection. Vasculature: Unremarkable. No abdominal aortic aneurysm. Lymph nodes:  Unremarkable. No enlarged lymph nodes. Reproductive: Normal uterus. An intrauterine device is in place. Bones/joints: Unremarkable. No acute fracture. No dislocation. Soft tissues: Unremarkable.  Impression:  No acute intra-abdominal or intrapelvic pathology.    Review of Systems   Constitutional:  Negative for appetite change and unexpected weight change.   HENT:  Negative for mouth sores.    Eyes:  Positive for visual disturbance.   Respiratory:  Negative for cough and shortness of breath.    Cardiovascular:  Negative for chest pain.   Gastrointestinal:  Positive for abdominal pain. Negative for diarrhea.   Genitourinary:  Negative for frequency.   Musculoskeletal:  Negative for back pain.   Integumentary:  Negative for rash.   Neurological:  Negative for headaches.   Hematological:  Negative for adenopathy.   Psychiatric/Behavioral:  The patient is not nervous/anxious.      See above  Objective:      Physical Exam  virtual  Assessment:       Problem List Items Addressed This Visit    None      Plan:     Prednisone 40 mg x 10 days and then slower taper planned  Reassess next week for AC without Keytruda  Breast imaging prior    Route Chart for Scheduling    Med Onc Chart Routing  Urgent    Follow up with physician . Needs breast ultrasound next week and EP with me after, labs prior   Follow up with ANDREW    Infusion scheduling note    Injection scheduling note    Labs    Imaging    Pharmacy appointment    Other referrals          Treatment Plan Information   OP PEMBROLIZUMAB CARBOPLATIN (AUC 5) WITH WEEKLY PACLITAXEL FOLLOWED BY PEMBROLIZUMAB DOXORUBICIN CYCLOPHOSPHAMIDE FOLLOWED BY PEMBROLIZUMAB 200 MG Q3W   Yessy Cruz MD   Upcoming Treatment Dates - OP PEMBROLIZUMAB CARBOPLATIN (AUC 5) WITH WEEKLY PACLITAXEL FOLLOWED BY PEMBROLIZUMAB DOXORUBICIN CYCLOPHOSPHAMIDE FOLLOWED BY PEMBROLIZUMAB 200 MG Q3W    9/5/2022       Chemotherapy       DOXOrubicin chemo injection 130 mg       cyclophosphamide 600 mg/m2 = 1,300  mg in sodium chloride 0.9% 250 mL chemo infusion       Antiemetics       aprepitant (CINVANTI) injection 130 mg       palonosetron 0.25mg/dexamethasone 12mg in NS IVPB       Immunotherapy       pembrolizumab (KEYTRUDA) 200 mg in sodium chloride 0.9% 108 mL infusion  9/26/2022       Chemotherapy       DOXOrubicin chemo injection 130 mg       cyclophosphamide 600 mg/m2 = 1,300 mg in sodium chloride 0.9% 250 mL chemo infusion       Antiemetics       aprepitant (CINVANTI) injection 130 mg       palonosetron 0.25mg/dexamethasone 12mg in NS IVPB       Immunotherapy       pembrolizumab (KEYTRUDA) 200 mg in sodium chloride 0.9% 108 mL infusion  10/17/2022       Chemotherapy       DOXOrubicin chemo injection 130 mg       cyclophosphamide 600 mg/m2 = 1,300 mg in sodium chloride 0.9% 250 mL chemo infusion       Antiemetics       aprepitant (CINVANTI) injection 130 mg       palonosetron 0.25mg/dexamethasone 12mg in NS IVPB       Immunotherapy       pembrolizumab (KEYTRUDA) 200 mg in sodium chloride 0.9% 108 mL infusion  11/7/2022       Chemotherapy       DOXOrubicin chemo injection 130 mg       cyclophosphamide 600 mg/m2 = 1,300 mg in sodium chloride 0.9% 250 mL chemo infusion       Antiemetics       aprepitant (CINVANTI) injection 130 mg       palonosetron 0.25mg/dexamethasone 12mg in NS IVPB       Immunotherapy       pembrolizumab (KEYTRUDA) 200 mg in sodium chloride 0.9% 108 mL infusion    Supportive Plan Information  OP FILGRASTIM 300 MCG   Yessy Cruz MD   Upcoming Treatment Dates - OP FILGRASTIM 300 MCG    6/20/2022       Medications       tbo-filgrastim (GRANIX) injection 480 mcg/0.8 mL  6/21/2022       Medications       tbo-filgrastim (GRANIX) injection 480 mcg/0.8 mL  6/22/2022       Medications       tbo-filgrastim (GRANIX) injection 480 mcg/0.8 mL  6/23/2022       Medications       tbo-filgrastim (GRANIX) injection 480 mcg/0.8 mL    Therapy Plan Information  Flushes  heparin, porcine (PF) 100 unit/mL injection  flush 500 Units  500 Units, Intravenous, Every visit  sodium chloride 0.9% flush 10 mL  10 mL, Intravenous, Every visit  heparin, porcine (PF) 100 unit/mL injection flush 500 Units  500 Units, Intravenous, Every visit  sodium chloride 0.9% flush 10 mL  10 mL, Intravenous, Every visit

## 2022-10-18 DIAGNOSIS — Z17.1 MALIGNANT NEOPLASM OF UPPER-OUTER QUADRANT OF LEFT BREAST IN FEMALE, ESTROGEN RECEPTOR NEGATIVE: Primary | ICD-10-CM

## 2022-10-18 DIAGNOSIS — C50.412 MALIGNANT NEOPLASM OF UPPER-OUTER QUADRANT OF LEFT BREAST IN FEMALE, ESTROGEN RECEPTOR NEGATIVE: Primary | ICD-10-CM

## 2022-10-20 ENCOUNTER — HOSPITAL ENCOUNTER (OUTPATIENT)
Dept: RADIOLOGY | Facility: HOSPITAL | Age: 37
Discharge: HOME | End: 2022-10-20
Attending: NURSE PRACTITIONER
Payer: MEDICAID

## 2022-10-20 ENCOUNTER — LAB VISIT (OUTPATIENT)
Dept: LAB | Facility: HOSPITAL | Age: 37
End: 2022-10-20
Attending: INTERNAL MEDICINE
Payer: MEDICAID

## 2022-10-20 DIAGNOSIS — Z17.1 MALIGNANT NEOPLASM OF UPPER-OUTER QUADRANT OF LEFT BREAST IN FEMALE, ESTROGEN RECEPTOR NEGATIVE: ICD-10-CM

## 2022-10-20 DIAGNOSIS — C50.412 MALIGNANT NEOPLASM OF UPPER-OUTER QUADRANT OF LEFT BREAST IN FEMALE, ESTROGEN RECEPTOR NEGATIVE: ICD-10-CM

## 2022-10-20 LAB
ALBUMIN SERPL BCP-MCNC: 3.9 G/DL (ref 3.5–5.2)
ALP SERPL-CCNC: 58 U/L (ref 55–135)
ALT SERPL W/O P-5'-P-CCNC: 19 U/L (ref 10–44)
ANION GAP SERPL CALC-SCNC: 10 MMOL/L (ref 8–16)
AST SERPL-CCNC: 14 U/L (ref 10–40)
BASOPHILS # BLD AUTO: 0.01 K/UL (ref 0–0.2)
BASOPHILS NFR BLD: 0.1 % (ref 0–1.9)
BILIRUB SERPL-MCNC: 0.3 MG/DL (ref 0.1–1)
BUN SERPL-MCNC: 17 MG/DL (ref 6–20)
CALCIUM SERPL-MCNC: 9.4 MG/DL (ref 8.7–10.5)
CHLORIDE SERPL-SCNC: 105 MMOL/L (ref 95–110)
CO2 SERPL-SCNC: 23 MMOL/L (ref 23–29)
CREAT SERPL-MCNC: 0.8 MG/DL (ref 0.5–1.4)
DIFFERENTIAL METHOD: ABNORMAL
EOSINOPHIL # BLD AUTO: 0 K/UL (ref 0–0.5)
EOSINOPHIL NFR BLD: 0 % (ref 0–8)
ERYTHROCYTE [DISTWIDTH] IN BLOOD BY AUTOMATED COUNT: 13.2 % (ref 11.5–14.5)
EST. GFR  (NO RACE VARIABLE): >60 ML/MIN/1.73 M^2
GLUCOSE SERPL-MCNC: 120 MG/DL (ref 70–110)
HCT VFR BLD AUTO: 34.3 % (ref 37–48.5)
HGB BLD-MCNC: 11.1 G/DL (ref 12–16)
IMM GRANULOCYTES # BLD AUTO: 0.06 K/UL (ref 0–0.04)
IMM GRANULOCYTES NFR BLD AUTO: 0.8 % (ref 0–0.5)
LYMPHOCYTES # BLD AUTO: 1.1 K/UL (ref 1–4.8)
LYMPHOCYTES NFR BLD: 14.7 % (ref 18–48)
MCH RBC QN AUTO: 32 PG (ref 27–31)
MCHC RBC AUTO-ENTMCNC: 32.4 G/DL (ref 32–36)
MCV RBC AUTO: 99 FL (ref 82–98)
MONOCYTES # BLD AUTO: 0.3 K/UL (ref 0.3–1)
MONOCYTES NFR BLD: 3.6 % (ref 4–15)
NEUTROPHILS # BLD AUTO: 6 K/UL (ref 1.8–7.7)
NEUTROPHILS NFR BLD: 80.8 % (ref 38–73)
NRBC BLD-RTO: 0 /100 WBC
PLATELET # BLD AUTO: 244 K/UL (ref 150–450)
PMV BLD AUTO: 9.4 FL (ref 9.2–12.9)
POTASSIUM SERPL-SCNC: 4 MMOL/L (ref 3.5–5.1)
PROT SERPL-MCNC: 6.8 G/DL (ref 6–8.4)
RBC # BLD AUTO: 3.47 M/UL (ref 4–5.4)
SODIUM SERPL-SCNC: 138 MMOL/L (ref 136–145)
WBC # BLD AUTO: 7.4 K/UL (ref 3.9–12.7)

## 2022-10-20 PROCEDURE — 80053 COMPREHEN METABOLIC PANEL: CPT | Performed by: INTERNAL MEDICINE

## 2022-10-20 PROCEDURE — 76642 ULTRASOUND BREAST LIMITED: CPT | Mod: TC,LT

## 2022-10-20 PROCEDURE — 76642 ULTRASOUND BREAST LIMITED: CPT | Mod: 26,LT,, | Performed by: RADIOLOGY

## 2022-10-20 PROCEDURE — 36415 COLL VENOUS BLD VENIPUNCTURE: CPT | Performed by: INTERNAL MEDICINE

## 2022-10-20 PROCEDURE — 76642 US BREAST LEFT LIMITED: ICD-10-PCS | Mod: 26,LT,, | Performed by: RADIOLOGY

## 2022-10-20 PROCEDURE — 85025 COMPLETE CBC W/AUTO DIFF WBC: CPT | Performed by: INTERNAL MEDICINE

## 2022-10-24 ENCOUNTER — OFFICE VISIT (OUTPATIENT)
Dept: HEMATOLOGY/ONCOLOGY | Facility: CLINIC | Age: 37
End: 2022-10-24
Payer: MEDICAID

## 2022-10-24 VITALS
WEIGHT: 224.31 LBS | OXYGEN SATURATION: 100 % | RESPIRATION RATE: 19 BRPM | DIASTOLIC BLOOD PRESSURE: 94 MMHG | HEART RATE: 104 BPM | SYSTOLIC BLOOD PRESSURE: 155 MMHG | BODY MASS INDEX: 37.37 KG/M2 | TEMPERATURE: 98 F | HEIGHT: 65 IN

## 2022-10-24 DIAGNOSIS — Z17.1 MALIGNANT NEOPLASM OF UPPER-OUTER QUADRANT OF LEFT BREAST IN FEMALE, ESTROGEN RECEPTOR NEGATIVE: Primary | ICD-10-CM

## 2022-10-24 DIAGNOSIS — B95.8 STAPH SKIN INFECTION: ICD-10-CM

## 2022-10-24 DIAGNOSIS — C50.412 MALIGNANT NEOPLASM OF UPPER-OUTER QUADRANT OF LEFT BREAST IN FEMALE, ESTROGEN RECEPTOR NEGATIVE: Primary | ICD-10-CM

## 2022-10-24 DIAGNOSIS — B37.9 YEAST INFECTION: ICD-10-CM

## 2022-10-24 DIAGNOSIS — L08.9 STAPH SKIN INFECTION: ICD-10-CM

## 2022-10-24 DIAGNOSIS — K85.30 DRUG-INDUCED ACUTE PANCREATITIS, UNSPECIFIED COMPLICATION STATUS: ICD-10-CM

## 2022-10-24 DIAGNOSIS — D63.0 ANEMIA IN NEOPLASTIC DISEASE: ICD-10-CM

## 2022-10-24 PROCEDURE — 3077F PR MOST RECENT SYSTOLIC BLOOD PRESSURE >= 140 MM HG: ICD-10-PCS | Mod: CPTII,,, | Performed by: INTERNAL MEDICINE

## 2022-10-24 PROCEDURE — 1159F MED LIST DOCD IN RCRD: CPT | Mod: CPTII,,, | Performed by: INTERNAL MEDICINE

## 2022-10-24 PROCEDURE — 3080F DIAST BP >= 90 MM HG: CPT | Mod: CPTII,,, | Performed by: INTERNAL MEDICINE

## 2022-10-24 PROCEDURE — 3077F SYST BP >= 140 MM HG: CPT | Mod: CPTII,,, | Performed by: INTERNAL MEDICINE

## 2022-10-24 PROCEDURE — 1160F RVW MEDS BY RX/DR IN RCRD: CPT | Mod: CPTII,,, | Performed by: INTERNAL MEDICINE

## 2022-10-24 PROCEDURE — 99999 PR PBB SHADOW E&M-EST. PATIENT-LVL IV: CPT | Mod: PBBFAC,,, | Performed by: INTERNAL MEDICINE

## 2022-10-24 PROCEDURE — 99999 PR PBB SHADOW E&M-EST. PATIENT-LVL IV: ICD-10-PCS | Mod: PBBFAC,,, | Performed by: INTERNAL MEDICINE

## 2022-10-24 PROCEDURE — 99215 OFFICE O/P EST HI 40 MIN: CPT | Mod: S$PBB,,, | Performed by: INTERNAL MEDICINE

## 2022-10-24 PROCEDURE — 99215 PR OFFICE/OUTPT VISIT, EST, LEVL V, 40-54 MIN: ICD-10-PCS | Mod: S$PBB,,, | Performed by: INTERNAL MEDICINE

## 2022-10-24 PROCEDURE — 1159F PR MEDICATION LIST DOCUMENTED IN MEDICAL RECORD: ICD-10-PCS | Mod: CPTII,,, | Performed by: INTERNAL MEDICINE

## 2022-10-24 PROCEDURE — 1160F PR REVIEW ALL MEDS BY PRESCRIBER/CLIN PHARMACIST DOCUMENTED: ICD-10-PCS | Mod: CPTII,,, | Performed by: INTERNAL MEDICINE

## 2022-10-24 PROCEDURE — 99214 OFFICE O/P EST MOD 30 MIN: CPT | Mod: PBBFAC | Performed by: INTERNAL MEDICINE

## 2022-10-24 PROCEDURE — 3080F PR MOST RECENT DIASTOLIC BLOOD PRESSURE >= 90 MM HG: ICD-10-PCS | Mod: CPTII,,, | Performed by: INTERNAL MEDICINE

## 2022-10-24 RX ORDER — NYSTATIN 100000 U/G
CREAM TOPICAL 2 TIMES DAILY
Qty: 15 G | Refills: 0 | Status: ON HOLD | OUTPATIENT
Start: 2022-10-24 | End: 2022-11-11 | Stop reason: HOSPADM

## 2022-10-24 RX ORDER — NYSTATIN 100000 [USP'U]/G
POWDER TOPICAL 2 TIMES DAILY
Qty: 30 G | Refills: 0 | Status: ON HOLD | OUTPATIENT
Start: 2022-10-24 | End: 2022-11-11 | Stop reason: HOSPADM

## 2022-10-24 RX ORDER — DOXYCYCLINE 100 MG/1
100 CAPSULE ORAL 2 TIMES DAILY
Qty: 14 CAPSULE | Refills: 0 | Status: SHIPPED | OUTPATIENT
Start: 2022-10-24 | End: 2022-10-31

## 2022-10-24 NOTE — PROGRESS NOTES
"Subjective:       Patient ID: Laura Kent is a 36 y.o. female.    Chief Complaint: Idiopathic acute pancreatitis without infection or necrosis and Breast Cancer    HPI    Returns to discuss restarting NA therapy without immunotherapy and to review repeat breast imaging    In the interval:  Eating better now  Now avoiding fried foods- does smoothies  Has gained weight due to steroid  No N/V- no vomiting since hospitalization  Reports normal stools now- no blood, no diarrhea, no changes in color or appearance  No abdominal pain since restarting steroids  Notes pain returns if takes to late     Reports "great" energy level  Reports historic issues sleeping- no changes    10/20/2022 Ultrasound left breast:  Findings:  At the site of the marker from the prior malignant breast biopsy,there is a 6 mm x 7 mm x 3 mm oval, parallel, hypoechoic mass with indistinct margins seen in the left breast at 2 o'clock in the posterior depth, 8 cm from the nipple. This is difficult to distinguish from the Twirl marker itself.  At the site of the previously biopsied metastatic left axillary node, there is a 10 x 7 x 11 mm node with a Uyen- marker. Node has mild residual cortical thickening (5 mm), but has decreased in size. There are a few other normal size, normal appearing left axillary nodes.   Impression:  Left  Mass: Left breast 6 mm x 7 mm x 3 mm mass at the posterior 2 o'clock position. Assessment: 6 - Known biopsy, proven malignancy. Known, previously biopsied left breast cancer and metastatic left axillary node have both decreased in size. The breast mass is now difficult to distinguish from the biopsy marker.   BI-RADS Category:   Overall: 6 - Known Biopsy-Proven Malignancy  Recommendation:  Patient is already under the care of the Breast Oncology team. Most recent bilateral mammogram was performed in February 2022.     Most recent imaging regarding pancreatitis:  - 9/16/2022 MRI " Abdomen:  FINDINGS:  ABDOMEN:  LIVER: Enlarged measuring 19.8 cm in craniocaudal dimension.  Normal background signal.  Two punctate nonenhancing T2 hyperintensities consistent with cysts.  Portal and hepatic veins are patent.  SPLEEN: Enlarged measuring 12.0 cm in craniocaudal dimension.  No focal lesion.  PANCREAS: Normal signal/enhancement.  No significant peripancreatic inflammatory changes.  No solid mass.  ADRENAL: Normal.  KIDNEY: No solid renal mass.  No hydronephrosis.  RETROPERITONEUM: No adenopathy.  MISCELLANEOUS: Multiple loops of prominent air/fluid-filled small bowel in the mid abdomen measuring up to 3.3 cm in maximum diameter, partially visualized.  Small volume bilateral dependent pleural fluid.  No aneurysm.  No focal suspicious osseous lesion.  MRCP:  Mildly distended gallbladder. No cholelithiasis.  Normal caliber intra and extrahepatic biliary ducts.  Normal tapering of common bile duct at the level of the ampulla.  No focal stricture or filling defect.  Pancreatic duct measures within normal limits. No ductal strictures present.  Impression:  Satisfactory MRCP evaluation.  No abnormal biliary duct dilatation.  No cholelithiasis or choledocholithiasis.  Multiple prominent loops of air/fluid-filled small bowel in the mid abdomen, incompletely characterized.  Could represent ileus.  Recommend correlation with clinical history and exam.  Hepatosplenomegaly.  Small bilateral dependent pleural effusions.  Additional findings as above     - 9/8/2022 CT Abdomen:  FINDINGS:  Heart: Normal in size.  Trace pericardial effusion  Lung Bases: 0.5 cm pulmonary nodule in the left lower lobe (axial series 2, image 31), stable in comparison to CT 03/30/2022.  trace bilateral effusions.  Liver: Enlarged in size, measures 20.1 cm in craniocaudal dimension.  No focal lesion.  Gallbladder: No calcified gallstones.  Bile Ducts: No evidence of dilated ducts.  Pancreas: Mild peripancreatic fat stranding slightly  increased compared to September 3.  Multiple prominent lymph nodes, likely reactive.  Spleen: Enlarged in size, measures 14.7 cm in AP dimension.  Adrenals: Unremarkable.  Kidneys/ Ureters: Both kidneys are normal in size and location.  No nephrolithiasis or hydronephrosis.  No solid renal masses.  Both ureters are normal in course and caliber with no hydroureter.  Bladder: No evidence of wall thickening.  Reproductive organs: IUD device in place.  GI Tract/Mesentery: The stomach is unremarkable.  No evidence of bowel obstruction or inflammation.  The appendix is unremarkable.  Peritoneal Space: No ascites. No free air.  Retroperitoneum: Scattered nonenlarged para-aortic nodes noted.  Abdominal wall: Scattered foci of air in the subcutaneous tissue along the anterior abdominal wall, likely representing sites of injection.  Vasculature: No significant atherosclerosis or aneurysm.  Bones: No acute fracture.  Impression:  Slightly increased mild peripancreatic fat stranding with surrounding multiple prominent lymph nodes, allowing for limited evaluation due to lack of IV contrast.  Findings consistent with the clinical history of pancreatitis, no loculated fluid collections.  Stable 0.5 cm pulmonary nodule in the left lower lobe.  Hepatosplenomegaly.  Small pericardial effusion and trace bilateral pleural effusion.  Additional findings, as above.     Admitted 9/3-9/16/2022   Cancelled last week's follow up appointment as had eye appointment the same day   Per discharge note:  Hospital Course: Ms. Pacheco was initially admitted for abdominal pain likely from pancreatitis due to her cancer treatment regimen which she received 8/29. Lipase was found to be in the 200s with elevated LFTs with pancreatic fat stranding seen on CT. She was treated with anti-emetics, IVF, pain medications. She became febrile the night after admission. Broad spectrum antibiotics as well as Flagyl were started day 2 of hospitalization for  intraabdominal coverage, but continued to remain febrile. She also developed copious diarrhea after antibiotics were started. She continued to remain febrile through 9/8 with Tmax of 103. Infectious Disease was consulted. Stool studies were ordered which were negative C.Diff negative x2. She completed 5 day course of Unasyn. Daptomycin started for history of VRE. Infectious work-up negative and CT chest/abd/pelvis without acute findings. DVT/PE ruled out.   RUQ US ordered to evaluate biliary tract and liver, showing biliary sludge. MRCP was ordered per GI recommendations which showed biliary sludge with no stones. She continued to have diarrhea throughout her admission up to 20 times a day, watery, and sometimes green in color. 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 Loperamide, lomotil, Creon administered prn helped reduce episodes to 3-4 times daily w/ puree consistency. She was also started on high dose methylprednisolone for potential colitis. Her stools have become more formed and are now brown and soft however she is still having 2-3 bowel movements daily. Daptomycin was discontinued per ID guidance as it may have contributed to diarrhea.   She was switched to prednisone 90mg on taper. Will follow-up outpatient for colonoscopy and IBD evaluation.  She will follow-up with Dr. Cruz once colonoscopy complete.         Oncology History:  - self detected an area under her left arm and it initially seemed to go away (noted around winter holidays)  - 3/4/2022 Outside Mammogram:  Findings:  The breasts are heterogeneously dense, which may obscure small masses.   Left  There is a 31 mm x 18 mm x 21 mm irregularly shaped mass with microlobulated margins seen in the left breast at 2 o'clock in the posterior depth with associated left axillary adenopathy. Largest node depicted on the outside study measures 49 x 23 x 29 mm with cortical thickening and effacement of the hilum.  Breast mass is reportedly palpable.   Right  There is no evidence of suspicious masses, calcifications, or other abnormal findings in the right breast.  Impression:  Left  Mass: Left breast 31 mm x 18 mm x 21 mm mass at the posterior 2 o'clock position. Assessment: 5 - Highly suggestive of malignancy. Biopsy is recommended.   Right  There is no mammographic evidence of malignancy in the right breast.  BI-RADS Category:   Overall: 5 - Highly Suggestive of Malignancy  Recommendation:  Ultrasound Guided Core Needle Biopsy of the left breast mass and one of the abnormal left axillary nodes is recommended.     - 3/14/2022 Breast biopsy:  1. LEFT BREAST MASS, 2 O'CLOCK, BIOPSY:   Invasive ductal carcinoma, [place] histologic grade 3 (tubule formation:   3, nuclear pleomorphism:  3, mitotic activity:  3).   Comment:  Infiltrating carcinoma occupies the entirety of biopsied material   with the largest continuous focus measuring 13 mm.  Breast biomarkers are   pending and will be issued in a supplemental report.   2. LEFT AXILLARY LYMPH NODE, BIOPSY:   Invasive ductal carcinoma, [place] histologic grade 3 (tubule formation:   3, nuclear pleomorphism:  3, mitotic activity:  3).   Comment:  Infiltrating carcinoma occupies the entirety of biopsied material   with the largest continuous focus measuring 20 mm.  No lymph node tissue is   identified in the sample.  Tumor histology is essentially identical to that   of part 1.  The findings could represent part of a larger intranodal   metastasis, but an extension from the primary tumor cannot be excluded.   Radiographic correlation is advised.   Note:  Dr. Stephanie Rao also reviewed this case and agrees with the   diagnosis.   BREAST BIOMARKER RESULTS   Estrogen receptor (ER):  Negative (0)   Progesterone receptor (ER):  Negative (0)   HER2 IHC:  Negative (1+)   Ki-67 proliferation index:  80%      - 3/17/2022 Breast MRI:  Findings:  The breasts have heterogeneous  fibroglandular tissue. The background parenchymal enhancement is minimal.   Left  There is a 38 mm x 33 mm x 33 mm irregularly shaped mass seen in the left breast at 2 o'clock in the posterior depth, 8.4 cm from the nipple and 1.2 cm from the skin. Associated signal void from a twirl biopsy marker; pathology showed invasive ductal carcinoma.   Posterior to the mass there is abnormal non mass enhancement measuring 29 x 28 mm. The NME is 4 mm from the skin and 2.3 cm from the chest wall. In total the mass and NME measure 54 mm in AP extent.  Overlying skin thickening and edema involving the lateral breast, likely from lymphovascular obstruction. No suspicious skin enhancement. Overall increased vascularity to the left breast.   Four abnormal lymph level 1 and level 2 left axillary lymph nodes. The largest lymph node measures 52 mm x 40 mm x 38 mm which previously underwent biopsy showing metastatic disease. There is an associated radar reflector within the biopsied lymph node.  Right  There is no evidence of suspicious masses, abnormal enhancement, or other abnormal findings in the right breast. No enlarged axillary or internal mammary lymph nodes.   Impression:  Left  Mass: Left breast 38 mm x 33 mm x 33 mm mass at the posterior 2 o'clock position. Assessment: 6 - Known biopsy, proven malignancy. Associated 29 mm NME extending posteriorly from the mass. In total the mass and NME measure 54 mm in AP extent.  Lymph Node: Abnormal level 1 and 2 left axillary lymph nodes, the largest of which measures 52 mm x 40 mm x 38 mm lymph node and is known biopsy, proven malignancy.   Right  There is no MR evidence of malignancy in the right breast.  BI-RADS Category:   Overall: 6 - Known Biopsy-Proven Malignancy  Recommendation:  Clinical management of known left breast cancer. Patient is established with the breast surgery clinic.      - 3/21/2022 CT C/A/P:  FINDINGS:  Base of Neck: No significant abnormality.  Thoracic soft  tissue: A proximally 3.2 x 3.6 x 3.7 cm irregular left breast mass within the lateral aspect with internal biopsy marker, corresponding to findings on prior breast MRI and compatible with malignancy.  Enlarged left axillary lymph nodes, largest measuring approximately 4.2 x 3.6 cm, (series 2, image 27).  CHEST:  -Heart: Normal size. No pericardial effusion.  -Pulmonary vasculature: Pulmonary arteries distribute normally.  There are four pulmonary veins.  -Radha/Mediastinum: No pathologic shelly enlargement.  -Trachea and Proximal airways: Trachea is midline.  Central airways are patent.  No significant bronchial wall thickening or bronchiectasis.  -Lungs/Pleura: Symmetrically expanded without focal consolidation, pneumothorax, or mass.  No pleural effusion or thickening.  -Esophagus: Normal course and caliber.  ABDOMEN:  - Liver: Normal in size and attenuation with no focal hepatic abnormality.  - Gallbladder: No calcified gallstones.  No wall thickening or pericholecystic fluid.  - Bile Ducts: No intra or extrahepatic biliary ductal dilatation.  - Stomach/Duodenum: Small hiatal hernia otherwise unremarkable.  - Spleen: Normal size.  No focal parenchymal abnormality.  - Pancreas: No mass lesion.  No pancreatic ductal dilatation.  No peripancreatic fat stranding.  - Adrenals: Unremarkable.  - Kidneys/ureters/urinary bladder: Normal in size and location.  Normal enhancement pattern.  No nephrolithiasis.  Ureters are normal in course and caliber.  No hydroureteronephrosis.  - Retroperitoneum: Scattered nonenlarged retroperitoneal lymph nodes.  PELVIS:  - Reproductive: Intrauterine device present.  A proximally 2.8 cm peripherally enhancing right ovarian cyst, likely corpus luteal cyst.  - Other: No pelvic adenopathy, free fluid, or mass.  BOWEL/MESENTERY:  No evidence of bowel obstruction or inflammatory process. Appendix is visualized and unremarkable.  VASCULATURE: Left-sided aortic arch with 3 branch vessels.  No  aneurysm and no significant atherosclerosis.  Portal vein, splenic vein, and SMV are patent.  BONES: Lucent approximately 0.6 cm right trochanteric lesion, likely synovial herniation pit.  No acute fracture or bony destructive process.  EXTRATHORACIC/EXTRAPERITONEAL SOFT TISSUES: Unremarkable.  Impression:  In this patient with known breast cancer, there is an approximately 3.7 cm irregular left breast mass with left axillary lymphadenopathy.  No evidence of metastatic disease  Small hiatal hernia.  Additional findings as described above.     - 3/21/2022 Bone scan:  FINDINGS:  There is physiologic distribution of the radiopharmaceutical throughout the skeleton.  Mild degenerative uptake within the bilateral shoulders and knees.  There is normal uptake in the genitourinary system and soft tissues.  Impression:  There is no scintigraphic evidence of osteoblastic metastatic disease.     - 3/21/2022 Brain MRI:  FINDINGS:  Please note there is dental metal artifact distorting the examination.  Allowing for artifacts the brain parenchyma is normal in contour.  Developmental variant with cavum septum pellucidum identified.  The ventricles are otherwise normal in size without hydrocephalus.  There is no midline shift or significant mass effect.  The major intracranial T2 flow voids are present.  No restricted diffusion within the non distorted brain parenchyma.  There is severe distortion of the susceptibility imaging no parenchymal susceptibility to suggest parenchymal hemorrhage in the non distorted brain parenchyma.  There is suboptimal evaluation of the cerebellum and posterior fossa.  Impression:  Unremarkable MRI brain allowing for artifacts from dental metal as detailed above specifically without abnormal parenchymal enhancement to suggest intracranial metastatic disease in light of history.     - Initiated NA therapy on 4/13/2022  C4 held with pancreatitis     PMH:  HTN- around pregnancies; off of blood pressure  medication x 2 years  CHF- [facility]CHOs at [facility]3]  Gestational DM  Hyperlipidemia when heavier, managed with diet and followed by cardiology  C- sections x 2  Iron deficiency     GynHx:  Menarche- 7   (16 at 1st pregnancy) (17, 7, 4)  Still with periods - last 2022- last 5-7 days, moderate normal flow  + breast feeding x 1 year  IUD     SH:  Working, , [employer] and [employer]  Single  Good support system  No tobacco  No EtOH  No illicit drugs     FH:  Mom- 55 yo, osteoarthritis   Dad- 58 yo, DM, HTN  Maternal uncle-  of metastatic pancreatic cancer at age 57  Paternal grandmother-  of metastatic bladder cancer- unclear what age but thought to be her 60s  No breast cancer  No uterine cancer, no ovarian cancer  No prostate cancer  No melanoma    Review of Systems   Constitutional:  Negative for appetite change and unexpected weight change.   HENT:  Negative for mouth sores.    Eyes:  Negative for visual disturbance.   Respiratory:  Negative for cough and shortness of breath.    Cardiovascular:  Negative for chest pain.   Gastrointestinal:  Negative for abdominal pain and diarrhea.   Genitourinary:  Negative for frequency.   Musculoskeletal:  Negative for back pain.   Integumentary:  Negative for rash.   Neurological:  Negative for headaches.   Hematological:  Negative for adenopathy.   Psychiatric/Behavioral:  The patient is not nervous/anxious.        Objective:      Physical Exam  Vitals and nursing note reviewed.   Constitutional:       General: She is not in acute distress.     Appearance: Normal appearance. She is well-developed. She is not ill-appearing.      Comments: Presents alone  Very pleasant  ECOG= 0  Tearful today.    HENT:      Head: Normocephalic and atraumatic.   Eyes:      General: Lids are normal. No scleral icterus.     Extraocular Movements: Extraocular movements intact.      Conjunctiva/sclera: Conjunctivae normal.      Pupils: Pupils are equal, round, and reactive to light.    Neck:      Thyroid: No thyromegaly.      Vascular: No JVD.      Trachea: Trachea normal.   Cardiovascular:      Rate and Rhythm: Normal rate and regular rhythm.      Heart sounds: Normal heart sounds. No murmur heard.    No friction rub. No gallop.   Pulmonary:      Effort: Pulmonary effort is normal. No respiratory distress.      Breath sounds: Normal breath sounds. No wheezing, rhonchi or rales.      Comments: Left breast mass much smaller. No LAD  Right sided  port clean and dry - incision healed.   Abdominal:      General: Bowel sounds are normal. There is no distension.      Palpations: Abdomen is soft. There is no mass.      Tenderness: There is no abdominal tenderness. There is no guarding or rebound.      Comments: Pain mid epigastric   Musculoskeletal:         General: No swelling or deformity. Normal range of motion.      Cervical back: Normal range of motion and neck supple.      Right lower leg: No edema.      Left lower leg: No edema.      Comments: No spinal or paraspinal tenderness.    Lymphadenopathy:      Head:      Right side of head: No submental or submandibular adenopathy.      Left side of head: No submental or submandibular adenopathy.      Cervical: No cervical adenopathy.      Upper Body:      Right upper body: No supraclavicular or axillary adenopathy.      Left upper body: No supraclavicular or axillary adenopathy.   Skin:     General: Skin is warm and dry.      Capillary Refill: Capillary refill takes less than 2 seconds.      Coloration: Skin is not jaundiced or pale.      Findings: Rash (staph back, yeast azilla and under breast) present. No bruising.      Nails: There is no clubbing.      Comments: acne to back   Neurological:      General: No focal deficit present.      Mental Status: She is alert and oriented to person, place, and time. Mental status is at baseline.      Sensory: No sensory deficit.      Motor: No weakness.      Coordination: Coordination normal.      Gait: Gait  normal.   Psychiatric:         Mood and Affect: Mood normal.         Speech: Speech normal.         Behavior: Behavior normal.         Thought Content: Thought content normal.       Assessment:       Problem List Items Addressed This Visit       Yeast infection    Staph skin infection    Malignant neoplasm of upper-outer quadrant of left breast in female, estrogen receptor negative - Primary    Anemia in neoplastic disease    Acute pancreatitis       Plan:       Yeast infection- Nystatin prescribed  Ointment at night, powder during day     Staph infection- back acneiform rash- Doxycycline sent    Breast cancer - clinical response  No further immunotherapy with side effects  Will need further chemotherapy and reviewed with patient rationale and the pros and cons of further therapy  Agrees to completion of AC  Discussed Neulasta    Desires Monday appointments    Route Chart for Scheduling    Med Onc Chart Routing  Urgent    Follow up with physician . Start AC again next Monday 10/31 with Neulasta to follow; needs cbc, cmp prior-- 11/28/2022 cbc, cmmp EP with me and chemo   Follow up with ANDREW . 11/14/2022 cbc, cmp and chemo- see PJ   Infusion scheduling note    Injection scheduling note    Labs    Imaging    Pharmacy appointment    Other referrals        Treatment Plan Information   OP PEMBROLIZUMAB CARBOPLATIN (AUC 5) WITH WEEKLY PACLITAXEL FOLLOWED BY PEMBROLIZUMAB DOXORUBICIN CYCLOPHOSPHAMIDE FOLLOWED BY PEMBROLIZUMAB 200 MG Q3W   Yessy Cruz MD   Upcoming Treatment Dates - OP PEMBROLIZUMAB CARBOPLATIN (AUC 5) WITH WEEKLY PACLITAXEL FOLLOWED BY PEMBROLIZUMAB DOXORUBICIN CYCLOPHOSPHAMIDE FOLLOWED BY PEMBROLIZUMAB 200 MG Q3W    9/5/2022       Chemotherapy       DOXOrubicin chemo injection 130 mg       cyclophosphamide 600 mg/m2 = 1,300 mg in sodium chloride 0.9% 250 mL chemo infusion       Antiemetics       aprepitant (CINVANTI) injection 130 mg       palonosetron 0.25mg/dexamethasone 12mg in NS IVPB        Immunotherapy       pembrolizumab (KEYTRUDA) 200 mg in sodium chloride 0.9% 108 mL infusion  9/26/2022       Chemotherapy       DOXOrubicin chemo injection 130 mg       cyclophosphamide 600 mg/m2 = 1,300 mg in sodium chloride 0.9% 250 mL chemo infusion       Antiemetics       aprepitant (CINVANTI) injection 130 mg       palonosetron 0.25mg/dexamethasone 12mg in NS IVPB       Immunotherapy       pembrolizumab (KEYTRUDA) 200 mg in sodium chloride 0.9% 108 mL infusion  10/17/2022       Chemotherapy       DOXOrubicin chemo injection 130 mg       cyclophosphamide 600 mg/m2 = 1,300 mg in sodium chloride 0.9% 250 mL chemo infusion       Antiemetics       aprepitant (CINVANTI) injection 130 mg       palonosetron 0.25mg/dexamethasone 12mg in NS IVPB       Immunotherapy       pembrolizumab (KEYTRUDA) 200 mg in sodium chloride 0.9% 108 mL infusion  11/7/2022       Chemotherapy       DOXOrubicin chemo injection 130 mg       cyclophosphamide 600 mg/m2 = 1,300 mg in sodium chloride 0.9% 250 mL chemo infusion       Antiemetics       aprepitant (CINVANTI) injection 130 mg       palonosetron 0.25mg/dexamethasone 12mg in NS IVPB       Immunotherapy       pembrolizumab (KEYTRUDA) 200 mg in sodium chloride 0.9% 108 mL infusion    Supportive Plan Information  OP FILGRASTIM 300 MCG   Yessy Cruz MD   Upcoming Treatment Dates - OP FILGRASTIM 300 MCG    6/20/2022       Medications       tbo-filgrastim (GRANIX) injection 480 mcg/0.8 mL  6/21/2022       Medications       tbo-filgrastim (GRANIX) injection 480 mcg/0.8 mL  6/22/2022       Medications       tbo-filgrastim (GRANIX) injection 480 mcg/0.8 mL  6/23/2022       Medications       tbo-filgrastim (GRANIX) injection 480 mcg/0.8 mL    Therapy Plan Information  Flushes  heparin, porcine (PF) 100 unit/mL injection flush 500 Units  500 Units, Intravenous, Every visit  sodium chloride 0.9% flush 10 mL  10 mL, Intravenous, Every visit  heparin, porcine (PF) 100 unit/mL injection flush 500  Units  500 Units, Intravenous, Every visit  sodium chloride 0.9% flush 10 mL  10 mL, Intravenous, Every visit

## 2022-10-28 ENCOUNTER — TELEPHONE (OUTPATIENT)
Dept: HEMATOLOGY/ONCOLOGY | Facility: CLINIC | Age: 37
End: 2022-10-28
Payer: MEDICAID

## 2022-10-31 ENCOUNTER — TELEPHONE (OUTPATIENT)
Dept: HEMATOLOGY/ONCOLOGY | Facility: CLINIC | Age: 37
End: 2022-10-31
Payer: MEDICAID

## 2022-11-07 ENCOUNTER — HOSPITAL ENCOUNTER (INPATIENT)
Facility: OTHER | Age: 37
LOS: 4 days | Discharge: HOME | DRG: 440 | End: 2022-11-11
Attending: EMERGENCY MEDICINE | Admitting: INTERNAL MEDICINE
Payer: MEDICAID

## 2022-11-07 ENCOUNTER — OFFICE VISIT (OUTPATIENT)
Dept: HEMATOLOGY/ONCOLOGY | Facility: CLINIC | Age: 37
DRG: 440 | End: 2022-11-07
Payer: MEDICAID

## 2022-11-07 VITALS
BODY MASS INDEX: 38.49 KG/M2 | TEMPERATURE: 98 F | DIASTOLIC BLOOD PRESSURE: 80 MMHG | HEIGHT: 65 IN | WEIGHT: 231 LBS | HEART RATE: 79 BPM | SYSTOLIC BLOOD PRESSURE: 120 MMHG

## 2022-11-07 DIAGNOSIS — C50.412 MALIGNANT NEOPLASM OF UPPER-OUTER QUADRANT OF LEFT BREAST IN FEMALE, ESTROGEN RECEPTOR NEGATIVE: Primary | ICD-10-CM

## 2022-11-07 DIAGNOSIS — K85.90 ACUTE PANCREATITIS, UNSPECIFIED COMPLICATION STATUS, UNSPECIFIED PANCREATITIS TYPE: Primary | ICD-10-CM

## 2022-11-07 DIAGNOSIS — R07.9 CHEST PAIN: ICD-10-CM

## 2022-11-07 DIAGNOSIS — K85.30 DRUG-INDUCED ACUTE PANCREATITIS WITHOUT INFECTION OR NECROSIS: ICD-10-CM

## 2022-11-07 DIAGNOSIS — Z17.1 MALIGNANT NEOPLASM OF UPPER-OUTER QUADRANT OF LEFT BREAST IN FEMALE, ESTROGEN RECEPTOR NEGATIVE: Primary | ICD-10-CM

## 2022-11-07 DIAGNOSIS — K85.00 IDIOPATHIC ACUTE PANCREATITIS WITHOUT INFECTION OR NECROSIS: ICD-10-CM

## 2022-11-07 PROBLEM — K52.9 COLITIS: Status: RESOLVED | Noted: 2022-09-17 | Resolved: 2022-11-07

## 2022-11-07 PROBLEM — B37.9 YEAST INFECTION: Status: RESOLVED | Noted: 2022-10-24 | Resolved: 2022-11-07

## 2022-11-07 PROBLEM — Z97.5 IUD (INTRAUTERINE DEVICE) IN PLACE: Status: ACTIVE | Noted: 2022-11-07

## 2022-11-07 PROBLEM — R68.83 CHILLS (WITHOUT FEVER): Status: RESOLVED | Noted: 2022-06-20 | Resolved: 2022-11-07

## 2022-11-07 PROBLEM — R79.89 ELEVATED LFTS: Status: RESOLVED | Noted: 2022-09-10 | Resolved: 2022-11-07

## 2022-11-07 PROBLEM — R78.81 BACTEREMIA: Status: RESOLVED | Noted: 2022-04-19 | Resolved: 2022-11-07

## 2022-11-07 PROBLEM — I10 PRIMARY HYPERTENSION: Status: ACTIVE | Noted: 2022-11-07

## 2022-11-07 PROBLEM — B95.8 STAPH SKIN INFECTION: Status: RESOLVED | Noted: 2022-10-24 | Resolved: 2022-11-07

## 2022-11-07 PROBLEM — L08.9 STAPH SKIN INFECTION: Status: RESOLVED | Noted: 2022-10-24 | Resolved: 2022-11-07

## 2022-11-07 PROBLEM — J10.1 INFLUENZA A: Status: RESOLVED | Noted: 2022-04-19 | Resolved: 2022-11-07

## 2022-11-07 LAB
ALBUMIN SERPL BCP-MCNC: 3.7 G/DL (ref 3.5–5.2)
ALP SERPL-CCNC: 50 U/L (ref 55–135)
ALT SERPL W/O P-5'-P-CCNC: 16 U/L (ref 10–44)
ANION GAP SERPL CALC-SCNC: 6 MMOL/L (ref 8–16)
AST SERPL-CCNC: 17 U/L (ref 10–40)
B-HCG UR QL: NEGATIVE
BASOPHILS # BLD AUTO: 0.01 K/UL (ref 0–0.2)
BASOPHILS NFR BLD: 0.2 % (ref 0–1.9)
BILIRUB SERPL-MCNC: 0.3 MG/DL (ref 0.1–1)
BILIRUB UR QL STRIP: NEGATIVE
BUN SERPL-MCNC: 12 MG/DL (ref 6–20)
CALCIUM SERPL-MCNC: 8.8 MG/DL (ref 8.7–10.5)
CHLORIDE SERPL-SCNC: 108 MMOL/L (ref 95–110)
CLARITY UR: CLEAR
CO2 SERPL-SCNC: 25 MMOL/L (ref 23–29)
COLOR UR: YELLOW
CREAT SERPL-MCNC: 0.7 MG/DL (ref 0.5–1.4)
CTP QC/QA: YES
DIFFERENTIAL METHOD: ABNORMAL
EOSINOPHIL # BLD AUTO: 0 K/UL (ref 0–0.5)
EOSINOPHIL NFR BLD: 0.2 % (ref 0–8)
ERYTHROCYTE [DISTWIDTH] IN BLOOD BY AUTOMATED COUNT: 12.5 % (ref 11.5–14.5)
EST. GFR  (NO RACE VARIABLE): >60 ML/MIN/1.73 M^2
GLUCOSE SERPL-MCNC: 115 MG/DL (ref 70–110)
GLUCOSE UR QL STRIP: NEGATIVE
HCT VFR BLD AUTO: 31.1 % (ref 37–48.5)
HGB BLD-MCNC: 10.7 G/DL (ref 12–16)
HGB UR QL STRIP: NEGATIVE
IMM GRANULOCYTES # BLD AUTO: 0.04 K/UL (ref 0–0.04)
IMM GRANULOCYTES NFR BLD AUTO: 0.7 % (ref 0–0.5)
KETONES UR QL STRIP: NEGATIVE
LEUKOCYTE ESTERASE UR QL STRIP: NEGATIVE
LIPASE SERPL-CCNC: >1000 U/L (ref 4–60)
LYMPHOCYTES # BLD AUTO: 1.3 K/UL (ref 1–4.8)
LYMPHOCYTES NFR BLD: 21.5 % (ref 18–48)
MCH RBC QN AUTO: 32.2 PG (ref 27–31)
MCHC RBC AUTO-ENTMCNC: 34.4 G/DL (ref 32–36)
MCV RBC AUTO: 94 FL (ref 82–98)
MONOCYTES # BLD AUTO: 0.5 K/UL (ref 0.3–1)
MONOCYTES NFR BLD: 8.3 % (ref 4–15)
NEUTROPHILS # BLD AUTO: 4.1 K/UL (ref 1.8–7.7)
NEUTROPHILS NFR BLD: 69.1 % (ref 38–73)
NITRITE UR QL STRIP: NEGATIVE
NRBC BLD-RTO: 0 /100 WBC
PH UR STRIP: 6 [PH] (ref 5–8)
PLATELET # BLD AUTO: 149 K/UL (ref 150–450)
PMV BLD AUTO: 9.9 FL (ref 9.2–12.9)
POTASSIUM SERPL-SCNC: 3.4 MMOL/L (ref 3.5–5.1)
PROT SERPL-MCNC: 6.4 G/DL (ref 6–8.4)
PROT UR QL STRIP: NEGATIVE
RBC # BLD AUTO: 3.32 M/UL (ref 4–5.4)
SODIUM SERPL-SCNC: 139 MMOL/L (ref 136–145)
SP GR UR STRIP: >=1.03 (ref 1–1.03)
URN SPEC COLLECT METH UR: ABNORMAL
UROBILINOGEN UR STRIP-ACNC: NEGATIVE EU/DL
WBC # BLD AUTO: 5.87 K/UL (ref 3.9–12.7)

## 2022-11-07 PROCEDURE — 81025 URINE PREGNANCY TEST: CPT | Performed by: PHYSICIAN ASSISTANT

## 2022-11-07 PROCEDURE — 85025 COMPLETE CBC W/AUTO DIFF WBC: CPT | Performed by: PHYSICIAN ASSISTANT

## 2022-11-07 PROCEDURE — G0378 HOSPITAL OBSERVATION PER HR: HCPCS

## 2022-11-07 PROCEDURE — 99220 PR INITIAL OBSERVATION CARE,LEVL III: CPT | Mod: ,,, | Performed by: NURSE PRACTITIONER

## 2022-11-07 PROCEDURE — 25000003 PHARM REV CODE 250: Performed by: EMERGENCY MEDICINE

## 2022-11-07 PROCEDURE — 99285 EMERGENCY DEPT VISIT HI MDM: CPT | Mod: 25,27

## 2022-11-07 PROCEDURE — 99220 PR INITIAL OBSERVATION CARE,LEVL III: ICD-10-PCS | Mod: ,,, | Performed by: NURSE PRACTITIONER

## 2022-11-07 PROCEDURE — 3008F PR BODY MASS INDEX (BMI) DOCUMENTED: ICD-10-PCS | Mod: CPTII,,, | Performed by: INTERNAL MEDICINE

## 2022-11-07 PROCEDURE — 99999 PR PBB SHADOW E&M-EST. PATIENT-LVL IV: CPT | Mod: PBBFAC,,, | Performed by: INTERNAL MEDICINE

## 2022-11-07 PROCEDURE — 99214 PR OFFICE/OUTPT VISIT, EST, LEVL IV, 30-39 MIN: ICD-10-PCS | Mod: S$PBB,,, | Performed by: INTERNAL MEDICINE

## 2022-11-07 PROCEDURE — 99214 OFFICE O/P EST MOD 30 MIN: CPT | Mod: PBBFAC,25 | Performed by: INTERNAL MEDICINE

## 2022-11-07 PROCEDURE — 63600175 PHARM REV CODE 636 W HCPCS: Performed by: NURSE PRACTITIONER

## 2022-11-07 PROCEDURE — 63600175 PHARM REV CODE 636 W HCPCS: Performed by: EMERGENCY MEDICINE

## 2022-11-07 PROCEDURE — 96374 THER/PROPH/DIAG INJ IV PUSH: CPT

## 2022-11-07 PROCEDURE — 80053 COMPREHEN METABOLIC PANEL: CPT | Performed by: PHYSICIAN ASSISTANT

## 2022-11-07 PROCEDURE — 99999 PR PBB SHADOW E&M-EST. PATIENT-LVL IV: ICD-10-PCS | Mod: PBBFAC,,, | Performed by: INTERNAL MEDICINE

## 2022-11-07 PROCEDURE — 25500020 PHARM REV CODE 255: Performed by: EMERGENCY MEDICINE

## 2022-11-07 PROCEDURE — 81003 URINALYSIS AUTO W/O SCOPE: CPT | Performed by: PHYSICIAN ASSISTANT

## 2022-11-07 PROCEDURE — 99214 OFFICE O/P EST MOD 30 MIN: CPT | Mod: S$PBB,,, | Performed by: INTERNAL MEDICINE

## 2022-11-07 PROCEDURE — 1160F PR REVIEW ALL MEDS BY PRESCRIBER/CLIN PHARMACIST DOCUMENTED: ICD-10-PCS | Mod: CPTII,,, | Performed by: INTERNAL MEDICINE

## 2022-11-07 PROCEDURE — 83690 ASSAY OF LIPASE: CPT | Performed by: PHYSICIAN ASSISTANT

## 2022-11-07 PROCEDURE — 11000001 HC ACUTE MED/SURG PRIVATE ROOM

## 2022-11-07 PROCEDURE — 3079F DIAST BP 80-89 MM HG: CPT | Mod: CPTII,,, | Performed by: INTERNAL MEDICINE

## 2022-11-07 PROCEDURE — 3008F BODY MASS INDEX DOCD: CPT | Mod: CPTII,,, | Performed by: INTERNAL MEDICINE

## 2022-11-07 PROCEDURE — 1159F MED LIST DOCD IN RCRD: CPT | Mod: CPTII,,, | Performed by: INTERNAL MEDICINE

## 2022-11-07 PROCEDURE — 1160F RVW MEDS BY RX/DR IN RCRD: CPT | Mod: CPTII,,, | Performed by: INTERNAL MEDICINE

## 2022-11-07 PROCEDURE — 3074F PR MOST RECENT SYSTOLIC BLOOD PRESSURE < 130 MM HG: ICD-10-PCS | Mod: CPTII,,, | Performed by: INTERNAL MEDICINE

## 2022-11-07 PROCEDURE — 1159F PR MEDICATION LIST DOCUMENTED IN MEDICAL RECORD: ICD-10-PCS | Mod: CPTII,,, | Performed by: INTERNAL MEDICINE

## 2022-11-07 PROCEDURE — 3079F PR MOST RECENT DIASTOLIC BLOOD PRESSURE 80-89 MM HG: ICD-10-PCS | Mod: CPTII,,, | Performed by: INTERNAL MEDICINE

## 2022-11-07 PROCEDURE — 63600175 PHARM REV CODE 636 W HCPCS: Performed by: INTERNAL MEDICINE

## 2022-11-07 PROCEDURE — 3074F SYST BP LT 130 MM HG: CPT | Mod: CPTII,,, | Performed by: INTERNAL MEDICINE

## 2022-11-07 RX ORDER — ACETAMINOPHEN 325 MG/1
650 TABLET ORAL EVERY 4 HOURS PRN
Status: DISCONTINUED | OUTPATIENT
Start: 2022-11-07 | End: 2022-11-11 | Stop reason: HOSPADM

## 2022-11-07 RX ORDER — NALOXONE HCL 0.4 MG/ML
0.02 VIAL (ML) INJECTION
Status: DISCONTINUED | OUTPATIENT
Start: 2022-11-07 | End: 2022-11-11 | Stop reason: HOSPADM

## 2022-11-07 RX ORDER — GLUCAGON 1 MG
1 KIT INJECTION
Status: DISCONTINUED | OUTPATIENT
Start: 2022-11-07 | End: 2022-11-11 | Stop reason: HOSPADM

## 2022-11-07 RX ORDER — MORPHINE SULFATE 4 MG/ML
4 INJECTION, SOLUTION INTRAMUSCULAR; INTRAVENOUS
Status: COMPLETED | OUTPATIENT
Start: 2022-11-07 | End: 2022-11-07

## 2022-11-07 RX ORDER — IBUPROFEN 200 MG
24 TABLET ORAL
Status: DISCONTINUED | OUTPATIENT
Start: 2022-11-07 | End: 2022-11-11 | Stop reason: HOSPADM

## 2022-11-07 RX ORDER — IBUPROFEN 200 MG
16 TABLET ORAL
Status: DISCONTINUED | OUTPATIENT
Start: 2022-11-07 | End: 2022-11-11 | Stop reason: HOSPADM

## 2022-11-07 RX ORDER — ONDANSETRON 2 MG/ML
4 INJECTION INTRAMUSCULAR; INTRAVENOUS EVERY 6 HOURS PRN
Status: DISCONTINUED | OUTPATIENT
Start: 2022-11-07 | End: 2022-11-11 | Stop reason: HOSPADM

## 2022-11-07 RX ORDER — SODIUM CHLORIDE, SODIUM LACTATE, POTASSIUM CHLORIDE, CALCIUM CHLORIDE 600; 310; 30; 20 MG/100ML; MG/100ML; MG/100ML; MG/100ML
INJECTION, SOLUTION INTRAVENOUS CONTINUOUS
Status: DISCONTINUED | OUTPATIENT
Start: 2022-11-07 | End: 2022-11-11 | Stop reason: HOSPADM

## 2022-11-07 RX ORDER — POLYETHYLENE GLYCOL 3350 17 G/17G
17 POWDER, FOR SOLUTION ORAL DAILY PRN
Status: DISCONTINUED | OUTPATIENT
Start: 2022-11-07 | End: 2022-11-11 | Stop reason: HOSPADM

## 2022-11-07 RX ORDER — MORPHINE SULFATE 4 MG/ML
4 INJECTION, SOLUTION INTRAMUSCULAR; INTRAVENOUS EVERY 4 HOURS PRN
Status: DISCONTINUED | OUTPATIENT
Start: 2022-11-07 | End: 2022-11-11 | Stop reason: HOSPADM

## 2022-11-07 RX ORDER — PREDNISOLONE ACETATE 10 MG/ML
1 SUSPENSION/ DROPS OPHTHALMIC 2 TIMES DAILY
COMMUNITY
Start: 2022-10-24 | End: 2023-09-20

## 2022-11-07 RX ORDER — MORPHINE SULFATE 2 MG/ML
2 INJECTION, SOLUTION INTRAMUSCULAR; INTRAVENOUS EVERY 4 HOURS PRN
Status: DISCONTINUED | OUTPATIENT
Start: 2022-11-07 | End: 2022-11-11 | Stop reason: HOSPADM

## 2022-11-07 RX ADMIN — SODIUM CHLORIDE 1000 ML: 0.9 INJECTION, SOLUTION INTRAVENOUS at 02:11

## 2022-11-07 RX ADMIN — MORPHINE SULFATE 2 MG: 2 INJECTION, SOLUTION INTRAMUSCULAR; INTRAVENOUS at 06:11

## 2022-11-07 RX ADMIN — IOHEXOL 100 ML: 350 INJECTION, SOLUTION INTRAVENOUS at 03:11

## 2022-11-07 RX ADMIN — SODIUM CHLORIDE, SODIUM LACTATE, POTASSIUM CHLORIDE, AND CALCIUM CHLORIDE: .6; .31; .03; .02 INJECTION, SOLUTION INTRAVENOUS at 06:11

## 2022-11-07 RX ADMIN — MORPHINE SULFATE 4 MG: 4 INJECTION, SOLUTION INTRAMUSCULAR; INTRAVENOUS at 02:11

## 2022-11-07 NOTE — PROGRESS NOTES
Subjective:       Patient ID: Laura Kent is a 37 y.o. female.    Chief Complaint: No chief complaint on file.    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 This is difficult to distinguish from the Twirl marker itself.  At the site of the previously biopsied metastatic left axillary node, there is a 10 x 7 x 11 mm node with a Uyen- marker. Node has mild residual cortical thickening (5 mm), but has decreased in size. There are a few other normal size, normal appearing left axillary nodes.   Impression:  Left  Mass: Left breast 6 mm x 7 mm x 3 mm mass at the posterior 2 o'clock position. Assessment: 6 - Known biopsy, proven malignancy. Known, previously biopsied left breast cancer and metastatic left axillary node have both decreased in size. The breast mass is now difficult to distinguish from the biopsy marker.   BI-RADS Category:   Overall: 6 - Known Biopsy-Proven Malignancy  Recommendation:  Patient is already under the care of the Breast Oncology team. Most recent bilateral mammogram was performed in February 2022.      Most recent imaging regarding pancreatitis:  - 9/16/2022 MRI Abdomen:  FINDINGS:  ABDOMEN:  LIVER: Enlarged measuring 19.8 cm in craniocaudal dimension.  Normal background signal.  Two punctate nonenhancing T2 hyperintensities consistent with cysts.  Portal and  hepatic veins are patent.  SPLEEN: Enlarged measuring 12.0 cm in craniocaudal dimension.  No focal lesion.  PANCREAS: Normal signal/enhancement.  No significant peripancreatic inflammatory changes.  No solid mass.  ADRENAL: Normal.  KIDNEY: No solid renal mass.  No hydronephrosis.  RETROPERITONEUM: No adenopathy.  MISCELLANEOUS: Multiple loops of prominent air/fluid-filled small bowel in the mid abdomen measuring up to 3.3 cm in maximum diameter, partially visualized.  Small volume bilateral dependent pleural fluid.  No aneurysm.  No focal suspicious osseous lesion.  MRCP:  Mildly distended gallbladder. No cholelithiasis.  Normal caliber intra and extrahepatic biliary ducts.  Normal tapering of common bile duct at the level of the ampulla.  No focal stricture or filling defect.  Pancreatic duct measures within normal limits. No ductal strictures present.  Impression:  Satisfactory MRCP evaluation.  No abnormal biliary duct dilatation.  No cholelithiasis or choledocholithiasis.  Multiple prominent loops of air/fluid-filled small bowel in the mid abdomen, incompletely characterized.  Could represent ileus.  Recommend correlation with clinical history and exam.  Hepatosplenomegaly.  Small bilateral dependent pleural effusions.  Additional findings as above     - 9/8/2022 CT Abdomen:  FINDINGS:  Heart: Normal in size.  Trace pericardial effusion  Lung Bases: 0.5 cm pulmonary nodule in the left lower lobe (axial series 2, image 31), stable in comparison to CT 03/30/2022.  trace bilateral effusions.  Liver: Enlarged in size, measures 20.1 cm in craniocaudal dimension.  No focal lesion.  Gallbladder: No calcified gallstones.  Bile Ducts: No evidence of dilated ducts.  Pancreas: Mild peripancreatic fat stranding slightly increased compared to September 3.  Multiple prominent lymph nodes, likely reactive.  Spleen: Enlarged in size, measures 14.7 cm in AP dimension.  Adrenals: Unremarkable.  Kidneys/ Ureters: Both kidneys are  normal in size and location.  No nephrolithiasis or hydronephrosis.  No solid renal masses.  Both ureters are normal in course and caliber with no hydroureter.  Bladder: No evidence of wall thickening.  Reproductive organs: IUD device in place.  GI Tract/Mesentery: The stomach is unremarkable.  No evidence of bowel obstruction or inflammation.  The appendix is unremarkable.  Peritoneal Space: No ascites. No free air.  Retroperitoneum: Scattered nonenlarged para-aortic nodes noted.  Abdominal wall: Scattered foci of air in the subcutaneous tissue along the anterior abdominal wall, likely representing sites of injection.  Vasculature: No significant atherosclerosis or aneurysm.  Bones: No acute fracture.  Impression:  Slightly increased mild peripancreatic fat stranding with surrounding multiple prominent lymph nodes, allowing for limited evaluation due to lack of IV contrast.  Findings consistent with the clinical history of pancreatitis, no loculated fluid collections.  Stable 0.5 cm pulmonary nodule in the left lower lobe.  Hepatosplenomegaly.  Small pericardial effusion and trace bilateral pleural effusion.  Additional findings, as above.     Admitted 9/3-9/16/2022   Cancelled last week's follow up appointment as had eye appointment the same day   Per discharge note:  Hospital Course: Ms. Pacheco was initially admitted for abdominal pain likely from pancreatitis due to her cancer treatment regimen which she received 8/29. Lipase was found to be in the 200s with elevated LFTs with pancreatic fat stranding seen on CT. She was treated with anti-emetics, IVF, pain medications. She became febrile the night after admission. Broad spectrum antibiotics as well as Flagyl were started day 2 of hospitalization for intraabdominal coverage, but continued to remain febrile. She also developed copious diarrhea after antibiotics were started. She continued to remain febrile through 9/8 with Tmax of 103. Infectious Disease was  consulted. Stool studies were ordered which were negative C.Diff negative x2. She completed 5 day course of Unasyn. Daptomycin started for history of VRE. Infectious work-up negative and CT chest/abd/pelvis without acute findings. DVT/PE ruled out.   RUQ US ordered to evaluate biliary tract and liver, showing biliary sludge. MRCP was ordered per GI recommendations which showed biliary sludge with no stones. She continued to have diarrhea throughout her admission up to 20 times a day, watery, and sometimes green in color. 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 Loperamide, lomotil, Creon administered prn helped reduce episodes to 3-4 times daily w/ puree consistency. She was also started on high dose methylprednisolone for potential colitis. Her stools have become more formed and are now brown and soft however she is still having 2-3 bowel movements daily. Daptomycin was discontinued per ID guidance as it may have contributed to diarrhea.   She was switched to prednisone 90mg on taper. Will follow-up outpatient for colonoscopy and IBD evaluation.  She will follow-up with Dr. Cruz once colonoscopy complete.         Oncology History:  - self detected an area under her left arm and it initially seemed to go away (noted around winter holidays)  - 3/4/2022 Outside Mammogram:  Findings:  The breasts are heterogeneously dense, which may obscure small masses.   Left  There is a 31 mm x 18 mm x 21 mm irregularly shaped mass with microlobulated margins seen in the left breast at 2 o'clock in the posterior depth with associated left axillary adenopathy. Largest node depicted on the outside study measures 49 x 23 x 29 mm with cortical thickening and effacement of the hilum. Breast mass is reportedly palpable.   Right  There is no evidence of suspicious masses, calcifications, or other abnormal findings in the right breast.  Impression:  Left  Mass: Left breast 31 mm x 18 mm x 21  mm mass at the posterior 2 o'clock position. Assessment: 5 - Highly suggestive of malignancy. Biopsy is recommended.   Right  There is no mammographic evidence of malignancy in the right breast.  BI-RADS Category:   Overall: 5 - Highly Suggestive of Malignancy  Recommendation:  Ultrasound Guided Core Needle Biopsy of the left breast mass and one of the abnormal left axillary nodes is recommended.     - 3/14/2022 Breast biopsy:  1. 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   Comment:  Infiltrating carcinoma occupies the entirety of biopsied material   with the largest continuous focus measuring 13 mm.  Breast biomarkers are   pending and will be issued in a supplemental report.   2. LEFT AXILLARY LYMPH NODE, BIOPSY:   Invasive ductal carcinoma, [place] histologic grade 3 (tubule formation:   3, nuclear pleomorphism:  3, mitotic activity:  3).   Comment:  Infiltrating carcinoma occupies the entirety of biopsied material   with the largest continuous focus measuring 20 mm.  No lymph node tissue is   identified in the sample.  Tumor histology is essentially identical to that   of part 1.  The findings could represent part of a larger intranodal   metastasis, but an extension from the primary tumor cannot be excluded.   Radiographic correlation is advised.   Note:  Dr. Stephanie Rao also reviewed this case and agrees with the   diagnosis.   BREAST BIOMARKER RESULTS   Estrogen receptor (ER):  Negative (0)   Progesterone receptor (ER):  Negative (0)   HER2 IHC:  Negative (1+)   Ki-67 proliferation index:  80%      - 3/17/2022 Breast MRI:  Findings:  The breasts have heterogeneous fibroglandular tissue. The background parenchymal enhancement is minimal.   Left  There is a 38 mm x 33 mm x 33 mm irregularly shaped mass seen in the left breast at 2 o'clock in the posterior depth, 8.4 cm from the  nipple and 1.2 cm from the skin. Associated signal void from a twirl biopsy marker; pathology showed invasive ductal carcinoma.   Posterior to the mass there is abnormal non mass enhancement measuring 29 x 28 mm. The NME is 4 mm from the skin and 2.3 cm from the chest wall. In total the mass and NME measure 54 mm in AP extent.  Overlying skin thickening and edema involving the lateral breast, likely from lymphovascular obstruction. No suspicious skin enhancement. Overall increased vascularity to the left breast.   Four abnormal lymph level 1 and level 2 left axillary lymph nodes. The largest lymph node measures 52 mm x 40 mm x 38 mm which previously underwent biopsy showing metastatic disease. There is an associated radar reflector within the biopsied lymph node.  Right  There is no evidence of suspicious masses, abnormal enhancement, or other abnormal findings in the right breast. No enlarged axillary or internal mammary lymph nodes.   Impression:  Left  Mass: Left breast 38 mm x 33 mm x 33 mm mass at the posterior 2 o'clock position. Assessment: 6 - Known biopsy, proven malignancy. Associated 29 mm NME extending posteriorly from the mass. In total the mass and NME measure 54 mm in AP extent.  Lymph Node: Abnormal level 1 and 2 left axillary lymph nodes, the largest of which measures 52 mm x 40 mm x 38 mm lymph node and is known biopsy, proven malignancy.   Right  There is no MR evidence of malignancy in the right breast.  BI-RADS Category:   Overall: 6 - Known Biopsy-Proven Malignancy  Recommendation:  Clinical management of known left breast cancer. Patient is established with the breast surgery clinic.      - 3/21/2022 CT C/A/P:  FINDINGS:  Base of Neck: No significant abnormality.  Thoracic soft tissue: A proximally 3.2 x 3.6 x 3.7 cm irregular left breast mass within the lateral aspect with internal biopsy marker, corresponding to findings on prior breast MRI and compatible with malignancy.  Enlarged left  axillary lymph nodes, largest measuring approximately 4.2 x 3.6 cm, (series 2, image 27).  CHEST:  -Heart: Normal size. No pericardial effusion.  -Pulmonary vasculature: Pulmonary arteries distribute normally.  There are four pulmonary veins.  -Radha/Mediastinum: No pathologic shelly enlargement.  -Trachea and Proximal airways: Trachea is midline.  Central airways are patent.  No significant bronchial wall thickening or bronchiectasis.  -Lungs/Pleura: Symmetrically expanded without focal consolidation, pneumothorax, or mass.  No pleural effusion or thickening.  -Esophagus: Normal course and caliber.  ABDOMEN:  - Liver: Normal in size and attenuation with no focal hepatic abnormality.  - Gallbladder: No calcified gallstones.  No wall thickening or pericholecystic fluid.  - Bile Ducts: No intra or extrahepatic biliary ductal dilatation.  - Stomach/Duodenum: Small hiatal hernia otherwise unremarkable.  - Spleen: Normal size.  No focal parenchymal abnormality.  - Pancreas: No mass lesion.  No pancreatic ductal dilatation.  No peripancreatic fat stranding.  - Adrenals: Unremarkable.  - Kidneys/ureters/urinary bladder: Normal in size and location.  Normal enhancement pattern.  No nephrolithiasis.  Ureters are normal in course and caliber.  No hydroureteronephrosis.  - Retroperitoneum: Scattered nonenlarged retroperitoneal lymph nodes.  PELVIS:  - Reproductive: Intrauterine device present.  A proximally 2.8 cm peripherally enhancing right ovarian cyst, likely corpus luteal cyst.  - Other: No pelvic adenopathy, free fluid, or mass.  BOWEL/MESENTERY:  No evidence of bowel obstruction or inflammatory process. Appendix is visualized and unremarkable.  VASCULATURE: Left-sided aortic arch with 3 branch vessels.  No aneurysm and no significant atherosclerosis.  Portal vein, splenic vein, and SMV are patent.  BONES: Lucent approximately 0.6 cm right trochanteric lesion, likely synovial herniation pit.  No acute fracture or bony  destructive process.  EXTRATHORACIC/EXTRAPERITONEAL SOFT TISSUES: Unremarkable.  Impression:  In this patient with known breast cancer, there is an approximately 3.7 cm irregular left breast mass with left axillary lymphadenopathy.  No evidence of metastatic disease  Small hiatal hernia.  Additional findings as described above.     - 3/21/2022 Bone scan:  FINDINGS:  There is physiologic distribution of the radiopharmaceutical throughout the skeleton.  Mild degenerative uptake within the bilateral shoulders and knees.  There is normal uptake in the genitourinary system and soft tissues.  Impression:  There is no scintigraphic evidence of osteoblastic metastatic disease.     - 3/21/2022 Brain MRI:  FINDINGS:  Please note there is dental metal artifact distorting the examination.  Allowing for artifacts the brain parenchyma is normal in contour.  Developmental variant with cavum septum pellucidum identified.  The ventricles are otherwise normal in size without hydrocephalus.  There is no midline shift or significant mass effect.  The major intracranial T2 flow voids are present.  No restricted diffusion within the non distorted brain parenchyma.  There is severe distortion of the susceptibility imaging no parenchymal susceptibility to suggest parenchymal hemorrhage in the non distorted brain parenchyma.  There is suboptimal evaluation of the cerebellum and posterior fossa.  Impression:  Unremarkable MRI brain allowing for artifacts from dental metal as detailed above specifically without abnormal parenchymal enhancement to suggest intracranial metastatic disease in light of history.     - Initiated NA therapy on 2022  C4 held with pancreatitis     PMH:  HTN- around pregnancies; off of blood pressure medication x 2 years  CHF- ECHOs at [facility]  Gestational DM  Hyperlipidemia when heavier, managed with diet and followed by cardiology  C- sections x 2  Iron deficiency     GynHx:  Menarche- 7   (16 at 1st  pregnancy) (17, 7, 4)  Still with periods - last 2/28/2022- last 5-7 days, moderate normal flow  + breast feeding x 1 year  IUD     SH:  Working, , [employer]  Single  Good support system  No tobacco  No EtOH  No illicit drugs      Review of Systems   Constitutional:  Negative for activity change, appetite change and unexpected weight change.   HENT:  Negative for mouth sores.    Eyes:  Negative for visual disturbance.   Respiratory:  Negative for cough and shortness of breath.    Cardiovascular:  Negative for chest pain.   Gastrointestinal:  Positive for abdominal pain. Negative for diarrhea.   Genitourinary:  Negative for frequency.   Musculoskeletal:  Negative for back pain.   Integumentary:  Positive for rash.   Neurological:  Negative for headaches.   Hematological:  Negative for adenopathy.   Psychiatric/Behavioral:  The patient is not nervous/anxious.        Objective:      Physical Exam  Vitals and nursing note reviewed.   Constitutional:       General: She is not in acute distress.     Appearance: Normal appearance. She is well-developed. She is not ill-appearing.      Comments: Presents alone  Very pleasant  ECOG= 0  Tearful today.    HENT:      Head: Normocephalic and atraumatic.   Eyes:      General: Lids are normal. No scleral icterus.     Extraocular Movements: Extraocular movements intact.      Conjunctiva/sclera: Conjunctivae normal.      Pupils: Pupils are equal, round, and reactive to light.   Neck:      Thyroid: No thyromegaly.      Vascular: No JVD.      Trachea: Trachea normal.   Cardiovascular:      Rate and Rhythm: Normal rate and regular rhythm.      Heart sounds: Normal heart sounds. No murmur heard.    No friction rub. No gallop.   Pulmonary:      Effort: Pulmonary effort is normal. No respiratory distress.      Breath sounds: Normal breath sounds. No wheezing, rhonchi or rales.      Comments: Left breast mass much smaller. No LAD  Right sided  port clean and dry - incision  healed.   Abdominal:      General: Bowel sounds are normal. There is no distension.      Palpations: Abdomen is soft. There is no mass.      Tenderness: There is abdominal tenderness. There is guarding. There is no rebound.      Comments: Pain mid epigastric   Musculoskeletal:         General: No swelling or deformity. Normal range of motion.      Cervical back: Normal range of motion and neck supple.      Right lower leg: No edema.      Left lower leg: No edema.      Comments: No spinal or paraspinal tenderness.    Lymphadenopathy:      Head:      Right side of head: No submental or submandibular adenopathy.      Left side of head: No submental or submandibular adenopathy.      Cervical: No cervical adenopathy.      Upper Body:      Right upper body: No supraclavicular or axillary adenopathy.      Left upper body: No supraclavicular or axillary adenopathy.   Skin:     General: Skin is warm and dry.      Capillary Refill: Capillary refill takes less than 2 seconds.      Coloration: Skin is not jaundiced or pale.      Findings: Rash (staph back, yeast azilla and under breast) present. No bruising.      Nails: There is no clubbing.      Comments: acne to back   Neurological:      General: No focal deficit present.      Mental Status: She is alert and oriented to person, place, and time. Mental status is at baseline.      Sensory: No sensory deficit.      Motor: No weakness.      Coordination: Coordination normal.      Gait: Gait normal.   Psychiatric:         Mood and Affect: Mood normal.         Speech: Speech normal.         Behavior: Behavior normal.         Thought Content: Thought content normal.       Assessment:       Problem List Items Addressed This Visit       Malignant neoplasm of upper-outer quadrant of left breast in female, estrogen receptor negative - Primary    Acute pancreatitis       Plan:     To ED- if pancreatitis admit  Reschedule chemo    Route Chart for Scheduling    Med Onc Chart  Routing  Urgent    Follow up with physician    Follow up with ANDREW . Add to PJ schedule and chemo on Monday   Infusion scheduling note    Injection scheduling note    Labs    Imaging    Pharmacy appointment    Other referrals        Treatment Plan Information   OP PEMBROLIZUMAB CARBOPLATIN (AUC 5) WITH WEEKLY PACLITAXEL FOLLOWED BY DOSE DENSE DOXORUBICIN CYCLOPHOSPHAMIDE Q2W   Yessy Cruz MD   Upcoming Treatment Dates - OP PEMBROLIZUMAB CARBOPLATIN (AUC 5) WITH WEEKLY PACLITAXEL FOLLOWED BY DOSE DENSE DOXORUBICIN CYCLOPHOSPHAMIDE Q2W    11/1/2022       Chemotherapy       DOXOrubicin chemo injection 130 mg       cyclophosphamide 600 mg/m2 = 1,300 mg in sodium chloride 0.9% 250 mL chemo infusion       Antiemetics       aprepitant (CINVANTI) injection 130 mg       palonosetron 0.25mg/dexAMETHasone 12mg in NS IVPB  11/15/2022       Chemotherapy       DOXOrubicin chemo injection 130 mg       cyclophosphamide 600 mg/m2 = 1,300 mg in sodium chloride 0.9% 250 mL chemo infusion       Antiemetics       aprepitant (CINVANTI) injection 130 mg       palonosetron 0.25mg/dexAMETHasone 12mg in NS IVPB  11/29/2022       Chemotherapy       DOXOrubicin chemo injection 130 mg       cyclophosphamide 600 mg/m2 = 1,300 mg in sodium chloride 0.9% 250 mL chemo infusion       Antiemetics       aprepitant (CINVANTI) injection 130 mg       palonosetron 0.25mg/dexAMETHasone 12mg in NS IVPB  12/13/2022       Chemotherapy       DOXOrubicin chemo injection 130 mg       cyclophosphamide 600 mg/m2 = 1,300 mg in sodium chloride 0.9% 250 mL chemo infusion       Antiemetics       aprepitant (CINVANTI) injection 130 mg       palonosetron 0.25mg/dexAMETHasone 12mg in NS IVPB    Supportive Plan Information  OP FILGRASTIM 300 MCG   Yessy Cruz MD   Upcoming Treatment Dates - OP FILGRASTIM 300 MCG    6/20/2022       Medications       tbo-filgrastim (GRANIX) injection 480 mcg/0.8 mL  6/21/2022       Medications       tbo-filgrastim (GRANIX) injection  480 mcg/0.8 mL  6/22/2022       Medications       tbo-filgrastim (GRANIX) injection 480 mcg/0.8 mL  6/23/2022       Medications       tbo-filgrastim (GRANIX) injection 480 mcg/0.8 mL    Therapy Plan Information  Flushes  heparin, porcine (PF) 100 unit/mL injection flush 500 Units  500 Units, Intravenous, Every visit  sodium chloride 0.9% flush 10 mL  10 mL, Intravenous, Every visit  heparin, porcine (PF) 100 unit/mL injection flush 500 Units  500 Units, Intravenous, Every visit  sodium chloride 0.9% flush 10 mL  10 mL, Intravenous, Every visit    30 minutes direct with patient  [facility] called and notified en route

## 2022-11-07 NOTE — ED TRIAGE NOTES
Pt presents to ED c/o diffuse abdominal pain onset Saturday. Denies fever at home, N/V/D, RUQ and LUQ tender to palpation. Pt states she is having a flare-up of pancreatitis.

## 2022-11-07 NOTE — FIRST PROVIDER EVALUATION
Emergency Department TeleTriage Encounter Note      CHIEF COMPLAINT    Chief Complaint   Patient presents with    Abdominal Pain     Patient to ED for abdominal pain since Saturday . Denies any N/V/D . Patient states she is having a flare up of Pancreatitis .        VITAL SIGNS   Initial Vitals [11/07/22 1314]   BP Pulse Resp Temp SpO2   133/81 92 16 98.9 °F (37.2 °C) 99 %      MAP       --            ALLERGIES    Review of patient's allergies indicates:   Allergen Reactions    Strawberries [strawberry] Hives       PROVIDER TRIAGE NOTE  This is a teletriage evaluation of a 37 y.o. female presenting to the ED complaining of abdominal pain. Patient reports epigastric abdominal pain for 2 days. Denies nausea or vomiting. Patient currently undergoing chemotherapy for breast cancer.     Initial orders will be placed and care will be transferred to an alternate provider when patient is roomed for a full evaluation. Any additional orders and the final disposition will be determined by that provider.         ORDERS  Labs Reviewed   CBC W/ AUTO DIFFERENTIAL   COMPREHENSIVE METABOLIC PANEL   LIPASE   URINALYSIS, REFLEX TO URINE CULTURE   POCT URINE PREGNANCY       ED Orders (720h ago, onward)      Start Ordered     Status Ordering Provider    11/07/22 1320 11/07/22 1319  Vital signs  Every 2 hours         Ordered AMRIT, TRINITY G.    11/07/22 1320 11/07/22 1319  Diet NPO  Diet effective now         Ordered AMRIT, TRINITY G.    11/07/22 1320 11/07/22 1319  Insert peripheral IV  Once         Ordered AMRIT, TRINITY G.    11/07/22 1320 11/07/22 1319  CBC W/ AUTO DIFFERENTIAL  STAT         Ordered AMRIT, TRINITY G.    11/07/22 1320 11/07/22 1319  Comp. Metabolic Panel  STAT         Ordered AMRIT, TRINITY G.    11/07/22 1320 11/07/22 1319  Lipase  STAT         Ordered AMRIT, TRINITY G.    11/07/22 1320 11/07/22 1319  Urinalysis, Reflex to Urine Culture Urine, Clean Catch  STAT         Ordered AMRIT, TRINITY G.    11/07/22 1320 11/07/22 1319   POCT urine pregnancy  Once         Ordered TRINITY MARCUS              Virtual Visit Note: The provider triage portion of this emergency department evaluation and documentation was performed via [employer], a HIPAA-compliant telemedicine application, in concert with a tele-presenter in the room. A face to face patient evaluation with one of my colleagues will occur once the patient is placed in an emergency department room.      DISCLAIMER: This note was prepared with [employer] voice recognition transcription software. Garbled syntax, mangled pronouns, and other bizarre constructions may be attributed to that software system.

## 2022-11-07 NOTE — ED PROVIDER NOTES
Encounter Date: 2022    SCRIBE #1 NOTE: I, Anastasiia Suárez, am scribing for, and in the presence of,  Anastasiia Vera MD.     History     Chief Complaint   Patient presents with    Abdominal Pain     Patient to ED for abdominal pain since Saturday . Denies any N/V/D . Patient states she is having a flare up of Pancreatitis .      Time seen by provider: 2:05 PM    This is a 37 y.o. female who presents with complaint of upper abdominal pain for the past 2 days. The patient reports nausea. She denies vomiting, diarrhea, or loss of appetite. She also denies taking any pain medications. She notes a PMHx of pancreatitis after immunotherapy treatment for her breast cancer. She states that she is currently receiving chemotherapy.    The history is provided by the patient.   Review of patient's allergies indicates:   Allergen Reactions    Strawberries [strawberry] Hives     Past Medical History:   Diagnosis Date    Anxiety     Breast cancer     Encounter for blood transfusion     Hypertension     Meningitis      Past Surgical History:   Procedure Laterality Date    BREAST BIOPSY Left      SECTION      COLONOSCOPY N/A 2022    Procedure: COLONOSCOPY;  Surgeon: Davi Chery MD;  Location: [facility] ([address] [place] [address]);  Service: Endoscopy;  Laterality: N/A;  case request entered from referral - Per DR. KATHLEEN Chery Pt to be scheduled for urgent colonoscopy on 22/ fully vaccinated / prep ins for Sutab on portal - ERW  see micro for negative C-diff- ERW    INSERTION OF TUNNELED CENTRAL VENOUS CATHETER (CVC) WITH SUBCUTANEOUS PORT N/A 2022    Procedure: [id number]-CATH;  Surgeon: Deo Sin Jr., MD;  Location: [address];  Service: General;  Laterality: N/A;    INSERTION OF TUNNELED CENTRAL VENOUS CATHETER (CVC) WITH SUBCUTANEOUS PORT N/A 2022    Procedure: INSERTION, SINGLE LUMEN CATHETER WITH PORT, WITH FLUOROSCOPIC GUIDANCE;  Surgeon: Ryder Dinero MD;  Location: [facility] CATH LAB;  Service:  Vascular;  Laterality: N/A;     Family History   Problem Relation Age of Onset    Diabetes Father     Hypertension Father     Pancreatic cancer Maternal Uncle     Bladder Cancer Paternal Grandmother     Colon cancer Neg Hx     Esophageal cancer Neg Hx      Social History     Tobacco Use    Smoking status: Never    Smokeless tobacco: Never   Substance Use Topics    Alcohol use: Not Currently     Comment: social    Drug use: Never     Review of Systems   Constitutional:  Negative for activity change, appetite change, chills, diaphoresis and fever.   HENT:  Negative for congestion, sore throat and trouble swallowing.    Eyes:  Negative for photophobia and visual disturbance.   Respiratory:  Negative for cough, chest tightness and shortness of breath.    Cardiovascular:  Negative for chest pain.   Gastrointestinal:  Positive for abdominal pain and nausea. Negative for diarrhea and vomiting.   Endocrine: Negative for polydipsia and polyuria.   Genitourinary:  Negative for difficulty urinating and flank pain.   Musculoskeletal:  Negative for back pain and neck pain.   Skin:  Negative for rash.   Neurological:  Negative for weakness and headaches.   Psychiatric/Behavioral:  Negative for confusion.      Physical Exam     Initial Vitals [11/07/22 1314]   BP Pulse Resp Temp SpO2   133/81 92 16 98.9 °F (37.2 °C) 99 %      MAP       --         Physical Exam    Constitutional: She appears well-developed. She is cooperative.   Obese. Appears uncomfortable.   HENT:   Head: Atraumatic.   Eyes: Conjunctivae and lids are normal.   Neck:   Normal range of motion.  Cardiovascular:  Normal rate.           Abdominal: There is abdominal tenderness.   Epigastric tenderness on deep palpation.   Musculoskeletal:         General: Normal range of motion.      Cervical back: Normal range of motion.     Neurological: She is alert.   Skin: No rash noted.   Psychiatric: She has a normal mood and affect. Her speech is normal and behavior is normal.        ED Course   Procedures  Labs Reviewed   CBC W/ AUTO DIFFERENTIAL - Abnormal; Notable for the following components:       Result Value    RBC 3.32 (*)     Hemoglobin 10.7 (*)     Hematocrit 31.1 (*)     MCH 32.2 (*)     Platelets 149 (*)     Immature Granulocytes 0.7 (*)     All other components within normal limits   COMPREHENSIVE METABOLIC PANEL - Abnormal; Notable for the following components:    Potassium 3.4 (*)     Glucose 115 (*)     Alkaline Phosphatase 50 (*)     Anion Gap 6 (*)     All other components within normal limits   LIPASE - Abnormal; Notable for the following components:    Lipase >1000 (*)     All other components within normal limits   URINALYSIS, REFLEX TO URINE CULTURE   POCT URINE PREGNANCY          Imaging Results              CT Abdomen Pelvis With Contrast (In process)                      Medications   sodium chloride 0.9% bolus 1,000 mL (1,000 mLs Intravenous New Bag 11/7/22 1449)   iohexoL (OMNIPAQUE 350) injection 100 mL (has no administration in time range)   morphine injection 4 mg (4 mg Intravenous Given 11/7/22 1450)     Medical Decision Making:   History:   Old Medical Records: I decided to obtain old medical records.  Initial Assessment:   Labs significant for pancreatitis, elevated lipase.  CT imaging without necrosis.  Patient will be admitted to the hospital for IV fluids, analgesics, made NPO.  Independently Interpreted Test(s):   I have ordered and independently interpreted X-rays - see prior notes.  Clinical Tests:   Lab Tests: Ordered and Reviewed  Radiological Study: Reviewed and Ordered        Scribe Attestation:   Scribe #1: I performed the above scribed service and the documentation accurately describes the services I performed. I attest to the accuracy of the note.      ED Course as of 11/07/22 1515   Mon Nov 07, 2022   1427 Urgent evaluation of 37-year-old female with breast cancer previously on chemotherapy, but immunotherapy on hold given pancreatitis, and  colitis episode recently.  Here with worsening abdominal pain, no diarrhea.  Patient denies vomiting, nontoxic appearing, vital signs reassuring.  Patient did not try analgesics prior to arrival.  Here on examination patient well hydrated, will plan to administer analgesics, IV fluids and reassess.     [DM]   1450 Lipase(!): >1000 [DM]   1505 Case discussed with Dr. Perez, will place in observation for hydration, pain management. [DM]      ED Course User Index  [DM] Anastasiia Vera MD        Physician Attestation for Scribe: IChuy, reviewed documentation as scribed in my presence, which is both accurate and complete.           Clinical Impression:   Final diagnoses:  [K85.90] Acute pancreatitis, unspecified complication status, unspecified pancreatitis type (Primary)        ED Disposition Condition    Observation Stable                Anastasiia Vera MD  11/07/22 2784

## 2022-11-08 LAB
ALBUMIN SERPL BCP-MCNC: 3.3 G/DL (ref 3.5–5.2)
ALP SERPL-CCNC: 38 U/L (ref 55–135)
ALT SERPL W/O P-5'-P-CCNC: 17 U/L (ref 10–44)
ANION GAP SERPL CALC-SCNC: 7 MMOL/L (ref 8–16)
AST SERPL-CCNC: 17 U/L (ref 10–40)
BASOPHILS # BLD AUTO: 0.02 K/UL (ref 0–0.2)
BASOPHILS NFR BLD: 0.5 % (ref 0–1.9)
BILIRUB SERPL-MCNC: 0.4 MG/DL (ref 0.1–1)
BUN SERPL-MCNC: 7 MG/DL (ref 6–20)
CALCIUM SERPL-MCNC: 8.8 MG/DL (ref 8.7–10.5)
CHLORIDE SERPL-SCNC: 108 MMOL/L (ref 95–110)
CO2 SERPL-SCNC: 25 MMOL/L (ref 23–29)
CREAT SERPL-MCNC: 0.6 MG/DL (ref 0.5–1.4)
DIFFERENTIAL METHOD: ABNORMAL
EOSINOPHIL # BLD AUTO: 0 K/UL (ref 0–0.5)
EOSINOPHIL NFR BLD: 0.5 % (ref 0–8)
ERYTHROCYTE [DISTWIDTH] IN BLOOD BY AUTOMATED COUNT: 12.3 % (ref 11.5–14.5)
EST. GFR  (NO RACE VARIABLE): >60 ML/MIN/1.73 M^2
GLUCOSE SERPL-MCNC: 110 MG/DL (ref 70–110)
HCT VFR BLD AUTO: 32.8 % (ref 37–48.5)
HGB BLD-MCNC: 10.9 G/DL (ref 12–16)
IMM GRANULOCYTES # BLD AUTO: 0.04 K/UL (ref 0–0.04)
IMM GRANULOCYTES NFR BLD AUTO: 0.9 % (ref 0–0.5)
LIPASE SERPL-CCNC: 797 U/L (ref 4–60)
LYMPHOCYTES # BLD AUTO: 1.2 K/UL (ref 1–4.8)
LYMPHOCYTES NFR BLD: 27.4 % (ref 18–48)
MAGNESIUM SERPL-MCNC: 1.6 MG/DL (ref 1.6–2.6)
MCH RBC QN AUTO: 31.7 PG (ref 27–31)
MCHC RBC AUTO-ENTMCNC: 33.2 G/DL (ref 32–36)
MCV RBC AUTO: 95 FL (ref 82–98)
MONOCYTES # BLD AUTO: 0.4 K/UL (ref 0.3–1)
MONOCYTES NFR BLD: 8.1 % (ref 4–15)
NEUTROPHILS # BLD AUTO: 2.7 K/UL (ref 1.8–7.7)
NEUTROPHILS NFR BLD: 62.6 % (ref 38–73)
NRBC BLD-RTO: 0 /100 WBC
PHOSPHATE SERPL-MCNC: 3.3 MG/DL (ref 2.7–4.5)
PLATELET # BLD AUTO: 136 K/UL (ref 150–450)
PMV BLD AUTO: 9.6 FL (ref 9.2–12.9)
POTASSIUM SERPL-SCNC: 3.3 MMOL/L (ref 3.5–5.1)
PROT SERPL-MCNC: 6 G/DL (ref 6–8.4)
RBC # BLD AUTO: 3.44 M/UL (ref 4–5.4)
SODIUM SERPL-SCNC: 140 MMOL/L (ref 136–145)
WBC # BLD AUTO: 4.3 K/UL (ref 3.9–12.7)

## 2022-11-08 PROCEDURE — 63600175 PHARM REV CODE 636 W HCPCS: Performed by: NURSE PRACTITIONER

## 2022-11-08 PROCEDURE — 83690 ASSAY OF LIPASE: CPT | Performed by: NURSE PRACTITIONER

## 2022-11-08 PROCEDURE — 36415 COLL VENOUS BLD VENIPUNCTURE: CPT | Performed by: NURSE PRACTITIONER

## 2022-11-08 PROCEDURE — 84100 ASSAY OF PHOSPHORUS: CPT | Performed by: NURSE PRACTITIONER

## 2022-11-08 PROCEDURE — 80053 COMPREHEN METABOLIC PANEL: CPT | Performed by: NURSE PRACTITIONER

## 2022-11-08 PROCEDURE — 25000003 PHARM REV CODE 250: Performed by: INTERNAL MEDICINE

## 2022-11-08 PROCEDURE — 63600175 PHARM REV CODE 636 W HCPCS: Performed by: INTERNAL MEDICINE

## 2022-11-08 PROCEDURE — G0378 HOSPITAL OBSERVATION PER HR: HCPCS

## 2022-11-08 PROCEDURE — 99225 PR SUBSEQUENT OBSERVATION CARE,LEVEL II: CPT | Mod: ,,, | Performed by: INTERNAL MEDICINE

## 2022-11-08 PROCEDURE — 83735 ASSAY OF MAGNESIUM: CPT | Performed by: NURSE PRACTITIONER

## 2022-11-08 PROCEDURE — 85025 COMPLETE CBC W/AUTO DIFF WBC: CPT | Performed by: NURSE PRACTITIONER

## 2022-11-08 PROCEDURE — 11000001 HC ACUTE MED/SURG PRIVATE ROOM

## 2022-11-08 PROCEDURE — 99225 PR SUBSEQUENT OBSERVATION CARE,LEVEL II: ICD-10-PCS | Mod: ,,, | Performed by: INTERNAL MEDICINE

## 2022-11-08 RX ORDER — HYDROCORTISONE 1 %
CREAM (GRAM) TOPICAL 2 TIMES DAILY
Status: DISCONTINUED | OUTPATIENT
Start: 2022-11-08 | End: 2022-11-11 | Stop reason: HOSPADM

## 2022-11-08 RX ADMIN — MORPHINE SULFATE 4 MG: 4 INJECTION, SOLUTION INTRAMUSCULAR; INTRAVENOUS at 01:11

## 2022-11-08 RX ADMIN — SODIUM CHLORIDE, SODIUM LACTATE, POTASSIUM CHLORIDE, AND CALCIUM CHLORIDE: .6; .31; .03; .02 INJECTION, SOLUTION INTRAVENOUS at 03:11

## 2022-11-08 RX ADMIN — HYDROCORTISONE: 1 CREAM TOPICAL at 09:11

## 2022-11-08 RX ADMIN — MORPHINE SULFATE 4 MG: 4 INJECTION, SOLUTION INTRAMUSCULAR; INTRAVENOUS at 12:11

## 2022-11-08 RX ADMIN — HYDROCORTISONE: 1 CREAM TOPICAL at 01:11

## 2022-11-08 RX ADMIN — MORPHINE SULFATE 4 MG: 4 INJECTION, SOLUTION INTRAMUSCULAR; INTRAVENOUS at 06:11

## 2022-11-08 RX ADMIN — MORPHINE SULFATE 4 MG: 4 INJECTION, SOLUTION INTRAMUSCULAR; INTRAVENOUS at 09:11

## 2022-11-08 RX ADMIN — MORPHINE SULFATE 2 MG: 2 INJECTION, SOLUTION INTRAMUSCULAR; INTRAVENOUS at 09:11

## 2022-11-08 RX ADMIN — SODIUM CHLORIDE, SODIUM LACTATE, POTASSIUM CHLORIDE, AND CALCIUM CHLORIDE: .6; .31; .03; .02 INJECTION, SOLUTION INTRAVENOUS at 06:11

## 2022-11-08 NOTE — ASSESSMENT & PLAN NOTE
Pt of Dr Cruz.  She was scheduled to restart chemo without immunotherapy, today, however due to abdominal pain she was sent to the ED instead.

## 2022-11-08 NOTE — ASSESSMENT & PLAN NOTE
Immunotherapy induced pancreatitis  Patient admitted with right upper quadrant epigastric pain without nausea or vomiting  Lipase over 1000  LFTs normal  CT abdomen pelvis:  Mild pancreatic stranding particularly around the head.  No mass or eduard pancreatic fluid, no pancreatic duct dilatation  -LR at 150 mL an hour  -p.r.n. analgesia and anti emetics  -trend lipase    Try CLD today

## 2022-11-08 NOTE — HPI
Ms. Kent is a 37-year-old female with a past medical history of hypertension, anxiety, and current treatment of left breast cancer.  She was sent to the ED by her oncologist for severe abdominal pain that began on Saturday.  The patient denies nausea and vomiting.  She describes the pain is a soreness 10/10 and worse when she lies down.  Patient had a recent episode of immunotherapy induced pancreatitis in September and she was in the hospital for almost 3 weeks.  While in the ED patient's lipase was greater than 1000 and she had a CT of her abdomen and pelvis with contrast that indicated pancreatitis with no pancreatic duct dilatation or peripancreatic fluid.  Patient admitted to hospital medicine for further management.  Patient did want to be transferred to [facility] because that is where her oncology team is with .  She ended up a [facility] because the ED at [facility] was full and had a very long wait.

## 2022-11-08 NOTE — SUBJECTIVE & OBJECTIVE
Past Medical History:   Diagnosis Date    Anxiety     Breast cancer     Encounter for blood transfusion     Hypertension     Meningitis        Past Surgical History:   Procedure Laterality Date    BREAST BIOPSY Left      SECTION      COLONOSCOPY N/A 2022    Procedure: COLONOSCOPY;  Surgeon: Davi Chery MD;  Location: [facility] (4TH FLR);  Service: Endoscopy;  Laterality: N/A;  case request entered from referral - Per DR. KATHLEEN Chery Pt to be scheduled for urgent colonoscopy on 22/ fully vaccinated / prep ins for Sutab on portal - ERW  see micro for negative C-diff- ERW    INSERTION OF TUNNELED CENTRAL VENOUS CATHETER (CVC) WITH SUBCUTANEOUS PORT N/A 2022    Procedure: [id number]-CATH;  Surgeon: Deo Sin Jr., MD;  [facility];  Service: General;  Laterality: N/A;    INSERTION OF TUNNELED CENTRAL VENOUS CATHETER (CVC) WITH SUBCUTANEOUS PORT N/A 2022    Procedure: INSERTION, SINGLE LUMEN CATHETER WITH PORT, WITH FLUOROSCOPIC GUIDANCE;  Surgeon: Ryder Dinero MD;  Location: [facility] CATH LAB;  Service: Vascular;  Laterality: N/A;       Review of patient's allergies indicates:   Allergen Reactions    Strawberries [strawberry] Hives       No current facility-administered medications on file prior to encounter.     Current Outpatient Medications on File Prior to Encounter   Medication Sig    nystatin (MYCOSTATIN) cream Apply topically 2 (two) times daily.    nystatin (MYCOSTATIN) powder Apply topically 2 (two) times daily. Apply to affected areas bid    prednisoLONE acetate (PRED FORTE) 1 % DrpS Place 1 drop into both eyes 2 (two) times daily.    OLANZapine (ZYPREXA) 5 MG tablet Take 1 tablet (5 mg total) by mouth every evening. While on chemotherapy (Patient not taking: Reported on 2022)    ondansetron (ZOFRAN) 8 MG tablet Take 1 tablet (8 mg total) by mouth every 12 (twelve) hours as needed for Nausea. (Patient not taking: Reported on 2022)    pantoprazole  (PROTONIX) 40 MG tablet Take 1 tablet (40 mg total) by mouth once daily while you are taking prednisone. You can stop when you finish your prednisone. (Patient not taking: Reported on 11/7/2022)    predniSONE (DELTASONE) 20 MG tablet Take 2 tablets (40 mg total) by mouth once daily. (Patient not taking: Reported on 11/7/2022)    promethazine (PHENERGAN) 25 MG tablet Take 1 tablet (25 mg total) by mouth every 6 (six) hours as needed for Nausea. (Patient not taking: Reported on 11/7/2022)    venlafaxine (EFFEXOR XR) 37.5 MG 24 hr capsule Take 1 capsule (37.5 mg total) by mouth once daily. (Patient not taking: Reported on 11/7/2022)    [DISCONTINUED] fluconazole (DIFLUCAN) 150 MG Tab Take 150 mg by mouth.    [DISCONTINUED] LIDOcaine-prilocaine (EMLA) cream Apply topically as needed (for port).    [DISCONTINUED] lipase-protease-amylase 24,000-76,000-120,000 units (PANLIPASE) 24,000-76,000 -120,000 unit capsule Take 2 capsules by mouth 3 (three) times daily with meals.    [DISCONTINUED] loperamide (IMODIUM) 2 mg capsule Take 2 capsules (4 mg total) by mouth every 6 (six) hours as needed for Diarrhea (Use as second choice).    [DISCONTINUED] mupirocin (BACTROBAN) 2 % ointment Apply topically once daily.    [DISCONTINUED] oxyCODONE (ROXICODONE) 5 MG immediate release tablet Take 1 tablet (5 mg total) by mouth every 6 (six) hours as needed for Pain.    [DISCONTINUED] sod sulf-pot chloride-mag sulf (SUTAB) 1.479-0.188- 0.225 gram tablet Take 12 tablets by mouth once daily. Take according to package instructions with indicated amount of water.     Family History       Problem Relation (Age of Onset)    Bladder Cancer Paternal Grandmother    Diabetes Father    Hypertension Father    Pancreatic cancer Maternal Uncle          Tobacco Use    Smoking status: Never    Smokeless tobacco: Never   Substance and Sexual Activity    Alcohol use: Not Currently     Comment: social    Drug use: Never    Sexual activity: Not Currently      Review of Systems   Constitutional:  Negative for activity change, chills and fever.   HENT:  Negative for congestion and trouble swallowing.    Eyes:  Negative for photophobia and visual disturbance.   Respiratory:  Negative for cough, shortness of breath and wheezing.    Cardiovascular:  Negative for chest pain, palpitations and leg swelling.   Gastrointestinal:  Positive for abdominal pain. Negative for diarrhea, nausea and vomiting.        Last BM this morning   Endocrine: Negative for polydipsia and polyuria.   Genitourinary:  Negative for dysuria and frequency.   Musculoskeletal:  Negative for arthralgias and gait problem.   Skin:  Negative for rash and wound.   Neurological:  Negative for dizziness, speech difficulty, weakness and headaches.   Psychiatric/Behavioral:  Negative for confusion and sleep disturbance.    Objective:     Vital Signs (Most Recent):  Temp: 98 °F (36.7 °C) (11/07/22 2037)  Pulse: 73 (11/07/22 2037)  Resp: 15 (11/07/22 2037)  BP: 125/70 (11/07/22 2037)  SpO2: 98 % (11/07/22 2037) Vital Signs (24h Range):  Temp:  [98 °F (36.7 °C)-98.9 °F (37.2 °C)] 98 °F (36.7 °C)  Pulse:  [73-94] 73  Resp:  [15-18] 15  SpO2:  [96 %-99 %] 98 %  BP: (120-140)/(69-93) 125/70     Weight: 107.4 kg (236 lb 12.4 oz)  Body mass index is 39.4 kg/m².    Physical Exam  Vitals reviewed.   Constitutional:       General: She is not in acute distress.     Appearance: She is not ill-appearing.   HENT:      Head: Normocephalic and atraumatic.      Nose: No congestion.      Mouth/Throat:      Mouth: Mucous membranes are moist.      Pharynx: Oropharynx is clear.   Eyes:      General:         Right eye: No discharge.         Left eye: No discharge.      Extraocular Movements: Extraocular movements intact.      Pupils: Pupils are equal, round, and reactive to light.   Cardiovascular:      Rate and Rhythm: Normal rate and regular rhythm.      Heart sounds: Normal heart sounds. No murmur heard.  Pulmonary:      Effort:  Pulmonary effort is normal. No respiratory distress.      Breath sounds: Normal breath sounds. No wheezing or rhonchi.   Abdominal:      General: Bowel sounds are normal. There is no distension.      Palpations: Abdomen is soft.      Tenderness: There is no abdominal tenderness. There is no guarding.   Musculoskeletal:         General: No swelling or tenderness. Normal range of motion.      Cervical back: Normal range of motion. No rigidity or tenderness.   Skin:     General: Skin is warm.      Capillary Refill: Capillary refill takes less than 2 seconds.      Findings: No lesion or rash.   Neurological:      General: No focal deficit present.      Mental Status: She is alert and oriented to person, place, and time.      Sensory: No sensory deficit.      Gait: Gait normal.   Psychiatric:         Mood and Affect: Mood normal.         Behavior: Behavior normal.         CRANIAL NERVES     CN III, IV, VI   Pupils are equal, round, and reactive to light.     Significant Labs: All pertinent labs within the past 24 hours have been reviewed.  BMP:   Recent Labs   Lab 11/07/22  1403   *      K 3.4*      CO2 25   BUN 12   CREATININE 0.7   CALCIUM 8.8     CBC:   Recent Labs   Lab 11/07/22  1052 11/07/22  1403   WBC 6.26 5.87   HGB 10.4* 10.7*   HCT 30.8* 31.1*   * 149*     CMP:   Recent Labs   Lab 11/07/22  1052 11/07/22  1403    139   K 3.4* 3.4*    108   CO2 23 25   * 115*   BUN 13 12   CREATININE 0.8 0.7   CALCIUM 9.0 8.8   PROT 6.1 6.4   ALBUMIN 3.5 3.7   BILITOT 0.3 0.3   ALKPHOS 45* 50*   AST 17 17   ALT 16 16   ANIONGAP 8 6*       Significant Imaging: I have reviewed all pertinent imaging results/findings within the past 24 hours.  CT Abdomen Pelvis With Contrast  Narrative: EXAMINATION:  CT ABDOMEN PELVIS WITH CONTRAST    CLINICAL HISTORY:  Pancreatitis, acute, severe;    TECHNIQUE:  Low dose axial images, sagittal and coronal reformations were obtained from the lung bases  to the pubic symphysis following the IV administration of 100 mL of Omnipaque 350 .  Oral contrast was not given.    COMPARISON:  10/05/2022    FINDINGS:  Limited evaluation lung bases show no acute process.  There is limited evaluation of the distal tip of a central venous catheter.    The liver is normal in size.  No solid mass or biliary ductal dilatation.  Gallbladder is unremarkable.    Spleen and adrenal glands show no focal abnormality.  There is mild peripancreatic stranding, particularly around the pancreatic head.  No large enhancing mass.  No peripancreatic fluid collection.    Bilateral kidneys are normal in size and position.  No hydronephrosis or nephrolithiasis.  The urinary bladder is relatively decompressed but shows no focal wall thickening.  There is an IUD in place.  This appears to be inferiorly displaced within the inferior uterine segment and cervix.  Right adnexal cyst is likely physiologic.    Gastrointestinal tract shows no wall thickening or obstruction.  A normal appendix is present.  No free fluid or free gas.  No drainable fluid collection.  No concerning lymphadenopathy.    Vascular structures show normal course and caliber.  No evidence of an acute fracture or destructive osseous lesion.  Impression: Inflammatory stranding adjacent to the pancreas, most notable at the pancreatic head concerning for pancreatitis.  No significant pancreatic ductal dilatation or peripancreatic fluid collection.    There is an IUD which appears to be inferiorly displaced residing within the inferior uterine segment/cervix.  Nonemergent pelvic ultrasound may be obtained for further evaluation.    Electronically signed by: Syd Sigala MD  Date:    11/07/2022  Time:    15:44

## 2022-11-08 NOTE — H&P
[facility] Surg [facility] Medicine  History & Physical    Patient Name: Laura Kent  MRN: [id number]  Patient Class: OP- Observation  Admission Date: 2022  Attending Physician: Pascual Perez MD   Primary Care Provider: Viry Bess MD         Patient information was obtained from patient and ER records.     Subjective:     Principal Problem:Drug-induced acute pancreatitis without infection or necrosis    Chief Complaint:   Chief Complaint   Patient presents with    Abdominal Pain     Patient to ED for abdominal pain since Saturday . Denies any N/V/D . Patient states she is having a flare up of Pancreatitis .         HPI: Ms. Kent is a 37-year-old female with a past medical history of hypertension, anxiety, and current treatment of left breast cancer.  She was sent to the ED by her oncologist for severe abdominal pain that began on Saturday.  The patient denies nausea and vomiting.  She describes the pain is a soreness 10/10 and worse when she lies down.  Patient had a recent episode of immunotherapy induced pancreatitis in September and she was in the hospital for almost 3 weeks.  While in the ED patient's lipase was greater than 1000 and she had a CT of her abdomen and pelvis with contrast that indicated pancreatitis with no pancreatic duct dilatation or peripancreatic fluid.  Patient admitted to hospital medicine for further management.  Patient did want to be transferred to [facility] because that is where her oncology team is with .  She ended up [facility] because the ED at [facility] was full and had a very long wait.    Past Medical History:   Diagnosis Date    Anxiety     Breast cancer     Encounter for blood transfusion     Hypertension     Meningitis        Past Surgical History:   Procedure Laterality Date    BREAST BIOPSY Left      SECTION      COLONOSCOPY N/A 2022    Procedure: COLONOSCOPY;  Surgeon: Davi Chery MD;  Location: [address]  [facility]);  Service: Endoscopy;  Laterality: N/A;  case request entered from referral - Per DR. KATHLEEN Chery Pt to be scheduled for urgent colonoscopy on 9/22/22/ fully vaccinated / prep ins for Sutab on portal - [facility]  see micro for negative C-diff- [facility]    INSERTION OF TUNNELED CENTRAL VENOUS CATHETER (CVC) WITH SUBCUTANEOUS PORT N/A 4/5/2022    Procedure: [id number];  Surgeon: Deo Sin Jr., MD;  [facility];  Service: General;  Laterality: N/A;    INSERTION OF TUNNELED CENTRAL VENOUS CATHETER (CVC) WITH SUBCUTANEOUS PORT N/A 8/1/2022    Procedure: INSERTION, SINGLE LUMEN CATHETER WITH PORT, WITH FLUOROSCOPIC GUIDANCE;  Surgeon: Ryder Dinero MD;  Location: [facility] CATH LAB;  Service: Vascular;  Laterality: N/A;       Review of patient's allergies indicates:   Allergen Reactions    Strawberries [strawberry] Hives       No current facility-administered medications on file prior to encounter.     Current Outpatient Medications on File Prior to Encounter   Medication Sig    nystatin (MYCOSTATIN) cream Apply topically 2 (two) times daily.    nystatin (MYCOSTATIN) powder Apply topically 2 (two) times daily. Apply to affected areas bid    prednisoLONE acetate (PRED FORTE) 1 % DrpS Place 1 drop into both eyes 2 (two) times daily.    OLANZapine (ZYPREXA) 5 MG tablet Take 1 tablet (5 mg total) by mouth every evening. While on chemotherapy (Patient not taking: Reported on 11/7/2022)    ondansetron (ZOFRAN) 8 MG tablet Take 1 tablet (8 mg total) by mouth every 12 (twelve) hours as needed for Nausea. (Patient not taking: Reported on 11/7/2022)    pantoprazole (PROTONIX) 40 MG tablet Take 1 tablet (40 mg total) by mouth once daily while you are taking prednisone. You can stop when you finish your prednisone. (Patient not taking: Reported on 11/7/2022)    predniSONE (DELTASONE) 20 MG tablet Take 2 tablets (40 mg total) by mouth once daily. (Patient not taking: Reported on 11/7/2022)    promethazine  (PHENERGAN) 25 MG tablet Take 1 tablet (25 mg total) by mouth every 6 (six) hours as needed for Nausea. (Patient not taking: Reported on 11/7/2022)    venlafaxine (EFFEXOR XR) 37.5 MG 24 hr capsule Take 1 capsule (37.5 mg total) by mouth once daily. (Patient not taking: Reported on 11/7/2022)    [DISCONTINUED] fluconazole (DIFLUCAN) 150 MG Tab Take 150 mg by mouth.    [DISCONTINUED] LIDOcaine-prilocaine (EMLA) cream Apply topically as needed (for port).    [DISCONTINUED] lipase-protease-amylase 24,000-76,000-120,000 units (PANLIPASE) 24,000-76,000 -120,000 unit capsule Take 2 capsules by mouth 3 (three) times daily with meals.    [DISCONTINUED] loperamide (IMODIUM) 2 mg capsule Take 2 capsules (4 mg total) by mouth every 6 (six) hours as needed for Diarrhea (Use as second choice).    [DISCONTINUED] mupirocin (BACTROBAN) 2 % ointment Apply topically once daily.    [DISCONTINUED] oxyCODONE (ROXICODONE) 5 MG immediate release tablet Take 1 tablet (5 mg total) by mouth every 6 (six) hours as needed for Pain.    [DISCONTINUED] sod sulf-pot chloride-mag sulf (SUTAB) 1.479-0.188- 0.225 gram tablet Take 12 tablets by mouth once daily. Take according to package instructions with indicated amount of water.     Family History       Problem Relation (Age of Onset)    Bladder Cancer Paternal Grandmother    Diabetes Father    Hypertension Father    Pancreatic cancer Maternal Uncle          Tobacco Use    Smoking status: Never    Smokeless tobacco: Never   Substance and Sexual Activity    Alcohol use: Not Currently     Comment: social    Drug use: Never    Sexual activity: Not Currently     Review of Systems   Constitutional:  Negative for activity change, chills and fever.   HENT:  Negative for congestion and trouble swallowing.    Eyes:  Negative for photophobia and visual disturbance.   Respiratory:  Negative for cough, shortness of breath and wheezing.    Cardiovascular:  Negative for chest pain, palpitations and leg swelling.    Gastrointestinal:  Positive for abdominal pain. Negative for diarrhea, nausea and vomiting.        Last BM this morning   Endocrine: Negative for polydipsia and polyuria.   Genitourinary:  Negative for dysuria and frequency.   Musculoskeletal:  Negative for arthralgias and gait problem.   Skin:  Negative for rash and wound.   Neurological:  Negative for dizziness, speech difficulty, weakness and headaches.   Psychiatric/Behavioral:  Negative for confusion and sleep disturbance.    Objective:     Vital Signs (Most Recent):  Temp: 98 °F (36.7 °C) (11/07/22 2037)  Pulse: 73 (11/07/22 2037)  Resp: 15 (11/07/22 2037)  BP: 125/70 (11/07/22 2037)  SpO2: 98 % (11/07/22 2037) Vital Signs (24h Range):  Temp:  [98 °F (36.7 °C)-98.9 °F (37.2 °C)] 98 °F (36.7 °C)  Pulse:  [73-94] 73  Resp:  [15-18] 15  SpO2:  [96 %-99 %] 98 %  BP: (120-140)/(69-93) 125/70     Weight: 107.4 kg (236 lb 12.4 oz)  Body mass index is 39.4 kg/m².    Physical Exam  Vitals reviewed.   Constitutional:       General: She is not in acute distress.     Appearance: She is not ill-appearing.   HENT:      Head: Normocephalic and atraumatic.      Nose: No congestion.      Mouth/Throat:      Mouth: Mucous membranes are moist.      Pharynx: Oropharynx is clear.   Eyes:      General:         Right eye: No discharge.         Left eye: No discharge.      Extraocular Movements: Extraocular movements intact.      Pupils: Pupils are equal, round, and reactive to light.   Cardiovascular:      Rate and Rhythm: Normal rate and regular rhythm.      Heart sounds: Normal heart sounds. No murmur heard.  Pulmonary:      Effort: Pulmonary effort is normal. No respiratory distress.      Breath sounds: Normal breath sounds. No wheezing or rhonchi.   Abdominal:      General: Bowel sounds are normal. There is no distension.      Palpations: Abdomen is soft.      Tenderness: There is no abdominal tenderness. There is no guarding.   Musculoskeletal:         General: No swelling or  tenderness. Normal range of motion.      Cervical back: Normal range of motion. No rigidity or tenderness.   Skin:     General: Skin is warm.      Capillary Refill: Capillary refill takes less than 2 seconds.      Findings: No lesion or rash.   Neurological:      General: No focal deficit present.      Mental Status: She is alert and oriented to person, place, and time.      Sensory: No sensory deficit.      Gait: Gait normal.   Psychiatric:         Mood and Affect: Mood normal.         Behavior: Behavior normal.         CRANIAL NERVES     CN III, IV, VI   Pupils are equal, round, and reactive to light.     Significant Labs: All pertinent labs within the past 24 hours have been reviewed.  BMP:   Recent Labs   Lab 11/07/22  1403   *      K 3.4*      CO2 25   BUN 12   CREATININE 0.7   CALCIUM 8.8     CBC:   Recent Labs   Lab 11/07/22  1052 11/07/22  1403   WBC 6.26 5.87   HGB 10.4* 10.7*   HCT 30.8* 31.1*   * 149*     CMP:   Recent Labs   Lab 11/07/22  1052 11/07/22  1403    139   K 3.4* 3.4*    108   CO2 23 25   * 115*   BUN 13 12   CREATININE 0.8 0.7   CALCIUM 9.0 8.8   PROT 6.1 6.4   ALBUMIN 3.5 3.7   BILITOT 0.3 0.3   ALKPHOS 45* 50*   AST 17 17   ALT 16 16   ANIONGAP 8 6*       Significant Imaging: I have reviewed all pertinent imaging results/findings within the past 24 hours.  CT Abdomen Pelvis With Contrast  Narrative: EXAMINATION:  CT ABDOMEN PELVIS WITH CONTRAST    CLINICAL HISTORY:  Pancreatitis, acute, severe;    TECHNIQUE:  Low dose axial images, sagittal and coronal reformations were obtained from the lung bases to the pubic symphysis following the IV administration of 100 mL of Omnipaque 350 .  Oral contrast was not given.    COMPARISON:  10/05/2022    FINDINGS:  Limited evaluation lung bases show no acute process.  There is limited evaluation of the distal tip of a central venous catheter.    The liver is normal in size.  No solid mass or biliary ductal  dilatation.  Gallbladder is unremarkable.    Spleen and adrenal glands show no focal abnormality.  There is mild peripancreatic stranding, particularly around the pancreatic head.  No large enhancing mass.  No peripancreatic fluid collection.    Bilateral kidneys are normal in size and position.  No hydronephrosis or nephrolithiasis.  The urinary bladder is relatively decompressed but shows no focal wall thickening.  There is an IUD in place.  This appears to be inferiorly displaced within the inferior uterine segment and cervix.  Right adnexal cyst is likely physiologic.    Gastrointestinal tract shows no wall thickening or obstruction.  A normal appendix is present.  No free fluid or free gas.  No drainable fluid collection.  No concerning lymphadenopathy.    Vascular structures show normal course and caliber.  No evidence of an acute fracture or destructive osseous lesion.  Impression: Inflammatory stranding adjacent to the pancreas, most notable at the pancreatic head concerning for pancreatitis.  No significant pancreatic ductal dilatation or peripancreatic fluid collection.    There is an IUD which appears to be inferiorly displaced residing within the inferior uterine segment/cervix.  Nonemergent pelvic ultrasound may be obtained for further evaluation.    Electronically signed by: Syd Sigala MD  Date:    11/07/2022  Time:    15:44    Assessment/Plan:     * Drug-induced acute pancreatitis without infection or necrosis  Immunotherapy induced pancreatitis  Patient admitted with right upper quadrant epigastric pain without nausea or vomiting  Lipase over 1000  LFTs normal  CT abdomen pelvis:  Mild pancreatic stranding particularly around the head.  No mass or eduard pancreatic fluid, no pancreatic duct dilatation  -LR at 150 mL an hour  -p.r.n. analgesia and anti emetics  -trend lipase    Primary hypertension  Patient does not take any antihypertensives at this time.      IUD (intrauterine device) in  place  CT abdomen pelvis reports IUD out of place.  Future follow-up ultrasound recommended.      Malignant neoplasm of upper-outer quadrant of left breast in female, estrogen receptor negative  Pt of Dr Cruz.  She was scheduled to restart chemo without immunotherapy, today, however due to abdominal pain she was sent to the ED instead.      VTE Risk Mitigation (From admission, onward)           Ordered     Reason for No Pharmacological VTE Prophylaxis  Once        Question:  Reasons:  Answer:  Thrombocytopenia    11/07/22 1751     IP VTE HIGH RISK PATIENT  Once         11/07/22 1751     Place sequential compression device  Until discontinued         11/07/22 1751                       Lisa Alegria DNP  Department of Hospital Medicine   [facility] [place], operated by [facility] Surg ([place])

## 2022-11-08 NOTE — ASSESSMENT & PLAN NOTE
Immunotherapy induced pancreatitis  Patient admitted with right upper quadrant epigastric pain without nausea or vomiting  Lipase over 1000  LFTs normal  CT abdomen pelvis:  Mild pancreatic stranding particularly around the head.  No mass or eduard pancreatic fluid, no pancreatic duct dilatation  -LR at 150 mL an hour  -p.r.n. analgesia and anti emetics  -trend lipase

## 2022-11-08 NOTE — PROGRESS NOTES
[facility] Medicine  Progress Note    Patient Name: Laura Kent  MRN: [id number]  Patient Class: OP- Observation   Admission Date: 11/7/2022  Length of Stay: 0 days  Attending Physician: Elan Pardo MD  Primary Care Provider: Viry Bess MD        Subjective:     Principal Problem:Drug-induced acute pancreatitis without infection or necrosis        HPI:  Ms. Kent is a 37-year-old female with a past medical history of hypertension, anxiety, and current treatment of left breast cancer.  She was sent to the ED by her oncologist for severe abdominal pain that began on Saturday.  The patient denies nausea and vomiting.  She describes the pain is a soreness 10/10 and worse when she lies down.  Patient had a recent episode of immunotherapy induced pancreatitis in September and she was in the hospital for almost 3 weeks.  While in the ED patient's lipase was greater than 1000 and she had a CT of her abdomen and pelvis with contrast that indicated pancreatitis with no pancreatic duct dilatation or peripancreatic fluid.  Patient admitted to hospital medicine for further management.  Patient did want to be transferred to [facility] because that is where her oncology team is with .  She ended up [facility] because the ED at [facility] was full and had a very long wait.      Overview/Hospital Course:  No notes on file    Interval History: no N/V, normal BM this AM. Would like to try to eat. Similar pain to admission.    Review of Systems   Constitutional:  Negative for chills and fever.   HENT:  Negative for trouble swallowing.    Respiratory:  Negative for cough and shortness of breath.    Cardiovascular:  Negative for chest pain and palpitations.   Gastrointestinal:  Positive for abdominal pain. Negative for diarrhea, nausea and vomiting.   Skin:  Positive for rash.   Objective:     Vital Signs (Most Recent):  Temp: 98.1 °F (36.7 °C) (11/08/22 0719)  Pulse: 75 (11/08/22  0719)  Resp: 18 (11/08/22 0918)  BP: (!) 127/90 (11/08/22 0719)  SpO2: 99 % (11/08/22 0719)   Vital Signs (24h Range):  Temp:  [97.9 °F (36.6 °C)-98.9 °F (37.2 °C)] 98.1 °F (36.7 °C)  Pulse:  [73-94] 75  Resp:  [15-18] 18  SpO2:  [96 %-100 %] 99 %  BP: (120-140)/(69-93) 127/90     Weight: 107.4 kg (236 lb 12.4 oz)  Body mass index is 39.4 kg/m².    Intake/Output Summary (Last 24 hours) at 11/8/2022 1057  Last data filed at 11/8/2022 0638  Gross per 24 hour   Intake 2774 ml   Output --   Net 2774 ml      Physical Exam  Vitals and nursing note reviewed.   Constitutional:       General: She is not in acute distress.     Appearance: She is well-developed.   Eyes:      Conjunctiva/sclera: Conjunctivae normal.   Neck:      Vascular: No JVD.   Cardiovascular:      Rate and Rhythm: Normal rate and regular rhythm.      Heart sounds: Normal heart sounds.   Pulmonary:      Effort: Pulmonary effort is normal.      Breath sounds: Normal breath sounds. No wheezing or rales.   Abdominal:      General: There is no distension.      Palpations: Abdomen is soft.      Tenderness: There is abdominal tenderness. There is no rebound.   Musculoskeletal:         General: No deformity.      Cervical back: Neck supple.   Skin:     General: Skin is warm and dry.      Findings: Rash present.   Neurological:      Mental Status: She is alert and oriented to person, place, and time.       Significant Labs: All pertinent labs within the past 24 hours have been reviewed.    Significant Imaging: I have reviewed all pertinent imaging results/findings within the past 24 hours.      Assessment/Plan:      * Drug-induced acute pancreatitis without infection or necrosis  Immunotherapy induced pancreatitis  Patient admitted with right upper quadrant epigastric pain without nausea or vomiting  Lipase over 1000  LFTs normal  CT abdomen pelvis:  Mild pancreatic stranding particularly around the head.  No mass or eduard pancreatic fluid, no pancreatic duct  dilatation  -LR at 150 mL an hour  -p.r.n. analgesia and anti emetics  -trend lipase    Try CLD today    IUD (intrauterine device) in place  CT abdomen pelvis reports IUD out of place.  Future follow-up ultrasound recommended.      Primary hypertension  Patient does not take any antihypertensives at this time.      Malignant neoplasm of upper-outer quadrant of left breast in female, estrogen receptor negative  Pt of Dr Cruz.  She was scheduled to restart chemo without immunotherapy, today, however due to abdominal pain she was sent to the ED instead.    Will need to coordinate with oncologist to reset her planned chemo dates.        VTE Risk Mitigation (From admission, onward)         Ordered     Reason for No Pharmacological VTE Prophylaxis  Once        Question:  Reasons:  Answer:  Thrombocytopenia    11/07/22 1751     IP VTE HIGH RISK PATIENT  Once         11/07/22 1751     Place sequential compression device  Until discontinued         11/07/22 1751                Discharge Planning   MIRIAN:      Code Status: Full Code   Is the patient medically ready for discharge?:     Reason for patient still in hospital (select all that apply): Treatment                     Elan Pardo MD  Department of Hospital Medicine   [facility] Surg ([place])

## 2022-11-08 NOTE — ASSESSMENT & PLAN NOTE
Pt of Dr Cruz.  She was scheduled to restart chemo without immunotherapy, today, however due to abdominal pain she was sent to the ED instead.    Will need to coordinate with oncologist to reset her planned chemo dates.

## 2022-11-08 NOTE — SUBJECTIVE & OBJECTIVE
Interval History: no N/V, normal BM this AM. Would like to try to eat. Similar pain to admission.    Review of Systems   Constitutional:  Negative for chills and fever.   HENT:  Negative for trouble swallowing.    Respiratory:  Negative for cough and shortness of breath.    Cardiovascular:  Negative for chest pain and palpitations.   Gastrointestinal:  Positive for abdominal pain. Negative for diarrhea, nausea and vomiting.   Skin:  Positive for rash.   Objective:     Vital Signs (Most Recent):  Temp: 98.1 °F (36.7 °C) (11/08/22 0719)  Pulse: 75 (11/08/22 0719)  Resp: 18 (11/08/22 0918)  BP: (!) 127/90 (11/08/22 0719)  SpO2: 99 % (11/08/22 0719)   Vital Signs (24h Range):  Temp:  [97.9 °F (36.6 °C)-98.9 °F (37.2 °C)] 98.1 °F (36.7 °C)  Pulse:  [73-94] 75  Resp:  [15-18] 18  SpO2:  [96 %-100 %] 99 %  BP: (120-140)/(69-93) 127/90     Weight: 107.4 kg (236 lb 12.4 oz)  Body mass index is 39.4 kg/m².    Intake/Output Summary (Last 24 hours) at 11/8/2022 1057  Last data filed at 11/8/2022 0638  Gross per 24 hour   Intake 2774 ml   Output --   Net 2774 ml      Physical Exam  Vitals and nursing note reviewed.   Constitutional:       General: She is not in acute distress.     Appearance: She is well-developed.   Eyes:      Conjunctiva/sclera: Conjunctivae normal.   Neck:      Vascular: No JVD.   Cardiovascular:      Rate and Rhythm: Normal rate and regular rhythm.      Heart sounds: Normal heart sounds.   Pulmonary:      Effort: Pulmonary effort is normal.      Breath sounds: Normal breath sounds. No wheezing or rales.   Abdominal:      General: There is no distension.      Palpations: Abdomen is soft.      Tenderness: There is abdominal tenderness. There is no rebound.   Musculoskeletal:         General: No deformity.      Cervical back: Neck supple.   Skin:     General: Skin is warm and dry.      Findings: Rash present.   Neurological:      Mental Status: She is alert and oriented to person, place, and time.        Significant Labs: All pertinent labs within the past 24 hours have been reviewed.    Significant Imaging: I have reviewed all pertinent imaging results/findings within the past 24 hours.

## 2022-11-09 LAB
ALBUMIN SERPL BCP-MCNC: 3.4 G/DL (ref 3.5–5.2)
ALP SERPL-CCNC: 42 U/L (ref 55–135)
ALT SERPL W/O P-5'-P-CCNC: 17 U/L (ref 10–44)
ANION GAP SERPL CALC-SCNC: 6 MMOL/L (ref 8–16)
AST SERPL-CCNC: 16 U/L (ref 10–40)
BASOPHILS # BLD AUTO: 0.01 K/UL (ref 0–0.2)
BASOPHILS NFR BLD: 0.2 % (ref 0–1.9)
BILIRUB SERPL-MCNC: 0.4 MG/DL (ref 0.1–1)
BUN SERPL-MCNC: 6 MG/DL (ref 6–20)
CALCIUM SERPL-MCNC: 9.1 MG/DL (ref 8.7–10.5)
CHLORIDE SERPL-SCNC: 105 MMOL/L (ref 95–110)
CO2 SERPL-SCNC: 29 MMOL/L (ref 23–29)
CREAT SERPL-MCNC: 0.7 MG/DL (ref 0.5–1.4)
DIFFERENTIAL METHOD: ABNORMAL
EOSINOPHIL # BLD AUTO: 0 K/UL (ref 0–0.5)
EOSINOPHIL NFR BLD: 0.2 % (ref 0–8)
ERYTHROCYTE [DISTWIDTH] IN BLOOD BY AUTOMATED COUNT: 12 % (ref 11.5–14.5)
EST. GFR  (NO RACE VARIABLE): >60 ML/MIN/1.73 M^2
GLUCOSE SERPL-MCNC: 114 MG/DL (ref 70–110)
HCT VFR BLD AUTO: 30.8 % (ref 37–48.5)
HGB BLD-MCNC: 10.7 G/DL (ref 12–16)
IMM GRANULOCYTES # BLD AUTO: 0.03 K/UL (ref 0–0.04)
IMM GRANULOCYTES NFR BLD AUTO: 0.6 % (ref 0–0.5)
LIPASE SERPL-CCNC: 886 U/L (ref 4–60)
LYMPHOCYTES # BLD AUTO: 1.3 K/UL (ref 1–4.8)
LYMPHOCYTES NFR BLD: 27.5 % (ref 18–48)
MCH RBC QN AUTO: 32.1 PG (ref 27–31)
MCHC RBC AUTO-ENTMCNC: 34.7 G/DL (ref 32–36)
MCV RBC AUTO: 93 FL (ref 82–98)
MONOCYTES # BLD AUTO: 0.5 K/UL (ref 0.3–1)
MONOCYTES NFR BLD: 10 % (ref 4–15)
NEUTROPHILS # BLD AUTO: 3 K/UL (ref 1.8–7.7)
NEUTROPHILS NFR BLD: 61.5 % (ref 38–73)
NRBC BLD-RTO: 0 /100 WBC
PLATELET # BLD AUTO: 151 K/UL (ref 150–450)
PMV BLD AUTO: 9.5 FL (ref 9.2–12.9)
POTASSIUM SERPL-SCNC: 3.4 MMOL/L (ref 3.5–5.1)
PROT SERPL-MCNC: 6 G/DL (ref 6–8.4)
RBC # BLD AUTO: 3.33 M/UL (ref 4–5.4)
SODIUM SERPL-SCNC: 140 MMOL/L (ref 136–145)
WBC # BLD AUTO: 4.8 K/UL (ref 3.9–12.7)

## 2022-11-09 PROCEDURE — 11000001 HC ACUTE MED/SURG PRIVATE ROOM

## 2022-11-09 PROCEDURE — 85025 COMPLETE CBC W/AUTO DIFF WBC: CPT | Performed by: INTERNAL MEDICINE

## 2022-11-09 PROCEDURE — 99232 PR SUBSEQUENT HOSPITAL CARE,LEVL II: ICD-10-PCS | Mod: ,,, | Performed by: INTERNAL MEDICINE

## 2022-11-09 PROCEDURE — 63600175 PHARM REV CODE 636 W HCPCS: Performed by: NURSE PRACTITIONER

## 2022-11-09 PROCEDURE — 36415 COLL VENOUS BLD VENIPUNCTURE: CPT | Performed by: INTERNAL MEDICINE

## 2022-11-09 PROCEDURE — 99232 SBSQ HOSP IP/OBS MODERATE 35: CPT | Mod: ,,, | Performed by: INTERNAL MEDICINE

## 2022-11-09 PROCEDURE — 83690 ASSAY OF LIPASE: CPT | Performed by: INTERNAL MEDICINE

## 2022-11-09 PROCEDURE — 80053 COMPREHEN METABOLIC PANEL: CPT | Performed by: INTERNAL MEDICINE

## 2022-11-09 RX ADMIN — MORPHINE SULFATE 4 MG: 4 INJECTION, SOLUTION INTRAMUSCULAR; INTRAVENOUS at 05:11

## 2022-11-09 RX ADMIN — HYDROCORTISONE: 1 CREAM TOPICAL at 09:11

## 2022-11-09 RX ADMIN — MORPHINE SULFATE 4 MG: 4 INJECTION, SOLUTION INTRAMUSCULAR; INTRAVENOUS at 02:11

## 2022-11-09 RX ADMIN — MORPHINE SULFATE 4 MG: 4 INJECTION, SOLUTION INTRAMUSCULAR; INTRAVENOUS at 09:11

## 2022-11-09 RX ADMIN — MORPHINE SULFATE 4 MG: 4 INJECTION, SOLUTION INTRAMUSCULAR; INTRAVENOUS at 08:11

## 2022-11-09 NOTE — PROGRESS NOTES
[facility] Medicine  Progress Note    Patient Name: Laura Kent  MRN: [id number]  Patient Class: IP- Inpatient   Admission Date: 11/7/2022  Length of Stay: 0 days  Attending Physician: Elan Pardo MD  Primary Care Provider: Viry Bess MD        Subjective:     Principal Problem:Drug-induced acute pancreatitis without infection or necrosis        HPI:  Ms. Kent is a 37-year-old female with a past medical history of hypertension, anxiety, and current treatment of left breast cancer.  She was sent to the ED by her oncologist for severe abdominal pain that began on Saturday.  The patient denies nausea and vomiting.  She describes the pain is a soreness 10/10 and worse when she lies down.  Patient had a recent episode of immunotherapy induced pancreatitis in September and she was in the hospital for almost 3 weeks.  While in the ED patient's lipase was greater than 1000 and she had a CT of her abdomen and pelvis with contrast that indicated pancreatitis with no pancreatic duct dilatation or peripancreatic fluid.  Patient admitted to hospital medicine for further management.  Patient did want to be transferred to [facility] because that is where her oncology team is with .  She ended up a [facility] because the ED at [facility] was full and had a very long wait.      Overview/Hospital Course:  No notes on file    Interval History: abd pain improving, able to tolerate some liquids yesterday.    Review of Systems   Constitutional:  Negative for chills and fever.   HENT:  Negative for trouble swallowing.    Respiratory:  Negative for cough and shortness of breath.    Cardiovascular:  Negative for chest pain and palpitations.   Gastrointestinal:  Positive for abdominal pain. Negative for diarrhea, nausea and vomiting.   Skin:  Positive for rash.   Objective:     Vital Signs (Most Recent):  Temp: 99.3 °F (37.4 °C) (11/09/22 0736)  Pulse: 73 (11/09/22 0736)  Resp: 16 (11/09/22  0736)  BP: 112/69 (11/09/22 0736)  SpO2: 98 % (11/09/22 0736) Vital Signs (24h Range):  Temp:  [98 °F (36.7 °C)-99.3 °F (37.4 °C)] 99.3 °F (37.4 °C)  Pulse:  [69-90] 73  Resp:  [15-18] 16  SpO2:  [98 %-100 %] 98 %  BP: (104-134)/(56-83) 112/69     Weight: 107.4 kg (236 lb 12.4 oz)  Body mass index is 39.4 kg/m².    Intake/Output Summary (Last 24 hours) at 11/9/2022 0856  Last data filed at 11/9/2022 0636  Gross per 24 hour   Intake 1465 ml   Output --   Net 1465 ml      Physical Exam  Vitals and nursing note reviewed.   Constitutional:       General: She is not in acute distress.     Appearance: She is well-developed.   Eyes:      Conjunctiva/sclera: Conjunctivae normal.   Neck:      Vascular: No JVD.   Cardiovascular:      Rate and Rhythm: Normal rate and regular rhythm.      Heart sounds: Normal heart sounds.   Pulmonary:      Effort: Pulmonary effort is normal.      Breath sounds: Normal breath sounds. No wheezing or rales.   Abdominal:      General: There is no distension.      Palpations: Abdomen is soft.      Tenderness: There is abdominal tenderness. There is no rebound.   Musculoskeletal:         General: No deformity.      Cervical back: Neck supple.   Skin:     General: Skin is warm and dry.      Findings: Rash present.   Neurological:      Mental Status: She is alert and oriented to person, place, and time.       Significant Labs: All pertinent labs within the past 24 hours have been reviewed.    Significant Imaging: I have reviewed all pertinent imaging results/findings within the past 24 hours.      Assessment/Plan:      * Drug-induced acute pancreatitis without infection or necrosis  Immunotherapy induced pancreatitis  Patient admitted with right upper quadrant epigastric pain without nausea or vomiting  Lipase over 1000  LFTs normal  CT abdomen pelvis:  Mild pancreatic stranding particularly around the head.  No mass or eduard pancreatic fluid, no pancreatic duct dilatation  -LR at 150 mL an  hour  -p.r.n. analgesia and anti emetics  -trend lipase    Pending lipase, wants to advance diet today    IUD (intrauterine device) in place  CT abdomen pelvis reports IUD out of place.  Future follow-up ultrasound recommended.      Primary hypertension  Patient does not take any antihypertensives at this time.      Malignant neoplasm of upper-outer quadrant of left breast in female, estrogen receptor negative  Pt of Dr Cruz.  She was scheduled to restart chemo without immunotherapy, today, however due to abdominal pain she was sent to the ED instead.    Will need to coordinate with oncologist to reset her planned chemo dates.        VTE Risk Mitigation (From admission, onward)         Ordered     Reason for No Pharmacological VTE Prophylaxis  Once        Question:  Reasons:  Answer:  Thrombocytopenia    11/07/22 1751     IP VTE HIGH RISK PATIENT  Once         11/07/22 1751     Place sequential compression device  Until discontinued         11/07/22 1751                Discharge Planning   MIRIAN:      Code Status: Full Code   Is the patient medically ready for discharge?:     Reason for patient still in hospital (select all that apply): Treatment                     Elan Pardo MD  Department of Hospital Medicine   [facility] ([place])

## 2022-11-09 NOTE — ASSESSMENT & PLAN NOTE
Immunotherapy induced pancreatitis  Patient admitted with right upper quadrant epigastric pain without nausea or vomiting  Lipase over 1000  LFTs normal  CT abdomen pelvis:  Mild pancreatic stranding particularly around the head.  No mass or eduard pancreatic fluid, no pancreatic duct dilatation  -LR at 150 mL an hour  -p.r.n. analgesia and anti emetics  -trend lipase    Pending lipase, wants to advance diet today

## 2022-11-09 NOTE — RESEARCH
Sponsor: Dr. Devin Mejia M.D.    Study Title/IRB Number: A computer vision approach to prevention of injury from falling  IRB #: 2020.256    Principle Investigator: Devin Mejia M.D.    Present for Discussion: Yes/Patient only     Is LAR Consenting for Subject: No    Prior to the Informed Consent (IC) being signed, or any study protocol required data collection, testing, procedure, or intervention being performed, the following was done and/or discussed:  Patient was given a copy of the IC for review   Purpose of the study and qualifications to participate   Study design, follow up schedule, and tests or procedures done at each visit  Confidentiality and HIPAA Authorization for Release of Medical Records for the research trial/ subject's rights/research related injury  Risk, Benefits, Alternative Treatments, Compensation and Costs  Participation in the research trial is voluntary and patient may withdraw at anytime  Contact information for study related questions    Patient verbalizes understanding of the above: Yes  Contact information for CRC and PI given to patient: Yes  Patient able to adequately summarize: the purpose of the study, the risks associated with the study, and all procedures, testing, and follow-ups associated with the study: Yes    Patient signed the informed consent form for the A Computer Vision Approach to Prevent Injury From Falling research study with an IRB approval date of 07/09/2020.  Each page of the consent form was reviewed with patient and all questions answered satisfactorily. Patient signed the consent form and received a copy of same. The original consent was scanned into electronic medical records (EPIC) and filed into the subject's research study chart.    Ms. Kent was able to complete the following upon entry of the study:    - Subject was outfitted with white and black checkered gown: Yes  - Subject understands that they must wear the white and black checkered gown  for the entirety of the 24 hour observation period: Yes   - Subject understands that visitors and staff will also be recorded while in the view of visual recording equipment: Yes  - Subject was able to read consent and understands that they'll only be visually recorded for 24 hours and not audio recorded: Yes  -Subject states that they understand that this is an investigational study and that the WOW (Workstation on Wheels) doesn't prevent or lower risks of falls or injuries, and that they should always follow their provider's orders and use their call bell to alert hospital staff before ambulating or becoming mobile: Yes    Dr. Devin Mejia M.D. has reviewed the following inclusion/ exclusion criteria and confirms that subject meets all Inclusion and no Exclusion criteria at this time:      Inclusion-   - Subject is currently admitted as an inpatient with acute care needs to [facility] and is at risk for falling.  - Subject is awake, alert, oriented and can speak and understand English without        difficulty.  - Subject can stand and ambulate with or without assistance.  - Subject must be literate.  - Subject must have a BMI that allows for proper fit of white and black      checkered gown.  - Subject must be able to provide informed consent.  - Subject will be admitted for > 24 hours.    Exclusion-  - Subject cannot read.  - Subject is immobile e.g. (paralyzed)  - Subject has BMI that prevents them from properly fitting in white and black checkered      gown.  - Subject has COVID-19 or other airborne infectious diseases.  - Subject is on reverse-isolation for immunosuppressive diseases.  - Subject has altered mental status or diagnosis of dementia.  - Subject is expected to be discharged in < 24 hours.    Pt AAO x 4, lying quietly in bed supine with HOB elevated X 30 degrees. Respirations even and unlabored without distress noted or complaints of voiced. Pt is calm with a  pleasant affect. Pt placed in gown without incident. Pt reminded that WOW and video recording is not monitored and provides no emergency benefits or reduction or elimination from the risk of injury from falls; pt voiced understanding. Pt reminded to follow plan of care put forth by provider and to call RN for all assistance to reduce risk of problematic situations and potential injuries; pt voiced understanding. Recording started without incident. Advised pt that their participation is voluntary and if for any reasons they decide to cease the study that they only needed to inform their nurse and study session would be stopped; pt voiced understanding. Call bell within reach. Charge nurse notified of continuous monitoring and of other study initiatives.

## 2022-11-09 NOTE — SUBJECTIVE & OBJECTIVE
Interval History: abd pain improving, able to tolerate some liquids yesterday.    Review of Systems   Constitutional:  Negative for chills and fever.   HENT:  Negative for trouble swallowing.    Respiratory:  Negative for cough and shortness of breath.    Cardiovascular:  Negative for chest pain and palpitations.   Gastrointestinal:  Positive for abdominal pain. Negative for diarrhea, nausea and vomiting.   Skin:  Positive for rash.   Objective:     Vital Signs (Most Recent):  Temp: 99.3 °F (37.4 °C) (11/09/22 0736)  Pulse: 73 (11/09/22 0736)  Resp: 16 (11/09/22 0736)  BP: 112/69 (11/09/22 0736)  SpO2: 98 % (11/09/22 0736) Vital Signs (24h Range):  Temp:  [98 °F (36.7 °C)-99.3 °F (37.4 °C)] 99.3 °F (37.4 °C)  Pulse:  [69-90] 73  Resp:  [15-18] 16  SpO2:  [98 %-100 %] 98 %  BP: (104-134)/(56-83) 112/69     Weight: 107.4 kg (236 lb 12.4 oz)  Body mass index is 39.4 kg/m².    Intake/Output Summary (Last 24 hours) at 11/9/2022 0856  Last data filed at 11/9/2022 0636  Gross per 24 hour   Intake 1465 ml   Output --   Net 1465 ml      Physical Exam  Vitals and nursing note reviewed.   Constitutional:       General: She is not in acute distress.     Appearance: She is well-developed.   Eyes:      Conjunctiva/sclera: Conjunctivae normal.   Neck:      Vascular: No JVD.   Cardiovascular:      Rate and Rhythm: Normal rate and regular rhythm.      Heart sounds: Normal heart sounds.   Pulmonary:      Effort: Pulmonary effort is normal.      Breath sounds: Normal breath sounds. No wheezing or rales.   Abdominal:      General: There is no distension.      Palpations: Abdomen is soft.      Tenderness: There is abdominal tenderness. There is no rebound.   Musculoskeletal:         General: No deformity.      Cervical back: Neck supple.   Skin:     General: Skin is warm and dry.      Findings: Rash present.   Neurological:      Mental Status: She is alert and oriented to person, place, and time.       Significant Labs: All pertinent  labs within the past 24 hours have been reviewed.    Significant Imaging: I have reviewed all pertinent imaging results/findings within the past 24 hours.

## 2022-11-09 NOTE — PLAN OF CARE
[facility] ([place])  Initial Discharge Assessment       Primary Care Provider: Yessy Cruz MD    Admission Diagnosis: Chest pain [R07.9]  Acute pancreatitis, unspecified complication status, unspecified pancreatitis type [K85.90]    Admission Date: 11/7/2022  Expected Discharge Date:   TBD       Payor: MEDICAID / Plan: [place] [employer] / Product Type: Managed Medicaid /     Extended Emergency Contact Information  Primary Emergency Contact: kellen pack  Mobile Phone: [phone]  Relation: Mother  Secondary Emergency Contact: GINO WARNER  Mobile Phone: [phone]  Relation: Son  Preferred language: English   needed? No    Discharge Plan A: Home  Discharge Plan B: Home with family      [id number] - [place], [place] - [address] AT [facility] & [address]  [address]  [facility] [address]  Phone: [phone] Fax: [phone]    [facility] [address] [place] [address]  Phone: [phone] Fax: [phone]    Discharge planner met with patient at the bedside, Patient denies any inpatient admissions less than 30 days.    Patient able to verify all demographics in epic to be correct.     Verified PCP: Yessy Cruz ( oncologist, [facility])  Denies DME for home use      No needs identified at this time, case management to continue to follow.  Patient would like to provide her own transportation home at the time of discharge( personal vehicle parked in garage)   11/9/22 1005   Discharge Assessment   Assessment Type Discharge Planning Assessment   Confirmed/corrected address and phone number on face sheet? Yes   Assessment information obtained from? Patient    Communicated expected length of stay with patient/caregiver Yes    Type of Healthcare Directive Received None    If Healthcare Directive is received, is it scanned into Epic? no (comment)   Prior to hospitalization cognitive status: Alert/Oriented   Prior to  hospitalization functional status: Independent    Current cognitive status: Alert/Oriented    Current Functional Status: Independent    Arrived From home or self-care   Lives With child(rico)   Able to Return to Prior Arrangements Yes    Is patient able to care for self after discharge? Yes    Readmission Within The Last 30 Days no previous admission in last 30 days   Provided patient/caregiver education on the expected discharge date and the discharge plan Yes   Patient currently being followed by outpatient case management? No   Patient currently receives home health services? No   Does the patient currently use HME? No   Do you have any problems affording any of your prescribed medications? no   Is the patient taking medications as prescribed? Yes   Do you have any financial concerns preventing you from receiving the healthcare you need? No   Does the patient have transportation to healthcare appointments? Yes   Transportation Available family or friend will provide; drives self   Discharge Plan A Home   DME Needed Upon Discharge None    Discharge Plan B Home with family   Patient/Family in Agreement with Plan Yes

## 2022-11-10 LAB
ALBUMIN SERPL BCP-MCNC: 3.3 G/DL (ref 3.5–5.2)
ALP SERPL-CCNC: 44 U/L (ref 55–135)
ALT SERPL W/O P-5'-P-CCNC: 19 U/L (ref 10–44)
ANION GAP SERPL CALC-SCNC: 6 MMOL/L (ref 8–16)
AST SERPL-CCNC: 17 U/L (ref 10–40)
BASOPHILS # BLD AUTO: 0.01 K/UL (ref 0–0.2)
BASOPHILS NFR BLD: 0.2 % (ref 0–1.9)
BILIRUB SERPL-MCNC: 0.4 MG/DL (ref 0.1–1)
BUN SERPL-MCNC: 11 MG/DL (ref 6–20)
CALCIUM SERPL-MCNC: 9.3 MG/DL (ref 8.7–10.5)
CHLORIDE SERPL-SCNC: 105 MMOL/L (ref 95–110)
CO2 SERPL-SCNC: 26 MMOL/L (ref 23–29)
CREAT SERPL-MCNC: 0.7 MG/DL (ref 0.5–1.4)
DIFFERENTIAL METHOD: ABNORMAL
EOSINOPHIL # BLD AUTO: 0 K/UL (ref 0–0.5)
EOSINOPHIL NFR BLD: 0.2 % (ref 0–8)
ERYTHROCYTE [DISTWIDTH] IN BLOOD BY AUTOMATED COUNT: 12.2 % (ref 11.5–14.5)
EST. GFR  (NO RACE VARIABLE): >60 ML/MIN/1.73 M^2
GLUCOSE SERPL-MCNC: 110 MG/DL (ref 70–110)
HCT VFR BLD AUTO: 31.4 % (ref 37–48.5)
HGB BLD-MCNC: 10.8 G/DL (ref 12–16)
IMM GRANULOCYTES # BLD AUTO: 0.02 K/UL (ref 0–0.04)
IMM GRANULOCYTES NFR BLD AUTO: 0.5 % (ref 0–0.5)
LIPASE SERPL-CCNC: 730 U/L (ref 4–60)
LYMPHOCYTES # BLD AUTO: 1.1 K/UL (ref 1–4.8)
LYMPHOCYTES NFR BLD: 25.7 % (ref 18–48)
MCH RBC QN AUTO: 31.7 PG (ref 27–31)
MCHC RBC AUTO-ENTMCNC: 34.4 G/DL (ref 32–36)
MCV RBC AUTO: 92 FL (ref 82–98)
MONOCYTES # BLD AUTO: 0.4 K/UL (ref 0.3–1)
MONOCYTES NFR BLD: 10 % (ref 4–15)
NEUTROPHILS # BLD AUTO: 2.7 K/UL (ref 1.8–7.7)
NEUTROPHILS NFR BLD: 63.4 % (ref 38–73)
NRBC BLD-RTO: 0 /100 WBC
PLATELET # BLD AUTO: 170 K/UL (ref 150–450)
PMV BLD AUTO: 9.9 FL (ref 9.2–12.9)
POTASSIUM SERPL-SCNC: 3.6 MMOL/L (ref 3.5–5.1)
PROT SERPL-MCNC: 6 G/DL (ref 6–8.4)
RBC # BLD AUTO: 3.41 M/UL (ref 4–5.4)
SODIUM SERPL-SCNC: 137 MMOL/L (ref 136–145)
WBC # BLD AUTO: 4.32 K/UL (ref 3.9–12.7)

## 2022-11-10 PROCEDURE — 36415 COLL VENOUS BLD VENIPUNCTURE: CPT | Performed by: INTERNAL MEDICINE

## 2022-11-10 PROCEDURE — 11000001 HC ACUTE MED/SURG PRIVATE ROOM

## 2022-11-10 PROCEDURE — 99232 PR SUBSEQUENT HOSPITAL CARE,LEVL II: ICD-10-PCS | Mod: ,,, | Performed by: INTERNAL MEDICINE

## 2022-11-10 PROCEDURE — 83690 ASSAY OF LIPASE: CPT | Performed by: INTERNAL MEDICINE

## 2022-11-10 PROCEDURE — 85025 COMPLETE CBC W/AUTO DIFF WBC: CPT | Performed by: INTERNAL MEDICINE

## 2022-11-10 PROCEDURE — 80053 COMPREHEN METABOLIC PANEL: CPT | Performed by: INTERNAL MEDICINE

## 2022-11-10 PROCEDURE — 63600175 PHARM REV CODE 636 W HCPCS: Performed by: NURSE PRACTITIONER

## 2022-11-10 PROCEDURE — 99232 SBSQ HOSP IP/OBS MODERATE 35: CPT | Mod: ,,, | Performed by: INTERNAL MEDICINE

## 2022-11-10 PROCEDURE — 63600175 PHARM REV CODE 636 W HCPCS: Performed by: INTERNAL MEDICINE

## 2022-11-10 RX ADMIN — MORPHINE SULFATE 4 MG: 4 INJECTION, SOLUTION INTRAMUSCULAR; INTRAVENOUS at 02:11

## 2022-11-10 RX ADMIN — PREDNISONE 60 MG: 50 TABLET ORAL at 03:11

## 2022-11-10 RX ADMIN — SODIUM CHLORIDE, SODIUM LACTATE, POTASSIUM CHLORIDE, AND CALCIUM CHLORIDE: .6; .31; .03; .02 INJECTION, SOLUTION INTRAVENOUS at 02:11

## 2022-11-10 RX ADMIN — HYDROCORTISONE: 1 CREAM TOPICAL at 10:11

## 2022-11-10 RX ADMIN — HYDROCORTISONE: 1 CREAM TOPICAL at 09:11

## 2022-11-10 RX ADMIN — MORPHINE SULFATE 4 MG: 4 INJECTION, SOLUTION INTRAMUSCULAR; INTRAVENOUS at 10:11

## 2022-11-10 NOTE — ASSESSMENT & PLAN NOTE
Immunotherapy induced pancreatitis  Patient admitted with right upper quadrant epigastric pain without nausea or vomiting  Lipase over 1000  LFTs normal  CT abdomen pelvis:  Mild pancreatic stranding particularly around the head.  No mass or eduard pancreatic fluid, no pancreatic duct dilatation      Trend lipase, start 60mg prednisone, c/w GI, decrease fluids to 75cc/hr

## 2022-11-10 NOTE — SUBJECTIVE & OBJECTIVE
Interval History: abd pain similar, is tolerating some diet. No diarrhea, no fevers    Review of Systems   Constitutional:  Negative for chills and fever.   HENT:  Negative for trouble swallowing.    Respiratory:  Negative for cough and shortness of breath.    Cardiovascular:  Negative for chest pain and palpitations.   Gastrointestinal:  Positive for abdominal pain. Negative for diarrhea, nausea and vomiting.   Objective:     Vital Signs (Most Recent):  Temp: 98.4 °F (36.9 °C) (11/10/22 1420)  Pulse: 86 (11/10/22 1420)  Resp: 18 (11/10/22 1421)  BP: 127/81 (11/10/22 1420)  SpO2: 98 % (11/10/22 1420) Vital Signs (24h Range):  Temp:  [97.7 °F (36.5 °C)-98.9 °F (37.2 °C)] 98.4 °F (36.9 °C)  Pulse:  [79-86] 86  Resp:  [16-18] 18  SpO2:  [97 %-100 %] 98 %  BP: (124-139)/(75-83) 127/81     Weight: 107.4 kg (236 lb 12.4 oz)  Body mass index is 39.4 kg/m².  No intake or output data in the 24 hours ending 11/10/22 1651   Physical Exam  Vitals and nursing note reviewed.   Constitutional:       General: She is not in acute distress.     Appearance: She is well-developed.   Eyes:      Conjunctiva/sclera: Conjunctivae normal.   Neck:      Vascular: No JVD.   Cardiovascular:      Rate and Rhythm: Normal rate and regular rhythm.      Heart sounds: Normal heart sounds.   Pulmonary:      Effort: Pulmonary effort is normal.      Breath sounds: Normal breath sounds. No wheezing or rales.   Abdominal:      General: There is no distension.      Palpations: Abdomen is soft.      Tenderness: There is abdominal tenderness. There is no rebound.   Musculoskeletal:         General: No deformity.      Cervical back: Neck supple.   Skin:     General: Skin is warm and dry.      Findings: Rash present.   Neurological:      Mental Status: She is alert and oriented to person, place, and time.       Significant Labs: All pertinent labs within the past 24 hours have been reviewed.    Significant Imaging: I have reviewed all pertinent imaging  results/findings within the past 24 hours.

## 2022-11-10 NOTE — PROGRESS NOTES
[facility] Medicine  Progress Note    Patient Name: Laura Kent  MRN: [id number]  Patient Class: IP- Inpatient   Admission Date: 11/7/2022  Length of Stay: 1 days  Attending Physician: Elan Pardo MD  Primary Care Provider: Yessy Cruz MD        Subjective:     Principal Problem:Drug-induced acute pancreatitis without infection or necrosis        HPI:  Ms. Kent is a 37-year-old female with a past medical history of hypertension, anxiety, and current treatment of left breast cancer.  She was sent to the ED by her oncologist for severe abdominal pain that began on Saturday.  The patient denies nausea and vomiting.  She describes the pain is a soreness 10/10 and worse when she lies down.  Patient had a recent episode of immunotherapy induced pancreatitis in September and she was in the hospital for almost 3 weeks.  While in the ED patient's lipase was greater than 1000 and she had a CT of her abdomen and pelvis with contrast that indicated pancreatitis with no pancreatic duct dilatation or peripancreatic fluid.  Patient admitted to hospital medicine for further management.  Patient did want to be transferred to [facility] because that is where her oncology team is with .  She ended up a [facility] because the ED at [facility] was full and had a very long wait.      Overview/Hospital Course:  No notes on file    Interval History: abd pain similar, is tolerating some diet. No diarrhea, no fevers    Review of Systems   Constitutional:  Negative for chills and fever.   HENT:  Negative for trouble swallowing.    Respiratory:  Negative for cough and shortness of breath.    Cardiovascular:  Negative for chest pain and palpitations.   Gastrointestinal:  Positive for abdominal pain. Negative for diarrhea, nausea and vomiting.   Objective:     Vital Signs (Most Recent):  Temp: 98.4 °F (36.9 °C) (11/10/22 1420)  Pulse: 86 (11/10/22 1420)  Resp: 18 (11/10/22 1421)  BP: 127/81 (11/10/22  1420)  SpO2: 98 % (11/10/22 1420) Vital Signs (24h Range):  Temp:  [97.7 °F (36.5 °C)-98.9 °F (37.2 °C)] 98.4 °F (36.9 °C)  Pulse:  [79-86] 86  Resp:  [16-18] 18  SpO2:  [97 %-100 %] 98 %  BP: (124-139)/(75-83) 127/81     Weight: 107.4 kg (236 lb 12.4 oz)  Body mass index is 39.4 kg/m².  No intake or output data in the 24 hours ending 11/10/22 1651   Physical Exam  Vitals and nursing note reviewed.   Constitutional:       General: She is not in acute distress.     Appearance: She is well-developed.   Eyes:      Conjunctiva/sclera: Conjunctivae normal.   Neck:      Vascular: No JVD.   Cardiovascular:      Rate and Rhythm: Normal rate and regular rhythm.      Heart sounds: Normal heart sounds.   Pulmonary:      Effort: Pulmonary effort is normal.      Breath sounds: Normal breath sounds. No wheezing or rales.   Abdominal:      General: There is no distension.      Palpations: Abdomen is soft.      Tenderness: There is abdominal tenderness. There is no rebound.   Musculoskeletal:         General: No deformity.      Cervical back: Neck supple.   Skin:     General: Skin is warm and dry.      Findings: Rash present.   Neurological:      Mental Status: She is alert and oriented to person, place, and time.       Significant Labs: All pertinent labs within the past 24 hours have been reviewed.    Significant Imaging: I have reviewed all pertinent imaging results/findings within the past 24 hours.      Assessment/Plan:      * Drug-induced acute pancreatitis without infection or necrosis  Immunotherapy induced pancreatitis  Patient admitted with right upper quadrant epigastric pain without nausea or vomiting  Lipase over 1000  LFTs normal  CT abdomen pelvis:  Mild pancreatic stranding particularly around the head.  No mass or eduard pancreatic fluid, no pancreatic duct dilatation      Trend lipase, start 60mg prednisone, c/w GI, decrease fluids to 75cc/hr    IUD (intrauterine device) in place  CT abdomen pelvis reports IUD out  of place.  Future follow-up ultrasound recommended.      Primary hypertension  Patient does not take any antihypertensives at this time.      Malignant neoplasm of upper-outer quadrant of left breast in female, estrogen receptor negative  Pt of Dr Cruz.  She was scheduled to restart chemo without immunotherapy, today, however due to abdominal pain she was sent to the ED instead.    Will need to coordinate with oncologist to reset her planned chemo dates.        VTE Risk Mitigation (From admission, onward)         Ordered     Reason for No Pharmacological VTE Prophylaxis  Once        Question:  Reasons:  Answer:  Thrombocytopenia    11/07/22 1751     IP VTE HIGH RISK PATIENT  Once         11/07/22 1751     Place sequential compression device  Until discontinued         11/07/22 1751                Discharge Planning   MIRIAN:      Code Status: Full Code   Is the patient medically ready for discharge?:     Reason for patient still in hospital (select all that apply): Treatment  Discharge Plan A: Home   Discharge Delays: None known at this time              Elan Pardo MD  Department of Hospital Medicine   [facility] ([place])

## 2022-11-11 VITALS
HEIGHT: 65 IN | BODY MASS INDEX: 39.45 KG/M2 | WEIGHT: 236.75 LBS | HEART RATE: 67 BPM | RESPIRATION RATE: 16 BRPM | OXYGEN SATURATION: 96 % | TEMPERATURE: 99 F | SYSTOLIC BLOOD PRESSURE: 151 MMHG | DIASTOLIC BLOOD PRESSURE: 88 MMHG

## 2022-11-11 PROBLEM — R10.9 ABDOMINAL PAIN: Status: RESOLVED | Noted: 2022-09-04 | Resolved: 2022-11-11

## 2022-11-11 LAB
ALBUMIN SERPL BCP-MCNC: 3.5 G/DL (ref 3.5–5.2)
ALP SERPL-CCNC: 46 U/L (ref 55–135)
ALT SERPL W/O P-5'-P-CCNC: 19 U/L (ref 10–44)
ANION GAP SERPL CALC-SCNC: 5 MMOL/L (ref 8–16)
AST SERPL-CCNC: 18 U/L (ref 10–40)
BASOPHILS # BLD AUTO: 0.01 K/UL (ref 0–0.2)
BASOPHILS NFR BLD: 0.1 % (ref 0–1.9)
BILIRUB SERPL-MCNC: 0.4 MG/DL (ref 0.1–1)
BUN SERPL-MCNC: 9 MG/DL (ref 6–20)
CALCIUM SERPL-MCNC: 9.7 MG/DL (ref 8.7–10.5)
CHLORIDE SERPL-SCNC: 107 MMOL/L (ref 95–110)
CO2 SERPL-SCNC: 24 MMOL/L (ref 23–29)
CREAT SERPL-MCNC: 0.7 MG/DL (ref 0.5–1.4)
DIFFERENTIAL METHOD: ABNORMAL
EOSINOPHIL # BLD AUTO: 0 K/UL (ref 0–0.5)
EOSINOPHIL NFR BLD: 0 % (ref 0–8)
ERYTHROCYTE [DISTWIDTH] IN BLOOD BY AUTOMATED COUNT: 12 % (ref 11.5–14.5)
EST. GFR  (NO RACE VARIABLE): >60 ML/MIN/1.73 M^2
GLUCOSE SERPL-MCNC: 132 MG/DL (ref 70–110)
HCT VFR BLD AUTO: 33 % (ref 37–48.5)
HGB BLD-MCNC: 11.4 G/DL (ref 12–16)
IMM GRANULOCYTES # BLD AUTO: 0.03 K/UL (ref 0–0.04)
IMM GRANULOCYTES NFR BLD AUTO: 0.4 % (ref 0–0.5)
LIPASE SERPL-CCNC: 269 U/L (ref 4–60)
LYMPHOCYTES # BLD AUTO: 1 K/UL (ref 1–4.8)
LYMPHOCYTES NFR BLD: 12.6 % (ref 18–48)
MCH RBC QN AUTO: 31.8 PG (ref 27–31)
MCHC RBC AUTO-ENTMCNC: 34.5 G/DL (ref 32–36)
MCV RBC AUTO: 92 FL (ref 82–98)
MONOCYTES # BLD AUTO: 0.3 K/UL (ref 0.3–1)
MONOCYTES NFR BLD: 3.6 % (ref 4–15)
NEUTROPHILS # BLD AUTO: 6.4 K/UL (ref 1.8–7.7)
NEUTROPHILS NFR BLD: 83.3 % (ref 38–73)
NRBC BLD-RTO: 0 /100 WBC
PLATELET # BLD AUTO: 208 K/UL (ref 150–450)
PMV BLD AUTO: 9.8 FL (ref 9.2–12.9)
POTASSIUM SERPL-SCNC: 4.1 MMOL/L (ref 3.5–5.1)
PROT SERPL-MCNC: 6.6 G/DL (ref 6–8.4)
RBC # BLD AUTO: 3.59 M/UL (ref 4–5.4)
SODIUM SERPL-SCNC: 136 MMOL/L (ref 136–145)
WBC # BLD AUTO: 7.68 K/UL (ref 3.9–12.7)

## 2022-11-11 PROCEDURE — 83690 ASSAY OF LIPASE: CPT | Performed by: INTERNAL MEDICINE

## 2022-11-11 PROCEDURE — 36415 COLL VENOUS BLD VENIPUNCTURE: CPT | Performed by: INTERNAL MEDICINE

## 2022-11-11 PROCEDURE — 85025 COMPLETE CBC W/AUTO DIFF WBC: CPT | Performed by: INTERNAL MEDICINE

## 2022-11-11 PROCEDURE — 99239 PR HOSPITAL DISCHARGE DAY,>30 MIN: ICD-10-PCS | Mod: ,,, | Performed by: INTERNAL MEDICINE

## 2022-11-11 PROCEDURE — 80053 COMPREHEN METABOLIC PANEL: CPT | Performed by: INTERNAL MEDICINE

## 2022-11-11 PROCEDURE — 99239 HOSP IP/OBS DSCHRG MGMT >30: CPT | Mod: ,,, | Performed by: INTERNAL MEDICINE

## 2022-11-11 PROCEDURE — 63600175 PHARM REV CODE 636 W HCPCS: Performed by: INTERNAL MEDICINE

## 2022-11-11 RX ORDER — PREDNISONE 10 MG/1
TABLET ORAL
Qty: 53 TABLET | Refills: 0 | Status: SHIPPED | OUTPATIENT
Start: 2022-11-12 | End: 2022-11-14 | Stop reason: SDUPTHER

## 2022-11-11 RX ADMIN — PREDNISONE 60 MG: 50 TABLET ORAL at 08:11

## 2022-11-11 RX ADMIN — HYDROCORTISONE: 1 CREAM TOPICAL at 08:11

## 2022-11-11 NOTE — HOSPITAL COURSE
Patient admitted with recurrence of her drug induced pancreatitis. She had some slow improvement over a few days then not much progress. GI consulted and steroids were restarted with good effect. She was placed on an extended steroid taper of steroids starting at 40mg and will complete her last dose of 5mg on 12/6/2022. She is to follow up with oncology for her appointment next week. Given return precautions.

## 2022-11-11 NOTE — PLAN OF CARE
Pt will discharge home with no needs.  Her family will transport her home.  Pt is clear for discharge from a case management standpoint.         11/11/22 0906   Final Note   Assessment Type Final Discharge Note   Anticipated Discharge Disposition Home   Post-Acute Status   Discharge Delays None known at this time

## 2022-11-11 NOTE — DISCHARGE SUMMARY
[facility] Medicine  Discharge Summary      Patient Name: Laura Kent  MRN: [id number]  JOHN: [id number]  Patient Class: IP- Inpatient  Admission Date: 11/7/2022  Hospital Length of Stay: 2 days  Discharge Date and Time:  11/11/2022 7:35 AM  Attending Physician: Elan Pardo MD   Discharging Provider: Elan Pardo MD  Primary Care Provider: Yessy Cruz MD    Primary Care Team: Networked reference to record PCT     HPI:   Ms. Kent is a 37-year-old female with a past medical history of hypertension, anxiety, and current treatment of left breast cancer.  She was sent to the ED by her oncologist for severe abdominal pain that began on Saturday.  The patient denies nausea and vomiting.  She describes the pain is a soreness 10/10 and worse when she lies down.  Patient had a recent episode of immunotherapy induced pancreatitis in September and she was in the hospital for almost 3 weeks.  While in the ED patient's lipase was greater than 1000 and she had a CT of her abdomen and pelvis with contrast that indicated pancreatitis with no pancreatic duct dilatation or peripancreatic fluid.  Patient admitted to [facility] medicine for further management.  Patient did want to be transferred to [facility] because that is where her oncology team is with .  She ended up a [facility] because the ED at [facility] was full and had a very long wait.      * No surgery found *      Hospital Course:   Patient admitted with recurrence of her drug induced pancreatitis. She had some slow improvement over a few days then not much progress. GI consulted and steroids were restarted with good effect. She was placed on an extended steroid taper of steroids starting at 40mg and will complete her last dose of 5mg on 12/6/2022. She is to follow up with oncology for her appointment next week. Given return precautions.       Goals of Care Treatment Preferences:  Code Status: Full Code      Consults:   Consults  (From admission, onward)        Status Ordering Provider     Inpatient consult to Gastroenterology  Once        Provider:  Deo Orellana MD    Completed GATO EASTMAN          No new Assessment & Plan notes have been filed under this hospital service since the last note was generated.  Service: Hospital Medicine    Final Active Diagnoses:    Diagnosis Date Noted POA    PRINCIPAL PROBLEM:  Drug-induced acute pancreatitis without infection or necrosis [K85.30] 09/04/2022 Yes    Primary hypertension [I10] 11/07/2022 Yes    IUD (intrauterine device) in place [Z97.5] 11/07/2022 Not Applicable    Malignant neoplasm of upper-outer quadrant of left breast in female, estrogen receptor negative [C50.412, Z17.1] 03/21/2022 Not Applicable      Problems Resolved During this Admission:    Diagnosis Date Noted Date Resolved POA    Abdominal pain [R10.9] 09/04/2022 11/11/2022 Yes       Discharged Condition: good    Disposition: Home or Self Care    Follow Up:   Follow-up Information     Yessy Cruz MD. Go in 1 week(s).    Specialties: Hematology and Oncology, Hematology  Contact information:  [address] [place] [address] [phone]                       Patient Instructions:      Diet Adult Regular     Activity as tolerated       Significant Diagnostic Studies: Labs:   CMP   Recent Labs   Lab 11/10/22  0459 11/11/22  0417    136   K 3.6 4.1    107   CO2 26 24    132*   BUN 11 9   CREATININE 0.7 0.7   CALCIUM 9.3 9.7   PROT 6.0 6.6   ALBUMIN 3.3* 3.5   BILITOT 0.4 0.4   ALKPHOS 44* 46*   AST 17 18   ALT 19 19   ANIONGAP 6* 5*    and CBC   Recent Labs   Lab 11/10/22  0459 11/11/22  0417   WBC 4.32 7.68   HGB 10.8* 11.4*   HCT 31.4* 33.0*    208       Pending Diagnostic Studies:     None         Medications:  Reconciled Home Medications:      Medication List      START taking these medications    venlafaxine 37.5 MG 24 hr capsule  Commonly known as: EFFEXOR XR  Take 1 capsule (37.5 mg  total) by mouth once daily.        CHANGE how you take these medications    predniSONE 10 MG tablet  Commonly known as: DELTASONE  Take 4 tablets (40 mg total) by mouth once daily for 5 days, THEN 3 tablets (30 mg total) once daily for 5 days, THEN 2 tablets (20 mg total) once daily for 5 days, THEN 1 tablet (10 mg total) once daily for 5 days, THEN 0.5 tablets (5 mg total) once daily for 5 days.  Start taking on: November 12, 2022  What changed:   · medication strength  · See the new instructions.        CONTINUE taking these medications    prednisoLONE acetate 1 % Drps  Commonly known as: PRED FORTE  Place 1 drop into both eyes 2 (two) times daily.     promethazine 25 MG tablet  Commonly known as: PHENERGAN  Take 1 tablet (25 mg total) by mouth every 6 (six) hours as needed for Nausea.        STOP taking these medications    nystatin cream  Commonly known as: MYCOSTATIN     nystatin powder  Commonly known as: MYCOSTATIN     OLANZapine 5 MG tablet  Commonly known as: ZyPREXA     ondansetron 8 MG tablet  Commonly known as: ZOFRAN     pantoprazole 40 MG tablet  Commonly known as: PROTONIX            Indwelling Lines/Drains at time of discharge:   Lines/Drains/Airways     Central Venous Catheter Line  Duration                PowerPort A Cath Single Lumen 09/05/22 0323 right subclavian 67 days                Time spent on the discharge of patient: 38 minutes         Elan Pardo MD  Department of Hospital Medicine  [facility] ([place])

## 2022-11-11 NOTE — NURSING
Discharge papers and instructions given to patient. No questions asked. PIV removed. Awaiting for patient transport.

## 2022-11-14 ENCOUNTER — PATIENT OUTREACH (OUTPATIENT)
Dept: ADMINISTRATIVE | Facility: CLINIC | Age: 37
End: 2022-11-14
Payer: MEDICAID

## 2022-11-14 ENCOUNTER — PATIENT MESSAGE (OUTPATIENT)
Dept: HEMATOLOGY/ONCOLOGY | Facility: CLINIC | Age: 37
End: 2022-11-14
Payer: MEDICAID

## 2022-11-14 ENCOUNTER — TELEPHONE (OUTPATIENT)
Dept: HEMATOLOGY/ONCOLOGY | Facility: CLINIC | Age: 37
End: 2022-11-14
Payer: MEDICAID

## 2022-11-14 DIAGNOSIS — K85.00 IDIOPATHIC ACUTE PANCREATITIS WITHOUT INFECTION OR NECROSIS: Primary | ICD-10-CM

## 2022-11-14 RX ORDER — PREDNISONE 10 MG/1
TABLET ORAL
Qty: 53 TABLET | Refills: 0 | Status: SHIPPED | OUTPATIENT
Start: 2022-11-14 | End: 2022-12-09

## 2022-11-17 DIAGNOSIS — C50.412 MALIGNANT NEOPLASM OF UPPER-OUTER QUADRANT OF LEFT BREAST IN FEMALE, ESTROGEN RECEPTOR NEGATIVE: Primary | ICD-10-CM

## 2022-11-17 DIAGNOSIS — Z17.1 MALIGNANT NEOPLASM OF UPPER-OUTER QUADRANT OF LEFT BREAST IN FEMALE, ESTROGEN RECEPTOR NEGATIVE: Primary | ICD-10-CM

## 2022-11-21 ENCOUNTER — INFUSION (OUTPATIENT)
Dept: INFUSION THERAPY | Facility: HOSPITAL | Age: 37
DRG: 440 | End: 2022-11-21
Attending: INTERNAL MEDICINE
Payer: MEDICAID

## 2022-11-21 ENCOUNTER — OFFICE VISIT (OUTPATIENT)
Dept: HEMATOLOGY/ONCOLOGY | Facility: CLINIC | Age: 37
DRG: 440 | End: 2022-11-21
Payer: MEDICAID

## 2022-11-21 VITALS
BODY MASS INDEX: 38.93 KG/M2 | RESPIRATION RATE: 17 BRPM | WEIGHT: 233.69 LBS | TEMPERATURE: 98 F | HEIGHT: 65 IN | SYSTOLIC BLOOD PRESSURE: 157 MMHG | DIASTOLIC BLOOD PRESSURE: 85 MMHG | HEART RATE: 82 BPM | OXYGEN SATURATION: 99 %

## 2022-11-21 VITALS
RESPIRATION RATE: 17 BRPM | SYSTOLIC BLOOD PRESSURE: 159 MMHG | OXYGEN SATURATION: 98 % | DIASTOLIC BLOOD PRESSURE: 90 MMHG | HEART RATE: 80 BPM | TEMPERATURE: 99 F

## 2022-11-21 DIAGNOSIS — C50.412 MALIGNANT NEOPLASM OF UPPER-OUTER QUADRANT OF LEFT BREAST IN FEMALE, ESTROGEN RECEPTOR NEGATIVE: Primary | ICD-10-CM

## 2022-11-21 DIAGNOSIS — D63.0 ANEMIA IN NEOPLASTIC DISEASE: ICD-10-CM

## 2022-11-21 DIAGNOSIS — D70.1 CHEMOTHERAPY INDUCED NEUTROPENIA: ICD-10-CM

## 2022-11-21 DIAGNOSIS — Z17.1 MALIGNANT NEOPLASM OF UPPER-OUTER QUADRANT OF LEFT BREAST IN FEMALE, ESTROGEN RECEPTOR NEGATIVE: Primary | ICD-10-CM

## 2022-11-21 DIAGNOSIS — K85.30 DRUG-INDUCED ACUTE PANCREATITIS WITHOUT INFECTION OR NECROSIS: ICD-10-CM

## 2022-11-21 DIAGNOSIS — T45.1X5A CHEMOTHERAPY INDUCED NEUTROPENIA: ICD-10-CM

## 2022-11-21 PROCEDURE — 96413 CHEMO IV INFUSION 1 HR: CPT

## 2022-11-21 PROCEDURE — 1111F DSCHRG MED/CURRENT MED MERGE: CPT | Mod: CPTII,,, | Performed by: NURSE PRACTITIONER

## 2022-11-21 PROCEDURE — 3008F PR BODY MASS INDEX (BMI) DOCUMENTED: ICD-10-PCS | Mod: CPTII,,, | Performed by: NURSE PRACTITIONER

## 2022-11-21 PROCEDURE — 99999 PR PBB SHADOW E&M-EST. PATIENT-LVL III: CPT | Mod: PBBFAC,,, | Performed by: NURSE PRACTITIONER

## 2022-11-21 PROCEDURE — 3008F BODY MASS INDEX DOCD: CPT | Mod: CPTII,,, | Performed by: NURSE PRACTITIONER

## 2022-11-21 PROCEDURE — 99999 PR PBB SHADOW E&M-EST. PATIENT-LVL III: ICD-10-PCS | Mod: PBBFAC,,, | Performed by: NURSE PRACTITIONER

## 2022-11-21 PROCEDURE — 63600175 PHARM REV CODE 636 W HCPCS: Performed by: NURSE PRACTITIONER

## 2022-11-21 PROCEDURE — 1111F PR DISCHARGE MEDS RECONCILED W/ CURRENT OUTPATIENT MED LIST: ICD-10-PCS | Mod: CPTII,,, | Performed by: NURSE PRACTITIONER

## 2022-11-21 PROCEDURE — 99215 PR OFFICE/OUTPT VISIT, EST, LEVL V, 40-54 MIN: ICD-10-PCS | Mod: S$PBB,,, | Performed by: NURSE PRACTITIONER

## 2022-11-21 PROCEDURE — 3077F PR MOST RECENT SYSTOLIC BLOOD PRESSURE >= 140 MM HG: ICD-10-PCS | Mod: CPTII,,, | Performed by: NURSE PRACTITIONER

## 2022-11-21 PROCEDURE — 96367 TX/PROPH/DG ADDL SEQ IV INF: CPT

## 2022-11-21 PROCEDURE — 99215 OFFICE O/P EST HI 40 MIN: CPT | Mod: S$PBB,,, | Performed by: NURSE PRACTITIONER

## 2022-11-21 PROCEDURE — 99213 OFFICE O/P EST LOW 20 MIN: CPT | Mod: PBBFAC,25 | Performed by: NURSE PRACTITIONER

## 2022-11-21 PROCEDURE — A4216 STERILE WATER/SALINE, 10 ML: HCPCS | Performed by: NURSE PRACTITIONER

## 2022-11-21 PROCEDURE — 3077F SYST BP >= 140 MM HG: CPT | Mod: CPTII,,, | Performed by: NURSE PRACTITIONER

## 2022-11-21 PROCEDURE — 3079F PR MOST RECENT DIASTOLIC BLOOD PRESSURE 80-89 MM HG: ICD-10-PCS | Mod: CPTII,,, | Performed by: NURSE PRACTITIONER

## 2022-11-21 PROCEDURE — 25000003 PHARM REV CODE 250: Performed by: NURSE PRACTITIONER

## 2022-11-21 PROCEDURE — 3079F DIAST BP 80-89 MM HG: CPT | Mod: CPTII,,, | Performed by: NURSE PRACTITIONER

## 2022-11-21 PROCEDURE — 96375 TX/PRO/DX INJ NEW DRUG ADDON: CPT

## 2022-11-21 PROCEDURE — 96411 CHEMO IV PUSH ADDL DRUG: CPT

## 2022-11-21 RX ORDER — EPINEPHRINE 0.3 MG/.3ML
0.3 INJECTION SUBCUTANEOUS ONCE AS NEEDED
Status: CANCELLED | OUTPATIENT
Start: 2022-11-21

## 2022-11-21 RX ORDER — HEPARIN 100 UNIT/ML
500 SYRINGE INTRAVENOUS
Status: CANCELLED | OUTPATIENT
Start: 2022-11-21

## 2022-11-21 RX ORDER — DIPHENHYDRAMINE HYDROCHLORIDE 50 MG/ML
50 INJECTION INTRAMUSCULAR; INTRAVENOUS ONCE AS NEEDED
Status: CANCELLED | OUTPATIENT
Start: 2022-11-21

## 2022-11-21 RX ORDER — HEPARIN 100 UNIT/ML
500 SYRINGE INTRAVENOUS
Status: DISCONTINUED | OUTPATIENT
Start: 2022-11-21 | End: 2022-11-21 | Stop reason: HOSPADM

## 2022-11-21 RX ORDER — DIPHENHYDRAMINE HYDROCHLORIDE 50 MG/ML
50 INJECTION INTRAMUSCULAR; INTRAVENOUS ONCE AS NEEDED
Status: DISCONTINUED | OUTPATIENT
Start: 2022-11-21 | End: 2022-11-21 | Stop reason: HOSPADM

## 2022-11-21 RX ORDER — SODIUM CHLORIDE 0.9 % (FLUSH) 0.9 %
10 SYRINGE (ML) INJECTION
Status: DISCONTINUED | OUTPATIENT
Start: 2022-11-21 | End: 2022-11-21 | Stop reason: HOSPADM

## 2022-11-21 RX ORDER — DOXORUBICIN HYDROCHLORIDE 2 MG/ML
60 INJECTION, SOLUTION INTRAVENOUS
Status: CANCELLED | OUTPATIENT
Start: 2022-11-21

## 2022-11-21 RX ORDER — SODIUM CHLORIDE 0.9 % (FLUSH) 0.9 %
10 SYRINGE (ML) INJECTION
Status: CANCELLED | OUTPATIENT
Start: 2022-11-21

## 2022-11-21 RX ORDER — EPINEPHRINE 0.3 MG/.3ML
0.3 INJECTION SUBCUTANEOUS ONCE AS NEEDED
Status: DISCONTINUED | OUTPATIENT
Start: 2022-11-21 | End: 2022-11-21 | Stop reason: HOSPADM

## 2022-11-21 RX ORDER — DOXORUBICIN HYDROCHLORIDE 2 MG/ML
60 INJECTION, SOLUTION INTRAVENOUS
Status: COMPLETED | OUTPATIENT
Start: 2022-11-21 | End: 2022-11-21

## 2022-11-21 RX ADMIN — APREPITANT 130 MG: 130 INJECTION, EMULSION INTRAVENOUS at 02:11

## 2022-11-21 RX ADMIN — Medication 10 ML: at 04:11

## 2022-11-21 RX ADMIN — SODIUM CHLORIDE: 0.9 INJECTION, SOLUTION INTRAVENOUS at 02:11

## 2022-11-21 RX ADMIN — CYCLOPHOSPHAMIDE 1300 MG: 200 INJECTION, SOLUTION INTRAVENOUS at 03:11

## 2022-11-21 RX ADMIN — DEXAMETHASONE SODIUM PHOSPHATE 0.25 MG: 4 INJECTION, SOLUTION INTRA-ARTICULAR; INTRALESIONAL; INTRAMUSCULAR; INTRAVENOUS; SOFT TISSUE at 02:11

## 2022-11-21 RX ADMIN — HEPARIN 500 UNITS: 100 SYRINGE at 04:11

## 2022-11-21 RX ADMIN — DOXORUBICIN HYDROCHLORIDE 130 MG: 2 INJECTION, SOLUTION INTRAVENOUS at 03:11

## 2022-11-21 NOTE — PLAN OF CARE
1616  Patient  completed Doxorubicin and Cytocan infusion, tolerated well.  Port de accessed without issue, flushed, blood return noted, heparin locked.  RTC 11/22/22  Patient ambulated off floor independently, NAD

## 2022-11-21 NOTE — PLAN OF CARE
1285  Patient seated in chair, VSS, assessment done.  Port accessed without issue, flushed, blood return noted.  Started NS @ 25 cc/hr KVO while waiting for Doxorubicin & cytoxan from pharmacy.  [facility] for safety

## 2022-11-23 ENCOUNTER — INFUSION (OUTPATIENT)
Dept: INFUSION THERAPY | Facility: HOSPITAL | Age: 37
End: 2022-11-23
Attending: INTERNAL MEDICINE
Payer: MEDICAID

## 2022-11-23 DIAGNOSIS — Z17.1 MALIGNANT NEOPLASM OF UPPER-OUTER QUADRANT OF LEFT BREAST IN FEMALE, ESTROGEN RECEPTOR NEGATIVE: Primary | ICD-10-CM

## 2022-11-23 DIAGNOSIS — C50.412 MALIGNANT NEOPLASM OF UPPER-OUTER QUADRANT OF LEFT BREAST IN FEMALE, ESTROGEN RECEPTOR NEGATIVE: Primary | ICD-10-CM

## 2022-11-23 PROCEDURE — 63600175 PHARM REV CODE 636 W HCPCS: Mod: TB | Performed by: NURSE PRACTITIONER

## 2022-11-23 PROCEDURE — 96372 THER/PROPH/DIAG INJ SC/IM: CPT

## 2022-11-23 RX ADMIN — PEGFILGRASTIM-CBQV 6 MG: 6 INJECTION, SOLUTION SUBCUTANEOUS at 01:11

## 2022-12-05 ENCOUNTER — OFFICE VISIT (OUTPATIENT)
Dept: HEMATOLOGY/ONCOLOGY | Facility: CLINIC | Age: 37
End: 2022-12-05
Payer: MEDICAID

## 2022-12-05 ENCOUNTER — INFUSION (OUTPATIENT)
Dept: INFUSION THERAPY | Facility: HOSPITAL | Age: 37
End: 2022-12-05
Payer: MEDICAID

## 2022-12-05 ENCOUNTER — LAB VISIT (OUTPATIENT)
Dept: LAB | Facility: HOSPITAL | Age: 37
End: 2022-12-05
Attending: INTERNAL MEDICINE
Payer: MEDICAID

## 2022-12-05 VITALS
SYSTOLIC BLOOD PRESSURE: 134 MMHG | HEIGHT: 65 IN | OXYGEN SATURATION: 100 % | RESPIRATION RATE: 18 BRPM | TEMPERATURE: 98 F | WEIGHT: 230.69 LBS | HEART RATE: 100 BPM | BODY MASS INDEX: 38.44 KG/M2 | DIASTOLIC BLOOD PRESSURE: 84 MMHG

## 2022-12-05 VITALS
RESPIRATION RATE: 18 BRPM | DIASTOLIC BLOOD PRESSURE: 73 MMHG | HEART RATE: 83 BPM | TEMPERATURE: 98 F | SYSTOLIC BLOOD PRESSURE: 121 MMHG | OXYGEN SATURATION: 99 % | BODY MASS INDEX: 38.42 KG/M2 | WEIGHT: 230.63 LBS | HEIGHT: 65 IN

## 2022-12-05 DIAGNOSIS — Z17.1 MALIGNANT NEOPLASM OF UPPER-OUTER QUADRANT OF LEFT BREAST IN FEMALE, ESTROGEN RECEPTOR NEGATIVE: ICD-10-CM

## 2022-12-05 DIAGNOSIS — L70.8 OTHER ACNE: ICD-10-CM

## 2022-12-05 DIAGNOSIS — Z17.1 MALIGNANT NEOPLASM OF UPPER-OUTER QUADRANT OF LEFT BREAST IN FEMALE, ESTROGEN RECEPTOR NEGATIVE: Primary | ICD-10-CM

## 2022-12-05 DIAGNOSIS — K85.30 DRUG-INDUCED ACUTE PANCREATITIS WITHOUT INFECTION OR NECROSIS: ICD-10-CM

## 2022-12-05 DIAGNOSIS — C50.412 MALIGNANT NEOPLASM OF UPPER-OUTER QUADRANT OF LEFT BREAST IN FEMALE, ESTROGEN RECEPTOR NEGATIVE: Primary | ICD-10-CM

## 2022-12-05 DIAGNOSIS — T45.1X5A CHEMOTHERAPY INDUCED NEUTROPENIA: ICD-10-CM

## 2022-12-05 DIAGNOSIS — D70.1 CHEMOTHERAPY INDUCED NEUTROPENIA: ICD-10-CM

## 2022-12-05 DIAGNOSIS — C50.412 MALIGNANT NEOPLASM OF UPPER-OUTER QUADRANT OF LEFT BREAST IN FEMALE, ESTROGEN RECEPTOR NEGATIVE: ICD-10-CM

## 2022-12-05 DIAGNOSIS — D63.0 ANEMIA IN NEOPLASTIC DISEASE: ICD-10-CM

## 2022-12-05 LAB
ALBUMIN SERPL BCP-MCNC: 3.9 G/DL (ref 3.5–5.2)
ALP SERPL-CCNC: 61 U/L (ref 55–135)
ALT SERPL W/O P-5'-P-CCNC: 15 U/L (ref 10–44)
ANION GAP SERPL CALC-SCNC: 12 MMOL/L (ref 8–16)
AST SERPL-CCNC: 16 U/L (ref 10–40)
BILIRUB SERPL-MCNC: 0.3 MG/DL (ref 0.1–1)
BUN SERPL-MCNC: 14 MG/DL (ref 6–20)
CALCIUM SERPL-MCNC: 9.1 MG/DL (ref 8.7–10.5)
CHLORIDE SERPL-SCNC: 105 MMOL/L (ref 95–110)
CO2 SERPL-SCNC: 27 MMOL/L (ref 23–29)
CREAT SERPL-MCNC: 0.8 MG/DL (ref 0.5–1.4)
ERYTHROCYTE [DISTWIDTH] IN BLOOD BY AUTOMATED COUNT: 12.3 % (ref 11.5–14.5)
EST. GFR  (NO RACE VARIABLE): >60 ML/MIN/1.73 M^2
GLUCOSE SERPL-MCNC: 157 MG/DL (ref 70–110)
HCG INTACT+B SERPL-ACNC: <2.4 MIU/ML
HCT VFR BLD AUTO: 32.5 % (ref 37–48.5)
HGB BLD-MCNC: 10.6 G/DL (ref 12–16)
IMM GRANULOCYTES # BLD AUTO: 0.78 K/UL (ref 0–0.04)
LIPASE SERPL-CCNC: 175 U/L (ref 4–60)
MCH RBC QN AUTO: 31.2 PG (ref 27–31)
MCHC RBC AUTO-ENTMCNC: 32.6 G/DL (ref 32–36)
MCV RBC AUTO: 96 FL (ref 82–98)
NEUTROPHILS # BLD AUTO: 5.6 K/UL (ref 1.8–7.7)
PLATELET # BLD AUTO: 130 K/UL (ref 150–450)
PMV BLD AUTO: 10.2 FL (ref 9.2–12.9)
POTASSIUM SERPL-SCNC: 3.5 MMOL/L (ref 3.5–5.1)
PROT SERPL-MCNC: 6.5 G/DL (ref 6–8.4)
RBC # BLD AUTO: 3.4 M/UL (ref 4–5.4)
SODIUM SERPL-SCNC: 144 MMOL/L (ref 136–145)
WBC # BLD AUTO: 8.29 K/UL (ref 3.9–12.7)

## 2022-12-05 PROCEDURE — 1111F DSCHRG MED/CURRENT MED MERGE: CPT | Mod: CPTII,,, | Performed by: INTERNAL MEDICINE

## 2022-12-05 PROCEDURE — 99213 OFFICE O/P EST LOW 20 MIN: CPT | Mod: PBBFAC | Performed by: INTERNAL MEDICINE

## 2022-12-05 PROCEDURE — A4216 STERILE WATER/SALINE, 10 ML: HCPCS | Performed by: INTERNAL MEDICINE

## 2022-12-05 PROCEDURE — 99999 PR PBB SHADOW E&M-EST. PATIENT-LVL III: CPT | Mod: PBBFAC,,, | Performed by: INTERNAL MEDICINE

## 2022-12-05 PROCEDURE — 3079F DIAST BP 80-89 MM HG: CPT | Mod: CPTII,,, | Performed by: INTERNAL MEDICINE

## 2022-12-05 PROCEDURE — 3079F PR MOST RECENT DIASTOLIC BLOOD PRESSURE 80-89 MM HG: ICD-10-PCS | Mod: CPTII,,, | Performed by: INTERNAL MEDICINE

## 2022-12-05 PROCEDURE — 96413 CHEMO IV INFUSION 1 HR: CPT

## 2022-12-05 PROCEDURE — 3008F BODY MASS INDEX DOCD: CPT | Mod: CPTII,,, | Performed by: INTERNAL MEDICINE

## 2022-12-05 PROCEDURE — 25000003 PHARM REV CODE 250: Performed by: INTERNAL MEDICINE

## 2022-12-05 PROCEDURE — 84702 CHORIONIC GONADOTROPIN TEST: CPT | Performed by: NURSE PRACTITIONER

## 2022-12-05 PROCEDURE — 96367 TX/PROPH/DG ADDL SEQ IV INF: CPT

## 2022-12-05 PROCEDURE — 96411 CHEMO IV PUSH ADDL DRUG: CPT

## 2022-12-05 PROCEDURE — 3075F SYST BP GE 130 - 139MM HG: CPT | Mod: CPTII,,, | Performed by: INTERNAL MEDICINE

## 2022-12-05 PROCEDURE — 99999 PR PBB SHADOW E&M-EST. PATIENT-LVL III: ICD-10-PCS | Mod: PBBFAC,,, | Performed by: INTERNAL MEDICINE

## 2022-12-05 PROCEDURE — 1159F MED LIST DOCD IN RCRD: CPT | Mod: CPTII,,, | Performed by: INTERNAL MEDICINE

## 2022-12-05 PROCEDURE — 80053 COMPREHEN METABOLIC PANEL: CPT | Performed by: NURSE PRACTITIONER

## 2022-12-05 PROCEDURE — 99214 PR OFFICE/OUTPT VISIT, EST, LEVL IV, 30-39 MIN: ICD-10-PCS | Mod: S$PBB,,, | Performed by: INTERNAL MEDICINE

## 2022-12-05 PROCEDURE — 1159F PR MEDICATION LIST DOCUMENTED IN MEDICAL RECORD: ICD-10-PCS | Mod: CPTII,,, | Performed by: INTERNAL MEDICINE

## 2022-12-05 PROCEDURE — 3075F PR MOST RECENT SYSTOLIC BLOOD PRESS GE 130-139MM HG: ICD-10-PCS | Mod: CPTII,,, | Performed by: INTERNAL MEDICINE

## 2022-12-05 PROCEDURE — 1111F PR DISCHARGE MEDS RECONCILED W/ CURRENT OUTPATIENT MED LIST: ICD-10-PCS | Mod: CPTII,,, | Performed by: INTERNAL MEDICINE

## 2022-12-05 PROCEDURE — 3008F PR BODY MASS INDEX (BMI) DOCUMENTED: ICD-10-PCS | Mod: CPTII,,, | Performed by: INTERNAL MEDICINE

## 2022-12-05 PROCEDURE — 36415 COLL VENOUS BLD VENIPUNCTURE: CPT | Performed by: NURSE PRACTITIONER

## 2022-12-05 PROCEDURE — 85027 COMPLETE CBC AUTOMATED: CPT | Performed by: NURSE PRACTITIONER

## 2022-12-05 PROCEDURE — 96375 TX/PRO/DX INJ NEW DRUG ADDON: CPT

## 2022-12-05 PROCEDURE — 99214 OFFICE O/P EST MOD 30 MIN: CPT | Mod: S$PBB,,, | Performed by: INTERNAL MEDICINE

## 2022-12-05 PROCEDURE — 63600175 PHARM REV CODE 636 W HCPCS: Performed by: INTERNAL MEDICINE

## 2022-12-05 PROCEDURE — 83690 ASSAY OF LIPASE: CPT | Performed by: NURSE PRACTITIONER

## 2022-12-05 RX ORDER — DOXYCYCLINE 100 MG/1
100 CAPSULE ORAL EVERY 12 HOURS
Qty: 10 CAPSULE | Refills: 0 | Status: SHIPPED | OUTPATIENT
Start: 2022-12-05 | End: 2022-12-10

## 2022-12-05 RX ORDER — HEPARIN 100 UNIT/ML
500 SYRINGE INTRAVENOUS
Status: DISCONTINUED | OUTPATIENT
Start: 2022-12-05 | End: 2022-12-05 | Stop reason: HOSPADM

## 2022-12-05 RX ORDER — DIPHENHYDRAMINE HYDROCHLORIDE 50 MG/ML
50 INJECTION INTRAMUSCULAR; INTRAVENOUS ONCE AS NEEDED
Status: DISCONTINUED | OUTPATIENT
Start: 2022-12-05 | End: 2022-12-05 | Stop reason: HOSPADM

## 2022-12-05 RX ORDER — DIPHENHYDRAMINE HYDROCHLORIDE 50 MG/ML
50 INJECTION INTRAMUSCULAR; INTRAVENOUS ONCE AS NEEDED
Status: CANCELLED | OUTPATIENT
Start: 2022-12-05

## 2022-12-05 RX ORDER — HEPARIN 100 UNIT/ML
500 SYRINGE INTRAVENOUS
Status: CANCELLED | OUTPATIENT
Start: 2022-12-05

## 2022-12-05 RX ORDER — MUPIROCIN 20 MG/G
OINTMENT TOPICAL DAILY
Qty: 30 G | Refills: 0 | Status: SHIPPED | OUTPATIENT
Start: 2022-12-05

## 2022-12-05 RX ORDER — SODIUM CHLORIDE 0.9 % (FLUSH) 0.9 %
10 SYRINGE (ML) INJECTION
Status: CANCELLED | OUTPATIENT
Start: 2022-12-05

## 2022-12-05 RX ORDER — EPINEPHRINE 0.3 MG/.3ML
0.3 INJECTION SUBCUTANEOUS ONCE AS NEEDED
Status: CANCELLED | OUTPATIENT
Start: 2022-12-05

## 2022-12-05 RX ORDER — SODIUM CHLORIDE 0.9 % (FLUSH) 0.9 %
10 SYRINGE (ML) INJECTION
Status: DISCONTINUED | OUTPATIENT
Start: 2022-12-05 | End: 2022-12-05 | Stop reason: HOSPADM

## 2022-12-05 RX ORDER — DOXORUBICIN HYDROCHLORIDE 2 MG/ML
60 INJECTION, SOLUTION INTRAVENOUS
Status: CANCELLED | OUTPATIENT
Start: 2022-12-05

## 2022-12-05 RX ORDER — EPINEPHRINE 0.3 MG/.3ML
0.3 INJECTION SUBCUTANEOUS ONCE AS NEEDED
Status: DISCONTINUED | OUTPATIENT
Start: 2022-12-05 | End: 2022-12-05 | Stop reason: HOSPADM

## 2022-12-05 RX ORDER — DOXORUBICIN HYDROCHLORIDE 2 MG/ML
60 INJECTION, SOLUTION INTRAVENOUS
Status: COMPLETED | OUTPATIENT
Start: 2022-12-05 | End: 2022-12-05

## 2022-12-05 RX ADMIN — APREPITANT 130 MG: 130 INJECTION, EMULSION INTRAVENOUS at 02:12

## 2022-12-05 RX ADMIN — HEPARIN 500 UNITS: 100 SYRINGE at 04:12

## 2022-12-05 RX ADMIN — CYCLOPHOSPHAMIDE 1300 MG: 200 INJECTION, SOLUTION INTRAVENOUS at 04:12

## 2022-12-05 RX ADMIN — DOXORUBICIN HYDROCHLORIDE 130 MG: 2 INJECTION, SOLUTION INTRAVENOUS at 03:12

## 2022-12-05 RX ADMIN — Medication 10 ML: at 04:12

## 2022-12-05 RX ADMIN — DEXAMETHASONE SODIUM PHOSPHATE 0.25 MG: 4 INJECTION, SOLUTION INTRA-ARTICULAR; INTRALESIONAL; INTRAMUSCULAR; INTRAVENOUS; SOFT TISSUE at 02:12

## 2022-12-05 NOTE — PROGRESS NOTES
Subjective:       Patient ID: Laura Kent is a 37 y.o. female.    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 Laura Kent presents for follow up of TNBC prior to C6D1 neoadjuvant chemotherapy. She is doing well, no signs/symptoms of pancreatitis recurrence. She does report rash recurrence to her back which is pruritic. This started when she ran out of bactroban ointment. She does report nausea which occurred for the week after her most recent chemo cycle. She tried promethazine but stopped because it made her sedated and she did not feel it helped nausea.     Hospital Course: 11/7/2022-11/11/2022  Patient admitted with recurrence of her drug induced pancreatitis. She had some slow improvement over a few days then not much progress. GI consulted and steroids were restarted with good effect. She was placed on an extended steroid taper of steroids starting at 40mg and will complete her last dose of 5mg on 12/6/2022. She is to follow up with oncology for her appointment next week. Given return precautions.     10/20/2022 Ultrasound left breast:  Findings:  At the site of the marker from the prior malignant breast biopsy,there is a 6 mm x 7 mm x 3 mm oval, parallel, hypoechoic mass with indistinct margins seen in the left breast at 2 o'clock in the posterior depth, 8 cm from the nipple. This is difficult to distinguish from the Twirl marker itself.  At the site of the previously biopsied metastatic left axillary node, there is a 10 x 7 x 11 mm node with a Uyen- marker. Node has mild residual cortical thickening (5 mm), but has decreased in size. There are a few other normal size, normal appearing left axillary nodes.   Impression:  Left  Mass: Left breast 6 mm x 7 mm x 3 mm mass at the posterior 2 o'clock position. Assessment: 6 - Known biopsy, proven malignancy. Known, previously biopsied left breast cancer and metastatic left axillary node have both decreased  in size. The breast mass is now difficult to distinguish from the biopsy marker.   BI-RADS Category:   Overall: 6 - Known Biopsy-Proven Malignancy  Recommendation:  Patient is already under the care of the Breast Oncology team. Most recent bilateral mammogram was performed in February 2022.      Most recent imaging regarding pancreatitis:  - 9/16/2022 MRI Abdomen:  FINDINGS:  ABDOMEN:  LIVER: Enlarged measuring 19.8 cm in craniocaudal dimension.  Normal background signal.  Two punctate nonenhancing T2 hyperintensities consistent with cysts.  Portal and hepatic veins are patent.  SPLEEN: Enlarged measuring 12.0 cm in craniocaudal dimension.  No focal lesion.  PANCREAS: Normal signal/enhancement.  No significant peripancreatic inflammatory changes.  No solid mass.  ADRENAL: Normal.  KIDNEY: No solid renal mass.  No hydronephrosis.  RETROPERITONEUM: No adenopathy.  MISCELLANEOUS: Multiple loops of prominent air/fluid-filled small bowel in the mid abdomen measuring up to 3.3 cm in maximum diameter, partially visualized.  Small volume bilateral dependent pleural fluid.  No aneurysm.  No focal suspicious osseous lesion.  MRCP:  Mildly distended gallbladder. No cholelithiasis.  Normal caliber intra and extrahepatic biliary ducts.  Normal tapering of common bile duct at the level of the ampulla.  No focal stricture or filling defect.  Pancreatic duct measures within normal limits. No ductal strictures present.  Impression:  Satisfactory MRCP evaluation.  No abnormal biliary duct dilatation.  No cholelithiasis or choledocholithiasis.  Multiple prominent loops of air/fluid-filled small bowel in the mid abdomen, incompletely characterized.  Could represent ileus.  Recommend correlation with clinical history and exam.  Hepatosplenomegaly.  Small bilateral dependent pleural effusions.  Additional findings as above     - 9/8/2022 CT Abdomen:  FINDINGS:  Heart: Normal in size.  Trace pericardial effusion  Lung Bases: 0.5 cm  pulmonary nodule in the left lower lobe (axial series 2, image 31), stable in comparison to CT 03/30/2022.  trace bilateral effusions.  Liver: Enlarged in size, measures 20.1 cm in craniocaudal dimension.  No focal lesion.  Gallbladder: No calcified gallstones.  Bile Ducts: No evidence of dilated ducts.  Pancreas: Mild peripancreatic fat stranding slightly increased compared to September 3.  Multiple prominent lymph nodes, likely reactive.  Spleen: Enlarged in size, measures 14.7 cm in AP dimension.  Adrenals: Unremarkable.  Kidneys/ Ureters: Both kidneys are normal in size and location.  No nephrolithiasis or hydronephrosis.  No solid renal masses.  Both ureters are normal in course and caliber with no hydroureter.  Bladder: No evidence of wall thickening.  Reproductive organs: IUD device in place.  GI Tract/Mesentery: The stomach is unremarkable.  No evidence of bowel obstruction or inflammation.  The appendix is unremarkable.  Peritoneal Space: No ascites. No free air.  Retroperitoneum: Scattered nonenlarged para-aortic nodes noted.  Abdominal wall: Scattered foci of air in the subcutaneous tissue along the anterior abdominal wall, likely representing sites of injection.  Vasculature: No significant atherosclerosis or aneurysm.  Bones: No acute fracture.  Impression:  Slightly increased mild peripancreatic fat stranding with surrounding multiple prominent lymph nodes, allowing for limited evaluation due to lack of IV contrast.  Findings consistent with the clinical history of pancreatitis, no loculated fluid collections.  Stable 0.5 cm pulmonary nodule in the left lower lobe.  Hepatosplenomegaly.  Small pericardial effusion and trace bilateral pleural effusion.  Additional findings, as above.     Admitted 9/3-9/16/2022   Cancelled last week's follow up appointment as had eye appointment the same day   Per discharge note:  Hospital Course: Ms. Pacheco was initially admitted for abdominal pain likely from pancreatitis  due to her cancer treatment regimen which she received 8/29. Lipase was found to be in the 200s with elevated LFTs with pancreatic fat stranding seen on CT. She was treated with anti-emetics, IVF, pain medications. She became febrile the night after admission. Broad spectrum antibiotics as well as Flagyl were started day 2 of hospitalization for intraabdominal coverage, but continued to remain febrile. She also developed copious diarrhea after antibiotics were started. She continued to remain febrile through 9/8 with Tmax of 103. Infectious Disease was consulted. Stool studies were ordered which were negative C.Diff negative x2. She completed 5 day course of Unasyn. Daptomycin started for history of VRE. Infectious work-up negative and CT chest/abd/pelvis without acute findings. DVT/PE ruled out.   RUQ US ordered to evaluate biliary tract and liver, showing biliary sludge. MRCP was ordered per GI recommendations which showed biliary sludge with no stones. She continued to have diarrhea throughout her admission up to 20 times a day, watery, and sometimes green in color. 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 Loperamide, lomotil, Creon administered prn helped reduce episodes to 3-4 times daily w/ puree consistency. She was also started on high dose methylprednisolone for potential colitis. Her stools have become more formed and are now brown and soft however she is still having 2-3 bowel movements daily. Daptomycin was discontinued per ID guidance as it may have contributed to diarrhea.   She was switched to prednisone 90mg on taper. Will follow-up outpatient for colonoscopy and IBD evaluation.  She will follow-up with Dr. Cruz once colonoscopy complete.         Oncology History:  - self detected an area under her left arm and it initially seemed to go away (noted around winter holidays)  - 3/4/2022 Outside Mammogram:  Findings:  The breasts are heterogeneously dense,  which may obscure small masses.   Left  There is a 31 mm x 18 mm x 21 mm irregularly shaped mass with microlobulated margins seen in the left breast at 2 o'clock in the posterior depth with associated left axillary adenopathy. Largest node depicted on the outside study measures 49 x 23 x 29 mm with cortical thickening and effacement of the hilum. Breast mass is reportedly palpable.   Right  There is no evidence of suspicious masses, calcifications, or other abnormal findings in the right breast.  Impression:  Left  Mass: Left breast 31 mm x 18 mm x 21 mm mass at the posterior 2 o'clock position. Assessment: 5 - Highly suggestive of malignancy. Biopsy is recommended.   Right  There is no mammographic evidence of malignancy in the right breast.  BI-RADS Category:   Overall: 5 - Highly Suggestive of Malignancy  Recommendation:  Ultrasound Guided Core Needle Biopsy of the left breast mass and one of the abnormal left axillary nodes is recommended.     - 3/14/2022 Breast biopsy:  1. 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   Comment:  Infiltrating carcinoma occupies the entirety of biopsied material   with the largest continuous focus measuring 13 mm.  Breast biomarkers are   pending and will be issued in a supplemental report.   2. LEFT AXILLARY LYMPH NODE, BIOPSY:   Invasive ductal carcinoma, Lev histologic grade 3 (tubule formation:   3, nuclear pleomorphism:  3, mitotic activity:  3).   Comment:  Infiltrating carcinoma occupies the entirety of biopsied material   with the largest continuous focus measuring 20 mm.  No lymph node tissue is   identified in the sample.  Tumor histology is essentially identical to that   of part 1.  The findings could represent part of a larger intranodal   metastasis, but an extension from the primary tumor cannot be excluded.   Radiographic correlation  is advised.   Note:  Dr. Stephanie Rao also reviewed this case and agrees with the   diagnosis.   BREAST BIOMARKER RESULTS   Estrogen receptor (ER):  Negative (0)   Progesterone receptor (ER):  Negative (0)   HER2 IHC:  Negative (1+)   Ki-67 proliferation index:  80%      - 3/17/2022 Breast MRI:  Findings:  The breasts have heterogeneous fibroglandular tissue. The background parenchymal enhancement is minimal.   Left  There is a 38 mm x 33 mm x 33 mm irregularly shaped mass seen in the left breast at 2 o'clock in the posterior depth, 8.4 cm from the nipple and 1.2 cm from the skin. Associated signal void from a twirl biopsy marker; pathology showed invasive ductal carcinoma.   Posterior to the mass there is abnormal non mass enhancement measuring 29 x 28 mm. The NME is 4 mm from the skin and 2.3 cm from the chest wall. In total the mass and NME measure 54 mm in AP extent.  Overlying skin thickening and edema involving the lateral breast, likely from lymphovascular obstruction. No suspicious skin enhancement. Overall increased vascularity to the left breast.   Four abnormal lymph level 1 and level 2 left axillary lymph nodes. The largest lymph node measures 52 mm x 40 mm x 38 mm which previously underwent biopsy showing metastatic disease. There is an associated radar reflector within the biopsied lymph node.  Right  There is no evidence of suspicious masses, abnormal enhancement, or other abnormal findings in the right breast. No enlarged axillary or internal mammary lymph nodes.   Impression:  Left  Mass: Left breast 38 mm x 33 mm x 33 mm mass at the posterior 2 o'clock position. Assessment: 6 - Known biopsy, proven malignancy. Associated 29 mm NME extending posteriorly from the mass. In total the mass and NME measure 54 mm in AP extent.  Lymph Node: Abnormal level 1 and 2 left axillary lymph nodes, the largest of which measures 52 mm x 40 mm x 38 mm lymph node and is known biopsy, proven malignancy.   Right  There  is no MR evidence of malignancy in the right breast.  BI-RADS Category:   Overall: 6 - Known Biopsy-Proven Malignancy  Recommendation:  Clinical management of known left breast cancer. Patient is established with the breast surgery clinic.      - 3/21/2022 CT C/A/P:  FINDINGS:  Base of Neck: No significant abnormality.  Thoracic soft tissue: A proximally 3.2 x 3.6 x 3.7 cm irregular left breast mass within the lateral aspect with internal biopsy marker, corresponding to findings on prior breast MRI and compatible with malignancy.  Enlarged left axillary lymph nodes, largest measuring approximately 4.2 x 3.6 cm, (series 2, image 27).  CHEST:  -Heart: Normal size. No pericardial effusion.  -Pulmonary vasculature: Pulmonary arteries distribute normally.  There are four pulmonary veins.  -Radha/Mediastinum: No pathologic shelly enlargement.  -Trachea and Proximal airways: Trachea is midline.  Central airways are patent.  No significant bronchial wall thickening or bronchiectasis.  -Lungs/Pleura: Symmetrically expanded without focal consolidation, pneumothorax, or mass.  No pleural effusion or thickening.  -Esophagus: Normal course and caliber.  ABDOMEN:  - Liver: Normal in size and attenuation with no focal hepatic abnormality.  - Gallbladder: No calcified gallstones.  No wall thickening or pericholecystic fluid.  - Bile Ducts: No intra or extrahepatic biliary ductal dilatation.  - Stomach/Duodenum: Small hiatal hernia otherwise unremarkable.  - Spleen: Normal size.  No focal parenchymal abnormality.  - Pancreas: No mass lesion.  No pancreatic ductal dilatation.  No peripancreatic fat stranding.  - Adrenals: Unremarkable.  - Kidneys/ureters/urinary bladder: Normal in size and location.  Normal enhancement pattern.  No nephrolithiasis.  Ureters are normal in course and caliber.  No hydroureteronephrosis.  - Retroperitoneum: Scattered nonenlarged retroperitoneal lymph nodes.  PELVIS:  - Reproductive: Intrauterine device  present.  A proximally 2.8 cm peripherally enhancing right ovarian cyst, likely corpus luteal cyst.  - Other: No pelvic adenopathy, free fluid, or mass.  BOWEL/MESENTERY:  No evidence of bowel obstruction or inflammatory process. Appendix is visualized and unremarkable.  VASCULATURE: Left-sided aortic arch with 3 branch vessels.  No aneurysm and no significant atherosclerosis.  Portal vein, splenic vein, and SMV are patent.  BONES: Lucent approximately 0.6 cm right trochanteric lesion, likely synovial herniation pit.  No acute fracture or bony destructive process.  EXTRATHORACIC/EXTRAPERITONEAL SOFT TISSUES: Unremarkable.  Impression:  In this patient with known breast cancer, there is an approximately 3.7 cm irregular left breast mass with left axillary lymphadenopathy.  No evidence of metastatic disease  Small hiatal hernia.  Additional findings as described above.     - 3/21/2022 Bone scan:  FINDINGS:  There is physiologic distribution of the radiopharmaceutical throughout the skeleton.  Mild degenerative uptake within the bilateral shoulders and knees.  There is normal uptake in the genitourinary system and soft tissues.  Impression:  There is no scintigraphic evidence of osteoblastic metastatic disease.     - 3/21/2022 Brain MRI:  FINDINGS:  Please note there is dental metal artifact distorting the examination.  Allowing for artifacts the brain parenchyma is normal in contour.  Developmental variant with cavum septum pellucidum identified.  The ventricles are otherwise normal in size without hydrocephalus.  There is no midline shift or significant mass effect.  The major intracranial T2 flow voids are present.  No restricted diffusion within the non distorted brain parenchyma.  There is severe distortion of the susceptibility imaging no parenchymal susceptibility to suggest parenchymal hemorrhage in the non distorted brain parenchyma.  There is suboptimal evaluation of the cerebellum and posterior  fossa.  Impression:  Unremarkable MRI brain allowing for artifacts from dental metal as detailed above specifically without abnormal parenchymal enhancement to suggest intracranial metastatic disease in light of history.     - Initiated NA therapy on 2022  C4 held with pancreatitis     PMH:  HTN- around pregnancies; off of blood pressure medication x 2 years  CHF- ECHOs at [facility]  Gestational DM  Hyperlipidemia when heavier, managed with diet and followed by cardiology  C- sections x 2  Iron deficiency     GynHx:  Menarche- 7   (16 at 1st pregnancy) (17, 7, 4)  Still with periods - last 2022- last 5-7 days, moderate normal flow  + breast feeding x 1 year  IUD     SH:  Working, , [employer]  Single  Good support system  No tobacco  No EtOH  No illicit drugs      Review of Systems   Constitutional:  Negative for appetite change and unexpected weight change.        See above   HENT:  Negative for mouth sores.    Eyes:  Negative for visual disturbance.   Respiratory:  Negative for cough and shortness of breath.    Cardiovascular:  Negative for chest pain.   Gastrointestinal:  Positive for nausea. Negative for abdominal pain and diarrhea.   Genitourinary:  Negative for frequency.   Musculoskeletal:  Negative for back pain.   Integumentary:  Positive for rash.   Neurological:  Negative for headaches.   Hematological:  Negative for adenopathy.   Psychiatric/Behavioral:  The patient is not nervous/anxious.    All other systems reviewed and are negative.      Objective:      Physical Exam  Vitals and nursing note reviewed.   Constitutional:       General: She is not in acute distress.     Appearance: Normal appearance. She is well-developed. She is not ill-appearing.      Comments: Presents alone  Very pleasant  ECOG= 0   HENT:      Head: Normocephalic and atraumatic.   Eyes:      General: Lids are normal. No scleral icterus.     Extraocular Movements: Extraocular movements intact.       Conjunctiva/sclera: Conjunctivae normal.      Pupils: Pupils are equal, round, and reactive to light.   Neck:      Thyroid: No thyromegaly.      Vascular: No JVD.      Trachea: Trachea normal.   Cardiovascular:      Rate and Rhythm: Normal rate and regular rhythm.      Heart sounds: Normal heart sounds. No murmur heard.    No friction rub. No gallop.   Pulmonary:      Effort: Pulmonary effort is normal. No respiratory distress.      Breath sounds: Normal breath sounds. No wheezing, rhonchi or rales.      Comments: Left breast mass smaller. No LAD  Right sided  port clean and dry - incision healed.   Abdominal:      General: Bowel sounds are normal. There is no distension.      Palpations: Abdomen is soft. There is no mass.      Tenderness: There is no abdominal tenderness. There is no guarding or rebound.      Comments: No pain   Musculoskeletal:         General: No swelling or deformity. Normal range of motion.      Cervical back: Normal range of motion and neck supple.      Right lower leg: No edema.      Left lower leg: No edema.      Comments: No spinal or paraspinal tenderness.    Lymphadenopathy:      Head:      Right side of head: No submental or submandibular adenopathy.      Left side of head: No submental or submandibular adenopathy.      Cervical: No cervical adenopathy.      Upper Body:      Right upper body: No supraclavicular or axillary adenopathy.      Left upper body: No supraclavicular or axillary adenopathy.   Skin:     General: Skin is warm and dry.      Capillary Refill: Capillary refill takes less than 2 seconds.      Coloration: Skin is not jaundiced or pale.      Findings: Rash (scattered pustules to back) present. No bruising.      Nails: There is no clubbing.      Comments: acne to back   Neurological:      General: No focal deficit present.      Mental Status: She is alert and oriented to person, place, and time. Mental status is at baseline.      Sensory: No sensory deficit.      Motor:  No weakness.      Coordination: Coordination normal.      Gait: Gait normal.   Psychiatric:         Mood and Affect: Mood normal.         Speech: Speech normal.         Behavior: Behavior normal.         Thought Content: Thought content normal.       Assessment:       Problem List Items Addressed This Visit          Oncology    Anemia in neoplastic disease    Chemotherapy induced neutropenia    Malignant neoplasm of upper-outer quadrant of left breast in female, estrogen receptor negative - Primary       GI    Drug-induced acute pancreatitis without infection or necrosis       Plan:       Today is C6D1 neoadjuvant chemotherapy for triple negative breast cancer.   - will proceed with chemo today  - discussed antiemetics with patient, recommended that she take zofran every 6-8 hours for the first 4-5 days after this cycle to help with nausea  - doing well with no signs/symptoms of drug-induced autoimmune pancreatitis  - will plan to continue with prednisone 20 mg daily while on ddAC portion. Discussed need to take protonix while on prednisone, may need to start PJP ppx if remains on 20mg daily.   - f/u with Dr. Cruz on 12/19 for C7D1  - ordered echocardiogram to be done prior to next cycle    Folliculitis  - refilled doxycycline x 5 days as well as mupirocin ointment    Discussed with Dr. Nancy Vyas,   PGY-V  Hematology/Oncology Fellow    Route Chart for Scheduling    Med Onc Chart Routing      Follow up with physician . F/u with Dr. Cruz with CBC, CMP, lipase, bHCG on 12/19   Follow up with ANDREW    Infusion scheduling note    Injection scheduling note    Labs CMP and CBC   Lab interval:  + lipase prior to clinic appt 12/19   Imaging ECHO   echo to be done prior to 12/19   Pharmacy appointment    Other referrals        Treatment Plan Information   OP PEMBROLIZUMAB CARBOPLATIN (AUC 5) WITH WEEKLY PACLITAXEL FOLLOWED BY DOSE DENSE DOXORUBICIN CYCLOPHOSPHAMIDE Q2W   Yessy Cruz MD   Upcoming  Treatment Dates - OP PEMBROLIZUMAB CARBOPLATIN (AUC 5) WITH WEEKLY PACLITAXEL FOLLOWED BY DOSE DENSE DOXORUBICIN CYCLOPHOSPHAMIDE Q2W    12/5/2022       Chemotherapy       DOXOrubicin chemo injection 130 mg       cyclophosphamide 600 mg/m2 = 1,300 mg in sodium chloride 0.9% 250 mL chemo infusion       Antiemetics       aprepitant (CINVANTI) injection 130 mg       palonosetron 0.25mg/dexAMETHasone 12mg in NS IVPB  12/19/2022       Chemotherapy       DOXOrubicin chemo injection 130 mg       cyclophosphamide 600 mg/m2 = 1,300 mg in sodium chloride 0.9% 250 mL chemo infusion       Antiemetics       aprepitant (CINVANTI) injection 130 mg       palonosetron 0.25mg/dexAMETHasone 12mg in NS IVPB  1/2/2023       Chemotherapy       DOXOrubicin chemo injection 130 mg       cyclophosphamide 600 mg/m2 = 1,300 mg in sodium chloride 0.9% 250 mL chemo infusion       Antiemetics       aprepitant (CINVANTI) injection 130 mg       palonosetron 0.25mg/dexAMETHasone 12mg in NS IVPB    Supportive Plan Information  OP FILGRASTIM 300 MCG   Yessy Cruz MD   Upcoming Treatment Dates - OP FILGRASTIM 300 MCG    6/20/2022       Medications       tbo-filgrastim (GRANIX) injection 480 mcg/0.8 mL  6/21/2022       Medications       tbo-filgrastim (GRANIX) injection 480 mcg/0.8 mL  6/22/2022       Medications       tbo-filgrastim (GRANIX) injection 480 mcg/0.8 mL  6/23/2022       Medications       tbo-filgrastim (GRANIX) injection 480 mcg/0.8 mL    Therapy Plan Information  Flushes  heparin, porcine (PF) 100 unit/mL injection flush 500 Units  500 Units, Intravenous, Every visit  sodium chloride 0.9% flush 10 mL  10 mL, Intravenous, Every visit  heparin, porcine (PF) 100 unit/mL injection flush 500 Units  500 Units, Intravenous, Every visit  sodium chloride 0.9% flush 10 mL  10 mL, Intravenous, Every visit    Attending Note  I have personally taken the history and examined this patient and agree with the fellow's note as stated above.  30 minutes  total + extra time with fellow

## 2022-12-06 ENCOUNTER — INFUSION (OUTPATIENT)
Dept: INFUSION THERAPY | Facility: HOSPITAL | Age: 37
End: 2022-12-06
Payer: MEDICAID

## 2022-12-06 DIAGNOSIS — Z17.1 MALIGNANT NEOPLASM OF UPPER-OUTER QUADRANT OF LEFT BREAST IN FEMALE, ESTROGEN RECEPTOR NEGATIVE: Primary | ICD-10-CM

## 2022-12-06 DIAGNOSIS — C50.412 MALIGNANT NEOPLASM OF UPPER-OUTER QUADRANT OF LEFT BREAST IN FEMALE, ESTROGEN RECEPTOR NEGATIVE: Primary | ICD-10-CM

## 2022-12-06 PROCEDURE — 96372 THER/PROPH/DIAG INJ SC/IM: CPT

## 2022-12-06 PROCEDURE — 63600175 PHARM REV CODE 636 W HCPCS: Mod: TB | Performed by: INTERNAL MEDICINE

## 2022-12-06 RX ADMIN — PEGFILGRASTIM-CBQV 6 MG: 6 INJECTION, SOLUTION SUBCUTANEOUS at 01:12

## 2022-12-08 ENCOUNTER — HOSPITAL ENCOUNTER (OUTPATIENT)
Dept: RADIOLOGY | Facility: HOSPITAL | Age: 37
Discharge: HOME | End: 2022-12-08
Attending: INTERNAL MEDICINE
Payer: MEDICAID

## 2022-12-08 DIAGNOSIS — C50.412 MALIGNANT NEOPLASM OF UPPER-OUTER QUADRANT OF LEFT BREAST IN FEMALE, ESTROGEN RECEPTOR NEGATIVE: ICD-10-CM

## 2022-12-08 DIAGNOSIS — Z17.1 MALIGNANT NEOPLASM OF UPPER-OUTER QUADRANT OF LEFT BREAST IN FEMALE, ESTROGEN RECEPTOR NEGATIVE: ICD-10-CM

## 2022-12-08 PROCEDURE — 77049 MRI BREAST W/WO CONTRAST, W/CAD, BILATERAL: ICD-10-PCS | Mod: 26,,, | Performed by: RADIOLOGY

## 2022-12-08 PROCEDURE — 77049 MRI BREAST C-+ W/CAD BI: CPT | Mod: TC

## 2022-12-08 PROCEDURE — A9577 INJ MULTIHANCE: HCPCS | Performed by: INTERNAL MEDICINE

## 2022-12-08 PROCEDURE — 25500020 PHARM REV CODE 255: Performed by: INTERNAL MEDICINE

## 2022-12-08 PROCEDURE — 77049 MRI BREAST C-+ W/CAD BI: CPT | Mod: 26,,, | Performed by: RADIOLOGY

## 2022-12-08 RX ADMIN — GADOBENATE DIMEGLUMINE 20 ML: 529 INJECTION, SOLUTION INTRAVENOUS at 05:12

## 2022-12-13 ENCOUNTER — PATIENT MESSAGE (OUTPATIENT)
Dept: HEMATOLOGY/ONCOLOGY | Facility: CLINIC | Age: 37
End: 2022-12-13
Payer: MEDICAID

## 2022-12-28 ENCOUNTER — HOSPITAL ENCOUNTER (OUTPATIENT)
Dept: CARDIOLOGY | Facility: HOSPITAL | Age: 37
Discharge: HOME | End: 2022-12-28
Attending: STUDENT IN AN ORGANIZED HEALTH CARE EDUCATION/TRAINING PROGRAM
Payer: MEDICAID

## 2022-12-28 VITALS
HEART RATE: 80 BPM | DIASTOLIC BLOOD PRESSURE: 98 MMHG | SYSTOLIC BLOOD PRESSURE: 148 MMHG | WEIGHT: 230 LBS | BODY MASS INDEX: 38.32 KG/M2 | HEIGHT: 65 IN

## 2022-12-28 DIAGNOSIS — C50.412 MALIGNANT NEOPLASM OF UPPER-OUTER QUADRANT OF LEFT BREAST IN FEMALE, ESTROGEN RECEPTOR NEGATIVE: ICD-10-CM

## 2022-12-28 DIAGNOSIS — Z17.1 MALIGNANT NEOPLASM OF UPPER-OUTER QUADRANT OF LEFT BREAST IN FEMALE, ESTROGEN RECEPTOR NEGATIVE: ICD-10-CM

## 2022-12-28 PROCEDURE — 93356 MYOCRD STRAIN IMG SPCKL TRCK: CPT

## 2022-12-28 PROCEDURE — 93306 ECHO (CUPID ONLY): ICD-10-PCS | Mod: 26,,, | Performed by: INTERNAL MEDICINE

## 2022-12-28 PROCEDURE — 93356 ECHO (CUPID ONLY): ICD-10-PCS | Mod: ,,, | Performed by: INTERNAL MEDICINE

## 2022-12-28 PROCEDURE — 93306 TTE W/DOPPLER COMPLETE: CPT | Mod: 26,,, | Performed by: INTERNAL MEDICINE

## 2022-12-28 PROCEDURE — 93356 MYOCRD STRAIN IMG SPCKL TRCK: CPT | Mod: ,,, | Performed by: INTERNAL MEDICINE

## 2022-12-29 ENCOUNTER — OFFICE VISIT (OUTPATIENT)
Dept: HEMATOLOGY/ONCOLOGY | Facility: CLINIC | Age: 37
End: 2022-12-29
Payer: MEDICAID

## 2022-12-29 ENCOUNTER — TELEPHONE (OUTPATIENT)
Dept: CARDIOLOGY | Facility: CLINIC | Age: 37
End: 2022-12-29
Payer: MEDICAID

## 2022-12-29 ENCOUNTER — INFUSION (OUTPATIENT)
Dept: INFUSION THERAPY | Facility: HOSPITAL | Age: 37
End: 2022-12-29
Payer: MEDICAID

## 2022-12-29 VITALS
SYSTOLIC BLOOD PRESSURE: 138 MMHG | HEIGHT: 65 IN | HEART RATE: 78 BPM | OXYGEN SATURATION: 100 % | WEIGHT: 234.38 LBS | DIASTOLIC BLOOD PRESSURE: 81 MMHG | RESPIRATION RATE: 17 BRPM | BODY MASS INDEX: 39.05 KG/M2 | TEMPERATURE: 98 F

## 2022-12-29 VITALS
TEMPERATURE: 98 F | HEIGHT: 65 IN | SYSTOLIC BLOOD PRESSURE: 140 MMHG | DIASTOLIC BLOOD PRESSURE: 92 MMHG | HEART RATE: 65 BPM | BODY MASS INDEX: 39.05 KG/M2 | RESPIRATION RATE: 18 BRPM | WEIGHT: 234.38 LBS

## 2022-12-29 DIAGNOSIS — I31.39 PERICARDIAL EFFUSION: ICD-10-CM

## 2022-12-29 DIAGNOSIS — C50.412 MALIGNANT NEOPLASM OF UPPER-OUTER QUADRANT OF LEFT BREAST IN FEMALE, ESTROGEN RECEPTOR NEGATIVE: Primary | ICD-10-CM

## 2022-12-29 DIAGNOSIS — K21.9 GASTROESOPHAGEAL REFLUX DISEASE WITHOUT ESOPHAGITIS: ICD-10-CM

## 2022-12-29 DIAGNOSIS — K85.30 DRUG-INDUCED ACUTE PANCREATITIS WITHOUT INFECTION OR NECROSIS: ICD-10-CM

## 2022-12-29 DIAGNOSIS — E87.6 HYPOKALEMIA: ICD-10-CM

## 2022-12-29 DIAGNOSIS — D63.0 ANEMIA IN NEOPLASTIC DISEASE: ICD-10-CM

## 2022-12-29 DIAGNOSIS — L73.9 FOLLICULITIS: ICD-10-CM

## 2022-12-29 DIAGNOSIS — Z17.1 MALIGNANT NEOPLASM OF UPPER-OUTER QUADRANT OF LEFT BREAST IN FEMALE, ESTROGEN RECEPTOR NEGATIVE: Primary | ICD-10-CM

## 2022-12-29 LAB
ASCENDING AORTA: 2.73 CM
AV INDEX (PROSTH): 0.72
AV MEAN GRADIENT: 5 MMHG
AV PEAK GRADIENT: 10 MMHG
AV VALVE AREA: 2.88 CM2
AV VELOCITY RATIO: 0.63
BSA FOR ECHO PROCEDURE: 2.19 M2
CV ECHO LV RWT: 0.47 CM
DOP CALC AO PEAK VEL: 1.61 M/S
DOP CALC AO VTI: 31.2 CM
DOP CALC LVOT AREA: 4 CM2
DOP CALC LVOT DIAMETER: 2.25 CM
DOP CALC LVOT PEAK VEL: 1.02 M/S
DOP CALC LVOT STROKE VOLUME: 89.77 CM3
DOP CALCLVOT PEAK VEL VTI: 22.59 CM
E WAVE DECELERATION TIME: 195.81 MSEC
E/A RATIO: 1.28
E/E' RATIO: 9.2 M/S
ECHO LV POSTERIOR WALL: 1.08 CM (ref 0.6–1.1)
EJECTION FRACTION: 63 %
FRACTIONAL SHORTENING: 28 % (ref 28–44)
INTERVENTRICULAR SEPTUM: 0.99 CM (ref 0.6–1.1)
LA MAJOR: 4.93 CM
LA MINOR: 5.2 CM
LA WIDTH: 3.71 CM
LEFT ATRIUM SIZE: 3.92 CM
LEFT ATRIUM VOLUME INDEX MOD: 29.6 ML/M2
LEFT ATRIUM VOLUME INDEX: 29.8 ML/M2
LEFT ATRIUM VOLUME MOD: 62.11 CM3
LEFT ATRIUM VOLUME: 62.57 CM3
LEFT INTERNAL DIMENSION IN SYSTOLE: 3.29 CM (ref 2.1–4)
LEFT VENTRICLE DIASTOLIC VOLUME INDEX: 46.11 ML/M2
LEFT VENTRICLE DIASTOLIC VOLUME: 96.83 ML
LEFT VENTRICLE MASS INDEX: 79 G/M2
LEFT VENTRICLE SYSTOLIC VOLUME INDEX: 20.9 ML/M2
LEFT VENTRICLE SYSTOLIC VOLUME: 43.79 ML
LEFT VENTRICULAR INTERNAL DIMENSION IN DIASTOLE: 4.59 CM (ref 3.5–6)
LEFT VENTRICULAR MASS: 165.92 G
LV LATERAL E/E' RATIO: 7.67 M/S
LV SEPTAL E/E' RATIO: 11.5 M/S
MV A" WAVE DURATION": 8.28 MSEC
MV PEAK A VEL: 0.54 M/S
MV PEAK E VEL: 0.69 M/S
MV STENOSIS PRESSURE HALF TIME: 56.79 MS
MV VALVE AREA P 1/2 METHOD: 3.87 CM2
PISA TR MAX VEL: 2.36 M/S
PULM VEIN S/D RATIO: 1
PV PEAK D VEL: 0.57 M/S
PV PEAK S VEL: 0.57 M/S
QEF: 55 %
RA MAJOR: 5.22 CM
RA PRESSURE: 3 MMHG
RA WIDTH: 3.5 CM
RIGHT VENTRICULAR END-DIASTOLIC DIMENSION: 4.66 CM
RV TISSUE DOPPLER FREE WALL SYSTOLIC VELOCITY 1 (APICAL 4 CHAMBER VIEW): 13.9 CM/S
SINUS: 3.03 CM
STJ: 2.82 CM
TDI LATERAL: 0.09 M/S
TDI SEPTAL: 0.06 M/S
TDI: 0.08 M/S
TR MAX PG: 22 MMHG
TRICUSPID ANNULAR PLANE SYSTOLIC EXCURSION: 2.64 CM
TV REST PULMONARY ARTERY PRESSURE: 25 MMHG

## 2022-12-29 PROCEDURE — 96413 CHEMO IV INFUSION 1 HR: CPT

## 2022-12-29 PROCEDURE — 25000003 PHARM REV CODE 250: Performed by: NURSE PRACTITIONER

## 2022-12-29 PROCEDURE — A4216 STERILE WATER/SALINE, 10 ML: HCPCS | Performed by: NURSE PRACTITIONER

## 2022-12-29 PROCEDURE — 96375 TX/PRO/DX INJ NEW DRUG ADDON: CPT

## 2022-12-29 PROCEDURE — 96411 CHEMO IV PUSH ADDL DRUG: CPT

## 2022-12-29 PROCEDURE — 99213 OFFICE O/P EST LOW 20 MIN: CPT | Mod: PBBFAC,25 | Performed by: NURSE PRACTITIONER

## 2022-12-29 PROCEDURE — 1159F PR MEDICATION LIST DOCUMENTED IN MEDICAL RECORD: ICD-10-PCS | Mod: CPTII,,, | Performed by: NURSE PRACTITIONER

## 2022-12-29 PROCEDURE — 3075F SYST BP GE 130 - 139MM HG: CPT | Mod: CPTII,,, | Performed by: NURSE PRACTITIONER

## 2022-12-29 PROCEDURE — 99999 PR PBB SHADOW E&M-EST. PATIENT-LVL III: CPT | Mod: PBBFAC,,, | Performed by: NURSE PRACTITIONER

## 2022-12-29 PROCEDURE — 96367 TX/PROPH/DG ADDL SEQ IV INF: CPT

## 2022-12-29 PROCEDURE — 63600175 PHARM REV CODE 636 W HCPCS: Mod: JG | Performed by: NURSE PRACTITIONER

## 2022-12-29 PROCEDURE — 3008F BODY MASS INDEX DOCD: CPT | Mod: CPTII,,, | Performed by: NURSE PRACTITIONER

## 2022-12-29 PROCEDURE — 3008F PR BODY MASS INDEX (BMI) DOCUMENTED: ICD-10-PCS | Mod: CPTII,,, | Performed by: NURSE PRACTITIONER

## 2022-12-29 PROCEDURE — 3075F PR MOST RECENT SYSTOLIC BLOOD PRESS GE 130-139MM HG: ICD-10-PCS | Mod: CPTII,,, | Performed by: NURSE PRACTITIONER

## 2022-12-29 PROCEDURE — 99999 PR PBB SHADOW E&M-EST. PATIENT-LVL III: ICD-10-PCS | Mod: PBBFAC,,, | Performed by: NURSE PRACTITIONER

## 2022-12-29 PROCEDURE — 3079F DIAST BP 80-89 MM HG: CPT | Mod: CPTII,,, | Performed by: NURSE PRACTITIONER

## 2022-12-29 PROCEDURE — 99215 PR OFFICE/OUTPT VISIT, EST, LEVL V, 40-54 MIN: ICD-10-PCS | Mod: S$PBB,,, | Performed by: NURSE PRACTITIONER

## 2022-12-29 PROCEDURE — 99215 OFFICE O/P EST HI 40 MIN: CPT | Mod: S$PBB,,, | Performed by: NURSE PRACTITIONER

## 2022-12-29 PROCEDURE — 3079F PR MOST RECENT DIASTOLIC BLOOD PRESSURE 80-89 MM HG: ICD-10-PCS | Mod: CPTII,,, | Performed by: NURSE PRACTITIONER

## 2022-12-29 PROCEDURE — 1159F MED LIST DOCD IN RCRD: CPT | Mod: CPTII,,, | Performed by: NURSE PRACTITIONER

## 2022-12-29 RX ORDER — SODIUM CHLORIDE 0.9 % (FLUSH) 0.9 %
10 SYRINGE (ML) INJECTION
Status: DISCONTINUED | OUTPATIENT
Start: 2022-12-29 | End: 2022-12-29 | Stop reason: HOSPADM

## 2022-12-29 RX ORDER — POTASSIUM CHLORIDE 20 MEQ/1
20 TABLET, EXTENDED RELEASE ORAL ONCE
Status: CANCELLED
Start: 2022-12-29 | End: 2022-12-29

## 2022-12-29 RX ORDER — SODIUM CHLORIDE 0.9 % (FLUSH) 0.9 %
10 SYRINGE (ML) INJECTION
Status: CANCELLED | OUTPATIENT
Start: 2022-12-29

## 2022-12-29 RX ORDER — HEPARIN 100 UNIT/ML
500 SYRINGE INTRAVENOUS
Status: DISCONTINUED | OUTPATIENT
Start: 2022-12-29 | End: 2022-12-29 | Stop reason: HOSPADM

## 2022-12-29 RX ORDER — DIPHENHYDRAMINE HYDROCHLORIDE 50 MG/ML
50 INJECTION INTRAMUSCULAR; INTRAVENOUS ONCE AS NEEDED
Status: DISCONTINUED | OUTPATIENT
Start: 2022-12-29 | End: 2022-12-29 | Stop reason: HOSPADM

## 2022-12-29 RX ORDER — POTASSIUM CHLORIDE 20 MEQ/1
20 TABLET, EXTENDED RELEASE ORAL ONCE
Status: COMPLETED | OUTPATIENT
Start: 2022-12-29 | End: 2022-12-29

## 2022-12-29 RX ORDER — HEPARIN 100 UNIT/ML
500 SYRINGE INTRAVENOUS
Status: CANCELLED | OUTPATIENT
Start: 2022-12-29

## 2022-12-29 RX ORDER — PANTOPRAZOLE SODIUM 40 MG/1
40 TABLET, DELAYED RELEASE ORAL DAILY
Qty: 30 TABLET | Refills: 1 | Status: SHIPPED | OUTPATIENT
Start: 2022-12-29 | End: 2023-12-29

## 2022-12-29 RX ORDER — DOXORUBICIN HYDROCHLORIDE 2 MG/ML
60 INJECTION, SOLUTION INTRAVENOUS
Status: COMPLETED | OUTPATIENT
Start: 2022-12-29 | End: 2022-12-29

## 2022-12-29 RX ORDER — DIPHENHYDRAMINE HYDROCHLORIDE 50 MG/ML
50 INJECTION INTRAMUSCULAR; INTRAVENOUS ONCE AS NEEDED
Status: CANCELLED | OUTPATIENT
Start: 2022-12-29

## 2022-12-29 RX ORDER — EPINEPHRINE 0.3 MG/.3ML
0.3 INJECTION SUBCUTANEOUS ONCE AS NEEDED
Status: DISCONTINUED | OUTPATIENT
Start: 2022-12-29 | End: 2022-12-29 | Stop reason: HOSPADM

## 2022-12-29 RX ORDER — DOXORUBICIN HYDROCHLORIDE 2 MG/ML
60 INJECTION, SOLUTION INTRAVENOUS
Status: CANCELLED | OUTPATIENT
Start: 2022-12-29

## 2022-12-29 RX ORDER — EPINEPHRINE 0.3 MG/.3ML
0.3 INJECTION SUBCUTANEOUS ONCE AS NEEDED
Status: CANCELLED | OUTPATIENT
Start: 2022-12-29

## 2022-12-29 RX ADMIN — APREPITANT 130 MG: 130 INJECTION, EMULSION INTRAVENOUS at 10:12

## 2022-12-29 RX ADMIN — CYCLOPHOSPHAMIDE 1300 MG: 200 INJECTION, SOLUTION INTRAVENOUS at 11:12

## 2022-12-29 RX ADMIN — DOXORUBICIN HYDROCHLORIDE 130 MG: 2 INJECTION, SOLUTION INTRAVENOUS at 10:12

## 2022-12-29 RX ADMIN — DEXAMETHASONE SODIUM PHOSPHATE 0.25 MG: 4 INJECTION, SOLUTION INTRA-ARTICULAR; INTRALESIONAL; INTRAMUSCULAR; INTRAVENOUS; SOFT TISSUE at 10:12

## 2022-12-29 RX ADMIN — Medication 10 ML: at 11:12

## 2022-12-29 RX ADMIN — HEPARIN 500 UNITS: 100 SYRINGE at 11:12

## 2022-12-29 RX ADMIN — POTASSIUM CHLORIDE 20 MEQ: 1500 TABLET, EXTENDED RELEASE ORAL at 10:12

## 2022-12-29 RX ADMIN — SODIUM CHLORIDE: 9 INJECTION, SOLUTION INTRAVENOUS at 10:12

## 2022-12-29 NOTE — PROGRESS NOTES
Subjective:       Patient ID: Laura Kent is a 37 y.o. female.    Chief Complaint: Malignant neoplasm of upper-outer quadrant of left breast i      Ms. Laura Kent presents for follow up of TNBC prior to C7D1 neoadjuvant chemotherapy.   Reports mild abd discomfort since last treatment. These are not the intense pains she noted with pancreatitis but in ssame are.   Always feels hungry.   Increased gas.   Diarrhea alternating with constipation.   She reports diarrhea was mild but relieved with imodium.   Not taking protonix daily but is taking Prednisone.   Nausea post chemo relieved with zofran. None now.   Reports bumps break out in different areas that itch. They come and go lasting approximately 2 days. Previously just occurred on back but now notices in other areas such as arms, leg.   No fever, chills.   No CP/SOB or swelling.   Notes hot flashes at times- flushing.   Has not been eating her bananas like she used to        12/29/2022 ECHO:  Summary    The left ventricle is normal in size with concentric remodeling and normal systolic function.  The visually estimated ejection fraction is 63% ( 60-65%).  The quantitatively derived ejection fraction is 55%( visually looks bettter than this).  The left ventricular global longitudinal strain is -17.5%.  Normal left ventricular diastolic function.  Normal right ventricular size with normal right ventricular systolic function.  Mild right atrial enlargement.  Normal central venous pressure (3 mmHg).  The estimated PA systolic pressure is 25 mmHg.  Trivial posterior pericardial effusion. Just at base and trivial-small under the RA.        Oncology History:  - self detected an area under her left arm and it initially seemed to go away (noted around winter holidays)  - 3/4/2022 Outside Mammogram:  Findings:  The breasts are heterogeneously dense, which may obscure small masses.   Left  There is a 31 mm x 18 mm x 21 mm irregularly shaped mass with  microlobulated margins seen in the left breast at 2 o'clock in the posterior depth with associated left axillary adenopathy. Largest node depicted on the outside study measures 49 x 23 x 29 mm with cortical thickening and effacement of the hilum. Breast mass is reportedly palpable.   Right  There is no evidence of suspicious masses, calcifications, or other abnormal findings in the right breast.  Impression:  Left  Mass: Left breast 31 mm x 18 mm x 21 mm mass at the posterior 2 o'clock position. Assessment: 5 - Highly suggestive of malignancy. Biopsy is recommended.   Right  There is no mammographic evidence of malignancy in the right breast.  BI-RADS Category:   Overall: 5 - Highly Suggestive of Malignancy  Recommendation:  Ultrasound Guided Core Needle Biopsy of the left breast mass and one of the abnormal left axillary nodes is recommended.     - 3/14/2022 Breast biopsy:  1. 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   Comment:  Infiltrating carcinoma occupies the entirety of biopsied material   with the largest continuous focus measuring 13 mm.  Breast biomarkers are   pending and will be issued in a supplemental report.   2. LEFT AXILLARY LYMPH NODE, BIOPSY:   Invasive ductal carcinoma, [place] histologic grade 3 (tubule formation:   3, nuclear pleomorphism:  3, mitotic activity:  3).   Comment:  Infiltrating carcinoma occupies the entirety of biopsied material   with the largest continuous focus measuring 20 mm.  No lymph node tissue is   identified in the sample.  Tumor histology is essentially identical to that   of part 1.  The findings could represent part of a larger intranodal   metastasis, but an extension from the primary tumor cannot be excluded.   Radiographic correlation is advised.   Note:  Dr. Stephanie Rao also reviewed this case and agrees with the   diagnosis.    BREAST BIOMARKER RESULTS   Estrogen receptor (ER):  Negative (0)   Progesterone receptor (ER):  Negative (0)   HER2 IHC:  Negative (1+)   Ki-67 proliferation index:  80%      - 3/17/2022 Breast MRI:  Findings:  The breasts have heterogeneous fibroglandular tissue. The background parenchymal enhancement is minimal.   Left  There is a 38 mm x 33 mm x 33 mm irregularly shaped mass seen in the left breast at 2 o'clock in the posterior depth, 8.4 cm from the nipple and 1.2 cm from the skin. Associated signal void from a twirl biopsy marker; pathology showed invasive ductal carcinoma.   Posterior to the mass there is abnormal non mass enhancement measuring 29 x 28 mm. The NME is 4 mm from the skin and 2.3 cm from the chest wall. In total the mass and NME measure 54 mm in AP extent.  Overlying skin thickening and edema involving the lateral breast, likely from lymphovascular obstruction. No suspicious skin enhancement. Overall increased vascularity to the left breast.   Four abnormal lymph level 1 and level 2 left axillary lymph nodes. The largest lymph node measures 52 mm x 40 mm x 38 mm which previously underwent biopsy showing metastatic disease. There is an associated radar reflector within the biopsied lymph node.  Right  There is no evidence of suspicious masses, abnormal enhancement, or other abnormal findings in the right breast. No enlarged axillary or internal mammary lymph nodes.   Impression:  Left  Mass: Left breast 38 mm x 33 mm x 33 mm mass at the posterior 2 o'clock position. Assessment: 6 - Known biopsy, proven malignancy. Associated 29 mm NME extending posteriorly from the mass. In total the mass and NME measure 54 mm in AP extent.  Lymph Node: Abnormal level 1 and 2 left axillary lymph nodes, the largest of which measures 52 mm x 40 mm x 38 mm lymph node and is known biopsy, proven malignancy.   Right  There is no MR evidence of malignancy in the right breast.  BI-RADS Category:   Overall: 6 - Known  Biopsy-Proven Malignancy  Recommendation:  Clinical management of known left breast cancer. Patient is established with the breast surgery clinic.      - 3/21/2022 CT C/A/P:  FINDINGS:  Base of Neck: No significant abnormality.  Thoracic soft tissue: A proximally 3.2 x 3.6 x 3.7 cm irregular left breast mass within the lateral aspect with internal biopsy marker, corresponding to findings on prior breast MRI and compatible with malignancy.  Enlarged left axillary lymph nodes, largest measuring approximately 4.2 x 3.6 cm, (series 2, image 27).  CHEST:  -Heart: Normal size. No pericardial effusion.  -Pulmonary vasculature: Pulmonary arteries distribute normally.  There are four pulmonary veins.  -Radha/Mediastinum: No pathologic shelly enlargement.  -Trachea and Proximal airways: Trachea is midline.  Central airways are patent.  No significant bronchial wall thickening or bronchiectasis.  -Lungs/Pleura: Symmetrically expanded without focal consolidation, pneumothorax, or mass.  No pleural effusion or thickening.  -Esophagus: Normal course and caliber.  ABDOMEN:  - Liver: Normal in size and attenuation with no focal hepatic abnormality.  - Gallbladder: No calcified gallstones.  No wall thickening or pericholecystic fluid.  - Bile Ducts: No intra or extrahepatic biliary ductal dilatation.  - Stomach/Duodenum: Small hiatal hernia otherwise unremarkable.  - Spleen: Normal size.  No focal parenchymal abnormality.  - Pancreas: No mass lesion.  No pancreatic ductal dilatation.  No peripancreatic fat stranding.  - Adrenals: Unremarkable.  - Kidneys/ureters/urinary bladder: Normal in size and location.  Normal enhancement pattern.  No nephrolithiasis.  Ureters are normal in course and caliber.  No hydroureteronephrosis.  - Retroperitoneum: Scattered nonenlarged retroperitoneal lymph nodes.  PELVIS:  - Reproductive: Intrauterine device present.  A proximally 2.8 cm peripherally enhancing right ovarian cyst, likely corpus luteal  cyst.  - Other: No pelvic adenopathy, free fluid, or mass.  BOWEL/MESENTERY:  No evidence of bowel obstruction or inflammatory process. Appendix is visualized and unremarkable.  VASCULATURE: Left-sided aortic arch with 3 branch vessels.  No aneurysm and no significant atherosclerosis.  Portal vein, splenic vein, and SMV are patent.  BONES: Lucent approximately 0.6 cm right trochanteric lesion, likely synovial herniation pit.  No acute fracture or bony destructive process.  EXTRATHORACIC/EXTRAPERITONEAL SOFT TISSUES: Unremarkable.  Impression:  In this patient with known breast cancer, there is an approximately 3.7 cm irregular left breast mass with left axillary lymphadenopathy.  No evidence of metastatic disease  Small hiatal hernia.  Additional findings as described above.     - 3/21/2022 Bone scan:  FINDINGS:  There is physiologic distribution of the radiopharmaceutical throughout the skeleton.  Mild degenerative uptake within the bilateral shoulders and knees.  There is normal uptake in the genitourinary system and soft tissues.  Impression:  There is no scintigraphic evidence of osteoblastic metastatic disease.     - 3/21/2022 Brain MRI:  FINDINGS:  Please note there is dental metal artifact distorting the examination.  Allowing for artifacts the brain parenchyma is normal in contour.  Developmental variant with cavum septum pellucidum identified.  The ventricles are otherwise normal in size without hydrocephalus.  There is no midline shift or significant mass effect.  The major intracranial T2 flow voids are present.  No restricted diffusion within the non distorted brain parenchyma.  There is severe distortion of the susceptibility imaging no parenchymal susceptibility to suggest parenchymal hemorrhage in the non distorted brain parenchyma.  There is suboptimal evaluation of the cerebellum and posterior fossa.  Impression:  Unremarkable MRI brain allowing for artifacts from dental metal as detailed above  specifically without abnormal parenchymal enhancement to suggest intracranial metastatic disease in light of history.     - Initiated NA therapy on 4/13/2022  Developed pancreatitis post C4 and treatment held       Hospital Course: 11/7/2022-11/11/2022  Patient admitted with recurrence of her drug induced pancreatitis. She had some slow improvement over a few days then not much progress. GI consulted and steroids were restarted with good effect. She was placed on an extended steroid taper of steroids starting at 40mg and will complete her last dose of 5mg on 12/6/2022. She is to follow up with oncology for her appointment next week. Given return precautions.     10/20/2022 Ultrasound left breast:  Findings:  At the site of the marker from the prior malignant breast biopsy,there is a 6 mm x 7 mm x 3 mm oval, parallel, hypoechoic mass with indistinct margins seen in the left breast at 2 o'clock in the posterior depth, 8 cm from the nipple. This is difficult to distinguish from the Twirl marker itself.  At the site of the previously biopsied metastatic left axillary node, there is a 10 x 7 x 11 mm node with a Uyen- marker. Node has mild residual cortical thickening (5 mm), but has decreased in size. There are a few other normal size, normal appearing left axillary nodes.   Impression:  Left  Mass: Left breast 6 mm x 7 mm x 3 mm mass at the posterior 2 o'clock position. Assessment: 6 - Known biopsy, proven malignancy. Known, previously biopsied left breast cancer and metastatic left axillary node have both decreased in size. The breast mass is now difficult to distinguish from the biopsy marker.   BI-RADS Category:   Overall: 6 - Known Biopsy-Proven Malignancy  Recommendation:  Patient is already under the care of the Breast Oncology team. Most recent bilateral mammogram was performed in February 2022.      Resumed NA chemo without immunotherapy 11/21/2022.         PMH:  HTN- around pregnancies; off of blood  pressure medication x 2 years  CHF- ECHOs at [facility]  Gestational DM  Hyperlipidemia when heavier, managed with diet and followed by cardiology  C- sections x 2  Iron deficiency     GynHx:  Menarche- 7   (16 at 1st pregnancy) (17, 7, 4)  Still with periods - last 2022- last 5-7 days, moderate normal flow  + breast feeding x 1 year  IUD     SH:  Working, , [employer]  Single  Good support system  No tobacco  No EtOH  No illicit drugs      Review of Systems   Constitutional:         See above   All other systems reviewed and are negative.      Objective:      Physical Exam  Vitals and nursing note reviewed.   Constitutional:       General: She is not in acute distress.     Appearance: Normal appearance. She is well-developed. She is not ill-appearing.      Comments: Presents alone  Very pleasant  ECOG= 0   HENT:      Head: Normocephalic and atraumatic.   Eyes:      General: Lids are normal. No scleral icterus.     Extraocular Movements: Extraocular movements intact.      Conjunctiva/sclera: Conjunctivae normal.      Pupils: Pupils are equal, round, and reactive to light.   Neck:      Thyroid: No thyromegaly.      Vascular: No JVD.      Trachea: Trachea normal.   Cardiovascular:      Rate and Rhythm: Normal rate and regular rhythm.      Heart sounds: Normal heart sounds. No murmur heard.    No friction rub. No gallop.      Comments: Heart tones normal  Pulmonary:      Effort: Pulmonary effort is normal. No respiratory distress.      Breath sounds: Normal breath sounds. No wheezing, rhonchi or rales.      Comments: Left breast mass - unable to palpate.  No LAD  Right sided  port clean and dry - incision healed.   Abdominal:      General: Bowel sounds are normal. There is no distension.      Palpations: Abdomen is soft. There is no mass.      Tenderness: There is no abdominal tenderness. There is no guarding or rebound.      Comments: No pain   Musculoskeletal:         General: No swelling  or deformity. Normal range of motion.      Cervical back: Normal range of motion and neck supple.      Right lower leg: No edema.      Left lower leg: No edema.      Comments: No spinal or paraspinal tenderness.    Lymphadenopathy:      Head:      Right side of head: No submental or submandibular adenopathy.      Left side of head: No submental or submandibular adenopathy.      Cervical: No cervical adenopathy.      Upper Body:      Right upper body: No supraclavicular or axillary adenopathy.      Left upper body: No supraclavicular or axillary adenopathy.   Skin:     General: Skin is warm and dry.      Capillary Refill: Capillary refill takes less than 2 seconds.      Coloration: Skin is not jaundiced or pale.      Findings: Rash (few scattered pustules to back) present. No bruising.      Nails: There is no clubbing.      Comments: acne to back   Neurological:      General: No focal deficit present.      Mental Status: She is alert and oriented to person, place, and time. Mental status is at baseline.      Sensory: No sensory deficit.      Motor: No weakness.      Coordination: Coordination normal.      Gait: Gait normal.   Psychiatric:         Mood and Affect: Mood normal.         Speech: Speech normal.         Behavior: Behavior normal.         Thought Content: Thought content normal.       Assessment:       1. Malignant neoplasm of upper-outer quadrant of left breast in female, estrogen receptor negative  CBC Oncology    Comprehensive Metabolic Panel    HCG, Quantitative    LIPASE      2. Hypokalemia        3. Anemia in neoplastic disease        4. Gastroesophageal reflux disease without esophagitis  pantoprazole (PROTONIX) 40 MG tablet      5. Pericardial effusion  Ambulatory referral/consult to Cardiology      6. Folliculitis        7. Drug-induced acute pancreatitis without infection or necrosis              Labs reviewed and adequate to proceed.     Plan:       1. Proceed with C7D1 NA chemo for TNBC  ddAC (3  of 4 cycles); udenyca tomorrow   2. Add potassium pill to today's treatment. Advised to increase potassium rich foods.   3. Monitor as parameters stable.   4. Rx Protonix 40 mg daily as on prednisone  5. Refer to cards to evaluate trivial pericardial effusion seen on echo  6. Bactroban as needed and cortaid or benadryl for the itching.   7. Lipase trending down. Believe above symptoms related to prednisone.        2 weeks. see Dr. Cruz in 2 weeks with cbc, cmp, lipase, bhcg and for ddAC with udenyca day 2  Route Chart for Scheduling    Med Onc Chart Routing  Urgent    Follow up with physician 2 weeks. see Dr. Crzu (needs MDO)  in 2 weeks with cbc, cmp, lipase, bhcg and for ddAC with udenyca day 2   Follow up with ANDREW    Infusion scheduling note    Injection scheduling note    Labs    Imaging    Pharmacy appointment    Other referrals Additional referrals needed         Treatment Plan Information   OP PEMBROLIZUMAB CARBOPLATIN (AUC 5) WITH WEEKLY PACLITAXEL FOLLOWED BY DOSE DENSE DOXORUBICIN CYCLOPHOSPHAMIDE Q2W   Yessy Cruz MD   Upcoming Treatment Dates - OP PEMBROLIZUMAB CARBOPLATIN (AUC 5) WITH WEEKLY PACLITAXEL FOLLOWED BY DOSE DENSE DOXORUBICIN CYCLOPHOSPHAMIDE Q2W    12/29/2022       Chemotherapy       DOXOrubicin chemo injection 130 mg       cyclophosphamide 600 mg/m2 = 1,300 mg in sodium chloride 0.9% 250 mL chemo infusion       Antiemetics       aprepitant (CINVANTI) injection 130 mg       palonosetron (ALOXI) 0.25 mg with Dexamethasone (DECADRON) 12 mg in NS 50 mL IVPB  1/12/2023       Chemotherapy       DOXOrubicin chemo injection 130 mg       cyclophosphamide 600 mg/m2 = 1,300 mg in sodium chloride 0.9% 250 mL chemo infusion       Antiemetics       aprepitant (CINVANTI) injection 130 mg       palonosetron (ALOXI) 0.25 mg with Dexamethasone (DECADRON) 12 mg in NS 50 mL IVPB    Supportive Plan Information  OP FILGRASTIM 300 MCG   Yessy Cruz MD   Upcoming Treatment Dates - OP FILGRASTIM 300  MCG    6/20/2022       Medications       tbo-filgrastim (GRANIX) injection 480 mcg/0.8 mL  6/21/2022       Medications       tbo-filgrastim (GRANIX) injection 480 mcg/0.8 mL  6/22/2022       Medications       tbo-filgrastim (GRANIX) injection 480 mcg/0.8 mL  6/23/2022       Medications       tbo-filgrastim (GRANIX) injection 480 mcg/0.8 mL    Therapy Plan Information  Flushes  heparin, porcine (PF) 100 unit/mL injection flush 500 Units  500 Units, Intravenous, Every visit  sodium chloride 0.9% flush 10 mL  10 mL, Intravenous, Every visit  heparin, porcine (PF) 100 unit/mL injection flush 500 Units  500 Units, Intravenous, Every visit  sodium chloride 0.9% flush 10 mL  10 mL, Intravenous, Every visit          Patient is in agreement with the proposed treatment plan. All questions were answered to the patient's satisfaction. Pt knows to call clinic for any new or worsening symptoms and if anything is needed before the next clinic visit.      JUICE Hester-MARLON  Hematology & Oncology  [facility] [place] [address]  ph. [phone]  Fax. [phone]     Face to Face time with patient: 25 minutes  45 minutes of total time spent on the encounter, which includes face to face time and non-face to face time preparing to see the patient (eg, review of tests), Obtaining and/or reviewing separately obtained history, Documenting clinical information in the electronic or other health record, Independently interpreting results (not separately reported) and communicating results to the patient/family/caregiver, or Care coordination (not separately reported).

## 2022-12-29 NOTE — PLAN OF CARE
Pt tolerated AC today. NAD. Port flushed + blood return present, flushed. Hep lock and deaccesed. Pt to RTC for Fuliphia. Per Argelia Mishra Injection is pending Auth Notified Phuong RN. Charge will update pt.Pt notified verbalized understanding. declined AVS. Uses my [facility]. Discharged home. Ambulated independently.   Problem: Adult Inpatient Plan of Care  Goal: Optimal Comfort and Wellbeing  Intervention: Provide Person-Centered Care  Flowsheets (Taken 12/29/2022 1406)  Trust Relationship/Rapport:   care explained   choices provided   thoughts/feelings acknowledged   emotional support provided   empathic listening provided   questions answered   questions encouraged   reassurance provided

## 2022-12-29 NOTE — TELEPHONE ENCOUNTER
----- Message from Esperanza Jimenez MA sent at 12/29/2022 10:25 AM CST -----  Medicaid patient  ----- Message -----  From: Adonay Cannon NP  Sent: 12/29/2022  10:05 AM CST  To: Yahaira Carson Staff    Needs an appt with Dr. Yahaira brown. thanks

## 2022-12-31 ENCOUNTER — INFUSION (OUTPATIENT)
Dept: INFUSION THERAPY | Facility: HOSPITAL | Age: 37
End: 2022-12-31
Payer: MEDICAID

## 2022-12-31 DIAGNOSIS — Z17.1 MALIGNANT NEOPLASM OF UPPER-OUTER QUADRANT OF LEFT BREAST IN FEMALE, ESTROGEN RECEPTOR NEGATIVE: Primary | ICD-10-CM

## 2022-12-31 DIAGNOSIS — C50.412 MALIGNANT NEOPLASM OF UPPER-OUTER QUADRANT OF LEFT BREAST IN FEMALE, ESTROGEN RECEPTOR NEGATIVE: Primary | ICD-10-CM

## 2022-12-31 PROCEDURE — 63600175 PHARM REV CODE 636 W HCPCS: Mod: TB | Performed by: NURSE PRACTITIONER

## 2022-12-31 PROCEDURE — 96372 THER/PROPH/DIAG INJ SC/IM: CPT

## 2022-12-31 RX ADMIN — PEGFILGRASTIM 6 MG: 6 INJECTION SUBCUTANEOUS at 12:12

## 2023-01-01 ENCOUNTER — HOSPITAL ENCOUNTER (EMERGENCY)
Facility: HOSPITAL | Age: 38
Discharge: HOME | End: 2023-01-01
Attending: EMERGENCY MEDICINE
Payer: MEDICAID

## 2023-01-01 VITALS
OXYGEN SATURATION: 97 % | HEART RATE: 93 BPM | TEMPERATURE: 100 F | RESPIRATION RATE: 18 BRPM | SYSTOLIC BLOOD PRESSURE: 140 MMHG | DIASTOLIC BLOOD PRESSURE: 80 MMHG | HEIGHT: 65 IN | WEIGHT: 230 LBS | BODY MASS INDEX: 38.32 KG/M2

## 2023-01-01 DIAGNOSIS — R50.9 FEVER: ICD-10-CM

## 2023-01-01 DIAGNOSIS — U07.1 COVID-19 VIRUS DETECTED: ICD-10-CM

## 2023-01-01 DIAGNOSIS — N89.8 LEUKORRHEA: ICD-10-CM

## 2023-01-01 DIAGNOSIS — R50.9 FEVER, UNSPECIFIED FEVER CAUSE: Primary | ICD-10-CM

## 2023-01-01 DIAGNOSIS — U07.1 COVID-19: ICD-10-CM

## 2023-01-01 LAB
ALBUMIN SERPL BCP-MCNC: 3.9 G/DL (ref 3.5–5.2)
ALP SERPL-CCNC: 62 U/L (ref 55–135)
ALT SERPL W/O P-5'-P-CCNC: 11 U/L (ref 10–44)
ANION GAP SERPL CALC-SCNC: 9 MMOL/L (ref 8–16)
AST SERPL-CCNC: 13 U/L (ref 10–40)
BACTERIA #/AREA URNS AUTO: ABNORMAL /HPF
BASOPHILS # BLD AUTO: 0.02 K/UL (ref 0–0.2)
BASOPHILS NFR BLD: 0.1 % (ref 0–1.9)
BILIRUB SERPL-MCNC: 0.5 MG/DL (ref 0.1–1)
BILIRUB UR QL STRIP: NEGATIVE
BUN SERPL-MCNC: 14 MG/DL (ref 6–20)
CALCIUM SERPL-MCNC: 9.4 MG/DL (ref 8.7–10.5)
CHLORIDE SERPL-SCNC: 102 MMOL/L (ref 95–110)
CLARITY UR REFRACT.AUTO: CLEAR
CO2 SERPL-SCNC: 27 MMOL/L (ref 23–29)
COLOR UR AUTO: YELLOW
CREAT SERPL-MCNC: 0.8 MG/DL (ref 0.5–1.4)
DIFFERENTIAL METHOD: ABNORMAL
EOSINOPHIL # BLD AUTO: 0 K/UL (ref 0–0.5)
EOSINOPHIL NFR BLD: 0 % (ref 0–8)
ERYTHROCYTE [DISTWIDTH] IN BLOOD BY AUTOMATED COUNT: 13.4 % (ref 11.5–14.5)
EST. GFR  (NO RACE VARIABLE): >60 ML/MIN/1.73 M^2
GLUCOSE SERPL-MCNC: 99 MG/DL (ref 70–110)
GLUCOSE UR QL STRIP: NEGATIVE
HCT VFR BLD AUTO: 29.6 % (ref 37–48.5)
HGB BLD-MCNC: 10.1 G/DL (ref 12–16)
HGB UR QL STRIP: NEGATIVE
HYALINE CASTS UR QL AUTO: 3 /LPF
IMM GRANULOCYTES # BLD AUTO: 0.45 K/UL (ref 0–0.04)
IMM GRANULOCYTES NFR BLD AUTO: 2.4 % (ref 0–0.5)
INFLUENZA A, MOLECULAR: NOT DETECTED
INFLUENZA B, MOLECULAR: NOT DETECTED
KETONES UR QL STRIP: NEGATIVE
LACTATE SERPL-SCNC: 0.9 MMOL/L (ref 0.5–2.2)
LEUKOCYTE ESTERASE UR QL STRIP: ABNORMAL
LYMPHOCYTES # BLD AUTO: 0.7 K/UL (ref 1–4.8)
LYMPHOCYTES NFR BLD: 3.7 % (ref 18–48)
MAGNESIUM SERPL-MCNC: 1.8 MG/DL (ref 1.6–2.6)
MCH RBC QN AUTO: 31.1 PG (ref 27–31)
MCHC RBC AUTO-ENTMCNC: 34.1 G/DL (ref 32–36)
MCV RBC AUTO: 91 FL (ref 82–98)
MICROSCOPIC COMMENT: ABNORMAL
MONOCYTES # BLD AUTO: 0.4 K/UL (ref 0.3–1)
MONOCYTES NFR BLD: 1.9 % (ref 4–15)
NEUTROPHILS # BLD AUTO: 17.5 K/UL (ref 1.8–7.7)
NEUTROPHILS NFR BLD: 91.9 % (ref 38–73)
NITRITE UR QL STRIP: NEGATIVE
NRBC BLD-RTO: 0 /100 WBC
PH UR STRIP: 6 [PH] (ref 5–8)
PLATELET # BLD AUTO: 217 K/UL (ref 150–450)
PMV BLD AUTO: 10.2 FL (ref 9.2–12.9)
POTASSIUM SERPL-SCNC: 3.1 MMOL/L (ref 3.5–5.1)
PROT SERPL-MCNC: 6.4 G/DL (ref 6–8.4)
PROT UR QL STRIP: NEGATIVE
RBC # BLD AUTO: 3.25 M/UL (ref 4–5.4)
RBC #/AREA URNS AUTO: 3 /HPF (ref 0–4)
RSV AG BY MOLECULAR METHOD: NOT DETECTED
SARS-COV-2 RNA RESP QL NAA+PROBE: DETECTED
SODIUM SERPL-SCNC: 138 MMOL/L (ref 136–145)
SP GR UR STRIP: 1.02 (ref 1–1.03)
SQUAMOUS #/AREA URNS AUTO: 2 /HPF
URN SPEC COLLECT METH UR: ABNORMAL
WBC # BLD AUTO: 19.06 K/UL (ref 3.9–12.7)
WBC #/AREA URNS AUTO: 30 /HPF (ref 0–5)

## 2023-01-01 PROCEDURE — 96365 THER/PROPH/DIAG IV INF INIT: CPT

## 2023-01-01 PROCEDURE — 99284 EMERGENCY DEPT VISIT MOD MDM: CPT | Mod: CR,,, | Performed by: EMERGENCY MEDICINE

## 2023-01-01 PROCEDURE — 96367 TX/PROPH/DG ADDL SEQ IV INF: CPT

## 2023-01-01 PROCEDURE — 63600175 PHARM REV CODE 636 W HCPCS: Performed by: EMERGENCY MEDICINE

## 2023-01-01 PROCEDURE — 83605 ASSAY OF LACTIC ACID: CPT | Performed by: EMERGENCY MEDICINE

## 2023-01-01 PROCEDURE — 93010 EKG 12-LEAD: ICD-10-PCS | Mod: ,,, | Performed by: INTERNAL MEDICINE

## 2023-01-01 PROCEDURE — 96366 THER/PROPH/DIAG IV INF ADDON: CPT

## 2023-01-01 PROCEDURE — 81001 URINALYSIS AUTO W/SCOPE: CPT | Performed by: EMERGENCY MEDICINE

## 2023-01-01 PROCEDURE — 87040 BLOOD CULTURE FOR BACTERIA: CPT | Performed by: EMERGENCY MEDICINE

## 2023-01-01 PROCEDURE — 93010 ELECTROCARDIOGRAM REPORT: CPT | Mod: ,,, | Performed by: INTERNAL MEDICINE

## 2023-01-01 PROCEDURE — 85025 COMPLETE CBC W/AUTO DIFF WBC: CPT | Performed by: EMERGENCY MEDICINE

## 2023-01-01 PROCEDURE — 93005 ELECTROCARDIOGRAM TRACING: CPT

## 2023-01-01 PROCEDURE — 99285 EMERGENCY DEPT VISIT HI MDM: CPT | Mod: 25

## 2023-01-01 PROCEDURE — 83735 ASSAY OF MAGNESIUM: CPT | Performed by: EMERGENCY MEDICINE

## 2023-01-01 PROCEDURE — 0241U SARS-COV2 (COVID) WITH FLU/RSV BY PCR: CPT | Performed by: EMERGENCY MEDICINE

## 2023-01-01 PROCEDURE — 96375 TX/PRO/DX INJ NEW DRUG ADDON: CPT

## 2023-01-01 PROCEDURE — 99284 PR EMERGENCY DEPT VISIT,LEVEL IV: ICD-10-PCS | Mod: CR,,, | Performed by: EMERGENCY MEDICINE

## 2023-01-01 PROCEDURE — 25000003 PHARM REV CODE 250: Performed by: EMERGENCY MEDICINE

## 2023-01-01 PROCEDURE — 80053 COMPREHEN METABOLIC PANEL: CPT | Performed by: EMERGENCY MEDICINE

## 2023-01-01 PROCEDURE — 87086 URINE CULTURE/COLONY COUNT: CPT | Performed by: EMERGENCY MEDICINE

## 2023-01-01 RX ORDER — FLUCONAZOLE 150 MG/1
150 TABLET ORAL DAILY
Qty: 2 TABLET | Refills: 0 | Status: SHIPPED | OUTPATIENT
Start: 2023-01-01 | End: 2023-01-02

## 2023-01-01 RX ORDER — ONDANSETRON 2 MG/ML
4 INJECTION INTRAMUSCULAR; INTRAVENOUS
Status: COMPLETED | OUTPATIENT
Start: 2023-01-01 | End: 2023-01-01

## 2023-01-01 RX ADMIN — CEFEPIME 2 G: 2 INJECTION, POWDER, FOR SOLUTION INTRAVENOUS at 06:01

## 2023-01-01 RX ADMIN — SODIUM CHLORIDE, POTASSIUM CHLORIDE, SODIUM LACTATE AND CALCIUM CHLORIDE 1710 ML: 600; 310; 30; 20 INJECTION, SOLUTION INTRAVENOUS at 06:01

## 2023-01-01 RX ADMIN — VANCOMYCIN HYDROCHLORIDE 2000 MG: 500 INJECTION, POWDER, LYOPHILIZED, FOR SOLUTION INTRAVENOUS at 06:01

## 2023-01-01 RX ADMIN — ONDANSETRON 4 MG: 2 INJECTION INTRAMUSCULAR; INTRAVENOUS at 06:01

## 2023-01-02 LAB — BACTERIA UR CULT: NORMAL

## 2023-01-02 NOTE — CONSULTS
Consult note:    Notified by the ED regarding this patient presenting for fevers, fatigue, and congestion prior to presentation to the ED. Vitally stable in the ED. Patient follows with Dr. Cruz for triple negative breast cancer on neoadjuvant chemotherapy. She had Udenyca on 12/30/22 likely contributing to her leukocytosis. Patient tested positive for COVID-19. UA showing 3+ leukocytes with plans to treat as UTI but with no clear urinary symptoms at this time.   Paxlovid contraindicated due to Doxorubicin. Patient educated by ED staff to monitor for symptom progression including recurrent fever or shortness of breath. Deemed stable to discharge from the ED.       Deo Yoo D.O.  Hematology/Oncology Fellow, PGY-IV

## 2023-01-02 NOTE — ED PROVIDER NOTES
Encounter Date: 2023       History     Chief Complaint   Patient presents with    Fever Post Chemo     Breast CA, chemo last week. 100.9 fever today.      HPI patient is a 37-year-old female with a history of breast cancer, currently undergoing chemotherapy last chemotherapy earlier this week and received a colonies stimulating factor at that same visit presents to the emergency department with concern for fever.  The patient notes she has had approximately 2 days of body aches and fatigue, nausea that is increased in comparison to her previous chemotherapy, no diarrhea, no abdominal pain, no cough, no shortness of breath.  She did note that she had a persistent fever with T-max 100.9°F today and so came to the emergency department.  The patient denies any flank pain, UTI symptoms but does note some vaginal itching and discharge similar to previous vaginal yeast infections and the patient notes she was recently treated with a course of antibiotics.    Review of patient's allergies indicates:   Allergen Reactions    Strawberries [strawberry] Hives     Past Medical History:   Diagnosis Date    Anxiety     Breast cancer     Encounter for blood transfusion     Hypertension     Meningitis      Past Surgical History:   Procedure Laterality Date    BREAST BIOPSY Left      SECTION      COLONOSCOPY N/A 2022    Procedure: COLONOSCOPY;  Surgeon: Davi Chery MD;  Location: [facility] ([facility]);  Service: Endoscopy;  Laterality: N/A;  case request entered from referral - Per DR. KATHLEEN Chery Pt to be scheduled for urgent colonoscopy on 22/ fully vaccinated / prep ins for Sutab on portal - ERW  see micro for negative C-diff- ERW    INSERTION OF TUNNELED CENTRAL VENOUS CATHETER (CVC) WITH SUBCUTANEOUS PORT N/A 2022    Procedure: [id number];  Surgeon: Deo iSn Jr., MD;  Location: [address];  Service: General;  Laterality: N/A;    INSERTION OF TUNNELED CENTRAL VENOUS CATHETER  (CVC) WITH SUBCUTANEOUS PORT N/A 8/1/2022    Procedure: INSERTION, SINGLE LUMEN CATHETER WITH PORT, WITH FLUOROSCOPIC GUIDANCE;  Surgeon: Ryder Dinero MD;  Location: [facility] CATH LAB;  Service: Vascular;  Laterality: N/A;     Family History   Problem Relation Age of Onset    Diabetes Father     Hypertension Father     Pancreatic cancer Maternal Uncle     Bladder Cancer Paternal Grandmother     Colon cancer Neg Hx     Esophageal cancer Neg Hx      Social History     Tobacco Use    Smoking status: Never    Smokeless tobacco: Never   Substance Use Topics    Alcohol use: Not Currently     Comment: social    Drug use: Never     Review of Systems  10 point review of systems reviewed with patient otherwise negative.     Physical Exam     Initial Vitals [01/01/23 1625]   BP Pulse Resp Temp SpO2   132/81 101 18 99.9 °F (37.7 °C) 96 %      MAP       --         Physical Exam  Physical Exam     Nursing note and vitals reviewed.  Constitutional: Patient appears well-developed and well-nourished. No distress.   HENT:   Head: Normocephalic and atraumatic.   Eyes: Conjunctivae and EOM are normal. Pupils are equal, round, and reactive to light.   Neck: Neck supple.   Normal range of motion.  Cardiovascular: Normal rate, regular rhythm, normal heart sounds and intact distal pulses.   Pulmonary/Chest: Breath sounds normal.   Abdominal: Abdomen is soft. Patient exhibits no distension. There is no abdominal tenderness.   :  Vulva with external vaginal exam with thick white vaginal discharge consistent with vulvovaginal candidiasis.  Musculoskeletal:      Cervical back: Normal range of motion and neck supple.   Neurological: Patient is alert and oriented to person, place, and time. No cranial nerve deficit. Gait normal. GCS score is 15.    Skin: Skin is warm and dry.  Psych: Normal mood/affect    ED Course   Procedures  Labs Reviewed   CBC W/ AUTO DIFFERENTIAL - Abnormal; Notable for the following components:       Result Value    WBC  19.06 (*)     RBC 3.25 (*)     Hemoglobin 10.1 (*)     Hematocrit 29.6 (*)     MCH 31.1 (*)     Immature Granulocytes 2.4 (*)     Gran # (ANC) 17.5 (*)     Immature Grans (Abs) 0.45 (*)     Lymph # 0.7 (*)     Gran % 91.9 (*)     Lymph % 3.7 (*)     Mono % 1.9 (*)     All other components within normal limits   COMPREHENSIVE METABOLIC PANEL - Abnormal; Notable for the following components:    Potassium 3.1 (*)     All other components within normal limits   URINALYSIS, REFLEX TO URINE CULTURE - Abnormal; Notable for the following components:    Leukocytes, UA 3+ (*)     All other components within normal limits    Narrative:     Specimen Source->Urine   SARS-COV2 (COVID) WITH FLU/RSV BY PCR - Abnormal; Notable for the following components:    SARS-CoV2 (COVID-19) Qualitative PCR Detected (*)     All other components within normal limits   URINALYSIS MICROSCOPIC - Abnormal; Notable for the following components:    WBC, UA 30 (*)     Bacteria Few (*)     Hyaline Casts, UA 3 (*)     All other components within normal limits    Narrative:     Specimen Source->Urine   CULTURE, BLOOD   CULTURE, BLOOD   CULTURE, URINE   LACTIC ACID, PLASMA   MAGNESIUM   LACTIC ACID, PLASMA          Imaging Results              X-Ray Chest AP Portable (Final result)  Result time 01/01/23 18:23:44      Final result by Jelani Gutierrez MD (01/01/23 18:23:44)                   Impression:      No radiographic acute intrathoracic process seen on this limited single view.      Electronically signed by: Jelani Gutierrez MD  Date:    01/01/2023  Time:    18:23               Narrative:    EXAMINATION:  XR CHEST AP PORTABLE    CLINICAL HISTORY:  Sepsis;    TECHNIQUE:  Single frontal view of the chest was performed.    COMPARISON:  Chest radiograph 09/08/2022    FINDINGS:  Patient is slightly rotated.  Resolution is limited by body habitus with underpenetration.  Artifact external to patient overlies the left axilla and lateral lower left chest limiting  evaluation.    Right upper chest Port-A-Cath appears stable.  Cardiomediastinal silhouette is relatively midline and mildly prominent without evidence of failure, presumably magnified by chest wall and AP portable technique with rotation.  Similar nonspecific elevation of the right hemidiaphragm.  Pulmonary vasculature and hilar contours are within normal limits.  The lungs are well expanded without large consolidation, pleural effusion or pneumothorax definitively seen.  No acute osseous process seen.  PA and lateral views can be obtained.                                       Medications   lactated ringers bolus 1,710 mL (0 mLs Intravenous Stopped 1/1/23 1942)   ondansetron injection 4 mg (4 mg Intravenous Given 1/1/23 1803)   ceFEPIme (MAXIPIME) 2 g in dextrose 5 % in water (D5W) 5 % 50 mL IVPB (MB+) (0 g Intravenous Stopped 1/1/23 1832)   vancomycin 2 g in dextrose 5 % 500 mL IVPB (0 mg Intravenous Stopped 1/1/23 2041)     Patient with a history of breast cancer, currently undergoing chemotherapy who presents to the emergency department with concern for 2 days of myalgias and 1 day of fever.    The patient was noted to be COVID positive in the emergency department with otherwise normal vital signs, no hypoxia, no infiltrate on chest x-ray and patient is tolerating p.o. well in the emergency department.    Patient received an empiric dose of cefepime and a partial empiric dose of vancomycin in the emergency department as a precaution, however vancomycin infiltrated and did not complete the entire infusion, however patient has no clinical evidence to suggest bacteremia, is afebrile in the emergency department, lactate is negative thus bacteremia much less likely and fever is more likely the result of COVID noted on labs today.    Given the patient's immunocompromised status, I spoke with the Oncology fellow who note given the patient has normal vital signs and the patient is well-appearing, it is reasonable to  discharge the patient home.  I discussed this plan with the patient and she feels comfortable and much prefers to return home if possible.    Reviewed the patient's eligibility for Paxlovid and she is not a candidate for Paxlovid given her therapy with doxorubicin and concern for increased risk of neutropenia    Spoke again with oncology fellow to discuss molnupiravir therapy - given this is less likely to be effective and the patient has mild symptoms, recommends against treatment.    Discussed again with patient who feels very comfortable returning home will plan for discharge home with oral Diflucan, urine culture pending given WBCs noted in UA however, patient has no UTI symptoms that I suspect this is more likely the result of her vaginal infection.  Clinical Impression:   Final diagnoses:  [R50.9] Fever  [R50.9] Fever, unspecified fever cause (Primary)  [U07.1] COVID-19  [N89.8] Leukorrhea        ED Disposition Condition    Discharge Stable          ED Prescriptions       Medication Sig Dispense Start Date End Date Auth. Provider    fluconazole (DIFLUCAN) 150 MG Tab Take 1 tablet (150 mg total) by mouth once daily. for 1 day 2 tablet 1/1/2023 1/2/2023 Gauri Monroy MD          Follow-up Information       Follow up With Specialties Details Why Contact Info    Yessy Cruz MD Hematology and Oncology, Hematology Schedule an appointment as soon as possible for a visit   [address] [facility] [address]  [phone]      [facility] - Emergency Dept Emergency Medicine  As needed, If symptoms worsen [address] [facility] [id number] [phone]             Gauri Monroy MD  01/01/23 9724

## 2023-01-02 NOTE — DISCHARGE INSTRUCTIONS
You may take Diflucan, 1 dose which should treat your vaginal yeast infection symptoms.  If you notice your symptoms are not resolved within 72 hours of this 1st dose, you may take a 2nd dose at that time.    Return to the ED immediately for any new chest pain, shortness of breath, severe abdominal pain, abdominal pain that is constant, nausea/and vomiting that does not stop on its own, severe headache, new numbness, tingling, or weakness anywhere, fever greater than 101F or any additional concerns.

## 2023-01-05 ENCOUNTER — TELEPHONE (OUTPATIENT)
Dept: HEMATOLOGY/ONCOLOGY | Facility: CLINIC | Age: 38
End: 2023-01-05
Payer: MEDICAID

## 2023-01-06 LAB
BACTERIA BLD CULT: NORMAL
BACTERIA BLD CULT: NORMAL

## 2023-01-08 ENCOUNTER — PATIENT MESSAGE (OUTPATIENT)
Dept: HEMATOLOGY/ONCOLOGY | Facility: CLINIC | Age: 38
End: 2023-01-08
Payer: MEDICAID

## 2023-01-09 DIAGNOSIS — R21 RASH: Primary | ICD-10-CM

## 2023-01-12 ENCOUNTER — OFFICE VISIT (OUTPATIENT)
Dept: HEMATOLOGY/ONCOLOGY | Facility: CLINIC | Age: 38
End: 2023-01-12
Payer: MEDICAID

## 2023-01-12 ENCOUNTER — TELEPHONE (OUTPATIENT)
Dept: HEMATOLOGY/ONCOLOGY | Facility: CLINIC | Age: 38
End: 2023-01-12

## 2023-01-12 DIAGNOSIS — Z17.1 MALIGNANT NEOPLASM OF UPPER-OUTER QUADRANT OF LEFT BREAST IN FEMALE, ESTROGEN RECEPTOR NEGATIVE: Primary | ICD-10-CM

## 2023-01-12 DIAGNOSIS — C50.412 MALIGNANT NEOPLASM OF UPPER-OUTER QUADRANT OF LEFT BREAST IN FEMALE, ESTROGEN RECEPTOR NEGATIVE: Primary | ICD-10-CM

## 2023-01-12 DIAGNOSIS — D63.0 ANEMIA IN NEOPLASTIC DISEASE: ICD-10-CM

## 2023-01-12 DIAGNOSIS — K85.30 DRUG-INDUCED ACUTE PANCREATITIS WITHOUT INFECTION OR NECROSIS: ICD-10-CM

## 2023-01-12 PROCEDURE — 1160F PR REVIEW ALL MEDS BY PRESCRIBER/CLIN PHARMACIST DOCUMENTED: ICD-10-PCS | Mod: CPTII,95,, | Performed by: INTERNAL MEDICINE

## 2023-01-12 PROCEDURE — 1159F MED LIST DOCD IN RCRD: CPT | Mod: CPTII,95,, | Performed by: INTERNAL MEDICINE

## 2023-01-12 PROCEDURE — 1159F PR MEDICATION LIST DOCUMENTED IN MEDICAL RECORD: ICD-10-PCS | Mod: CPTII,95,, | Performed by: INTERNAL MEDICINE

## 2023-01-12 PROCEDURE — 1160F RVW MEDS BY RX/DR IN RCRD: CPT | Mod: CPTII,95,, | Performed by: INTERNAL MEDICINE

## 2023-01-12 PROCEDURE — 99214 PR OFFICE/OUTPT VISIT, EST, LEVL IV, 30-39 MIN: ICD-10-PCS | Mod: 95,,, | Performed by: INTERNAL MEDICINE

## 2023-01-12 PROCEDURE — 99214 OFFICE O/P EST MOD 30 MIN: CPT | Mod: 95,,, | Performed by: INTERNAL MEDICINE

## 2023-01-12 NOTE — NURSING
Oncology Navigation   Intake  Date of Diagnosis: 03/14/22  Cancer Type: Breast  Internal / External Referral: Internal  Referral Source: Holly Roe  Date of Referral: 03/16/22  Initial Nurse Navigator Contact: 03/16/22  Referral to Initial Contact Timeline (days): 0  Date Worked: 01/12/23  First Appointment Available: 03/21/22  Appointment Date: 03/21/22  First Available Date vs. Scheduled Date (days): 0  Multiple appointments: Yes     Treatment  Current Status: Active    Surgical Oncologist: Dr.Alexa Weiner  Consult Date: 03/21/22    Medical Oncologist: Dr.Zoe Cruz  Consult Date: 03/21/22  Chemotherapy: Discontinued  Chemotherapy Regimen: AC, Taxol, Carbo  Immunotherapy: Discontinued  Immunotherapy Name: Keytruda    Radiation Oncologist: Dr.Angela Bess    Procedures: MRI  Biopsy Schedule Date: 03/14/22  Bone Scan Schedule Date: 03/30/22  CT Schedule Date: 03/30/22  Echo Schedule Date: 03/30/22  Genetic Testing Date Sent: 03/21/22  MRI Schedule Date: 12/08/22  Port / PICC Schedule Date: 04/05/22    General Referrals: Integrative medicine  Social Work: message sent to [place]  [employer] Work Referral Date: 03/22/22    ER: Negative  NC: Negative  Her2: Negative    Radiation Oncologist: Dr.Angela Bess    Support Systems: Family members  Barriers of Care: Financial concerns  Financial Concerns: Financial hardship     Acuity  Stage: 2  Systemic Treatment - predicted or initiated: Chemotherapy Regimen with Multiple drugs (+1)  Treatment Tolerability: 2  Hospitalization Within the Past Month: 1  Navigation Acuity: 5     Follow Up  No follow-ups on file.

## 2023-01-12 NOTE — PROGRESS NOTES
"Subjective:       Patient ID: Laura Kent is a 37 y.o. female.    Chief Complaint: Breast Cancer    HPI    The patient location is: home  The chief complaint leading to consultation is:     Visit type: audiovisual    Face to Face time with patient: 15 minutes  25 minutes of total time spent on the encounter, which includes face to face time and non-face to face time preparing to see the patient (eg, review of tests), Obtaining and/or reviewing separately obtained history, Documenting clinical information in the electronic or other health record, Independently interpreting results (not separately reported) and communicating results to the patient/family/caregiver, or Care coordination (not separately reported).   Each patient to whom he or she provides medical services by telemedicine is:  (1) informed of the relationship between the physician and patient and the respective role of any other health care provider with respect to management of the patient; and (2) notified that he or she may decline to receive medical services by telemedicine and may withdraw from such care at any time.    Notes:   Ms. Laura Kent presents for follow up of TNBC prior to final cycle of neoadjuvant chemotherapy.      In the interval--  - 1/1/2023 ED visit for fever  Wbc was elevated as received Udenyca on 12/30/22   She did test positive for COVID-19. UA showing 3+ leukocytes with plans to treat as UTI but with no clear urinary symptoms at this time.   Paxlovid contraindicated due to Doxorubicin. Abx x 1 with IV Vancomycin and Cefepime in ED- not discharged on antibiotics.  She is feeling better now  Only complaints are diarrhea and notes "greasy"  Some abdominal bloating but not pain    Oncology History:  - self detected an area under her left arm and it initially seemed to go away (noted around winter holidays)  - 3/4/2022 Outside Mammogram:  Findings:  The breasts are heterogeneously dense, which may obscure small masses. "   Left  There is a 31 mm x 18 mm x 21 mm irregularly shaped mass with microlobulated margins seen in the left breast at 2 o'clock in the posterior depth with associated left axillary adenopathy. Largest node depicted on the outside study measures 49 x 23 x 29 mm with cortical thickening and effacement of the hilum. Breast mass is reportedly palpable.   Right  There is no evidence of suspicious masses, calcifications, or other abnormal findings in the right breast.  Impression:  Left  Mass: Left breast 31 mm x 18 mm x 21 mm mass at the posterior 2 o'clock position. Assessment: 5 - Highly suggestive of malignancy. Biopsy is recommended.   Right  There is no mammographic evidence of malignancy in the right breast.  BI-RADS Category:   Overall: 5 - Highly Suggestive of Malignancy  Recommendation:  Ultrasound Guided Core Needle Biopsy of the left breast mass and one of the abnormal left axillary nodes is recommended.     - 3/14/2022 Breast biopsy:  1. LEFT BREAST MASS, 2 O'CLOCK, BIOPSY:   Invasive ductal carcinoma, [place] histologic grade 3 (tubule formation:   3, nuclear pleomorphism:  3, mitotic activity:  3).   Comment:  Infiltrating carcinoma occupies the entirety of biopsied material   with the largest continuous focus measuring 13 mm.  Breast biomarkers are   pending and will be issued in a supplemental report.   2. LEFT AXILLARY LYMPH NODE, BIOPSY:   Invasive ductal carcinoma, [place] histologic grade 3 (tubule formation:   3, nuclear pleomorphism:  3, mitotic activity:  3).   Comment:  Infiltrating carcinoma occupies the entirety of biopsied material   with the largest continuous focus measuring 20 mm.  No lymph node tissue is   identified in the sample.  Tumor histology is essentially identical to that   of part 1.  The findings could represent part of a larger intranodal   metastasis, but an extension from the primary tumor cannot be excluded.   Radiographic correlation is advised.   Note:  Dr. Brown  Jalen also reviewed this case and agrees with the   diagnosis.   BREAST BIOMARKER RESULTS   Estrogen receptor (ER):  Negative (0)   Progesterone receptor (ER):  Negative (0)   HER2 IHC:  Negative (1+)   Ki-67 proliferation index:  80%      - 3/17/2022 Breast MRI:  Findings:  The breasts have heterogeneous fibroglandular tissue. The background parenchymal enhancement is minimal.   Left  There is a 38 mm x 33 mm x 33 mm irregularly shaped mass seen in the left breast at 2 o'clock in the posterior depth, 8.4 cm from the nipple and 1.2 cm from the skin. Associated signal void from a twirl biopsy marker; pathology showed invasive ductal carcinoma.   Posterior to the mass there is abnormal non mass enhancement measuring 29 x 28 mm. The NME is 4 mm from the skin and 2.3 cm from the chest wall. In total the mass and NME measure 54 mm in AP extent.  Overlying skin thickening and edema involving the lateral breast, likely from lymphovascular obstruction. No suspicious skin enhancement. Overall increased vascularity to the left breast.   Four abnormal lymph level 1 and level 2 left axillary lymph nodes. The largest lymph node measures 52 mm x 40 mm x 38 mm which previously underwent biopsy showing metastatic disease. There is an associated radar reflector within the biopsied lymph node.  Right  There is no evidence of suspicious masses, abnormal enhancement, or other abnormal findings in the right breast. No enlarged axillary or internal mammary lymph nodes.   Impression:  Left  Mass: Left breast 38 mm x 33 mm x 33 mm mass at the posterior 2 o'clock position. Assessment: 6 - Known biopsy, proven malignancy. Associated 29 mm NME extending posteriorly from the mass. In total the mass and NME measure 54 mm in AP extent.  Lymph Node: Abnormal level 1 and 2 left axillary lymph nodes, the largest of which measures 52 mm x 40 mm x 38 mm lymph node and is known biopsy, proven malignancy.   Right  There is no MR evidence of  malignancy in the right breast.  BI-RADS Category:   Overall: 6 - Known Biopsy-Proven Malignancy  Recommendation:  Clinical management of known left breast cancer. Patient is established with the breast surgery clinic.      - 3/21/2022 CT C/A/P:  FINDINGS:  Base of Neck: No significant abnormality.  Thoracic soft tissue: A proximally 3.2 x 3.6 x 3.7 cm irregular left breast mass within the lateral aspect with internal biopsy marker, corresponding to findings on prior breast MRI and compatible with malignancy.  Enlarged left axillary lymph nodes, largest measuring approximately 4.2 x 3.6 cm, (series 2, image 27).  CHEST:  -Heart: Normal size. No pericardial effusion.  -Pulmonary vasculature: Pulmonary arteries distribute normally.  There are four pulmonary veins.  -Radha/Mediastinum: No pathologic shelly enlargement.  -Trachea and Proximal airways: Trachea is midline.  Central airways are patent.  No significant bronchial wall thickening or bronchiectasis.  -Lungs/Pleura: Symmetrically expanded without focal consolidation, pneumothorax, or mass.  No pleural effusion or thickening.  -Esophagus: Normal course and caliber.  ABDOMEN:  - Liver: Normal in size and attenuation with no focal hepatic abnormality.  - Gallbladder: No calcified gallstones.  No wall thickening or pericholecystic fluid.  - Bile Ducts: No intra or extrahepatic biliary ductal dilatation.  - Stomach/Duodenum: Small hiatal hernia otherwise unremarkable.  - Spleen: Normal size.  No focal parenchymal abnormality.  - Pancreas: No mass lesion.  No pancreatic ductal dilatation.  No peripancreatic fat stranding.  - Adrenals: Unremarkable.  - Kidneys/ureters/urinary bladder: Normal in size and location.  Normal enhancement pattern.  No nephrolithiasis.  Ureters are normal in course and caliber.  No hydroureteronephrosis.  - Retroperitoneum: Scattered nonenlarged retroperitoneal lymph nodes.  PELVIS:  - Reproductive: Intrauterine device present.  A proximally  2.8 cm peripherally enhancing right ovarian cyst, likely corpus luteal cyst.  - Other: No pelvic adenopathy, free fluid, or mass.  BOWEL/MESENTERY:  No evidence of bowel obstruction or inflammatory process. Appendix is visualized and unremarkable.  VASCULATURE: Left-sided aortic arch with 3 branch vessels.  No aneurysm and no significant atherosclerosis.  Portal vein, splenic vein, and SMV are patent.  BONES: Lucent approximately 0.6 cm right trochanteric lesion, likely synovial herniation pit.  No acute fracture or bony destructive process.  EXTRATHORACIC/EXTRAPERITONEAL SOFT TISSUES: Unremarkable.  Impression:  In this patient with known breast cancer, there is an approximately 3.7 cm irregular left breast mass with left axillary lymphadenopathy.  No evidence of metastatic disease  Small hiatal hernia.  Additional findings as described above.     - 3/21/2022 Bone scan:  FINDINGS:  There is physiologic distribution of the radiopharmaceutical throughout the skeleton.  Mild degenerative uptake within the bilateral shoulders and knees.  There is normal uptake in the genitourinary system and soft tissues.  Impression:  There is no scintigraphic evidence of osteoblastic metastatic disease.     - 3/21/2022 Brain MRI:  FINDINGS:  Please note there is dental metal artifact distorting the examination.  Allowing for artifacts the brain parenchyma is normal in contour.  Developmental variant with cavum septum pellucidum identified.  The ventricles are otherwise normal in size without hydrocephalus.  There is no midline shift or significant mass effect.  The major intracranial T2 flow voids are present.  No restricted diffusion within the non distorted brain parenchyma.  There is severe distortion of the susceptibility imaging no parenchymal susceptibility to suggest parenchymal hemorrhage in the non distorted brain parenchyma.  There is suboptimal evaluation of the cerebellum and posterior fossa.  Impression:  Unremarkable  MRI brain allowing for artifacts from dental metal as detailed above specifically without abnormal parenchymal enhancement to suggest intracranial metastatic disease in light of history.     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 She had some slow improvement over a few days then not much progress. GI consulted and steroids were restarted with good effect. She was placed on an extended steroid taper of steroids starting at 40mg and will complete her last dose of 5mg on 12/6/2022. She is to follow up with oncology for her appointment next week. Given return precautions.     10/20/2022 Ultrasound left breast:  Findings:  At the site of the marker from the prior malignant breast biopsy,there is a 6 mm x 7 mm x 3 mm oval, parallel, hypoechoic mass with indistinct margins seen in the left breast at 2 o'clock in the posterior depth, 8 cm from the nipple. This is difficult to distinguish from the Twirl marker itself.  At the site of the previously biopsied metastatic left axillary node, there is a 10 x 7 x 11 mm node with a Uyen- marker. Node has mild residual cortical thickening (5 mm), but has decreased in size. There are a few other normal size, normal appearing left axillary nodes.   Impression:  Left  Mass: Left breast 6 mm x 7 mm x 3 mm mass at the posterior 2 o'clock position. Assessment: 6 - Known biopsy, proven malignancy. Known, previously biopsied left breast cancer and metastatic left axillary node have both decreased in size. The breast mass is now difficult to distinguish from the biopsy marker.   BI-RADS Category:   Overall: 6 - Known Biopsy-Proven Malignancy  Recommendation:  Patient is already under the care of the Breast Oncology team. Most recent bilateral mammogram was performed in February 2022.      Resumed NA chemo without immunotherapy  2022.      - 2022 Breast MRI:  Findings:  The breasts have heterogeneous fibroglandular tissue. The background parenchymal enhancement is minimal and symmetric. There is no evidence of suspicious masses, abnormal enhancement, or other abnormal findings.  There has been interval decrease in there has been interval resolution of previously described enhancing mass at the 2:00 axis which is a known biopsy-proven malignancy.    There is no abnormality in the right breast.    There has been significant decrease in size of left axillary lymph nodes which all are normal in size on current exam.  The largest contains a signal void consistent with previously placed radar reflector at site of biopsy-proven axillary metastasis.  It measures 8 mm in short axis.  Impression:  Interval resolution of enhancing mass at site of known malignancy in the left breast at 2:00  and marked interval decrease in left axillary adenopathy with visualized lymph nodes normal in size on current exam.  Findings are consistent with complete imaging response to therapy.    BI-RADS Category:   Overall: 6 - Known Biopsy-Proven Malignancy    PMH:  HTN- around pregnancies; off of blood pressure medication x 2 years  CHF- [facility]CHOs at [facility]  Gestational DM  Hyperlipidemia when heavier, managed with diet and followed by cardiology  C- sections x 2  Iron deficiency     GynHx:  Menarche- 7   (16 at 1st pregnancy) (17, 7, 4)  Still with periods - last 2022- last 5-7 days, moderate normal flow  + breast feeding x 1 year  IUD     SH:  Working, , [employer]  Single  Good support system  No tobacco  No EtOH  No illicit drugs    Review of Systems   Constitutional:  Negative for activity change, appetite change, chills, fatigue, fever and unexpected weight change.        See above   HENT:  Negative for mouth sores.    Eyes:  Negative for visual disturbance.   Respiratory:  Negative for cough, shortness of breath and  wheezing.    Cardiovascular:  Negative for chest pain.   Gastrointestinal:  Positive for diarrhea. Negative for abdominal pain and nausea.   Genitourinary:  Negative for frequency.   Musculoskeletal:  Negative for back pain.   Integumentary:  Negative for rash.   Neurological:  Negative for headaches.   Hematological:  Negative for adenopathy.   Psychiatric/Behavioral:  The patient is not nervous/anxious.    All other systems reviewed and are negative.      Objective:      Physical Exam  Very pleasant  Virtual visit  In no acute distress    Assessment:       Problem List Items Addressed This Visit       Malignant neoplasm of upper-outer quadrant of left breast in female, estrogen receptor negative - Primary    Drug-induced acute pancreatitis without infection or necrosis    Anemia in neoplastic disease       Plan:       Breast cancer  Great clinical response to NA chemotherapy and confirmed by recent MRI  Immunotherapy held with pancreatitis will start to wean steroid to 10 mg qd in anticipation of surgery and monitor closely  Anemia stable    Will cancel last NA chemo with continued complications and arrange for surgery follow up    Route Chart for Scheduling    Med Onc Chart Routing      Follow up with physician . 6-8 weeks- needs to see Dr. Weiner in next week or 2and I will message navigation   Follow up with ANDREW . Virtual Mon or Tues to assess steroid taper   Infusion scheduling note    Injection scheduling note    Labs    Imaging    Pharmacy appointment    Other referrals        Treatment Plan Information   OP PEMBROLIZUMAB CARBOPLATIN (AUC 5) WITH WEEKLY PACLITAXEL FOLLOWED BY DOSE DENSE DOXORUBICIN CYCLOPHOSPHAMIDE Q2W   Yessy Cruz MD   Upcoming Treatment Dates - OP PEMBROLIZUMAB CARBOPLATIN (AUC 5) WITH WEEKLY PACLITAXEL FOLLOWED BY DOSE DENSE DOXORUBICIN CYCLOPHOSPHAMIDE Q2W    1/12/2023       Chemotherapy       DOXOrubicin chemo injection 130 mg       cyclophosphamide 600 mg/m2 = 1,300 mg in sodium  chloride 0.9% 250 mL chemo infusion       Antiemetics       aprepitant (CINVANTI) injection 130 mg       palonosetron (ALOXI) 0.25 mg with Dexamethasone (DECADRON) 12 mg in NS 50 mL IVPB    Supportive Plan Information  OP FILGRASTIM 300 MCG   Yessy Cruz MD   Upcoming Treatment Dates - OP FILGRASTIM 300 MCG    6/20/2022       Medications       tbo-filgrastim (GRANIX) injection 480 mcg/0.8 mL  6/21/2022       Medications       tbo-filgrastim (GRANIX) injection 480 mcg/0.8 mL  6/22/2022       Medications       tbo-filgrastim (GRANIX) injection 480 mcg/0.8 mL  6/23/2022       Medications       tbo-filgrastim (GRANIX) injection 480 mcg/0.8 mL    Therapy Plan Information  Flushes  heparin, porcine (PF) 100 unit/mL injection flush 500 Units  500 Units, Intravenous, Every visit  sodium chloride 0.9% flush 10 mL  10 mL, Intravenous, Every visit  heparin, porcine (PF) 100 unit/mL injection flush 500 Units  500 Units, Intravenous, Every visit  sodium chloride 0.9% flush 10 mL  10 mL, Intravenous, Every visit

## 2023-01-12 NOTE — TELEPHONE ENCOUNTER
----- Message from Solange Prieto RN sent at 1/12/2023  1:48 PM CST -----  Can you see if she can come and see Lucie tomorrow and then put her with [facility] on Wednesday at 4:15.  Can you open the 415 spot or do I need to get [place] to open it for you?    ----- Message -----  From: Holly Roberts RN  Sent: 1/12/2023  12:09 PM CST  To: Solange Prieto RN, Jennifer Stafford RN, #    Can I put her in next Wednesday?  Destiny  ----- Message -----  From: Yessy Cruz MD  Sent: 1/12/2023  11:57 AM CST  To: Holly Roberts RN    She will not receive her final cycle of chemo   Can we get her in with Dr. Weiner in next week or two?  She has great response to NA chemo- last AC held- she stopped immunotherapy due to pancreatitis and is weaning off steroid  Thanks

## 2023-01-13 ENCOUNTER — OFFICE VISIT (OUTPATIENT)
Dept: SURGERY | Facility: CLINIC | Age: 38
End: 2023-01-13
Payer: MEDICAID

## 2023-01-13 ENCOUNTER — TELEPHONE (OUTPATIENT)
Dept: PLASTIC SURGERY | Facility: CLINIC | Age: 38
End: 2023-01-13
Payer: MEDICAID

## 2023-01-13 VITALS
HEART RATE: 76 BPM | BODY MASS INDEX: 37.1 KG/M2 | SYSTOLIC BLOOD PRESSURE: 121 MMHG | HEIGHT: 65 IN | WEIGHT: 222.69 LBS | DIASTOLIC BLOOD PRESSURE: 72 MMHG

## 2023-01-13 DIAGNOSIS — C50.412 MALIGNANT NEOPLASM OF UPPER-OUTER QUADRANT OF LEFT BREAST IN FEMALE, ESTROGEN RECEPTOR NEGATIVE: Primary | ICD-10-CM

## 2023-01-13 DIAGNOSIS — Z01.818 PREOP TESTING: ICD-10-CM

## 2023-01-13 DIAGNOSIS — Z17.1 MALIGNANT NEOPLASM OF UPPER-OUTER QUADRANT OF LEFT BREAST IN FEMALE, ESTROGEN RECEPTOR NEGATIVE: Primary | ICD-10-CM

## 2023-01-13 PROCEDURE — 1160F PR REVIEW ALL MEDS BY PRESCRIBER/CLIN PHARMACIST DOCUMENTED: ICD-10-PCS | Mod: CPTII,,, | Performed by: PHYSICIAN ASSISTANT

## 2023-01-13 PROCEDURE — 99215 OFFICE O/P EST HI 40 MIN: CPT | Mod: S$PBB,,, | Performed by: PHYSICIAN ASSISTANT

## 2023-01-13 PROCEDURE — 3078F PR MOST RECENT DIASTOLIC BLOOD PRESSURE < 80 MM HG: ICD-10-PCS | Mod: CPTII,,, | Performed by: PHYSICIAN ASSISTANT

## 2023-01-13 PROCEDURE — 3074F SYST BP LT 130 MM HG: CPT | Mod: CPTII,,, | Performed by: PHYSICIAN ASSISTANT

## 2023-01-13 PROCEDURE — 1160F RVW MEDS BY RX/DR IN RCRD: CPT | Mod: CPTII,,, | Performed by: PHYSICIAN ASSISTANT

## 2023-01-13 PROCEDURE — 3078F DIAST BP <80 MM HG: CPT | Mod: CPTII,,, | Performed by: PHYSICIAN ASSISTANT

## 2023-01-13 PROCEDURE — 3074F PR MOST RECENT SYSTOLIC BLOOD PRESSURE < 130 MM HG: ICD-10-PCS | Mod: CPTII,,, | Performed by: PHYSICIAN ASSISTANT

## 2023-01-13 PROCEDURE — 99999 PR PBB SHADOW E&M-EST. PATIENT-LVL III: ICD-10-PCS | Mod: PBBFAC,,, | Performed by: PHYSICIAN ASSISTANT

## 2023-01-13 PROCEDURE — 99213 OFFICE O/P EST LOW 20 MIN: CPT | Mod: PBBFAC | Performed by: PHYSICIAN ASSISTANT

## 2023-01-13 PROCEDURE — 99999 PR PBB SHADOW E&M-EST. PATIENT-LVL III: CPT | Mod: PBBFAC,,, | Performed by: PHYSICIAN ASSISTANT

## 2023-01-13 PROCEDURE — 1159F PR MEDICATION LIST DOCUMENTED IN MEDICAL RECORD: ICD-10-PCS | Mod: CPTII,,, | Performed by: PHYSICIAN ASSISTANT

## 2023-01-13 PROCEDURE — 99215 PR OFFICE/OUTPT VISIT, EST, LEVL V, 40-54 MIN: ICD-10-PCS | Mod: S$PBB,,, | Performed by: PHYSICIAN ASSISTANT

## 2023-01-13 PROCEDURE — 3008F PR BODY MASS INDEX (BMI) DOCUMENTED: ICD-10-PCS | Mod: CPTII,,, | Performed by: PHYSICIAN ASSISTANT

## 2023-01-13 PROCEDURE — 1159F MED LIST DOCD IN RCRD: CPT | Mod: CPTII,,, | Performed by: PHYSICIAN ASSISTANT

## 2023-01-13 PROCEDURE — 3008F BODY MASS INDEX DOCD: CPT | Mod: CPTII,,, | Performed by: PHYSICIAN ASSISTANT

## 2023-01-13 NOTE — PROGRESS NOTES
Breast Surgery  [facility]  Department of Surgery      REFERRING PROVIDER: No referring provider defined for this encounter.    Chief Complaint: Post-op Evaluation (Post chemo talk )      Subjective:      Patient ID: Laura Kent is a 37 y.o. female who presents with left breast Invasive Ductal Carcinoma.     She presented for diagnostic breast imaging on 02/02/2022 for new breast symptoms. . This identified mass in the left breast and enlarged axillary lymph nodes.  In the left breast 2 o'clock position there is a mass measuring 3.1 x 1.8 x 02.1 cm and in the left axilla there was adenopathy with the largest node measuring 4.9 x 2.3 x 2.9 cm ultrasound-guided core needle biopsies were performed on 03/14/2022 with pathology revealing invasive ductal carcinoma of the breast and lymph node metastasis.  A radar reflector was placed within the biopsied lymph node  Breast MRI was performed on 03/17/2022 which again identified the 3.8 x 3.3 x 3.3 cm mass in the 2 o'clock position of the left breast as well as associated non mass enhancement for total extent of disease of 5.4 cm.  Abnormal lymph nodes were identified in level 1 and level 2 on the left axilla with the largest node measuring 5.2 x 4.0 x 3.8 cm.     Findings at that time were the following:   Lesion 1:    Location:  Left, 02:00 o'clock   Clip:  Twirl, in expected position  Tumor size:  3.8 cm   Tumor thgthrthathdtheth:th th4th Estrogen Receptor:  Negative   Progesterone Receptor:  Negative   Her-2 scarlet:  Negative   Lymph node status:  Positive, biopsy proven, a radar reflector in the biopsied lymph node     Patient has not noted a change on breast exam.  Patient denies nipple discharge. Patient denies previous breast biopsy. Patient denies a personal history of breast cancer. Family history includes maternal uncle with pancreatic cancer and paternal grandmother with bladder cancer.     Interval History 1/13/22:   Patient presents to for post-chemotherapy  surgical discussion. Patient had a complicated chemotherapy course. Chemotherapy was initiated 22, but developed pancreatitis during her Taxol portion. Patient was admitted for this issue in mid-November for 4 days. Started on Immunotherapy with steroid taper. Dr. Cruz opted to hold last cycle of chemotherapy. Last chemotherapy infusion 22. She will finish her steroid taper on 23. Patient states the palpable mass of her breast is no longer there. Otherwise without new complaints such as skin changes or nipple discharge.     GYN History:  Age of menarche was 7. Premenopausal, copper IUD in place.  Patient denies hormonal therapy. Patient is . Age of first live birth was 16. Patient did breast feed.    Past Medical History:   Diagnosis Date    Anxiety     Breast cancer     Encounter for blood transfusion     Hypertension     Meningitis      Past Surgical History:   Procedure Laterality Date    BREAST BIOPSY Left      SECTION      COLONOSCOPY N/A 2022    Procedure: COLONOSCOPY;  Surgeon: Davi Chery MD;  Location: [facility] ([address] [place] [address]);  Service: Endoscopy;  Laterality: N/A;  case request entered from referral - Per DR. KATHLEEN Chery Pt to be scheduled for urgent colonoscopy on 22/ fully vaccinated / prep ins for Sutab on portal - ERW  see micro for negative C-diff- ERW    INSERTION OF TUNNELED CENTRAL VENOUS CATHETER (CVC) WITH SUBCUTANEOUS PORT N/A 2022    Procedure: [id number];  Surgeon: Deo Sin Jr., MD;  Location: [address];  Service: General;  Laterality: N/A;    INSERTION OF TUNNELED CENTRAL VENOUS CATHETER (CVC) WITH SUBCUTANEOUS PORT N/A 2022    Procedure: INSERTION, SINGLE LUMEN CATHETER WITH PORT, WITH FLUOROSCOPIC GUIDANCE;  Surgeon: Ryder Dinero MD;  Location: [facility] CATH LAB;  Service: Vascular;  Laterality: N/A;     Current Outpatient Medications on File Prior to Visit   Medication Sig Dispense Refill    mupirocin (BACTROBAN) 2 %  ointment Apply topically once daily. 30 g 0    pantoprazole (PROTONIX) 40 MG tablet Take 1 tablet (40 mg total) by mouth once daily. 30 tablet 1    prednisoLONE acetate (PRED FORTE) 1 % DrpS Place 1 drop into both eyes 2 (two) times daily.      promethazine (PHENERGAN) 25 MG tablet Take 1 tablet (25 mg total) by mouth every 6 (six) hours as needed for Nausea. 30 tablet 3    venlafaxine (EFFEXOR XR) 37.5 MG 24 hr capsule Take 1 capsule (37.5 mg total) by mouth once daily. 30 capsule 6     No current facility-administered medications on file prior to visit.     Social History     Socioeconomic History    Marital status: Single   Tobacco Use    Smoking status: Never    Smokeless tobacco: Never   Substance and Sexual Activity    Alcohol use: Not Currently     Comment: social    Drug use: Never    Sexual activity: Not Currently     Social Determinants of Health     Financial Resource Strain: Medium Risk    Difficulty of Paying Living Expenses: Somewhat hard   Food Insecurity: No Food Insecurity    Worried About Running Out of Food in the Last Year: Never true    Ran Out of Food in the Last Year: Never true   Transportation Needs: No Transportation Needs    Lack of Transportation (Medical): No    Lack of Transportation (Non-Medical): No   Physical Activity: Insufficiently Active    Days of Exercise per Week: 2 days    Minutes of Exercise per Session: 60 min   Stress: Stress Concern Present    Feeling of Stress : To some extent   Social Connections: Unknown    Frequency of Communication with Friends and Family: More than three times a week    Frequency of Social Gatherings with Friends and Family: Once a week    Active Member of Clubs or Organizations: Yes    Attends Club or Organization Meetings: More than 4 times per year    Marital Status: Never    Housing Stability: High Risk    Unable to Pay for Housing in the Last Year: Yes    Number of Places Lived in the Last Year: 1    Unstable Housing in the Last Year: No  "    Family History   Problem Relation Age of Onset    Diabetes Father     Hypertension Father     Pancreatic cancer Maternal Uncle     Bladder Cancer Paternal Grandmother     Colon cancer Neg Hx     Esophageal cancer Neg Hx         Review of Systems   Constitutional:  Negative for appetite change, chills, fever and unexpected weight change.   HENT:  Negative for facial swelling, postnasal drip and sore throat.    Eyes:  Negative for redness and itching.   Respiratory:  Negative for chest tightness and shortness of breath.    Cardiovascular:  Negative for chest pain and palpitations.   Gastrointestinal:  Negative for blood in stool, diarrhea, nausea and vomiting.   Genitourinary:  Negative for difficulty urinating and dysuria.   Musculoskeletal:  Negative for arthralgias and joint swelling.   Skin:  Negative for rash and wound.   Neurological:  Negative for dizziness and syncope.   Hematological:  Negative for adenopathy.   Psychiatric/Behavioral:  Negative for agitation. The patient is not nervous/anxious.    All other systems reviewed and are negative.  Objective:   /72 (BP Location: Right arm, Patient Position: Sitting, BP Method: Large (Automatic))   Pulse 76   Ht 5' 5" (1.651 m)   Wt 101 kg (222 lb 10.6 oz)   LMP 11/19/2022 (Exact Date)   BMI 37.05 kg/m²     Physical Exam   Vitals reviewed.  Constitutional: She is oriented to person, place, and time. She appears well-developed.   HENT:   Head: Normocephalic and atraumatic.   Nose: Nose normal.   Eyes: Pupils are equal, round, and reactive to light. Right eye exhibits no discharge. Left eye exhibits no discharge.   Cardiovascular:  Normal rate.            Pulmonary/Chest: Effort normal and breath sounds normal. No stridor. No respiratory distress. She exhibits no mass, no tenderness and no edema. Right breast exhibits no inverted nipple, no mass, no nipple discharge, no skin change and no tenderness. Left breast exhibits no inverted nipple, no mass, no " nipple discharge, no skin change and no tenderness. No breast swelling or bleeding. Breasts are symmetrical.       Abdominal: Normal appearance.   Genitourinary: No breast swelling or bleeding.   Lymphadenopathy:     She has no cervical adenopathy.        Right axillary: No pectoral adenopathy present.        Left axillary: Pectoral adenopathy present.        Right: No supraclavicular adenopathy present.        Left: No supraclavicular adenopathy present.   Neurological: She is alert and oriented to person, place, and time.   Skin: Skin is warm and dry. No rash noted. No erythema. No pallor.     Psychiatric: Her behavior is normal. Mood, judgment and thought content normal.     Radiology review: Images personally reviewed by me in the clinic and shown to the patient during the consultation.     Assessment:       1. Malignant neoplasm of upper-outer quadrant of left breast in female, estrogen receptor negative        Plan:      Laura Kent is a 37 y.o. premenopausal female with left breast cancer, clinical stage IIIC ([id number]), estrogen receptor negative, progesterone receptor negative, HER2 negative    Plan:    Options for management were discussed with the patient and her family.  Her appointment today was part of the multidisciplinary clinic with Medical Oncology and Radiation Oncology.    The due to the large size for breast cancer as well as her involved lymph nodes and triple negative receptor profile she is being recommended for neoadjuvant chemotherapy.  This was discussed by medical oncology in detail.  We discussed that potential benefits of giving chemotherapy before surgery would be potentially decrease in the size of the breast cancer as well as decreasing the size of the lymph node involvement.    We discussed that surgical options would include a lumpectomy versus a mastectomy.  We discussed there is no survival benefit to undergoing a mastectomy compared to lumpectomy.  Based on clinical  exam and imaging, she is a borderline candidate for breast conservation.  The mass feels about 5 cm in size and represents about a 4th of her breast.  However she has a large ptotic breasts.   We discussed that depending on her response to chemotherapy she may become a better lumpectomy candidate.  We will plan to reassess her with a breast MRI on completion of chemotherapy.  Follow-up at the end chemotherapy with MRI to discuss surgical planning.  She is currently considering both options for lumpectomy and mastectomy.  We also discussed the results were genetic testing may impact her decision-making process.     We discussed the option for contralateral prophylactic mastectomy.  We discussed the risk of a consult cancer would be dependent on her genetic testing.  We will revisit this conversation at her follow-up appointment.    Reconstruction after mastectomy was discussed.  Reconstructive generally include implant or autologous tissue reconstruction.  I anticipate she will require radiation.  Thus she would be a poor candidate for implant based reconstruction.  She would likely require a tissue expander and the tissue based reconstruction.  She is unsure if she would like to proceed with mastectomy at this time.  We also discussed the option for oncoplastic reduction.  We will defer plastic surgry consultation until her follow-up appointment.      We also discussed axillary surgery.  We discussed that when the lymph nodes are centrally involved before chemotherapy we often have to proceed with an axilla lymph node dissection as we are unable to adequately determine which lymph nodes a sample of surgery.  We are currently planning to proceed with the next lymph node dissection after neoadjuvant chemotherapy.  However we will re-evaluate the chemotherapy response in the lymph nodes on post chemotherapy MRI.  We discussed that axillary lymph node dissection involved removing all the level 1 and 2 axillary lymph  nodes.  This procedure has increased risk of lymphedema upwards of 30%.     MRI performed on 12/8/22 which showed interval resolution of enhancing mass at site of known malignancy in left breast. Left axillary nodes are morphologically normal.  Patient would like to meet with plastic surgery to discuss reconstruction options. She is interested in KANDICE flap. Not amenable to implants.  Scheduled today to see Dr. Enoch Barrios on 1/19/23. Patient will see Dr. Weiner on 1/18/23.     Total time spent with the patient: 40 minutes.  30 minutes of face to face consultation and 10 minutes of chart review and coordination of care.

## 2023-01-18 ENCOUNTER — OFFICE VISIT (OUTPATIENT)
Dept: SURGERY | Facility: CLINIC | Age: 38
End: 2023-01-18
Payer: MEDICAID

## 2023-01-18 ENCOUNTER — TELEPHONE (OUTPATIENT)
Dept: SURGERY | Facility: CLINIC | Age: 38
End: 2023-01-18
Payer: MEDICAID

## 2023-01-18 VITALS
SYSTOLIC BLOOD PRESSURE: 118 MMHG | TEMPERATURE: 99 F | HEIGHT: 65 IN | BODY MASS INDEX: 36.65 KG/M2 | DIASTOLIC BLOOD PRESSURE: 75 MMHG | HEART RATE: 87 BPM | WEIGHT: 220 LBS | OXYGEN SATURATION: 99 %

## 2023-01-18 DIAGNOSIS — Z17.1 MALIGNANT NEOPLASM OF UPPER-OUTER QUADRANT OF LEFT BREAST IN FEMALE, ESTROGEN RECEPTOR NEGATIVE: Primary | ICD-10-CM

## 2023-01-18 DIAGNOSIS — C50.412 MALIGNANT NEOPLASM OF UPPER-OUTER QUADRANT OF LEFT BREAST IN FEMALE, ESTROGEN RECEPTOR NEGATIVE: Primary | ICD-10-CM

## 2023-01-18 PROCEDURE — 3078F DIAST BP <80 MM HG: CPT | Mod: CPTII,,, | Performed by: SURGERY

## 2023-01-18 PROCEDURE — 99214 OFFICE O/P EST MOD 30 MIN: CPT | Mod: S$PBB,,, | Performed by: SURGERY

## 2023-01-18 PROCEDURE — 1160F PR REVIEW ALL MEDS BY PRESCRIBER/CLIN PHARMACIST DOCUMENTED: ICD-10-PCS | Mod: CPTII,,, | Performed by: SURGERY

## 2023-01-18 PROCEDURE — 3074F SYST BP LT 130 MM HG: CPT | Mod: CPTII,,, | Performed by: SURGERY

## 2023-01-18 PROCEDURE — 99999 PR PBB SHADOW E&M-EST. PATIENT-LVL III: CPT | Mod: PBBFAC,,, | Performed by: SURGERY

## 2023-01-18 PROCEDURE — 1159F PR MEDICATION LIST DOCUMENTED IN MEDICAL RECORD: ICD-10-PCS | Mod: CPTII,,, | Performed by: SURGERY

## 2023-01-18 PROCEDURE — 3008F PR BODY MASS INDEX (BMI) DOCUMENTED: ICD-10-PCS | Mod: CPTII,,, | Performed by: SURGERY

## 2023-01-18 PROCEDURE — 99213 OFFICE O/P EST LOW 20 MIN: CPT | Mod: PBBFAC | Performed by: SURGERY

## 2023-01-18 PROCEDURE — 3008F BODY MASS INDEX DOCD: CPT | Mod: CPTII,,, | Performed by: SURGERY

## 2023-01-18 PROCEDURE — 1160F RVW MEDS BY RX/DR IN RCRD: CPT | Mod: CPTII,,, | Performed by: SURGERY

## 2023-01-18 PROCEDURE — 99999 PR PBB SHADOW E&M-EST. PATIENT-LVL III: ICD-10-PCS | Mod: PBBFAC,,, | Performed by: SURGERY

## 2023-01-18 PROCEDURE — 1159F MED LIST DOCD IN RCRD: CPT | Mod: CPTII,,, | Performed by: SURGERY

## 2023-01-18 PROCEDURE — 3078F PR MOST RECENT DIASTOLIC BLOOD PRESSURE < 80 MM HG: ICD-10-PCS | Mod: CPTII,,, | Performed by: SURGERY

## 2023-01-18 PROCEDURE — 3074F PR MOST RECENT SYSTOLIC BLOOD PRESSURE < 130 MM HG: ICD-10-PCS | Mod: CPTII,,, | Performed by: SURGERY

## 2023-01-18 PROCEDURE — 99214 PR OFFICE/OUTPT VISIT, EST, LEVL IV, 30-39 MIN: ICD-10-PCS | Mod: S$PBB,,, | Performed by: SURGERY

## 2023-01-18 NOTE — PROGRESS NOTES
Breast Surgery  [facility]  Department of Surgery      REFERRING PROVIDER: No referring provider defined for this encounter.    Chief Complaint: Follow-up (Post-Chemo )      Subjective:      Patient ID: Laura Kent is a 37 y.o. female who presents with left breast Invasive Ductal Carcinoma.     She presented for diagnostic breast imaging on 02/02/2022 for new breast symptoms. . This identified mass in the left breast and enlarged axillary lymph nodes.  In the left breast 2 o'clock position there is a mass measuring 3.1 x 1.8 x 02.1 cm and in the left axilla there was adenopathy with the largest node measuring 4.9 x 2.3 x 2.9 cm ultrasound-guided core needle biopsies were performed on 03/14/2022 with pathology revealing invasive ductal carcinoma of the breast and lymph node metastasis.  A radar reflector was placed within the biopsied lymph node  Breast MRI was performed on 03/17/2022 which again identified the 3.8 x 3.3 x 3.3 cm mass in the 2 o'clock position of the left breast as well as associated non mass enhancement for total extent of disease of 5.4 cm.  Abnormal lymph nodes were identified in level 1 and level 2 on the left axilla with the largest node measuring 5.2 x 4.0 x 3.8 cm.     Findings at that time were the following:   Lesion 1:    Location:  Left, 02:00 o'clock   Clip:  Twirl, in expected position  Tumor size:  3.8 cm   Tumor thgthrthathdtheth:th th4th Estrogen Receptor:  Negative   Progesterone Receptor:  Negative   Her-2 scarlet:  Negative   Lymph node status:  Positive, biopsy proven, a radar reflector in the biopsied lymph node     Patient has not noted a change on breast exam.  Patient denies nipple discharge. Patient denies previous breast biopsy. Patient denies a personal history of breast cancer. Family history includes maternal uncle with pancreatic cancer and paternal grandmother with bladder cancer.     Interval History 1/13/22:   Patient presents to for post-chemotherapy surgical  discussion. Patient had a complicated chemotherapy course. Chemotherapy was initiated 22, but developed pancreatitis during her Taxol portion. Patient was admitted for this issue in mid-November for 4 days. Started on Immunotherapy with steroid taper. Dr. Cruz opted to hold last cycle of chemotherapy. Last chemotherapy infusion 22. She will finish her steroid taper on 23. Patient states the palpable mass of her breast is no longer there. Otherwise without new complaints such as skin changes or nipple discharge.     Interval History 23:   Patient has been doing well since she stopped chemotherapy (23). She reports stopping her steroids this past Tony 1/15/2023. She has had a complete clinical and imaging response to neoadjuvant. She presents to clinic today to discuss her surgical options. In terms of the breast, she is still unsure of whether she would like a lumpectomy with oncoplastic reduction or bilateral mastectomy with two stage reconstruction. Ideally, she would want surgery after Ernesto Gras due to work, this would put her at 7 weeks post chemotherapy.     GYN History:  Age of menarche was 7. Premenopausal, copper IUD in place.  Patient denies hormonal therapy. Patient is . Age of first live birth was 16. Patient did breast feed.    Past Medical History:   Diagnosis Date    Anxiety     Breast cancer     Encounter for blood transfusion     Hypertension     Meningitis      Past Surgical History:   Procedure Laterality Date    BREAST BIOPSY Left      SECTION      COLONOSCOPY N/A 2022    Procedure: COLONOSCOPY;  Surgeon: Davi Chery MD;  Location: [address];  Service: Endoscopy;  Laterality: N/A;  case request entered from referral - Per DR. KATHLEEN Chery Pt to be scheduled for urgent colonoscopy on 22/ fully vaccinated / prep ins for Sutab on portal - ERW  see micro for negative C-diff- ERW    INSERTION OF TUNNELED CENTRAL VENOUS CATHETER (CVC) WITH  SUBCUTANEOUS PORT N/A 4/5/2022    Procedure: [id number];  Surgeon: Deo Sin Jr., MD;  Location: [facility] OR [address] [place] [address];  Service: General;  Laterality: N/A;    INSERTION OF TUNNELED CENTRAL VENOUS CATHETER (CVC) WITH SUBCUTANEOUS PORT N/A 8/1/2022    Procedure: INSERTION, SINGLE LUMEN CATHETER WITH PORT, WITH FLUOROSCOPIC GUIDANCE;  Surgeon: Ryder Dinero MD;  Location: [facility] CATH LAB;  Service: Vascular;  Laterality: N/A;     Current Outpatient Medications on File Prior to Visit   Medication Sig Dispense Refill    mupirocin (BACTROBAN) 2 % ointment Apply topically once daily. 30 g 0    pantoprazole (PROTONIX) 40 MG tablet Take 1 tablet (40 mg total) by mouth once daily. 30 tablet 1    prednisoLONE acetate (PRED FORTE) 1 % DrpS Place 1 drop into both eyes 2 (two) times daily.      promethazine (PHENERGAN) 25 MG tablet Take 1 tablet (25 mg total) by mouth every 6 (six) hours as needed for Nausea. 30 tablet 3    venlafaxine (EFFEXOR XR) 37.5 MG 24 hr capsule Take 1 capsule (37.5 mg total) by mouth once daily. 30 capsule 6     No current facility-administered medications on file prior to visit.     Social History     Socioeconomic History    Marital status: Single   Tobacco Use    Smoking status: Never    Smokeless tobacco: Never   Substance and Sexual Activity    Alcohol use: Not Currently     Comment: social    Drug use: Never    Sexual activity: Not Currently     Social Determinants of Health     Financial Resource Strain: Medium Risk    Difficulty of Paying Living Expenses: Somewhat hard   Food Insecurity: No Food Insecurity    Worried About Running Out of Food in the Last Year: Never true    Ran Out of Food in the Last Year: Never true   Transportation Needs: No Transportation Needs    Lack of Transportation (Medical): No    Lack of Transportation (Non-Medical): No   Physical Activity: Insufficiently Active    Days of Exercise per Week: 2 days    Minutes of Exercise per Session: 60 min   Stress:  "Stress Concern Present    Feeling of Stress : To some extent   Social Connections: Unknown    Frequency of Communication with Friends and Family: More than three times a week    Frequency of Social Gatherings with Friends and Family: Once a week    Active Member of Clubs or Organizations: Yes    Attends Club or Organization Meetings: More than 4 times per year    Marital Status: Never    Housing Stability: High Risk    Unable to Pay for Housing in the Last Year: Yes    Number of Places Lived in the Last Year: 1    Unstable Housing in the Last Year: No     Family History   Problem Relation Age of Onset    Diabetes Father     Hypertension Father     Pancreatic cancer Maternal Uncle     Bladder Cancer Paternal Grandmother     Colon cancer Neg Hx     Esophageal cancer Neg Hx         Review of Systems   Constitutional:  Negative for appetite change, chills, fever and unexpected weight change.   HENT:  Negative for facial swelling, postnasal drip and sore throat.    Eyes:  Negative for redness and itching.   Respiratory:  Negative for chest tightness and shortness of breath.    Cardiovascular:  Negative for chest pain and palpitations.   Gastrointestinal:  Negative for blood in stool, diarrhea, nausea and vomiting.   Genitourinary:  Negative for difficulty urinating and dysuria.   Musculoskeletal:  Negative for arthralgias and joint swelling.   Skin:  Negative for rash and wound.   Neurological:  Negative for dizziness and syncope.   Hematological:  Negative for adenopathy.   Psychiatric/Behavioral:  Negative for agitation. The patient is not nervous/anxious.    All other systems reviewed and are negative.  Objective:   /75 (BP Location: Right arm, Patient Position: Sitting, BP Method: Large (Automatic))   Pulse 87   Temp 99.2 °F (37.3 °C) (Oral)   Ht 5' 5" (1.651 m)   Wt 99.8 kg (220 lb)   SpO2 99%   BMI 36.61 kg/m²     Physical Exam   Vitals reviewed.  Constitutional: She is oriented to person, place, " and time. She appears well-developed.   HENT:   Head: Normocephalic and atraumatic.   Nose: Nose normal.   Eyes: Pupils are equal, round, and reactive to light. Right eye exhibits no discharge. Left eye exhibits no discharge.   Cardiovascular:  Normal rate.            Pulmonary/Chest: Effort normal and breath sounds normal. No stridor. No respiratory distress. She exhibits no mass, no tenderness and no edema. Right breast exhibits no inverted nipple, no mass, no nipple discharge, no skin change and no tenderness. Left breast exhibits no inverted nipple, no mass, no nipple discharge, no skin change and no tenderness. No breast swelling or bleeding. Breasts are symmetrical.       Abdominal: Normal appearance.   Genitourinary: No breast swelling or bleeding.   Lymphadenopathy:     She has no cervical adenopathy.        Right axillary: No pectoral adenopathy present.        Left axillary: No pectoral adenopathy present.       Right: No supraclavicular adenopathy present.        Left: No supraclavicular adenopathy present.   Neurological: She is alert and oriented to person, place, and time.   Skin: Skin is warm and dry. No rash noted. No erythema. No pallor.     Psychiatric: Her behavior is normal. Mood, judgment and thought content normal.     Radiology review: Images personally reviewed by me in the clinic and shown to the patient during the consultation.     Assessment:       1. Malignant neoplasm of upper-outer quadrant of left breast in female, estrogen receptor negative         Laura Kent is a 37 y.o. premenopausal female with left breast cancer, clinical stage IIIC ([id number]), estrogen receptor negative, progesterone receptor negative, HER2 negative    Plan:      She was cleared for port removal by medical oncology     We discussed the options for surgery in the setting of clinical and radiologic complete response.  We discussed the options for surgery include lumpectomy, lumpectomy with oncoplastic  reduction and mastectomy.  She was initially considering mastectomy with eventual P[place] flap reconstruction.  We discussed that due to her likely need for postmastectomy radiation we would recommend a tissue expander placed at the time of surgery and delay any flap based reconstruction until after radiation.  We also discussed optimal BMI for flap based reconstruction would be a BMI of 35 or less.  She is about 10 lb away from this goal.  We also discussed the option for oncoplastic reduction with lumpectomy.  She desires nipple preservation however due to the large degree of ptosis she is not a great candidate for nipple preservation in a mastectomy.  She is scheduled to meet with Dr. Barrios tomorrow to discuss breast reconstruction options.    We also discussed about lymph node surgery.  She has had a complete response in the lymph node on imaging however she would a large metastatic deposit prior to chemotherapy.  There is a CATINA  in this lymph node instead of a clip.  We discussed that in this setting we usually recommend proceeding with a targeted sentinel lymph node biopsy.  This is removal of the clipped lymph node as well as at least 2 additional sentinel lymph nodes utilizing 2 types of dye.  We would send the lymph nodes for frozen evaluation and only remove the rest of lymph nodes if there is living cancer cells confirmed in the lymph node.  We discussed the risk of needing an axilla lymph node dissection if the lymph nodes were found to have liver cancer cells in them.  We discussed that with an axillary lymph node dissection there is an increased risk of lymphedema and permanent arm swelling.  The risks and benefits of lymph node evaluation discussed in full detail.    We discussed timing of surgery is ideally 4-6 weeks after chemotherapy.  She also has just gotten off steroids.  We discussed the steroids will increase her risk for wound complications.  She desires delaying surgery just a few more  weeks due to gurjit frankel being busy time for her at work as a .  We discussed that delaying surgery is not ideal however may be low risk in her setting as she has had a complete response on imaging.    Plan to proceed with left axillary sentinel lymph node biopsy with targeted dissection of the CATINA  clipped lymph node and possible left axillary lymph node biopsy.  She will plan to proceed with either left mastectomy with tissue expander based reconstruction and eventual flap based reconstruction or a left CATINA localized lumpectomy with an oncoplastic reduction.  She will require radiation therapy after mastectomy or lumpectomy due to the extent of lymph node involvement prior to chemotherapy.  Port removal was confirmed with Medical Oncology.    30 minutes were spent on this encounter, 25 of which was face to face counseling and 5 minutes were spent on chart review and coordination of care.

## 2023-01-18 NOTE — PROGRESS NOTES
Breast Surgery  [facility]  Department of Surgery      REFERRING PROVIDER: No referring provider defined for this encounter.    Chief Complaint: No chief complaint on file.      Subjective:      Patient ID: Laura Kent is a 37 y.o. female who presents with left breast Invasive Ductal Carcinoma.     She presented for diagnostic breast imaging on 02/02/2022 for new breast symptoms. . This identified mass in the left breast and enlarged axillary lymph nodes.  In the left breast 2 o'clock position there is a mass measuring 3.1 x 1.8 x 02.1 cm and in the left axilla there was adenopathy with the largest node measuring 4.9 x 2.3 x 2.9 cm ultrasound-guided core needle biopsies were performed on 03/14/2022 with pathology revealing invasive ductal carcinoma of the breast and lymph node metastasis.  A radar reflector was placed within the biopsied lymph node  Breast MRI was performed on 03/17/2022 which again identified the 3.8 x 3.3 x 3.3 cm mass in the 2 o'clock position of the left breast as well as associated non mass enhancement for total extent of disease of 5.4 cm.  Abnormal lymph nodes were identified in level 1 and level 2 on the left axilla with the largest node measuring 5.2 x 4.0 x 3.8 cm.     Findings at that time were the following:   Lesion 1:    Location:  Left, 02:00 o'clock   Clip:  Twirl, in expected position  Tumor size:  3.8 cm   Tumor ndgndrndanddndend:nd nd2nd Estrogen Receptor:  Negative   Progesterone Receptor:  Negative   Her-2 scarlet:  Negative   Lymph node status:  Positive, biopsy proven, a radar reflector in the biopsied lymph node     Patient has not noted a change on breast exam.  Patient denies nipple discharge. Patient denies previous breast biopsy. Patient denies a personal history of breast cancer. Family history includes maternal uncle with pancreatic cancer and paternal grandmother with bladder cancer.     Interval History 1/13/22:   Patient presents to for post-chemotherapy surgical  discussion. Patient had a complicated chemotherapy course. Chemotherapy was initiated 22, but developed pancreatitis during her Taxol portion. Patient was admitted for this issue in mid-November for 4 days. Started on Immunotherapy with steroid taper. Dr. Cruz opted to hold last cycle of chemotherapy. Last chemotherapy infusion 22. She will finish her steroid taper on 23. Patient states the palpable mass of her breast is no longer there. Otherwise without new complaints such as skin changes or nipple discharge.     Interval History 23:     GYN History:  Age of menarche was 7. Premenopausal, copper IUD in place.  Patient denies hormonal therapy. Patient is . Age of first live birth was 16. Patient did breast feed.    Past Medical History:   Diagnosis Date    Anxiety     Breast cancer     Encounter for blood transfusion     Hypertension     Meningitis      Past Surgical History:   Procedure Laterality Date    BREAST BIOPSY Left      SECTION      COLONOSCOPY N/A 2022    Procedure: COLONOSCOPY;  Surgeon: Davi Chery MD;  Location: [facility] ([facility]);  Service: Endoscopy;  Laterality: N/A;  case request entered from referral - Per DR. KATHLEEN Chery Pt to be scheduled for urgent colonoscopy on 22/ fully vaccinated / prep ins for Sutab on portal - ERW  see micro for negative C-diff- ERW    INSERTION OF TUNNELED CENTRAL VENOUS CATHETER (CVC) WITH SUBCUTANEOUS PORT N/A 2022    Procedure: [id number];  Surgeon: Deo Sin Jr., MD;  Location: [address];  Service: General;  Laterality: N/A;    INSERTION OF TUNNELED CENTRAL VENOUS CATHETER (CVC) WITH SUBCUTANEOUS PORT N/A 2022    Procedure: INSERTION, SINGLE LUMEN CATHETER WITH PORT, WITH FLUOROSCOPIC GUIDANCE;  Surgeon: Ryder Dinero MD;  Location: [facility] CATH LAB;  Service: Vascular;  Laterality: N/A;     Current Outpatient Medications on File Prior to Visit   Medication Sig Dispense Refill     mupirocin (BACTROBAN) 2 % ointment Apply topically once daily. 30 g 0    pantoprazole (PROTONIX) 40 MG tablet Take 1 tablet (40 mg total) by mouth once daily. 30 tablet 1    prednisoLONE acetate (PRED FORTE) 1 % DrpS Place 1 drop into both eyes 2 (two) times daily.      promethazine (PHENERGAN) 25 MG tablet Take 1 tablet (25 mg total) by mouth every 6 (six) hours as needed for Nausea. 30 tablet 3    venlafaxine (EFFEXOR XR) 37.5 MG 24 hr capsule Take 1 capsule (37.5 mg total) by mouth once daily. 30 capsule 6     No current facility-administered medications on file prior to visit.     Social History     Socioeconomic History    Marital status: Single   Tobacco Use    Smoking status: Never    Smokeless tobacco: Never   Substance and Sexual Activity    Alcohol use: Not Currently     Comment: social    Drug use: Never    Sexual activity: Not Currently     Social Determinants of Health     Financial Resource Strain: Medium Risk    Difficulty of Paying Living Expenses: Somewhat hard   Food Insecurity: No Food Insecurity    Worried About Running Out of Food in the Last Year: Never true    Ran Out of Food in the Last Year: Never true   Transportation Needs: No Transportation Needs    Lack of Transportation (Medical): No    Lack of Transportation (Non-Medical): No   Physical Activity: Insufficiently Active    Days of Exercise per Week: 2 days    Minutes of Exercise per Session: 60 min   Stress: Stress Concern Present    Feeling of Stress : To some extent   Social Connections: Unknown    Frequency of Communication with Friends and Family: More than three times a week    Frequency of Social Gatherings with Friends and Family: Once a week    Active Member of Clubs or Organizations: Yes    Attends Club or Organization Meetings: More than 4 times per year    Marital Status: Never    Housing Stability: High Risk    Unable to Pay for Housing in the Last Year: Yes    Number of Places Lived in the  Last Year: 1    Unstable Housing in the Last Year: No     Family History   Problem Relation Age of Onset    Diabetes Father     Hypertension Father     Pancreatic cancer Maternal Uncle     Bladder Cancer Paternal Grandmother     Colon cancer Neg Hx     Esophageal cancer Neg Hx         Review of Systems   Constitutional:  Negative for appetite change, chills, fever and unexpected weight change.   HENT:  Negative for facial swelling, postnasal drip and sore throat.    Eyes:  Negative for redness and itching.   Respiratory:  Negative for chest tightness and shortness of breath.    Cardiovascular:  Negative for chest pain and palpitations.   Gastrointestinal:  Negative for blood in stool, diarrhea, nausea and vomiting.   Genitourinary:  Negative for difficulty urinating and dysuria.   Musculoskeletal:  Negative for arthralgias and joint swelling.   Skin:  Negative for rash and wound.   Neurological:  Negative for dizziness and syncope.   Hematological:  Negative for adenopathy.   Psychiatric/Behavioral:  Negative for agitation. The patient is not nervous/anxious.    All other systems reviewed and are negative.  Objective:   There were no vitals taken for this visit.    Physical Exam   Vitals reviewed.  Constitutional: She is oriented to person, place, and time. She appears well-developed.   HENT:   Head: Normocephalic and atraumatic.   Nose: Nose normal.   Eyes: Pupils are equal, round, and reactive to light. Right eye exhibits no discharge. Left eye exhibits no discharge.   Cardiovascular:  Normal rate.            Pulmonary/Chest: Effort normal and breath sounds normal. No stridor. No respiratory distress. She exhibits no mass, no tenderness and no edema. Right breast exhibits no inverted nipple, no mass, no nipple discharge, no skin change and no tenderness. Left breast exhibits no inverted nipple, no mass, no nipple discharge, no skin change and no tenderness. No breast swelling or bleeding. Breasts are  symmetrical.       Abdominal: Normal appearance.   Genitourinary: No breast swelling or bleeding.   Lymphadenopathy:     She has no cervical adenopathy.        Right axillary: No pectoral adenopathy present.        Left axillary: Pectoral adenopathy present.        Right: No supraclavicular adenopathy present.        Left: No supraclavicular adenopathy present.   Neurological: She is alert and oriented to person, place, and time.   Skin: Skin is warm and dry. No rash noted. No erythema. No pallor.     Psychiatric: Her behavior is normal. Mood, judgment and thought content normal.     Radiology review: Images personally reviewed by me in the clinic and shown to the patient during the consultation.     Assessment:       No diagnosis found.      Plan:      Laura Kent is a 37 y.o. premenopausal female with left breast cancer, clinical stage IIIC ([id number]), estrogen receptor negative, progesterone receptor negative, HER2 negative    Plan:    Options for management were discussed with the patient and her family.  Her appointment today was part of the multidisciplinary clinic with Medical Oncology and Radiation Oncology.    The due to the large size for breast cancer as well as her involved lymph nodes and triple negative receptor profile she is being recommended for neoadjuvant chemotherapy.  This was discussed by medical oncology in detail.  We discussed that potential benefits of giving chemotherapy before surgery would be potentially decrease in the size of the breast cancer as well as decreasing the size of the lymph node involvement.    We discussed that surgical options would include a lumpectomy versus a mastectomy.  We discussed there is no survival benefit to undergoing a mastectomy compared to lumpectomy.  Based on clinical exam and imaging, she is a borderline candidate for breast conservation.  The mass feels about 5 cm in size and represents about a 4th of her breast.  However she has a large  ptotic breasts.   We discussed that depending on her response to chemotherapy she may become a better lumpectomy candidate.  We will plan to reassess her with a breast MRI on completion of chemotherapy.  Follow-up at the end chemotherapy with MRI to discuss surgical planning.  She is currently considering both options for lumpectomy and mastectomy.  We also discussed the results were genetic testing may impact her decision-making process.     We discussed the option for contralateral prophylactic mastectomy.  We discussed the risk of a consult cancer would be dependent on her genetic testing.  We will revisit this conversation at her follow-up appointment.    Reconstruction after mastectomy was discussed.  Reconstructive generally include implant or autologous tissue reconstruction.  I anticipate she will require radiation.  Thus she would be a poor candidate for implant based reconstruction.  She would likely require a tissue expander and the tissue based reconstruction.  She is unsure if she would like to proceed with mastectomy at this time.  We also discussed the option for oncoplastic reduction.  We will defer plastic surgry consultation until her follow-up appointment.      We also discussed axillary surgery.  We discussed that when the lymph nodes are centrally involved before chemotherapy we often have to proceed with an axilla lymph node dissection as we are unable to adequately determine which lymph nodes a sample of surgery.  We are currently planning to proceed with the next lymph node dissection after neoadjuvant chemotherapy.  However we will re-evaluate the chemotherapy response in the lymph nodes on post chemotherapy MRI.  We discussed that axillary lymph node dissection involved removing all the level 1 and 2 axillary lymph nodes.  This procedure has increased risk of lymphedema upwards of 30%.     MRI performed on 12/8/22 which showed interval resolution of enhancing mass at site of known  malignancy in left breast. Left axillary nodes are morphologically normal.  Patient would like to meet with plastic surgery to discuss reconstruction options. She is interested in KANDICE flap. Not amenable to implants.  Scheduled today to see Dr. Enoch Barrios on 1/19/23. Patient will see Dr. Weiner on 1/18/23.     Total time spent with the patient: 40 minutes.  30 minutes of face to face consultation and 10 minutes of chart review and coordination of care.

## 2023-01-19 ENCOUNTER — OFFICE VISIT (OUTPATIENT)
Dept: PLASTIC SURGERY | Facility: CLINIC | Age: 38
End: 2023-01-19
Payer: MEDICAID

## 2023-01-19 VITALS — HEART RATE: 98 BPM | DIASTOLIC BLOOD PRESSURE: 69 MMHG | SYSTOLIC BLOOD PRESSURE: 120 MMHG

## 2023-01-19 DIAGNOSIS — Z17.1 MALIGNANT NEOPLASM OF UPPER-OUTER QUADRANT OF LEFT BREAST IN FEMALE, ESTROGEN RECEPTOR NEGATIVE: Primary | ICD-10-CM

## 2023-01-19 DIAGNOSIS — C50.412 MALIGNANT NEOPLASM OF UPPER-OUTER QUADRANT OF LEFT BREAST IN FEMALE, ESTROGEN RECEPTOR NEGATIVE: Primary | ICD-10-CM

## 2023-01-19 PROCEDURE — 1159F MED LIST DOCD IN RCRD: CPT | Mod: CPTII,,, | Performed by: SURGERY

## 2023-01-19 PROCEDURE — 3078F DIAST BP <80 MM HG: CPT | Mod: CPTII,,, | Performed by: SURGERY

## 2023-01-19 PROCEDURE — 99204 PR OFFICE/OUTPT VISIT, NEW, LEVL IV, 45-59 MIN: ICD-10-PCS | Mod: S$PBB,,, | Performed by: SURGERY

## 2023-01-19 PROCEDURE — 99204 OFFICE O/P NEW MOD 45 MIN: CPT | Mod: S$PBB,,, | Performed by: SURGERY

## 2023-01-19 PROCEDURE — 3074F SYST BP LT 130 MM HG: CPT | Mod: CPTII,,, | Performed by: SURGERY

## 2023-01-19 PROCEDURE — 3074F PR MOST RECENT SYSTOLIC BLOOD PRESSURE < 130 MM HG: ICD-10-PCS | Mod: CPTII,,, | Performed by: SURGERY

## 2023-01-19 PROCEDURE — 99999 PR PBB SHADOW E&M-EST. PATIENT-LVL III: ICD-10-PCS | Mod: PBBFAC,,, | Performed by: SURGERY

## 2023-01-19 PROCEDURE — 1159F PR MEDICATION LIST DOCUMENTED IN MEDICAL RECORD: ICD-10-PCS | Mod: CPTII,,, | Performed by: SURGERY

## 2023-01-19 PROCEDURE — 99213 OFFICE O/P EST LOW 20 MIN: CPT | Mod: PBBFAC | Performed by: SURGERY

## 2023-01-19 PROCEDURE — 3078F PR MOST RECENT DIASTOLIC BLOOD PRESSURE < 80 MM HG: ICD-10-PCS | Mod: CPTII,,, | Performed by: SURGERY

## 2023-01-19 PROCEDURE — 99999 PR PBB SHADOW E&M-EST. PATIENT-LVL III: CPT | Mod: PBBFAC,,, | Performed by: SURGERY

## 2023-01-20 ENCOUNTER — TELEPHONE (OUTPATIENT)
Dept: SURGERY | Facility: CLINIC | Age: 38
End: 2023-01-20
Payer: MEDICAID

## 2023-01-20 NOTE — PROGRESS NOTES
Plastic Surgery History & Physical    SUBJECTIVE:   Chief complaint:  Discuss reconstruction    History of Present Illness:  37 y.o. female with left breast cancer.  This was diagnosed in 2021.  The time of diagnosis in addition near the left breast mass she had a large axillary lymph node.  She states that she initially presented with a axillary mass.  She was treated with neoadjuvant chemotherapy.  This was completed at the end of December in  she is seen Dr. Weiner.  They discussed both mastectomy and lumpectomy with radiation.  She was worried about long-term recurrence    Past medical history includes hypertension.  She denies any other surgical history outside of  in her Port-A-Cath  She is a nonsmoker she does not vape   She works in town as a  and is very busy around [facility].  She has 3 children between 10 and 20 years old    Past Medical History:   Diagnosis Date    Anxiety     Breast cancer     Encounter for blood transfusion     Hypertension     Meningitis        Past Surgical History:   Procedure Laterality Date    BREAST BIOPSY Left      SECTION      COLONOSCOPY N/A 2022    Procedure: COLONOSCOPY;  Surgeon: Davi Chery MD;  Location: [facility] (4TH [facility]);  Service: Endoscopy;  Laterality: N/A;  case request entered from referral - Per DR. KATHLEEN Chery Pt to be scheduled for urgent colonoscopy on 22/ fully vaccinated / prep ins for Sutab on portal - ERW  see micro for negative C-diff- ERW    INSERTION OF TUNNELED CENTRAL VENOUS CATHETER (CVC) WITH SUBCUTANEOUS PORT N/A 2022    Procedure: [id number];  Surgeon: Deo Sin Jr., MD;  Location: [address];  Service: General;  Laterality: N/A;    INSERTION OF TUNNELED CENTRAL VENOUS CATHETER (CVC) WITH SUBCUTANEOUS PORT N/A 2022    Procedure: INSERTION, SINGLE LUMEN CATHETER WITH PORT, WITH FLUOROSCOPIC GUIDANCE;  Surgeon: Ryder Dinero MD;  Location: [facility] CATH LAB;   Service: Vascular;  Laterality: N/A;       Family History   Problem Relation Age of Onset    Diabetes Father     Hypertension Father     Pancreatic cancer Maternal Uncle     Bladder Cancer Paternal Grandmother     Colon cancer Neg Hx     Esophageal cancer Neg Hx        Social History     Socioeconomic History    Marital status: Single   Tobacco Use    Smoking status: Never    Smokeless tobacco: Never   Substance and Sexual Activity    Alcohol use: Not Currently     Comment: social    Drug use: Never    Sexual activity: Not Currently     Social Determinants of Health     Financial Resource Strain: Medium Risk    Difficulty of Paying Living Expenses: Somewhat hard   Food Insecurity: No Food Insecurity    Worried About Running Out of Food in the Last Year: Never true    Ran Out of Food in the Last Year: Never true   Transportation Needs: No Transportation Needs    Lack of Transportation (Medical): No    Lack of Transportation (Non-Medical): No   Physical Activity: Insufficiently Active    Days of Exercise per Week: 2 days    Minutes of Exercise per Session: 60 min   Stress: Stress Concern Present    Feeling of Stress : To some extent   Social Connections: Unknown    Frequency of Communication with Friends and Family: More than three times a week    Frequency of Social Gatherings with Friends and Family: Once a week    Active Member of Clubs or Organizations: Yes    Attends Club or Organization Meetings: More than 4 times per year    Marital Status: Never    Housing Stability: High Risk    Unable to Pay for Housing in the Last Year: Yes    Number of Places Lived in the Last Year: 1    Unstable Housing in the Last Year: No       Current Outpatient Medications   Medication Sig Dispense Refill    mupirocin (BACTROBAN) 2 % ointment Apply topically once daily. 30 g 0    pantoprazole (PROTONIX) 40 MG tablet Take 1 tablet (40 mg total) by mouth once daily. 30 tablet 1    prednisoLONE acetate (PRED FORTE) 1 % DrpS Place  1 drop into both eyes 2 (two) times daily.      promethazine (PHENERGAN) 25 MG tablet Take 1 tablet (25 mg total) by mouth every 6 (six) hours as needed for Nausea. 30 tablet 3     No current facility-administered medications for this visit.       Review of patient's allergies indicates:   Allergen Reactions    Strawberries [strawberry] Hives         Review of Systems:  Recent COVID infection        OBJECTIVE:     /69   Pulse 98       Physical Exam:  Gen: NAD, appears stated age  Neuro: normal without focal findings, mental status and speech normal  HEENT: NCAT, neck supple, PEERL  CV: RRR  Pulm: Breathing non-labored, chest wall movement equal bilaterally   Breast:  Deflated breasts bilaterally with, soft, no masses, no scarring, and pendulous ptotic breasts grade 3 ptosis   Right Left   SN-N 36 36   N-IMF 16 13   N-N 22   BW 15 15       Abdomen: soft, nontender, no guarding adequate lower abdominal tissue excess for bilateral KANDICE flaps  Gu: genitalia not examined  Extremity:normal strength, no cyanosis or edema  Skin: Skin color, texture, turgor normal. No rashes or lesions  Psych: oriented to time, place and person, mood and affect are within normal limits          ASSESSMENT/PLAN:     Malignant neoplasm of upper-outer quadrant of left breast in female, estrogen receptor negative    Began with discussion of implant based reconstruction. This included both direct-to-implant, tissue expander based reconstruction, the possible use of ADM, drains and expected post-operative course.  She was very much against implant based reconstruction    Next we discussed autologous reconstruction with the most common donor sites (abdomen, thigh, back). Explained that this procedure will reconstruction the breast with the patients own living tissue, without the need for an implant, but in exchange for a longer initial procedure and on average three day hospital stay afterwards.  Discussed flap monitoring, risks of take  back/flap failure/ expected hospital course, use of drains, and recovery.  Risks including flap loss, fat necrosis, infection, wound breakdown, seroma, hematoma, scarring, need for reoperation, blood clots were all covered.  Typical revision procedures including lifting the breast, tailoring the skin, liposuction and fat grafting for contour were also discussed.    The we discussed oncoplastic breast reduction  Discussed the procedure in detail including: using a pedicle of breast tissue to support blood supply to the nipple, the use of Wise pattern skin flaps to reduce size and support the pedicle, risks of skin loss, fat necrosis, partial or complete nipple loss, delayed wound healing, normal scarring, outpatient surgery, general recovery and the use of drains. Also discussed that breasts may be bruised or swollen afterwards and won't have their final appearance for 3-6 months after surgery.       The patient is unsure of her ultimate plan.  In short her options are an oncoplastic reduction with postoperative radiotherapy.  Or if she wanted autologous reconstruction because of her excess skin and also likely need for postoperative radiation I would place tissue expanders at the time of skin sparing mastectomy with delayed KANDICE flap reconstruction.  The patient will think about it do the more research and let us know.  She would also like to have surgery after Ernestonaya frankel if  possible.  She will need pre-op photos.      Enoch Barrios, DO  Plastic and Reconstructive Surgery    I spent a total of 50 minutes on the day of the visit.  This includes face to face time and non-face to face time preparing to see the patient (eg, review of tests), obtaining and/or reviewing separately obtained history, documenting clinical information in the electronic or other health record, independently interpreting results and communicating results to the patient/family/caregiver, or care coordinator.

## 2023-01-20 NOTE — ASSESSMENT & PLAN NOTE
Began with discussion of implant based reconstruction. This included both direct-to-implant, tissue expander based reconstruction, the possible use of ADM, drains and expected post-operative course.  She was very much against implant based reconstruction    Next we discussed autologous reconstruction with the most common donor sites (abdomen, thigh, back). Explained that this procedure will reconstruction the breast with the patients own living tissue, without the need for an implant, but in exchange for a longer initial procedure and on average three day hospital stay afterwards.  Discussed flap monitoring, risks of take back/flap failure/ expected hospital course, use of drains, and recovery.  Risks including flap loss, fat necrosis, infection, wound breakdown, seroma, hematoma, scarring, need for reoperation, blood clots were all covered.  Typical revision procedures including lifting the breast, tailoring the skin, liposuction and fat grafting for contour were also discussed.    The we discussed oncoplastic breast reduction  Discussed the procedure in detail including: using a pedicle of breast tissue to support blood supply to the nipple, the use of Wise pattern skin flaps to reduce size and support the pedicle, risks of skin loss, fat necrosis, partial or complete nipple loss, delayed wound healing, normal scarring, outpatient surgery, general recovery and the use of drains. Also discussed that breasts may be bruised or swollen afterwards and won't have their final appearance for 3-6 months after surgery.       The patient is unsure of her ultimate plan.  In short her options are and oncoplastic reduction with postoperative radiotherapy.  Or if she wanted autologous reconstruction because of her excess skin and also likely need for postoperative radiation I would place tissue expanders at the time of skin sparing mastectomy with delayed KANDICE flap reconstruction.  The patient will think about it do the more  research and let us know.  She would also like to have surgery after Ernesto graft possible.  She will need pre-op photos.

## 2023-01-23 ENCOUNTER — TELEPHONE (OUTPATIENT)
Dept: SURGERY | Facility: CLINIC | Age: 38
End: 2023-01-23
Payer: MEDICAID

## 2023-01-25 ENCOUNTER — PATIENT MESSAGE (OUTPATIENT)
Dept: HEMATOLOGY/ONCOLOGY | Facility: CLINIC | Age: 38
End: 2023-01-25
Payer: MEDICAID

## 2023-01-31 NOTE — PROGRESS NOTES
Subjective:   Patient ID:  Laura Kent is a 37 y.o. female who presents for evaluation of Dizziness (When moving around) and Hypertension    PROBLEM LIST:  Breast cancer, left  (OP PEMBROLIZUMAB CARBOPLATIN (AUC 5) WITH WEEKLY PACLITAXEL FOLLOWED BY DOSE DENSE DOXORUBICIN CYCLOPHOSPHAMIDE Q2W completed )  HTN    HPI: Referred by Dr. Cruz to establish with Cardio-Oncology clinic. She was diagnosed with left breast cancer 3/22. She recently completed NACT with ddAC in December. She has received one dose of pembrolizumab in September but developed pancreatitis and it was discontinued. She remained on steroid treatment until recently. She reports a history of HTN and states she was previously on 9 different BP medications. She has lost about 60 lbs and is no longer requiring medication. She checks her BP at home and reports readings of 120-130/60-70. She has had three pregnancies and reports Pre-eclampsia with the 1st, HELLP syndrome with the 2nd and HTN and gestational DM with the third.  She currently reports coughing, sneezing a burning in her chest when she sneezes. No fevers/ chills. Had covid a few months ago. No sick contacts at home. She is scheduled for bilateral mastectomies later this month. She does not eat red meat or dairy products.    EC2023 19:18:40: Personally reviewed by me shows NSR with QTc 445 ms    Echo :  Summary    The left ventricle is normal in size with concentric remodeling and normal systolic function.  The visually estimated ejection fraction is 63% ( 60-65%).  The quantitatively derived ejection fraction is 55%( visually looks bettter than this).  The left ventricular global longitudinal strain is -17.5%.  Normal left ventricular diastolic function.  Normal right ventricular size with normal right ventricular systolic function.  Mild right atrial enlargement.  Normal central venous pressure (3 mmHg).  The estimated PA systolic pressure is 25  mmHg.  Trivial posterior pericardial effusion. Just at base and trivial-small under the RA.    Echo 3/22:  Summary    Moderate left atrial enlargement.  The left ventricle is mildly enlarged with normal systolic function.  The estimated ejection fraction is 60%.  The left ventricular global longitudinal strain is -17%.  Normal left ventricular diastolic function.  Normal right ventricular size with normal right ventricular systolic function.  Mild mitral regurgitation.  Normal central venous pressure (3 mmHg).  The estimated PA systolic pressure is 18 mmHg.    Past Medical History:   Diagnosis Date    Anxiety     Breast cancer     Encounter for blood transfusion     Hypertension     Meningitis     Severe obesity 2023       Past Surgical History:   Procedure Laterality Date    BREAST BIOPSY Left      SECTION      COLONOSCOPY N/A 2022    Procedure: COLONOSCOPY;  Surgeon: Davi Chery MD;  Location: [facility] (4TH FLR);  Service: Endoscopy;  Laterality: N/A;  case request entered from referral - Per DR. KATHLEEN Chery Pt to be scheduled for urgent colonoscopy on 22/ fully vaccinated / prep ins for Sutab on portal - ERW  see micro for negative C-diff- ERW    INSERTION OF TUNNELED CENTRAL VENOUS CATHETER (CVC) WITH SUBCUTANEOUS PORT N/A 2022    Procedure: [id number];  Surgeon: Deo Sin Jr., MD;  [facility];  Service: General;  Laterality: N/A;    INSERTION OF TUNNELED CENTRAL VENOUS CATHETER (CVC) WITH SUBCUTANEOUS PORT N/A 2022    Procedure: INSERTION, SINGLE LUMEN CATHETER WITH PORT, WITH FLUOROSCOPIC GUIDANCE;  Surgeon: Ryder Dinero MD;  Location: [facility] CATH LAB;  Service: Vascular;  Laterality: N/A;       Social History     Socioeconomic History    Marital status: Single   Tobacco Use    Smoking status: Never    Smokeless tobacco: Never   Substance and Sexual Activity    Alcohol use: Not Currently     Comment: social    Drug use: Never    Sexual  activity: Not Currently     Social Determinants of Health     Financial Resource Strain: Medium Risk    Difficulty of Paying Living Expenses: Somewhat hard   Food Insecurity: No Food Insecurity    Worried About Running Out of Food in the Last Year: Never true    Ran Out of Food in the Last Year: Never true   Transportation Needs: No Transportation Needs    Lack of Transportation (Medical): No    Lack of Transportation (Non-Medical): No   Physical Activity: Insufficiently Active    Days of Exercise per Week: 2 days    Minutes of Exercise per Session: 60 min   Stress: Stress Concern Present    Feeling of Stress : To some extent   Social Connections: Unknown    Frequency of Communication with Friends and Family: More than three times a week    Frequency of Social Gatherings with Friends and Family: Once a week    Active Member of Clubs or Organizations: Yes    Attends Club or Organization Meetings: More than 4 times per year    Marital Status: Never    Housing Stability: High Risk    Unable to Pay for Housing in the Last Year: Yes    Number of Places Lived in the Last Year: 1    Unstable Housing in the Last Year: No       Family History   Problem Relation Age of Onset    Hypertension Mother     Diabetes Father     Hypertension Father     Pancreatic cancer Maternal Uncle     Bladder Cancer Paternal Grandmother     Colon cancer Neg Hx     Esophageal cancer Neg Hx        Patient's Medications   New Prescriptions    No medications on file   Previous Medications    CETIRIZINE (ZYRTEC) 10 MG TABLET    Take 10 mg by mouth.    CLOTRIMAZOLE (LOTRIMIN) 1 % CREAM    Apply 1 application topically every evening.    HYDROXYZINE HCL (ATARAX) 10 MG TAB    Take 10 mg by mouth nightly as needed.    MUPIROCIN (BACTROBAN) 2 % OINTMENT    Apply topically once daily.    PANTOPRAZOLE (PROTONIX) 40 MG TABLET    Take 1 tablet (40 mg total) by mouth once daily.    PREDNISOLONE ACETATE (PRED FORTE) 1 % DRPS    Place 1 drop into both eyes  "2 (two) times daily.    PROMETHAZINE (PHENERGAN) 25 MG TABLET    Take 1 tablet (25 mg total) by mouth every 6 (six) hours as needed for Nausea.    TRIAMCINOLONE ACETONIDE 0.1% (KENALOG) 0.1 % CREAM    Apply 1 g topically 2 (two) times daily.   Modified Medications    No medications on file   Discontinued Medications    No medications on file       Review of Systems   Constitutional: Negative for malaise/fatigue and weight gain.   HENT:  Negative for hearing loss.    Eyes:  Negative for visual disturbance.   Cardiovascular:  Negative for chest pain, claudication, dyspnea on exertion, leg swelling, near-syncope, orthopnea, palpitations, paroxysmal nocturnal dyspnea and syncope.   Respiratory:  Positive for cough. Negative for shortness of breath, sleep disturbances due to breathing, snoring and wheezing.    Endocrine: Negative for cold intolerance, heat intolerance, polydipsia, polyphagia and polyuria.   Hematologic/Lymphatic: Negative for bleeding problem. Does not bruise/bleed easily.   Skin:  Negative for rash and suspicious lesions.   Musculoskeletal:  Negative for arthritis, falls, joint pain, muscle weakness and myalgias.   Gastrointestinal:  Negative for abdominal pain, change in bowel habit, constipation, diarrhea, heartburn, hematochezia, melena and nausea.   Genitourinary:  Negative for hematuria and nocturia.   Neurological:  Negative for excessive daytime sleepiness, dizziness, headaches, light-headedness, loss of balance and weakness.   Psychiatric/Behavioral:  Negative for depression. The patient is not nervous/anxious.    Allergic/Immunologic: Negative for environmental allergies.     /79   Pulse 86   Ht 5' 5" (1.651 m)   Wt 101.4 kg (223 lb 8.7 oz)   SpO2 100%   BMI 37.20 kg/m²     Objective:   Physical Exam  Constitutional:       Appearance: She is well-developed. She is obese.      Comments: She became short of breath getting up onto the exam table.   HENT:      Head: Normocephalic and " atraumatic.   Eyes:      General: No scleral icterus.     Conjunctiva/sclera: Conjunctivae normal.      Pupils: Pupils are equal, round, and reactive to light.   Neck:      Thyroid: No thyromegaly.      Vascular: No hepatojugular reflux or JVD.      Trachea: No tracheal deviation.   Cardiovascular:      Rate and Rhythm: Normal rate and regular rhythm.      Chest Wall: PMI is not displaced.      Pulses: Intact distal pulses.           Carotid pulses are 2+ on the right side and 2+ on the left side.       Radial pulses are 2+ on the right side and 2+ on the left side.        Dorsalis pedis pulses are 2+ on the right side and 2+ on the left side.        Posterior tibial pulses are 2+ on the right side and 2+ on the left side.      Heart sounds: Normal heart sounds.   Pulmonary:      Effort: Pulmonary effort is normal.      Breath sounds: Normal breath sounds.   Abdominal:      General: Bowel sounds are normal. There is no distension.      Palpations: Abdomen is soft. There is no mass.      Tenderness: There is no abdominal tenderness.   Musculoskeletal:         General: No tenderness.      Cervical back: Normal range of motion and neck supple.   Lymphadenopathy:      Cervical: No cervical adenopathy.   Skin:     General: Skin is warm and dry.      Findings: No erythema or rash.      Nails: There is no clubbing.   Neurological:      Mental Status: She is alert and oriented to person, place, and time.   Psychiatric:         Speech: Speech normal.         Behavior: Behavior normal.       Lab Results   Component Value Date     01/01/2023    K 3.1 (L) 01/01/2023     01/01/2023    CO2 27 01/01/2023    BUN 14 01/01/2023    CREATININE 0.8 01/01/2023    GLU 99 01/01/2023    MG 1.8 01/01/2023    AST 13 01/01/2023    ALT 11 01/01/2023    ALBUMIN 3.9 01/01/2023    PROT 6.4 01/01/2023    BILITOT 0.5 01/01/2023    WBC 19.06 (H) 01/01/2023    HGB 10.1 (L) 01/01/2023    HCT 29.6 (L) 01/01/2023    HCT 30 (L) 09/03/2022     MCV 91 01/01/2023     01/01/2023    TSH 1.489 08/29/2022    CHOL 189 09/04/2022    HDL 30 (L) 09/04/2022    LDLCALC 143.4 09/04/2022    TRIG 78 09/04/2022       Assessment:     1. Malignant neoplasm of upper-outer quadrant of left breast in female, estrogen receptor negative : She recently completed NACT with ddAC. Follow up echo was stable. Biomarkers were not done. We discussed that a follow up echo is recommended in 6 months.   2. Primary hypertension : Her BP has been normal since her weight loss. Potassium was low on recent labs. Will recheck.   3. Severe obesity : She has lost 60 lbs. Following a heart health diet such as the Mediterranean Diet is recommended in addition to a graded exercise program to lower cholesterol, maintain a healthy body weight, and improve overall cardiovascular health.     4.      Shortness of breath: She does not appear volume overloaded. Will check BNP.    Plan:     Laura was seen today for dizziness and hypertension.    Diagnoses and all orders for this visit:    Malignant neoplasm of upper-outer quadrant of left breast in female, estrogen receptor negative  -     Lipid Panel; Future  -     Basic Metabolic Panel; Future  -     Hemoglobin A1C; Future  -     BNP; Future  -     Echo; Future    Primary hypertension  -     Lipid Panel; Future  -     Basic Metabolic Panel; Future  -     Hemoglobin A1C; Future  -     BNP; Future  -     Echo; Future    Severe obesity        Thank you for allowing me to participate in this patient's care. Please do not hesitate to contact me with any questions or concerns.

## 2023-02-02 ENCOUNTER — OFFICE VISIT (OUTPATIENT)
Dept: CARDIOLOGY | Facility: CLINIC | Age: 38
End: 2023-02-02
Payer: MEDICAID

## 2023-02-02 VITALS
HEIGHT: 65 IN | HEART RATE: 86 BPM | BODY MASS INDEX: 37.25 KG/M2 | WEIGHT: 223.56 LBS | OXYGEN SATURATION: 100 % | DIASTOLIC BLOOD PRESSURE: 79 MMHG | SYSTOLIC BLOOD PRESSURE: 135 MMHG

## 2023-02-02 DIAGNOSIS — Z17.1 MALIGNANT NEOPLASM OF UPPER-OUTER QUADRANT OF LEFT BREAST IN FEMALE, ESTROGEN RECEPTOR NEGATIVE: Primary | ICD-10-CM

## 2023-02-02 DIAGNOSIS — E66.01 SEVERE OBESITY: ICD-10-CM

## 2023-02-02 DIAGNOSIS — I31.39 PERICARDIAL EFFUSION: ICD-10-CM

## 2023-02-02 DIAGNOSIS — C50.412 MALIGNANT NEOPLASM OF UPPER-OUTER QUADRANT OF LEFT BREAST IN FEMALE, ESTROGEN RECEPTOR NEGATIVE: Primary | ICD-10-CM

## 2023-02-02 DIAGNOSIS — R06.02 SHORTNESS OF BREATH: ICD-10-CM

## 2023-02-02 DIAGNOSIS — I10 PRIMARY HYPERTENSION: ICD-10-CM

## 2023-02-02 DIAGNOSIS — C50.912 LEFT BREAST CANCER WITH T3 TUMOR, >5 CM IN GREATEST DIMENSION: ICD-10-CM

## 2023-02-02 PROCEDURE — 99204 OFFICE O/P NEW MOD 45 MIN: CPT | Mod: S$PBB,,, | Performed by: INTERNAL MEDICINE

## 2023-02-02 PROCEDURE — 3075F SYST BP GE 130 - 139MM HG: CPT | Mod: CPTII,,, | Performed by: INTERNAL MEDICINE

## 2023-02-02 PROCEDURE — 99204 PR OFFICE/OUTPT VISIT, NEW, LEVL IV, 45-59 MIN: ICD-10-PCS | Mod: S$PBB,,, | Performed by: INTERNAL MEDICINE

## 2023-02-02 PROCEDURE — 3078F DIAST BP <80 MM HG: CPT | Mod: CPTII,,, | Performed by: INTERNAL MEDICINE

## 2023-02-02 PROCEDURE — 99999 PR PBB SHADOW E&M-EST. PATIENT-LVL IV: CPT | Mod: PBBFAC,,, | Performed by: INTERNAL MEDICINE

## 2023-02-02 PROCEDURE — 1159F MED LIST DOCD IN RCRD: CPT | Mod: CPTII,,, | Performed by: INTERNAL MEDICINE

## 2023-02-02 PROCEDURE — 3078F PR MOST RECENT DIASTOLIC BLOOD PRESSURE < 80 MM HG: ICD-10-PCS | Mod: CPTII,,, | Performed by: INTERNAL MEDICINE

## 2023-02-02 PROCEDURE — 99214 OFFICE O/P EST MOD 30 MIN: CPT | Mod: PBBFAC | Performed by: INTERNAL MEDICINE

## 2023-02-02 PROCEDURE — 3008F PR BODY MASS INDEX (BMI) DOCUMENTED: ICD-10-PCS | Mod: CPTII,,, | Performed by: INTERNAL MEDICINE

## 2023-02-02 PROCEDURE — 3008F BODY MASS INDEX DOCD: CPT | Mod: CPTII,,, | Performed by: INTERNAL MEDICINE

## 2023-02-02 PROCEDURE — 3075F PR MOST RECENT SYSTOLIC BLOOD PRESS GE 130-139MM HG: ICD-10-PCS | Mod: CPTII,,, | Performed by: INTERNAL MEDICINE

## 2023-02-02 PROCEDURE — 1159F PR MEDICATION LIST DOCUMENTED IN MEDICAL RECORD: ICD-10-PCS | Mod: CPTII,,, | Performed by: INTERNAL MEDICINE

## 2023-02-02 PROCEDURE — 99999 PR PBB SHADOW E&M-EST. PATIENT-LVL IV: ICD-10-PCS | Mod: PBBFAC,,, | Performed by: INTERNAL MEDICINE

## 2023-02-02 RX ORDER — CLOTRIMAZOLE 1 %
1 CREAM (GRAM) TOPICAL NIGHTLY
COMMUNITY
Start: 2023-01-25

## 2023-02-02 RX ORDER — CETIRIZINE HYDROCHLORIDE 10 MG/1
10 TABLET ORAL
COMMUNITY
Start: 2023-01-25 | End: 2023-02-13 | Stop reason: CLARIF

## 2023-02-02 RX ORDER — HYDROXYZINE HYDROCHLORIDE 10 MG/1
10 TABLET, FILM COATED ORAL NIGHTLY PRN
COMMUNITY
Start: 2023-01-25 | End: 2024-04-03 | Stop reason: HOSPADM

## 2023-02-02 RX ORDER — TRIAMCINOLONE ACETONIDE 1 MG/G
1 CREAM TOPICAL 2 TIMES DAILY
COMMUNITY
Start: 2023-01-25 | End: 2023-09-20

## 2023-02-06 ENCOUNTER — LAB VISIT (OUTPATIENT)
Dept: LAB | Facility: HOSPITAL | Age: 38
End: 2023-02-06
Attending: INTERNAL MEDICINE
Payer: MEDICAID

## 2023-02-06 DIAGNOSIS — I10 PRIMARY HYPERTENSION: ICD-10-CM

## 2023-02-06 DIAGNOSIS — Z17.1 MALIGNANT NEOPLASM OF UPPER-OUTER QUADRANT OF LEFT BREAST IN FEMALE, ESTROGEN RECEPTOR NEGATIVE: ICD-10-CM

## 2023-02-06 DIAGNOSIS — C50.412 MALIGNANT NEOPLASM OF UPPER-OUTER QUADRANT OF LEFT BREAST IN FEMALE, ESTROGEN RECEPTOR NEGATIVE: ICD-10-CM

## 2023-02-06 LAB
ANION GAP SERPL CALC-SCNC: 11 MMOL/L (ref 8–16)
BUN SERPL-MCNC: 9 MG/DL (ref 6–20)
CALCIUM SERPL-MCNC: 9.9 MG/DL (ref 8.7–10.5)
CHLORIDE SERPL-SCNC: 106 MMOL/L (ref 95–110)
CHOLEST SERPL-MCNC: 162 MG/DL (ref 120–199)
CHOLEST/HDLC SERPL: 5.1 {RATIO} (ref 2–5)
CO2 SERPL-SCNC: 23 MMOL/L (ref 23–29)
CREAT SERPL-MCNC: 0.8 MG/DL (ref 0.5–1.4)
EST. GFR  (NO RACE VARIABLE): >60 ML/MIN/1.73 M^2
ESTIMATED AVG GLUCOSE: 114 MG/DL (ref 68–131)
GLUCOSE SERPL-MCNC: 111 MG/DL (ref 70–110)
HBA1C MFR BLD: 5.6 % (ref 4–5.6)
HDLC SERPL-MCNC: 32 MG/DL (ref 40–75)
HDLC SERPL: 19.8 % (ref 20–50)
LDLC SERPL CALC-MCNC: 109.4 MG/DL (ref 63–159)
NONHDLC SERPL-MCNC: 130 MG/DL
POTASSIUM SERPL-SCNC: 3.6 MMOL/L (ref 3.5–5.1)
SODIUM SERPL-SCNC: 140 MMOL/L (ref 136–145)
TRIGL SERPL-MCNC: 103 MG/DL (ref 30–150)

## 2023-02-06 PROCEDURE — 83036 HEMOGLOBIN GLYCOSYLATED A1C: CPT | Performed by: INTERNAL MEDICINE

## 2023-02-06 PROCEDURE — 80048 BASIC METABOLIC PNL TOTAL CA: CPT | Performed by: INTERNAL MEDICINE

## 2023-02-06 PROCEDURE — 83880 ASSAY OF NATRIURETIC PEPTIDE: CPT | Performed by: INTERNAL MEDICINE

## 2023-02-06 PROCEDURE — 36415 COLL VENOUS BLD VENIPUNCTURE: CPT | Performed by: INTERNAL MEDICINE

## 2023-02-06 PROCEDURE — 80061 LIPID PANEL: CPT | Performed by: INTERNAL MEDICINE

## 2023-02-07 LAB — BNP SERPL-MCNC: 21 PG/ML (ref 0–99)

## 2023-02-13 ENCOUNTER — HOSPITAL ENCOUNTER (OUTPATIENT)
Dept: PREADMISSION TESTING | Facility: OTHER | Age: 38
Discharge: HOME | End: 2023-02-13
Attending: SURGERY
Payer: MEDICAID

## 2023-02-13 ENCOUNTER — ANESTHESIA EVENT (OUTPATIENT)
Dept: SURGERY | Facility: OTHER | Age: 38
End: 2023-02-13
Payer: MEDICAID

## 2023-02-13 VITALS
HEART RATE: 73 BPM | HEIGHT: 65 IN | SYSTOLIC BLOOD PRESSURE: 131 MMHG | RESPIRATION RATE: 18 BRPM | BODY MASS INDEX: 35.99 KG/M2 | WEIGHT: 216 LBS | DIASTOLIC BLOOD PRESSURE: 82 MMHG | OXYGEN SATURATION: 100 % | TEMPERATURE: 98 F

## 2023-02-13 RX ORDER — CETIRIZINE HYDROCHLORIDE 10 MG/1
10 TABLET ORAL DAILY
COMMUNITY

## 2023-02-13 RX ORDER — SODIUM CHLORIDE, SODIUM LACTATE, POTASSIUM CHLORIDE, CALCIUM CHLORIDE 600; 310; 30; 20 MG/100ML; MG/100ML; MG/100ML; MG/100ML
INJECTION, SOLUTION INTRAVENOUS CONTINUOUS
Status: CANCELLED | OUTPATIENT
Start: 2023-02-13

## 2023-02-13 RX ORDER — ACETAMINOPHEN 500 MG
1000 TABLET ORAL
Status: CANCELLED | OUTPATIENT
Start: 2023-02-13 | End: 2023-02-13

## 2023-02-13 RX ORDER — PREGABALIN 75 MG/1
75 CAPSULE ORAL ONCE
Status: CANCELLED | OUTPATIENT
Start: 2023-02-13 | End: 2023-02-13

## 2023-02-13 RX ORDER — LIDOCAINE HYDROCHLORIDE 10 MG/ML
0.5 INJECTION, SOLUTION EPIDURAL; INFILTRATION; INTRACAUDAL; PERINEURAL ONCE
Status: CANCELLED | OUTPATIENT
Start: 2023-02-13 | End: 2023-02-13

## 2023-02-13 NOTE — ANESTHESIA PREPROCEDURE EVALUATION
02/13/2023  Laura Kent is a 37 y.o., female.      Pre-op Assessment    I have reviewed the Patient Summary Reports.     I have reviewed the Nursing Notes. I have reviewed the NPO Status.   I have reviewed the Medications.     Review of Systems  Anesthesia Hx:  No problems with previous Anesthesia  Denies Family Hx of Anesthesia complications.   Denies Personal Hx of Anesthesia complications.   Social:  Non-Smoker    Hematology/Oncology:  Hematology Normal      Current/Recent Cancer. Breast left   EENT/Dental:EENT/Dental Normal   Cardiovascular:   Hypertension Summary    ? The left ventricle is normal in size with concentric remodeling and normal systolic function.  ? The visually estimated ejection fraction is 63% ( 60-65%).  ? The quantitatively derived ejection fraction is 55%( visually looks bettter than this).  ? The left ventricular global longitudinal strain is -17.5%.  ? Normal left ventricular diastolic function.  ? Normal right ventricular size with normal right ventricular systolic function.  ? Mild right atrial enlargement.  ? Normal central venous pressure (3 mmHg).  ? The estimated PA systolic pressure is 25 mmHg.  ? Trivial posterior pericardial effusion. Just at base and trivial-small under the RA.      Pulmonary:  Pulmonary Normal    Renal/:  Renal/ Normal     Hepatic/GI:  Hepatic/GI Normal    Musculoskeletal:  Musculoskeletal Normal    Neurological:  Neurology Normal    Endocrine:  Endocrine Normal  Obesity / BMI > 30  Dermatological:  Skin Normal    Psych:  Psychiatric Normal           Physical Exam  General: Well nourished and Alert    Airway:  Mallampati: II   Mouth Opening: Normal  Tongue: Normal    Dental:  Intact        Anesthesia Plan  Type of Anesthesia, risks & benefits discussed:    Anesthesia Type: Gen ETT  Intra-op Monitoring Plan: Standard ASA Monitors  Post Op  Pain Control Plan: multimodal analgesia  Induction:  IV  Airway Plan: Video  Informed Consent: Informed consent signed with the Patient and all parties understand the risks and agree with anesthesia plan.  All questions answered.   ASA Score: 3  Anesthesia Plan Notes: Labs/EKG in Epic    HCT 29.6    Ready For Surgery From Anesthesia Perspective.     .

## 2023-02-13 NOTE — DISCHARGE INSTRUCTIONS
Information to Prepare you for your Surgery    PRE-ADMIT TESTING -  [phone]    [address] [facility]          Your surgery has been scheduled at [facility]. We are pleased to have the opportunity to serve you. For Further Information please call [phone].    On the day of surgery please report to the Information Desk on the 1st floor.    CONTACT YOUR PHYSICIAN'S OFFICE THE DAY PRIOR TO YOUR SURGERY TO OBTAIN YOUR ARRIVAL TIME.     The evening before surgery do not eat anything after 9 p.m. ( this includes hard candy, chewing gum and mints).  You may only have GATORADE, POWERADE AND WATER  from 9 p.m. until you leave your home.   DO NOT DRINK ANY LIQUIDS ON THE WAY TO THE HOSPITAL.      Why does your anesthesiologist allow you to drink Gatorade/Powerade before surgery?  Gatorade/Powerade helps to increase your comfort before surgery and to decrease your nausea after surgery. The carbohydrates in Gatorade/Powerade help reduce your body's stress response to surgery.  If you are a diabetic-drink only water prior to surgery.      Current Visitor policy(12/27/2021) - Patients may have 2 visitors pre and post procedure. Only 2 visitors will be allowed in the Surgical building with the patient. No one under the age of 12 will be allowed into the facility.    SPECIAL MEDICATION INSTRUCTIONS: TAKE medications checked off by the Anesthesiologist on your Medication List.    Angiogram Patients: Take medications as instructed by your physician, including aspirin.     Surgery Patients:    If you take ASPIRIN - Your PHYSICIAN/SURGEON will need to inform you IF/OR when you need to stop taking aspirin prior to your surgery.     The week prior to surgery do not ot take any medications containing IBUPROFEN or NSAIDS ( Advil, Motrin, Goodys, BC, Aleve, Naproxen etc) If you are not sure if you should take a medicine please call your surgeon's office.  Ok to take  Tylenol    Do Not Wear any make-up (especially eye make-up) to surgery. Please remove any false eyelashes or eyelash extensions. If you arrive the day of surgery with makeup/eyelashes on you will be required to remove prior to surgery. (There is a risk of corneal abrasions if eye makeup/eyelash extensions are not removed)      Leave all valuables at home.   Do Not wear any jewelry or watches, including any metal in body piercings. Jewelry must be removed prior to coming to the hospital.  There is a possibility that rings that are unable to be removed may be cut off if they are on the surgical extremity.    Please remove all hair extensions, wigs, clips and any other metal accessories/ ornaments from your hair.  These items may pose a flammable/fire risk in Surgery and must be removed.    Do not shave your surgical area at least 5 days prior to your surgery. The surgical prep will be performed at the hospital according to Infection Control regulations.    Contact Lens must be removed before surgery. Either do not wear the contact lens or bring a case and solution for storage.  Please bring a container for eyeglasses or dentures as required.  Bring any paperwork your physician has provided, such as consent forms,  history and physicals, doctor's orders, etc.   Bring comfortable clothes that are loose fitting to wear upon discharge. Take into consideration the type of surgery being performed.  Maintain your diet as advised per your physician the day prior to surgery.      Adequate rest the night before surgery is advised.   Park in the Parking lot behind the hospital or in the [place] across the street from the parking lot. Parking is complimentary.  If you will be discharged the same day as your procedure, please arrange for a responsible adult to drive you home or to accompany you if traveling by taxi.   YOU WILL NOT BE PERMITTED TO DRIVE OR TO LEAVE THE HOSPITAL ALONE AFTER SURGERY.   If you are  being discharged the same day, it is strongly recommended that you arrange for someone to remain with you for the first 24 hrs following your surgery.    The Surgeon will speak to your family/visitor after your surgery regarding the outcome of your surgery and post op care.  The Surgeon may speak to you after your surgery, but there is a possibility you may not remember the details.  Please check with your family members regarding the conversation with the Surgeon.    We strongly recommend whoever is bringing you home be present for discharge instructions.  This will ensure a thorough understanding for your post op home care.    ALL CHILDREN MUST ALWAYS BE ACCOMPANIED BY AN ADULT.    Visitors-Refer to current Visitor policy handouts.    Thank you for your cooperation.  The Staff of [facility].            Bathing Instructions with Hibiclens    Shower the evening before and morning of your procedure with Chlorhexidine (Hibiclens)  do not use Chlorhexidine on your face or genitals. Do not get in your eyes.  Wash your face with water and your regular face wash/soap  Use your regular shampoo  Apply Chlorhexidine (Hibiclens) directly on your skin or on a wet washcloth and wash gently. When showering: Move away from the shower stream when applying Chlorhexidine (Hibiclens) to avoid rinsing off too soon.  Rinse thoroughly with warm water  Do not dilute Chlorhexidine (Hibiclens)   Dry off as usual, do not use any deodorant, powder, body lotions, perfume, after shave or cologne.

## 2023-02-24 ENCOUNTER — TELEPHONE (OUTPATIENT)
Dept: SURGERY | Facility: CLINIC | Age: 38
End: 2023-02-24
Payer: MEDICAID

## 2023-02-24 NOTE — TELEPHONE ENCOUNTER
Confirmed arrival time for surgery on 2/27/23 at the [facility] Location w/. Arrival time is for 5 am surgery is scheduled for 7 am . Nothing to eat after 9 pm on Sunday evening 2/26/23. Clear liquids Gatorade until patient leaves home . Please leave all jewelry home . Ms.Donte voiced understanding to this call .

## 2023-02-27 ENCOUNTER — HOSPITAL ENCOUNTER (OUTPATIENT)
Dept: RADIOLOGY | Facility: OTHER | Age: 38
Discharge: HOME | End: 2023-02-27
Attending: SURGERY | Admitting: SURGERY
Payer: MEDICAID

## 2023-02-27 ENCOUNTER — NURSE TRIAGE (OUTPATIENT)
Dept: ADMINISTRATIVE | Facility: CLINIC | Age: 38
End: 2023-02-27
Payer: MEDICAID

## 2023-02-27 ENCOUNTER — ANESTHESIA (OUTPATIENT)
Dept: SURGERY | Facility: OTHER | Age: 38
End: 2023-02-27
Payer: MEDICAID

## 2023-02-27 ENCOUNTER — HOSPITAL ENCOUNTER (OUTPATIENT)
Facility: OTHER | Age: 38
Discharge: HOME | End: 2023-02-27
Attending: SURGERY | Admitting: SURGERY
Payer: MEDICAID

## 2023-02-27 ENCOUNTER — HOSPITAL ENCOUNTER (OUTPATIENT)
Dept: RADIOLOGY | Facility: OTHER | Age: 38
Discharge: HOME | End: 2023-02-27
Attending: SURGERY
Payer: MEDICAID

## 2023-02-27 VITALS
OXYGEN SATURATION: 100 % | SYSTOLIC BLOOD PRESSURE: 121 MMHG | HEART RATE: 72 BPM | RESPIRATION RATE: 16 BRPM | DIASTOLIC BLOOD PRESSURE: 73 MMHG | TEMPERATURE: 99 F

## 2023-02-27 DIAGNOSIS — Z17.1 MALIGNANT NEOPLASM OF UPPER-OUTER QUADRANT OF LEFT BREAST IN FEMALE, ESTROGEN RECEPTOR NEGATIVE: ICD-10-CM

## 2023-02-27 DIAGNOSIS — C50.412 MALIGNANT NEOPLASM OF UPPER-OUTER QUADRANT OF LEFT BREAST IN FEMALE, ESTROGEN RECEPTOR NEGATIVE: ICD-10-CM

## 2023-02-27 DIAGNOSIS — Z85.3 PERSONAL HISTORY OF BREAST CANCER: ICD-10-CM

## 2023-02-27 DIAGNOSIS — Z17.1 MALIGNANT NEOPLASM OF UPPER-OUTER QUADRANT OF LEFT BREAST IN FEMALE, ESTROGEN RECEPTOR NEGATIVE: Primary | ICD-10-CM

## 2023-02-27 DIAGNOSIS — C50.412 MALIGNANT NEOPLASM OF UPPER-OUTER QUADRANT OF LEFT BREAST IN FEMALE, ESTROGEN RECEPTOR NEGATIVE: Primary | ICD-10-CM

## 2023-02-27 LAB
B-HCG UR QL: NEGATIVE
CTP QC/QA: YES

## 2023-02-27 PROCEDURE — 36590 REMOVAL TUNNELED CV CATH: CPT | Mod: 51,,, | Performed by: SURGERY

## 2023-02-27 PROCEDURE — 71000016 HC POSTOP RECOV ADDL HR: Performed by: SURGERY

## 2023-02-27 PROCEDURE — 88332 PR  PATH CONSULT IN SURG,W ADDN FRZ SEC: ICD-10-PCS | Mod: 26,,, | Performed by: PATHOLOGY

## 2023-02-27 PROCEDURE — 88341 PR IHC OR ICC EACH ADD'L SINGLE ANTIBODY  STAINPR: ICD-10-PCS | Mod: 26,,, | Performed by: PATHOLOGY

## 2023-02-27 PROCEDURE — 19357 PR BREAST RECONSTRUC W TISS EXPANDR: ICD-10-PCS | Mod: LT,,, | Performed by: SURGERY

## 2023-02-27 PROCEDURE — 88307 TISSUE EXAM BY PATHOLOGIST: CPT | Mod: 59 | Performed by: PATHOLOGY

## 2023-02-27 PROCEDURE — C9290 INJ, BUPIVACAINE LIPOSOME: HCPCS | Performed by: SURGERY

## 2023-02-27 PROCEDURE — 76098 X-RAY EXAM SURGICAL SPECIMEN: CPT | Mod: TC

## 2023-02-27 PROCEDURE — 19357 TISS XPNDR PLMT BRST RCNSTJ: CPT | Mod: AS,LT,, | Performed by: PHYSICIAN ASSISTANT

## 2023-02-27 PROCEDURE — 25000003 PHARM REV CODE 250: Performed by: SURGERY

## 2023-02-27 PROCEDURE — 38900 PR INTRAOPERATIVE SENTINEL LYMPH NODE ID W DYE INJECTION: ICD-10-PCS | Mod: LT,,, | Performed by: SURGERY

## 2023-02-27 PROCEDURE — 88341 IMHCHEM/IMCYTCHM EA ADD ANTB: CPT | Mod: 26,,, | Performed by: PATHOLOGY

## 2023-02-27 PROCEDURE — 37000009 HC ANESTHESIA EA ADD 15 MINS: Performed by: SURGERY

## 2023-02-27 PROCEDURE — 38792 PR IDENTIFY SENTINEL 2DE: ICD-10-PCS | Mod: 59,LT,, | Performed by: SURGERY

## 2023-02-27 PROCEDURE — 15860 IV NJX TST VASC FLO FLAP/GRF: CPT | Mod: 51,,, | Performed by: SURGERY

## 2023-02-27 PROCEDURE — 63600175 PHARM REV CODE 636 W HCPCS: Performed by: NURSE ANESTHETIST, CERTIFIED REGISTERED

## 2023-02-27 PROCEDURE — 36000707: Performed by: SURGERY

## 2023-02-27 PROCEDURE — 88302 TISSUE EXAM BY PATHOLOGIST: CPT | Performed by: PATHOLOGY

## 2023-02-27 PROCEDURE — 88302 TISSUE EXAM BY PATHOLOGIST: CPT | Mod: 26,,, | Performed by: PATHOLOGY

## 2023-02-27 PROCEDURE — 88341 IMHCHEM/IMCYTCHM EA ADD ANTB: CPT | Mod: 59 | Performed by: PATHOLOGY

## 2023-02-27 PROCEDURE — 27201423 OPTIME MED/SURG SUP & DEVICES STERILE SUPPLY: Performed by: SURGERY

## 2023-02-27 PROCEDURE — 71000033 HC RECOVERY, INTIAL HOUR: Performed by: SURGERY

## 2023-02-27 PROCEDURE — 15860 PR IV INJ TO TEST BLOOD FLOW IN FLAP/GRAFT: ICD-10-PCS | Mod: 51,,, | Performed by: SURGERY

## 2023-02-27 PROCEDURE — 25000003 PHARM REV CODE 250: Performed by: ANESTHESIOLOGY

## 2023-02-27 PROCEDURE — 19307 MAST MOD RAD: CPT | Mod: LT,,, | Performed by: SURGERY

## 2023-02-27 PROCEDURE — 88332 PATH CONSLTJ SURG EA ADD BLK: CPT | Mod: 59 | Performed by: PATHOLOGY

## 2023-02-27 PROCEDURE — 19357 PR BREAST RECONSTRUC W TISS EXPANDR: ICD-10-PCS | Mod: AS,LT,, | Performed by: PHYSICIAN ASSISTANT

## 2023-02-27 PROCEDURE — C1729 CATH, DRAINAGE: HCPCS | Performed by: SURGERY

## 2023-02-27 PROCEDURE — 63600175 PHARM REV CODE 636 W HCPCS: Performed by: PHYSICIAN ASSISTANT

## 2023-02-27 PROCEDURE — 88342 IMHCHEM/IMCYTCHM 1ST ANTB: CPT | Mod: 59 | Performed by: PATHOLOGY

## 2023-02-27 PROCEDURE — 38900 IO MAP OF SENT LYMPH NODE: CPT | Mod: LT,,, | Performed by: SURGERY

## 2023-02-27 PROCEDURE — 63600175 PHARM REV CODE 636 W HCPCS: Performed by: SURGERY

## 2023-02-27 PROCEDURE — 37000008 HC ANESTHESIA 1ST 15 MINUTES: Performed by: SURGERY

## 2023-02-27 PROCEDURE — 88302 PR  SURG PATH,LEVEL II: ICD-10-PCS | Mod: 26,,, | Performed by: PATHOLOGY

## 2023-02-27 PROCEDURE — 71000015 HC POSTOP RECOV 1ST HR: Performed by: SURGERY

## 2023-02-27 PROCEDURE — 88331 PR  PATH CONSULT IN SURG,W FRZ SEC: ICD-10-PCS | Mod: 26,,, | Performed by: PATHOLOGY

## 2023-02-27 PROCEDURE — 88331 PATH CONSLTJ SURG 1 BLK 1SPC: CPT | Mod: 26,,, | Performed by: PATHOLOGY

## 2023-02-27 PROCEDURE — A9520 TC99 TILMANOCEPT DIAG 0.5MCI: HCPCS

## 2023-02-27 PROCEDURE — 88300 PR  SURG PATH,GROSS,LEVEL I: ICD-10-PCS | Mod: 26,59,, | Performed by: PATHOLOGY

## 2023-02-27 PROCEDURE — 19307 PR MASTECTOMY, MODIFIED RADICAL: ICD-10-PCS | Mod: LT,,, | Performed by: SURGERY

## 2023-02-27 PROCEDURE — 25000003 PHARM REV CODE 250: Performed by: PHYSICIAN ASSISTANT

## 2023-02-27 PROCEDURE — 00402 ANES INTEG SYS RCNSTV BREAST: CPT | Performed by: SURGERY

## 2023-02-27 PROCEDURE — 88332 PATH CONSLTJ SURG EA ADD BLK: CPT | Mod: 26,,, | Performed by: PATHOLOGY

## 2023-02-27 PROCEDURE — 63600175 PHARM REV CODE 636 W HCPCS: Performed by: ANESTHESIOLOGY

## 2023-02-27 PROCEDURE — 88307 TISSUE EXAM BY PATHOLOGIST: CPT | Mod: 26,,, | Performed by: PATHOLOGY

## 2023-02-27 PROCEDURE — 88300 SURGICAL PATH GROSS: CPT | Performed by: PATHOLOGY

## 2023-02-27 PROCEDURE — 19357 TISS XPNDR PLMT BRST RCNSTJ: CPT | Mod: LT,,, | Performed by: SURGERY

## 2023-02-27 PROCEDURE — 71000039 HC RECOVERY, EACH ADD'L HOUR: Performed by: SURGERY

## 2023-02-27 PROCEDURE — 88342 IMHCHEM/IMCYTCHM 1ST ANTB: CPT | Mod: 26,,, | Performed by: PATHOLOGY

## 2023-02-27 PROCEDURE — C1789 PROSTHESIS, BREAST, IMP: HCPCS | Performed by: SURGERY

## 2023-02-27 PROCEDURE — 36590 PR REMOVAL TUNNELED CV CATH W SUBQ PORT OR PUMP: ICD-10-PCS | Mod: 51,,, | Performed by: SURGERY

## 2023-02-27 PROCEDURE — 25000003 PHARM REV CODE 250: Performed by: NURSE ANESTHETIST, CERTIFIED REGISTERED

## 2023-02-27 PROCEDURE — 88342 CHG IMMUNOCYTOCHEMISTRY: ICD-10-PCS | Mod: 26,,, | Performed by: PATHOLOGY

## 2023-02-27 PROCEDURE — 38792 RA TRACER ID OF SENTINL NODE: CPT | Mod: 59,LT,, | Performed by: SURGERY

## 2023-02-27 PROCEDURE — 36000706: Performed by: SURGERY

## 2023-02-27 PROCEDURE — 88307 PR  SURG PATH,LEVEL V: ICD-10-PCS | Mod: 26,,, | Performed by: PATHOLOGY

## 2023-02-27 PROCEDURE — 88331 PATH CONSLTJ SURG 1 BLK 1SPC: CPT | Mod: 59 | Performed by: PATHOLOGY

## 2023-02-27 PROCEDURE — 63600175 PHARM REV CODE 636 W HCPCS: Mod: JG | Performed by: SURGERY

## 2023-02-27 PROCEDURE — 88300 SURGICAL PATH GROSS: CPT | Mod: 26,59,, | Performed by: PATHOLOGY

## 2023-02-27 PROCEDURE — 81025 URINE PREGNANCY TEST: CPT | Performed by: ANESTHESIOLOGY

## 2023-02-27 RX ORDER — PHENYLEPHRINE HYDROCHLORIDE 10 MG/ML
INJECTION INTRAVENOUS
Status: DISCONTINUED | OUTPATIENT
Start: 2023-02-27 | End: 2023-02-27

## 2023-02-27 RX ORDER — CEFAZOLIN SODIUM 1 G/3ML
2 INJECTION, POWDER, FOR SOLUTION INTRAMUSCULAR; INTRAVENOUS
Status: COMPLETED | OUTPATIENT
Start: 2023-02-27 | End: 2023-02-27

## 2023-02-27 RX ORDER — GABAPENTIN 100 MG/1
300 CAPSULE ORAL 3 TIMES DAILY
Qty: 63 CAPSULE | Refills: 0 | Status: SHIPPED | OUTPATIENT
Start: 2023-02-27 | End: 2023-03-07

## 2023-02-27 RX ORDER — MIDAZOLAM HYDROCHLORIDE 1 MG/ML
INJECTION INTRAMUSCULAR; INTRAVENOUS
Status: DISCONTINUED | OUTPATIENT
Start: 2023-02-27 | End: 2023-02-27

## 2023-02-27 RX ORDER — KETAMINE HCL IN 0.9 % NACL 50 MG/5 ML
SYRINGE (ML) INTRAVENOUS
Status: DISCONTINUED | OUTPATIENT
Start: 2023-02-27 | End: 2023-02-27

## 2023-02-27 RX ORDER — OXYCODONE HYDROCHLORIDE 5 MG/1
5 TABLET ORAL EVERY 4 HOURS PRN
Qty: 10 TABLET | Refills: 0 | Status: SHIPPED | OUTPATIENT
Start: 2023-02-27 | End: 2023-03-02 | Stop reason: SDUPTHER

## 2023-02-27 RX ORDER — PREGABALIN 75 MG/1
75 CAPSULE ORAL ONCE
Status: COMPLETED | OUTPATIENT
Start: 2023-02-27 | End: 2023-02-27

## 2023-02-27 RX ORDER — SODIUM CHLORIDE 0.9 % (FLUSH) 0.9 %
3 SYRINGE (ML) INJECTION
Status: DISCONTINUED | OUTPATIENT
Start: 2023-02-27 | End: 2023-02-27 | Stop reason: HOSPADM

## 2023-02-27 RX ORDER — LIDOCAINE HYDROCHLORIDE 10 MG/ML
0.5 INJECTION, SOLUTION EPIDURAL; INFILTRATION; INTRACAUDAL; PERINEURAL ONCE
Status: DISCONTINUED | OUTPATIENT
Start: 2023-02-27 | End: 2023-02-27 | Stop reason: HOSPADM

## 2023-02-27 RX ORDER — KETOROLAC TROMETHAMINE 30 MG/ML
INJECTION, SOLUTION INTRAMUSCULAR; INTRAVENOUS
Status: DISCONTINUED | OUTPATIENT
Start: 2023-02-27 | End: 2023-02-27

## 2023-02-27 RX ORDER — ACETAMINOPHEN 500 MG
1000 TABLET ORAL
Status: COMPLETED | OUTPATIENT
Start: 2023-02-27 | End: 2023-02-27

## 2023-02-27 RX ORDER — LIDOCAINE HCL/PF 100 MG/5ML
SYRINGE (ML) INTRAVENOUS
Status: DISCONTINUED | OUTPATIENT
Start: 2023-02-27 | End: 2023-02-27

## 2023-02-27 RX ORDER — ACETAMINOPHEN 500 MG
1000 TABLET ORAL EVERY 8 HOURS
Qty: 42 TABLET | Refills: 0
Start: 2023-02-27 | End: 2023-03-06

## 2023-02-27 RX ORDER — BUPIVACAINE HYDROCHLORIDE 2.5 MG/ML
INJECTION, SOLUTION EPIDURAL; INFILTRATION; INTRACAUDAL
Status: DISCONTINUED | OUTPATIENT
Start: 2023-02-27 | End: 2023-02-27 | Stop reason: HOSPADM

## 2023-02-27 RX ORDER — SODIUM CHLORIDE 0.9 % (FLUSH) 0.9 %
10 SYRINGE (ML) INJECTION
Status: DISCONTINUED | OUTPATIENT
Start: 2023-02-27 | End: 2023-02-27 | Stop reason: HOSPADM

## 2023-02-27 RX ORDER — IBUPROFEN 800 MG/1
800 TABLET ORAL EVERY 6 HOURS
Qty: 28 TABLET | Refills: 0
Start: 2023-02-27 | End: 2023-03-06

## 2023-02-27 RX ORDER — PROPOFOL 10 MG/ML
VIAL (ML) INTRAVENOUS
Status: DISCONTINUED | OUTPATIENT
Start: 2023-02-27 | End: 2023-02-27

## 2023-02-27 RX ORDER — HYDROMORPHONE HYDROCHLORIDE 2 MG/ML
0.4 INJECTION, SOLUTION INTRAMUSCULAR; INTRAVENOUS; SUBCUTANEOUS EVERY 5 MIN PRN
Status: DISCONTINUED | OUTPATIENT
Start: 2023-02-27 | End: 2023-02-27 | Stop reason: HOSPADM

## 2023-02-27 RX ORDER — ROCURONIUM BROMIDE 10 MG/ML
INJECTION, SOLUTION INTRAVENOUS
Status: DISCONTINUED | OUTPATIENT
Start: 2023-02-27 | End: 2023-02-27

## 2023-02-27 RX ORDER — LIDOCAINE HYDROCHLORIDE 10 MG/ML
1 INJECTION, SOLUTION EPIDURAL; INFILTRATION; INTRACAUDAL; PERINEURAL ONCE
Status: DISCONTINUED | OUTPATIENT
Start: 2023-02-27 | End: 2023-02-27 | Stop reason: HOSPADM

## 2023-02-27 RX ORDER — MUPIROCIN 20 MG/G
OINTMENT TOPICAL
Status: DISCONTINUED | OUTPATIENT
Start: 2023-02-27 | End: 2023-02-27 | Stop reason: HOSPADM

## 2023-02-27 RX ORDER — ONDANSETRON HYDROCHLORIDE 2 MG/ML
INJECTION, SOLUTION INTRAMUSCULAR; INTRAVENOUS
Status: DISCONTINUED | OUTPATIENT
Start: 2023-02-27 | End: 2023-02-27

## 2023-02-27 RX ORDER — MEPERIDINE HYDROCHLORIDE 25 MG/ML
12.5 INJECTION INTRAMUSCULAR; INTRAVENOUS; SUBCUTANEOUS ONCE AS NEEDED
Status: DISCONTINUED | OUTPATIENT
Start: 2023-02-27 | End: 2023-02-27 | Stop reason: HOSPADM

## 2023-02-27 RX ORDER — INDOCYANINE GREEN AND WATER 25 MG
KIT INJECTION
Status: DISCONTINUED | OUTPATIENT
Start: 2023-02-27 | End: 2023-02-27

## 2023-02-27 RX ORDER — ISOSULFAN BLUE 50 MG/5ML
INJECTION, SOLUTION SUBCUTANEOUS
Status: DISCONTINUED | OUTPATIENT
Start: 2023-02-27 | End: 2023-02-27 | Stop reason: HOSPADM

## 2023-02-27 RX ORDER — DEXAMETHASONE SODIUM PHOSPHATE 4 MG/ML
INJECTION, SOLUTION INTRA-ARTICULAR; INTRALESIONAL; INTRAMUSCULAR; INTRAVENOUS; SOFT TISSUE
Status: DISCONTINUED | OUTPATIENT
Start: 2023-02-27 | End: 2023-02-27

## 2023-02-27 RX ORDER — SODIUM CHLORIDE, SODIUM LACTATE, POTASSIUM CHLORIDE, CALCIUM CHLORIDE 600; 310; 30; 20 MG/100ML; MG/100ML; MG/100ML; MG/100ML
INJECTION, SOLUTION INTRAVENOUS CONTINUOUS
Status: DISCONTINUED | OUTPATIENT
Start: 2023-02-27 | End: 2023-02-27 | Stop reason: HOSPADM

## 2023-02-27 RX ORDER — HYDROMORPHONE HYDROCHLORIDE 2 MG/ML
INJECTION, SOLUTION INTRAMUSCULAR; INTRAVENOUS; SUBCUTANEOUS
Status: DISCONTINUED | OUTPATIENT
Start: 2023-02-27 | End: 2023-02-27

## 2023-02-27 RX ORDER — OXYCODONE HYDROCHLORIDE 5 MG/1
5 TABLET ORAL
Status: DISCONTINUED | OUTPATIENT
Start: 2023-02-27 | End: 2023-02-27 | Stop reason: HOSPADM

## 2023-02-27 RX ORDER — OXYCODONE HYDROCHLORIDE 5 MG/1
5 TABLET ORAL ONCE
Status: COMPLETED | OUTPATIENT
Start: 2023-02-27 | End: 2023-02-27

## 2023-02-27 RX ORDER — CEPHALEXIN 500 MG/1
500 CAPSULE ORAL EVERY 12 HOURS
Qty: 28 CAPSULE | Refills: 0 | Status: SHIPPED | OUTPATIENT
Start: 2023-02-27 | End: 2023-03-14 | Stop reason: SDUPTHER

## 2023-02-27 RX ORDER — METHOCARBAMOL 500 MG/1
500-1000 TABLET, FILM COATED ORAL 3 TIMES DAILY PRN
Qty: 20 TABLET | Refills: 0 | Status: SHIPPED | OUTPATIENT
Start: 2023-02-27 | End: 2023-03-04

## 2023-02-27 RX ORDER — FENTANYL CITRATE 50 UG/ML
INJECTION, SOLUTION INTRAMUSCULAR; INTRAVENOUS
Status: DISCONTINUED | OUTPATIENT
Start: 2023-02-27 | End: 2023-02-27

## 2023-02-27 RX ORDER — HALOPERIDOL 5 MG/ML
INJECTION INTRAMUSCULAR
Status: DISCONTINUED | OUTPATIENT
Start: 2023-02-27 | End: 2023-02-27

## 2023-02-27 RX ORDER — ONDANSETRON 2 MG/ML
4 INJECTION INTRAMUSCULAR; INTRAVENOUS DAILY PRN
Status: DISCONTINUED | OUTPATIENT
Start: 2023-02-27 | End: 2023-02-27 | Stop reason: HOSPADM

## 2023-02-27 RX ADMIN — HYDROMORPHONE HYDROCHLORIDE 0.4 MG: 2 INJECTION INTRAMUSCULAR; INTRAVENOUS; SUBCUTANEOUS at 11:02

## 2023-02-27 RX ADMIN — PHENYLEPHRINE HYDROCHLORIDE 100 MCG: 10 INJECTION INTRAVENOUS at 09:02

## 2023-02-27 RX ADMIN — HYDROMORPHONE HYDROCHLORIDE 0.2 MG: 2 INJECTION INTRAMUSCULAR; INTRAVENOUS; SUBCUTANEOUS at 11:02

## 2023-02-27 RX ADMIN — SODIUM CHLORIDE, SODIUM LACTATE, POTASSIUM CHLORIDE, AND CALCIUM CHLORIDE: .6; .31; .03; .02 INJECTION, SOLUTION INTRAVENOUS at 06:02

## 2023-02-27 RX ADMIN — SODIUM CHLORIDE, SODIUM LACTATE, POTASSIUM CHLORIDE, AND CALCIUM CHLORIDE: .6; .31; .03; .02 INJECTION, SOLUTION INTRAVENOUS at 09:02

## 2023-02-27 RX ADMIN — OXYCODONE 5 MG: 5 TABLET ORAL at 02:02

## 2023-02-27 RX ADMIN — ACETAMINOPHEN 1000 MG: 500 TABLET, FILM COATED ORAL at 05:02

## 2023-02-27 RX ADMIN — ROCURONIUM BROMIDE 20 MG: 10 INJECTION, SOLUTION INTRAVENOUS at 10:02

## 2023-02-27 RX ADMIN — HYDROMORPHONE HYDROCHLORIDE 0.4 MG: 2 INJECTION, SOLUTION INTRAMUSCULAR; INTRAVENOUS; SUBCUTANEOUS at 12:02

## 2023-02-27 RX ADMIN — PROPOFOL 200 MG: 10 INJECTION, EMULSION INTRAVENOUS at 07:02

## 2023-02-27 RX ADMIN — PHENYLEPHRINE HYDROCHLORIDE 200 MCG: 10 INJECTION INTRAVENOUS at 07:02

## 2023-02-27 RX ADMIN — PHENYLEPHRINE HYDROCHLORIDE 0.3 MCG/KG/MIN: 10 INJECTION INTRAVENOUS at 09:02

## 2023-02-27 RX ADMIN — SUGAMMADEX 200 MG: 100 INJECTION, SOLUTION INTRAVENOUS at 11:02

## 2023-02-27 RX ADMIN — ONDANSETRON 4 MG: 2 INJECTION INTRAMUSCULAR; INTRAVENOUS at 11:02

## 2023-02-27 RX ADMIN — HALOPERIDOL LACTATE 0.5 MG: 5 INJECTION, SOLUTION INTRAMUSCULAR at 11:02

## 2023-02-27 RX ADMIN — LIDOCAINE HYDROCHLORIDE 80 MG: 20 INJECTION, SOLUTION INTRAVENOUS at 07:02

## 2023-02-27 RX ADMIN — FENTANYL CITRATE 100 MCG: 50 INJECTION, SOLUTION INTRAMUSCULAR; INTRAVENOUS at 07:02

## 2023-02-27 RX ADMIN — ROCURONIUM BROMIDE 30 MG: 10 INJECTION, SOLUTION INTRAVENOUS at 07:02

## 2023-02-27 RX ADMIN — CEFAZOLIN 2 G: 330 INJECTION, POWDER, FOR SOLUTION INTRAMUSCULAR; INTRAVENOUS at 11:02

## 2023-02-27 RX ADMIN — CEFAZOLIN 2 G: 330 INJECTION, POWDER, FOR SOLUTION INTRAMUSCULAR; INTRAVENOUS at 07:02

## 2023-02-27 RX ADMIN — PHENYLEPHRINE HYDROCHLORIDE 200 MCG: 10 INJECTION INTRAVENOUS at 09:02

## 2023-02-27 RX ADMIN — Medication 30 MG: at 07:02

## 2023-02-27 RX ADMIN — FENTANYL CITRATE 50 MCG: 50 INJECTION, SOLUTION INTRAMUSCULAR; INTRAVENOUS at 10:02

## 2023-02-27 RX ADMIN — INDOCYANINE GREEN 10 MG: KIT INTRAVENOUS at 11:02

## 2023-02-27 RX ADMIN — PHENYLEPHRINE HYDROCHLORIDE 100 MCG: 10 INJECTION INTRAVENOUS at 11:02

## 2023-02-27 RX ADMIN — MUPIROCIN: 20 OINTMENT TOPICAL at 05:02

## 2023-02-27 RX ADMIN — FENTANYL CITRATE 50 MCG: 50 INJECTION, SOLUTION INTRAMUSCULAR; INTRAVENOUS at 08:02

## 2023-02-27 RX ADMIN — CARBOXYMETHYLCELLULOSE SODIUM 4 DROP: 2.5 SOLUTION/ DROPS OPHTHALMIC at 07:02

## 2023-02-27 RX ADMIN — PREGABALIN 75 MG: 75 CAPSULE ORAL at 05:02

## 2023-02-27 RX ADMIN — MIDAZOLAM HYDROCHLORIDE 2 MG: 1 INJECTION, SOLUTION INTRAMUSCULAR; INTRAVENOUS at 07:02

## 2023-02-27 RX ADMIN — OXYCODONE 5 MG: 5 TABLET ORAL at 03:02

## 2023-02-27 RX ADMIN — PHENYLEPHRINE HYDROCHLORIDE 100 MCG: 10 INJECTION INTRAVENOUS at 08:02

## 2023-02-27 RX ADMIN — GLYCOPYRROLATE 0.2 MG: 0.2 INJECTION, SOLUTION INTRAMUSCULAR; INTRAVITREAL at 07:02

## 2023-02-27 RX ADMIN — DEXAMETHASONE SODIUM PHOSPHATE 8 MG: 4 INJECTION, SOLUTION INTRAMUSCULAR; INTRAVENOUS at 07:02

## 2023-02-27 RX ADMIN — KETOROLAC TROMETHAMINE 30 MG: 30 INJECTION, SOLUTION INTRAMUSCULAR; INTRAVENOUS at 11:02

## 2023-02-27 NOTE — TRANSFER OF CARE
Anesthesia Transfer of Care Note    Patient: Laura Kent    Procedure(s) Performed: Procedure(s) (LRB):  MASTECTOMY / LEFT (Left)  BIOPSY, LYMPH NODE, SENTINEL LEFT with radiological marker (Left)  INJECTION, FOR SENTINEL NODE IDENTIFICATION (Left)  LYMPHADENECTOMY, AXILLARY LEFT (Left)  INSERTION, TISSUE EXPANDER, BREAST (Left)    Patient location: PACU    Anesthesia Type: general    Transport from OR: Transported from OR on 6-10 L/min O2 by face mask with adequate spontaneous ventilation    Post pain: adequate analgesia    Post assessment: no apparent anesthetic complications    Post vital signs: stable    Level of consciousness: responds to stimulation    Nausea/Vomiting: no nausea/vomiting    Complications: none    Transfer of care protocol was followed      Last vitals:   Visit Vitals  BP (!) 141/92 (BP Location: Right arm, Patient Position: Sitting)   Pulse 72   Temp 36.6 °C (97.9 °F) (Oral)   Resp 18   LMP 11/19/2022 (Exact Date)   SpO2 99%   Breastfeeding No

## 2023-02-27 NOTE — H&P
[facility]  Plastic Surgery  History & Physical    Patient Name: Laura Kent  MRN: [id number]  Admission Date: 2023  Attending Physician: YASMANI Weiner MD  Primary Care Provider: Yessy Cruz MD    Patient information was obtained from patient and past medical records.     Subjective:     Chief Complaint/Reason for Admission: L mastectomy    History of Present Illness: 37F was diagnosed with left breast cancer 3/22. She recently completed NACT with ddAC in December. She has received one dose of pembrolizumab in September but developed pancreatitis and it was discontinued. She remained on steroid treatment until recently. She reports a history of HTN and states she was previously on 9 different BP medications. She has lost about 60 lbs and is no longer requiring medication. She checks her BP at home and reports readings of 120-130/60-70. She has had three pregnancies and reports Pre-eclampsia with the 1st, HELLP syndrome with the 2nd and HTN and gestational DM with the third.    Here today 23 for L mastectomy and SLNB      No current facility-administered medications on file prior to encounter.     Current Outpatient Medications on File Prior to Encounter   Medication Sig    mupirocin (BACTROBAN) 2 % ointment Apply topically once daily.    pantoprazole (PROTONIX) 40 MG tablet Take 1 tablet (40 mg total) by mouth once daily.    prednisoLONE acetate (PRED FORTE) 1 % DrpS Place 1 drop into both eyes 2 (two) times daily.       Review of patient's allergies indicates:   Allergen Reactions    Strawberries [strawberry] Hives       Past Medical History:   Diagnosis Date    Anxiety     Breast cancer     Encounter for blood transfusion     Hypertension     Meningitis     Severe obesity 2023     Past Surgical History:   Procedure Laterality Date    BREAST BIOPSY Left      SECTION      COLONOSCOPY N/A 2022    Procedure: COLONOSCOPY;  Surgeon: Davi Chery,  MD;  Location: [facility] ENDO (4TH FLR);  Service: Endoscopy;  Laterality: N/A;  case request entered from referral - Per DR. KATHLEEN Chery Pt to be scheduled for urgent colonoscopy on 9/22/22/ fully vaccinated / prep ins for Sutab on portal - ERW  see micro for negative C-diff- ERW    INSERTION OF TUNNELED CENTRAL VENOUS CATHETER (CVC) WITH SUBCUTANEOUS PORT N/A 4/5/2022    Procedure: [id number];  Surgeon: Deo Sin Jr., MD;  Location: [facility] OR 2ND FLR;  Service: General;  Laterality: N/A;    INSERTION OF TUNNELED CENTRAL VENOUS CATHETER (CVC) WITH SUBCUTANEOUS PORT N/A 8/1/2022    Procedure: INSERTION, SINGLE LUMEN CATHETER WITH PORT, WITH FLUOROSCOPIC GUIDANCE;  Surgeon: Ryder Dinero MD;  Location: [facility] CATH LAB;  Service: Vascular;  Laterality: N/A;     Family History       Problem Relation (Age of Onset)    Bladder Cancer Paternal Grandmother    Diabetes Father    Hypertension Mother, Father    Pancreatic cancer Maternal Uncle          Tobacco Use    Smoking status: Never    Smokeless tobacco: Never   Substance and Sexual Activity    Alcohol use: Not Currently     Comment: social    Drug use: Never    Sexual activity: Not Currently     Review of Systems  Constitutional: Negative for malaise/fatigue and weight gain.   HENT:  Negative for hearing loss.    Eyes:  Negative for visual disturbance.   Cardiovascular:  Negative for chest pain, claudication, dyspnea on exertion, leg swelling, near-syncope, orthopnea, palpitations, paroxysmal nocturnal dyspnea and syncope.   Respiratory: Negative for shortness of breath, sleep disturbances due to breathing, snoring and wheezing.    Endocrine: Negative for cold intolerance, heat intolerance, polydipsia, polyphagia and polyuria.   Hematologic/Lymphatic: Negative for bleeding problem. Does not bruise/bleed easily.   Skin:  Negative for rash and suspicious lesions.   Musculoskeletal:  Negative for arthritis, falls, joint pain, muscle weakness and myalgias.    Gastrointestinal:  Negative for abdominal pain, change in bowel habit, constipation, diarrhea, heartburn, hematochezia, melena and nausea.   Genitourinary:  Negative for hematuria and nocturia.   Neurological:  Negative for excessive daytime sleepiness, dizziness, headaches, light-headedness, loss of balance and weakness.   Psychiatric/Behavioral:  Negative for depression. The patient is not nervous/anxious.    Allergic/Immunologic: Negative for environmental allergies.   Objective:     Vital Signs (Most Recent):  Temp: 97.9 °F (36.6 °C) (02/27/23 0531)  Pulse: 72 (02/27/23 0531)  Resp: 18 (02/27/23 0531)  BP: (!) 141/92 (02/27/23 0531)  SpO2: 99 % (02/27/23 0531)   Vital Signs (24h Range):  Temp:  [97.9 °F (36.6 °C)] 97.9 °F (36.6 °C)  Pulse:  [72] 72  Resp:  [18] 18  SpO2:  [99 %] 99 %  BP: (141)/(92) 141/92        There is no height or weight on file to calculate BMI.    Physical Exam  Constitutional:       Appearance: She is well-developed. She is obese.      Comments:  HENT:      Head: Normocephalic and atraumatic.   Eyes:      General: No scleral icterus.     Conjunctiva/sclera: Conjunctivae normal.      Pupils: Pupils are equal, round, and reactive to light.   Neck:      Thyroid: No thyromegaly.      Vascular: No hepatojugular reflux or JVD.      Trachea: No tracheal deviation.   Cardiovascular:      Rate and Rhythm: Normal rate and regular rhythm.      Chest Wall: PMI is not displaced.      Pulses: Intact distal pulses.           Carotid pulses are 2+ on the right side and 2+ on the left side.       Radial pulses are 2+ on the right side and 2+ on the left side.        Dorsalis pedis pulses are 2+ on the right side and 2+ on the left side.        Posterior tibial pulses are 2+ on the right side and 2+ on the left side.      Heart sounds: Normal heart sounds.   Pulmonary:      Effort: Pulmonary effort is normal.      Breath sounds: Normal breath sounds.   Abdominal:      General: Bowel sounds are normal.  There is no distension.      Palpations: Abdomen is soft. There is no mass.      Tenderness: There is no abdominal tenderness.   Musculoskeletal:         General: No tenderness.      Cervical back: Normal range of motion and neck supple.   Lymphadenopathy:      Cervical: No cervical adenopathy.   Skin:     General: Skin is warm and dry.      Findings: No erythema or rash.      Nails: There is no clubbing.   Neurological:      Mental Status: She is alert and oriented to person, place, and time.   Psychiatric:         Speech: Speech normal.         Behavior: Behavior normal.   Significant Labs:  I have reviewed all pertinent lab results within the past 24 hours.    Significant Diagnostics:  I have reviewed all pertinent imaging results/findings within the past 24 hours.  No results found in the last 24 hours.        Assessment/Plan:     Malignant neoplasm of upper-outer quadrant of left breast in female, estrogen receptor negative  37F with malignant neoplasm of upper-outer quadrant of left breast in female, estrogen receptor negative, presenting today for L mastectomy and SLNB    Plan:  --Proceed to OR  --NPO since midnight  --Consent obtained. Questions/concerns addressed      VTE Risk Mitigation (From admission, onward)         Ordered     Place BETHANY hose  Until discontinued         02/27/23 0518     Place sequential compression device  Until discontinued         02/27/23 0518     IP VTE HIGH RISK PATIENT  Once         02/27/23 0518                Francis Alvarez MD  Plastic Surgery  [facility] ([place])

## 2023-02-27 NOTE — BRIEF OP NOTE
[facility] ([place])  Brief Operative Note    Surgery Date: 2/27/2023     Surgeon(s) and Role:  Panel 1:     * YASMANI Weiner MD - Primary  Panel 2:     * Reese Barrios DO - Primary    Assisting Surgeon: Francis Alvarez MD    Pre-op Diagnosis:  Malignant neoplasm of upper-outer quadrant of left breast in female, estrogen receptor negative [C50.412, Z17.1]    Post-op Diagnosis:  Post-Op Diagnosis Codes:     * Malignant neoplasm of upper-outer quadrant of left breast in female, estrogen receptor negative [C50.412, Z17.1]    Procedure(s) (LRB):  MASTECTOMY / LEFT (Left)  BIOPSY, LYMPH NODE, SENTINEL LEFT with radiological marker (Left)  INJECTION, FOR SENTINEL NODE IDENTIFICATION (Left)  LYMPHADENECTOMY, AXILLARY LEFT (Left)  INSERTION, TISSUE EXPANDER, BREAST (Left)    Anesthesia: General    Operative Findings: R chest port removed. L sentinel lymph node biopsy performed which was positive for cancer in the node with the clip upon pathology review. L mastectomy performed. Further L axillary node dissection was performed with lymph nodes removed while preserving thoracodorsal, long thoracic, several intercostal brachial nerves, axillary nerve, main trunk of medial pectoral branch. Skin closure performed by plastic surgery, planned for 2 drains    Estimated Blood Loss: * No values recorded between 2/27/2023  7:30 AM and 2/27/2023 10:43 AM *         Specimens:   Specimen (24h ago, onward)      None              Discharge Note    OUTCOME: Patient tolerated treatment/procedure well without complication and is now ready for discharge.    DISPOSITION: Home or Self Care    FINAL DIAGNOSIS:  <principal problem not specified>    FOLLOWUP: In clinic    DISCHARGE INSTRUCTIONS:  No discharge procedures on file.

## 2023-02-27 NOTE — OP NOTE
Operative Note     2/27/2023    PRE-OP DIAGNOSIS: Malignant neoplasm of upper-outer quadrant of left breast in female, estrogen receptor negative [C50.412, Z17.1]      POST-OP DIAGNOSIS: Post-Op Diagnosis Codes:     * Malignant neoplasm of upper-outer quadrant of left breast in female, estrogen receptor negative [C50.412, Z17.1]    Procedure(s):  MASTECTOMY / LEFT  BIOPSY, LYMPH NODE, SENTINEL LEFT with radiological marker  INJECTION, FOR SENTINEL NODE IDENTIFICATION, blue dye and radioactive dye  LYMPHADENECTOMY, AXILLARY LEFT  Right chest implanted central venous catheter  Intraoperative interpretation of radiographic imaging by surgeon    SURGEON: Surgeon(s) and Role:  Panel 1:     * YASMANI Weiner MD - Primary    ANESTHESIA: General     OPERATIVE FINDINGS:  Left axillary node with CATINA  positive for carcinoma on frozen section.  Left breast removed with clip confirmed removed by intraoperative imaging.    INDICATION FOR PROCEDURE: This patient presents with a history of invasive carcinoma of the left breast status post neoadjuvant chemotherapy with pretreatment lymph node positivity    PROCEDURE IN DETAIL:  Laura Kent is a 37 y.o. female brought to the operating room for definitive surgery of invasive carcinoma of the left breast.  The patient has elected to undergo left skin sparing mastectomy with sentinel lymph node biopsy for shelly assessment with possible left axilla lymph node dissection if metastatic disease was confirmed on frozen section.  She also elected to undergo removal of right chest implanted central venous catheter.  The patient was informed of the possible risks and complications of the procedure, including but not limited to anesthetic risks, bleeding, infection, and need for additional surgery.  The patient concurred with the proposed plan, and has given informed consent.  The site of surgery was properly noted/marked in the preoperative holding area.    The patient was  then brought to the operating room and placed in the supine position with both upper extremities extended.  general anesthesia was administered. Perioperative antibiotics were administered consisting of Ancef and a time out was performed confirming the patient, site, and procedure.   The patient's left breast was injected with technetium sulfur colloid and Lymphazurin blue dye to facilitate sentinel lymph node identification.The bilateral chest and axilla was then prepped and draped in the usual sterile fashion.    We 1st turned our attention to the right chest at the site of the implant central venous catheter.  The prior incision site was incised.  Bovie cautery was used to dissect down to the port.  The port cavity was opened.  The port was freed from the surrounding tissue.  The port was removed in its entirety from the chest.  Pressure was held over the internal jugular vein.  There were no signs of bleeding from tunnel.  The tunnel was closed with a figure-of-eight 3-0 Vicryl suture.  The port cavity was investigated no signs of bleeding.  The incision was closed with layered 3-0 Vicryl and 4-0 Monocryl suture.  Dermabond adhesive was applied over the wound.     We then  turned our attention to the left axilla. The gamma probe was used to identify an area of increased radioactivity within the lower axilla and the CATINA  reflector was identified in the same region.  An incision was made at the inferior hairline and deepened with Bovie electrocautery.  The clavipectoral sheath was sharply incised to reveal the level I axillary lymph nodes. The probe was used to identify a single node with the CATINA  in place.  This lymph node did not have radio activity or blue dye. This node was brought into the operative field and carefully dissected free of the surrounding lymphovascular structures.    Intraoperative radiograph was performed to confirm removal of the CATINA  marked lymph node.  Three additional  lymph nodes were identified.  One lymph node having high radio activity of 1716.  Another lymph node having large amount of blue dye and a 3rd lymph node being large and palpable.  total of 4 axillary sentinel nodes and 0 axillary non-sentinel nodes were identified, excised and submitted to pathology.  Bed counts were obtained to confirm that the 10% rule had not been violated.   The wound was irrigated with normal saline, and all bleeding points were secured with Bovie electrocautery.     We then turned our attention to the left breast where an elliptical incision was fashioned to incorporate the nipple areolar complex.  The incision was made with a 15-blade and extended through the subcutaneous tissues with Bovie electrocautery.  Skin flaps were raised to the clavicle superiorly, lateral border of the sternum medially, to the inframammary fold inferiorly, and to the anterior border of the latissimus dorsi muscle laterally. The breast tissue was sharply excised off the chest wall taking care to incorporate the pectoralis fascia while leaving the serratus fascia behind.  The resulting mastectomy specimen was marked using a short stitch superiorly and long stitch laterally.  Intraoperative radiograph was taken of the mastectomy specimen to confirm that the ring shaped clip was removed EN bloc with the mastectomy specimen.  The radiograph was intraoperatively confirmed by myself.  The breast was sent to pathology for permanent evaluation.      Frozen section shelly evaluation revealed residual metastatic disease in the CATINA  marked lymph node.  An axillary dissection was performed with removal of the associated lymph nodes and surrounding adipose tissue. This included levels I and II. This was accomplished by exposing the axillary vein superiorly. Small venous tributaries, lymphatics, and vessels were clipped and ligated or cauterized and divided. The subscapularis muscle was skeletonized. The long thoracic and  thoracodorsal neurovascular bundles were identified and preserved. The tissue between the long thoracic and thoracodorsal bundle was removed.  Of note, at the end of the dissection, level 3 nodes were palpated and no abnormal nodes were noted.      The specimen was submitted to pathology. The wound was irrigated. A 15 Ecuadorean drain will be  placed in the axillary space and then of the case by the Plastic surgery Service. The axillary skin incision was closed in layers with a 3-0 Vicryl suture for the deep dermis followed by a a running 4-0 monocryl used for the subcuticular closure.     The patient was then turned over to the plastic surgery service for breast reconstruction and skin closure.  Instrument, sponge, and needle counts were correct at closure and at the conclusion of my portion of the case.    ESTIMATED BLOOD LOSS: less than 100 mL    COMPLICATIONS:  None    DISPOSITION:  Intraoperative transferred to the plastic surgery service    ATTESTATION:   I was present and scrubbed for the entire procedure.    Kendrick Attestation:    Breast Cancer - Synoptic Operative Report Summary    Auto-populated Information    Patient name Laura Kent    Patient ID [id number]    Date of surgery 2/27/2023    Name of surgeon Chhaya Weiner MD    Name of co-surgeon na   Name of assistant(s)    Indication for surgery Left breast cancer with positive axillary nodes prior to chemotherapy   Pre-operative diagnosis Malignant neoplasm of upper-outer quadrant of left breast in female, estrogen receptor negative [C50.412, Z17.1]    Post-operative diagnosis * No post-op diagnosis entered *   Procedure(s) performed Procedure(s):  MASTECTOMY / LEFT  BIOPSY, LYMPH NODE, SENTINEL LEFT with radiological marker  INJECTION, FOR SENTINEL NODE IDENTIFICATION  LYMPHADENECTOMY, AXILLARY LEFT  INSERTION, TISSUE EXPANDER, BREAST    Specimens * No specimens in log *    Type of anesthesia Anesthesia type not filed in the log.    Estimated  blood loss * No values recorded between 2/27/2023  7:30 AM and 2/27/2023 10:53 AM *   Drain/Implant Drains       Drain  Duration                  Urethral Catheter 02/27/23 0720 Silicone 16 Fr. <1 day                     No requested implants      Additional administrative details None     Cancer-Specific Information    Preoperative Cancer-Specific Goal Curative intent   Preoperative Cancer Type Invasive cancer   Preoperative Tumor Location Upper-outer quadrant of breast   Preoperative TNM Classification Cancer Staging   Malignant neoplasm of upper-outer quadrant of left breast in female, estrogen receptor negative  - Clinical stage from 3/21/2022: Stage IIIC (cT3, cN2(f), cM0, G3, ER-, WA-, HER2-) - Signed by Yessy Cruz MD on 3/21/2022      T2 (Tumor >20 mm but <50 mm in greatest dimension)  N1 (Metastases to movable ipsilateral Level I, II axillary lymph node(s))  M0 (No clinical or radiographic evidence of distant metastases)    G3 (High combined histologic grade; SBR score of 8-9 points)    Negative    Negative    Negative   Preoperative Therapy Chemotherapy  Immunotherapy     Right breast    None    Left breast    Procedure-specific Information - Mastectomy    Procedure Intent Excision of primary tumor with grossly negative margins   Mastectomy Type Skin-sparing   Type of Incision Other - wise pattern   Tumescent Solutions Used No   Pectoralis Fascia, Muscle or Both Removed Neither   Reconstruction Yes   Drains Placed Deferred to plastics       Procedure-specific Information - Altus Lymph Node Biopsy    Tracers Used to Identify Altus Nodes in Neoadjuvant and Non-neoadjuvant Setting Radioactive tracer; neoadjuvant setting and Dye; neoadjuvant setting   Additional Tracer Details (Optional) Isosulfan blue   Completeness of Altus and Palpable Node Removal All significantly radioactive nodes were removed, All nodes (colored or non-colored) present at the end of a dye-filled lymphatic channel were  removed, and All palpably suspicious nodes were removed   Biopsy-proven Positive Nodes Marked with Clips Prior to Chemotherapy Identified and Removed Yes       Procedure-specific Information - Axillary Dissection    Resection Performed within the Boundaries of the Axillary Vein, Chest Wall (Serratus anterior), and Latissimus Dorsi Yes   Level III Nodes Removed No   Nerves Identified and Preserved During the Dissection Long thoracic, Thoracodorsal, Branches of intercostobrachial nerves - 3, and Other (including medial pectoral)   Drains Placed Yes plastic surgery we will place at the end of case   Reason for axillary dissection Other - preoperative positive lymph node with continued positivity after neoadjuvant chemotherapy       Additional Procedure Details    Oncoplastic Reconstruction No   Intraoperative Radiation No   Intraoperative Complications None   Additional Surgical Information None

## 2023-02-27 NOTE — BRIEF OP NOTE
[facility] - Surgery ([place])  Brief Operative Note     SUMMARY     Surgery Date: 2/27/2023     Surgeon(s) and Role:  Panel 1:     * YASMANI Weiner MD - Primary  Panel 2:     * Reese Barrios DO - Primary    Assistant: Angie Peck PA-C, Fox Hurtado MD    Pre-op Diagnosis:  Malignant neoplasm of upper-outer quadrant of left breast in female, estrogen receptor negative [C50.412, Z17.1]    Post-op Diagnosis:  Post-Op Diagnosis Codes:     * Malignant neoplasm of upper-outer quadrant of left breast in female, estrogen receptor negative [C50.412, Z17.1]    Procedure(s) (LRB):  MASTECTOMY / LEFT (Left)  BIOPSY, LYMPH NODE, SENTINEL LEFT with radiological marker (Left)  INJECTION, FOR SENTINEL NODE IDENTIFICATION (Left)  LYMPHADENECTOMY, AXILLARY LEFT (Left)  INSERTION, TISSUE EXPANDER, BREAST (Left)    Anesthesia: General    Description of the findings of the procedure: left mastectomy, axillary dissection, tissue expander placement    Findings/Key Components: See operative report    Estimated Blood Loss: * No values recorded between 2/27/2023  7:30 AM and 2/27/2023 11:33 AM *         Specimens:   Specimen (24h ago, onward)       Start     Ordered    02/27/23 1119  Specimen to Pathology, Surgery Breast  Once        Comments: Pre-op Diagnosis: Malignant neoplasm of upper-outer quadrant of left breast in female, estrogen receptor negative [C50.412, Z17.1]Procedure(s):MASTECTOMY / LEFTBIOPSY, LYMPH NODE, SENTINEL LEFT with radiological markerINJECTION, FOR SENTINEL NODE IDENTIFICATIONLYMPHADENECTOMY, AXILLARY LEFTINSERTION, TISSUE EXPANDER, BREAST Number of specimens: 8Name of specimens: 1) RIGHT CHEST PORT GROSS ONLY;2) LEFT AXILLARY SENTINEL LYMPH NODE #1, PALPABLE (FROZEN);3) LEFT AXILLARY SENTINEL LYMPH NODE #2, HOT AND BLUE 1716 (FROZEN);4) LEFT AXILLARY SENTINEL LYMPH NODE #3, BLUE (FROZEN);5) LEFT AXILLARY SENTINEL LYMPH NODE #4, PALPABLE (FROZEN);6) LEFT MASTECTOMY (LONG-LATERAL, SHORT-SUPERIOR);7)  LEFT AXILLARY CONTENTS8) ADDITIONAL MASTECTOMY SKIN     References:    Click here for ordering Quick Tip   Question Answer Comment   Procedure Type: Breast    Specimen Class: Known or suspected malignancy    Which provider would you like to cc? YASMANI HERNANDEZ    Which provider would you like to cc? KELI BONILLA    Release to patient Immediate        02/27/23 1131    02/27/23 1100  Specimen to Pathology, Surgery Breast  Once,   Status:  Canceled        Comments: Pre-op Diagnosis: Malignant neoplasm of upper-outer quadrant of left breast in female, estrogen receptor negative [C50.412, Z17.1]Procedure(s):MASTECTOMY / LEFTBIOPSY, LYMPH NODE, SENTINEL LEFT with radiological markerINJECTION, FOR SENTINEL NODE IDENTIFICATIONLYMPHADENECTOMY, AXILLARY LEFTINSERTION, TISSUE EXPANDER, BREAST Number of specimens: 7Name of specimens: 1) RIGHT CHEST PORT GROSS ONLY;2) LEFT AXILLARY SENTINEL LYMPH NODE #1, PALPABLE (FROZEN);3) LEFT AXILLARY SENTINEL LYMPH NODE #2, HOT AND BLUE 1716 (FROZEN);4) LEFT AXILLARY SENTINEL LYMPH NODE #3, BLUE (FROZEN);5) LEFT AXILLARY SENTINEL LYMPH NODE #4, PALPABLE (FROZEN);6) LEFT MASTECTOMY (LONG-LATERAL, SHORT-SUPERIOR);7) LEFT AXILLARY CONTENTS;     References:    Click here for ordering Quick Tip   Question Answer Comment   Procedure Type: Breast    Specimen Class: Known or suspected malignancy    Which provider would you like to cc? YASMANI HERNANDEZ    Release to patient Immediate        02/27/23 1101                    Implants:   Implant Name Type Inv. Item Serial No.  Lot No. LRB No. Used Action   [employer]IN TISSUE EXPANDER    [id number]  [id number] Left 1 Implanted       Complications: None    Discharge Note    SUMMARY     Admit Date: 2/27/2023    Discharge Date and Time:  02/27/2023 11:33 AM    Hospital Course: Patient was placed in observation status for the above procedure.  See above.  Postoperatively was discharged home from the PACU once criteria was met.    Final  Diagnosis: Post-Op Diagnosis Codes:     * Malignant neoplasm of upper-outer quadrant of left breast in female, estrogen receptor negative [C50.412, Z17.1]    Disposition: Home or Self Care    Follow Up/Patient Instructions:     Medications:  Reconciled Home Medications:      Medication List        ASK your doctor about these medications      cetirizine 10 MG tablet  Commonly known as: ZYRTEC  Take 10 mg by mouth once daily.     clotrimazole 1 % cream  Commonly known as: LOTRIMIN  Apply 1 application topically every evening.     hydrOXYzine HCL 10 MG Tab  Commonly known as: ATARAX  Take 10 mg by mouth nightly as needed.     mupirocin 2 % ointment  Commonly known as: BACTROBAN  Apply topically once daily.     pantoprazole 40 MG tablet  Commonly known as: PROTONIX  Take 1 tablet (40 mg total) by mouth once daily.     prednisoLONE acetate 1 % Drps  Commonly known as: PRED FORTE  Place 1 drop into both eyes 2 (two) times daily.     triamcinolone acetonide 0.1% 0.1 % cream  Commonly known as: KENALOG  Apply 1 g topically 2 (two) times daily.            No discharge procedures on file.

## 2023-02-27 NOTE — SUBJECTIVE & OBJECTIVE
No current facility-administered medications on file prior to encounter.     Current Outpatient Medications on File Prior to Encounter   Medication Sig    mupirocin (BACTROBAN) 2 % ointment Apply topically once daily.    pantoprazole (PROTONIX) 40 MG tablet Take 1 tablet (40 mg total) by mouth once daily.    prednisoLONE acetate (PRED FORTE) 1 % DrpS Place 1 drop into both eyes 2 (two) times daily.       Review of patient's allergies indicates:   Allergen Reactions    Strawberries [strawberry] Hives       Past Medical History:   Diagnosis Date    Anxiety     Breast cancer     Encounter for blood transfusion     Hypertension     Meningitis     Severe obesity 2023     Past Surgical History:   Procedure Laterality Date    BREAST BIOPSY Left      SECTION      COLONOSCOPY N/A 2022    Procedure: COLONOSCOPY;  Surgeon: Davi Chery MD;  Location: [facility] ([address] [place] [address]);  Service: Endoscopy;  Laterality: N/A;  case request entered from referral - Per DR. KATHLEEN Chery Pt to be scheduled for urgent colonoscopy on 22/ fully vaccinated / prep ins for Sutab on portal - ERW  see micro for negative C-diff- ERW    INSERTION OF TUNNELED CENTRAL VENOUS CATHETER (CVC) WITH SUBCUTANEOUS PORT N/A 2022    Procedure: [id number]-[id number];  Surgeon: Deo Sin Jr., MD;  Location: [facility] OR [address] [place] [address];  Service: General;  Laterality: N/A;    INSERTION OF TUNNELED CENTRAL VENOUS CATHETER (CVC) WITH SUBCUTANEOUS PORT N/A 2022    Procedure: INSERTION, SINGLE LUMEN CATHETER WITH PORT, WITH FLUOROSCOPIC GUIDANCE;  Surgeon: Ryder Dinero MD;  Location: [facility] CATH LAB;  Service: Vascular;  Laterality: N/A;     Family History       Problem Relation (Age of Onset)    Bladder Cancer Paternal Grandmother    Diabetes Father    Hypertension Mother, Father    Pancreatic cancer Maternal Uncle          Tobacco Use    Smoking status: Never    Smokeless tobacco: Never   Substance and Sexual Activity    Alcohol use: Not  Currently     Comment: social    Drug use: Never    Sexual activity: Not Currently     Review of Systems  Constitutional: Negative for malaise/fatigue and weight gain.   HENT:  Negative for hearing loss.    Eyes:  Negative for visual disturbance.   Cardiovascular:  Negative for chest pain, claudication, dyspnea on exertion, leg swelling, near-syncope, orthopnea, palpitations, paroxysmal nocturnal dyspnea and syncope.   Respiratory: Negative for shortness of breath, sleep disturbances due to breathing, snoring and wheezing.    Endocrine: Negative for cold intolerance, heat intolerance, polydipsia, polyphagia and polyuria.   Hematologic/Lymphatic: Negative for bleeding problem. Does not bruise/bleed easily.   Skin:  Negative for rash and suspicious lesions.   Musculoskeletal:  Negative for arthritis, falls, joint pain, muscle weakness and myalgias.   Gastrointestinal:  Negative for abdominal pain, change in bowel habit, constipation, diarrhea, heartburn, hematochezia, melena and nausea.   Genitourinary:  Negative for hematuria and nocturia.   Neurological:  Negative for excessive daytime sleepiness, dizziness, headaches, light-headedness, loss of balance and weakness.   Psychiatric/Behavioral:  Negative for depression. The patient is not nervous/anxious.    Allergic/Immunologic: Negative for environmental allergies.   Objective:     Vital Signs (Most Recent):  Temp: 97.9 °F (36.6 °C) (02/27/23 0531)  Pulse: 72 (02/27/23 0531)  Resp: 18 (02/27/23 0531)  BP: (!) 141/92 (02/27/23 0531)  SpO2: 99 % (02/27/23 0531)   Vital Signs (24h Range):  Temp:  [97.9 °F (36.6 °C)] 97.9 °F (36.6 °C)  Pulse:  [72] 72  Resp:  [18] 18  SpO2:  [99 %] 99 %  BP: (141)/(92) 141/92        There is no height or weight on file to calculate BMI.    Physical Exam  Constitutional:       Appearance: She is well-developed. She is obese.      Comments:  HENT:      Head: Normocephalic and atraumatic.   Eyes:      General: No scleral icterus.      Conjunctiva/sclera: Conjunctivae normal.      Pupils: Pupils are equal, round, and reactive to light.   Neck:      Thyroid: No thyromegaly.      Vascular: No hepatojugular reflux or JVD.      Trachea: No tracheal deviation.   Cardiovascular:      Rate and Rhythm: Normal rate and regular rhythm.      Chest Wall: PMI is not displaced.      Pulses: Intact distal pulses.           Carotid pulses are 2+ on the right side and 2+ on the left side.       Radial pulses are 2+ on the right side and 2+ on the left side.        Dorsalis pedis pulses are 2+ on the right side and 2+ on the left side.        Posterior tibial pulses are 2+ on the right side and 2+ on the left side.      Heart sounds: Normal heart sounds.   Pulmonary:      Effort: Pulmonary effort is normal.      Breath sounds: Normal breath sounds.   Abdominal:      General: Bowel sounds are normal. There is no distension.      Palpations: Abdomen is soft. There is no mass.      Tenderness: There is no abdominal tenderness.   Musculoskeletal:         General: No tenderness.      Cervical back: Normal range of motion and neck supple.   Lymphadenopathy:      Cervical: No cervical adenopathy.   Skin:     General: Skin is warm and dry.      Findings: No erythema or rash.      Nails: There is no clubbing.   Neurological:      Mental Status: She is alert and oriented to person, place, and time.   Psychiatric:         Speech: Speech normal.         Behavior: Behavior normal.   Significant Labs:  I have reviewed all pertinent lab results within the past 24 hours.    Significant Diagnostics:  I have reviewed all pertinent imaging results/findings within the past 24 hours.  No results found in the last 24 hours.

## 2023-02-27 NOTE — ANESTHESIA PROCEDURE NOTES
Intubation    Date/Time: 2/27/2023 7:23 AM  Performed by: Meghana Cota CRNA  Authorized by: Placido Cano MD     Intubation:     Induction:  Intravenous    Intubated:  Postinduction    Mask Ventilation:  Easy mask    Attempts:  1    Attempted By:  CRNA    Method of Intubation:  Video laryngoscopy    Blade:  Shaffer 3    Laryngeal View Grade: Grade I - full view of cords      Difficult Airway Encountered?: No      Complications:  None    Airway Device:  Oral endotracheal tube    Airway Device Size:  7.0    Style/Cuff Inflation:  Cuffed    Inflation Amount (mL):  3    Tube secured:  22    Secured at:  The lips    Placement Verified By:  Capnometry    Complicating Factors:  Obesity and short neck    Findings Post-Intubation:  BS equal bilateral

## 2023-02-27 NOTE — PLAN OF CARE
Laura De Jesusdominique Kent has met all discharge criteria from Phase II. Vital Signs are stable, ambulating  without difficulty. Discharge instructions given, patient verbalized understanding. Discharged from facility via wheelchair in stable condition.

## 2023-02-27 NOTE — H&P
Plastic and Reconstructive Surgery  History and Physical    HPI:  Laura Kent is a 37 y.o. female with left breast invasive ductal cancer and left axillary lymph node mets on biopsy, here today for left mastectomy, axillary dissection and tissue expander placement.    She was diagnosed in 2021.  The time of diagnosis in addition near the left breast mass she had a large axillary lymph node.  She states that she initially presented with a axillary mass.  She was treated with neoadjuvant chemotherapy.  This was completed at the end of December in  she is seen by Dr. Weiner.  They discussed both mastectomy and lumpectomy with radiation.  She was worried about long-term recurrence    Past medical history includes hypertension.  She denies any other surgical history outside of  in her Port-A-Cath  She is a nonsmoker she does not vape   She works in town as a  and is very busy around [employer]. She has 3 children between 10 and 20 years old    Review of Systems    Past Medical History:  Past Medical History:   Diagnosis Date    Anxiety     Breast cancer     Encounter for blood transfusion     Hypertension     Meningitis     Severe obesity 2023       Past Surgical History:  Past Surgical History:   Procedure Laterality Date    BREAST BIOPSY Left      SECTION      COLONOSCOPY N/A 2022    Procedure: COLONOSCOPY;  Surgeon: Davi Chery MD;  [facility]: [facility] ([facility]);  Service: Endoscopy;  Laterality: N/A;  case request entered from referral - Per DR. KATHLEEN Chery Pt to be scheduled for urgent colonoscopy on 22/ fully vaccinated / prep ins for Sutab on portal - ERW  see micro for negative C-diff- ERW    INSERTION OF TUNNELED CENTRAL VENOUS CATHETER (CVC) WITH SUBCUTANEOUS PORT N/A 2022    Procedure: [id number];  Surgeon: Deo Sin Jr., MD;  Location: [address];  Service: General;  Laterality: N/A;    INSERTION OF TUNNELED  CENTRAL VENOUS CATHETER (CVC) WITH SUBCUTANEOUS PORT N/A 8/1/2022    Procedure: INSERTION, SINGLE LUMEN CATHETER WITH PORT, WITH FLUOROSCOPIC GUIDANCE;  Surgeon: Ryder Dinero MD;  Location: [facility] CATH LAB;  Service: Vascular;  Laterality: N/A;       Family History:  Family History   Problem Relation Age of Onset    Hypertension Mother     Diabetes Father     Hypertension Father     Pancreatic cancer Maternal Uncle     Bladder Cancer Paternal Grandmother     Colon cancer Neg Hx     Esophageal cancer Neg Hx        Social History:  Social History     Socioeconomic History    Marital status: Single   Tobacco Use    Smoking status: Never    Smokeless tobacco: Never   Substance and Sexual Activity    Alcohol use: Not Currently     Comment: social    Drug use: Never    Sexual activity: Not Currently     Social Determinants of Health     Financial Resource Strain: Medium Risk    Difficulty of Paying Living Expenses: Somewhat hard   Food Insecurity: No Food Insecurity    Worried About Running Out of Food in the Last Year: Never true    Ran Out of Food in the Last Year: Never true   Transportation Needs: No Transportation Needs    Lack of Transportation (Medical): No    Lack of Transportation (Non-Medical): No   Physical Activity: Insufficiently Active    Days of Exercise per Week: 2 days    Minutes of Exercise per Session: 60 min   Stress: Stress Concern Present    Feeling of Stress : To some extent   Social Connections: Unknown    Frequency of Communication with Friends and Family: More than three times a week    Frequency of Social Gatherings with Friends and Family: Once a week    Active Member of Clubs or Organizations: Yes    Attends Club or Organization Meetings: More than 4 times per year    Marital Status: Never    Housing Stability: High Risk    Unable to Pay for Housing in the Last Year: Yes    Number of Places Lived in the Last Year: 1    Unstable Housing in the Last Year: No       Medications:  Current  Facility-Administered Medications   Medication Dose Route Frequency Provider Last Rate Last Admin    ceFAZolin injection 2 g  2 g Intravenous On Call Procedure Angie Peck PA-C        lactated ringers infusion   Intravenous Continuous Placido Cano MD        LIDOcaine (PF) 10 mg/ml (1%) injection 10 mg  1 mL Intradermal Once Angie Peck PA-C        LIDOcaine (PF) 10 mg/ml (1%) injection 5 mg  0.5 mL Intradermal Once Placido Cano MD        mupirocin 2 % ointment   Nasal On Call Procedure Angie Peck PA-C   Given at 02/27/23 0547    sodium chloride 0.9% flush 10 mL  10 mL Intravenous PRN Angie Peck PA-C         Facility-Administered Medications Ordered in Other Encounters   Medication Dose Route Frequency Provider Last Rate Last Admin    lactated ringers infusion   Intravenous Continuous PRN Brandyn Mohan MD   New Bag at 02/27/23 0624       Allergies:  Review of patient's allergies indicates:   Allergen Reactions    Strawberries [strawberry] Hives         Physical Exam    Vital Signs: BP (!) 141/92 (BP Location: Right arm, Patient Position: Sitting)   Pulse 72   Temp 97.9 °F (36.6 °C) (Oral)   Resp 18   LMP 11/19/2022 (Exact Date)   SpO2 99%   Breastfeeding No   Physical Exam    A&Ox4  Patient in NAD, resting comfortably in bed  Heart RRR  Lungs CTAB  Skin is warm and well-perfused      Diagnostics:  Lab Results   Component Value Date    WBC 19.06 (H) 01/01/2023    HGB 10.1 (L) 01/01/2023    HCT 29.6 (L) 01/01/2023    MCV 91 01/01/2023     01/01/2023     Lab Results   Component Value Date    CREATININE 0.8 02/06/2023    BUN 9 02/06/2023     02/06/2023    K 3.6 02/06/2023     02/06/2023    CO2 23 02/06/2023     Lab Results   Component Value Date    ALT 11 01/01/2023    AST 13 01/01/2023    ALKPHOS 62 01/01/2023    BILITOT 0.5 01/01/2023     Lab Results   Component Value Date    ALBUMIN 3.9 01/01/2023         Assessment/Plan  Laura Kent  is a 37 y.o. female with left breast invasive ductal cancer and left axillary lymph node mets on biopsy, here today for left mastectomy, axillary dissection and tissue expander placement.    - Consents obtained and questions answered.  - Proceed to OR.      Benjamin Hurtado  Plastic & Reconstructive Surgery

## 2023-02-27 NOTE — ASSESSMENT & PLAN NOTE
37F with malignant neoplasm of upper-outer quadrant of left breast in female, estrogen receptor negative, presenting today for L mastectomy and SLNB    Plan:  --Proceed to OR  --NPO since midnight  --Consent obtained. Questions/concerns addressed

## 2023-02-27 NOTE — ANESTHESIA POSTPROCEDURE EVALUATION
Anesthesia Post Evaluation    Patient: Laura Kent    Procedure(s) Performed: Procedure(s) (LRB):  MASTECTOMY / LEFT (Left)  BIOPSY, LYMPH NODE, SENTINEL LEFT with radiological marker (Left)  INJECTION, FOR SENTINEL NODE IDENTIFICATION (Left)  LYMPHADENECTOMY, AXILLARY LEFT (Left)  INSERTION, TISSUE EXPANDER, BREAST (Left)    Final Anesthesia Type: general      Patient location during evaluation: PACU  Patient participation: Yes- Able to Participate  Level of consciousness: awake and alert  Post-procedure vital signs: reviewed and stable  Pain management: adequate  Airway patency: patent    PONV status at discharge: No PONV  Anesthetic complications: no      Cardiovascular status: blood pressure returned to baseline and stable  Respiratory status: room air  Hydration status: euvolemic  Follow-up not needed.          Vitals Value Taken Time   /58 02/27/23 1301   Temp 36.9 °C (98.5 °F) 02/27/23 1250   Pulse 79 02/27/23 1308   Resp 16 02/27/23 1500   SpO2 100 % 02/27/23 1308   Vitals shown include unvalidated device data.      Event Time   Out of Recovery 13:09:00         Pain/Martell Score: Pain Rating Prior to Med Admin: 7 (2/27/2023  3:00 PM)  Pain Rating Post Med Admin: 3 (2/27/2023 12:59 PM)  Martell Score: 10 (2/27/2023  1:35 PM)

## 2023-02-27 NOTE — HPI
37F was diagnosed with left breast cancer 3/22. She recently completed NACT with ddAC in December. She has received one dose of pembrolizumab in September but developed pancreatitis and it was discontinued. She remained on steroid treatment until recently. She reports a history of HTN and states she was previously on 9 different BP medications. She has lost about 60 lbs and is no longer requiring medication. She checks her BP at home and reports readings of 120-130/60-70. She has had three pregnancies and reports Pre-eclampsia with the 1st, HELLP syndrome with the 2nd and HTN and gestational DM with the third.    Here today 2/27/23 for L mastectomy and SLNB

## 2023-02-27 NOTE — OP NOTE
[facility] ([place])  Plastic Surgery  Operative Note    SUMMARY     Date of Procedure: 2/27/2023     Location:  [facility]    Procedure(s): Procedure(s) (LRB):  MASTECTOMY / LEFT (Left)  BIOPSY, LYMPH NODE, SENTINEL LEFT with radiological marker (Left)  INJECTION, FOR SENTINEL NODE IDENTIFICATION (Left)  LYMPHADENECTOMY, AXILLARY LEFT (Left)    Immediate insertion of left breast tissue expander  ICG angiography to assess mastectomy skin flap perfusion    Surgeon(s) and Role:  Panel 1:     * YASMANI Weiner MD - Primary  Panel 2:     * Reese Barrios DO - Primary    Assistant: ERMA Bacon and Benjamin Hurtado MD, Fellow    Pre-Operative Diagnosis: Malignant neoplasm of upper-outer quadrant of left breast in female, estrogen receptor negative [C50.412, Z17.1]    Post-Operative Diagnosis: same    Anesthesia: General    Indications for Procedure:  37-year-old woman with left breast cancer.  She was initially diagnosed with a left breast and axillary mass.  She underwent neoadjuvant chemotherapy.  Is highly likely that she may need adjuvant radiation as well.  We discussed implant and autologous reconstruction.  The patient would like autologous reconstruction.  We elected to go with a bridge expander as she has excess skin that can be reduced with her skin sparing mastectomy and also is likely to need postop radiation     Operative Findings (including complications, if any): 750 cc Artoura HP expander     Description of Procedures:  Next patient was seen in the preoperative holding area, her breasts were marked.  Surgical and photographic consents were signed.  Incisions were discussed preoperatively with Dr. Weiner and we  decided upon tucker pattern.  The patient was taken to the operating room.  General anesthesia was induced.  The breasts and axilla were prepped and draped the usual sterile fashion.  Please see Dr Weiner's operative note for their portion of the procedure.    When I entered the  wound room the left mastectomy and ALND was complete.  Next the skin was re-prepped with chlorhexidine and new drapes were laid down.  The left breast pockets were checked for hemostasis.  By the base width was measured and 15 cm was thought to be an appropriate sized implant.    A 750cc HP Artoura implant was opened on the back table and allowed to soak antibiotic solution.     20cc of exparel was diluted with 20cc 0.25% marcaine.  Intercostal rib blocks were performed as well as injection of the VALENTINA drain and axillary drain sites and the PEC1 plane under the pec major muscle.  The mastectomy pocket(s) were irrigated with dilute betadine solution followed by vashe.  Again the wound was made hemostatic using the bovie.     The tabs were sewn in to place on the chest wall using 2-0 PDS sutures with the knots buried under the tabs. all tabs were used    The mastectomy utilized a wise pattern skin incision. The incision was temporaily stapled close.  No  air was added to the expander.    ICG angiography was performed using the vision sense machine. 4cc of reconstituted ICG was injected by anesthesia. The expander was toatally deflated.  The entire breast was well perfused save a very small area at the t point.  This was debrided with facelift scissors.      Skin was closed with buried 3-0 monocryl and a running 3-0 startafix subcuticular suture    The wound was dressed with prineo tape, ABD pads and a post op bra.  VALENTINA drain sites were dressed with CHG tegaderm dressing  Patient tolerated the procedure well and was taken to the OR in stable condition.      Significant Surgical Tasks Conducted by the Assistant(s), if Applicable: The indicated resident served as first assistant for this procedure.    Estimated Blood Loss (EBL): 20mL           Implants:   Implant Name Type Inv. Item Serial No.  Lot No. LRB No. Used Action   JOHN MUNOZA TISSUE EXPANDER    [id number]  [id number] Left 1 Implanted       Specimens:    Specimen (24h ago, onward)       Start     Ordered    02/27/23 1119  Specimen to Pathology, Surgery Breast  Once        Comments: Pre-op Diagnosis: Malignant neoplasm of upper-outer quadrant of left breast in female, estrogen receptor negative [C50.412, Z17.1]Procedure(s):MASTECTOMY / LEFTBIOPSY, LYMPH NODE, SENTINEL LEFT with radiological markerINJECTION, FOR SENTINEL NODE IDENTIFICATIONLYMPHADENECTOMY, AXILLARY LEFTINSERTION, TISSUE EXPANDER, BREAST Number of specimens: 8Name of specimens: 1) RIGHT CHEST PORT GROSS ONLY;2) LEFT AXILLARY SENTINEL LYMPH NODE #1, PALPABLE (FROZEN);3) LEFT AXILLARY SENTINEL LYMPH NODE #2, HOT AND BLUE 1716 (FROZEN);4) LEFT AXILLARY SENTINEL LYMPH NODE #3, BLUE (FROZEN);5) LEFT AXILLARY SENTINEL LYMPH NODE #4, PALPABLE (FROZEN);6) LEFT MASTECTOMY (LONG-LATERAL, SHORT-SUPERIOR);7) LEFT AXILLARY CONTENTS8) ADDITIONAL MASTECTOMY SKIN     References:    Click here for ordering Quick Tip   Question Answer Comment   Procedure Type: Breast    Specimen Class: Known or suspected malignancy    Which provider would you like to cc? YASMANI HERNANDEZ    Which provider would you like to cc? KELI BONILLA    Release to patient Immediate        02/27/23 1131    02/27/23 1100  Specimen to Pathology, Surgery Breast  Once,   Status:  Canceled        Comments: Pre-op Diagnosis: Malignant neoplasm of upper-outer quadrant of left breast in female, estrogen receptor negative [C50.412, Z17.1]Procedure(s):MASTECTOMY / LEFTBIOPSY, LYMPH NODE, SENTINEL LEFT with radiological markerINJECTION, FOR SENTINEL NODE IDENTIFICATIONLYMPHADENECTOMY, AXILLARY LEFTINSERTION, TISSUE EXPANDER, BREAST Number of specimens: 7Name of specimens: 1) RIGHT CHEST PORT GROSS ONLY;2) LEFT AXILLARY SENTINEL LYMPH NODE #1, PALPABLE (FROZEN);3) LEFT AXILLARY SENTINEL LYMPH NODE #2, HOT AND BLUE 1716 (FROZEN);4) LEFT AXILLARY SENTINEL LYMPH NODE #3, BLUE (FROZEN);5) LEFT AXILLARY SENTINEL LYMPH NODE #4, PALPABLE (FROZEN);6) LEFT  MASTECTOMY (LONG-LATERAL, SHORT-SUPERIOR);7) LEFT AXILLARY CONTENTS;     References:    Click here for ordering Quick Tip   Question Answer Comment   Procedure Type: Breast    Specimen Class: Known or suspected malignancy    Which provider would you like to cc? YASMANI HERNANDEZ    Release to patient Immediate        02/27/23 1101                            Condition: Good    Disposition: PACU - hemodynamically stable., DC home    Attestation: I was present and scrubbed for the entire procedure.

## 2023-02-27 NOTE — PATIENT INSTRUCTIONS
Plastic Surgery Discharge Instructions  Enoch Barrios DO    Wound Care  1. Shower daily beginning 2 days after surgery  2. Okay to let warm soapy water run over the wound at that time  3. Do not submerge wounds in the bath  4. Leave any skin glue or mesh tape in place    Drain Care  If you have drains, strip tubing and record output when drain bulb becomes half full, or at least twice daily  Record output for each drain and bring to our follow up appointment    Diet  Regular Diet    Activity  Light activity only - no lifting greater than 10 lbs  Avoid strenuous activity that would cause you to get hot or sweaty    Medications  You have been given a prescription for antibiotics, narcotic pain medication, anti-inflammatory pain, muscle relaxer, and nerve pain  Please take medications as prescribed     What to call for:  1. Sustained fever > 101.0  2. Changes in color, sensation, temperature or pain at surgical site  3. Redness and/or drainage from the surgical site  4. Any reaction to prescribed medications  5. Continuous bloody drainage from surgical drains  6. Wound vac malfunction  7. Questions related to the procedure    Follow-up  1. Please call [phone] to schedule or confirm your follow up visit at the [facility], [address]  2. Call with any questions or concerns.  2. After hours call [phone] - ask to speak with the Plastic Surgery fellow on call

## 2023-02-28 NOTE — TELEPHONE ENCOUNTER
Had a procedure done today.Was told to call for any problems. Had a mastectomy left breast. Have two drains and Drain #2 is not draining at all, but has empted drain # 1 twice. Thinks that the drain #2 is clogged. Triage nurse paged surgery intern. Dr. Oswald called back and MD call transferred to the patient phone.  Reason for Disposition   [1] Caller has URGENT question AND [2] triager unable to answer question    Additional Information   Negative: Sounds like a life-threatening emergency to the triager   Negative: Chest pain   Negative: Difficulty breathing   Negative: Acting confused (e.g., disoriented, slurred speech) or excessively sleepy   Negative: Post-Op tonsil and adenoid surgery, symptoms or questions about   Negative: Surgical incision symptoms and questions   Negative: [1] Pain or burning with passing urine (urination) AND [2] male   Negative: [1] Pain or burning with passing urine (urination) AND [2] female   Negative: Constipation   Negative: New or worsening leg (calf, thigh) pain   Negative: New or worsening leg swelling   Negative: Dizziness is severe, or persists > 24 hours after surgery   Negative: Pain, redness, swelling, or pus at IV Site   Negative: Symptoms arising from use of a urinary catheter (e.g., coude, Roberson)   Negative: Cast problems or questions   Negative: Medication question   Negative: [1] Widespread rash AND [2] bright red, sunburn-like   Negative: [1] SEVERE headache AND [2] after spinal (epidural) anesthesia   Negative: [1] Vomiting AND [2] persists > 4 hours   Negative: [1] Vomiting AND [2] abdomen looks much more swollen than usual   Negative: [1] Drinking very little AND [2] dehydration suspected (e.g., no urine > 12 hours, very dry mouth, very lightheaded)   Negative: Patient sounds very sick or weak to the triager   Negative: Sounds like a serious complication to the triager   Negative: Fever > 100.4 F (38.0 C)   Negative: [1] SEVERE post-op pain (e.g., excruciating, pain  scale 8-10) AND [2] not controlled with pain medications    Protocols used: Post-Op Symptoms and [id number]

## 2023-03-02 ENCOUNTER — OFFICE VISIT (OUTPATIENT)
Dept: HEMATOLOGY/ONCOLOGY | Facility: CLINIC | Age: 38
End: 2023-03-02
Payer: MEDICAID

## 2023-03-02 VITALS
HEIGHT: 65 IN | DIASTOLIC BLOOD PRESSURE: 87 MMHG | WEIGHT: 215.38 LBS | HEART RATE: 79 BPM | RESPIRATION RATE: 18 BRPM | TEMPERATURE: 98 F | OXYGEN SATURATION: 100 % | SYSTOLIC BLOOD PRESSURE: 133 MMHG | BODY MASS INDEX: 35.88 KG/M2

## 2023-03-02 DIAGNOSIS — D63.0 ANEMIA IN NEOPLASTIC DISEASE: ICD-10-CM

## 2023-03-02 DIAGNOSIS — Z17.1 MALIGNANT NEOPLASM OF UPPER-OUTER QUADRANT OF LEFT BREAST IN FEMALE, ESTROGEN RECEPTOR NEGATIVE: Primary | ICD-10-CM

## 2023-03-02 DIAGNOSIS — G89.3 NEOPLASM RELATED PAIN: ICD-10-CM

## 2023-03-02 DIAGNOSIS — C50.412 MALIGNANT NEOPLASM OF UPPER-OUTER QUADRANT OF LEFT BREAST IN FEMALE, ESTROGEN RECEPTOR NEGATIVE: Primary | ICD-10-CM

## 2023-03-02 PROCEDURE — 3044F HG A1C LEVEL LT 7.0%: CPT | Mod: CPTII,,, | Performed by: INTERNAL MEDICINE

## 2023-03-02 PROCEDURE — 3075F PR MOST RECENT SYSTOLIC BLOOD PRESS GE 130-139MM HG: ICD-10-PCS | Mod: CPTII,,, | Performed by: INTERNAL MEDICINE

## 2023-03-02 PROCEDURE — 99214 OFFICE O/P EST MOD 30 MIN: CPT | Mod: PBBFAC | Performed by: INTERNAL MEDICINE

## 2023-03-02 PROCEDURE — 1159F MED LIST DOCD IN RCRD: CPT | Mod: CPTII,,, | Performed by: INTERNAL MEDICINE

## 2023-03-02 PROCEDURE — 3008F BODY MASS INDEX DOCD: CPT | Mod: CPTII,,, | Performed by: INTERNAL MEDICINE

## 2023-03-02 PROCEDURE — 99214 PR OFFICE/OUTPT VISIT, EST, LEVL IV, 30-39 MIN: ICD-10-PCS | Mod: S$PBB,,, | Performed by: INTERNAL MEDICINE

## 2023-03-02 PROCEDURE — 3075F SYST BP GE 130 - 139MM HG: CPT | Mod: CPTII,,, | Performed by: INTERNAL MEDICINE

## 2023-03-02 PROCEDURE — 99999 PR PBB SHADOW E&M-EST. PATIENT-LVL IV: ICD-10-PCS | Mod: PBBFAC,,, | Performed by: INTERNAL MEDICINE

## 2023-03-02 PROCEDURE — 3079F PR MOST RECENT DIASTOLIC BLOOD PRESSURE 80-89 MM HG: ICD-10-PCS | Mod: CPTII,,, | Performed by: INTERNAL MEDICINE

## 2023-03-02 PROCEDURE — 1160F PR REVIEW ALL MEDS BY PRESCRIBER/CLIN PHARMACIST DOCUMENTED: ICD-10-PCS | Mod: CPTII,,, | Performed by: INTERNAL MEDICINE

## 2023-03-02 PROCEDURE — 99214 OFFICE O/P EST MOD 30 MIN: CPT | Mod: S$PBB,,, | Performed by: INTERNAL MEDICINE

## 2023-03-02 PROCEDURE — 1160F RVW MEDS BY RX/DR IN RCRD: CPT | Mod: CPTII,,, | Performed by: INTERNAL MEDICINE

## 2023-03-02 PROCEDURE — 3044F PR MOST RECENT HEMOGLOBIN A1C LEVEL <7.0%: ICD-10-PCS | Mod: CPTII,,, | Performed by: INTERNAL MEDICINE

## 2023-03-02 PROCEDURE — 3079F DIAST BP 80-89 MM HG: CPT | Mod: CPTII,,, | Performed by: INTERNAL MEDICINE

## 2023-03-02 PROCEDURE — 3008F PR BODY MASS INDEX (BMI) DOCUMENTED: ICD-10-PCS | Mod: CPTII,,, | Performed by: INTERNAL MEDICINE

## 2023-03-02 PROCEDURE — 99999 PR PBB SHADOW E&M-EST. PATIENT-LVL IV: CPT | Mod: PBBFAC,,, | Performed by: INTERNAL MEDICINE

## 2023-03-02 PROCEDURE — 1159F PR MEDICATION LIST DOCUMENTED IN MEDICAL RECORD: ICD-10-PCS | Mod: CPTII,,, | Performed by: INTERNAL MEDICINE

## 2023-03-02 RX ORDER — OXYCODONE HYDROCHLORIDE 5 MG/1
5 TABLET ORAL EVERY 4 HOURS PRN
Qty: 15 TABLET | Refills: 0 | Status: SHIPPED | OUTPATIENT
Start: 2023-03-02 | End: 2023-09-20

## 2023-03-02 NOTE — PROGRESS NOTES
Subjective:       Patient ID: Laura Kent is a 37 y.o. female.    Chief Complaint: Malignant neoplasm of upper-outer quadrant of left breast i    HPI     In the interval-- she underwent her left mastectomy and pathology has not yet resulted  She still has her VALENTINA drain  Reports pain but healing well    - 2/27/2023 Procedure(s) (LRB):  MASTECTOMY / LEFT (Left)  BIOPSY, LYMPH NODE, SENTINEL LEFT with radiological marker (Left)  INJECTION, FOR SENTINEL NODE IDENTIFICATION (Left)  LYMPHADENECTOMY, AXILLARY LEFT (Left)  INSERTION, TISSUE EXPANDER, BREAST (Left)     Oncology History:  - self detected an area under her left arm and it initially seemed to go away (noted around winter holidays)  - 3/4/2022 Outside Mammogram:  Findings:  The breasts are heterogeneously dense, which may obscure small masses.   Left  There is a 31 mm x 18 mm x 21 mm irregularly shaped mass with microlobulated margins seen in the left breast at 2 o'clock in the posterior depth with associated left axillary adenopathy. Largest node depicted on the outside study measures 49 x 23 x 29 mm with cortical thickening and effacement of the hilum. Breast mass is reportedly palpable.   Right  There is no evidence of suspicious masses, calcifications, or other abnormal findings in the right breast.  Impression:  Left  Mass: Left breast 31 mm x 18 mm x 21 mm mass at the posterior 2 o'clock position. Assessment: 5 - Highly suggestive of malignancy. Biopsy is recommended.   Right  There is no mammographic evidence of malignancy in the right breast.  BI-RADS Category:   Overall: 5 - Highly Suggestive of Malignancy  Recommendation:  Ultrasound Guided Core Needle Biopsy of the left breast mass and one of the abnormal left axillary nodes is recommended.     - 3/14/2022 Breast biopsy:  1. LEFT BREAST MASS, 2 O'CLOCK, BIOPSY:   Invasive ductal carcinoma, [place] histologic grade 3 (tubule formation:   3, nuclear pleomorphism:  3, mitotic activity:  3).    Comment:  Infiltrating carcinoma occupies the entirety of biopsied material   with the largest continuous focus measuring 13 mm.  Breast biomarkers are   pending and will be issued in a supplemental report.   2. LEFT AXILLARY LYMPH NODE, BIOPSY:   Invasive ductal carcinoma, [place] histologic grade 3 (tubule formation:   3, nuclear pleomorphism:  3, mitotic activity:  3).   Comment:  Infiltrating carcinoma occupies the entirety of biopsied material   with the largest continuous focus measuring 20 mm.  No lymph node tissue is   identified in the sample.  Tumor histology is essentially identical to that   of part 1.  The findings could represent part of a larger intranodal   metastasis, but an extension from the primary tumor cannot be excluded.   Radiographic correlation is advised.   Note:  Dr. Stephanie Rao also reviewed this case and agrees with the   diagnosis.   BREAST BIOMARKER RESULTS   Estrogen receptor (ER):  Negative (0)   Progesterone receptor (ER):  Negative (0)   HER2 IHC:  Negative (1+)   Ki-67 proliferation index:  80%      - 3/17/2022 Breast MRI:  Findings:  The breasts have heterogeneous fibroglandular tissue. The background parenchymal enhancement is minimal.   Left  There is a 38 mm x 33 mm x 33 mm irregularly shaped mass seen in the left breast at 2 o'clock in the posterior depth, 8.4 cm from the nipple and 1.2 cm from the skin. Associated signal void from a twirl biopsy marker; pathology showed invasive ductal carcinoma.   Posterior to the mass there is abnormal non mass enhancement measuring 29 x 28 mm. The NME is 4 mm from the skin and 2.3 cm from the chest wall. In total the mass and NME measure 54 mm in AP extent.  Overlying skin thickening and edema involving the lateral breast, likely from lymphovascular obstruction. No suspicious skin enhancement. Overall increased vascularity to the left breast.   Four abnormal lymph level 1 and level 2 left axillary lymph nodes. The largest lymph  node measures 52 mm x 40 mm x 38 mm which previously underwent biopsy showing metastatic disease. There is an associated radar reflector within the biopsied lymph node.  Right  There is no evidence of suspicious masses, abnormal enhancement, or other abnormal findings in the right breast. No enlarged axillary or internal mammary lymph nodes.   Impression:  Left  Mass: Left breast 38 mm x 33 mm x 33 mm mass at the posterior 2 o'clock position. Assessment: 6 - Known biopsy, proven malignancy. Associated 29 mm NME extending posteriorly from the mass. In total the mass and NME measure 54 mm in AP extent.  Lymph Node: Abnormal level 1 and 2 left axillary lymph nodes, the largest of which measures 52 mm x 40 mm x 38 mm lymph node and is known biopsy, proven malignancy.   Right  There is no MR evidence of malignancy in the right breast.  BI-RADS Category:   Overall: 6 - Known Biopsy-Proven Malignancy  Recommendation:  Clinical management of known left breast cancer. Patient is established with the breast surgery clinic.      - 3/21/2022 CT C/A/P:  FINDINGS:  Base of Neck: No significant abnormality.  Thoracic soft tissue: A proximally 3.2 x 3.6 x 3.7 cm irregular left breast mass within the lateral aspect with internal biopsy marker, corresponding to findings on prior breast MRI and compatible with malignancy.  Enlarged left axillary lymph nodes, largest measuring approximately 4.2 x 3.6 cm, (series 2, image 27).  CHEST:  -Heart: Normal size. No pericardial effusion.  -Pulmonary vasculature: Pulmonary arteries distribute normally.  There are four pulmonary veins.  -Radha/Mediastinum: No pathologic shelly enlargement.  -Trachea and Proximal airways: Trachea is midline.  Central airways are patent.  No significant bronchial wall thickening or bronchiectasis.  -Lungs/Pleura: Symmetrically expanded without focal consolidation, pneumothorax, or mass.  No pleural effusion or thickening.  -Esophagus: Normal course and  caliber.  ABDOMEN:  - Liver: Normal in size and attenuation with no focal hepatic abnormality.  - Gallbladder: No calcified gallstones.  No wall thickening or pericholecystic fluid.  - Bile Ducts: No intra or extrahepatic biliary ductal dilatation.  - Stomach/Duodenum: Small hiatal hernia otherwise unremarkable.  - Spleen: Normal size.  No focal parenchymal abnormality.  - Pancreas: No mass lesion.  No pancreatic ductal dilatation.  No peripancreatic fat stranding.  - Adrenals: Unremarkable.  - Kidneys/ureters/urinary bladder: Normal in size and location.  Normal enhancement pattern.  No nephrolithiasis.  Ureters are normal in course and caliber.  No hydroureteronephrosis.  - Retroperitoneum: Scattered nonenlarged retroperitoneal lymph nodes.  PELVIS:  - Reproductive: Intrauterine device present.  A proximally 2.8 cm peripherally enhancing right ovarian cyst, likely corpus luteal cyst.  - Other: No pelvic adenopathy, free fluid, or mass.  BOWEL/MESENTERY:  No evidence of bowel obstruction or inflammatory process. Appendix is visualized and unremarkable.  VASCULATURE: Left-sided aortic arch with 3 branch vessels.  No aneurysm and no significant atherosclerosis.  Portal vein, splenic vein, and SMV are patent.  BONES: Lucent approximately 0.6 cm right trochanteric lesion, likely synovial herniation pit.  No acute fracture or bony destructive process.  EXTRATHORACIC/EXTRAPERITONEAL SOFT TISSUES: Unremarkable.  Impression:  In this patient with known breast cancer, there is an approximately 3.7 cm irregular left breast mass with left axillary lymphadenopathy.  No evidence of metastatic disease  Small hiatal hernia.  Additional findings as described above.     - 3/21/2022 Bone scan:  FINDINGS:  There is physiologic distribution of the radiopharmaceutical throughout the skeleton.  Mild degenerative uptake within the bilateral shoulders and knees.  There is normal uptake in the genitourinary system and soft  tissues.  Impression:  There is no scintigraphic evidence of osteoblastic metastatic disease.     - 3/21/2022 Brain MRI:  FINDINGS:  Please note there is dental metal artifact distorting the examination.  Allowing for artifacts the brain parenchyma is normal in contour.  Developmental variant with cavum septum pellucidum identified.  The ventricles are otherwise normal in size without hydrocephalus.  There is no midline shift or significant mass effect.  The major intracranial T2 flow voids are present.  No restricted diffusion within the non distorted brain parenchyma.  There is severe distortion of the susceptibility imaging no parenchymal susceptibility to suggest parenchymal hemorrhage in the non distorted brain parenchyma.  There is suboptimal evaluation of the cerebellum and posterior fossa.  Impression:  Unremarkable MRI brain allowing for artifacts from dental metal as detailed above specifically without abnormal parenchymal enhancement to suggest intracranial metastatic disease in light of history.     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 She had some slow improvement over a few days then not much progress. GI consulted and steroids were restarted with good effect. She was placed on an extended steroid taper of steroids starting at 40mg and will complete her last dose of 5mg on 12/6/2022. She is to follow up with oncology for her appointment next week. Given return precautions.     10/20/2022 Ultrasound left breast:  Findings:  At the site of the marker from the prior malignant breast biopsy,there is a 6 mm x 7 mm x 3 mm oval, parallel, hypoechoic mass with indistinct margins seen in the left breast at 2 o'clock in the posterior depth, 8 cm from the nipple. This is difficult to distinguish from the Twirl marker itself.  At the site of the  previously biopsied metastatic left axillary node, there is a 10 x 7 x 11 mm node with a Uyen- marker. Node has mild residual cortical thickening (5 mm), but has decreased in size. There are a few other normal size, normal appearing left axillary nodes.   Impression:  Left  Mass: Left breast 6 mm x 7 mm x 3 mm mass at the posterior 2 o'clock position. Assessment: 6 - Known biopsy, proven malignancy. Known, previously biopsied left breast cancer and metastatic left axillary node have both decreased in size. The breast mass is now difficult to distinguish from the biopsy marker.   BI-RADS Category:   Overall: 6 - Known Biopsy-Proven Malignancy  Recommendation:  Patient is already under the care of the Breast Oncology team. Most recent bilateral mammogram was performed in February 2022.      Resumed NA chemo without immunotherapy 11/21/2022.      - 12/8/2022 Breast MRI:  Findings:  The breasts have heterogeneous fibroglandular tissue. The background parenchymal enhancement is minimal and symmetric. There is no evidence of suspicious masses, abnormal enhancement, or other abnormal findings.  There has been interval decrease in there has been interval resolution of previously described enhancing mass at the 2:00 axis which is a known biopsy-proven malignancy.    There is no abnormality in the right breast.    There has been significant decrease in size of left axillary lymph nodes which all are normal in size on current exam.  The largest contains a signal void consistent with previously placed radar reflector at site of biopsy-proven axillary metastasis.  It measures 8 mm in short axis.  Impression:  Interval resolution of enhancing mass at site of known malignancy in the left breast at 2:00  and marked interval decrease in left axillary adenopathy with visualized lymph nodes normal in size on current exam.  Findings are consistent with complete imaging response to therapy.    BI-RADS Category:   Overall: 6 - Known  Biopsy-Proven Malignancy    - 2023 surgery     PMH:  HTN- around pregnancies; off of blood pressure medication x 2 years  CHF- ECHOs at [facility]  Gestational DM  Hyperlipidemia when heavier, managed with diet and followed by cardiology  C- sections x 2  Iron deficiency     GynHx:  Menarche- 7   (16 at 1st pregnancy) (17, 7, 4)  Still with periods - last 2022- last 5-7 days, moderate normal flow  + breast feeding x 1 year  IUD     SH:  Working, , [employer]  Single  Good support system  No tobacco  No EtOH  No illicit drugs    Review of Systems   Constitutional:  Positive for activity change. Negative for fatigue and unexpected weight change.   Respiratory:  Positive for chest tightness. Negative for cough and shortness of breath.    Cardiovascular:  Negative for chest pain, palpitations and leg swelling.   Gastrointestinal:  Negative for change in bowel habit, constipation and change in bowel habit.   Genitourinary:  Negative for difficulty urinating.   Psychiatric/Behavioral:  Negative for dysphoric mood.        Objective:      Physical Exam  Vitals and nursing note reviewed.   Constitutional:       Comments: Presents with her sister  In a wheelchair as post op but ambulates well to table   Cardiovascular:      Rate and Rhythm: Normal rate and regular rhythm.      Heart sounds: No murmur heard.    No friction rub. No gallop.   Pulmonary:      Effort: Pulmonary effort is normal. No respiratory distress.      Breath sounds: Normal breath sounds.      Comments: Left chest wall healing well and no signs of infection  Neurological:      Mental Status: She is alert.       Assessment:       Problem List Items Addressed This Visit       Neoplasm related pain    Relevant Medications    oxyCODONE (ROXICODONE) 5 MG immediate release tablet    Malignant neoplasm of upper-outer quadrant of left breast in female, estrogen receptor negative - Primary    Relevant Medications    oxyCODONE  (ROXICODONE) 5 MG immediate release tablet    Anemia in neoplastic disease       Plan:       Pain- post op meds- refilled     Breast cancer- awaiting final pathology and will call her and make next plans    Counseled on post surgical wound care and new supplies provided    Route Chart for Scheduling    Med Onc Chart Routing  Urgent    Follow up with physician . RTC 2-3 weeks   Follow up with ANDREW    Infusion scheduling note    Injection scheduling note    Labs    Imaging    Pharmacy appointment    Other referrals        Treatment Plan Information   OP PEMBROLIZUMAB CARBOPLATIN (AUC 5) WITH WEEKLY PACLITAXEL FOLLOWED BY DOSE DENSE DOXORUBICIN CYCLOPHOSPHAMIDE Q2W   Yessy Cruz MD   Upcoming Treatment Dates - OP PEMBROLIZUMAB CARBOPLATIN (AUC 5) WITH WEEKLY PACLITAXEL FOLLOWED BY DOSE DENSE DOXORUBICIN CYCLOPHOSPHAMIDE Q2W    1/12/2023       Chemotherapy       DOXOrubicin chemo injection 130 mg       cyclophosphamide 600 mg/m2 = 1,300 mg in sodium chloride 0.9% 250 mL chemo infusion       Antiemetics       aprepitant (CINVANTI) injection 130 mg       palonosetron (ALOXI) 0.25 mg with Dexamethasone (DECADRON) 12 mg in NS 50 mL IVPB    Supportive Plan Information  OP FILGRASTIM 300 MCG   Yessy Cruz MD   Upcoming Treatment Dates - OP FILGRASTIM 300 MCG    6/20/2022       Medications       tbo-filgrastim (GRANIX) injection 480 mcg/0.8 mL  6/21/2022       Medications       tbo-filgrastim (GRANIX) injection 480 mcg/0.8 mL  6/22/2022       Medications       tbo-filgrastim (GRANIX) injection 480 mcg/0.8 mL  6/23/2022       Medications       tbo-filgrastim (GRANIX) injection 480 mcg/0.8 mL    Therapy Plan Information  Flushes  heparin, porcine (PF) 100 unit/mL injection flush 500 Units  500 Units, Intravenous, Every visit  sodium chloride 0.9% flush 10 mL  10 mL, Intravenous, Every visit  heparin, porcine (PF) 100 unit/mL injection flush 500 Units  500 Units, Intravenous, Every visit  sodium chloride 0.9% flush 10 mL  10  mL, Intravenous, Every visit

## 2023-03-03 ENCOUNTER — PATIENT MESSAGE (OUTPATIENT)
Dept: HEMATOLOGY/ONCOLOGY | Facility: CLINIC | Age: 38
End: 2023-03-03
Payer: MEDICAID

## 2023-03-03 LAB
FINAL PATHOLOGIC DIAGNOSIS: NORMAL
FROZEN SECTION DIAGNOSIS: NORMAL
FROZEN SECTION FOOTNOTE: NORMAL
GROSS: NORMAL
Lab: NORMAL

## 2023-03-06 ENCOUNTER — PATIENT MESSAGE (OUTPATIENT)
Dept: SURGERY | Facility: CLINIC | Age: 38
End: 2023-03-06
Payer: MEDICAID

## 2023-03-06 ENCOUNTER — TELEPHONE (OUTPATIENT)
Dept: PLASTIC SURGERY | Facility: CLINIC | Age: 38
End: 2023-03-06
Payer: MEDICAID

## 2023-03-06 NOTE — TELEPHONE ENCOUNTER
Called pt back in regards to c/o pain- pt states the pain meds are not helping with the pain - I advised pt to take as prescribed post sx Tylenol ES  500mg 2 tabs every 8 hours and can alternate around the clock every 6hours with Ibuprofren 800mg along with Gababpentin 3 x day - pt was taking one 500mg tablet and only 200mg of the Ibuprofen- clarified the correct dosage and times  - pt voiced understanding - asked to Please reach out if have any questions or additional concerns-

## 2023-03-07 ENCOUNTER — OFFICE VISIT (OUTPATIENT)
Dept: PLASTIC SURGERY | Facility: CLINIC | Age: 38
End: 2023-03-07
Payer: MEDICAID

## 2023-03-07 VITALS
DIASTOLIC BLOOD PRESSURE: 85 MMHG | HEART RATE: 84 BPM | WEIGHT: 217.81 LBS | HEIGHT: 65 IN | BODY MASS INDEX: 36.29 KG/M2 | SYSTOLIC BLOOD PRESSURE: 124 MMHG

## 2023-03-07 DIAGNOSIS — Z17.1 MALIGNANT NEOPLASM OF UPPER-OUTER QUADRANT OF LEFT BREAST IN FEMALE, ESTROGEN RECEPTOR NEGATIVE: Primary | ICD-10-CM

## 2023-03-07 DIAGNOSIS — C50.412 MALIGNANT NEOPLASM OF UPPER-OUTER QUADRANT OF LEFT BREAST IN FEMALE, ESTROGEN RECEPTOR NEGATIVE: Primary | ICD-10-CM

## 2023-03-07 PROCEDURE — 3044F HG A1C LEVEL LT 7.0%: CPT | Mod: CPTII,,, | Performed by: SURGERY

## 2023-03-07 PROCEDURE — 99213 OFFICE O/P EST LOW 20 MIN: CPT | Mod: PBBFAC | Performed by: SURGERY

## 2023-03-07 PROCEDURE — 99024 POSTOP FOLLOW-UP VISIT: CPT | Mod: ,,, | Performed by: SURGERY

## 2023-03-07 PROCEDURE — 3074F PR MOST RECENT SYSTOLIC BLOOD PRESSURE < 130 MM HG: ICD-10-PCS | Mod: CPTII,,, | Performed by: SURGERY

## 2023-03-07 PROCEDURE — 3008F BODY MASS INDEX DOCD: CPT | Mod: CPTII,,, | Performed by: SURGERY

## 2023-03-07 PROCEDURE — 3079F PR MOST RECENT DIASTOLIC BLOOD PRESSURE 80-89 MM HG: ICD-10-PCS | Mod: CPTII,,, | Performed by: SURGERY

## 2023-03-07 PROCEDURE — 3008F PR BODY MASS INDEX (BMI) DOCUMENTED: ICD-10-PCS | Mod: CPTII,,, | Performed by: SURGERY

## 2023-03-07 PROCEDURE — 3044F PR MOST RECENT HEMOGLOBIN A1C LEVEL <7.0%: ICD-10-PCS | Mod: CPTII,,, | Performed by: SURGERY

## 2023-03-07 PROCEDURE — 3074F SYST BP LT 130 MM HG: CPT | Mod: CPTII,,, | Performed by: SURGERY

## 2023-03-07 PROCEDURE — 1159F PR MEDICATION LIST DOCUMENTED IN MEDICAL RECORD: ICD-10-PCS | Mod: CPTII,,, | Performed by: SURGERY

## 2023-03-07 PROCEDURE — 3079F DIAST BP 80-89 MM HG: CPT | Mod: CPTII,,, | Performed by: SURGERY

## 2023-03-07 PROCEDURE — 99999 PR PBB SHADOW E&M-EST. PATIENT-LVL III: ICD-10-PCS | Mod: PBBFAC,,, | Performed by: SURGERY

## 2023-03-07 PROCEDURE — 99024 PR POST-OP FOLLOW-UP VISIT: ICD-10-PCS | Mod: ,,, | Performed by: SURGERY

## 2023-03-07 PROCEDURE — 99999 PR PBB SHADOW E&M-EST. PATIENT-LVL III: CPT | Mod: PBBFAC,,, | Performed by: SURGERY

## 2023-03-07 PROCEDURE — 1159F MED LIST DOCD IN RCRD: CPT | Mod: CPTII,,, | Performed by: SURGERY

## 2023-03-07 RX ORDER — METHOCARBAMOL 500 MG/1
500 TABLET, FILM COATED ORAL 4 TIMES DAILY
Qty: 28 TABLET | Refills: 0 | Status: SHIPPED | OUTPATIENT
Start: 2023-03-07 | End: 2023-03-14 | Stop reason: SDUPTHER

## 2023-03-07 RX ORDER — IBUPROFEN 600 MG/1
600 TABLET ORAL 3 TIMES DAILY
Qty: 21 TABLET | Refills: 2 | Status: SHIPPED | OUTPATIENT
Start: 2023-03-07 | End: 2023-03-14

## 2023-03-07 RX ORDER — GABAPENTIN 300 MG/1
300 CAPSULE ORAL 3 TIMES DAILY
Qty: 21 CAPSULE | Refills: 0 | Status: SHIPPED | OUTPATIENT
Start: 2023-03-07 | End: 2023-03-14 | Stop reason: SDUPTHER

## 2023-03-07 RX ORDER — TRAMADOL HYDROCHLORIDE 50 MG/1
50 TABLET ORAL EVERY 6 HOURS PRN
Qty: 20 TABLET | Refills: 0 | Status: SHIPPED | OUTPATIENT
Start: 2023-03-07 | End: 2023-03-16 | Stop reason: SDUPTHER

## 2023-03-07 NOTE — PROGRESS NOTES
Ms Kent is 1 week status post left skin sparing mastectomy, wise pattern, axillary lymph node dissection, tissue expander placement    She said that she is been having quite a bit of pain from her axilla and some from her chest wall.  Her axillary chest wall skin is very tender to the touch.  A lot of her complains of pain from the axilla and sites of chest wall fixation of the tissue expander.  There was also some confusion about how to take scheduled ibuprofen.  Her axillary drain output is low.Her breast output remains high    Her skin is well healed.  She was Prineo tape in place and no air in the expander.  Dermabond over her axillary incision.  She does have some firm indurated, edematous tissue laterally  Axillary VALENTINA drain removed.  CHD dressings replaced      The fill port on the left tissue expander was marked using the magnet and a pen.  The skin was then prepped using chloroprep.  Using sterile technique the left port was accessed with a 21G butterfly needle. Air was added to the expander.  The skin was checked for tension and cap refill.    left volume added: Air        Discussed multimodal pain regimen and timing of fills.    We will send an additional week of gabapentin, Robaxin, 600 mg ibuprofen, and Ultram for breakthrough pain   See her back next week and exchange to saline    Enoch Barrios, DO  Plastic and Reconstructive Surgery

## 2023-03-10 ENCOUNTER — OFFICE VISIT (OUTPATIENT)
Dept: HEMATOLOGY/ONCOLOGY | Facility: CLINIC | Age: 38
End: 2023-03-10
Payer: MEDICAID

## 2023-03-10 ENCOUNTER — PATIENT MESSAGE (OUTPATIENT)
Dept: SURGERY | Facility: CLINIC | Age: 38
End: 2023-03-10
Payer: MEDICAID

## 2023-03-10 DIAGNOSIS — M79.2 NEUROPATHIC PAIN: Primary | ICD-10-CM

## 2023-03-10 DIAGNOSIS — C50.412 MALIGNANT NEOPLASM OF UPPER-OUTER QUADRANT OF LEFT BREAST IN FEMALE, ESTROGEN RECEPTOR NEGATIVE: ICD-10-CM

## 2023-03-10 DIAGNOSIS — Z17.1 MALIGNANT NEOPLASM OF UPPER-OUTER QUADRANT OF LEFT BREAST IN FEMALE, ESTROGEN RECEPTOR NEGATIVE: ICD-10-CM

## 2023-03-10 PROCEDURE — 3044F PR MOST RECENT HEMOGLOBIN A1C LEVEL <7.0%: ICD-10-PCS | Mod: CPTII,95,, | Performed by: NURSE PRACTITIONER

## 2023-03-10 PROCEDURE — 99214 OFFICE O/P EST MOD 30 MIN: CPT | Mod: 95,,, | Performed by: NURSE PRACTITIONER

## 2023-03-10 PROCEDURE — 3044F HG A1C LEVEL LT 7.0%: CPT | Mod: CPTII,95,, | Performed by: NURSE PRACTITIONER

## 2023-03-10 PROCEDURE — 99214 PR OFFICE/OUTPT VISIT, EST, LEVL IV, 30-39 MIN: ICD-10-PCS | Mod: 95,,, | Performed by: NURSE PRACTITIONER

## 2023-03-10 NOTE — PROGRESS NOTES
Subjective:      The patient location is: [place]  The chief complaint leading to consultation is: pain    Visit type: audiovisual      30 minutes of total time spent on the encounter, which includes face to face time and non-face to face time preparing to see the patient (eg, review of tests), Obtaining and/or reviewing separately obtained history, Documenting clinical information in the electronic or other health record, Independently interpreting results (not separately reported) and communicating results to the patient/family/caregiver, or Care coordination (not separately reported).         Each patient to whom he or she provides medical services by telemedicine is:  (1) informed of the relationship between the physician and patient and the respective role of any other health care provider with respect to management of the patient; and (2) notified that he or she may decline to receive medical services by telemedicine and may withdraw from such care at any time.    Notes:      Patient ID: Laura Kent is a 37 y.o. female.    Chief Complaint: No chief complaint on file.    HPI  Urgent care visit for pain  Left arm pain- underneath and along backside of arm. Pain started after surgery. She has been in touch with surgery about this.   Taking pain medication- tramadol (3-4 times a day), IBP and gabapentin TID.   Pain improved across chest cavity but not underneath arm -continues to be numb and tender when touched. No redness/warmth.   No swelling.    No CP  No SOB      - 2/27/2023 Procedure(s) (LRB):  MASTECTOMY / LEFT (Left)  BIOPSY, LYMPH NODE, SENTINEL LEFT with radiological marker (Left)  INJECTION, FOR SENTINEL NODE IDENTIFICATION (Left)  LYMPHADENECTOMY, AXILLARY LEFT (Left)  INSERTION, TISSUE EXPANDER, BREAST (Left)    Final Pathologic Diagnosis 1.  Port-a-cath, right chest port, removal: Gross Diagnosis:  Port.   2.  Left axillary sentinel lymph node #1, palpable, excisional biopsy: 1 of 3   lymph  nodes are positive for metastatic carcinoma (1/3).          Greatest dimension of metastatic carcinoma measures 4 mm (0.4 cm).          Confirmed by positive immunohistochemical staining in the metastatic   foci with cytokeratins AE1/AE3, Cam5.2 and wide          spectrum keratin with satisfactory positive and negative controls.            Treatment effect is present in lymph node.   3.  Left axillary sentinel lymph node #2, hot and blue 1716, excisional   biopsy: 1 lymph node, negative for metastatic carcinoma (0/1).          Confirmed by negative immunohistochemical staining with cytokeratins   AE1/AE3, cam 5.2 and wide spectrum keratin with          satisfactory positive and negative controls.   4.  Left axillary sentinel lymph node #3, blue, excisional biopsy: Benign   fibroadipose tissue with no lymph node tissue present.          Note:  This material is entirely submitted.   5.  Left axillary sentinel lymph node #4, palpable, excisional biopsy: 3   lymph nodes, negative for metastatic carcinoma (0/3).          Confirmed by negative immunohistochemical staining with cytokeratins   AE1/AE3, cam 5.2 and wide spectrum keratin with          satisfactory positive and negative controls.   6.  Left breast, mastectomy: No residual malignancy is present within the   mastectomy specimen.          Treatment effect is present within the breast at area of prior biopsy   site.          A ring shaped biopsy clip is identified grossly and prior biopsy site   changes are present.          Microcalcifications are present associated with benign breast elements.          Background breast tissue shows fibrocystic changes including stromal   fibrosis and fibroadenomatoid nodules.          Skin is present and is unremarkable.          See synoptic report in comment section for further details.   7.  Left axillary contents, axillary dissection: 14 benign lymph nodes,   negative for metastatic carcinoma (0/14).   8.  Additionally  "mastectomy skin, excision: Skin with no significant   histopathologic abnormality.          No evidence of atypia or malignancy.     Comment:  Synoptic report   Specimen:   Procedure:  Total mastectomy   Specimen laterality:  Left   Tumor:   Histologic type:  No residual invasive carcinoma present within the   mastectomy.   Histologic grade (Lev histologic score). Not applicable (no residual   carcinoma)   Tumor size:  No residual invasive carcinoma.   Ductal carcinoma in Situ (DCIS):  Not identified   Lobular carcinoma in Situ (LCIS):  Not identified   Lymphovascular invasion:  No residual tumor in the mastectomy specimen,   however residual metastatic carcinoma is present within 1 lymph node   surrounded by treatment effect.   Treatment effect in the breast:  No residual invasive carcinoma is present in   the breast after presurgical therapy.   Treatment effect in the lymph nodes:  Probable or definite response to   presurgical therapy in metastatic carcinoma.   Margins:  Not applicable (residual invasive carcinoma in the specimen is   absent)   Regional lymph nodes:   Regional lymph node status:  Regional lymph nodes are present and tumor is   present in regional lymph nodes. Number of lymph nodes with macrometastasis:   1          Number of lymph nodes with micrometastasis:  0          Number of lymph nodes with isolated tumor cells:  0     Size of largest shelly metastatic deposit:  4 mm (0.4 cm)          Extranodal extension:  Not identified     Total number of lymph nodes examined (sentinel and nonsentinel):  21          Number of sentinel lymph nodes examined:  7   Distant metastasis:  Not applicable   Pathologic stage classification:   TNM descriptors: "y" - posttreatment   Primary tumor:        ypT0:  No evidence of primary tumor (no residual tumor after neoadjuvant   treatment)   Regional lymph nodes:        ypN1a:  Metastasis in 1-3 axillary lymph nodes, at least 1 metastasis   larger than 2 mm " "  Distant metastasis:        pMX:  Cannot be assessed.   Additional studies/biomarker reporting:   This patient's biomarkers were completed on the patient's previous left   breast core biopsy from 3/14/2022, case number OMS-, with the   following results:   "BREAST BIOMARKER RESULTS   Estrogen receptor (ER): Negative (0)   Progesterone receptor (ER): Negative (0)   HER2 IHC: Negative (1+)   Ki-67 proliferation index: 80%   All immunostain controls react appropriately."   Tumor Blocks for possible Future Studies:  Residual carcinoma in metastatic   lymph node focus:  Block 2A    Comment: Interp By Elke Mcpherson M.D., Signed on 2023 at 13:21   Frozen Section Diagnosis Specimen 2: positive for carcinoma   Specimen 3: negative for carcinoma   Dr. Cortez reported results to Dr. Weiner at 0904 on 2023   Specimen 4: fibroadipose tissue   no lymphoid tissue present   negative for carcinoma   Specimen 5: negative for carcinoma   Dr. Cortez reported results to Dr. Weiner at 0912 on 2023    Gross Pathology ID: [id number]     :  1985   SURGERY MRN:  [id number]/PATHOLOGY MRN:  [id number]   PART 1:   Received fresh labeled as "right chest port for gross only" is a purple   triangular plastic device (3.0 x 3.0 x 1.5 cm), consistent with a port.  The   following serial numbers and gross identifiers are present:  "Bard [id number]".   Extending from the port is a segment of white opaque rubber tubing ( 25.0 cm   in length x 0.3 cm in diameter), with the numbers 5, 15, and 20 legible.  No   adherent soft tissue is present.  The port is intact and submitted for gross   identification only.   SURGERY MRN:  [id number]/PATHOLOGY MRN:  [id number]   PART 2:   Received fresh for frozen section analysis and subsequently placed in   formalin labeled as "left axillary sentinel lymph node #1, palpable" is an   aggregate of tan-yellow lobulated fibroadipose tissue (4.0 x 3.2 x 1.5 cm).   It is palpated to reveal 2 lymph " "node candidates (1.7 x 1.3 x 1.0 cm and 1.5   x 1.3 x 0.8 cm).  Sectioning of the larger lymph node reveals a reflector tag   present.  The specimen is submitted entirely as follows:   IDQ--2-A-FS:  Frozen section control-1 lymph node candidate with   reflector tag site, serially sectioned   ZVS--2-B-FS:  Frozen section control-1 lymph node candidate, bisected   GLH--2-C to YXK--2-G:  Remainder of fibroadipose tissue   SURGERY MRN:  [id number]/PATHOLOGY MRN:  [id number]   PART 3:   Received fresh for frozen section analysis and subsequently placed in   formalin labeled as "left axillary sentinel lymph node #2, hot and blue 1716"   is a single lymph node candidate (1.0 x 0.8 x 0.6 cm).  The lymph node is   bisected, and submitted entirely in TYU--3-A-FS for frozen section   control.   SURGERY MRN:  [id number]/PATHOLOGY MRN:  [id number]   PART 4:   Received fresh for frozen section analysis and subsequently placed in   formalin labeled as "left axillary sentinel lymph node #3 blue" is an   aggregate of tan-yellow lobulated fibroadipose tissue (3.2 x 2.7 x 0.5 cm).   It is palpated to reveal no lymph shelly tissue, and is entirely submitted in   CFE--4-A-FS and FYN--4-B-FS for frozen section control.   SURGERY MRN:  [id number] MRN:  [id number]   PART 5:   Received fresh for frozen section analysis and subsequently placed in   formalin labeled as "left axillary sentinel lymph node #4, palpable" is an   aggregate of tan-yellow lobulated fibroadipose tissue (3.3 x 2.5 x 1.0 cm).   It is palpated to reveal a single lymph node candidate (1.5 x 1.4 x 0.8 cm).   The specimen is submitted entirely as follows:   RLN--5-A-FS:  Frozen section control-1 lymph node candidate, bisected   AWC--5-B to JEG--5-D:  Remainder of fibroadipose tissue   SURGERY MRN:  [id number]/PATHOLOGY MRN:  [id number]   PART 6:   SOURCE:  The specimen is received fresh, subsequently placed in formalin, " "and   is designated as "left mastectomy long-lateral, short-superior".   PROCEDURE:  Skin sparing mastectomy of left breast   ORIENTATION:  Per the requisition form, the specimen is oriented with 2   orienting sutures, with a short stitch denoting the superior margin, long   stitch denoting the lateral margin.   SIZE & WEIGHT OF COMPONENTS:  The specimen consists of a 1183 g breast,   measuring 24.3 cm (medial to lateral) x 18.3 cm (superior to inferior) x 12.4   cm (anterior to posterior).  The tissue is surmounted by a tan-brown,   irregularly-shaped skin excision (25.3 x 16.4) that includes an areola (4.7 x   4.5 cm) with a centrally-positioned, everted nipple (1.5 cm in length x 1.7   cm in diameter).   LEVELS:  The specimen is serially sectioned from medial to lateral into 18   sequentially designated levels.  Each level averages 1.3 cm in thickness.   Overlying skin is present in all levels.  The nipple is located in level 9.   PRIMARY GROSS FINDINGS:   Located in level 14 at the 2:00 region's upper   outer quadrant (middle depth) is a ring shaped biopsy clip imbedded within   fibrous tissue.  No discrete masses grossly identified.   MARGINS & LANDMARKS IN RELATIONSHIP TO BIOPSY CLIP SITE:   -Anterior-superior margin = 3.0 cm   -Anterior-inferior margin =  approximately 12.5 cm   -Posterior margin = 2.7 cm   -Nipple = approximately 9.5 cm   -Skin = approximately 5.5 cm   SECONDARY GROSS FINDINGS:  No additional areas of interest or biopsy clips   are grossly identified.  The remaining breast tissues are comprised of   yellow, lobulated parenchyma with densely distributed fibrous stroma that   encompasses approximately 30-40% of the uninvolved cut surfaces.   INKING KEY:   -Anterior-superior margin = Blue   -Anterior-inferior margin = Green   -Posterior margin = Black   CASSETTE SUMMARY:  Representative sections are submitted as follows:   ZXA--6-A:  Level 14, 2 o'clock, middle depth, biopsy clip site " with   surrounding parenchyma (no true margins represented)   AME--6-B:  Level 14, 2 o'clock, middle/posterior depth, uninvolved   parenchyma directly posterior to biopsy clip site (no true margins   represented)   UOS--6-C:  Level 14, 2 o'clock, posterior depth, uninvolved posterior   margin with closest approach to biopsy clip site (cassette 6A-6C =   sequentially submitted from anterior to posterior)   EDH--6-D:  Level 14, 1 o'clock, middle depth, parenchyma superior to   biopsy clip site (no true margins represented)   JZD--6-E:  Level 14, 2 o'clock, anterior depth, uninvolved   anterosuperior margin with closest approach to biopsy clip site   AWW--6-F:  Level 14, 4 o'clock, anterior depth, uninvolved   anteroinferior margin with closest approach to biopsy clip site   BUS--6-G:  Level 13, 2 o'clock, middle depth, parenchyma adjacent to   biopsy clip site (no true margins represented)   MBH--6-H:  Level 13, 2 o'clock, posterior depth, uninvolved posterior   margin directly posterior to cassette 6G   NZW--6-I:  Level 13, 2 o'clock, anterior depth, uninvolved parenchyma   directly anterior to cassette 6G (no true margins represented)   ORA--6-J:  Level 13, 1 o'clock, middle depth, parenchyma directly   superior to cassette 6G (no true margins represented)   YGW--6-K:  Level 13, 3 o'clock, middle depth, parenchyma directly   inferior to cassette 6G (no true margins represented)   JAW--6-L:  Level 15, 2 o'clock, middle depth, uninvolved parenchyma   adjacent to biopsy clip site   YKK--6-M:  Level 15, 1 o'clock, anterior depth, uninvolved   anterosuperior margin superior and anterior to cassette 6L   FLX--6-N:  Level 15, 2 o'clock, posterior depth, uninvolved posterior   margin directly posterior to cassette 6L   VCQ--6-O:  Level 15, 3 o'clock, middle depth, uninvolved parenchyma   directly inferior to cassette 6L   YBD--6-P:   "Level 9, nipple cross-section, perpendicular   IXO--6-Q:  Level 7, 10 o'clock, upper inner quadrant, anterior depth,   fibrous tissue to anterosuperior margin   GVM--6-R:  Level 6, 8 o'clock, lower inner quadrant, middle depth,   fibrous tissue (no true margins represented)   QXE--6-S:  Level 11, 2 o'clock, upper outer quadrant, middle depth,   fibrous tissue (no true margins represented)   OYX--6-T:  Level 12, 5 o'clock, lower outer quadrant, anterior depth,   fibrous tissue to anteroinferior margin   Cold ischemic time: < 1 hour   Formalin fixation time:  6-72 hours   SURGERY MRN:  [id number]/PATHOLOGY MRN:  [id number]   PART 7:   Received fresh and subsequently placed in formalin labeled as "left axillary   contents" is an aggregate of tan-yellow lobulated fibroadipose tissue (9.8 x   8.5 x 2.5 cm).  It is palpated to reveal 18 lymph node candidates ranging   from 0.2 to 1.8 cm in greatest dimension.  All lymph node candidates are   submitted entirely as follows:   HXA--7-A:  5 lymph node candidates, whole   IRR--7-B:  4 lymph node candidates, whole   UUW--7-C:  4 lymph node candidates, whole   LMI--7-D:  2 lymph node candidates, whole   ILB--7-E:  2 lymph node candidates, whole   SEN--7-F:  1 lymph node candidate, serially sectioned   Cold ischemic time:  Less than 1 hour   Formalin fixation time:  6-72 hours   SURGERY MRN:  [id number]/PATHOLOGY MRN:  [id number]   PART 8:   Received fresh and subsequently placed in formalin labeled as "additional   mastectomy skin" are 2 irregularly-shaped unoriented excisions of tan-brown   pigmented skin (4.7 x 1.0 x 0.5 cm and 5.3 x 1.1 x 0.6 cm).  The epidermal   surfaces are unremarkable.  The underlying soft tissue consists of pink-tan   to tan-white soft tissue with sparse attached fibroadipose tissue.  No   discrete lesions are identified.  Representative sections are submitted in   LZZ--8-A.   -Ivone Win MS, " PA([facility])    Frozen Section Footnote Frozen section performed at [facility],   [place], [place], [address]    Disclaimer Unless the case is a 'gross only' or additional testing only, the final   diagnosis for each specimen is based on a microscopic examination of   appropriate tissue sections.    Resulting Agency [facility]            Oncology History:  - self detected an area under her left arm and it initially seemed to go away (noted around winter holidays)  - 3/4/2022 Outside Mammogram:  Findings:  The breasts are heterogeneously dense, which may obscure small masses.   Left  There is a 31 mm x 18 mm x 21 mm irregularly shaped mass with microlobulated margins seen in the left breast at 2 o'clock in the posterior depth with associated left axillary adenopathy. Largest node depicted on the outside study measures 49 x 23 x 29 mm with cortical thickening and effacement of the hilum. Breast mass is reportedly palpable.   Right  There is no evidence of suspicious masses, calcifications, or other abnormal findings in the right breast.  Impression:  Left  Mass: Left breast 31 mm x 18 mm x 21 mm mass at the posterior 2 o'clock position. Assessment: 5 - Highly suggestive of malignancy. Biopsy is recommended.   Right  There is no mammographic evidence of malignancy in the right breast.  BI-RADS Category:   Overall: 5 - Highly Suggestive of Malignancy  Recommendation:  Ultrasound Guided Core Needle Biopsy of the left breast mass and one of the abnormal left axillary nodes is recommended.     - 3/14/2022 Breast biopsy:  1. LEFT BREAST MASS, 2 O'CLOCK, BIOPSY:   Invasive ductal carcinoma, [place] histologic grade 3 (tubule formation:   3, nuclear pleomorphism:  3, mitotic activity:  3).   Comment:  Infiltrating carcinoma occupies the entirety of biopsied material   with the largest continuous focus measuring 13 mm.  Breast biomarkers are   pending and will be issued in a supplemental report.   2.  LEFT AXILLARY LYMPH NODE, BIOPSY:   Invasive ductal carcinoma, Lev histologic grade 3 (tubule formation:   3, nuclear pleomorphism:  3, mitotic activity:  3).   Comment:  Infiltrating carcinoma occupies the entirety of biopsied material   with the largest continuous focus measuring 20 mm.  No lymph node tissue is   identified in the sample.  Tumor histology is essentially identical to that   of part 1.  The findings could represent part of a larger intranodal   metastasis, but an extension from the primary tumor cannot be excluded.   Radiographic correlation is advised.   Note:  Dr. Stephanie Rao also reviewed this case and agrees with the   diagnosis.   BREAST BIOMARKER RESULTS   Estrogen receptor (ER):  Negative (0)   Progesterone receptor (ER):  Negative (0)   HER2 IHC:  Negative (1+)   Ki-67 proliferation index:  80%      - 3/17/2022 Breast MRI:  Findings:  The breasts have heterogeneous fibroglandular tissue. The background parenchymal enhancement is minimal.   Left  There is a 38 mm x 33 mm x 33 mm irregularly shaped mass seen in the left breast at 2 o'clock in the posterior depth, 8.4 cm from the nipple and 1.2 cm from the skin. Associated signal void from a twirl biopsy marker; pathology showed invasive ductal carcinoma.   Posterior to the mass there is abnormal non mass enhancement measuring 29 x 28 mm. The NME is 4 mm from the skin and 2.3 cm from the chest wall. In total the mass and NME measure 54 mm in AP extent.  Overlying skin thickening and edema involving the lateral breast, likely from lymphovascular obstruction. No suspicious skin enhancement. Overall increased vascularity to the left breast.   Four abnormal lymph level 1 and level 2 left axillary lymph nodes. The largest lymph node measures 52 mm x 40 mm x 38 mm which previously underwent biopsy showing metastatic disease. There is an associated radar reflector within the biopsied lymph node.  Right  There is no evidence of suspicious  masses, abnormal enhancement, or other abnormal findings in the right breast. No enlarged axillary or internal mammary lymph nodes.   Impression:  Left  Mass: Left breast 38 mm x 33 mm x 33 mm mass at the posterior 2 o'clock position. Assessment: 6 - Known biopsy, proven malignancy. Associated 29 mm NME extending posteriorly from the mass. In total the mass and NME measure 54 mm in AP extent.  Lymph Node: Abnormal level 1 and 2 left axillary lymph nodes, the largest of which measures 52 mm x 40 mm x 38 mm lymph node and is known biopsy, proven malignancy.   Right  There is no MR evidence of malignancy in the right breast.  BI-RADS Category:   Overall: 6 - Known Biopsy-Proven Malignancy  Recommendation:  Clinical management of known left breast cancer. Patient is established with the breast surgery clinic.      - 3/21/2022 CT C/A/P:  FINDINGS:  Base of Neck: No significant abnormality.  Thoracic soft tissue: A proximally 3.2 x 3.6 x 3.7 cm irregular left breast mass within the lateral aspect with internal biopsy marker, corresponding to findings on prior breast MRI and compatible with malignancy.  Enlarged left axillary lymph nodes, largest measuring approximately 4.2 x 3.6 cm, (series 2, image 27).  CHEST:  -Heart: Normal size. No pericardial effusion.  -Pulmonary vasculature: Pulmonary arteries distribute normally.  There are four pulmonary veins.  -Radha/Mediastinum: No pathologic shelly enlargement.  -Trachea and Proximal airways: Trachea is midline.  Central airways are patent.  No significant bronchial wall thickening or bronchiectasis.  -Lungs/Pleura: Symmetrically expanded without focal consolidation, pneumothorax, or mass.  No pleural effusion or thickening.  -Esophagus: Normal course and caliber.  ABDOMEN:  - Liver: Normal in size and attenuation with no focal hepatic abnormality.  - Gallbladder: No calcified gallstones.  No wall thickening or pericholecystic fluid.  - Bile Ducts: No intra or extrahepatic  biliary ductal dilatation.  - Stomach/Duodenum: Small hiatal hernia otherwise unremarkable.  - Spleen: Normal size.  No focal parenchymal abnormality.  - Pancreas: No mass lesion.  No pancreatic ductal dilatation.  No peripancreatic fat stranding.  - Adrenals: Unremarkable.  - Kidneys/ureters/urinary bladder: Normal in size and location.  Normal enhancement pattern.  No nephrolithiasis.  Ureters are normal in course and caliber.  No hydroureteronephrosis.  - Retroperitoneum: Scattered nonenlarged retroperitoneal lymph nodes.  PELVIS:  - Reproductive: Intrauterine device present.  A proximally 2.8 cm peripherally enhancing right ovarian cyst, likely corpus luteal cyst.  - Other: No pelvic adenopathy, free fluid, or mass.  BOWEL/MESENTERY:  No evidence of bowel obstruction or inflammatory process. Appendix is visualized and unremarkable.  VASCULATURE: Left-sided aortic arch with 3 branch vessels.  No aneurysm and no significant atherosclerosis.  Portal vein, splenic vein, and SMV are patent.  BONES: Lucent approximately 0.6 cm right trochanteric lesion, likely synovial herniation pit.  No acute fracture or bony destructive process.  EXTRATHORACIC/EXTRAPERITONEAL SOFT TISSUES: Unremarkable.  Impression:  In this patient with known breast cancer, there is an approximately 3.7 cm irregular left breast mass with left axillary lymphadenopathy.  No evidence of metastatic disease  Small hiatal hernia.  Additional findings as described above.     - 3/21/2022 Bone scan:  FINDINGS:  There is physiologic distribution of the radiopharmaceutical throughout the skeleton.  Mild degenerative uptake within the bilateral shoulders and knees.  There is normal uptake in the genitourinary system and soft tissues.  Impression:  There is no scintigraphic evidence of osteoblastic metastatic disease.     - 3/21/2022 Brain MRI:  FINDINGS:  Please note there is dental metal artifact distorting the examination.  Allowing for artifacts the brain  parenchyma is normal in contour.  Developmental variant with cavum septum pellucidum identified.  The ventricles are otherwise normal in size without hydrocephalus.  There is no midline shift or significant mass effect.  The major intracranial T2 flow voids are present.  No restricted diffusion within the non distorted brain parenchyma.  There is severe distortion of the susceptibility imaging no parenchymal susceptibility to suggest parenchymal hemorrhage in the non distorted brain parenchyma.  There is suboptimal evaluation of the cerebellum and posterior fossa.  Impression:  Unremarkable MRI brain allowing for artifacts from dental metal as detailed above specifically without abnormal parenchymal enhancement to suggest intracranial metastatic disease in light of history.     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 She had some slow improvement over a few days then not much progress. GI consulted and steroids were restarted with good effect. She was placed on an extended steroid taper of steroids starting at 40mg and will complete her last dose of 5mg on 12/6/2022. She is to follow up with oncology for her appointment next week. Given return precautions.     10/20/2022 Ultrasound left breast:  Findings:  At the site of the marker from the prior malignant breast biopsy,there is a 6 mm x 7 mm x 3 mm oval, parallel, hypoechoic mass with indistinct margins seen in the left breast at 2 o'clock in the posterior depth, 8 cm from the nipple. This is difficult to distinguish from the Twirl marker itself.  At the site of the previously biopsied metastatic left axillary node, there is a 10 x 7 x 11 mm node with a Uyen- marker. Node has mild residual cortical thickening (5 mm), but has decreased in size. There are a few other normal size, normal appearing left  axillary nodes.   Impression:  Left  Mass: Left breast 6 mm x 7 mm x 3 mm mass at the posterior 2 o'clock position. Assessment: 6 - Known biopsy, proven malignancy. Known, previously biopsied left breast cancer and metastatic left axillary node have both decreased in size. The breast mass is now difficult to distinguish from the biopsy marker.   BI-RADS Category:   Overall: 6 - Known Biopsy-Proven Malignancy  Recommendation:  Patient is already under the care of the Breast Oncology team. Most recent bilateral mammogram was performed in February 2022.      Resumed NA chemo without immunotherapy 11/21/2022.      - 12/8/2022 Breast MRI:  Findings:  The breasts have heterogeneous fibroglandular tissue. The background parenchymal enhancement is minimal and symmetric. There is no evidence of suspicious masses, abnormal enhancement, or other abnormal findings.  There has been interval decrease in there has been interval resolution of previously described enhancing mass at the 2:00 axis which is a known biopsy-proven malignancy.    There is no abnormality in the right breast.    There has been significant decrease in size of left axillary lymph nodes which all are normal in size on current exam.  The largest contains a signal void consistent with previously placed radar reflector at site of biopsy-proven axillary metastasis.  It measures 8 mm in short axis.  Impression:  Interval resolution of enhancing mass at site of known malignancy in the left breast at 2:00  and marked interval decrease in left axillary adenopathy with visualized lymph nodes normal in size on current exam.  Findings are consistent with complete imaging response to therapy.    BI-RADS Category:   Overall: 6 - Known Biopsy-Proven Malignancy    - 2/27/2023 surgery above     PMH:  HTN- around pregnancies; off of blood pressure medication x 2 years  CHF- ECHOs at [facility]  Gestational DM  Hyperlipidemia when heavier, managed with diet and followed by  cardiology  C- sections x 2  Iron deficiency     GynHx:  Menarche- 7   (16 at 1st pregnancy) (17, 7, 4)  Still with periods - last 2022- last 5-7 days, moderate normal flow  + breast feeding x 1 year  IUD     SH:  Working, , [employer]  Single  Good support system  No tobacco  No EtOH  No illicit drugs    Review of Systems   Constitutional:         See above   All other systems reviewed and are negative.      Objective:      Physical Exam  Constitutional:       Comments: Limited PE as virtual   Appears comfortable and in NAD       Assessment:       Problem List Items Addressed This Visit          Oncology    Malignant neoplasm of upper-outer quadrant of left breast in female, estrogen receptor negative     Other Visit Diagnoses       Neuropathic pain    -  Primary              Plan:       Increase gabapentin QID. Patient will message me in a week for an update.   Continue tramadol   Bowel regimen discussed.   Encouraged hydration  F/u with Dr. Cruz to discuss path results as previously noted- appt 3/20      Route Chart for Scheduling    Med Onc Chart Routing      Follow up with physician . Appt appropriate   Follow up with ANDREW    Infusion scheduling note    Injection scheduling note    Labs    Imaging    Pharmacy appointment    Other referrals             Urgent Care Oncology Visit    Date and Time: 03/10/2023 2:07 PM   Patient MRN: [id number]   Chief Complaint: No chief complaint on file.    Reason for Urgent Care Visit: pain  Intervention: medication change  Dispo: return to clinic as previous  UrgOnc appointment addressed via: phone call  This UrgOnc visit was virtual  Time spent handling UC issue:  30 minutes    Was this virtual or in person UrgOnc visit appropriate? yes        Patient is in agreement with the proposed treatment plan. All questions were answered to the patient's satisfaction. Pt knows to call clinic for any new or worsening symptoms and if anything is needed before the  next clinic visit.      SHANDRA HesterP-C  Hematology & Oncology  [facility] [place] [address]  ph. [phone]  Fax. [phone]

## 2023-03-14 ENCOUNTER — OFFICE VISIT (OUTPATIENT)
Dept: PLASTIC SURGERY | Facility: CLINIC | Age: 38
End: 2023-03-14
Payer: MEDICAID

## 2023-03-14 ENCOUNTER — OFFICE VISIT (OUTPATIENT)
Dept: SURGERY | Facility: CLINIC | Age: 38
End: 2023-03-14
Payer: MEDICAID

## 2023-03-14 VITALS
WEIGHT: 217 LBS | SYSTOLIC BLOOD PRESSURE: 118 MMHG | DIASTOLIC BLOOD PRESSURE: 71 MMHG | BODY MASS INDEX: 36.15 KG/M2 | TEMPERATURE: 98 F | HEIGHT: 65 IN | HEART RATE: 65 BPM | OXYGEN SATURATION: 98 %

## 2023-03-14 DIAGNOSIS — Z17.1 MALIGNANT NEOPLASM OF UPPER-OUTER QUADRANT OF LEFT BREAST IN FEMALE, ESTROGEN RECEPTOR NEGATIVE: Primary | ICD-10-CM

## 2023-03-14 DIAGNOSIS — C50.412 MALIGNANT NEOPLASM OF UPPER-OUTER QUADRANT OF LEFT BREAST IN FEMALE, ESTROGEN RECEPTOR NEGATIVE: Primary | ICD-10-CM

## 2023-03-14 DIAGNOSIS — Z12.31 SCREENING MAMMOGRAM FOR BREAST CANCER: ICD-10-CM

## 2023-03-14 PROCEDURE — 99024 PR POST-OP FOLLOW-UP VISIT: ICD-10-PCS | Mod: S$GLB,,, | Performed by: SURGERY

## 2023-03-14 PROCEDURE — 99024 POSTOP FOLLOW-UP VISIT: CPT | Mod: S$GLB,,, | Performed by: SURGERY

## 2023-03-14 PROCEDURE — 3008F PR BODY MASS INDEX (BMI) DOCUMENTED: ICD-10-PCS | Mod: CPTII,S$GLB,, | Performed by: SURGERY

## 2023-03-14 PROCEDURE — 3074F SYST BP LT 130 MM HG: CPT | Mod: CPTII,S$GLB,, | Performed by: SURGERY

## 2023-03-14 PROCEDURE — 3044F HG A1C LEVEL LT 7.0%: CPT | Mod: CPTII,,, | Performed by: SURGERY

## 2023-03-14 PROCEDURE — 3044F PR MOST RECENT HEMOGLOBIN A1C LEVEL <7.0%: ICD-10-PCS | Mod: CPTII,,, | Performed by: SURGERY

## 2023-03-14 PROCEDURE — 1160F PR REVIEW ALL MEDS BY PRESCRIBER/CLIN PHARMACIST DOCUMENTED: ICD-10-PCS | Mod: CPTII,S$GLB,, | Performed by: SURGERY

## 2023-03-14 PROCEDURE — 3078F DIAST BP <80 MM HG: CPT | Mod: CPTII,S$GLB,, | Performed by: SURGERY

## 2023-03-14 PROCEDURE — 1160F RVW MEDS BY RX/DR IN RCRD: CPT | Mod: CPTII,S$GLB,, | Performed by: SURGERY

## 2023-03-14 PROCEDURE — 1159F PR MEDICATION LIST DOCUMENTED IN MEDICAL RECORD: ICD-10-PCS | Mod: CPTII,,, | Performed by: SURGERY

## 2023-03-14 PROCEDURE — 1159F MED LIST DOCD IN RCRD: CPT | Mod: CPTII,S$GLB,, | Performed by: SURGERY

## 2023-03-14 PROCEDURE — 3044F HG A1C LEVEL LT 7.0%: CPT | Mod: CPTII,S$GLB,, | Performed by: SURGERY

## 2023-03-14 PROCEDURE — 99999 PR PBB SHADOW E&M-EST. PATIENT-LVL II: CPT | Mod: PBBFAC,,, | Performed by: SURGERY

## 2023-03-14 PROCEDURE — 99024 POSTOP FOLLOW-UP VISIT: CPT | Mod: ,,, | Performed by: SURGERY

## 2023-03-14 PROCEDURE — 3008F BODY MASS INDEX DOCD: CPT | Mod: CPTII,S$GLB,, | Performed by: SURGERY

## 2023-03-14 PROCEDURE — 99212 OFFICE O/P EST SF 10 MIN: CPT | Mod: PBBFAC | Performed by: SURGERY

## 2023-03-14 PROCEDURE — 3044F PR MOST RECENT HEMOGLOBIN A1C LEVEL <7.0%: ICD-10-PCS | Mod: CPTII,S$GLB,, | Performed by: SURGERY

## 2023-03-14 PROCEDURE — 1159F PR MEDICATION LIST DOCUMENTED IN MEDICAL RECORD: ICD-10-PCS | Mod: CPTII,S$GLB,, | Performed by: SURGERY

## 2023-03-14 PROCEDURE — 1159F MED LIST DOCD IN RCRD: CPT | Mod: CPTII,,, | Performed by: SURGERY

## 2023-03-14 PROCEDURE — 3074F PR MOST RECENT SYSTOLIC BLOOD PRESSURE < 130 MM HG: ICD-10-PCS | Mod: CPTII,S$GLB,, | Performed by: SURGERY

## 2023-03-14 PROCEDURE — 3078F PR MOST RECENT DIASTOLIC BLOOD PRESSURE < 80 MM HG: ICD-10-PCS | Mod: CPTII,S$GLB,, | Performed by: SURGERY

## 2023-03-14 PROCEDURE — 99024 PR POST-OP FOLLOW-UP VISIT: ICD-10-PCS | Mod: ,,, | Performed by: SURGERY

## 2023-03-14 PROCEDURE — 99999 PR PBB SHADOW E&M-EST. PATIENT-LVL II: ICD-10-PCS | Mod: PBBFAC,,, | Performed by: SURGERY

## 2023-03-14 RX ORDER — METHOCARBAMOL 500 MG/1
500 TABLET, FILM COATED ORAL 4 TIMES DAILY
Qty: 28 TABLET | Refills: 0 | Status: SHIPPED | OUTPATIENT
Start: 2023-03-14 | End: 2023-03-21

## 2023-03-14 RX ORDER — GABAPENTIN 300 MG/1
300 CAPSULE ORAL 3 TIMES DAILY
Qty: 21 CAPSULE | Refills: 0 | Status: SHIPPED | OUTPATIENT
Start: 2023-03-14 | End: 2023-03-16 | Stop reason: SDUPTHER

## 2023-03-14 RX ORDER — CEPHALEXIN 500 MG/1
500 CAPSULE ORAL EVERY 12 HOURS
Qty: 14 CAPSULE | Refills: 0 | Status: SHIPPED | OUTPATIENT
Start: 2023-03-14 | End: 2023-03-21

## 2023-03-14 NOTE — PROGRESS NOTES
Laura Kent presents to [facility] for a follow up visit status post left skin sparing mastectomy, wise pattern, axillary lymph node dissection, tissue expander placement . She is doing well today with no issues since her last visit. Continues to have high volume serosanguinous left breast VALENTINA drain output (didn't bring log with her today). Completed keflex given for post op prophylaxis yesterday. Pain is essentially unchanged from last visit despite rx tramadol and ibuprofen. She is still taking robaxin and gabapentin with temporary relief. She denies fever, chills, nausea, vomiting or other systemic signs of infection.       ROS - negative, other than stated above    PHYSICAL EXAMINATION  There were no vitals filed for this visit.  WD WN NAD  VSS  Normal respiratory effort  R breast ptosis, unchanged  L breast - incision CDI, no erythema or drainage, nipple absent, diminished sensation  VALENTINA drain in left midaxillary line with ss drainage in bulb. No surrounding erythema.      The fill ports on the left tissue expander was marked using the magnet and a pen.  The skin was then prepped using chloroprep.  Using sterile technique the left port was accessed with a 21G butterfly needle.Sterile injectable saline was added to the expander.  The skin was checked for tension and cap refill.    Has 750cc artoura HP expander    left volume added: 250 cc                 left total volume: 250 cc        ASSESSMENT/PLAN  37 y.o. F s/p left tissue expander placement  - Doing well, no issues.   - Left breast drain remains in place  - Refilled keflex, continue until drain is pulled  - Refilled robaxin and gabapentin  - Encouraged tapering from tramadol as it was not helpful for her pain and she should not require additional meds for pain control 2 weeks post op  - RTC x 1 week for drain eval, possibly another fill, appointment scheduled.      All questions were answered. The patient was advised to contact the  clinic with any questions or concerns prior to their next visit.       Angie Peck PA-C  Plastic and Reconstructive Surgery

## 2023-03-14 NOTE — PROGRESS NOTES
[facility]       Post-Op        REFERRING PHYSICIAN:  No referring provider defined for this encounter.       Yessy Cruz MD    MEDICAL ONCOLOGIST:    Dr. Cruz  RADIATION ONCOLOGIST:   Pending tumor board     DIAGNOSIS:    This is a 37 y.o. female with Stage IIIC (cT3, cN2(f), cM0, G3, ER-, MT-, HER2-)  invasive ductal carcinoma of the left breast.    TREATMENT SUMMARY:  The patient is status post left mastectomy and axillary lymph node dissection and TE placement on 2/27/2023.  Final pathology shown below. The breast had no residual invasive carcinoma within the specimen. There was one lymph node positive for carcinoma.     Final Pathologic Diagnosis   Date/Time Value Ref Range Status   02/27/2023 11:57 AM   Final    1.  Port-a-cath, right chest port, removal: Gross Diagnosis:  Port.  2.  Left axillary sentinel lymph node #1, palpable, excisional biopsy: 1 of 3  lymph nodes are positive for metastatic carcinoma (1/3).         Greatest dimension of metastatic carcinoma measures 4 mm (0.4 cm).         Confirmed by positive immunohistochemical staining in the metastatic  foci with cytokeratins AE1/AE3, Cam5.2 and wide         spectrum keratin with satisfactory positive and negative controls.           Treatment effect is present in lymph node.  3.  Left axillary sentinel lymph node #2, hot and blue 1716, excisional  biopsy: 1 lymph node, negative for metastatic carcinoma (0/1).         Confirmed by negative immunohistochemical staining with cytokeratins  AE1/AE3, cam 5.2 and wide spectrum keratin with         satisfactory positive and negative controls.  4.  Left axillary sentinel lymph node #3, blue, excisional biopsy: Benign  fibroadipose tissue with no lymph node tissue present.         Note:  This material is entirely submitted.  5.  Left axillary sentinel lymph node #4, palpable, excisional biopsy: 3  lymph nodes, negative for metastatic carcinoma (0/3).         Confirmed by negative  immunohistochemical staining with cytokeratins  AE1/AE3, cam 5.2 and wide spectrum keratin with         satisfactory positive and negative controls.  6.  Left breast, mastectomy: No residual malignancy is present within the  mastectomy specimen.         Treatment effect is present within the breast at area of prior biopsy  site.         A ring shaped biopsy clip is identified grossly and prior biopsy site  changes are present.         Microcalcifications are present associated with benign breast elements.         Background breast tissue shows fibrocystic changes including stromal  fibrosis and fibroadenomatoid nodules.         Skin is present and is unremarkable.         See synoptic report in comment section for further details.  7.  Left axillary contents, axillary dissection: 14 benign lymph nodes,  negative for metastatic carcinoma (0/14).  8.  Additionally mastectomy skin, excision: Skin with no significant  histopathologic abnormality.         No evidence of atypia or malignancy.    Comment:  Synoptic report  Specimen:  Procedure:  Total mastectomy  Specimen laterality:  Left  Tumor:  Histologic type:  No residual invasive carcinoma present within the  mastectomy.  Histologic grade (Lev histologic score). Not applicable (no residual  carcinoma)  Tumor size:  No residual invasive carcinoma.  Ductal carcinoma in Situ (DCIS):  Not identified  Lobular carcinoma in Situ (LCIS):  Not identified  Lymphovascular invasion:  No residual tumor in the mastectomy specimen,  however residual metastatic carcinoma is present within 1 lymph node  surrounded by treatment effect.  Treatment effect in the breast:  No residual invasive carcinoma is present in  the breast after presurgical therapy.  Treatment effect in the lymph nodes:  Probable or definite response to  presurgical therapy in metastatic carcinoma.  Margins:  Not applicable (residual invasive carcinoma in the specimen is  absent)  Regional lymph  "nodes:  Regional lymph node status:  Regional lymph nodes are present and tumor is  present in regional lymph nodes. Number of lymph nodes with macrometastasis:  1         Number of lymph nodes with micrometastasis:  0         Number of lymph nodes with isolated tumor cells:  0    Size of largest shelly metastatic deposit:  4 mm (0.4 cm)         Extranodal extension:  Not identified    Total number of lymph nodes examined (sentinel and nonsentinel):  21         Number of sentinel lymph nodes examined:  7  Distant metastasis:  Not applicable  Pathologic stage classification:  TNM descriptors: "y" - posttreatment  Primary tumor:       ypT0:  No evidence of primary tumor (no residual tumor after neoadjuvant  treatment)  Regional lymph nodes:       ypN1a:  Metastasis in 1-3 axillary lymph nodes, at least 1 metastasis  larger than 2 mm  Distant metastasis:       pMX:  Cannot be assessed.  Additional studies/biomarker reporting:  This patient's biomarkers were completed on the patient's previous left  breast core biopsy from 3/14/2022, case number [id number] with the  following results:  "BREAST BIOMARKER RESULTS  Estrogen receptor (ER): Negative (0)  Progesterone receptor (ER): Negative (0)  HER2 IHC: Negative (1+)  Ki-67 proliferation index: 80%  All immunostain controls react appropriately."  Tumor Blocks for possible Future Studies:  Residual carcinoma in metastatic  lymph node focus:  Block 2A       Comment:     Interp By Elke Mcpherson M.D., Signed on 03/03/2023 at 13:21         INTERVAL HISTORY:   Laura Kent comes in for a post-op check.    Her pain is well controlled.  Some left arm pain and tightness.     MEDICATIONS:  Current Outpatient Medications   Medication Sig Dispense Refill    cephALEXin (KEFLEX) 500 MG capsule Take 1 capsule (500 mg total) by mouth every 12 (twelve) hours. for 7 days 14 capsule 0    cetirizine (ZYRTEC) 10 MG tablet Take 10 mg by mouth once daily.      clotrimazole " (LOTRIMIN) 1 % cream Apply 1 application topically every evening.      gabapentin (NEURONTIN) 300 MG capsule Take 1 capsule (300 mg total) by mouth 3 (three) times daily. for 7 days 21 capsule 0    hydrOXYzine HCL (ATARAX) 10 MG Tab Take 10 mg by mouth nightly as needed.      ibuprofen (ADVIL,MOTRIN) 600 MG tablet Take 1 tablet (600 mg total) by mouth 3 (three) times daily. for 7 days 21 tablet 2    methocarbamoL (ROBAXIN) 500 MG Tab Take 1 tablet (500 mg total) by mouth 4 (four) times daily. for 7 days 28 tablet 0    mupirocin (BACTROBAN) 2 % ointment Apply topically once daily. 30 g 0    oxyCODONE (ROXICODONE) 5 MG immediate release tablet Take 1 tablet (5 mg total) by mouth every 4 (four) hours as needed for Pain. (Patient not taking: Reported on 3/7/2023) 15 tablet 0    pantoprazole (PROTONIX) 40 MG tablet Take 1 tablet (40 mg total) by mouth once daily. 30 tablet 1    prednisoLONE acetate (PRED FORTE) 1 % DrpS Place 1 drop into both eyes 2 (two) times daily.      traMADoL (ULTRAM) 50 mg tablet Take 1 tablet (50 mg total) by mouth every 6 (six) hours as needed for Pain. 20 tablet 0    triamcinolone acetonide 0.1% (KENALOG) 0.1 % cream Apply 1 g topically 2 (two) times daily.       No current facility-administered medications for this visit.     Facility-Administered Medications Ordered in Other Visits   Medication Dose Route Frequency Provider Last Rate Last Admin    ceFAZolin 1 g, gentamicin 80 mg in sodium chloride 0.9% 500 mL irrigation   Irrigation On Call Procedure EOtilio Weiner MD           ALLERGIES:   Review of patient's allergies indicates:   Allergen Reactions    Strawberries [strawberry] Hives       PHYSICAL EXAMINATION:   General:  This is a well appearing female with appropriate speech, affect and gait.     Breast:  Incisions clean, dry, and intact    IMPRESSION:   The patient has had an uneventful postoperative course.    PLAN:   Referral to PT for lymphedema education and arm mobility.   1.  return in 6 months for a follow up office visit and breast exam  2. right mammogram in the next month  3. The patient is advised in continued exam of the breast chest wall and to report to this office sooner should she note any areas of abnormality or concern.   4.  Will see Medical oncology soon. Radiation discussion pending tumor board.   5. Continue to see Dr. Barrios   6. Will plan to discuss at tumor board

## 2023-03-16 ENCOUNTER — PATIENT MESSAGE (OUTPATIENT)
Dept: PLASTIC SURGERY | Facility: CLINIC | Age: 38
End: 2023-03-16
Payer: MEDICAID

## 2023-03-16 RX ORDER — GABAPENTIN 300 MG/1
600 CAPSULE ORAL 3 TIMES DAILY
Qty: 42 CAPSULE | Refills: 0 | Status: ON HOLD | OUTPATIENT
Start: 2023-03-16 | End: 2023-12-14

## 2023-03-16 RX ORDER — TRAMADOL HYDROCHLORIDE 50 MG/1
50 TABLET ORAL EVERY 6 HOURS PRN
Qty: 20 TABLET | Refills: 0 | Status: ON HOLD | OUTPATIENT
Start: 2023-03-16 | End: 2023-12-14 | Stop reason: HOSPADM

## 2023-03-20 ENCOUNTER — OFFICE VISIT (OUTPATIENT)
Dept: HEMATOLOGY/ONCOLOGY | Facility: CLINIC | Age: 38
End: 2023-03-20
Payer: MEDICAID

## 2023-03-20 VITALS
HEIGHT: 65 IN | HEART RATE: 72 BPM | TEMPERATURE: 98 F | WEIGHT: 216.19 LBS | OXYGEN SATURATION: 100 % | RESPIRATION RATE: 17 BRPM | BODY MASS INDEX: 36.02 KG/M2 | DIASTOLIC BLOOD PRESSURE: 85 MMHG | SYSTOLIC BLOOD PRESSURE: 146 MMHG

## 2023-03-20 DIAGNOSIS — D63.0 ANEMIA IN NEOPLASTIC DISEASE: ICD-10-CM

## 2023-03-20 DIAGNOSIS — K85.30 DRUG-INDUCED ACUTE PANCREATITIS WITHOUT INFECTION OR NECROSIS: ICD-10-CM

## 2023-03-20 DIAGNOSIS — C50.412 MALIGNANT NEOPLASM OF UPPER-OUTER QUADRANT OF LEFT BREAST IN FEMALE, ESTROGEN RECEPTOR NEGATIVE: Primary | ICD-10-CM

## 2023-03-20 DIAGNOSIS — Z17.1 MALIGNANT NEOPLASM OF UPPER-OUTER QUADRANT OF LEFT BREAST IN FEMALE, ESTROGEN RECEPTOR NEGATIVE: Primary | ICD-10-CM

## 2023-03-20 PROCEDURE — 99214 OFFICE O/P EST MOD 30 MIN: CPT | Mod: PBBFAC | Performed by: INTERNAL MEDICINE

## 2023-03-20 PROCEDURE — 3077F SYST BP >= 140 MM HG: CPT | Mod: CPTII,,, | Performed by: INTERNAL MEDICINE

## 2023-03-20 PROCEDURE — 1160F RVW MEDS BY RX/DR IN RCRD: CPT | Mod: CPTII,,, | Performed by: INTERNAL MEDICINE

## 2023-03-20 PROCEDURE — 99999 PR PBB SHADOW E&M-EST. PATIENT-LVL IV: CPT | Mod: PBBFAC,,, | Performed by: INTERNAL MEDICINE

## 2023-03-20 PROCEDURE — 3044F PR MOST RECENT HEMOGLOBIN A1C LEVEL <7.0%: ICD-10-PCS | Mod: CPTII,,, | Performed by: INTERNAL MEDICINE

## 2023-03-20 PROCEDURE — 3044F HG A1C LEVEL LT 7.0%: CPT | Mod: CPTII,,, | Performed by: INTERNAL MEDICINE

## 2023-03-20 PROCEDURE — 3079F PR MOST RECENT DIASTOLIC BLOOD PRESSURE 80-89 MM HG: ICD-10-PCS | Mod: CPTII,,, | Performed by: INTERNAL MEDICINE

## 2023-03-20 PROCEDURE — 1159F MED LIST DOCD IN RCRD: CPT | Mod: CPTII,,, | Performed by: INTERNAL MEDICINE

## 2023-03-20 PROCEDURE — 3008F PR BODY MASS INDEX (BMI) DOCUMENTED: ICD-10-PCS | Mod: CPTII,,, | Performed by: INTERNAL MEDICINE

## 2023-03-20 PROCEDURE — 3077F PR MOST RECENT SYSTOLIC BLOOD PRESSURE >= 140 MM HG: ICD-10-PCS | Mod: CPTII,,, | Performed by: INTERNAL MEDICINE

## 2023-03-20 PROCEDURE — 99215 OFFICE O/P EST HI 40 MIN: CPT | Mod: S$PBB,,, | Performed by: INTERNAL MEDICINE

## 2023-03-20 PROCEDURE — 99215 PR OFFICE/OUTPT VISIT, EST, LEVL V, 40-54 MIN: ICD-10-PCS | Mod: S$PBB,,, | Performed by: INTERNAL MEDICINE

## 2023-03-20 PROCEDURE — 1159F PR MEDICATION LIST DOCUMENTED IN MEDICAL RECORD: ICD-10-PCS | Mod: CPTII,,, | Performed by: INTERNAL MEDICINE

## 2023-03-20 PROCEDURE — 99999 PR PBB SHADOW E&M-EST. PATIENT-LVL IV: ICD-10-PCS | Mod: PBBFAC,,, | Performed by: INTERNAL MEDICINE

## 2023-03-20 PROCEDURE — 3079F DIAST BP 80-89 MM HG: CPT | Mod: CPTII,,, | Performed by: INTERNAL MEDICINE

## 2023-03-20 PROCEDURE — 3008F BODY MASS INDEX DOCD: CPT | Mod: CPTII,,, | Performed by: INTERNAL MEDICINE

## 2023-03-20 PROCEDURE — 1160F PR REVIEW ALL MEDS BY PRESCRIBER/CLIN PHARMACIST DOCUMENTED: ICD-10-PCS | Mod: CPTII,,, | Performed by: INTERNAL MEDICINE

## 2023-03-20 NOTE — PROGRESS NOTES
"Biguanide Agents Failed 12/16/2022 10:40 AM   Protocol Details  Patient has documented A1c within the specified period of time.     Lab order in place   [facility] 11/04/2022 : For his diabetes, he is currently taking Metformin 500 mg 4 tabs in am.  Most recent A1C was 7.0 % on 6/13/2022.... Will plan to check A1C in Jan 2023.\"  " Subjective:       Patient ID: Laura Kent is a 37 y.o. female.    Chief Complaint: Breast Cancer    HPI    Returns for follow up  She is recovering from surgery as below- 1 drain remaining  She is now ambulating and out of the wheelchair - reports feeling much better  Her pathology is now finalized and we have reviewed via phone- today is clinical assessment prior to next steps (last visit in wheelchair, ECOG PS 1)    - 2/27/2023 Left Mastectomy and SLN Bx, axillary lymphadenectomy  Pathology:  Left breast, mastectomy: No residual malignancy is present within the   mastectomy specimen. Treatment effect is present within the breast at area of prior biopsy   site.   Left axillary sentinel lymph node #1, palpable, excisional biopsy: 1 of 3   lymph nodes are positive for metastatic carcinoma   Greatest dimension of metastatic carcinoma measures 4 mm (0.4 cm).   Confirmed by positive immunohistochemical staining in the metastatic   foci with cytokeratins AE1/AE3, Cam5.2 and wide spectrum keratin with satisfactory positive and negative controls.   Treatment effect is present in lymph node.   Left axillary contents, axillary dissection: 14 benign lymph nodes,   Prior BREAST BIOMARKER RESULTS   Estrogen receptor (ER): Negative (0)   Progesterone receptor (ER): Negative (0)   HER2 IHC: Negative (1+)   Ki-67 proliferation index: 80%     Oncology History:  - self detected an area under her left arm and it initially seemed to go away (noted around winter holidays)    - 3/4/2022 Outside Mammogram:  Findings:  The breasts are heterogeneously dense, which may obscure small masses.   Left  There is a 31 mm x 18 mm x 21 mm irregularly shaped mass with microlobulated margins seen in the left breast at 2 o'clock in the posterior depth with associated left axillary adenopathy. Largest node depicted on the outside study measures 49 x 23 x 29 mm with cortical thickening and effacement of the hilum. Breast mass is reportedly  palpable.   Right  There is no evidence of suspicious masses, calcifications, or other abnormal findings in the right breast.  Impression:  Left  Mass: Left breast 31 mm x 18 mm x 21 mm mass at the posterior 2 o'clock position. Assessment: 5 - Highly suggestive of malignancy. Biopsy is recommended.   Right  There is no mammographic evidence of malignancy in the right breast.  BI-RADS Category:   Overall: 5 - Highly Suggestive of Malignancy  Recommendation:  Ultrasound Guided Core Needle Biopsy of the left breast mass and one of the abnormal left axillary nodes is recommended.     - 3/14/2022 Breast biopsy:  1. 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   Comment:  Infiltrating carcinoma occupies the entirety of biopsied material   with the largest continuous focus measuring 13 mm.  Breast biomarkers are   pending and will be issued in a supplemental report.   2. LEFT AXILLARY LYMPH NODE, BIOPSY:   Invasive ductal carcinoma, [place] histologic grade 3 (tubule formation:   3, nuclear pleomorphism:  3, mitotic activity:  3).   Comment:  Infiltrating carcinoma occupies the entirety of biopsied material   with the largest continuous focus measuring 20 mm.  No lymph node tissue is   identified in the sample.  Tumor histology is essentially identical to that   of part 1.  The findings could represent part of a larger intranodal   metastasis, but an extension from the primary tumor cannot be excluded.   Radiographic correlation is advised.   Note:  Dr. Stephanie Rao also reviewed this case and agrees with the   diagnosis.   BREAST BIOMARKER RESULTS   Estrogen receptor (ER):  Negative (0)   Progesterone receptor (ER):  Negative (0)   HER2 IHC:  Negative (1+)   Ki-67 proliferation index:  80%      - 3/17/2022 Breast MRI:  Findings:  The breasts have heterogeneous fibroglandular tissue. The  background parenchymal enhancement is minimal.   Left  There is a 38 mm x 33 mm x 33 mm irregularly shaped mass seen in the left breast at 2 o'clock in the posterior depth, 8.4 cm from the nipple and 1.2 cm from the skin. Associated signal void from a twirl biopsy marker; pathology showed invasive ductal carcinoma.   Posterior to the mass there is abnormal non mass enhancement measuring 29 x 28 mm. The NME is 4 mm from the skin and 2.3 cm from the chest wall. In total the mass and NME measure 54 mm in AP extent.  Overlying skin thickening and edema involving the lateral breast, likely from lymphovascular obstruction. No suspicious skin enhancement. Overall increased vascularity to the left breast.   Four abnormal lymph level 1 and level 2 left axillary lymph nodes. The largest lymph node measures 52 mm x 40 mm x 38 mm which previously underwent biopsy showing metastatic disease. There is an associated radar reflector within the biopsied lymph node.  Right  There is no evidence of suspicious masses, abnormal enhancement, or other abnormal findings in the right breast. No enlarged axillary or internal mammary lymph nodes.   Impression:  Left  Mass: Left breast 38 mm x 33 mm x 33 mm mass at the posterior 2 o'clock position. Assessment: 6 - Known biopsy, proven malignancy. Associated 29 mm NME extending posteriorly from the mass. In total the mass and NME measure 54 mm in AP extent.  Lymph Node: Abnormal level 1 and 2 left axillary lymph nodes, the largest of which measures 52 mm x 40 mm x 38 mm lymph node and is known biopsy, proven malignancy.   Right  There is no MR evidence of malignancy in the right breast.  BI-RADS Category:   Overall: 6 - Known Biopsy-Proven Malignancy  Recommendation:  Clinical management of known left breast cancer. Patient is established with the breast surgery clinic.      - 3/21/2022 CT C/A/P:  FINDINGS:  Base of Neck: No significant abnormality.  Thoracic soft tissue: A proximally 3.2 x 3.6  x 3.7 cm irregular left breast mass within the lateral aspect with internal biopsy marker, corresponding to findings on prior breast MRI and compatible with malignancy.  Enlarged left axillary lymph nodes, largest measuring approximately 4.2 x 3.6 cm, (series 2, image 27).  CHEST:  -Heart: Normal size. No pericardial effusion.  -Pulmonary vasculature: Pulmonary arteries distribute normally.  There are four pulmonary veins.  -Radha/Mediastinum: No pathologic shelly enlargement.  -Trachea and Proximal airways: Trachea is midline.  Central airways are patent.  No significant bronchial wall thickening or bronchiectasis.  -Lungs/Pleura: Symmetrically expanded without focal consolidation, pneumothorax, or mass.  No pleural effusion or thickening.  -Esophagus: Normal course and caliber.  ABDOMEN:  - Liver: Normal in size and attenuation with no focal hepatic abnormality.  - Gallbladder: No calcified gallstones.  No wall thickening or pericholecystic fluid.  - Bile Ducts: No intra or extrahepatic biliary ductal dilatation.  - Stomach/Duodenum: Small hiatal hernia otherwise unremarkable.  - Spleen: Normal size.  No focal parenchymal abnormality.  - Pancreas: No mass lesion.  No pancreatic ductal dilatation.  No peripancreatic fat stranding.  - Adrenals: Unremarkable.  - Kidneys/ureters/urinary bladder: Normal in size and location.  Normal enhancement pattern.  No nephrolithiasis.  Ureters are normal in course and caliber.  No hydroureteronephrosis.  - Retroperitoneum: Scattered nonenlarged retroperitoneal lymph nodes.  PELVIS:  - Reproductive: Intrauterine device present.  A proximally 2.8 cm peripherally enhancing right ovarian cyst, likely corpus luteal cyst.  - Other: No pelvic adenopathy, free fluid, or mass.  BOWEL/MESENTERY:  No evidence of bowel obstruction or inflammatory process. Appendix is visualized and unremarkable.  VASCULATURE: Left-sided aortic arch with 3 branch vessels.  No aneurysm and no significant  atherosclerosis.  Portal vein, splenic vein, and SMV are patent.  BONES: Luc[place] approximately 0.6 cm right trochanteric lesion, likely synovial herniation pit.  No acute fracture or bony destructive process.  EXTRATHORACIC/EXTRAPERITONEAL SOFT TISSUES: Unremarkable.  Impression:  In this patient with known breast cancer, there is an approximately 3.7 cm irregular left breast mass with left axillary lymphadenopathy.  No evidence of metastatic disease  Small hiatal hernia.  Additional findings as described above.     - 3/21/2022 Bone scan:  FINDINGS:  There is physiologic distribution of the radiopharmaceutical throughout the skeleton.  Mild degenerative uptake within the bilateral shoulders and knees.  There is normal uptake in the genitourinary system and soft tissues.  Impression:  There is no scintigraphic evidence of osteoblastic metastatic disease.     - 3/21/2022 Brain MRI:  FINDINGS:  Please note there is dental metal artifact distorting the examination.  Allowing for artifacts the brain parenchyma is normal in contour.  Developmental variant with cavum septum pellucidum identified.  The ventricles are otherwise normal in size without hydrocephalus.  There is no midline shift or significant mass effect.  The major intracranial T2 flow voids are present.  No restricted diffusion within the non distorted brain parenchyma.  There is severe distortion of the susceptibility imaging no parenchymal susceptibility to suggest parenchymal hemorrhage in the non distorted brain parenchyma.  There is suboptimal evaluation of the cerebellum and posterior fossa.  Impression:  Unremarkable MRI brain allowing for artifacts from dental metal as detailed above specifically without abnormal parenchymal enhancement to suggest intracranial metastatic disease in light of history.     - Initiated NA therapy on 4/13/2022  Developed pancreatitis post C4 from immunotherapy and treatment held- required 2 hospitalizations     - 10/20/2022  Ultrasound left breast:  Findings:  At the site of the marker from the prior malignant breast biopsy,there is a 6 mm x 7 mm x 3 mm oval, parallel, hypoechoic mass with indistinct margins seen in the left breast at 2 o'clock in the posterior depth, 8 cm from the nipple. This is difficult to distinguish from the Twirl marker itself.  At the site of the previously biopsied metastatic left axillary node, there is a 10 x 7 x 11 mm node with a [place]- marker. Node has mild residual cortical thickening (5 mm), but has decreased in size. There are a few other normal size, normal appearing left axillary nodes.   Impression:  Left  Mass: Left breast 6 mm x 7 mm x 3 mm mass at the posterior 2 o'clock position. Assessment: 6 - Known biopsy, proven malignancy. Known, previously biopsied left breast cancer and metastatic left axillary node have both decreased in size. The breast mass is now difficult to distinguish from the biopsy marker.   BI-RADS Category:   Overall: 6 - Known Biopsy-Proven Malignancy  Recommendation:  Patient is already under the care of the Breast Oncology team. Most recent bilateral mammogram was performed in February 2022.      Resumed NA chemo without immunotherapy 11/21/2022- completed 7/8 cycles     - 12/8/2022 Breast MRI:  Findings:  The breasts have heterogeneous fibroglandular tissue. The background parenchymal enhancement is minimal and symmetric. There is no evidence of suspicious masses, abnormal enhancement, or other abnormal findings.  There has been interval decrease in there has been interval resolution of previously described enhancing mass at the 2:00 axis which is a known biopsy-proven malignancy.    There is no abnormality in the right breast.    There has been significant decrease in size of left axillary lymph nodes which all are normal in size on current exam.  The largest contains a signal void consistent with previously placed radar reflector at site of biopsy-proven axillary  metastasis.  It measures 8 mm in short axis.  Impression:  Interval resolution of enhancing mass at site of known malignancy in the left breast at 2:00  and marked interval decrease in left axillary adenopathy with visualized lymph nodes normal in size on current exam.  Findings are consistent with complete imaging response to therapy.    BI-RADS Category:   Overall: 6 - Known Biopsy-Proven Malignancy     - 2023 surgery     PMH:  HTN- around pregnancies; off of blood pressure medication x 2 years  CHF- ECHOs at [facility]  Gestational DM  Hyperlipidemia when heavier, managed with diet and followed by cardiology  C- sections x 2  Iron deficiency     GynHx:  Menarche- 7   (16 at 1st pregnancy) (17, 7, 4)  Still with periods - last 2022- last 5-7 days, moderate normal flow  + breast feeding x 1 year  IUD     SH:  Working, , [employer]  Single  Good support system  No tobacco  No EtOH  No illicit drugs    Review of Systems   Constitutional:  Negative for appetite change and unexpected weight change.   HENT:  Negative for mouth sores.    Eyes:  Negative for visual disturbance.   Respiratory:  Negative for cough and shortness of breath.    Cardiovascular:  Negative for chest pain.   Gastrointestinal:  Negative for abdominal pain and diarrhea.   Genitourinary:  Negative for frequency.   Musculoskeletal:  Negative for back pain.   Integumentary:  Negative for rash.   Neurological:  Negative for headaches.   Hematological:  Negative for adenopathy.   Psychiatric/Behavioral:  The patient is not nervous/anxious.        Objective:      Physical Exam  Vitals and nursing note reviewed.   Constitutional:       General: She is not in acute distress.     Appearance: Normal appearance. She is normal weight. She is not ill-appearing.      Comments: Presents with her sister  In a wheelchair as post op but ambulates well to table   HENT:      Head: Normocephalic and atraumatic.   Eyes:      General: No  scleral icterus.     Extraocular Movements: Extraocular movements intact.      Conjunctiva/sclera: Conjunctivae normal.      Pupils: Pupils are equal, round, and reactive to light.   Cardiovascular:      Rate and Rhythm: Normal rate and regular rhythm.      Heart sounds: No murmur heard.    No friction rub. No gallop.   Pulmonary:      Effort: Pulmonary effort is normal. No respiratory distress.      Breath sounds: Normal breath sounds. No rhonchi or rales.      Comments: Left chest wall healing well and no signs of infection  Abdominal:      General: Abdomen is flat. There is no distension.      Palpations: Abdomen is soft. There is no mass.      Tenderness: There is no abdominal tenderness. There is no guarding or rebound.   Musculoskeletal:         General: No swelling or tenderness. Normal range of motion.      Cervical back: Normal range of motion and neck supple. No tenderness.      Right lower leg: No edema.      Left lower leg: No edema.   Lymphadenopathy:      Cervical: No cervical adenopathy.   Skin:     General: Skin is warm and dry.      Coloration: Skin is not jaundiced or pale.      Findings: No rash.   Neurological:      General: No focal deficit present.      Mental Status: She is alert and oriented to person, place, and time.      Sensory: No sensory deficit.      Motor: No weakness.      Coordination: Coordination normal.      Gait: Gait normal.   Psychiatric:         Mood and Affect: Mood normal.         Behavior: Behavior normal.         Thought Content: Thought content normal.         Judgment: Judgment normal.       Labs- reviewed  Assessment:       Problem List Items Addressed This Visit       Malignant neoplasm of upper-outer quadrant of left breast in female, estrogen receptor negative - Primary    Drug-induced acute pancreatitis without infection or necrosis    Anemia in neoplastic disease       Plan:         Breast cancer  Reviewed pathology and role of adjuvant Xeloda    Pancreatitis  resolved     Anemia improving    Route Chart for Scheduling    Med Onc Chart Routing      Follow up with physician    Follow up with ANDREW 2 weeks. cbc, cmp   Infusion scheduling note    Injection scheduling note    Labs    Imaging    Pharmacy appointment    Other referrals           Therapy Plan Information  Flushes  heparin, porcine (PF) 100 unit/mL injection flush 500 Units  500 Units, Intravenous, Every visit  sodium chloride 0.9% flush 10 mL  10 mL, Intravenous, Every visit  heparin, porcine (PF) 100 unit/mL injection flush 500 Units  500 Units, Intravenous, Every visit  sodium chloride 0.9% flush 10 mL  10 mL, Intravenous, Every visit

## 2023-03-21 ENCOUNTER — TUMOR BOARD CONFERENCE (OUTPATIENT)
Dept: SURGERY | Facility: CLINIC | Age: 38
End: 2023-03-21
Payer: MEDICAID

## 2023-03-21 ENCOUNTER — DOCUMENTATION ONLY (OUTPATIENT)
Dept: HEMATOLOGY/ONCOLOGY | Facility: CLINIC | Age: 38
End: 2023-03-21
Payer: MEDICAID

## 2023-03-21 NOTE — PROGRESS NOTES
Breast Tumor Board  Cancer Genetics Summary    Cancer Genetics Provider Present:  Javi Antonio DNP    Patient ID:  Name Laura Kent     1985    MRN [id number]      Date of Breast Tumor Board:  2023  Presenting Provider(s):  Liza Weiner MD    Cancer Genetics Impression:  37 y.o. biological female diagnosed with breast cancer.    She underwent germline cancer-genetic testing ([employer] [employer] with [facility]) in 2022, which demonstrated 2 variants of uncertain significance (VUSs).     AXIN2 c.1615G>A (p.Zal993Taq)  NM_004655.4(AXIN2):c.1615G>A (p.Sci662Hwe)  Interpretation:  Benign/Likely benign    RNF43 c.970C>A (p.Gho436Xwu)  NM_017763.6(RNF43):c.970C>A (p.Xfq494Ada)  Interpretation:  Likely benign    References:  National Center for Biotechnology Information. ClinVar; [SFT857183182.31], https://www.ncbi.nlm.nih.gov/clinvar/variation/BQD514228338.31 (accessed 2023).  National Center for Biotechnology Information. ClinVar; [ISI652057021.7], https://www.ncbi.nlm.nih.gov/clinvar/variation/GHS988014395.7 (accessed 2023).    Cancer Genetics Recommendation/Intervention:  NF1 Questionnaire to patient for completion to assess whether there is clinical suspicion for mutation in NF1, a breast cancer susceptibility gene.  Subsequent steps to be determined based on patient's responses.

## 2023-03-21 NOTE — PROGRESS NOTES
Oncology History   Malignant neoplasm of upper-outer quadrant of left breast in female, estrogen receptor negative   3/13/2022 Initial Diagnosis    Malignant neoplasm of upper-outer quadrant of left breast in female, estrogen receptor negative     3/14/2022 Biopsy    1. LEFT BREAST MASS, 2 O'CLOCK, BIOPSY:   Invasive ductal carcinoma, [place] histologic grade 3 (tubule formation:   3, nuclear pleomorphism:  3, mitotic activity:  3).   Comment:  Infiltrating carcinoma occupies the entirety of biopsied material   with the largest continuous focus measuring 13 mm.  Breast biomarkers are   pending and will be issued in a supplemental report.   2. LEFT AXILLARY LYMPH NODE, BIOPSY:   Invasive ductal carcinoma, [place] histologic grade 3 (tubule formation:   3, nuclear pleomorphism:  3, mitotic activity:  3).   Comment:  Infiltrating carcinoma occupies the entirety of biopsied material   with the largest continuous focus measuring 20 mm.  No lymph node tissue is   identified in the sample.  Tumor histology is essentially identical to that   of part 1.  The findings could represent part of a larger intranodal   metastasis, but an extension from the primary tumor cannot be excluded.   Radiographic correlation is advised     3/14/2022 Breast Tumor Markers    BREAST BIOMARKER RESULTS   Estrogen receptor (ER):  Negative (0)   Progesterone receptor (ER):  Negative (0)   HER2 IHC:  Negative (1+)   Ki-67 proliferation index:  80%        3/21/2022 Cancer Staged    Staging form: Breast, AJCC 8th Edition  - Clinical stage from 3/21/2022: Stage IIIC (cT3, cN2(f), cM0, G3, ER-, VT-, HER2-)       3/22/2022 Genetic Testing    R17: Negative, AXIN2 and RNF43 VUS noted.      4/13/2022 - 12/31/2022 Chemotherapy    Treatment Summary   Plan Name: OP PEMBROLIZUMAB CARBOPLATIN (AUC 5) WITH WEEKLY PACLITAXEL FOLLOWED BY DOSE DENSE DOXORUBICIN CYCLOPHOSPHAMIDE Q2W  Treatment Goal: Curative  Status: Inactive  Start Date:  4/13/2022  End Date: 12/31/2022  Provider: Yessy Cruz MD  Chemotherapy: DOXOrubicin chemo injection 130 mg, 60 mg/m2 = 130 mg, Intravenous, Clinic/HOD 1 time, 3 of 4 cycles  Administration: 130 mg (11/21/2022), 130 mg (12/5/2022), 130 mg (12/29/2022)  CARBOplatin (PARAPLATIN) 750 mg in sodium chloride 0.9% 250 mL chemo infusion, 750 mg, Intravenous, Clinic/HOD 1 time, 4 of 4 cycles  Administration: 750 mg (4/13/2022), 750 mg (6/13/2022), 750 mg (8/15/2022)  cyclophosphamide 600 mg/m2 = 1,300 mg in sodium chloride 0.9% 250 mL chemo infusion, 600 mg/m2 = 1,300 mg, Intravenous, Clinic/HOD 1 time, 3 of 4 cycles  Administration: 1,300 mg (11/21/2022), 1,300 mg (12/5/2022), 1,300 mg (12/29/2022)  PACLitaxeL (TAXOL) 80 mg/m2 = 174 mg in sodium chloride 0.9% 250 mL chemo infusion, 80 mg/m2 = 174 mg, Intravenous, Clinic/HOD 1 time, 4 of 4 cycles  Administration: 174 mg (4/13/2022), 174 mg (5/23/2022), 174 mg (6/6/2022), 174 mg (6/13/2022), 174 mg (6/20/2022), 174 mg (7/6/2022), 174 mg (7/18/2022), 174 mg (7/25/2022), 174 mg (8/2/2022), 174 mg (8/15/2022), 174 mg (8/22/2022), 174 mg (8/8/2022), 174 mg (8/29/2022)       2/27/2023 Breast Surgery    Left Mastectomy with L ALND and TE placement     3/21/2023 Tumor Conference    No residual disease noted on final pathology in the breast however patient did have 1/17 nodes positive on ALND with 4 mm of disease. Patient has a TE in place. Team discussed XRT. Radiation Oncology would recommend XRT given age and extent of disease at time of diagnosis. Patient will also proceed with adjuvant Xeloda.

## 2023-03-23 ENCOUNTER — OFFICE VISIT (OUTPATIENT)
Dept: PLASTIC SURGERY | Facility: CLINIC | Age: 38
End: 2023-03-23
Payer: MEDICAID

## 2023-03-23 ENCOUNTER — CLINICAL SUPPORT (OUTPATIENT)
Dept: REHABILITATION | Facility: HOSPITAL | Age: 38
End: 2023-03-23
Attending: SURGERY
Payer: MEDICAID

## 2023-03-23 ENCOUNTER — PATIENT MESSAGE (OUTPATIENT)
Dept: HEMATOLOGY/ONCOLOGY | Facility: CLINIC | Age: 38
End: 2023-03-23
Payer: MEDICAID

## 2023-03-23 VITALS — SYSTOLIC BLOOD PRESSURE: 137 MMHG | DIASTOLIC BLOOD PRESSURE: 75 MMHG | HEART RATE: 77 BPM

## 2023-03-23 DIAGNOSIS — C50.412 MALIGNANT NEOPLASM OF UPPER-OUTER QUADRANT OF LEFT BREAST IN FEMALE, ESTROGEN RECEPTOR NEGATIVE: ICD-10-CM

## 2023-03-23 DIAGNOSIS — R29.3 POOR POSTURE: ICD-10-CM

## 2023-03-23 DIAGNOSIS — Z17.1 MALIGNANT NEOPLASM OF UPPER-OUTER QUADRANT OF LEFT BREAST IN FEMALE, ESTROGEN RECEPTOR NEGATIVE: Primary | ICD-10-CM

## 2023-03-23 DIAGNOSIS — R26.89 DECREASED FUNCTIONAL MOBILITY: ICD-10-CM

## 2023-03-23 DIAGNOSIS — M25.612 DECREASED RANGE OF MOTION OF LEFT SHOULDER: ICD-10-CM

## 2023-03-23 DIAGNOSIS — R29.898 WEAKNESS OF LEFT UPPER EXTREMITY: ICD-10-CM

## 2023-03-23 DIAGNOSIS — Z91.89 AT RISK FOR LYMPHEDEMA: ICD-10-CM

## 2023-03-23 DIAGNOSIS — C50.412 MALIGNANT NEOPLASM OF UPPER-OUTER QUADRANT OF LEFT BREAST IN FEMALE, ESTROGEN RECEPTOR NEGATIVE: Primary | ICD-10-CM

## 2023-03-23 DIAGNOSIS — Z17.1 MALIGNANT NEOPLASM OF UPPER-OUTER QUADRANT OF LEFT BREAST IN FEMALE, ESTROGEN RECEPTOR NEGATIVE: ICD-10-CM

## 2023-03-23 PROCEDURE — 1159F MED LIST DOCD IN RCRD: CPT | Mod: CPTII,,, | Performed by: SURGERY

## 2023-03-23 PROCEDURE — 99213 OFFICE O/P EST LOW 20 MIN: CPT | Mod: PBBFAC | Performed by: SURGERY

## 2023-03-23 PROCEDURE — 3075F SYST BP GE 130 - 139MM HG: CPT | Mod: CPTII,,, | Performed by: SURGERY

## 2023-03-23 PROCEDURE — 99999 PR PBB SHADOW E&M-EST. PATIENT-LVL III: CPT | Mod: PBBFAC,,, | Performed by: SURGERY

## 2023-03-23 PROCEDURE — 3075F PR MOST RECENT SYSTOLIC BLOOD PRESS GE 130-139MM HG: ICD-10-PCS | Mod: CPTII,,, | Performed by: SURGERY

## 2023-03-23 PROCEDURE — 3044F PR MOST RECENT HEMOGLOBIN A1C LEVEL <7.0%: ICD-10-PCS | Mod: CPTII,,, | Performed by: SURGERY

## 2023-03-23 PROCEDURE — 1159F PR MEDICATION LIST DOCUMENTED IN MEDICAL RECORD: ICD-10-PCS | Mod: CPTII,,, | Performed by: SURGERY

## 2023-03-23 PROCEDURE — 97162 PT EVAL MOD COMPLEX 30 MIN: CPT

## 2023-03-23 PROCEDURE — 3044F HG A1C LEVEL LT 7.0%: CPT | Mod: CPTII,,, | Performed by: SURGERY

## 2023-03-23 PROCEDURE — 99024 PR POST-OP FOLLOW-UP VISIT: ICD-10-PCS | Mod: ,,, | Performed by: SURGERY

## 2023-03-23 PROCEDURE — 3078F DIAST BP <80 MM HG: CPT | Mod: CPTII,,, | Performed by: SURGERY

## 2023-03-23 PROCEDURE — 99999 PR PBB SHADOW E&M-EST. PATIENT-LVL III: ICD-10-PCS | Mod: PBBFAC,,, | Performed by: SURGERY

## 2023-03-23 PROCEDURE — 99024 POSTOP FOLLOW-UP VISIT: CPT | Mod: ,,, | Performed by: SURGERY

## 2023-03-23 PROCEDURE — 3078F PR MOST RECENT DIASTOLIC BLOOD PRESSURE < 80 MM HG: ICD-10-PCS | Mod: CPTII,,, | Performed by: SURGERY

## 2023-03-23 NOTE — PATIENT INSTRUCTIONS
POST OP PATIENT EDUCATION    Post Operative Instructions       When to call your doctor   - if any part of your affected arm or axilla feels hot, is reddened or has increased swelling   - if you develop a temperature over 101 degrees Fahrenheit      Lymphedema - Identification and Prevention     Lymphedema - is the swelling of a body area or extremity caused by the accumulation of lymphatic fluid.  There is a risk for lymphedema with the removal of lymph nodes, trauma or radiation therapy.  Treatment of breast cancer often involves surgery: mastectomy or lumpectomy. Some of the lymph nodes in the underarm (called axillary lymph nodes) may be removed and checked to see if they contain cancer cells.     During breast surgery when axillary lymph nodes are removed (with sentinel node biopsy or axillary dissection) or are treated with radiation therapy, the lymphatic system may become impaired. This may prevent lymphatic fluid from leaving the area therefore, causing lymphedema.     Lymphatic fluid is a normal part of the circulatory system. Its function is to remove waste products and to produce cells vital to fighting infection. Swelling occurs when the vessels become restricted and the lymphatic fluid is unable to freely flow through them.  If lymphedema is left untreated, the affected limb could progressively become more swollen, which could lead to hardening of the skin, bulkiness in the limb, infection and impaired wound healing.         There are things you can do to decrease the chance of developing lymphedema.                                          [email or web address]                              The information presented is intended for general information and educational purposes. It is not intended to replace the  advice of your health care provider. Contact your health care provider if you believe you have a health  problem.                                                    POST OP EXERCISES - SAFE TO DO THE FIRST 2 WEEKS AFTER SURGERY UNTIL YOUR FOLLOW UP APPOINTMENT WITH PHYSICAL/OCCUPATIONAL THERAPY    Scapular Retraction (Standing)    With arms at sides, squeeze shoulder blades together. Do not shrug shoulders and do not hold your breath. Hold 5 seconds. Repeat 10 times 1sessions 1-2 x day.       Exaggerated Breathing and Relaxation      Practice deep breathing frequently in the first few days following surgery even before you begin exercising. This exercise helps with tissue extensibility in the chest wall.  Inhale slowly and deeply through the nose and exhale through pursed lips. Concentrate on relaxing as you let the air out of your lungs. Repeat three (3) to four (4) times, remembering to breath in deeply and then relaxing. This exercise helps to ease the sensation of pulling and discomfort that may be experienced while exercising.      Ball Squeeze OR Hand pumps       Perform this exercise three (3) times a day for 10 reps.    The ball squeeze or hand pumps helps to prevent or reduce temporary swelling that may occur in the affected arm. This exercise may be performed standing, sitting or while lying in bed. During this exercise the affected arm should be slightly bent and held upward. Support your arm with a pillow if you are uncomfortable holding it up.            AROM: Elbow Flexion / Extension        With left hand palm up, gently bend elbow as far as possible. Then straighten arm as far as possible. Do this in standing.   Repeat 10 times per set. Do 1 sets per session. Do 1-3 sessions per day.      Radiation Position    Place left arm, elbow bent, beside head on pillow. Use 1-3 pillows to be comfortable.  REST AND RELAX.  Do 1-2 times per day.

## 2023-03-23 NOTE — PLAN OF CARE
[facility]  Physical Therapy Initial Evaluation    Date: 3/23/2023   Name: Laura Kent  Clinic Number: [id number]    Therapy Diagnosis:   Encounter Diagnoses   Name Primary?    Malignant neoplasm of upper-outer quadrant of left breast in female, estrogen receptor negative     Decreased range of motion of left shoulder     Weakness of left upper extremity     Poor posture     At risk for lymphedema     Decreased functional mobility      Physician: YASMANI Weiner MD    Physician Orders: PT Eval and Treat   Medical Diagnosis: C50.412,Z17.1 (ICD-10-CM) - Malignant neoplasm of upper-outer quadrant of left breast in female, estrogen receptor negative  Evaluation Date: 3/23/2023  Authorization Period Expiration: 3/13/2024  Plan of Care Certification Period: 2023  Visit # / Visits authorized:   Insurance: [place] [employer]  FOTO: 1/3    Time In: 9:00 AM  Time Out: 9:45 AM  Total Billable Time: 45 minutes    Precautions: Standard and cancer      History   History of Present Illness: Laura is a 37 y.o. female that presents to  [facility] at the [facility] s/p left breast surgery.   Diagnosis: Stage IIIC (cT3, cN2(f), cM0, G3, ER-, UT-, HER2-)  invasive ductal carcinoma of the left breast  Chief complaint: she was having trouble lifting the arm as it is feeling tight and hard under her left arm. She has some discomfort with driving, reaching of a car door, reaching for the seatbelt, performing her usual household duties, cooking, getting some help with dressing, and has not been able to return to her usual work duties (carrying 20 lbs)or work outs.    Surgical History:  Laura Kent  has a past surgical history that includes Breast biopsy (Left);  section; Insertion of tunneled central venous catheter (CVC) with subcutaneous port (N/A, 2022); Insertion of tunneled central venous catheter (CVC)  with subcutaneous port (N/A, 8/1/2022); Colonoscopy (N/A, 9/22/2022); Mastectomy (Left, 2/27/2023); Coyle lymph node biopsy (Left, 2/27/2023); Injection for sentinel node identification (Left, 2/27/2023); Axillary node dissection (Left, 2/27/2023); and Insertion of breast tissue expander (Left, 2/27/2023).    Chemotherapy: neoadjuvant completed 11/22  Radiation: TBD  Endocrine therapy: N/A    Past Medical History:   Diagnosis Date    Anxiety     Breast cancer     Encounter for blood transfusion     Hypertension     Meningitis     Severe obesity 2/2/2023          Medications:  Laura has a current medication list which includes the following prescription(s): cetirizine, clotrimazole, gabapentin, hydroxyzine hcl, mupirocin, oxycodone, pantoprazole, prednisolone acetate, tramadol, and triamcinolone acetonide 0.1%, and the following Facility-Administered Medications: ceFAZolin 1 g, gentamicin 80 mg in sodium chloride 0.9% 500 mL irrigation.    Allergies:  Review of patient's allergies indicates:   Allergen Reactions    Strawberries [strawberry] Hives        Prior Therapy: none  Social History: two story [facility], bedroom on second floor, 1 flight to enter the home, lives with their family  Durable Medical Equipment: none  Occupation: /caterer  Prior Level of Function: independent  Current Level of Function: mod A with heavy lifting and household dutie  Exercise routine prior to onset: 5 days a week, wts, cardio, crossfit type workouts  Hand dominance: left      Patient's Goals: to get my full function back        Subjective   Pt states: she is not having much pain today as it has calmed down this week  Pain: 3/10 on VAS currently.   Best: 3/10  Worst: 10/10   Pain location: left axilla, breast   Objective   Mental status: alert & oriented x 3    Postural examination/scapula alignment: Rounded shoulder and Head forward    Skin Integrity:   Scar Location: L breast, axilla  Appearance: TBA  Signs of  "infection: TBA  Drainage: TBA  Color:TBA    Edema: Mild  Location: L UE    Axillary Web Syndrome/Cording:   Location: TBA  Degree of Cording: TBA (mild, moderate etc...)   Number of cords present: TBA    Sensation: numbness noted around incision, L triceps        Range of Motion:      Shoulder Range of Motion:   Active /Passive ROM Right Left   Flexion 175 85   Abduction 180 90   Extension 85 45   IR/90deg 90 90   ER/90deg 95 0@90/75@45          Strength: manual muscle test grades below   Upper Extremity Strength   (R) UE (L) UE   Shoulder flexion: 5/5 3-/5   Shoulder Abduction: 5/5 3-/5   Shoulder IR 5/5 5/5   Shoulder ER 5/5 5/5   Elbow flexion: 5/5 5/5   Elbow extension: 5/5 5/5   Wrist flexion: 5/5 5/5   Wrist extension: 5/5 5/5    82.6 lbs 77.3 lbs       Baseline Measurements of BL UE's for early detection of Lymphedema:     LANDMARK RIGHT UE LEFT UE DIFFERENCE   E + 8" 41 cm 42 cm 1 cm   E + 6" 38.5 cm 38 cm 0.5 cm   E + 4" 37 cm 36 cm 1 cm   E + 2" 33.5 cm 34 cm 0.5 cm   Elbow 29.5 cm 29.5 cm 0 cm   W+ 8" 31 cm 31.5 cm 0.5 cm   W +  6" 30 cm 30 cm 0 cm   W + 4" 26.5 cm 26 cm 0.5 cm   Wrist 18.5 cm 19 cm 0.5 cm   DPC 24 cm 23.5 cm 0.5 cm   IP Thumb 7 cm 7 cm 0 cm     Functional Mobility (Bed mobility, transfers)  Mod I    ADL's:  Mod A    Gait Assessment:   - AD used: none  - Assistance: independent  - Distance: community distances     Patient Education Provided   - role of therapy in multi - disciplinary team, goals for therapy  - Pt was educated in lymphedema etiology and management plans.    - Pt was provided with written risk reductions and precautions for managing lymphedema.     Pt has no cultural, educational or language barriers to learning provided.  Treatment and Instruction of Home Exercise Program      Total Time Separate from Evaluation: 5 minutes    Laura received individual therapeutic exercises to improve postural correction and alignment, stretching and soft tissue mobility, and " strengthening for 5 minutes including the following:   Scapula retractions, 1 set x 10 reps  Elbow flex/ext, 1 set x 10 reps  Gripping  Radiation position stretch      Written Home Exercises Provided: yes.  Exercises were reviewed and Laura was able to demonstrate them prior to the end of the session.  Laura demonstrated good  understanding of the education provided.     See EMR under Patient Instructions for exercises provided 3/23/2023.      Functional Limitations Reporting      CMS Impairment/Limitation/Restriction for FOTO Shoulder Survey    Therapist reviewed FOTO scores for Laura Kent on 3/23/2023.   FOTO documents entered into [employer] - see Media section.    Limitations Score: 46%  Category: Carrying           Assessment   This is a 37 y.o. female referred to outpatient physical therapy and presents with a medical diagnosis of left breast cancer and was seen today post-operatively to establish PT plan of care for impairments following surgery including: decreased ROM of her left shoulder, weakness of left upper extremity, muscle tightness, poor posture, and at risk for lymphedema. Due to her weakness, decreased ROM, and muscle tightness she has difficulty performing her usual household duties, work duties, recreational activities, and ADLs.     Pt instructed in HEP this session and was able to perform all exercises given independently. Pt instructed to follow up with therapist if any concerns arise with program established. Pt will continue to benefit from skilled physical therapy to address the impairments stated in chart below, provide patient/family education and to maximize pt's level of independence in home and community environment     Pt prognosis is Good.    Anticipated barriers to physical therapy: none anticipated     Pt's spiritual, cultural and educational needs considered and pt agreeable to plan of care and goals as stated below:     Medical necessity is demonstrated by the following  IMPAIRMENTS/PROMBLEM LIST:    History  Co-morbidities and personal factors that may impact the plan of care Co-morbidities:   anxiety, high BMI, history of cancer, and HTN    Personal Factors:   no deficits     moderate   Examination  Body Structures and Functions, activity limitations and participation restrictions that may impact the plan of care Body Regions:   upper extremities    Body Systems:    ROM  strength  transfers  motor control  motor learning    Participation Restrictions:   None anticipated    Activity limitations:   Learning and applying knowledge  no deficits    General Tasks and Commands  no deficits    Communication  no deficits    Mobility  lifting and carrying objects  fine hand use (grasping/picking up)  driving (bike, car, motorcycle)    Self care  washing oneself (bathing, drying, washing hands)  caring for body parts (brushing teeth, shaving, grooming)  dressing    Domestic Life  shopping  cooking  doing house work (cleaning house, washing dishes, laundry)    Interactions/Relationships  no deficits    Life Areas  employment    Community and Social Life  community life  recreation and leisure         moderate   Clinical Presentation evolving clinical presentation with changing clinical characteristics moderate   Decision Making/ Complexity Score: moderate       Goals: Pt agrees with goals set    Short Term goals: 4 weeks  1. Patient will demonstrate 100% understanding of lymphedema risk reduction practices to include self monitoring for lymphedema. (progressing, not met)  2. Patient will demonstrate independence with Home Exercise program established. (progressing, not met)  3. Pt will increase AROM/PROM in shoulder abduction ROM to 120 degrees on left to improve functional reach, carry, push, pull pain free. (progressing, not met)  4. Pt will increase AROM/PROM in shoulder flexion to 120 degrees on left to improve functional reach, carry, push, pull pain free.(progressing, not met)  5. Pt  will increase strength to >/= 4/5 in gross UE musculature to improve tolerance to all functional activities pain free. (progressing, not met)    Long Term Goals: 8 weeks   1.  Pt will increase AROM/PROM in shoulder flexion to 175 degrees on left to improve functional reach, carry, push, pull pain free. (progressing, not met)  2. Pt will increase strength to 5/5 in gross UE musculature to improve tolerance to all functional activities pain free. (progressing, not met)  3. Pt will demonstrate full/maximized tissue mobility to increase ROM and promote healthy tissue to be pain free at discharge. (progressing, not met)  4.Pt will increase AROM/PROM in shoulder external rotation ROM to 90 degrees at 90 degrees abduction on left to improve functional reach, carry, push, pull pain free. (progressing, not met)  5. Pt will increase AROM/PROM in shoulder abduction ROM to 180 degrees on left to improve functional reach, carry, push, pull pain free. (progressing, not met)  6. Patient will report compliance with walking program 5x week for 20-30 min each day to improve overall cardiovascular function and decrease cancer related fatigue at discharge. (progressing, not met)      Plan   Outpatient physical therapy 2x week for 8 weeks to include the following:   Manual Therapy, Neuromuscular Re-ed, Patient Education, Self Care, Therapeutic Activities, Therapeutic Exercise, and IASTM .    Plan of care Certification Period: 3/23/2023 to 5/19/2023.    Therapist: Teresa Hurley, PT  3/23/2023

## 2023-03-24 PROBLEM — R29.3 POOR POSTURE: Status: ACTIVE | Noted: 2023-03-24

## 2023-03-24 PROBLEM — R29.898 WEAKNESS OF LEFT UPPER EXTREMITY: Status: ACTIVE | Noted: 2023-03-24

## 2023-03-24 PROBLEM — M25.612 DECREASED RANGE OF MOTION OF LEFT SHOULDER: Status: ACTIVE | Noted: 2023-03-24

## 2023-03-24 PROBLEM — R26.89 DECREASED FUNCTIONAL MOBILITY: Status: ACTIVE | Noted: 2023-03-24

## 2023-03-24 PROBLEM — Z91.89 AT RISK FOR LYMPHEDEMA: Status: ACTIVE | Noted: 2023-03-24

## 2023-03-24 NOTE — PROGRESS NOTES
Laura Kent presents to Plastic Surgery Clinic for a follow up visit status post left skin sparing mastectomy, wise pattern, axillary lymph node dissection, tissue expander placement on 2/27/23. She has 750cc high profile expanders with 250ccs saline filled. Her pain has improved today for the first time since surgery, recently started PT. She isn't needing the tramadol and pain is controlled with mm pain control using ibuprofen, tylenol, gabapentin and robaxin. She reports low serosanguinous output from her left breast drain. She denies fever, chills, nausea, vomiting or other systemic signs of infection.       ROS - negative, other than stated above    PHYSICAL EXAMINATION  Vitals:    03/23/23 1622   BP: 137/75   Pulse: 77     WD WN NAD  VSS  Normal respiratory effort  L breast - incision CDI, no erythema or drainage, nipple absent, diminished sensation  [place] drain in left midaxillary line with ss drainage in bulb. No surrounding erythema.      The fill ports on the left tissue expander was marked using the magnet and a pen.  The skin was then prepped using chloroprep.  Using sterile technique the left port was accessed with a 21G butterfly needle.Sterile injectable saline was added to the expander.  The skin was checked for tension and cap refill.    left volume added: 240 cc                 left total volume: 490 cc        ASSESSMENT/PLAN  37 y.o. F s/p left tissue expander placement  - Doing well, no issues.   - Left breast drain removed  - Tissue expander filled in clinic today  - RTC x 2 weeks for another fill, appointment scheduled.      All questions were answered. The patient was advised to contact the clinic with any questions or concerns prior to their next visit.       Angie Peck PA-C  Plastic and Reconstructive Surgery

## 2023-03-27 ENCOUNTER — CLINICAL SUPPORT (OUTPATIENT)
Dept: REHABILITATION | Facility: HOSPITAL | Age: 38
End: 2023-03-27
Payer: MEDICAID

## 2023-03-27 DIAGNOSIS — R26.89 DECREASED FUNCTIONAL MOBILITY: ICD-10-CM

## 2023-03-27 DIAGNOSIS — R29.898 WEAKNESS OF LEFT UPPER EXTREMITY: ICD-10-CM

## 2023-03-27 DIAGNOSIS — M25.612 DECREASED RANGE OF MOTION OF LEFT SHOULDER: Primary | ICD-10-CM

## 2023-03-27 DIAGNOSIS — Z91.89 AT RISK FOR LYMPHEDEMA: ICD-10-CM

## 2023-03-27 DIAGNOSIS — R29.3 POOR POSTURE: ICD-10-CM

## 2023-03-27 PROCEDURE — 97110 THERAPEUTIC EXERCISES: CPT

## 2023-03-27 NOTE — PROGRESS NOTES
Physical Therapy Daily Treatment Note       Date: 3/27/2023   Name: Laura Kent  Clinic Number: [id number]    Therapy Diagnosis:   Encounter Diagnoses   Name Primary?    Decreased range of motion of left shoulder Yes    Weakness of left upper extremity     Poor posture     At risk for lymphedema     Decreased functional mobility      Physician: YASMANI Weiner MD    Physician Orders: PT Eval and Treat   Medical Diagnosis: C50.412,Z17.1 (ICD-10-CM) - Malignant neoplasm of upper-outer quadrant of left breast in female, estrogen receptor negative  Evaluation Date: 3/23/2023  Authorization Period Expiration: 3/13/2024  Plan of Care Certification Period: 5/19/2023  Visit # / Visits authorized: 1/ 1  Insurance: MEDICAID/[place] [employer]  FOTO: 1/3    Time In: 9:13 am(late arrival)  Time Out: 10:00 am  Total Billable Time: 47 minutes (TE only per [place] medicaid)    Precautions: Standard and cancer      Subjective     Pt reports: feeling a pulling in her left arm when stretching her arm out.   She was compliant with home exercise program.  Response to previous treatment: increased soreness  Pain Scale: Laura rates pain on a scale of 5/10 on VAS.   Pain location: arm left    Fatigue: none  Functional change: putting clothes on is easier  Treatment: Chemotherapy: neoadjuvant completed 11/22  Radiation: TBD  Endocrine therapy: N/A  Surgery date: 2/27/23      Objective     Objective Measures updated at progress report unless specified.     Shoulder Range of Motion: 3/27/23  Active /Passive ROM Right Left   Flexion 175 165   Abduction 180 125   Extension 85 65   IR/90deg 90 90   ER/90deg 95 90     Axillary Web Syndrome/Cording:   Location: left axilla to mid forearm  Degree of Cording: mild (mild, moderate etc...)   Number of cords present: 1    Treatment     Laura received individual therapeutic exercises to improve postural correction and alignment, stretching  and soft tissue mobility, and strengthening for 32 minutes including the following:   Pulley flex/abd, 2'/2'  Scapula retractions, 1 set x 10 reps  Wand flexion, 1 set x 10 reps  Wand ER, 1 set x 10 reps  Butterflies, hands on forehead, 1 set x 10 reps  Wall walks flexion, 1 set x 10 reps  Wall walks, abduction, 1 set x 10 reps  PROM flex, abd, and ER, 1 set x 10 reps    Laura received the following manual therapy techniques were performed to increased myofascial/soft tissue length, mobility and pliability, increase PROM, AROM and function as well as to decrease pain for 15 minutes  - pec side bending in ER  - Gentle stretching and side-bending for cording in left axilla to mid forearm (proximal to distal and back)      Home Exercise Program and Patient Education   Education provided re:  - role of PT in multi - disciplinary team, goals for PT  - progress towards goals   - compliance with HEP  - keeping exercises in a pain free range    Written Home Exercises Provided: yes.  Exercises were reviewed and Laura was able to demonstrate them prior to the end of the session.  Laura demonstrated good  understanding of the education provided.     See EMR under Patient Instructions for exercises provided  eval and 3/27/23 .    Pt has no cultural, educational or language barriers to learning provided.    Assessment     Patient is responding well to physical therapy. Patient was able to begin making progress towards her goals as she was able to perform all of today's activities with no increase in symptoms prior to leaving the clinic. She did display mild cording in her left axilla to forearm. She responded well to manual therapy techniques reporting decreased pulling when reaching out to the side. She was able to demonstrate an increase in AROM of her left shoulder of 80 degrees flexion, 35 degrees abduction, 90 degrees external rotation at the end of the session. She has met the short term goals noted below. Will progress  as tolerated.     Pt prognosis is Good. Pt will continue to benefit from skilled outpatient physical therapy to address the deficits listed in the problem list chart on initial evaluation, provide pt/family education and to maximize pt's level of independence in the home and community environment.     Anticipated barriers to physical therapy: none anticipated    Goals as follows:  Short Term goals: 4 weeks  1. Patient will demonstrate 100% understanding of lymphedema risk reduction practices to include self monitoring for lymphedema. (progressing, not met)  2. Patient will demonstrate independence with Home Exercise program established. (progressing, not met)  3. Pt will increase AROM/PROM in shoulder abduction ROM to 120 degrees on left to improve functional reach, carry, push, pull pain free. (Exceeded, 3/27/23)  4. Pt will increase AROM/PROM in shoulder flexion to 120 degrees on left to improve functional reach, carry, push, pull pain free.(Exceeded, 3/27/23)  5. Pt will increase strength to >/= 4/5 in gross UE musculature to improve tolerance to all functional activities pain free. (progressing, not met)     Long Term Goals: 8 weeks   1.  Pt will increase AROM/PROM in shoulder flexion to 175 degrees on left to improve functional reach, carry, push, pull pain free. (progressing, not met)  2. Pt will increase strength to 5/5 in gross UE musculature to improve tolerance to all functional activities pain free. (progressing, not met)  3. Pt will demonstrate full/maximized tissue mobility to increase ROM and promote healthy tissue to be pain free at discharge. (progressing, not met)  4.Pt will increase AROM/PROM in shoulder external rotation ROM to 90 degrees at 90 degrees abduction on left to improve functional reach, carry, push, pull pain free. (progressing, not met)  5. Pt will increase AROM/PROM in shoulder abduction ROM to 180 degrees on left to improve functional reach, carry, push, pull pain free.  (progressing, not met)  6. Patient will report compliance with walking program 5x week for 20-30 min each day to improve overall cardiovascular function and decrease cancer related fatigue at discharge. (progressing, not met)     Plan     Outpatient physical therapy 2x week for 8 weeks to include the following:   Manual Therapy, Neuromuscular Re-ed, Patient Education, Self Care, Therapeutic Activities, Therapeutic Exercise, and IASTM .     Plan of care Certification Period: 3/23/2023 to 5/19/2023    Therapist: Teresa Hurley, PT  3/27/2023

## 2023-03-27 NOTE — PATIENT INSTRUCTIONS
ROM: Flexion (Alternate)        Slide right arm up wall, with palm out, by leaning toward wall. Hold 2 seconds.  Repeat 10 times per set. Do 1 sets per session. Do 2 sessions per day.     https://[email or web address]     Copyright © [employer]. All rights reserved.           Wall walk abduction    Standing with the wall on your involved side, place forearm on the wall.    Walk your arm up the wall as shown in picture.   Do 10 reps per set. Do 2 sets per session. Do 1 session per day.    ROM: Flexion - Wand (Supine)        Lie on back holding wand. Raise arms over head.   Repeat 10 times per set. Do 2 sets per session. Do 2 sessions per day.     https://[email or web address]     Copyright © [employer]. All rights reserved.           Supine Pec Stretch    Lie on your back with your knees bent. Interlock your hands and place them on your forehead to start. Progress to placing hands under your head and neck for support. Allow your elbows to fall towards the floor until feeling a comfortable stretch. Hold the position.  Do 10 reps per set. Do 1 set per session. Do 1 session per day.        Shoulder External Rotation 90 Degrees Bilateral     Lay on your back and grab the wand with both hand with palms facing forward. Raise both elbows to shoulder height and adjust hand to maintain a 90 degree elbow bend. Next, without sliding the elbow, reach hand down and back towards table or surface.

## 2023-03-28 ENCOUNTER — PATIENT MESSAGE (OUTPATIENT)
Dept: RESEARCH | Facility: HOSPITAL | Age: 38
End: 2023-03-28
Payer: MEDICAID

## 2023-03-29 ENCOUNTER — OFFICE VISIT (OUTPATIENT)
Dept: RADIATION ONCOLOGY | Facility: CLINIC | Age: 38
End: 2023-03-29
Payer: MEDICAID

## 2023-03-29 ENCOUNTER — CLINICAL SUPPORT (OUTPATIENT)
Dept: REHABILITATION | Facility: HOSPITAL | Age: 38
End: 2023-03-29
Payer: MEDICAID

## 2023-03-29 VITALS
HEART RATE: 72 BPM | HEIGHT: 65 IN | DIASTOLIC BLOOD PRESSURE: 84 MMHG | OXYGEN SATURATION: 100 % | BODY MASS INDEX: 36.49 KG/M2 | SYSTOLIC BLOOD PRESSURE: 134 MMHG | TEMPERATURE: 98 F | WEIGHT: 219 LBS

## 2023-03-29 DIAGNOSIS — Z91.89 AT RISK FOR LYMPHEDEMA: ICD-10-CM

## 2023-03-29 DIAGNOSIS — R29.3 POOR POSTURE: ICD-10-CM

## 2023-03-29 DIAGNOSIS — M25.612 DECREASED RANGE OF MOTION OF LEFT SHOULDER: Primary | ICD-10-CM

## 2023-03-29 DIAGNOSIS — R29.898 WEAKNESS OF LEFT UPPER EXTREMITY: ICD-10-CM

## 2023-03-29 DIAGNOSIS — R26.89 DECREASED FUNCTIONAL MOBILITY: ICD-10-CM

## 2023-03-29 DIAGNOSIS — Z17.1 MALIGNANT NEOPLASM OF UPPER-OUTER QUADRANT OF LEFT BREAST IN FEMALE, ESTROGEN RECEPTOR NEGATIVE: Primary | ICD-10-CM

## 2023-03-29 DIAGNOSIS — C50.412 MALIGNANT NEOPLASM OF UPPER-OUTER QUADRANT OF LEFT BREAST IN FEMALE, ESTROGEN RECEPTOR NEGATIVE: Primary | ICD-10-CM

## 2023-03-29 PROCEDURE — 3008F BODY MASS INDEX DOCD: CPT | Mod: CPTII,,, | Performed by: RADIOLOGY

## 2023-03-29 PROCEDURE — 99999 PR PBB SHADOW E&M-EST. PATIENT-LVL III: CPT | Mod: PBBFAC,,, | Performed by: RADIOLOGY

## 2023-03-29 PROCEDURE — 3075F PR MOST RECENT SYSTOLIC BLOOD PRESS GE 130-139MM HG: ICD-10-PCS | Mod: CPTII,,, | Performed by: RADIOLOGY

## 2023-03-29 PROCEDURE — 3044F HG A1C LEVEL LT 7.0%: CPT | Mod: CPTII,,, | Performed by: RADIOLOGY

## 2023-03-29 PROCEDURE — 97110 THERAPEUTIC EXERCISES: CPT

## 2023-03-29 PROCEDURE — 3079F PR MOST RECENT DIASTOLIC BLOOD PRESSURE 80-89 MM HG: ICD-10-PCS | Mod: CPTII,,, | Performed by: RADIOLOGY

## 2023-03-29 PROCEDURE — 1159F MED LIST DOCD IN RCRD: CPT | Mod: CPTII,,, | Performed by: RADIOLOGY

## 2023-03-29 PROCEDURE — 99024 POSTOP FOLLOW-UP VISIT: CPT | Mod: ,,, | Performed by: RADIOLOGY

## 2023-03-29 PROCEDURE — 99213 OFFICE O/P EST LOW 20 MIN: CPT | Mod: PBBFAC | Performed by: RADIOLOGY

## 2023-03-29 PROCEDURE — 3044F PR MOST RECENT HEMOGLOBIN A1C LEVEL <7.0%: ICD-10-PCS | Mod: CPTII,,, | Performed by: RADIOLOGY

## 2023-03-29 PROCEDURE — 1159F PR MEDICATION LIST DOCUMENTED IN MEDICAL RECORD: ICD-10-PCS | Mod: CPTII,,, | Performed by: RADIOLOGY

## 2023-03-29 PROCEDURE — 3075F SYST BP GE 130 - 139MM HG: CPT | Mod: CPTII,,, | Performed by: RADIOLOGY

## 2023-03-29 PROCEDURE — 99999 PR PBB SHADOW E&M-EST. PATIENT-LVL III: ICD-10-PCS | Mod: PBBFAC,,, | Performed by: RADIOLOGY

## 2023-03-29 PROCEDURE — 99024 PR POST-OP FOLLOW-UP VISIT: ICD-10-PCS | Mod: ,,, | Performed by: RADIOLOGY

## 2023-03-29 PROCEDURE — 3079F DIAST BP 80-89 MM HG: CPT | Mod: CPTII,,, | Performed by: RADIOLOGY

## 2023-03-29 PROCEDURE — 3008F PR BODY MASS INDEX (BMI) DOCUMENTED: ICD-10-PCS | Mod: CPTII,,, | Performed by: RADIOLOGY

## 2023-03-29 RX ORDER — CLOBETASOL PROPIONATE 0.46 MG/ML
SOLUTION TOPICAL
COMMUNITY
Start: 2023-01-25 | End: 2023-09-20

## 2023-03-29 RX ORDER — DUPILUMAB 300 MG/2ML
INJECTION, SOLUTION SUBCUTANEOUS
COMMUNITY
Start: 2023-03-24 | End: 2023-11-30

## 2023-03-29 RX ORDER — KETOCONAZOLE 20 MG/ML
SHAMPOO, SUSPENSION TOPICAL
COMMUNITY
Start: 2023-01-26

## 2023-03-29 RX ORDER — IBUPROFEN 600 MG/1
600 TABLET ORAL 3 TIMES DAILY
Status: ON HOLD | COMMUNITY
Start: 2023-03-14 | End: 2023-12-14 | Stop reason: SDUPTHER

## 2023-03-29 NOTE — PROGRESS NOTES
Physical Therapy Daily Treatment Note     Date: 3/29/2023   Name: Laura Kent  Clinic Number: [id number]    Therapy Diagnosis:  Encounter Diagnoses   Name Primary?    Decreased range of motion of left shoulder Yes    Weakness of left upper extremity     Poor posture     At risk for lymphedema     Decreased functional mobility      Physician: YASMANI Weiner MD    Physician Orders: PT Eval and Treat   Medical Diagnosis: C50.412,Z17.1 (ICD-10-CM) - Malignant neoplasm of upper-outer quadrant of left breast in female, estrogen receptor negative  Evaluation Date: 3/23/2023  Authorization Period Expiration: 3/13/2024  Plan of Care Certification Period: 5/19/2023  Visit # / Visits Authorized: 1 / 1 (plus eval)  Insurance: MEDICAID / [place] [employer]  FOTO: 1/3    Time In: 9:11 AM (late arrival due to parking)  Time Out: 10:00 am  Total Billable Time: 44 minutes (TE only per [place] medicaid)  5 minutes deducted from session secondary to fire drill during appt.     Precautions: Standard and cancer      Subjective     Patient reports: LUE and axilla area still feeling sore and tight this morning. Wall slides (both directions) most difficult exercise to perform at home.    She was compliant with home exercise program.  Response to Previous Treatment: increased muscle soreness    Pain Scale: Laura rates pain on a scale of 5/10 on VAS.   Pain Location: L lateral chest wall / axilla    Fatigue: none  Functional Change: putting clothes on is easier    Treatment:   Chemotherapy: neoadjuvant completed 11/22  Radiation: TBD  Endocrine Therapy: N/A    Surgery Date: LEFT mastectomy with TE placement with LEFT SLNB per Myra Weiner and Denis.       Objective     Objective Measures updated at progress report unless specified.     Shoulder Range of Motion: 03/27/2023  Active ROM Right Left   Flexion 175 165   Abduction 180 125   Extension 85 65   IR/90deg 90 90   ER/90deg 95 90      Axillary Web Syndrome/Cording:   Location: left axilla to mid forearm  Degree of Cording: mild  Number of Cords Present: 1    Treatment     Laura received individual therapeutic exercises to improve postural correction and alignment, stretching and soft tissue mobility, and strengthening for 31 minutes including the following:     Seated Exercises:  - Pulleys (flexion, abduction)   2 min each  - Physioball roll outs (3 ways)   3 min    Standing Exercises:  - Wall walks flexion - held   1 set x 10 reps  - Wall walks, abduction - held   1 set x 10 reps    Mat Exercises:  - Supine pec stretch with towel roll  2 min  - Butterflies, hands on forehead  1 set x 10 reps  - AAROM wand flexion - held   1 set x 10 reps  - AAROM wand ER - held   1 set x 10 reps  - LUE PROM flex, abd, and ER   1 set x 10 reps      Laura received the following individual neuromuscular re-education activities to improve coordination, kinesthetic sense, proprioception, and posture for 3 minutes:     - Scapula retractions    2 min      Laura received the following manual therapy techniques were performed to increased myofascial/soft tissue length, mobility and pliability, increase PROM, AROM and function as well as to decrease pain for 10 minutes:    - Pec side bending in ER  - Gentle stretching and side-bending for cording in left axilla to mid forearm (proximal to distal and back)      Home Exercise Program and Patient Education     Education Provided Regarding:  - Role of physical therapy in multi-disciplinary team  - Goals for physical therapy, progress towards goals  - Exercise technique, compliance with home exercise program  - Keeping exercises in pain-free range    Written Home Exercises Provided: Patient instructed to cont prior HEP. Exercises were reviewed and Laura was able to demonstrate them prior to the end of the session. Laura demonstrated good  understanding of the education provided.     See EMR under Patient Instructions  for exercises provided  eval and 3/27/23 .    Pt has no cultural, educational, or language barriers to learning provided.    Assessment     Patient is responding well to physical therapy. Patient able to perform new exercises and exercise progressions without increase in symptoms prior to leaving the clinic. Improved flexibility and joint mobility with added ball roll out stretch. Improved soft tissue mobility with reduced degree of cording following manual techniques. Will progress as tolerated.     Patient prognosis is Good. Patient will continue to benefit from skilled outpatient physical therapy to address the deficits listed in the problem list chart on initial evaluation, provide patient / family education, and to maximize patient's level of independence in the home and community environment.     Anticipated barriers to physical therapy: none anticipated    Goals as Follows:  Short Term Goals: 4 weeks  Patient will demonstrate 100% understanding of lymphedema risk reduction practices to include self monitoring for lymphedema (progressing, not met).  Patient will demonstrate independence with Home Exercise program established (progressing, not met).  Patient will increase AROM / PROM in shoulder abduction to 120 degrees on left to improve functional reach, carry, push, pull pain free (exceeded, 03/27/2023).  Patient will increase AROM / PROM in shoulder flexion to 120 degrees on left to improve functional reach, carry, push, pull pain free (exceeded, 03/27/2023).  Patient will increase strength to >/= 4/5 in gross upper extremity musculature to improve tolerance to all functional activities pain free (progressing, not met).     Long Term Goals: 8 weeks   Patient will increase AROM / PROM in shoulder flexion to 175 degrees on left to improve functional reach, carry, push, pull pain free (progressing, not met).  Patient will increase strength to 5/5 in gross upper extremity musculature to improve tolerance to all  functional activities pain free (progressing, not met).  Patient will demonstrate full / maximized tissue mobility to increase range of motion and promote healthy tissue to be pain-free at discharge (progressing, not met).   Patient will increase AROM / PROM in shoulder external rotation to 90 degrees at 90 degrees abduction on left to improve functional reach, carry, push, pull pain free (progressing, not met).  Patient will increase AROM / PROM in shoulder abduction ROM to 180 degrees on left to improve functional reach, carry, push, pull pain free (progressing, not met).  Patient will report compliance with walking program 5x/week for 20-30 minutes/day to improve overall cardiovascular function and decrease cancer-related fatigue at discharge (progressing, not met).     Plan     Outpatient physical therapy 2x week for 8 weeks to include the following: Manual Therapy, Neuromuscular Re-ed, Patient Education, Self Care, Therapeutic Activities, Therapeutic Exercise, and IASTM .     Plan of Care Certification Period: 03/23/2023 to 05/19/2023    Therapist: Ghada Brooks, PT  3/29/2023

## 2023-03-29 NOTE — PROGRESS NOTES
PATIENT IDENTIFICATION:  Patient Name: Laura Kent  MRN: [id number]  : 1985    DIAGNOSIS:  Cancer Staging   Malignant neoplasm of upper-outer quadrant of left breast in female, estrogen receptor negative  Staging form: Breast, AJCC 8th Edition  - Clinical stage from 3/21/2022: Stage IIIC (cT3, cN2(f), cM0, G3, ER-, IN-, HER2-) - Signed by Yessy Cruz MD on 3/21/2022      HISTORY OF PRESENT ILLNESS:   The patient is a 37-year-old woman with a triple negative left breast cancer.  She is status post neoadjuvant chemotherapy with residual disease in the axilla.  She is been referred to our department for consideration of postmastectomy radiation therapy.    The patient palpated a mass in the left breast late .    Outside mammogram was performed on 2022.  Imaging revealed a 3.1 x 1.8 x 2.1 cm mass in the left breast at the 2 o'clock position in the posterior depth with associated left axillary lymphadenopathy.  The largest lymph node measured 4.9 x 2.3 x 2.9 cm with cortical thickening and effacement of the hilum.      The patient underwent biopsy of the left breast mass and axillary lymph node on 3/14/22 which revealed grade 3 invasive ductal carcinoma.  On immunohistochemistry, the tumor was ERPR negative and HER2 Sal negative.  The Ki-67 proliferative index was 80%.    MRI Breast 3/16/22:  Left  There is a 38 mm x 33 mm x 33 mm irregularly shaped mass seen in the left breast at 2 o'clock in the posterior depth, 8.4 cm from the nipple and 1.2 cm from the skin. Associated signal void from a twirl biopsy marker; pathology showed invasive ductal carcinoma.      Posterior to the mass there is abnormal non mass enhancement measuring 29 x 28 mm. The NME is 4 mm from the skin and 2.3 cm from the chest wall. In total the mass and NME measure 54 mm in AP extent.     Overlying skin thickening and edema involving the lateral breast, likely from lymphovascular obstruction. No suspicious skin  enhancement. Overall increased vascularity to the left breast.      Four abnormal lymph level 1 and level 2 left axillary lymph nodes. The largest lymph node measures 52 mm x 40 mm x 38 mm which previously underwent biopsy showing metastatic disease. There is an associated radar reflector within the biopsied lymph node.     Right  There is no evidence of suspicious masses, abnormal enhancement, or other abnormal findings in the right breast. No enlarged axillary or internal mammary lymph nodes.      Impression:  Left  Mass: Left breast 38 mm x 33 mm x 33 mm mass at the posterior 2 o'clock position. Assessment: 6 - Known biopsy, proven malignancy. Associated 29 mm NME extending posteriorly from the mass. In total the mass and NME measure 54 mm in AP extent.  Lymph Node: Abnormal level 1 and 2 left axillary lymph nodes, the largest of which measures 52 mm x 40 mm x 38 mm lymph node and is known biopsy, proven malignancy.     The patient received neoadjuvant chemotherapy under the care of Dr. Cruz.  Treatment was complicated by hospitalization for pancreatitis.     The patient was taken to the operating room on 02/27/2023 for left modified radical mastectomy.  She was found to have no residual disease within the breast.  1/21 lymph nodes contained a macro metastasis measuring 4 mm.  Oncology History   Malignant neoplasm of upper-outer quadrant of left breast in female, estrogen receptor negative   3/13/2022 Initial Diagnosis    Malignant neoplasm of upper-outer quadrant of left breast in female, estrogen receptor negative     3/14/2022 Biopsy    1. LEFT BREAST MASS, 2 O'CLOCK, BIOPSY:   Invasive ductal carcinoma, [place] histologic grade 3 (tubule formation:   3, nuclear pleomorphism:  3, mitotic activity:  3).   Comment:  Infiltrating carcinoma occupies the entirety of biopsied material   with the largest continuous focus measuring 13 mm.  Breast biomarkers are   pending and will be issued in a supplemental  report.   2. LEFT AXILLARY LYMPH NODE, BIOPSY:   Invasive ductal carcinoma, [place] histologic grade 3 (tubule formation:   3, nuclear pleomorphism:  3, mitotic activity:  3).   Comment:  Infiltrating carcinoma occupies the entirety of biopsied material   with the largest continuous focus measuring 20 mm.  No lymph node tissue is   identified in the sample.  Tumor histology is essentially identical to that   of part 1.  The findings could represent part of a larger intranodal   metastasis, but an extension from the primary tumor cannot be excluded.   Radiographic correlation is advised     3/14/2022 Breast Tumor Markers    BREAST BIOMARKER RESULTS   Estrogen receptor (ER):  Negative (0)   Progesterone receptor (ER):  Negative (0)   HER2 IHC:  Negative (1+)   Ki-67 proliferation index:  80%        3/21/2022 Cancer Staged    Staging form: Breast, AJCC 8th Edition  - Clinical stage from 3/21/2022: Stage IIIC (cT3, cN2(f), cM0, G3, ER-, NV-, HER2-)       3/22/2022 Genetic Testing    [email or web address]: Negative, AXIN2 and RNF43 VUS noted.      4/13/2022 - 12/31/2022 Chemotherapy    Treatment Summary   Plan Name: OP PEMBROLIZUMAB CARBOPLATIN (AUC 5) WITH WEEKLY PACLITAXEL FOLLOWED BY DOSE DENSE DOXORUBICIN CYCLOPHOSPHAMIDE Q2W  Treatment Goal: Curative  Status: Inactive  Start Date: 4/13/2022  End Date: 12/31/2022  Provider: Yessy Cruz MD  Chemotherapy: DOXOrubicin chemo injection 130 mg, 60 mg/m2 = 130 mg, Intravenous, Clinic/HOD 1 time, 3 of 4 cycles  Administration: 130 mg (11/21/2022), 130 mg (12/5/2022), 130 mg (12/29/2022)  CARBOplatin (PARAPLATIN) 750 mg in sodium chloride 0.9% 250 mL chemo infusion, 750 mg, Intravenous, Clinic/HOD 1 time, 4 of 4 cycles  Administration: 750 mg (4/13/2022), 750 mg (6/13/2022), 750 mg (8/15/2022)  cyclophosphamide 600 mg/m2 = 1,300 mg in sodium chloride 0.9% 250 mL chemo infusion, 600 mg/m2 = 1,300 mg, Intravenous, Clinic/HOD 1 time, 3 of 4 cycles  Administration: 1,300 mg  (2022), 1,300 mg (2022), 1,300 mg (2022)  PACLitaxeL (TAXOL) 80 mg/m2 = 174 mg in sodium chloride 0.9% 250 mL chemo infusion, 80 mg/m2 = 174 mg, Intravenous, Clinic/HOD 1 time, 4 of 4 cycles  Administration: 174 mg (2022), 174 mg (2022), 174 mg (2022), 174 mg (2022), 174 mg (2022), 174 mg (2022), 174 mg (2022), 174 mg (2022), 174 mg (2022), 174 mg (8/15/2022), 174 mg (2022), 174 mg (2022), 174 mg (2022)       2023 Breast Surgery    Left Mastectomy with L ALND and TE placement     3/21/2023 Tumor Conference    No residual disease noted on final pathology in the breast however patient did have  nodes positive on ALND with 4 mm of disease. Patient has a TE in place. Team discussed XRT. Radiation Oncology would recommend XRT given age and extent of disease at time of diagnosis. Patient will also proceed with adjuvant Xeloda.             REVIEW OF SYSTEMS:   Review of Systems   Constitutional:  Positive for weight loss.   HENT:  Positive for congestion.      PAST MEDICAL HISTORY:  Past Medical History:   Diagnosis Date    Anxiety     Breast cancer     Encounter for blood transfusion     Hypertension     Meningitis     Severe obesity 2023       PAST SURGICAL HISTORY:  Past Surgical History:   Procedure Laterality Date    AXILLARY NODE DISSECTION Left 2023    Procedure: LYMPHADENECTOMY, AXILLARY;  Surgeon: YASMANI Weiner MD;  Location: [facility] OR;  Service: General;  Laterality: Left;    BREAST BIOPSY Left      SECTION      COLONOSCOPY N/A 2022    Procedure: COLONOSCOPY;  Surgeon: Davi Chery MD;  Location: [address]);  Service: Endoscopy;  Laterality: N/A;  case request entered from referral - Per DR. KATHLEEN Chery Pt to be scheduled for urgent colonoscopy on 22/ fully vaccinated / prep ins for Sutab on portal - ERW  see micro for negative C-diff- ERW    INJECTION FOR SENTINEL NODE IDENTIFICATION Left  2/27/2023    Procedure: INJECTION, FOR SENTINEL NODE IDENTIFICATION;  Surgeon: YASMANI Weiner MD;  Location: [facility] OR;  Service: General;  Laterality: Left;    INSERTION OF BREAST TISSUE EXPANDER Left 2/27/2023    Procedure: INSERTION, TISSUE EXPANDER, BREAST;  Surgeon: Reese Barrios DO;  Location: [facility] OR;  Service: Plastics;  Laterality: Left;    INSERTION OF TUNNELED CENTRAL VENOUS CATHETER (CVC) WITH SUBCUTANEOUS PORT N/A 4/5/2022    Procedure: [id number];  Surgeon: Deo Sin Jr., MD;  Location: [facility] OR 2ND FLR;  Service: General;  Laterality: N/A;    INSERTION OF TUNNELED CENTRAL VENOUS CATHETER (CVC) WITH SUBCUTANEOUS PORT N/A 8/1/2022    Procedure: INSERTION, SINGLE LUMEN CATHETER WITH PORT, WITH FLUOROSCOPIC GUIDANCE;  Surgeon: Ryder Dinero MD;  Location: [facility] CATH LAB;  Service: Vascular;  Laterality: N/A;    MASTECTOMY Left 2/27/2023    Procedure: MASTECTOMY;  Surgeon: YASMANI Weiner MD;  Location: [facility];  Service: General;  Laterality: Left;  3.5 TOTAL HOURS / EMAIL SENT 2-23 @ 7:56 [facility]    SENTINEL LYMPH NODE BIOPSY Left 2/27/2023    Procedure: BIOPSY, LYMPH NODE, SENTINEL;  Surgeon: YASMANI Weiner MD;  Location: [facility];  Service: General;  Laterality: Left;  LEFT with radiological marker       ALLERGIES:   Review of patient's allergies indicates:   Allergen Reactions    Strawberries [strawberry] Hives       MEDICATIONS:  Current Outpatient Medications   Medication Sig    cetirizine (ZYRTEC) 10 MG tablet Take 10 mg by mouth once daily.    clobetasoL (TEMOVATE) 0.05 % external solution Apply topically.    clotrimazole (LOTRIMIN) 1 % cream Apply 1 application topically every evening.    DUPIXENT  mg/2 mL PnIj INJECT 300MG UNDER THE SKIN EVERY 2 WEEKS AS DIRECTED    hydrOXYzine HCL (ATARAX) 10 MG Tab Take 10 mg by mouth nightly as needed.    ibuprofen (ADVIL,MOTRIN) 600 MG tablet Take 600 mg by mouth 3 (three) times daily.    ketoconazole (NIZORAL) 2 % shampoo  Apply topically Every 3 (three) days.    mupirocin (BACTROBAN) 2 % ointment Apply topically once daily.    oxyCODONE (ROXICODONE) 5 MG immediate release tablet Take 1 tablet (5 mg total) by mouth every 4 (four) hours as needed for Pain.    pantoprazole (PROTONIX) 40 MG tablet Take 1 tablet (40 mg total) by mouth once daily.    prednisoLONE acetate (PRED FORTE) 1 % DrpS Place 1 drop into both eyes 2 (two) times daily.    traMADoL (ULTRAM) 50 mg tablet Take 1 tablet (50 mg total) by mouth every 6 (six) hours as needed for Pain.    triamcinolone acetonide 0.1% (KENALOG) 0.1 % cream Apply 1 g topically 2 (two) times daily.    gabapentin (NEURONTIN) 300 MG capsule Take 2 capsules (600 mg total) by mouth 3 (three) times daily. for 7 days     No current facility-administered medications for this visit.     Facility-Administered Medications Ordered in Other Visits   Medication    ceFAZolin 1 g, gentamicin 80 mg in sodium chloride 0.9% 500 mL irrigation       SOCIAL HISTORY:  Social History     Socioeconomic History    Marital status: Single   Tobacco Use    Smoking status: Never    Smokeless tobacco: Never   Substance and Sexual Activity    Alcohol use: Not Currently     Comment: social    Drug use: Never    Sexual activity: Not Currently     Social Determinants of Health     Financial Resource Strain: Medium Risk    Difficulty of Paying Living Expenses: Somewhat hard   Food Insecurity: No Food Insecurity    Worried About Running Out of Food in the Last Year: Never true    Ran Out of Food in the Last Year: Never true   Transportation Needs: No Transportation Needs    Lack of Transportation (Medical): No    Lack of Transportation (Non-Medical): No   Physical Activity: Insufficiently Active    Days of Exercise per Week: 2 days    Minutes of Exercise per Session: 60 min   Stress: Stress Concern Present    Feeling of Stress : To some extent   Social Connections: Unknown    Frequency of Communication with Friends and Family:  More than three times a week    Frequency of Social Gatherings with Friends and Family: Once a week    Active Member of Clubs or Organizations: Yes    Attends Club or Organization Meetings: More than 4 times per year    Marital Status: Never    Housing Stability: High Risk    Unable to Pay for Housing in the Last Year: Yes    Number of Places Lived in the Last Year: 1    Unstable Housing in the Last Year: No       FAMILY HISTORY:  Family History   Problem Relation Age of Onset    Hypertension Mother     Diabetes Father     Hypertension Father     Pancreatic cancer Maternal Uncle     Bladder Cancer Paternal Grandmother     Colon cancer Neg Hx     Esophageal cancer Neg Hx          PHYSICAL EXAMINATION:  Vitals:    23 1113   BP: 134/84   Pulse: 72   Temp: 98.4 °F (36.9 °C)     Body mass index is 36.44 kg/m².    ECO  Physical Exam  Constitutional:       Appearance: Normal appearance.   HENT:      Head: Normocephalic and atraumatic.      Nose: Nose normal.      Mouth/Throat:      Mouth: Mucous membranes are moist.   Cardiovascular:      Rate and Rhythm: Normal rate.   Pulmonary:      Effort: Pulmonary effort is normal.   Chest:          Comments: Tissue expander in place at the lefft chest wall  Abdominal:      General: Abdomen is flat.      Palpations: Abdomen is soft.   Musculoskeletal:         General: Normal range of motion.      Cervical back: Normal range of motion.   Skin:     General: Skin is warm and dry.   Neurological:      General: No focal deficit present.      Mental Status: She is alert. Mental status is at baseline.           ASSESSMENT/PLAN:  Laura was seen today for follow-up.    Diagnoses and all orders for this visit:    Malignant neoplasm of upper-outer quadrant of left breast in female, estrogen receptor negative        The patient is a 37-year-old woman with a locally advanced triple negative breast cancer.  She is status post neoadjuvant chemotherapy with near complete response.   She initially had bulky disease in the breast and axilla and is recommended postmastectomy radiation therapy to reduce risk of recurrence and improve overall survival.  She will receive 5 weeks of daily external beam treatment.  The patient does have decreased mobility in the left upper extremity and will need to continue with physical therapy.  In addition, she will need to complete filling of the tissue expanders prior to CT simulation.  We will have her return to our department in 3 weeks for CT simulation with a plan to start her treatment shortly thereafter.    The risks and benefits of treatment have been discussed with the patient and she expressed full understanding. she understands the treatment plan and willing to proceed accordingly.    I spent approximately 60 minutes reviewing the available records and evaluating the patient, out of which over 50% of the time was spent face to face with the patient in counseling and coordinating this patient's care.

## 2023-04-03 ENCOUNTER — OFFICE VISIT (OUTPATIENT)
Dept: HEMATOLOGY/ONCOLOGY | Facility: CLINIC | Age: 38
End: 2023-04-03
Payer: MEDICAID

## 2023-04-03 ENCOUNTER — HOSPITAL ENCOUNTER (OUTPATIENT)
Dept: CARDIOLOGY | Facility: HOSPITAL | Age: 38
Discharge: HOME | End: 2023-04-03
Attending: INTERNAL MEDICINE
Payer: MEDICAID

## 2023-04-03 ENCOUNTER — HOSPITAL ENCOUNTER (OUTPATIENT)
Dept: RADIATION THERAPY | Facility: HOSPITAL | Age: 38
Discharge: HOME | End: 2023-04-03
Attending: RADIOLOGY
Payer: MEDICAID

## 2023-04-03 VITALS
WEIGHT: 216 LBS | HEIGHT: 65 IN | BODY MASS INDEX: 35.72 KG/M2 | DIASTOLIC BLOOD PRESSURE: 80 MMHG | HEART RATE: 78 BPM | TEMPERATURE: 97 F | WEIGHT: 214.38 LBS | SYSTOLIC BLOOD PRESSURE: 130 MMHG | DIASTOLIC BLOOD PRESSURE: 80 MMHG | BODY MASS INDEX: 35.99 KG/M2 | HEART RATE: 75 BPM | RESPIRATION RATE: 18 BRPM | SYSTOLIC BLOOD PRESSURE: 135 MMHG | HEIGHT: 65 IN | OXYGEN SATURATION: 99 %

## 2023-04-03 DIAGNOSIS — C50.412 MALIGNANT NEOPLASM OF UPPER-OUTER QUADRANT OF LEFT BREAST IN FEMALE, ESTROGEN RECEPTOR NEGATIVE: ICD-10-CM

## 2023-04-03 DIAGNOSIS — Z17.1 MALIGNANT NEOPLASM OF UPPER-OUTER QUADRANT OF LEFT BREAST IN FEMALE, ESTROGEN RECEPTOR NEGATIVE: Primary | ICD-10-CM

## 2023-04-03 DIAGNOSIS — K85.30 DRUG-INDUCED ACUTE PANCREATITIS WITHOUT INFECTION OR NECROSIS: ICD-10-CM

## 2023-04-03 DIAGNOSIS — D70.1 CHEMOTHERAPY INDUCED NEUTROPENIA: ICD-10-CM

## 2023-04-03 DIAGNOSIS — R73.9 HYPERGLYCEMIA: ICD-10-CM

## 2023-04-03 DIAGNOSIS — C50.412 MALIGNANT NEOPLASM OF UPPER-OUTER QUADRANT OF LEFT BREAST IN FEMALE, ESTROGEN RECEPTOR NEGATIVE: Primary | ICD-10-CM

## 2023-04-03 DIAGNOSIS — E87.6 HYPOKALEMIA: ICD-10-CM

## 2023-04-03 DIAGNOSIS — T45.1X5A CHEMOTHERAPY INDUCED NEUTROPENIA: ICD-10-CM

## 2023-04-03 DIAGNOSIS — D63.0 ANEMIA IN NEOPLASTIC DISEASE: ICD-10-CM

## 2023-04-03 DIAGNOSIS — I10 PRIMARY HYPERTENSION: ICD-10-CM

## 2023-04-03 DIAGNOSIS — Z17.1 MALIGNANT NEOPLASM OF UPPER-OUTER QUADRANT OF LEFT BREAST IN FEMALE, ESTROGEN RECEPTOR NEGATIVE: ICD-10-CM

## 2023-04-03 LAB
ASCENDING AORTA: 3 CM
AV INDEX (PROSTH): 0.72
AV MEAN GRADIENT: 3 MMHG
AV PEAK GRADIENT: 6 MMHG
AV VALVE AREA: 2.72 CM2
AV VELOCITY RATIO: 0.78
BSA FOR ECHO PROCEDURE: 2.12 M2
CV ECHO LV RWT: 0.39 CM
DOP CALC AO PEAK VEL: 1.26 M/S
DOP CALC AO VTI: 24.66 CM
DOP CALC LVOT AREA: 3.8 CM2
DOP CALC LVOT DIAMETER: 2.2 CM
DOP CALC LVOT PEAK VEL: 0.98 M/S
DOP CALC LVOT STROKE VOLUME: 67.02 CM3
DOP CALCLVOT PEAK VEL VTI: 17.64 CM
E WAVE DECELERATION TIME: 218.47 MSEC
E/A RATIO: 1.43
E/E' RATIO: 7.05 M/S
ECHO LV POSTERIOR WALL: 0.93 CM (ref 0.6–1.1)
EJECTION FRACTION: 60 %
FRACTIONAL SHORTENING: 35 % (ref 28–44)
INTERVENTRICULAR SEPTUM: 0.99 CM (ref 0.6–1.1)
IVRT: 85.63 MSEC
LA MAJOR: 5.24 CM
LA MINOR: 5.16 CM
LA WIDTH: 4.66 CM
LEFT ATRIUM SIZE: 3.63 CM
LEFT ATRIUM VOLUME INDEX MOD: 37.1 ML/M2
LEFT ATRIUM VOLUME INDEX: 36.6 ML/M2
LEFT ATRIUM VOLUME MOD: 75.75 CM3
LEFT ATRIUM VOLUME: 74.76 CM3
LEFT INTERNAL DIMENSION IN SYSTOLE: 3.06 CM (ref 2.1–4)
LEFT VENTRICLE DIASTOLIC VOLUME INDEX: 51.24 ML/M2
LEFT VENTRICLE DIASTOLIC VOLUME: 104.53 ML
LEFT VENTRICLE MASS INDEX: 77 G/M2
LEFT VENTRICLE SYSTOLIC VOLUME INDEX: 18 ML/M2
LEFT VENTRICLE SYSTOLIC VOLUME: 36.74 ML
LEFT VENTRICULAR INTERNAL DIMENSION IN DIASTOLE: 4.74 CM (ref 3.5–6)
LEFT VENTRICULAR MASS: 157.77 G
LV LATERAL E/E' RATIO: 6.7 M/S
LV SEPTAL E/E' RATIO: 7.44 M/S
MV A" WAVE DURATION": 17.98 MSEC
MV PEAK A VEL: 0.47 M/S
MV PEAK E VEL: 0.67 M/S
MV STENOSIS PRESSURE HALF TIME: 63.36 MS
MV VALVE AREA P 1/2 METHOD: 3.47 CM2
PULM VEIN S/D RATIO: 0.8
PV PEAK D VEL: 0.44 M/S
PV PEAK S VEL: 0.35 M/S
QEF: 54 %
RA MAJOR: 4.48 CM
RA PRESSURE: 3 MMHG
RA WIDTH: 3.32 CM
RIGHT VENTRICULAR END-DIASTOLIC DIMENSION: 3.22 CM
RV TISSUE DOPPLER FREE WALL SYSTOLIC VELOCITY 1 (APICAL 4 CHAMBER VIEW): 11.34 CM/S
SINUS: 3.58 CM
STJ: 2.89 CM
TDI LATERAL: 0.1 M/S
TDI SEPTAL: 0.09 M/S
TDI: 0.1 M/S
TRICUSPID ANNULAR PLANE SYSTOLIC EXCURSION: 2.12 CM

## 2023-04-03 PROCEDURE — 93356 MYOCRD STRAIN IMG SPCKL TRCK: CPT | Mod: ,,, | Performed by: INTERNAL MEDICINE

## 2023-04-03 PROCEDURE — 3044F PR MOST RECENT HEMOGLOBIN A1C LEVEL <7.0%: ICD-10-PCS | Mod: CPTII,,, | Performed by: NURSE PRACTITIONER

## 2023-04-03 PROCEDURE — 93356 MYOCRD STRAIN IMG SPCKL TRCK: CPT

## 2023-04-03 PROCEDURE — 99213 OFFICE O/P EST LOW 20 MIN: CPT | Mod: PBBFAC,25 | Performed by: NURSE PRACTITIONER

## 2023-04-03 PROCEDURE — 99999 PR PBB SHADOW E&M-EST. PATIENT-LVL III: ICD-10-PCS | Mod: PBBFAC,,, | Performed by: NURSE PRACTITIONER

## 2023-04-03 PROCEDURE — 3008F PR BODY MASS INDEX (BMI) DOCUMENTED: ICD-10-PCS | Mod: CPTII,,, | Performed by: NURSE PRACTITIONER

## 2023-04-03 PROCEDURE — 93306 ECHO (CUPID ONLY): ICD-10-PCS | Mod: 26,,, | Performed by: INTERNAL MEDICINE

## 2023-04-03 PROCEDURE — 3075F SYST BP GE 130 - 139MM HG: CPT | Mod: CPTII,,, | Performed by: NURSE PRACTITIONER

## 2023-04-03 PROCEDURE — 93306 TTE W/DOPPLER COMPLETE: CPT | Mod: 26,,, | Performed by: INTERNAL MEDICINE

## 2023-04-03 PROCEDURE — 93356 ECHO (CUPID ONLY): ICD-10-PCS | Mod: ,,, | Performed by: INTERNAL MEDICINE

## 2023-04-03 PROCEDURE — 3008F BODY MASS INDEX DOCD: CPT | Mod: CPTII,,, | Performed by: NURSE PRACTITIONER

## 2023-04-03 PROCEDURE — 99214 PR OFFICE/OUTPT VISIT, EST, LEVL IV, 30-39 MIN: ICD-10-PCS | Mod: S$PBB,,, | Performed by: NURSE PRACTITIONER

## 2023-04-03 PROCEDURE — 3075F PR MOST RECENT SYSTOLIC BLOOD PRESS GE 130-139MM HG: ICD-10-PCS | Mod: CPTII,,, | Performed by: NURSE PRACTITIONER

## 2023-04-03 PROCEDURE — 3044F HG A1C LEVEL LT 7.0%: CPT | Mod: CPTII,,, | Performed by: NURSE PRACTITIONER

## 2023-04-03 PROCEDURE — 3079F PR MOST RECENT DIASTOLIC BLOOD PRESSURE 80-89 MM HG: ICD-10-PCS | Mod: CPTII,,, | Performed by: NURSE PRACTITIONER

## 2023-04-03 PROCEDURE — 99999 PR PBB SHADOW E&M-EST. PATIENT-LVL III: CPT | Mod: PBBFAC,,, | Performed by: NURSE PRACTITIONER

## 2023-04-03 PROCEDURE — 3079F DIAST BP 80-89 MM HG: CPT | Mod: CPTII,,, | Performed by: NURSE PRACTITIONER

## 2023-04-03 PROCEDURE — 99214 OFFICE O/P EST MOD 30 MIN: CPT | Mod: S$PBB,,, | Performed by: NURSE PRACTITIONER

## 2023-04-03 NOTE — PROGRESS NOTES
Subjective:       Patient ID: Laura Kent is a 37 y.o. female.    Chief Complaint: Malignant neoplasm of upper-outer quadrant of left breast i    HPI    Returns for follow up    Overall doing well.   Saw Radiation oncology in the interval, plan to start XRT in 3 weeks and will get a total of 5 weeks.   Plan for adjuvant Xeloda after that.   No other complaints   No f/c  No n/v/d/c  Appetite good   No abd pain          Oncology History:  - self detected an area under her left arm and it initially seemed to go away (noted around winter holidays)    - 3/4/2022 Outside Mammogram:  Findings:  The breasts are heterogeneously dense, which may obscure small masses.   Left  There is a 31 mm x 18 mm x 21 mm irregularly shaped mass with microlobulated margins seen in the left breast at 2 o'clock in the posterior depth with associated left axillary adenopathy. Largest node depicted on the outside study measures 49 x 23 x 29 mm with cortical thickening and effacement of the hilum. Breast mass is reportedly palpable.   Right  There is no evidence of suspicious masses, calcifications, or other abnormal findings in the right breast.  Impression:  Left  Mass: Left breast 31 mm x 18 mm x 21 mm mass at the posterior 2 o'clock position. Assessment: 5 - Highly suggestive of malignancy. Biopsy is recommended.   Right  There is no mammographic evidence of malignancy in the right breast.  BI-RADS Category:   Overall: 5 - Highly Suggestive of Malignancy  Recommendation:  Ultrasound Guided Core Needle Biopsy of the left breast mass and one of the abnormal left axillary nodes is recommended.     - 3/14/2022 Breast biopsy:  1. 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   Comment:  Infiltrating carcinoma occupies the entirety of biopsied material   with the largest continuous focus measuring 13 mm.  Breast  biomarkers are   pending and will be issued in a supplemental report.   2. LEFT AXILLARY LYMPH NODE, BIOPSY:   Invasive ductal carcinoma, Lev histologic grade 3 (tubule formation:   3, nuclear pleomorphism:  3, mitotic activity:  3).   Comment:  Infiltrating carcinoma occupies the entirety of biopsied material   with the largest continuous focus measuring 20 mm.  No lymph node tissue is   identified in the sample.  Tumor histology is essentially identical to that   of part 1.  The findings could represent part of a larger intranodal   metastasis, but an extension from the primary tumor cannot be excluded.   Radiographic correlation is advised.   Note:  Dr. Stephanie Rao also reviewed this case and agrees with the   diagnosis.   BREAST BIOMARKER RESULTS   Estrogen receptor (ER):  Negative (0)   Progesterone receptor (ER):  Negative (0)   HER2 IHC:  Negative (1+)   Ki-67 proliferation index:  80%      - 3/17/2022 Breast MRI:  Findings:  The breasts have heterogeneous fibroglandular tissue. The background parenchymal enhancement is minimal.   Left  There is a 38 mm x 33 mm x 33 mm irregularly shaped mass seen in the left breast at 2 o'clock in the posterior depth, 8.4 cm from the nipple and 1.2 cm from the skin. Associated signal void from a twirl biopsy marker; pathology showed invasive ductal carcinoma.   Posterior to the mass there is abnormal non mass enhancement measuring 29 x 28 mm. The NME is 4 mm from the skin and 2.3 cm from the chest wall. In total the mass and NME measure 54 mm in AP extent.  Overlying skin thickening and edema involving the lateral breast, likely from lymphovascular obstruction. No suspicious skin enhancement. Overall increased vascularity to the left breast.   Four abnormal lymph level 1 and level 2 left axillary lymph nodes. The largest lymph node measures 52 mm x 40 mm x 38 mm which previously underwent biopsy showing metastatic disease. There is an associated radar reflector  within the biopsied lymph node.  Right  There is no evidence of suspicious masses, abnormal enhancement, or other abnormal findings in the right breast. No enlarged axillary or internal mammary lymph nodes.   Impression:  Left  Mass: Left breast 38 mm x 33 mm x 33 mm mass at the posterior 2 o'clock position. Assessment: 6 - Known biopsy, proven malignancy. Associated 29 mm NME extending posteriorly from the mass. In total the mass and NME measure 54 mm in AP extent.  Lymph Node: Abnormal level 1 and 2 left axillary lymph nodes, the largest of which measures 52 mm x 40 mm x 38 mm lymph node and is known biopsy, proven malignancy.   Right  There is no MR evidence of malignancy in the right breast.  BI-RADS Category:   Overall: 6 - Known Biopsy-Proven Malignancy  Recommendation:  Clinical management of known left breast cancer. Patient is established with the breast surgery clinic.      - 3/21/2022 CT C/A/P:  FINDINGS:  Base of Neck: No significant abnormality.  Thoracic soft tissue: A proximally 3.2 x 3.6 x 3.7 cm irregular left breast mass within the lateral aspect with internal biopsy marker, corresponding to findings on prior breast MRI and compatible with malignancy.  Enlarged left axillary lymph nodes, largest measuring approximately 4.2 x 3.6 cm, (series 2, image 27).  CHEST:  -Heart: Normal size. No pericardial effusion.  -Pulmonary vasculature: Pulmonary arteries distribute normally.  There are four pulmonary veins.  -Radha/Mediastinum: No pathologic shelly enlargement.  -Trachea and Proximal airways: Trachea is midline.  Central airways are patent.  No significant bronchial wall thickening or bronchiectasis.  -Lungs/Pleura: Symmetrically expanded without focal consolidation, pneumothorax, or mass.  No pleural effusion or thickening.  -Esophagus: Normal course and caliber.  ABDOMEN:  - Liver: Normal in size and attenuation with no focal hepatic abnormality.  - Gallbladder: No calcified gallstones.  No wall  thickening or pericholecystic fluid.  - Bile Ducts: No intra or extrahepatic biliary ductal dilatation.  - Stomach/Duodenum: Small hiatal hernia otherwise unremarkable.  - Spleen: Normal size.  No focal parenchymal abnormality.  - Pancreas: No mass lesion.  No pancreatic ductal dilatation.  No peripancreatic fat stranding.  - Adrenals: Unremarkable.  - Kidneys/ureters/urinary bladder: Normal in size and location.  Normal enhancement pattern.  No nephrolithiasis.  Ureters are normal in course and caliber.  No hydroureteronephrosis.  - Retroperitoneum: Scattered nonenlarged retroperitoneal lymph nodes.  PELVIS:  - Reproductive: Intrauterine device present.  A proximally 2.8 cm peripherally enhancing right ovarian cyst, likely corpus luteal cyst.  - Other: No pelvic adenopathy, free fluid, or mass.  BOWEL/MESENTERY:  No evidence of bowel obstruction or inflammatory process. Appendix is visualized and unremarkable.  VASCULATURE: Left-sided aortic arch with 3 branch vessels.  No aneurysm and no significant atherosclerosis.  Portal vein, splenic vein, and SMV are patent.  BONES: Lucent approximately 0.6 cm right trochanteric lesion, likely synovial herniation pit.  No acute fracture or bony destructive process.  EXTRATHORACIC/EXTRAPERITONEAL SOFT TISSUES: Unremarkable.  Impression:  In this patient with known breast cancer, there is an approximately 3.7 cm irregular left breast mass with left axillary lymphadenopathy.  No evidence of metastatic disease  Small hiatal hernia.  Additional findings as described above.     - 3/21/2022 Bone scan:  FINDINGS:  There is physiologic distribution of the radiopharmaceutical throughout the skeleton.  Mild degenerative uptake within the bilateral shoulders and knees.  There is normal uptake in the genitourinary system and soft tissues.  Impression:  There is no scintigraphic evidence of osteoblastic metastatic disease.     - 3/21/2022 Brain MRI:  FINDINGS:  Please note there is dental  metal artifact distorting the examination.  Allowing for artifacts the brain parenchyma is normal in contour.  Developmental variant with cavum septum pellucidum identified.  The ventricles are otherwise normal in size without hydrocephalus.  There is no midline shift or significant mass effect.  The major intracranial T2 flow voids are present.  No restricted diffusion within the non distorted brain parenchyma.  There is severe distortion of the susceptibility imaging no parenchymal susceptibility to suggest parenchymal hemorrhage in the non distorted brain parenchyma.  There is suboptimal evaluation of the cerebellum and posterior fossa.  Impression:  Unremarkable MRI brain allowing for artifacts from dental metal as detailed above specifically without abnormal parenchymal enhancement to suggest intracranial metastatic disease in light of history.     - Initiated NA therapy on 4/13/2022  Developed pancreatitis post C4 from immunotherapy and treatment held- required 2 hospitalizations     - 10/20/2022 Ultrasound left breast:  Findings:  At the site of the marker from the prior malignant breast biopsy,there is a 6 mm x 7 mm x 3 mm oval, parallel, hypoechoic mass with indistinct margins seen in the left breast at 2 o'clock in the posterior depth, 8 cm from the nipple. This is difficult to distinguish from the Twirl marker itself.  At the site of the previously biopsied metastatic left axillary node, there is a 10 x 7 x 11 mm node with a Uyen- marker. Node has mild residual cortical thickening (5 mm), but has decreased in size. There are a few other normal size, normal appearing left axillary nodes.   Impression:  Left  Mass: Left breast 6 mm x 7 mm x 3 mm mass at the posterior 2 o'clock position. Assessment: 6 - Known biopsy, proven malignancy. Known, previously biopsied left breast cancer and metastatic left axillary node have both decreased in size. The breast mass is now difficult to distinguish from the  biopsy marker.   BI-RADS Category:   Overall: 6 - Known Biopsy-Proven Malignancy  Recommendation:  Patient is already under the care of the Breast Oncology team. Most recent bilateral mammogram was performed in February 2022.      Resumed NA chemo without immunotherapy 11/21/2022- completed 7/8 cycles     - 12/8/2022 Breast MRI:  Findings:  The breasts have heterogeneous fibroglandular tissue. The background parenchymal enhancement is minimal and symmetric. There is no evidence of suspicious masses, abnormal enhancement, or other abnormal findings.  There has been interval decrease in there has been interval resolution of previously described enhancing mass at the 2:00 axis which is a known biopsy-proven malignancy.    There is no abnormality in the right breast.    There has been significant decrease in size of left axillary lymph nodes which all are normal in size on current exam.  The largest contains a signal void consistent with previously placed radar reflector at site of biopsy-proven axillary metastasis.  It measures 8 mm in short axis.  Impression:  Interval resolution of enhancing mass at site of known malignancy in the left breast at 2:00  and marked interval decrease in left axillary adenopathy with visualized lymph nodes normal in size on current exam.  Findings are consistent with complete imaging response to therapy.    BI-RADS Category:   Overall: 6 - Known Biopsy-Proven Malignancy       - 2/27/2023 Left Mastectomy and SLN Bx, axillary lymphadenectomy  Pathology:  Left breast, mastectomy: No residual malignancy is present within the   mastectomy specimen. Treatment effect is present within the breast at area of prior biopsy   site.   Left axillary sentinel lymph node #1, palpable, excisional biopsy: 1 of 3   lymph nodes are positive for metastatic carcinoma   Greatest dimension of metastatic carcinoma measures 4 mm (0.4 cm).   Confirmed by positive immunohistochemical staining in the metastatic    foci with cytokeratins AE1/AE3, Cam5.2 and wide spectrum keratin with satisfactory positive and negative controls.   Treatment effect is present in lymph node.   Left axillary contents, axillary dissection: 14 benign lymph nodes,   Prior BREAST BIOMARKER RESULTS   Estrogen receptor (ER): Negative (0)   Progesterone receptor (ER): Negative (0)   HER2 IHC: Negative (1+)   Ki-67 proliferation index: 80%        Planning XRT and adjuvant Xeloda    PMH:  HTN- around pregnancies; off of blood pressure medication x 2 years  CHF- ECHOs at [facility]  Gestational DM  Hyperlipidemia when heavier, managed with diet and followed by cardiology  C- sections x 2  Iron deficiency     GynHx:  Menarche- 7   (16 at 1st pregnancy) (17, 7, 4)  Still with periods - last 2022- last 5-7 days, moderate normal flow  + breast feeding x 1 year  IUD     SH:  Working, , [employer]  Single  Good support system  No tobacco  No EtOH  No illicit drugs    Review of Systems   Constitutional:  Negative for appetite change and unexpected weight change.   HENT:  Negative for mouth sores.    Eyes:  Negative for visual disturbance.   Respiratory:  Negative for cough and shortness of breath.    Cardiovascular:  Negative for chest pain.   Gastrointestinal:  Negative for abdominal pain and diarrhea.   Genitourinary:  Negative for frequency.   Musculoskeletal:  Negative for back pain.   Integumentary:  Negative for rash.   Neurological:  Negative for headaches.   Hematological:  Negative for adenopathy.   Psychiatric/Behavioral:  The patient is not nervous/anxious.        Objective:      Physical Exam  Vitals and nursing note reviewed.   Constitutional:       General: She is not in acute distress.     Appearance: Normal appearance. She is normal weight. She is not ill-appearing.      Comments: Presents alone  Ambulating without difficulty   HENT:      Head: Normocephalic and atraumatic.   Eyes:      General: No scleral icterus.      Extraocular Movements: Extraocular movements intact.      Conjunctiva/sclera: Conjunctivae normal.      Pupils: Pupils are equal, round, and reactive to light.   Cardiovascular:      Rate and Rhythm: Normal rate and regular rhythm.      Heart sounds: No murmur heard.    No friction rub. No gallop.   Pulmonary:      Effort: Pulmonary effort is normal. No respiratory distress.      Breath sounds: Normal breath sounds. No rhonchi or rales.      Comments: Left breast reconstruction. Incisions healed.   No right breast masses  No LAD  Abdominal:      General: Abdomen is flat. There is no distension.      Palpations: Abdomen is soft. There is no mass.      Tenderness: There is no abdominal tenderness. There is no guarding or rebound.   Musculoskeletal:         General: No swelling or tenderness. Normal range of motion.      Cervical back: Normal range of motion and neck supple. No tenderness.      Right lower leg: No edema.      Left lower leg: No edema.   Lymphadenopathy:      Cervical: No cervical adenopathy.   Skin:     General: Skin is warm and dry.      Coloration: Skin is not jaundiced or pale.      Findings: No rash.   Neurological:      General: No focal deficit present.      Mental Status: She is alert and oriented to person, place, and time.      Motor: No weakness.      Coordination: Coordination normal.      Gait: Gait normal.   Psychiatric:         Mood and Affect: Mood normal.         Behavior: Behavior normal.         Thought Content: Thought content normal.         Judgment: Judgment normal.       Labs- reviewed  Assessment:       Problem List Items Addressed This Visit          Oncology    Malignant neoplasm of upper-outer quadrant of left breast in female, estrogen receptor negative - Primary    Relevant Orders    CBC Oncology    Comprehensive Metabolic Panel    Chemotherapy induced neutropenia    Anemia in neoplastic disease       GI    Drug-induced acute pancreatitis without infection or necrosis      Other Visit Diagnoses       Hypokalemia        Hyperglycemia                  Plan:           Overall doing well post surgery  Plan on XRT   adjuvant Xeloda post   Anemia parameters improving  Encouraged to increase potassium rich foods.   Monitor glucose.   No evidence of pancreatitis    Route Chart for Scheduling    Med Onc Chart Routing      Follow up with physician 6 weeks. with cbc, cmp   Follow up with ANDREW    Infusion scheduling note    Injection scheduling note    Labs    Imaging    Pharmacy appointment    Other referrals           Case discussed with Dr. Cruz, who agrees with above.           Patient is in agreement with the proposed treatment plan. All questions were answered to the patient's satisfaction. Pt knows to call clinic for any new or worsening symptoms and if anything is needed before the next clinic visit.      JUICE Hester-C  Hematology & Oncology  [facility] [place] [address]  ph. [phone]  Fax. [phone]       35 minutes of total time spent on the encounter, which includes face to face time and non-face to face time preparing to see the patient (eg, review of tests), Obtaining and/or reviewing separately obtained history, Documenting clinical information in the electronic or other health record, Independently interpreting results (not separately reported) and communicating results to the patient/family/caregiver, or Care coordination (not separately reported).

## 2023-04-05 ENCOUNTER — CLINICAL SUPPORT (OUTPATIENT)
Dept: REHABILITATION | Facility: HOSPITAL | Age: 38
End: 2023-04-05
Payer: MEDICAID

## 2023-04-05 ENCOUNTER — OFFICE VISIT (OUTPATIENT)
Dept: PLASTIC SURGERY | Facility: CLINIC | Age: 38
End: 2023-04-05
Payer: MEDICAID

## 2023-04-05 VITALS — HEART RATE: 66 BPM | SYSTOLIC BLOOD PRESSURE: 126 MMHG | DIASTOLIC BLOOD PRESSURE: 85 MMHG

## 2023-04-05 DIAGNOSIS — R29.898 WEAKNESS OF LEFT UPPER EXTREMITY: ICD-10-CM

## 2023-04-05 DIAGNOSIS — C50.412 MALIGNANT NEOPLASM OF UPPER-OUTER QUADRANT OF LEFT BREAST IN FEMALE, ESTROGEN RECEPTOR NEGATIVE: Primary | ICD-10-CM

## 2023-04-05 DIAGNOSIS — R26.89 DECREASED FUNCTIONAL MOBILITY: ICD-10-CM

## 2023-04-05 DIAGNOSIS — Z17.1 MALIGNANT NEOPLASM OF UPPER-OUTER QUADRANT OF LEFT BREAST IN FEMALE, ESTROGEN RECEPTOR NEGATIVE: Primary | ICD-10-CM

## 2023-04-05 DIAGNOSIS — Z91.89 AT RISK FOR LYMPHEDEMA: ICD-10-CM

## 2023-04-05 DIAGNOSIS — R29.3 POOR POSTURE: ICD-10-CM

## 2023-04-05 DIAGNOSIS — M25.612 DECREASED RANGE OF MOTION OF LEFT SHOULDER: Primary | ICD-10-CM

## 2023-04-05 PROCEDURE — 3044F PR MOST RECENT HEMOGLOBIN A1C LEVEL <7.0%: ICD-10-PCS | Mod: CPTII,,, | Performed by: PHYSICIAN ASSISTANT

## 2023-04-05 PROCEDURE — 99999 PR PBB SHADOW E&M-EST. PATIENT-LVL III: ICD-10-PCS | Mod: PBBFAC,,, | Performed by: PHYSICIAN ASSISTANT

## 2023-04-05 PROCEDURE — 97110 THERAPEUTIC EXERCISES: CPT

## 2023-04-05 PROCEDURE — 3044F HG A1C LEVEL LT 7.0%: CPT | Mod: CPTII,,, | Performed by: PHYSICIAN ASSISTANT

## 2023-04-05 PROCEDURE — 3074F SYST BP LT 130 MM HG: CPT | Mod: CPTII,,, | Performed by: PHYSICIAN ASSISTANT

## 2023-04-05 PROCEDURE — 1159F PR MEDICATION LIST DOCUMENTED IN MEDICAL RECORD: ICD-10-PCS | Mod: CPTII,,, | Performed by: PHYSICIAN ASSISTANT

## 2023-04-05 PROCEDURE — 1159F MED LIST DOCD IN RCRD: CPT | Mod: CPTII,,, | Performed by: PHYSICIAN ASSISTANT

## 2023-04-05 PROCEDURE — 3074F PR MOST RECENT SYSTOLIC BLOOD PRESSURE < 130 MM HG: ICD-10-PCS | Mod: CPTII,,, | Performed by: PHYSICIAN ASSISTANT

## 2023-04-05 PROCEDURE — 99999 PR PBB SHADOW E&M-EST. PATIENT-LVL III: CPT | Mod: PBBFAC,,, | Performed by: PHYSICIAN ASSISTANT

## 2023-04-05 PROCEDURE — 99024 POSTOP FOLLOW-UP VISIT: CPT | Mod: ,,, | Performed by: PHYSICIAN ASSISTANT

## 2023-04-05 PROCEDURE — 99024 PR POST-OP FOLLOW-UP VISIT: ICD-10-PCS | Mod: ,,, | Performed by: PHYSICIAN ASSISTANT

## 2023-04-05 PROCEDURE — 3079F DIAST BP 80-89 MM HG: CPT | Mod: CPTII,,, | Performed by: PHYSICIAN ASSISTANT

## 2023-04-05 PROCEDURE — 3079F PR MOST RECENT DIASTOLIC BLOOD PRESSURE 80-89 MM HG: ICD-10-PCS | Mod: CPTII,,, | Performed by: PHYSICIAN ASSISTANT

## 2023-04-05 PROCEDURE — 99213 OFFICE O/P EST LOW 20 MIN: CPT | Mod: PBBFAC | Performed by: PHYSICIAN ASSISTANT

## 2023-04-05 NOTE — PROGRESS NOTES
Laura Kent presents to Plastic Surgery [facility] for a follow up visit status post left skin sparing mastectomy, wise pattern, axillary lymph node dissection, tissue expander placement on 2/27/23. She is doing well today with no issues since her last visit. She continues to endorse a pulling sensation in her left axilla into her medial upper arm. She is doing PT for this. She is scheduled for radiation mapping next week. She denies fever, chills, nausea, vomiting or other systemic signs of infection.       ROS - negative, other than stated above    PHYSICAL EXAMINATION  Vitals:    04/05/23 1430   BP: 126/85   Pulse: 66     WD WN NAD  VSS  Normal respiratory effort  L breast - incision CDI, no erythema or drainage, nipple absent, diminished sensation, partially expanded tissue expander in place      The fill ports on the left tissue expander was marked using the magnet and a pen.  The skin was then prepped using chloroprep.  Using sterile technique the left port was accessed with a 21G butterfly needle.Sterile injectable saline was added to the expander.  The skin was checked for tension and cap refill.    left volume added: 180 cc                 left total volume: 670 cc        ASSESSMENT/PLAN  37 y.o. F s/p left mastectomy with TE  - Doing well  - Last fill today, she is happy with size  - Plans to undergo radiation, discussed second stage KANDICE flaps would be minimum of 6 months out from end of radiation  - RTC x 3 months, appointment scheduled.      All questions were answered. The patient was advised to contact the clinic with any questions or concerns prior to their next visit.       Angie Peck PA-C  Plastic and Reconstructive Surgery

## 2023-04-05 NOTE — PROGRESS NOTES
Physical Therapy Daily Treatment Note     Date: 4/5/2023   Name: Laura Kent  Clinic Number: [id number]    Therapy Diagnosis:  Encounter Diagnoses   Name Primary?    Decreased range of motion of left shoulder Yes    Weakness of left upper extremity     Poor posture     At risk for lymphedema     Decreased functional mobility      Physician: YASMANI Weiner MD    Physician Orders: PT Eval and Treat   Medical Diagnosis: C50.412,Z17.1 (ICD-10-CM) - Malignant neoplasm of upper-outer quadrant of left breast in female, estrogen receptor negative  Evaluation Date: 3/23/2023  Authorization Period Expiration: 3/13/2024  Plan of Care Certification Period: 5/19/2023  Visit # / Visits Authorized: 1 / 1 (plus eval)  Insurance: MEDICAID / [place] [employer]  FOTO: 1/3    Time In: 9:05 AM   Time Out: 10:02 am  Total Billable Time: 57 minutes (TE only per [place] medicaid)    Precautions: Standard and cancer      Subjective     Patient reports: 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 Patient did not lift, carry items at event. Patient also notes cording extending to L wrist when performing overhead reaching. Patient concerned with increased swelling on L chest wall compared to R. Patient to meet with surgeon this afternoon.     She was compliant with home exercise program.  Response to Previous Treatment: no adverse events    Pain Scale: Laura rates pain on a scale of 5/10 on VAS.   Pain Location: L lateral chest wall / axilla    Fatigue: none  Functional Change: putting clothes on is easier    Treatment:   Chemotherapy: neoadjuvant completed 11/22  Radiation: TBD  Endocrine Therapy: N/A    Surgery Date: LEFT mastectomy with TE placement with LEFT SLNB on 02/27/2023 per Myra Weiner and Denis.       Objective     Objective Measures updated at progress report unless specified.     Shoulder Range of Motion:  03/27/2023  Active ROM Right Left   Flexion 175 165   Abduction 180 125   Extension 85 65   IR/90deg 90 90   ER/90deg 95 90     Axillary Web Syndrome / Cording:   Location: left axilla to mid forearm / wrist  Degree of Cording: mild  Number of Cords Present: 1    Treatment     Laura received individual therapeutic exercises to improve postural correction and alignment, stretching and soft tissue mobility, and strengthening for 39 minutes including the following:     Seated Exercises:  - Pulleys (flexion, abduction)   2 min each  - Physioball roll outs (3 ways)   3 min    Standing Exercises:  - Wall walks flexion - held   1 set x 10 reps  - Wall walks, abduction - held   1 set x 10 reps    Mat Exercises:  - Supine pec stretch with towel roll  2 min  - Butterfly stretch (forehead)   10 sec hold x 2 min  - Butterfly stretch with LTR (forehead) 2 min  - AAROM wand flexion   2 min  - AAROM wand LUE ER    2 min  - LUE PROM flex, abd, and ER   1 set x 15  each      Laura received the following individual neuromuscular re-education activities to improve coordination, kinesthetic sense, proprioception, and posture for 3 minutes:     - Scapula retractions    2 min      Laura received the following manual therapy techniques were performed to increased myofascial/soft tissue length, mobility and pliability, increase PROM, AROM and function as well as to decrease pain for 15 minutes:    - Gentle stretching and side-bending for cording in left axilla to mid forearm (proximal to distal and back)      Home Exercise Program and Patient Education     Education Provided Regarding:  - Role of physical therapy in multi-disciplinary team  - Goals for physical therapy, progress towards goals  - Exercise technique, compliance with home exercise program  - Keeping exercises in pain-free range    Written Home Exercises Provided: Patient instructed to cont prior HEP. Exercises were reviewed and Laura was able to demonstrate them prior to  the end of the session. Laura demonstrated good  understanding of the education provided.     See EMR under Patient Instructions for exercises provided  eval and 3/27/23 .    Pt has no cultural, educational, or language barriers to learning provided.    Assessment     Patient is responding well to physical therapy. Patient able to perform new exercises and exercise progressions without increase in symptoms prior to leaving the clinic. Progressed butterfly stretch to include LTR for improved flexibility. Patient also able to resume therapeutic exercise program with occasional rest breaks secondary to fatigue. Improved soft tissue mobility of LUE through forearm following manual techniques. Therapist discussed increased swelling on L possibly attributed to expected post-operative edema as well as limited LUE use; patient verbalized understanding, agreement and will discuss with surgeon at appointment this afternoon. Will progress as tolerated.     Patient prognosis is Good. Patient will continue to benefit from skilled outpatient physical therapy to address the deficits listed in the problem list chart on initial evaluation, provide patient / family education, and to maximize patient's level of independence in the home and community environment.     Anticipated barriers to physical therapy: none anticipated    Goals as Follows:  Short Term Goals: 4 weeks  Patient will demonstrate 100% understanding of lymphedema risk reduction practices to include self monitoring for lymphedema (progressing, not met).  Patient will demonstrate independence with Home Exercise program established (progressing, not met).  Patient will increase AROM / PROM in shoulder abduction to 120 degrees on left to improve functional reach, carry, push, pull pain free (exceeded, 03/27/2023).  Patient will increase AROM / PROM in shoulder flexion to 120 degrees on left to improve functional reach, carry, push, pull pain free (exceeded,  03/27/2023).  Patient will increase strength to >/= 4/5 in gross upper extremity musculature to improve tolerance to all functional activities pain free (progressing, not met).     Long Term Goals: 8 weeks   Patient will increase AROM / PROM in shoulder flexion to 175 degrees on left to improve functional reach, carry, push, pull pain free (progressing, not met).  Patient will increase strength to 5/5 in gross upper extremity musculature to improve tolerance to all functional activities pain free (progressing, not met).  Patient will demonstrate full / maximized tissue mobility to increase range of motion and promote healthy tissue to be pain-free at discharge (progressing, not met).   Patient will increase AROM / PROM in shoulder external rotation to 90 degrees at 90 degrees abduction on left to improve functional reach, carry, push, pull pain free (progressing, not met).  Patient will increase AROM / PROM in shoulder abduction ROM to 180 degrees on left to improve functional reach, carry, push, pull pain free (progressing, not met).  Patient will report compliance with walking program 5x/week for 20-30 minutes/day to improve overall cardiovascular function and decrease cancer-related fatigue at discharge (progressing, not met).     Plan     Outpatient physical therapy 2x week for 8 weeks to include the following: Manual Therapy, Neuromuscular Re-ed, Patient Education, Self Care, Therapeutic Activities, Therapeutic Exercise, and IASTM .     Plan of Care Certification Period: 03/23/2023 to 05/19/2023    Therapist: Ghada Brooks, PT  4/5/2023

## 2023-04-10 ENCOUNTER — CLINICAL SUPPORT (OUTPATIENT)
Dept: REHABILITATION | Facility: HOSPITAL | Age: 38
End: 2023-04-10
Payer: MEDICAID

## 2023-04-10 DIAGNOSIS — R29.898 WEAKNESS OF LEFT UPPER EXTREMITY: ICD-10-CM

## 2023-04-10 DIAGNOSIS — M25.612 DECREASED RANGE OF MOTION OF LEFT SHOULDER: Primary | ICD-10-CM

## 2023-04-10 DIAGNOSIS — R26.89 DECREASED FUNCTIONAL MOBILITY: ICD-10-CM

## 2023-04-10 DIAGNOSIS — Z91.89 AT RISK FOR LYMPHEDEMA: ICD-10-CM

## 2023-04-10 DIAGNOSIS — R29.3 POOR POSTURE: ICD-10-CM

## 2023-04-10 PROCEDURE — 97110 THERAPEUTIC EXERCISES: CPT

## 2023-04-10 NOTE — PATIENT INSTRUCTIONS
ELASTIC BAND EXTENSION BILATERAL SHOULDER    While holding an elastic band with both arms in front of you with your elbows straight, pull the band downwards and back towards your side.  Reps 10 per set. Do 3 sets per session. Do 1 session per day        Theraband shoulder external rotation    Hold the band out to the front with both hands, elbows at your sides and bent 90 degrees.     Stretch the band apart as far as you can without pain. Keep the elbows in contact with your ribs. Squeeze the shoulder blades together.   Then return to start position.  Do 10 reps per set. Do 3 sets per session. Do 1 sessions per day          ELASTIC BAND ROWS     Holding elastic band with both hands, draw back the band as you bend your elbows. Keep your elbows near the side of your body. Do 10 reps per set. Do 3 sets per session. Do 1 session per day.          ELASTIC BAND BILATERAL HORIZONTAL ABDUCTION    Start by holding an elastic band in front of your chest with your elbows straight. Then, pull your arms apart and towards the side. Return to starting position and repeat.   Do 10 reps per set. Do 3 sets per session. Do 1 session per day.        ELASTIC BAND TRICEPS EXTENSION - SELF FIXATION    While seated, hold and fixate one end of an elastic band against your chest. Hold the other end with your opposite hand with your elbow bent and arm by your side.     Start by pulling the band downward so that the elbow goes from a bent position to a straightened position as shown. Return to starting position and repeat.   Do 10 reps per set. Do 3 sets per session. Do 1 session per day.        ELASTIC BAND BICEP CURLS    Sit in chair and place a long elastic band under your feet and hold the ends with your hands.    Start with your arms at your side while holding the ends of the elastic band. Then, bend your elbows as you raise your hands and pull up the band. Lower back down and repeat.  Do 10 reps per set. Do 3 sets per session. Do 1  session per day.

## 2023-04-10 NOTE — PROGRESS NOTES
Physical Therapy Daily Treatment Note     Date: 4/10/2023   Name: Laura Kent  Clinic Number: [id number]    Therapy Diagnosis:  Encounter Diagnoses   Name Primary?    Decreased range of motion of left shoulder Yes    Weakness of left upper extremity     Poor posture     At risk for lymphedema     Decreased functional mobility        Physician: YASMANI Weiner MD    Physician Orders: PT Eval and Treat   Medical Diagnosis: C50.412,Z17.1 (ICD-10-CM) - Malignant neoplasm of upper-outer quadrant of left breast in female, estrogen receptor negative  Evaluation Date: 3/23/2023  Authorization Period Expiration: 3/13/2024  Plan of Care Certification Period: 5/19/2023  Visit # / Visits Authorized: 4 / 12 (plus eval)  Insurance: MEDICAID / [place] [employer]  FOTO: 2/3    Time In: 9:18 am (late arrival)  Time Out: 10:00 am  Total Billable Time: 42 minutes (TE only per [place] medicaid)    Precautions: Standard and cancer      Subjective     Patient reports:  no pain this morning, just the usual soreness. Her implants were filled recently so it feels tighter. She has returned to usual walking workouts.    She was compliant with home exercise program.  Response to Previous Treatment: no increase soreness    Pain Scale: Laura rates pain on a scale of 3-4/10 on VAS.   Pain Location: L lateral chest wall / axilla    Fatigue: none  Functional Change: putting clothes on is easier    Treatment:   Chemotherapy: neoadjuvant completed 11/22  Radiation: TBD  Endocrine Therapy: N/A    Surgery Date: LEFT mastectomy with TE placement with LEFT SLNB on 02/27/2023 per Myra Weiner and Denis.       Objective     Objective Measures updated at progress report unless specified.     Shoulder Range of Motion: 03/27/2023  Active ROM Right Left   Flexion 175 165   Abduction 180 125   Extension 85 65   IR/90deg 90 90   ER/90deg 95 90     Axillary Web Syndrome / Cording:   Location: left axilla  to mid forearm / wrist  Degree of Cording: mild  Number of Cords Present: 1    CMS Impairment/Limitation/Restriction for FOTO Shoulder Survey     Therapist reviewed FOTO scores for Laura eKnt on 4/10/2023.   FOTO documents entered into EPIC - see Media section.     Limitations Score: 42%  Category: Carrying    Treatment     Laura received individual therapeutic exercises to improve postural correction and alignment, stretching and soft tissue mobility, and strengthening for  22 minutes including the following:     Seated Exercises:  - Pulleys (flexion, abduction)   2 min each  - Physioball roll outs (3 ways)   3 min - held    Mat Exercises:  - Supine pec stretch with towel roll  2 min  - Butterfly stretch with LTR    2 min  - AAROM wand flexion, 2#   2 min  - AAROM wand LUE ER, 2#   2 min  - LUE PROM flex, abd, and ER   1 set x 15  each      Laura received the following individual neuromuscular re-education activities to improve coordination, kinesthetic sense, proprioception, and posture for 10 minutes:     - Scapula retractions    2 min  - B shoulder ER, red    1 set x 10 reps  - Horizontal abd, red    1 set x 10 reps  - Rows, red     1 set x 10 reps  - Shoulder ext, red    1 set x 10 reps    Laura received the following manual therapy techniques were performed to increased myofascial/soft tissue length, mobility and pliability, increase PROM, AROM and function as well as to decrease pain for 10 minutes:    - Gentle stretching and side-bending for cording in left axilla to mid forearm (proximal to distal and back)      Home Exercise Program and Patient Education     Education Provided Regarding:  - Role of physical therapy in multi-disciplinary team  - Goals for physical therapy, progress towards goals  - Exercise technique, compliance with home exercise program  - Keeping exercises in pain-free range    Written Home Exercises Provided: Patient instructed to cont prior HEP. Exercises were reviewed  and Laura was able to demonstrate them prior to the end of the session. Laura demonstrated good  understanding of the education provided.     See EMR under Patient Instructions for exercises provided  eval and 3/27/23 .    Pt has no cultural, educational, or language barriers to learning provided.    Assessment     Patient is responding well to physical therapy. Patient was able to perform today's new strengthening exercises with increase in symptoms prior to leaving the clinic. She required occasional cues with resistance band exercises to avoid excessive use of her upper traps. She demonstrated PROM of left shoulder WFL with no complaints of pulling after manual therapy. Attempt to advance her strengthening exercises next visit. Will progress as tolerated.     Patient prognosis is Good. Patient will continue to benefit from skilled outpatient physical therapy to address the deficits listed in the problem list chart on initial evaluation, provide patient / family education, and to maximize patient's level of independence in the home and community environment.     Anticipated barriers to physical therapy: none anticipated    Goals as Follows:  Short Term Goals: 4 weeks  Patient will demonstrate 100% understanding of lymphedema risk reduction practices to include self monitoring for lymphedema (progressing, not met).  Patient will demonstrate independence with Home Exercise program established (progressing, not met).  Patient will increase AROM / PROM in shoulder abduction to 120 degrees on left to improve functional reach, carry, push, pull pain free (exceeded, 03/27/2023).  Patient will increase AROM / PROM in shoulder flexion to 120 degrees on left to improve functional reach, carry, push, pull pain free (exceeded, 03/27/2023).  Patient will increase strength to >/= 4/5 in gross upper extremity musculature to improve tolerance to all functional activities pain free (progressing, not met).     Long Term Goals: 8  weeks   Patient will increase AROM / PROM in shoulder flexion to 175 degrees on left to improve functional reach, carry, push, pull pain free (progressing, not met).  Patient will increase strength to 5/5 in gross upper extremity musculature to improve tolerance to all functional activities pain free (progressing, not met).  Patient will demonstrate full / maximized tissue mobility to increase range of motion and promote healthy tissue to be pain-free at discharge (progressing, not met).   Patient will increase AROM / PROM in shoulder external rotation to 90 degrees at 90 degrees abduction on left to improve functional reach, carry, push, pull pain free (progressing, not met).  Patient will increase AROM / PROM in shoulder abduction ROM to 180 degrees on left to improve functional reach, carry, push, pull pain free (progressing, not met).  Patient will report compliance with walking program 5x/week for 20-30 minutes/day to improve overall cardiovascular function and decrease cancer-related fatigue at discharge (progressing, not met).     Plan     Outpatient physical therapy 2x week for 8 weeks to include the following: Manual Therapy, Neuromuscular Re-ed, Patient Education, Self Care, Therapeutic Activities, Therapeutic Exercise, and IASTM .     Plan of Care Certification Period: 03/23/2023 to 05/19/2023    Therapist: Teresa Hurley, PT  4/10/2023

## 2023-04-11 ENCOUNTER — PATIENT MESSAGE (OUTPATIENT)
Dept: RESEARCH | Facility: HOSPITAL | Age: 38
End: 2023-04-11
Payer: MEDICAID

## 2023-04-12 ENCOUNTER — CLINICAL SUPPORT (OUTPATIENT)
Dept: REHABILITATION | Facility: HOSPITAL | Age: 38
End: 2023-04-12
Payer: MEDICAID

## 2023-04-12 DIAGNOSIS — M25.612 DECREASED RANGE OF MOTION OF LEFT SHOULDER: Primary | ICD-10-CM

## 2023-04-12 DIAGNOSIS — R29.898 WEAKNESS OF LEFT UPPER EXTREMITY: ICD-10-CM

## 2023-04-12 DIAGNOSIS — R29.3 POOR POSTURE: ICD-10-CM

## 2023-04-12 DIAGNOSIS — Z91.89 AT RISK FOR LYMPHEDEMA: ICD-10-CM

## 2023-04-12 DIAGNOSIS — R26.89 DECREASED FUNCTIONAL MOBILITY: ICD-10-CM

## 2023-04-12 PROCEDURE — 97110 THERAPEUTIC EXERCISES: CPT

## 2023-04-17 ENCOUNTER — CLINICAL SUPPORT (OUTPATIENT)
Dept: REHABILITATION | Facility: HOSPITAL | Age: 38
End: 2023-04-17
Payer: MEDICAID

## 2023-04-17 DIAGNOSIS — R26.89 DECREASED FUNCTIONAL MOBILITY: ICD-10-CM

## 2023-04-17 DIAGNOSIS — M25.612 DECREASED RANGE OF MOTION OF LEFT SHOULDER: Primary | ICD-10-CM

## 2023-04-17 DIAGNOSIS — R29.3 POOR POSTURE: ICD-10-CM

## 2023-04-17 DIAGNOSIS — R29.898 WEAKNESS OF LEFT UPPER EXTREMITY: ICD-10-CM

## 2023-04-17 DIAGNOSIS — Z91.89 AT RISK FOR LYMPHEDEMA: ICD-10-CM

## 2023-04-17 PROCEDURE — 97110 THERAPEUTIC EXERCISES: CPT

## 2023-04-17 NOTE — PROGRESS NOTES
Physical Therapy Daily Treatment Note     Date: 4/17/2023   Name: Laura Kent  Clinic Number: [id number]    Therapy Diagnosis:  Encounter Diagnoses   Name Primary?    Decreased range of motion of left shoulder Yes    Weakness of left upper extremity     Poor posture     At risk for lymphedema     Decreased functional mobility        Physician: YASMANI Weiner MD    Physician Orders: PT Eval and Treat   Medical Diagnosis: C50.412,Z17.1 (ICD-10-CM) - Malignant neoplasm of upper-outer quadrant of left breast in female, estrogen receptor negative  Evaluation Date: 3/23/2023  Authorization Period Expiration: 3/13/2024  Plan of Care Certification Period: 5/19/2023  Visit # / Visits Authorized: 6 / 12 (plus eval)  Insurance: [place]ICAID / [place] [employer]  FOTO: 2/3    Time In: 9:11 AM(late arrival)  Time Out: 10:06 AM  Total Billable Time: 55 minutes (TE only per [place] medicaid)    Precautions: Standard and cancer      Subjective     Patient reports: still having the tightness under her arm, it runs from her mid upper arm to the side of her breast. She feels it more with reaching behind her head and reaching up. Sometimes she feels tighter than others. She rates her tightness as 4/10.    She was compliant with home exercise program.  Response to Previous Treatment: no adverse events    Pain Scale: Laura rates pain on a scale of 0/10 on VAS.   Pain Location: L axilla    Fatigue: none  Functional Change: putting clothes on is easier    Treatment:   Chemotherapy: neoadjuvant completed 11/22  Radiation: TBD  Endocrine Therapy: N/A    Surgery Date: LEFT mastectomy with TE placement with LEFT SLNB on 02/27/2023 per Myra Weiner and Denis.       Objective     Objective Measures updated at progress report unless specified.     Shoulder Range of Motion: 03/27/2023  Active ROM Right Left   Flexion 175 165   Abduction 180 125   Extension 85 65   IR/90deg 90 90   ER/90deg 95  90     Axillary Web Syndrome / Cording:   Location: left axilla to mid forearm / wrist  Degree of Cording: mild  Number of Cords Present: 1      Treatment     Laura received individual therapeutic exercises to improve postural correction and alignment, stretching and soft tissue mobility, and strengthening for 37 minutes including the following:     Seated Exercises:  - Pulleys (flexion, abduction)   2 min each  - Physioball roll outs (3 ways)   3 min    Mat Exercises:  - Supine pec stretch with towel roll  2 min  - Foam roller series (on towel roll)  1 set x 15 reps each  - Butterfly stretch with LTR    2 min  - AAROM wand flexion, 2#   2 min  - AAROM wand LUE ER, 2#   2 min  - LUE PROM flex, abd, and ER   1 set x 15  each      Laura received the following individual neuromuscular re-education activities to improve coordination, kinesthetic sense, proprioception, and posture for 10 minutes:     Seated Exercises:   - Bilateral shoulder ER, green   2 set x 10 reps  - Bilateral horizontal abduction, green 2 set x 10 reps    Standing Exercises  - Shoulder rows, green   2 set x 10 reps  - Shoulder extension, green   2 set x 10 reps      Laura received the following manual therapy techniques were performed to increased myofascial/soft tissue length, mobility and pliability, increase PROM, AROM and function as well as to decrease pain for 15 minutes:    - Gentle stretching and side-bending for cording in left axilla to mid upper arm (proximal to distal and back)      Home Exercise Program and Patient Education     Education Provided Regarding:  - Role of physical therapy in multi-disciplinary team  - Goals for physical therapy, progress towards goals  - Exercise technique, compliance with home exercise program  - Keeping exercises in pain-free range    Written Home Exercises Provided: Patient instructed to cont prior HEP. Exercises were reviewed and Laura was able to demonstrate them prior to the end of the session.  "Laura demonstrated good  understanding of the education provided.     See EMR under Patient Instructions for exercises provided  eval and 03/27/2023 .    Patient has no cultural, educational, or language barriers to learning provided.    Assessment     Patient is responding well to physical therapy. Patient was able to perform today's progressions with no increase in symptoms prior to leaving the clinic. She continues to respond well to manual therapy techniques as she reported a slight decrease in "pulling". Decreased cording palpated in L upper arm to chest wall at the end of the session. She required occasional cues for technique with foam roller series. Will progress as tolerated.     Patient prognosis is Good. Patient will continue to benefit from skilled outpatient physical therapy to address the deficits listed in the problem list chart on initial evaluation, provide patient / family education, and to maximize patient's level of independence in the home and community environment.     Anticipated barriers to physical therapy: none anticipated    Goals as Follows:  Short Term Goals: 4 weeks  Patient will demonstrate 100% understanding of lymphedema risk reduction practices to include self monitoring for lymphedema (progressing, not met).  Patient will demonstrate independence with Home Exercise program established (progressing, not met).  Patient will increase AROM / PROM in shoulder abduction to 120 degrees on left to improve functional reach, carry, push, pull pain free (exceeded, 03/27/2023).  Patient will increase AROM / PROM in shoulder flexion to 120 degrees on left to improve functional reach, carry, push, pull pain free (exceeded, 03/27/2023).  Patient will increase strength to >/= 4/5 in gross upper extremity musculature to improve tolerance to all functional activities pain free (progressing, not met).     Long Term Goals: 8 weeks   Patient will increase AROM / PROM in shoulder flexion to 175 degrees " on left to improve functional reach, carry, push, pull pain free (progressing, not met).  Patient will increase strength to 5/5 in gross upper extremity musculature to improve tolerance to all functional activities pain free (progressing, not met).  Patient will demonstrate full / maximized tissue mobility to increase range of motion and promote healthy tissue to be pain-free at discharge (progressing, not met).   Patient will increase AROM / PROM in shoulder external rotation to 90 degrees at 90 degrees abduction on left to improve functional reach, carry, push, pull pain free (progressing, not met).  Patient will increase AROM / PROM in shoulder abduction ROM to 180 degrees on left to improve functional reach, carry, push, pull pain free (progressing, not met).  Patient will report compliance with walking program 5x/week for 20-30 minutes/day to improve overall cardiovascular function and decrease cancer-related fatigue at discharge (progressing, not met).     Plan     Outpatient physical therapy 2x week for 8 weeks to include the following: Manual Therapy, Neuromuscular Re-ed, Patient Education, Self Care, Therapeutic Activities, Therapeutic Exercise, and IASTM.     Plan of Care Certification Period: 03/23/2023 to 05/19/2023    Therapist: Teresa Hurley, PT  4/17/2023

## 2023-04-18 NOTE — PROGRESS NOTES
Physical Therapy Daily Treatment Note     Date: 4/19/2023   Name: Laura Kent  Clinic Number: [id number]    Therapy Diagnosis:  Encounter Diagnoses   Name Primary?    Decreased range of motion of left shoulder Yes    Weakness of left upper extremity     Poor posture     At risk for lymphedema     Decreased functional mobility      Physician: YASMANI Weiner MD    Physician Orders: PT Eval and Treat   Medical Diagnosis: C50.412,Z17.1 (ICD-10-CM) - Malignant neoplasm of upper-outer quadrant of left breast in female, estrogen receptor negative  Evaluation Date: 3/23/2023  Authorization Period Expiration: 03/13/2024  Plan of Care Certification Period: 03/23/2023 - 05/19/2023  Visit # / Visits Authorized: 7 / 12 (plus eval)  Insurance: MEDICAID / [place] [employer]  FOTO: 2/3    Time In: 9:11 AM (late start secondary to patient using restroom)  Time Out: 9:55 AM  Total Billable Time: 44 minutes (TE only per [place] medicaid)    Precautions: Standard and cancer      Subjective     Patient reports: less pulling through wrist and forearm but still experiencing tightness / discomfort through L axilla, attributed to tissue expander. Patient scheduled for XRT simulation and mammogram this morning. Patient has been experiencing pancreatitis symptoms lately (improving) but has resumed walking program 30-60 minutes throughout the week. Patient has not attempted to lifting anything >10 pounds.    She was compliant with home exercise program.  Response to Previous Treatment: mild muscle soreness    Pain Scale: Laura rates pain on a scale of 4/10 on VAS.   Pain Location: under L breast and in axilla region    Fatigue: mild  Functional Change: improved tolerance to overhead reaching    Treatment:   Chemotherapy: neoadjuvant completed 11/22  Radiation: TBD  Endocrine Therapy: N/A    Surgery Date: LEFT mastectomy with TE placement with LEFT SLNB on 02/27/2023 per Myra Weiner  wilson Barrios.       Objective     Shoulder Range of Motion: 04/19/2023  Active ROM Right Left   Flexion 180 165   Abduction 180 160   Extension 85 70   IR/90deg 90 90   ER/90deg 95 90     Strength: manual muscle test grades below   Upper Extremity Strength: 04/19/2023    (R) UE (L) UE   Shoulder flexion: 5/5 4/5   Shoulder Abduction: 5/5 4/5   Shoulder IR 5/5 5/5   Shoulder ER 5/5 5/5   Elbow flexion: 5/5 5/5   Elbow extension: 5/5 5/5   Wrist flexion: 5/5 5/5   Wrist extension: 5/5 5/5    53.7 lbs 67.2 lbs        Axillary Web Syndrome / Cording:   Location: left axilla to elbow  Degree of Cording: mild  Number of Cords Present: 1    Treatment     Laura received the following individual physical performance testing as part of reassessment for 10 minutes:    - Active range of motion measurements  - Manual muscle testing  -  strength testing    Of note, held upper extremity circumferential measurements secondary to patient actively monitoring for LUE edema. Patient denies any changes and agreeable for therapist to reassess at a later time.       Laura received individual therapeutic exercises to improve postural correction and alignment, stretching and soft tissue mobility, and strengthening for 34 minutes including the following:     Seated Exercises:  - Pulleys (flexion, abduction)   2 min each  - Physioball roll outs (3 ways)   3 min    Mat Exercises:  - Supine pec stretch (on 1/2 foam roller) 2 min  - Foam roller series (on 1/2 foam roller) 1 set x 10 reps each  - Butterfly stretch with LTR    2 min - held  - Open book stretch (LUE only)  10 sec x 10 reps  - AAROM wand flexion, 4#   1 set x 10 reps  - AAROM wand LUE ER, 4#   1 set x 10 reps  - LUE PROM flex, abd, and ER   1 set x 15 each      Laura received the following individual neuromuscular re-education activities to improve coordination, kinesthetic sense, proprioception, and posture for 0 minutes:     Seated Exercises:   - Bilateral shoulder ER,  green   2 set x 10 reps  - Bilateral horizontal abduction, green 2 set x 10 reps    Standing Exercises  - Shoulder rows, green   2 set x 10 reps  - Shoulder extension, green   2 set x 10 reps      Laura received the following manual therapy techniques were performed to increased myofascial/soft tissue length, mobility and pliability, increase PROM, AROM and function as well as to decrease pain for 0 minutes:    - Gentle stretching and side-bending for cording in left axilla to mid upper arm (proximal to distal and back)      Home Exercise Program and Patient Education     Education Provided Regarding:  - Role of physical therapy in multi-disciplinary team  - Goals for physical therapy, progress towards goals  - Exercise technique, compliance with home exercise program  - Keeping exercises in pain-free range    Written Home Exercises Provided: Patient instructed to cont prior HEP. Exercises were reviewed and Laura was able to demonstrate them prior to the end of the session. Laura demonstrated good  understanding of the education provided.     See EMR under Patient Instructions for exercises provided  eval and 03/27/2023 .    Patient has no cultural, educational, or language barriers to learning provided.    Assessment     Patient is responding well to physical therapy. Held theraband exercises today secondary to time constraint from progress report measurements as well as delayed start to session due to patient's (resolving) pancreatitis symptoms. Increased flexibility with added open book stretch; demonstration, verbal cues required for proper technique (good carryover). Patient demonstrated the following active range of motion improvements since initial evaluation (03/23/2023):     - 5 degree improvement in RIGHT shoulder FLEXION  - 80 degree improvement in LEFT shoulder FLEXION   - 70 degree improvement in LEFT shoulder ABDUCTION  - 25 degree improvement in LEFT shoulder EXTENSION    Patient also demonstrates  improved LEFT shoulder internal, external rotation active range of motion (measured at 90 degrees L shoulder abduction) compared to initial evaluation. Overall improved left shoulder flexion, abduction strength. Patient would continue to benefit from skilled physical therapy to further improve left shoulder active range of motion and strength to increase tolerance to functional reaching, pushing, pulling, carrying, and lifting activities of daily living. Will progress as tolerated.     Patient prognosis is Good. Patient will continue to benefit from skilled outpatient physical therapy to address the deficits listed in the problem list chart on initial evaluation, provide patient / family education, and to maximize patient's level of independence in the home and community environment.     Anticipated barriers to physical therapy: none anticipated    Goals as Follows:  Short Term Goals: 4 weeks  Patient will demonstrate 100% understanding of lymphedema risk reduction practices to include self monitoring for lymphedema (met, 04/19/2023).  Patient will demonstrate independence with Home Exercise program established (met, 04/19/2023).  Patient will increase AROM / PROM in shoulder abduction to 120 degrees on left to improve functional reach, carry, push, pull pain free (exceeded, 03/27/2023).  Patient will increase AROM / PROM in shoulder flexion to 120 degrees on left to improve functional reach, carry, push, pull pain free (exceeded, 03/27/2023).  Patient will increase strength to >/= 4/5 in gross upper extremity musculature to improve tolerance to all functional activities pain free (met, 04/19/2023).     Long Term Goals: 8 weeks   Patient will increase LEFT shoulder FLEXION active range of motion to 175 degrees to improve functional reaching, carrying, pushing, and pulling pain-free (progressing, not met).  Patient will increase LEFT shoulder ABDUCTION active range of motion to 180 degrees to improve functional  reaching, carrying, pushing, and pulling pain-free (progressing, not met).  Patient will increase LEFT shoulder EXTERNAL ROTATION active range of motion to 90 degrees (at 90 degrees abduction) to improve functional reaching, carrying, pushing, and pulling pain-free (progressing, not met).  Patient will increase strength to 5/5 in gross upper extremity musculature to improve tolerance to all functional activities pain-free (progressing, not met).  Patient will demonstrate full / maximized tissue mobility to increase range of motion and promote healthy tissue to be pain-free at discharge (progressing, not met).   Patient will report compliance with walking program 5x/week for 20-30 minutes/day to improve overall cardiovascular function and decrease cancer-related fatigue at discharge (met, 04/19/2023).     Plan     Outpatient physical therapy 2x week for 8 weeks to include the following: Manual Therapy, Neuromuscular Re-ed, Patient Education, Self Care, Therapeutic Activities, Therapeutic Exercise, and IASTM.     Plan of Care Certification Period: 03/23/2023 to 05/19/2023.    Therapist: Ghada Brooks, PT  4/19/2023

## 2023-04-19 ENCOUNTER — HOSPITAL ENCOUNTER (OUTPATIENT)
Dept: RADIATION THERAPY | Facility: HOSPITAL | Age: 38
Discharge: HOME | End: 2023-04-19
Attending: RADIOLOGY
Payer: MEDICAID

## 2023-04-19 ENCOUNTER — HOSPITAL ENCOUNTER (OUTPATIENT)
Dept: RADIOLOGY | Facility: HOSPITAL | Age: 38
Discharge: HOME | End: 2023-04-19
Attending: SURGERY
Payer: MEDICAID

## 2023-04-19 ENCOUNTER — CLINICAL SUPPORT (OUTPATIENT)
Dept: REHABILITATION | Facility: HOSPITAL | Age: 38
End: 2023-04-19
Payer: MEDICAID

## 2023-04-19 DIAGNOSIS — R29.898 WEAKNESS OF LEFT UPPER EXTREMITY: ICD-10-CM

## 2023-04-19 DIAGNOSIS — Z12.31 SCREENING MAMMOGRAM FOR BREAST CANCER: ICD-10-CM

## 2023-04-19 DIAGNOSIS — R26.89 DECREASED FUNCTIONAL MOBILITY: ICD-10-CM

## 2023-04-19 DIAGNOSIS — Z91.89 AT RISK FOR LYMPHEDEMA: ICD-10-CM

## 2023-04-19 DIAGNOSIS — R29.3 POOR POSTURE: ICD-10-CM

## 2023-04-19 DIAGNOSIS — M25.612 DECREASED RANGE OF MOTION OF LEFT SHOULDER: Primary | ICD-10-CM

## 2023-04-19 DIAGNOSIS — C50.412 MALIGNANT NEOPLASM OF UPPER-OUTER QUADRANT OF LEFT BREAST IN FEMALE, ESTROGEN RECEPTOR NEGATIVE: Primary | ICD-10-CM

## 2023-04-19 DIAGNOSIS — Z17.1 MALIGNANT NEOPLASM OF UPPER-OUTER QUADRANT OF LEFT BREAST IN FEMALE, ESTROGEN RECEPTOR NEGATIVE: Primary | ICD-10-CM

## 2023-04-19 LAB
B-HCG UR QL: NEGATIVE
CTP QC/QA: YES

## 2023-04-19 PROCEDURE — 77332 PR  RADN TREATMENT AID(S) SIMPLE: ICD-10-PCS | Mod: 26,,, | Performed by: RADIOLOGY

## 2023-04-19 PROCEDURE — 77063 BREAST TOMOSYNTHESIS BI: CPT | Mod: 26,52,, | Performed by: RADIOLOGY

## 2023-04-19 PROCEDURE — 77290 THER RAD SIMULAJ FIELD CPLX: CPT | Mod: TC | Performed by: RADIOLOGY

## 2023-04-19 PROCEDURE — 77263 PR  RADIATION THERAPY PLAN COMPLEX: ICD-10-PCS | Mod: ,,, | Performed by: RADIOLOGY

## 2023-04-19 PROCEDURE — 81025 URINE PREGNANCY TEST: CPT | Performed by: RADIOLOGY

## 2023-04-19 PROCEDURE — 77263 THER RADIOLOGY TX PLNG CPLX: CPT | Mod: ,,, | Performed by: RADIOLOGY

## 2023-04-19 PROCEDURE — 77067 SCR MAMMO BI INCL CAD: CPT | Mod: TC,52

## 2023-04-19 PROCEDURE — 77290 THER RAD SIMULAJ FIELD CPLX: CPT | Mod: 26,,, | Performed by: RADIOLOGY

## 2023-04-19 PROCEDURE — 97110 THERAPEUTIC EXERCISES: CPT

## 2023-04-19 PROCEDURE — 77332 RADIATION TREATMENT AID(S): CPT | Mod: 26,,, | Performed by: RADIOLOGY

## 2023-04-19 PROCEDURE — 77067 MAMMO DIGITAL SCREENING RIGHT WITH TOMO: ICD-10-PCS | Mod: 26,52,, | Performed by: RADIOLOGY

## 2023-04-19 PROCEDURE — 77067 SCR MAMMO BI INCL CAD: CPT | Mod: 26,52,, | Performed by: RADIOLOGY

## 2023-04-19 PROCEDURE — 77332 RADIATION TREATMENT AID(S): CPT | Mod: TC | Performed by: RADIOLOGY

## 2023-04-19 PROCEDURE — 77290 PR  SET RADN THERAPY FIELD COMPLEX: ICD-10-PCS | Mod: 26,,, | Performed by: RADIOLOGY

## 2023-04-19 PROCEDURE — 77014 HC CT GUIDANCE RADIATION THERAPY FLDS PLACEMENT: CPT | Mod: TC | Performed by: RADIOLOGY

## 2023-04-19 PROCEDURE — 77063 MAMMO DIGITAL SCREENING RIGHT WITH TOMO: ICD-10-PCS | Mod: 26,52,, | Performed by: RADIOLOGY

## 2023-04-26 ENCOUNTER — CLINICAL SUPPORT (OUTPATIENT)
Dept: REHABILITATION | Facility: HOSPITAL | Age: 38
End: 2023-04-26
Payer: MEDICAID

## 2023-04-26 DIAGNOSIS — R29.3 POOR POSTURE: ICD-10-CM

## 2023-04-26 DIAGNOSIS — R26.89 DECREASED FUNCTIONAL MOBILITY: ICD-10-CM

## 2023-04-26 DIAGNOSIS — M25.612 DECREASED RANGE OF MOTION OF LEFT SHOULDER: Primary | ICD-10-CM

## 2023-04-26 DIAGNOSIS — R29.898 WEAKNESS OF LEFT UPPER EXTREMITY: ICD-10-CM

## 2023-04-26 DIAGNOSIS — Z91.89 AT RISK FOR LYMPHEDEMA: ICD-10-CM

## 2023-04-26 PROCEDURE — 77301 RADIOTHERAPY DOSE PLAN IMRT: CPT | Mod: TC | Performed by: RADIOLOGY

## 2023-04-26 PROCEDURE — 97110 THERAPEUTIC EXERCISES: CPT

## 2023-04-26 PROCEDURE — 77293 PR RESPIRATORY MOTION MGMT SIMULATION: ICD-10-PCS | Mod: 26,,, | Performed by: RADIOLOGY

## 2023-04-26 PROCEDURE — 77293 RESPIRATOR MOTION MGMT SIMUL: CPT | Mod: TC | Performed by: RADIOLOGY

## 2023-04-26 PROCEDURE — 77293 RESPIRATOR MOTION MGMT SIMUL: CPT | Mod: 26,,, | Performed by: RADIOLOGY

## 2023-04-26 PROCEDURE — 77301 RADIOTHERAPY DOSE PLAN IMRT: CPT | Mod: 26,,, | Performed by: RADIOLOGY

## 2023-04-26 PROCEDURE — 77301 PR  INTEN MOD RADIOTHER PLAN W/DOSE VOL HIST: ICD-10-PCS | Mod: 26,,, | Performed by: RADIOLOGY

## 2023-04-26 NOTE — PROGRESS NOTES
"                                                    Physical Therapy Daily Treatment Note     Date: 4/26/2023   Name: Laura Kent  Clinic Number: [id number]    Therapy Diagnosis:  Encounter Diagnoses   Name Primary?    Decreased range of motion of left shoulder Yes    Weakness of left upper extremity     Poor posture     At risk for lymphedema     Decreased functional mobility      Physician: YASMANI Weiner MD    Physician Orders: PT Eval and Treat   Medical Diagnosis: C50.412,Z17.1 (ICD-10-CM) - Malignant neoplasm of upper-outer quadrant of left breast in female, estrogen receptor negative  Evaluation Date: 3/23/2023  Authorization Period Expiration: 03/13/2024  Plan of Care Certification Period: 03/23/2023 - 05/19/2023  Visit # / Visits Authorized: 8 / 12 (plus eval)  Insurance: MEDICAID / [place] [employer]  FOTO: 2/3    Time In: 9:05 AM  Time Out: 10:05 AM  Total Billable Time: 60 minutes (TE only per [place] medicaid)    Precautions: Standard and cancer      Subjective     Patient reports: no longer experiencing stomach / GI upset. Continued nerve-like pain through posterior LUE (above elbow) with sensitivity to touch. Patient to begin XRT tomorrow (04/27/2023) at 8:30 AM.     She was compliant with home exercise program.  Response to Previous Treatment: mild muscle soreness    Pain Scale: Laura rates pain on a scale of 4/10 on VAS.   Pain Location: under L breast and in axilla region    Fatigue: mild  Functional Change: improved tolerance to overhead reaching    Treatment:   Chemotherapy: neoadjuvant completed 11/22  Radiation: TBD  Endocrine Therapy: N/A    Surgery Date: LEFT mastectomy with TE placement with LEFT SLNB on 02/27/2023 per Myra Weiner and Denis.       Objective     Objective Measures updated at progress report unless specified.     Baseline Measurements of BUE's for early detection of Lymphedema: 04/26/2023     LANDMARK RIGHT UE LEFT UE DIFFERENCE   E + 8" 35 cm 35.5 cm 0.5   E + 6" 34 " "cm 35.5 cm 1.5 cm    E + 4" 33.5 cm 35 cm 1.5 cm   E + 2" 31 cm 30.5 cm 0.5 cm   Elbow 27 cm 27.5 cm 0.5 cm   W+ 8" 28.5 cm 30 cm 1.5 cm   W +  6" 26.5 cm 27 cm 0.5 cm   W + 4" 22.5 cm 23.5 cm 1 cm   Wrist 16.5 cm 17 cm 0.5 cm   DPC 20 cm 20 cm 0 cm   IP Thumb 7 cm 7 cm 0 cm        Treatment     Laura received the following individual physical performance testing as part of reassessment for 5 minutes:    - Circumferential measurements for early detection of lymphedema      Laura received individual therapeutic exercises to improve postural correction and alignment, stretching and soft tissue mobility, and strengthening for 24 minutes including the following:     UBE: Seat 4, Level 1 (fwd / bkwd)  2 min each    Seated Exercises:  - Pulleys (flexion, abduction)   2 min each  - Physioball roll outs (3 ways)   3 min - held    Mat Exercises:  - Supine pec stretch (on 1/2 foam) 2 min - held  - Foam roller series (on 1/2 foam) 1 set x 10 reps - held  - Butterfly stretch with LTR   2 min - held  - Open book stretch (LUE)  10 sec x 10 reps - held  - AAROM wand flexion, 4#  1 set x 10 reps - held  - AAROM wand LUE ER, 4#  1 set x 10 reps - held  - LUE PROM flex, abd, and ER  1 set x 15 each    Standing Exercises:  - Triceps pushdowns (green)   2 set x 10 reps      Laura received the following individual neuromuscular re-education activities to improve coordination, kinesthetic sense, proprioception, and posture for 10 minutes:     Seated Exercises:   - Bilateral shoulder ER (green)  2 set x 10 reps  - Bilateral shoulder horizontal abd (green) 2 set x 10 reps    Standing Exercises  - Shoulder rows (green)   2 set x 10 reps  - Shoulder extension (green)   2 set x 10 reps - held      Laura received the following manual therapy techniques were performed to increased myofascial/soft tissue length, mobility and pliability, increase PROM, AROM and function as well as to decrease pain for 15 minutes:    - Soft tissue mobilization " of L pec minor      Laura received the following individual self-care / home management education for 11 minutes:    Therapist instructed patient to attempt nerve desensitization techniques to reduce LUE pain. Recommended gently rubbing bath towel (or softer) for 2-3 minutes, 1-2x/day in small circular motions to provide external stimulus to painful area. Patient verbalized understanding, agreement.    Therapist requested lymphedema therapist (Yumiko Suárez) to recommend tetra- size to help alleviate posterior LUE nerve-type pain. Therapists issued Size E tetragrip and reviewed washing and donning / doffing instructions. Patient verbalized understanding, agreement and left clinic with tetra- donned. No complaints or visual distress noted.      Home Exercise Program and Patient Education     Education Provided Regarding:  - Role of physical therapy in multi-disciplinary team  - Goals for physical therapy, progress towards goals  - Exercise technique, compliance with home exercise program  - Nerve desensitization techniques    Written Home Exercises Provided: Patient instructed to cont prior HEP. Exercises were reviewed and Laura was able to demonstrate them prior to the end of the session. Laura demonstrated good  understanding of the education provided.     See EMR under Patient Instructions for exercises provided  eval and 03/27/2023 .    Patient has no cultural, educational, or language barriers to learning provided.    Assessment     Patient is responding well to physical therapy. Improved BUE strength demonstrated with added resisted triceps pushdowns. Improved soft tissue mobility of L pec minor with manual techniques. No evidence of lymphedema per today's circumferential measurements. Discussed reducing frequency to 1x/week as patient begins XRT but discussed resuming 2x/week frequency if noting increased joint / soft tissue stiffness with XRT. Patient verbalized understanding, agreement. Will  progress as tolerated.     Patient prognosis is Good. Patient will continue to benefit from skilled outpatient physical therapy to address the deficits listed in the problem list chart on initial evaluation, provide patient / family education, and to maximize patient's level of independence in the home and community environment.     Anticipated barriers to physical therapy: none anticipated    Goals as Follows:  Short Term Goals: 4 weeks  Patient will demonstrate 100% understanding of lymphedema risk reduction practices to include self monitoring for lymphedema (met, 04/19/2023).  Patient will demonstrate independence with Home Exercise program established (met, 04/19/2023).  Patient will increase AROM / PROM in shoulder abduction to 120 degrees on left to improve functional reach, carry, push, pull pain free (exceeded, 03/27/2023).  Patient will increase AROM / PROM in shoulder flexion to 120 degrees on left to improve functional reach, carry, push, pull pain free (exceeded, 03/27/2023).  Patient will increase strength to >/= 4/5 in gross upper extremity musculature to improve tolerance to all functional activities pain free (met, 04/19/2023).     Long Term Goals: 8 weeks   Patient will increase LEFT shoulder FLEXION active range of motion to 175 degrees to improve functional reaching, carrying, pushing, and pulling pain-free (progressing, not met).  Patient will increase LEFT shoulder ABDUCTION active range of motion to 180 degrees to improve functional reaching, carrying, pushing, and pulling pain-free (progressing, not met).  Patient will increase LEFT shoulder EXTERNAL ROTATION active range of motion to 90 degrees (at 90 degrees abduction) to improve functional reaching, carrying, pushing, and pulling pain-free (progressing, not met).  Patient will increase strength to 5/5 in gross upper extremity musculature to improve tolerance to all functional activities pain-free (progressing, not met).  Patient will  demonstrate full / maximized tissue mobility to increase range of motion and promote healthy tissue to be pain-free at discharge (progressing, not met).   Patient will report compliance with walking program 5x/week for 20-30 minutes/day to improve overall cardiovascular function and decrease cancer-related fatigue at discharge (met, 04/19/2023).     Plan     Outpatient physical therapy 2x week for 8 weeks to include the following: Manual Therapy, Neuromuscular Re-ed, Patient Education, Self Care, Therapeutic Activities, Therapeutic Exercise, and IASTM.     Plan of Care Certification Period: 03/23/2023 to 05/19/2023.    Therapist: Ghada Brooks, PT  4/26/2023

## 2023-04-27 PROCEDURE — 77338 DESIGN MLC DEVICE FOR IMRT: CPT | Mod: TC,59 | Performed by: RADIOLOGY

## 2023-04-27 PROCEDURE — 77385 HC IMRT, SIMPLE: CPT | Performed by: RADIOLOGY

## 2023-04-27 PROCEDURE — 77387 PR GUIDANCE FOR RADIATION TREATMENT DELIVERY: ICD-10-PCS | Mod: 26,,, | Performed by: RADIOLOGY

## 2023-04-27 PROCEDURE — 77300 PR RADIATION THERAPY,DOSIMETRY PLAN: ICD-10-PCS | Mod: 26,,, | Performed by: RADIOLOGY

## 2023-04-27 PROCEDURE — 77300 RADIATION THERAPY DOSE PLAN: CPT | Mod: TC | Performed by: RADIOLOGY

## 2023-04-27 PROCEDURE — 77417 THER RADIOLOGY PORT IMAGE(S): CPT | Performed by: RADIOLOGY

## 2023-04-27 PROCEDURE — 77338 DESIGN MLC DEVICE FOR IMRT: CPT | Mod: 26,,, | Performed by: RADIOLOGY

## 2023-04-27 PROCEDURE — 77300 RADIATION THERAPY DOSE PLAN: CPT | Mod: 26,,, | Performed by: RADIOLOGY

## 2023-04-27 PROCEDURE — 77338 PR  MLC IMRT DESIGN & CONSTRUCTION PER IMRT PLAN: ICD-10-PCS | Mod: 26,,, | Performed by: RADIOLOGY

## 2023-04-27 PROCEDURE — 77387 GUIDANCE FOR RADJ TX DLVR: CPT | Mod: 26,,, | Performed by: RADIOLOGY

## 2023-04-28 PROCEDURE — 77387 PR GUIDANCE FOR RADIATION TREATMENT DELIVERY: ICD-10-PCS | Mod: 26,,, | Performed by: RADIOLOGY

## 2023-04-28 PROCEDURE — 77387 GUIDANCE FOR RADJ TX DLVR: CPT | Mod: 26,,, | Performed by: RADIOLOGY

## 2023-04-28 PROCEDURE — 77385 HC IMRT, SIMPLE: CPT | Performed by: RADIOLOGY

## 2023-05-01 ENCOUNTER — HOSPITAL ENCOUNTER (OUTPATIENT)
Dept: RADIATION THERAPY | Facility: HOSPITAL | Age: 38
Discharge: HOME | End: 2023-05-01
Attending: RADIOLOGY
Payer: MEDICAID

## 2023-05-01 ENCOUNTER — CLINICAL SUPPORT (OUTPATIENT)
Dept: REHABILITATION | Facility: HOSPITAL | Age: 38
End: 2023-05-01
Payer: MEDICAID

## 2023-05-01 DIAGNOSIS — Z91.89 AT RISK FOR LYMPHEDEMA: ICD-10-CM

## 2023-05-01 DIAGNOSIS — M25.612 DECREASED RANGE OF MOTION OF LEFT SHOULDER: Primary | ICD-10-CM

## 2023-05-01 DIAGNOSIS — R29.3 POOR POSTURE: ICD-10-CM

## 2023-05-01 DIAGNOSIS — R26.89 DECREASED FUNCTIONAL MOBILITY: ICD-10-CM

## 2023-05-01 DIAGNOSIS — R29.898 WEAKNESS OF LEFT UPPER EXTREMITY: ICD-10-CM

## 2023-05-01 PROCEDURE — 97110 THERAPEUTIC EXERCISES: CPT

## 2023-05-01 PROCEDURE — 77387 GUIDANCE FOR RADJ TX DLVR: CPT | Mod: 26,,, | Performed by: RADIOLOGY

## 2023-05-01 PROCEDURE — 77387 PR GUIDANCE FOR RADIATION TREATMENT DELIVERY: ICD-10-PCS | Mod: 26,,, | Performed by: RADIOLOGY

## 2023-05-01 PROCEDURE — 77385 HC IMRT, SIMPLE: CPT | Performed by: RADIOLOGY

## 2023-05-01 NOTE — PROGRESS NOTES
Physical Therapy Daily Treatment Note     Date: 5/1/2023   Name: Laura Kent  Clinic Number: [id number]    Therapy Diagnosis:  Encounter Diagnoses   Name Primary?    Decreased range of motion of left shoulder Yes    Weakness of left upper extremity     Poor posture     At risk for lymphedema     Decreased functional mobility      Physician: YASMANI Weiner MD    Physician Orders: PT Eval and Treat   Medical Diagnosis: C50.412,Z17.1 (ICD-10-CM) - Malignant neoplasm of upper-outer quadrant of left breast in female, estrogen receptor negative  Evaluation Date: 3/23/2023  Authorization Period Expiration: 03/13/2024  Plan of Care Certification Period: 03/23/2023 - 05/19/2023  Visit # / Visits Authorized: 9 / 12 (plus eval)  Insurance: MEDICAID / [place] [employer]  FOTO: 2/3    Time In: 9:18 AM (late arrival)  Time Out: 10:00 AM  Total Billable Time: 42 minutes (TE only per [place] medicaid)    Precautions: Standard and cancer      Subjective     Patient reports: still having the pain under her left arm. She did start rubbing her arm with a towel since her last visit. She has no problem with radiation.     She was compliant with home exercise program.  Response to Previous Treatment: no adverse events  Pain Scale: Laura rates pain on a scale of 3/10 on VAS.   Pain Location: L axilla region    Fatigue: mild  Functional Change: improved tolerance to overhead reaching    Treatment:   Chemotherapy: neoadjuvant completed 11/22  Radiation: TBD  Endocrine Therapy: N/A    Surgery Date: LEFT mastectomy with TE placement with LEFT SLNB on 02/27/2023 per Myra Weiner and Denis.       Objective     Objective Measures updated at progress report unless specified.     Shoulder Range of Motion:   Active /Passive ROM Right Left   Flexion 175 180   Abduction 180 160   Extension 85 45   IR/90deg 90 90   ER/90deg 95 70     Axillary Web Syndrome/Cording:   Location: L axilla  Degree  of Cording: mild  Number of cords present: 1    Treatment       Laura received individual therapeutic exercises to improve postural correction and alignment, stretching and soft tissue mobility, and strengthening for 22 minutes including the following:     UBE: Seat 4, Level 1 (fwd / bkwd)  2 min each    Seated Exercises:  - Pulleys (flexion, abduction)   2 min each - held  - Physioball roll outs (3 ways)   3 min - held    Mat Exercises:  - Supine pec stretch (on 1/2 foam) 2 min   - Foam roller series (on 1/2 foam) 1 set x 10 reps  - Butterfly stretch with LTR   2 min - held  - Open book stretch (LUE)  10 sec x 10 reps - held  - AAROM wand flexion, 4#  1 set x 10 reps - held  - AAROM wand LUE ER, 4#  1 set x 10 reps - held  - LUE PROM flex, abd, and ER  1 set x 15 each    Standing Exercises:  - Triceps pushdowns (green)   2 set x 10 reps      Laura received the following individual neuromuscular re-education activities to improve coordination, kinesthetic sense, proprioception, and posture for 10 minutes:     Seated Exercises:   - Bilateral shoulder ER (green)  2 set x 10 reps  - Bilateral shoulder horizontal abd (green) 2 set x 10 reps    Standing Exercises  - Shoulder rows (green)   2 set x 10 reps  - Shoulder extension (green)   2 set x 10 reps - held      Laura received the following manual therapy techniques were performed to increased myofascial/soft tissue length, mobility and pliability, increase PROM, AROM and function as well as to decrease pain for 10 minutes:    - Soft tissue mobilization of L pec minor  - Gentle stretching and side-bending for cording in L axilla to upper arm(proximal to distal and back)    Home Exercise Program and Patient Education     Education Provided Regarding:  - Role of physical therapy in multi-disciplinary team  - Goals for physical therapy, progress towards goals  - Exercise technique, compliance with home exercise program  - Nerve desensitization techniques    Written  Home Exercises Provided: Patient instructed to cont prior HEP. Exercises were reviewed and Laura was able to demonstrate them prior to the end of the session. Laura demonstrated good  understanding of the education provided.     See EMR under Patient Instructions for exercises provided  eval and 03/27/2023 .    Patient has no cultural, educational, or language barriers to learning provided.    Assessment     Patient is responding well to physical therapy. Physical Therapist noted a mild cord in patient's L axilla during PROM and with assess AROM. She responded well to manual therapy techniques reporting less pulling afterwards. She did not complete all of her usual exercises due to time constraints as she arrived late to her appointment secondary to e-check-in did not work. She required occasional cues for posture with resistance band exercises. Will progress as tolerated.     Patient prognosis is Good. Patient will continue to benefit from skilled outpatient physical therapy to address the deficits listed in the problem list chart on initial evaluation, provide patient / family education, and to maximize patient's level of independence in the home and community environment.     Anticipated barriers to physical therapy: none anticipated    Goals as Follows:  Short Term Goals: 4 weeks  Patient will demonstrate 100% understanding of lymphedema risk reduction practices to include self monitoring for lymphedema (met, 04/19/2023).  Patient will demonstrate independence with Home Exercise program established (met, 04/19/2023).  Patient will increase AROM / PROM in shoulder abduction to 120 degrees on left to improve functional reach, carry, push, pull pain free (exceeded, 03/27/2023).  Patient will increase AROM / PROM in shoulder flexion to 120 degrees on left to improve functional reach, carry, push, pull pain free (exceeded, 03/27/2023).  Patient will increase strength to >/= 4/5 in gross upper extremity musculature  to improve tolerance to all functional activities pain free (met, 04/19/2023).     Long Term Goals: 8 weeks   Patient will increase LEFT shoulder FLEXION active range of motion to 175 degrees to improve functional reaching, carrying, pushing, and pulling pain-free (progressing, not met).  Patient will increase LEFT shoulder ABDUCTION active range of motion to 180 degrees to improve functional reaching, carrying, pushing, and pulling pain-free (progressing, not met).  Patient will increase LEFT shoulder EXTERNAL ROTATION active range of motion to 90 degrees (at 90 degrees abduction) to improve functional reaching, carrying, pushing, and pulling pain-free (progressing, not met).  Patient will increase strength to 5/5 in gross upper extremity musculature to improve tolerance to all functional activities pain-free (progressing, not met).  Patient will demonstrate full / maximized tissue mobility to increase range of motion and promote healthy tissue to be pain-free at discharge (progressing, not met).   Patient will report compliance with walking program 5x/week for 20-30 minutes/day to improve overall cardiovascular function and decrease cancer-related fatigue at discharge (met, 04/19/2023).     Plan     Outpatient physical therapy 2x week for 8 weeks to include the following: Manual Therapy, Neuromuscular Re-ed, Patient Education, Self Care, Therapeutic Activities, Therapeutic Exercise, and IASTM.     Plan of Care Certification Period: 03/23/2023 to 05/19/2023.    Therapist: Teresa Hurley, PT  5/1/2023

## 2023-05-02 PROCEDURE — 77387 PR GUIDANCE FOR RADIATION TREATMENT DELIVERY: ICD-10-PCS | Mod: 26,,, | Performed by: RADIOLOGY

## 2023-05-02 PROCEDURE — 77385 HC IMRT, SIMPLE: CPT | Performed by: RADIOLOGY

## 2023-05-02 PROCEDURE — 77387 GUIDANCE FOR RADJ TX DLVR: CPT | Mod: 26,,, | Performed by: RADIOLOGY

## 2023-05-03 ENCOUNTER — DOCUMENTATION ONLY (OUTPATIENT)
Dept: RADIATION ONCOLOGY | Facility: CLINIC | Age: 38
End: 2023-05-03
Payer: MEDICAID

## 2023-05-03 PROCEDURE — 77336 RADIATION PHYSICS CONSULT: CPT | Performed by: RADIOLOGY

## 2023-05-03 PROCEDURE — 77385 HC IMRT, SIMPLE: CPT | Performed by: RADIOLOGY

## 2023-05-03 PROCEDURE — 77387 GUIDANCE FOR RADJ TX DLVR: CPT | Mod: 26,,, | Performed by: RADIOLOGY

## 2023-05-03 PROCEDURE — 77387 PR GUIDANCE FOR RADIATION TREATMENT DELIVERY: ICD-10-PCS | Mod: 26,,, | Performed by: RADIOLOGY

## 2023-05-04 PROCEDURE — 77387 PR GUIDANCE FOR RADIATION TREATMENT DELIVERY: ICD-10-PCS | Mod: 26,,, | Performed by: RADIOLOGY

## 2023-05-04 PROCEDURE — 77385 HC IMRT, SIMPLE: CPT | Performed by: RADIOLOGY

## 2023-05-04 PROCEDURE — 77387 GUIDANCE FOR RADJ TX DLVR: CPT | Mod: 26,,, | Performed by: RADIOLOGY

## 2023-05-05 PROCEDURE — 77387 GUIDANCE FOR RADJ TX DLVR: CPT | Mod: 26,,, | Performed by: RADIOLOGY

## 2023-05-05 PROCEDURE — 77387 PR GUIDANCE FOR RADIATION TREATMENT DELIVERY: ICD-10-PCS | Mod: 26,,, | Performed by: RADIOLOGY

## 2023-05-05 PROCEDURE — 77385 HC IMRT, SIMPLE: CPT | Performed by: RADIOLOGY

## 2023-05-08 PROCEDURE — 77385 HC IMRT, SIMPLE: CPT | Performed by: RADIOLOGY

## 2023-05-08 PROCEDURE — 77387 GUIDANCE FOR RADJ TX DLVR: CPT | Mod: 26,,, | Performed by: RADIOLOGY

## 2023-05-08 PROCEDURE — 77387 PR GUIDANCE FOR RADIATION TREATMENT DELIVERY: ICD-10-PCS | Mod: 26,,, | Performed by: RADIOLOGY

## 2023-05-08 PROCEDURE — 77014 HC CT GUIDANCE RADIATION THERAPY FLDS PLACEMENT: CPT | Mod: TC | Performed by: RADIOLOGY

## 2023-05-09 PROCEDURE — 77387 GUIDANCE FOR RADJ TX DLVR: CPT | Mod: 26,,, | Performed by: RADIOLOGY

## 2023-05-09 PROCEDURE — 77014 HC CT GUIDANCE RADIATION THERAPY FLDS PLACEMENT: CPT | Mod: TC | Performed by: RADIOLOGY

## 2023-05-09 PROCEDURE — 77385 HC IMRT, SIMPLE: CPT | Performed by: RADIOLOGY

## 2023-05-09 PROCEDURE — 77387 PR GUIDANCE FOR RADIATION TREATMENT DELIVERY: ICD-10-PCS | Mod: 26,,, | Performed by: RADIOLOGY

## 2023-05-10 ENCOUNTER — PATIENT MESSAGE (OUTPATIENT)
Dept: HEMATOLOGY/ONCOLOGY | Facility: CLINIC | Age: 38
End: 2023-05-10
Payer: MEDICAID

## 2023-05-10 ENCOUNTER — CLINICAL SUPPORT (OUTPATIENT)
Dept: REHABILITATION | Facility: HOSPITAL | Age: 38
End: 2023-05-10
Payer: MEDICAID

## 2023-05-10 ENCOUNTER — PATIENT MESSAGE (OUTPATIENT)
Dept: SURGERY | Facility: CLINIC | Age: 38
End: 2023-05-10
Payer: MEDICAID

## 2023-05-10 ENCOUNTER — DOCUMENTATION ONLY (OUTPATIENT)
Dept: RADIATION ONCOLOGY | Facility: CLINIC | Age: 38
End: 2023-05-10
Payer: MEDICAID

## 2023-05-10 DIAGNOSIS — Z91.89 AT RISK FOR LYMPHEDEMA: ICD-10-CM

## 2023-05-10 DIAGNOSIS — R26.89 DECREASED FUNCTIONAL MOBILITY: ICD-10-CM

## 2023-05-10 DIAGNOSIS — R29.898 WEAKNESS OF LEFT UPPER EXTREMITY: ICD-10-CM

## 2023-05-10 DIAGNOSIS — M25.612 DECREASED RANGE OF MOTION OF LEFT SHOULDER: Primary | ICD-10-CM

## 2023-05-10 DIAGNOSIS — R29.3 POOR POSTURE: ICD-10-CM

## 2023-05-10 PROCEDURE — 77385 HC IMRT, SIMPLE: CPT | Performed by: RADIOLOGY

## 2023-05-10 PROCEDURE — 77387 GUIDANCE FOR RADJ TX DLVR: CPT | Mod: 26,,, | Performed by: RADIOLOGY

## 2023-05-10 PROCEDURE — 77336 RADIATION PHYSICS CONSULT: CPT | Performed by: RADIOLOGY

## 2023-05-10 PROCEDURE — 77387 PR GUIDANCE FOR RADIATION TREATMENT DELIVERY: ICD-10-PCS | Mod: 26,,, | Performed by: RADIOLOGY

## 2023-05-10 PROCEDURE — 97110 THERAPEUTIC EXERCISES: CPT

## 2023-05-10 NOTE — PROGRESS NOTES
Physical Therapy Daily Treatment Note     Date: 5/10/2023   Name: Laura Kent  Clinic Number: [id number]    Therapy Diagnosis:  Encounter Diagnoses   Name Primary?    Decreased range of motion of left shoulder Yes    Weakness of left upper extremity     Poor posture     At risk for lymphedema     Decreased functional mobility      Physician: YASMANI Weiner MD    Physician Orders: PT Eval and Treat   Medical Diagnosis: C50.412,Z17.1 (ICD-10-CM) - Malignant neoplasm of upper-outer quadrant of left breast in female, estrogen receptor negative  Evaluation Date: 3/23/2023  Authorization Period Expiration: 03/13/2024  Plan of Care Certification Period: 03/23/2023 - 05/19/2023  Visit # / Visits Authorized: 10 / 17 (plus eval)  Insurance: MEDICAID / [place] [employer]  FOTO: 2/3    Time In: 10:00 AM  Time Out: 10:55 AM  Total Billable Time: 55 minutes (TE only per [place] medicaid)    Precautions: Standard and cancer      Subjective     Patient reports: she is still having a lot of pain in the radiation area of radiation. Last night she had to rub it down with lidocaine cream. She is also still getting a lot of pulling when reaching and it feels like her motion has gotten worse. She is also still getting the shocking pain in the back of her arm at random times. She feels the tightness in her underarm more at night.     She was compliant with home exercise program.  Response to Previous Treatment: no adverse events  Pain Scale: Laura rates burning/tightness on a scale of 3/10 on VAS.   Pain Location: L axilla region    Fatigue: mild  Functional Change: improved tolerance to overhead reaching    Treatment:   Chemotherapy: neoadjuvant completed 11/22  Radiation: TBD  Endocrine Therapy: N/A    Surgery Date: LEFT mastectomy with TE placement with LEFT SLNB on 02/27/2023 per Myra Weiner and Denis.       Objective     Objective Measures updated at progress report unless  specified.     Treatment       Laura received individual therapeutic exercises to improve postural correction and alignment, stretching and soft tissue mobility, and strengthening for 30 minutes including the following:     UBE: Seat 4, Level 1 (fwd / bkwd)  2 min each - held    Seated Exercises:  - Pulleys (flexion, abduction)   2 min each   - Physioball roll outs (3 ways)   3 min     Mat Exercises:  - Supine pec stretch (on 1/2 foam) 2 min   - Foam roller series (on 1/2 foam) 1 set x 10 reps  - Butterfly stretch with LTR   2 min   - Open book stretch (LUE)  10 sec x 10 reps   - AAROM wand flexion, 4#  1 set x 10 reps - held  - AAROM wand LUE ER, 4#  1 set x 10 reps - held  - LUE PROM flex, abd, and ER  1 set x 15 each    Standing Exercises:  - Triceps pushdowns (green)   2 set x 10 reps      Laura received the following individual neuromuscular re-education activities to improve coordination, kinesthetic sense, proprioception, and posture for 15 minutes:     Seated Exercises:   - Bilateral shoulder ER (green)  2 set x 10 reps  - Bilateral shoulder horizontal abd (green) 2 set x 10 reps    Standing Exercises  - Shoulder rows (green)   2 set x 10 reps  - Shoulder extension (green)   2 set x 10 reps      Laura received the following manual therapy techniques were performed to increased myofascial/soft tissue length, mobility and pliability, increase PROM, AROM and function as well as to decrease pain for 10 minutes:    - Soft tissue mobilization of L pec minor  - Gentle stretching and side-bending for cording in L axilla to upper arm(proximal to distal and back)    Home Exercise Program and Patient Education     Education Provided Regarding:  - Role of physical therapy in multi-disciplinary team  - Goals for physical therapy, progress towards goals  - Exercise technique, compliance with home exercise program  - Nerve desensitization techniques    Written Home Exercises Provided: Patient instructed to cont prior  HEP. Exercises were reviewed and Laura was able to demonstrate them prior to the end of the session. Laura demonstrated good  understanding of the education provided.     See EMR under Patient Instructions for exercises provided  eval and 03/27/2023 .    Patient has no cultural, educational, or language barriers to learning provided.    Assessment     Patient is responding well to physical therapy. Physical Therapist did not notice any cording in the patient's axilla during the session. She responded well to manual therapy as she displayed PROM WNL with no complaints of pulling and when seated was able to display AROM WFL with no complaints of pulling/tightness in her axilla. She was able to resume most of her usual exercises with no increase in symptoms prior to leaving the clinic. Will progress as tolerated.     Patient prognosis is Good. Patient will continue to benefit from skilled outpatient physical therapy to address the deficits listed in the problem list chart on initial evaluation, provide patient / family education, and to maximize patient's level of independence in the home and community environment.     Anticipated barriers to physical therapy: none anticipated    Goals as Follows:  Short Term Goals: 4 weeks  Patient will demonstrate 100% understanding of lymphedema risk reduction practices to include self monitoring for lymphedema (met, 04/19/2023).  Patient will demonstrate independence with Home Exercise program established (met, 04/19/2023).  Patient will increase AROM / PROM in shoulder abduction to 120 degrees on left to improve functional reach, carry, push, pull pain free (exceeded, 03/27/2023).  Patient will increase AROM / PROM in shoulder flexion to 120 degrees on left to improve functional reach, carry, push, pull pain free (exceeded, 03/27/2023).  Patient will increase strength to >/= 4/5 in gross upper extremity musculature to improve tolerance to all functional activities pain free  (met, 04/19/2023).     Long Term Goals: 8 weeks   Patient will increase LEFT shoulder FLEXION active range of motion to 175 degrees to improve functional reaching, carrying, pushing, and pulling pain-free (progressing, not met).  Patient will increase LEFT shoulder ABDUCTION active range of motion to 180 degrees to improve functional reaching, carrying, pushing, and pulling pain-free (progressing, not met).  Patient will increase LEFT shoulder EXTERNAL ROTATION active range of motion to 90 degrees (at 90 degrees abduction) to improve functional reaching, carrying, pushing, and pulling pain-free (progressing, not met).  Patient will increase strength to 5/5 in gross upper extremity musculature to improve tolerance to all functional activities pain-free (progressing, not met).  Patient will demonstrate full / maximized tissue mobility to increase range of motion and promote healthy tissue to be pain-free at discharge (progressing, not met).   Patient will report compliance with walking program 5x/week for 20-30 minutes/day to improve overall cardiovascular function and decrease cancer-related fatigue at discharge (met, 04/19/2023).     Plan     Outpatient physical therapy 2x week for 8 weeks to include the following: Manual Therapy, Neuromuscular Re-ed, Patient Education, Self Care, Therapeutic Activities, Therapeutic Exercise, and IASTM.     Plan of Care Certification Period: 03/23/2023 to 05/19/2023.    Therapist: Teresa Hurley, PT  5/10/2023

## 2023-05-10 NOTE — PLAN OF CARE
Day 10 of outpatient radiation to the left breast mound. No skin issues seen. Reports burning sensation in the breast that she has applied lidocaine lotion for relief. Has gel sheets given to her last week as well. Using Miaderm cream BID.

## 2023-05-11 PROCEDURE — 77387 GUIDANCE FOR RADJ TX DLVR: CPT | Mod: 26,,, | Performed by: RADIOLOGY

## 2023-05-11 PROCEDURE — 77385 HC IMRT, SIMPLE: CPT | Performed by: RADIOLOGY

## 2023-05-11 PROCEDURE — 77387 PR GUIDANCE FOR RADIATION TREATMENT DELIVERY: ICD-10-PCS | Mod: 26,,, | Performed by: RADIOLOGY

## 2023-05-12 PROCEDURE — 77387 PR GUIDANCE FOR RADIATION TREATMENT DELIVERY: ICD-10-PCS | Mod: 26,,, | Performed by: RADIOLOGY

## 2023-05-12 PROCEDURE — 77387 GUIDANCE FOR RADJ TX DLVR: CPT | Mod: 26,,, | Performed by: RADIOLOGY

## 2023-05-12 PROCEDURE — 77385 HC IMRT, SIMPLE: CPT | Performed by: RADIOLOGY

## 2023-05-12 PROCEDURE — 77014 HC CT GUIDANCE RADIATION THERAPY FLDS PLACEMENT: CPT | Mod: TC | Performed by: RADIOLOGY

## 2023-05-15 ENCOUNTER — LAB VISIT (OUTPATIENT)
Dept: LAB | Facility: HOSPITAL | Age: 38
End: 2023-05-15
Payer: MEDICAID

## 2023-05-15 ENCOUNTER — OFFICE VISIT (OUTPATIENT)
Dept: HEMATOLOGY/ONCOLOGY | Facility: CLINIC | Age: 38
End: 2023-05-15
Payer: MEDICAID

## 2023-05-15 ENCOUNTER — CLINICAL SUPPORT (OUTPATIENT)
Dept: REHABILITATION | Facility: HOSPITAL | Age: 38
End: 2023-05-15
Payer: MEDICAID

## 2023-05-15 VITALS
RESPIRATION RATE: 17 BRPM | OXYGEN SATURATION: 100 % | DIASTOLIC BLOOD PRESSURE: 84 MMHG | WEIGHT: 220.25 LBS | BODY MASS INDEX: 36.69 KG/M2 | SYSTOLIC BLOOD PRESSURE: 144 MMHG | HEART RATE: 70 BPM | TEMPERATURE: 98 F | HEIGHT: 65 IN

## 2023-05-15 DIAGNOSIS — D63.0 ANEMIA IN NEOPLASTIC DISEASE: ICD-10-CM

## 2023-05-15 DIAGNOSIS — I10 PRIMARY HYPERTENSION: ICD-10-CM

## 2023-05-15 DIAGNOSIS — Z91.89 AT RISK FOR LYMPHEDEMA: ICD-10-CM

## 2023-05-15 DIAGNOSIS — R26.89 DECREASED FUNCTIONAL MOBILITY: ICD-10-CM

## 2023-05-15 DIAGNOSIS — Z17.1 MALIGNANT NEOPLASM OF UPPER-OUTER QUADRANT OF LEFT BREAST IN FEMALE, ESTROGEN RECEPTOR NEGATIVE: Primary | ICD-10-CM

## 2023-05-15 DIAGNOSIS — M25.612 DECREASED RANGE OF MOTION OF LEFT SHOULDER: Primary | ICD-10-CM

## 2023-05-15 DIAGNOSIS — R29.3 POOR POSTURE: ICD-10-CM

## 2023-05-15 DIAGNOSIS — C50.412 MALIGNANT NEOPLASM OF UPPER-OUTER QUADRANT OF LEFT BREAST IN FEMALE, ESTROGEN RECEPTOR NEGATIVE: Primary | ICD-10-CM

## 2023-05-15 DIAGNOSIS — C50.412 MALIGNANT NEOPLASM OF UPPER-OUTER QUADRANT OF LEFT BREAST IN FEMALE, ESTROGEN RECEPTOR NEGATIVE: ICD-10-CM

## 2023-05-15 DIAGNOSIS — Z17.1 MALIGNANT NEOPLASM OF UPPER-OUTER QUADRANT OF LEFT BREAST IN FEMALE, ESTROGEN RECEPTOR NEGATIVE: ICD-10-CM

## 2023-05-15 DIAGNOSIS — R29.898 WEAKNESS OF LEFT UPPER EXTREMITY: ICD-10-CM

## 2023-05-15 LAB
ALBUMIN SERPL BCP-MCNC: 3.9 G/DL (ref 3.5–5.2)
ALP SERPL-CCNC: 60 U/L (ref 55–135)
ALT SERPL W/O P-5'-P-CCNC: 17 U/L (ref 10–44)
ANION GAP SERPL CALC-SCNC: 6 MMOL/L (ref 8–16)
AST SERPL-CCNC: 22 U/L (ref 10–40)
BILIRUB SERPL-MCNC: 0.4 MG/DL (ref 0.1–1)
BUN SERPL-MCNC: 11 MG/DL (ref 6–20)
CALCIUM SERPL-MCNC: 9.2 MG/DL (ref 8.7–10.5)
CHLORIDE SERPL-SCNC: 107 MMOL/L (ref 95–110)
CO2 SERPL-SCNC: 25 MMOL/L (ref 23–29)
CREAT SERPL-MCNC: 0.7 MG/DL (ref 0.5–1.4)
ERYTHROCYTE [DISTWIDTH] IN BLOOD BY AUTOMATED COUNT: 12.5 % (ref 11.5–14.5)
EST. GFR  (NO RACE VARIABLE): >60 ML/MIN/1.73 M^2
GLUCOSE SERPL-MCNC: 85 MG/DL (ref 70–110)
HCT VFR BLD AUTO: 31.7 % (ref 37–48.5)
HGB BLD-MCNC: 10.6 G/DL (ref 12–16)
IMM GRANULOCYTES # BLD AUTO: 0.01 K/UL (ref 0–0.04)
MCH RBC QN AUTO: 30.3 PG (ref 27–31)
MCHC RBC AUTO-ENTMCNC: 33.4 G/DL (ref 32–36)
MCV RBC AUTO: 91 FL (ref 82–98)
NEUTROPHILS # BLD AUTO: 1.5 K/UL (ref 1.8–7.7)
PLATELET # BLD AUTO: 158 K/UL (ref 150–450)
PMV BLD AUTO: 10.5 FL (ref 9.2–12.9)
POTASSIUM SERPL-SCNC: 3.6 MMOL/L (ref 3.5–5.1)
PROT SERPL-MCNC: 6.3 G/DL (ref 6–8.4)
RBC # BLD AUTO: 3.5 M/UL (ref 4–5.4)
SODIUM SERPL-SCNC: 138 MMOL/L (ref 136–145)
WBC # BLD AUTO: 2.44 K/UL (ref 3.9–12.7)

## 2023-05-15 PROCEDURE — 77385 HC IMRT, SIMPLE: CPT | Performed by: RADIOLOGY

## 2023-05-15 PROCEDURE — 3044F PR MOST RECENT HEMOGLOBIN A1C LEVEL <7.0%: ICD-10-PCS | Mod: CPTII,,, | Performed by: INTERNAL MEDICINE

## 2023-05-15 PROCEDURE — 3077F SYST BP >= 140 MM HG: CPT | Mod: CPTII,,, | Performed by: INTERNAL MEDICINE

## 2023-05-15 PROCEDURE — 97110 THERAPEUTIC EXERCISES: CPT

## 2023-05-15 PROCEDURE — 3008F PR BODY MASS INDEX (BMI) DOCUMENTED: ICD-10-PCS | Mod: CPTII,,, | Performed by: INTERNAL MEDICINE

## 2023-05-15 PROCEDURE — 99999 PR PBB SHADOW E&M-EST. PATIENT-LVL IV: ICD-10-PCS | Mod: PBBFAC,,, | Performed by: INTERNAL MEDICINE

## 2023-05-15 PROCEDURE — 3077F PR MOST RECENT SYSTOLIC BLOOD PRESSURE >= 140 MM HG: ICD-10-PCS | Mod: CPTII,,, | Performed by: INTERNAL MEDICINE

## 2023-05-15 PROCEDURE — 1159F PR MEDICATION LIST DOCUMENTED IN MEDICAL RECORD: ICD-10-PCS | Mod: CPTII,,, | Performed by: INTERNAL MEDICINE

## 2023-05-15 PROCEDURE — 3008F BODY MASS INDEX DOCD: CPT | Mod: CPTII,,, | Performed by: INTERNAL MEDICINE

## 2023-05-15 PROCEDURE — 77387 GUIDANCE FOR RADJ TX DLVR: CPT | Mod: 26,,, | Performed by: RADIOLOGY

## 2023-05-15 PROCEDURE — 99214 OFFICE O/P EST MOD 30 MIN: CPT | Mod: PBBFAC | Performed by: INTERNAL MEDICINE

## 2023-05-15 PROCEDURE — 3079F PR MOST RECENT DIASTOLIC BLOOD PRESSURE 80-89 MM HG: ICD-10-PCS | Mod: CPTII,,, | Performed by: INTERNAL MEDICINE

## 2023-05-15 PROCEDURE — 77387 PR GUIDANCE FOR RADIATION TREATMENT DELIVERY: ICD-10-PCS | Mod: 26,,, | Performed by: RADIOLOGY

## 2023-05-15 PROCEDURE — 85027 COMPLETE CBC AUTOMATED: CPT | Performed by: NURSE PRACTITIONER

## 2023-05-15 PROCEDURE — 36415 COLL VENOUS BLD VENIPUNCTURE: CPT | Performed by: NURSE PRACTITIONER

## 2023-05-15 PROCEDURE — 1159F MED LIST DOCD IN RCRD: CPT | Mod: CPTII,,, | Performed by: INTERNAL MEDICINE

## 2023-05-15 PROCEDURE — 3044F HG A1C LEVEL LT 7.0%: CPT | Mod: CPTII,,, | Performed by: INTERNAL MEDICINE

## 2023-05-15 PROCEDURE — 3079F DIAST BP 80-89 MM HG: CPT | Mod: CPTII,,, | Performed by: INTERNAL MEDICINE

## 2023-05-15 PROCEDURE — 1160F PR REVIEW ALL MEDS BY PRESCRIBER/CLIN PHARMACIST DOCUMENTED: ICD-10-PCS | Mod: CPTII,,, | Performed by: INTERNAL MEDICINE

## 2023-05-15 PROCEDURE — 1160F RVW MEDS BY RX/DR IN RCRD: CPT | Mod: CPTII,,, | Performed by: INTERNAL MEDICINE

## 2023-05-15 PROCEDURE — 99214 PR OFFICE/OUTPT VISIT, EST, LEVL IV, 30-39 MIN: ICD-10-PCS | Mod: S$PBB,,, | Performed by: INTERNAL MEDICINE

## 2023-05-15 PROCEDURE — 77417 THER RADIOLOGY PORT IMAGE(S): CPT | Performed by: RADIOLOGY

## 2023-05-15 PROCEDURE — 99999 PR PBB SHADOW E&M-EST. PATIENT-LVL IV: CPT | Mod: PBBFAC,,, | Performed by: INTERNAL MEDICINE

## 2023-05-15 PROCEDURE — 80053 COMPREHEN METABOLIC PANEL: CPT | Performed by: NURSE PRACTITIONER

## 2023-05-15 PROCEDURE — 99214 OFFICE O/P EST MOD 30 MIN: CPT | Mod: S$PBB,,, | Performed by: INTERNAL MEDICINE

## 2023-05-15 NOTE — PROGRESS NOTES
Physical Therapy Daily Treatment Note     Date: 5/15/2023   Name: Laura Kent  Clinic Number: [id number]    Therapy Diagnosis:  Encounter Diagnoses   Name Primary?    Decreased range of motion of left shoulder Yes    Weakness of left upper extremity     Poor posture     At risk for lymphedema     Decreased functional mobility        Physician: YASMANI Weiner MD    Physician Orders: PT Eval and Treat   Medical Diagnosis: C50.412,Z17.1 (ICD-10-CM) - Malignant neoplasm of upper-outer quadrant of left breast in female, estrogen receptor negative  Evaluation Date: 3/23/2023  Authorization Period Expiration: 06/25/2023  Plan of Care Certification Period: 03/23/2023 - 05/19/2023  Visit # / Visits Authorized: 11 / 17 (plus eval)  Insurance: MEDICAID / [place] [employer]  FOTO: 2/3    Time In: 9:11 am  Time Out: 10:00 am  Total Billable Time: 49 minutes (TE only per [place] medicaid)    Precautions: Standard and cancer      Subjective     Patient reports: having some tightness and the usual pain. She has been doing her exercises at home before her radiation but still feels tight.    She was compliant with home exercise program.  Response to Previous Treatment: no adverse events  Pain Scale: Laura rates burning/tightness on a scale of 4/10 on VAS.   Pain Location: L axilla region    Fatigue: mild  Functional Change: improved tolerance to overhead reaching    Treatment:   Chemotherapy: neoadjuvant completed 11/22  Radiation: currently receiving 5 weeks, started 4/27/23  Endocrine Therapy: N/A    Surgery Date: LEFT mastectomy with TE placement with LEFT SLNB on 02/27/2023 per Myra Weiner and Denis.       Objective     Objective Measures updated at progress report unless specified.     Treatment       Laura received individual therapeutic exercises to improve postural correction and alignment, stretching and soft tissue mobility, and strengthening for 29 minutes  including the following:     UBE: Seat 4, Level 1 (fwd / bkwd)  2 min each - held    Seated Exercises:  - Pulleys (flexion, abduction)   2 min each   - Physioball roll outs (3 ways)   3 min     Mat Exercises:  - Supine pec stretch (on 1/2 foam) 2 min   - Foam roller series (on 1/2 foam) 1 set x 10 reps  - Butterfly stretch with LTR   2 min   - Open book stretch (LUE)  10 sec x 10 reps- held   - AAROM wand flexion, 4#  1 set x 10 reps   - AAROM wand LUE ER, 4#  1 set x 10 reps   - LUE PROM flex, abd, and ER  1 set x 15 each    Standing Exercises:  - Triceps pushdowns (green)   2 set x 10 reps      Laura received the following individual neuromuscular re-education activities to improve coordination, kinesthetic sense, proprioception, and posture for 15 minutes:     Seated Exercises:   - Bilateral shoulder ER (green)  2 set x 10 reps  - Bilateral shoulder horizontal abd (green) 2 set x 10 reps    Standing Exercises  - Shoulder rows (green)   2 set x 10 reps  - Shoulder extension (green)   2 set x 10 reps      Laura received the following manual therapy techniques were performed to increased myofascial/soft tissue length, mobility and pliability, increase PROM, AROM and function as well as to decrease pain for 5 minutes:    - Soft tissue mobilization of L pec minor  - Gentle stretching and side-bending for cording in L axilla to upper arm(proximal to distal and back)    Home Exercise Program and Patient Education     Education Provided Regarding:  - Role of physical therapy in multi-disciplinary team  - Goals for physical therapy, progress towards goals  - Exercise technique, compliance with home exercise program  - Nerve desensitization techniques    Written Home Exercises Provided: Patient instructed to cont prior HEP. Exercises were reviewed and Laura was able to demonstrate them prior to the end of the session. Laura demonstrated good  understanding of the education provided.     See EMR under Patient  "Instructions for exercises provided  eval and 03/27/2023 .    Patient has no cultural, educational, or language barriers to learning provided.    Assessment     Patient is responding well to physical therapy. Patient was able to perform all of today's activities with no increase in symptoms prior to leaving the clinic. She displayed PROM WFL with no complaints of pain but did have some "tightness" in her pec at end range flexion. She required one cue to keep her exercises in a comfortable range during the session. Will progress as tolerated.     Patient prognosis is Good. Patient will continue to benefit from skilled outpatient physical therapy to address the deficits listed in the problem list chart on initial evaluation, provide patient / family education, and to maximize patient's level of independence in the home and community environment.     Anticipated barriers to physical therapy: none anticipated    Goals as Follows:  Short Term Goals: 4 weeks  Patient will demonstrate 100% understanding of lymphedema risk reduction practices to include self monitoring for lymphedema (met, 04/19/2023).  Patient will demonstrate independence with Home Exercise program established (met, 04/19/2023).  Patient will increase AROM / PROM in shoulder abduction to 120 degrees on left to improve functional reach, carry, push, pull pain free (exceeded, 03/27/2023).  Patient will increase AROM / PROM in shoulder flexion to 120 degrees on left to improve functional reach, carry, push, pull pain free (exceeded, 03/27/2023).  Patient will increase strength to >/= 4/5 in gross upper extremity musculature to improve tolerance to all functional activities pain free (met, 04/19/2023).     Long Term Goals: 8 weeks   Patient will increase LEFT shoulder FLEXION active range of motion to 175 degrees to improve functional reaching, carrying, pushing, and pulling pain-free (progressing, not met).  Patient will increase LEFT shoulder ABDUCTION " active range of motion to 180 degrees to improve functional reaching, carrying, pushing, and pulling pain-free (progressing, not met).  Patient will increase LEFT shoulder EXTERNAL ROTATION active range of motion to 90 degrees (at 90 degrees abduction) to improve functional reaching, carrying, pushing, and pulling pain-free (progressing, not met).  Patient will increase strength to 5/5 in gross upper extremity musculature to improve tolerance to all functional activities pain-free (progressing, not met).  Patient will demonstrate full / maximized tissue mobility to increase range of motion and promote healthy tissue to be pain-free at discharge (progressing, not met).   Patient will report compliance with walking program 5x/week for 20-30 minutes/day to improve overall cardiovascular function and decrease cancer-related fatigue at discharge (met, 04/19/2023).     Plan     Outpatient physical therapy 2x week for 8 weeks to include the following: Manual Therapy, Neuromuscular Re-ed, Patient Education, Self Care, Therapeutic Activities, Therapeutic Exercise, and IASTM.     Plan of Care Certification Period: 03/23/2023 to 05/19/2023.    Therapist: Teresa Hurley, PT  5/15/2023

## 2023-05-15 NOTE — PROGRESS NOTES
Subjective     Patient ID: Laura Kent is a 37 y.o. female.    Chief Complaint: Malignant neoplasm of upper-outer quadrant of left breast i    HPI    Presents for follow up    3 weeks left of XRT  Undergoing PT for cording with improvement  Still notes a constant pain in her left breast area with expander  Energy level has improved  No other complaints   No f/c or infectious complaints  No n/v/d/c  Appetite good      Oncology History:  - self detected an area under her left arm and it initially seemed to go away (noted around winter holidays)     - 3/4/2022 Outside Mammogram:  Findings:  The breasts are heterogeneously dense, which may obscure small masses.   Left  There is a 31 mm x 18 mm x 21 mm irregularly shaped mass with microlobulated margins seen in the left breast at 2 o'clock in the posterior depth with associated left axillary adenopathy. Largest node depicted on the outside study measures 49 x 23 x 29 mm with cortical thickening and effacement of the hilum. Breast mass is reportedly palpable.   Right  There is no evidence of suspicious masses, calcifications, or other abnormal findings in the right breast.  Impression:  Left  Mass: Left breast 31 mm x 18 mm x 21 mm mass at the posterior 2 o'clock position. Assessment: 5 - Highly suggestive of malignancy. Biopsy is recommended.   Right  There is no mammographic evidence of malignancy in the right breast.  BI-RADS Category:   Overall: 5 - Highly Suggestive of Malignancy  Recommendation:  Ultrasound Guided Core Needle Biopsy of the left breast mass and one of the abnormal left axillary nodes is recommended.     - 3/14/2022 Breast biopsy:  1. 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   Comment:  Infiltrating carcinoma occupies the entirety of biopsied material   with the largest continuous focus measuring 13 mm.  Breast  biomarkers are   pending and will be issued in a supplemental report.   2. LEFT AXILLARY LYMPH NODE, BIOPSY:   Invasive ductal carcinoma, Lev histologic grade 3 (tubule formation:   3, nuclear pleomorphism:  3, mitotic activity:  3).   Comment:  Infiltrating carcinoma occupies the entirety of biopsied material   with the largest continuous focus measuring 20 mm.  No lymph node tissue is   identified in the sample.  Tumor histology is essentially identical to that   of part 1.  The findings could represent part of a larger intranodal   metastasis, but an extension from the primary tumor cannot be excluded.   Radiographic correlation is advised.   Note:  Dr. Stephanie Rao also reviewed this case and agrees with the   diagnosis.   BREAST BIOMARKER RESULTS   Estrogen receptor (ER):  Negative (0)   Progesterone receptor (ER):  Negative (0)   HER2 IHC:  Negative (1+)   Ki-67 proliferation index:  80%      - 3/17/2022 Breast MRI:  Findings:  The breasts have heterogeneous fibroglandular tissue. The background parenchymal enhancement is minimal.   Left  There is a 38 mm x 33 mm x 33 mm irregularly shaped mass seen in the left breast at 2 o'clock in the posterior depth, 8.4 cm from the nipple and 1.2 cm from the skin. Associated signal void from a twirl biopsy marker; pathology showed invasive ductal carcinoma.   Posterior to the mass there is abnormal non mass enhancement measuring 29 x 28 mm. The NME is 4 mm from the skin and 2.3 cm from the chest wall. In total the mass and NME measure 54 mm in AP extent.  Overlying skin thickening and edema involving the lateral breast, likely from lymphovascular obstruction. No suspicious skin enhancement. Overall increased vascularity to the left breast.   Four abnormal lymph level 1 and level 2 left axillary lymph nodes. The largest lymph node measures 52 mm x 40 mm x 38 mm which previously underwent biopsy showing metastatic disease. There is an associated radar reflector  within the biopsied lymph node.  Right  There is no evidence of suspicious masses, abnormal enhancement, or other abnormal findings in the right breast. No enlarged axillary or internal mammary lymph nodes.   Impression:  Left  Mass: Left breast 38 mm x 33 mm x 33 mm mass at the posterior 2 o'clock position. Assessment: 6 - Known biopsy, proven malignancy. Associated 29 mm NME extending posteriorly from the mass. In total the mass and NME measure 54 mm in AP extent.  Lymph Node: Abnormal level 1 and 2 left axillary lymph nodes, the largest of which measures 52 mm x 40 mm x 38 mm lymph node and is known biopsy, proven malignancy.   Right  There is no MR evidence of malignancy in the right breast.  BI-RADS Category:   Overall: 6 - Known Biopsy-Proven Malignancy  Recommendation:  Clinical management of known left breast cancer. Patient is established with the breast surgery clinic.      - 3/21/2022 CT C/A/P:  FINDINGS:  Base of Neck: No significant abnormality.  Thoracic soft tissue: A proximally 3.2 x 3.6 x 3.7 cm irregular left breast mass within the lateral aspect with internal biopsy marker, corresponding to findings on prior breast MRI and compatible with malignancy.  Enlarged left axillary lymph nodes, largest measuring approximately 4.2 x 3.6 cm, (series 2, image 27).  CHEST:  -Heart: Normal size. No pericardial effusion.  -Pulmonary vasculature: Pulmonary arteries distribute normally.  There are four pulmonary veins.  -Radha/Mediastinum: No pathologic shelly enlargement.  -Trachea and Proximal airways: Trachea is midline.  Central airways are patent.  No significant bronchial wall thickening or bronchiectasis.  -Lungs/Pleura: Symmetrically expanded without focal consolidation, pneumothorax, or mass.  No pleural effusion or thickening.  -Esophagus: Normal course and caliber.  ABDOMEN:  - Liver: Normal in size and attenuation with no focal hepatic abnormality.  - Gallbladder: No calcified gallstones.  No wall  thickening or pericholecystic fluid.  - Bile Ducts: No intra or extrahepatic biliary ductal dilatation.  - Stomach/Duodenum: Small hiatal hernia otherwise unremarkable.  - Spleen: Normal size.  No focal parenchymal abnormality.  - Pancreas: No mass lesion.  No pancreatic ductal dilatation.  No peripancreatic fat stranding.  - Adrenals: Unremarkable.  - Kidneys/ureters/urinary bladder: Normal in size and location.  Normal enhancement pattern.  No nephrolithiasis.  Ureters are normal in course and caliber.  No hydroureteronephrosis.  - Retroperitoneum: Scattered nonenlarged retroperitoneal lymph nodes.  PELVIS:  - Reproductive: Intrauterine device present.  A proximally 2.8 cm peripherally enhancing right ovarian cyst, likely corpus luteal cyst.  - Other: No pelvic adenopathy, free fluid, or mass.  BOWEL/MESENTERY:  No evidence of bowel obstruction or inflammatory process. Appendix is visualized and unremarkable.  VASCULATURE: Left-sided aortic arch with 3 branch vessels.  No aneurysm and no significant atherosclerosis.  Portal vein, splenic vein, and SMV are patent.  BONES: Lucent approximately 0.6 cm right trochanteric lesion, likely synovial herniation pit.  No acute fracture or bony destructive process.  EXTRATHORACIC/EXTRAPERITONEAL SOFT TISSUES: Unremarkable.  Impression:  In this patient with known breast cancer, there is an approximately 3.7 cm irregular left breast mass with left axillary lymphadenopathy.  No evidence of metastatic disease  Small hiatal hernia.  Additional findings as described above.     - 3/21/2022 Bone scan:  FINDINGS:  There is physiologic distribution of the radiopharmaceutical throughout the skeleton.  Mild degenerative uptake within the bilateral shoulders and knees.  There is normal uptake in the genitourinary system and soft tissues.  Impression:  There is no scintigraphic evidence of osteoblastic metastatic disease.     - 3/21/2022 Brain MRI:  FINDINGS:  Please note there is dental  metal artifact distorting the examination.  Allowing for artifacts the brain parenchyma is normal in contour.  Developmental variant with cavum septum pellucidum identified.  The ventricles are otherwise normal in size without hydrocephalus.  There is no midline shift or significant mass effect.  The major intracranial T2 flow voids are present.  No restricted diffusion within the non distorted brain parenchyma.  There is severe distortion of the susceptibility imaging no parenchymal susceptibility to suggest parenchymal hemorrhage in the non distorted brain parenchyma.  There is suboptimal evaluation of the cerebellum and posterior fossa.  Impression:  Unremarkable MRI brain allowing for artifacts from dental metal as detailed above specifically without abnormal parenchymal enhancement to suggest intracranial metastatic disease in light of history.     - Initiated NA therapy on 4/13/2022  Developed pancreatitis post C4 from immunotherapy and treatment held- required 2 hospitalizations     - 10/20/2022 Ultrasound left breast:  Findings:  At the site of the marker from the prior malignant breast biopsy,there is a 6 mm x 7 mm x 3 mm oval, parallel, hypoechoic mass with indistinct margins seen in the left breast at 2 o'clock in the posterior depth, 8 cm from the nipple. This is difficult to distinguish from the Twirl marker itself.  At the site of the previously biopsied metastatic left axillary node, there is a 10 x 7 x 11 mm node with a Uyen- marker. Node has mild residual cortical thickening (5 mm), but has decreased in size. There are a few other normal size, normal appearing left axillary nodes.   Impression:  Left  Mass: Left breast 6 mm x 7 mm x 3 mm mass at the posterior 2 o'clock position. Assessment: 6 - Known biopsy, proven malignancy. Known, previously biopsied left breast cancer and metastatic left axillary node have both decreased in size. The breast mass is now difficult to distinguish from the  biopsy marker.   BI-RADS Category:   Overall: 6 - Known Biopsy-Proven Malignancy  Recommendation:  Patient is already under the care of the Breast Oncology team. Most recent bilateral mammogram was performed in February 2022.      Resumed NA chemo without immunotherapy 11/21/2022- completed 7/8 cycles     - 12/8/2022 Breast MRI:  Findings:  The breasts have heterogeneous fibroglandular tissue. The background parenchymal enhancement is minimal and symmetric. There is no evidence of suspicious masses, abnormal enhancement, or other abnormal findings.  There has been interval decrease in there has been interval resolution of previously described enhancing mass at the 2:00 axis which is a known biopsy-proven malignancy.    There is no abnormality in the right breast.    There has been significant decrease in size of left axillary lymph nodes which all are normal in size on current exam.  The largest contains a signal void consistent with previously placed radar reflector at site of biopsy-proven axillary metastasis.  It measures 8 mm in short axis.  Impression:  Interval resolution of enhancing mass at site of known malignancy in the left breast at 2:00  and marked interval decrease in left axillary adenopathy with visualized lymph nodes normal in size on current exam.  Findings are consistent with complete imaging response to therapy.  BI-RADS Category:   Overall: 6 - Known Biopsy-Proven Malignancy     - 2/27/2023 Left Mastectomy and SLN Bx, axillary lymphadenectomy  Pathology:  Left breast, mastectomy: No residual malignancy is present within the   mastectomy specimen. Treatment effect is present within the breast at area of prior biopsy   site.   Left axillary sentinel lymph node #1, palpable, excisional biopsy: 1 of 3   lymph nodes are positive for metastatic carcinoma   Greatest dimension of metastatic carcinoma measures 4 mm (0.4 cm).   Confirmed by positive immunohistochemical staining in the metastatic   foci  with cytokeratins AE1/AE3, Cam5.2 and wide spectrum keratin with satisfactory positive and negative controls.   Treatment effect is present in lymph node.   Left axillary contents, axillary dissection: 14 benign lymph nodes,   Prior BREAST BIOMARKER RESULTS   Estrogen receptor (ER): Negative (0)   Progesterone receptor (ER): Negative (0)   HER2 IHC: Negative (1+)   Ki-67 proliferation index: 80%      - Undergoing XRT   - to be followed by adjuvant Xeloda     PMH:  HTN- around pregnancies; off of blood pressure medication x 2 years  CHF- ECHOs at [facility]  Gestational DM  Hyperlipidemia when heavier, managed with diet and followed by cardiology  C- sections x 2  Iron deficiency     GynHx:  Menarche- 7   (16 at 1st pregnancy) (17, 7, 4)  Still with periods - last 2022- last 5-7 days, moderate normal flow  + breast feeding x 1 year  IUD     SH:  Working, , [employer]  Single  Good support system  No tobacco  No EtOH  No illicit drugs    Review of Systems   Constitutional:  Negative for activity change, appetite change, chills, fatigue, fever and unexpected weight change.   HENT:  Negative for mouth sores.    Eyes:  Negative for visual disturbance.   Respiratory:  Positive for chest tightness (at breast expander). Negative for cough and shortness of breath.    Cardiovascular:  Negative for chest pain, palpitations and leg swelling.   Gastrointestinal:  Negative for abdominal pain, diarrhea, nausea and vomiting.   Genitourinary:  Negative for decreased urine volume, dysuria and frequency.   Musculoskeletal:  Negative for arthralgias, back pain and gait problem.   Integumentary:  Negative for rash.   Neurological:  Negative for weakness and headaches.   Hematological:  Negative for adenopathy. Does not bruise/bleed easily.   Psychiatric/Behavioral:  The patient is not nervous/anxious.         Objective     Physical Exam  Vitals and nursing note reviewed.   Constitutional:       General: She  is not in acute distress.     Appearance: Normal appearance. She is obese. She is not ill-appearing.      Comments: Presents alone  ECOG= 0  Very pleasant   HENT:      Head: Normocephalic and atraumatic.   Eyes:      General: No scleral icterus.     Extraocular Movements: Extraocular movements intact.      Conjunctiva/sclera: Conjunctivae normal.      Pupils: Pupils are equal, round, and reactive to light.   Cardiovascular:      Rate and Rhythm: Normal rate and regular rhythm.      Heart sounds: No murmur heard.    No friction rub. No gallop.   Pulmonary:      Effort: Pulmonary effort is normal. No respiratory distress.      Breath sounds: Normal breath sounds. No rhonchi or rales.      Comments: Left breast expander- tight, mild XRT color changes  Cording improving  Right breast no masses or LAD  Abdominal:      General: Abdomen is flat. There is no distension.      Palpations: Abdomen is soft. There is no mass.      Tenderness: There is no abdominal tenderness. There is no guarding or rebound.   Musculoskeletal:         General: No swelling or tenderness. Normal range of motion.      Cervical back: Normal range of motion and neck supple. No tenderness.      Right lower leg: No edema.      Left lower leg: No edema.   Lymphadenopathy:      Cervical: No cervical adenopathy.   Skin:     General: Skin is warm and dry.      Coloration: Skin is not jaundiced or pale.      Findings: No lesion or rash.   Neurological:      General: No focal deficit present.      Mental Status: She is alert and oriented to person, place, and time. Mental status is at baseline.      Sensory: No sensory deficit.      Motor: No weakness.      Coordination: Coordination normal.      Gait: Gait normal.   Psychiatric:         Mood and Affect: Mood normal.         Behavior: Behavior normal.         Thought Content: Thought content normal.         Judgment: Judgment normal.          Assessment and Plan     Problem List Items Addressed This Visit        Primary hypertension    Malignant neoplasm of upper-outer quadrant of left breast in female, estrogen receptor negative - Primary    At risk for lymphedema    Anemia in neoplastic disease     Overall doing well post surgery  Complete XRT   Plan for adjuvant Xeloda post ([facility] Trial)- she will not be able to complete Keytruda with immunotherapy side effects    Anemia parameters improving    PT for lymphedema risk and cording    HTN- well controlled now, monitor    Route Chart for Scheduling    Med Onc Chart Routing      Follow up with physician 4 weeks. cbc, cmp prior   Follow up with ANDREW    Infusion scheduling note    Injection scheduling note    Labs    Imaging    Pharmacy appointment    Other referrals           Therapy Plan Information  Flushes  heparin, porcine (PF) 100 unit/mL injection flush 500 Units  500 Units, Intravenous, Every visit  sodium chloride 0.9% flush 10 mL  10 mL, Intravenous, Every visit  heparin, porcine (PF) 100 unit/mL injection flush 500 Units  500 Units, Intravenous, Every visit  sodium chloride 0.9% flush 10 mL  10 mL, Intravenous, Every visit    30 minutes total visit- direct patient care

## 2023-05-16 PROCEDURE — 77385 HC IMRT, SIMPLE: CPT | Performed by: RADIOLOGY

## 2023-05-16 PROCEDURE — 77386 HC IMRT, COMPLEX: CPT | Performed by: RADIOLOGY

## 2023-05-16 NOTE — PROGRESS NOTES
"                                                    Physical Therapy Daily Treatment Note     Date: 5/17/2023   Name: Laura Kent  Clinic Number: [id number]    Therapy Diagnosis:  Encounter Diagnoses   Name Primary?    Decreased range of motion of left shoulder Yes    Weakness of left upper extremity     Poor posture     At risk for lymphedema     Decreased functional mobility      Physician: YASMANI Weiner MD    Physician Orders: PT Eval and Treat   Medical Diagnosis: C50.412,Z17.1 (ICD-10-CM) - Malignant neoplasm of upper-outer quadrant of left breast in female, estrogen receptor negative  Evaluation Date: 3/23/2023  Authorization Period Expiration: 06/25/2023  Plan of Care Certification Period: 05/17/2023 - 07/14/2023  Visit # / Visits Authorized: 12 / 17 (plus eval)  Insurance: MEDICAID / [place] [employer]  FOTO: 2/3    Time In: 9:15 AM (late arrival secondary to traffic)  Time Out: 9:55 AM (ended early secondary to ongoing nausea)  Total Billable Time: 40 minutes (TE only per [place] medicaid)    Precautions: Standard and cancer      Subjective     Patient reports: increased nausea for no apparent reason. Patient states tightness through LUE "not horrible" today. Patient to complete XRT on 05/25/2023.     She was compliant with home exercise program.  Response to Previous Treatment: no adverse reaction    Pain Scale: Laura rates pain on a scale of 5/10 on VAS.   Pain Location: stomach, abdomen    Fatigue: mild-moderate  Functional Change: overall improved reaching tolerance    Treatment:   Chemotherapy: neoadjuvant completed 11/22  Radiation: currently receiving 5 weeks, started 4/27/23  Endocrine Therapy: N/A    Surgery Date: LEFT mastectomy with TE placement with LEFT SLNB on 02/27/2023 per Myra Weiner and Denis.       Objective     Objective Measures updated at progress report unless specified.     Shoulder Range of Motion: 05/17/2023  Active ROM Right Left   Flexion 180 155*   Abduction 180 135* " "  Extension 85 70   IR/90deg 90 90   ER/90deg 95 90   *Of note, patient demonstrates full LUE PROM in supine    Strength: manual muscle test grades below   Upper Extremity Strength: 05/17/2023    (R) UE (L) UE   Shoulder Flexion: 5/5 4+/5   Shoulder Abduction: 5/5 5/5   Shoulder IR 5/5 5/5   Shoulder ER 5/5 5/5   Elbow flexion: 5/5 5/5   Elbow extension: 5/5 5/5   Wrist flexion: 5/5 5/5   Wrist extension: 5/5 5/5    60.6 lbs 67.4 lbs         Upper Extremity Circumferential Measurements: 05/17/2023   LANDMARK RIGHT UE LEFT UE DIFFERENCE   E + 8" 38 cm 39 cm 1 cm   E + 6" 35.5 cm 37 cm 1.5 cm   E + 4" 35 cm 36.5 cm 1.5 cm   E + 2" 31.5 cm 33 cm 1.5 cm   Elbow 28.5 cm 29.5 cm 1 cm   W+ 8" 30 cm 31 cm 1 cm   W +  6" 27.5 cm 27 cm 0.5 cm   W + 4" 23 cm 23.5 cm 0.5 cm   Wrist 17 cm 17.5 cm 0.5 cm   DPC 21 cm 21 cm 0 cm   IP Thumb 7 cm 7 cm 0 cm      Axillary Web Syndrome / Cording:   Location: left axilla to elbow  Degree of Cording: mild  Number of Cords Present: 1    Treatment     Laura received the following physical performance testing as part of reassessment for 20 minutes:    - Active range of motion measurements  - Upper extremity manual muscle testing  -  dynamometer testing  - Circumferential measurements for early detection of lymphedema      Laura received the following individual therapeutic exercises to improve postural correction and alignment, flexibility and soft tissue mobility, and strength for 20 minutes:      UBE: Seat 4, Level 1 (fwd / bkwd)  2 min each - held    Seated Exercises:  - Pulleys (flexion, abduction)   2 min each   - Physioball roll outs (3 ways)   3 min - held    Mat Exercises:  - Supine pec stretch (on 1/2 foam) 2 min - held  - Foam roller series (on 1/2 foam) 1 set x 10 reps - held  - Butterfly stretch with LTR   2 min - held  - Open book stretch (LUE)  10 sec x 10 reps- held   - AAROM wand flexion, 4#  1 set x 10 reps - held  - MICHEL GALO ER, 4#  1 set x 10 reps - held  - " CLOVER PROM flex, abd, and ER  1 set x 20 each    Standing Exercises:  - Triceps pushdowns (green)   2 set x 10 reps - held      Laura received the following individual neuromuscular re-education activities to improve coordination, kinesthetic sense, proprioception, and posture for 0 minutes:     Seated Exercises:   - Bilateral shoulder ER (green)  2 set x 10 reps - held  - Bilateral shoulder horizontal abd (green) 2 set x 10 reps - held    Standing Exercises  - Shoulder rows (green)   2 set x 10 reps - held  - Shoulder extension (green)   2 set x 10 reps - held      Laura received the following manual therapy techniques were performed to increased myofascial/soft tissue length, mobility and pliability, increase PROM, AROM and function as well as to decrease pain for 0 minutes:    - Soft tissue mobilization of L pec minor  - Gentle stretching and side-bending for cording in L axilla to upper arm(proximal to distal and back)    Home Exercise Program and Patient Education     Education Provided Regarding:  - Role of physical therapy in multi-disciplinary team  - Goals for physical therapy, progress towards goals  - Exercise technique, compliance with home exercise program    Written Home Exercises Provided: Patient instructed to cont prior HEP. Exercises were reviewed and Laura was able to demonstrate them prior to the end of the session. Laura demonstrated good  understanding of the education provided.     See EMR under Patient Instructions for exercises provided  eval and 03/27/2023 .    Patient has no cultural, educational, or language barriers to learning provided.    Assessment     Patient is responding well to physical therapy. Patient able to perform limited exercises today secondary to ongoing nausea but was able to perform select activities without increase in symptoms prior to leaving the clinic. Patient demonstrates the following active range of motion improvements since initial evaluation  (03/23/2023):    - 5 degree improvement in RIGHT shoulder flexion  - 70 degree improvement in LEFT shoulder flexion  - 45 degree improvement in LEFT shoulder abduction  - 25 degree improvement in LEFT shoulder extension  - 90 degree improvement in LEFT shoulder external rotation    Patient also demonstrates overall improved bilateral upper extremity strength via manual muscle testing. No evidence of lymphedema per today's circumferential measurements. Patient still lacking full LUE range of motion and experiencing limited soft tissue mobility secondary to cording and possible XRT-related soft tissue changes. Patient would continue to benefit from skilled physical therapy to further increase LUE strength and active range of motion to improve tolerance to functional reaching / carrying / lifting / pushing / pulling for return to prior level of function. Emphasize LUE strengthening exercises in upcoming visits and will progress as tolerated.     Patient prognosis is Good. Patient will continue to benefit from skilled outpatient physical therapy to address the deficits listed in the problem list chart on initial evaluation, provide patient / family education, and to maximize patient's level of independence in the home and community environment.     Anticipated barriers to physical therapy: none anticipated    Goals as Follows:  Short Term Goals: 4 weeks  Patient will demonstrate 100% understanding of lymphedema risk reduction practices to include self monitoring for lymphedema (met, 04/19/2023).  Patient will demonstrate independence with Home Exercise program established (met, 04/19/2023).  Patient will increase AROM / PROM in shoulder abduction to 120 degrees on left to improve functional reach, carry, push, pull pain free (exceeded, 03/27/2023).  Patient will increase AROM / PROM in shoulder flexion to 120 degrees on left to improve functional reach, carry, push, pull pain free (exceeded, 03/27/2023).  Patient will  increase strength to >/= 4/5 in gross upper extremity musculature to improve tolerance to all functional activities pain free (met, 04/19/2023).     Long Term Goals: 8 weeks   Patient will increase LEFT shoulder FLEXION active range of motion to 175 degrees to improve functional reaching, carrying, pushing, and pulling pain-free (progressing, not met).  Patient will increase LEFT shoulder ABDUCTION active range of motion to 180 degrees to improve functional reaching, carrying, pushing, and pulling pain-free (progressing, not met).  Patient will increase LEFT shoulder EXTERNAL ROTATION active range of motion to 90 degrees (at 90 degrees abduction) to improve functional reaching, carrying, pushing, and pulling pain-free (met, 05/17/2023).  Patient will increase strength to 5/5 in gross upper extremity musculature to improve tolerance to all functional activities pain-free (progressing, not met).  Patient will demonstrate full / maximized tissue mobility to increase range of motion and promote healthy tissue to be pain-free at discharge (progressing, not met).   Patient will report compliance with walking program 5x/week for 20-30 minutes/day to improve overall cardiovascular function and decrease cancer-related fatigue at discharge (met, 04/19/2023).     Plan     Outpatient physical therapy 2x week for 8 weeks to include the following: Manual Therapy, Neuromuscular Re-ed, Patient Education, Self Care, Therapeutic Activities, Therapeutic Exercise, and IASTM.     Plan of Care Certification Period: 05/17/2023 - 07/14/2023    Therapist: Ghada Brooks, PT  5/17/2023

## 2023-05-16 NOTE — PLAN OF CARE
DISCONTINUE ON PATHWAY REGIMEN - Breast    [id number]        Pembrolizumab (Keytruda)       Paclitaxel (Taxol)       Carboplatin (Paraplatin)       Filgrastim-xxxx       Pembrolizumab (Keytruda)       Doxorubicin (Adriamycin)       Cyclophosphamide (Cytoxan)       Pegfilgrastim-xxxx           Additional Orders: In the KEYNOTE-522 trial (Cristiana et al., 2020), G-CSF   was recommended after each chemotherapy cycle beginning 24 hours after the last   dose of chemotherapy. For the paclitaxel + carboplatin cycles, filgrastim was   recommended and it continued daily at least until 72 hours after the last dose   of chemotherapy. For the doxorubicin + cyclophosphamide cycles, pegfilgrastim   could be used. See protocol for details. Assess LVEF prior to initiation of   doxorubicin and as clinically indicated during and after treatment. Doxorubicin   can cause myocardial damage, including acute left ventricular failure. Risk of   cardiomyopathy is generally proportional to cumulative   anthracycline/anthracenedione exposure. Use of concomitant cardiotoxic agents,   previous radiotherapy to the mediastinum, or presence/history of cardiovascular   disease can additionally increase cardiomyopathy risk. See PI for details.   Serious immune-mediated adverse events can occur with pembrolizumab. Please   monitor your patient and refer to the linked immune-mediated adverse reaction   management materials for more information.    **Always confirm dose/schedule in your pharmacy ordering system**    REASON: Other Reason  PRIOR TREATMENT: [id number]  TREATMENT RESPONSE: Partial Response ([place])    CLINICAL TRIAL SELECTED - Breast    Trial 2017.141.A: [id number] Double Blind, Randomized Phase II Trial to Evaluate   Vaccination with Folate Receptor Alpha Peptide Vaccine with GM-CSF as Vaccine   Adj Following Oral Cyclophosphamide VS GM-CSF/Placebo to Prevent Recurrence in   Triple Negative BC    **Trial eligibility and accrual should be confirmed by  your research team**    Patient Characteristics:  Post-Neoadjuvant Therapy and Resection, HER2 Negative/Unknown/Equivocal,   Residual Disease, ER Negative/Unknown, Received Neoadjuvant Pembrolizumab +   Chemotherapy  Therapeutic Status: Post-Neoadjuvant Therapy and Resection  Residual Invasive Disease Post-Neoadjuvant Therapy<= Yes  ER Status: Negative (-)  HER2 Status: Negative (-)  OK Status: Negative (-)  BRCA Mutation Status: BRCA Wild-Type  Intent of Therapy:  Curative Intent, Discussed with Patient

## 2023-05-17 ENCOUNTER — DOCUMENTATION ONLY (OUTPATIENT)
Dept: RADIATION ONCOLOGY | Facility: CLINIC | Age: 38
End: 2023-05-17
Payer: MEDICAID

## 2023-05-17 ENCOUNTER — CLINICAL SUPPORT (OUTPATIENT)
Dept: REHABILITATION | Facility: HOSPITAL | Age: 38
End: 2023-05-17
Payer: MEDICAID

## 2023-05-17 DIAGNOSIS — R26.89 DECREASED FUNCTIONAL MOBILITY: ICD-10-CM

## 2023-05-17 DIAGNOSIS — M25.612 DECREASED RANGE OF MOTION OF LEFT SHOULDER: Primary | ICD-10-CM

## 2023-05-17 DIAGNOSIS — R29.898 WEAKNESS OF LEFT UPPER EXTREMITY: ICD-10-CM

## 2023-05-17 DIAGNOSIS — Z91.89 AT RISK FOR LYMPHEDEMA: ICD-10-CM

## 2023-05-17 DIAGNOSIS — R29.3 POOR POSTURE: ICD-10-CM

## 2023-05-17 PROCEDURE — 77387 PR GUIDANCE FOR RADIATION TREATMENT DELIVERY: ICD-10-PCS | Mod: 26,,, | Performed by: RADIOLOGY

## 2023-05-17 PROCEDURE — 77385 HC IMRT, SIMPLE: CPT | Performed by: RADIOLOGY

## 2023-05-17 PROCEDURE — 77387 GUIDANCE FOR RADJ TX DLVR: CPT | Mod: 26,,, | Performed by: RADIOLOGY

## 2023-05-17 PROCEDURE — 97110 THERAPEUTIC EXERCISES: CPT

## 2023-05-17 NOTE — PLAN OF CARE
Day 15 of outpatient radiation to the left breast mound. No skin issues seen or reported. Using Miaderm cream BID. Reported nausea today, likely due to pancreas issues pt has had in past.

## 2023-05-17 NOTE — PLAN OF CARE
"[facility]  Physical Therapy Plan of Care Note     Name: Laura Kent  Clinic Number: [id number]    Therapy Diagnosis:   Encounter Diagnoses   Name Primary?    Decreased range of motion of left shoulder Yes    Weakness of left upper extremity     Poor posture     At risk for lymphedema     Decreased functional mobility      Physician: YASMANI Weiner MD    Visit Date: 5/17/2023    Physician Orders: Eval and Treat  Medical Diagnosis from Referral: C50.412,Z17.1 (ICD-10-CM) - Malignant neoplasm of upper-outer quadrant of left breast in female, estrogen receptor negative  Evaluation Date: 03/23/2023  Authorization Period Expiration: 06/25/2023   Plan of Care Expiration: 05/19/2023  Progress Note Due: 05/19/2023   Visit # / Visits Authorized: 12 / 17 (plus eval)  FOTO: 2/3    Precautions: Standard and cancer  Functional Level Prior to Evaluation: independent    SUBJECTIVE     Update: significant improvement in overall upper extremity range of motion but still experiencing LUE tightness from cording, tissue expanders, and possible XRT-related skin changes. Patient also experiencing occasional nausea that limits functional activity tolerance.    OBJECTIVE     Shoulder Range of Motion: 05/17/2023  Active ROM Right Left   Flexion 180 155*   Abduction 180 135*   Extension 85 70   IR/90deg 90 90   ER/90deg 95 90   *Of note, patient demonstrates full LUE PROM in supine    Strength: manual muscle test grades below   Upper Extremity Strength: 05/17/2023    (R) UE (L) UE   Shoulder Flexion: 5/5 4+/5   Shoulder Abduction: 5/5 5/5   Shoulder IR 5/5 5/5   Shoulder ER 5/5 5/5   Elbow flexion: 5/5 5/5   Elbow extension: 5/5 5/5   Wrist flexion: 5/5 5/5   Wrist extension: 5/5 5/5    60.6 lbs 67.4 lbs     Upper Extremity Circumferential Measurements: 05/17/2023   LANDMARK RIGHT UE LEFT UE DIFFERENCE   E + 8" 38 cm 39 cm 1 cm   E + 6" 35.5 cm 37 cm 1.5 cm   E + 4" 35 cm 36.5 cm 1.5 cm   E + 2" " "31.5 cm 33 cm 1.5 cm   Elbow 28.5 cm 29.5 cm 1 cm   W+ 8" 30 cm 31 cm 1 cm   W +  6" 27.5 cm 27 cm 0.5 cm   W + 4" 23 cm 23.5 cm 0.5 cm   Wrist 17 cm 17.5 cm 0.5 cm   DPC 21 cm 21 cm 0 cm   IP Thumb 7 cm 7 cm 0 cm     Axillary Web Syndrome / Cording:   Location: left axilla to elbow  Degree of Cording: mild  Number of Cords Present: 1    ASSESSMENT     Update: Patient demonstrates the following active range of motion improvements since initial evaluation (03/23/2023):     - 5 degree improvement in RIGHT shoulder flexion  - 70 degree improvement in LEFT shoulder flexion  - 45 degree improvement in LEFT shoulder abduction  - 25 degree improvement in LEFT shoulder extension  - 90 degree improvement in LEFT shoulder external rotation    Patient also demonstrates overall improved bilateral upper extremity strength via manual muscle testing. No evidence of lymphedema per today's circumferential measurements. Patient still lacking full LUE range of motion and experiencing limited soft tissue mobility secondary to cording and possible XRT-related soft tissue changes.    Previous Short Term Goals Status: progressing, not met  New Short Term Goals Status: met / exceeded short term goals  Long Term Goal Status: continue per initial plan of care    Reasons for Recertification of Therapy: Patient would continue to benefit from skilled physical therapy to further increase LUE strength and active range of motion to improve tolerance to functional reaching / carrying / lifting / pushing / pulling for return to prior level of function.    GOALS:   Short Term Goals: 4 Weeks  Patient will demonstrate 100% understanding of lymphedema risk reduction practices to include self monitoring for lymphedema (met, 04/19/2023).  Patient will demonstrate independence with Home Exercise program established (met, 04/19/2023).  Patient will increase AROM / PROM in shoulder abduction to 120 degrees on left to improve functional reach, carry, push, " pull pain free (exceeded, 03/27/2023).  Patient will increase AROM / PROM in shoulder flexion to 120 degrees on left to improve functional reach, carry, push, pull pain free (exceeded, 03/27/2023).  Patient will increase strength to >/= 4/5 in gross upper extremity musculature to improve tolerance to all functional activities pain free (met, 04/19/2023).    Long Term Goals: 8 weeks   Patient will increase LEFT shoulder FLEXION active range of motion to 175 degrees to improve functional reaching, carrying, pushing, and pulling pain-free (progressing, not met).  Patient will increase LEFT shoulder ABDUCTION active range of motion to 180 degrees to improve functional reaching, carrying, pushing, and pulling pain-free (progressing, not met).  Patient will increase LEFT shoulder EXTERNAL ROTATION active range of motion to 90 degrees (at 90 degrees abduction) to improve functional reaching, carrying, pushing, and pulling pain-free (met, 05/17/2023).  Patient will increase strength to 5/5 in gross upper extremity musculature to improve tolerance to all functional activities pain-free (progressing, not met).  Patient will demonstrate full / maximized tissue mobility to increase range of motion and promote healthy tissue to be pain-free at discharge (progressing, not met).   Patient will report compliance with walking program 5x/week for 20-30 minutes/day to improve overall cardiovascular function and decrease cancer-related fatigue at discharge (met, 04/19/2023).     PLAN     Updated Certification Period: 05/17/2023 to 07/14/2023     Recommended Treatment Plan: 2 times per week for 8 weeks:  Manual Therapy, Neuromuscular Re-ed, Patient Education, Self Care, Therapeutic Activities, Therapeutic Exercise, and IASTM.    Other Recommendations: continue per written plan of care    Ghada Brooks, PT

## 2023-05-18 PROCEDURE — 77387 GUIDANCE FOR RADJ TX DLVR: CPT | Mod: 26,,, | Performed by: RADIOLOGY

## 2023-05-18 PROCEDURE — 77385 HC IMRT, SIMPLE: CPT | Performed by: RADIOLOGY

## 2023-05-18 PROCEDURE — 77336 RADIATION PHYSICS CONSULT: CPT | Performed by: RADIOLOGY

## 2023-05-18 PROCEDURE — 77387 PR GUIDANCE FOR RADIATION TREATMENT DELIVERY: ICD-10-PCS | Mod: 26,,, | Performed by: RADIOLOGY

## 2023-05-19 PROCEDURE — 77014 PR  CT GUIDANCE PLACEMENT RAD THERAPY FIELDS: CPT | Mod: 26,,, | Performed by: RADIOLOGY

## 2023-05-19 PROCEDURE — 77014 PR  CT GUIDANCE PLACEMENT RAD THERAPY FIELDS: ICD-10-PCS | Mod: 26,,, | Performed by: RADIOLOGY

## 2023-05-19 PROCEDURE — 77385 HC IMRT, SIMPLE: CPT | Performed by: RADIOLOGY

## 2023-05-19 PROCEDURE — 77014 HC CT GUIDANCE RADIATION THERAPY FLDS PLACEMENT: CPT | Mod: TC | Performed by: RADIOLOGY

## 2023-05-22 PROCEDURE — 77387 PR GUIDANCE FOR RADIATION TREATMENT DELIVERY: ICD-10-PCS | Mod: 26,,, | Performed by: RADIOLOGY

## 2023-05-22 PROCEDURE — 77385 HC IMRT, SIMPLE: CPT | Performed by: RADIOLOGY

## 2023-05-22 PROCEDURE — 77387 GUIDANCE FOR RADJ TX DLVR: CPT | Mod: 26,,, | Performed by: RADIOLOGY

## 2023-05-23 PROCEDURE — 77417 THER RADIOLOGY PORT IMAGE(S): CPT | Performed by: RADIOLOGY

## 2023-05-23 PROCEDURE — 77387 GUIDANCE FOR RADJ TX DLVR: CPT | Mod: 26,,, | Performed by: RADIOLOGY

## 2023-05-23 PROCEDURE — 77387 PR GUIDANCE FOR RADIATION TREATMENT DELIVERY: ICD-10-PCS | Mod: 26,,, | Performed by: RADIOLOGY

## 2023-05-23 PROCEDURE — 77385 HC IMRT, SIMPLE: CPT | Performed by: RADIOLOGY

## 2023-05-24 ENCOUNTER — DOCUMENTATION ONLY (OUTPATIENT)
Dept: RADIATION ONCOLOGY | Facility: CLINIC | Age: 38
End: 2023-05-24
Payer: MEDICAID

## 2023-05-24 PROCEDURE — 77387 PR GUIDANCE FOR RADIATION TREATMENT DELIVERY: ICD-10-PCS | Mod: 26,,, | Performed by: RADIOLOGY

## 2023-05-24 PROCEDURE — 77387 GUIDANCE FOR RADJ TX DLVR: CPT | Mod: 26,,, | Performed by: RADIOLOGY

## 2023-05-24 PROCEDURE — 77385 HC IMRT, SIMPLE: CPT | Performed by: RADIOLOGY

## 2023-05-24 NOTE — PLAN OF CARE
Day 20 of outpatient radiation to the left breast mound. Hyperpigmentation noted in the axilla, skin intact. Using Miaderm cream BID.

## 2023-05-24 NOTE — PROGRESS NOTES
Physical Therapy Daily Treatment Note     Date: 5/25/2023   Name: Laura Kent  Clinic Number: [id number]    Therapy Diagnosis:  Encounter Diagnoses   Name Primary?    Decreased range of motion of left shoulder Yes    Weakness of left upper extremity     Poor posture     At risk for lymphedema     Decreased functional mobility      Physician: YASMANI Weiner MD    Physician Orders: PT Eval and Treat   Medical Diagnosis: C50.412,Z17.1 (ICD-10-CM) - Malignant neoplasm of upper-outer quadrant of left breast in female, estrogen receptor negative  Evaluation Date: 3/23/2023  Authorization Period Expiration: 06/25/2023  Plan of Care Certification Period: 05/17/2023 - 07/14/2023  Visit # / Visits Authorized: 13 / 17 (plus eval)  Insurance: MEDICAID / [place] [employer]  FOTO: 2/3    Time In: 9:02 AM  Time Out: 10:00 AM   Total Billable Time: 58 minutes (TE only per [place] medicaid)    Precautions: Standard and cancer      Subjective     Patient reports: GI issues improved this week, but patient still experiencing tightness through left shoulder and arm. Patient has approx 1 week of XRT remaining.     She was compliant with home exercise program.  Response to Previous Treatment: no adverse events    Pain Scale: Laura rates pain on a scale of 4-5/10 on VAS.   Pain Location: L breast, axilla region    Fatigue: 0/10  Functional Change: increased L shoulder tightness when reaching overhead    Treatment:   Chemotherapy: neoadjuvant completed 11/22  Radiation: currently receiving 5 weeks, started 4/27/23  Endocrine Therapy: N/A    Surgery Date: LEFT mastectomy with TE placement with LEFT SLNB on 02/27/2023 per Myra Weiner and Denis.       Objective     Objective Measures updated at progress report unless specified.     Shoulder Range of Motion: 05/17/2023  Active ROM Right Left   Flexion 180 155*   Abduction 180 135*   Extension 85 70   IR/90deg 90 90   ER/90deg 95 90    *Of note, patient demonstrates full LUE PROM in supine    Axillary Web Syndrome / Cording:   Location: left axilla to elbow  Degree of Cording: mild  Number of Cords Present: 1    Treatment     Laura received the following individual therapeutic exercises to improve postural correction and alignment, flexibility and soft tissue mobility, and strength for *** minutes:      UBE: Seat 4, Level 1 (fwd / bkwd)  2 min each    Seated Exercises:  - Pulleys (flexion, abduction)   2 min each     Mat Exercises:  - Supine pec stretch (on 1/2 foam)  2 min  - Foam roller series (on 1/2 foam)  1 set x 10 reps  - Butterfly stretch with LTR    2 min  - Open book stretch (LUE)   10 sec x 10 reps  - AAROM wand flexion, 4#   1 set x 10 reps - held  - AAROM wand LUE ER, 4#   1 set x 10 reps - held  - LUE PROM flex, abd, and ER   1 set x 20 each    Standing Exercises:  - Triceps pushdowns (green)   2 set x 10 reps - held      Laura received the following individual neuromuscular re-education activities to improve coordination, kinesthetic sense, proprioception, and posture for *** minutes:     Seated Exercises:   - Bilateral shoulder ER (green)  2 set x 10 reps - held  - Bilateral shoulder horizontal abd (green) 2 set x 10 reps - held    Standing Exercises  - Shoulder rows (green)   2 set x 10 reps - held  - Shoulder extension (green)   2 set x 10 reps - held      Laura received the following manual therapy techniques were performed to increased myofascial/soft tissue length, mobility and pliability, increase PROM, AROM and function as well as to decrease pain for *** minutes:    - Gentle stretching and side-bending for cording in L axilla to upper arm(proximal to distal and back)    Home Exercise Program and Patient Education     Education Provided Regarding:  - Role of physical therapy in multi-disciplinary team  - Goals for physical therapy, progress towards goals  - Exercise technique, compliance with home exercise  program    Written Home Exercises Provided: Patient instructed to cont prior HEP. Exercises were reviewed and Laura was able to demonstrate them prior to the end of the session. Laura demonstrated good  understanding of the education provided.     See EMR under Patient Instructions for exercises provided  eval and 03/27/2023 .    Patient has no cultural, educational, or language barriers to learning provided.    Assessment     Patient is responding well to physical therapy. Patient able to perform limited exercises today secondary to ongoing nausea but was able to perform select activities without increase in symptoms prior to leaving the clinic. *** Will progress as tolerated.    Patient prognosis is Good. Patient will continue to benefit from skilled outpatient physical therapy to address the deficits listed in the problem list chart on initial evaluation, provide patient / family education, and to maximize patient's level of independence in the home and community environment.     Anticipated barriers to physical therapy: none anticipated    Goals as Follows:  Short Term Goals: 4 weeks  Patient will demonstrate 100% understanding of lymphedema risk reduction practices to include self monitoring for lymphedema (met, 04/19/2023).  Patient will demonstrate independence with Home Exercise program established (met, 04/19/2023).  Patient will increase AROM / PROM in shoulder abduction to 120 degrees on left to improve functional reach, carry, push, pull pain free (exceeded, 03/27/2023).  Patient will increase AROM / PROM in shoulder flexion to 120 degrees on left to improve functional reach, carry, push, pull pain free (exceeded, 03/27/2023).  Patient will increase strength to >/= 4/5 in gross upper extremity musculature to improve tolerance to all functional activities pain free (met, 04/19/2023).     Long Term Goals: 8 weeks   Patient will increase LEFT shoulder FLEXION active range of motion to 175 degrees to  improve functional reaching, carrying, pushing, and pulling pain-free (progressing, not met).  Patient will increase LEFT shoulder ABDUCTION active range of motion to 180 degrees to improve functional reaching, carrying, pushing, and pulling pain-free (progressing, not met).  Patient will increase LEFT shoulder EXTERNAL ROTATION active range of motion to 90 degrees (at 90 degrees abduction) to improve functional reaching, carrying, pushing, and pulling pain-free (met, 05/17/2023).  Patient will increase strength to 5/5 in gross upper extremity musculature to improve tolerance to all functional activities pain-free (progressing, not met).  Patient will demonstrate full / maximized tissue mobility to increase range of motion and promote healthy tissue to be pain-free at discharge (progressing, not met).   Patient will report compliance with walking program 5x/week for 20-30 minutes/day to improve overall cardiovascular function and decrease cancer-related fatigue at discharge (met, 04/19/2023).     Plan     Outpatient physical therapy 2x week for 8 weeks to include the following: Manual Therapy, Neuromuscular Re-ed, Patient Education, Self Care, Therapeutic Activities, Therapeutic Exercise, and IASTM.     Plan of Care Certification Period: 05/17/2023 - 07/14/2023    Therapist: Ghada Brooks, PT  5/25/2023

## 2023-05-25 ENCOUNTER — PATIENT MESSAGE (OUTPATIENT)
Dept: REHABILITATION | Facility: HOSPITAL | Age: 38
End: 2023-05-25

## 2023-05-25 ENCOUNTER — CLINICAL SUPPORT (OUTPATIENT)
Dept: REHABILITATION | Facility: HOSPITAL | Age: 38
End: 2023-05-25
Payer: MEDICAID

## 2023-05-25 DIAGNOSIS — Z91.89 AT RISK FOR LYMPHEDEMA: ICD-10-CM

## 2023-05-25 DIAGNOSIS — R29.898 WEAKNESS OF LEFT UPPER EXTREMITY: ICD-10-CM

## 2023-05-25 DIAGNOSIS — R26.89 DECREASED FUNCTIONAL MOBILITY: ICD-10-CM

## 2023-05-25 DIAGNOSIS — M25.612 DECREASED RANGE OF MOTION OF LEFT SHOULDER: Primary | ICD-10-CM

## 2023-05-25 DIAGNOSIS — R29.3 POOR POSTURE: ICD-10-CM

## 2023-05-25 PROCEDURE — 97110 THERAPEUTIC EXERCISES: CPT

## 2023-05-25 PROCEDURE — 77387 GUIDANCE FOR RADJ TX DLVR: CPT | Mod: 26,,, | Performed by: RADIOLOGY

## 2023-05-25 PROCEDURE — 77336 RADIATION PHYSICS CONSULT: CPT | Performed by: RADIOLOGY

## 2023-05-25 PROCEDURE — 77387 PR GUIDANCE FOR RADIATION TREATMENT DELIVERY: ICD-10-PCS | Mod: 26,,, | Performed by: RADIOLOGY

## 2023-05-25 PROCEDURE — 77385 HC IMRT, SIMPLE: CPT | Performed by: RADIOLOGY

## 2023-05-25 NOTE — PROGRESS NOTES
Physical Therapy Daily Treatment Note     Date: 5/25/2023   Name: Laura Kent  Clinic Number: [id number]    Therapy Diagnosis:  Encounter Diagnoses   Name Primary?    Decreased range of motion of left shoulder Yes    Weakness of left upper extremity     Poor posture     At risk for lymphedema     Decreased functional mobility      Physician: YASMANI Weiner MD    Physician Orders: PT Eval and Treat   Medical Diagnosis: C50.412,Z17.1 (ICD-10-CM) - Malignant neoplasm of upper-outer quadrant of left breast in female, estrogen receptor negative  Evaluation Date: 3/23/2023  Authorization Period Expiration: 06/25/2023  Plan of Care Certification Period: 05/17/2023 - 07/14/2023  Visit # / Visits Authorized: 13 / 17 (plus eval)  Insurance: MEDICAID / [place] [employer]  FOTO: 2/3    Time In: 9:02 AM  Time Out: 10:00 AM   Total Billable Time: 58 minutes (TE only per [place] medicaid)    Precautions: Standard and cancer      Subjective     Patient reports: GI issues improved this week, but patient still experiencing tightness through left shoulder and arm. Patient has approx 1 week of XRT remaining.     She was compliant with home exercise program.  Response to Previous Treatment: no adverse events    Pain Scale: Laura rates pain on a scale of 4-5/10 on VAS.   Pain Location: L breast, axilla region    Fatigue: 0/10  Functional Change: increased L shoulder tightness when reaching overhead    Treatment:   Chemotherapy: neoadjuvant completed 11/22  Radiation: currently receiving 5 weeks, started 4/27/23  Endocrine Therapy: N/A    Surgery Date: LEFT mastectomy with TE placement with LEFT SLNB on 02/27/2023 per Myra Weiner and Denis.       Objective     Objective Measures updated at progress report unless specified.     Shoulder Range of Motion: 05/17/2023  Active ROM Right Left   Flexion 180 155*   Abduction 180 135*   Extension 85 70   IR/90deg 90 90   ER/90deg 95  90   *Of note, patient demonstrates full LUE PROM in supine    Axillary Web Syndrome / Cording:   Location: left axilla to elbow  Degree of Cording: mild  Number of Cords Present: 1    Treatment     Laura received the following individual therapeutic exercises to improve postural correction and alignment, flexibility and soft tissue mobility, and strength for 28 minutes:      UBE: Seat 4, Level 1 (fwd / bkwd)  2 min each    Seated Exercises:  - Pulleys (flexion, abduction)   2 min each     Mat Exercises:  - Supine pec stretch (on 1/2 foam)  2 min  - Foam roller series (on 1/2 foam)  1 set x 10 reps  - Butterfly stretch with LTR    2 min - held  - Open book stretch (LUE)   10 sec x 10 rep - held  - AAROM wand flexion, 4#   1 set x 10 reps - held  - AAROM wand LUE ER, 4#   1 set x 10 reps - held  - LUE PROM flex, abd   1 set x 20 each    Standing Exercises:  - Triceps pushdowns (green)   2 set x 10 reps - held      Laura received the following individual neuromuscular re-education activities to improve coordination, kinesthetic sense, proprioception, posture, and neuro mobility for 15 minutes:     Mat Exercises:  - Ulnar nerve slides (LUE)   2 set x 10 reps    Seated Exercises:   - Bilateral shoulder ER (green)  2 set x 10 reps - held  - Bilateral shoulder horizontal abd (green) 2 set x 10 reps - held    Standing Exercises  - Shoulder rows (green)   2 set x 10 reps  - Shoulder extension (green)   2 set x 10 reps - held  - Serratus wall slides (yellow)   2 set x 10 reps      Laura received the following individual therapeutic activities to improve functional lifting performance for 5 minutes:    - Shelf lifts, 2# (LUE)    2 set x 10 reps      Laura received the following individual manual therapy techniques to decrease pain as well as increase myofascial and soft tissue length, mobility and pliability, passive and active range of motion, and function for 10 minutes:    - Gentle stretching and side-bending for  "cording in L axilla to upper arm (proximal to distal and back)    - Soft tissue mobilization to bilateral upper trapezius      Home Exercise Program and Patient Education     Education Provided Regarding:  - Role of physical therapy in multi-disciplinary team  - Goals for physical therapy, progress towards goals  - Exercise technique, compliance with home exercise program    Written Home Exercises Provided: Patient instructed to cont prior HEP. Exercises were reviewed and Laura was able to demonstrate them prior to the end of the session. Laura demonstrated good  understanding of the education provided.     See EMR under Patient Instructions for exercises provided  eval and 03/27/2023 .    Patient has no cultural, educational, or language barriers to learning provided.    Assessment     Patient is responding well to physical therapy. Patient able to perform limited exercises today secondary to ongoing nausea but was able to perform select activities without increase in symptoms prior to leaving the clinic. Decreased "shocking" nerve pain through posterior LUE following supine ulnar nerve slides. Verbal cues to limit repetitions of nerve flossing exercises to avoid exacerbating symptoms; patient verbalized understanding, agreement. Improved overhead lifting tolerance with added shelf lifts. Improved scapular stabilization with added serratus wall slides. Will progress as tolerated.    Patient prognosis is Good. Patient will continue to benefit from skilled outpatient physical therapy to address the deficits listed in the problem list chart on initial evaluation, provide patient / family education, and to maximize patient's level of independence in the home and community environment.     Anticipated barriers to physical therapy: none anticipated    Goals as Follows:  Short Term Goals: 4 weeks  Patient will demonstrate 100% understanding of lymphedema risk reduction practices to include self monitoring for lymphedema " (met, 04/19/2023).  Patient will demonstrate independence with Home Exercise program established (met, 04/19/2023).  Patient will increase AROM / PROM in shoulder abduction to 120 degrees on left to improve functional reach, carry, push, pull pain free (exceeded, 03/27/2023).  Patient will increase AROM / PROM in shoulder flexion to 120 degrees on left to improve functional reach, carry, push, pull pain free (exceeded, 03/27/2023).  Patient will increase strength to >/= 4/5 in gross upper extremity musculature to improve tolerance to all functional activities pain free (met, 04/19/2023).     Long Term Goals: 8 weeks   Patient will increase LEFT shoulder FLEXION active range of motion to 175 degrees to improve functional reaching, carrying, pushing, and pulling pain-free (progressing, not met).  Patient will increase LEFT shoulder ABDUCTION active range of motion to 180 degrees to improve functional reaching, carrying, pushing, and pulling pain-free (progressing, not met).  Patient will increase LEFT shoulder EXTERNAL ROTATION active range of motion to 90 degrees (at 90 degrees abduction) to improve functional reaching, carrying, pushing, and pulling pain-free (met, 05/17/2023).  Patient will increase strength to 5/5 in gross upper extremity musculature to improve tolerance to all functional activities pain-free (progressing, not met).  Patient will demonstrate full / maximized tissue mobility to increase range of motion and promote healthy tissue to be pain-free at discharge (progressing, not met).   Patient will report compliance with walking program 5x/week for 20-30 minutes/day to improve overall cardiovascular function and decrease cancer-related fatigue at discharge (met, 04/19/2023).     Plan     Outpatient physical therapy 2x week for 8 weeks to include the following: Manual Therapy, Neuromuscular Re-ed, Patient Education, Self Care, Therapeutic Activities, Therapeutic Exercise, and IASTM.     Plan of Care  Certification Period: 05/17/2023 - 07/14/2023    Therapist: Ghada Brooks, PT  5/25/2023

## 2023-05-26 PROCEDURE — 77385 HC IMRT, SIMPLE: CPT | Performed by: RADIOLOGY

## 2023-05-26 PROCEDURE — 77387 PR GUIDANCE FOR RADIATION TREATMENT DELIVERY: ICD-10-PCS | Mod: 26,,, | Performed by: RADIOLOGY

## 2023-05-26 PROCEDURE — 77387 GUIDANCE FOR RADJ TX DLVR: CPT | Mod: 26,,, | Performed by: RADIOLOGY

## 2023-05-29 ENCOUNTER — CLINICAL SUPPORT (OUTPATIENT)
Dept: REHABILITATION | Facility: HOSPITAL | Age: 38
End: 2023-05-29
Payer: MEDICAID

## 2023-05-29 DIAGNOSIS — R29.898 WEAKNESS OF LEFT UPPER EXTREMITY: ICD-10-CM

## 2023-05-29 DIAGNOSIS — R26.89 DECREASED FUNCTIONAL MOBILITY: ICD-10-CM

## 2023-05-29 DIAGNOSIS — Z91.89 AT RISK FOR LYMPHEDEMA: ICD-10-CM

## 2023-05-29 DIAGNOSIS — R29.3 POOR POSTURE: ICD-10-CM

## 2023-05-29 DIAGNOSIS — M25.612 DECREASED RANGE OF MOTION OF LEFT SHOULDER: Primary | ICD-10-CM

## 2023-05-29 PROCEDURE — 97110 THERAPEUTIC EXERCISES: CPT

## 2023-05-29 PROCEDURE — 77387 GUIDANCE FOR RADJ TX DLVR: CPT | Mod: 26,,, | Performed by: RADIOLOGY

## 2023-05-29 PROCEDURE — 77385 HC IMRT, SIMPLE: CPT | Performed by: RADIOLOGY

## 2023-05-29 PROCEDURE — 77387 PR GUIDANCE FOR RADIATION TREATMENT DELIVERY: ICD-10-PCS | Mod: 26,,, | Performed by: RADIOLOGY

## 2023-05-29 NOTE — PROGRESS NOTES
Physical Therapy Daily Treatment Note     Date: 5/29/2023   Name: Laura Kent  Clinic Number: [id number]    Therapy Diagnosis:  Encounter Diagnoses   Name Primary?    Decreased range of motion of left shoulder Yes    Weakness of left upper extremity     Poor posture     At risk for lymphedema     Decreased functional mobility        Physician: YASMANI Weiner MD    Physician Orders: PT Eval and Treat   Medical Diagnosis: C50.412,Z17.1 (ICD-10-CM) - Malignant neoplasm of upper-outer quadrant of left breast in female, estrogen receptor negative  Evaluation Date: 3/23/2023  Authorization Period Expiration: 06/25/2023  Plan of Care Certification Period: 05/17/2023 - 07/14/2023  Visit # / Visits Authorized: 14 / 17 (plus eval)  Insurance: MEDICAID / [place] [employer]  FOTO: 2/3    Time In: 9:22 AM (late arrival)  Time Out: 10:00 AM  Total Billable Time: 38 minutes (TE only per [place] medicaid)    Precautions: Standard and cancer      Subjective     Patient reports: her stomach is not good this morning, she been doing mabel tea and chews. Her L arm/chest is still feeling stiff and she is getting a pulling when reaching all the way up and out. The nerve glides feel like they help and she has been doing a few reps a couple of times a day. The stiffness is 3-4/10 in her underarm.  She was compliant with home exercise program.  Response to Previous Treatment: no adverse events    Pain Scale: Laura rates pain on a scale of 0/10 on VAS.   Pain Location: L breast, axilla region    Fatigue: 0/10  Functional Change: increased L shoulder tightness when reaching overhead    Treatment:   Chemotherapy: neoadjuvant completed 11/22  Radiation: currently receiving 5 weeks, started 4/27/23  Endocrine Therapy: N/A    Surgery Date: LEFT mastectomy with TE placement with LEFT SLNB on 02/27/2023 per Myra Weiner and Denis.       Objective     Objective Measures updated at progress  report unless specified.     Shoulder Range of Motion: 05/17/2023  Active ROM Right Left   Flexion 180 155*   Abduction 180 135*   Extension 85 70   IR/90deg 90 90   ER/90deg 95 90   *Of note, patient demonstrates full LUE PROM in supine    Axillary Web Syndrome / Cording:   Location: left axilla to elbow  Degree of Cording: mild  Number of Cords Present: 1    Treatment     Laura received the following individual therapeutic exercises to improve postural correction and alignment, flexibility and soft tissue mobility, and strength for 18 minutes:      UBE: Seat 4, Level 1 (fwd / bkwd)  2 min each - held    Seated Exercises:  - Pulleys (flexion, abduction)   2 min each     Mat Exercises:  - Supine pec stretch (on 1/2 foam)  2 min  - Foam roller series (on 1/2 foam)  1 set x 10 reps  - Butterfly stretch with LTR    2 min - held  - Open book stretch (LUE)   10 sec x 10 rep   - AAROM wand flexion, 4#   1 set x 10 reps - held  - AAROM wand LUE ER, 4#   1 set x 10 reps - held  - LUE PROM flex, abd   1 set x 10 each    Standing Exercises:  - Triceps pushdowns (green)   2 set x 10 reps - held  - wall bounces, yellow ball   2 reps x 30 sec    Laura received the following individual neuromuscular re-education activities to improve coordination, kinesthetic sense, proprioception, posture, and neuro mobility for 15 minutes:     Mat Exercises:  - Ulnar nerve slides (LUE)   2 set x 10 reps    Seated Exercises:   - Bilateral shoulder ER (green)  2 set x 10 reps - held  - Bilateral shoulder horizontal abd (green) 2 set x 10 reps     Standing Exercises  - Shoulder rows (blue)   2 set x 10 reps  - Shoulder extension (green)   2 set x 10 reps - held  - Serratus wall slides (yellow)   2 set x 10 reps      Laura received the following individual therapeutic activities to improve functional lifting performance for 5 minutes:    - Shelf lifts, 2# (LUE)    2 set x 10 reps      Laura received the following individual manual therapy  techniques to decrease pain as well as increase myofascial and soft tissue length, mobility and pliability, passive and active range of motion, and function for 0 minutes: - held due to time constraints    - Gentle stretching and side-bending for cording in L axilla to upper arm (proximal to distal and back)    - Soft tissue mobilization to bilateral upper trapezius      Home Exercise Program and Patient Education     Education Provided Regarding:  - Role of physical therapy in multi-disciplinary team  - Goals for physical therapy, progress towards goals  - Exercise technique, compliance with home exercise program    Written Home Exercises Provided: Patient instructed to cont prior HEP. Exercises were reviewed and Laura was able to demonstrate them prior to the end of the session. Laura demonstrated good  understanding of the education provided.     See EMR under Patient Instructions for exercises provided  eval and 03/27/2023 .    Patient has no cultural, educational, or language barriers to learning provided.    Assessment     Patient is responding well to physical therapy. Patient did not complete all of her usual exercises due to time constraints secondary to on going nausea. She was able to perform all of today's exercises with no increase in symptoms prior to leaving the clinic. Patient demonstrated increased overhead endurance activities as she was able to perform wall bounces with no difficulty. Attempt to resume her usual exercises next visit. Will progress as tolerated.    Patient prognosis is Good. Patient will continue to benefit from skilled outpatient physical therapy to address the deficits listed in the problem list chart on initial evaluation, provide patient / family education, and to maximize patient's level of independence in the home and community environment.     Anticipated barriers to physical therapy: none anticipated    Goals as Follows:  Short Term Goals: 4 weeks  Patient will  demonstrate 100% understanding of lymphedema risk reduction practices to include self monitoring for lymphedema (met, 04/19/2023).  Patient will demonstrate independence with Home Exercise program established (met, 04/19/2023).  Patient will increase AROM / PROM in shoulder abduction to 120 degrees on left to improve functional reach, carry, push, pull pain free (exceeded, 03/27/2023).  Patient will increase AROM / PROM in shoulder flexion to 120 degrees on left to improve functional reach, carry, push, pull pain free (exceeded, 03/27/2023).  Patient will increase strength to >/= 4/5 in gross upper extremity musculature to improve tolerance to all functional activities pain free (met, 04/19/2023).     Long Term Goals: 8 weeks   Patient will increase LEFT shoulder FLEXION active range of motion to 175 degrees to improve functional reaching, carrying, pushing, and pulling pain-free (progressing, not met).  Patient will increase LEFT shoulder ABDUCTION active range of motion to 180 degrees to improve functional reaching, carrying, pushing, and pulling pain-free (progressing, not met).  Patient will increase LEFT shoulder EXTERNAL ROTATION active range of motion to 90 degrees (at 90 degrees abduction) to improve functional reaching, carrying, pushing, and pulling pain-free (met, 05/17/2023).  Patient will increase strength to 5/5 in gross upper extremity musculature to improve tolerance to all functional activities pain-free (progressing, not met).  Patient will demonstrate full / maximized tissue mobility to increase range of motion and promote healthy tissue to be pain-free at discharge (progressing, not met).   Patient will report compliance with walking program 5x/week for 20-30 minutes/day to improve overall cardiovascular function and decrease cancer-related fatigue at discharge (met, 04/19/2023).     Plan     Outpatient physical therapy 2x week for 8 weeks to include the following: Manual Therapy, Neuromuscular  Re-ed, Patient Education, Self Care, Therapeutic Activities, Therapeutic Exercise, and IASTM.     Plan of Care Certification Period: 05/17/2023 - 07/14/2023    Therapist: Teresa Hurley, PT  5/29/2023

## 2023-05-29 NOTE — PROGRESS NOTES
"                                                    Physical Therapy Daily Treatment Note     Date: 5/31/2023   Name: Laura Kent  Clinic Number: [id number]    Therapy Diagnosis:  Encounter Diagnoses   Name Primary?    Decreased range of motion of left shoulder Yes    Weakness of left upper extremity     Poor posture     At risk for lymphedema     Decreased functional mobility      Physician: YASMANI Weiner MD    Physician Orders: PT Eval and Treat   Medical Diagnosis: C50.412,Z17.1 (ICD-10-CM) - Malignant neoplasm of upper-outer quadrant of left breast in female, estrogen receptor negative  Evaluation Date: 3/23/2023  Authorization Period Expiration: 06/25/2023  Plan of Care Certification Period: 05/17/2023 - 07/14/2023  Visit # / Visits Authorized: 15 / 17 (plus eval)  Insurance: MEDICAID / [place] [employer]  FOTO: 2/3    Time In: 9:15 AM (late arrival from speaking with MD)  Time Out: 10:00 AM  Total Billable Time: 45 minutes (TE only per [place] medicaid)    Precautions: Standard and cancer      Subjective     Patient reports: finished XRT this morning. Patient notes skin discoloration but denies burns, blisters. Patient reports continued tingling through posterior LUE, improved with ulnar nerve slides. Increased fatigue this morning from bringing son to work at 2:00 AM.     Response to Previous Treatment: no adverse events    Pain Scale: Laura rates pain on a scale of 0/10 on VAS.   Pain Location: N/A    Fatigue: "tired"   Functional Change: improved lifting tolerance    Treatment:   Chemotherapy: neoadjuvant completed 11/22  Radiation: currently receiving 5 weeks, started 4/27/23  Endocrine Therapy: N/A    Surgery Date: LEFT mastectomy with TE placement with LEFT SLNB on 02/27/2023 per Myra Weiner and Denis.       Objective     Objective Measures updated at progress report unless specified.     Shoulder Range of Motion: 05/17/2023  Active ROM Right Left   Flexion 180 155*   Abduction 180 135* "   Extension 85 70   IR/90deg 90 90   ER/90deg 95 90   *Of note, patient demonstrates full LUE PROM in supine    Axillary Web Syndrome / Cording:   Location: left axilla to elbow  Degree of Cording: mild  Number of Cords Present: 1    Treatment     Laura received the following individual therapeutic exercises to improve postural correction and alignment, flexibility and soft tissue mobility, and strength for 30 minutes:      UBE: Seat 4, Level 1 (fwd / bkwd)  2 min each     Seated Exercises:  - Pulleys (flexion, abduction)   2 min each   - Posterior capsule stretch (LUE only) 30 sec x 2 reps    Mat Exercises:  - Supine pec stretch (on 1/2 foam)  2 min  - Foam roller series (on 1/2 foam)  1 set x 10 reps - held  - Butterfly stretch with LTR    2 min  - Open book stretch (LUE)   10 sec x 10 rep   - AAROM wand flexion, 4#   1 set x 10 reps - held  - AAROM wand LUE ER, 4#   1 set x 10 reps - held  - LUE PROM flex, abd   1 set x 10 each - held    Standing Exercises:  - Triceps pushdowns (green)   2 set x 10 reps - held  - Wall bounces, yellow ball   2 reps x 30 sec - held  - Doorway pec stretch    30 sec x 2 reps      Laura received the following individual neuromuscular re-education activities to improve coordination, kinesthetic sense, proprioception, posture, and neuro mobility for 15 minutes:     Mat Exercises:  - Ulnar nerve slides (LUE)   2 set x 10 reps - held    Seated Exercises:   - Bilateral shoulder ER (green)  2 set x 10 reps - held  - Bilateral shoulder horizontal abd (green) 3 set x 10 reps     Standing Exercises  - Shoulder rows (blue)    3 set x 10 reps  - Shoulder extension (green)   2 set x 10 reps - held  - Serratus wall slides (yellow)   3 set x 10 reps      Laura received the following individual therapeutic activities to improve functional lifting performance for 0 minutes:    - Shelf lifts, 2# (LUE)    2 set x 10 reps      Laura received the following individual manual therapy techniques to  decrease pain as well as increase myofascial and soft tissue length, mobility and pliability, passive and active range of motion, and function for 0 minutes:    - Gentle stretching and side-bending for cording in L axilla to upper arm (proximal to distal and back)    - Soft tissue mobilization to bilateral upper trapezius      Home Exercise Program and Patient Education     Education Provided Regarding:  - Role of physical therapy in multi-disciplinary team  - Goals for physical therapy, progress towards goals  - Exercise technique, compliance with home exercise program    Written Home Exercises Provided: Patient instructed to cont prior HEP. Exercises were reviewed and Laura was able to demonstrate them prior to the end of the session. Laura demonstrated good  understanding of the education provided.     See EMR under Patient Instructions for exercises provided  eval and 03/27/2023 .    Patient has no cultural, educational, or language barriers to learning provided.    Assessment     Patient is responding well to physical therapy. Patient able to perform exercise progressions without increase in symptoms prior to leaving the clinic. Improved scapular stabilization with increased repetitions of theraband exercises. Added posterior capsule stretch and doorway pec stretch for improved flexibility. Verbal cues to maintain stretches within tolerable range (good carryover). Held several exercises today secondary to patient arriving late to appointment following meeting with radiation oncologist. Resume full exercise program next visit and will progress as tolerated.    Patient prognosis is Good. Patient will continue to benefit from skilled outpatient physical therapy to address the deficits listed in the problem list chart on initial evaluation, provide patient / family education, and to maximize patient's level of independence in the home and community environment.     Anticipated barriers to physical therapy: none  anticipated    Goals as Follows:  Short Term Goals: 4 Weeks  Patient will demonstrate 100% understanding of lymphedema risk reduction practices to include self monitoring for lymphedema (met, 04/19/2023).  Patient will demonstrate independence with Home Exercise program established (met, 04/19/2023).  Patient will increase AROM / PROM in shoulder abduction to 120 degrees on left to improve functional reach, carry, push, pull pain free (exceeded, 03/27/2023).  Patient will increase AROM / PROM in shoulder flexion to 120 degrees on left to improve functional reach, carry, push, pull pain free (exceeded, 03/27/2023).  Patient will increase strength to >/= 4/5 in gross upper extremity musculature to improve tolerance to all functional activities pain free (met, 04/19/2023).     Long Term Goals: 8 weeks   Patient will increase LEFT shoulder FLEXION active range of motion to 175 degrees to improve functional reaching, carrying, pushing, and pulling pain-free (progressing, not met).  Patient will increase LEFT shoulder ABDUCTION active range of motion to 180 degrees to improve functional reaching, carrying, pushing, and pulling pain-free (progressing, not met).  Patient will increase LEFT shoulder EXTERNAL ROTATION active range of motion to 90 degrees (at 90 degrees abduction) to improve functional reaching, carrying, pushing, and pulling pain-free (met, 05/17/2023).  Patient will increase strength to 5/5 in gross upper extremity musculature to improve tolerance to all functional activities pain-free (progressing, not met).  Patient will demonstrate full / maximized tissue mobility to increase range of motion and promote healthy tissue to be pain-free at discharge (progressing, not met).   Patient will report compliance with walking program 5x/week for 20-30 minutes/day to improve overall cardiovascular function and decrease cancer-related fatigue at discharge (met, 04/19/2023).     Plan     Outpatient physical therapy 2x  week for 8 weeks to include the following: Manual Therapy, Neuromuscular Re-ed, Patient Education, Self Care, Therapeutic Activities, Therapeutic Exercise, and IASTM.     Plan of Care Certification Period: 05/17/2023 - 07/14/2023    Therapist: Ghada Brooks, PT  5/31/2023

## 2023-05-30 PROCEDURE — 77385 HC IMRT, SIMPLE: CPT | Performed by: RADIOLOGY

## 2023-05-30 PROCEDURE — 77386 HC IMRT, COMPLEX: CPT | Performed by: RADIOLOGY

## 2023-05-31 ENCOUNTER — DOCUMENTATION ONLY (OUTPATIENT)
Dept: RADIATION ONCOLOGY | Facility: CLINIC | Age: 38
End: 2023-05-31
Payer: MEDICAID

## 2023-05-31 ENCOUNTER — CLINICAL SUPPORT (OUTPATIENT)
Dept: REHABILITATION | Facility: HOSPITAL | Age: 38
End: 2023-05-31
Payer: MEDICAID

## 2023-05-31 DIAGNOSIS — M25.612 DECREASED RANGE OF MOTION OF LEFT SHOULDER: Primary | ICD-10-CM

## 2023-05-31 DIAGNOSIS — R29.3 POOR POSTURE: ICD-10-CM

## 2023-05-31 DIAGNOSIS — R29.898 WEAKNESS OF LEFT UPPER EXTREMITY: ICD-10-CM

## 2023-05-31 DIAGNOSIS — Z91.89 AT RISK FOR LYMPHEDEMA: ICD-10-CM

## 2023-05-31 DIAGNOSIS — R26.89 DECREASED FUNCTIONAL MOBILITY: ICD-10-CM

## 2023-05-31 PROCEDURE — 97110 THERAPEUTIC EXERCISES: CPT

## 2023-05-31 PROCEDURE — 77385 HC IMRT, SIMPLE: CPT | Performed by: RADIOLOGY

## 2023-05-31 PROCEDURE — 77387 GUIDANCE FOR RADJ TX DLVR: CPT | Mod: 26,,, | Performed by: RADIOLOGY

## 2023-05-31 PROCEDURE — 77336 RADIATION PHYSICS CONSULT: CPT | Performed by: RADIOLOGY

## 2023-05-31 PROCEDURE — 77387 PR GUIDANCE FOR RADIATION TREATMENT DELIVERY: ICD-10-PCS | Mod: 26,,, | Performed by: RADIOLOGY

## 2023-05-31 NOTE — PLAN OF CARE
Outpatient radiation to the left chest wall completed 5/31/23. Pt to f/u with Dr Bess in 6 weeks. Appt given.

## 2023-06-06 ENCOUNTER — PATIENT MESSAGE (OUTPATIENT)
Dept: SURGERY | Facility: CLINIC | Age: 38
End: 2023-06-06
Payer: MEDICAID

## 2023-06-06 DIAGNOSIS — Z90.12 S/P LEFT MASTECTOMY: Primary | ICD-10-CM

## 2023-06-08 ENCOUNTER — HOSPITAL ENCOUNTER (EMERGENCY)
Facility: HOSPITAL | Age: 38
Discharge: HOME | End: 2023-06-08
Attending: EMERGENCY MEDICINE
Payer: MEDICAID

## 2023-06-08 VITALS
RESPIRATION RATE: 16 BRPM | TEMPERATURE: 99 F | DIASTOLIC BLOOD PRESSURE: 87 MMHG | BODY MASS INDEX: 36.61 KG/M2 | WEIGHT: 220 LBS | SYSTOLIC BLOOD PRESSURE: 133 MMHG | HEART RATE: 84 BPM | OXYGEN SATURATION: 100 %

## 2023-06-08 DIAGNOSIS — N93.9 VAGINAL BLEEDING: ICD-10-CM

## 2023-06-08 DIAGNOSIS — Z30.431 IUD CHECK UP: Primary | ICD-10-CM

## 2023-06-08 DIAGNOSIS — Z78.9 NOT CURRENTLY PREGNANT: ICD-10-CM

## 2023-06-08 DIAGNOSIS — N93.9 ABNORMAL UTERINE BLEEDING: ICD-10-CM

## 2023-06-08 LAB
B-HCG UR QL: NEGATIVE
BACTERIA #/AREA URNS AUTO: ABNORMAL /HPF
BILIRUB UR QL STRIP: NEGATIVE
CLARITY UR REFRACT.AUTO: ABNORMAL
COLOR UR AUTO: ABNORMAL
CTP QC/QA: YES
GLUCOSE UR QL STRIP: NEGATIVE
HGB UR QL STRIP: ABNORMAL
HYALINE CASTS UR QL AUTO: 0 /LPF
KETONES UR QL STRIP: ABNORMAL
LEUKOCYTE ESTERASE UR QL STRIP: ABNORMAL
MICROSCOPIC COMMENT: ABNORMAL
NITRITE UR QL STRIP: NEGATIVE
PH UR STRIP: 7 [PH] (ref 5–8)
PROT UR QL STRIP: ABNORMAL
RBC #/AREA URNS AUTO: >100 /HPF (ref 0–4)
SP GR UR STRIP: 1.01 (ref 1–1.03)
SQUAMOUS #/AREA URNS AUTO: 8 /HPF
URN SPEC COLLECT METH UR: ABNORMAL
WBC #/AREA URNS AUTO: 21 /HPF (ref 0–5)

## 2023-06-08 PROCEDURE — 25000003 PHARM REV CODE 250: Performed by: PHYSICIAN ASSISTANT

## 2023-06-08 PROCEDURE — 81001 URINALYSIS AUTO W/SCOPE: CPT | Performed by: PHYSICIAN ASSISTANT

## 2023-06-08 PROCEDURE — 99284 PR EMERGENCY DEPT VISIT,LEVEL IV: ICD-10-PCS | Mod: ,,, | Performed by: EMERGENCY MEDICINE

## 2023-06-08 PROCEDURE — 99284 EMERGENCY DEPT VISIT MOD MDM: CPT

## 2023-06-08 PROCEDURE — 87086 URINE CULTURE/COLONY COUNT: CPT | Performed by: PHYSICIAN ASSISTANT

## 2023-06-08 PROCEDURE — 81025 URINE PREGNANCY TEST: CPT | Performed by: PHYSICIAN ASSISTANT

## 2023-06-08 PROCEDURE — 99284 EMERGENCY DEPT VISIT MOD MDM: CPT | Mod: ,,, | Performed by: EMERGENCY MEDICINE

## 2023-06-08 RX ORDER — NAPROXEN 500 MG/1
500 TABLET ORAL
Status: COMPLETED | OUTPATIENT
Start: 2023-06-08 | End: 2023-06-08

## 2023-06-08 RX ORDER — NAPROXEN 500 MG/1
500 TABLET ORAL EVERY 12 HOURS PRN
Qty: 14 TABLET | Refills: 0 | Status: SHIPPED | OUTPATIENT
Start: 2023-06-08 | End: 2023-11-30

## 2023-06-08 RX ADMIN — NAPROXEN 500 MG: 500 TABLET ORAL at 10:06

## 2023-06-08 NOTE — Clinical Note
"Laura Perrin" Donte was seen and treated in our emergency department on 6/8/2023.  She may return to work on 06/09/2023.       If you have any questions or concerns, please don't hesitate to call.      Paula Escoto PA-C"

## 2023-06-09 NOTE — ED NOTES
Patient identifiers for Laura Kent 37 y.o. female checked and correct.    Pt presents to the ED with complaints of her IUD being out of place. Pt states that she noticed it while she was using the restroom and she could feel the strings. Pt states that the abdominal pain and vaginal bleeding is very light. She states that its the ending of her monthly cycle so that could be the reason for the bleeding.     Chief Complaint   Patient presents with    IUD Check     Pt states her IUD slipped out of place, C/o abdominal and vaginal pain, +vaginal bleeding.      Past Medical History:   Diagnosis Date    Anxiety     Breast cancer     Encounter for blood transfusion     Hypertension     Meningitis     Severe obesity 2/2/2023     Allergies reported:   Review of patient's allergies indicates:   Allergen Reactions    Strawberries [strawberry] Hives         HEENT: Denies vision changes. Denies ear drainage or hearing loss. No c/o nasal drainage. Denies dysphagia or voice changes.   Appearance: Pt awake, alert & oriented to person, place & time. Pt in no acute distress at present time. Pt is clean and well groomed with clothes appropriately fastened.   Skin: Skin warm, dry & intact. Color consistent with ethnicity. Mucous membranes moist. No breakdown or brusing noted.   Musculoskeletal: Patient moving all extremities well, no obvious swelling or deformities noted.   Respiratory: Respirations spontaneous, even, and non-labored. Visible chest rise noted. Airway is open and patent. No accessory muscle use noted.   Neurologic: Sensation is intact. Speech is clear and appropriate. Eyes open spontaneously, behavior appropriate to situation, follows commands, facial expression symmetrical, bilateral hand grasp equal and even, purposeful motor response noted.  Cardiac: All peripheral pulses present. No Bilateral lower extremity edema. Cap refill is <3 seconds.  Abdomen: Abdomen soft, non distended, non tender to  palpation.   : Pt voids independently, denies dysuria, hematuria, frequency.

## 2023-06-09 NOTE — DISCHARGE INSTRUCTIONS
There is only an IUD string coming out of your cervical os.  The rest is nice and bundle high up into your vaginal vault.  I do not think a string ever came out.    You are not pregnant.  You likely have menstrual cramps.   Take naproxen 500 mg every 12 hours as needed with food for anti-inflammatory relief. You were given your first dose in the ER.  You can take acetaminophen/tylenol 650 mg every 6 hours or 1000 mg every 8 hours for added relief.  Apply ice to the area for 10-20 minutes every 4 hours. You can apply heat 2 days after for the same duration and frequency.  Follow up with OBGYN as scheduled for tomorrow.  Return to the ER for new or worsening symptoms.  Future Appointments   Date Time Provider Department Center   6/12/2023  9:00 AM Teresa Hurley, PT [facility] OP [facility]   6/14/2023  9:00 AM Ghada Sweeney, PT [facility] OP [facility]   6/23/2023 10:00 AM LAB, HEMONC CANCER BLDG [facility] LAB HO [facility]   6/26/2023  7:00 AM Yessy Cruz MD [facility]   7/6/2023  3:15 PM Reese Barrios DO [facility] PLASTIC [facility]   7/12/2023  9:00 AM Viry Bess MD [facility] RAD ONC [facility]   10/3/2023  2:00 PM YASMANI Weiner MD [facility] BRT [facility]

## 2023-06-09 NOTE — ED PROVIDER NOTES
Encounter Date: 2023       History     Chief Complaint   Patient presents with    IUD Check     Pt states her IUD slipped out of place, C/o abdominal and vaginal pain, +vaginal bleeding.      10:00 PM  Patient is a 37-year-old female with a history of obesity, HTN, anxiety, who presents to [facility] ED for emergent evaluation of IUD problem.    Patient states this afternoon she urinated and noted her IUD strings sticking out of her vagina.  She reports onset of suprapubic pain at 18:00.  She started her menstrual cycle last week on Wednesday which ended Tuesday.  She noted blood again today that is very light.  Currently her pain is moderate.  She did not take any medication for symptoms.  She denies any fever, chills, dysuria, hematuria, frequency, diarrhea.    She reports her IUD spontaneously fell out about 10 years ago as well.      Review of patient's allergies indicates:   Allergen Reactions    Strawberries [strawberry] Hives     Past Medical History:   Diagnosis Date    Anxiety     Breast cancer     Encounter for blood transfusion     Hypertension     Meningitis     Severe obesity 2023     Past Surgical History:   Procedure Laterality Date    AXILLARY NODE DISSECTION Left 2023    Procedure: LYMPHADENECTOMY, AXILLARY;  Surgeon: YASMANI Weiner MD;  Location: [facility];  Service: General;  Laterality: Left;    BREAST BIOPSY Left      SECTION      COLONOSCOPY N/A 2022    Procedure: COLONOSCOPY;  Surgeon: Davi Chery MD;  Location: [address]);  Service: Endoscopy;  Laterality: N/A;  case request entered from referral - Per DR. KATHLEEN Chery Pt to be scheduled for urgent colonoscopy on 22/ fully vaccinated / prep ins for Sutab on portal - ERW  see micro for negative C-diff- ERW    INJECTION FOR SENTINEL NODE IDENTIFICATION Left 2023    Procedure: INJECTION, FOR SENTINEL NODE IDENTIFICATION;  Surgeon: YASMANI Weiner MD;  Location: [facility];  Service: General;  Laterality: Left;     INSERTION OF BREAST TISSUE EXPANDER Left 2/27/2023    Procedure: INSERTION, TISSUE EXPANDER, BREAST;  Surgeon: Reese Barrios DO;  Location: [facility];  Service: Plastics;  Laterality: Left;    INSERTION OF TUNNELED CENTRAL VENOUS CATHETER (CVC) WITH SUBCUTANEOUS PORT N/A 4/5/2022    Procedure: [id number];  Surgeon: Deo Sin Jr., MD;  Location: [facility] FLR;  Service: General;  Laterality: N/A;    INSERTION OF TUNNELED CENTRAL VENOUS CATHETER (CVC) WITH SUBCUTANEOUS PORT N/A 8/1/2022    Procedure: INSERTION, SINGLE LUMEN CATHETER WITH PORT, WITH FLUOROSCOPIC GUIDANCE;  Surgeon: Ryder Dinero MD;  Location: [facility] CATH LAB;  Service: Vascular;  Laterality: N/A;    MASTECTOMY Left 2/27/2023    Procedure: MASTECTOMY;  Surgeon: YASMANI Weiner MD;  Location: [facility];  Service: General;  Laterality: Left;  3.5 TOTAL HOURS / EMAIL SENT 2-23 @ 7:56 [facility]    SENTINEL LYMPH NODE BIOPSY Left 2/27/2023    Procedure: BIOPSY, LYMPH NODE, SENTINEL;  Surgeon: YASMANI Weiner MD;  Location: [facility];  Service: General;  Laterality: Left;  LEFT with radiological marker     Family History   Problem Relation Age of Onset    Hypertension Mother     Diabetes Father     Hypertension Father     Pancreatic cancer Maternal Uncle     Bladder Cancer Paternal Grandmother     Colon cancer Neg Hx     Esophageal cancer Neg Hx      Social History     Tobacco Use    Smoking status: Never    Smokeless tobacco: Never   Substance Use Topics    Alcohol use: Not Currently     Comment: social    Drug use: Never     Review of Systems   Constitutional:  Negative for chills and fever.   HENT:  Negative for sore throat.    Respiratory:  Negative for cough and shortness of breath.    Cardiovascular:  Negative for chest pain.   Gastrointestinal:  Negative for abdominal pain and nausea.   Genitourinary:  Positive for pelvic pain. Negative for dysuria, frequency and hematuria.   Musculoskeletal:  Negative for back pain and myalgias.    Skin:  Negative for rash.   Neurological:  Negative for weakness and light-headedness.   Hematological:  Does not bruise/bleed easily.     Physical Exam     Initial Vitals   BP Pulse Resp Temp SpO2   06/08/23 2036 06/08/23 2036 06/08/23 2036 06/08/23 2038 06/08/23 2036   133/87 84 16 98.6 °F (37 °C) 100 %      MAP       --                Physical Exam    Vitals reviewed.  Constitutional: She appears well-developed and well-nourished. She is not diaphoretic. She is cooperative.  Non-toxic appearance. She does not have a sickly appearance. She does not appear ill. No distress.   HENT:   Head: Normocephalic and atraumatic.   Nose: Nose normal.   Mouth/Throat: No trismus in the jaw.   Eyes: Conjunctivae and EOM are normal.   Neck:   Normal range of motion.  Pulmonary/Chest: No accessory muscle usage. No tachypnea. No respiratory distress.   Abdominal: Abdomen is soft. She exhibits no distension. There is no abdominal tenderness. There is no rebound and no guarding.   Genitourinary:    Pelvic exam was performed with patient supine.      Genitourinary Comments: Chaperone present.    On exam, she does not have a strain or any foreign body protruding from her vagina.  There is appropriate straining coming from her cervical os.  The rest is nice in bundled up high in her vaginal vault.  There is blood in her vault.  No other foreign bodies.     Musculoskeletal:         General: Normal range of motion.      Cervical back: Normal range of motion.     Neurological: She is alert. She has normal strength.   Skin: Skin is warm and dry. No erythema. No pallor.       ED Course   Procedures  Labs Reviewed   URINALYSIS, REFLEX TO URINE CULTURE   URINALYSIS MICROSCOPIC   POCT URINE PREGNANCY          Imaging Results    None          Medications   naproxen tablet 500 mg (500 mg Oral Given 6/8/23 2226)     Medical Decision Making:   History:   Old Medical Records: I decided to obtain old medical records.  Old Records Summarized:  records from clinic visits and records from previous admission(s).  Initial Assessment:   Patient is a 37-year-old female with a history of obesity, HTN, anxiety, who presents [place] [facility] [place] for emergent evaluation of IUD problem.  Differential Diagnosis:   Includes but is not limited to IUD string sticking out, IUD displacement, UTI, menstrual cycle, abnormal uterine bleeding, menstrual cramp. Doubt anemia requiring blood transfusion since VSS and no symptoms of anemia.  ED Management:  Her IUD on my exam appears intact.  She started her menstrual cycle 8 days ago on Wednesday that improved 2 days ago, but she started to bleed again today.  She is not pregnant.  She could have abnormal uterine bleeding.  Her vitals are stable.  No signs of shock.  She does not need any further labs or imaging at this time as I do not is suspect that her IUD was ever displaced.  She made an appointment with her OBGYN tomorrow morning.  Encouraged to follow-up.  Will give naproxen here for pelvic pain/menstrual cramp.  Continue every 12 hours as needed.            ED Course as of 06/08/23 2230   Thu Jun 08, 2023   2220 Preg Test, Ur: Negative [CL]      ED Course User Index  [CL] Paula Escoto PA-C                 Clinical Impression:   Final diagnoses:  [Z30.431] IUD check up (Primary)  [N93.9] Vaginal bleeding  [Z78.9] Not currently pregnant  [N93.9] Abnormal uterine bleeding        ED Disposition Condition    Discharge Stable          ED Prescriptions       Medication Sig Dispense Start Date End Date Auth. Provider    naproxen (NAPROSYN) 500 MG tablet Take 1 tablet (500 mg total) by mouth every 12 (twelve) hours as needed. 14 tablet 6/8/2023 -- Paula Escoto PA-C          Follow-up Information       Follow up With Specialties Details Why Contact Info    Your OBGYN that you are seeing tomorrow.  Go in 1 day      Jennifer Zepeda MD Obstetrics, Obstetrics and Gynecology Schedule an appointment as soon as possible for a visit   [address]  [address]  [facility] [address]  [phone]      [facility] - Emergency Dept Emergency Medicine  If symptoms worsen [address]  [facility] [address] [phone]          Future Appointments   Date Time Provider Department Center   6/12/2023  9:00 AM Teresa Hurley, PT [facility] OP [facility]   6/14/2023  9:00 AM Ghada Sweeney, PT [facility]   6/23/2023 10:00 AM LAB, HEMONC CANCER BLDG [facility] LAB HO [facility]   6/26/2023  7:00 AM Yessy Cruz MD [facility]   7/6/2023  3:15 PM Reese Barrios DO [facility] PLASTIC [facility]   7/12/2023  9:00 AM Viry Bess MD [facility] RAD ONC [facility]   10/3/2023  2:00 PM YASMANI Weiner MD [facility] BRT [facility]          Paula Escoto PA-C  06/08/23 9060

## 2023-06-10 LAB
BACTERIA UR CULT: NORMAL
BACTERIA UR CULT: NORMAL

## 2023-06-12 ENCOUNTER — CLINICAL SUPPORT (OUTPATIENT)
Dept: REHABILITATION | Facility: HOSPITAL | Age: 38
End: 2023-06-12
Payer: MEDICAID

## 2023-06-12 DIAGNOSIS — R29.3 POOR POSTURE: ICD-10-CM

## 2023-06-12 DIAGNOSIS — R29.898 WEAKNESS OF LEFT UPPER EXTREMITY: ICD-10-CM

## 2023-06-12 DIAGNOSIS — Z91.89 AT RISK FOR LYMPHEDEMA: ICD-10-CM

## 2023-06-12 DIAGNOSIS — R26.89 DECREASED FUNCTIONAL MOBILITY: ICD-10-CM

## 2023-06-12 DIAGNOSIS — M25.612 DECREASED RANGE OF MOTION OF LEFT SHOULDER: Primary | ICD-10-CM

## 2023-06-12 DIAGNOSIS — Z90.12 S/P LEFT MASTECTOMY: ICD-10-CM

## 2023-06-12 PROCEDURE — 97162 PT EVAL MOD COMPLEX 30 MIN: CPT

## 2023-06-12 NOTE — PATIENT INSTRUCTIONS
TRUNK EXTENSION - TOWEL - AROM - MOBILIZATION    While sitting in a chair, extend your thoracic spine backwards over a rolled up towel against the back rest.   Do 10 reps per set. Do 1 set per day.

## 2023-06-12 NOTE — PLAN OF CARE
[facility]  Physical Therapy Initial Evaluation    Name: Laura Kent  Clinic Number: [id number]    Therapy Diagnosis:   Encounter Diagnoses   Name Primary?    S/P left mastectomy     Decreased range of motion of left shoulder Yes    Weakness of left upper extremity     Poor posture     At risk for lymphedema     Decreased functional mobility         Physician: YASMANI Weiner MD    Physician Orders: PT Eval and Treat   Medical Diagnosis from Referral: Z90.12 (ICD-10-CM) - S/P left mastectomy  Evaluation Date: 2023  Authorization Period Expiration: 2024  Plan of Care Expiration: 2023  Progress Note Due:   Visit # / Visits authorized:        Precautions: Standard and cancer     Time In: 9:00 AM  Time Out: 10:00 AM  Total Appointment Time (timed & untimed codes): 60 minutes      History   History of Present Illness: Laura is a 37 y.o. female that presents to  [facility] at the [facility] clinic s/p left breast surgery.   Diagnosis: Stage IIIC (cT3, cN2(f), cM0, G3, ER-, [place]-, HER2-)  invasive ductal carcinoma of the left breast  Chief complaint: patient reports her left shoulder feels tighter than before radiation. She is having trouble reaching above shoulder height, gets some pulling with reaching down to feet, has difficultly pulling a jacket up her left arm, she also has some pain and discomfort when when picking up something heavy(case of water, basket of clothes), and she has a constant tightness and sharp pain that hits from her underarm into her triceps.  Surgical History:  Laura Kent  has a past surgical history that includes Breast biopsy (Left);  section; Insertion of tunneled central venous catheter (CVC) with subcutaneous port (N/A, 2022); Insertion of tunneled central venous catheter (CVC) with subcutaneous port (N/A, 2022); Colonoscopy (N/A, 2022); Mastectomy (Left,  2/27/2023); [place] lymph node biopsy (Left, 2/27/2023); Injection for sentinel node identification (Left, 2/27/2023); Axillary node dissection (Left, 2/27/2023); and Insertion of breast tissue expander (Left, 2/27/2023).    Chemotherapy: neoadjuvant completed 11/22  Radiation: completed 5/31/2023  Endocrine therapy: N/A    Past Medical History:   Diagnosis Date    Anxiety     Breast cancer     Encounter for blood transfusion     Hypertension     Meningitis     Severe obesity 2/2/2023          Medications:  Laura has a current medication list which includes the following prescription(s): cetirizine, clobetasol, clotrimazole, dupixent pen, gabapentin, hydroxyzine hcl, ibuprofen, ketoconazole, mupirocin, naproxen, oxycodone, pantoprazole, prednisolone acetate, tramadol, and triamcinolone acetonide 0.1%, and the following Facility-Administered Medications: ceFAZolin 1 g, gentamicin 80 mg in sodium chloride 0.9% 500 mL irrigation.    Allergies:  Review of patient's allergies indicates:   Allergen Reactions    Strawberries [strawberry] Hives        Prior Therapy: PT postop mastectomy  Social History: two story [facility], bedroom on second floor, 1 flight to enter the home, lives with their family  Durable Medical Equipment: none  Occupation: /caterer  Prior Level of Function: independent  Current Level of Function: mod A with heavy lifting and household dutie  Exercise routine prior to onset: 5 days a week, wts, cardio, crossfit type workouts  Hand dominance: left      Patient's Goals: To get my arm back the way it was        Subjective   Pt states: having pain with reaching above shoulder level, sharp random pains in L underarm  Pain: 4/10 on VAS currently.   Best: 4/10  Worst: 8/10   Pain location: left underarm   Objective   Mental status: alert & oriented x 3    Postural examination/scapula alignment: Rounded shoulder and Head forward    Skin Integrity:   Scar Location: L breast, axilla  Appearance:  "TBA  Signs of infection: TBA  Drainage: TBA  Color:TBA    Edema: Mild  Location: L UE    Axillary Web Syndrome/Cording:   Location: N/A  Degree of Cording: N/A (mild, moderate etc...)   Number of cords present: N/A    Sensation: numbness noted around incision, L triceps            Range of Motion:      Shoulder Range of Motion:   Active /Passive ROM Right Left   Flexion 180 145   Abduction 175 125   Extension 55 55   IR/90deg 90@45 85   ER/90deg 75@ 45* 90   Sharp pain triceps, pulling in axilla under breast to lat       Strength: manual muscle test grades below   Upper Extremity Strength   (R) UE (L) UE   Shoulder flexion: 4+/5 4+/5   Shoulder Abduction: 5/5 4+/5   Shoulder IR 5/5 5/5   Shoulder ER 5/5 5/5   Elbow flexion: 5/5 5/5   Elbow extension: 5/5 5/5   Wrist flexion: 5/5 5/5   Wrist extension: 5/5 5/5    72.9 lbs 81.7       Baseline Measurements of BL UE's for early detection of Lymphedema:     LANDMARK RIGHT UE LEFT UE DIFFERENCE   E + 8" 40 cm 41 cm 1.0 cm   E + 6" 39 cm 39.5 cm 0.5 cm   E + 4" 38 cm 36 cm 2.0 cm   E + 2" 33 cm 33 cm 0 cm   Elbow 31 cm 30 cm 1.0 cm   W+ 8" 32.5 cm 32 cm 0.5 cm   W +  6" 29.5 cm 29 cm 0.5 cm   W + 4" 25 cm 25.5 cm 0.5 cm   Wrist 18.5 cm 19 cm 0.5 cm   DPC 22.5 cm 23.5 cm 1.0 cm   IP Thumb 7 cm 7 cm 0 cm     Functional Mobility (Bed mobility, transfers)  Mod I    ADL's:  Mod I    Gait Assessment:   - AD used: none  - Assistance: independent  - Distance: community distances     Patient Education Provided   - role of therapy in multi - disciplinary team, goals for therapy  - Pt was educated in lymphedema etiology and management plans.    - Pt was provided with written risk reductions and precautions for managing lymphedema.     Pt has no cultural, educational or language barriers to learning provided.  Treatment and Instruction of Home Exercise Program      Total Time Separate from Evaluation: 10 minutes    Laura received individual therapeutic exercises to improve " postural correction and alignment, stretching and soft tissue mobility, and strengthening for 10 minutes including the following:   LTR with hands on forehead, 1 set x 10 reps  Open book  Thoracic ext    Written Home Exercises Provided: yes.  Exercises were reviewed and Laura was able to demonstrate them prior to the end of the session.  Laura demonstrated good  understanding of the education provided.     See EMR under Patient Instructions for exercises provided 6/12/2023.      Assessment   This is a 37 y.o. female referred to outpatient physical therapy and presents with a medical diagnosis of left breast cancer and was seen today post-operatively to establish PT plan of care for impairments following surgery including: decreased ROM of her left shoulder, weakness of left upper extremity, muscle tightness, poor posture, and at risk for lymphedema. Due to her weakness, decreased ROM, and muscle tightness she has difficulty performing her usual household duties, work duties, recreational activities, and ADLs.     Pt instructed in HEP this session and was able to perform all exercises given independently. Pt instructed to follow up with therapist if any concerns arise with program established. Pt will continue to benefit from skilled physical therapy to address the impairments stated in chart below, provide patient/family education and to maximize pt's level of independence in home and community environment     Pt prognosis is Good.    Anticipated barriers to physical therapy: none anticipated     Pt's spiritual, cultural and educational needs considered and pt agreeable to plan of care and goals as stated below:       Medical Necessity is demonstrated by the following:  History  Co-morbidities and personal factors that may impact the plan of care [] LOW: no personal factors / co-morbidities  [] MODERATE: 1-2 personal factors / co-morbidities  [x] HIGH: 3+ personal factors / co-morbidities    Moderate / High  Support Documentation:   Co-morbidities affecting plan of care: anxiety, high BMI, history of cancer, HTN    Personal Factors:   no deficits     Examination  Body Structures and Functions, activity limitations and participation restrictions that may impact the plan of care [] LOW: addressing 1-2 elements  [] MODERATE: 3+ elements  [x] HIGH: 4+ elements (please support below)    Moderate / High Support Documentation: lifting, carrying, reaching above shoulder height, dressing, recreational activities     Clinical Presentation [] LOW: stable  [x] MODERATE: Evolving  [] HIGH: Unstable     Decision Making/ Complexity Score: moderate       Goals: Pt agrees with goals set    Short Term goals: 4 weeks  1. Patient will demonstrate 100% understanding of lymphedema risk reduction practices to include self monitoring for lymphedema. (progressing, not met)  2. Patient will demonstrate independence with Home Exercise program established. (progressing, not met)  3. Pt will increase AROM/PROM in shoulder abduction ROM to 150 degrees on left to improve functional reach, carry, push, pull pain free. (progressing, not met)  4. Pt will increase AROM/PROM in shoulder flexion to 160 degrees on left to improve functional reach, carry, push, pull pain free.(progressing, not met)  5. Pt will increase strength to >/= 5/5 in gross UE musculature to improve tolerance to all functional activities pain free. (progressing, not met)    Long Term Goals: 8 weeks   1.  Pt will increase AROM/PROM in shoulder flexion to 180 degrees on left to improve functional reach, carry, push, pull pain free. (progressing, not met)  2. Pt will increase strength to 4+/5 in gross UE musculature to improve tolerance to all functional activities pain free. (progressing, not met)  3. Pt will demonstrate full/maximized tissue mobility to increase ROM and promote healthy tissue to be pain free at discharge. (progressing, not met)  4. Pt will report decrease in overall  worst pain to 2/10 at discharge. (progressing, not met)  5. Pt will increase AROM/PROM in shoulder abduction ROM to 180 degrees on left to improve functional reach, carry, push, pull pain free. (progressing, not met)      Plan   Outpatient physical therapy 2x week for 8 weeks to include the following:   Manual Therapy, Neuromuscular Re-ed, Patient Education, Self Care, Therapeutic Activities, Therapeutic Exercise, and IASTM .    Plan of care Certification Period: 6/12/2023 to 8/11/2023.    Therapist: Teresa Hurley, PT  6/12/2023

## 2023-06-13 ENCOUNTER — PATIENT OUTREACH (OUTPATIENT)
Dept: EMERGENCY MEDICINE | Facility: HOSPITAL | Age: 38
End: 2023-06-13
Payer: MEDICAID

## 2023-06-13 NOTE — PROGRESS NOTES
"[facility]   Physical Therapy Treatment Note      Name: Laura Kent  Clinic Number: [id number]    Therapy Diagnosis:   Encounter Diagnoses   Name Primary?    Decreased range of motion of left shoulder Yes    Weakness of left upper extremity     Poor posture     At risk for lymphedema     Decreased functional mobility      Physician: YASMANI Weiner MD    Visit Date: 6/14/2023    Physician Orders: PT Eval and Treat   Medical Diagnosis from Referral: Z90.12 (ICD-10-CM) - S/P left mastectomy  Evaluation Date: 06/12/2023  Authorization Period Expiration: 06/05/2024  Plan of Care Expiration: 08/11/2023  Progress Note Due: 07/05/2023  Visit # / Visits Authorized: 1 / 20 (pending, plus eval)     Precautions: standard and cancer     Time In: 9:09 AM  Time Out: 10:06 AM  Total Billable Time: 57 minutes      Subjective     Patient reports: continued "tightness" and "sharp pulling" through L armpit into chest. Patient attributes pulling to tissue expander but denies improvement throughout day. Of note, patient also experiencing stomach issues.    She was compliant with home exercise program.    Response to Previous Treatment: no adverse events  Functional Change: improving tolerance to overhead reaching    Pain: 5/10 ("tightness")  Location: left axilla / chest / breast     Fatigue: mild    Diagnosis: stage IIIC (cT3, cN2(f), cM0, G3, ER-, [place]-, HER2-) IDC of the LEFT breast    Treatment:   Chemotherapy: neoadjuvant completed 11/22  Radiation: currently receiving 5 weeks, started 4/27/23  Endocrine Therapy: N/A     Surgery Date: LEFT mastectomy with TE placement with LEFT SLNB on 02/27/2023 per Myra Weiner and Denis.       Objective      Objective Measures updated at progress report unless specified.     Treatment     Laura received the treatments listed below:      Therapeutic exercises to increase cardiovascular endurance, flexibility, range of motion, and flexibility for 42 minutes: "     UBE: Seat 4, Level 1 (fwd/bkwd)  2 min each    Seated Exercises:  - Pulleys with 1# ankle weight (flex, scap) 2 min each  - Thoracic ext, horizontal 1/2 foam  1 set x 10 reps    Standing Exercises:  - Doorway pec stretch (overhead)  30 sec x 3 reps    Mat Exercises:   - Open book stretch, LUE only (yellow) 2 set x 10 reps  - Pec stretch on 1/2 foam, holding 2#  2 min  - Butterfly stretch with LTR (forehead) 2 min  - Bilateral shoulder ER with 2# bar  1 set x 10 reps      Manual therapy techniques to decrease pain as well as to increase soft tissue mobility, joint mobility, mobility and pliability, and function for 15 minutes:     Soft tissue mobilization of L pec minor      Patient Education and Home Exercises       Education Provided Regarding:   - Role of physical therapy in multi-disciplinary team  - Goals for physical therapy, progress towards goals  - Physical therapy plan of care, scheduling  - Exercise technique, home exercise program  - Energy conservation techniques  - Consults to integrative oncology (meditation, yoga, acupuncture)      Written Home Exercises Provided: Patient instructed to cont prior HEP. Exercises were reviewed and Laura was able to demonstrate them prior to the end of the session. Laura demonstrated good  understanding of the education provided. See EMR under Patient Instructions for exercises provided during therapy sessions    Assessment     Patient is responding well to physical therapy. Patient able to perform new exercises and exercise progressions without increase in symptoms prior to leaving the clinic. Progressed overhead pulleys, adding 1# ankle weights to bilateral wrists for additional strengthening. Improved flexibility through bilateral pecs with added supine bilateral shoulder external rotation with 2# weighted bar. Patient verbalized improved soft tissue mobility at conclusion of session. Of note, patient expressed interest in acupuncture and yoga through  integrative oncology. Therapist agreeable to message Jennifer Levi and Dr. Cruz for appropriate referrals. Patient verbalized understanding, agreement. Will progress as tolerated.    Laura is progressing well towards her goals.   Patient prognosis is Good.     Patient will continue to benefit from skilled outpatient physical therapy to address the deficits listed in the problem list box on initial evaluation, provide patient / family education, and to maximize patient's level of independence in the home and community environment.     Patient's spiritual, cultural, and educational needs considered, and patient agreeable to plan of care and goals.     Anticipated barriers to physical therapy: none anticipated    GOALS:   Short Term Goals: 4 Weeks  Patient will demonstrate 100% understanding of lymphedema risk reduction practices, including self-monitoring for lymphedema (progressing, not met).  Patient will demonstrate independence with established home exercise program (progressing, not met).   Patient will increase LEFT shoulder ABDUCTION active range of motion to 150 degrees to improve functional reaching, carrying, pushing, and pulling pain-free (progressing, not met).   Patient will increase LEFT shoulder FLEXION active range of motion to 160 degrees to improve functional reaching, carrying, pushing, and pulling pain-free (progressing, not met).   Patient will increase strength to >5/5 in gross upper extremity musculature to improve tolerance to all functional activities pain-free (progressing, not met).      Long Term Goals: 8 Weeks   Patient will increase LEFT shoulder FLEXION active range of motion to 180 degrees to improve functional reaching, carrying, pushing, and pulling pain-free (progressing, not met).   Patient will increase LEFT shoulder ABDUCTION active range of motion to 180 degrees to improve functional reaching, carrying, pushing, and pulling pain-free (progressing, not met).   Patient will  increase strength to 4+/5 in gross upper extremity musculature to improve tolerance to all functional activities pain-free (progressing, not met).   Patient will demonstrate full / maximized tissue mobility to increase range of motion and promote healthy tissue to be pain-free at discharge (progressing, not met).   Patient will report decrease in overall worst pain to 2/10 at discharge (progressing, not met).      Plan     Plan of Care Certification: 06/12/2023 to 08/11/2023.    Ghada Brooks, PT

## 2023-06-13 NOTE — PROGRESS NOTES
Adelita Rao LPN  ED Navigator  Emergency Department    Project: [facility] ED Navigator  Role: Community Health Worker    Date: 06/13/2023  Patient Name: Laura Kent  MRN: [id number]  PCP: Yessy Cruz MD    Assessment:     Laura Kent is a 37 y.o. female who has presented to ED for IUD check. Patient has visited the ED 1 times in the past 3 months. Patient did contact PCP.     ED Navigator Initial Assessment    ED Navigator Enrollment Documentation  Consent to Services  Does patient consent to completing the assessment?: Yes  Contact  Method of Initial Contact: Phone  Transportation  Does the patient have issues with Transportation?: Yes  Does the patient have transportation to and from healthcare appointments?: Yes  Can family, friends, or others help?: Yes  Lack of transportation to appts, pharmacy, etc.?: No  What type of assistance is needed?: Standard (RTA/MITS)  What is available in their region?: uber, rental car  Insurance Coverage  Do you have coverage/adequate coverage?: Yes  Type/kind of coverage: Medicaid/[employer] [facility]  Is patient able to afford co-pays/deductibles?: Yes  Is patient able to afford HME or supplies?: Yes  Does patient have an established [facility] PCP?: Yes  Able to access?: Yes  Does the patient have a lack of adequate coverage?: No  Specialist Appointment  Did the patient come to the ED to see a specialist?: No  Does the patient have a pending specialist referral?: No  Does the patient have a specialist appointment made?: No  PCP Follow Up Appointment  Has the patient had an appointment with a primary care provider in the past year?: Yes  Approximate date: 5/15/23  Provider: Yessy Cruz MD  Does the patient have a follow up appontment with a PCP?: Yes  Upcoming appointment date: 6/26/23  Provider: Yessy Cruz MD  When was the last time you saw your PCP?: 6/26/23  Medications  Is patient able to afford medication?: Yes  Is patient on pharmacy assistance program?: No  Is  patient unable to get medication due to lack of transportation?: No  Psychological  Does the patient have psycho-social concerns?: Yes  What concerns does the patient have?: Anxiety and/or Depression  Food  Does the patient have concerns about food?: Yes  What concerns does the patient have regarding food?: Lack of food  Communication/Education  Does the patient have limited English proficiency/English not primary language?: No  Does patient have low literacy and/or low health literacy?: No  Does patient have concerns with care?: No  Does patient have dissatisfaction with care?: No  Other Financial Concerns  Does the patient have immediate financial distress?: Yes  Does the patient have general financial concerns?: Yes  Other Social Barriers/Concerns  Does the patient have any additional barriers or concerns?: None  Primary Barrier  Barriers identified: Psychological barrier (mistrust, anxiety, etc.)  Root Cause of ED Utilization: Lack of Transportation  Plan to address Lack of Transportation: Provided patient with Medicaid transportation telephone number ([phone])  Next steps: Provided Education, Scheduled Appointment/Referral  Scheduled Appointment Date: 6/26/23  Appointment Reminder Date: 6/23/23  Was education/educational materials provided surrounding PCP services/creating a medical home?: Yes Was education verbal or written?: Written     Was education/educational materials provided surrounding low cost, healthy foods?: Yes Was education verbal or written?: Written     Was education/educational materials provided surrounding other items? If so, use comment to explain.: No Was education verbal or written?: Written   Plan: Provided information for [facility] 24/7 Nurse triage line, [phone] or 1-[facility] ([phone])  Expected Date of Follow Up 1: 6/23/23         Social History     Socioeconomic History    Marital status: Single   Tobacco Use    Smoking status: Never    Smokeless tobacco:  Never   Substance and Sexual Activity    Alcohol use: Not Currently     Comment: social    Drug use: Never    Sexual activity: Not Currently     Social Determinants of Health     Financial Resource Strain: Medium Risk    Difficulty of Paying Living Expenses: Somewhat hard   Food Insecurity: No Food Insecurity    Worried About Running Out of Food in the Last Year: Never true    Ran Out of Food in the Last Year: Never true   Transportation Needs: No Transportation Needs    Lack of Transportation (Medical): No    Lack of Transportation (Non-Medical): No   Physical Activity: Insufficiently Active    Days of Exercise per Week: 2 days    Minutes of Exercise per Session: 30 min   Stress: Stress Concern Present    Feeling of Stress : To some extent   Social Connections: Moderately Integrated    Frequency of Communication with Friends and Family: More than three times a week    Frequency of Social Gatherings with Friends and Family: More than three times a week    Attends Holiness Services: 1 to 4 times per year    Active Member of Clubs or Organizations: Yes    Attends Club or Organization Meetings: 1 to 4 times per year    Marital Status: Never    Housing Stability: High Risk    Unable to Pay for Housing in the Last Year: Yes    Number of Places Lived in the Last Year: 1    Unstable Housing in the Last Year: No       Plan:   Call placed to Pt to f/u from recent ER visit for an IUD check. Pt states that she has followed up with her Ob/GYN for a check up. Pt states she was instructed that the incident was d/t her cancer and not having had a menstrual cycle for a while. Pt states she would like resource information sent to  [email or web address] for transportation and utilities. Patient was given education on (The Right Care at the Right Level information, [facility] information, and Heart healthy diet tips).  Patient was given education on Rent/Mortgage payment assistance for their region.  Patient was  given utility assistance resources for their area.   Patient was given Medicaid PCP Information Line ([phone]).  Patient was given education on Stress and anxiety relief.   Pt advised that she may reach out via E-mail and phone with any questions or concerns as needed. Pt verbalized understanding. Adelita Rao.  Food Insecurity: resources provided  Transportation Insecurity: resources provided    Appointment made with: Yessy Cruz MD

## 2023-06-14 ENCOUNTER — TELEPHONE (OUTPATIENT)
Dept: RADIATION ONCOLOGY | Facility: CLINIC | Age: 38
End: 2023-06-14
Payer: MEDICAID

## 2023-06-14 ENCOUNTER — TELEPHONE (OUTPATIENT)
Dept: HEMATOLOGY/ONCOLOGY | Facility: CLINIC | Age: 38
End: 2023-06-14
Payer: MEDICAID

## 2023-06-14 ENCOUNTER — CLINICAL SUPPORT (OUTPATIENT)
Dept: REHABILITATION | Facility: HOSPITAL | Age: 38
End: 2023-06-14
Payer: MEDICAID

## 2023-06-14 DIAGNOSIS — M25.612 DECREASED RANGE OF MOTION OF LEFT SHOULDER: Primary | ICD-10-CM

## 2023-06-14 DIAGNOSIS — R29.898 WEAKNESS OF LEFT UPPER EXTREMITY: ICD-10-CM

## 2023-06-14 DIAGNOSIS — R29.3 POOR POSTURE: ICD-10-CM

## 2023-06-14 DIAGNOSIS — Z91.89 AT RISK FOR LYMPHEDEMA: ICD-10-CM

## 2023-06-14 DIAGNOSIS — Z17.1 MALIGNANT NEOPLASM OF UPPER-OUTER QUADRANT OF LEFT BREAST IN FEMALE, ESTROGEN RECEPTOR NEGATIVE: Primary | ICD-10-CM

## 2023-06-14 DIAGNOSIS — C50.412 MALIGNANT NEOPLASM OF UPPER-OUTER QUADRANT OF LEFT BREAST IN FEMALE, ESTROGEN RECEPTOR NEGATIVE: Primary | ICD-10-CM

## 2023-06-14 DIAGNOSIS — R26.89 DECREASED FUNCTIONAL MOBILITY: ICD-10-CM

## 2023-06-14 PROCEDURE — 97110 THERAPEUTIC EXERCISES: CPT

## 2023-06-14 NOTE — TELEPHONE ENCOUNTER
Call to pt to see how she is doing since completing left chest wall radiation 5/31/23. Reports a dry peel to her skin with hyperpigmentation. Using Miaderm cream. Has appt with Dr Bess 7/12/23.

## 2023-06-19 NOTE — PROGRESS NOTES
"[facility]   Physical Therapy Treatment Note      Name: Laura Kent  Clinic Number: [id number]    Therapy Diagnosis:   Encounter Diagnoses   Name Primary?    Decreased range of motion of left shoulder Yes    Weakness of left upper extremity     Poor posture     At risk for lymphedema     Decreased functional mobility      Physician: YASMANI Weiner MD    Visit Date: 6/20/2023    Physician Orders: PT Eval and Treat   Medical Diagnosis from Referral: Z90.12 (ICD-10-CM) - S/P left mastectomy  Evaluation Date: 06/12/2023  Authorization Period Expiration: 06/05/2024  Plan of Care Expiration: 08/11/2023  Progress Note Due: 07/05/2023  Visit # / Visits Authorized: 2 / 20 (pending, plus eval)     Precautions: standard and cancer     Time In: 9:12 AM (late arrival)  Time Out: 10:05 AM  Total Billable Time: 53 minutes      Subjective     Patient reports: no new complaints. Patient noted slight relief of L-sided "tightness" with stretches last night after stressful time driving.     She was compliant with home exercise program (stretches).    Response to Previous Treatment: no adverse events  Functional Change: improving overhead reaching tolerance    Pain: 5-6 / 10 ("stiffness")  Location: L axilla     Fatigue: mild-moderate    Diagnosis: stage IIIC (cT3, cN2(f), cM0, G3, ER-, MI-, HER2-) IDC of the LEFT breast    Treatment:   Chemotherapy: neoadjuvant completed 11/22  Radiation: currently receiving 5 weeks, started 4/27/23  Endocrine Therapy: N/A     Surgery Date: LEFT mastectomy with TE placement with LEFT SLNB on 02/27/2023 per Myra Weiner and Denis.       Objective      Objective Measures updated at progress report unless specified.     Treatment     Laura received the treatments listed below:      Therapeutic exercises to increase cardiovascular endurance, flexibility, range of motion, and flexibility for 36 minutes:     UBE: Seat 4, Level 1 (fwd/bkwd)  2 min each - held    Seated " "Exercises:  - Pulleys with 1# ankle weight (flex, scap) 2 min each  - Thoracic ext, horizontal 1/2 foam  1 set x 15 reps    Standing Exercises:  - Doorway pec stretch (overhead)  30 sec x 3 reps  - Ball roll on wall (forward only)  10 sec x 5 reps    Mat Exercises:   - Open book stretch, LUE only  2 set x 10 reps  - Pec stretch on 1/2 foam, holding 2#  2 min  - Butterfly stretch with LTR (forehead) 2 min  - Bilateral shoulder ER with 2# bar  2 set x 10 reps  - PROM L shoulder ER   1 set x 20 reps      Manual therapy techniques to decrease pain as well as to increase soft tissue mobility, joint mobility, mobility and pliability, and function for 17 minutes:     Soft tissue mobilization of L pec minor, L biceps      Patient Education and Home Exercises       Education Provided Regarding:   - Role of physical therapy in multi-disciplinary team  - Goals for physical therapy, progress towards goals  - Physical therapy plan of care, scheduling  - Exercise technique, home exercise program  - Energy conservation techniques  - Consults to integrative oncology (meditation, yoga, acupuncture)      Written Home Exercises Provided: Patient instructed to cont prior HEP. Exercises were reviewed and Laura was able to demonstrate them prior to the end of the session. Laura demonstrated good  understanding of the education provided. See EMR under Patient Instructions for exercises provided during therapy sessions    Assessment     Patient is responding well to physical therapy. Patient able to perform new exercises and exercise progressions without increase in symptoms prior to leaving the clinic. Improved flexibility and reduced "tightness" through left side of chest with added ball on wall stretch. Noted increased tightness through left pec muscle with manual techniques. Will progress as tolerated.    Laura is progressing well towards her goals.   Patient prognosis is Good.     Patient will continue to benefit from skilled " outpatient physical therapy to address the deficits listed in the problem list box on initial evaluation, provide patient / family education, and to maximize patient's level of independence in the home and community environment.     Patient's spiritual, cultural, and educational needs considered, and patient agreeable to plan of care and goals.     Anticipated barriers to physical therapy: none anticipated    GOALS:   Short Term Goals: 4 Weeks  Patient will demonstrate 100% understanding of lymphedema risk reduction practices, including self-monitoring for lymphedema (progressing, not met).  Patient will demonstrate independence with established home exercise program (progressing, not met).   Patient will increase LEFT shoulder ABDUCTION active range of motion to 150 degrees to improve functional reaching, carrying, pushing, and pulling pain-free (progressing, not met).   Patient will increase LEFT shoulder FLEXION active range of motion to 160 degrees to improve functional reaching, carrying, pushing, and pulling pain-free (progressing, not met).   Patient will increase strength to >5/5 in gross upper extremity musculature to improve tolerance to all functional activities pain-free (progressing, not met).      Long Term Goals: 8 Weeks   Patient will increase LEFT shoulder FLEXION active range of motion to 180 degrees to improve functional reaching, carrying, pushing, and pulling pain-free (progressing, not met).   Patient will increase LEFT shoulder ABDUCTION active range of motion to 180 degrees to improve functional reaching, carrying, pushing, and pulling pain-free (progressing, not met).   Patient will increase strength to 4+/5 in gross upper extremity musculature to improve tolerance to all functional activities pain-free (progressing, not met).   Patient will demonstrate full / maximized tissue mobility to increase range of motion and promote healthy tissue to be pain-free at discharge (progressing, not met).    Patient will report decrease in overall worst pain to 2/10 at discharge (progressing, not met).      Plan     Plan of Care Certification: 06/12/2023 to 08/11/2023.    Outpatient physical therapy 2x week for 8 weeks to include the following: Manual Therapy, Neuromuscular Re-ed, Patient Education, Self Care, Therapeutic Activities, Therapeutic Exercise, and IASTM.    Ghada Brooks, PT

## 2023-06-20 ENCOUNTER — CLINICAL SUPPORT (OUTPATIENT)
Dept: REHABILITATION | Facility: HOSPITAL | Age: 38
End: 2023-06-20
Payer: MEDICAID

## 2023-06-20 DIAGNOSIS — M25.612 DECREASED RANGE OF MOTION OF LEFT SHOULDER: Primary | ICD-10-CM

## 2023-06-20 DIAGNOSIS — R29.898 WEAKNESS OF LEFT UPPER EXTREMITY: ICD-10-CM

## 2023-06-20 DIAGNOSIS — R26.89 DECREASED FUNCTIONAL MOBILITY: ICD-10-CM

## 2023-06-20 DIAGNOSIS — R29.3 POOR POSTURE: ICD-10-CM

## 2023-06-20 DIAGNOSIS — Z91.89 AT RISK FOR LYMPHEDEMA: ICD-10-CM

## 2023-06-20 PROCEDURE — 97110 THERAPEUTIC EXERCISES: CPT

## 2023-06-22 ENCOUNTER — CLINICAL SUPPORT (OUTPATIENT)
Dept: REHABILITATION | Facility: HOSPITAL | Age: 38
End: 2023-06-22
Payer: MEDICAID

## 2023-06-22 DIAGNOSIS — R29.898 WEAKNESS OF LEFT UPPER EXTREMITY: ICD-10-CM

## 2023-06-22 DIAGNOSIS — M25.612 DECREASED RANGE OF MOTION OF LEFT SHOULDER: Primary | ICD-10-CM

## 2023-06-22 DIAGNOSIS — Z91.89 AT RISK FOR LYMPHEDEMA: ICD-10-CM

## 2023-06-22 DIAGNOSIS — R29.3 POOR POSTURE: ICD-10-CM

## 2023-06-22 DIAGNOSIS — R26.89 DECREASED FUNCTIONAL MOBILITY: ICD-10-CM

## 2023-06-22 PROCEDURE — 97110 THERAPEUTIC EXERCISES: CPT

## 2023-06-22 NOTE — PROGRESS NOTES
"[facility]   Physical Therapy Treatment Note      Name: Laura Kent  Clinic Number: [id number]    Therapy Diagnosis:   Encounter Diagnoses   Name Primary?    Decreased range of motion of left shoulder Yes    Weakness of left upper extremity     Poor posture     At risk for lymphedema     Decreased functional mobility        Physician: YASMANI Weiner MD    Visit Date: 6/22/2023    Physician Orders: PT Eval and Treat   Medical Diagnosis from Referral: Z90.12 (ICD-10-CM) - S/P left mastectomy  Evaluation Date: 06/12/2023  Authorization Period Expiration: 06/05/2024  Plan of Care Expiration: 08/11/2023  Progress Note Due: 07/05/2023  Visit # / Visits Authorized: 3 / 20 (pending, plus eval)     Precautions: standard and cancer     Time In: 12:15 PM  Time Out: 1:05 PM  Total Billable Time: 50 minutes      Subjective     Patient reports: still having the stiffness. She saw her dermatologist and given some medications for skin. She has been avoiding lifting and carrying things with her left arm as she didn't want to mess up anything.     She was compliant with home exercise program (stretches).    Response to Previous Treatment: felt stretched out, I could feel it  Functional Change: improving overhead reaching tolerance    Pain: 5-6 / 10 ("stiffness")  Location: L axilla     Fatigue: none    Diagnosis: stage IIIC (cT3, cN2(f), cM0, G3, ER-, GA-, HER2-) IDC of the LEFT breast    Treatment:   Chemotherapy: neoadjuvant completed 11/22  Radiation: currently receiving 5 weeks, started 4/27/23  Endocrine Therapy: N/A     Surgery Date: LEFT mastectomy with TE placement with LEFT SLNB on 02/27/2023 per Myra Weiner and Denis.       Objective      Objective Measures updated at progress report unless specified.     Treatment     Laura received the treatments listed below:      Therapeutic exercises to increase cardiovascular endurance, flexibility, range of motion, and flexibility for 35 " minutes:     UBE: Seat 4, Level 1 (fwd/bkwd)  2 min each - held    Seated Exercises:  - Pulleys with 1# ankle weight (flex, scap) 2 min each  - Thoracic ext, horizontal 1/2 foam  1 set x 15 reps    Standing Exercises:  - Doorway pec stretch (overhead)  30 sec x 3 reps - held  - Ball roll on wall (forward only)  10 sec x 5 reps - held  - Lifting from knee height, 5#   1 set x 10 reps  - Reach and lift, 2# L UE, knee height 1 set x 10 reps    Mat Exercises:   - Open book stretch, LUE only  2 set x 10 reps  - Pec stretch on 1/2 foam, holding 2#  2 min  - Butterfly stretch with LTR (behind head) 2 min  - Bilateral shoulder ER with 2# bar  2 set x 10 reps held - held  - PROM L shoulder ER   1 set x 20 reps      Manual therapy techniques to decrease pain as well as to increase soft tissue mobility, joint mobility, mobility and pliability, and function for 15 minutes:     Soft tissue mobilization of L pec minor, L biceps      Patient Education and Home Exercises       Education Provided Regarding:   - Role of physical therapy in multi-disciplinary team  - Goals for physical therapy, progress towards goals  - Physical therapy plan of care, scheduling  - Exercise technique, home exercise program  - Energy conservation techniques  - Consults to integrative oncology (meditation, yoga, acupuncture)      Written Home Exercises Provided: Patient instructed to cont prior HEP. Exercises were reviewed and Laura was able to demonstrate them prior to the end of the session. Laura demonstrated good  understanding of the education provided. See EMR under Patient Instructions for exercises provided during therapy sessions    Assessment     Patient is responding well to physical therapy. Patient was able to perform all of today's new exercises with no increase in symptoms prior to leaving the clinic. She continues to have increased muscle tightness in her left pec, but reports feeling looser after manual therapy. Resume all of her usual  exercises next visit. Will progress as tolerated.    Laura is progressing well towards her goals.   Patient prognosis is Good.     Patient will continue to benefit from skilled outpatient physical therapy to address the deficits listed in the problem list box on initial evaluation, provide patient / family education, and to maximize patient's level of independence in the home and community environment.     Patient's spiritual, cultural, and educational needs considered, and patient agreeable to plan of care and goals.     Anticipated barriers to physical therapy: none anticipated    GOALS:   Short Term Goals: 4 Weeks  Patient will demonstrate 100% understanding of lymphedema risk reduction practices, including self-monitoring for lymphedema (progressing, not met).  Patient will demonstrate independence with established home exercise program (progressing, not met).   Patient will increase LEFT shoulder ABDUCTION active range of motion to 150 degrees to improve functional reaching, carrying, pushing, and pulling pain-free (progressing, not met).   Patient will increase LEFT shoulder FLEXION active range of motion to 160 degrees to improve functional reaching, carrying, pushing, and pulling pain-free (progressing, not met).   Patient will increase strength to >5/5 in gross upper extremity musculature to improve tolerance to all functional activities pain-free (progressing, not met).      Long Term Goals: 8 Weeks   Patient will increase LEFT shoulder FLEXION active range of motion to 180 degrees to improve functional reaching, carrying, pushing, and pulling pain-free (progressing, not met).   Patient will increase LEFT shoulder ABDUCTION active range of motion to 180 degrees to improve functional reaching, carrying, pushing, and pulling pain-free (progressing, not met).   Patient will increase strength to 4+/5 in gross upper extremity musculature to improve tolerance to all functional activities pain-free (progressing,  not met).   Patient will demonstrate full / maximized tissue mobility to increase range of motion and promote healthy tissue to be pain-free at discharge (progressing, not met).   Patient will report decrease in overall worst pain to 2/10 at discharge (progressing, not met).      Plan     Plan of Care Certification: 06/12/2023 to 08/11/2023.    Outpatient physical therapy 2x week for 8 weeks to include the following: Manual Therapy, Neuromuscular Re-ed, Patient Education, Self Care, Therapeutic Activities, Therapeutic Exercise, and IASTM.    Teresa Hurley, PT

## 2023-06-23 ENCOUNTER — PATIENT MESSAGE (OUTPATIENT)
Dept: HEMATOLOGY/ONCOLOGY | Facility: CLINIC | Age: 38
End: 2023-06-23
Payer: MEDICAID

## 2023-06-23 ENCOUNTER — PATIENT OUTREACH (OUTPATIENT)
Dept: EMERGENCY MEDICINE | Facility: HOSPITAL | Age: 38
End: 2023-06-23
Payer: MEDICAID

## 2023-06-23 ENCOUNTER — LAB VISIT (OUTPATIENT)
Dept: LAB | Facility: HOSPITAL | Age: 38
End: 2023-06-23
Attending: INTERNAL MEDICINE
Payer: MEDICAID

## 2023-06-23 DIAGNOSIS — C50.412 MALIGNANT NEOPLASM OF UPPER-OUTER QUADRANT OF LEFT BREAST IN FEMALE, ESTROGEN RECEPTOR NEGATIVE: ICD-10-CM

## 2023-06-23 DIAGNOSIS — Z17.1 MALIGNANT NEOPLASM OF UPPER-OUTER QUADRANT OF LEFT BREAST IN FEMALE, ESTROGEN RECEPTOR NEGATIVE: ICD-10-CM

## 2023-06-23 LAB
ALBUMIN SERPL BCP-MCNC: 3.9 G/DL (ref 3.5–5.2)
ALP SERPL-CCNC: 66 U/L (ref 55–135)
ALT SERPL W/O P-5'-P-CCNC: 37 U/L (ref 10–44)
ANION GAP SERPL CALC-SCNC: 8 MMOL/L (ref 8–16)
AST SERPL-CCNC: 36 U/L (ref 10–40)
BASOPHILS # BLD AUTO: 0.01 K/UL (ref 0–0.2)
BASOPHILS NFR BLD: 0.4 % (ref 0–1.9)
BILIRUB SERPL-MCNC: 0.4 MG/DL (ref 0.1–1)
BUN SERPL-MCNC: 15 MG/DL (ref 6–20)
CALCIUM SERPL-MCNC: 9 MG/DL (ref 8.7–10.5)
CHLORIDE SERPL-SCNC: 108 MMOL/L (ref 95–110)
CO2 SERPL-SCNC: 27 MMOL/L (ref 23–29)
CREAT SERPL-MCNC: 0.8 MG/DL (ref 0.5–1.4)
DIFFERENTIAL METHOD: ABNORMAL
EOSINOPHIL # BLD AUTO: 0 K/UL (ref 0–0.5)
EOSINOPHIL NFR BLD: 1.3 % (ref 0–8)
ERYTHROCYTE [DISTWIDTH] IN BLOOD BY AUTOMATED COUNT: 13 % (ref 11.5–14.5)
EST. GFR  (NO RACE VARIABLE): >60 ML/MIN/1.73 M^2
GLUCOSE SERPL-MCNC: 91 MG/DL (ref 70–110)
HCT VFR BLD AUTO: 29.7 % (ref 37–48.5)
HGB BLD-MCNC: 10.4 G/DL (ref 12–16)
IMM GRANULOCYTES # BLD AUTO: 0.01 K/UL (ref 0–0.04)
IMM GRANULOCYTES NFR BLD AUTO: 0.4 % (ref 0–0.5)
LYMPHOCYTES # BLD AUTO: 0.5 K/UL (ref 1–4.8)
LYMPHOCYTES NFR BLD: 22.2 % (ref 18–48)
MCH RBC QN AUTO: 31.3 PG (ref 27–31)
MCHC RBC AUTO-ENTMCNC: 35 G/DL (ref 32–36)
MCV RBC AUTO: 90 FL (ref 82–98)
MONOCYTES # BLD AUTO: 0.3 K/UL (ref 0.3–1)
MONOCYTES NFR BLD: 10.9 % (ref 4–15)
NEUTROPHILS # BLD AUTO: 1.6 K/UL (ref 1.8–7.7)
NEUTROPHILS NFR BLD: 64.8 % (ref 38–73)
NRBC BLD-RTO: 0 /100 WBC
PLATELET # BLD AUTO: 153 K/UL (ref 150–450)
PMV BLD AUTO: 10.2 FL (ref 9.2–12.9)
POTASSIUM SERPL-SCNC: 3.5 MMOL/L (ref 3.5–5.1)
PROT SERPL-MCNC: 6.3 G/DL (ref 6–8.4)
RBC # BLD AUTO: 3.32 M/UL (ref 4–5.4)
SODIUM SERPL-SCNC: 143 MMOL/L (ref 136–145)
WBC # BLD AUTO: 2.39 K/UL (ref 3.9–12.7)

## 2023-06-23 PROCEDURE — 85025 COMPLETE CBC W/AUTO DIFF WBC: CPT | Performed by: INTERNAL MEDICINE

## 2023-06-23 PROCEDURE — 80053 COMPREHEN METABOLIC PANEL: CPT | Performed by: INTERNAL MEDICINE

## 2023-06-23 PROCEDURE — 36415 COLL VENOUS BLD VENIPUNCTURE: CPT | Performed by: INTERNAL MEDICINE

## 2023-06-23 NOTE — PROGRESS NOTES
"Subjective     Patient ID: Laura Kent is a 37 y.o. female.    Chief Complaint: Malignant neoplasm of upper-outer quadrant of left breast i    HPI    Presents for follow up     States energy level improving  Biggest complaint is left breast pain and arm pain- does have an expander  Reports cording is better  Pain is worse post radiation- 6/10 and fairly constant  She is doing her PT exercises  Weight up and down- wishes to start a fasting diet  Reports exercises 1 hr 2-3 days per week    Also, notes stools "float" and more frequent stools since the pancreatititis    Oncology History:  - self detected an area under her left arm and it initially seemed to go away (noted around winter holidays)     - 3/4/2022 Outside Mammogram:  Findings:  The breasts are heterogeneously dense, which may obscure small masses.   Left  There is a 31 mm x 18 mm x 21 mm irregularly shaped mass with microlobulated margins seen in the left breast at 2 o'clock in the posterior depth with associated left axillary adenopathy. Largest node depicted on the outside study measures 49 x 23 x 29 mm with cortical thickening and effacement of the hilum. Breast mass is reportedly palpable.   Right  There is no evidence of suspicious masses, calcifications, or other abnormal findings in the right breast.  Impression:  Left  Mass: Left breast 31 mm x 18 mm x 21 mm mass at the posterior 2 o'clock position. Assessment: 5 - Highly suggestive of malignancy. Biopsy is recommended.   Right  There is no mammographic evidence of malignancy in the right breast.  BI-RADS Category:   Overall: 5 - Highly Suggestive of Malignancy  Recommendation:  Ultrasound Guided Core Needle Biopsy of the left breast mass and one of the abnormal left axillary nodes is recommended.     - 3/14/2022 Breast biopsy:  1. LEFT BREAST MASS, 2 O'CLOCK, BIOPSY:   Invasive ductal carcinoma, [place] histologic grade 3 (tubule formation:   3, nuclear pleomorphism:  3, mitotic " activity:  3).   Comment:  Infiltrating carcinoma occupies the entirety of biopsied material   with the largest continuous focus measuring 13 mm.  Breast biomarkers are   pending and will be issued in a supplemental report.   2. LEFT AXILLARY LYMPH NODE, BIOPSY:   Invasive ductal carcinoma, [place] histologic grade 3 (tubule formation:   3, nuclear pleomorphism:  3, mitotic activity:  3).   Comment:  Infiltrating carcinoma occupies the entirety of biopsied material   with the largest continuous focus measuring 20 mm.  No lymph node tissue is   identified in the sample.  Tumor histology is essentially identical to that   of part 1.  The findings could represent part of a larger intranodal   metastasis, but an extension from the primary tumor cannot be excluded.   Radiographic correlation is advised.   Note:  Dr. Stephanie Rao also reviewed this case and agrees with the   diagnosis.   BREAST BIOMARKER RESULTS   Estrogen receptor (ER):  Negative (0)   Progesterone receptor (ER):  Negative (0)   HER2 IHC:  Negative (1+)   Ki-67 proliferation index:  80%      - 3/17/2022 Breast MRI:  Findings:  The breasts have heterogeneous fibroglandular tissue. The background parenchymal enhancement is minimal.   Left  There is a 38 mm x 33 mm x 33 mm irregularly shaped mass seen in the left breast at 2 o'clock in the posterior depth, 8.4 cm from the nipple and 1.2 cm from the skin. Associated signal void from a twirl biopsy marker; pathology showed invasive ductal carcinoma.   Posterior to the mass there is abnormal non mass enhancement measuring 29 x 28 mm. The NME is 4 mm from the skin and 2.3 cm from the chest wall. In total the mass and NME measure 54 mm in AP extent.  Overlying skin thickening and edema involving the lateral breast, likely from lymphovascular obstruction. No suspicious skin enhancement. Overall increased vascularity to the left breast.   Four abnormal lymph level 1 and level 2 left axillary lymph nodes. The  largest lymph node measures 52 mm x 40 mm x 38 mm which previously underwent biopsy showing metastatic disease. There is an associated radar reflector within the biopsied lymph node.  Right  There is no evidence of suspicious masses, abnormal enhancement, or other abnormal findings in the right breast. No enlarged axillary or internal mammary lymph nodes.   Impression:  Left  Mass: Left breast 38 mm x 33 mm x 33 mm mass at the posterior 2 o'clock position. Assessment: 6 - Known biopsy, proven malignancy. Associated 29 mm NME extending posteriorly from the mass. In total the mass and NME measure 54 mm in AP extent.  Lymph Node: Abnormal level 1 and 2 left axillary lymph nodes, the largest of which measures 52 mm x 40 mm x 38 mm lymph node and is known biopsy, proven malignancy.   Right  There is no MR evidence of malignancy in the right breast.  BI-RADS Category:   Overall: 6 - Known Biopsy-Proven Malignancy  Recommendation:  Clinical management of known left breast cancer. Patient is established with the breast surgery clinic.      - 3/21/2022 CT C/A/P:  FINDINGS:  Base of Neck: No significant abnormality.  Thoracic soft tissue: A proximally 3.2 x 3.6 x 3.7 cm irregular left breast mass within the lateral aspect with internal biopsy marker, corresponding to findings on prior breast MRI and compatible with malignancy.  Enlarged left axillary lymph nodes, largest measuring approximately 4.2 x 3.6 cm, (series 2, image 27).  CHEST:  -Heart: Normal size. No pericardial effusion.  -Pulmonary vasculature: Pulmonary arteries distribute normally.  There are four pulmonary veins.  -Radha/Mediastinum: No pathologic shelly enlargement.  -Trachea and Proximal airways: Trachea is midline.  Central airways are patent.  No significant bronchial wall thickening or bronchiectasis.  -Lungs/Pleura: Symmetrically expanded without focal consolidation, pneumothorax, or mass.  No pleural effusion or thickening.  -Esophagus: Normal course and  caliber.  ABDOMEN:  - Liver: Normal in size and attenuation with no focal hepatic abnormality.  - Gallbladder: No calcified gallstones.  No wall thickening or pericholecystic fluid.  - Bile Ducts: No intra or extrahepatic biliary ductal dilatation.  - Stomach/Duodenum: Small hiatal hernia otherwise unremarkable.  - Spleen: Normal size.  No focal parenchymal abnormality.  - Pancreas: No mass lesion.  No pancreatic ductal dilatation.  No peripancreatic fat stranding.  - Adrenals: Unremarkable.  - Kidneys/ureters/urinary bladder: Normal in size and location.  Normal enhancement pattern.  No nephrolithiasis.  Ureters are normal in course and caliber.  No hydroureteronephrosis.  - Retroperitoneum: Scattered nonenlarged retroperitoneal lymph nodes.  PELVIS:  - Reproductive: Intrauterine device present.  A proximally 2.8 cm peripherally enhancing right ovarian cyst, likely corpus luteal cyst.  - Other: No pelvic adenopathy, free fluid, or mass.  BOWEL/MESENTERY:  No evidence of bowel obstruction or inflammatory process. Appendix is visualized and unremarkable.  VASCULATURE: Left-sided aortic arch with 3 branch vessels.  No aneurysm and no significant atherosclerosis.  Portal vein, splenic vein, and SMV are patent.  BONES: [place] approximately 0.6 cm right trochanteric lesion, likely synovial herniation pit.  No acute fracture or bony destructive process.  EXTRATHORACIC/EXTRAPERITONEAL SOFT TISSUES: Unremarkable.  Impression:  In this patient with known breast cancer, there is an approximately 3.7 cm irregular left breast mass with left axillary lymphadenopathy.  No evidence of metastatic disease  Small hiatal hernia.  Additional findings as described above.     - 3/21/2022 Bone scan:  FINDINGS:  There is physiologic distribution of the radiopharmaceutical throughout the skeleton.  Mild degenerative uptake within the bilateral shoulders and knees.  There is normal uptake in the genitourinary system and soft  tissues.  Impression:  There is no scintigraphic evidence of osteoblastic metastatic disease.     - 3/21/2022 Brain MRI:  FINDINGS:  Please note there is dental metal artifact distorting the examination.  Allowing for artifacts the brain parenchyma is normal in contour.  Developmental variant with cavum septum pellucidum identified.  The ventricles are otherwise normal in size without hydrocephalus.  There is no midline shift or significant mass effect.  The major intracranial T2 flow voids are present.  No restricted diffusion within the non distorted brain parenchyma.  There is severe distortion of the susceptibility imaging no parenchymal susceptibility to suggest parenchymal hemorrhage in the non distorted brain parenchyma.  There is suboptimal evaluation of the cerebellum and posterior fossa.  Impression:  Unremarkable MRI brain allowing for artifacts from dental metal as detailed above specifically without abnormal parenchymal enhancement to suggest intracranial metastatic disease in light of history.     - Initiated NA therapy on 4/13/2022  Developed pancreatitis post C4 from immunotherapy and treatment held- required 2 hospitalizations     - 10/20/2022 Ultrasound left breast:  Findings:  At the site of the marker from the prior malignant breast biopsy,there is a 6 mm x 7 mm x 3 mm oval, parallel, hypoechoic mass with indistinct margins seen in the left breast at 2 o'clock in the posterior depth, 8 cm from the nipple. This is difficult to distinguish from the Twirl marker itself.  At the site of the previously biopsied metastatic left axillary node, there is a 10 x 7 x 11 mm node with a [place]- marker. Node has mild residual cortical thickening (5 mm), but has decreased in size. There are a few other normal size, normal appearing left axillary nodes.   Impression:  Left  Mass: Left breast 6 mm x 7 mm x 3 mm mass at the posterior 2 o'clock position. Assessment: 6 - Known biopsy, proven malignancy. Known,  previously biopsied left breast cancer and metastatic left axillary node have both decreased in size. The breast mass is now difficult to distinguish from the biopsy marker.   BI-RADS Category:   Overall: 6 - Known Biopsy-Proven Malignancy  Recommendation:  Patient is already under the care of the Breast Oncology team. Most recent bilateral mammogram was performed in February 2022.      Resumed NA chemo without immunotherapy 11/21/2022- completed 7/8 cycles     - 12/8/2022 Breast MRI:  Findings:  The breasts have heterogeneous fibroglandular tissue. The background parenchymal enhancement is minimal and symmetric. There is no evidence of suspicious masses, abnormal enhancement, or other abnormal findings.  There has been interval decrease in there has been interval resolution of previously described enhancing mass at the 2:00 axis which is a known biopsy-proven malignancy.    There is no abnormality in the right breast.    There has been significant decrease in size of left axillary lymph nodes which all are normal in size on current exam.  The largest contains a signal void consistent with previously placed radar reflector at site of biopsy-proven axillary metastasis.  It measures 8 mm in short axis.  Impression:  Interval resolution of enhancing mass at site of known malignancy in the left breast at 2:00  and marked interval decrease in left axillary adenopathy with visualized lymph nodes normal in size on current exam.  Findings are consistent with complete imaging response to therapy.  BI-RADS Category:   Overall: 6 - Known Biopsy-Proven Malignancy     - 2/27/2023 Left Mastectomy and SLN Bx, axillary lymphadenectomy  Pathology:  Left breast, mastectomy: No residual malignancy is present within the   mastectomy specimen. Treatment effect is present within the breast at area of prior biopsy   site.   Left axillary sentinel lymph node #1, palpable, excisional biopsy: 1 of 3   lymph nodes are positive for metastatic  carcinoma   Greatest dimension of metastatic carcinoma measures 4 mm (0.4 cm).   Confirmed by positive immunohistochemical staining in the metastatic   foci with cytokeratins AE1/AE3, Cam5.2 and wide spectrum keratin with satisfactory positive and negative controls.   Treatment effect is present in lymph node.   Left axillary contents, axillary dissection: 14 benign lymph nodes,   Prior BREAST BIOMARKER RESULTS   Estrogen receptor (ER): Negative (0)   Progesterone receptor (ER): Negative (0)   HER2 IHC: Negative (1+)   Ki-67 proliferation index: 80%      - - completed XRT 2023    - to be followed by adjuvant Xeloda     PMH:  HTN- around pregnancies; off of blood pressure medication x 2 years  CHF- ECHOs at [facility]  Gestational DM  Hyperlipidemia when heavier, managed with diet and followed by cardiology  C- sections x 2  Iron deficiency     GynHx:  Menarche- 7   (16 at 1st pregnancy) (17, 7, 4)  Still with periods - last 2022- last 5-7 days, moderate normal flow  + breast feeding x 1 year  IUD     SH:  Working, , [employer] and [employer]  Single  Good support system  No tobacco  No EtOH  No illicit drugs      Review of Systems   Constitutional:  Negative for activity change, appetite change, chills, fatigue, fever and unexpected weight change.   HENT:  Negative for dental problem and mouth sores.    Eyes:  Negative for visual disturbance.   Respiratory:  Negative for cough, chest tightness and shortness of breath.    Cardiovascular:  Negative for chest pain, palpitations and leg swelling.   Gastrointestinal:  Positive for change in bowel habit, diarrhea and change in bowel habit. Negative for abdominal distention, abdominal pain, blood in stool, nausea and vomiting.   Genitourinary:  Negative for decreased urine volume, dysuria and frequency.   Musculoskeletal:  Negative for arthralgias, back pain and gait problem.   Integumentary:  Positive for breast tenderness (left side). Negative for  rash.   Neurological:  Negative for weakness and headaches.   Hematological:  Negative for adenopathy. Does not bruise/bleed easily.   Psychiatric/Behavioral:  Negative for dysphoric mood. The patient is not nervous/anxious.    Breast: Positive for tenderness (left side).       Objective     Physical Exam  Vitals and nursing note reviewed.   Constitutional:       General: She is not in acute distress.     Appearance: Normal appearance. She is obese. She is not ill-appearing.      Comments: Presents alone  ECOG= 0  Very pleasant   HENT:      Head: Normocephalic and atraumatic.   Eyes:      General: No scleral icterus.     Extraocular Movements: Extraocular movements intact.      Conjunctiva/sclera: Conjunctivae normal.      Pupils: Pupils are equal, round, and reactive to light.   Cardiovascular:      Rate and Rhythm: Normal rate and regular rhythm.      Heart sounds: Normal heart sounds. No murmur heard.    No friction rub. No gallop.   Pulmonary:      Effort: Pulmonary effort is normal. No respiratory distress.      Breath sounds: Normal breath sounds. No rhonchi or rales.      Comments: Left breast expander- tight, mild XRT color changes  Cording improving  Right breast no masses or LAD  Abdominal:      General: Abdomen is flat. Bowel sounds are normal. There is no distension.      Palpations: Abdomen is soft. There is no mass.      Tenderness: There is no abdominal tenderness. There is no guarding or rebound.      Comments: No organomegaly   Musculoskeletal:         General: No swelling or tenderness. Normal range of motion.      Cervical back: Normal range of motion and neck supple. No tenderness.      Right lower leg: No edema.      Left lower leg: No edema.   Lymphadenopathy:      Cervical: No cervical adenopathy.   Skin:     General: Skin is warm and dry.      Coloration: Skin is not jaundiced or pale.      Findings: No lesion or rash.   Neurological:      General: No focal deficit present.      Mental  Status: She is alert and oriented to person, place, and time. Mental status is at baseline.      Sensory: No sensory deficit.      Motor: No weakness.      Coordination: Coordination normal.      Gait: Gait normal.   Psychiatric:         Mood and Affect: Mood normal.         Behavior: Behavior normal.         Thought Content: Thought content normal.         Judgment: Judgment normal.          Assessment and Plan     Laura was seen today for malignant neoplasm of upper-outer quadrant of left breast i.    Diagnoses and all orders for this visit:    Pancreatic insufficiency  -     lipase-protease-amylase 24,000-76,000-120,000 units (CREON) 24,000-76,000 -120,000 unit capsule; Take 1 capsule by mouth 3 (three) times daily with meals.    Malignant neoplasm of upper-outer quadrant of left breast in female, estrogen receptor negative    Drug-induced acute pancreatitis without infection or necrosis    Diarrhea due to malabsorption    Yeast infection    Triple negative breast cancer  Start Xeloda (CREATE-X Trial)  Reviewed and consented for today- monitor closely based on prior side effects  Sees breast surgeon next month  She will not be able to complete Keytruda with immunotherapy side effects (pancreatitis- recurrent)     Pancreatic enzyme insufficiency causing diarrhea- Creon reviewed and sent     Continue PT for lymphedema risk and cording     HTN- well controlled now, monitor    Yeast infection - prescriptions sent    45 minutes total time including consent, counseling    Route Chart for Scheduling    Med Onc Chart Routing      Follow up with physician 2 months. cbc, cmp prior and EP   Follow up with ANDREW 4 weeks. cbc, cmp prior and EP   Infusion scheduling note    Injection scheduling note    Labs    Imaging    Pharmacy appointment    Other referrals            Therapy Plan Information  Flushes  heparin, porcine (PF) 100 unit/mL injection flush 500 Units  500 Units, Intravenous, Every visit  sodium chloride 0.9% flush  10 mL  10 mL, Intravenous, Every visit  heparin, porcine (PF) 100 unit/mL injection flush 500 Units  500 Units, Intravenous, Every visit  sodium chloride 0.9% flush 10 mL  10 mL, Intravenous, Every visit

## 2023-06-23 NOTE — PROGRESS NOTES
Call placed to Pt to remind of upcoming [facility] 6[facility] Pt verbalized understanding and plans to keep the appt.

## 2023-06-23 NOTE — PROGRESS NOTES
Call placed to Pt to f/u from last encounter. Pt states everything is going ok. She has f/u with her physician and has an appt for labs this morning. Pt states she received the enrollment email and is thankful for the information. She plans to look into utility assistance and [employer] to inquire about building a home.

## 2023-06-23 NOTE — PROGRESS NOTES
"[facility]   Physical Therapy Treatment Note      Name: Laura Kent  Clinic Number: [id number]    Therapy Diagnosis:   Encounter Diagnoses   Name Primary?    Decreased range of motion of left shoulder Yes    Weakness of left upper extremity     Poor posture     At risk for lymphedema     Decreased functional mobility      Physician: YASMANI Weiner MD    Visit Date: 6/26/2023    Physician Orders: PT Eval and Treat   Medical Diagnosis from Referral: Z90.12 (ICD-10-CM) - S/P left mastectomy  Evaluation Date: 06/12/2023  Authorization Period Expiration: 06/05/2024  Plan of Care Expiration: 08/11/2023  Progress Note Due: 07/05/2023  Visit # / Visits Authorized: 4 / 20 (pending, plus eval)     Precautions: standard and cancer     Time In: 9:00 AM  Time Out: 9:55 AM  Total Billable Time: 55 minutes (TE only per [place] Medicaid)    Subjective     Patient reports: increased tightness through L lateral chest wall this morning for no apparent reason. Patient denies relief as day progresses. Patient also denies attempting to lift items using LUE over the weekend and occasionally feels "heaviness" through posterior LUE.    She was compliant with home exercise program.    Response to Previous Treatment: no adverse events  Functional Change: continued tightness when elevating LUE    Pain: 6/10   Location: L lateral trunk wall    Fatigue: mild    Diagnosis: stage IIIC (cT3, cN2(f), cM0, G3, ER-, [place]-, HER2-) IDC of the LEFT breast    Treatment:   Chemotherapy: neoadjuvant completed 11/22  Radiation: currently receiving 5 weeks, started 4/27/23  Endocrine Therapy: N/A     Surgery Date: LEFT mastectomy with TE placement with LEFT SLNB on 02/27/2023 per Myra Weiner and Denis.       Objective      Objective Measures updated at progress report unless specified.     LTR / Abduction Test: 06/26/2023  - Beginning of Session: 70 / 180 degrees  - End of Session: 90 / 180 degrees    Soft Tissue Restriction: " mod-severe L anterior, lateral chest wall    Treatment     Laura received the treatments listed below:      Therapeutic exercises to increase cardiovascular endurance, flexibility, range of motion, and flexibility for 30 minutes:     Seated Exercises:  - Pulleys (flex, scap)    2 min each  - Biceps curls, nya (4#)    3 set x 10 reps  - Thoracic ext, horizontal 1/2 foam  1 set x 15 reps - held    Standing Exercises:  - Doorway pec stretch (overhead)  30 sec x 2 reps  - Triceps pushdowns (blue)   3 set x 10 reps  - Lifting from knee height, 5#   1 set x 10 reps - held  - Reach and lift, 2# L UE, knee height 1 set x 10 reps - held    Mat Exercises:   - Butterfly stretch (behind head)  1 min x 2 reps  - LTR / shoulder abd stretch (LUE only) 10 sec x 10 reps  - Sleeper stretch (LUE only)   30 sec x 2 reps  - Open book stretch, LUE only  2 set x 10 reps - held  - Pec stretch on 1/2 foam, holding 2#  2 min - held  - Bilateral shoulder ER with 2# bar  2 set x 10 reps - held      Manual therapy techniques to decrease pain as well as to increase soft tissue mobility, joint mobility, mobility and pliability, and function for 25 minutes:     - Manual L pec stretch with abduction  - Manual L axillary stretch  - Manual L pec attachment stretch  - Gentle mobilization of L tissue expander (medially and inferiorly)    Patient Education and Home Exercises       Education Provided Regarding:   - Role of physical therapy in multi-disciplinary team  - Goals for physical therapy, progress towards goals  - Physical therapy plan of care, scheduling  - Exercise technique, home exercise program  - Energy conservation techniques  - Consults to integrative oncology (meditation, yoga, acupuncture)    Written Home Exercises Provided: yes. Exercises were reviewed and Laura was able to demonstrate them prior to the end of the session. Laura demonstrated good  understanding of the education provided. See EMR under Patient Instructions for  exercises provided during therapy sessions    Assessment     Patient is responding well to physical therapy. Patient able to perform new exercises and exercise progressions without increase in symptoms prior to leaving the clinic. Improved strength demonstrated with added biceps curls and triceps pushdowns. Improved soft tissue mobility of lateral and anterior L chest wall following manual techniques. Patient also demonstrated approximately 20 degree improvement with LTR / shoulder abduction test following manual techniques. Advised patient of possible soreness following today's stretches and recommended preferred OTC pain relief or medication recommended by MD; patient verbalized understanding, agreement. Will progress as tolerated.    Laura is progressing well towards her goals.   Patient prognosis is Good.     Patient will continue to benefit from skilled outpatient physical therapy to address the deficits listed in the problem list box on initial evaluation, provide patient / family education, and to maximize patient's level of independence in the home and community environment.     Patient's spiritual, cultural, and educational needs considered, and patient agreeable to plan of care and goals.     Anticipated barriers to physical therapy: none anticipated    GOALS:   Short Term Goals: 4 Weeks  Patient will demonstrate 100% understanding of lymphedema risk reduction practices, including self-monitoring for lymphedema (progressing, not met).  Patient will demonstrate independence with established home exercise program (progressing, not met).   Patient will increase LEFT shoulder ABDUCTION active range of motion to 150 degrees to improve functional reaching, carrying, pushing, and pulling pain-free (progressing, not met).   Patient will increase LEFT shoulder FLEXION active range of motion to 160 degrees to improve functional reaching, carrying, pushing, and pulling pain-free (progressing, not met).   Patient  will increase strength to >5/5 in gross upper extremity musculature to improve tolerance to all functional activities pain-free (progressing, not met).      Long Term Goals: 8 Weeks   Patient will increase LEFT shoulder FLEXION active range of motion to 180 degrees to improve functional reaching, carrying, pushing, and pulling pain-free (progressing, not met).   Patient will increase LEFT shoulder ABDUCTION active range of motion to 180 degrees to improve functional reaching, carrying, pushing, and pulling pain-free (progressing, not met).   Patient will increase strength to 4+/5 in gross upper extremity musculature to improve tolerance to all functional activities pain-free (progressing, not met).   Patient will demonstrate full / maximized tissue mobility to increase range of motion and promote healthy tissue to be pain-free at discharge (progressing, not met).   Patient will report decrease in overall worst pain to 2/10 at discharge (progressing, not met).      Plan     Plan of Care Certification: 06/12/2023 to 08/11/2023.    Outpatient physical therapy 2x week for 8 weeks to include the following: Manual Therapy, Neuromuscular Re-ed, Patient Education, Self Care, Therapeutic Activities, Therapeutic Exercise, and IASTM.    Ghada Brooks, PT

## 2023-06-26 ENCOUNTER — OFFICE VISIT (OUTPATIENT)
Dept: HEMATOLOGY/ONCOLOGY | Facility: CLINIC | Age: 38
End: 2023-06-26
Payer: MEDICAID

## 2023-06-26 ENCOUNTER — CLINICAL SUPPORT (OUTPATIENT)
Dept: REHABILITATION | Facility: HOSPITAL | Age: 38
End: 2023-06-26
Payer: MEDICAID

## 2023-06-26 VITALS
OXYGEN SATURATION: 99 % | RESPIRATION RATE: 16 BRPM | DIASTOLIC BLOOD PRESSURE: 85 MMHG | HEIGHT: 66 IN | BODY MASS INDEX: 35.57 KG/M2 | SYSTOLIC BLOOD PRESSURE: 132 MMHG | TEMPERATURE: 98 F | HEART RATE: 77 BPM | WEIGHT: 221.31 LBS

## 2023-06-26 DIAGNOSIS — K86.89 PANCREATIC INSUFFICIENCY: Primary | ICD-10-CM

## 2023-06-26 DIAGNOSIS — Z91.89 AT RISK FOR LYMPHEDEMA: ICD-10-CM

## 2023-06-26 DIAGNOSIS — M25.612 DECREASED RANGE OF MOTION OF LEFT SHOULDER: Primary | ICD-10-CM

## 2023-06-26 DIAGNOSIS — R26.89 DECREASED FUNCTIONAL MOBILITY: ICD-10-CM

## 2023-06-26 DIAGNOSIS — R29.3 POOR POSTURE: ICD-10-CM

## 2023-06-26 DIAGNOSIS — K85.30 DRUG-INDUCED ACUTE PANCREATITIS WITHOUT INFECTION OR NECROSIS: ICD-10-CM

## 2023-06-26 DIAGNOSIS — R19.7 DIARRHEA DUE TO MALABSORPTION: ICD-10-CM

## 2023-06-26 DIAGNOSIS — K90.9 DIARRHEA DUE TO MALABSORPTION: ICD-10-CM

## 2023-06-26 DIAGNOSIS — C50.412 MALIGNANT NEOPLASM OF UPPER-OUTER QUADRANT OF LEFT BREAST IN FEMALE, ESTROGEN RECEPTOR NEGATIVE: ICD-10-CM

## 2023-06-26 DIAGNOSIS — B37.9 YEAST INFECTION: ICD-10-CM

## 2023-06-26 DIAGNOSIS — Z17.1 MALIGNANT NEOPLASM OF UPPER-OUTER QUADRANT OF LEFT BREAST IN FEMALE, ESTROGEN RECEPTOR NEGATIVE: ICD-10-CM

## 2023-06-26 DIAGNOSIS — R29.898 WEAKNESS OF LEFT UPPER EXTREMITY: ICD-10-CM

## 2023-06-26 PROCEDURE — 3075F PR MOST RECENT SYSTOLIC BLOOD PRESS GE 130-139MM HG: ICD-10-PCS | Mod: CPTII,,, | Performed by: INTERNAL MEDICINE

## 2023-06-26 PROCEDURE — 1159F PR MEDICATION LIST DOCUMENTED IN MEDICAL RECORD: ICD-10-PCS | Mod: CPTII,,, | Performed by: INTERNAL MEDICINE

## 2023-06-26 PROCEDURE — 3079F DIAST BP 80-89 MM HG: CPT | Mod: CPTII,,, | Performed by: INTERNAL MEDICINE

## 2023-06-26 PROCEDURE — 3044F PR MOST RECENT HEMOGLOBIN A1C LEVEL <7.0%: ICD-10-PCS | Mod: CPTII,,, | Performed by: INTERNAL MEDICINE

## 2023-06-26 PROCEDURE — 3008F BODY MASS INDEX DOCD: CPT | Mod: CPTII,,, | Performed by: INTERNAL MEDICINE

## 2023-06-26 PROCEDURE — 99215 OFFICE O/P EST HI 40 MIN: CPT | Mod: S$PBB,,, | Performed by: INTERNAL MEDICINE

## 2023-06-26 PROCEDURE — 99999 PR PBB SHADOW E&M-EST. PATIENT-LVL IV: CPT | Mod: PBBFAC,,, | Performed by: INTERNAL MEDICINE

## 2023-06-26 PROCEDURE — 3008F PR BODY MASS INDEX (BMI) DOCUMENTED: ICD-10-PCS | Mod: CPTII,,, | Performed by: INTERNAL MEDICINE

## 2023-06-26 PROCEDURE — 99999 PR PBB SHADOW E&M-EST. PATIENT-LVL IV: ICD-10-PCS | Mod: PBBFAC,,, | Performed by: INTERNAL MEDICINE

## 2023-06-26 PROCEDURE — 99214 OFFICE O/P EST MOD 30 MIN: CPT | Mod: PBBFAC | Performed by: INTERNAL MEDICINE

## 2023-06-26 PROCEDURE — 1159F MED LIST DOCD IN RCRD: CPT | Mod: CPTII,,, | Performed by: INTERNAL MEDICINE

## 2023-06-26 PROCEDURE — 97110 THERAPEUTIC EXERCISES: CPT

## 2023-06-26 PROCEDURE — 1160F PR REVIEW ALL MEDS BY PRESCRIBER/CLIN PHARMACIST DOCUMENTED: ICD-10-PCS | Mod: CPTII,,, | Performed by: INTERNAL MEDICINE

## 2023-06-26 PROCEDURE — 3075F SYST BP GE 130 - 139MM HG: CPT | Mod: CPTII,,, | Performed by: INTERNAL MEDICINE

## 2023-06-26 PROCEDURE — 1160F RVW MEDS BY RX/DR IN RCRD: CPT | Mod: CPTII,,, | Performed by: INTERNAL MEDICINE

## 2023-06-26 PROCEDURE — 3044F HG A1C LEVEL LT 7.0%: CPT | Mod: CPTII,,, | Performed by: INTERNAL MEDICINE

## 2023-06-26 PROCEDURE — 99215 PR OFFICE/OUTPT VISIT, EST, LEVL V, 40-54 MIN: ICD-10-PCS | Mod: S$PBB,,, | Performed by: INTERNAL MEDICINE

## 2023-06-26 PROCEDURE — 3079F PR MOST RECENT DIASTOLIC BLOOD PRESSURE 80-89 MM HG: ICD-10-PCS | Mod: CPTII,,, | Performed by: INTERNAL MEDICINE

## 2023-06-26 RX ORDER — PANCRELIPASE 24000; 76000; 120000 [USP'U]/1; [USP'U]/1; [USP'U]/1
1 CAPSULE, DELAYED RELEASE PELLETS ORAL
Qty: 90 CAPSULE | Refills: 2 | Status: ON HOLD | OUTPATIENT
Start: 2023-06-26 | End: 2023-12-14 | Stop reason: HOSPADM

## 2023-06-26 RX ORDER — CAPECITABINE 150 MG/1
150 TABLET, FILM COATED ORAL 2 TIMES DAILY
Qty: 28 TABLET | Refills: 2 | Status: ACTIVE | OUTPATIENT
Start: 2023-06-26 | End: 2023-09-05 | Stop reason: SDUPTHER

## 2023-06-26 RX ORDER — CAPECITABINE 500 MG/1
2000 TABLET, FILM COATED ORAL 2 TIMES DAILY
Qty: 112 TABLET | Refills: 2 | Status: ACTIVE | OUTPATIENT
Start: 2023-06-26 | End: 2023-09-05 | Stop reason: SDUPTHER

## 2023-06-26 RX ORDER — NYSTATIN 100000 U/G
OINTMENT TOPICAL 2 TIMES DAILY
Qty: 15 G | Refills: 1 | Status: SHIPPED | OUTPATIENT
Start: 2023-06-26 | End: 2024-01-02

## 2023-06-26 RX ORDER — NYSTATIN 100000 [USP'U]/G
POWDER TOPICAL 2 TIMES DAILY
Qty: 30 G | Refills: 1 | Status: SHIPPED | OUTPATIENT
Start: 2023-06-26 | End: 2024-01-02

## 2023-06-26 RX ORDER — ONDANSETRON HYDROCHLORIDE 8 MG/1
8 TABLET, FILM COATED ORAL EVERY 12 HOURS PRN
Qty: 30 TABLET | Refills: 2 | Status: SHIPPED | OUTPATIENT
Start: 2023-06-26 | End: 2024-06-25

## 2023-06-27 ENCOUNTER — LAB VISIT (OUTPATIENT)
Dept: LAB | Facility: HOSPITAL | Age: 38
End: 2023-06-27
Attending: INTERNAL MEDICINE
Payer: MEDICAID

## 2023-06-27 ENCOUNTER — SPECIALTY PHARMACY (OUTPATIENT)
Dept: PHARMACY | Facility: CLINIC | Age: 38
End: 2023-06-27
Payer: MEDICAID

## 2023-06-27 ENCOUNTER — TELEPHONE (OUTPATIENT)
Dept: PHARMACY | Facility: CLINIC | Age: 38
End: 2023-06-27
Payer: MEDICAID

## 2023-06-27 DIAGNOSIS — C50.412 MALIGNANT NEOPLASM OF UPPER-OUTER QUADRANT OF LEFT BREAST IN FEMALE, ESTROGEN RECEPTOR NEGATIVE: ICD-10-CM

## 2023-06-27 DIAGNOSIS — Z17.1 MALIGNANT NEOPLASM OF UPPER-OUTER QUADRANT OF LEFT BREAST IN FEMALE, ESTROGEN RECEPTOR NEGATIVE: ICD-10-CM

## 2023-06-27 PROCEDURE — 36415 COLL VENOUS BLD VENIPUNCTURE: CPT | Performed by: INTERNAL MEDICINE

## 2023-06-27 NOTE — PROGRESS NOTES
"[facility]   Physical Therapy Treatment Note      Name: Laura Kent  Clinic Number: [id number]    Therapy Diagnosis:   Encounter Diagnoses   Name Primary?    Decreased range of motion of left shoulder Yes    Weakness of left upper extremity     Poor posture     At risk for lymphedema     Decreased functional mobility      Physician: YASMANI Weiner MD    Visit Date: 6/29/2023    Physician Orders: PT Eval and Treat   Medical Diagnosis from Referral: Z90.12 (ICD-10-CM) - S/P left mastectomy  Evaluation Date: 06/12/2023  Authorization Period Expiration: 06/05/2024  Plan of Care Expiration: 08/11/2023  Progress Note Due: 07/05/2023  Visit # / Visits Authorized: 5 / 20 (pending, plus eval)     Precautions: standard and cancer     Time In: 4:09 PM  Time Out: 5:04 PM  Total Billable Time: 55 minutes (TE only per [place] Medicaid)    Subjective     Patient reports: feeling tired this afternoon, but L breast and shoulder felt looser this week.     She was not compliant with home exercise program.    Response to Previous Treatment: no adverse event  Functional Change: improved soft tissue mobility when using LUE    Pain: 4/10 ("tightness")  Location: L breast    Fatigue: "exhausted"    Diagnosis: stage IIIC (cT3, cN2(f), cM0, G3, ER-, [place]-, HER2-) IDC of the LEFT breast    Treatment:   Chemotherapy: neoadjuvant completed 11/22  Radiation: currently receiving 5 weeks, started 4/27/23  Endocrine Therapy: N/A     Surgery Date: LEFT mastectomy with TE placement with LEFT SLNB on 02/27/2023 per Myra Weiner and Denis.       Objective      Objective Measures updated at progress report unless specified.     LTR / Abduction Test: 06/26/2023  - Beginning of Session: 70 / 180 degrees  - End of Session: 90 / 180 degrees    Soft Tissue Restriction: mod-severe L anterior, lateral chest wall    Treatment     Laura received the treatments listed below:      Therapeutic exercises to increase cardiovascular " endurance, flexibility, range of motion, and flexibility for 26 minutes:     Seated Exercises:  - Pulleys (flex, scap)    2 min each  - Biceps curls, nya (4#)    3 set x 10 reps - held  - Thoracic ext, horizontal 1/2 foam  1 set x 15 reps    Standing Exercises:  - Doorway pec stretch (overhead)  30 sec x 2 reps - held  - Triceps pushdowns (blue)   3 set x 10 reps - held  - Lifting from knee height, 10#  1 set x 10 reps  - Supinated lifts, 2#    2 set x 10 reps    Mat Exercises:   - Butterfly stretch (behind head)  1 min x 2 reps  - LTR / shoulder abd stretch (LUE only) 10 sec x 10 reps  - Sleeper stretch (LUE only)   30 sec x 2 reps  - Open book stretch, LUE only  1 set x 10 reps  - Pec stretch on 1/2 foam, holding 2#  2 min - held      Manual therapy techniques to decrease pain as well as to increase soft tissue mobility, joint mobility, mobility and pliability, and function for 30 minutes:     - Manual L axillary stretch  - Manual L pec attachment stretch  - Gentle mobilization of L tissue expander (medially and inferiorly)    Patient Education and Home Exercises       Education Provided Regarding:   - Role of physical therapy in multi-disciplinary team  - Goals for physical therapy, progress towards goals  - Physical therapy plan of care, scheduling  - Exercise technique, home exercise program  - Energy conservation techniques  - Consults to integrative oncology (meditation, yoga, acupuncture)    Written Home Exercises Provided: yes. Exercises were reviewed and Laura was able to demonstrate them prior to the end of the session. Laura demonstrated good  understanding of the education provided. See EMR under Patient Instructions for exercises provided during therapy sessions    Assessment     Patient is responding well to physical therapy. Patient able to perform new exercises and exercise progressions without increase in symptoms prior to leaving the clinic. Noted improved soft tissue mobility in anterior,  superior L chest with manual techniques today. Improved lifting tolerance demonstrated with increasing load from 5 lbs to 10 lbs; good carryover of proper lifting technique and no reported increase in L shoulder tightness or pain. Will progress as tolerated.    Laura is progressing well towards her goals.   Patient prognosis is Good.     Patient will continue to benefit from skilled outpatient physical therapy to address the deficits listed in the problem list box on initial evaluation, provide patient / family education, and to maximize patient's level of independence in the home and community environment.     Patient's spiritual, cultural, and educational needs considered, and patient agreeable to plan of care and goals.     Anticipated barriers to physical therapy: none anticipated    GOALS:   Short Term Goals: 4 Weeks  Patient will demonstrate 100% understanding of lymphedema risk reduction practices, including self-monitoring for lymphedema (progressing, not met).  Patient will demonstrate independence with established home exercise program (progressing, not met).   Patient will increase LEFT shoulder ABDUCTION active range of motion to 150 degrees to improve functional reaching, carrying, pushing, and pulling pain-free (progressing, not met).   Patient will increase LEFT shoulder FLEXION active range of motion to 160 degrees to improve functional reaching, carrying, pushing, and pulling pain-free (progressing, not met).   Patient will increase strength to >5/5 in gross upper extremity musculature to improve tolerance to all functional activities pain-free (progressing, not met).      Long Term Goals: 8 Weeks   Patient will increase LEFT shoulder FLEXION active range of motion to 180 degrees to improve functional reaching, carrying, pushing, and pulling pain-free (progressing, not met).   Patient will increase LEFT shoulder ABDUCTION active range of motion to 180 degrees to improve functional reaching,  carrying, pushing, and pulling pain-free (progressing, not met).   Patient will increase strength to 4+/5 in gross upper extremity musculature to improve tolerance to all functional activities pain-free (progressing, not met).   Patient will demonstrate full / maximized tissue mobility to increase range of motion and promote healthy tissue to be pain-free at discharge (progressing, not met).   Patient will report decrease in overall worst pain to 2/10 at discharge (progressing, not met).      Plan     Plan of Care Certification: 06/12/2023 to 08/11/2023.    Outpatient physical therapy 2x week for 8 weeks to include the following: Manual Therapy, Neuromuscular Re-ed, Patient Education, Self Care, Therapeutic Activities, Therapeutic Exercise, and IASTM.    Ghada Brooks, PT

## 2023-06-27 NOTE — TELEPHONE ENCOUNTER
Specialty Pharmacy - Initial Clinical Assessment    Specialty Medication Orders Linked to Encounter      Flowsheet Row Most Recent Value   Medication #1 capecitabine (XELODA) 150 MG tablet (Order#[id number], Rx#[id number])   Medication #2 capecitabine (XELODA) 500 MG Tab (Order#[id number], Rx#[id number])          Patient Diagnosis   C50.412, Z17.1 - Malignant neoplasm of upper-outer quadrant of left breast in female, estrogen receptor negative    Subjective    Laura Kent is a 37 y.o. female, who is followed by the specialty pharmacy service for management and education.    Recent Encounters       Date Type Provider Description    06/27/2023 Specialty Pharmacy Sarika Lira, Roberto Initial Clinical Assessment    06/27/2023 Specialty Pharmacy Sarika Lira PharmD Referral Authorization            Current Outpatient Medications   Medication Sig    capecitabine (XELODA) 150 MG tablet Take 1 tablet (150 mg total) by mouth 2 (two) times daily Take for 14 days followed by 7 days off with 1 other capecitabine prescription for 2,150 mg total.    capecitabine (XELODA) 500 MG Tab Take 4 tablets (2,000 mg total) by mouth 2 (two) times daily Take for 14 days followed by 7 days off with 1 other capecitabine prescription for 2,150 mg total.    cetirizine (ZYRTEC) 10 MG tablet Take 10 mg by mouth once daily.    clobetasoL (TEMOVATE) 0.05 % external solution Apply topically.    clotrimazole (LOTRIMIN) 1 % cream Apply 1 application topically every evening.    DUPIXENT  mg/2 mL PnIj INJECT 300MG UNDER THE SKIN EVERY 2 WEEKS AS DIRECTED    gabapentin (NEURONTIN) 300 MG capsule Take 2 capsules (600 mg total) by mouth 3 (three) times daily. for 7 days    hydrOXYzine HCL (ATARAX) 10 MG Tab Take 10 mg by mouth nightly as needed.    ibuprofen (ADVIL,MOTRIN) 600 MG tablet Take 600 mg by mouth 3 (three) times daily.    ketoconazole (NIZORAL) 2 % shampoo Apply topically Every 3 (three) days.    lipase-protease-amylase  24,000-76,000-120,000 units (CREON) 24,000-76,000 -120,000 unit capsule Take 1 capsule by mouth 3 (three) times daily with meals.    mupirocin (BACTROBAN) 2 % ointment Apply topically once daily.    naproxen (NAPROSYN) 500 MG tablet Take 1 tablet (500 mg total) by mouth every 12 (twelve) hours as needed.    nystatin (MYCOSTATIN) ointment Apply topically 2 (two) times daily.    nystatin (MYCOSTATIN) powder Apply topically 2 (two) times daily.    ondansetron (ZOFRAN) 8 MG tablet Take 1 tablet (8 mg total) by mouth every 12 (twelve) hours as needed for Nausea.    oxyCODONE (ROXICODONE) 5 MG immediate release tablet Take 1 tablet (5 mg total) by mouth every 4 (four) hours as needed for Pain.    pantoprazole (PROTONIX) 40 MG tablet Take 1 tablet (40 mg total) by mouth once daily.    prednisoLONE acetate (PRED FORTE) 1 % DrpS Place 1 drop into both eyes 2 (two) times daily.    traMADoL (ULTRAM) 50 mg tablet Take 1 tablet (50 mg total) by mouth every 6 (six) hours as needed for Pain.    triamcinolone acetonide 0.1% (KENALOG) 0.1 % cream Apply 1 g topically 2 (two) times daily.   Last reviewed on 6/27/2023  2:04 PM by Sarika Lira, PharmD    Review of patient's allergies indicates:   Allergen Reactions    Strawberries [strawberry] Hives   Last reviewed on  6/26/2023 7:13 AM by Yessy Cruz          Assessment Questions - Documented Responses      Flowsheet Row Most Recent Value   Assessment    Medication Reconciliation completed for patient Yes   During the past 4 weeks, has patient missed any activities due to condition or medication? No   During the past 4 weeks, did patient have any of the following urgent care visits? None   Goals of Therapy Status Discussed (new start)   Status of the patients ability to self-administer: Is Able   All education points have been covered with patient? Yes, supplemental printed education provided   Welcome packet contents reviewed and discussed with patient? Yes   Assesment completed?  "Yes   Plan Therapy being initiated   Do you need to open a clinical intervention (i-vent)? No   Do you want to schedule first shipment? Yes   Medication #1 Assessment Info    Patient status New medication, New to [facility]   Is this medication appropriate for the patient? Yes   Is this medication effective? Not yet started   Medication #2 Assessment Info    Patient status New medication, New to [facility]   Is this medication appropriate for the patient? Yes   Is this medication effective? Not yet started          Refill Questions - Documented Responses      Flowsheet Row Most Recent Value   Patient Availability and HIPAA Verification    Does patient want to proceed with activity? Yes   HIPAA/medical authority confirmed? Yes   Relationship to patient of person spoken to? Self   Refill Screening Questions    When does the patient need to receive the medication? 06/30/23   Refill Delivery Questions    How will the patient receive the medication? MEDRx   When does the patient need to receive the medication? 06/30/23   Shipping Address Home   Address in [facility] confirmed and updated if neccessary? Yes   Expected Copay ($) 5   Is the patient able to afford the medication copay? Yes   Payment Method new CC added to file   Days supply of Refill 21   Supplies needed? No supplies needed   Refill activity completed? Yes   Refill activity plan Refill scheduled   Shipment/Pickup Date: 06/29/23            Objective    She has a past medical history of Anxiety, Breast cancer, Encounter for blood transfusion, Hypertension, Meningitis, and Severe obesity (2/2/2023).    Tried/failed medications: keytruda     BP Readings from Last 4 Encounters:   06/26/23 132/85   06/08/23 133/87   05/15/23 (!) 144/84   04/05/23 126/85     Ht Readings from Last 4 Encounters:   06/26/23 5' 6" (1.676 m)   05/15/23 5' 5" (1.651 m)   04/03/23 5' 5" (1.651 m)   04/03/23 5' 5" (1.651 m)     Wt Readings from Last 4 Encounters:   06/26/23 100.4 kg (221 lb 5.5 " oz)   06/08/23 99.8 kg (220 lb)   05/15/23 99.9 kg (220 lb 3.8 oz)   04/03/23 98 kg (216 lb)     Recent Labs   Lab Result Units 06/23/23  0959 05/15/23  1435 04/03/23  0934   RBC M/uL 3.32 L 3.50 L 3.66 L   Hemoglobin g/dL 10.4 L 10.6 L 11.1 L   Hematocrit % 29.7 L 31.7 L 33.2 L   WBC K/uL 2.39 L 2.44 L 3.31 L   Gran # (ANC) K/uL 1.6 L 1.5 L 1.8   Gran % % 64.8  --   --    Platelets K/uL 153 158 197   Sodium mmol/L 143 138 142   Potassium mmol/L 3.5 3.6 3.4 L   Chloride mmol/L 108 107 109   Glucose mg/dL 91 85 116 H   BUN mg/dL 15 11 10   Creatinine mg/dL 0.8 0.7 0.7   Calcium mg/dL 9.0 9.2 9.0   Total Protein g/dL 6.3 6.3 6.5   Albumin g/dL 3.9 3.9 3.9   Total Bilirubin mg/dL 0.4 0.4 0.3   Alkaline Phosphatase U/L 66 60 67   AST U/L 36 22 25   ALT U/L 37 17 18     The goals of cancer treatment include:  Achieving remission of cancer, if possible  Reducing tumor size and spread of cancer, if remission is not possible  Minimizing pain and symptoms of the cancer  Preventing infection and other complications of treatment  Promoting adequate nutrition  Encouraging proper hydration  Improving or maintaining quality of life  Maintaining optimal therapy adherence  Minimizing and managing side effects    Goals of Therapy Status: Discussed (new start)    Assessment/Plan  Patient plans to start therapy on 06/30/23      Indication, dosage, appropriateness, effectiveness, safety and convenience of her specialty medication(s) were reviewed today.     Patient Education   Patient received education on the following:   Expectations and possible outcomes of therapy  Proper use, timely administration, and missed dose management  Duration of therapy  Side effects, including prevention, minimization, and management  Contraindications and safety precautions  New or changed medications, including prescribe and over the counter medications and supplements  Reviews recommended vaccinations, as appropriate  Storage, safe handling, and  disposal      Tasks added this encounter   No tasks added.   Tasks due within next 3 months   6/27/2023 - Initial Clinical Assessment/Patient Education (180 Day Reassessment)  6/27/2023 - Set up Initial Fill     Sarika Lira, PharmD  [facility]  [address]  [facility] [address]  Phone: [phone]  Fax: [phone]

## 2023-06-27 NOTE — TELEPHONE ENCOUNTER
Eloy, this is Sarika Lira, clinical pharmacist with [facility] that is part of your care team.  We have begun working on your prescription that your doctor has sent us. Our next steps include:     Working with your insurance company to obtain approval for your medication  Working with you to ensure your medication is affordable     We will be calling you along the way with updates on your medication but if you have any concerns or receive information that you would like to discuss please reach us at [phone].    Welcome call outcome: Patient/caregiver reached

## 2023-06-29 ENCOUNTER — CLINICAL SUPPORT (OUTPATIENT)
Dept: REHABILITATION | Facility: HOSPITAL | Age: 38
End: 2023-06-29
Payer: MEDICAID

## 2023-06-29 DIAGNOSIS — Z91.89 AT RISK FOR LYMPHEDEMA: ICD-10-CM

## 2023-06-29 DIAGNOSIS — R29.3 POOR POSTURE: ICD-10-CM

## 2023-06-29 DIAGNOSIS — M25.612 DECREASED RANGE OF MOTION OF LEFT SHOULDER: Primary | ICD-10-CM

## 2023-06-29 DIAGNOSIS — R26.89 DECREASED FUNCTIONAL MOBILITY: ICD-10-CM

## 2023-06-29 DIAGNOSIS — R29.898 WEAKNESS OF LEFT UPPER EXTREMITY: ICD-10-CM

## 2023-06-29 PROCEDURE — 97110 THERAPEUTIC EXERCISES: CPT

## 2023-06-30 ENCOUNTER — PATIENT MESSAGE (OUTPATIENT)
Dept: RESEARCH | Facility: HOSPITAL | Age: 38
End: 2023-06-30
Payer: MEDICAID

## 2023-07-03 ENCOUNTER — CLINICAL SUPPORT (OUTPATIENT)
Dept: REHABILITATION | Facility: HOSPITAL | Age: 38
End: 2023-07-03
Payer: MEDICAID

## 2023-07-03 DIAGNOSIS — R29.898 WEAKNESS OF LEFT UPPER EXTREMITY: ICD-10-CM

## 2023-07-03 DIAGNOSIS — R26.89 DECREASED FUNCTIONAL MOBILITY: ICD-10-CM

## 2023-07-03 DIAGNOSIS — M25.612 DECREASED RANGE OF MOTION OF LEFT SHOULDER: Primary | ICD-10-CM

## 2023-07-03 DIAGNOSIS — R29.3 POOR POSTURE: ICD-10-CM

## 2023-07-03 DIAGNOSIS — Z91.89 AT RISK FOR LYMPHEDEMA: ICD-10-CM

## 2023-07-03 PROCEDURE — 97110 THERAPEUTIC EXERCISES: CPT

## 2023-07-03 NOTE — PROGRESS NOTES
[facility]   Physical Therapy Treatment Note      Name: Laura Kent  Clinic Number: [id number]    Therapy Diagnosis:   Encounter Diagnoses   Name Primary?    Decreased range of motion of left shoulder Yes    Weakness of left upper extremity     Poor posture     At risk for lymphedema     Decreased functional mobility      Physician: YASMANI Weiner MD    Visit Date: 7/3/2023    Physician Orders: PT Eval and Treat   Medical Diagnosis from Referral: Z90.12 (ICD-10-CM) - S/P left mastectomy  Evaluation Date: 06/12/2023  Authorization Period Expiration: 06/05/2024  Plan of Care Expiration: 08/11/2023  Progress Note Due: 07/05/2023  Visit # / Visits Authorized: 6 / 20 (pending, plus eval)     Precautions: standard and cancer     Time In: 9:00 AM  Time Out: 10:00 AM  Total Billable Time: 60 minutes (TE only per [place] [employer])    Subjective     Patient reports: left underarm and side of her breast feel looser and has softened up. The back of her left arm is still sensitive to touch with a shocking pain. She is doing the nerve glides every time she gets a sharp pain.    She was compliant with home exercise program.    Response to Previous Treatment: no adverse event  Functional Change: improved soft tissue mobility when using LUE    Pain: 3/10   Location: L posterior arm due to touch    Fatigue: I'm ok    Diagnosis: stage IIIC (cT3, cN2(f), cM0, G3, ER-, [place]-, HER2-) IDC of the LEFT breast    Treatment:   Chemotherapy: neoadjuvant completed 11/22  Radiation: completed 5/31/23  Endocrine Therapy: N/A     Surgery Date: LEFT mastectomy with TE placement with LEFT SLNB on 02/27/2023 per Myra Weiner and Denis.       Objective      Objective Measures updated at progress report unless specified.     LTR / Abduction Test: 07/3/2023  - Beginning of Session: 85 / 180 degrees  - End of Session: 115 / 180 degrees    Soft Tissue Restriction: mod-severe L anterior, lateral chest wall    Treatment      Laura received the treatments listed below:      Therapeutic exercises to increase cardiovascular endurance, flexibility, range of motion, and flexibility for 30 minutes:     Seated Exercises:  - Pulleys (flex, scap)    2 min each  - Biceps curls, nya (4#)    3 set x 10 reps - held  - Thoracic ext, horizontal 1/2 foam  1 set x 15 reps  - Physioball roll outs, 3 ways   2 min    Standing Exercises:  - Doorway pec stretch (overhead)  30 sec x 2 reps - held  - Triceps pushdowns (blue)   3 set x 10 reps - held  - Lifting from knee height, 10#  1 set x 15 reps  - Supinated lifts, 2#    2 set x 10 reps    Mat Exercises:   - Butterfly stretch (behind head)  1 min x 2 reps  - LTR / shoulder abd stretch (LUE only) 10 sec x 10 reps  - Sleeper stretch (LUE only)   30 sec x 2 reps  - Open book stretch, LUE only  1 set x 10 reps  - Pec stretch on 1/2 foam, holding 2#  2 min - held      Manual therapy techniques to decrease pain as well as to increase soft tissue mobility, joint mobility, mobility and pliability, and function for 30 minutes:     - Manual L axillary stretch  - Manual L pec attachment stretch  - Gentle mobilization of L tissue expander (medially and inferiorly)    Patient Education and Home Exercises       Education Provided Regarding:   - Role of physical therapy in multi-disciplinary team  - Goals for physical therapy, progress towards goals  - Physical therapy plan of care, scheduling  - Exercise technique, home exercise program  - Energy conservation techniques  - Consults to integrative oncology (meditation, yoga, acupuncture)    Written Home Exercises Provided: yes. Exercises were reviewed and Laura was able to demonstrate them prior to the end of the session. Laura demonstrated good  understanding of the education provided. See EMR under Patient Instructions for exercises provided during therapy sessions    Assessment     Patient is responding well to physical therapy. Patient was able to perform  today's progressions with no increase in symptoms prior to leaving the clinic. She demonstrated increased tissue length at the start and end of the session, as her ROM with LTR/abduction test improved compared to previous visit. She continues to respond well to manual therapy techniques as she displayed increased tissue length as noted above. Attempt to increase weight with lifting drills next visit. Will progress as tolerated.    Laura is progressing well towards her goals.   Patient prognosis is Good.     Patient will continue to benefit from skilled outpatient physical therapy to address the deficits listed in the problem list box on initial evaluation, provide patient / family education, and to maximize patient's level of independence in the home and community environment.     Patient's spiritual, cultural, and educational needs considered, and patient agreeable to plan of care and goals.     Anticipated barriers to physical therapy: none anticipated    GOALS:   Short Term Goals: 4 Weeks  Patient will demonstrate 100% understanding of lymphedema risk reduction practices, including self-monitoring for lymphedema (progressing, not met).  Patient will demonstrate independence with established home exercise program (progressing, not met).   Patient will increase LEFT shoulder ABDUCTION active range of motion to 150 degrees to improve functional reaching, carrying, pushing, and pulling pain-free (progressing, not met).   Patient will increase LEFT shoulder FLEXION active range of motion to 160 degrees to improve functional reaching, carrying, pushing, and pulling pain-free (progressing, not met).   Patient will increase strength to >5/5 in gross upper extremity musculature to improve tolerance to all functional activities pain-free (progressing, not met).      Long Term Goals: 8 Weeks   Patient will increase LEFT shoulder FLEXION active range of motion to 180 degrees to improve functional reaching, carrying,  pushing, and pulling pain-free (progressing, not met).   Patient will increase LEFT shoulder ABDUCTION active range of motion to 180 degrees to improve functional reaching, carrying, pushing, and pulling pain-free (progressing, not met).   Patient will increase strength to 4+/5 in gross upper extremity musculature to improve tolerance to all functional activities pain-free (progressing, not met).   Patient will demonstrate full / maximized tissue mobility to increase range of motion and promote healthy tissue to be pain-free at discharge (progressing, not met).   Patient will report decrease in overall worst pain to 2/10 at discharge (progressing, not met).      Plan     Plan of Care Certification: 06/12/2023 to 08/11/2023.    Outpatient physical therapy 2x week for 8 weeks to include the following: Manual Therapy, Neuromuscular Re-ed, Patient Education, Self Care, Therapeutic Activities, Therapeutic Exercise, and IASTM.    Teresa Hurley, PT

## 2023-07-05 ENCOUNTER — CLINICAL SUPPORT (OUTPATIENT)
Dept: REHABILITATION | Facility: HOSPITAL | Age: 38
End: 2023-07-05
Payer: MEDICAID

## 2023-07-05 DIAGNOSIS — R29.898 WEAKNESS OF LEFT UPPER EXTREMITY: ICD-10-CM

## 2023-07-05 DIAGNOSIS — Z91.89 AT RISK FOR LYMPHEDEMA: ICD-10-CM

## 2023-07-05 DIAGNOSIS — R26.89 DECREASED FUNCTIONAL MOBILITY: ICD-10-CM

## 2023-07-05 DIAGNOSIS — M25.612 DECREASED RANGE OF MOTION OF LEFT SHOULDER: Primary | ICD-10-CM

## 2023-07-05 DIAGNOSIS — R29.3 POOR POSTURE: ICD-10-CM

## 2023-07-05 PROCEDURE — 97110 THERAPEUTIC EXERCISES: CPT

## 2023-07-06 ENCOUNTER — OFFICE VISIT (OUTPATIENT)
Dept: PLASTIC SURGERY | Facility: CLINIC | Age: 38
End: 2023-07-06
Payer: MEDICAID

## 2023-07-06 VITALS
HEIGHT: 66 IN | HEART RATE: 77 BPM | WEIGHT: 221 LBS | SYSTOLIC BLOOD PRESSURE: 132 MMHG | DIASTOLIC BLOOD PRESSURE: 85 MMHG | BODY MASS INDEX: 35.52 KG/M2

## 2023-07-06 DIAGNOSIS — C50.412 MALIGNANT NEOPLASM OF UPPER-OUTER QUADRANT OF LEFT BREAST IN FEMALE, ESTROGEN RECEPTOR NEGATIVE: Primary | ICD-10-CM

## 2023-07-06 DIAGNOSIS — Z17.1 MALIGNANT NEOPLASM OF UPPER-OUTER QUADRANT OF LEFT BREAST IN FEMALE, ESTROGEN RECEPTOR NEGATIVE: Primary | ICD-10-CM

## 2023-07-06 PROCEDURE — 3079F DIAST BP 80-89 MM HG: CPT | Mod: CPTII,,, | Performed by: SURGERY

## 2023-07-06 PROCEDURE — 3079F PR MOST RECENT DIASTOLIC BLOOD PRESSURE 80-89 MM HG: ICD-10-PCS | Mod: CPTII,,, | Performed by: SURGERY

## 2023-07-06 PROCEDURE — 1159F MED LIST DOCD IN RCRD: CPT | Mod: CPTII,,, | Performed by: SURGERY

## 2023-07-06 PROCEDURE — 3075F PR MOST RECENT SYSTOLIC BLOOD PRESS GE 130-139MM HG: ICD-10-PCS | Mod: CPTII,,, | Performed by: SURGERY

## 2023-07-06 PROCEDURE — 99999 PR PBB SHADOW E&M-EST. PATIENT-LVL III: CPT | Mod: PBBFAC,,, | Performed by: SURGERY

## 2023-07-06 PROCEDURE — 99999 PR PBB SHADOW E&M-EST. PATIENT-LVL III: ICD-10-PCS | Mod: PBBFAC,,, | Performed by: SURGERY

## 2023-07-06 PROCEDURE — 3075F SYST BP GE 130 - 139MM HG: CPT | Mod: CPTII,,, | Performed by: SURGERY

## 2023-07-06 PROCEDURE — 3008F BODY MASS INDEX DOCD: CPT | Mod: CPTII,,, | Performed by: SURGERY

## 2023-07-06 PROCEDURE — 99213 PR OFFICE/OUTPT VISIT, EST, LEVL III, 20-29 MIN: ICD-10-PCS | Mod: S$PBB,,, | Performed by: SURGERY

## 2023-07-06 PROCEDURE — 99213 OFFICE O/P EST LOW 20 MIN: CPT | Mod: S$PBB,,, | Performed by: SURGERY

## 2023-07-06 PROCEDURE — 3044F HG A1C LEVEL LT 7.0%: CPT | Mod: CPTII,,, | Performed by: SURGERY

## 2023-07-06 PROCEDURE — 3044F PR MOST RECENT HEMOGLOBIN A1C LEVEL <7.0%: ICD-10-PCS | Mod: CPTII,,, | Performed by: SURGERY

## 2023-07-06 PROCEDURE — 3008F PR BODY MASS INDEX (BMI) DOCUMENTED: ICD-10-PCS | Mod: CPTII,,, | Performed by: SURGERY

## 2023-07-06 PROCEDURE — 99213 OFFICE O/P EST LOW 20 MIN: CPT | Mod: PBBFAC | Performed by: SURGERY

## 2023-07-06 PROCEDURE — 1159F PR MEDICATION LIST DOCUMENTED IN MEDICAL RECORD: ICD-10-PCS | Mod: CPTII,,, | Performed by: SURGERY

## 2023-07-07 LAB
ONEOME COMMENT: NORMAL
ONEOME METHOD: NORMAL

## 2023-07-08 NOTE — PROGRESS NOTES
[facility] for [facility] follow up visit. She underwent left skin sparing mastectomy, wise pattern, axillary lymph node dissection, and tissue expander placement on 2/27/23. She has recently completed radiation therapy which she tolerated well. She is interested in right prophylactic mastectomy with immediate bilateral KANDICE flap reconstruction. She has gained about 20lb since her cancer diagnosis and would like to know if weight loss is recommended. He is otherwise doing well. She denies fever, chills, nausea, vomiting or other systemic signs of infection.       ROS - negative, other than stated above    PHYSICAL EXAMINATION  Vitals:    07/06/23 1527   BP: 132/85   Pulse: 77     WD WN NAD  VSS  Normal respiratory effort  Right breast ptosis  L breast with filled tissue expander in place. No significant post radiation changes noted. Nipple absent.    ASSESSMENT/PLAN  37 y.o. F s/p left mastectomy with tissue expander placement here for long term f/u  - Doing well, no issues.   - We discussed second stage reconstruction. Patient plans to undergo prophylactic right mastectomy with immediate bilateral KANDICE flap reconstruction.  - She understands that we will wait 6 mo post radiation before scheduling surgery (December at earliest) to allow skin healing  - Discussed weight, current BMI is 35 and she has adequate donor tissue. Does not need to lose weight for surgery but encouraged her to work on weight loss goals prior to surgery so that her post operative contour will match her goal weight.   - RTC for preop visit once surgery is scheduled      All questions were answered. The patient was advised to contact the clinic with any questions or concerns prior to their next visit.       Angie Peck PA-C  Plastic and Reconstructive Surgery

## 2023-07-11 ENCOUNTER — DOCUMENTATION ONLY (OUTPATIENT)
Dept: REHABILITATION | Facility: HOSPITAL | Age: 38
End: 2023-07-11
Payer: MEDICAID

## 2023-07-11 DIAGNOSIS — R29.898 WEAKNESS OF LEFT UPPER EXTREMITY: ICD-10-CM

## 2023-07-11 DIAGNOSIS — R26.89 DECREASED FUNCTIONAL MOBILITY: ICD-10-CM

## 2023-07-11 DIAGNOSIS — M25.612 DECREASED RANGE OF MOTION OF LEFT SHOULDER: Primary | ICD-10-CM

## 2023-07-11 DIAGNOSIS — R29.3 POOR POSTURE: ICD-10-CM

## 2023-07-11 DIAGNOSIS — Z91.89 AT RISK FOR LYMPHEDEMA: ICD-10-CM

## 2023-07-11 NOTE — PROGRESS NOTES
[facility]   Physical Therapy Treatment Note      Name: Laura Kent  Clinic Number: [id number]    Therapy Diagnosis:   Encounter Diagnoses   Name Primary?    Decreased range of motion of left shoulder Yes    Weakness of left upper extremity     Poor posture     At risk for lymphedema     Decreased functional mobility      Physician: YASMANI Weiner MD    Visit Date: 7/12/2023    Physician Orders: PT Eval and Treat   Medical Diagnosis from Referral: Z90.12 (ICD-10-CM) - S/P left mastectomy  Evaluation Date: 06/12/2023  Authorization Period Expiration: 06/05/2024  Plan of Care Expiration: 08/11/2023  Progress Note Due: 08/05/2023  Visit # / Visits Authorized: 8 / 20 (pending, plus eval)  FOTO: 3/3     Precautions: standard and cancer     Time In: 10:02 AM   Time Out: 10:56 AM  Total Billable Time: 54 minutes (TE only per [place] [employer])      Subjective     Patient reports: began chemo regimen this week (14 days on, 7 days off) with noted increase in fatigue. Chest / breast continues to feel tight for no apparent reason.     She was compliant with home exercise program.    Response to Previous Treatment: no adverse events  Functional Change: no noticeable change since last visit    Pain: 5/10   Location: L breast, axilla    Fatigue: mild - moderate    Diagnosis: stage IIIC (cT3, cN2(f), cM0, G3, ER-, [place]-, HER2-) IDC of the LEFT breast    Treatment:   Chemotherapy: neoadjuvant completed 11/22  Radiation: completed 5/31/23  Endocrine Therapy: N/A     Surgery Date: LEFT mastectomy with TE placement with LEFT SLNB on 02/27/2023 per Myra Weiner and Denis.       Objective      Objective Measures updated at progress report unless specified.     LTR / Abduction Test: 07/3/2023  - Beginning of Session: 85 / 180 degrees  - End of Session: 115 / 180 degrees    Soft Tissue Restriction: mod-severe L anterior, lateral chest wall    Treatment     Laura received the treatments listed below:       Therapeutic exercises to increase cardiovascular endurance, flexibility, range of motion, and flexibility for 19 minutes:     Seated Exercises:  - Pulleys (flex, scap)    2 min each  - Biceps curls, nya (4#)    3 set x 10 reps - held  - Thoracic ext, horizontal 1/2 foam  1 set x 15 reps - held  - Physioball roll outs, 3 ways   2 min    Standing Exercises:  - Doorway pec stretch (overhead)  30 sec x 3 reps  - Triceps pushdowns (blue)   3 set x 10 reps - held  - Lifting from knee height, 10#  1 set x 15 reps - held  - Supinated lifts, 2#    2 set x 10 reps - held    Mat Exercises:   - Butterfly stretch (behind head)  1 min x 2 reps  - LTR / shoulder abd stretch (LUE only) 10 sec x 10 reps  - Open book stretch, LUE only  1 set x 10 reps - held  - Pec stretch on 1/2 foam, holding 2#  2 min - held  - Sleeper stretch (LUE only)   30 sec x 2 reps - held      Manual therapy techniques to decrease pain as well as to increase soft tissue mobility, joint mobility, mobility and pliability, and function for 35 minutes:     - Manual L axillary stretch  - Manual L pec attachment stretch  - Gentle mobilization of L tissue expander (medially and inferiorly)      Patient Education and Home Exercises       Education Provided Regarding:   - Role of physical therapy in multi-disciplinary team  - Goals for physical therapy, progress towards goals  - Physical therapy plan of care, scheduling  - Exercise technique, home exercise program  - Energy conservation techniques  - Consults to integrative oncology (meditation, yoga, acupuncture)    Written Home Exercises Provided: Patient instructed to cont prior HEP. Exercises were reviewed and Laura was able to demonstrate them prior to the end of the session. Laura demonstrated good  understanding of the education provided. See EMR under Patient Instructions for exercises provided during therapy sessions.    Assessment     Patient is responding well to physical therapy. Patient able to  perform new exercises and exercise progressions without increase in symptoms prior to leaving the clinic. Appreciated significant tightness through L axilla and breast upon initiating manual therapy techniques. Improved soft tissue mobility with manual axilla and anterior chest layer stretches. Will progress as tolerated.    Laura is progressing well towards her goals.   Patient prognosis is Good.     Patient will continue to benefit from skilled outpatient physical therapy to address the deficits listed in the problem list box on initial evaluation, provide patient / family education, and to maximize patient's level of independence in the home and community environment.     Patient's spiritual, cultural, and educational needs considered, and patient agreeable to plan of care and goals.     Anticipated barriers to physical therapy: none anticipated    GOALS:   Short Term Goals: 4 Weeks  Patient will demonstrate 100% understanding of lymphedema risk reduction practices, including self-monitoring for lymphedema (met, 07/05/2023).  Patient will demonstrate independence with established home exercise program (met, 07/05/2023).   Patient will increase LEFT shoulder ABDUCTION active range of motion to 150 degrees to improve functional reaching, carrying, pushing, and pulling pain-free (progressing, not met).   Patient will increase LEFT shoulder FLEXION active range of motion to 160 degrees to improve functional reaching, carrying, pushing, and pulling pain-free (progressing, not met).   Patient will increase strength to >5/5 in gross upper extremity musculature to improve tolerance to all functional activities pain-free (progressing, not met).      Long Term Goals: 8 Weeks   Patient will increase LEFT shoulder FLEXION active range of motion to 180 degrees to improve functional reaching, carrying, pushing, and pulling pain-free (progressing, not met).   Patient will increase LEFT shoulder ABDUCTION active range of motion  to 180 degrees to improve functional reaching, carrying, pushing, and pulling pain-free (progressing, not met).   Patient will increase strength to 4+/5 in gross upper extremity musculature to improve tolerance to all functional activities pain-free (progressing, not met).   Patient will demonstrate full / maximized tissue mobility to increase range of motion and promote healthy tissue to be pain-free at discharge (progressing, not met).   Patient will report decrease in overall worst pain to 2/10 at discharge (progressing, not met).      Plan     Plan of Care Certification: 06/12/2023 to 08/11/2023.    Outpatient physical therapy 2x week for 8 weeks to include the following: Manual Therapy, Neuromuscular Re-ed, Patient Education, Self Care, Therapeutic Activities, Therapeutic Exercise, and IASTM.    Ghada Brooks, PT    none known

## 2023-07-11 NOTE — PROGRESS NOTES
No Show Note/Documentation    Patient: Laura Kent  Date of Session: 7/11/2023  Diagnosis:   1. Decreased range of motion of left shoulder        2. Weakness of left upper extremity        3. Poor posture        4. At risk for lymphedema        5. Decreased functional mobility          MRN: [id number]    Laura Kent did not attend her scheduled therapy appointment today. She did not call to cancel nor reschedule. Next appointment is scheduled for 7/12/2023 and will follow up with patient at that time. No charges have been posted today.     Cancel: 6  No show: 1    Teresa Hurley, PT  7/10/2023

## 2023-07-12 ENCOUNTER — OFFICE VISIT (OUTPATIENT)
Dept: RADIATION ONCOLOGY | Facility: CLINIC | Age: 38
End: 2023-07-12
Payer: MEDICAID

## 2023-07-12 ENCOUNTER — CLINICAL SUPPORT (OUTPATIENT)
Dept: REHABILITATION | Facility: HOSPITAL | Age: 38
End: 2023-07-12
Payer: MEDICAID

## 2023-07-12 VITALS
TEMPERATURE: 98 F | BODY MASS INDEX: 35.93 KG/M2 | WEIGHT: 222.63 LBS | OXYGEN SATURATION: 100 % | HEART RATE: 76 BPM | DIASTOLIC BLOOD PRESSURE: 80 MMHG | SYSTOLIC BLOOD PRESSURE: 124 MMHG

## 2023-07-12 DIAGNOSIS — R29.898 WEAKNESS OF LEFT UPPER EXTREMITY: ICD-10-CM

## 2023-07-12 DIAGNOSIS — M25.612 DECREASED RANGE OF MOTION OF LEFT SHOULDER: Primary | ICD-10-CM

## 2023-07-12 DIAGNOSIS — R29.3 POOR POSTURE: ICD-10-CM

## 2023-07-12 DIAGNOSIS — Z17.1 MALIGNANT NEOPLASM OF UPPER-OUTER QUADRANT OF LEFT BREAST IN FEMALE, ESTROGEN RECEPTOR NEGATIVE: Primary | ICD-10-CM

## 2023-07-12 DIAGNOSIS — C50.412 MALIGNANT NEOPLASM OF UPPER-OUTER QUADRANT OF LEFT BREAST IN FEMALE, ESTROGEN RECEPTOR NEGATIVE: Primary | ICD-10-CM

## 2023-07-12 DIAGNOSIS — Z91.89 AT RISK FOR LYMPHEDEMA: ICD-10-CM

## 2023-07-12 DIAGNOSIS — R26.89 DECREASED FUNCTIONAL MOBILITY: ICD-10-CM

## 2023-07-12 PROCEDURE — 1159F MED LIST DOCD IN RCRD: CPT | Mod: CPTII,,, | Performed by: RADIOLOGY

## 2023-07-12 PROCEDURE — 99024 POSTOP FOLLOW-UP VISIT: CPT | Mod: ,,, | Performed by: RADIOLOGY

## 2023-07-12 PROCEDURE — 3044F HG A1C LEVEL LT 7.0%: CPT | Mod: CPTII,,, | Performed by: RADIOLOGY

## 2023-07-12 PROCEDURE — 97110 THERAPEUTIC EXERCISES: CPT

## 2023-07-12 PROCEDURE — 3044F PR MOST RECENT HEMOGLOBIN A1C LEVEL <7.0%: ICD-10-PCS | Mod: CPTII,,, | Performed by: RADIOLOGY

## 2023-07-12 PROCEDURE — 99999 PR PBB SHADOW E&M-EST. PATIENT-LVL IV: CPT | Mod: PBBFAC,,, | Performed by: RADIOLOGY

## 2023-07-12 PROCEDURE — 1159F PR MEDICATION LIST DOCUMENTED IN MEDICAL RECORD: ICD-10-PCS | Mod: CPTII,,, | Performed by: RADIOLOGY

## 2023-07-12 PROCEDURE — 99024 PR POST-OP FOLLOW-UP VISIT: ICD-10-PCS | Mod: ,,, | Performed by: RADIOLOGY

## 2023-07-12 PROCEDURE — 3074F PR MOST RECENT SYSTOLIC BLOOD PRESSURE < 130 MM HG: ICD-10-PCS | Mod: CPTII,,, | Performed by: RADIOLOGY

## 2023-07-12 PROCEDURE — 3008F PR BODY MASS INDEX (BMI) DOCUMENTED: ICD-10-PCS | Mod: CPTII,,, | Performed by: RADIOLOGY

## 2023-07-12 PROCEDURE — 3079F PR MOST RECENT DIASTOLIC BLOOD PRESSURE 80-89 MM HG: ICD-10-PCS | Mod: CPTII,,, | Performed by: RADIOLOGY

## 2023-07-12 PROCEDURE — 99999 PR PBB SHADOW E&M-EST. PATIENT-LVL IV: ICD-10-PCS | Mod: PBBFAC,,, | Performed by: RADIOLOGY

## 2023-07-12 PROCEDURE — 99214 OFFICE O/P EST MOD 30 MIN: CPT | Mod: PBBFAC | Performed by: RADIOLOGY

## 2023-07-12 PROCEDURE — 3079F DIAST BP 80-89 MM HG: CPT | Mod: CPTII,,, | Performed by: RADIOLOGY

## 2023-07-12 PROCEDURE — 3074F SYST BP LT 130 MM HG: CPT | Mod: CPTII,,, | Performed by: RADIOLOGY

## 2023-07-12 PROCEDURE — 3008F BODY MASS INDEX DOCD: CPT | Mod: CPTII,,, | Performed by: RADIOLOGY

## 2023-07-12 RX ORDER — TRANEXAMIC ACID 650 MG/1
TABLET ORAL
COMMUNITY
Start: 2023-06-09 | End: 2023-11-30

## 2023-07-12 NOTE — PROGRESS NOTES
DEPARTMENT OF RADIATION ONCOLOGY  FOLLOW-UP NOTE        Patient Name: Laura Kent  MRN: [id number]  : 1985    DIAGNOSIS:  Cancer Staging   Malignant neoplasm of upper-outer quadrant of left breast in female, estrogen receptor negative  Staging form: Breast, AJCC 8th Edition  - Clinical stage from 3/21/2022: Stage IIIC (cT3, cN2(f), cM0, G3, ER-, VA-, HER2-) - Signed by Yessy Cruz MD on 3/21/2022      PATIENT IDENTIFICATION:  The patient is a 37-year-old woman with a triple negative left breast cancer.  She is status post neoadjuvant chemotherapy with residual disease in the axilla.  She is been referred to our department for consideration of postmastectomy radiation therapy.     The patient palpated a mass in the left breast late .     Outside mammogram was performed on 2022.  Imaging revealed a 3.1 x 1.8 x 2.1 cm mass in the left breast at the 2 o'clock position in the posterior depth with associated left axillary lymphadenopathy.  The largest lymph node measured 4.9 x 2.3 x 2.9 cm with cortical thickening and effacement of the hilum.        The patient underwent biopsy of the left breast mass and axillary lymph node on 3/14/22 which revealed grade 3 invasive ductal carcinoma.  On immunohistochemistry, the tumor was ERPR negative and HER2 Sal negative.  The Ki-67 proliferative index was 80%.     MRI Breast 3/16/22:  Left  There is a 38 mm x 33 mm x 33 mm irregularly shaped mass seen in the left breast at 2 o'clock in the posterior depth, 8.4 cm from the nipple and 1.2 cm from the skin. Associated signal void from a twirl biopsy marker; pathology showed invasive ductal carcinoma.      Posterior to the mass there is abnormal non mass enhancement measuring 29 x 28 mm. The NME is 4 mm from the skin and 2.3 cm from the chest wall. In total the mass and NME measure 54 mm in AP extent.     Overlying skin thickening and edema involving the lateral breast, likely from lymphovascular  obstruction. No suspicious skin enhancement. Overall increased vascularity to the left breast.      Four abnormal lymph level 1 and level 2 left axillary lymph nodes. The largest lymph node measures 52 mm x 40 mm x 38 mm which previously underwent biopsy showing metastatic disease. There is an associated radar reflector within the biopsied lymph node.     Right  There is no evidence of suspicious masses, abnormal enhancement, or other abnormal findings in the right breast. No enlarged axillary or internal mammary lymph nodes.      Impression:  Left  Mass: Left breast 38 mm x 33 mm x 33 mm mass at the posterior 2 o'clock position. Assessment: 6 - Known biopsy, proven malignancy. Associated 29 mm NME extending posteriorly from the mass. In total the mass and NME measure 54 mm in AP extent.  Lymph Node: Abnormal level 1 and 2 left axillary lymph nodes, the largest of which measures 52 mm x 40 mm x 38 mm lymph node and is known biopsy, proven malignancy.      The patient received neoadjuvant chemotherapy under the care of Dr. Cruz.  Treatment was complicated by hospitalization for pancreatitis.     The patient was taken to the operating room on 02/27/2023 for left modified radical mastectomy.  She was found to have no residual disease within the breast.  1/21 lymph nodes contained a macro metastasis measuring 4 mm.  Oncology History   Malignant neoplasm of upper-outer quadrant of left breast in female, estrogen receptor negative   3/13/2022 Initial Diagnosis    Malignant neoplasm of upper-outer quadrant of left breast in female, estrogen receptor negative     3/14/2022 Biopsy    1. LEFT BREAST MASS, 2 O'CLOCK, BIOPSY:   Invasive ductal carcinoma, [place] histologic grade 3 (tubule formation:   3, nuclear pleomorphism:  3, mitotic activity:  3).   Comment:  Infiltrating carcinoma occupies the entirety of biopsied material   with the largest continuous focus measuring 13 mm.  Breast biomarkers are   pending and will  be issued in a supplemental report.   2. LEFT AXILLARY LYMPH NODE, BIOPSY:   Invasive ductal carcinoma, [place] histologic grade 3 (tubule formation:   3, nuclear pleomorphism:  3, mitotic activity:  3).   Comment:  Infiltrating carcinoma occupies the entirety of biopsied material   with the largest continuous focus measuring 20 mm.  No lymph node tissue is   identified in the sample.  Tumor histology is essentially identical to that   of part 1.  The findings could represent part of a larger intranodal   metastasis, but an extension from the primary tumor cannot be excluded.   Radiographic correlation is advised     3/14/2022 Breast Tumor Markers    BREAST BIOMARKER RESULTS   Estrogen receptor (ER):  Negative (0)   Progesterone receptor (ER):  Negative (0)   HER2 IHC:  Negative (1+)   Ki-67 proliferation index:  80%        3/21/2022 Cancer Staged    Staging form: Breast, AJCC 8th Edition  - Clinical stage from 3/21/2022: Stage IIIC (cT3, cN2(f), cM0, G3, ER-, [place]-, HER2-)     3/22/2022 Genetic Testing    [employer]: Negative, AXIN2 and RNF43 VUS noted.      4/13/2022 - 12/31/2022 Chemotherapy    Treatment Summary   Plan Name: OP PEMBROLIZUMAB CARBOPLATIN (AUC 5) WITH WEEKLY PACLITAXEL FOLLOWED BY DOSE DENSE DOXORUBICIN CYCLOPHOSPHAMIDE Q2W  Treatment Goal: Curative  Status: Inactive  Start Date: 4/13/2022  End Date: 12/31/2022  Provider: Yessy Cruz MD  Chemotherapy: DOXOrubicin chemo injection 130 mg, 60 mg/m2 = 130 mg, Intravenous, Clinic/HOD 1 time, 3 of 4 cycles  Administration: 130 mg (11/21/2022), 130 mg (12/5/2022), 130 mg (12/29/2022)  CARBOplatin (PARAPLATIN) 750 mg in sodium chloride 0.9% 250 mL chemo infusion, 750 mg, Intravenous, Clinic/HOD 1 time, 4 of 4 cycles  Administration: 750 mg (4/13/2022), 750 mg (6/13/2022), 750 mg (8/15/2022)  cyclophosphamide 600 mg/m2 = 1,300 mg in sodium chloride 0.9% 250 mL chemo infusion, 600 mg/m2 = 1,300 mg, Intravenous, Clinic/HOD 1 time, 3 of 4  cycles  Administration: 1,300 mg (11/21/2022), 1,300 mg (12/5/2022), 1,300 mg (12/29/2022)  PACLitaxeL (TAXOL) 80 mg/m2 = 174 mg in sodium chloride 0.9% 250 mL chemo infusion, 80 mg/m2 = 174 mg, Intravenous, [facility] 1 time, 4 of 4 cycles  Administration: 174 mg (4/13/2022), 174 mg (5/23/2022), 174 mg (6/6/2022), 174 mg (6/13/2022), 174 mg (6/20/2022), 174 mg (7/6/2022), 174 mg (7/18/2022), 174 mg (7/25/2022), 174 mg (8/2/2022), 174 mg (8/15/2022), 174 mg (8/22/2022), 174 mg (8/8/2022), 174 mg (8/29/2022)     2/27/2023 Breast Surgery    Left Mastectomy with L ALND and TE placement     3/21/2023 Tumor Conference    No residual disease noted on final pathology in the breast however patient did have 1/17 nodes positive on ALND with 4 mm of disease. Patient has a TE in place. Team discussed XRT. Radiation Oncology would recommend XRT given age and extent of disease at time of diagnosis. Patient will also proceed with adjuvant Xeloda.        4/27/2023 - 5/31/2023 Radiation Therapy    Treating physician: Dr. Viry Bess  Total Dose: 50 Gy  Fractions: 25         RADIATION:  Treatment Summary  Course: C1 Breast 2023    Treatment Site Ref. ID Energy Dose/Fx (Gy) #Fx Dose Correction (Gy) Total Dose (Gy) Start Date End Date Elapsed Days   IM Breast_L PTV_Eval 6X 2 25 / 25 0 50 4/27/2023 5/31/2023 34         HISTORY OF PRESENT ILLNESS: Ms. Kent returns to clinic today for routine post-radiation follow-up.  The patient reports doing well post radiation treatment.  She is now receiving chemotherapy with Xeloda.  She reports nausea.    REVIEW OF SYSTEMS:   Review of Systems   Constitutional:  Positive for malaise/fatigue. Negative for fever and weight loss.   HENT:  Negative for ear pain, hearing loss, sinus pain and sore throat.    Eyes:  Positive for blurred vision. Negative for double vision and pain.   Respiratory:  Negative for cough, hemoptysis, shortness of breath and wheezing.    Cardiovascular:  Negative for chest  pain, palpitations and leg swelling.   Gastrointestinal:  Positive for diarrhea and nausea. Negative for abdominal pain, blood in stool, constipation, heartburn and vomiting.   Genitourinary:  Negative for dysuria, frequency, hematuria and urgency.   Musculoskeletal:  Negative for back pain and joint pain.   Skin:  Positive for itching. Negative for rash.   Neurological:  Negative for tingling, focal weakness, seizures and headaches.   Psychiatric/Behavioral:  Negative for depression. The patient is nervous/anxious.        ALLERGIES:   Review of patient's allergies indicates:   Allergen Reactions    Strawberries [strawberry] Hives       MEDICATIONS:  Current Outpatient Medications   Medication Sig    capecitabine (XELODA) 150 MG tablet Take 1 tablet (150 mg total) by mouth 2 (two) times daily for 14 days followed by 7 days off. Take with 1 other capecitabine prescription for 2,150 mg total.    capecitabine (XELODA) 500 MG Tab Take 4 tablets (2,000 mg total) by mouth 2 (two) times daily for 14 days followed by 7 days off. Take with 1 other capecitabine prescription for 2,150 mg total.    cetirizine (ZYRTEC) 10 MG tablet Take 10 mg by mouth once daily.    clotrimazole (LOTRIMIN) 1 % cream Apply 1 application topically every evening.    DUPIXENT  mg/2 mL PnIj INJECT 300MG UNDER THE SKIN EVERY 2 WEEKS AS DIRECTED    hydrOXYzine HCL (ATARAX) 10 MG Tab Take 10 mg by mouth nightly as needed.    ibuprofen (ADVIL,MOTRIN) 600 MG tablet Take 600 mg by mouth 3 (three) times daily.    ketoconazole (NIZORAL) 2 % shampoo Apply topically Every 3 (three) days.    lipase-protease-amylase 24,000-76,000-120,000 units (CREON) 24,000-76,000 -120,000 unit capsule Take 1 capsule by mouth 3 (three) times daily with meals.    mupirocin (BACTROBAN) 2 % ointment Apply topically once daily.    naproxen (NAPROSYN) 500 MG tablet Take 1 tablet (500 mg total) by mouth every 12 (twelve) hours as needed.    nystatin (MYCOSTATIN) ointment Apply  topically 2 (two) times daily.    nystatin (MYCOSTATIN) powder Apply topically 2 (two) times daily.    ondansetron (ZOFRAN) 8 MG tablet Take 1 tablet (8 mg total) by mouth every 12 (twelve) hours as needed for Nausea.    pantoprazole (PROTONIX) 40 MG tablet Take 1 tablet (40 mg total) by mouth once daily.    traMADoL (ULTRAM) 50 mg tablet Take 1 tablet (50 mg total) by mouth every 6 (six) hours as needed for Pain.    tranexamic acid (LYSTEDA) 650 mg tablet     clobetasoL (TEMOVATE) 0.05 % external solution Apply topically.    gabapentin (NEURONTIN) 300 MG capsule Take 2 capsules (600 mg total) by mouth 3 (three) times daily. for 7 days    oxyCODONE (ROXICODONE) 5 MG immediate release tablet Take 1 tablet (5 mg total) by mouth every 4 (four) hours as needed for Pain.    prednisoLONE acetate (PRED FORTE) 1 % DrpS Place 1 drop into both eyes 2 (two) times daily.    triamcinolone acetonide 0.1% (KENALOG) 0.1 % cream Apply 1 g topically 2 (two) times daily.     No current facility-administered medications for this visit.     Facility-Administered Medications Ordered in Other Visits   Medication    ceFAZolin 1 g, gentamicin 80 mg in sodium chloride 0.9% 500 mL irrigation           PHYSICAL EXAMINATION:  Vitals:    23 0914   BP: 124/80   Pulse: 76   Temp: 98.1 °F (36.7 °C)     ECO  Body mass index is 35.93 kg/m².   Physical Exam  Constitutional:       Appearance: She is well-developed.   HENT:      Head: Normocephalic and atraumatic.   Eyes:      Conjunctiva/sclera: Conjunctivae normal.   Cardiovascular:      Rate and Rhythm: Normal rate.   Pulmonary:      Effort: Pulmonary effort is normal.   Chest:          Comments: Left breast mound with slight hyperpigmentation  Abdominal:      Palpations: Abdomen is soft.   Musculoskeletal:         General: Normal range of motion.      Cervical back: Normal range of motion and neck supple.   Skin:     General: Skin is warm and dry.   Neurological:      Mental Status: She  is alert and oriented to person, place, and time.   Psychiatric:         Behavior: Behavior normal.         Thought Content: Thought content normal.        ASSESSMENT/PLAN:  Laura was seen today for follow-up.    Diagnoses and all orders for this visit:    Malignant neoplasm of upper-outer quadrant of left breast in female, estrogen receptor negative    The patient is recovering well from the acute effects of radiation treatment.  She was advised on continued skin care with a mild moisturizer such as Eucerin or vitamin-E oil.  Reconstruction planned in November 2023.  Continue follow-up with medical oncologist.  Returned to clinic as needed in the future.

## 2023-07-13 ENCOUNTER — SPECIALTY PHARMACY (OUTPATIENT)
Dept: PHARMACY | Facility: CLINIC | Age: 38
End: 2023-07-13
Payer: MEDICAID

## 2023-07-14 ENCOUNTER — TELEPHONE (OUTPATIENT)
Dept: OPHTHALMOLOGY | Facility: CLINIC | Age: 38
End: 2023-07-14
Payer: MEDICAID

## 2023-07-14 ENCOUNTER — OFFICE VISIT (OUTPATIENT)
Dept: OPTOMETRY | Facility: CLINIC | Age: 38
End: 2023-07-14
Payer: MEDICAID

## 2023-07-14 DIAGNOSIS — H18.821 CORNEAL ABRASION DUE TO CONTACT LENS, RIGHT: Primary | ICD-10-CM

## 2023-07-14 PROCEDURE — 1159F PR MEDICATION LIST DOCUMENTED IN MEDICAL RECORD: ICD-10-PCS | Mod: CPTII,,, | Performed by: OPTOMETRIST

## 2023-07-14 PROCEDURE — 3044F PR MOST RECENT HEMOGLOBIN A1C LEVEL <7.0%: ICD-10-PCS | Mod: CPTII,,, | Performed by: OPTOMETRIST

## 2023-07-14 PROCEDURE — 99999 PR PBB SHADOW E&M-EST. PATIENT-LVL III: ICD-10-PCS | Mod: PBBFAC,,, | Performed by: OPTOMETRIST

## 2023-07-14 PROCEDURE — 1159F MED LIST DOCD IN RCRD: CPT | Mod: CPTII,,, | Performed by: OPTOMETRIST

## 2023-07-14 PROCEDURE — 92002 PR EYE EXAM, NEW PATIENT,INTERMED: ICD-10-PCS | Mod: S$PBB,,, | Performed by: OPTOMETRIST

## 2023-07-14 PROCEDURE — 3044F HG A1C LEVEL LT 7.0%: CPT | Mod: CPTII,,, | Performed by: OPTOMETRIST

## 2023-07-14 PROCEDURE — 92002 INTRM OPH EXAM NEW PATIENT: CPT | Mod: S$PBB,,, | Performed by: OPTOMETRIST

## 2023-07-14 PROCEDURE — 99213 OFFICE O/P EST LOW 20 MIN: CPT | Mod: PBBFAC | Performed by: OPTOMETRIST

## 2023-07-14 PROCEDURE — 99999 PR PBB SHADOW E&M-EST. PATIENT-LVL III: CPT | Mod: PBBFAC,,, | Performed by: OPTOMETRIST

## 2023-07-14 NOTE — TELEPHONE ENCOUNTER
----- Message from Lyudmila Torrez sent at 7/14/2023  9:09 AM CDT -----  Regarding: Schedule  Pt called about being seen today for a white film over right eye.    Call back-[phone]

## 2023-07-14 NOTE — TELEPHONE ENCOUNTER
Pt called triage complaining of white film over OD used steroid gtts last night and this morning and no improvement . Also had FBS and irritation and blurry vision.Wears Soft contact lens and denies sleeping in them. Scheduled appt for 2:00 today with Dr. Castellon.

## 2023-07-17 ENCOUNTER — CLINICAL SUPPORT (OUTPATIENT)
Dept: REHABILITATION | Facility: HOSPITAL | Age: 38
End: 2023-07-17
Payer: MEDICAID

## 2023-07-17 DIAGNOSIS — M25.612 DECREASED RANGE OF MOTION OF LEFT SHOULDER: Primary | ICD-10-CM

## 2023-07-17 DIAGNOSIS — Z91.89 AT RISK FOR LYMPHEDEMA: ICD-10-CM

## 2023-07-17 DIAGNOSIS — R29.898 WEAKNESS OF LEFT UPPER EXTREMITY: ICD-10-CM

## 2023-07-17 DIAGNOSIS — R26.89 DECREASED FUNCTIONAL MOBILITY: ICD-10-CM

## 2023-07-17 DIAGNOSIS — R29.3 POOR POSTURE: ICD-10-CM

## 2023-07-17 PROCEDURE — 97110 THERAPEUTIC EXERCISES: CPT

## 2023-07-17 NOTE — PROGRESS NOTES
[facility]   Physical Therapy Treatment Note      Name: Laura Kent  Clinic Number: [id number]    Therapy Diagnosis:   Encounter Diagnoses   Name Primary?    Decreased range of motion of left shoulder Yes    Weakness of left upper extremity     Poor posture     At risk for lymphedema     Decreased functional mobility        Physician: YASMANI Weiner MD    Visit Date: 7/17/2023    Physician Orders: PT Eval and Treat   Medical Diagnosis from Referral: Z90.12 (ICD-10-CM) - S/P left mastectomy  Evaluation Date: 06/12/2023  Authorization Period Expiration: 06/05/2024  Plan of Care Expiration: 08/11/2023  Progress Note Due: 08/05/2023  Visit # / Visits Authorized: 9 / 20 (pending, plus eval)  FOTO: 3/3     Precautions: standard and cancer     Time In:  9:09 am (late arrival)  Time Out: 10:00 am  Total Billable Time: 51 minutes (TE only per [place] [employer])      Subjective     Patient reports: she did some catering over the weekend with no adverse events. The heaviest thing she picked up was a 24 pack of small water bottles. She also carried several bags carried on R arm, but this may be her habit now. She is still having some tightness under her arm and into her back when raising her arm up. The stinging/pinching feeling in her left arm has not been coming as often.    She was compliant with home exercise program.    Response to Previous Treatment: increased soreness  Functional Change: no noticeable change since last visit    Pain: 3/10 when lifting arm  Location: L breast, axilla    Fatigue: 5-6/10    Diagnosis: stage IIIC (cT3, cN2(f), cM0, G3, ER-, [place]-, HER2-) IDC of the LEFT breast    Treatment:   Chemotherapy: neoadjuvant completed 11/22  Radiation: completed 5/31/23  Endocrine Therapy: N/A     Surgery Date: LEFT mastectomy with TE placement with LEFT SLNB on 02/27/2023 per Myra Weiner and Denis.       Objective      Objective Measures updated at progress report unless specified.      LTR / Abduction Test: 07/17/2023  - Beginning of Session: 105 / 180 degrees  - End of Session: 165 / 180 degrees    Soft Tissue Restriction: mod-severe L anterior, lateral chest wall    Treatment     Laura received the treatments listed below:      Therapeutic exercises to increase cardiovascular endurance, flexibility, range of motion, and flexibility for 21 minutes:     Seated Exercises:  - Pulleys (flex, scap)    2 min each  - Biceps curls, nya (4#)    3 set x 10 reps - held  - Thoracic ext, horizontal 1/2 foam  1 set x 15 reps - held  - Physioball roll outs, 3 ways   2 min    Standing Exercises:  - Doorway pec stretch (overhead)  30 sec x 3 reps  - Triceps pushdowns (blue)   3 set x 10 reps - held  - Lifting from knee height, 10#  1 set x 15 reps - held  - Supinated lifts, 2#    2 set x 10 reps - held    Mat Exercises:   - Butterfly stretch (behind head)  1 min x 2 reps  - LTR / shoulder abd stretch (LUE only) 10 sec x 10 reps  - Open book stretch, LUE only  1 set x 10 reps   - Pec stretch on 1/2 foam, holding 2#  2 min - held  - Sleeper stretch (LUE only)   30 sec x 2 reps - held      Manual therapy techniques to decrease pain as well as to increase soft tissue mobility, joint mobility, mobility and pliability, and function for 30 minutes:     - Manual L axillary stretch  - Manual L pec attachment stretch  - Gentle mobilization of L tissue expander (medially and inferiorly)      Patient Education and Home Exercises       Education Provided Regarding:   - Role of physical therapy in multi-disciplinary team  - Goals for physical therapy, progress towards goals  - Physical therapy plan of care, scheduling  - Exercise technique, home exercise program  - Energy conservation techniques  - Consults to integrative oncology (meditation, yoga, acupuncture)    Written Home Exercises Provided: Patient instructed to cont prior HEP. Exercises were reviewed and Laura was able to demonstrate them prior to the end of the  session. Laura demonstrated good  understanding of the education provided. See EMR under Patient Instructions for exercises provided during therapy sessions.    Assessment     Patient is responding well to physical therapy. Patient demonstrated an increase in her soft tissue mobility at the start of the session as noted in the objective section. She continues to respond well to manual therapy as she again displayed decreased soft tissue restrictions afterwards as noted. She was able to perform all of today's activities with no increase in symptoms prior to leaving the clinic. Will progress as tolerated.    Laura is progressing well towards her goals.   Patient prognosis is Good.     Patient will continue to benefit from skilled outpatient physical therapy to address the deficits listed in the problem list box on initial evaluation, provide patient / family education, and to maximize patient's level of independence in the home and community environment.     Patient's spiritual, cultural, and educational needs considered, and patient agreeable to plan of care and goals.     Anticipated barriers to physical therapy: none anticipated    GOALS:   Short Term Goals: 4 Weeks  Patient will demonstrate 100% understanding of lymphedema risk reduction practices, including self-monitoring for lymphedema (met, 07/05/2023).  Patient will demonstrate independence with established home exercise program (met, 07/05/2023).   Patient will increase LEFT shoulder ABDUCTION active range of motion to 150 degrees to improve functional reaching, carrying, pushing, and pulling pain-free (progressing, not met).   Patient will increase LEFT shoulder FLEXION active range of motion to 160 degrees to improve functional reaching, carrying, pushing, and pulling pain-free (progressing, not met).   Patient will increase strength to >5/5 in gross upper extremity musculature to improve tolerance to all functional activities pain-free (progressing, not  met).      Long Term Goals: 8 Weeks   Patient will increase LEFT shoulder FLEXION active range of motion to 180 degrees to improve functional reaching, carrying, pushing, and pulling pain-free (progressing, not met).   Patient will increase LEFT shoulder ABDUCTION active range of motion to 180 degrees to improve functional reaching, carrying, pushing, and pulling pain-free (progressing, not met).   Patient will increase strength to 4+/5 in gross upper extremity musculature to improve tolerance to all functional activities pain-free (progressing, not met).   Patient will demonstrate full / maximized tissue mobility to increase range of motion and promote healthy tissue to be pain-free at discharge (progressing, not met).   Patient will report decrease in overall worst pain to 2/10 at discharge (progressing, not met).      Plan     Plan of Care Certification: 06/12/2023 to 08/11/2023.    Outpatient physical therapy 2x week for 8 weeks to include the following: Manual Therapy, Neuromuscular Re-ed, Patient Education, Self Care, Therapeutic Activities, Therapeutic Exercise, and IASTM.    Teresa Hurley, PT

## 2023-07-17 NOTE — PROGRESS NOTES
HPI    Pt here for urgent visit of irritated OD  Pt states OD is swollen and extremely painful  Pt did not sleep in contacts   Pt states OD has just become irritated this morning   Pt not exactly sure what caused the irritation   Pt says earlier in morning she was able to see from OD but as the day   progressed, OD vision has just become white and cloudy.   Pt states feelings of foreign body sensation in eye in RUL.   Pt states she flushed OD out with water and clear eye drops but that doing   that only seems to have made the problem worse.  Pt states she feels pain in OD even when using ocular muscles of OS to   look left,right,up,down  Pt states OD is warm to touch   Pt state she was given a pain reliever drop in the [facility] earlier today   and had relief for a while.   Last edited by Vera Castellon, OD on 7/17/2023 11:59 AM.            Assessment /Plan     For exam results, see Encounter Report.    Corneal abrasion due to contact lens, right      Educated pt on today's findings and initiated gtt therapy in-office: instilled 1 gtt Cyclo OD in office without complication for pain relief followed by a thin ribbon of Ciloxan ritu into OD in office without complication. Pt instructed to continue Ciloxan ritu instillation qid OD until follow up on Monday, 7/17/23. Pt also instructed to use 1 gtt Cyclo OD daily as needed for pain relief until follow up.  Pt to stay out of OD CL until condition resolves.  Pt states her current CLs constantly dry out eyes and cause them to produce mucous--abrasion likely caused during CL removal. Pt to return for CL re-fit once condition resolves.

## 2023-07-18 NOTE — PROGRESS NOTES
[facility]   Physical Therapy Treatment Note      Name: Laura Kent  Clinic Number: [id number]    Therapy Diagnosis:   Encounter Diagnoses   Name Primary?    Decreased range of motion of left shoulder Yes    Weakness of left upper extremity     Poor posture     At risk for lymphedema     Decreased functional mobility      Physician: YASMANI Weiner MD    Visit Date: 7/19/2023    Physician Orders: PT Eval and Treat   Medical Diagnosis from Referral: Z90.12 (ICD-10-CM) - S/P left mastectomy  Evaluation Date: 06/12/2023  Authorization Period Expiration: 06/05/2024  Plan of Care Expiration: 08/11/2023  Progress Note Due: 08/05/2023  Visit # / Visits Authorized: 9 / 20 (pending, plus eval)  FOTO: 3/3     Precautions: standard and cancer     Time In: 9:03 AM  Time Out: 10:00 AM  Total Billable Time: 57 minutes (TE only per [place] Medicaid)      Subjective     Patient reports: less muscle soreness this morning compared to previous visit. Patient able to  lighter objects with LUE "without thinking anymore."     She was compliant with home exercise program.    Response to Previous Treatment: slight muscle soreness  Functional Change: less tightness in lateral breast with LUE activity    Pain: 3/10 (at beginning of session); 6/10 (yesterday, 07/18/2023)  Location: chest    Fatigue: mild    Diagnosis: stage IIIC (cT3, cN2(f), cM0, G3, ER-, MD-, HER2-) IDC of the LEFT breast    Treatment:   Chemotherapy: neoadjuvant completed 11/22  Radiation: completed 5/31/23  Endocrine Therapy: N/A     Surgery Date: LEFT mastectomy with TE placement with LEFT SLNB on 02/27/2023 per Myra Weiner and Denis.       Objective      Objective Measures updated at progress report unless specified.     LTR / Abduction Test: 07/17/2023  - Beginning of Session: 105 / 180 degrees  - End of Session: 165 / 180 degrees    Soft Tissue Restriction: mod-severe L anterior, lateral chest wall    Treatment     Laura " received the treatments listed below:      Therapeutic exercises to increase cardiovascular endurance, flexibility, range of motion, and flexibility for 32 minutes:     Seated Exercises:  - Pulleys (flex, scap)    2 min each  - Physioball roll outs, 3 ways   2 min  - Biceps curls, nya (4#)    2 set x 10 reps  - Thoracic ext, horizontal 1/2 foam  1 set x 10 reps    Standing Exercises:  - Doorway pec stretch (overhead)  30 sec x 3 reps  - Triceps pushdowns (blue)   3 set x 10 reps - held  - Lifting from knee height, 10#  1 set x 15 reps - held  - Supinated lifts, 2#    2 set x 10 reps - held    Mat Exercises:   - Butterfly stretch (behind head)  1 min x 2 reps  - LTR / shoulder abd stretch (LUE only) 10 sec x 10 reps  - Open book stretch, LUE only  10 set x 10 reps  - Sleeper stretch (LUE only)   30 sec x 2 reps - held      Manual therapy techniques to decrease pain as well as to increase soft tissue mobility, joint mobility, mobility and pliability, and function for 25 minutes:     - Manual L axillary stretch  - Manual L pec attachment stretch  - Gentle mobilization of L tissue expander (medially and inferiorly)      Patient Education and Home Exercises       Education Provided Regarding:   - Role of physical therapy in multi-disciplinary team  - Goals for physical therapy, progress towards goals  - Physical therapy plan of care, scheduling  - Exercise technique, home exercise program  - Energy conservation techniques  - Consults to integrative oncology (meditation, yoga, acupuncture)    Written Home Exercises Provided: Patient instructed to cont prior HEP. Exercises were reviewed and Laura was able to demonstrate them prior to the end of the session. Laura demonstrated good  understanding of the education provided. See EMR under Patient Instructions for exercises provided during therapy sessions.    Assessment     Patient is responding well to physical therapy. Patient able to perform new exercises and exercise  progressions without increase in symptoms prior to leaving the clinic. Noted improved soft tissue mobility of left anterior chest wall. Resumed resisted biceps curls without increase in symptoms. Trial 1x/week frequency in upcoming weeks and monitor tolerance. Will progress as tolerated.    Laura is progressing well towards her goals.   Patient prognosis is Good.     Patient will continue to benefit from skilled outpatient physical therapy to address the deficits listed in the problem list box on initial evaluation, provide patient / family education, and to maximize patient's level of independence in the home and community environment.     Patient's spiritual, cultural, and educational needs considered, and patient agreeable to plan of care and goals.     Anticipated barriers to physical therapy: none anticipated    GOALS:   Short Term Goals: 4 Weeks  Patient will demonstrate 100% understanding of lymphedema risk reduction practices, including self-monitoring for lymphedema (met, 07/05/2023).  Patient will demonstrate independence with established home exercise program (met, 07/05/2023).   Patient will increase LEFT shoulder ABDUCTION active range of motion to 150 degrees to improve functional reaching, carrying, pushing, and pulling pain-free (progressing, not met).   Patient will increase LEFT shoulder FLEXION active range of motion to 160 degrees to improve functional reaching, carrying, pushing, and pulling pain-free (progressing, not met).   Patient will increase strength to >5/5 in gross upper extremity musculature to improve tolerance to all functional activities pain-free (progressing, not met).      Long Term Goals: 8 Weeks   Patient will increase LEFT shoulder FLEXION active range of motion to 180 degrees to improve functional reaching, carrying, pushing, and pulling pain-free (progressing, not met).   Patient will increase LEFT shoulder ABDUCTION active range of motion to 180 degrees to improve  functional reaching, carrying, pushing, and pulling pain-free (progressing, not met).   Patient will increase strength to 4+/5 in gross upper extremity musculature to improve tolerance to all functional activities pain-free (progressing, not met).   Patient will demonstrate full / maximized tissue mobility to increase range of motion and promote healthy tissue to be pain-free at discharge (progressing, not met).   Patient will report decrease in overall worst pain to 2/10 at discharge (progressing, not met).      Plan     Plan of Care Certification: 06/12/2023 to 08/11/2023.    Outpatient physical therapy 2x week for 8 weeks to include the following: Manual Therapy, Neuromuscular Re-ed, Patient Education, Self Care, Therapeutic Activities, Therapeutic Exercise, and IASTM.    Ghada Brooks, PT

## 2023-07-18 NOTE — TELEPHONE ENCOUNTER
Outgoing call regarding refill Xeloda. Pt states she is on week 2 of medication and starts new cycle on 8/1. Informed pt [facility] will follow up on 7/24 for refill and delivery.

## 2023-07-19 ENCOUNTER — CLINICAL SUPPORT (OUTPATIENT)
Dept: REHABILITATION | Facility: HOSPITAL | Age: 38
End: 2023-07-19
Payer: MEDICAID

## 2023-07-19 DIAGNOSIS — R29.3 POOR POSTURE: ICD-10-CM

## 2023-07-19 DIAGNOSIS — R26.89 DECREASED FUNCTIONAL MOBILITY: ICD-10-CM

## 2023-07-19 DIAGNOSIS — R29.898 WEAKNESS OF LEFT UPPER EXTREMITY: ICD-10-CM

## 2023-07-19 DIAGNOSIS — Z91.89 AT RISK FOR LYMPHEDEMA: ICD-10-CM

## 2023-07-19 DIAGNOSIS — M25.612 DECREASED RANGE OF MOTION OF LEFT SHOULDER: Primary | ICD-10-CM

## 2023-07-19 PROCEDURE — 97110 THERAPEUTIC EXERCISES: CPT

## 2023-07-24 ENCOUNTER — PATIENT OUTREACH (OUTPATIENT)
Dept: EMERGENCY MEDICINE | Facility: HOSPITAL | Age: 38
End: 2023-07-24
Payer: MEDICAID

## 2023-07-24 NOTE — TELEPHONE ENCOUNTER
Specialty Pharmacy - Refill Coordination    Specialty Medication Orders Linked to Encounter      Flowsheet Row Most Recent Value   Medication #1 capecitabine (XELODA) 150 MG tablet (Order#[id number], Rx#[id number])   Medication #2 capecitabine (XELODA) 500 MG Tab (Order#[id number], Rx#[id number])            Refill Questions - Documented Responses      Flowsheet Row Most Recent Value   Patient Availability and HIPAA Verification    Does patient want to proceed with activity? Yes   HIPAA/medical authority confirmed? Yes   Relationship to patient of person spoken to? Self   Refill Screening Questions    Changes to allergies? No   Changes to medications? No   New conditions since last clinic visit? No   Unplanned office visit, urgent care, ED, or hospital admission in the last 4 weeks? No   How does patient/caregiver feel medication is working? Good   Financial problems or insurance changes? No   How many doses of your specialty medications were missed in the last 4 weeks? 0   Would patient like to speak to a pharmacist? No   When does the patient need to receive the medication? 08/01/23   Refill Delivery Questions    How will the patient receive the medication? [facility]   When does the patient need to receive the medication? 08/01/23   Shipping Address Home   Address in [facility] confirmed and updated if neccessary? Yes   Expected Copay ($) 6   Is the patient able to afford the medication copay? Yes   Payment Method CC on file   Days supply of Refill 21   Supplies needed? No supplies needed   Refill activity completed? Yes   Refill activity plan Refill scheduled   Shipment/Pickup Date: 07/27/23            Current Outpatient Medications   Medication Sig    capecitabine (XELODA) 150 MG tablet Take 1 tablet (150 mg total) by mouth 2 (two) times daily for 14 days followed by 7 days off. Take with 1 other capecitabine prescription for 2,150 mg total.    capecitabine (XELODA) 500 MG Tab Take 4 tablets (2,000 mg total)  by mouth 2 (two) times daily for 14 days followed by 7 days off. Take with 1 other capecitabine prescription for 2,150 mg total.    cetirizine (ZYRTEC) 10 MG tablet Take 10 mg by mouth once daily.    clobetasoL (TEMOVATE) 0.05 % external solution Apply topically.    clotrimazole (LOTRIMIN) 1 % cream Apply 1 application topically every evening.    DUPIXENT  mg/2 mL PnIj INJECT 300MG UNDER THE SKIN EVERY 2 WEEKS AS DIRECTED    gabapentin (NEURONTIN) 300 MG capsule Take 2 capsules (600 mg total) by mouth 3 (three) times daily. for 7 days    hydrOXYzine HCL (ATARAX) 10 MG Tab Take 10 mg by mouth nightly as needed.    ibuprofen (ADVIL,MOTRIN) 600 MG tablet Take 600 mg by mouth 3 (three) times daily.    ketoconazole (NIZORAL) 2 % shampoo Apply topically Every 3 (three) days.    lipase-protease-amylase 24,000-76,000-120,000 units (CREON) 24,000-76,000 -120,000 unit capsule Take 1 capsule by mouth 3 (three) times daily with meals.    mupirocin (BACTROBAN) 2 % ointment Apply topically once daily.    naproxen (NAPROSYN) 500 MG tablet Take 1 tablet (500 mg total) by mouth every 12 (twelve) hours as needed.    nystatin (MYCOSTATIN) ointment Apply topically 2 (two) times daily.    nystatin (MYCOSTATIN) powder Apply topically 2 (two) times daily.    ondansetron (ZOFRAN) 8 MG tablet Take 1 tablet (8 mg total) by mouth every 12 (twelve) hours as needed for Nausea.    oxyCODONE (ROXICODONE) 5 MG immediate release tablet Take 1 tablet (5 mg total) by mouth every 4 (four) hours as needed for Pain.    pantoprazole (PROTONIX) 40 MG tablet Take 1 tablet (40 mg total) by mouth once daily.    prednisoLONE acetate (PRED FORTE) 1 % DrpS Place 1 drop into both eyes 2 (two) times daily.    traMADoL (ULTRAM) 50 mg tablet Take 1 tablet (50 mg total) by mouth every 6 (six) hours as needed for Pain.    tranexamic acid (LYSTEDA) 650 mg tablet     triamcinolone acetonide 0.1% (KENALOG) 0.1 % cream Apply 1 g topically 2 (two) times daily.    Last reviewed on 7/14/2023  2:16 PM by Kimberlyn Powell    Review of patient's allergies indicates:   Allergen Reactions    Strawberries [strawberry] Hives    Last reviewed on  7/17/2023 12:02 PM by Vera Castellon      Tasks added this encounter   No tasks added.   Tasks due within next 3 months   7/24/2023 - Refill Coordination Outreach (1 time occurrence)     Sarika Lira, PharmD  [facility]  [address] [place], [place] [address]  [facility] [address]  Phone: [phone]  Fax: [phone]

## 2023-07-24 NOTE — PROGRESS NOTES
Call placed to Pt to f/u from last encounter and most recent ER Visit. Pt states everything is going ok. She looked into the rental assistance and is on levalized billing with utilities but could use some help. Resource information forwarded to [email or web address] for assistance. Pt verbalized understanding.

## 2023-07-24 NOTE — PROGRESS NOTES
Appt reminder call placed to Pt in regards to appt on 7-26-23 at 9am with [facility]. Pt verbalized understanding and will keep the appt.

## 2023-07-28 ENCOUNTER — CLINICAL SUPPORT (OUTPATIENT)
Dept: REHABILITATION | Facility: HOSPITAL | Age: 38
End: 2023-07-28
Payer: MEDICAID

## 2023-07-28 ENCOUNTER — PATIENT MESSAGE (OUTPATIENT)
Dept: PLASTIC SURGERY | Facility: CLINIC | Age: 38
End: 2023-07-28
Payer: MEDICAID

## 2023-07-28 ENCOUNTER — TELEPHONE (OUTPATIENT)
Dept: PLASTIC SURGERY | Facility: CLINIC | Age: 38
End: 2023-07-28
Payer: MEDICAID

## 2023-07-28 DIAGNOSIS — R29.898 WEAKNESS OF LEFT UPPER EXTREMITY: ICD-10-CM

## 2023-07-28 DIAGNOSIS — M25.612 DECREASED RANGE OF MOTION OF LEFT SHOULDER: Primary | ICD-10-CM

## 2023-07-28 DIAGNOSIS — R26.89 DECREASED FUNCTIONAL MOBILITY: ICD-10-CM

## 2023-07-28 DIAGNOSIS — Z91.89 AT RISK FOR LYMPHEDEMA: ICD-10-CM

## 2023-07-28 DIAGNOSIS — R29.3 POOR POSTURE: ICD-10-CM

## 2023-07-28 PROCEDURE — 97110 THERAPEUTIC EXERCISES: CPT

## 2023-07-28 NOTE — TELEPHONE ENCOUNTER
Contacted patient in response to [employer] message regarding a firm area on her left upper outer tissue expander. She denies pain, skin changes, or thinning of the skin over the corner of the expander. Reassurance provided. Recommend she contact us with any new changes.

## 2023-07-28 NOTE — PROGRESS NOTES
[facility]PAT[facility]ERAP[facility] AND [facility]   Physical Therapy Treatment Note      Name: Laura Kent  Clinic Number: [id number]    Therapy Diagnosis:   Encounter Diagnoses   Name Primary?    Decreased range of motion of left shoulder Yes    Weakness of left upper extremity     Poor posture     At risk for lymphedema     Decreased functional mobility        Physician: YASMANI Weiner MD    Visit Date: 7/28/2023    Physician Orders: PT Eval and Treat   Medical Diagnosis from Referral: Z90.12 (ICD-10-CM) - S/P left mastectomy  Evaluation Date: 06/12/2023  Authorization Period Expiration: 06/05/2024  Plan of Care Expiration: 08/11/2023  Progress Note Due: 08/05/2023  Visit # / Visits Authorized: 10 / 20 (pending, plus eval)  FOTO: 3/3     Precautions: standard and cancer     Time In: 9:18 am (late arrival)  Time Out: 10:06 am  Total Billable Time: 48 minutes (TE only per [place] Medicaid)      Subjective     Patient reports: this week she started getting the random shocking pains in her L arm(triceps) again. She has also felt tighter so she has been doing her stretches a lot. A few days ago she noticed a hard spot on her tissue expander, it only bothers her when she presses on it. She is going to start going to her cardio work outs on 8/1.    She was compliant with home exercise program.    Response to Previous Treatment: some soreness but not for long, felt looser  Functional Change: less tightness in lateral breast with LUE activity    Pain: 3/10 (at beginning of session)  Location: chest    Fatigue: mild    Diagnosis: stage IIIC (cT3, cN2(f), cM0, G3, ER-, [place]-, HER2-) IDC of the LEFT breast    Treatment:   Chemotherapy: neoadjuvant completed 11/22  Radiation: completed 5/31/23  Endocrine Therapy: N/A     Surgery Date: LEFT mastectomy with TE placement with LEFT SLNB on 02/27/2023 per Myra Weiner and Denis.       Objective      Objective Measures updated at progress report unless specified.     LTR / Abduction Test:  07/17/2023  - Beginning of Session: 105 / 180 degrees  - End of Session: 165 / 180 degrees    Soft Tissue Restriction: mod-severe L anterior, lateral chest wall    Treatment     Laura received the treatments listed below:      Therapeutic exercises to increase cardiovascular endurance, flexibility, range of motion, and flexibility for 25 minutes:     Seated Exercises:  - Pulleys (flex, scap)    2 min each  - Physioball roll outs, 3 ways   2 min  - Biceps curls, nya (4#)    2 set x 10 reps - held  - Thoracic ext, horizontal 1/2 foam  1 set x 10 reps    Standing Exercises:  - Doorway pec stretch (overhead)  30 sec x 3 reps  - Triceps pushdowns (blue)   3 set x 10 reps - held  - Lifting from knee height, 10#  1 set x 15 reps - held  - Supinated lifts, 2#    2 set x 10 reps - held    Mat Exercises:   - Butterfly stretch (behind head)  1 min x 2 reps  - LTR / shoulder abd stretch (LUE only) 10 sec x 10 reps  - Open book stretch, LUE only  10 set x 10 reps  - Sleeper stretch (LUE only)   30 sec x 2 reps - held      Manual therapy techniques to decrease pain as well as to increase soft tissue mobility, joint mobility, mobility and pliability, and function for 13 minutes:     - Manual L axillary stretch  - Manual L pec attachment stretch  - Gentle mobilization of L tissue expander (medially and inferiorly)      Patient Education and Home Exercises       Education Provided Regarding:   - Role of physical therapy in multi-disciplinary team  - Goals for physical therapy, progress towards goals  - Physical therapy plan of care, scheduling  - Exercise technique, home exercise program  - Energy conservation techniques  - Consults to integrative oncology (meditation, yoga, acupuncture)    Written Home Exercises Provided: Patient instructed to cont prior HEP. Exercises were reviewed and Laura was able to demonstrate them prior to the end of the session. Laura demonstrated good  understanding of the education provided. See EMR  under Patient Instructions for exercises provided during therapy sessions.    Assessment     Patient is responding well to physical therapy. Patient did not perform all of her usual exercises due to time constraints due to her late arrival. She was able to perform all of today's activities with no increase in symptoms prior to leaving the clinic. PT encouraged the patient to contact her plastic surgeon regarding the hard spot on her tissue expander. Attempt to resume all of her usual exercises next visit. Will progress as tolerated.    Laura is progressing well towards her goals.   Patient prognosis is Good.     Patient will continue to benefit from skilled outpatient physical therapy to address the deficits listed in the problem list box on initial evaluation, provide patient / family education, and to maximize patient's level of independence in the home and community environment.     Patient's spiritual, cultural, and educational needs considered, and patient agreeable to plan of care and goals.     Anticipated barriers to physical therapy: none anticipated    GOALS:   Short Term Goals: 4 Weeks  Patient will demonstrate 100% understanding of lymphedema risk reduction practices, including self-monitoring for lymphedema (met, 07/05/2023).  Patient will demonstrate independence with established home exercise program (met, 07/05/2023).   Patient will increase LEFT shoulder ABDUCTION active range of motion to 150 degrees to improve functional reaching, carrying, pushing, and pulling pain-free (progressing, not met).   Patient will increase LEFT shoulder FLEXION active range of motion to 160 degrees to improve functional reaching, carrying, pushing, and pulling pain-free (progressing, not met).   Patient will increase strength to >5/5 in gross upper extremity musculature to improve tolerance to all functional activities pain-free (progressing, not met).      Long Term Goals: 8 Weeks   Patient will increase LEFT  shoulder FLEXION active range of motion to 180 degrees to improve functional reaching, carrying, pushing, and pulling pain-free (progressing, not met).   Patient will increase LEFT shoulder ABDUCTION active range of motion to 180 degrees to improve functional reaching, carrying, pushing, and pulling pain-free (progressing, not met).   Patient will increase strength to 4+/5 in gross upper extremity musculature to improve tolerance to all functional activities pain-free (progressing, not met).   Patient will demonstrate full / maximized tissue mobility to increase range of motion and promote healthy tissue to be pain-free at discharge (progressing, not met).   Patient will report decrease in overall worst pain to 2/10 at discharge (progressing, not met).      Plan     Plan of Care Certification: 06/12/2023 to 08/11/2023.    Outpatient physical therapy 2x week for 8 weeks to include the following: Manual Therapy, Neuromuscular Re-ed, Patient Education, Self Care, Therapeutic Activities, Therapeutic Exercise, and IASTM.    Teresa Hurley, PT

## 2023-08-01 NOTE — PROGRESS NOTES
"[facility]   Physical Therapy Treatment Note      Name: Laura Kent  Clinic Number: [id number]    Therapy Diagnosis:   Encounter Diagnoses   Name Primary?    Decreased range of motion of left shoulder Yes    Weakness of left upper extremity     Poor posture     At risk for lymphedema     Decreased functional mobility      Physician: YASMANI Weiner MD    Visit Date: 8/2/2023    Physician Orders: PT Eval and Treat   Medical Diagnosis from Referral: Z90.12 (ICD-10-CM) - S/P left mastectomy  Evaluation Date: 06/12/2023  Authorization Period Expiration: 06/05/2024  Plan of Care Expiration: 09/29/2023  Progress Note Due: 09/02/2023  Visit # / Visits Authorized: 12 / 20 (pending, plus eval)  FOTO: 3/3     Precautions: standard and cancer     Time In: 9:05 AM  Time Out: 10:00 AM  Total Billable Time: 55 minutes (TE only per [place] [employer])      Subjective     Patient reports: self-reports approx 80% improved since beginning PT. Continued tightness in "one problem spot" in L breast but overall significant relief from muscle tightness / soft tissue restriction. Of note, patient experiencing significant increase in fatigue since beginning second chemo cycle this Monday (07/31/2023).    She was compliant with home exercise program.    Response to Previous Treatment: no adverse events  Functional Change: less difficulty with LUE overhead reaching    Pain: 0/10  Location: N/A    Fatigue: 8/10    Diagnosis: stage IIIC (cT3, cN2(f), cM0, G3, ER-, HI-, HER2-) IDC of the LEFT breast    Treatment:   Chemotherapy: neoadjuvant completed 11/22  Radiation: completed 5/31/23  Endocrine Therapy: N/A     Surgery Date: LEFT mastectomy with TE placement with LEFT SLNB on 02/27/2023 per Myra Weiner and Denis.       Objective      Objective Measures updated at progress report unless specified.     Shoulder Range of Motion: 08/02/2023  Active ROM LEFT RIGHT   Flexion 175 180   Abduction 150 180   Extension 60 " "60   IR/90deg 85 90   ER/90deg 90 90       Upper Extremity Strength: 08/02/2023    (L) UE (R) UE   Shoulder Flexion 4+/5 4+/5   Shoulder Abduction 5/5 5/5   Shoulder IR 5/5 5/5   Shoulder ER 5/5 5/5   Elbow flexion: 5/5 5/5   Elbow extension: 5/5 5/5   Wrist flexion: 5/5 5/5   Wrist extension: 5/5 5/5    71.8 lbs 81.1 lbs         BUE Circumferential Measurements: 08/02/2023   LANDMARK LEFT UE RIGHT UE DIFFERENCE   E + 8" 40 cm 38.5 cm 1.5 cm   E + 6" 38 cm 38 cm 0 cm   E + 4" 37.5 cm 36 cm 1.5 cm   E + 2" 34 cm 33 cm 1 cm   Elbow 29.5 cm 29.5 cm 0 cm   W+ 8" 32 cm 30.5 cm 1.5 cm   W +  6" 28.5 cm 28.5 cm 0 cm   W + 4" 25 cm 25 cm 0 cm   Wrist 18.5 cm 18 cm 0.5 cm   DPC 22.5 cm 21.5 cm 1 cm   IP Thumb 7 cm 7 cm 0 cm        Treatment     Laura received the treatments listed below:      Physical performance testing as part of plan of care reassessment for 25 minutes:    - Bilateral upper extremity active range of motion measurements  - Bilateral upper extremity manual muscle testing  - Bilateral upper extremity circumferential measurements      Therapeutic exercises to increase cardiovascular endurance, flexibility, range of motion, and flexibility for 5 minutes:     Seated Exercises:  - Pulleys, red (flex, scap)   2 min each  - Physioball roll outs, 3 ways   2 min - held  - Biceps curls, nya (4#)    2 set x 10 reps - held  - Thoracic ext, horizontal 1/2 foam  1 set x 10 reps - held    Standing Exercises:  - Doorway pec stretch (overhead)  30 sec x 3 reps - held  - Triceps pushdowns (blue)   3 set x 10 reps - held  - Lifting from knee height, 10#  1 set x 15 reps - held  - Supinated lifts, 2#    2 set x 10 reps - held    Mat Exercises:   - Butterfly stretch (behind head)  1 min x 2 reps - held  - LTR / shoulder abd stretch (L only)  10 sec x 10 rep - held  - Open book stretch, LUE only  10 set x 10 rep - held  - Sleeper stretch (LUE only)   30 sec x 2 reps - held      Neuromuscular re-education exercises to " improve transverse abdominis / oblique activation for 10 minutes:    - Palloff press, nya (green)   1 set x 10 reps  - PNF chops, nya (green)   1 set x 10 reps  - PNF lifts, nya (green)    1 set x 10 reps      Manual therapy techniques to decrease pain as well as to increase soft tissue mobility, joint mobility, mobility and pliability, and function for 15 minutes:     - Manual L axillary stretch  - Manual L pec attachment stretch  - Gentle mobilization of L tissue expander (medially and inferiorly)      Patient Education and Home Exercises       Education Provided Regarding:   - Role of physical therapy in multi-disciplinary team  - Goals for physical therapy, progress towards goals  - Physical therapy plan of care, scheduling  - Exercise technique, home exercise program  - Energy conservation techniques  - Consults to integrative oncology (meditation, yoga, acupuncture)    Written Home Exercises Provided: yes. Exercises were reviewed and Laura was able to demonstrate them prior to the end of the session. Laura demonstrated good  understanding of the education provided. See EMR under Patient Instructions for exercises provided during therapy sessions.    Assessment     Patient is responding well to physical therapy. Patient able to perform new exercises and exercise progressions without increase in symptoms prior to leaving the clinic. Improved transverse abdominis and oblique activation with added resistance band exercises. Verbal cues to maintain abdominal brace throughout exercises for proper core activation (good carryover). Appreciated less soft tissue restriction with manual techniques. Patient demonstrates the following active range of motion improvements since previous progress reassessment (07/05/2023):    - 45 degree improvement in LEFT shoulder FLEXION  - 15 degree improvement in RIGHT shoulder FLEXION  - 40 degree improvement in LEFT shoulder ABDUCTION  - 35 degree improvement in RIGHT shoulder  ABDUCTION  - 5 degree improvement in LEFT shoulder EXTERNAL ROTATION    Patient also demonstrates improving bilateral upper extremity strength via manual muscle testing and  dynamometer strength testing. No evidence of lymphedema per today's circumferential measurements. Patient would continue to benefit from skilled physical therapy services to further improve LEFT shoulder active range of motion / strength, core abdominal strength, and tolerance to functional lifting / carrying / pushing / pulling / reaching activities of daily living for return to prior level of function. Will progress as tolerated.     Laura is progressing well towards her goals.   Patient prognosis is Good.     Patient will continue to benefit from skilled outpatient physical therapy to address the deficits listed in the problem list box on initial evaluation, provide patient / family education, and to maximize patient's level of independence in the home and community environment.     Patient's spiritual, cultural, and educational needs considered, and patient agreeable to plan of care and goals.     Anticipated barriers to physical therapy: none anticipated    GOALS:   Short Term Goals: 4 Weeks  Patient will demonstrate 100% understanding of lymphedema risk reduction practices, including self-monitoring for lymphedema (met, 07/05/2023).  Patient will demonstrate independence with established home exercise program (met, 07/05/2023).   Patient will increase LEFT shoulder ABDUCTION active range of motion to 150 degrees to improve functional reaching, carrying, pushing, and pulling pain-free (met, 08/02/2023, not met).   Patient will increase LEFT shoulder FLEXION active range of motion to 160 degrees to improve functional reaching, carrying, pushing, and pulling pain-free (exceeded, 08/02/2023).   Patient will increase strength to 4+/5 in gross upper extremity musculature to improve tolerance to all functional activities pain-free (met,  08/02/2023).      Long Term Goals: 8 Weeks   Patient will increase LEFT shoulder FLEXION active range of motion to 180 degrees to improve functional reaching, carrying, pushing, and pulling pain-free (progressing, not met).   Patient will increase LEFT shoulder ABDUCTION active range of motion to 180 degrees to improve functional reaching, carrying, pushing, and pulling pain-free (progressing, not met).   Patient will demonstrate full / maximized tissue mobility to increase range of motion and promote healthy tissue to be pain-free at discharge (progressing, not met).   Patient will report decrease in overall worst pain to 2/10 at discharge (progressing, not met).     Modified Long Term Goals: 8 Weeks  Patient will demonstrate 5/5 strength of BILATERAL upper extremities for improved tolerance to functional reaching / carrying / pushing / pulling / lifting activities of daily living (progressing, not met).      Plan     Plan of Care Certification: 08/02/2023 to 09/29/2023.    Outpatient physical therapy 1x week for 8 weeks to include the following: Manual Therapy, Neuromuscular Re-ed, Patient Education, Self Care, Therapeutic Activities, Therapeutic Exercise, and IASTM.    Ghada Brooks, PT

## 2023-08-02 ENCOUNTER — CLINICAL SUPPORT (OUTPATIENT)
Dept: REHABILITATION | Facility: HOSPITAL | Age: 38
End: 2023-08-02
Payer: MEDICAID

## 2023-08-02 ENCOUNTER — LAB VISIT (OUTPATIENT)
Dept: LAB | Facility: HOSPITAL | Age: 38
End: 2023-08-02
Attending: INTERNAL MEDICINE
Payer: MEDICAID

## 2023-08-02 DIAGNOSIS — M25.612 DECREASED RANGE OF MOTION OF LEFT SHOULDER: Primary | ICD-10-CM

## 2023-08-02 DIAGNOSIS — Z17.1 MALIGNANT NEOPLASM OF UPPER-OUTER QUADRANT OF LEFT BREAST IN FEMALE, ESTROGEN RECEPTOR NEGATIVE: ICD-10-CM

## 2023-08-02 DIAGNOSIS — R29.3 POOR POSTURE: ICD-10-CM

## 2023-08-02 DIAGNOSIS — C50.412 MALIGNANT NEOPLASM OF UPPER-OUTER QUADRANT OF LEFT BREAST IN FEMALE, ESTROGEN RECEPTOR NEGATIVE: ICD-10-CM

## 2023-08-02 DIAGNOSIS — R26.89 DECREASED FUNCTIONAL MOBILITY: ICD-10-CM

## 2023-08-02 DIAGNOSIS — Z91.89 AT RISK FOR LYMPHEDEMA: ICD-10-CM

## 2023-08-02 DIAGNOSIS — R29.898 WEAKNESS OF LEFT UPPER EXTREMITY: ICD-10-CM

## 2023-08-02 LAB
ALBUMIN SERPL BCP-MCNC: 3.9 G/DL (ref 3.5–5.2)
ALP SERPL-CCNC: 71 U/L (ref 55–135)
ALT SERPL W/O P-5'-P-CCNC: 26 U/L (ref 10–44)
ANION GAP SERPL CALC-SCNC: 7 MMOL/L (ref 8–16)
AST SERPL-CCNC: 36 U/L (ref 10–40)
BASOPHILS # BLD AUTO: 0.01 K/UL (ref 0–0.2)
BASOPHILS NFR BLD: 0.4 % (ref 0–1.9)
BILIRUB SERPL-MCNC: 0.4 MG/DL (ref 0.1–1)
BUN SERPL-MCNC: 17 MG/DL (ref 6–20)
CALCIUM SERPL-MCNC: 8.7 MG/DL (ref 8.7–10.5)
CHLORIDE SERPL-SCNC: 111 MMOL/L (ref 95–110)
CO2 SERPL-SCNC: 25 MMOL/L (ref 23–29)
CREAT SERPL-MCNC: 0.8 MG/DL (ref 0.5–1.4)
DIFFERENTIAL METHOD: ABNORMAL
EOSINOPHIL # BLD AUTO: 0 K/UL (ref 0–0.5)
EOSINOPHIL NFR BLD: 1.2 % (ref 0–8)
ERYTHROCYTE [DISTWIDTH] IN BLOOD BY AUTOMATED COUNT: 14.7 % (ref 11.5–14.5)
EST. GFR  (NO RACE VARIABLE): >60 ML/MIN/1.73 M^2
GLUCOSE SERPL-MCNC: 105 MG/DL (ref 70–110)
HCT VFR BLD AUTO: 29.4 % (ref 37–48.5)
HGB BLD-MCNC: 10.2 G/DL (ref 12–16)
IMM GRANULOCYTES # BLD AUTO: 0.01 K/UL (ref 0–0.04)
IMM GRANULOCYTES NFR BLD AUTO: 0.4 % (ref 0–0.5)
LYMPHOCYTES # BLD AUTO: 0.6 K/UL (ref 1–4.8)
LYMPHOCYTES NFR BLD: 25.6 % (ref 18–48)
MCH RBC QN AUTO: 32.8 PG (ref 27–31)
MCHC RBC AUTO-ENTMCNC: 34.7 G/DL (ref 32–36)
MCV RBC AUTO: 95 FL (ref 82–98)
MONOCYTES # BLD AUTO: 0.3 K/UL (ref 0.3–1)
MONOCYTES NFR BLD: 12.4 % (ref 4–15)
NEUTROPHILS # BLD AUTO: 1.5 K/UL (ref 1.8–7.7)
NEUTROPHILS NFR BLD: 60 % (ref 38–73)
NRBC BLD-RTO: 0 /100 WBC
PLATELET # BLD AUTO: 155 K/UL (ref 150–450)
PMV BLD AUTO: 10 FL (ref 9.2–12.9)
POTASSIUM SERPL-SCNC: 3.6 MMOL/L (ref 3.5–5.1)
PROT SERPL-MCNC: 6.3 G/DL (ref 6–8.4)
RBC # BLD AUTO: 3.11 M/UL (ref 4–5.4)
SODIUM SERPL-SCNC: 143 MMOL/L (ref 136–145)
WBC # BLD AUTO: 2.5 K/UL (ref 3.9–12.7)

## 2023-08-02 PROCEDURE — 80053 COMPREHEN METABOLIC PANEL: CPT | Performed by: INTERNAL MEDICINE

## 2023-08-02 PROCEDURE — 97110 THERAPEUTIC EXERCISES: CPT

## 2023-08-02 PROCEDURE — 36415 COLL VENOUS BLD VENIPUNCTURE: CPT | Performed by: INTERNAL MEDICINE

## 2023-08-02 PROCEDURE — 85025 COMPLETE CBC W/AUTO DIFF WBC: CPT | Performed by: INTERNAL MEDICINE

## 2023-08-02 NOTE — PLAN OF CARE
"[facility]  Physical Therapy Plan of Care Note     Name: Laura Kent  Clinic Number: [id number]    Therapy Diagnosis:   Encounter Diagnoses   Name Primary?    Decreased range of motion of left shoulder Yes    Weakness of left upper extremity     Poor posture     At risk for lymphedema     Decreased functional mobility      Physician: YASMANI Weiner MD    Visit Date: 8/2/2023    Physician Orders: Eval and Treat  Medical Diagnosis from Referral: Z90.12 (ICD-10-CM) - S/P left mastectomy  Evaluation Date: 06/12/2023  Authorization Period Expiration: 06/05/2023   Plan of Care Expiration: 09/29/2023  Progress Note Due: 09/02/2023   Visit # / Visits Authorized: 12 / 20 (pending, plus eval)  FOTO: 2/3    Precautions: Standard and cancer  Functional Level Prior to Evaluation: independent    SUBJECTIVE     Update: self-reports approx 80% improved since beginning physical therapy. Continued tightness in "one problem spot" in L breast but overall significant relief from muscle tightness / soft tissue restriction. Of note, patient experiencing significant increase in fatigue since beginning second chemo cycle this Monday (07/31/2023).     OBJECTIVE     Shoulder Range of Motion: 08/02/2023  Active ROM LEFT RIGHT   Flexion 175 180   Abduction 150 180   Extension 60 60   IR/90deg 85 90   ER/90deg 90 90         Upper Extremity Strength: 08/02/2023    (L) UE (R) UE   Shoulder Flexion 4+/5 4+/5   Shoulder Abduction 5/5 5/5   Shoulder IR 5/5 5/5   Shoulder ER 5/5 5/5   Elbow flexion: 5/5 5/5   Elbow extension: 5/5 5/5   Wrist flexion: 5/5 5/5   Wrist extension: 5/5 5/5    71.8 lbs 81.1 lbs         BUE Circumferential Measurements: 08/02/2023   LANDMARK LEFT UE RIGHT UE DIFFERENCE   E + 8" 40 cm 38.5 cm 1.5 cm   E + 6" 38 cm 38 cm 0 cm   E + 4" 37.5 cm 36 cm 1.5 cm   E + 2" 34 cm 33 cm 1 cm   Elbow 29.5 cm 29.5 cm 0 cm   W+ 8" 32 cm 30.5 cm 1.5 cm   W +  6" 28.5 cm 28.5 cm 0 cm   W + 4" 25 cm " 25 cm 0 cm   Wrist 18.5 cm 18 cm 0.5 cm   DPC 22.5 cm 21.5 cm 1 cm   IP Thumb 7 cm 7 cm 0 cm        ASSESSMENT     Update: Patient demonstrates the following active range of motion improvements since previous progress reassessment (07/05/2023):     - 45 degree improvement in LEFT shoulder FLEXION  - 15 degree improvement in RIGHT shoulder FLEXION  - 40 degree improvement in LEFT shoulder ABDUCTION  - 35 degree improvement in RIGHT shoulder ABDUCTION  - 5 degree improvement in LEFT shoulder EXTERNAL ROTATION     Patient also demonstrates improving bilateral upper extremity strength via manual muscle testing and  dynamometer strength testing. No evidence of lymphedema per today's circumferential measurements.     Previous Short Term Goals Status: partially met short term goals  New Short Term Goals Status: met short term goals  Long Term Goal Status: modified strength long-term term (please see below).     Reasons for Recertification of Therapy: Patient would continue to benefit from skilled physical therapy services to further improve LEFT shoulder active range of motion / strength, core abdominal strength, and tolerance to functional lifting / carrying / pushing / pulling / reaching activities of daily living for return to prior level of function.    GOALS:  Short Term Goals: 4 Weeks  Patient will demonstrate 100% understanding of lymphedema risk reduction practices, including self-monitoring for lymphedema (met, 07/05/2023).  Patient will demonstrate independence with established home exercise program (met, 07/05/2023).   Patient will increase LEFT shoulder ABDUCTION active range of motion to 150 degrees to improve functional reaching, carrying, pushing, and pulling pain-free (met, 08/02/2023, not met).   Patient will increase LEFT shoulder FLEXION active range of motion to 160 degrees to improve functional reaching, carrying, pushing, and pulling pain-free (exceeded, 08/02/2023).   Patient will increase  strength to 4+/5 in gross upper extremity musculature to improve tolerance to all functional activities pain-free (met, 08/02/2023).      Long Term Goals: 8 Weeks   Patient will increase LEFT shoulder FLEXION active range of motion to 180 degrees to improve functional reaching, carrying, pushing, and pulling pain-free (progressing, not met).   Patient will increase LEFT shoulder ABDUCTION active range of motion to 180 degrees to improve functional reaching, carrying, pushing, and pulling pain-free (progressing, not met).   Patient will demonstrate full / maximized tissue mobility to increase range of motion and promote healthy tissue to be pain-free at discharge (progressing, not met).   Patient will report decrease in overall worst pain to 2/10 at discharge (progressing, not met).     Modified Long Term Goals: 8 Weeks  Patient will demonstrate 5/5 strength of BILATERAL upper extremities for improved tolerance to functional reaching / carrying / pushing / pulling / lifting activities of daily living (progressing, not met).     PLAN     Updated Certification Period: 08/02/2023 to 09/29/2023     Recommended Treatment Plan: 1 times per week for 8 weeks:  Gait Training, Manual Therapy, Neuromuscular Re-ed, Patient Education, Self Care, Therapeutic Activities, Therapeutic Exercise, and IASTM.    Other Recommendations: continue per written plan of care.    Ghada Brooks, PT

## 2023-08-10 ENCOUNTER — SPECIALTY PHARMACY (OUTPATIENT)
Dept: PHARMACY | Facility: CLINIC | Age: 38
End: 2023-08-10
Payer: MEDICAID

## 2023-08-10 NOTE — TELEPHONE ENCOUNTER
Outgoing call regarding Xeloda refill, pt states her next cycle starts around 8/22. Informed pt refill too soon and will follow up on 8/14 to schedule refill and delivery.

## 2023-08-15 NOTE — TELEPHONE ENCOUNTER
Specialty Pharmacy - Refill Coordination    Specialty Medication Orders Linked to Encounter      Flowsheet Row Most Recent Value   Medication #1 capecitabine (XELODA) 150 MG tablet (Order#[id number], Rx#[id number])   Medication #2 capecitabine (XELODA) 500 MG Tab (Order#[id number], Rx#[id number])            Refill Questions - Documented Responses      Flowsheet Row Most Recent Value   Patient Availability and HIPAA Verification    Does patient want to proceed with activity? Yes   HIPAA/medical authority confirmed? Yes   Relationship to patient of person spoken to? Self   Refill Screening Questions    Changes to allergies? No   Changes to medications? No   New conditions since last clinic visit? No   Unplanned office visit, urgent care, ED, or hospital admission in the last 4 weeks? No   How does patient/caregiver feel medication is working? Good   Financial problems or insurance changes? No   How many doses of your specialty medications were missed in the last 4 weeks? 0   Would patient like to speak to a pharmacist? No   When does the patient need to receive the medication? 08/22/23   Refill Delivery Questions    How will the patient receive the medication? [facility]   When does the patient need to receive the medication? 08/22/23   Shipping Address Home   Address in [facility] confirmed and updated if neccessary? Yes   Expected Copay ($) 6   Is the patient able to afford the medication copay? Yes   Payment Method CC on file   Days supply of Refill 21   Supplies needed? No supplies needed   Refill activity completed? Yes   Refill activity plan Refill scheduled   Shipment/Pickup Date: 08/17/23            Current Outpatient Medications   Medication Sig    capecitabine (XELODA) 150 MG tablet Take 1 tablet (150 mg total) by mouth 2 (two) times daily for 14 days followed by 7 days off. Take with 1 other capecitabine prescription for 2,150 mg total.    capecitabine (XELODA) 500 MG Tab Take 4 tablets (2,000 mg total)  by mouth 2 (two) times daily for 14 days followed by 7 days off. Take with 1 other capecitabine prescription for 2,150 mg total.    cetirizine (ZYRTEC) 10 MG tablet Take 10 mg by mouth once daily.    clobetasoL (TEMOVATE) 0.05 % external solution Apply topically.    clotrimazole (LOTRIMIN) 1 % cream Apply 1 application topically every evening.    DUPIXENT  mg/2 mL PnIj INJECT 300MG UNDER THE SKIN EVERY 2 WEEKS AS DIRECTED    gabapentin (NEURONTIN) 300 MG capsule Take 2 capsules (600 mg total) by mouth 3 (three) times daily. for 7 days    hydrOXYzine HCL (ATARAX) 10 MG Tab Take 10 mg by mouth nightly as needed.    ibuprofen (ADVIL,MOTRIN) 600 MG tablet Take 600 mg by mouth 3 (three) times daily.    ketoconazole (NIZORAL) 2 % shampoo Apply topically Every 3 (three) days.    lipase-protease-amylase 24,000-76,000-120,000 units (CREON) 24,000-76,000 -120,000 unit capsule Take 1 capsule by mouth 3 (three) times daily with meals.    mupirocin (BACTROBAN) 2 % ointment Apply topically once daily.    naproxen (NAPROSYN) 500 MG tablet Take 1 tablet (500 mg total) by mouth every 12 (twelve) hours as needed.    nystatin (MYCOSTATIN) ointment Apply topically 2 (two) times daily.    nystatin (MYCOSTATIN) powder Apply topically 2 (two) times daily.    ondansetron (ZOFRAN) 8 MG tablet Take 1 tablet (8 mg total) by mouth every 12 (twelve) hours as needed for Nausea.    oxyCODONE (ROXICODONE) 5 MG immediate release tablet Take 1 tablet (5 mg total) by mouth every 4 (four) hours as needed for Pain.    pantoprazole (PROTONIX) 40 MG tablet Take 1 tablet (40 mg total) by mouth once daily.    prednisoLONE acetate (PRED FORTE) 1 % DrpS Place 1 drop into both eyes 2 (two) times daily.    traMADoL (ULTRAM) 50 mg tablet Take 1 tablet (50 mg total) by mouth every 6 (six) hours as needed for Pain.    tranexamic acid (LYSTEDA) 650 mg tablet     triamcinolone acetonide 0.1% (KENALOG) 0.1 % cream Apply 1 g topically 2 (two) times daily.    Last reviewed on 7/14/2023  2:16 PM by Kimberlyn Powell    Review of patient's allergies indicates:   Allergen Reactions    Strawberries [strawberry] Hives    Last reviewed on  7/17/2023 12:02 PM by Vera Castellon      Tasks added this encounter   No tasks added.   Tasks due within next 3 months   8/14/2023 - Refill Coordination Outreach (1 time occurrence)     [facility]  [address]  [facility] [address]  Phone: [phone]  Fax: [phone]

## 2023-08-21 ENCOUNTER — CLINICAL SUPPORT (OUTPATIENT)
Dept: REHABILITATION | Facility: HOSPITAL | Age: 38
End: 2023-08-21
Payer: MEDICAID

## 2023-08-21 DIAGNOSIS — Z91.89 AT RISK FOR LYMPHEDEMA: ICD-10-CM

## 2023-08-21 DIAGNOSIS — M25.612 DECREASED RANGE OF MOTION OF LEFT SHOULDER: Primary | ICD-10-CM

## 2023-08-21 DIAGNOSIS — R29.898 WEAKNESS OF LEFT UPPER EXTREMITY: ICD-10-CM

## 2023-08-21 DIAGNOSIS — R26.89 DECREASED FUNCTIONAL MOBILITY: ICD-10-CM

## 2023-08-21 DIAGNOSIS — R29.3 POOR POSTURE: ICD-10-CM

## 2023-08-21 PROCEDURE — 97110 THERAPEUTIC EXERCISES: CPT

## 2023-08-21 NOTE — PROGRESS NOTES
[facility]   Physical Therapy Treatment Note      Name: Laura Kent  Clinic Number: [id number]    Therapy Diagnosis:   Encounter Diagnoses   Name Primary?    Decreased range of motion of left shoulder Yes    Weakness of left upper extremity     Poor posture     At risk for lymphedema     Decreased functional mobility        Physician: YASMANI Weiner MD    Visit Date: 8/21/2023    Physician Orders: PT Eval and Treat   Medical Diagnosis from Referral: Z90.12 (ICD-10-CM) - S/P left mastectomy  Evaluation Date: 06/12/2023  Authorization Period Expiration: 06/05/2024  Plan of Care Expiration: 09/29/2023  Progress Note Due: 09/02/2023  Visit # / Visits Authorized: 13 / 20 (pending, plus eval)  FOTO: 3/3     Precautions: standard and cancer     Time In: 2:24 pm  Time Out: 3:08 pm  Total Billable Time: 44 minutes (TE only per [place] [employer])      Subjective     Patient reports: she is still having some tightness when first getting up but when she does her stretching it feels better. Sometimes it feels like that thing has shifted. She has been doing ok with her work outs, but the chemo make she tired. She recently started her last round of chemo.  She was compliant with home exercise program.  Response to Previous Treatment: felt good, loose  Functional Change: movements are getting easier, stretching is getting easier    Pain: 0/10  Location: N/A    Fatigue: 4-5/10    Diagnosis: stage IIIC (cT3, cN2(f), cM0, G3, ER-, [place]-, HER2-) IDC of the LEFT breast    Treatment:   Chemotherapy: neoadjuvant completed 11/22  Radiation: completed 5/31/23  Endocrine Therapy: N/A     Surgery Date: LEFT mastectomy with TE placement with LEFT SLNB on 02/27/2023 per Myra Weiner and Denis.       Objective      Objective Measures updated at progress report unless specified.       Treatment     Laura received the treatments listed below:        Therapeutic exercises to increase cardiovascular endurance,  flexibility, range of motion, and flexibility for 19 minutes:     Seated Exercises:  - Pulleys, red (flex, scap)   2 min each  - Physioball roll outs, 3 ways   2 min - held  - Biceps curls, nya (4#)    2 set x 10 reps - held  - Thoracic ext, horizontal 1/2 foam  1 set x 10 reps - held    Standing Exercises:  - Doorway pec stretch (overhead)  30 sec x 3 reps  - Triceps pushdowns (blue)   3 set x 10 reps - held  - Lifting from knee height, 10#  1 set x 15 reps - held  - Supinated lifts, 2#    2 set x 10 reps - held    Mat Exercises:   - Butterfly stretch (behind head)  1 min x 2 reps   - LTR / shoulder abd stretch (L only)  10 sec x 10 rep   - Open book stretch, LUE only  10 set x 10 rep   - Sleeper stretch (LUE only)   30 sec x 2 reps - held      Neuromuscular re-education exercises to improve transverse abdominis / oblique activation for 10 minutes:    - Palloff press, nya (blue)   1 set x 10 reps  - PNF chops, nya (blue)   1 set x 10 reps  - PNF lifts, nya (blue)    1 set x 10 reps      Manual therapy techniques to decrease pain as well as to increase soft tissue mobility, joint mobility, mobility and pliability, and function for 15 minutes:     - Manual L axillary stretch  - Manual L pec attachment stretch  - Gentle mobilization of L tissue expander (medially and inferiorly)      Patient Education and Home Exercises       Education Provided Regarding:   - Role of physical therapy in multi-disciplinary team  - Goals for physical therapy, progress towards goals  - Physical therapy plan of care, scheduling  - Exercise technique, home exercise program  - Energy conservation techniques  - Consults to integrative oncology (meditation, yoga, acupuncture)    Written Home Exercises Provided: yes. Exercises were reviewed and Laura was able to demonstrate them prior to the end of the session. Laura demonstrated good  understanding of the education provided. See EMR under Patient Instructions for exercises provided during  therapy sessions.    Assessment     Patient is responding well to physical therapy. Patient was able to perform all of today's progressions with no increase in symptoms prior to leaving the clinic. She required occasional cues for technique and to maintain her core engagement with resistance band exercises. She continues to respond well to manual therapy techniques as she reports feeling looser afterwards and PT was able to palpate decreased tissue tension along her lateral breast/chest. Will progress as tolerated.     Laura is progressing well towards her goals.   Patient prognosis is Good.     Patient will continue to benefit from skilled outpatient physical therapy to address the deficits listed in the problem list box on initial evaluation, provide patient / family education, and to maximize patient's level of independence in the home and community environment.     Patient's spiritual, cultural, and educational needs considered, and patient agreeable to plan of care and goals.     Anticipated barriers to physical therapy: none anticipated    GOALS:   Short Term Goals: 4 Weeks  Patient will demonstrate 100% understanding of lymphedema risk reduction practices, including self-monitoring for lymphedema (met, 07/05/2023).  Patient will demonstrate independence with established home exercise program (met, 07/05/2023).   Patient will increase LEFT shoulder ABDUCTION active range of motion to 150 degrees to improve functional reaching, carrying, pushing, and pulling pain-free (met, 08/02/2023, not met).   Patient will increase LEFT shoulder FLEXION active range of motion to 160 degrees to improve functional reaching, carrying, pushing, and pulling pain-free (exceeded, 08/02/2023).   Patient will increase strength to 4+/5 in gross upper extremity musculature to improve tolerance to all functional activities pain-free (met, 08/02/2023).      Long Term Goals: 8 Weeks   Patient will increase LEFT shoulder FLEXION active  range of motion to 180 degrees to improve functional reaching, carrying, pushing, and pulling pain-free (progressing, not met).   Patient will increase LEFT shoulder ABDUCTION active range of motion to 180 degrees to improve functional reaching, carrying, pushing, and pulling pain-free (progressing, not met).   Patient will demonstrate full / maximized tissue mobility to increase range of motion and promote healthy tissue to be pain-free at discharge (progressing, not met).   Patient will report decrease in overall worst pain to 2/10 at discharge (progressing, not met).     Modified Long Term Goals: 8 Weeks  Patient will demonstrate 5/5 strength of BILATERAL upper extremities for improved tolerance to functional reaching / carrying / pushing / pulling / lifting activities of daily living (progressing, not met).      Plan     Plan of Care Certification: 08/02/2023 to 09/29/2023.    Outpatient physical therapy 1x week for 8 weeks to include the following: Manual Therapy, Neuromuscular Re-ed, Patient Education, Self Care, Therapeutic Activities, Therapeutic Exercise, and IASTM.    Teresa Hurley, PT

## 2023-08-24 NOTE — PROGRESS NOTES
[facility]   Physical Therapy Treatment Note      Name: Laura Kent  Clinic Number: [id number]    Therapy Diagnosis:   No diagnosis found.      Physician: YASMANI Weinre MD    Visit Date: 8/28/2023    Physician Orders: PT Eval and Treat   Medical Diagnosis from Referral: Z90.12 (ICD-10-CM) - S/P left mastectomy  Evaluation Date: 06/12/2023  Authorization Period Expiration: 06/05/2024  Plan of Care Expiration: 09/29/2023  Progress Note Due: 09/02/2023  Visit # / Visits Authorized: 14 / 20 (pending, plus eval)  FOTO: 3/3     Precautions: standard and cancer     Time In: *** AM  Time Out: *** AM  Total Billable Time: *** minutes (TE only per [place] [employer])      Subjective     Patient reports: ***    She *** compliant with home exercise program.    Response to Previous Treatment: ***  Functional Change: ***    Pain: ***/10  Location: ***    Fatigue: ***/10    Diagnosis: stage IIIC (cT3, cN2(f), cM0, G3, ER-, [place]-, HER2-) IDC of the LEFT breast    Treatment:   Chemotherapy: neoadjuvant completed 11/22  Radiation: completed 5/31/23  Endocrine Therapy: N/A     Surgery Date: LEFT mastectomy with TE placement with LEFT SLNB on 02/27/2023 per Myra Weiner and Denis.       Objective      Objective Measures updated at progress report unless specified.     Treatment     Laura received the treatments listed below:      Therapeutic exercises to increase cardiovascular endurance, flexibility, range of motion, and flexibility for *** minutes:     Seated Exercises:  - Pulleys, red (flex, scap)   2 min each  - Physioball roll outs, 3 ways   2 min - held  - Biceps curls, nya (4#)    2 set x 10 reps - held  - Thoracic ext, horizontal 1/2 foam  1 set x 10 reps - held    Standing Exercises:  - Doorway pec stretch (overhead)  30 sec x 3 reps  - Triceps pushdowns (blue)   3 set x 10 reps - held  - Lifting from knee height, 10#  1 set x 15 reps - held  - Supinated lifts, 2#    2 set x 10 reps -  held    Mat Exercises:   - Butterfly stretch (behind head)  1 min x 2 reps   - LTR / shoulder abd stretch (L only)  10 sec x 10 rep   - Open book stretch, LUE only  10 set x 10 rep   - Sleeper stretch (LUE only)   30 sec x 2 reps - held      Neuromuscular re-education exercises to improve transverse abdominis / oblique activation for *** minutes:    - Palloff press, nya (blue)   1 set x 10 reps  - PNF chops, nya (blue)   1 set x 10 reps  - PNF lifts, nya (blue)    1 set x 10 reps      Manual therapy techniques to decrease pain as well as to increase soft tissue mobility, joint mobility, mobility and pliability, and function for *** minutes:     - Manual L axillary stretch  - Manual L pec attachment stretch  - Gentle mobilization of L tissue expander (medially and inferiorly)      Patient Education and Home Exercises       Education Provided Regarding:   - Role of physical therapy in multi-disciplinary team  - Goals for physical therapy, progress towards goals  - Physical therapy plan of care, scheduling  - Exercise technique, home exercise program  - Energy conservation techniques  - Consults to integrative oncology (meditation, yoga, acupuncture)    Written Home Exercises Provided: ***. Exercises were reviewed and Laura was able to demonstrate them prior to the end of the session. Laura demonstrated good  understanding of the education provided. See EMR under Patient Instructions for exercises provided during therapy sessions.    Assessment     Patient is responding well to physical therapy. Patient able to perform new exercises and exercise progressions without increase in symptoms prior to leaving the clinic. *** Will progress as tolerated.     Laura is progressing well towards her goals.   Patient prognosis is Good.     Patient will continue to benefit from skilled outpatient physical therapy to address the deficits listed in the problem list box on initial evaluation, provide patient / family education, and  to maximize patient's level of independence in the home and community environment.     Patient's spiritual, cultural, and educational needs considered, and patient agreeable to plan of care and goals.     Anticipated barriers to physical therapy: none anticipated    GOALS:   Short Term Goals: 4 Weeks  Patient will demonstrate 100% understanding of lymphedema risk reduction practices, including self-monitoring for lymphedema (met, 07/05/2023).  Patient will demonstrate independence with established home exercise program (met, 07/05/2023).   Patient will increase LEFT shoulder ABDUCTION active range of motion to 150 degrees to improve functional reaching, carrying, pushing, and pulling pain-free (met, 08/02/2023, not met).   Patient will increase LEFT shoulder FLEXION active range of motion to 160 degrees to improve functional reaching, carrying, pushing, and pulling pain-free (exceeded, 08/02/2023).   Patient will increase strength to 4+/5 in gross upper extremity musculature to improve tolerance to all functional activities pain-free (met, 08/02/2023).      Long Term Goals: 8 Weeks   Patient will increase LEFT shoulder FLEXION active range of motion to 180 degrees to improve functional reaching, carrying, pushing, and pulling pain-free (progressing, not met).   Patient will increase LEFT shoulder ABDUCTION active range of motion to 180 degrees to improve functional reaching, carrying, pushing, and pulling pain-free (progressing, not met).   Patient will demonstrate full / maximized tissue mobility to increase range of motion and promote healthy tissue to be pain-free at discharge (progressing, not met).   Patient will report decrease in overall worst pain to 2/10 at discharge (progressing, not met).     Modified Long Term Goals: 8 Weeks  Patient will demonstrate 5/5 strength of BILATERAL upper extremities for improved tolerance to functional reaching / carrying / pushing / pulling / lifting activities of daily  living (progressing, not met).      Plan     Plan of Care Certification: 08/02/2023 to 09/29/2023.    Outpatient physical therapy 1x week for 8 weeks to include the following: Manual Therapy, Neuromuscular Re-ed, Patient Education, Self Care, Therapeutic Activities, Therapeutic Exercise, and IASTM.    Ghada Brooks, PT

## 2023-08-28 ENCOUNTER — CLINICAL SUPPORT (OUTPATIENT)
Dept: REHABILITATION | Facility: HOSPITAL | Age: 38
End: 2023-08-28
Payer: MEDICAID

## 2023-08-28 DIAGNOSIS — M25.612 DECREASED RANGE OF MOTION OF LEFT SHOULDER: Primary | ICD-10-CM

## 2023-08-28 DIAGNOSIS — R29.3 POOR POSTURE: ICD-10-CM

## 2023-08-28 DIAGNOSIS — R29.898 WEAKNESS OF LEFT UPPER EXTREMITY: ICD-10-CM

## 2023-08-28 DIAGNOSIS — R26.89 DECREASED FUNCTIONAL MOBILITY: ICD-10-CM

## 2023-08-28 DIAGNOSIS — Z91.89 AT RISK FOR LYMPHEDEMA: ICD-10-CM

## 2023-08-28 PROCEDURE — 97110 THERAPEUTIC EXERCISES: CPT

## 2023-08-28 NOTE — PROGRESS NOTES
[facility]   Physical Therapy Treatment Note      Name: Laura Kent  Clinic Number: [id number]    Therapy Diagnosis:   Encounter Diagnoses   Name Primary?    Decreased range of motion of left shoulder Yes    Weakness of left upper extremity     Poor posture     At risk for lymphedema     Decreased functional mobility          Physician: YASMANI Weiner MD    Visit Date: 8/28/2023    Physician Orders: PT Eval and Treat   Medical Diagnosis from Referral: Z90.12 (ICD-10-CM) - S/P left mastectomy  Evaluation Date: 06/12/2023  Authorization Period Expiration: 06/05/2024  Plan of Care Expiration: 09/29/2023  Progress Note Due: 09/02/2023  Visit # / Visits Authorized: 14 / 20 (pending, plus eval)  FOTO: 3/3     Precautions: standard and cancer     Time In: 2:36 pm  Time Out: 2:30 pm  Total Billable Time: 54 minutes (TE only per [place] [employer])      Subjective     Patient reports: the mobility is there, but still feeling the tightness along the side of her breast with certain movements.  She was compliant with home exercise program.  Response to Previous Treatment: felt good, loose  Functional Change: movements are getting easier, stretching is getting easier    Pain: 0/10  Location: N/A    Fatigue: 0/10    Diagnosis: stage IIIC (cT3, cN2(f), cM0, G3, ER-, [place]-, HER2-) IDC of the LEFT breast    Treatment:   Chemotherapy: neoadjuvant completed 11/22  Radiation: completed 5/31/23  Endocrine Therapy: N/A     Surgery Date: LEFT mastectomy with TE placement with LEFT SLNB on 02/27/2023 per Myra Weiner and Denis.       Objective      Objective Measures updated at progress report unless specified.       Treatment     Laura received the treatments listed below:        Therapeutic exercises to increase cardiovascular endurance, flexibility, range of motion, and flexibility for 24 minutes:     Seated Exercises:  - Pulleys, red (flex, scap)   2 min each  - Physioball roll outs, 3 ways   2 min   -  Biceps curls, nya (4#)    2 set x 10 reps - held  - Thoracic ext, horizontal 1/2 foam  1 set x 10 reps     Standing Exercises:  - Doorway pec stretch (overhead)  30 sec x 3 reps  - Triceps pushdowns (blue)   3 set x 10 reps - held  - Lifting from knee height, 10#  1 set x 15 reps - held  - Supinated lifts, 2#    2 set x 10 reps - held    Mat Exercises:   - Butterfly stretch (behind head)  1 min x 2 reps   - LTR / shoulder abd stretch (L only)  10 sec x 10 rep   - Open book stretch, LUE only  10 set x 10 rep   - Sleeper stretch (LUE only)   30 sec x 2 reps - held      Neuromuscular re-education exercises to improve transverse abdominis / oblique activation for 10 minutes:    - Palloff press, nya (blue)   2 set x 10 reps  - PNF chops, nya (blue)   1 set x 10 reps  - PNF lifts, nya (blue)    1 set x 10 reps      Manual therapy techniques to decrease pain as well as to increase soft tissue mobility, joint mobility, mobility and pliability, and function for 20 minutes:     - Manual L axillary stretch  - Manual L pec attachment stretch  - Gentle mobilization of L tissue expander (medially and inferiorly)      Patient Education and Home Exercises       Education Provided Regarding:   - Role of physical therapy in multi-disciplinary team  - Goals for physical therapy, progress towards goals  - Physical therapy plan of care, scheduling  - Exercise technique, home exercise program  - Energy conservation techniques  - Consults to integrative oncology (meditation, yoga, acupuncture)    Written Home Exercises Provided: yes. Exercises were reviewed and Laura was able to demonstrate them prior to the end of the session. Laura demonstrated good  understanding of the education provided. See EMR under Patient Instructions for exercises provided during therapy sessions.    Assessment     Patient is responding well to physical therapy. The patient was able to perform all of today's progressions with no increase in symptoms prior to  leaving the clinic. She continues to have an area of tissue tightness along the left lateral breast that limits her ability to reach back and out to the side. She responds well to manual therapy techniques reporting less pulling and tightness in that area with certain movements. Reassess patient next visit for progress note. Will progress as tolerated.     Laura is progressing well towards her goals.   Patient prognosis is Good.     Patient will continue to benefit from skilled outpatient physical therapy to address the deficits listed in the problem list box on initial evaluation, provide patient / family education, and to maximize patient's level of independence in the home and community environment.     Patient's spiritual, cultural, and educational needs considered, and patient agreeable to plan of care and goals.     Anticipated barriers to physical therapy: none anticipated    GOALS:   Short Term Goals: 4 Weeks  Patient will demonstrate 100% understanding of lymphedema risk reduction practices, including self-monitoring for lymphedema (met, 07/05/2023).  Patient will demonstrate independence with established home exercise program (met, 07/05/2023).   Patient will increase LEFT shoulder ABDUCTION active range of motion to 150 degrees to improve functional reaching, carrying, pushing, and pulling pain-free (met, 08/02/2023, not met).   Patient will increase LEFT shoulder FLEXION active range of motion to 160 degrees to improve functional reaching, carrying, pushing, and pulling pain-free (exceeded, 08/02/2023).   Patient will increase strength to 4+/5 in gross upper extremity musculature to improve tolerance to all functional activities pain-free (met, 08/02/2023).      Long Term Goals: 8 Weeks   Patient will increase LEFT shoulder FLEXION active range of motion to 180 degrees to improve functional reaching, carrying, pushing, and pulling pain-free (progressing, not met).   Patient will increase LEFT shoulder  ABDUCTION active range of motion to 180 degrees to improve functional reaching, carrying, pushing, and pulling pain-free (progressing, not met).   Patient will demonstrate full / maximized tissue mobility to increase range of motion and promote healthy tissue to be pain-free at discharge (progressing, not met).   Patient will report decrease in overall worst pain to 2/10 at discharge (progressing, not met).     Modified Long Term Goals: 8 Weeks  Patient will demonstrate 5/5 strength of BILATERAL upper extremities for improved tolerance to functional reaching / carrying / pushing / pulling / lifting activities of daily living (progressing, not met).      Plan     Plan of Care Certification: 08/02/2023 to 09/29/2023.    Outpatient physical therapy 1x week for 8 weeks to include the following: Manual Therapy, Neuromuscular Re-ed, Patient Education, Self Care, Therapeutic Activities, Therapeutic Exercise, and IASTM.    Teresa Hurley, PT

## 2023-08-29 ENCOUNTER — PATIENT OUTREACH (OUTPATIENT)
Dept: EMERGENCY MEDICINE | Facility: HOSPITAL | Age: 38
End: 2023-08-29
Payer: MEDICAID

## 2023-08-29 NOTE — PROGRESS NOTES
ED Navigator called to remind Pt of her upcoming appt on 8-31-23, at 8am with Dr. Cruz at  [facility]. Pt is aware and plans to attend.

## 2023-08-29 NOTE — PROGRESS NOTES
ED Navigator called to f/u from last encounter and to remind of her upcoming appt on 8-31-23, at 8am with Dr. Cruz at  [facility], [facility]. Pt denies any needs at this time. Next f/u scheduled for 10-10-23.

## 2023-08-30 NOTE — PROGRESS NOTES
Subjective     Patient ID: Laura Kent is a 37 y.o. female.    Chief Complaint: Breast Cancer    HPI    Presents for follow up  Next breast surgery- right mastectomy and bilateral reconstructions- planned for 11/2023    Weight pretty stable and this frustrates her as she is making diet and exercise changes  Walks 3 miles per day Mon- Fri  Energy level better  Notes discomfort at port scar  No other pain  Stools have now normalized (priro pancreatitis left some changes)  See remainder ROS below     Oncology History:  - self detected an area under her left arm and it initially seemed to go away (noted around winter holidays)     - 3/4/2022 Outside Mammogram:  Findings:  The breasts are heterogeneously dense, which may obscure small masses.   Left  There is a 31 mm x 18 mm x 21 mm irregularly shaped mass with microlobulated margins seen in the left breast at 2 o'clock in the posterior depth with associated left axillary adenopathy. Largest node depicted on the outside study measures 49 x 23 x 29 mm with cortical thickening and effacement of the hilum. Breast mass is reportedly palpable.   Right  There is no evidence of suspicious masses, calcifications, or other abnormal findings in the right breast.  Impression:  Left  Mass: Left breast 31 mm x 18 mm x 21 mm mass at the posterior 2 o'clock position. Assessment: 5 - Highly suggestive of malignancy. Biopsy is recommended.   Right  There is no mammographic evidence of malignancy in the right breast.  BI-RADS Category:   Overall: 5 - Highly Suggestive of Malignancy  Recommendation:  Ultrasound Guided Core Needle Biopsy of the left breast mass and one of the abnormal left axillary nodes is recommended.     - 3/14/2022 Breast biopsy:  1. 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   Comment:  Infiltrating carcinoma occupies the entirety of  biopsied material   with the largest continuous focus measuring 13 mm.  Breast biomarkers are   pending and will be issued in a supplemental report.   2. LEFT AXILLARY LYMPH NODE, BIOPSY:   Invasive ductal carcinoma, [place] histologic grade 3 (tubule formation:   3, nuclear pleomorphism:  3, mitotic activity:  3).   Comment:  Infiltrating carcinoma occupies the entirety of biopsied material   with the largest continuous focus measuring 20 mm.  No lymph node tissue is   identified in the sample.  Tumor histology is essentially identical to that   of part 1.  The findings could represent part of a larger intranodal   metastasis, but an extension from the primary tumor cannot be excluded.   Radiographic correlation is advised.   Note:  Dr. Stephanie Rao also reviewed this case and agrees with the   diagnosis.   BREAST BIOMARKER RESULTS   Estrogen receptor (ER):  Negative (0)   Progesterone receptor (ER):  Negative (0)   HER2 IHC:  Negative (1+)   Ki-67 proliferation index:  80%      - 3/17/2022 Breast MRI:  Findings:  The breasts have heterogeneous fibroglandular tissue. The background parenchymal enhancement is minimal.   Left  There is a 38 mm x 33 mm x 33 mm irregularly shaped mass seen in the left breast at 2 o'clock in the posterior depth, 8.4 cm from the nipple and 1.2 cm from the skin. Associated signal void from a twirl biopsy marker; pathology showed invasive ductal carcinoma.   Posterior to the mass there is abnormal non mass enhancement measuring 29 x 28 mm. The NME is 4 mm from the skin and 2.3 cm from the chest wall. In total the mass and NME measure 54 mm in AP extent.  Overlying skin thickening and edema involving the lateral breast, likely from lymphovascular obstruction. No suspicious skin enhancement. Overall increased vascularity to the left breast.   Four abnormal lymph level 1 and level 2 left axillary lymph nodes. The largest lymph node measures 52 mm x 40 mm x 38 mm which previously underwent  biopsy showing metastatic disease. There is an associated radar reflector within the biopsied lymph node.  Right  There is no evidence of suspicious masses, abnormal enhancement, or other abnormal findings in the right breast. No enlarged axillary or internal mammary lymph nodes.   Impression:  Left  Mass: Left breast 38 mm x 33 mm x 33 mm mass at the posterior 2 o'clock position. Assessment: 6 - Known biopsy, proven malignancy. Associated 29 mm NME extending posteriorly from the mass. In total the mass and NME measure 54 mm in AP extent.  Lymph Node: Abnormal level 1 and 2 left axillary lymph nodes, the largest of which measures 52 mm x 40 mm x 38 mm lymph node and is known biopsy, proven malignancy.   Right  There is no MR evidence of malignancy in the right breast.  BI-RADS Category:   Overall: 6 - Known Biopsy-Proven Malignancy  Recommendation:  Clinical management of known left breast cancer. Patient is established with the breast surgery clinic.      - 3/21/2022 CT C/A/P:  FINDINGS:  Base of Neck: No significant abnormality.  Thoracic soft tissue: A proximally 3.2 x 3.6 x 3.7 cm irregular left breast mass within the lateral aspect with internal biopsy marker, corresponding to findings on prior breast MRI and compatible with malignancy.  Enlarged left axillary lymph nodes, largest measuring approximately 4.2 x 3.6 cm, (series 2, image 27).  CHEST:  -Heart: Normal size. No pericardial effusion.  -Pulmonary vasculature: Pulmonary arteries distribute normally.  There are four pulmonary veins.  -Radha/Mediastinum: No pathologic shelly enlargement.  -Trachea and Proximal airways: Trachea is midline.  Central airways are patent.  No significant bronchial wall thickening or bronchiectasis.  -Lungs/Pleura: Symmetrically expanded without focal consolidation, pneumothorax, or mass.  No pleural effusion or thickening.  -Esophagus: Normal course and caliber.  ABDOMEN:  - Liver: Normal in size and attenuation with no focal  hepatic abnormality.  - Gallbladder: No calcified gallstones.  No wall thickening or pericholecystic fluid.  - Bile Ducts: No intra or extrahepatic biliary ductal dilatation.  - Stomach/Duodenum: Small hiatal hernia otherwise unremarkable.  - Spleen: Normal size.  No focal parenchymal abnormality.  - Pancreas: No mass lesion.  No pancreatic ductal dilatation.  No peripancreatic fat stranding.  - Adrenals: Unremarkable.  - Kidneys/ureters/urinary bladder: Normal in size and location.  Normal enhancement pattern.  No nephrolithiasis.  Ureters are normal in course and caliber.  No hydroureteronephrosis.  - Retroperitoneum: Scattered nonenlarged retroperitoneal lymph nodes.  PELVIS:  - Reproductive: Intrauterine device present.  A proximally 2.8 cm peripherally enhancing right ovarian cyst, likely corpus luteal cyst.  - Other: No pelvic adenopathy, free fluid, or mass.  BOWEL/MESENTERY:  No evidence of bowel obstruction or inflammatory process. Appendix is visualized and unremarkable.  VASCULATURE: Left-sided aortic arch with 3 branch vessels.  No aneurysm and no significant atherosclerosis.  Portal vein, splenic vein, and SMV are patent.  BONES: [place] approximately 0.6 cm right trochanteric lesion, likely synovial herniation pit.  No acute fracture or bony destructive process.  EXTRATHORACIC/EXTRAPERITONEAL SOFT TISSUES: Unremarkable.  Impression:  In this patient with known breast cancer, there is an approximately 3.7 cm irregular left breast mass with left axillary lymphadenopathy.  No evidence of metastatic disease  Small hiatal hernia.  Additional findings as described above.     - 3/21/2022 Bone scan:  FINDINGS:  There is physiologic distribution of the radiopharmaceutical throughout the skeleton.  Mild degenerative uptake within the bilateral shoulders and knees.  There is normal uptake in the genitourinary system and soft tissues.  Impression:  There is no scintigraphic evidence of osteoblastic metastatic  disease.     - 3/21/2022 Brain MRI:  FINDINGS:  Please note there is dental metal artifact distorting the examination.  Allowing for artifacts the brain parenchyma is normal in contour.  Developmental variant with cavum septum pellucidum identified.  The ventricles are otherwise normal in size without hydrocephalus.  There is no midline shift or significant mass effect.  The major intracranial T2 flow voids are present.  No restricted diffusion within the non distorted brain parenchyma.  There is severe distortion of the susceptibility imaging no parenchymal susceptibility to suggest parenchymal hemorrhage in the non distorted brain parenchyma.  There is suboptimal evaluation of the cerebellum and posterior fossa.  Impression:  Unremarkable MRI brain allowing for artifacts from dental metal as detailed above specifically without abnormal parenchymal enhancement to suggest intracranial metastatic disease in light of history.     - Initiated NA therapy on 4/13/2022  Developed pancreatitis post C4 from immunotherapy and treatment held- required 2 hospitalizations     - 10/20/2022 Ultrasound left breast:  Findings:  At the site of the marker from the prior malignant breast biopsy,there is a 6 mm x 7 mm x 3 mm oval, parallel, hypoechoic mass with indistinct margins seen in the left breast at 2 o'clock in the posterior depth, 8 cm from the nipple. This is difficult to distinguish from the Twirl marker itself.  At the site of the previously biopsied metastatic left axillary node, there is a 10 x 7 x 11 mm node with a [place]- marker. Node has mild residual cortical thickening (5 mm), but has decreased in size. There are a few other normal size, normal appearing left axillary nodes.   Impression:  Left  Mass: Left breast 6 mm x 7 mm x 3 mm mass at the posterior 2 o'clock position. Assessment: 6 - Known biopsy, proven malignancy. Known, previously biopsied left breast cancer and metastatic left axillary node have both  decreased in size. The breast mass is now difficult to distinguish from the biopsy marker.   BI-RADS Category:   Overall: 6 - Known Biopsy-Proven Malignancy  Recommendation:  Patient is already under the care of the Breast Oncology team. Most recent bilateral mammogram was performed in February 2022.      Resumed NA chemo without immunotherapy 11/21/2022- completed 7/8 cycles     - 12/8/2022 Breast MRI:  Findings:  The breasts have heterogeneous fibroglandular tissue. The background parenchymal enhancement is minimal and symmetric. There is no evidence of suspicious masses, abnormal enhancement, or other abnormal findings.  There has been interval decrease in there has been interval resolution of previously described enhancing mass at the 2:00 axis which is a known biopsy-proven malignancy.    There is no abnormality in the right breast.    There has been significant decrease in size of left axillary lymph nodes which all are normal in size on current exam.  The largest contains a signal void consistent with previously placed radar reflector at site of biopsy-proven axillary metastasis.  It measures 8 mm in short axis.  Impression:  Interval resolution of enhancing mass at site of known malignancy in the left breast at 2:00  and marked interval decrease in left axillary adenopathy with visualized lymph nodes normal in size on current exam.  Findings are consistent with complete imaging response to therapy.  BI-RADS Category:   Overall: 6 - Known Biopsy-Proven Malignancy     - 2/27/2023 Left Mastectomy and SLN Bx, axillary lymphadenectomy  Pathology:  Left breast, mastectomy: No residual malignancy is present within the   mastectomy specimen. Treatment effect is present within the breast at area of prior biopsy   site.   Left axillary sentinel lymph node #1, palpable, excisional biopsy: 1 of 3   lymph nodes are positive for metastatic carcinoma   Greatest dimension of metastatic carcinoma measures 4 mm (0.4 cm).    Confirmed by positive immunohistochemical staining in the metastatic   foci with cytokeratins AE1/AE3, Cam5.2 and wide spectrum keratin with satisfactory positive and negative controls.   Treatment effect is present in lymph node.   Left axillary contents, axillary dissection: 14 benign lymph nodes,   Prior BREAST BIOMARKER RESULTS   Estrogen receptor (ER): Negative (0)   Progesterone receptor (ER): Negative (0)   HER2 IHC: Negative (1+)   Ki-67 proliferation index: 80%      - - completed XRT 2023     - to be followed by adjuvant Xeloda     PMH:  HTN- around pregnancies; off of blood pressure medication x 2 years  CHF- [facility]CHOs at [facility]  Gestational DM  Hyperlipidemia when heavier, managed with diet and followed by cardiology  C- sections x 2  Iron deficiency     GynHx:  Menarche- 7   (16 at 1st pregnancy) (17, 7, 4)  Still with periods - last 2022- last 5-7 days, moderate normal flow  + breast feeding x 1 year  IUD     SH:  Working, , [employer]  Single  Good support system  No tobacco  No EtOH  No illicit drugs    Review of Systems   Constitutional:  Negative for activity change, appetite change, chills, fatigue, fever and unexpected weight change.   HENT:  Negative for dental problem and mouth sores.    Eyes:  Negative for visual disturbance.   Respiratory:  Negative for cough, chest tightness and shortness of breath.    Cardiovascular:  Negative for chest pain, palpitations and leg swelling.   Gastrointestinal:  Positive for change in bowel habit, diarrhea and change in bowel habit. Negative for abdominal distention, abdominal pain, blood in stool, nausea and vomiting.   Genitourinary:  Negative for decreased urine volume, dysuria and frequency.   Musculoskeletal:  Negative for arthralgias, back pain and gait problem.   Integumentary:  Positive for breast tenderness (left side). Negative for rash.   Neurological:  Negative for weakness and headaches.   Hematological:   Negative for adenopathy. Does not bruise/bleed easily.   Psychiatric/Behavioral:  Negative for dysphoric mood. The patient is not nervous/anxious.    Breast: Positive for tenderness (left side).         Objective     Physical Exam  Vitals and nursing note reviewed.   Constitutional:       General: She is not in acute distress.     Appearance: Normal appearance. She is obese. She is not ill-appearing.      Comments: Presents alone  ECOG= 0  Very pleasant   HENT:      Head: Normocephalic and atraumatic.   Eyes:      General: No scleral icterus.     Extraocular Movements: Extraocular movements intact.      Conjunctiva/sclera: Conjunctivae normal.      Pupils: Pupils are equal, round, and reactive to light.   Cardiovascular:      Rate and Rhythm: Normal rate and regular rhythm.      Heart sounds: Normal heart sounds. No murmur heard.     No friction rub. No gallop.   Pulmonary:      Effort: Pulmonary effort is normal. No respiratory distress.      Breath sounds: Normal breath sounds. No rhonchi or rales.      Comments: Left breast expander- tight, mild XRT color changes; No LAD  Right breast no masses or LAD  Abdominal:      General: Abdomen is flat. Bowel sounds are normal. There is no distension.      Palpations: Abdomen is soft. There is no mass.      Tenderness: There is no abdominal tenderness. There is no guarding or rebound.      Comments: No organomegaly   Musculoskeletal:         General: No swelling or tenderness. Normal range of motion.      Cervical back: Normal range of motion and neck supple. No tenderness.      Right lower leg: No edema.      Left lower leg: No edema.   Lymphadenopathy:      Cervical: No cervical adenopathy.   Skin:     General: Skin is warm and dry.      Coloration: Skin is not jaundiced or pale.      Findings: No lesion or rash.      Comments: Hypertrophic port scar   Neurological:      General: No focal deficit present.      Mental Status: She is alert and oriented to person, place,  and time. Mental status is at baseline.      Sensory: No sensory deficit.      Motor: No weakness.      Coordination: Coordination normal.      Gait: Gait normal.   Psychiatric:         Mood and Affect: Mood normal.         Behavior: Behavior normal.         Thought Content: Thought content normal.         Judgment: Judgment normal.   4/2023 Mammogram on right negative     Assessment and Plan     1. Malignant neoplasm of upper-outer quadrant of left breast in female, estrogen receptor negative      On Xeloda ([employer]-X Trial)- needs labs done for monitoring- she will return tomorrow to do  She was not be able to complete Keytruda with immunotherapy side effects (pancreatitis- recurrent)     Needs monthly follow up     25 minutes total time     Route Chart for Scheduling    Med Onc Chart Routing  Urgent    Follow up with physician . Needs cbc, cmp tomorrow 9/1 around 9AM   Follow up with ANDREW 4 weeks. cbc, cmp and EP in 4 weeks   Infusion scheduling note    Injection scheduling note    Labs    Imaging    Pharmacy appointment    Other referrals            Therapy Plan Information  Flushes  heparin, porcine (PF) 100 unit/mL injection flush 500 Units  500 Units, Intravenous, Every visit  sodium chloride 0.9% flush 10 mL  10 mL, Intravenous, Every visit  heparin, porcine (PF) 100 unit/mL injection flush 500 Units  500 Units, Intravenous, Every visit  sodium chloride 0.9% flush 10 mL  10 mL, Intravenous, Every visit

## 2023-08-31 ENCOUNTER — OFFICE VISIT (OUTPATIENT)
Dept: HEMATOLOGY/ONCOLOGY | Facility: CLINIC | Age: 38
End: 2023-08-31
Payer: MEDICAID

## 2023-08-31 ENCOUNTER — PATIENT MESSAGE (OUTPATIENT)
Dept: HEMATOLOGY/ONCOLOGY | Facility: CLINIC | Age: 38
End: 2023-08-31

## 2023-08-31 VITALS
TEMPERATURE: 98 F | HEART RATE: 73 BPM | DIASTOLIC BLOOD PRESSURE: 87 MMHG | WEIGHT: 223 LBS | BODY MASS INDEX: 35.99 KG/M2 | OXYGEN SATURATION: 100 % | SYSTOLIC BLOOD PRESSURE: 139 MMHG

## 2023-08-31 DIAGNOSIS — Z17.1 MALIGNANT NEOPLASM OF UPPER-OUTER QUADRANT OF LEFT BREAST IN FEMALE, ESTROGEN RECEPTOR NEGATIVE: Primary | ICD-10-CM

## 2023-08-31 DIAGNOSIS — C50.412 MALIGNANT NEOPLASM OF UPPER-OUTER QUADRANT OF LEFT BREAST IN FEMALE, ESTROGEN RECEPTOR NEGATIVE: Primary | ICD-10-CM

## 2023-08-31 PROCEDURE — 99214 OFFICE O/P EST MOD 30 MIN: CPT | Mod: S$PBB,,, | Performed by: INTERNAL MEDICINE

## 2023-08-31 PROCEDURE — 3044F PR MOST RECENT HEMOGLOBIN A1C LEVEL <7.0%: ICD-10-PCS | Mod: CPTII,,, | Performed by: INTERNAL MEDICINE

## 2023-08-31 PROCEDURE — 99214 OFFICE O/P EST MOD 30 MIN: CPT | Mod: PBBFAC | Performed by: INTERNAL MEDICINE

## 2023-08-31 PROCEDURE — 3075F SYST BP GE 130 - 139MM HG: CPT | Mod: CPTII,,, | Performed by: INTERNAL MEDICINE

## 2023-08-31 PROCEDURE — 3044F HG A1C LEVEL LT 7.0%: CPT | Mod: CPTII,,, | Performed by: INTERNAL MEDICINE

## 2023-08-31 PROCEDURE — 3079F DIAST BP 80-89 MM HG: CPT | Mod: CPTII,,, | Performed by: INTERNAL MEDICINE

## 2023-08-31 PROCEDURE — 1160F PR REVIEW ALL MEDS BY PRESCRIBER/CLIN PHARMACIST DOCUMENTED: ICD-10-PCS | Mod: CPTII,,, | Performed by: INTERNAL MEDICINE

## 2023-08-31 PROCEDURE — 3008F PR BODY MASS INDEX (BMI) DOCUMENTED: ICD-10-PCS | Mod: CPTII,,, | Performed by: INTERNAL MEDICINE

## 2023-08-31 PROCEDURE — 99999 PR PBB SHADOW E&M-EST. PATIENT-LVL IV: ICD-10-PCS | Mod: PBBFAC,,, | Performed by: INTERNAL MEDICINE

## 2023-08-31 PROCEDURE — 1159F PR MEDICATION LIST DOCUMENTED IN MEDICAL RECORD: ICD-10-PCS | Mod: CPTII,,, | Performed by: INTERNAL MEDICINE

## 2023-08-31 PROCEDURE — 99214 PR OFFICE/OUTPT VISIT, EST, LEVL IV, 30-39 MIN: ICD-10-PCS | Mod: S$PBB,,, | Performed by: INTERNAL MEDICINE

## 2023-08-31 PROCEDURE — 1160F RVW MEDS BY RX/DR IN RCRD: CPT | Mod: CPTII,,, | Performed by: INTERNAL MEDICINE

## 2023-08-31 PROCEDURE — 99999 PR PBB SHADOW E&M-EST. PATIENT-LVL IV: CPT | Mod: PBBFAC,,, | Performed by: INTERNAL MEDICINE

## 2023-08-31 PROCEDURE — 3008F BODY MASS INDEX DOCD: CPT | Mod: CPTII,,, | Performed by: INTERNAL MEDICINE

## 2023-08-31 PROCEDURE — 3079F PR MOST RECENT DIASTOLIC BLOOD PRESSURE 80-89 MM HG: ICD-10-PCS | Mod: CPTII,,, | Performed by: INTERNAL MEDICINE

## 2023-08-31 PROCEDURE — 1159F MED LIST DOCD IN RCRD: CPT | Mod: CPTII,,, | Performed by: INTERNAL MEDICINE

## 2023-08-31 PROCEDURE — 3075F PR MOST RECENT SYSTOLIC BLOOD PRESS GE 130-139MM HG: ICD-10-PCS | Mod: CPTII,,, | Performed by: INTERNAL MEDICINE

## 2023-09-01 ENCOUNTER — LAB VISIT (OUTPATIENT)
Dept: LAB | Facility: HOSPITAL | Age: 38
End: 2023-09-01
Attending: INTERNAL MEDICINE
Payer: MEDICAID

## 2023-09-01 DIAGNOSIS — Z17.1 MALIGNANT NEOPLASM OF UPPER-OUTER QUADRANT OF LEFT BREAST IN FEMALE, ESTROGEN RECEPTOR NEGATIVE: ICD-10-CM

## 2023-09-01 DIAGNOSIS — C50.412 MALIGNANT NEOPLASM OF UPPER-OUTER QUADRANT OF LEFT BREAST IN FEMALE, ESTROGEN RECEPTOR NEGATIVE: ICD-10-CM

## 2023-09-01 LAB
ALBUMIN SERPL BCP-MCNC: 4.3 G/DL (ref 3.5–5.2)
ALP SERPL-CCNC: 67 U/L (ref 55–135)
ALT SERPL W/O P-5'-P-CCNC: 25 U/L (ref 10–44)
ANION GAP SERPL CALC-SCNC: 5 MMOL/L (ref 8–16)
AST SERPL-CCNC: 24 U/L (ref 10–40)
BASOPHILS # BLD AUTO: 0.02 K/UL (ref 0–0.2)
BASOPHILS NFR BLD: 0.7 % (ref 0–1.9)
BILIRUB SERPL-MCNC: 0.4 MG/DL (ref 0.1–1)
BUN SERPL-MCNC: 16 MG/DL (ref 6–20)
CALCIUM SERPL-MCNC: 9.3 MG/DL (ref 8.7–10.5)
CHLORIDE SERPL-SCNC: 111 MMOL/L (ref 95–110)
CO2 SERPL-SCNC: 27 MMOL/L (ref 23–29)
CREAT SERPL-MCNC: 0.8 MG/DL (ref 0.5–1.4)
DIFFERENTIAL METHOD: ABNORMAL
EOSINOPHIL # BLD AUTO: 0.1 K/UL (ref 0–0.5)
EOSINOPHIL NFR BLD: 1.8 % (ref 0–8)
ERYTHROCYTE [DISTWIDTH] IN BLOOD BY AUTOMATED COUNT: 13.8 % (ref 11.5–14.5)
EST. GFR  (NO RACE VARIABLE): >60 ML/MIN/1.73 M^2
GLUCOSE SERPL-MCNC: 111 MG/DL (ref 70–110)
HCT VFR BLD AUTO: 30.4 % (ref 37–48.5)
HGB BLD-MCNC: 10.6 G/DL (ref 12–16)
IMM GRANULOCYTES # BLD AUTO: 0.01 K/UL (ref 0–0.04)
IMM GRANULOCYTES NFR BLD AUTO: 0.4 % (ref 0–0.5)
LYMPHOCYTES # BLD AUTO: 0.7 K/UL (ref 1–4.8)
LYMPHOCYTES NFR BLD: 27.1 % (ref 18–48)
MCH RBC QN AUTO: 32.6 PG (ref 27–31)
MCHC RBC AUTO-ENTMCNC: 34.9 G/DL (ref 32–36)
MCV RBC AUTO: 94 FL (ref 82–98)
MONOCYTES # BLD AUTO: 0.3 K/UL (ref 0.3–1)
MONOCYTES NFR BLD: 9.5 % (ref 4–15)
NEUTROPHILS # BLD AUTO: 1.7 K/UL (ref 1.8–7.7)
NEUTROPHILS NFR BLD: 60.5 % (ref 38–73)
NRBC BLD-RTO: 0 /100 WBC
PLATELET # BLD AUTO: 145 K/UL (ref 150–450)
PMV BLD AUTO: 9.9 FL (ref 9.2–12.9)
POTASSIUM SERPL-SCNC: 4.1 MMOL/L (ref 3.5–5.1)
PROT SERPL-MCNC: 6.8 G/DL (ref 6–8.4)
RBC # BLD AUTO: 3.25 M/UL (ref 4–5.4)
SODIUM SERPL-SCNC: 143 MMOL/L (ref 136–145)
WBC # BLD AUTO: 2.73 K/UL (ref 3.9–12.7)

## 2023-09-01 PROCEDURE — 85025 COMPLETE CBC W/AUTO DIFF WBC: CPT | Performed by: INTERNAL MEDICINE

## 2023-09-01 PROCEDURE — 80053 COMPREHEN METABOLIC PANEL: CPT | Performed by: INTERNAL MEDICINE

## 2023-09-01 PROCEDURE — 36415 COLL VENOUS BLD VENIPUNCTURE: CPT | Performed by: INTERNAL MEDICINE

## 2023-09-05 ENCOUNTER — PATIENT MESSAGE (OUTPATIENT)
Dept: HEMATOLOGY/ONCOLOGY | Facility: CLINIC | Age: 38
End: 2023-09-05
Payer: MEDICAID

## 2023-09-05 DIAGNOSIS — Z17.1 MALIGNANT NEOPLASM OF UPPER-OUTER QUADRANT OF LEFT BREAST IN FEMALE, ESTROGEN RECEPTOR NEGATIVE: ICD-10-CM

## 2023-09-05 DIAGNOSIS — C50.412 MALIGNANT NEOPLASM OF UPPER-OUTER QUADRANT OF LEFT BREAST IN FEMALE, ESTROGEN RECEPTOR NEGATIVE: ICD-10-CM

## 2023-09-05 RX ORDER — CAPECITABINE 150 MG/1
150 TABLET, FILM COATED ORAL 2 TIMES DAILY
Qty: 28 TABLET | Refills: 2 | Status: SHIPPED | OUTPATIENT
Start: 2023-09-05 | End: 2024-09-04

## 2023-09-05 RX ORDER — CAPECITABINE 500 MG/1
2000 TABLET, FILM COATED ORAL 2 TIMES DAILY
Qty: 112 TABLET | Refills: 2 | Status: SHIPPED | OUTPATIENT
Start: 2023-09-05 | End: 2024-09-04

## 2023-09-06 DIAGNOSIS — Z17.1 MALIGNANT NEOPLASM OF UPPER-OUTER QUADRANT OF LEFT BREAST IN FEMALE, ESTROGEN RECEPTOR NEGATIVE: ICD-10-CM

## 2023-09-06 DIAGNOSIS — C50.412 MALIGNANT NEOPLASM OF UPPER-OUTER QUADRANT OF LEFT BREAST IN FEMALE, ESTROGEN RECEPTOR NEGATIVE: ICD-10-CM

## 2023-09-06 RX ORDER — CAPECITABINE 150 MG/1
150 TABLET, FILM COATED ORAL 2 TIMES DAILY
Qty: 28 TABLET | Refills: 2 | OUTPATIENT
Start: 2023-09-06 | End: 2024-09-05

## 2023-09-06 RX ORDER — CAPECITABINE 500 MG/1
2000 TABLET, FILM COATED ORAL 2 TIMES DAILY
Qty: 112 TABLET | Refills: 2 | OUTPATIENT
Start: 2023-09-06 | End: 2024-09-05

## 2023-09-11 DIAGNOSIS — C50.412 MALIGNANT NEOPLASM OF UPPER-OUTER QUADRANT OF LEFT BREAST IN FEMALE, ESTROGEN RECEPTOR NEGATIVE: ICD-10-CM

## 2023-09-11 DIAGNOSIS — Z17.1 MALIGNANT NEOPLASM OF UPPER-OUTER QUADRANT OF LEFT BREAST IN FEMALE, ESTROGEN RECEPTOR NEGATIVE: ICD-10-CM

## 2023-09-11 RX ORDER — CAPECITABINE 500 MG/1
2000 TABLET, FILM COATED ORAL 2 TIMES DAILY
Qty: 112 TABLET | Refills: 2 | Status: ACTIVE | OUTPATIENT
Start: 2023-09-11 | End: 2023-11-09 | Stop reason: SDUPTHER

## 2023-09-11 RX ORDER — CAPECITABINE 150 MG/1
150 TABLET, FILM COATED ORAL 2 TIMES DAILY
Qty: 28 TABLET | Refills: 2 | Status: ACTIVE | OUTPATIENT
Start: 2023-09-11 | End: 2023-11-09 | Stop reason: SDUPTHER

## 2023-09-12 ENCOUNTER — DOCUMENTATION ONLY (OUTPATIENT)
Dept: REHABILITATION | Facility: HOSPITAL | Age: 38
End: 2023-09-12
Payer: MEDICAID

## 2023-09-12 ENCOUNTER — PATIENT MESSAGE (OUTPATIENT)
Dept: REHABILITATION | Facility: HOSPITAL | Age: 38
End: 2023-09-12
Payer: MEDICAID

## 2023-09-12 PROBLEM — R29.898 WEAKNESS OF LEFT UPPER EXTREMITY: Status: RESOLVED | Noted: 2023-03-24 | Resolved: 2023-09-12

## 2023-09-12 PROBLEM — R26.89 DECREASED FUNCTIONAL MOBILITY: Status: RESOLVED | Noted: 2023-03-24 | Resolved: 2023-09-12

## 2023-09-12 PROBLEM — Z91.89 AT RISK FOR LYMPHEDEMA: Status: RESOLVED | Noted: 2023-03-24 | Resolved: 2023-09-12

## 2023-09-12 PROBLEM — M25.612 DECREASED RANGE OF MOTION OF LEFT SHOULDER: Status: RESOLVED | Noted: 2023-03-24 | Resolved: 2023-09-12

## 2023-09-12 PROBLEM — R29.3 POOR POSTURE: Status: RESOLVED | Noted: 2023-03-24 | Resolved: 2023-09-12

## 2023-09-12 NOTE — PROGRESS NOTES
PHYSICAL THERAPY  Discharge  Date of Discharge 9/12/2023    [id number]    Pt was seen in Physical Therapy for initial evaluation on:3/23/23  Pt's last date of treatment in Physical Therapy was:8/28/23  Number of treatment sessions completed: 38  Reason for discharge:    pt requested  Status at Discharge:  max rehab potential reached

## 2023-09-18 ENCOUNTER — TELEPHONE (OUTPATIENT)
Dept: PLASTIC SURGERY | Facility: CLINIC | Age: 38
End: 2023-09-18
Payer: MEDICAID

## 2023-09-18 ENCOUNTER — PATIENT MESSAGE (OUTPATIENT)
Dept: PLASTIC SURGERY | Facility: CLINIC | Age: 38
End: 2023-09-18
Payer: MEDICAID

## 2023-09-18 DIAGNOSIS — Z17.1 MALIGNANT NEOPLASM OF UPPER-OUTER QUADRANT OF LEFT BREAST IN FEMALE, ESTROGEN RECEPTOR NEGATIVE: Primary | ICD-10-CM

## 2023-09-18 DIAGNOSIS — Z01.818 PRE-OP EXAM: Primary | ICD-10-CM

## 2023-09-18 DIAGNOSIS — C50.412 MALIGNANT NEOPLASM OF UPPER-OUTER QUADRANT OF LEFT BREAST IN FEMALE, ESTROGEN RECEPTOR NEGATIVE: Primary | ICD-10-CM

## 2023-09-18 NOTE — TELEPHONE ENCOUNTER
Spoke to pt and offered a surgery date 12/11/23 at the Main location, 2nd floor Day of surgery center- Pt agreeable. Provided patient with details of pre /post op appts, basic prep for sx, arrival time the day before, triage call from anesthesia dept, and address of the location. CTA scan scheduled pre sx -  Pt stated will be finishing chemo in November and will check with Oncologist and inform of sx date -call back # to RN navigator given should any questions or concerns arise  All questions and concerns addressed. Pt voiced understanding.

## 2023-09-18 NOTE — TELEPHONE ENCOUNTER
Received notification of surgery case.  Approval for 12/11/23 for case and confirmed with Dr Barrios staff, [place].  In basket staff message sent.

## 2023-09-18 NOTE — TELEPHONE ENCOUNTER
Attempted to contact pt to discuss scheduling Flap sx x 2 times this am Pt was not available at the time of the call. I was unable to leave a message.

## 2023-09-19 ENCOUNTER — PATIENT MESSAGE (OUTPATIENT)
Dept: HEMATOLOGY/ONCOLOGY | Facility: CLINIC | Age: 38
End: 2023-09-19
Payer: MEDICAID

## 2023-09-19 PROBLEM — B37.9 YEAST INFECTION: Status: RESOLVED | Noted: 2023-06-26 | Resolved: 2023-09-19

## 2023-09-19 NOTE — PROGRESS NOTES
Subjective     Patient ID: Laura Kent is a 37 y.o. female.    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 She is on Xeloda (CREATE-X Trial).   Next breast surgery- right mastectomy and bilateral reconstructions- planned for 11/2023.    Overall she is doing well.   She walks for 45 minutes 5 times a week. Weight has been stable.   She is fatigued, states she sleeps okay.  She is nauseated today, this is her norm, denies vomiting. She has Zofran with minimal relief, but she has to work so she can't take phenergan.  Appetite has been okay. Bowel movements have been normal. Occasional constipation.   Denies pain.     Stools have now normalized (priro pancreatitis left some changes).        Per Dr. Cruz's previous note: Oncology History:  - self detected an area under her left arm and it initially seemed to go away (noted around winter holidays)     - 3/4/2022 Outside Mammogram:  Findings:  The breasts are heterogeneously dense, which may obscure small masses.   Left  There is a 31 mm x 18 mm x 21 mm irregularly shaped mass with microlobulated margins seen in the left breast at 2 o'clock in the posterior depth with associated left axillary adenopathy. Largest node depicted on the outside study measures 49 x 23 x 29 mm with cortical thickening and effacement of the hilum. Breast mass is reportedly palpable.   Right  There is no evidence of suspicious masses, calcifications, or other abnormal findings in the right breast.  Impression:  Left  Mass: Left breast 31 mm x 18 mm x 21 mm mass at the posterior 2 o'clock position. Assessment: 5 - Highly suggestive of malignancy. Biopsy is recommended.   Right  There is no mammographic evidence of malignancy in the right breast.  BI-RADS Category:   Overall: 5 - Highly Suggestive of Malignancy  Recommendation:  Ultrasound Guided Core Needle Biopsy of the left breast mass and one of the abnormal left  axillary nodes is recommended.     - 3/14/2022 Breast biopsy:  1. LEFT BREAST MASS, 2 O'CLOCK, BIOPSY:   Invasive ductal carcinoma, [place] histologic grade 3 (tubule formation:   3, nuclear pleomorphism:  3, mitotic activity:  3).   Comment:  Infiltrating carcinoma occupies the entirety of biopsied material   with the largest continuous focus measuring 13 mm.  Breast biomarkers are   pending and will be issued in a supplemental report.   2. LEFT AXILLARY LYMPH NODE, BIOPSY:   Invasive ductal carcinoma, [place] histologic grade 3 (tubule formation:   3, nuclear pleomorphism:  3, mitotic activity:  3).   Comment:  Infiltrating carcinoma occupies the entirety of biopsied material   with the largest continuous focus measuring 20 mm.  No lymph node tissue is   identified in the sample.  Tumor histology is essentially identical to that   of part 1.  The findings could represent part of a larger intranodal   metastasis, but an extension from the primary tumor cannot be excluded.   Radiographic correlation is advised.   Note:  Dr. Stephanie Rao also reviewed this case and agrees with the   diagnosis.   BREAST BIOMARKER RESULTS   Estrogen receptor (ER):  Negative (0)   Progesterone receptor (ER):  Negative (0)   HER2 IHC:  Negative (1+)   Ki-67 proliferation index:  80%      - 3/17/2022 Breast MRI:  Findings:  The breasts have heterogeneous fibroglandular tissue. The background parenchymal enhancement is minimal.   Left  There is a 38 mm x 33 mm x 33 mm irregularly shaped mass seen in the left breast at 2 o'clock in the posterior depth, 8.4 cm from the nipple and 1.2 cm from the skin. Associated signal void from a twirl biopsy marker; pathology showed invasive ductal carcinoma.   Posterior to the mass there is abnormal non mass enhancement measuring 29 x 28 mm. The NME is 4 mm from the skin and 2.3 cm from the chest wall. In total the mass and NME measure 54 mm in AP extent.  Overlying skin thickening and edema  involving the lateral breast, likely from lymphovascular obstruction. No suspicious skin enhancement. Overall increased vascularity to the left breast.   Four abnormal lymph level 1 and level 2 left axillary lymph nodes. The largest lymph node measures 52 mm x 40 mm x 38 mm which previously underwent biopsy showing metastatic disease. There is an associated radar reflector within the biopsied lymph node.  Right  There is no evidence of suspicious masses, abnormal enhancement, or other abnormal findings in the right breast. No enlarged axillary or internal mammary lymph nodes.   Impression:  Left  Mass: Left breast 38 mm x 33 mm x 33 mm mass at the posterior 2 o'clock position. Assessment: 6 - Known biopsy, proven malignancy. Associated 29 mm NME extending posteriorly from the mass. In total the mass and NME measure 54 mm in AP extent.  Lymph Node: Abnormal level 1 and 2 left axillary lymph nodes, the largest of which measures 52 mm x 40 mm x 38 mm lymph node and is known biopsy, proven malignancy.   Right  There is no MR evidence of malignancy in the right breast.  BI-RADS Category:   Overall: 6 - Known Biopsy-Proven Malignancy  Recommendation:  Clinical management of known left breast cancer. Patient is established with the breast surgery clinic.      - 3/21/2022 CT C/A/P:  FINDINGS:  Base of Neck: No significant abnormality.  Thoracic soft tissue: A proximally 3.2 x 3.6 x 3.7 cm irregular left breast mass within the lateral aspect with internal biopsy marker, corresponding to findings on prior breast MRI and compatible with malignancy.  Enlarged left axillary lymph nodes, largest measuring approximately 4.2 x 3.6 cm, (series 2, image 27).  CHEST:  -Heart: Normal size. No pericardial effusion.  -Pulmonary vasculature: Pulmonary arteries distribute normally.  There are four pulmonary veins.  -Radha/Mediastinum: No pathologic shelly enlargement.  -Trachea and Proximal airways: Trachea is midline.  Central airways are  patent.  No significant bronchial wall thickening or bronchiectasis.  -Lungs/Pleura: Symmetrically expanded without focal consolidation, pneumothorax, or mass.  No pleural effusion or thickening.  -Esophagus: Normal course and caliber.  ABDOMEN:  - Liver: Normal in size and attenuation with no focal hepatic abnormality.  - Gallbladder: No calcified gallstones.  No wall thickening or pericholecystic fluid.  - Bile Ducts: No intra or extrahepatic biliary ductal dilatation.  - Stomach/Duodenum: Small hiatal hernia otherwise unremarkable.  - Spleen: Normal size.  No focal parenchymal abnormality.  - Pancreas: No mass lesion.  No pancreatic ductal dilatation.  No peripancreatic fat stranding.  - Adrenals: Unremarkable.  - Kidneys/ureters/urinary bladder: Normal in size and location.  Normal enhancement pattern.  No nephrolithiasis.  Ureters are normal in course and caliber.  No hydroureteronephrosis.  - Retroperitoneum: Scattered nonenlarged retroperitoneal lymph nodes.  PELVIS:  - Reproductive: Intrauterine device present.  A proximally 2.8 cm peripherally enhancing right ovarian cyst, likely corpus luteal cyst.  - Other: No pelvic adenopathy, free fluid, or mass.  BOWEL/MESENTERY:  No evidence of bowel obstruction or inflammatory process. Appendix is visualized and unremarkable.  VASCULATURE: Left-sided aortic arch with 3 branch vessels.  No aneurysm and no significant atherosclerosis.  Portal vein, splenic vein, and SMV are patent.  BONES: Luc[place] approximately 0.6 cm right trochanteric lesion, likely synovial herniation pit.  No acute fracture or bony destructive process.  EXTRATHORACIC/EXTRAPERITONEAL SOFT TISSUES: Unremarkable.  Impression:  In this patient with known breast cancer, there is an approximately 3.7 cm irregular left breast mass with left axillary lymphadenopathy.  No evidence of metastatic disease  Small hiatal hernia.  Additional findings as described above.     - 3/21/2022 Bone  scan:  FINDINGS:  There is physiologic distribution of the radiopharmaceutical throughout the skeleton.  Mild degenerative uptake within the bilateral shoulders and knees.  There is normal uptake in the genitourinary system and soft tissues.  Impression:  There is no scintigraphic evidence of osteoblastic metastatic disease.     - 3/21/2022 Brain MRI:  FINDINGS:  Please note there is dental metal artifact distorting the examination.  Allowing for artifacts the brain parenchyma is normal in contour.  Developmental variant with cavum septum pellucidum identified.  The ventricles are otherwise normal in size without hydrocephalus.  There is no midline shift or significant mass effect.  The major intracranial T2 flow voids are present.  No restricted diffusion within the non distorted brain parenchyma.  There is severe distortion of the susceptibility imaging no parenchymal susceptibility to suggest parenchymal hemorrhage in the non distorted brain parenchyma.  There is suboptimal evaluation of the cerebellum and posterior fossa.  Impression:  Unremarkable MRI brain allowing for artifacts from dental metal as detailed above specifically without abnormal parenchymal enhancement to suggest intracranial metastatic disease in light of history.     - Initiated NA therapy on 4/13/2022  Developed pancreatitis post C4 from immunotherapy and treatment held- required 2 hospitalizations     - 10/20/2022 Ultrasound left breast:  Findings:  At the site of the marker from the prior malignant breast biopsy,there is a 6 mm x 7 mm x 3 mm oval, parallel, hypoechoic mass with indistinct margins seen in the left breast at 2 o'clock in the posterior depth, 8 cm from the nipple. This is difficult to distinguish from the Twirl marker itself.  At the site of the previously biopsied metastatic left axillary node, there is a 10 x 7 x 11 mm node with a [place]- marker. Node has mild residual cortical thickening (5 mm), but has decreased in  size. There are a few other normal size, normal appearing left axillary nodes.   Impression:  Left  Mass: Left breast 6 mm x 7 mm x 3 mm mass at the posterior 2 o'clock position. Assessment: 6 - Known biopsy, proven malignancy. Known, previously biopsied left breast cancer and metastatic left axillary node have both decreased in size. The breast mass is now difficult to distinguish from the biopsy marker.   BI-RADS Category:   Overall: 6 - Known Biopsy-Proven Malignancy  Recommendation:  Patient is already under the care of the Breast Oncology team. Most recent bilateral mammogram was performed in February 2022.      Resumed NA chemo without immunotherapy 11/21/2022- completed 7/8 cycles     - 12/8/2022 Breast MRI:  Findings:  The breasts have heterogeneous fibroglandular tissue. The background parenchymal enhancement is minimal and symmetric. There is no evidence of suspicious masses, abnormal enhancement, or other abnormal findings.  There has been interval decrease in there has been interval resolution of previously described enhancing mass at the 2:00 axis which is a known biopsy-proven malignancy.    There is no abnormality in the right breast.    There has been significant decrease in size of left axillary lymph nodes which all are normal in size on current exam.  The largest contains a signal void consistent with previously placed radar reflector at site of biopsy-proven axillary metastasis.  It measures 8 mm in short axis.  Impression:  Interval resolution of enhancing mass at site of known malignancy in the left breast at 2:00  and marked interval decrease in left axillary adenopathy with visualized lymph nodes normal in size on current exam.  Findings are consistent with complete imaging response to therapy.  BI-RADS Category:   Overall: 6 - Known Biopsy-Proven Malignancy     - 2/27/2023 Left Mastectomy and SLN Bx, axillary lymphadenectomy  Pathology:  Left breast, mastectomy: No residual malignancy is  present within the   mastectomy specimen. Treatment effect is present within the breast at area of prior biopsy   site.   Left axillary sentinel lymph node #1, palpable, excisional biopsy: 1 of 3   lymph nodes are positive for metastatic carcinoma   Greatest dimension of metastatic carcinoma measures 4 mm (0.4 cm).   Confirmed by positive immunohistochemical staining in the metastatic   foci with cytokeratins AE1/AE3, Cam5.2 and wide spectrum keratin with satisfactory positive and negative controls.   Treatment effect is present in lymph node.   Left axillary contents, axillary dissection: 14 benign lymph nodes,   Prior BREAST BIOMARKER RESULTS   Estrogen receptor (ER): Negative (0)   Progesterone receptor (ER): Negative (0)   HER2 IHC: Negative (1+)   Ki-67 proliferation index: 80%      - - completed XRT 2023     - to be followed by adjuvant Xeloda     PMH:  HTN- around pregnancies; off of blood pressure medication x 2 years  CHF- ECHOs at [facility]  Gestational DM  Hyperlipidemia when heavier, managed with diet and followed by cardiology  C- sections x 2  Iron deficiency     GynHx:  Menarche- 7   (16 at 1st pregnancy) (17, 7, 4)  Still with periods - last 2022- last 5-7 days, moderate normal flow  + breast feeding x 1 year  IUD     SH:  Working, , [employer]  Single  Good support system  No tobacco  No EtOH  No illicit drugs    Review of Systems   Constitutional:  Negative for activity change, appetite change, chills, fatigue, fever and unexpected weight change.   HENT:  Negative for dental problem and mouth sores.    Eyes:  Negative for visual disturbance.   Respiratory:  Negative for cough, chest tightness and shortness of breath.    Cardiovascular:  Negative for chest pain, palpitations and leg swelling.   Gastrointestinal:  Positive for nausea. Negative for abdominal distention, abdominal pain, blood in stool, change in bowel habit, diarrhea and vomiting.   Genitourinary:   Negative for decreased urine volume, dysuria and frequency.   Musculoskeletal:  Negative for arthralgias, back pain and gait problem.        Left hand has been locking up on her   Integumentary:  Negative for rash and breast tenderness.   Neurological:  Negative for weakness and headaches.   Hematological:  Negative for adenopathy. Does not bruise/bleed easily.   Psychiatric/Behavioral:  Negative for dysphoric mood. The patient is not nervous/anxious.    Breast: Negative for tenderness         Objective     Physical Exam  Vitals and nursing note reviewed.   Constitutional:       General: She is not in acute distress.     Appearance: Normal appearance. She is well-developed.   HENT:      Head: Normocephalic.   Eyes:      Pupils: Pupils are equal, round, and reactive to light.   Cardiovascular:      Rate and Rhythm: Normal rate and regular rhythm.      Heart sounds: Normal heart sounds.   Pulmonary:      Effort: Pulmonary effort is normal.      Breath sounds: Normal breath sounds.   Chest:   Breasts:     Right: Normal.      Left: Bleeding: Left breast soreness, expander in place.   Abdominal:      General: Bowel sounds are normal. There is no distension.      Palpations: Abdomen is soft.      Tenderness: There is no abdominal tenderness.   Musculoskeletal:      Cervical back: Normal range of motion.   Lymphadenopathy:      Cervical: No cervical adenopathy.      Upper Body:      Right upper body: No supraclavicular or axillary adenopathy.      Left upper body: No supraclavicular or axillary adenopathy.   Skin:     General: Skin is warm and dry.      Findings: No rash.   Neurological:      Mental Status: She is alert and oriented to person, place, and time.   Psychiatric:         Behavior: Behavior normal.     4/2023 Mammogram on right negative     Assessment and Plan     1. Malignant neoplasm of upper-outer quadrant of left breast in female, estrogen receptor negative    2. Chemotherapy induced neutropenia    3.  Primary hypertension    4. Neoplasm related pain    5. Severe obesity      On Xeloda (CREATE-X Trial)- needs labs done for monitoring- she will return tomorrow to do  She was not be able to complete Keytruda with immunotherapy side effects (pancreatitis- recurrent)     2. ANC 1360; continue Xeloda will recheck labs next week     3. Monitored today; continue current medication and follow up with PCP     4. Stable will continue to monitor    5. Referred to integrative oncology for weight loss       Return to clinic in 3 weeks with MD appointment and labs.     Patient is in agreement with the proposed treatment plan. All questions were answered to the patient's satisfaction. Patient knows to call clinic for any new or worsening symptoms and if anything is needed before the next clinic visit.          Sheree Mayo, SHANDRAP-C  Hematology & Medical Oncology   [facility] [place] [address]  ph. [phone]  Fax. [phone]    Collaborating physician, Dr. Cruz.    Approximately 14 minutes were spent face-to-face with the patient.  Approximately 25 minutes in total were spent on this encounter, which includes face-to-face time and non-face-to-face time preparing to see the patient (e.g., review of tests), obtaining and/or reviewing separately obtained history, documenting clinical information in the electronic or other health record, independently interpreting results (not separately reported) and communicating results to the patient/family/caregiver, or care coordination (not separately reported).       Route Chart for Scheduling    Med Onc Chart Routing      Follow up with physician 3 weeks. CBC and CMP priro to seeing Dr. cruz   Follow up with ANDREW 6 weeks. PJ   Infusion scheduling note    Injection scheduling note    Labs CBC and CMP   Scheduling:  Preferred lab:  Lab interval: every 3 weeks     Imaging    Pharmacy appointment    Other referrals                  Therapy Plan  Information  Flushes  heparin, porcine (PF) 100 unit/mL injection flush 500 Units  500 Units, Intravenous, Every visit  sodium chloride 0.9% flush 10 mL  10 mL, Intravenous, Every visit  heparin, porcine (PF) 100 unit/mL injection flush 500 Units  500 Units, Intravenous, Every visit  sodium chloride 0.9% flush 10 mL  10 mL, Intravenous, Every visit

## 2023-09-20 ENCOUNTER — DOCUMENTATION ONLY (OUTPATIENT)
Dept: HEMATOLOGY/ONCOLOGY | Facility: CLINIC | Age: 38
End: 2023-09-20
Payer: MEDICAID

## 2023-09-20 ENCOUNTER — LAB VISIT (OUTPATIENT)
Dept: LAB | Facility: HOSPITAL | Age: 38
End: 2023-09-20
Attending: INTERNAL MEDICINE
Payer: MEDICAID

## 2023-09-20 ENCOUNTER — OFFICE VISIT (OUTPATIENT)
Dept: HEMATOLOGY/ONCOLOGY | Facility: CLINIC | Age: 38
End: 2023-09-20
Payer: MEDICAID

## 2023-09-20 ENCOUNTER — TELEPHONE (OUTPATIENT)
Dept: HEMATOLOGY/ONCOLOGY | Facility: CLINIC | Age: 38
End: 2023-09-20
Payer: MEDICAID

## 2023-09-20 VITALS
HEIGHT: 66 IN | TEMPERATURE: 98 F | SYSTOLIC BLOOD PRESSURE: 129 MMHG | OXYGEN SATURATION: 100 % | RESPIRATION RATE: 18 BRPM | BODY MASS INDEX: 36.62 KG/M2 | WEIGHT: 227.88 LBS | HEART RATE: 63 BPM | DIASTOLIC BLOOD PRESSURE: 87 MMHG

## 2023-09-20 DIAGNOSIS — T45.1X5A CHEMOTHERAPY INDUCED NEUTROPENIA: ICD-10-CM

## 2023-09-20 DIAGNOSIS — I10 PRIMARY HYPERTENSION: ICD-10-CM

## 2023-09-20 DIAGNOSIS — E66.01 SEVERE OBESITY: ICD-10-CM

## 2023-09-20 DIAGNOSIS — D70.1 CHEMOTHERAPY INDUCED NEUTROPENIA: ICD-10-CM

## 2023-09-20 DIAGNOSIS — C50.412 MALIGNANT NEOPLASM OF UPPER-OUTER QUADRANT OF LEFT BREAST IN FEMALE, ESTROGEN RECEPTOR NEGATIVE: ICD-10-CM

## 2023-09-20 DIAGNOSIS — C50.412 MALIGNANT NEOPLASM OF UPPER-OUTER QUADRANT OF LEFT BREAST IN FEMALE, ESTROGEN RECEPTOR NEGATIVE: Primary | ICD-10-CM

## 2023-09-20 DIAGNOSIS — G89.3 NEOPLASM RELATED PAIN: ICD-10-CM

## 2023-09-20 DIAGNOSIS — Z17.1 MALIGNANT NEOPLASM OF UPPER-OUTER QUADRANT OF LEFT BREAST IN FEMALE, ESTROGEN RECEPTOR NEGATIVE: Primary | ICD-10-CM

## 2023-09-20 DIAGNOSIS — Z17.1 MALIGNANT NEOPLASM OF UPPER-OUTER QUADRANT OF LEFT BREAST IN FEMALE, ESTROGEN RECEPTOR NEGATIVE: ICD-10-CM

## 2023-09-20 LAB
ANISOCYTOSIS BLD QL SMEAR: SLIGHT
BASOPHILS # BLD AUTO: ABNORMAL K/UL (ref 0–0.2)
BASOPHILS NFR BLD: 0 % (ref 0–1.9)
DIFFERENTIAL METHOD: ABNORMAL
EOSINOPHIL # BLD AUTO: ABNORMAL K/UL (ref 0–0.5)
EOSINOPHIL NFR BLD: 1 % (ref 0–8)
ERYTHROCYTE [DISTWIDTH] IN BLOOD BY AUTOMATED COUNT: 12.6 % (ref 11.5–14.5)
HCT VFR BLD AUTO: 30.7 % (ref 37–48.5)
HGB BLD-MCNC: 11.1 G/DL (ref 12–16)
HYPOCHROMIA BLD QL SMEAR: ABNORMAL
IMM GRANULOCYTES # BLD AUTO: ABNORMAL K/UL (ref 0–0.04)
IMM GRANULOCYTES NFR BLD AUTO: ABNORMAL % (ref 0–0.5)
LYMPHOCYTES # BLD AUTO: ABNORMAL K/UL (ref 1–4.8)
LYMPHOCYTES NFR BLD: 19 % (ref 18–48)
MCH RBC QN AUTO: 32.1 PG (ref 27–31)
MCHC RBC AUTO-ENTMCNC: 36.2 G/DL (ref 32–36)
MCV RBC AUTO: 89 FL (ref 82–98)
MONOCYTES # BLD AUTO: ABNORMAL K/UL (ref 0.3–1)
MONOCYTES NFR BLD: 4 % (ref 4–15)
NEUTROPHILS NFR BLD: 76 % (ref 38–73)
NRBC BLD-RTO: 0 /100 WBC
OVALOCYTES BLD QL SMEAR: ABNORMAL
PLATELET # BLD AUTO: 175 K/UL (ref 150–450)
PMV BLD AUTO: 10.3 FL (ref 9.2–12.9)
POIKILOCYTOSIS BLD QL SMEAR: SLIGHT
POLYCHROMASIA BLD QL SMEAR: ABNORMAL
RBC # BLD AUTO: 3.46 M/UL (ref 4–5.4)
SPHEROCYTES BLD QL SMEAR: ABNORMAL
WBC # BLD AUTO: 1.79 K/UL (ref 3.9–12.7)

## 2023-09-20 PROCEDURE — 3008F BODY MASS INDEX DOCD: CPT | Mod: CPTII,,, | Performed by: NURSE PRACTITIONER

## 2023-09-20 PROCEDURE — 3074F PR MOST RECENT SYSTOLIC BLOOD PRESSURE < 130 MM HG: ICD-10-PCS | Mod: CPTII,,, | Performed by: NURSE PRACTITIONER

## 2023-09-20 PROCEDURE — 1159F MED LIST DOCD IN RCRD: CPT | Mod: CPTII,,, | Performed by: NURSE PRACTITIONER

## 2023-09-20 PROCEDURE — 99999 PR PBB SHADOW E&M-EST. PATIENT-LVL V: ICD-10-PCS | Mod: PBBFAC,,, | Performed by: NURSE PRACTITIONER

## 2023-09-20 PROCEDURE — 99215 OFFICE O/P EST HI 40 MIN: CPT | Mod: S$PBB,,, | Performed by: NURSE PRACTITIONER

## 2023-09-20 PROCEDURE — 85007 BL SMEAR W/DIFF WBC COUNT: CPT | Performed by: INTERNAL MEDICINE

## 2023-09-20 PROCEDURE — 3079F PR MOST RECENT DIASTOLIC BLOOD PRESSURE 80-89 MM HG: ICD-10-PCS | Mod: CPTII,,, | Performed by: NURSE PRACTITIONER

## 2023-09-20 PROCEDURE — 1160F RVW MEDS BY RX/DR IN RCRD: CPT | Mod: CPTII,,, | Performed by: NURSE PRACTITIONER

## 2023-09-20 PROCEDURE — 36415 COLL VENOUS BLD VENIPUNCTURE: CPT | Performed by: INTERNAL MEDICINE

## 2023-09-20 PROCEDURE — 1159F PR MEDICATION LIST DOCUMENTED IN MEDICAL RECORD: ICD-10-PCS | Mod: CPTII,,, | Performed by: NURSE PRACTITIONER

## 2023-09-20 PROCEDURE — 99999 PR PBB SHADOW E&M-EST. PATIENT-LVL V: CPT | Mod: PBBFAC,,, | Performed by: NURSE PRACTITIONER

## 2023-09-20 PROCEDURE — 3074F SYST BP LT 130 MM HG: CPT | Mod: CPTII,,, | Performed by: NURSE PRACTITIONER

## 2023-09-20 PROCEDURE — 3044F HG A1C LEVEL LT 7.0%: CPT | Mod: CPTII,,, | Performed by: NURSE PRACTITIONER

## 2023-09-20 PROCEDURE — 3008F PR BODY MASS INDEX (BMI) DOCUMENTED: ICD-10-PCS | Mod: CPTII,,, | Performed by: NURSE PRACTITIONER

## 2023-09-20 PROCEDURE — 1160F PR REVIEW ALL MEDS BY PRESCRIBER/CLIN PHARMACIST DOCUMENTED: ICD-10-PCS | Mod: CPTII,,, | Performed by: NURSE PRACTITIONER

## 2023-09-20 PROCEDURE — 85027 COMPLETE CBC AUTOMATED: CPT | Performed by: INTERNAL MEDICINE

## 2023-09-20 PROCEDURE — 99215 PR OFFICE/OUTPT VISIT, EST, LEVL V, 40-54 MIN: ICD-10-PCS | Mod: S$PBB,,, | Performed by: NURSE PRACTITIONER

## 2023-09-20 PROCEDURE — 3079F DIAST BP 80-89 MM HG: CPT | Mod: CPTII,,, | Performed by: NURSE PRACTITIONER

## 2023-09-20 PROCEDURE — 3044F PR MOST RECENT HEMOGLOBIN A1C LEVEL <7.0%: ICD-10-PCS | Mod: CPTII,,, | Performed by: NURSE PRACTITIONER

## 2023-09-20 PROCEDURE — 99215 OFFICE O/P EST HI 40 MIN: CPT | Mod: PBBFAC | Performed by: NURSE PRACTITIONER

## 2023-09-20 NOTE — TELEPHONE ENCOUNTER
----- Message from Sheree Mayo NP sent at 9/20/2023  9:50 AM CDT -----  Patient referred to integrative for weight loss

## 2023-10-02 ENCOUNTER — TELEPHONE (OUTPATIENT)
Dept: PLASTIC SURGERY | Facility: CLINIC | Age: 38
End: 2023-10-02
Payer: MEDICAID

## 2023-10-02 NOTE — TELEPHONE ENCOUNTER
Spoke to pt and rescheduled appt for a pre op to see Dr Barrios on 11/30 1;45  at [address] floor. [address]. Provided patient with appointment time and date, address of the location and call back # to RN navigator. All questions and concerns addressed.

## 2023-10-02 NOTE — TELEPHONE ENCOUNTER
Call placed to pre auth in response to processing auth request for CTA - communicated the pre op exam necessary for the sx - she stated the notes in the chart do not support this - Angie RITCHIE had responded in the inbasket note - Kristy stated she will respond to the message sent in detail what  is needed-

## 2023-10-03 ENCOUNTER — PATIENT MESSAGE (OUTPATIENT)
Dept: SURGERY | Facility: CLINIC | Age: 38
End: 2023-10-03

## 2023-10-03 ENCOUNTER — OFFICE VISIT (OUTPATIENT)
Dept: SURGERY | Facility: CLINIC | Age: 38
End: 2023-10-03
Payer: MEDICAID

## 2023-10-03 VITALS
SYSTOLIC BLOOD PRESSURE: 127 MMHG | DIASTOLIC BLOOD PRESSURE: 67 MMHG | OXYGEN SATURATION: 98 % | HEIGHT: 66 IN | HEART RATE: 77 BPM | WEIGHT: 227 LBS | BODY MASS INDEX: 36.48 KG/M2

## 2023-10-03 DIAGNOSIS — Z12.39 BREAST CANCER SCREENING OTHER THAN MAMMOGRAM: Primary | ICD-10-CM

## 2023-10-03 PROCEDURE — 99213 PR OFFICE/OUTPT VISIT, EST, LEVL III, 20-29 MIN: ICD-10-PCS | Mod: S$GLB,,, | Performed by: SURGERY

## 2023-10-03 PROCEDURE — 3074F PR MOST RECENT SYSTOLIC BLOOD PRESSURE < 130 MM HG: ICD-10-PCS | Mod: CPTII,S$GLB,, | Performed by: SURGERY

## 2023-10-03 PROCEDURE — 3044F PR MOST RECENT HEMOGLOBIN A1C LEVEL <7.0%: ICD-10-PCS | Mod: CPTII,S$GLB,, | Performed by: SURGERY

## 2023-10-03 PROCEDURE — 1159F MED LIST DOCD IN RCRD: CPT | Mod: CPTII,S$GLB,, | Performed by: SURGERY

## 2023-10-03 PROCEDURE — 1160F RVW MEDS BY RX/DR IN RCRD: CPT | Mod: CPTII,S$GLB,, | Performed by: SURGERY

## 2023-10-03 PROCEDURE — 3078F DIAST BP <80 MM HG: CPT | Mod: CPTII,S$GLB,, | Performed by: SURGERY

## 2023-10-03 PROCEDURE — 3074F SYST BP LT 130 MM HG: CPT | Mod: CPTII,S$GLB,, | Performed by: SURGERY

## 2023-10-03 PROCEDURE — 3078F PR MOST RECENT DIASTOLIC BLOOD PRESSURE < 80 MM HG: ICD-10-PCS | Mod: CPTII,S$GLB,, | Performed by: SURGERY

## 2023-10-03 PROCEDURE — 99213 OFFICE O/P EST LOW 20 MIN: CPT | Mod: S$GLB,,, | Performed by: SURGERY

## 2023-10-03 PROCEDURE — 1159F PR MEDICATION LIST DOCUMENTED IN MEDICAL RECORD: ICD-10-PCS | Mod: CPTII,S$GLB,, | Performed by: SURGERY

## 2023-10-03 PROCEDURE — 3008F BODY MASS INDEX DOCD: CPT | Mod: CPTII,S$GLB,, | Performed by: SURGERY

## 2023-10-03 PROCEDURE — 3044F HG A1C LEVEL LT 7.0%: CPT | Mod: CPTII,S$GLB,, | Performed by: SURGERY

## 2023-10-03 PROCEDURE — 3008F PR BODY MASS INDEX (BMI) DOCUMENTED: ICD-10-PCS | Mod: CPTII,S$GLB,, | Performed by: SURGERY

## 2023-10-03 PROCEDURE — 1160F PR REVIEW ALL MEDS BY PRESCRIBER/CLIN PHARMACIST DOCUMENTED: ICD-10-PCS | Mod: CPTII,S$GLB,, | Performed by: SURGERY

## 2023-10-03 NOTE — PROGRESS NOTES
"Date of Service:10/3/2023    DIAGNOSIS:   This is a 37 y.o. female with a history of IIIC (cT3, cN2(f), cM0, G3, ER-, VA-, HER2-)  invasive ductal carcinoma of the left breast.    TREATMENT:   1. left mastectomy and axillary lymph node dissection and TE placement on 2/27/2023. Dr. Weiner  2. Chemotherapy, 4/13/2022 - 12/31/2022 Dr. Cruz.   3. Radiation therapy 4/27/2023 - 5/31/2023 Dr. Bess.     HISTORY OF PRESENT ILLNESS:   Laura Kent is a 37 y.o. female comes in for oncological follow up.  She denies change in her breast self-exam specifically denying new masses, skin or nipple changes, or nipple discharge. Past medical and surgical history is updated with no new changes. There have been no changes to family history. T    IMAGING:   Right screening Mammogram  4/19/2023 BIRADS 1      PHYSICAL EXAM:   /67 (BP Location: Left arm, Patient Position: Sitting, BP Method: Large (Automatic))   Pulse 77   Ht 5' 6" (1.676 m)   Wt 103 kg (227 lb)   LMP 06/13/2023   SpO2 98%   BMI 36.64 kg/m²   General: The patient appears well and is in no acute distress.   Neuro: Alert & oriented x3.   Breasts: The exam was done with the patient seated and supine. Left breast - s/p mastectomy with TE and radiation.No palpable masses and no abnormal skin findings. No supraclavicular or axillary lymphadenopathy on the left side.   Right breast - within normal limits. No palpable masses and no abnormal skin or nipple findings. No supraclavicular or axillary lymphadenopathy on the right side.  Extremities: No lymphedema.     ASSESSMENT:   This is a 37 y.o. female without evidence of recurrence by exam, history or imaging.       PLAN:    She strongly desires contralateral mastectomy at the time of second stage reconstruction. We discussed the option for contralateral risk reducing mastectomy.  Undergoing a contralateral prophylactic mastectomy does not improve the cure rate for the known cancer or reduce the risk of the that " cancer returning.  Overall most women diagnosed with breast cancer have a 2-6% risk of developing a breast cancer in the opposite breast in the next 10 years.  Contralateral prophylactic mastectomy is not 100% protected against development of a new breast cancer.  Undergoing surgery on the contralateral breast has the risk of surgical site complications which could delay cancer treatments.  Most women who proceed with contralateral prophylactic mastectomy do so due to symmetry concerns or for peace of mind.    We discussed obtaining an updated breast MRI. Breast MRI is recommended in breast cancer screening in women less than 50 who have breast cancer. Will obtain prior to upcoming surgery. If no concerns on imaging then can proceed without lymph node evaluation.

## 2023-10-09 ENCOUNTER — HOSPITAL ENCOUNTER (OUTPATIENT)
Dept: RADIOLOGY | Facility: HOSPITAL | Age: 38
Discharge: HOME | End: 2023-10-09
Attending: SURGERY
Payer: MEDICAID

## 2023-10-09 DIAGNOSIS — Z01.818 PRE-OP EXAM: ICD-10-CM

## 2023-10-10 ENCOUNTER — HOSPITAL ENCOUNTER (OUTPATIENT)
Dept: RADIOLOGY | Facility: HOSPITAL | Age: 38
Discharge: HOME | End: 2023-10-10
Attending: SURGERY
Payer: MEDICAID

## 2023-10-10 PROCEDURE — 74174 CTA ABD&PLVS W/CONTRAST: CPT | Mod: TC

## 2023-10-10 PROCEDURE — 74174 CTA ABD&PLVS W/CONTRAST: CPT | Mod: 26,,, | Performed by: RADIOLOGY

## 2023-10-10 PROCEDURE — 25500020 PHARM REV CODE 255: Performed by: SURGERY

## 2023-10-10 PROCEDURE — 74174: ICD-10-PCS | Mod: 26,,, | Performed by: RADIOLOGY

## 2023-10-10 RX ADMIN — IOHEXOL 100 ML: 350 INJECTION, SOLUTION INTRAVENOUS at 01:10

## 2023-10-24 ENCOUNTER — TELEPHONE (OUTPATIENT)
Dept: HEMATOLOGY/ONCOLOGY | Facility: CLINIC | Age: 38
End: 2023-10-24
Payer: MEDICAID

## 2023-10-24 NOTE — TELEPHONE ENCOUNTER
Spoke with pt. in regards to confirming upcoming appointment on Thursday, October 26 with Yumiko Monreal PA-C. Pt. confirmed.     [place], [place] Ext. [phone]

## 2023-10-26 ENCOUNTER — PATIENT MESSAGE (OUTPATIENT)
Dept: HEMATOLOGY/ONCOLOGY | Facility: CLINIC | Age: 38
End: 2023-10-26
Payer: MEDICAID

## 2023-10-26 ENCOUNTER — OFFICE VISIT (OUTPATIENT)
Dept: HEMATOLOGY/ONCOLOGY | Facility: CLINIC | Age: 38
End: 2023-10-26
Payer: MEDICAID

## 2023-10-26 ENCOUNTER — LAB VISIT (OUTPATIENT)
Dept: LAB | Facility: HOSPITAL | Age: 38
End: 2023-10-26
Payer: MEDICAID

## 2023-10-26 VITALS — HEIGHT: 66 IN | BODY MASS INDEX: 38.09 KG/M2 | WEIGHT: 237 LBS

## 2023-10-26 DIAGNOSIS — E66.01 SEVERE OBESITY: ICD-10-CM

## 2023-10-26 DIAGNOSIS — R63.5 WEIGHT GAIN: Primary | ICD-10-CM

## 2023-10-26 DIAGNOSIS — C50.412 MALIGNANT NEOPLASM OF UPPER-OUTER QUADRANT OF LEFT BREAST IN FEMALE, ESTROGEN RECEPTOR NEGATIVE: ICD-10-CM

## 2023-10-26 DIAGNOSIS — Z13.1 DIABETES MELLITUS SCREENING: ICD-10-CM

## 2023-10-26 DIAGNOSIS — Z17.1 MALIGNANT NEOPLASM OF UPPER-OUTER QUADRANT OF LEFT BREAST IN FEMALE, ESTROGEN RECEPTOR NEGATIVE: ICD-10-CM

## 2023-10-26 DIAGNOSIS — R63.5 WEIGHT GAIN: ICD-10-CM

## 2023-10-26 LAB
ESTIMATED AVG GLUCOSE: 91 MG/DL (ref 68–131)
HBA1C MFR BLD: 4.8 % (ref 4–5.6)
T4 FREE SERPL-MCNC: 0.82 NG/DL (ref 0.71–1.51)
TSH SERPL DL<=0.005 MIU/L-ACNC: 0.71 UIU/ML (ref 0.4–4)

## 2023-10-26 PROCEDURE — 3044F HG A1C LEVEL LT 7.0%: CPT | Mod: CPTII,,, | Performed by: PHYSICIAN ASSISTANT

## 2023-10-26 PROCEDURE — 84443 ASSAY THYROID STIM HORMONE: CPT | Performed by: PHYSICIAN ASSISTANT

## 2023-10-26 PROCEDURE — 3008F BODY MASS INDEX DOCD: CPT | Mod: CPTII,,, | Performed by: PHYSICIAN ASSISTANT

## 2023-10-26 PROCEDURE — 1159F MED LIST DOCD IN RCRD: CPT | Mod: CPTII,,, | Performed by: PHYSICIAN ASSISTANT

## 2023-10-26 PROCEDURE — 3008F PR BODY MASS INDEX (BMI) DOCUMENTED: ICD-10-PCS | Mod: CPTII,,, | Performed by: PHYSICIAN ASSISTANT

## 2023-10-26 PROCEDURE — 99214 OFFICE O/P EST MOD 30 MIN: CPT | Mod: PBBFAC | Performed by: PHYSICIAN ASSISTANT

## 2023-10-26 PROCEDURE — 36415 COLL VENOUS BLD VENIPUNCTURE: CPT | Performed by: PHYSICIAN ASSISTANT

## 2023-10-26 PROCEDURE — 3044F PR MOST RECENT HEMOGLOBIN A1C LEVEL <7.0%: ICD-10-PCS | Mod: CPTII,,, | Performed by: PHYSICIAN ASSISTANT

## 2023-10-26 PROCEDURE — 83036 HEMOGLOBIN GLYCOSYLATED A1C: CPT | Performed by: PHYSICIAN ASSISTANT

## 2023-10-26 PROCEDURE — 1159F PR MEDICATION LIST DOCUMENTED IN MEDICAL RECORD: ICD-10-PCS | Mod: CPTII,,, | Performed by: PHYSICIAN ASSISTANT

## 2023-10-26 PROCEDURE — 99999 PR PBB SHADOW E&M-EST. PATIENT-LVL IV: CPT | Mod: PBBFAC,,, | Performed by: PHYSICIAN ASSISTANT

## 2023-10-26 PROCEDURE — 99999 PR PBB SHADOW E&M-EST. PATIENT-LVL IV: ICD-10-PCS | Mod: PBBFAC,,, | Performed by: PHYSICIAN ASSISTANT

## 2023-10-26 PROCEDURE — 1160F RVW MEDS BY RX/DR IN RCRD: CPT | Mod: CPTII,,, | Performed by: PHYSICIAN ASSISTANT

## 2023-10-26 PROCEDURE — 1160F PR REVIEW ALL MEDS BY PRESCRIBER/CLIN PHARMACIST DOCUMENTED: ICD-10-PCS | Mod: CPTII,,, | Performed by: PHYSICIAN ASSISTANT

## 2023-10-26 PROCEDURE — 99214 PR OFFICE/OUTPT VISIT, EST, LEVL IV, 30-39 MIN: ICD-10-PCS | Mod: S$PBB,,, | Performed by: PHYSICIAN ASSISTANT

## 2023-10-26 PROCEDURE — 84439 ASSAY OF FREE THYROXINE: CPT | Performed by: PHYSICIAN ASSISTANT

## 2023-10-26 PROCEDURE — 99214 OFFICE O/P EST MOD 30 MIN: CPT | Mod: S$PBB,,, | Performed by: PHYSICIAN ASSISTANT

## 2023-10-26 RX ORDER — METFORMIN HYDROCHLORIDE 500 MG/1
500 TABLET ORAL DAILY
Qty: 30 TABLET | Refills: 3 | Status: SHIPPED | OUTPATIENT
Start: 2023-10-26 | End: 2023-12-07

## 2023-10-26 RX ORDER — HYDROCODONE BITARTRATE AND ACETAMINOPHEN 5; 325 MG/1; MG/1
TABLET ORAL
Status: ON HOLD | COMMUNITY
Start: 2023-07-14 | End: 2023-12-14 | Stop reason: HOSPADM

## 2023-10-26 NOTE — PROGRESS NOTES
Subjective:      Laura Kent is a 37 y.o. female who presents to discuss weight loss. She has a history of triple negative left breast cancer. She completed neoadjuvant chemotherapy on 12/31/2022 and is s/p left breast mastectomy on 2/27/2023. Finished radiation therapy on 5/31/2023. Keytruda triggered acute pancreatitis in 8/2022. After this and oral steroids, she has been gaining weight and struggling to lose.  She admits to craving sugar. Does not eat meat but will eat seafood. Drinking protein shakes for protein daily. Previously lost about 70lbs when she started working with a . However, her physical activity is limited due breast surgeries. She is scheduled for her last surgery on 12/11/2023.   Menopause with chemotherapy. No longer having periods.    Sleep: 5-7 hours per night     Stress: Normal    Exercise: Walking 5 days a week     A typical day consists of:  Breakfast: biscuit, banana and pure protein shake; smoothies  Lunch: Skipped  Dinner: cheese in tortilla  Snack: Skittles  Beverages: water. Alcohol- none      Lab Visit on 10/09/2023   Component Date Value Ref Range Status    WBC 10/09/2023 3.16 (L)  3.90 - 12.70 K/uL Final    RBC 10/09/2023 3.51 (L)  4.00 - 5.40 M/uL Final    Hemoglobin 10/09/2023 11.2 (L)  12.0 - 16.0 g/dL Final    Hematocrit 10/09/2023 32.2 (L)  37.0 - 48.5 % Final    MCV 10/09/2023 92  82 - 98 fL Final    MCH 10/09/2023 31.9 (H)  27.0 - 31.0 pg Final    MCHC 10/09/2023 34.8  32.0 - 36.0 g/dL Final    RDW 10/09/2023 12.6  11.5 - 14.5 % Final    Platelets 10/09/2023 162  150 - 450 K/uL Final    MPV 10/09/2023 9.6  9.2 - 12.9 fL Final    Immature Granulocytes 10/09/2023 0.3  0.0 - 0.5 % Final    Gran # (ANC) 10/09/2023 2.0  1.8 - 7.7 K/uL Final    Immature Grans (Abs) 10/09/2023 0.01  0.00 - 0.04 K/uL Final    Lymph # 10/09/2023 0.7 (L)  1.0 - 4.8 K/uL Final    Mono # 10/09/2023 0.3  0.3 - 1.0 K/uL Final    Eos # 10/09/2023 0.1  0.0 - 0.5 K/uL Final    Baso #  10/09/2023 0.02  0.00 - 0.20 K/uL Final    nRBC 10/09/2023 0  0 /100 WBC Final    Gran % 10/09/2023 63.4  38.0 - 73.0 % Final    Lymph % 10/09/2023 23.4  18.0 - 48.0 % Final    Mono % 10/09/2023 9.8  4.0 - 15.0 % Final    Eosinophil % 10/09/2023 2.5  0.0 - 8.0 % Final    Basophil % 10/09/2023 0.6  0.0 - 1.9 % Final    Differential Method 10/09/2023 Automated   Final    Sodium 10/09/2023 142  136 - 145 mmol/L Final    Potassium 10/09/2023 3.9  3.5 - 5.1 mmol/L Final    Chloride 10/09/2023 108  95 - 110 mmol/L Final    CO2 10/09/2023 26  23 - 29 mmol/L Final    Glucose 10/09/2023 88  70 - 110 mg/dL Final    BUN 10/09/2023 17  6 - 20 mg/dL Final    Creatinine 10/09/2023 0.7  0.5 - 1.4 mg/dL Final    Calcium 10/09/2023 9.4  8.7 - 10.5 mg/dL Final    Total Protein 10/09/2023 6.9  6.0 - 8.4 g/dL Final    Albumin 10/09/2023 4.2  3.5 - 5.2 g/dL Final    Total Bilirubin 10/09/2023 0.3  0.1 - 1.0 mg/dL Final    Alkaline Phosphatase 10/09/2023 65  55 - 135 U/L Final    AST 10/09/2023 29  10 - 40 U/L Final    ALT 10/09/2023 25  10 - 44 U/L Final    eGFR 10/09/2023 >60.0  >60 mL/min/1.73 m^2 Final    Anion Gap 10/09/2023 8  8 - 16 mmol/L Final   Lab Visit on 09/20/2023   Component Date Value Ref Range Status    WBC 09/20/2023 1.79 (LL)  3.90 - 12.70 K/uL Final    RBC 09/20/2023 3.46 (L)  4.00 - 5.40 M/uL Final    Hemoglobin 09/20/2023 11.1 (L)  12.0 - 16.0 g/dL Final    Hematocrit 09/20/2023 30.7 (L)  37.0 - 48.5 % Final    MCV 09/20/2023 89  82 - 98 fL Final    MCH 09/20/2023 32.1 (H)  27.0 - 31.0 pg Final    MCHC 09/20/2023 36.2 (H)  32.0 - 36.0 g/dL Final    RDW 09/20/2023 12.6  11.5 - 14.5 % Final    Platelets 09/20/2023 175  150 - 450 K/uL Final    MPV 09/20/2023 10.3  9.2 - 12.9 fL Final    Immature Granulocytes 09/20/2023 CANCELED  0.0 - 0.5 % Final    Immature Grans (Abs) 09/20/2023 CANCELED  0.00 - 0.04 K/uL Final    Lymph # 09/20/2023 CANCELED  1.0 - 4.8 K/uL Final    Mono # 09/20/2023 CANCELED  0.3 - 1.0 K/uL Final     Eos # 09/20/2023 CANCELED  0.0 - 0.5 K/uL Final    Baso # 09/20/2023 CANCELED  0.00 - 0.20 K/uL Final    nRBC 09/20/2023 0  0 /100 WBC Final    Gran % 09/20/2023 76.0 (H)  38.0 - 73.0 % Final    Lymph % 09/20/2023 19.0  18.0 - 48.0 % Final    Mono % 09/20/2023 4.0  4.0 - 15.0 % Final    Eosinophil % 09/20/2023 1.0  0.0 - 8.0 % Final    Basophil % 09/20/2023 0.0  0.0 - 1.9 % Final    Aniso 09/20/2023 Slight   Final    Poik 09/20/2023 Slight   Final    Poly 09/20/2023 Occasional   Final    Hypo 09/20/2023 Occasional   Final    Ovalocytes 09/20/2023 Occasional   Final    Spherocytes 09/20/2023 Occasional   Final    Differential Method 09/20/2023 Manual   Final   Lab Visit on 09/01/2023   Component Date Value Ref Range Status    WBC 09/01/2023 2.73 (L)  3.90 - 12.70 K/uL Final    RBC 09/01/2023 3.25 (L)  4.00 - 5.40 M/uL Final    Hemoglobin 09/01/2023 10.6 (L)  12.0 - 16.0 g/dL Final    Hematocrit 09/01/2023 30.4 (L)  37.0 - 48.5 % Final    MCV 09/01/2023 94  82 - 98 fL Final    MCH 09/01/2023 32.6 (H)  27.0 - 31.0 pg Final    MCHC 09/01/2023 34.9  32.0 - 36.0 g/dL Final    RDW 09/01/2023 13.8  11.5 - 14.5 % Final    Platelets 09/01/2023 145 (L)  150 - 450 K/uL Final    MPV 09/01/2023 9.9  9.2 - 12.9 fL Final    Immature Granulocytes 09/01/2023 0.4  0.0 - 0.5 % Final    Gran # (ANC) 09/01/2023 1.7 (L)  1.8 - 7.7 K/uL Final    Immature Grans (Abs) 09/01/2023 0.01  0.00 - 0.04 K/uL Final    Lymph # 09/01/2023 0.7 (L)  1.0 - 4.8 K/uL Final    Mono # 09/01/2023 0.3  0.3 - 1.0 K/uL Final    Eos # 09/01/2023 0.1  0.0 - 0.5 K/uL Final    Baso # 09/01/2023 0.02  0.00 - 0.20 K/uL Final    nRBC 09/01/2023 0  0 /100 WBC Final    Gran % 09/01/2023 60.5  38.0 - 73.0 % Final    Lymph % 09/01/2023 27.1  18.0 - 48.0 % Final    Mono % 09/01/2023 9.5  4.0 - 15.0 % Final    Eosinophil % 09/01/2023 1.8  0.0 - 8.0 % Final    Basophil % 09/01/2023 0.7  0.0 - 1.9 % Final    Differential Method 09/01/2023 Automated   Final    Sodium 09/01/2023  143  136 - 145 mmol/L Final    Potassium 09/01/2023 4.1  3.5 - 5.1 mmol/L Final    Chloride 09/01/2023 111 (H)  95 - 110 mmol/L Final    CO2 09/01/2023 27  23 - 29 mmol/L Final    Glucose 09/01/2023 111 (H)  70 - 110 mg/dL Final    BUN 09/01/2023 16  6 - 20 mg/dL Final    Creatinine 09/01/2023 0.8  0.5 - 1.4 mg/dL Final    Calcium 09/01/2023 9.3  8.7 - 10.5 mg/dL Final    Total Protein 09/01/2023 6.8  6.0 - 8.4 g/dL Final    Albumin 09/01/2023 4.3  3.5 - 5.2 g/dL Final    Total Bilirubin 09/01/2023 0.4  0.1 - 1.0 mg/dL Final    Alkaline Phosphatase 09/01/2023 67  55 - 135 U/L Final    AST 09/01/2023 24  10 - 40 U/L Final    ALT 09/01/2023 25  10 - 44 U/L Final    eGFR 09/01/2023 >60.0  >60 mL/min/1.73 m^2 Final    Anion Gap 09/01/2023 5 (L)  8 - 16 mmol/L Final   Lab Visit on 08/02/2023   Component Date Value Ref Range Status    WBC 08/02/2023 2.50 (L)  3.90 - 12.70 K/uL Final    RBC 08/02/2023 3.11 (L)  4.00 - 5.40 M/uL Final    Hemoglobin 08/02/2023 10.2 (L)  12.0 - 16.0 g/dL Final    Hematocrit 08/02/2023 29.4 (L)  37.0 - 48.5 % Final    MCV 08/02/2023 95  82 - 98 fL Final    MCH 08/02/2023 32.8 (H)  27.0 - 31.0 pg Final    MCHC 08/02/2023 34.7  32.0 - 36.0 g/dL Final    RDW 08/02/2023 14.7 (H)  11.5 - 14.5 % Final    Platelets 08/02/2023 155  150 - 450 K/uL Final    MPV 08/02/2023 10.0  9.2 - 12.9 fL Final    Immature Granulocytes 08/02/2023 0.4  0.0 - 0.5 % Final    Gran # (ANC) 08/02/2023 1.5 (L)  1.8 - 7.7 K/uL Final    Immature Grans (Abs) 08/02/2023 0.01  0.00 - 0.04 K/uL Final    Lymph # 08/02/2023 0.6 (L)  1.0 - 4.8 K/uL Final    Mono # 08/02/2023 0.3  0.3 - 1.0 K/uL Final    Eos # 08/02/2023 0.0  0.0 - 0.5 K/uL Final    Baso # 08/02/2023 0.01  0.00 - 0.20 K/uL Final    nRBC 08/02/2023 0  0 /100 WBC Final    Gran % 08/02/2023 60.0  38.0 - 73.0 % Final    Lymph % 08/02/2023 25.6  18.0 - 48.0 % Final    Mono % 08/02/2023 12.4  4.0 - 15.0 % Final    Eosinophil % 08/02/2023 1.2  0.0 - 8.0 % Final    Basophil  % 2023 0.4  0.0 - 1.9 % Final    Differential Method 2023 Automated   Final    Sodium 2023 143  136 - 145 mmol/L Final    Potassium 2023 3.6  3.5 - 5.1 mmol/L Final    Chloride 2023 111 (H)  95 - 110 mmol/L Final    CO2 2023 25  23 - 29 mmol/L Final    Glucose 2023 105  70 - 110 mg/dL Final    BUN 2023 17  6 - 20 mg/dL Final    Creatinine 2023 0.8  0.5 - 1.4 mg/dL Final    Calcium 2023 8.7  8.7 - 10.5 mg/dL Final    Total Protein 2023 6.3  6.0 - 8.4 g/dL Final    Albumin 2023 3.9  3.5 - 5.2 g/dL Final    Total Bilirubin 2023 0.4  0.1 - 1.0 mg/dL Final    Alkaline Phosphatase 2023 71  55 - 135 U/L Final    AST 2023 36  10 - 40 U/L Final    ALT 2023 26  10 - 44 U/L Final    eGFR 2023 >60.0  >60 mL/min/1.73 m^2 Final    Anion Gap 2023 7 (L)  8 - 16 mmol/L Final       Past Medical History:   Diagnosis Date    Anxiety     Breast cancer     Encounter for blood transfusion     Hypertension     Meningitis     Severe obesity 2023     Past Surgical History:   Procedure Laterality Date    AXILLARY NODE DISSECTION Left 2023    Procedure: LYMPHADENECTOMY, AXILLARY;  Surgeon: YASMANI Weiner MD;  Location: [facility];  Service: General;  Laterality: Left;    BREAST BIOPSY Left      SECTION      COLONOSCOPY N/A 2022    Procedure: COLONOSCOPY;  Surgeon: Davi Chery MD;  Location: [address];  Service: Endoscopy;  Laterality: N/A;  case request entered from referral - Per DR. KATHLEEN Chery Pt to be scheduled for urgent colonoscopy on 22/ fully vaccinated / prep ins for Sutab on portal - ERW  see micro for negative C-diff- ERW    INJECTION FOR SENTINEL NODE IDENTIFICATION Left 2023    Procedure: INJECTION, FOR SENTINEL NODE IDENTIFICATION;  Surgeon: YASMANI Weiner MD;  Location: [facility];  Service: General;  Laterality: Left;    INSERTION OF BREAST TISSUE EXPANDER Left 2023     Procedure: INSERTION, TISSUE EXPANDER, BREAST;  Surgeon: Reese Barrios DO;  Location: [facility];  Service: Plastics;  Laterality: Left;    INSERTION OF TUNNELED CENTRAL VENOUS CATHETER (CVC) WITH SUBCUTANEOUS PORT N/A 2022    Procedure: [id number];  Surgeon: Deo Sin Jr., MD;  Location: [facility] OR [facility] FLR;  Service: General;  Laterality: N/A;    INSERTION OF TUNNELED CENTRAL VENOUS CATHETER (CVC) WITH SUBCUTANEOUS PORT N/A 2022    Procedure: INSERTION, SINGLE LUMEN CATHETER WITH PORT, WITH FLUOROSCOPIC GUIDANCE;  Surgeon: Ryder Dinero MD;  Location: [facility] CATH LAB;  Service: Vascular;  Laterality: N/A;    MASTECTOMY Left 2023    Procedure: MASTECTOMY;  Surgeon: YASMANI Weiner MD;  Location: [facility];  Service: General;  Laterality: Left;  3.5 TOTAL HOURS / EMAIL SENT  @ 7:56 [facility]    SENTINEL LYMPH NODE BIOPSY Left 2023    Procedure: BIOPSY, LYMPH NODE, SENTINEL;  Surgeon: YASMANI Weiner MD;  Location: [facility];  Service: General;  Laterality: Left;  LEFT with radiological marker     Social History     Tobacco Use    Smoking status: Never    Smokeless tobacco: Never   Substance Use Topics    Alcohol use: Not Currently     Comment: social    Drug use: Never     Family History   Problem Relation Age of Onset    Hypertension Mother     Diabetes Father     Hypertension Father     Pancreatic cancer Maternal Uncle     Bladder Cancer Paternal Grandmother     Colon cancer Neg Hx     Esophageal cancer Neg Hx      OB History    Para Term  AB Living   3 3 3         SAB IAB Ectopic Multiple Live Births                  # Outcome Date GA Lbr Nicanor/2nd Weight Sex Delivery Anes PTL Lv   3 Term            2 Term            1 Term                Current Outpatient Medications:     capecitabine (XELODA) 150 MG tablet, Take 1 tablet (150 mg total) by mouth 2 (two) times daily for 14 days followed by 7 days off. Take with 1 other capecitabine prescription for 2,150 mg total.,  Disp: 28 tablet, Rfl: 2    capecitabine (XELODA) 150 MG tablet, Take 1 tablet (150 mg total) by mouth 2 (two) times daily for 14 days followed by 7 days off. Take with 1 other capecitabine prescription for 2,150 mg total., Disp: 28 tablet, Rfl: 2    capecitabine (XELODA) 500 MG Tab, Take 4 tablets (2,000 mg total) by mouth 2 (two) times daily for 14 days followed by 7 days off. Take with 1 other capecitabine prescription for 2,150 mg total., Disp: 112 tablet, Rfl: 2    capecitabine (XELODA) 500 MG Tab, Take 4 tablets (2,000 mg total) by mouth 2 (two) times daily for 14 days followed by 7 days off. Take with 1 other capecitabine prescription for 2,150 mg total., Disp: 112 tablet, Rfl: 2    cetirizine (ZYRTEC) 10 MG tablet, Take 10 mg by mouth once daily., Disp: , Rfl:     clotrimazole (LOTRIMIN) 1 % cream, Apply 1 application topically every evening., Disp: , Rfl:     HYDROcodone-acetaminophen (NORCO) 5-325 mg per tablet, , Disp: , Rfl:     ibuprofen (ADVIL,MOTRIN) 600 MG tablet, Take 600 mg by mouth 3 (three) times daily., Disp: , Rfl:     ketoconazole (NIZORAL) 2 % shampoo, Apply topically Every 3 (three) days., Disp: , Rfl:     mupirocin (BACTROBAN) 2 % ointment, Apply topically once daily., Disp: 30 g, Rfl: 0    naproxen (NAPROSYN) 500 MG tablet, Take 1 tablet (500 mg total) by mouth every 12 (twelve) hours as needed., Disp: 14 tablet, Rfl: 0    nystatin (MYCOSTATIN) ointment, Apply topically 2 (two) times daily., Disp: 15 g, Rfl: 1    nystatin (MYCOSTATIN) powder, Apply topically 2 (two) times daily., Disp: 30 g, Rfl: 1    ondansetron (ZOFRAN) 8 MG tablet, Take 1 tablet (8 mg total) by mouth every 12 (twelve) hours as needed for Nausea., Disp: 30 tablet, Rfl: 2    pantoprazole (PROTONIX) 40 MG tablet, Take 1 tablet (40 mg total) by mouth once daily., Disp: 30 tablet, Rfl: 1    traMADoL (ULTRAM) 50 mg tablet, Take 1 tablet (50 mg total) by mouth every 6 (six) hours as needed for Pain., Disp: 20 tablet, Rfl: 0     "DUPIXENT  mg/2 mL PnIj, INJECT 300MG UNDER THE SKIN EVERY 2 WEEKS AS DIRECTED, Disp: , Rfl:     gabapentin (NEURONTIN) 300 MG capsule, Take 2 capsules (600 mg total) by mouth 3 (three) times daily. for 7 days, Disp: 42 capsule, Rfl: 0    hydrOXYzine HCL (ATARAX) 10 MG Tab, Take 10 mg by mouth nightly as needed., Disp: , Rfl:     lipase-protease-amylase 24,000-76,000-120,000 units (CREON) 24,000-76,000 -120,000 unit capsule, Take 1 capsule by mouth 3 (three) times daily with meals. (Patient not taking: Reported on 9/20/2023), Disp: 90 capsule, Rfl: 2    metFORMIN (GLUCOPHAGE) 500 MG tablet, Take 1 tablet (500 mg total) by mouth once daily. With food, Disp: 30 tablet, Rfl: 3    tranexamic acid (LYSTEDA) 650 mg tablet, , Disp: , Rfl:   No current facility-administered medications for this visit.    Facility-Administered Medications Ordered in Other Visits:     ceFAZolin 1 g, gentamicin 80 mg in sodium chloride 0.9% 500 mL irrigation, , Irrigation, On Call Procedure, YASMANI Weiner MD    Review of Systems:  General: No fever, chills, or weight loss.  Chest: No chest pain, shortness of breath, or palpitations.  Breast: No pain, masses, or nipple discharge.  Vulva: No pain, lesions, or itching.  Vagina: No relaxation, itching, discharge, or lesions.  Abdomen: No pain, nausea, vomiting, diarrhea, or constipation.  Urinary: No incontinence, nocturia, frequency, or dysuria.  Extremities:  No leg cramps, edema, or calf pain.  Neurologic: No headaches, dizziness, or visual changes.    Objective:     Vitals:    10/26/23 0936   Weight: 107.5 kg (236 lb 15.9 oz)   Height: 5' 6" (1.676 m)   PainSc:   4     Body mass index is 38.25 kg/m².    PHYSICAL EXAM:  APPEARANCE: Well nourished, well developed, in no acute distress.  AFFECT: WNL, alert and oriented x 3  SKIN: No acne or hirsutism  CHEST: Good respiratory effect    Assessment:    Weight gain  -     TSH; Future; Expected date: 10/26/2023  -     FREE T4; Future; " Expected date: 10/26/2023  -     Ambulatory referral/consult to Hematology/Oncology/Nutrition; Future; Expected date: 11/02/2023  -     metFORMIN (GLUCOPHAGE) 500 MG tablet; Take 1 tablet (500 mg total) by mouth once daily. With food  Dispense: 30 tablet; Refill: 3    Malignant neoplasm of upper-outer quadrant of left breast in female, estrogen receptor negative  -     Ambulatory referral/consult to Integrative Oncology  -     Ambulatory referral/consult to Hematology/Oncology/Nutrition; Future; Expected date: 11/02/2023    Severe obesity  -     Ambulatory referral/consult to Integrative Oncology  -     Ambulatory referral/consult to Hematology/Oncology/Nutrition; Future; Expected date: 11/02/2023    Diabetes mellitus screening  -     Hemoglobin A1c; Future; Expected date: 10/26/2023      BMR calculated at 1762 calories per day.  Plan:     Developed meal plan with handout of preferred foods.  Encouraged lean protein with every meal.  Wide variety of fruits and vegetables. Limit starch to 1/3 cup or 1 piece of bread per meal.  Log in shanti with goal of 1500 calories a day (35% protein, 35% carbs (mostly vegetables/fruits), 30% fat)  GLP-1 contraindicated with history of pancreatitis.  Likely insulin resistance and craving sugars  A1c and thyroid labs  Metformin 500mg QD with food.  Counseled on side effects and risks. Stop 1 week prior to up coming surgery  Referral to Nutrition at cancer center.  Follow up in 6 weeks.    Instructed patient to call if she experiences any side effects or has any questions.    I spent a total of 30 minutes on the day of the visit.This includes face to face time and non-face to face time preparing to see the patient (eg, review of tests), obtaining and/or reviewing separately obtained history, documenting clinical information in the electronic or other health record, independently interpreting results and communicating results to the patient/family/caregiver, or care coordinator.

## 2023-10-30 ENCOUNTER — LAB VISIT (OUTPATIENT)
Dept: LAB | Facility: HOSPITAL | Age: 38
End: 2023-10-30
Attending: INTERNAL MEDICINE
Payer: MEDICAID

## 2023-10-30 ENCOUNTER — OFFICE VISIT (OUTPATIENT)
Dept: HEMATOLOGY/ONCOLOGY | Facility: CLINIC | Age: 38
End: 2023-10-30
Payer: MEDICAID

## 2023-10-30 VITALS
BODY MASS INDEX: 37.84 KG/M2 | DIASTOLIC BLOOD PRESSURE: 86 MMHG | OXYGEN SATURATION: 100 % | TEMPERATURE: 98 F | HEART RATE: 85 BPM | RESPIRATION RATE: 17 BRPM | WEIGHT: 235.44 LBS | HEIGHT: 66 IN | SYSTOLIC BLOOD PRESSURE: 140 MMHG

## 2023-10-30 DIAGNOSIS — D70.1 CHEMOTHERAPY INDUCED NEUTROPENIA: ICD-10-CM

## 2023-10-30 DIAGNOSIS — Z17.1 MALIGNANT NEOPLASM OF UPPER-OUTER QUADRANT OF LEFT BREAST IN FEMALE, ESTROGEN RECEPTOR NEGATIVE: ICD-10-CM

## 2023-10-30 DIAGNOSIS — D63.0 ANEMIA IN NEOPLASTIC DISEASE: ICD-10-CM

## 2023-10-30 DIAGNOSIS — T45.1X5A CHEMOTHERAPY INDUCED NEUTROPENIA: ICD-10-CM

## 2023-10-30 DIAGNOSIS — Z17.1 MALIGNANT NEOPLASM OF UPPER-OUTER QUADRANT OF LEFT BREAST IN FEMALE, ESTROGEN RECEPTOR NEGATIVE: Primary | ICD-10-CM

## 2023-10-30 DIAGNOSIS — C50.412 MALIGNANT NEOPLASM OF UPPER-OUTER QUADRANT OF LEFT BREAST IN FEMALE, ESTROGEN RECEPTOR NEGATIVE: Primary | ICD-10-CM

## 2023-10-30 DIAGNOSIS — C50.412 MALIGNANT NEOPLASM OF UPPER-OUTER QUADRANT OF LEFT BREAST IN FEMALE, ESTROGEN RECEPTOR NEGATIVE: ICD-10-CM

## 2023-10-30 LAB
ALBUMIN SERPL BCP-MCNC: 4 G/DL (ref 3.5–5.2)
ALP SERPL-CCNC: 73 U/L (ref 55–135)
ALT SERPL W/O P-5'-P-CCNC: 18 U/L (ref 10–44)
ANION GAP SERPL CALC-SCNC: 10 MMOL/L (ref 8–16)
AST SERPL-CCNC: 23 U/L (ref 10–40)
BASOPHILS # BLD AUTO: 0.02 K/UL (ref 0–0.2)
BASOPHILS NFR BLD: 0.6 % (ref 0–1.9)
BILIRUB SERPL-MCNC: 0.3 MG/DL (ref 0.1–1)
BUN SERPL-MCNC: 14 MG/DL (ref 6–20)
CALCIUM SERPL-MCNC: 9.1 MG/DL (ref 8.7–10.5)
CHLORIDE SERPL-SCNC: 109 MMOL/L (ref 95–110)
CO2 SERPL-SCNC: 24 MMOL/L (ref 23–29)
CREAT SERPL-MCNC: 0.7 MG/DL (ref 0.5–1.4)
DIFFERENTIAL METHOD: ABNORMAL
EOSINOPHIL # BLD AUTO: 0.1 K/UL (ref 0–0.5)
EOSINOPHIL NFR BLD: 1.7 % (ref 0–8)
ERYTHROCYTE [DISTWIDTH] IN BLOOD BY AUTOMATED COUNT: 12.4 % (ref 11.5–14.5)
EST. GFR  (NO RACE VARIABLE): >60 ML/MIN/1.73 M^2
GLUCOSE SERPL-MCNC: 83 MG/DL (ref 70–110)
HCT VFR BLD AUTO: 32.8 % (ref 37–48.5)
HGB BLD-MCNC: 11.2 G/DL (ref 12–16)
IMM GRANULOCYTES # BLD AUTO: 0.01 K/UL (ref 0–0.04)
IMM GRANULOCYTES NFR BLD AUTO: 0.3 % (ref 0–0.5)
LYMPHOCYTES # BLD AUTO: 0.9 K/UL (ref 1–4.8)
LYMPHOCYTES NFR BLD: 24.7 % (ref 18–48)
MCH RBC QN AUTO: 31.4 PG (ref 27–31)
MCHC RBC AUTO-ENTMCNC: 34.1 G/DL (ref 32–36)
MCV RBC AUTO: 92 FL (ref 82–98)
MONOCYTES # BLD AUTO: 0.4 K/UL (ref 0.3–1)
MONOCYTES NFR BLD: 11 % (ref 4–15)
NEUTROPHILS # BLD AUTO: 2.2 K/UL (ref 1.8–7.7)
NEUTROPHILS NFR BLD: 61.7 % (ref 38–73)
NRBC BLD-RTO: 0 /100 WBC
PLATELET # BLD AUTO: 176 K/UL (ref 150–450)
PMV BLD AUTO: 9.9 FL (ref 9.2–12.9)
POTASSIUM SERPL-SCNC: 3.7 MMOL/L (ref 3.5–5.1)
PROT SERPL-MCNC: 6.8 G/DL (ref 6–8.4)
RBC # BLD AUTO: 3.57 M/UL (ref 4–5.4)
SODIUM SERPL-SCNC: 143 MMOL/L (ref 136–145)
WBC # BLD AUTO: 3.56 K/UL (ref 3.9–12.7)

## 2023-10-30 PROCEDURE — 99214 PR OFFICE/OUTPT VISIT, EST, LEVL IV, 30-39 MIN: ICD-10-PCS | Mod: S$PBB,,, | Performed by: INTERNAL MEDICINE

## 2023-10-30 PROCEDURE — 3044F PR MOST RECENT HEMOGLOBIN A1C LEVEL <7.0%: ICD-10-PCS | Mod: CPTII,,, | Performed by: INTERNAL MEDICINE

## 2023-10-30 PROCEDURE — 3044F HG A1C LEVEL LT 7.0%: CPT | Mod: CPTII,,, | Performed by: INTERNAL MEDICINE

## 2023-10-30 PROCEDURE — 99214 OFFICE O/P EST MOD 30 MIN: CPT | Mod: S$PBB,,, | Performed by: INTERNAL MEDICINE

## 2023-10-30 PROCEDURE — 80053 COMPREHEN METABOLIC PANEL: CPT | Performed by: INTERNAL MEDICINE

## 2023-10-30 PROCEDURE — 99215 OFFICE O/P EST HI 40 MIN: CPT | Mod: PBBFAC | Performed by: INTERNAL MEDICINE

## 2023-10-30 PROCEDURE — 3077F SYST BP >= 140 MM HG: CPT | Mod: CPTII,,, | Performed by: INTERNAL MEDICINE

## 2023-10-30 PROCEDURE — 1160F RVW MEDS BY RX/DR IN RCRD: CPT | Mod: CPTII,,, | Performed by: INTERNAL MEDICINE

## 2023-10-30 PROCEDURE — 99999 PR PBB SHADOW E&M-EST. PATIENT-LVL V: CPT | Mod: PBBFAC,,, | Performed by: INTERNAL MEDICINE

## 2023-10-30 PROCEDURE — 1159F PR MEDICATION LIST DOCUMENTED IN MEDICAL RECORD: ICD-10-PCS | Mod: CPTII,,, | Performed by: INTERNAL MEDICINE

## 2023-10-30 PROCEDURE — 85025 COMPLETE CBC W/AUTO DIFF WBC: CPT | Performed by: INTERNAL MEDICINE

## 2023-10-30 PROCEDURE — 3079F PR MOST RECENT DIASTOLIC BLOOD PRESSURE 80-89 MM HG: ICD-10-PCS | Mod: CPTII,,, | Performed by: INTERNAL MEDICINE

## 2023-10-30 PROCEDURE — 3008F PR BODY MASS INDEX (BMI) DOCUMENTED: ICD-10-PCS | Mod: CPTII,,, | Performed by: INTERNAL MEDICINE

## 2023-10-30 PROCEDURE — 99999 PR PBB SHADOW E&M-EST. PATIENT-LVL V: ICD-10-PCS | Mod: PBBFAC,,, | Performed by: INTERNAL MEDICINE

## 2023-10-30 PROCEDURE — 1159F MED LIST DOCD IN RCRD: CPT | Mod: CPTII,,, | Performed by: INTERNAL MEDICINE

## 2023-10-30 PROCEDURE — 1160F PR REVIEW ALL MEDS BY PRESCRIBER/CLIN PHARMACIST DOCUMENTED: ICD-10-PCS | Mod: CPTII,,, | Performed by: INTERNAL MEDICINE

## 2023-10-30 PROCEDURE — 3079F DIAST BP 80-89 MM HG: CPT | Mod: CPTII,,, | Performed by: INTERNAL MEDICINE

## 2023-10-30 PROCEDURE — 3077F PR MOST RECENT SYSTOLIC BLOOD PRESSURE >= 140 MM HG: ICD-10-PCS | Mod: CPTII,,, | Performed by: INTERNAL MEDICINE

## 2023-10-30 PROCEDURE — 36415 COLL VENOUS BLD VENIPUNCTURE: CPT | Performed by: INTERNAL MEDICINE

## 2023-10-30 PROCEDURE — 3008F BODY MASS INDEX DOCD: CPT | Mod: CPTII,,, | Performed by: INTERNAL MEDICINE

## 2023-10-30 NOTE — PROGRESS NOTES
Subjective     Patient ID: Laura Kent is a 37 y.o. female.    Chief Complaint: Malignant neoplasm of upper-outer quadrant of left breast i    HPI    Returns for follow up  Reports overall doing well  She notes some skin dryness- denies current hand/foot peeling or redness  No mouth sores  No fevers, chills, or infectious complaints  Rare nausea- manages with Zofran, no vomiting  Tendency towards constipation, no diarrhea    She is is on adjuvant Xeloda  Next breast surgery- right mastectomy and bilateral reconstructions- planned for later this month     Oncology History:  - self detected an area under her left arm and it initially seemed to go away (noted around winter holidays)     - 3/4/2022 Outside Mammogram:  Findings:  The breasts are heterogeneously dense, which may obscure small masses.   Left  There is a 31 mm x 18 mm x 21 mm irregularly shaped mass with microlobulated margins seen in the left breast at 2 o'clock in the posterior depth with associated left axillary adenopathy. Largest node depicted on the outside study measures 49 x 23 x 29 mm with cortical thickening and effacement of the hilum. Breast mass is reportedly palpable.   Right  There is no evidence of suspicious masses, calcifications, or other abnormal findings in the right breast.  Impression:  Left  Mass: Left breast 31 mm x 18 mm x 21 mm mass at the posterior 2 o'clock position. Assessment: 5 - Highly suggestive of malignancy. Biopsy is recommended.   Right  There is no mammographic evidence of malignancy in the right breast.  BI-RADS Category:   Overall: 5 - Highly Suggestive of Malignancy  Recommendation:  Ultrasound Guided Core Needle Biopsy of the left breast mass and one of the abnormal left axillary nodes is recommended.     - 3/14/2022 Breast biopsy:  1. LEFT BREAST MASS, 2 O'CLOCK, BIOPSY:   Invasive ductal carcinoma, [place] histologic grade 3 (tubule formation:   3, nuclear pleomorphism:  3, mitotic activity:  3).    Comment:  Infiltrating carcinoma occupies the entirety of biopsied material   with the largest continuous focus measuring 13 mm.  Breast biomarkers are   pending and will be issued in a supplemental report.   2. LEFT AXILLARY LYMPH NODE, BIOPSY:   Invasive ductal carcinoma, [place] histologic grade 3 (tubule formation:   3, nuclear pleomorphism:  3, mitotic activity:  3).   Comment:  Infiltrating carcinoma occupies the entirety of biopsied material   with the largest continuous focus measuring 20 mm.  No lymph node tissue is   identified in the sample.  Tumor histology is essentially identical to that   of part 1.  The findings could represent part of a larger intranodal   metastasis, but an extension from the primary tumor cannot be excluded.   Radiographic correlation is advised.   Note:  Dr. Stephanie Rao also reviewed this case and agrees with the   diagnosis.   BREAST BIOMARKER RESULTS   Estrogen receptor (ER):  Negative (0)   Progesterone receptor (ER):  Negative (0)   HER2 IHC:  Negative (1+)   Ki-67 proliferation index:  80%      - 3/17/2022 Breast MRI:  Findings:  The breasts have heterogeneous fibroglandular tissue. The background parenchymal enhancement is minimal.   Left  There is a 38 mm x 33 mm x 33 mm irregularly shaped mass seen in the left breast at 2 o'clock in the posterior depth, 8.4 cm from the nipple and 1.2 cm from the skin. Associated signal void from a twirl biopsy marker; pathology showed invasive ductal carcinoma.   Posterior to the mass there is abnormal non mass enhancement measuring 29 x 28 mm. The NME is 4 mm from the skin and 2.3 cm from the chest wall. In total the mass and NME measure 54 mm in AP extent.  Overlying skin thickening and edema involving the lateral breast, likely from lymphovascular obstruction. No suspicious skin enhancement. Overall increased vascularity to the left breast.   Four abnormal lymph level 1 and level 2 left axillary lymph nodes. The largest lymph  node measures 52 mm x 40 mm x 38 mm which previously underwent biopsy showing metastatic disease. There is an associated radar reflector within the biopsied lymph node.  Right  There is no evidence of suspicious masses, abnormal enhancement, or other abnormal findings in the right breast. No enlarged axillary or internal mammary lymph nodes.   Impression:  Left  Mass: Left breast 38 mm x 33 mm x 33 mm mass at the posterior 2 o'clock position. Assessment: 6 - Known biopsy, proven malignancy. Associated 29 mm NME extending posteriorly from the mass. In total the mass and NME measure 54 mm in AP extent.  Lymph Node: Abnormal level 1 and 2 left axillary lymph nodes, the largest of which measures 52 mm x 40 mm x 38 mm lymph node and is known biopsy, proven malignancy.   Right  There is no MR evidence of malignancy in the right breast.  BI-RADS Category:   Overall: 6 - Known Biopsy-Proven Malignancy  Recommendation:  Clinical management of known left breast cancer. Patient is established with the breast surgery clinic.      - 3/21/2022 CT C/A/P:  FINDINGS:  Base of Neck: No significant abnormality.  Thoracic soft tissue: A proximally 3.2 x 3.6 x 3.7 cm irregular left breast mass within the lateral aspect with internal biopsy marker, corresponding to findings on prior breast MRI and compatible with malignancy.  Enlarged left axillary lymph nodes, largest measuring approximately 4.2 x 3.6 cm, (series 2, image 27).  CHEST:  -Heart: Normal size. No pericardial effusion.  -Pulmonary vasculature: Pulmonary arteries distribute normally.  There are four pulmonary veins.  -Radha/Mediastinum: No pathologic shelly enlargement.  -Trachea and Proximal airways: Trachea is midline.  Central airways are patent.  No significant bronchial wall thickening or bronchiectasis.  -Lungs/Pleura: Symmetrically expanded without focal consolidation, pneumothorax, or mass.  No pleural effusion or thickening.  -Esophagus: Normal course and  caliber.  ABDOMEN:  - Liver: Normal in size and attenuation with no focal hepatic abnormality.  - Gallbladder: No calcified gallstones.  No wall thickening or pericholecystic fluid.  - Bile Ducts: No intra or extrahepatic biliary ductal dilatation.  - Stomach/Duodenum: Small hiatal hernia otherwise unremarkable.  - Spleen: Normal size.  No focal parenchymal abnormality.  - Pancreas: No mass lesion.  No pancreatic ductal dilatation.  No peripancreatic fat stranding.  - Adrenals: Unremarkable.  - Kidneys/ureters/urinary bladder: Normal in size and location.  Normal enhancement pattern.  No nephrolithiasis.  Ureters are normal in course and caliber.  No hydroureteronephrosis.  - Retroperitoneum: Scattered nonenlarged retroperitoneal lymph nodes.  PELVIS:  - Reproductive: Intrauterine device present.  A proximally 2.8 cm peripherally enhancing right ovarian cyst, likely corpus luteal cyst.  - Other: No pelvic adenopathy, free fluid, or mass.  BOWEL/MESENTERY:  No evidence of bowel obstruction or inflammatory process. Appendix is visualized and unremarkable.  VASCULATURE: Left-sided aortic arch with 3 branch vessels.  No aneurysm and no significant atherosclerosis.  Portal vein, splenic vein, and SMV are patent.  BONES: [place] approximately 0.6 cm right trochanteric lesion, likely synovial herniation pit.  No acute fracture or bony destructive process.  EXTRATHORACIC/EXTRAPERITONEAL SOFT TISSUES: Unremarkable.  Impression:  In this patient with known breast cancer, there is an approximately 3.7 cm irregular left breast mass with left axillary lymphadenopathy.  No evidence of metastatic disease  Small hiatal hernia.  Additional findings as described above.     - 3/21/2022 Bone scan:  FINDINGS:  There is physiologic distribution of the radiopharmaceutical throughout the skeleton.  Mild degenerative uptake within the bilateral shoulders and knees.  There is normal uptake in the genitourinary system and soft  tissues.  Impression:  There is no scintigraphic evidence of osteoblastic metastatic disease.     - 3/21/2022 Brain MRI:  FINDINGS:  Please note there is dental metal artifact distorting the examination.  Allowing for artifacts the brain parenchyma is normal in contour.  Developmental variant with cavum septum pellucidum identified.  The ventricles are otherwise normal in size without hydrocephalus.  There is no midline shift or significant mass effect.  The major intracranial T2 flow voids are present.  No restricted diffusion within the non distorted brain parenchyma.  There is severe distortion of the susceptibility imaging no parenchymal susceptibility to suggest parenchymal hemorrhage in the non distorted brain parenchyma.  There is suboptimal evaluation of the cerebellum and posterior fossa.  Impression:  Unremarkable MRI brain allowing for artifacts from dental metal as detailed above specifically without abnormal parenchymal enhancement to suggest intracranial metastatic disease in light of history.     - Initiated NA therapy on 4/13/2022  Developed pancreatitis post C4 from immunotherapy and treatment held- required 2 hospitalizations     - 10/20/2022 Ultrasound left breast:  Findings:  At the site of the marker from the prior malignant breast biopsy,there is a 6 mm x 7 mm x 3 mm oval, parallel, hypoechoic mass with indistinct margins seen in the left breast at 2 o'clock in the posterior depth, 8 cm from the nipple. This is difficult to distinguish from the Twirl marker itself.  At the site of the previously biopsied metastatic left axillary node, there is a 10 x 7 x 11 mm node with a [place]- marker. Node has mild residual cortical thickening (5 mm), but has decreased in size. There are a few other normal size, normal appearing left axillary nodes.   Impression:  Left  Mass: Left breast 6 mm x 7 mm x 3 mm mass at the posterior 2 o'clock position. Assessment: 6 - Known biopsy, proven malignancy. Known,  previously biopsied left breast cancer and metastatic left axillary node have both decreased in size. The breast mass is now difficult to distinguish from the biopsy marker.   BI-RADS Category:   Overall: 6 - Known Biopsy-Proven Malignancy  Recommendation:  Patient is already under the care of the Breast Oncology team. Most recent bilateral mammogram was performed in February 2022.      Resumed NA chemo without immunotherapy 11/21/2022- completed 7/8 cycles     - 12/8/2022 Breast MRI:  Findings:  The breasts have heterogeneous fibroglandular tissue. The background parenchymal enhancement is minimal and symmetric. There is no evidence of suspicious masses, abnormal enhancement, or other abnormal findings.  There has been interval decrease in there has been interval resolution of previously described enhancing mass at the 2:00 axis which is a known biopsy-proven malignancy.    There is no abnormality in the right breast.    There has been significant decrease in size of left axillary lymph nodes which all are normal in size on current exam.  The largest contains a signal void consistent with previously placed radar reflector at site of biopsy-proven axillary metastasis.  It measures 8 mm in short axis.  Impression:  Interval resolution of enhancing mass at site of known malignancy in the left breast at 2:00  and marked interval decrease in left axillary adenopathy with visualized lymph nodes normal in size on current exam.  Findings are consistent with complete imaging response to therapy.  BI-RADS Category:   Overall: 6 - Known Biopsy-Proven Malignancy     - 2/27/2023 Left Mastectomy and SLN Bx, axillary lymphadenectomy  Pathology:  Left breast, mastectomy: No residual malignancy is present within the   mastectomy specimen. Treatment effect is present within the breast at area of prior biopsy   site.   Left axillary sentinel lymph node #1, palpable, excisional biopsy: 1 of 3   lymph nodes are positive for metastatic  carcinoma   Greatest dimension of metastatic carcinoma measures 4 mm (0.4 cm).   Confirmed by positive immunohistochemical staining in the metastatic   foci with cytokeratins AE1/AE3, Cam5.2 and wide spectrum keratin with satisfactory positive and negative controls.   Treatment effect is present in lymph node.   Left axillary contents, axillary dissection: 14 benign lymph nodes,   Prior BREAST BIOMARKER RESULTS   Estrogen receptor (ER): Negative (0)   Progesterone receptor (ER): Negative (0)   HER2 IHC: Negative (1+)   Ki-67 proliferation index: 80%      - - completed XRT 2023     -- adjuvant Xeloda     PMH:  HTN- around pregnancies; off of blood pressure medication x 2 years  CHF- ECHOs at [facility]  Gestational DM  Hyperlipidemia when heavier, managed with diet and followed by cardiology  C- sections x 2  Iron deficiency     GynHx:  Menarche- 7   (16 at 1st pregnancy) (17, 7, 4)  Still with periods - last 2022- last 5-7 days, moderate normal flow  + breast feeding x 1 year  IUD     SH:  Working, , [employer] and [employer]  Single  Good support system  No tobacco  No EtOH  No illicit drugs    Review of Systems   Constitutional:  Negative for activity change, appetite change, chills, fatigue, fever and unexpected weight change.   HENT:  Negative for dental problem and mouth sores.    Eyes:  Negative for visual disturbance.   Respiratory:  Negative for cough, chest tightness and shortness of breath.    Cardiovascular:  Negative for chest pain, palpitations and leg swelling.   Gastrointestinal:  Positive for constipation. Negative for abdominal distention, abdominal pain, blood in stool, change in bowel habit, diarrhea, nausea and vomiting.   Genitourinary:  Negative for decreased urine volume, dysuria and frequency.   Musculoskeletal:  Negative for arthralgias, back pain and gait problem.   Integumentary:  Negative for rash and breast tenderness.        dry   Neurological:  Negative for weakness  and headaches.   Hematological:  Negative for adenopathy. Does not bruise/bleed easily.   Psychiatric/Behavioral:  Negative for dysphoric mood. The patient is not nervous/anxious.    Breast: Negative for tenderness         Objective     Physical Exam  Vitals and nursing note reviewed.   Constitutional:       General: She is not in acute distress.     Appearance: Normal appearance. She is obese. She is not ill-appearing.      Comments: Presents alone  ECOG= 0  Very pleasant   HENT:      Head: Normocephalic and atraumatic.      Mouth/Throat:      Mouth: Mucous membranes are moist.      Pharynx: No oropharyngeal exudate or posterior oropharyngeal erythema.   Eyes:      General: No scleral icterus.     Extraocular Movements: Extraocular movements intact.      Conjunctiva/sclera: Conjunctivae normal.      Pupils: Pupils are equal, round, and reactive to light.   Cardiovascular:      Rate and Rhythm: Normal rate and regular rhythm.      Heart sounds: Normal heart sounds. No murmur heard.     No friction rub. No gallop.   Pulmonary:      Effort: Pulmonary effort is normal. No respiratory distress.      Breath sounds: Normal breath sounds. No rhonchi or rales.      Comments: Left breast expander- tight, mild XRT color changes; No LAD  Right breast no masses or LAD  Abdominal:      General: Abdomen is flat. Bowel sounds are normal. There is no distension.      Palpations: Abdomen is soft. There is no mass.      Tenderness: There is no abdominal tenderness. There is no guarding or rebound.      Comments: No organomegaly   Musculoskeletal:         General: No swelling or tenderness. Normal range of motion.      Cervical back: Normal range of motion and neck supple. No tenderness.      Right lower leg: No edema.      Left lower leg: No edema.   Lymphadenopathy:      Cervical: No cervical adenopathy.   Skin:     General: Skin is warm and dry.      Coloration: Skin is not jaundiced or pale.      Findings: No lesion or rash.       Comments: Hypertrophic port scar   Neurological:      General: No focal deficit present.      Mental Status: She is alert and oriented to person, place, and time. Mental status is at baseline.      Sensory: No sensory deficit.      Motor: No weakness.      Coordination: Coordination normal.      Gait: Gait normal.   Psychiatric:         Mood and Affect: Mood normal.         Behavior: Behavior normal.         Thought Content: Thought content normal.         Judgment: Judgment normal.   Labs- reviewed       Assessment and Plan     1. Malignant neoplasm of upper-outer quadrant of left breast in female, estrogen receptor negative    2. Anemia in neoplastic disease    3. Chemotherapy induced neutropenia      TNBC:  On Xeloda (CREATE-X Trial)  Labs appropriate   This is cycle 4  Complete at 4  She was not be able to complete Keytruda with immunotherapy side effects (pancreatitis- recurrent)     Anemia:  Parameters stable     Neutropenia:  ANC Adequate     Route Chart for Scheduling    Med Onc Chart Routing      Follow up with physician    Follow up with ANDREW . 4 weeks cbc, cmp and EP   Infusion scheduling note    Injection scheduling note    Labs    Imaging    Pharmacy appointment    Other referrals                Therapy Plan Information  Flushes  heparin, porcine (PF) 100 unit/mL injection flush 500 Units  500 Units, Intravenous, Every visit  sodium chloride 0.9% flush 10 mL  10 mL, Intravenous, Every visit  heparin, porcine (PF) 100 unit/mL injection flush 500 Units  500 Units, Intravenous, Every visit  sodium chloride 0.9% flush 10 mL  10 mL, Intravenous, Every visit

## 2023-10-31 DIAGNOSIS — D63.0 ANEMIA IN NEOPLASTIC DISEASE: Primary | ICD-10-CM

## 2023-10-31 DIAGNOSIS — Z17.1 MALIGNANT NEOPLASM OF UPPER-OUTER QUADRANT OF LEFT BREAST IN FEMALE, ESTROGEN RECEPTOR NEGATIVE: ICD-10-CM

## 2023-10-31 DIAGNOSIS — C50.412 MALIGNANT NEOPLASM OF UPPER-OUTER QUADRANT OF LEFT BREAST IN FEMALE, ESTROGEN RECEPTOR NEGATIVE: ICD-10-CM

## 2023-10-31 DIAGNOSIS — D70.1 CHEMOTHERAPY INDUCED NEUTROPENIA: ICD-10-CM

## 2023-10-31 DIAGNOSIS — T45.1X5A CHEMOTHERAPY INDUCED NEUTROPENIA: ICD-10-CM

## 2023-11-09 DIAGNOSIS — C50.412 MALIGNANT NEOPLASM OF UPPER-OUTER QUADRANT OF LEFT BREAST IN FEMALE, ESTROGEN RECEPTOR NEGATIVE: ICD-10-CM

## 2023-11-09 DIAGNOSIS — Z17.1 MALIGNANT NEOPLASM OF UPPER-OUTER QUADRANT OF LEFT BREAST IN FEMALE, ESTROGEN RECEPTOR NEGATIVE: ICD-10-CM

## 2023-11-09 RX ORDER — CAPECITABINE 500 MG/1
2000 TABLET, FILM COATED ORAL 2 TIMES DAILY
Qty: 112 TABLET | Refills: 2 | Status: ACTIVE | OUTPATIENT
Start: 2023-11-09 | End: 2024-11-08

## 2023-11-09 RX ORDER — CAPECITABINE 150 MG/1
150 TABLET, FILM COATED ORAL 2 TIMES DAILY
Qty: 28 TABLET | Refills: 2 | Status: ACTIVE | OUTPATIENT
Start: 2023-11-09 | End: 2024-11-08

## 2023-11-09 NOTE — ANESTHESIA PAT ROS NOTE
11/09/2023  Laura Kent is a 38 y.o., female.      Pre-op Assessment          Review of Systems           Anesthesia Assessment: Preoperative EQUATION    Planned Procedure: Procedure(s) (LRB):  REMOVAL, TISSUE EXPANDER (Left)  RECONSTRUCTION, BREAST, USING KANDICE FREE FLAP (Bilateral)  Requested Anesthesia Type:General  Surgeon: Reese Barrios DO  Service: Plastics  Known or anticipated Date of Surgery:12/11/2023    Surgeon notes: reviewed    Previous anesthesia records:GETA and No problems  2/27/2023 Mastectomy with lymph node biopsy  Airway:  Mallampati: II   Mouth Opening: Normal  Tongue: Normal     02/27/23 Placement Time: 0723 , created via procedure documentation Method of Intubation: Video Laryngoscopy Mask Ventilation: Easy Intubated: Postinduction Blade: Shaffer #3 Airway Device Size: 7.0 Placement Verified By: Capnometry Complicating Factors: Obesity;Short neck Findings Post-Intubation: Bilateral breath sounds Secured at: Lips Complications: None     Last PCP note: > 1 year ago   Subspecialty notes: Hematology/Oncology, Breast surgery, Plastics    Other important co-morbidities: HTN, Obesity, and Anxiety       Tests already available:  Available tests,  within 3 months , within [facility] .  10/30/2023 CMP, CBC   10/26/2023 T4, TSH, A1c  4/3/2023 TTE (EF 54%)  1/1/2023 EKG    Optimization:  Anesthesia Preop Clinic Assessment  Indicated.    Medical Opinion Indicated.           Plan:    Testing:  EKG   Pre-anesthesia  visit       Visit focus: concerns in complex and/or prolonged anesthesia     Consultation:IM Perioperative Hospitalist     Patient  has previously scheduled Medical Appointment: 11/30/2023    Navigation: Tests Scheduled.              Consults scheduled.             Results will be tracked by Preop Clinic.

## 2023-11-14 ENCOUNTER — TELEPHONE (OUTPATIENT)
Dept: PREADMISSION TESTING | Facility: HOSPITAL | Age: 38
End: 2023-11-14
Payer: MEDICAID

## 2023-11-14 NOTE — TELEPHONE ENCOUNTER
----- Message from Bhumi Couch RN sent at 11/9/2023  2:38 PM CST -----  Surgery date: 12/11/2023  PreOp appt: 11/30/2023  Surgeon:  America Barrios Elder    Please call Pt and schedule the following preop appts:    NP  EKG    Thank you!  Bhumi

## 2023-11-29 ENCOUNTER — TELEPHONE (OUTPATIENT)
Dept: INTERNAL MEDICINE | Facility: CLINIC | Age: 38
End: 2023-11-29
Payer: MEDICAID

## 2023-11-30 ENCOUNTER — OFFICE VISIT (OUTPATIENT)
Dept: SURGERY | Facility: CLINIC | Age: 38
End: 2023-11-30
Payer: MEDICAID

## 2023-11-30 ENCOUNTER — OFFICE VISIT (OUTPATIENT)
Dept: INTERNAL MEDICINE | Facility: CLINIC | Age: 38
End: 2023-11-30
Payer: MEDICAID

## 2023-11-30 ENCOUNTER — HOSPITAL ENCOUNTER (OUTPATIENT)
Dept: CARDIOLOGY | Facility: CLINIC | Age: 38
Discharge: HOME | End: 2023-11-30
Payer: MEDICAID

## 2023-11-30 VITALS
HEART RATE: 68 BPM | WEIGHT: 235 LBS | BODY MASS INDEX: 37.77 KG/M2 | DIASTOLIC BLOOD PRESSURE: 79 MMHG | SYSTOLIC BLOOD PRESSURE: 137 MMHG | HEIGHT: 66 IN

## 2023-11-30 VITALS
BODY MASS INDEX: 37.91 KG/M2 | DIASTOLIC BLOOD PRESSURE: 77 MMHG | TEMPERATURE: 98 F | OXYGEN SATURATION: 99 % | SYSTOLIC BLOOD PRESSURE: 131 MMHG | HEART RATE: 80 BPM | WEIGHT: 235.88 LBS | HEIGHT: 66 IN

## 2023-11-30 DIAGNOSIS — I10 PRIMARY HYPERTENSION: Primary | ICD-10-CM

## 2023-11-30 DIAGNOSIS — K86.89 PANCREATIC INSUFFICIENCY: ICD-10-CM

## 2023-11-30 DIAGNOSIS — Z01.818 PREOPERATIVE TESTING: ICD-10-CM

## 2023-11-30 DIAGNOSIS — C50.412 MALIGNANT NEOPLASM OF UPPER-OUTER QUADRANT OF LEFT BREAST IN FEMALE, ESTROGEN RECEPTOR NEGATIVE: Primary | ICD-10-CM

## 2023-11-30 DIAGNOSIS — R19.7 DIARRHEA DUE TO MALABSORPTION: ICD-10-CM

## 2023-11-30 DIAGNOSIS — D63.0 ANEMIA IN NEOPLASTIC DISEASE: ICD-10-CM

## 2023-11-30 DIAGNOSIS — Z17.1 MALIGNANT NEOPLASM OF UPPER-OUTER QUADRANT OF LEFT BREAST IN FEMALE, ESTROGEN RECEPTOR NEGATIVE: Primary | ICD-10-CM

## 2023-11-30 DIAGNOSIS — R06.02 SHORTNESS OF BREATH: ICD-10-CM

## 2023-11-30 DIAGNOSIS — K90.9 DIARRHEA DUE TO MALABSORPTION: ICD-10-CM

## 2023-11-30 DIAGNOSIS — Z90.12 S/P LEFT MASTECTOMY: ICD-10-CM

## 2023-11-30 DIAGNOSIS — E66.01 SEVERE OBESITY: ICD-10-CM

## 2023-11-30 PROCEDURE — 3078F DIAST BP <80 MM HG: CPT | Mod: CPTII,,, | Performed by: SURGERY

## 2023-11-30 PROCEDURE — 99215 OFFICE O/P EST HI 40 MIN: CPT | Mod: PBBFAC,27 | Performed by: NURSE PRACTITIONER

## 2023-11-30 PROCEDURE — 99999 PR PBB SHADOW E&M-EST. PATIENT-LVL V: CPT | Mod: PBBFAC,,, | Performed by: NURSE PRACTITIONER

## 2023-11-30 PROCEDURE — 3008F PR BODY MASS INDEX (BMI) DOCUMENTED: ICD-10-PCS | Mod: CPTII,,, | Performed by: SURGERY

## 2023-11-30 PROCEDURE — 3044F HG A1C LEVEL LT 7.0%: CPT | Mod: CPTII,,, | Performed by: SURGERY

## 2023-11-30 PROCEDURE — 3075F PR MOST RECENT SYSTOLIC BLOOD PRESS GE 130-139MM HG: ICD-10-PCS | Mod: CPTII,,, | Performed by: NURSE PRACTITIONER

## 2023-11-30 PROCEDURE — 99214 OFFICE O/P EST MOD 30 MIN: CPT | Mod: S$PBB,,, | Performed by: SURGERY

## 2023-11-30 PROCEDURE — 99214 OFFICE O/P EST MOD 30 MIN: CPT | Mod: PBBFAC | Performed by: SURGERY

## 2023-11-30 PROCEDURE — 93005 ELECTROCARDIOGRAM TRACING: CPT | Mod: PBBFAC | Performed by: INTERNAL MEDICINE

## 2023-11-30 PROCEDURE — 99214 PR OFFICE/OUTPT VISIT, EST, LEVL IV, 30-39 MIN: ICD-10-PCS | Mod: S$PBB,,, | Performed by: SURGERY

## 2023-11-30 PROCEDURE — 93010 EKG 12-LEAD: ICD-10-PCS | Mod: S$PBB,,, | Performed by: INTERNAL MEDICINE

## 2023-11-30 PROCEDURE — 3044F PR MOST RECENT HEMOGLOBIN A1C LEVEL <7.0%: ICD-10-PCS | Mod: CPTII,,, | Performed by: SURGERY

## 2023-11-30 PROCEDURE — 1159F MED LIST DOCD IN RCRD: CPT | Mod: CPTII,,, | Performed by: SURGERY

## 2023-11-30 PROCEDURE — 3044F PR MOST RECENT HEMOGLOBIN A1C LEVEL <7.0%: ICD-10-PCS | Mod: CPTII,,, | Performed by: NURSE PRACTITIONER

## 2023-11-30 PROCEDURE — 99999 PR PBB SHADOW E&M-EST. PATIENT-LVL V: ICD-10-PCS | Mod: PBBFAC,,, | Performed by: NURSE PRACTITIONER

## 2023-11-30 PROCEDURE — 99215 PR OFFICE/OUTPT VISIT, EST, LEVL V, 40-54 MIN: ICD-10-PCS | Mod: S$PBB,,, | Performed by: NURSE PRACTITIONER

## 2023-11-30 PROCEDURE — 3075F SYST BP GE 130 - 139MM HG: CPT | Mod: CPTII,,, | Performed by: NURSE PRACTITIONER

## 2023-11-30 PROCEDURE — 3078F PR MOST RECENT DIASTOLIC BLOOD PRESSURE < 80 MM HG: ICD-10-PCS | Mod: CPTII,,, | Performed by: SURGERY

## 2023-11-30 PROCEDURE — 99999 PR PBB SHADOW E&M-EST. PATIENT-LVL IV: ICD-10-PCS | Mod: PBBFAC,,, | Performed by: SURGERY

## 2023-11-30 PROCEDURE — 3008F BODY MASS INDEX DOCD: CPT | Mod: CPTII,,, | Performed by: SURGERY

## 2023-11-30 PROCEDURE — 3075F PR MOST RECENT SYSTOLIC BLOOD PRESS GE 130-139MM HG: ICD-10-PCS | Mod: CPTII,,, | Performed by: SURGERY

## 2023-11-30 PROCEDURE — 1159F PR MEDICATION LIST DOCUMENTED IN MEDICAL RECORD: ICD-10-PCS | Mod: CPTII,,, | Performed by: SURGERY

## 2023-11-30 PROCEDURE — 3075F SYST BP GE 130 - 139MM HG: CPT | Mod: CPTII,,, | Performed by: SURGERY

## 2023-11-30 PROCEDURE — 99215 OFFICE O/P EST HI 40 MIN: CPT | Mod: S$PBB,,, | Performed by: NURSE PRACTITIONER

## 2023-11-30 PROCEDURE — 3044F HG A1C LEVEL LT 7.0%: CPT | Mod: CPTII,,, | Performed by: NURSE PRACTITIONER

## 2023-11-30 PROCEDURE — 99999 PR PBB SHADOW E&M-EST. PATIENT-LVL IV: CPT | Mod: PBBFAC,,, | Performed by: SURGERY

## 2023-11-30 PROCEDURE — 3078F PR MOST RECENT DIASTOLIC BLOOD PRESSURE < 80 MM HG: ICD-10-PCS | Mod: CPTII,,, | Performed by: NURSE PRACTITIONER

## 2023-11-30 PROCEDURE — 3008F PR BODY MASS INDEX (BMI) DOCUMENTED: ICD-10-PCS | Mod: CPTII,,, | Performed by: NURSE PRACTITIONER

## 2023-11-30 PROCEDURE — 3008F BODY MASS INDEX DOCD: CPT | Mod: CPTII,,, | Performed by: NURSE PRACTITIONER

## 2023-11-30 PROCEDURE — 3078F DIAST BP <80 MM HG: CPT | Mod: CPTII,,, | Performed by: NURSE PRACTITIONER

## 2023-11-30 PROCEDURE — 93010 ELECTROCARDIOGRAM REPORT: CPT | Mod: S$PBB,,, | Performed by: INTERNAL MEDICINE

## 2023-11-30 NOTE — ASSESSMENT & PLAN NOTE
Current /77  today.    Taking: used to take BP meds but BP stabilized sp they were d/c  Lifestyle changes to reduce systolic BP:   exercise 30 minutes per day,  5 days per week or 150 minutes weekly; sodium reduction and avoidance of high salt foods such as processed meats, frozen meals and  fast foods.   Keeping a healthy weight/BMI can help with better BP control    BP acceptable for surgery. I recommend monitoring BP during perioperative period as uncontrolled pain can elevate blood pressure.

## 2023-11-30 NOTE — HPI
This is a 38 y.o. female  who presents today for a preoperative evaluation in preparation for removal tissue expander, breast reconstruction, flap  Surgery is indicated for  h/o breast cancer    .   Patient is new to me.    The history has been obtained by speaking with the patient and reviewing the electronic medical record and/or outside health information. Significant health conditions for the perioperative period are discussed below in assessment and plan.     Patient reports current health status to be Good.  Denies any new symptoms before surgery.

## 2023-11-30 NOTE — H&P (VIEW-ONLY)
Plastic Surgery History & Physical    SUBJECTIVE:   Chief complaint: Preoperative visit for KANDICE flap reconstruction    History of Present Illness:  38 y.o. female presenting to plastic surgery clinic for a preoperative visit. The patient was last seen on 23 at which time we discussed  immediate right and delayed left  KANDICE flap reconstruction.  Patient understands the details of KANDICE flap reconstruction and [place] [facility] [place]. Since the last clinic visit there have been no significant changes in the patient's history.    Her skin has recovered well from XRT, pancreatitis improved    Past Medical History:   Diagnosis Date    Anemia     Anxiety     Breast cancer     Encounter for blood transfusion     Hypertension     Meningitis     Severe obesity 2023    Shortness of breath 2023       Past Surgical History:   Procedure Laterality Date    AXILLARY NODE DISSECTION Left 2023    Procedure: LYMPHADENECTOMY, AXILLARY;  Surgeon: YASMANI Weiner MD;  Location: [facility];  Service: General;  Laterality: Left;    BREAST BIOPSY Left      SECTION      COLONOSCOPY N/A 2022    Procedure: COLONOSCOPY;  Surgeon: Davi Chery MD;  Location: [address]);  Service: Endoscopy;  Laterality: N/A;  case request entered from referral - Per DR. KATHLEEN Chery Pt to be scheduled for urgent colonoscopy on 22/ fully vaccinated / prep ins for Sutab on portal - ERW  see micro for negative C-diff- ERW    INJECTION FOR SENTINEL NODE IDENTIFICATION Left 2023    Procedure: INJECTION, FOR SENTINEL NODE IDENTIFICATION;  Surgeon: YASMANI Weiner MD;  Location: [facility];  Service: General;  Laterality: Left;    INSERTION OF BREAST TISSUE EXPANDER Left 2023    Procedure: INSERTION, TISSUE EXPANDER, BREAST;  Surgeon: Reese Barrios DO;  Location: [facility];  Service: Plastics;  Laterality: Left;    INSERTION OF TUNNELED CENTRAL VENOUS CATHETER  (CVC) WITH SUBCUTANEOUS PORT N/A 4/5/2022    Procedure: [id number];  Surgeon: Deo Sin Jr., MD;  [address];  Service: General;  Laterality: N/A;    INSERTION OF TUNNELED CENTRAL VENOUS CATHETER (CVC) WITH SUBCUTANEOUS PORT N/A 8/1/2022    Procedure: INSERTION, SINGLE LUMEN CATHETER WITH PORT, WITH FLUOROSCOPIC GUIDANCE;  Surgeon: Ryder Dinero MD;  Location: [facility] CATH LAB;  Service: Vascular;  Laterality: N/A;    MASTECTOMY Left 2/27/2023    Procedure: MASTECTOMY;  Surgeon: YASMANI Weiner MD;  Location: [facility];  Service: General;  Laterality: Left;  3.5 TOTAL HOURS / EMAIL SENT 2-23 @ 7:56 [facility]    SENTINEL LYMPH NODE BIOPSY Left 2/27/2023    Procedure: BIOPSY, LYMPH NODE, SENTINEL;  Surgeon: YASMANI Weiner MD;  Location: [facility];  Service: General;  Laterality: Left;  LEFT with radiological marker       Family History   Problem Relation Age of Onset    Hypertension Father     Diabetes Father     Pancreatic cancer Maternal Uncle     Bladder Cancer Paternal Grandmother     Colon cancer Neg Hx     Esophageal cancer Neg Hx        Social History     Socioeconomic History    Marital status: Single    Number of children: 3   Tobacco Use    Smoking status: Never    Smokeless tobacco: Never   Substance and Sexual Activity    Alcohol use: Not Currently     Comment: social    Drug use: Never    Sexual activity: Not Currently   Social History Narrative    15-20 steps     Social Determinants of Health     Financial Resource Strain: Medium Risk (11/23/2023)    Overall Financial Resource Strain (CARDIA)     Difficulty of Paying Living Expenses: Somewhat hard   Food Insecurity: Food Insecurity Present (11/23/2023)    Hunger Vital Sign     Worried About Running Out of Food in the Last Year: Never true     Ran Out of Food in the Last Year: Sometimes true   Transportation Needs: No Transportation Needs (11/23/2023)    PRAPARE - Transportation     Lack of Transportation (Medical): No     Lack  of Transportation (Non-Medical): No   Physical Activity: Sufficiently Active (11/23/2023)    Exercise Vital Sign     Days of Exercise per Week: 5 days     Minutes of Exercise per Session: 30 min   Stress: No Stress Concern Present (11/23/2023)    [employer] - Occupational Stress Questionnaire     Feeling of Stress : Only a little   Social Connections: Moderately Integrated (11/23/2023)    Social Connection and Isolation Panel [NHANES]     Frequency of Communication with Friends and Family: More than three times a week     Frequency of Social Gatherings with Friends and Family: Patient refused     Attends Synagogue Services: 1 to 4 times per year     Active Member of Clubs or Organizations: Yes     Attends Club or Organization Meetings: More than 4 times per year     Marital Status: Never    Housing Stability: High Risk (11/23/2023)    Housing Stability Vital Sign     Unable to Pay for Housing in the Last Year: Yes     Number of Places Lived in the Last Year: 2     Unstable Housing in the Last Year: No       Current Outpatient Medications   Medication Sig Dispense Refill    capecitabine (XELODA) 150 MG tablet Take 1 tablet (150 mg total) by mouth 2 (two) times daily for 14 days followed by 7 days off. Take with 1 other capecitabine prescription for 2,150 mg total. 28 tablet 2    capecitabine (XELODA) 150 MG tablet Take 1 tablet (150 mg total) by mouth 2 (two) times daily for 14 days followed by 7 days off. Take with 1 other capecitabine prescription for 2,150 mg total. 28 tablet 2    capecitabine (XELODA) 500 MG Tab Take 4 tablets (2,000 mg total) by mouth 2 (two) times daily for 14 days followed by 7 days off. Take with 1 other capecitabine prescription for 2,150 mg total. 112 tablet 2    capecitabine (XELODA) 500 MG Tab Take 4 tablets (2,000 mg total) by mouth 2 (two) times daily for 14 days followed by 7 days off. Take with 1 other capecitabine prescription for 2,150 mg total.  "112 tablet 2    cetirizine (ZYRTEC) 10 MG tablet Take 10 mg by mouth once daily.      clotrimazole (LOTRIMIN) 1 % cream Apply 1 application topically every evening.      HYDROcodone-acetaminophen (NORCO) 5-325 mg per tablet       hydrOXYzine HCL (ATARAX) 10 MG Tab Take 10 mg by mouth nightly as needed.      ibuprofen (ADVIL,MOTRIN) 600 MG tablet Take 600 mg by mouth 3 (three) times daily.      ketoconazole (NIZORAL) 2 % shampoo Apply topically Every 3 (three) days.      lipase-protease-amylase 24,000-76,000-120,000 units (CREON) 24,000-76,000 -120,000 unit capsule Take 1 capsule by mouth 3 (three) times daily with meals. 90 capsule 2    metFORMIN (GLUCOPHAGE) 500 MG tablet Take 1 tablet (500 mg total) by mouth once daily. With food 30 tablet 3    mupirocin (BACTROBAN) 2 % ointment Apply topically once daily. 30 g 0    nystatin (MYCOSTATIN) ointment Apply topically 2 (two) times daily. 15 g 1    nystatin (MYCOSTATIN) powder Apply topically 2 (two) times daily. 30 g 1    ondansetron (ZOFRAN) 8 MG tablet Take 1 tablet (8 mg total) by mouth every 12 (twelve) hours as needed for Nausea. 30 tablet 2    pantoprazole (PROTONIX) 40 MG tablet Take 1 tablet (40 mg total) by mouth once daily. 30 tablet 1    traMADoL (ULTRAM) 50 mg tablet Take 1 tablet (50 mg total) by mouth every 6 (six) hours as needed for Pain. 20 tablet 0    gabapentin (NEURONTIN) 300 MG capsule Take 2 capsules (600 mg total) by mouth 3 (three) times daily. for 7 days 42 capsule 0     No current facility-administered medications for this visit.     Facility-Administered Medications Ordered in Other Visits   Medication Dose Route Frequency Provider Last Rate Last Admin    ceFAZolin 1 g, gentamicin 80 mg in sodium chloride 0.9% 500 mL irrigation   Irrigation On Call Procedure YASMANI Weiner MD           Review of patient's allergies indicates:   Allergen Reactions    Strawberries [strawberry] Hives           OBJECTIVE:     /79   Pulse 68   Ht 5' 6" " (1.676 m)   Wt 106.6 kg (235 lb)   BMI 37.93 kg/m²       Physical Exam:  Gen: NAD, appears stated age  Neuro: normal without focal findings, mental status and speech normal  HEENT: NCAT, neck supple, PEERL  CV: RRR  Pulm: Breathing non-labored, chest wall movement equal bilaterally   Breast: left TE in good position, no fluid  Right breast ptosis  Abdomen: soft, nontender, no guarding, adequate tissue for KANDICE flap  Extremity:normal strength, no cyanosis or edema  Skin: Skin color, texture, turgor normal. No rashes or lesions  Psych: oriented to time, place and person, mood and affect are within normal limits          ASSESSMENT/PLAN:     Laura Kent was seen preoperatively today for KANDICE flap reconstruction  The plan is for right SSM, L TE removal, BL KANDICE flaps    Explained the importance of postoperative hospitalization for monitoring of the new vessel anastomosis, which is most important in the 48 hours after surgery.  Discussed flap monitoring, risks of take back/flap failure/ expected hospital course, use of drains, and recovery.  Risks including flap loss, fat necrosis, infection, wound breakdown, seroma, hematoma, scarring, need for reoperation, blood clots were all covered.  Typical revision procedures including lifting the breast, tailoring the skin, liposuction and fat grafting for contour were also discussed.  Consents signed    Reviewed multimodal pain control regimen used in the post operative period. Prescriptions will be sent to the outpatient pharmacy the day of surgery.  The patient was given a packet including general instructions for post-operative care, instructions for the day of surgery, drain care and process to complete FMLA/Disability paperwork.  All questions were answered. The patient was given a business card with contact info and advised to contact the clinic with any questions or concerns before or after surgery.      Reese Barrios  Plastic and Reconstructive  Surgery    I spent a total of 30 minutes on the day of the visit.This includes face to face time and non-face to face time preparing to see the patient (eg, review of tests), obtaining and/or reviewing separately obtained history, documenting clinical information in the electronic or other health record, independently interpreting results and communicating results to the patient/family/caregiver, or care coordinator.

## 2023-11-30 NOTE — ASSESSMENT & PLAN NOTE
Keytruda caused pancreatitis and Colitis and she was hospitalized  Meds were stopped; treated with steroids   Now on Creon

## 2023-11-30 NOTE — PROGRESS NOTES
The patient location is: [place]  The chief complaint leading to consultation is: weight gain from steroids     Visit type: audiovisual    Face to Face time with patient: 34 minutes   46 minutes of total time spent on the encounter, which includes face to face time and non-face to face time preparing to see the patient (eg, review of tests), Obtaining and/or reviewing separately obtained history, Documenting clinical information in the electronic or other health record, Independently interpreting results (not separately reported) and communicating results to the patient/family/caregiver, or Care coordination (not separately reported).     Each patient to whom he or she provides medical services by telemedicine is:  (1) informed of the relationship between the physician and patient and the respective role of any other health care provider with respect to management of the patient; and (2) notified that he or she may decline to receive medical services by telemedicine and may withdraw from such care at any time.    Oncology Nutrition Assessment for Medical Nutrition Therapy  Initial Visit    Laura Vizcainoonette Donte   1985    Referring Provider:  Yumiko Monreal, PA-C      Reason for Visit: Pt in for education and nutrition counseling     PMHx:   Past Medical History:   Diagnosis Date    Anemia     Anxiety     Breast cancer     Encounter for blood transfusion     Hypertension     Meningitis     Severe obesity 02/02/2023    Shortness of breath 02/02/2023       Nutrition Assessment    Patient is a 38 y.o.female with a history of triple negative left breast cancer. She completed neoadjuvant chemotherapy on 12/31/2022 and is s/p left breast mastectomy on 2/27/2023. Finished radiation therapy on 5/31/2023. Keytruda triggered acute pancreatitis in 8/2022. She was on oral steroids and gained weight from this. Physical activity was also limited due to breast surgeries. She is working with Yumiko blancas recently  "started Metformin for insulin resistance. Referred to nutrition for additional support.   She reports that she has been trying hard to lose weight but nothing seems to be working. She is eating healthier, fasting 12+ hours per day, and walking 3-5 miles 3 days per week. 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 She does notice Metformin has helped some with cravings. She gained 30lb while on steroids and feels like her weight is still slowly creeping up. She used to work with a  and plans to get back into weight lifting once cleared after surgery.   Am- black coffee or tea (sips til lunch or until hungry)   Lunch (sometimes skips)- salad, sandwich, leftovers   Dinner- cooks mostly at home - rice, vegetable, meat   Other drinks- plain water, sparkling water  Snacks- Belvita, cheeze its, sunflower seeds, fruits, occasional candy    Weight:106.6 kg (235 lb)  Height:5' 6" (1.676 m)  BMI:Body mass index is 37.93 kg/m².   IBW: Ideal body weight: 59.3 kg (130 lb 11.7 oz)  Adjusted ideal body weight: 78.2 kg (172 lb 7 oz)    Allergies: Strawberries [strawberry]    Current Medications:    Current Outpatient Medications:     capecitabine (XELODA) 150 MG tablet, Take 1 tablet (150 mg total) by mouth 2 (two) times daily for 14 days followed by 7 days off. Take with 1 other capecitabine prescription for 2,150 mg total., Disp: 28 tablet, Rfl: 2    capecitabine (XELODA) 150 MG tablet, Take 1 tablet (150 mg total) by mouth 2 (two) times daily for 14 days followed by 7 days off. Take with 1 other capecitabine prescription for 2,150 mg total., Disp: 28 tablet, Rfl: 2    capecitabine (XELODA) 500 MG Tab, Take 4 tablets (2,000 mg total) by mouth 2 (two) times daily for 14 days followed by 7 days off. Take with 1 other capecitabine prescription for 2,150 mg total., Disp: 112 tablet, Rfl: 2    capecitabine (XELODA) 500 MG " Tab, Take 4 tablets (2,000 mg total) by mouth 2 (two) times daily for 14 days followed by 7 days off. Take with 1 other capecitabine prescription for 2,150 mg total., Disp: 112 tablet, Rfl: 2    cetirizine (ZYRTEC) 10 MG tablet, Take 10 mg by mouth once daily., Disp: , Rfl:     clotrimazole (LOTRIMIN) 1 % cream, Apply 1 application topically every evening., Disp: , Rfl:     gabapentin (NEURONTIN) 300 MG capsule, Take 2 capsules (600 mg total) by mouth 3 (three) times daily. for 7 days, Disp: 42 capsule, Rfl: 0    HYDROcodone-acetaminophen (NORCO) 5-325 mg per tablet, , Disp: , Rfl:     hydrOXYzine HCL (ATARAX) 10 MG Tab, Take 10 mg by mouth nightly as needed., Disp: , Rfl:     ibuprofen (ADVIL,MOTRIN) 600 MG tablet, Take 600 mg by mouth 3 (three) times daily., Disp: , Rfl:     ketoconazole (NIZORAL) 2 % shampoo, Apply topically Every 3 (three) days., Disp: , Rfl:     lipase-protease-amylase 24,000-76,000-120,000 units (CREON) 24,000-76,000 -120,000 unit capsule, Take 1 capsule by mouth 3 (three) times daily with meals., Disp: 90 capsule, Rfl: 2    metFORMIN (GLUCOPHAGE) 500 MG tablet, Take 1 tablet (500 mg total) by mouth once daily. With food, Disp: 30 tablet, Rfl: 3    mupirocin (BACTROBAN) 2 % ointment, Apply topically once daily., Disp: 30 g, Rfl: 0    nystatin (MYCOSTATIN) ointment, Apply topically 2 (two) times daily., Disp: 15 g, Rfl: 1    nystatin (MYCOSTATIN) powder, Apply topically 2 (two) times daily., Disp: 30 g, Rfl: 1    ondansetron (ZOFRAN) 8 MG tablet, Take 1 tablet (8 mg total) by mouth every 12 (twelve) hours as needed for Nausea., Disp: 30 tablet, Rfl: 2    pantoprazole (PROTONIX) 40 MG tablet, Take 1 tablet (40 mg total) by mouth once daily., Disp: 30 tablet, Rfl: 1    traMADoL (ULTRAM) 50 mg tablet, Take 1 tablet (50 mg total) by mouth every 6 (six) hours as needed for Pain., Disp: 20 tablet, Rfl: 0  No current facility-administered medications for this visit.    Facility-Administered  Medications Ordered in Other Visits:     ceFAZolin 1 g, gentamicin 80 mg in sodium chloride 0.9% 500 mL irrigation, , Irrigation, On Call Procedure, YASMANI Weiner MD    Vitamins/Supplements: none currently     Labs: Reviewed from 10/26 and 10/30    Nutrition Diagnosis    Problem: nutrition related knowledge deficit   Etiology (related to): lack of prior need for nutrition education  Signs/Symptoms (as evidenced by): weight gain  and self reported diet questions/concerns     Nutrition Intervention    Nutrition Prescription   1600 Kcals (15kcal/kg)  85 g protein (0.8g/kg)   2700 mL fluid (25mL/kg)    Recommendations:  2 regular meals and a snack  -Bill boxes, salads for lunch  -fruit with peanut butter, Greek yogurt with Belvita, protein shake with 100 calorie pack of nuts, homemade smoothies for snacks   -continue balanced dinner (protein, starch, vegetables)   Aim for 500-600 calories per meal (2x/day) and a 300 calorie snacks   Add fats to meals and snacks for more calories   -nuts, seeds, avocado, nut butters, etc.   Avoid picking on snacks all day long  Reviewed complex carbs rather than simple carbs   Increase exercise to 5 days per week   -resume resistance training after cleared from surgery     Materials Provided/Reviewed   Meal and snack ideas     Nutrition Monitoring and Evaluation    Monitor: energy intake, weight, and diet education needs     Goals: weight loss 1-2lb per week    Follow up Patient will follow up via portal as needed with any questions/concerns     Communication to referring provider/care team: note available in chart     Counseling time: 30 Minutes    Ashlyn Jernigan, MPH, RD, , LDN, FAND   [phone]

## 2023-11-30 NOTE — PROGRESS NOTES
[facility]  Progress Note    Patient Name: Laura Kent  MRN: [id number]  Date of Evaluation- 2023  PCP- Yessy Cruz MD    Future cases for Laura Kent [[id number]]       Case ID Status Date Time Nicanor Procedure Provider Location    [id number] [facility] 2023  7:00  REMOVAL, TISSUE EXPANDER Reese Barrios DO [[id number]] [facility] OR 2ND FLR            HPI:  This is a 38 y.o. female  who presents today for a preoperative evaluation in preparation for removal tissue expander, breast reconstruction, flap  Surgery is indicated for  h/o breast cancer    .   Patient is new to me.    The history has been obtained by speaking with the patient and reviewing the electronic medical record and/or outside health information. Significant health conditions for the perioperative period are discussed below in assessment and plan.     Patient reports current health status to be Good.  Denies any new symptoms before surgery.       Subjective/ Objective:     Chief Complaint: Preoperative evaulation, perioperative medical management, and complication reduction plan.     Functional Capacity:  Able to climb a flight of stairs without CP SOB or Syncope.  Able to meet 4 METs      Anesthesia issues: None  Difficulty mouth opening: y  Steroid use in the last 12 months: with pancreas troubles     Dental Issues: n    Family anesthesia difficulty: None       Last monthly period chemo stopped period- last MAy 2022    Family Hx of Thrombosis none    Past Medical History:   Diagnosis Date    Anemia     Anxiety     Breast cancer     Encounter for blood transfusion     Hypertension     Meningitis     Severe obesity 2023       Past Surgical History:   Procedure Laterality Date    AXILLARY NODE DISSECTION Left 2023    Procedure: LYMPHADENECTOMY, AXILLARY;  Surgeon: YASMANI Weiner MD;  Location: [facility];  Service: General;  Laterality: Left;    BREAST BIOPSY Left      SECTION       COLONOSCOPY N/A 9/22/2022    Procedure: COLONOSCOPY;  Surgeon: Davi Chery MD;  Location: [facility] ENDO (4TH FLR);  Service: Endoscopy;  Laterality: N/A;  case request entered from referral - Per DR. KATHLEEN Chery Pt to be scheduled for urgent colonoscopy on 9/22/22/ fully vaccinated / prep ins for Sutab on portal - ERW  see micro for negative C-diff- ERW    INJECTION FOR SENTINEL NODE IDENTIFICATION Left 2/27/2023    Procedure: INJECTION, FOR SENTINEL NODE IDENTIFICATION;  Surgeon: YASMANI Weiner MD;  Location: [facility] OR;  Service: General;  Laterality: Left;    INSERTION OF BREAST TISSUE EXPANDER Left 2/27/2023    Procedure: INSERTION, TISSUE EXPANDER, BREAST;  Surgeon: Reese Barrios DO;  [facility];  Service: Plastics;  Laterality: Left;    INSERTION OF TUNNELED CENTRAL VENOUS CATHETER (CVC) WITH SUBCUTANEOUS PORT N/A 4/5/2022    Procedure: [id number];  Surgeon: Deo Sin Jr., MD;  Location: [facility] 2ND FLR;  Service: General;  Laterality: N/A;    INSERTION OF TUNNELED CENTRAL VENOUS CATHETER (CVC) WITH SUBCUTANEOUS PORT N/A 8/1/2022    Procedure: INSERTION, SINGLE LUMEN CATHETER WITH PORT, WITH FLUOROSCOPIC GUIDANCE;  Surgeon: Ryder Dinero MD;  Location: [facility] CATH LAB;  Service: Vascular;  Laterality: N/A;    MASTECTOMY Left 2/27/2023    Procedure: MASTECTOMY;  Surgeon: YASMANI Weiner MD;  Location: [facility];  Service: General;  Laterality: Left;  3.5 TOTAL HOURS / EMAIL SENT 2-23 @ 7:56 [facility]    SENTINEL LYMPH NODE BIOPSY Left 2/27/2023    Procedure: BIOPSY, LYMPH NODE, SENTINEL;  Surgeon: YASMANI Weiner MD;  Location: [facility];  Service: General;  Laterality: Left;  LEFT with radiological marker       Review of Systems   Constitutional:  Positive for fatigue. Negative for chills and fever.   HENT:  Negative for trouble swallowing and voice change.    Eyes:  Negative for photophobia and visual disturbance.        No acute visual changes   Respiratory:  Negative for apnea, cough,  "chest tightness, shortness of breath and wheezing.         STOP bang  Score 2  Low risk ELIZABETH     Cardiovascular:  Negative for chest pain, palpitations and leg swelling.   Gastrointestinal:  Positive for abdominal pain and nausea. Negative for abdominal distention, blood in stool, constipation, diarrhea and vomiting.        NO FLD, hepatitis, cirrhosis  No BRB or black tarry stool     Genitourinary:  Negative for difficulty urinating, dysuria, flank pain, frequency, hematuria and urgency.   Musculoskeletal:  Negative for arthralgias, back pain, gait problem, joint swelling, myalgias, neck pain and neck stiffness.   Neurological:  Negative for dizziness, tremors, seizures, syncope, weakness, light-headedness, numbness and headaches.   Psychiatric/Behavioral:  Negative for suicidal ideas.       VITALS  Visit Vitals  /77 (BP Location: Left arm, Patient Position: Sitting)   Pulse 80   Temp 98.1 °F (36.7 °C) (Oral)   Ht 5' 6" (1.676 m)   Wt 107 kg (235 lb 14.3 oz)   SpO2 99%   BMI 38.07 kg/m²          Physical Exam  Constitutional:       General: She is not in acute distress.     Appearance: Normal appearance. She is well-developed. She is not diaphoretic.   HENT:      Head: Normocephalic.      Right Ear: Hearing normal.      Left Ear: Hearing normal.      Nose: Nose normal.      Mouth/Throat:      Lips: Pink.      Mouth: Mucous membranes are moist.      Pharynx: No oropharyngeal exudate.   Eyes:      General: Lids are normal.         Right eye: No discharge.         Left eye: No discharge.      Conjunctiva/sclera: Conjunctivae normal.      Pupils: Pupils are equal, round, and reactive to light.   Neck:      Thyroid: No thyromegaly.      Vascular: No carotid bruit or JVD.      Trachea: Trachea and phonation normal. No tracheal deviation.   Cardiovascular:      Rate and Rhythm: Normal rate and regular rhythm.      Pulses: Normal pulses.           Carotid pulses are 2+ on the right side and 2+ on the left side.       " Radial pulses are 2+ on the right side and 2+ on the left side.        Posterior tibial pulses are 2+ on the right side and 2+ on the left side.      Heart sounds: Normal heart sounds. No murmur heard.     No friction rub. No gallop.   Pulmonary:      Effort: Pulmonary effort is normal. No respiratory distress.      Breath sounds: Normal breath sounds. No stridor. No wheezing or rales.      Comments: Clear Anterior and Posterior BBS  Abdominal:      General: Abdomen is protuberant. Bowel sounds are normal. There is no distension.      Palpations: Abdomen is soft.      Tenderness: There is no abdominal tenderness. There is no guarding.   Musculoskeletal:         General: No tenderness or deformity. Normal range of motion.      Cervical back: Normal range of motion and neck supple. No rigidity.      Right lower leg: No edema.      Left lower leg: No edema.   Lymphadenopathy:      Head:      Right side of head: No submental, submandibular, tonsillar, preauricular, posterior auricular or occipital adenopathy.      Left side of head: No submental, submandibular, tonsillar, preauricular, posterior auricular or occipital adenopathy.      Cervical: No cervical adenopathy.      Right cervical: No superficial cervical adenopathy.     Left cervical: No superficial cervical adenopathy.   Skin:     General: Skin is warm and dry.      Capillary Refill: Capillary refill takes 2 to 3 seconds.      Coloration: Skin is not pale.      Findings: No erythema or rash.   Neurological:      Mental Status: She is alert and oriented to person, place, and time. Mental status is at baseline.      GCS: GCS eye subscore is 4. GCS verbal subscore is 5. GCS motor subscore is 6.      Motor: No abnormal muscle tone.      Coordination: Coordination normal.   Psychiatric:         Attention and Perception: Attention and perception normal.         Mood and Affect: Mood and affect normal.         Speech: Speech normal.         Behavior: Behavior normal.  Behavior is cooperative.         Thought Content: Thought content normal.         Cognition and Memory: Cognition and memory normal.         Judgment: Judgment normal.        Significant Labs:  Lab Results   Component Value Date    WBC 3.56 (L) 10/30/2023    HGB 11.2 (L) 10/30/2023    HCT 32.8 (L) 10/30/2023     10/30/2023    CHOL 162 02/06/2023    TRIG 103 02/06/2023    HDL 32 (L) 02/06/2023    ALT 18 10/30/2023    AST 23 10/30/2023     10/30/2023    K 3.7 10/30/2023     10/30/2023    CREATININE 0.7 10/30/2023    BUN 14 10/30/2023    CO2 24 10/30/2023    TSH 0.707 10/26/2023    HGBA1C 4.8 10/26/2023       Diagnostic Studies: No relevant studies.    EKG:   Results for orders placed or performed during the hospital encounter of 01/01/23   EKG 12-lead    Collection Time: 01/01/23  7:18 PM    Narrative    Test Reason : R50.9,    Vent. Rate : 088 BPM     Atrial Rate : 088 BPM     P-R Int : 174 ms          QRS Dur : 084 ms      QT Int : 368 ms       P-R-T Axes : 058 -04 055 degrees     QTc Int : 445 ms    Normal sinus rhythm  Low anterior forces and R wave progression  Abnormal ECG  When compared with ECG of 14-SEP-2022 18:13,  No significant change was found  Confirmed by Pedro CABRERA MD ([id number]) on 1/1/2023 9:49:52 PM    Referred By: [email or web address]   SELF           Confirmed By:Pedro CABRERA MD       2D ECHO:  TTE:  Results for orders placed or performed during the hospital encounter of 04/03/23   Echo   Result Value Ref Range    BSA 2.12 m2    TDI SEPTAL 0.09 m/s    LV LATERAL E/E' RATIO 6.70 m/s    LV SEPTAL E/E' RATIO 7.44 m/s    LA WIDTH 4.66 cm    TDI LATERAL 0.10 m/s    LVIDd 4.74 3.5 - 6.0 cm    IVS 0.99 0.6 - 1.1 cm    Posterior Wall 0.93 0.6 - 1.1 cm    LVIDs 3.06 2.1 - 4.0 cm    FS 35 28 - 44 %    LA volume 74.76 cm3    Sinus 3.58 cm    STJ 2.89 cm    Ascending aorta 3.00 cm    LV mass 157.77 g    LA size 3.63 cm    RVDD 3.22 cm    TAPSE 2.12 cm    RV S' 11.34 cm/s    Left Ventricle Relative Wall  "Thickness 0.39 cm    AV mean gradient 3 mmHg    AV valve area 2.72 cm2    AV Velocity Ratio 0.78     AV index (prosthetic) 0.72     MV valve area p 1/2 method 3.47 cm2    E/A ratio 1.43     Mean e' 0.10 m/s    E wave deceleration time 218.47 msec    IVRT 85.63 msec    MV "A" wave duration 17.98 msec    Pulm vein S/D ratio 0.80     LVOT diameter 2.20 cm    LVOT area 3.8 cm2    LVOT peak ian 0.98 m/s    LVOT peak VTI 17.64 cm    Ao peak ian 1.26 m/s    Ao VTI 24.66 cm    LVOT stroke volume 67.02 cm3    AV peak gradient 6 mmHg    E/E' ratio 7.05 m/s    MV Peak E Ian 0.67 m/s    MV stenosis pressure 1/2 time 63.36 ms    MV Peak A Ian 0.47 m/s    PV Peak S Ian 0.35 m/s    PV Peak D Ian 0.44 m/s    LV Systolic Volume 36.74 mL    LV Systolic Volume Index 18.0 mL/m2    LV Diastolic Volume 104.53 mL    LV Diastolic Volume Index 51.24 mL/m2    LA Volume Index 36.6 mL/m2    LV Mass Index 77 g/m2    RA Major Axis 4.48 cm    Left Atrium Minor Axis 5.16 cm    Left Atrium Major Axis 5.24 cm    LA Volume Index (Mod) 37.1 mL/m2    LA volume (mod) 75.75 cm3    RA Width 3.32 cm    Right Atrial Pressure (from IVC) 3 mmHg    QEF 54 %    EF 60 %    Narrative    · The left ventricle is normal in size with normal systolic function. The   estimated ejection fraction is 60%.  · The quantitatively derived ejection fraction is 54%.  · The left ventricular global longitudinal strain is -17.7%  · Normal right ventricular size with normal right ventricular systolic   function.  · Normal left ventricular diastolic function.  · Mild left atrial enlargement.  · Normal central venous pressure (3 mmHg).          CHLOE:  No results found for this or any previous visit.     Imaging     Active Cardiac Conditions: None      Revised Cardiac Risk Index   High -Risk Surgery  Intraperitoneal; Intrathoracic; suprainguinal vascular Yes- + 1 No- 0   History of Ischemic Heart Disease   (Hx of MI/positive exercise test/current chest pain due to ischemia/use of " nitrate therapy/EKG with pathological Q waves) Yes- + 1 No- 0   History of CHF  (Pulmonary edema/bilateral rales or S3 gallop/PND/CXR showing pulmonary vascular redistribution) Yes- + 1 No- 0   History of CVA   (Prior stroke or TIA) Yes- + 1 No- 0   Pre-operative treatment with insulin Yes- + 1 No- 0   Pre-operative creatinine > 2mg/dl Yes- + 1 No- 0   Total:      Risk Status:  Estimated risk of cardiac complications after non-cardiac surgery using the Revised Cardiac Risk Index for Preoperative risk is 3.9 %      ARISCAT (Canet) risk index: Low: 1.6% risk of post-op pulmonary complications.    American Society of Anesthesiologists Physical Status classification (ASA): 3           No further cardiac workup needed prior to surgery.        Preoperative cardiac risk assessment-  The patient does not have any active cardiac conditions . Revised cardiac risk index predictors- ---.Functional capacity is more than 4 Mets. She will be undergoing a Breast procedure that carries a Moderate Risk risk     The estimated risk of the rate of adverse cardiac outcomes  3.9    No further cardiac work up is indicated prior to proceeding with the surgery          American Society of Anesthesiologists Physical status classification ( ASA ) class: 3     Postoperative pulmonary complication risk assessment: 1.6     ARISCAT ( Canet) risk index- risk class -  Low, if duration of surgery is under 3 hours, intermediate, if duration of surgery is over 3 hours        Assessment/Plan:     Primary hypertension  Current /77  today.    Taking: used to take BP meds but BP stabilized sp they were d/c  Lifestyle changes to reduce systolic BP:   exercise 30 minutes per day,  5 days per week or 150 minutes weekly; sodium reduction and avoidance of high salt foods such as processed meats, frozen meals and  fast foods.   Keeping a healthy weight/BMI can help with better BP control    BP acceptable for surgery. I recommend monitoring BP during  perioperative period as uncontrolled pain can elevate blood pressure.         Anemia in neoplastic disease  Hgb 11.2 HCT 32.8    Shortness of breath  Resolved was decided r/t chemo    Pancreatic insufficiency  Keytruda caused pancreatitis and Colitis and she was hospitalized  Meds were stopped; treated with steroids   Now on Creon    Diarrhea  Has occasional     Severe obesity  BMI 38.07  Has lost and maintained 45 pound weight loss        Preventive perioperative care    Thromboembolic prophylaxis:  Her risk factors for thrombosis include morbid obesity, surgical procedure, age, and reduced mobility.I suggest  thromboembolic prophylaxis ( mechanical/pharmacological, weighing the risk benefits of pharmacological agent use considering eduard procedural bleeding )  during the perioperative period.I suggested being active in the post operative period.      Postoperative pulmonary complication prophylaxis-Risk factors for post operative pulmonary complications include surgery lasting over 3 hours and ASA class >2- I suggest incentive spirometry use, early ambulation, and pain control so as to avoid diaphragmatic splinting  Brush teeth twice per day, oral rinses, sleep with the head of the bed up 30 degrees     Renal complication prophylaxis-Risk factors for renal complications include age and hypertension . I suggest keeping her well hydrated and avoidance/ minimizing the use of  NSAID's,RAMOS 2 Inhibitors ,IV contrast if possible in the perioperative period.I suggested drinking 2 litre's of water a day      Surgical site Infection Prophylaxis-I  suggest appropriate antibiotic for Prophylaxis against Surgical site infections Shower with Hibiclens in the night before surgery and the morning of surgery    This visit was focused on Preoperative evaluation, Perioperative Medical management, complication reduction plans. I suggest that the patient follows up with primary care or relevant sub specialists for ongoing health  care.    I appreciate the opportunity to be involved in this patients care. Please feel free to contact me if there were any questions about this consultation.    Patient is optimized        I spent a total of 46 minutes on the day of the visit.This includes face to face time and non-face to face time preparing to see the patient (eg, review of tests), obtaining and/or reviewing separately obtained history, documenting clinical information in the electronic or other health record, independently interpreting results and communicating results to the patient/family/caregiver, or care coordinator.     Allen Hernandez NP  Perioperative Medicine  [facility]   Pager [phone]

## 2023-11-30 NOTE — DISCHARGE INSTRUCTIONS
Your surgery has been scheduled for:_______12/11/23___________________________________    You should report to:  ____[facility], located on the [place] side of the first floor of the           [facility] ([phone])  _X___[facility], located on the [facility] side of the            Second floor of the [facility] ([phone])  ____3rd Floor [facility] located on the [facility] side of the [facility] [phone]  ____[facility]: located at [address] [place] [address]. [address]     Please Note   Tell your doctor if you take Aspirin, products containing Aspirin, herbal medications  or blood thinners, such as Coumadin, Ticlid, or Plavix.  (Consult your provider regarding holding or stopping before surgery).  Arrange for someone to drive you home following surgery.  You will not be allowed to leave the surgical facility alone or drive yourself home following sedation and anesthesia.    Before Surgery  Stop taking all herbal medications, vitamins, and supplements 7 days prior to surgery  No Motrin/Advil (Ibuprofen) 7 days before surgery  No Aleve (Naproxen) 7 days before surgery  Stop Taking Asprin, products containing Aspirin __7___days before surgery   No Goody's/BC Powder 7 days before surgery  Refrain from drinking alcoholic beverages for 24 hours before and after surgery  Stop or limit smoking at least 24 hours prior to surgery  You may take Tylenol for pain    Night before Surgery  Do not eat or drink after midnight  Take a shower or bath (shower is recommended).  Bathe with Hibiclens soap or an antibacterial soap from the neck down.  If not supplied by your surgeon, hibiclens soap will need to be purchased over the counter in pharmacy.  Rinse soap off thoroughly.  Shampoo your hair with your regular shampoo    The Day of Surgery  Take another bath or shower with hibiclens  or any antibacterial soap, to reduce the chance of infection.  Take heart and blood pressure medications with a small sip of water, as advised by the perioperative team.  Do not take fluid pills  You may brush your teeth and rinse your mouth, but do not swallow any additional water.   Do not apply perfumes, powder, body lotions or deodorant on the day of surgery.  Nail polish should be removed.  Do not wear makeup or moisturizer  Wear comfortable clothes, such as a button front shirt and loose fitting pants.  Leave all jewelry, including body piercings, and valuables at home.    Bring any devices you will neeed after surgery such as crutches or canes.  If you have sleep apnea, please bring your CPAP machine  In the event that your physical condition changes including the onset of a cold or respiratory illness, or if you have to delay or cancel your surgery, please notify your surgeon.

## 2023-11-30 NOTE — OUTPATIENT SUBJECTIVE & OBJECTIVE
Outpatient Subjective & Objective      Chief Complaint: Preoperative evaulation, perioperative medical management, and complication reduction plan.     Functional Capacity:  Able to climb a flight of stairs without CP SOB or Syncope.  Able to meet 4 METs      Anesthesia issues: None  Difficulty mouth opening: y  Steroid use in the last 12 months: with pancreas troubles     Dental Issues: n    Family anesthesia difficulty: None       Last monthly period chemo stopped period- last MAy 2022    Family Hx of Thrombosis none    Past Medical History:   Diagnosis Date    Anemia     Anxiety     Breast cancer     Encounter for blood transfusion     Hypertension     Meningitis     Severe obesity 2023       Past Surgical History:   Procedure Laterality Date    AXILLARY NODE DISSECTION Left 2023    Procedure: LYMPHADENECTOMY, AXILLARY;  Surgeon: YASMANI Weiner MD;  Location: [facility];  Service: General;  Laterality: Left;    BREAST BIOPSY Left      SECTION      COLONOSCOPY N/A 2022    Procedure: COLONOSCOPY;  Surgeon: Davi Chery MD;  Location: [facility] ([address] [place] [address]);  Service: Endoscopy;  Laterality: N/A;  case request entered from referral - Per DR. KATHLEEN Chery Pt to be scheduled for urgent colonoscopy on 22/ fully vaccinated / prep ins for Sutab on portal - ERW  see micro for negative C-diff- ERW    INJECTION FOR SENTINEL NODE IDENTIFICATION Left 2023    Procedure: INJECTION, FOR SENTINEL NODE IDENTIFICATION;  Surgeon: YASMANI Weiner MD;  Location: [facility];  Service: General;  Laterality: Left;    INSERTION OF BREAST TISSUE EXPANDER Left 2023    Procedure: INSERTION, TISSUE EXPANDER, BREAST;  Surgeon: Reese Barrios DO;  Location: [facility];  Service: Plastics;  Laterality: Left;    INSERTION OF TUNNELED CENTRAL VENOUS CATHETER (CVC) WITH SUBCUTANEOUS PORT N/A 2022    Procedure: [id number];  Surgeon: Deo Sin Jr., MD;  Location: [address]  "[facility];  Service: General;  Laterality: N/A;    INSERTION OF TUNNELED CENTRAL VENOUS CATHETER (CVC) WITH SUBCUTANEOUS PORT N/A 8/1/2022    Procedure: INSERTION, SINGLE LUMEN CATHETER WITH PORT, WITH FLUOROSCOPIC GUIDANCE;  Surgeon: Ryder Dinero MD;  Location: [facility] CATH LAB;  Service: Vascular;  Laterality: N/A;    MASTECTOMY Left 2/27/2023    Procedure: MASTECTOMY;  Surgeon: YASMANI Weiner MD;  Location: [facility];  Service: General;  Laterality: Left;  3.5 TOTAL HOURS / EMAIL SENT 2-23 @ 7:56 CCC    SENTINEL LYMPH NODE BIOPSY Left 2/27/2023    Procedure: BIOPSY, LYMPH NODE, SENTINEL;  Surgeon: YASMANI Weiner MD;  Location: [facility];  Service: General;  Laterality: Left;  LEFT with radiological marker       Review of Systems   Constitutional:  Positive for fatigue. Negative for chills and fever.   HENT:  Negative for trouble swallowing and voice change.    Eyes:  Negative for photophobia and visual disturbance.        No acute visual changes   Respiratory:  Negative for apnea, cough, chest tightness, shortness of breath and wheezing.         STOP bang  Score 2  Low risk ELIZABETH     Cardiovascular:  Negative for chest pain, palpitations and leg swelling.   Gastrointestinal:  Positive for abdominal pain and nausea. Negative for abdominal distention, blood in stool, constipation, diarrhea and vomiting.        NO FLD, hepatitis, cirrhosis  No BRB or black tarry stool     Genitourinary:  Negative for difficulty urinating, dysuria, flank pain, frequency, hematuria and urgency.   Musculoskeletal:  Negative for arthralgias, back pain, gait problem, joint swelling, myalgias, neck pain and neck stiffness.   Neurological:  Negative for dizziness, tremors, seizures, syncope, weakness, light-headedness, numbness and headaches.   Psychiatric/Behavioral:  Negative for suicidal ideas.       VITALS  Visit Vitals  /77 (BP Location: Left arm, Patient Position: Sitting)   Pulse 80   Temp 98.1 °F (36.7 °C) (Oral)   Ht 5' 6" (1.676 " m)   Wt 107 kg (235 lb 14.3 oz)   SpO2 99%   BMI 38.07 kg/m²          Physical Exam  Constitutional:       General: She is not in acute distress.     Appearance: Normal appearance. She is well-developed. She is not diaphoretic.   HENT:      Head: Normocephalic.      Right Ear: Hearing normal.      Left Ear: Hearing normal.      Nose: Nose normal.      Mouth/Throat:      Lips: Pink.      Mouth: Mucous membranes are moist.      Pharynx: No oropharyngeal exudate.   Eyes:      General: Lids are normal.         Right eye: No discharge.         Left eye: No discharge.      Conjunctiva/sclera: Conjunctivae normal.      Pupils: Pupils are equal, round, and reactive to light.   Neck:      Thyroid: No thyromegaly.      Vascular: No carotid bruit or JVD.      Trachea: Trachea and phonation normal. No tracheal deviation.   Cardiovascular:      Rate and Rhythm: Normal rate and regular rhythm.      Pulses: Normal pulses.           Carotid pulses are 2+ on the right side and 2+ on the left side.       Radial pulses are 2+ on the right side and 2+ on the left side.        Posterior tibial pulses are 2+ on the right side and 2+ on the left side.      Heart sounds: Normal heart sounds. No murmur heard.     No friction rub. No gallop.   Pulmonary:      Effort: Pulmonary effort is normal. No respiratory distress.      Breath sounds: Normal breath sounds. No stridor. No wheezing or rales.      Comments: Clear Anterior and Posterior BBS  Abdominal:      General: Abdomen is protuberant. Bowel sounds are normal. There is no distension.      Palpations: Abdomen is soft.      Tenderness: There is no abdominal tenderness. There is no guarding.   Musculoskeletal:         General: No tenderness or deformity. Normal range of motion.      Cervical back: Normal range of motion and neck supple. No rigidity.      Right lower leg: No edema.      Left lower leg: No edema.   Lymphadenopathy:      Head:      Right side of head: No submental,  submandibular, tonsillar, preauricular, posterior auricular or occipital adenopathy.      Left side of head: No submental, submandibular, tonsillar, preauricular, posterior auricular or occipital adenopathy.      Cervical: No cervical adenopathy.      Right cervical: No superficial cervical adenopathy.     Left cervical: No superficial cervical adenopathy.   Skin:     General: Skin is warm and dry.      Capillary Refill: Capillary refill takes 2 to 3 seconds.      Coloration: Skin is not pale.      Findings: No erythema or rash.   Neurological:      Mental Status: She is alert and oriented to person, place, and time. Mental status is at baseline.      GCS: GCS eye subscore is 4. GCS verbal subscore is 5. GCS motor subscore is 6.      Motor: No abnormal muscle tone.      Coordination: Coordination normal.   Psychiatric:         Attention and Perception: Attention and perception normal.         Mood and Affect: Mood and affect normal.         Speech: Speech normal.         Behavior: Behavior normal. Behavior is cooperative.         Thought Content: Thought content normal.         Cognition and Memory: Cognition and memory normal.         Judgment: Judgment normal.        Significant Labs:  Lab Results   Component Value Date    WBC 3.56 (L) 10/30/2023    HGB 11.2 (L) 10/30/2023    HCT 32.8 (L) 10/30/2023     10/30/2023    CHOL 162 02/06/2023    TRIG 103 02/06/2023    HDL 32 (L) 02/06/2023    ALT 18 10/30/2023    AST 23 10/30/2023     10/30/2023    K 3.7 10/30/2023     10/30/2023    CREATININE 0.7 10/30/2023    BUN 14 10/30/2023    CO2 24 10/30/2023    TSH 0.707 10/26/2023    HGBA1C 4.8 10/26/2023       Diagnostic Studies: No relevant studies.    EKG:   Results for orders placed or performed during the hospital encounter of 01/01/23   EKG 12-lead    Collection Time: 01/01/23  7:18 PM    Narrative    Test Reason : R50.9,    Vent. Rate : 088 BPM     Atrial Rate : 088 BPM     P-R Int : 174 ms           "QRS Dur : 084 ms      QT Int : 368 ms       P-R-T Axes : 058 -04 055 degrees     QTc Int : 445 ms    Normal sinus rhythm  Low anterior forces and R wave progression  Abnormal ECG  When compared with ECG of 14-SEP-2022 18:13,  No significant change was found  Confirmed by Pedro CABRERA MD ([id number]) on 1/1/2023 9:49:52 PM    Referred By: [email or web address]   SELF           Confirmed By:Pedro CABRERA MD       2D ECHO:  TTE:  Results for orders placed or performed during the hospital encounter of 04/03/23   Echo   Result Value Ref Range    BSA 2.12 m2    TDI SEPTAL 0.09 m/s    LV LATERAL E/E' RATIO 6.70 m/s    LV SEPTAL E/E' RATIO 7.44 m/s    LA WIDTH 4.66 cm    TDI LATERAL 0.10 m/s    LVIDd 4.74 3.5 - 6.0 cm    IVS 0.99 0.6 - 1.1 cm    Posterior Wall 0.93 0.6 - 1.1 cm    LVIDs 3.06 2.1 - 4.0 cm    FS 35 28 - 44 %    LA volume 74.76 cm3    Sinus 3.58 cm    STJ 2.89 cm    Ascending aorta 3.00 cm    LV mass 157.77 g    LA size 3.63 cm    RVDD 3.22 cm    TAPSE 2.12 cm    RV S' 11.34 cm/s    Left Ventricle Relative Wall Thickness 0.39 cm    AV mean gradient 3 mmHg    AV valve area 2.72 cm2    AV Velocity Ratio 0.78     AV index (prosthetic) 0.72     MV valve area p 1/2 method 3.47 cm2    E/A ratio 1.43     Mean e' 0.10 m/s    E wave deceleration time 218.47 msec    IVRT 85.63 msec    MV "A" wave duration 17.98 msec    Pulm vein S/D ratio 0.80     LVOT diameter 2.20 cm    LVOT area 3.8 cm2    LVOT peak ian 0.98 m/s    LVOT peak VTI 17.64 cm    Ao peak ian 1.26 m/s    Ao VTI 24.66 cm    LVOT stroke volume 67.02 cm3    AV peak gradient 6 mmHg    E/E' ratio 7.05 m/s    MV Peak E Ian 0.67 m/s    MV stenosis pressure 1/2 time 63.36 ms    MV Peak A Ian 0.47 m/s    PV Peak S Ian 0.35 m/s    PV Peak D Ian 0.44 m/s    LV Systolic Volume 36.74 mL    LV Systolic Volume Index 18.0 mL/m2    LV Diastolic Volume 104.53 mL    LV Diastolic Volume Index 51.24 mL/m2    LA Volume Index 36.6 mL/m2    LV Mass Index 77 g/m2    RA Major Axis 4.48 cm    Left Atrium " Minor Axis 5.16 cm    Left Atrium Major Axis 5.24 cm    LA Volume Index (Mod) 37.1 mL/m2    LA volume (mod) 75.75 cm3    RA Width 3.32 cm    Right Atrial Pressure (from IVC) 3 mmHg    QEF 54 %    EF 60 %    Narrative    · The left ventricle is normal in size with normal systolic function. The   estimated ejection fraction is 60%.  · The quantitatively derived ejection fraction is 54%.  · The left ventricular global longitudinal strain is -17.7%  · Normal right ventricular size with normal right ventricular systolic   function.  · Normal left ventricular diastolic function.  · Mild left atrial enlargement.  · Normal central venous pressure (3 mmHg).          CHLOE:  No results found for this or any previous visit.     Imaging     Active Cardiac Conditions: None      Revised Cardiac Risk Index   High -Risk Surgery  Intraperitoneal; Intrathoracic; suprainguinal vascular Yes- + 1 No- 0   History of Ischemic Heart Disease   (Hx of MI/positive exercise test/current chest pain due to ischemia/use of nitrate therapy/EKG with pathological Q waves) Yes- + 1 No- 0   History of CHF  (Pulmonary edema/bilateral rales or S3 gallop/PND/CXR showing pulmonary vascular redistribution) Yes- + 1 No- 0   History of CVA   (Prior stroke or TIA) Yes- + 1 No- 0   Pre-operative treatment with insulin Yes- + 1 No- 0   Pre-operative creatinine > 2mg/dl Yes- + 1 No- 0   Total:      Risk Status:  Estimated risk of cardiac complications after non-cardiac surgery using the Revised Cardiac Risk Index for Preoperative risk is 3.9 %      ARISCAT (Canet) risk index: Low: 1.6% risk of post-op pulmonary complications.    American Society of Anesthesiologists Physical Status classification (ASA): 3           No further cardiac workup needed prior to surgery.    Outpatient Subjective & Objective

## 2023-11-30 NOTE — PROGRESS NOTES
Plastic Surgery History & Physical    SUBJECTIVE:   Chief complaint: Preoperative visit for KANDICE flap reconstruction    History of Present Illness:  38 y.o. female presenting to plastic surgery clinic for a preoperative visit. The patient was last seen on 23 at which time we discussed  immediate right and delayed left  KANDICE flap reconstruction.  Patient understands the details of KANDICE flap reconstruction and has elected to undergo surgery on  at [facility] [place] with Madi Weiner and [place]. Since the last clinic visit there have been no significant changes in the patient's history.    Her skin has recovered well from XRT, pancreatitis improved    Past Medical History:   Diagnosis Date    Anemia     Anxiety     Breast cancer     Encounter for blood transfusion     Hypertension     Meningitis     Severe obesity 2023    Shortness of breath 2023       Past Surgical History:   Procedure Laterality Date    AXILLARY NODE DISSECTION Left 2023    Procedure: LYMPHADENECTOMY, AXILLARY;  Surgeon: YASMANI Weiner MD;  Location: [facility];  Service: General;  Laterality: Left;    BREAST BIOPSY Left      SECTION      COLONOSCOPY N/A 2022    Procedure: COLONOSCOPY;  Surgeon: Davi Chery MD;  Location: [address]);  Service: Endoscopy;  Laterality: N/A;  case request entered from referral - Per DR. KATHLEEN Chery Pt to be scheduled for urgent colonoscopy on 22/ fully vaccinated / prep ins for Sutab on portal - ERW  see micro for negative C-diff- ERW    INJECTION FOR SENTINEL NODE IDENTIFICATION Left 2023    Procedure: INJECTION, FOR SENTINEL NODE IDENTIFICATION;  Surgeon: YASMANI Weiner MD;  Location: [facility];  Service: General;  Laterality: Left;    INSERTION OF BREAST TISSUE EXPANDER Left 2023    Procedure: INSERTION, TISSUE EXPANDER, BREAST;  Surgeon: Reese Barrios DO;  Location: [facility];  Service: Plastics;  Laterality: Left;    INSERTION OF TUNNELED CENTRAL VENOUS CATHETER  (CVC) WITH SUBCUTANEOUS PORT N/A 4/5/2022    Procedure: [id number];  Surgeon: Deo Sin Jr., MD;  [address]  Service: General;  Laterality: N/A;    INSERTION OF TUNNELED CENTRAL VENOUS CATHETER (CVC) WITH SUBCUTANEOUS PORT N/A 8/1/2022    Procedure: INSERTION, SINGLE LUMEN CATHETER WITH PORT, WITH FLUOROSCOPIC GUIDANCE;  Surgeon: Ryder Dinero MD;  Location: [facility] CATH LAB;  Service: Vascular;  Laterality: N/A;    MASTECTOMY Left 2/27/2023    Procedure: MASTECTOMY;  Surgeon: YASMANI Weiner MD;  Location: [facility];  Service: General;  Laterality: Left;  3.5 TOTAL HOURS / EMAIL SENT 2-23 @ 7:56 CCC    SENTINEL LYMPH NODE BIOPSY Left 2/27/2023    Procedure: BIOPSY, LYMPH NODE, SENTINEL;  Surgeon: YASMANI Weiner MD;  Location: [facility];  Service: General;  Laterality: Left;  LEFT with radiological marker       Family History   Problem Relation Age of Onset    Hypertension Father     Diabetes Father     Pancreatic cancer Maternal Uncle     Bladder Cancer Paternal Grandmother     Colon cancer Neg Hx     Esophageal cancer Neg Hx        Social History     Socioeconomic History    Marital status: Single    Number of children: 3   Tobacco Use    Smoking status: Never    Smokeless tobacco: Never   Substance and Sexual Activity    Alcohol use: Not Currently     Comment: social    Drug use: Never    Sexual activity: Not Currently   Social History Narrative    15-20 steps     Social Determinants of Health     Financial Resource Strain: Medium Risk (11/23/2023)    Overall Financial Resource Strain (CARDIA)     Difficulty of Paying Living Expenses: Somewhat hard   Food Insecurity: Food Insecurity Present (11/23/2023)    Hunger Vital Sign     Worried About Running Out of Food in the Last Year: Never true     Ran Out of Food in the Last Year: Sometimes true   Transportation Needs: No Transportation Needs (11/23/2023)    PRAPARE - Transportation     Lack of Transportation (Medical): No     Lack  of Transportation (Non-Medical): No   Physical Activity: Sufficiently Active (11/23/2023)    Exercise Vital Sign     Days of Exercise per Week: 5 days     Minutes of Exercise per Session: 30 min   Stress: No Stress Concern Present (11/23/2023)    [employer] - Occupational Stress Questionnaire     Feeling of Stress : Only a little   Social Connections: Moderately Integrated (11/23/2023)    Social Connection and Isolation Panel [NHANES]     Frequency of Communication with Friends and Family: More than three times a week     Frequency of Social Gatherings with Friends and Family: Patient refused     Attends Religion Services: 1 to 4 times per year     Active Member of Clubs or Organizations: Yes     Attends Club or Organization Meetings: More than 4 times per year     Marital Status: Never    Housing Stability: High Risk (11/23/2023)    Housing Stability Vital Sign     Unable to Pay for Housing in the Last Year: Yes     Number of Places Lived in the Last Year: 2     Unstable Housing in the Last Year: No       Current Outpatient Medications   Medication Sig Dispense Refill    capecitabine (XELODA) 150 MG tablet Take 1 tablet (150 mg total) by mouth 2 (two) times daily for 14 days followed by 7 days off. Take with 1 other capecitabine prescription for 2,150 mg total. 28 tablet 2    capecitabine (XELODA) 150 MG tablet Take 1 tablet (150 mg total) by mouth 2 (two) times daily for 14 days followed by 7 days off. Take with 1 other capecitabine prescription for 2,150 mg total. 28 tablet 2    capecitabine (XELODA) 500 MG Tab Take 4 tablets (2,000 mg total) by mouth 2 (two) times daily for 14 days followed by 7 days off. Take with 1 other capecitabine prescription for 2,150 mg total. 112 tablet 2    capecitabine (XELODA) 500 MG Tab Take 4 tablets (2,000 mg total) by mouth 2 (two) times daily for 14 days followed by 7 days off. Take with 1 other capecitabine prescription for 2,150 mg total.  "112 tablet 2    cetirizine (ZYRTEC) 10 MG tablet Take 10 mg by mouth once daily.      clotrimazole (LOTRIMIN) 1 % cream Apply 1 application topically every evening.      HYDROcodone-acetaminophen (NORCO) 5-325 mg per tablet       hydrOXYzine HCL (ATARAX) 10 MG Tab Take 10 mg by mouth nightly as needed.      ibuprofen (ADVIL,MOTRIN) 600 MG tablet Take 600 mg by mouth 3 (three) times daily.      ketoconazole (NIZORAL) 2 % shampoo Apply topically Every 3 (three) days.      lipase-protease-amylase 24,000-76,000-120,000 units (CREON) 24,000-76,000 -120,000 unit capsule Take 1 capsule by mouth 3 (three) times daily with meals. 90 capsule 2    metFORMIN (GLUCOPHAGE) 500 MG tablet Take 1 tablet (500 mg total) by mouth once daily. With food 30 tablet 3    mupirocin (BACTROBAN) 2 % ointment Apply topically once daily. 30 g 0    nystatin (MYCOSTATIN) ointment Apply topically 2 (two) times daily. 15 g 1    nystatin (MYCOSTATIN) powder Apply topically 2 (two) times daily. 30 g 1    ondansetron (ZOFRAN) 8 MG tablet Take 1 tablet (8 mg total) by mouth every 12 (twelve) hours as needed for Nausea. 30 tablet 2    pantoprazole (PROTONIX) 40 MG tablet Take 1 tablet (40 mg total) by mouth once daily. 30 tablet 1    traMADoL (ULTRAM) 50 mg tablet Take 1 tablet (50 mg total) by mouth every 6 (six) hours as needed for Pain. 20 tablet 0    gabapentin (NEURONTIN) 300 MG capsule Take 2 capsules (600 mg total) by mouth 3 (three) times daily. for 7 days 42 capsule 0     No current facility-administered medications for this visit.     Facility-Administered Medications Ordered in Other Visits   Medication Dose Route Frequency Provider Last Rate Last Admin    ceFAZolin 1 g, gentamicin 80 mg in sodium chloride 0.9% 500 mL irrigation   Irrigation On Call Procedure YASMANI Weiner MD           Review of patient's allergies indicates:   Allergen Reactions    Strawberries [strawberry] Hives           OBJECTIVE:     /79   Pulse 68   Ht 5' 6" " (1.676 m)   Wt 106.6 kg (235 lb)   BMI 37.93 kg/m²       Physical Exam:  Gen: NAD, appears stated age  Neuro: normal without focal findings, mental status and speech normal  HEENT: NCAT, neck supple, PEERL  CV: RRR  Pulm: Breathing non-labored, chest wall movement equal bilaterally   Breast: left TE in good position, no fluid  Right breast ptosis  Abdomen: soft, nontender, no guarding, adequate tissue for KANDICE flap  Extremity:normal strength, no cyanosis or edema  Skin: Skin color, texture, turgor normal. No rashes or lesions  Psych: oriented to time, place and person, mood and affect are within normal limits          ASSESSMENT/PLAN:     Laura Kent was seen preoperatively today for KANDICE flap reconstruction  The plan is for right SSM, L TE removal, BL KANDICE flaps    Explained the importance of postoperative hospitalization for monitoring of the new vessel anastomosis, which is most important in the 48 hours after surgery.  Discussed flap monitoring, risks of take back/flap failure/ expected hospital course, use of drains, and recovery.  Risks including flap loss, fat necrosis, infection, wound breakdown, seroma, hematoma, scarring, need for reoperation, blood clots were all covered.  Typical revision procedures including lifting the breast, tailoring the skin, liposuction and fat grafting for contour were also discussed.  Consents signed    Reviewed multimodal pain control regimen used in the post operative period. Prescriptions will be sent to the outpatient pharmacy the day of surgery.  The patient was given a packet including general instructions for post-operative care, instructions for the day of surgery, drain care and process to complete FMLA/Disability paperwork.  All questions were answered. The patient was given a business card with contact info and advised to contact the clinic with any questions or concerns before or after surgery.      Reese Barrios  Plastic and Reconstructive  Surgery    I spent a total of 30 minutes on the day of the visit.This includes face to face time and non-face to face time preparing to see the patient (eg, review of tests), obtaining and/or reviewing separately obtained history, documenting clinical information in the electronic or other health record, independently interpreting results and communicating results to the patient/family/caregiver, or care coordinator.

## 2023-12-04 ENCOUNTER — OFFICE VISIT (OUTPATIENT)
Dept: HEMATOLOGY/ONCOLOGY | Facility: CLINIC | Age: 38
End: 2023-12-04
Payer: MEDICAID

## 2023-12-04 ENCOUNTER — CLINICAL SUPPORT (OUTPATIENT)
Dept: HEMATOLOGY/ONCOLOGY | Facility: CLINIC | Age: 38
End: 2023-12-04
Payer: MEDICAID

## 2023-12-04 ENCOUNTER — LAB VISIT (OUTPATIENT)
Dept: LAB | Facility: HOSPITAL | Age: 38
End: 2023-12-04
Attending: INTERNAL MEDICINE

## 2023-12-04 VITALS — WEIGHT: 235 LBS | HEIGHT: 66 IN | BODY MASS INDEX: 37.77 KG/M2

## 2023-12-04 VITALS
OXYGEN SATURATION: 100 % | DIASTOLIC BLOOD PRESSURE: 80 MMHG | TEMPERATURE: 98 F | HEIGHT: 66 IN | SYSTOLIC BLOOD PRESSURE: 123 MMHG | HEART RATE: 72 BPM | RESPIRATION RATE: 18 BRPM | WEIGHT: 238.31 LBS | BODY MASS INDEX: 38.3 KG/M2

## 2023-12-04 DIAGNOSIS — Z17.1 MALIGNANT NEOPLASM OF UPPER-OUTER QUADRANT OF LEFT BREAST IN FEMALE, ESTROGEN RECEPTOR NEGATIVE: ICD-10-CM

## 2023-12-04 DIAGNOSIS — T45.1X5A CHEMOTHERAPY-INDUCED THROMBOCYTOPENIA: ICD-10-CM

## 2023-12-04 DIAGNOSIS — K86.89 PANCREATIC INSUFFICIENCY: ICD-10-CM

## 2023-12-04 DIAGNOSIS — C50.412 MALIGNANT NEOPLASM OF UPPER-OUTER QUADRANT OF LEFT BREAST IN FEMALE, ESTROGEN RECEPTOR NEGATIVE: Primary | ICD-10-CM

## 2023-12-04 DIAGNOSIS — T45.1X5A CHEMOTHERAPY INDUCED NEUTROPENIA: ICD-10-CM

## 2023-12-04 DIAGNOSIS — R63.5 WEIGHT GAIN: ICD-10-CM

## 2023-12-04 DIAGNOSIS — E55.9 VITAMIN D DEFICIENCY: ICD-10-CM

## 2023-12-04 DIAGNOSIS — C50.412 MALIGNANT NEOPLASM OF UPPER-OUTER QUADRANT OF LEFT BREAST IN FEMALE, ESTROGEN RECEPTOR NEGATIVE: ICD-10-CM

## 2023-12-04 DIAGNOSIS — D69.59 CHEMOTHERAPY-INDUCED THROMBOCYTOPENIA: ICD-10-CM

## 2023-12-04 DIAGNOSIS — D70.1 CHEMOTHERAPY INDUCED NEUTROPENIA: ICD-10-CM

## 2023-12-04 DIAGNOSIS — Z17.1 MALIGNANT NEOPLASM OF UPPER-OUTER QUADRANT OF LEFT BREAST IN FEMALE, ESTROGEN RECEPTOR NEGATIVE: Primary | ICD-10-CM

## 2023-12-04 DIAGNOSIS — Z71.3 NUTRITIONAL COUNSELING: Primary | ICD-10-CM

## 2023-12-04 DIAGNOSIS — D63.0 ANEMIA IN NEOPLASTIC DISEASE: ICD-10-CM

## 2023-12-04 DIAGNOSIS — E66.01 SEVERE OBESITY: ICD-10-CM

## 2023-12-04 LAB
ALBUMIN SERPL BCP-MCNC: 4 G/DL (ref 3.5–5.2)
ALP SERPL-CCNC: 57 U/L (ref 55–135)
ALT SERPL W/O P-5'-P-CCNC: 21 U/L (ref 10–44)
ANION GAP SERPL CALC-SCNC: 7 MMOL/L (ref 8–16)
AST SERPL-CCNC: 26 U/L (ref 10–40)
BASOPHILS # BLD AUTO: 0.02 K/UL (ref 0–0.2)
BASOPHILS NFR BLD: 0.5 % (ref 0–1.9)
BILIRUB SERPL-MCNC: 0.5 MG/DL (ref 0.1–1)
BUN SERPL-MCNC: 16 MG/DL (ref 6–20)
CALCIUM SERPL-MCNC: 9.1 MG/DL (ref 8.7–10.5)
CHLORIDE SERPL-SCNC: 106 MMOL/L (ref 95–110)
CO2 SERPL-SCNC: 26 MMOL/L (ref 23–29)
CREAT SERPL-MCNC: 0.7 MG/DL (ref 0.5–1.4)
DIFFERENTIAL METHOD: ABNORMAL
EOSINOPHIL # BLD AUTO: 0.1 K/UL (ref 0–0.5)
EOSINOPHIL NFR BLD: 1.6 % (ref 0–8)
ERYTHROCYTE [DISTWIDTH] IN BLOOD BY AUTOMATED COUNT: 13 % (ref 11.5–14.5)
EST. GFR  (NO RACE VARIABLE): >60 ML/MIN/1.73 M^2
GLUCOSE SERPL-MCNC: 87 MG/DL (ref 70–110)
HCT VFR BLD AUTO: 31.2 % (ref 37–48.5)
HGB BLD-MCNC: 11 G/DL (ref 12–16)
IMM GRANULOCYTES # BLD AUTO: 0.02 K/UL (ref 0–0.04)
IMM GRANULOCYTES NFR BLD AUTO: 0.5 % (ref 0–0.5)
LYMPHOCYTES # BLD AUTO: 0.7 K/UL (ref 1–4.8)
LYMPHOCYTES NFR BLD: 20 % (ref 18–48)
MCH RBC QN AUTO: 31.1 PG (ref 27–31)
MCHC RBC AUTO-ENTMCNC: 35.3 G/DL (ref 32–36)
MCV RBC AUTO: 88 FL (ref 82–98)
MONOCYTES # BLD AUTO: 0.3 K/UL (ref 0.3–1)
MONOCYTES NFR BLD: 9 % (ref 4–15)
NEUTROPHILS # BLD AUTO: 2.5 K/UL (ref 1.8–7.7)
NEUTROPHILS NFR BLD: 68.4 % (ref 38–73)
NRBC BLD-RTO: 0 /100 WBC
PLATELET # BLD AUTO: 147 K/UL (ref 150–450)
PMV BLD AUTO: 9.6 FL (ref 9.2–12.9)
POTASSIUM SERPL-SCNC: 3.8 MMOL/L (ref 3.5–5.1)
PROT SERPL-MCNC: 6.6 G/DL (ref 6–8.4)
RBC # BLD AUTO: 3.54 M/UL (ref 4–5.4)
SODIUM SERPL-SCNC: 139 MMOL/L (ref 136–145)
WBC # BLD AUTO: 3.65 K/UL (ref 3.9–12.7)

## 2023-12-04 PROCEDURE — 3074F PR MOST RECENT SYSTOLIC BLOOD PRESSURE < 130 MM HG: ICD-10-PCS | Mod: CPTII,,, | Performed by: NURSE PRACTITIONER

## 2023-12-04 PROCEDURE — 3079F PR MOST RECENT DIASTOLIC BLOOD PRESSURE 80-89 MM HG: ICD-10-PCS | Mod: CPTII,,, | Performed by: NURSE PRACTITIONER

## 2023-12-04 PROCEDURE — 3079F DIAST BP 80-89 MM HG: CPT | Mod: CPTII,,, | Performed by: NURSE PRACTITIONER

## 2023-12-04 PROCEDURE — 99214 PR OFFICE/OUTPT VISIT, EST, LEVL IV, 30-39 MIN: ICD-10-PCS | Mod: S$PBB,,, | Performed by: NURSE PRACTITIONER

## 2023-12-04 PROCEDURE — 99214 OFFICE O/P EST MOD 30 MIN: CPT | Mod: PBBFAC | Performed by: NURSE PRACTITIONER

## 2023-12-04 PROCEDURE — 3044F HG A1C LEVEL LT 7.0%: CPT | Mod: CPTII,,, | Performed by: NURSE PRACTITIONER

## 2023-12-04 PROCEDURE — 3008F BODY MASS INDEX DOCD: CPT | Mod: CPTII,,, | Performed by: NURSE PRACTITIONER

## 2023-12-04 PROCEDURE — 97802 PR MED NUTR THER, 1ST, INDIV, EA 15 MIN: ICD-10-PCS | Mod: 95,,, | Performed by: DIETITIAN, REGISTERED

## 2023-12-04 PROCEDURE — 85025 COMPLETE CBC W/AUTO DIFF WBC: CPT | Performed by: INTERNAL MEDICINE

## 2023-12-04 PROCEDURE — 3008F PR BODY MASS INDEX (BMI) DOCUMENTED: ICD-10-PCS | Mod: CPTII,,, | Performed by: NURSE PRACTITIONER

## 2023-12-04 PROCEDURE — 97802 MEDICAL NUTRITION INDIV IN: CPT | Mod: 95,,, | Performed by: DIETITIAN, REGISTERED

## 2023-12-04 PROCEDURE — 99999 PR PBB SHADOW E&M-EST. PATIENT-LVL IV: CPT | Mod: PBBFAC,,, | Performed by: NURSE PRACTITIONER

## 2023-12-04 PROCEDURE — 1159F PR MEDICATION LIST DOCUMENTED IN MEDICAL RECORD: ICD-10-PCS | Mod: CPTII,,, | Performed by: NURSE PRACTITIONER

## 2023-12-04 PROCEDURE — 3074F SYST BP LT 130 MM HG: CPT | Mod: CPTII,,, | Performed by: NURSE PRACTITIONER

## 2023-12-04 PROCEDURE — 80053 COMPREHEN METABOLIC PANEL: CPT | Performed by: INTERNAL MEDICINE

## 2023-12-04 PROCEDURE — 1159F MED LIST DOCD IN RCRD: CPT | Mod: CPTII,,, | Performed by: NURSE PRACTITIONER

## 2023-12-04 PROCEDURE — 99214 OFFICE O/P EST MOD 30 MIN: CPT | Mod: S$PBB,,, | Performed by: NURSE PRACTITIONER

## 2023-12-04 PROCEDURE — 36415 COLL VENOUS BLD VENIPUNCTURE: CPT | Performed by: INTERNAL MEDICINE

## 2023-12-04 PROCEDURE — 3044F PR MOST RECENT HEMOGLOBIN A1C LEVEL <7.0%: ICD-10-PCS | Mod: CPTII,,, | Performed by: NURSE PRACTITIONER

## 2023-12-04 PROCEDURE — 99999 PR PBB SHADOW E&M-EST. PATIENT-LVL IV: ICD-10-PCS | Mod: PBBFAC,,, | Performed by: NURSE PRACTITIONER

## 2023-12-04 NOTE — PROGRESS NOTES
Subjective     Patient ID: Laura Kent is a 38 y.o. female.    Chief Complaint: Malignant neoplasm of upper-outer quadrant of left breast i    HPI    Returns for follow up  She completed 6 cycles of xeloda. Last dose was 3 weeks ago.   Overall feels well and no new issues ro complaints.   She is having breast surgery 12/11/2023 - right mastectomy and left expander removal with implant insertion.   She notes some skin dryness- denies current hand/foot peeling or redness  No mouth sores  No fevers, chills, or infectious complaints  Tendency towards constipation, no diarrhea      Oncology History:  - self detected an area under her left arm and it initially seemed to go away (noted around winter holidays)     - 3/4/2022 Outside Mammogram:  Findings:  The breasts are heterogeneously dense, which may obscure small masses.   Left  There is a 31 mm x 18 mm x 21 mm irregularly shaped mass with microlobulated margins seen in the left breast at 2 o'clock in the posterior depth with associated left axillary adenopathy. Largest node depicted on the outside study measures 49 x 23 x 29 mm with cortical thickening and effacement of the hilum. Breast mass is reportedly palpable.   Right  There is no evidence of suspicious masses, calcifications, or other abnormal findings in the right breast.  Impression:  Left  Mass: Left breast 31 mm x 18 mm x 21 mm mass at the posterior 2 o'clock position. Assessment: 5 - Highly suggestive of malignancy. Biopsy is recommended.   Right  There is no mammographic evidence of malignancy in the right breast.  BI-RADS Category:   Overall: 5 - Highly Suggestive of Malignancy  Recommendation:  Ultrasound Guided Core Needle Biopsy of the left breast mass and one of the abnormal left axillary nodes is recommended.     - 3/14/2022 Breast biopsy:  1. LEFT BREAST MASS, 2 O'CLOCK, BIOPSY:   Invasive ductal carcinoma, [place] histologic grade 3 (tubule formation:   3, nuclear pleomorphism:  3,  mitotic activity:  3).   Comment:  Infiltrating carcinoma occupies the entirety of biopsied material   with the largest continuous focus measuring 13 mm.  Breast biomarkers are   pending and will be issued in a supplemental report.   2. LEFT AXILLARY LYMPH NODE, BIOPSY:   Invasive ductal carcinoma, [place] histologic grade 3 (tubule formation:   3, nuclear pleomorphism:  3, mitotic activity:  3).   Comment:  Infiltrating carcinoma occupies the entirety of biopsied material   with the largest continuous focus measuring 20 mm.  No lymph node tissue is   identified in the sample.  Tumor histology is essentially identical to that   of part 1.  The findings could represent part of a larger intranodal   metastasis, but an extension from the primary tumor cannot be excluded.   Radiographic correlation is advised.   Note:  Dr. Stephanie Rao also reviewed this case and agrees with the   diagnosis.   BREAST BIOMARKER RESULTS   Estrogen receptor (ER):  Negative (0)   Progesterone receptor (ER):  Negative (0)   HER2 IHC:  Negative (1+)   Ki-67 proliferation index:  80%      - 3/17/2022 Breast MRI:  Findings:  The breasts have heterogeneous fibroglandular tissue. The background parenchymal enhancement is minimal.   Left  There is a 38 mm x 33 mm x 33 mm irregularly shaped mass seen in the left breast at 2 o'clock in the posterior depth, 8.4 cm from the nipple and 1.2 cm from the skin. Associated signal void from a twirl biopsy marker; pathology showed invasive ductal carcinoma.   Posterior to the mass there is abnormal non mass enhancement measuring 29 x 28 mm. The NME is 4 mm from the skin and 2.3 cm from the chest wall. In total the mass and NME measure 54 mm in AP extent.  Overlying skin thickening and edema involving the lateral breast, likely from lymphovascular obstruction. No suspicious skin enhancement. Overall increased vascularity to the left breast.   Four abnormal lymph level 1 and level 2 left axillary lymph  nodes. The largest lymph node measures 52 mm x 40 mm x 38 mm which previously underwent biopsy showing metastatic disease. There is an associated radar reflector within the biopsied lymph node.  Right  There is no evidence of suspicious masses, abnormal enhancement, or other abnormal findings in the right breast. No enlarged axillary or internal mammary lymph nodes.   Impression:  Left  Mass: Left breast 38 mm x 33 mm x 33 mm mass at the posterior 2 o'clock position. Assessment: 6 - Known biopsy, proven malignancy. Associated 29 mm NME extending posteriorly from the mass. In total the mass and NME measure 54 mm in AP extent.  Lymph Node: Abnormal level 1 and 2 left axillary lymph nodes, the largest of which measures 52 mm x 40 mm x 38 mm lymph node and is known biopsy, proven malignancy.   Right  There is no MR evidence of malignancy in the right breast.  BI-RADS Category:   Overall: 6 - Known Biopsy-Proven Malignancy  Recommendation:  Clinical management of known left breast cancer. Patient is established with the breast surgery clinic.      - 3/21/2022 CT C/A/P:  FINDINGS:  Base of Neck: No significant abnormality.  Thoracic soft tissue: A proximally 3.2 x 3.6 x 3.7 cm irregular left breast mass within the lateral aspect with internal biopsy marker, corresponding to findings on prior breast MRI and compatible with malignancy.  Enlarged left axillary lymph nodes, largest measuring approximately 4.2 x 3.6 cm, (series 2, image 27).  CHEST:  -Heart: Normal size. No pericardial effusion.  -Pulmonary vasculature: Pulmonary arteries distribute normally.  There are four pulmonary veins.  -Radha/Mediastinum: No pathologic shelly enlargement.  -Trachea and Proximal airways: Trachea is midline.  Central airways are patent.  No significant bronchial wall thickening or bronchiectasis.  -Lungs/Pleura: Symmetrically expanded without focal consolidation, pneumothorax, or mass.  No pleural effusion or thickening.  -Esophagus: Normal  course and caliber.  ABDOMEN:  - Liver: Normal in size and attenuation with no focal hepatic abnormality.  - Gallbladder: No calcified gallstones.  No wall thickening or pericholecystic fluid.  - Bile Ducts: No intra or extrahepatic biliary ductal dilatation.  - Stomach/Duodenum: Small hiatal hernia otherwise unremarkable.  - Spleen: Normal size.  No focal parenchymal abnormality.  - Pancreas: No mass lesion.  No pancreatic ductal dilatation.  No peripancreatic fat stranding.  - Adrenals: Unremarkable.  - Kidneys/ureters/urinary bladder: Normal in size and location.  Normal enhancement pattern.  No nephrolithiasis.  Ureters are normal in course and caliber.  No hydroureteronephrosis.  - Retroperitoneum: Scattered nonenlarged retroperitoneal lymph nodes.  PELVIS:  - Reproductive: Intrauterine device present.  A proximally 2.8 cm peripherally enhancing right ovarian cyst, likely corpus luteal cyst.  - Other: No pelvic adenopathy, free fluid, or mass.  BOWEL/MESENTERY:  No evidence of bowel obstruction or inflammatory process. Appendix is visualized and unremarkable.  VASCULATURE: Left-sided aortic arch with 3 branch vessels.  No aneurysm and no significant atherosclerosis.  Portal vein, splenic vein, and SMV are patent.  BONES: [place] approximately 0.6 cm right trochanteric lesion, likely synovial herniation pit.  No acute fracture or bony destructive process.  EXTRATHORACIC/EXTRAPERITONEAL SOFT TISSUES: Unremarkable.  Impression:  In this patient with known breast cancer, there is an approximately 3.7 cm irregular left breast mass with left axillary lymphadenopathy.  No evidence of metastatic disease  Small hiatal hernia.  Additional findings as described above.     - 3/21/2022 Bone scan:  FINDINGS:  There is physiologic distribution of the radiopharmaceutical throughout the skeleton.  Mild degenerative uptake within the bilateral shoulders and knees.  There is normal uptake in the genitourinary system and soft  tissues.  Impression:  There is no scintigraphic evidence of osteoblastic metastatic disease.     - 3/21/2022 Brain MRI:  FINDINGS:  Please note there is dental metal artifact distorting the examination.  Allowing for artifacts the brain parenchyma is normal in contour.  Developmental variant with cavum septum pellucidum identified.  The ventricles are otherwise normal in size without hydrocephalus.  There is no midline shift or significant mass effect.  The major intracranial T2 flow voids are present.  No restricted diffusion within the non distorted brain parenchyma.  There is severe distortion of the susceptibility imaging no parenchymal susceptibility to suggest parenchymal hemorrhage in the non distorted brain parenchyma.  There is suboptimal evaluation of the cerebellum and posterior fossa.  Impression:  Unremarkable MRI brain allowing for artifacts from dental metal as detailed above specifically without abnormal parenchymal enhancement to suggest intracranial metastatic disease in light of history.     - Initiated NA therapy on 4/13/2022  Developed pancreatitis post C4 from immunotherapy and treatment held- required 2 hospitalizations     - 10/20/2022 Ultrasound left breast:  Findings:  At the site of the marker from the prior malignant breast biopsy,there is a 6 mm x 7 mm x 3 mm oval, parallel, hypoechoic mass with indistinct margins seen in the left breast at 2 o'clock in the posterior depth, 8 cm from the nipple. This is difficult to distinguish from the Twirl marker itself.  At the site of the previously biopsied metastatic left axillary node, there is a 10 x 7 x 11 mm node with a [place]- marker. Node has mild residual cortical thickening (5 mm), but has decreased in size. There are a few other normal size, normal appearing left axillary nodes.   Impression:  Left  Mass: Left breast 6 mm x 7 mm x 3 mm mass at the posterior 2 o'clock position. Assessment: 6 - Known biopsy, proven malignancy. Known,  previously biopsied left breast cancer and metastatic left axillary node have both decreased in size. The breast mass is now difficult to distinguish from the biopsy marker.   BI-RADS Category:   Overall: 6 - Known Biopsy-Proven Malignancy  Recommendation:  Patient is already under the care of the Breast Oncology team. Most recent bilateral mammogram was performed in February 2022.      Resumed NA chemo without immunotherapy 11/21/2022- completed 7/8 cycles  She was not be able to complete Keytruda with immunotherapy side effects (pancreatitis- recurrent)        - 12/8/2022 Breast MRI:  Findings:  The breasts have heterogeneous fibroglandular tissue. The background parenchymal enhancement is minimal and symmetric. There is no evidence of suspicious masses, abnormal enhancement, or other abnormal findings.  There has been interval decrease in there has been interval resolution of previously described enhancing mass at the 2:00 axis which is a known biopsy-proven malignancy.    There is no abnormality in the right breast.    There has been significant decrease in size of left axillary lymph nodes which all are normal in size on current exam.  The largest contains a signal void consistent with previously placed radar reflector at site of biopsy-proven axillary metastasis.  It measures 8 mm in short axis.  Impression:  Interval resolution of enhancing mass at site of known malignancy in the left breast at 2:00  and marked interval decrease in left axillary adenopathy with visualized lymph nodes normal in size on current exam.  Findings are consistent with complete imaging response to therapy.  BI-RADS Category:   Overall: 6 - Known Biopsy-Proven Malignancy     - 2/27/2023 Left Mastectomy and SLN Bx, axillary lymphadenectomy  Pathology:  Left breast, mastectomy: No residual malignancy is present within the   mastectomy specimen. Treatment effect is present within the breast at area of prior biopsy   site.   Left axillary  sentinel lymph node #1, palpable, excisional biopsy: 1 of 3   lymph nodes are positive for metastatic carcinoma   Greatest dimension of metastatic carcinoma measures 4 mm (0.4 cm).   Confirmed by positive immunohistochemical staining in the metastatic   foci with cytokeratins AE1/AE3, Cam5.2 and wide spectrum keratin with satisfactory positive and negative controls.   Treatment effect is present in lymph node.   Left axillary contents, axillary dissection: 14 benign lymph nodes,   Prior BREAST BIOMARKER RESULTS   Estrogen receptor (ER): Negative (0)   Progesterone receptor (ER): Negative (0)   HER2 IHC: Negative (1+)   Ki-67 proliferation index: 80%      - - completed XRT 2023     -- adjuvant Xeloda  -completed 6 cycles 10/2023     PMH:  HTN- around pregnancies; off of blood pressure medication x 2 years  CHF- ECHOs at [facility]  Gestational DM  Hyperlipidemia when heavier, managed with diet and followed by cardiology  C- sections x 2  Iron deficiency     GynHx:  Menarche- 7   (16 at 1st pregnancy) (17, 7, 4)  Still with periods - last 2022- last 5-7 days, moderate normal flow  + breast feeding x 1 year  IUD     SH:  Working, , [employer]  Single  Good support system  No tobacco  No EtOH  No illicit drugs    Review of Systems   Constitutional:         See above   All other systems reviewed and are negative.         Objective     Physical Exam  Vitals and nursing note reviewed.   Constitutional:       General: She is not in acute distress.     Appearance: Normal appearance. She is not ill-appearing.      Comments: Presents alone  ECOG= 0  Very pleasant   HENT:      Head: Normocephalic and atraumatic.      Mouth/Throat:      Mouth: Mucous membranes are moist.      Pharynx: No oropharyngeal exudate or posterior oropharyngeal erythema.   Eyes:      General: No scleral icterus.     Extraocular Movements: Extraocular movements intact.      Conjunctiva/sclera: Conjunctivae normal.       Pupils: Pupils are equal, round, and reactive to light.   Cardiovascular:      Rate and Rhythm: Normal rate and regular rhythm.      Heart sounds: Normal heart sounds. No murmur heard.     No friction rub. No gallop.   Pulmonary:      Effort: Pulmonary effort is normal. No respiratory distress.      Breath sounds: Normal breath sounds. No rhonchi or rales.      Comments: Left breast expander- tight, mild XRT color changes; No LAD  Right breast no masses or LAD  Abdominal:      General: Abdomen is flat. Bowel sounds are normal. There is no distension.      Palpations: Abdomen is soft. There is no mass.      Tenderness: There is no abdominal tenderness. There is no guarding or rebound.      Comments: No organomegaly   Musculoskeletal:         General: No swelling or tenderness. Normal range of motion.      Cervical back: Normal range of motion and neck supple. No tenderness.      Right lower leg: No edema.      Left lower leg: No edema.   Lymphadenopathy:      Cervical: No cervical adenopathy.   Skin:     General: Skin is warm and dry.      Coloration: Skin is not jaundiced or pale.      Findings: No lesion or rash.      Comments: Hypertrophic port scar   Neurological:      General: No focal deficit present.      Mental Status: She is alert and oriented to person, place, and time. Mental status is at baseline.      Motor: No weakness.      Coordination: Coordination normal.      Gait: Gait normal.   Psychiatric:         Mood and Affect: Mood normal.         Behavior: Behavior normal.         Thought Content: Thought content normal.         Judgment: Judgment normal.     Labs- reviewed most recent. Hasn't had lab today       Assessment and Plan     1. 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  Chemotherapy induced neutropenia    4. Chemotherapy-induced thrombocytopenia    5. Vitamin D deficiency  -     CBC Oncology; Future  -     Comprehensive Metabolic Panel; Future  -     Vitamin D; Future; Expected date: 12/04/2023        TNBC:  Completed adjuvant xeldoa (6 cycles).   Labs to be drawn.   Monitor counts  Planning for surgery 12/11/2023.  Check Vit d with next labs       Route Chart for Scheduling    Med Onc Chart Routing      Follow up with physician 3 months.   Follow up with [facility]    Infusion scheduling note    Injection scheduling note    Labs CBC, CMP and vitamin D   Scheduling:  Preferred lab:  Lab interval:     Imaging    Pharmacy appointment    Other referrals                  Therapy Plan Information  Flushes  heparin, porcine (PF) 100 unit/mL injection flush 500 Units  500 Units, Intravenous, Every visit  sodium chloride 0.9% flush 10 mL  10 mL, Intravenous, Every visit  heparin, porcine (PF) 100 unit/mL injection flush 500 Units  500 Units, Intravenous, Every visit  sodium chloride 0.9% flush 10 mL  10 mL, Intravenous, Every visit      Patient is in agreement with the proposed treatment plan. All questions were answered to the patient's satisfaction. Pt knows to call clinic for any new or worsening symptoms and if anything is needed before the next clinic visit.    Adonay Cannon, MSN, APRN, FNP-C  Nurse Practitioner to Dr. Yessy Cruz  [facility]  [facility]  Hematology & Medical Oncology  [address] [place] [id number]  ph. [phone] ext [phone]  Fax. [phone]              30 minutes of total time spent on the encounter, which includes face to face time and non-face to face time preparing to see the patient (eg, review of tests), Obtaining and/or reviewing separately obtained history, Documenting clinical information in the electronic or other health record, Independently interpreting results (not separately reported) and  communicating results to the patient/family/caregiver, or Care coordination (not separately reported).

## 2023-12-07 ENCOUNTER — TELEPHONE (OUTPATIENT)
Dept: PLASTIC SURGERY | Facility: CLINIC | Age: 38
End: 2023-12-07
Payer: MEDICAID

## 2023-12-07 ENCOUNTER — ANESTHESIA EVENT (OUTPATIENT)
Dept: SURGERY | Facility: HOSPITAL | Age: 38
DRG: 582 | End: 2023-12-07
Payer: MEDICAID

## 2023-12-07 ENCOUNTER — OFFICE VISIT (OUTPATIENT)
Dept: OBSTETRICS AND GYNECOLOGY | Facility: CLINIC | Age: 38
End: 2023-12-07
Payer: MEDICAID

## 2023-12-07 VITALS
BODY MASS INDEX: 38.35 KG/M2 | HEIGHT: 66 IN | DIASTOLIC BLOOD PRESSURE: 89 MMHG | WEIGHT: 238.63 LBS | SYSTOLIC BLOOD PRESSURE: 142 MMHG | HEART RATE: 64 BPM

## 2023-12-07 DIAGNOSIS — E88.810 METABOLIC SYNDROME: ICD-10-CM

## 2023-12-07 DIAGNOSIS — E88.819 INSULIN RESISTANCE: ICD-10-CM

## 2023-12-07 DIAGNOSIS — Z17.1 MALIGNANT NEOPLASM OF UPPER-OUTER QUADRANT OF LEFT BREAST IN FEMALE, ESTROGEN RECEPTOR NEGATIVE: Primary | ICD-10-CM

## 2023-12-07 DIAGNOSIS — C50.412 MALIGNANT NEOPLASM OF UPPER-OUTER QUADRANT OF LEFT BREAST IN FEMALE, ESTROGEN RECEPTOR NEGATIVE: Primary | ICD-10-CM

## 2023-12-07 PROCEDURE — 1159F PR MEDICATION LIST DOCUMENTED IN MEDICAL RECORD: ICD-10-PCS | Mod: CPTII,,, | Performed by: PHYSICIAN ASSISTANT

## 2023-12-07 PROCEDURE — 3077F SYST BP >= 140 MM HG: CPT | Mod: CPTII,,, | Performed by: PHYSICIAN ASSISTANT

## 2023-12-07 PROCEDURE — 3079F DIAST BP 80-89 MM HG: CPT | Mod: CPTII,,, | Performed by: PHYSICIAN ASSISTANT

## 2023-12-07 PROCEDURE — 99999 PR PBB SHADOW E&M-EST. PATIENT-LVL IV: ICD-10-PCS | Mod: PBBFAC,,, | Performed by: PHYSICIAN ASSISTANT

## 2023-12-07 PROCEDURE — 1160F RVW MEDS BY RX/DR IN RCRD: CPT | Mod: CPTII,,, | Performed by: PHYSICIAN ASSISTANT

## 2023-12-07 PROCEDURE — 3008F BODY MASS INDEX DOCD: CPT | Mod: CPTII,,, | Performed by: PHYSICIAN ASSISTANT

## 2023-12-07 PROCEDURE — 99214 OFFICE O/P EST MOD 30 MIN: CPT | Mod: PBBFAC | Performed by: PHYSICIAN ASSISTANT

## 2023-12-07 PROCEDURE — 3079F PR MOST RECENT DIASTOLIC BLOOD PRESSURE 80-89 MM HG: ICD-10-PCS | Mod: CPTII,,, | Performed by: PHYSICIAN ASSISTANT

## 2023-12-07 PROCEDURE — 3044F HG A1C LEVEL LT 7.0%: CPT | Mod: CPTII,,, | Performed by: PHYSICIAN ASSISTANT

## 2023-12-07 PROCEDURE — 99999 PR PBB SHADOW E&M-EST. PATIENT-LVL IV: CPT | Mod: PBBFAC,,, | Performed by: PHYSICIAN ASSISTANT

## 2023-12-07 PROCEDURE — 99213 PR OFFICE/OUTPT VISIT, EST, LEVL III, 20-29 MIN: ICD-10-PCS | Mod: S$PBB,,, | Performed by: PHYSICIAN ASSISTANT

## 2023-12-07 PROCEDURE — 1159F MED LIST DOCD IN RCRD: CPT | Mod: CPTII,,, | Performed by: PHYSICIAN ASSISTANT

## 2023-12-07 PROCEDURE — 99213 OFFICE O/P EST LOW 20 MIN: CPT | Mod: S$PBB,,, | Performed by: PHYSICIAN ASSISTANT

## 2023-12-07 PROCEDURE — 3077F PR MOST RECENT SYSTOLIC BLOOD PRESSURE >= 140 MM HG: ICD-10-PCS | Mod: CPTII,,, | Performed by: PHYSICIAN ASSISTANT

## 2023-12-07 PROCEDURE — 3008F PR BODY MASS INDEX (BMI) DOCUMENTED: ICD-10-PCS | Mod: CPTII,,, | Performed by: PHYSICIAN ASSISTANT

## 2023-12-07 PROCEDURE — 3044F PR MOST RECENT HEMOGLOBIN A1C LEVEL <7.0%: ICD-10-PCS | Mod: CPTII,,, | Performed by: PHYSICIAN ASSISTANT

## 2023-12-07 PROCEDURE — 1160F PR REVIEW ALL MEDS BY PRESCRIBER/CLIN PHARMACIST DOCUMENTED: ICD-10-PCS | Mod: CPTII,,, | Performed by: PHYSICIAN ASSISTANT

## 2023-12-07 RX ORDER — METFORMIN HYDROCHLORIDE 750 MG/1
750 TABLET, EXTENDED RELEASE ORAL
Qty: 90 TABLET | Refills: 1 | Status: SHIPPED | OUTPATIENT
Start: 2023-12-07 | End: 2024-03-06 | Stop reason: SDUPTHER

## 2023-12-07 NOTE — PROGRESS NOTES
Subjective:      Laura Kent is a 38 y.o. female with history of triple negative breast cancer who presents for weight loss follow up. She is taking Metformin 500mg QD. Tolerating medication well without side effects. Has helped reduce sugar and carb cravings. However, she has not lost weight.  Avoiding GLP-1 due to prior history of pancreatitis. She met with Nutrition, Ashlyn Jernigan, 3 days ago. Was very helpful for her and has started to meal plan again. Admits to skipping meals throughout the day and not getting regular protein. She is working on this. Scheduled for what will hopefully be her last reconstructive breast surgery next week. She is ready to get back to exercising once she recovers from this.    Lab Visit on 12/04/2023   Component Date Value Ref Range Status    Sodium 12/04/2023 139  136 - 145 mmol/L Final    Potassium 12/04/2023 3.8  3.5 - 5.1 mmol/L Final    Chloride 12/04/2023 106  95 - 110 mmol/L Final    CO2 12/04/2023 26  23 - 29 mmol/L Final    Glucose 12/04/2023 87  70 - 110 mg/dL Final    BUN 12/04/2023 16  6 - 20 mg/dL Final    Creatinine 12/04/2023 0.7  0.5 - 1.4 mg/dL Final    Calcium 12/04/2023 9.1  8.7 - 10.5 mg/dL Final    Total Protein 12/04/2023 6.6  6.0 - 8.4 g/dL Final    Albumin 12/04/2023 4.0  3.5 - 5.2 g/dL Final    Total Bilirubin 12/04/2023 0.5  0.1 - 1.0 mg/dL Final    Alkaline Phosphatase 12/04/2023 57  55 - 135 U/L Final    AST 12/04/2023 26  10 - 40 U/L Final    ALT 12/04/2023 21  10 - 44 U/L Final    eGFR 12/04/2023 >60.0  >60 mL/min/1.73 m^2 Final    Anion Gap 12/04/2023 7 (L)  8 - 16 mmol/L Final    WBC 12/04/2023 3.65 (L)  3.90 - 12.70 K/uL Final    RBC 12/04/2023 3.54 (L)  4.00 - 5.40 M/uL Final    Hemoglobin 12/04/2023 11.0 (L)  12.0 - 16.0 g/dL Final    Hematocrit 12/04/2023 31.2 (L)  37.0 - 48.5 % Final    MCV 12/04/2023 88  82 - 98 fL Final    MCH 12/04/2023 31.1 (H)  27.0 - 31.0 pg Final    MCHC 12/04/2023 35.3  32.0 - 36.0 g/dL Final    RDW 12/04/2023  13.0  11.5 - 14.5 % Final    Platelets 12/04/2023 147 (L)  150 - 450 K/uL Final    MPV 12/04/2023 9.6  9.2 - 12.9 fL Final    Immature Granulocytes 12/04/2023 0.5  0.0 - 0.5 % Final    Gran # (ANC) 12/04/2023 2.5  1.8 - 7.7 K/uL Final    Immature Grans (Abs) 12/04/2023 0.02  0.00 - 0.04 K/uL Final    Lymph # 12/04/2023 0.7 (L)  1.0 - 4.8 K/uL Final    Mono # 12/04/2023 0.3  0.3 - 1.0 K/uL Final    Eos # 12/04/2023 0.1  0.0 - 0.5 K/uL Final    Baso # 12/04/2023 0.02  0.00 - 0.20 K/uL Final    nRBC 12/04/2023 0  0 /100 WBC Final    Gran % 12/04/2023 68.4  38.0 - 73.0 % Final    Lymph % 12/04/2023 20.0  18.0 - 48.0 % Final    Mono % 12/04/2023 9.0  4.0 - 15.0 % Final    Eosinophil % 12/04/2023 1.6  0.0 - 8.0 % Final    Basophil % 12/04/2023 0.5  0.0 - 1.9 % Final    Differential Method 12/04/2023 Automated   Final   Lab Visit on 10/30/2023   Component Date Value Ref Range Status    WBC 10/30/2023 3.56 (L)  3.90 - 12.70 K/uL Final    RBC 10/30/2023 3.57 (L)  4.00 - 5.40 M/uL Final    Hemoglobin 10/30/2023 11.2 (L)  12.0 - 16.0 g/dL Final    Hematocrit 10/30/2023 32.8 (L)  37.0 - 48.5 % Final    MCV 10/30/2023 92  82 - 98 fL Final    MCH 10/30/2023 31.4 (H)  27.0 - 31.0 pg Final    MCHC 10/30/2023 34.1  32.0 - 36.0 g/dL Final    RDW 10/30/2023 12.4  11.5 - 14.5 % Final    Platelets 10/30/2023 176  150 - 450 K/uL Final    MPV 10/30/2023 9.9  9.2 - 12.9 fL Final    Immature Granulocytes 10/30/2023 0.3  0.0 - 0.5 % Final    Gran # (ANC) 10/30/2023 2.2  1.8 - 7.7 K/uL Final    Immature Grans (Abs) 10/30/2023 0.01  0.00 - 0.04 K/uL Final    Lymph # 10/30/2023 0.9 (L)  1.0 - 4.8 K/uL Final    Mono # 10/30/2023 0.4  0.3 - 1.0 K/uL Final    Eos # 10/30/2023 0.1  0.0 - 0.5 K/uL Final    Baso # 10/30/2023 0.02  0.00 - 0.20 K/uL Final    nRBC 10/30/2023 0  0 /100 WBC Final    Gran % 10/30/2023 61.7  38.0 - 73.0 % Final    Lymph % 10/30/2023 24.7  18.0 - 48.0 % Final    Mono % 10/30/2023 11.0  4.0 - 15.0 % Final    Eosinophil %  10/30/2023 1.7  0.0 - 8.0 % Final    Basophil % 10/30/2023 0.6  0.0 - 1.9 % Final    Differential Method 10/30/2023 Automated   Final    Sodium 10/30/2023 143  136 - 145 mmol/L Final    Potassium 10/30/2023 3.7  3.5 - 5.1 mmol/L Final    Chloride 10/30/2023 109  95 - 110 mmol/L Final    CO2 10/30/2023 24  23 - 29 mmol/L Final    Glucose 10/30/2023 83  70 - 110 mg/dL Final    BUN 10/30/2023 14  6 - 20 mg/dL Final    Creatinine 10/30/2023 0.7  0.5 - 1.4 mg/dL Final    Calcium 10/30/2023 9.1  8.7 - 10.5 mg/dL Final    Total Protein 10/30/2023 6.8  6.0 - 8.4 g/dL Final    Albumin 10/30/2023 4.0  3.5 - 5.2 g/dL Final    Total Bilirubin 10/30/2023 0.3  0.1 - 1.0 mg/dL Final    Alkaline Phosphatase 10/30/2023 73  55 - 135 U/L Final    AST 10/30/2023 23  10 - 40 U/L Final    ALT 10/30/2023 18  10 - 44 U/L Final    eGFR 10/30/2023 >60.0  >60 mL/min/1.73 m^2 Final    Anion Gap 10/30/2023 10  8 - 16 mmol/L Final   Lab Visit on 10/26/2023   Component Date Value Ref Range Status    Hemoglobin A1C 10/26/2023 4.8  4.0 - 5.6 % Final    Estimated Avg Glucose 10/26/2023 91  68 - 131 mg/dL Final    TSH 10/26/2023 0.707  0.400 - 4.000 uIU/mL Final    Free T4 10/26/2023 0.82  0.71 - 1.51 ng/dL Final   Lab Visit on 10/09/2023   Component Date Value Ref Range Status    WBC 10/09/2023 3.16 (L)  3.90 - 12.70 K/uL Final    RBC 10/09/2023 3.51 (L)  4.00 - 5.40 M/uL Final    Hemoglobin 10/09/2023 11.2 (L)  12.0 - 16.0 g/dL Final    Hematocrit 10/09/2023 32.2 (L)  37.0 - 48.5 % Final    MCV 10/09/2023 92  82 - 98 fL Final    MCH 10/09/2023 31.9 (H)  27.0 - 31.0 pg Final    MCHC 10/09/2023 34.8  32.0 - 36.0 g/dL Final    RDW 10/09/2023 12.6  11.5 - 14.5 % Final    Platelets 10/09/2023 162  150 - 450 K/uL Final    MPV 10/09/2023 9.6  9.2 - 12.9 fL Final    Immature Granulocytes 10/09/2023 0.3  0.0 - 0.5 % Final    Gran # (ANC) 10/09/2023 2.0  1.8 - 7.7 K/uL Final    Immature Grans (Abs) 10/09/2023 0.01  0.00 - 0.04 K/uL Final    Lymph #  10/09/2023 0.7 (L)  1.0 - 4.8 K/uL Final    Mono # 10/09/2023 0.3  0.3 - 1.0 K/uL Final    Eos # 10/09/2023 0.1  0.0 - 0.5 K/uL Final    Baso # 10/09/2023 0.02  0.00 - 0.20 K/uL Final    nRBC 10/09/2023 0  0 /100 WBC Final    Gran % 10/09/2023 63.4  38.0 - 73.0 % Final    Lymph % 10/09/2023 23.4  18.0 - 48.0 % Final    Mono % 10/09/2023 9.8  4.0 - 15.0 % Final    Eosinophil % 10/09/2023 2.5  0.0 - 8.0 % Final    Basophil % 10/09/2023 0.6  0.0 - 1.9 % Final    Differential Method 10/09/2023 Automated   Final    Sodium 10/09/2023 142  136 - 145 mmol/L Final    Potassium 10/09/2023 3.9  3.5 - 5.1 mmol/L Final    Chloride 10/09/2023 108  95 - 110 mmol/L Final    CO2 10/09/2023 26  23 - 29 mmol/L Final    Glucose 10/09/2023 88  70 - 110 mg/dL Final    BUN 10/09/2023 17  6 - 20 mg/dL Final    Creatinine 10/09/2023 0.7  0.5 - 1.4 mg/dL Final    Calcium 10/09/2023 9.4  8.7 - 10.5 mg/dL Final    Total Protein 10/09/2023 6.9  6.0 - 8.4 g/dL Final    Albumin 10/09/2023 4.2  3.5 - 5.2 g/dL Final    Total Bilirubin 10/09/2023 0.3  0.1 - 1.0 mg/dL Final    Alkaline Phosphatase 10/09/2023 65  55 - 135 U/L Final    AST 10/09/2023 29  10 - 40 U/L Final    ALT 10/09/2023 25  10 - 44 U/L Final    eGFR 10/09/2023 >60.0  >60 mL/min/1.73 m^2 Final    Anion Gap 10/09/2023 8  8 - 16 mmol/L Final   Lab Visit on 09/20/2023   Component Date Value Ref Range Status    WBC 09/20/2023 1.79 (LL)  3.90 - 12.70 K/uL Final    RBC 09/20/2023 3.46 (L)  4.00 - 5.40 M/uL Final    Hemoglobin 09/20/2023 11.1 (L)  12.0 - 16.0 g/dL Final    Hematocrit 09/20/2023 30.7 (L)  37.0 - 48.5 % Final    MCV 09/20/2023 89  82 - 98 fL Final    MCH 09/20/2023 32.1 (H)  27.0 - 31.0 pg Final    MCHC 09/20/2023 36.2 (H)  32.0 - 36.0 g/dL Final    RDW 09/20/2023 12.6  11.5 - 14.5 % Final    Platelets 09/20/2023 175  150 - 450 K/uL Final    MPV 09/20/2023 10.3  9.2 - 12.9 fL Final    Immature Granulocytes 09/20/2023 CANCELED  0.0 - 0.5 % Final    Immature Grans (Abs)  2023 CANCELED  0.00 - 0.04 K/uL Final    Lymph # 2023 CANCELED  1.0 - 4.8 K/uL Final    Mono # 2023 CANCELED  0.3 - 1.0 K/uL Final    Eos # 2023 CANCELED  0.0 - 0.5 K/uL Final    Baso # 2023 CANCELED  0.00 - 0.20 K/uL Final    nRBC 2023 0  0 /100 WBC Final    Gran % 2023 76.0 (H)  38.0 - 73.0 % Final    Lymph % 2023 19.0  18.0 - 48.0 % Final    Mono % 2023 4.0  4.0 - 15.0 % Final    Eosinophil % 2023 1.0  0.0 - 8.0 % Final    Basophil % 2023 0.0  0.0 - 1.9 % Final    Aniso 2023 Slight   Final    Poik 2023 Slight   Final    Poly 2023 Occasional   Final    Hypo 2023 Occasional   Final    Ovalocytes 2023 Occasional   Final    Spherocytes 2023 Occasional   Final    Differential Method 2023 Manual   Final       Past Medical History:   Diagnosis Date    Anemia     Anxiety     Breast cancer     Encounter for blood transfusion     Hypertension     Meningitis     Severe obesity 2023    Shortness of breath 2023     Past Surgical History:   Procedure Laterality Date    AXILLARY NODE DISSECTION Left 2023    Procedure: LYMPHADENECTOMY, AXILLARY;  Surgeon: YASMANI Weiner MD;  Location: [facility];  Service: General;  Laterality: Left;    BREAST BIOPSY Left      SECTION      COLONOSCOPY N/A 2022    Procedure: COLONOSCOPY;  Surgeon: Davi Chery MD;  Location: [address]);  Service: Endoscopy;  Laterality: N/A;  case request entered from referral - Per DR. KATHLEEN Chery Pt to be scheduled for urgent colonoscopy on 22/ fully vaccinated / prep ins for Sutab on portal - ERW  see micro for negative C-diff- ERW    INJECTION FOR SENTINEL NODE IDENTIFICATION Left 2023    Procedure: INJECTION, FOR SENTINEL NODE IDENTIFICATION;  Surgeon: YASMANI Weiner MD;  Location: [facility];  Service: General;  Laterality: Left;    INSERTION OF BREAST TISSUE EXPANDER Left 2023    Procedure: INSERTION,  TISSUE EXPANDER, BREAST;  Surgeon: Reese Barrios DO;  Location: [facility];  Service: Plastics;  Laterality: Left;    INSERTION OF TUNNELED CENTRAL VENOUS CATHETER (CVC) WITH SUBCUTANEOUS PORT N/A 2022    Procedure: [id number];  Surgeon: Deo Sin Jr., MD;  Location: [facility] OR 2ND FLR;  Service: General;  Laterality: N/A;    INSERTION OF TUNNELED CENTRAL VENOUS CATHETER (CVC) WITH SUBCUTANEOUS PORT N/A 2022    Procedure: INSERTION, SINGLE LUMEN CATHETER WITH PORT, WITH FLUOROSCOPIC GUIDANCE;  Surgeon: Ryder Dinero MD;  Location: [facility] CATH LAB;  Service: Vascular;  Laterality: N/A;    MASTECTOMY Left 2023    Procedure: MASTECTOMY;  Surgeon: YASMANI Weiner MD;  Location: [facility];  Service: General;  Laterality: Left;  3.5 TOTAL HOURS / EMAIL SENT  @ 7:56 [facility]    SENTINEL LYMPH NODE BIOPSY Left 2023    Procedure: BIOPSY, LYMPH NODE, SENTINEL;  Surgeon: YASMANI Weiner MD;  Location: [facility];  Service: General;  Laterality: Left;  LEFT with radiological marker     Social History     Tobacco Use    Smoking status: Never    Smokeless tobacco: Never   Substance Use Topics    Alcohol use: Not Currently     Comment: social    Drug use: Never     Family History   Problem Relation Age of Onset    Hypertension Father     Diabetes Father     Pancreatic cancer Maternal Uncle     Bladder Cancer Paternal Grandmother     Colon cancer Neg Hx     Esophageal cancer Neg Hx      OB History    Para Term  AB Living   3 3 3         SAB IAB Ectopic Multiple Live Births                  # Outcome Date GA Lbr Nicanor/2nd Weight Sex Delivery Anes PTL Lv   3 Term            2 Term            1 Term                Current Outpatient Medications:     capecitabine (XELODA) 150 MG tablet, Take 1 tablet (150 mg total) by mouth 2 (two) times daily for 14 days followed by 7 days off. Take with 1 other capecitabine prescription for 2,150 mg total., Disp: 28 tablet, Rfl: 2    capecitabine  (XELODA) 150 MG tablet, Take 1 tablet (150 mg total) by mouth 2 (two) times daily for 14 days followed by 7 days off. Take with 1 other capecitabine prescription for 2,150 mg total., Disp: 28 tablet, Rfl: 2    capecitabine (XELODA) 500 MG Tab, Take 4 tablets (2,000 mg total) by mouth 2 (two) times daily for 14 days followed by 7 days off. Take with 1 other capecitabine prescription for 2,150 mg total., Disp: 112 tablet, Rfl: 2    capecitabine (XELODA) 500 MG Tab, Take 4 tablets (2,000 mg total) by mouth 2 (two) times daily for 14 days followed by 7 days off. Take with 1 other capecitabine prescription for 2,150 mg total., Disp: 112 tablet, Rfl: 2    cetirizine (ZYRTEC) 10 MG tablet, Take 10 mg by mouth once daily., Disp: , Rfl:     clotrimazole (LOTRIMIN) 1 % cream, Apply 1 application topically every evening., Disp: , Rfl:     gabapentin (NEURONTIN) 300 MG capsule, Take 2 capsules (600 mg total) by mouth 3 (three) times daily. for 7 days, Disp: 42 capsule, Rfl: 0    HYDROcodone-acetaminophen (NORCO) 5-325 mg per tablet, , Disp: , Rfl:     hydrOXYzine HCL (ATARAX) 10 MG Tab, Take 10 mg by mouth nightly as needed., Disp: , Rfl:     ibuprofen (ADVIL,MOTRIN) 600 MG tablet, Take 600 mg by mouth 3 (three) times daily., Disp: , Rfl:     ketoconazole (NIZORAL) 2 % shampoo, Apply topically Every 3 (three) days., Disp: , Rfl:     lipase-protease-amylase 24,000-76,000-120,000 units (CREON) 24,000-76,000 -120,000 unit capsule, Take 1 capsule by mouth 3 (three) times daily with meals., Disp: 90 capsule, Rfl: 2    metFORMIN (GLUCOPHAGE-XR) 750 MG ER 24hr tablet, Take 1 tablet (750 mg total) by mouth daily with breakfast., Disp: 90 tablet, Rfl: 1    mupirocin (BACTROBAN) 2 % ointment, Apply topically once daily., Disp: 30 g, Rfl: 0    nystatin (MYCOSTATIN) ointment, Apply topically 2 (two) times daily., Disp: 15 g, Rfl: 1    nystatin (MYCOSTATIN) powder, Apply topically 2 (two) times daily., Disp: 30 g, Rfl: 1    ondansetron  "(ZOFRAN) 8 MG tablet, Take 1 tablet (8 mg total) by mouth every 12 (twelve) hours as needed for Nausea., Disp: 30 tablet, Rfl: 2    pantoprazole (PROTONIX) 40 MG tablet, Take 1 tablet (40 mg total) by mouth once daily., Disp: 30 tablet, Rfl: 1    traMADoL (ULTRAM) 50 mg tablet, Take 1 tablet (50 mg total) by mouth every 6 (six) hours as needed for Pain., Disp: 20 tablet, Rfl: 0  No current facility-administered medications for this visit.    Facility-Administered Medications Ordered in Other Visits:     ceFAZolin 1 g, gentamicin 80 mg in sodium chloride 0.9% 500 mL irrigation, , Irrigation, On Call Procedure, YASMANI Weiner MD    Review of Systems:  General: No fever, chills.  Chest: No chest pain, shortness of breath, or palpitations.  Breast: No pain, masses, or nipple discharge.  Vulva: No pain, lesions, or itching.  Vagina: No relaxation, itching, discharge, or lesions.  Abdomen: No pain, nausea, vomiting, diarrhea, or constipation.  Urinary: No incontinence, nocturia, frequency, or dysuria.  Extremities:  No leg cramps, edema, or calf pain.  Neurologic: No headaches, dizziness, or visual changes.    Objective:     Vitals:    12/07/23 0856   BP: (!) 142/89   Pulse: 64   Weight: 108.2 kg (238 lb 9.6 oz)   Height: 5' 6" (1.676 m)   PainSc: 0-No pain     Body mass index is 38.51 kg/m².    PHYSICAL EXAM:  APPEARANCE: Well nourished, well developed, in no acute distress.  AFFECT: WNL, alert and oriented x 3      Assessment:      Malignant neoplasm of upper-outer quadrant of left breast in female, estrogen receptor negative    Insulin resistance  -     metFORMIN (GLUCOPHAGE-XR) 750 MG ER 24hr tablet; Take 1 tablet (750 mg total) by mouth daily with breakfast.  Dispense: 90 tablet; Refill: 1    Metabolic syndrome        Plan:   Continue low glycemic diet with lean protein at each meal.  Encouraged to follow Ashlyn's meal plan and avoid skipping meals.  Maintain hydration.  Hold metformin until after surgery  Increase " metformin XR 750mg QD with food.  Restart 1-2 weeks post op  Reviewed side effects and risks.  Hope to restart her regular exercise after recovers from surgery.  Follow up in 3 months    Instructed patient to call if she experiences any side effects or has any questions.    I spent a total of 25 minutes on the day of the visit.This includes face to face time and non-face to face time preparing to see the patient (eg, review of tests), obtaining and/or reviewing separately obtained history, documenting clinical information in the electronic or other health record, independently interpreting results and communicating results to the patient/family/caregiver, or care coordinator.

## 2023-12-07 NOTE — TELEPHONE ENCOUNTER
Pre op call complete. Advised pt of arrival time for surgery, [place] [facility] [place] [facility], at 0500 for 0700 surgery, NPO status reinforced /No Solids after 9 PM; can drink Gatorade /Powerade or water until leave the home  - ride and care giver arranged and verified.Pre op education reinforced. Pt states her MD wanted her to hold Metformin starting today and to restart 2 weeks after - Pt Verbalized understanding, all questions and concerns addressed at this time.

## 2023-12-11 ENCOUNTER — ANESTHESIA (OUTPATIENT)
Dept: SURGERY | Facility: HOSPITAL | Age: 38
DRG: 582 | End: 2023-12-11
Payer: MEDICAID

## 2023-12-11 ENCOUNTER — HOSPITAL ENCOUNTER (INPATIENT)
Facility: HOSPITAL | Age: 38
LOS: 3 days | Discharge: HOME | DRG: 582 | End: 2023-12-14
Attending: SURGERY | Admitting: SURGERY
Payer: MEDICAID

## 2023-12-11 DIAGNOSIS — Z17.1 MALIGNANT NEOPLASM OF UPPER-OUTER QUADRANT OF LEFT BREAST IN FEMALE, ESTROGEN RECEPTOR NEGATIVE: Primary | ICD-10-CM

## 2023-12-11 DIAGNOSIS — R94.31 EKG ABNORMALITIES: ICD-10-CM

## 2023-12-11 DIAGNOSIS — Z01.818 PREOPERATIVE TESTING: ICD-10-CM

## 2023-12-11 DIAGNOSIS — C50.412 MALIGNANT NEOPLASM OF UPPER-OUTER QUADRANT OF LEFT BREAST IN FEMALE, ESTROGEN RECEPTOR NEGATIVE: Primary | ICD-10-CM

## 2023-12-11 DIAGNOSIS — Z85.3 PERSONAL HISTORY OF BREAST CANCER: ICD-10-CM

## 2023-12-11 LAB
B-HCG UR QL: NEGATIVE
CTP QC/QA: YES
POCT GLUCOSE: 114 MG/DL (ref 70–110)
POCT GLUCOSE: 361 MG/DL (ref 70–110)
POCT GLUCOSE: 421 MG/DL (ref 70–110)

## 2023-12-11 PROCEDURE — 63600175 PHARM REV CODE 636 W HCPCS: Performed by: PHYSICIAN ASSISTANT

## 2023-12-11 PROCEDURE — D9220A PRA ANESTHESIA: ICD-10-PCS | Mod: CRNA,,, | Performed by: NURSE ANESTHETIST, CERTIFIED REGISTERED

## 2023-12-11 PROCEDURE — 88307 TISSUE EXAM BY PATHOLOGIST: CPT | Mod: 26,,, | Performed by: STUDENT IN AN ORGANIZED HEALTH CARE EDUCATION/TRAINING PROGRAM

## 2023-12-11 PROCEDURE — 19371 PR REMOVAL OF BREAST CAPSULE: ICD-10-PCS | Mod: 51,LT,, | Performed by: SURGERY

## 2023-12-11 PROCEDURE — 20000000 HC ICU ROOM

## 2023-12-11 PROCEDURE — 12032 PR LAYR CLOS WND TRUNK,ARM,LEG 2.6-7.5 CM: ICD-10-PCS | Mod: 51,59,, | Performed by: SURGERY

## 2023-12-11 PROCEDURE — C1729 CATH, DRAINAGE: HCPCS | Performed by: SURGERY

## 2023-12-11 PROCEDURE — 25000003 PHARM REV CODE 250: Performed by: STUDENT IN AN ORGANIZED HEALTH CARE EDUCATION/TRAINING PROGRAM

## 2023-12-11 PROCEDURE — 37000009 HC ANESTHESIA EA ADD 15 MINS: Performed by: SURGERY

## 2023-12-11 PROCEDURE — 88307 TISSUE EXAM BY PATHOLOGIST: CPT | Performed by: STUDENT IN AN ORGANIZED HEALTH CARE EDUCATION/TRAINING PROGRAM

## 2023-12-11 PROCEDURE — 25000003 PHARM REV CODE 250: Performed by: PHYSICIAN ASSISTANT

## 2023-12-11 PROCEDURE — 63600175 PHARM REV CODE 636 W HCPCS: Performed by: STUDENT IN AN ORGANIZED HEALTH CARE EDUCATION/TRAINING PROGRAM

## 2023-12-11 PROCEDURE — 88304 TISSUE EXAM BY PATHOLOGIST: CPT | Performed by: STUDENT IN AN ORGANIZED HEALTH CARE EDUCATION/TRAINING PROGRAM

## 2023-12-11 PROCEDURE — 88305 TISSUE EXAM BY PATHOLOGIST: CPT | Performed by: STUDENT IN AN ORGANIZED HEALTH CARE EDUCATION/TRAINING PROGRAM

## 2023-12-11 PROCEDURE — 99900035 HC TECH TIME PER 15 MIN (STAT)

## 2023-12-11 PROCEDURE — 11406 EXC TR-EXT B9+MARG >4.0 CM: CPT | Mod: 51,,, | Performed by: SURGERY

## 2023-12-11 PROCEDURE — 88300 PR  SURG PATH,GROSS,LEVEL I: ICD-10-PCS | Mod: 26,,, | Performed by: STUDENT IN AN ORGANIZED HEALTH CARE EDUCATION/TRAINING PROGRAM

## 2023-12-11 PROCEDURE — S2068 PR  BREAST DIEP OR SIEA FLAP: ICD-10-PCS | Mod: 50,62,, | Performed by: SURGERY

## 2023-12-11 PROCEDURE — 19371 PERI-IMPLT CAPSLC BRST COMPL: CPT | Mod: 51,LT,, | Performed by: SURGERY

## 2023-12-11 PROCEDURE — 63600175 PHARM REV CODE 636 W HCPCS: Performed by: NURSE ANESTHETIST, CERTIFIED REGISTERED

## 2023-12-11 PROCEDURE — 15860 PR IV INJ TO TEST BLOOD FLOW IN FLAP/GRAFT: ICD-10-PCS | Mod: 51,,, | Performed by: SURGERY

## 2023-12-11 PROCEDURE — C1769 GUIDE WIRE: HCPCS | Performed by: SURGERY

## 2023-12-11 PROCEDURE — 37000008 HC ANESTHESIA 1ST 15 MINUTES: Performed by: SURGERY

## 2023-12-11 PROCEDURE — 11971 PR REMOVE TISSUE EXPANDER(S): ICD-10-PCS | Mod: 51,LT,, | Performed by: SURGERY

## 2023-12-11 PROCEDURE — 25000003 PHARM REV CODE 250: Performed by: SURGERY

## 2023-12-11 PROCEDURE — 19303 PR MASTECTOMY, SIMPLE, COMPLETE: ICD-10-PCS | Mod: RT,,, | Performed by: SURGERY

## 2023-12-11 PROCEDURE — 36000708 HC OR TIME LEV III 1ST 15 MIN: Performed by: SURGERY

## 2023-12-11 PROCEDURE — 88300 SURGICAL PATH GROSS: CPT | Performed by: STUDENT IN AN ORGANIZED HEALTH CARE EDUCATION/TRAINING PROGRAM

## 2023-12-11 PROCEDURE — 25000003 PHARM REV CODE 250: Performed by: NURSE ANESTHETIST, CERTIFIED REGISTERED

## 2023-12-11 PROCEDURE — D9220A PRA ANESTHESIA: Mod: ANES,,, | Performed by: STUDENT IN AN ORGANIZED HEALTH CARE EDUCATION/TRAINING PROGRAM

## 2023-12-11 PROCEDURE — 27000221 HC OXYGEN, UP TO 24 HOURS

## 2023-12-11 PROCEDURE — 15860 IV NJX TST VASC FLO FLAP/GRF: CPT | Mod: 51,,, | Performed by: SURGERY

## 2023-12-11 PROCEDURE — 88305 TISSUE EXAM BY PATHOLOGIST: CPT | Mod: 26,,, | Performed by: STUDENT IN AN ORGANIZED HEALTH CARE EDUCATION/TRAINING PROGRAM

## 2023-12-11 PROCEDURE — 27201423 OPTIME MED/SURG SUP & DEVICES STERILE SUPPLY: Performed by: SURGERY

## 2023-12-11 PROCEDURE — 88304 PR  SURG PATH,LEVEL III: ICD-10-PCS | Mod: 26,,, | Performed by: STUDENT IN AN ORGANIZED HEALTH CARE EDUCATION/TRAINING PROGRAM

## 2023-12-11 PROCEDURE — 88305 TISSUE EXAM BY PATHOLOGIST: ICD-10-PCS | Mod: 26,,, | Performed by: STUDENT IN AN ORGANIZED HEALTH CARE EDUCATION/TRAINING PROGRAM

## 2023-12-11 PROCEDURE — 11406 PR EXC SKIN BENIG >4 CM TRUNK,ARM,LEG: ICD-10-PCS | Mod: 51,,, | Performed by: SURGERY

## 2023-12-11 PROCEDURE — 19303 MAST SIMPLE COMPLETE: CPT | Mod: RT,,, | Performed by: SURGERY

## 2023-12-11 PROCEDURE — D9220A PRA ANESTHESIA: ICD-10-PCS | Mod: ANES,,, | Performed by: STUDENT IN AN ORGANIZED HEALTH CARE EDUCATION/TRAINING PROGRAM

## 2023-12-11 PROCEDURE — S2068 BREAST DIEP OR SIEA FLAP: HCPCS | Mod: 50,62,, | Performed by: SURGERY

## 2023-12-11 PROCEDURE — 36000709 HC OR TIME LEV III EA ADD 15 MIN: Performed by: SURGERY

## 2023-12-11 PROCEDURE — 11971 RMVL TIS XPNDR WO INSJ IMPLT: CPT | Mod: 51,LT,, | Performed by: SURGERY

## 2023-12-11 PROCEDURE — 88304 TISSUE EXAM BY PATHOLOGIST: CPT | Mod: 26,,, | Performed by: STUDENT IN AN ORGANIZED HEALTH CARE EDUCATION/TRAINING PROGRAM

## 2023-12-11 PROCEDURE — 12032 INTMD RPR S/A/T/EXT 2.6-7.5: CPT | Mod: 51,59,, | Performed by: SURGERY

## 2023-12-11 PROCEDURE — D9220A PRA ANESTHESIA: Mod: CRNA,,, | Performed by: NURSE ANESTHETIST, CERTIFIED REGISTERED

## 2023-12-11 PROCEDURE — 88307 PR  SURG PATH,LEVEL V: ICD-10-PCS | Mod: 26,,, | Performed by: STUDENT IN AN ORGANIZED HEALTH CARE EDUCATION/TRAINING PROGRAM

## 2023-12-11 PROCEDURE — 63600175 PHARM REV CODE 636 W HCPCS: Performed by: SURGERY

## 2023-12-11 PROCEDURE — 88300 SURGICAL PATH GROSS: CPT | Mod: 26,,, | Performed by: STUDENT IN AN ORGANIZED HEALTH CARE EDUCATION/TRAINING PROGRAM

## 2023-12-11 DEVICE — COUPLER MICROVAS ANSTMS 3.0MM: Type: IMPLANTABLE DEVICE | Site: BREAST | Status: FUNCTIONAL

## 2023-12-11 RX ORDER — OXYCODONE HYDROCHLORIDE 5 MG/1
5 TABLET ORAL EVERY 4 HOURS PRN
Status: DISCONTINUED | OUTPATIENT
Start: 2023-12-11 | End: 2023-12-14 | Stop reason: HOSPADM

## 2023-12-11 RX ORDER — ENOXAPARIN SODIUM 100 MG/ML
40 INJECTION SUBCUTANEOUS EVERY 24 HOURS
Status: DISCONTINUED | OUTPATIENT
Start: 2023-12-12 | End: 2023-12-12

## 2023-12-11 RX ORDER — CEFAZOLIN SODIUM 1 G/3ML
INJECTION, POWDER, FOR SOLUTION INTRAMUSCULAR; INTRAVENOUS
Status: DISCONTINUED | OUTPATIENT
Start: 2023-12-11 | End: 2023-12-11

## 2023-12-11 RX ORDER — DOCUSATE SODIUM 100 MG/1
100 CAPSULE, LIQUID FILLED ORAL 2 TIMES DAILY
Status: DISCONTINUED | OUTPATIENT
Start: 2023-12-12 | End: 2023-12-14 | Stop reason: HOSPADM

## 2023-12-11 RX ORDER — DEXMEDETOMIDINE HYDROCHLORIDE 100 UG/ML
INJECTION, SOLUTION INTRAVENOUS
Status: DISCONTINUED | OUTPATIENT
Start: 2023-12-11 | End: 2023-12-11

## 2023-12-11 RX ORDER — PROPOFOL 10 MG/ML
VIAL (ML) INTRAVENOUS
Status: DISCONTINUED | OUTPATIENT
Start: 2023-12-11 | End: 2023-12-11

## 2023-12-11 RX ORDER — GABAPENTIN 300 MG/1
300 CAPSULE ORAL 3 TIMES DAILY
Status: DISCONTINUED | OUTPATIENT
Start: 2023-12-11 | End: 2023-12-14 | Stop reason: HOSPADM

## 2023-12-11 RX ORDER — SODIUM CHLORIDE 9 MG/ML
INJECTION, SOLUTION INTRAVENOUS CONTINUOUS PRN
Status: DISCONTINUED | OUTPATIENT
Start: 2023-12-11 | End: 2023-12-11

## 2023-12-11 RX ORDER — SODIUM CHLORIDE 0.9 % (FLUSH) 0.9 %
10 SYRINGE (ML) INJECTION
Status: CANCELLED | OUTPATIENT
Start: 2023-12-11

## 2023-12-11 RX ORDER — FENTANYL CITRATE 50 UG/ML
INJECTION, SOLUTION INTRAMUSCULAR; INTRAVENOUS
Status: DISCONTINUED | OUTPATIENT
Start: 2023-12-11 | End: 2023-12-11

## 2023-12-11 RX ORDER — BUPIVACAINE HYDROCHLORIDE 2.5 MG/ML
INJECTION, SOLUTION EPIDURAL; INFILTRATION; INTRACAUDAL
Status: DISCONTINUED | OUTPATIENT
Start: 2023-12-11 | End: 2023-12-11 | Stop reason: HOSPADM

## 2023-12-11 RX ORDER — PHENYLEPHRINE HYDROCHLORIDE 10 MG/ML
INJECTION INTRAVENOUS
Status: DISCONTINUED | OUTPATIENT
Start: 2023-12-11 | End: 2023-12-11

## 2023-12-11 RX ORDER — HYDROMORPHONE HYDROCHLORIDE 1 MG/ML
0.2 INJECTION, SOLUTION INTRAMUSCULAR; INTRAVENOUS; SUBCUTANEOUS EVERY 5 MIN PRN
Status: CANCELLED | OUTPATIENT
Start: 2023-12-11

## 2023-12-11 RX ORDER — HALOPERIDOL 5 MG/ML
0.5 INJECTION INTRAMUSCULAR EVERY 10 MIN PRN
Status: CANCELLED | OUTPATIENT
Start: 2023-12-11

## 2023-12-11 RX ORDER — KETAMINE HCL IN 0.9 % NACL 50 MG/5 ML
SYRINGE (ML) INTRAVENOUS
Status: DISCONTINUED | OUTPATIENT
Start: 2023-12-11 | End: 2023-12-11

## 2023-12-11 RX ORDER — PROCHLORPERAZINE EDISYLATE 5 MG/ML
5 INJECTION INTRAMUSCULAR; INTRAVENOUS EVERY 6 HOURS PRN
Status: DISCONTINUED | OUTPATIENT
Start: 2023-12-11 | End: 2023-12-14 | Stop reason: HOSPADM

## 2023-12-11 RX ORDER — MUPIROCIN 20 MG/G
OINTMENT TOPICAL
Status: DISCONTINUED | OUTPATIENT
Start: 2023-12-11 | End: 2023-12-11 | Stop reason: HOSPADM

## 2023-12-11 RX ORDER — ACETAMINOPHEN 10 MG/ML
INJECTION, SOLUTION INTRAVENOUS
Status: DISCONTINUED | OUTPATIENT
Start: 2023-12-11 | End: 2023-12-11

## 2023-12-11 RX ORDER — HEPARIN SODIUM 1000 [USP'U]/ML
INJECTION, SOLUTION INTRAVENOUS; SUBCUTANEOUS
Status: DISCONTINUED | OUTPATIENT
Start: 2023-12-11 | End: 2023-12-11 | Stop reason: HOSPADM

## 2023-12-11 RX ORDER — VECURONIUM BROMIDE FOR INJECTION 1 MG/ML
INJECTION, POWDER, LYOPHILIZED, FOR SOLUTION INTRAVENOUS
Status: DISCONTINUED | OUTPATIENT
Start: 2023-12-11 | End: 2023-12-11

## 2023-12-11 RX ORDER — LIDOCAINE HYDROCHLORIDE 10 MG/ML
1 INJECTION, SOLUTION EPIDURAL; INFILTRATION; INTRACAUDAL; PERINEURAL ONCE
Status: DISCONTINUED | OUTPATIENT
Start: 2023-12-11 | End: 2023-12-11 | Stop reason: HOSPADM

## 2023-12-11 RX ORDER — DEXTROSE, SODIUM CHLORIDE, SODIUM LACTATE, POTASSIUM CHLORIDE, AND CALCIUM CHLORIDE 5; .6; .31; .03; .02 G/100ML; G/100ML; G/100ML; G/100ML; G/100ML
INJECTION, SOLUTION INTRAVENOUS CONTINUOUS
Status: DISCONTINUED | OUTPATIENT
Start: 2023-12-11 | End: 2023-12-12

## 2023-12-11 RX ORDER — LIDOCAINE HYDROCHLORIDE 40 MG/ML
INJECTION, SOLUTION RETROBULBAR
Status: DISCONTINUED | OUTPATIENT
Start: 2023-12-11 | End: 2023-12-11 | Stop reason: HOSPADM

## 2023-12-11 RX ORDER — ONDANSETRON 2 MG/ML
4 INJECTION INTRAMUSCULAR; INTRAVENOUS DAILY PRN
Status: CANCELLED | OUTPATIENT
Start: 2023-12-11

## 2023-12-11 RX ORDER — METHOCARBAMOL 500 MG/1
500 TABLET, FILM COATED ORAL 4 TIMES DAILY
Status: DISCONTINUED | OUTPATIENT
Start: 2023-12-11 | End: 2023-12-14 | Stop reason: HOSPADM

## 2023-12-11 RX ORDER — LIDOCAINE HYDROCHLORIDE 20 MG/ML
INJECTION INTRAVENOUS
Status: DISCONTINUED | OUTPATIENT
Start: 2023-12-11 | End: 2023-12-11

## 2023-12-11 RX ORDER — ENOXAPARIN SODIUM 100 MG/ML
40 INJECTION SUBCUTANEOUS
Status: COMPLETED | OUTPATIENT
Start: 2023-12-11 | End: 2023-12-11

## 2023-12-11 RX ORDER — HYDROMORPHONE HYDROCHLORIDE 1 MG/ML
0.5 INJECTION, SOLUTION INTRAMUSCULAR; INTRAVENOUS; SUBCUTANEOUS
Status: DISCONTINUED | OUTPATIENT
Start: 2023-12-11 | End: 2023-12-14 | Stop reason: HOSPADM

## 2023-12-11 RX ORDER — LIDOCAINE HYDROCHLORIDE AND EPINEPHRINE 10; 10 MG/ML; UG/ML
INJECTION, SOLUTION INFILTRATION; PERINEURAL
Status: DISCONTINUED | OUTPATIENT
Start: 2023-12-11 | End: 2023-12-11 | Stop reason: HOSPADM

## 2023-12-11 RX ORDER — ONDANSETRON 2 MG/ML
INJECTION INTRAMUSCULAR; INTRAVENOUS
Status: DISCONTINUED | OUTPATIENT
Start: 2023-12-11 | End: 2023-12-11

## 2023-12-11 RX ORDER — MIDAZOLAM HYDROCHLORIDE 1 MG/ML
INJECTION INTRAMUSCULAR; INTRAVENOUS
Status: DISCONTINUED | OUTPATIENT
Start: 2023-12-11 | End: 2023-12-11

## 2023-12-11 RX ORDER — HYDROMORPHONE HYDROCHLORIDE 2 MG/ML
INJECTION, SOLUTION INTRAMUSCULAR; INTRAVENOUS; SUBCUTANEOUS
Status: DISCONTINUED | OUTPATIENT
Start: 2023-12-11 | End: 2023-12-11

## 2023-12-11 RX ORDER — SODIUM CHLORIDE 0.9 % (FLUSH) 0.9 %
10 SYRINGE (ML) INJECTION
Status: DISCONTINUED | OUTPATIENT
Start: 2023-12-11 | End: 2023-12-11 | Stop reason: HOSPADM

## 2023-12-11 RX ORDER — ROCURONIUM BROMIDE 10 MG/ML
INJECTION, SOLUTION INTRAVENOUS
Status: DISCONTINUED | OUTPATIENT
Start: 2023-12-11 | End: 2023-12-11

## 2023-12-11 RX ORDER — PROCHLORPERAZINE EDISYLATE 5 MG/ML
INJECTION INTRAMUSCULAR; INTRAVENOUS
Status: DISCONTINUED | OUTPATIENT
Start: 2023-12-11 | End: 2023-12-11

## 2023-12-11 RX ORDER — ONDANSETRON 2 MG/ML
4 INJECTION INTRAMUSCULAR; INTRAVENOUS EVERY 6 HOURS PRN
Status: DISCONTINUED | OUTPATIENT
Start: 2023-12-11 | End: 2023-12-14 | Stop reason: HOSPADM

## 2023-12-11 RX ORDER — IBUPROFEN 600 MG/1
600 TABLET ORAL EVERY 8 HOURS
Status: DISCONTINUED | OUTPATIENT
Start: 2023-12-11 | End: 2023-12-14 | Stop reason: HOSPADM

## 2023-12-11 RX ORDER — MUPIROCIN 20 MG/G
OINTMENT TOPICAL 2 TIMES DAILY
Status: DISCONTINUED | OUTPATIENT
Start: 2023-12-11 | End: 2023-12-14 | Stop reason: HOSPADM

## 2023-12-11 RX ORDER — ACETAMINOPHEN 500 MG
1000 TABLET ORAL EVERY 8 HOURS
Status: DISCONTINUED | OUTPATIENT
Start: 2023-12-11 | End: 2023-12-14 | Stop reason: HOSPADM

## 2023-12-11 RX ADMIN — PHENYLEPHRINE HYDROCHLORIDE 200 MCG: 10 INJECTION INTRAVENOUS at 09:12

## 2023-12-11 RX ADMIN — MUPIROCIN: 20 OINTMENT TOPICAL at 09:12

## 2023-12-11 RX ADMIN — Medication 30 MG: at 07:12

## 2023-12-11 RX ADMIN — SODIUM CHLORIDE: 0.9 INJECTION, SOLUTION INTRAVENOUS at 07:12

## 2023-12-11 RX ADMIN — IBUPROFEN 600 MG: 600 TABLET, FILM COATED ORAL at 09:12

## 2023-12-11 RX ADMIN — PHENYLEPHRINE HYDROCHLORIDE 200 MCG: 10 INJECTION INTRAVENOUS at 11:12

## 2023-12-11 RX ADMIN — MIDAZOLAM HYDROCHLORIDE 2 MG: 1 INJECTION INTRAMUSCULAR; INTRAVENOUS at 07:12

## 2023-12-11 RX ADMIN — PROCHLORPERAZINE EDISYLATE 5 MG: 5 INJECTION INTRAMUSCULAR; INTRAVENOUS at 04:12

## 2023-12-11 RX ADMIN — VECURONIUM BROMIDE 2 MG: 10 INJECTION, POWDER, LYOPHILIZED, FOR SOLUTION INTRAVENOUS at 12:12

## 2023-12-11 RX ADMIN — PHENYLEPHRINE HYDROCHLORIDE 200 MCG: 10 INJECTION INTRAVENOUS at 10:12

## 2023-12-11 RX ADMIN — HYDROMORPHONE HYDROCHLORIDE 0.5 MG: 0.5 INJECTION, SOLUTION INTRAMUSCULAR; INTRAVENOUS; SUBCUTANEOUS at 08:12

## 2023-12-11 RX ADMIN — FENTANYL CITRATE 100 MCG: 50 INJECTION, SOLUTION INTRAMUSCULAR; INTRAVENOUS at 07:12

## 2023-12-11 RX ADMIN — LIDOCAINE HYDROCHLORIDE 100 MG: 20 INJECTION INTRAVENOUS at 07:12

## 2023-12-11 RX ADMIN — CEFAZOLIN 2 G: 2 INJECTION, POWDER, FOR SOLUTION INTRAMUSCULAR; INTRAVENOUS at 11:12

## 2023-12-11 RX ADMIN — PHENYLEPHRINE HYDROCHLORIDE 200 MCG: 10 INJECTION INTRAVENOUS at 12:12

## 2023-12-11 RX ADMIN — SODIUM CHLORIDE, SODIUM LACTATE, POTASSIUM CHLORIDE, CALCIUM CHLORIDE AND DEXTROSE MONOHYDRATE: 5; 600; 310; 30; 20 INJECTION, SOLUTION INTRAVENOUS at 06:12

## 2023-12-11 RX ADMIN — GABAPENTIN 300 MG: 300 CAPSULE ORAL at 08:12

## 2023-12-11 RX ADMIN — CEFAZOLIN 2 G: 330 INJECTION, POWDER, FOR SOLUTION INTRAMUSCULAR; INTRAVENOUS at 03:12

## 2023-12-11 RX ADMIN — FENTANYL CITRATE 50 MCG: 50 INJECTION, SOLUTION INTRAMUSCULAR; INTRAVENOUS at 07:12

## 2023-12-11 RX ADMIN — VECURONIUM BROMIDE 3 MG: 10 INJECTION, POWDER, LYOPHILIZED, FOR SOLUTION INTRAVENOUS at 12:12

## 2023-12-11 RX ADMIN — VECURONIUM BROMIDE 1 MG: 10 INJECTION, POWDER, LYOPHILIZED, FOR SOLUTION INTRAVENOUS at 02:12

## 2023-12-11 RX ADMIN — VECURONIUM BROMIDE 2 MG: 10 INJECTION, POWDER, LYOPHILIZED, FOR SOLUTION INTRAVENOUS at 09:12

## 2023-12-11 RX ADMIN — PHENYLEPHRINE HYDROCHLORIDE 100 MCG: 10 INJECTION INTRAVENOUS at 09:12

## 2023-12-11 RX ADMIN — DEXMEDETOMIDINE 20 MCG: 100 INJECTION, SOLUTION, CONCENTRATE INTRAVENOUS at 04:12

## 2023-12-11 RX ADMIN — ACETAMINOPHEN 1000 MG: 500 TABLET ORAL at 09:12

## 2023-12-11 RX ADMIN — Medication 10 MG: at 10:12

## 2023-12-11 RX ADMIN — SODIUM CHLORIDE, SODIUM GLUCONATE, SODIUM ACETATE, POTASSIUM CHLORIDE, MAGNESIUM CHLORIDE, SODIUM PHOSPHATE, DIBASIC, AND POTASSIUM PHOSPHATE: .53; .5; .37; .037; .03; .012; .00082 INJECTION, SOLUTION INTRAVENOUS at 12:12

## 2023-12-11 RX ADMIN — VECURONIUM BROMIDE 2 MG: 10 INJECTION, POWDER, LYOPHILIZED, FOR SOLUTION INTRAVENOUS at 01:12

## 2023-12-11 RX ADMIN — CEFAZOLIN 2 G: 330 INJECTION, POWDER, FOR SOLUTION INTRAMUSCULAR; INTRAVENOUS at 11:12

## 2023-12-11 RX ADMIN — ONDANSETRON 4 MG: 2 INJECTION INTRAMUSCULAR; INTRAVENOUS at 04:12

## 2023-12-11 RX ADMIN — SUGAMMADEX 200 MG: 100 INJECTION, SOLUTION INTRAVENOUS at 04:12

## 2023-12-11 RX ADMIN — HYDROMORPHONE HYDROCHLORIDE 0.5 MG: 2 INJECTION INTRAMUSCULAR; INTRAVENOUS; SUBCUTANEOUS at 04:12

## 2023-12-11 RX ADMIN — PHENYLEPHRINE HYDROCHLORIDE 100 MCG: 10 INJECTION INTRAVENOUS at 08:12

## 2023-12-11 RX ADMIN — ROCURONIUM BROMIDE 50 MG: 10 INJECTION, SOLUTION INTRAVENOUS at 07:12

## 2023-12-11 RX ADMIN — MUPIROCIN: 20 OINTMENT TOPICAL at 06:12

## 2023-12-11 RX ADMIN — SODIUM CHLORIDE, SODIUM GLUCONATE, SODIUM ACETATE, POTASSIUM CHLORIDE, MAGNESIUM CHLORIDE, SODIUM PHOSPHATE, DIBASIC, AND POTASSIUM PHOSPHATE: .53; .5; .37; .037; .03; .012; .00082 INJECTION, SOLUTION INTRAVENOUS at 08:12

## 2023-12-11 RX ADMIN — VECURONIUM BROMIDE 4 MG: 10 INJECTION, POWDER, LYOPHILIZED, FOR SOLUTION INTRAVENOUS at 08:12

## 2023-12-11 RX ADMIN — Medication 10 MG: at 09:12

## 2023-12-11 RX ADMIN — OXYCODONE HYDROCHLORIDE 5 MG: 5 TABLET ORAL at 08:12

## 2023-12-11 RX ADMIN — VECURONIUM BROMIDE 2 MG: 10 INJECTION, POWDER, LYOPHILIZED, FOR SOLUTION INTRAVENOUS at 08:12

## 2023-12-11 RX ADMIN — SODIUM CHLORIDE, SODIUM GLUCONATE, SODIUM ACETATE, POTASSIUM CHLORIDE, MAGNESIUM CHLORIDE, SODIUM PHOSPHATE, DIBASIC, AND POTASSIUM PHOSPHATE: .53; .5; .37; .037; .03; .012; .00082 INJECTION, SOLUTION INTRAVENOUS at 10:12

## 2023-12-11 RX ADMIN — FENTANYL CITRATE 50 MCG: 50 INJECTION, SOLUTION INTRAMUSCULAR; INTRAVENOUS at 02:12

## 2023-12-11 RX ADMIN — VECURONIUM BROMIDE 4 MG: 10 INJECTION, POWDER, LYOPHILIZED, FOR SOLUTION INTRAVENOUS at 07:12

## 2023-12-11 RX ADMIN — CEFAZOLIN 2 G: 330 INJECTION, POWDER, FOR SOLUTION INTRAMUSCULAR; INTRAVENOUS at 07:12

## 2023-12-11 RX ADMIN — FENTANYL CITRATE 50 MCG: 50 INJECTION, SOLUTION INTRAMUSCULAR; INTRAVENOUS at 08:12

## 2023-12-11 RX ADMIN — FENTANYL CITRATE 50 MCG: 50 INJECTION, SOLUTION INTRAMUSCULAR; INTRAVENOUS at 01:12

## 2023-12-11 RX ADMIN — VECURONIUM BROMIDE 4 MG: 10 INJECTION, POWDER, LYOPHILIZED, FOR SOLUTION INTRAVENOUS at 10:12

## 2023-12-11 RX ADMIN — ENOXAPARIN SODIUM 40 MG: 40 INJECTION SUBCUTANEOUS at 06:12

## 2023-12-11 RX ADMIN — METHOCARBAMOL 500 MG: 500 TABLET ORAL at 08:12

## 2023-12-11 RX ADMIN — VECURONIUM BROMIDE 2 MG: 10 INJECTION, POWDER, LYOPHILIZED, FOR SOLUTION INTRAVENOUS at 11:12

## 2023-12-11 RX ADMIN — ACETAMINOPHEN 1000 MG: 10 INJECTION, SOLUTION INTRAVENOUS at 07:12

## 2023-12-11 RX ADMIN — PROPOFOL 150 MG: 10 INJECTION, EMULSION INTRAVENOUS at 07:12

## 2023-12-11 NOTE — ANESTHESIA PROCEDURE NOTES
Intubation    Date/Time: 12/11/2023 7:28 AM    Performed by: Kumar Umanzor MD  Authorized by: Kumar Umanzor MD    Intubation:     Induction:  Intravenous    Intubated:  Postinduction    Mask Ventilation:  Easy mask    Attempts:  1    Attempted By:  Staff anesthesiologist    Method of Intubation:  Direct    Blade:  Patten 2    Laryngeal View Grade: Grade I - full view of cords      Difficult Airway Encountered?: No      Complications:  None    Airway Device:  Oral endotracheal tube    Airway Device Size:  7.0    Style/Cuff Inflation:  Cuffed    Tube secured:  21    Secured at:  The lips    Placement Verified By:  Capnometry    Complicating Factors:  None    Findings Post-Intubation:  BS equal bilateral

## 2023-12-11 NOTE — SUBJECTIVE & OBJECTIVE
Follow-up For: Procedure(s) (LRB):  REMOVAL, TISSUE EXPANDER (Left)  RECONSTRUCTION, BREAST, USING KANDICE FREE FLAP (Bilateral)  MASTECTOMY (Right)  REVISION, SCAR (Right)  spy (N/A)    Post-Operative Day: Day of Surgery     Past Medical History:   Diagnosis Date    Anemia     Anxiety     Breast cancer     Encounter for blood transfusion     Hypertension     Meningitis     Severe obesity 2023    Shortness of breath 2023       Past Surgical History:   Procedure Laterality Date    AXILLARY NODE DISSECTION Left 2023    Procedure: LYMPHADENECTOMY, AXILLARY;  Surgeon: YASMANI Weiner MD;  [facility]: [facility];  Service: General;  Laterality: Left;    BREAST BIOPSY Left      SECTION      COLONOSCOPY N/A 2022    Procedure: COLONOSCOPY;  Surgeon: Davi Chery MD;  Location: [facility] (4TH FLR);  Service: Endoscopy;  Laterality: N/A;  case request entered from referral - Per DR. KATHLEEN Chery Pt to be scheduled for urgent colonoscopy on 22/ fully vaccinated / prep ins for Sutab on portal - ERW  see micro for negative C-diff- ERW    INJECTION FOR SENTINEL NODE IDENTIFICATION Left 2023    Procedure: INJECTION, FOR SENTINEL NODE IDENTIFICATION;  Surgeon: YASMANI Weiner MD;  [facility]: [facility];  Service: General;  Laterality: Left;    INSERTION OF BREAST TISSUE EXPANDER Left 2023    Procedure: INSERTION, TISSUE EXPANDER, BREAST;  Surgeon: Reese Barrios DO;  Location: [facility];  Service: Plastics;  Laterality: Left;    INSERTION OF TUNNELED CENTRAL VENOUS CATHETER (CVC) WITH SUBCUTANEOUS PORT N/A 2022    Procedure: [id number];  Surgeon: Deo Sin Jr., MD;  Location: [facility] 2ND FLR;  Service: General;  Laterality: N/A;    INSERTION OF TUNNELED CENTRAL VENOUS CATHETER (CVC) WITH SUBCUTANEOUS PORT N/A 2022    Procedure: INSERTION, SINGLE LUMEN CATHETER WITH PORT, WITH FLUOROSCOPIC GUIDANCE;  Surgeon: Ryder Dinero MD;  Location: [facility] CATH LAB;  Service:  Vascular;  Laterality: N/A;    MASTECTOMY Left 2/27/2023    Procedure: MASTECTOMY;  Surgeon: YASMANI Weiner MD;  Location: [facility] OR;  Service: General;  Laterality: Left;  3.5 TOTAL HOURS / EMAIL SENT 2-23 @ 7:56 [facility]    SENTINEL LYMPH NODE BIOPSY Left 2/27/2023    Procedure: BIOPSY, LYMPH NODE, SENTINEL;  Surgeon: YASMANI Weiner MD;  Location: [facility] OR;  Service: General;  Laterality: Left;  LEFT with radiological marker       Review of patient's allergies indicates:   Allergen Reactions    Strawberries [strawberry] Hives       Family History       Problem Relation (Age of Onset)    Bladder Cancer Paternal Grandmother    Diabetes Father    Hypertension Father    Pancreatic cancer Maternal Uncle          Tobacco Use    Smoking status: Never    Smokeless tobacco: Never   Substance and Sexual Activity    Alcohol use: Not Currently     Comment: social    Drug use: Never    Sexual activity: Not Currently      Review of Systems   Reason unable to perform ROS: Post-operatively sedated.     Objective:     Vital Signs (Most Recent):  Temp: 98.4 °F (36.9 °C) (12/11/23 0636)  Pulse: 83 (12/11/23 1640)  Resp: 18 (12/11/23 0636)  BP: 139/77 (12/11/23 0647) Vital Signs (24h Range):  Temp:  [98.4 °F (36.9 °C)] 98.4 °F (36.9 °C)  Pulse:  [66-83] 83  Resp:  [18] 18  BP: (139)/(77) 139/77        There is no height or weight on file to calculate BMI.      Intake/Output Summary (Last 24 hours) at 12/11/2023 1730  Last data filed at 12/11/2023 1618  Gross per 24 hour   Intake 3900 ml   Output 1175 ml   Net 2725 ml          Physical Exam  Constitutional:       General: She is not in acute distress.     Appearance: Normal appearance.   HENT:      Head: Normocephalic and atraumatic.      Nose: Nose normal.   Eyes:      Extraocular Movements: Extraocular movements intact.      Conjunctiva/sclera: Conjunctivae normal.   Cardiovascular:      Rate and Rhythm: Normal rate and regular rhythm.      Pulses: Normal pulses.      Heart sounds: Normal  "heart sounds.   Pulmonary:      Effort: Pulmonary effort is normal.      Breath sounds: Normal breath sounds.   Abdominal:      General: Abdomen is flat. Bowel sounds are normal.      Tenderness: There is abdominal tenderness.   Musculoskeletal:         General: No swelling or tenderness. Normal range of motion.      Cervical back: Neck supple.   Skin:     General: Skin is warm and dry.      Capillary Refill: Capillary refill takes less than 2 seconds.      Comments: Bilateral breast dressings CDI under supportive bra   Abdominal binder present, 4 VALENTINA drains with serosanguinous output present   Neurological:      General: No focal deficit present.      Mental Status: She is alert.   Psychiatric:         Mood and Affect: Mood normal.         Behavior: Behavior normal.            Vents:       Lines/Drains/Airways       Central Venous Catheter Line  Duration                  PowerPort A Cath Single Lumen 09/05/22 0323 right subclavian 462 days              Drain  Duration                  Closed/Suction Drain 12/11/23 1358 Tube - 1 Left Hip Bulb 15 Fr. <1 day         Closed/Suction Drain 12/11/23 1359 Tube - 2 Right Hip Bulb 15 Fr. <1 day         Closed/Suction Drain 12/11/23 Tube - 1 Lateral;Right Chest Bulb 15 Fr. <1 day         Closed/Suction Drain 12/11/23 Tube - 2 Lateral;Left Chest Bulb 15 Fr. <1 day         Urethral Catheter 12/11/23 0730 Silicone;Temperature probe 14 Fr. <1 day              Peripheral Intravenous Line  Duration                  Peripheral IV - Single Lumen 12/11/23 0640 20 G Right Antecubital <1 day                    Significant Labs:    CBC/Anemia Profile:  No results for input(s): "WBC", "HGB", "HCT", "PLT", "MCV", "RDW", "IRON", "FERRITIN", "RETIC", "FOLATE", "[id number]", "OCCULTBLOOD" in the last 48 hours.     Chemistries:  No results for input(s): "NA", "K", "CL", "CO2", "BUN", "CREATININE", "CALCIUM", "ALBUMIN", "PROT", "BILITOT", "ALKPHOS", "ALT", "AST", "GLUCOSE", "MG", "PHOS" in the " last 48 hours.    All pertinent labs within the past 24 hours have been reviewed.    Significant Imaging: I have reviewed all pertinent imaging results/findings within the past 24 hours.

## 2023-12-11 NOTE — ANESTHESIA PREPROCEDURE EVALUATION
12/11/2023  Laura Kent is a 38 y.o., female hx breast CA, scheduled for reconstruction and scar removal      Pre-op Assessment    I have reviewed the Patient Summary Reports.     I have reviewed the Nursing Notes. I have reviewed the NPO Status.   I have reviewed the Medications.     Review of Systems  Anesthesia Hx:  No problems with previous Anesthesia   History of prior surgery of interest to airway management or planning:          Denies Family Hx of Anesthesia complications.    Denies Personal Hx of Anesthesia complications.                    Social:  Non-Smoker       Hematology/Oncology:  Hematology Normal                     Current/Recent Cancer.  Breast    left          EENT/Dental:  EENT/Dental Normal           Cardiovascular:     Hypertension               Summary    ? The left ventricle is normal in size with concentric remodeling and normal systolic function.  ? The visually estimated ejection fraction is 63% ( 60-65%).  ? The quantitatively derived ejection fraction is 55%( visually looks bettter than this).  ? The left ventricular global longitudinal strain is -17.5%.  ? Normal left ventricular diastolic function.  ? Normal right ventricular size with normal right ventricular systolic function.  ? Mild right atrial enlargement.  ? Normal central venous pressure (3 mmHg).  ? The estimated PA systolic pressure is 25 mmHg.  ? Trivial posterior pericardial effusion. Just at base and trivial-small under the RA.                              Pulmonary:  Pulmonary Normal                       Renal/:  Renal/ Normal                 Hepatic/GI:  Hepatic/GI Normal                 Musculoskeletal:  Musculoskeletal Normal                Neurological:  Neurology Normal                                      Endocrine:  Endocrine Normal          Obesity / BMI > 30  Dermatological:  Skin Normal     Psych:  Psychiatric Normal                    Physical Exam  General: Well nourished and Alert    Airway:  Mallampati: II   Mouth Opening: Normal  Tongue: Normal    Dental:  Intact        Anesthesia Plan  Type of Anesthesia, risks & benefits discussed:    Anesthesia Type: Gen ETT, Regional  Intra-op Monitoring Plan: Standard ASA Monitors  Post Op Pain Control Plan: multimodal analgesia  Induction:  IV  Airway Plan: Video  Informed Consent: Informed consent signed with the Patient and all parties understand the risks and agree with anesthesia plan.  All questions answered.   ASA Score: 3  Anesthesia Plan Notes: Discussed PECs and JOSE    Ready For Surgery From Anesthesia Perspective.     .

## 2023-12-11 NOTE — ASSESSMENT & PLAN NOTE
Laura Kent is a 37 yo female with history of invasive ductal carcinoma of the left breast, sp left mastectomy and axillary lymph node dissection and TE placement on 2/27/2023 with Dr. Weiner. Presents to the SICU sp right prophylactic skin sparing mastectomy, L tissue expander removal, BL KANDICE. Admitted to SICU for further management and flap checks       Neuro/Psych:   -- Sedation: none  -- Pain: mmd, dilaudid 0.5 q3h prn, oxy 5 q4h prn              Cards:   -- HDS  -- MAPs > 65      Pulm:   -- Goal O2 sat > 90%  -- Wean as able      Renal:  -- Keep hitchcock for strict I/O, plan for dc of hitchcock tomorrow  -- BUN/Cr   Recent Labs   Lab 12/04/23  1402   BUN 16   CREATININE 0.7     -- Urine output: 1.1L in 24 hours       FEN / GI:   -- Net pending  -- Replace lytes as needed  -- Nutrition: CLD, will plan to adv tomorrow      ID:   -- Tm: afebrile  Recent Labs   Lab 12/04/23  1402   WBC 3.65*     -- Abx none      Heme/Onc:  -- H/H pending  -- Daily CBC  Recent Labs   Lab 12/04/23  1402   HGB 11.0*   *         Endo:   -- Gluc goal 140-180  -- LDSSI prn       PPx:   Feeding: Clear liquid diet   Analgesia/Sedation: none / none   Thromboembolic prevention: lovenox tomorrow  HOB >30: yes  Stress Ulcer ppx: not indicated  Glucose control: Critical care goal 140-180 g/dl  Bowel reg: miralax  Invasive Lines/Drains/Airway: hitchcock  Deescalation: n/a        Dispo/Code Status/Palliative:   -- SICU / Full Code

## 2023-12-11 NOTE — BRIEF OP NOTE
[facility] - Surgery ([facility])  Brief Operative Note    SUMMARY     Surgery Date: 12/11/2023     Surgeon(s) and Role:  Panel 1:     * Reese Barrios DO - Primary     * Johnnie Rosario MD - Resident - Assisting     * Mg Lewis MD - Fellow     * Reese Cross MD - Co-Surgeon  Panel 2:     * Eldon Daley MD - Primary     * Samantha Hannon MD - Resident - Assisting        Pre-op Diagnosis:  Malignant neoplasm of upper-outer quadrant of left breast in female, estrogen receptor negative [C50.412, Z17.1]    Post-op Diagnosis:  Post-Op Diagnosis Codes:     * Malignant neoplasm of upper-outer quadrant of left breast in female, estrogen receptor negative [C50.412, Z17.1]    Procedure(s) (LRB):  REMOVAL, TISSUE EXPANDER (Left)  RECONSTRUCTION, BREAST, USING KANDICE FREE FLAP (Bilateral)  MASTECTOMY (Right)    Anesthesia: General    Implants:  * No implants in log *    Operative Findings: Right mastectomy performed, specimen sent for path. Plastics team remained in room to complete reconstruction.     Estimated Blood Loss: * No values recorded between 12/11/2023  7:50 AM and 12/11/2023  8:40 AM *    Estimated Blood Loss has not been documented. EBL = 30.         Specimens:   Specimen (24h ago, onward)      None            [id number]

## 2023-12-11 NOTE — OP NOTE
[facility] - Surgery ([facility])  Plastic Surgery  Operative Note    SUMMARY     Date of Procedure: 12/11/2023     Location:  [facility] [address]    Procedure(s): Procedure(s) (LRB):  MASTECTOMY (Right)    Left delayed KANDICE flap reconstruction  Right immediate KANDICE flap reconstruction  Left tissue expander removal with total capsulectomy  ICG angiography to assess flap perfusion  Excision of hypertrophic port site scar with layered closure, 5 cm  Bilateral tap and intercostal rib blocks with Exparel for postoperative pain control      Surgeon(s) and Role:  Panel 1:     * Reese Barrios DO - Primary     * Johnnie Rosario MD - Resident - Assisting     * Mg Lewis MD - Fellow     * Reese Cross MD - Co-Surgeon  Panel 2:     * Eldon Daley MD - Primary     * Samantha Hannon MD - Resident - Assisting    Assistant: ERMA Bacon    Pre-Operative Diagnosis: Malignant neoplasm of upper-outer quadrant of left breast in female, estrogen receptor negative [C50.412, Z17.1]  High-risk for breast cancer  Hypertrophic right upper chest port site scar    Post-Operative Diagnosis: same    Anesthesia: General    Indications for Procedure:  38-year-old woman with a history of left breast cancer.  She underwent chemotherapy and radiation and has a tissue expander placed at the time of her mastectomy.  She presents now for delayed autologous reconstruction.  She also requested contralateral mastectomy for risk reduction and symmetry.  The plan for today was a right mastectomy with bilateral KANDICE flap reconstruction.  She also had a hypertrophic port site scar that was bothersome to her that we also revised.    Operative Findings (including complications, if any):  Right mastectomy 1437 g  Removal of 750 cc tissue expander  Micro information:  Donor site:  Right hemiabdomen  Recipient site:  Left breast  Weight:  877 gm  Number of perforators:  3  Pedicle Artery:  Deep inferior  epigastric  Recipient Artery:  TYLER  Arterial anastomosis size:  2.5 mm  Recipient vein:  IMV  Vein  size:  3 mm  Additional Vein:  None    Micro information:  Donor site:  Left hemiabdomen  Recipient site:  Right breast  Weight:  868 gm  Number of perforators:  3  Pedicle Artery:  Deep inferior epigastric  Recipient Artery:  TYLER  Arterial anastomosis size:  2.5 mm  Recipient vein:  IMV  Vein  size:  3 mm  Additional Vein:  None      Description of Procedures:  Patient was seen in the preoperative holding area.  A conservative long narrow wise pattern was marked on the right breast.  Chest port site was marked.  Also her lower abdominal skin fold and midline of her abdomen were marked.  Patient was taken the operating room.  General anesthesia was induced.  Bilateral KANDICE flaps were drawn on her abdomen in the dominant perforators were marked based on her CTA.  Both breasts and the abdomen were prepped and draped in sterile fashion.  Lidocaine with epinephrine was injected in the planned area of the incisions.    Please see Dr. Daley's note for his portion of the procedure that was the right mastectomy.    I began with elevating the left alma abdominal flap.  Lower abdominal incision was made.  The patient had a previous  and there was no superficial vein.  Dissected straight down to the anterior abdominal wall fascia.  Next the upper border was incised.  I beveled upwards to capture the higher perforators that she had.  The flap was then elevated from lateral to medial.  It was covered 1 large high lateral , 1 more centrally located .  Next using a 15 blade and single hooks I incised around the umbilicus incised the midline between the flaps.  The flap was then elevated from lateral to medial.  2 moderate-sized perforators were discovered medially as well.  I began by opening the fascia between the central and lateral perforators down towards the groin.  There was a short  intramuscular course and the main pedicle was identified.  It was perforators were freed from the fascia  dissection was completed through the muscle.  After those 2 perforators were isolated I then opened the fascia from the lateral incision to the medial perforators.  One seemed to be septal cutaneous wrap around the rectus muscle.  The wound was divided.  There was a 2nd 1 next to it there was a branch of the main pedicle.  Short amount of muscle had to be divided to connect these 2.  The  was completed as well.  The flap was then isolated on these 3 perforators.  Dissected down towards the groin until there was adequate pedicle length.  Windows were created.  The flap was then temporarily stapled into place.      For the right alma abdominal flap was incised the lower and upper borders in the same way.  So elevated lateral to medial then medial to lateral.  I was able to identify 1 large lateral  at the level of the umbilicus and a large medial  few cm below the.  I opened the fascial laterally towards the groin.  There was a small  in line that was also captured.  Both of those 2 lateral perforators were freed from the fascia.  It has a longer intramuscular course.  Before the main pedicle was identified and then dissected down towards the groin.  Maintain that is small  in line.  There was a large branch download that was identified, to the medial .  That had a short intramuscular course and a long superficial course.  The  was also skeletonized and freed from the fascia.  The flap was then isolated on these 3 perforators and windows were created.  The flap was then stapled back down.    The vertical and medial incisions of the patient's previous wise pattern over her tissue expanders were incised.  The capsule was entered and the tissue expander was removed intact.  It was 750 cc.  Next a total capsulectomy was performed to remove  all of the old capsule.  Additional space was opened superiorly medially to accommodate the flap.  Next the pectoralis muscle was incised over the 3rd rib.  The 3rd rib was identified.  The perichondrium was scored.  Next the cartilaginous portion was removed using a rongeur.  Using an elevator and the bipolar the posterior perichondrium and intercostal muscles were removed in block.  This exposed the TYLER in the IMV.  They were skeletonized for a couple cm until we had adequate length and caliber.  4% lidocaine and a wet Ray-Ada were placed over the vessels and moist lap was placed in the pocket.    After mastectomy on the right side similar exposure was done.  There was a large caliber TYLER and IMV on the right side.  Much larger than on the left side which had been radiated.  Then intercostal rib blocks were performed with a mixture of Marcaine and Exparel.  Fifteen [place] Russ drains were placed in the anterior axillary line and sewn into place.      Next ICG angiography was performed.  4 cc of reconstituted ICG was injected by anesthesia.  Using the spy phi system angiography was performed.  There was slow filling of each flap.  On the right alma abdominal flap majority of the filling was higher and more medial.  The lower border and lateral edge of the flap did not feel well.  It was not surprising given we would higher perforators and also she would very large circumflex iliac vessels.  On the left side although the filling was slow the flap filled well except for the lateral edge of the flap.  Both mastectomy flaps were examined.  No areas need to be removed on the new mastectomy.  There was a wedge of medial skin that did not perfused that had to be removed on the left breast.  Smaller flaps were outlined based on the areas of perfusion on the abdomen.  Medial portion were left with the epidermis but the remainder of the flap was de-epithelialized.  Her alma abdominal flaps were much larger than she needed  for her breast reconstruction.      We began by transferring the right alma abdominal flap to the left chest 1st.  Clips were placed on the TYLER and the veins.  The medial vein was used in 2 clips were placed on the lateral vein.  After the flap was removed the vessels were checked for hemostasis.  The flap was irrigated with 10 cc of warm heparinized saline.  The flap was weighed it weighed 877 g.  The flap was then affixed to the left chest in a warm lap pad.  The pedicle vessels were skeletonized.  Additionally some additional skeletal fixation was done on the TYLER and the IMV.  The IMV was oriented with a marking pen.  A green clamp was placed proximally and divided distally with a small clip.  Both veins were measured and a 3 mm  was felt to be appropriate.  That was done without incident.  Next the TYLER was divided.  A double green approximating clamp was used.  End-to-end anastomosis was performed.  A putting and a front wall stitch, the suture caught and tore through the wall.  That anastomosis was then cut out and redone.  End-to-end anastomosis was done with interrupted 9 0 nylon sutures.  When the clamps were removed there was 1 bleeding area that was controlled with a single stitch.  Afterwards there was a strong palpable pulse.  Internal cook Doppler was placed on the artery distal to the anastomosis.  The flap was sutured to the chest with several 2-0 Vicryl sutures and the skin was temporarily stapled closed.    Next the left alma abdominal flap was harvested.  It was similar fashion was clipped.  We chose a double clipped the medial vein as the lateral vein was larger.  The pedicle was checked for hemostasis.  Similarly the flap was irrigated with heparinized saline the pedicle was oriented.  The flap weighed over 791 g.  It was also affixed to the chest.  The vessels were cleaned.  3 mm  was appropriate.  It was similar fashion of venous end-to-end anastomosis was done using a 3.0 mm  .  Next the arterial anastomosis was done using end-to-end fashion with interrupted 9 0 nylon sutures.  The clamps were removed there was no bleeding from the anastomosis.  There was a small branch that was bleeding between the vein was controlled with micro clips..  There was a palpable pulse in the pedicle.  An internal cook Doppler was placed.  The flap was also sewn to the chest using interrupted Vicryl suture.    Next the skin was tacked on both sides.  I chose to leave a U shaped skin paddle at 6:00 a.m. on each breasts.  They were measured and checked for symmetry.  Some additional mastectomy skin was removed and sent for permanent pathology.  Additionally an area of skin the left breast had to be removed because it did not spy well.  The pedicle paddles were fairly symmetric.  They were temporarily stapled.  Both breasts were closed with buried 3-0 Monocryl and running 3-0 Stratafix suture.      While the micro anastomosis it was being done the donor site was closed.  20 cc of Marcaine and Exparel was injected on each side for a tap block.  Fifteen [place] Russ drains were placed bilaterally.  The muscle was reapproximated with 2-0 Vicryl suture.  The anterior abdominal wall fascia was closed with a horizontal mattress 0 Prolene and a running 0 V lock suture.  The abdomen was able to be with minimal flexing of the bed after some uncommon normal.  The inset stapled.  Sarah's layer was closed with a running 0 PDO barbed suture.  Skin was closed with buried 3-0 Monocryl and running 3-0 Strattice horizontal incision was made overlying the umbilicus.  It was brought through the abdominal wall and sutured in place with buried 3-0 Monocryl and running 5 0 plain gut suture    The patient had a hypertrophic scar over her previous Port-A-Cath site.  That was incised in elliptical fashion using a knife.  The incision measured about 5 cm.  That was closed in layers with interrupted 3-0 Monocryl in the deep  dermis followed by running 3-0 Monocryl and a few 5 0 plain gut sutures.      The lower abdominal incision the horizontal portions Of the breast incisions and the Port-A-Cath scar were dressed with Prineo tape.  Dermabond was placed around the skin paddles.  The umbilicus was packed with Xeroform and covered with a Mepilex.  Island dressings were placed over the lower abdominal incisions.  Drains were placed to suction.  Tegaderm were placed over the cook Dopplers.  CHD drain dressings were used.  Patient was padded with ABD pads placed in a postoperative bra and an abdominal binder.      There were no concerns for flap perfusion after the anastomosis.  She tolerated the procedure well and was taken to the ICU , extubated, for q.1h flap checks.    Significant Surgical Tasks Conducted by the Assistant(s), if Applicable: The presence of an additional attending surgeon functioning as a co-surgeon  was required due to the complexity of the procedure relative to any available residents    Estimated Blood Loss (EBL):  200 cc           Implants:   Implant Name Type Inv. Item Serial No.  Lot No. LRB No. Used Action   JOHN SHIN TISSUE EXPANDER    [id number]  [id number] Left 1 Explanted    MICROVAS ANSTMS 3.0MM - [id number]   i-NeumaticosAS ANSTMS 3.0MM  [employer] [id number] Left 1 Implanted    MICROVAS ANSTMS 3.0MM - [id number]   MICROVAS ANSTMS 3.0MM  [employer] [id number] Right 1 Implanted       Specimens:   Specimen (24h ago, onward)       Start     Ordered    12/11/23 1607  Specimen to Pathology, Surgery Breast  Once        Comments: Pre-op Diagnosis: Malignant neoplasm of upper-outer quadrant of left breast in female, estrogen receptor negative [C50.412, Z17.1]Procedure(s):REMOVAL, TISSUE EXPANDERRECONSTRUCTION, BREAST, USING KANDICE FREE FLAPMASTECTOMY Number of specimens: 4Name of specimens: 1. Left tissue expander- gross only 2. Right mastectomy,  short- superior, long - lateral  -perm 3. Left breast capsule -perm 4. Left breast mastectomy skin, hx of cancer - perm     References:    Click here for ordering Quick Tip   Question Answer Comment   Procedure Type: Breast    Specimen Class: Known or suspected malignancy    Which provider would you like to cc? COMFORT ROLON    Release to patient Immediate        12/11/23 5911                            Condition: Good    Disposition: ICU - extubated and stable., q.1h flap checks.  Bedrest and clear liquids overnight    Attestation: I was present and scrubbed for the key portions of the procedure.

## 2023-12-11 NOTE — H&P
[facility] - Surgery ([facility])  General Surgery  History & Physical    Patient Name: Laura Kent  MRN: [id number]  Admission Date: 12/11/2023  Attending Physician: Reese Barrios DO   Primary Care Provider: Yessy Cruz MD    Patient information was obtained from patient and past medical records.     Subjective:     Chief Complaint/Reason for Admission: mastectomy and recon    History of Present Illness:  Patient is a 38 y.o. female  arrives today for right breast prophylactic mastectomy and subsequent reconstruction of left breast. Exam below has been updated     Current Facility-Administered Medications on File Prior to Encounter   Medication    ceFAZolin 1 g, gentamicin 80 mg in sodium chloride 0.9% 500 mL irrigation     Current Outpatient Medications on File Prior to Encounter   Medication Sig    lipase-protease-amylase 24,000-76,000-120,000 units (CREON) 24,000-76,000 -120,000 unit capsule Take 1 capsule by mouth 3 (three) times daily with meals.    ondansetron (ZOFRAN) 8 MG tablet Take 1 tablet (8 mg total) by mouth every 12 (twelve) hours as needed for Nausea.    capecitabine (XELODA) 150 MG tablet Take 1 tablet (150 mg total) by mouth 2 (two) times daily for 14 days followed by 7 days off. Take with 1 other capecitabine prescription for 2,150 mg total.    capecitabine (XELODA) 500 MG Tab Take 4 tablets (2,000 mg total) by mouth 2 (two) times daily for 14 days followed by 7 days off. Take with 1 other capecitabine prescription for 2,150 mg total.    cetirizine (ZYRTEC) 10 MG tablet Take 10 mg by mouth once daily.    clotrimazole (LOTRIMIN) 1 % cream Apply 1 application topically every evening.    gabapentin (NEURONTIN) 300 MG capsule Take 2 capsules (600 mg total) by mouth 3 (three) times daily. for 7 days    hydrOXYzine HCL (ATARAX) 10 MG Tab Take 10 mg by mouth nightly as needed.    ibuprofen (ADVIL,MOTRIN) 600 MG tablet Take 600 mg by mouth 3 (three) times daily.    ketoconazole (NIZORAL)  2 % shampoo Apply topically Every 3 (three) days.    mupirocin (BACTROBAN) 2 % ointment Apply topically once daily.    nystatin (MYCOSTATIN) ointment Apply topically 2 (two) times daily.    nystatin (MYCOSTATIN) powder Apply topically 2 (two) times daily.    pantoprazole (PROTONIX) 40 MG tablet Take 1 tablet (40 mg total) by mouth once daily.    traMADoL (ULTRAM) 50 mg tablet Take 1 tablet (50 mg total) by mouth every 6 (six) hours as needed for Pain.       Review of patient's allergies indicates:   Allergen Reactions    Strawberries [strawberry] Hives       Past Medical History:   Diagnosis Date    Anemia     Anxiety     Breast cancer     Encounter for blood transfusion     Hypertension     Meningitis     Severe obesity 2023    Shortness of breath 2023     Past Surgical History:   Procedure Laterality Date    AXILLARY NODE DISSECTION Left 2023    Procedure: LYMPHADENECTOMY, AXILLARY;  Surgeon: YASMANI Weiner MD;  Location: [facility];  Service: General;  Laterality: Left;    BREAST BIOPSY Left      SECTION      COLONOSCOPY N/A 2022    Procedure: COLONOSCOPY;  Surgeon: Davi Chery MD;  Location: [address]);  Service: Endoscopy;  Laterality: N/A;  case request entered from referral - Per DR. KATHLEEN Chery Pt to be scheduled for urgent colonoscopy on 22/ fully vaccinated / prep ins for Sutab on portal - ERW  see micro for negative C-diff- ERW    INJECTION FOR SENTINEL NODE IDENTIFICATION Left 2023    Procedure: INJECTION, FOR SENTINEL NODE IDENTIFICATION;  Surgeon: YASMANI Weiner MD;  Location: [facility];  Service: General;  Laterality: Left;    INSERTION OF BREAST TISSUE EXPANDER Left 2023    Procedure: INSERTION, TISSUE EXPANDER, BREAST;  Surgeon: Reese Barrios DO;  Location: [facility] [place] OR;  Service: Plastics;  Laterality: Left;    INSERTION OF TUNNELED CENTRAL VENOUS CATHETER (CVC) WITH SUBCUTANEOUS PORT N/A 2022    Procedure: [id number];  Surgeon:  Deo Sin Jr., MD;  Location: [facility] OR 2ND FLR;  Service: General;  Laterality: N/A;    INSERTION OF TUNNELED CENTRAL VENOUS CATHETER (CVC) WITH SUBCUTANEOUS PORT N/A 8/1/2022    Procedure: INSERTION, SINGLE LUMEN CATHETER WITH PORT, WITH FLUOROSCOPIC GUIDANCE;  Surgeon: Ryder Dinero MD;  Location: [facility] CATH LAB;  Service: Vascular;  Laterality: N/A;    MASTECTOMY Left 2/27/2023    Procedure: MASTECTOMY;  Surgeon: YASMANI Weiner MD;  Location: [facility];  Service: General;  Laterality: Left;  3.5 TOTAL HOURS / EMAIL SENT 2-23 @ 7:56 CCC    SENTINEL LYMPH NODE BIOPSY Left 2/27/2023    Procedure: BIOPSY, LYMPH NODE, SENTINEL;  Surgeon: YASMANI Weiner MD;  Location: [facility];  Service: General;  Laterality: Left;  LEFT with radiological marker     Family History       Problem Relation (Age of Onset)    Bladder Cancer Paternal Grandmother    Diabetes Father    Hypertension Father    Pancreatic cancer Maternal Uncle          Tobacco Use    Smoking status: Never    Smokeless tobacco: Never   Substance and Sexual Activity    Alcohol use: Not Currently     Comment: social    Drug use: Never    Sexual activity: Not Currently     Review of Systems  Objective:     Vital Signs (Most Recent):  Temp: 98.4 °F (36.9 °C) (12/11/23 0636)  Pulse: 66 (12/11/23 0636)  Resp: 18 (12/11/23 0636)  BP: 139/77 (12/11/23 0647) Vital Signs (24h Range):  Temp:  [98.4 °F (36.9 °C)] 98.4 °F (36.9 °C)  Pulse:  [66] 66  Resp:  [18] 18  BP: (139)/(77) 139/77        There is no height or weight on file to calculate BMI.    Physical Exam  Constitutional:       Appearance: She is obese.   HENT:      Head: Normocephalic.      Nose: Nose normal.      Mouth/Throat:      Mouth: Mucous membranes are moist.   Cardiovascular:      Rate and Rhythm: Normal rate and regular rhythm.      Pulses: Normal pulses.   Pulmonary:      Effort: Pulmonary effort is normal. No respiratory distress.   Chest:      Comments: Tissue expander left breast, normal breast  tissue right  Neurological:      Mental Status: She is alert.         Significant Labs:  I have reviewed all pertinent lab results within the past 24 hours.    Significant Diagnostics:  I have reviewed all pertinent imaging results/findings within the past 24 hours.    Assessment/Plan:     There are no hospital problems to display for this patient.    VTE Risk Mitigation (From admission, onward)           Ordered     Place BETHANY hose  Until discontinued         12/11/23 0543     Place sequential compression device  Until discontinued         12/11/23 0543     IP VTE HIGH RISK PATIENT  Once         12/11/23 0543                Proceed to OR for mastectomy    Samantha Mcconnell MD  General Surgery  [facility] - Surgery (2nd Fl)

## 2023-12-11 NOTE — HPI
38y.o. female with a history of IIIC (cT3, cN2(f), cM0, G3, ER-, PA-, HER2-)  invasive ductal carcinoma of the left breast, sp left mastectomy and axillary lymph node dissection and TE placement on 2/27/2023 with Dr. Weiner. Underwent chemotherapy, 4/13/2022 - 12/31/2022, managed by Dr. Cruz and radiation therapy 4/27/2023 - 5/31/2023 with Dr. Bess.     Presented to hospital today for planned right breast prophylactic mastectomy and subsequent KANDICE reconstruction of bilateral breasts. Presented to SICU post op for further management. Upon arrival pt HDS, extubated, vitals within normal limits, not expressing any uncontrolled pain.

## 2023-12-11 NOTE — TRANSFER OF CARE
Anesthesia Transfer of Care Note    Patient: Laura Kent    Procedure(s) Performed: Procedure(s) (LRB):  REMOVAL, TISSUE EXPANDER (Left)  RECONSTRUCTION, BREAST, USING KANDICE FREE FLAP (Bilateral)  MASTECTOMY (Right)  REVISION, SCAR (Right)  spy (N/A)    Patient location: ICU    Anesthesia Type: general    Transport from OR: Transported from OR on 6-10 L/min O2 by face mask with adequate spontaneous ventilation. Continuous ECG monitoring in transport. Continuous SpO2 monitoring in transport    Post pain: adequate analgesia    Post assessment: no apparent anesthetic complications    Post vital signs: stable    Level of consciousness: sedated    Nausea/Vomiting: no nausea/vomiting    Complications: none    Transfer of care protocol was followed      Last vitals: Visit Vitals  /77   Pulse 66   Temp 36.9 °C (98.4 °F) (Oral)   Resp 18   LMP 11/21/2023   Breastfeeding No

## 2023-12-12 PROBLEM — D62 ACUTE POSTOPERATIVE ANEMIA DUE TO EXPECTED BLOOD LOSS: Status: ACTIVE | Noted: 2023-12-12

## 2023-12-12 LAB
ABO + RH BLD: NORMAL
ALBUMIN SERPL BCP-MCNC: 3 G/DL (ref 3.5–5.2)
ALP SERPL-CCNC: 42 U/L (ref 55–135)
ALT SERPL W/O P-5'-P-CCNC: 11 U/L (ref 10–44)
ANION GAP SERPL CALC-SCNC: 8 MMOL/L (ref 8–16)
AST SERPL-CCNC: 28 U/L (ref 10–40)
BASOPHILS # BLD AUTO: 0.01 K/UL (ref 0–0.2)
BASOPHILS NFR BLD: 0.2 % (ref 0–1.9)
BILIRUB SERPL-MCNC: 0.6 MG/DL (ref 0.1–1)
BLD GP AB SCN CELLS X3 SERPL QL: NORMAL
BUN SERPL-MCNC: 10 MG/DL (ref 6–20)
CALCIUM SERPL-MCNC: 7.5 MG/DL (ref 8.7–10.5)
CHLORIDE SERPL-SCNC: 107 MMOL/L (ref 95–110)
CO2 SERPL-SCNC: 23 MMOL/L (ref 23–29)
CREAT SERPL-MCNC: 0.8 MG/DL (ref 0.5–1.4)
DIFFERENTIAL METHOD: ABNORMAL
EOSINOPHIL # BLD AUTO: 0 K/UL (ref 0–0.5)
EOSINOPHIL NFR BLD: 0.2 % (ref 0–8)
ERYTHROCYTE [DISTWIDTH] IN BLOOD BY AUTOMATED COUNT: 13.3 % (ref 11.5–14.5)
EST. GFR  (NO RACE VARIABLE): >60 ML/MIN/1.73 M^2
GLUCOSE SERPL-MCNC: 132 MG/DL (ref 70–110)
HCT VFR BLD AUTO: 27.3 % (ref 37–48.5)
HGB BLD-MCNC: 9.3 G/DL (ref 12–16)
IMM GRANULOCYTES # BLD AUTO: 0.02 K/UL (ref 0–0.04)
IMM GRANULOCYTES NFR BLD AUTO: 0.3 % (ref 0–0.5)
LYMPHOCYTES # BLD AUTO: 0.5 K/UL (ref 1–4.8)
LYMPHOCYTES NFR BLD: 8.1 % (ref 18–48)
MAGNESIUM SERPL-MCNC: 1.7 MG/DL (ref 1.6–2.6)
MCH RBC QN AUTO: 31.8 PG (ref 27–31)
MCHC RBC AUTO-ENTMCNC: 34.1 G/DL (ref 32–36)
MCV RBC AUTO: 94 FL (ref 82–98)
MONOCYTES # BLD AUTO: 0.5 K/UL (ref 0.3–1)
MONOCYTES NFR BLD: 8.1 % (ref 4–15)
NEUTROPHILS # BLD AUTO: 5.4 K/UL (ref 1.8–7.7)
NEUTROPHILS NFR BLD: 83.1 % (ref 38–73)
NRBC BLD-RTO: 0 /100 WBC
PHOSPHATE SERPL-MCNC: 2.4 MG/DL (ref 2.7–4.5)
PLATELET # BLD AUTO: 166 K/UL (ref 150–450)
PMV BLD AUTO: 10.4 FL (ref 9.2–12.9)
POTASSIUM SERPL-SCNC: 3.2 MMOL/L (ref 3.5–5.1)
PROT SERPL-MCNC: 5.3 G/DL (ref 6–8.4)
RBC # BLD AUTO: 2.92 M/UL (ref 4–5.4)
SODIUM SERPL-SCNC: 138 MMOL/L (ref 136–145)
SPECIMEN OUTDATE: NORMAL
WBC # BLD AUTO: 6.45 K/UL (ref 3.9–12.7)

## 2023-12-12 PROCEDURE — 93010 EKG 12-LEAD: ICD-10-PCS | Mod: ,,, | Performed by: INTERNAL MEDICINE

## 2023-12-12 PROCEDURE — 27000221 HC OXYGEN, UP TO 24 HOURS

## 2023-12-12 PROCEDURE — 20600001 HC STEP DOWN PRIVATE ROOM

## 2023-12-12 PROCEDURE — 84100 ASSAY OF PHOSPHORUS: CPT | Performed by: STUDENT IN AN ORGANIZED HEALTH CARE EDUCATION/TRAINING PROGRAM

## 2023-12-12 PROCEDURE — 93010 ELECTROCARDIOGRAM REPORT: CPT | Mod: ,,, | Performed by: INTERNAL MEDICINE

## 2023-12-12 PROCEDURE — 63600175 PHARM REV CODE 636 W HCPCS: Performed by: SURGERY

## 2023-12-12 PROCEDURE — 94761 N-INVAS EAR/PLS OXIMETRY MLT: CPT

## 2023-12-12 PROCEDURE — 85025 COMPLETE CBC W/AUTO DIFF WBC: CPT | Performed by: STUDENT IN AN ORGANIZED HEALTH CARE EDUCATION/TRAINING PROGRAM

## 2023-12-12 PROCEDURE — 27000492 HC SLEEVE, SCD T/L

## 2023-12-12 PROCEDURE — 93005 ELECTROCARDIOGRAM TRACING: CPT

## 2023-12-12 PROCEDURE — 25000003 PHARM REV CODE 250

## 2023-12-12 PROCEDURE — 86850 RBC ANTIBODY SCREEN: CPT | Performed by: SURGERY

## 2023-12-12 PROCEDURE — 80053 COMPREHEN METABOLIC PANEL: CPT | Performed by: STUDENT IN AN ORGANIZED HEALTH CARE EDUCATION/TRAINING PROGRAM

## 2023-12-12 PROCEDURE — 99900035 HC TECH TIME PER 15 MIN (STAT)

## 2023-12-12 PROCEDURE — 27000491 HC MATTRESS, SOFT CARE OVERLAY

## 2023-12-12 PROCEDURE — 25000003 PHARM REV CODE 250: Performed by: STUDENT IN AN ORGANIZED HEALTH CARE EDUCATION/TRAINING PROGRAM

## 2023-12-12 PROCEDURE — 63600175 PHARM REV CODE 636 W HCPCS: Performed by: STUDENT IN AN ORGANIZED HEALTH CARE EDUCATION/TRAINING PROGRAM

## 2023-12-12 PROCEDURE — 83735 ASSAY OF MAGNESIUM: CPT | Performed by: STUDENT IN AN ORGANIZED HEALTH CARE EDUCATION/TRAINING PROGRAM

## 2023-12-12 RX ORDER — ENOXAPARIN SODIUM 100 MG/ML
40 INJECTION SUBCUTANEOUS EVERY 24 HOURS
Status: DISCONTINUED | OUTPATIENT
Start: 2023-12-12 | End: 2023-12-12

## 2023-12-12 RX ORDER — SODIUM,POTASSIUM PHOSPHATES 280-250MG
2 POWDER IN PACKET (EA) ORAL EVERY 4 HOURS
Status: COMPLETED | OUTPATIENT
Start: 2023-12-12 | End: 2023-12-12

## 2023-12-12 RX ORDER — ENOXAPARIN SODIUM 100 MG/ML
40 INJECTION SUBCUTANEOUS EVERY 24 HOURS
Status: DISCONTINUED | OUTPATIENT
Start: 2023-12-12 | End: 2023-12-14 | Stop reason: HOSPADM

## 2023-12-12 RX ADMIN — METHOCARBAMOL 500 MG: 500 TABLET ORAL at 08:12

## 2023-12-12 RX ADMIN — METHOCARBAMOL 500 MG: 500 TABLET ORAL at 12:12

## 2023-12-12 RX ADMIN — POTASSIUM BICARBONATE 40 MEQ: 391 TABLET, EFFERVESCENT ORAL at 05:12

## 2023-12-12 RX ADMIN — DOCUSATE SODIUM 100 MG: 100 CAPSULE, LIQUID FILLED ORAL at 08:12

## 2023-12-12 RX ADMIN — GABAPENTIN 300 MG: 300 CAPSULE ORAL at 08:12

## 2023-12-12 RX ADMIN — POTASSIUM & SODIUM PHOSPHATES POWDER PACK 280-160-250 MG 2 PACKET: 280-160-250 PACK at 12:12

## 2023-12-12 RX ADMIN — ACETAMINOPHEN 1000 MG: 500 TABLET ORAL at 09:12

## 2023-12-12 RX ADMIN — ENOXAPARIN SODIUM 40 MG: 40 INJECTION SUBCUTANEOUS at 08:12

## 2023-12-12 RX ADMIN — ACETAMINOPHEN 1000 MG: 500 TABLET ORAL at 05:12

## 2023-12-12 RX ADMIN — IBUPROFEN 600 MG: 600 TABLET, FILM COATED ORAL at 09:12

## 2023-12-12 RX ADMIN — IBUPROFEN 600 MG: 600 TABLET, FILM COATED ORAL at 05:12

## 2023-12-12 RX ADMIN — CEFAZOLIN 2 G: 2 INJECTION, POWDER, FOR SOLUTION INTRAMUSCULAR; INTRAVENOUS at 08:12

## 2023-12-12 RX ADMIN — POTASSIUM & SODIUM PHOSPHATES POWDER PACK 280-160-250 MG 2 PACKET: 280-160-250 PACK at 02:12

## 2023-12-12 RX ADMIN — PANCRELIPASE 1 CAPSULE: 24000; 76000; 120000 CAPSULE, DELAYED RELEASE PELLETS ORAL at 08:12

## 2023-12-12 RX ADMIN — METHOCARBAMOL 500 MG: 500 TABLET ORAL at 04:12

## 2023-12-12 RX ADMIN — OXYCODONE HYDROCHLORIDE 5 MG: 5 TABLET ORAL at 06:12

## 2023-12-12 RX ADMIN — OXYCODONE HYDROCHLORIDE 5 MG: 5 TABLET ORAL at 08:12

## 2023-12-12 RX ADMIN — IBUPROFEN 600 MG: 600 TABLET, FILM COATED ORAL at 02:12

## 2023-12-12 RX ADMIN — MUPIROCIN: 20 OINTMENT TOPICAL at 08:12

## 2023-12-12 RX ADMIN — GABAPENTIN 300 MG: 300 CAPSULE ORAL at 02:12

## 2023-12-12 RX ADMIN — MUPIROCIN: 20 OINTMENT TOPICAL at 10:12

## 2023-12-12 RX ADMIN — HYDROMORPHONE HYDROCHLORIDE 0.5 MG: 0.5 INJECTION, SOLUTION INTRAMUSCULAR; INTRAVENOUS; SUBCUTANEOUS at 08:12

## 2023-12-12 RX ADMIN — ACETAMINOPHEN 1000 MG: 500 TABLET ORAL at 02:12

## 2023-12-12 RX ADMIN — SODIUM CHLORIDE, SODIUM LACTATE, POTASSIUM CHLORIDE, AND CALCIUM CHLORIDE 500 ML: .6; .31; .03; .02 INJECTION, SOLUTION INTRAVENOUS at 05:12

## 2023-12-12 RX ADMIN — OXYCODONE HYDROCHLORIDE 5 MG: 5 TABLET ORAL at 04:12

## 2023-12-12 NOTE — CARE UPDATE
SICU PLAN OF CARE NOTE    Dx: Malignant neoplasm of upper-outer quadrant of left breast in female, estrogen receptor negative    Shift Events: Q1H flapchecks completed, VALENTINA drainage monitored, pain regimen used for management, K replaced    Goals of Care: Daily labs    Neuro: AAO x4, Follows Commands, and Moves All Extremities    Vital Signs: /64 (BP Location: Left arm, Patient Position: Lying)   Pulse 99   Temp 98.9 °F (37.2 °C) (Oral)   Resp 14   LMP 11/21/2023   SpO2 95%   Breastfeeding No     Respiratory: Nasal Cannula    Diet: Clear Liquid    Gtts: MIVF    Urine Output: Urinary Catheter 540   cc/shift    Drains: L. Hip VALENTINA drain: 52 mL  R. Hip VALENTINA drain:10 mL  L. Breast VALENTINA drain: 45 mL  R. Breast VALENTINA drain: 67 mL    Flap Checks Q1H to both breasts. Internal doppler used Q1H for bilateral pulse checks. No changes to palpation, temperature, or color noted to flaps.       Labs/Accuchecks: Daily labs.    Skin: Foams in place, waffle mattress topper in place. No breakdown noted at this time. Rolling w/ 1x assist.

## 2023-12-12 NOTE — OP NOTE
Date of procedure - 12/11/2023  Preoperative diagnosis - invasive left breast cancer  Postoperative diagnosis - same  Procedure - skin sparing total right mastectomy for prophylaxis  Surgeon - Edi  Assist - Tormos  Anesthesia - general  Blood loss - 100 cc  Indications - this 38-year-old patient underwent mastectomy for a left breast cancer she presents at this time for a 2nd stage in her reconstruction and has opted for prophylactic right mastectomy  Operative report in detail - patient brought to the operating room placed in supine position prepped and draped in sterile fashion after satisfactory general anesthesia was induced  A wise incision pattern was fashioned by the plastic surgery group and we followed that incision to include the nipple-areolar complex and down to the inframammary fold  Flaps were then circumferentially raised cephalad to the clavicle medially to the midline inferiorly to the inframammary fold and laterally to the midaxillary line  The breast was dissected off of the underlying pectoralis fascia  The specimen was removed and oriented with a long lateral and short superior stitch  The viability of the flaps were checked and looked to be excellent  We analyzed the resected tissue and felt positive about our resection of breast tissue  Case was then turned over to Dr. Barrios for reconstruction

## 2023-12-12 NOTE — CARE UPDATE
Dr. Lewis at the bedside, aware of decreased UOP of 20 mL for the hour of 0600, flap status, and other assessment findings. No orders at this time, discussion of care plan for the day.

## 2023-12-12 NOTE — CARE UPDATE
Dr. Lewis at the bedside. Aware of flap check assessment findings, I&O's, VS, and other assessment findings. Aware of HR previously in 70's/80's increasing into mid 90's and 99.6 oral fever. No orders at this time.

## 2023-12-12 NOTE — SUBJECTIVE & OBJECTIVE
Int hx: NAOE, doppler checks appropriate and flaps soft.      Follow-up For: Procedure(s) (LRB):  REMOVAL, TISSUE EXPANDER (Left)  RECONSTRUCTION, BREAST, USING KANDICE FREE FLAP (Bilateral)  MASTECTOMY (Right)  REVISION, SCAR (Right)  spy (N/A)    Post-Operative Day: 1     Past Medical History:   Diagnosis Date    Anemia     Anxiety     Breast cancer     Encounter for blood transfusion     Hypertension     Meningitis     Severe obesity 2023    Shortness of breath 2023       Past Surgical History:   Procedure Laterality Date    AXILLARY NODE DISSECTION Left 2023    Procedure: LYMPHADENECTOMY, AXILLARY;  Surgeon: YASMANI Weiner MD;  Location: [facility];  Service: General;  Laterality: Left;    BREAST BIOPSY Left      SECTION      COLONOSCOPY N/A 2022    Procedure: COLONOSCOPY;  Surgeon: Davi Chery MD;  Location: [facility] (4TH FLR);  Service: Endoscopy;  Laterality: N/A;  case request entered from referral - Per DR. KATHLEEN Chery Pt to be scheduled for urgent colonoscopy on 22/ fully vaccinated / prep ins for Sutab on portal - ERW  see micro for negative C-diff- ERW    INJECTION FOR SENTINEL NODE IDENTIFICATION Left 2023    Procedure: INJECTION, FOR SENTINEL NODE IDENTIFICATION;  Surgeon: YASMANI Weiner MD;  Location: [facility];  Service: General;  Laterality: Left;    INSERTION OF BREAST TISSUE EXPANDER Left 2023    Procedure: INSERTION, TISSUE EXPANDER, BREAST;  Surgeon: Reese Barrios DO;  Location: [facility];  Service: Plastics;  Laterality: Left;    INSERTION OF TUNNELED CENTRAL VENOUS CATHETER (CVC) WITH SUBCUTANEOUS PORT N/A 2022    Procedure: [id number];  Surgeon: Deo Sin Jr., MD;  Location: [facility] 2ND FLR;  Service: General;  Laterality: N/A;    INSERTION OF TUNNELED CENTRAL VENOUS CATHETER (CVC) WITH SUBCUTANEOUS PORT N/A 2022    Procedure: INSERTION, SINGLE LUMEN CATHETER WITH PORT, WITH FLUOROSCOPIC GUIDANCE;  Surgeon: Ryder HUBER  MD Bar;  Location: [facility] CATH LAB;  Service: Vascular;  Laterality: N/A;    INTRAVENOUS INJECTION N/A 12/11/2023    Procedure: spy;  Surgeon: Reese Barrios DO;  Location: [facility] OR [facility] FLR;  Service: Plastics;  Laterality: N/A;    MASTECTOMY Left 2/27/2023    Procedure: MASTECTOMY;  Surgeon: YASMANI Weiner MD;  Location: [facility] OR;  Service: General;  Laterality: Left;  3.5 TOTAL HOURS / EMAIL SENT 2-23 @ 7:56 CCC    MASTECTOMY Right 12/11/2023    Procedure: MASTECTOMY;  Surgeon: Eldon Daley MD;  [facility];  Service: General;  Laterality: Right;    RECONSTRUCTION OF BREAST WITH DEEP INFERIOR EPIGASTRIC ARTERY  (KANDICE) FREE FLAP Bilateral 12/11/2023    Procedure: RECONSTRUCTION, BREAST, USING KANDICE FREE FLAP;  Surgeon: Reese Barrios DO;  [facility];  Service: Plastics;  Laterality: Bilateral;  Bilateral KANDICE flap - cosurgeon is Dr Cross -    REVISION OF SCAR Right 12/11/2023    Procedure: REVISION, SCAR;  Surgeon: Reese Barrios DO;  [facility];  Service: Plastics;  Laterality: Right;    SENTINEL LYMPH NODE BIOPSY Left 2/27/2023    Procedure: BIOPSY, LYMPH NODE, SENTINEL;  Surgeon: YASMANI Weiner MD;  Location: [facility];  Service: General;  Laterality: Left;  LEFT with radiological marker    TISSUE EXPANDER REMOVAL Left 12/11/2023    Procedure: REMOVAL, TISSUE EXPANDER;  Surgeon: Reese Barrios DO;  [facility];  Service: Plastics;  Laterality: Left;  removal left tissu expander       Review of patient's allergies indicates:   Allergen Reactions    Strawberries [strawberry] Hives       Family History       Problem Relation (Age of Onset)    Bladder Cancer Paternal Grandmother    Diabetes Father    Hypertension Father    Pancreatic cancer Maternal Uncle          Tobacco Use    Smoking status: Never    Smokeless tobacco: Never   Substance and Sexual Activity    Alcohol use: Not Currently     Comment: social     Drug use: Never    Sexual activity: Not Currently        Objective:     Vital Signs (Most Recent):  Temp: 98.9 °F (37.2 °C) (12/12/23 0423)  Pulse: 102 (12/12/23 0630)  Resp: 15 (12/12/23 0423)  BP: (!) 118/56 (12/12/23 0630)  SpO2: 95 % (12/12/23 0630) Vital Signs (24h Range):  Temp:  [98 °F (36.7 °C)-99.6 °F (37.6 °C)] 98.9 °F (37.2 °C)  Pulse:  [] 102  Resp:  [1-32] 15  SpO2:  [93 %-100 %] 95 %  BP: (116-151)/(56-90) 118/56     Weight: 110.2 kg (242 lb 15.2 oz)  Body mass index is 39.21 kg/m².      Intake/Output Summary (Last 24 hours) at 12/12/2023 0651  Last data filed at 12/12/2023 0600  Gross per 24 hour   Intake 5293.24 ml   Output 2150 ml   Net 3143.24 ml            Physical Exam  Constitutional:       General: She is not in acute distress.     Appearance: Normal appearance.   HENT:      Head: Normocephalic and atraumatic.      Nose: Nose normal.   Eyes:      Extraocular Movements: Extraocular movements intact.      Conjunctiva/sclera: Conjunctivae normal.   Cardiovascular:      Rate and Rhythm: Normal rate and regular rhythm.      Pulses: Normal pulses.      Heart sounds: Normal heart sounds.   Pulmonary:      Effort: Pulmonary effort is normal.      Breath sounds: Normal breath sounds.   Abdominal:      General: Abdomen is flat. Bowel sounds are normal.      Tenderness: There is abdominal tenderness.      Comments: Appropriate post op tenderness   Musculoskeletal:         General: No swelling or tenderness. Normal range of motion.      Cervical back: Neck supple.   Skin:     General: Skin is warm and dry.      Capillary Refill: Capillary refill takes less than 2 seconds.      Comments: Bilateral breast dressings CDI under supportive bra   Abdominal binder present, 4 VALENTINA drains with serosanguinous output present   Neurological:      General: No focal deficit present.      Mental Status: She is alert.   Psychiatric:         Mood and Affect: Mood normal.         Behavior: Behavior normal.             Vents:  Oxygen Concentration (%): 24 (12/12/23 0455)    Lines/Drains/Airways       Central Venous Catheter Line  Duration                  PowerPort A Cath Single Lumen 09/05/22 0323 right subclavian 463 days              Drain  Duration                  Closed/Suction Drain 12/11/23 Tube - 1 Lateral;Right Chest Bulb 15 Fr. 1 day         Closed/Suction Drain 12/11/23 Tube - 2 Lateral;Left Chest Bulb 15 Fr. 1 day         Closed/Suction Drain 12/11/23 1358 Tube - 1 Left Hip Bulb 15 Fr. <1 day         Closed/Suction Drain 12/11/23 1359 Tube - 2 Right Hip Bulb 15 Fr. <1 day         Urethral Catheter 12/11/23 0730 Silicone;Temperature probe 14 Fr. <1 day              Peripheral Intravenous Line  Duration                  Peripheral IV - Single Lumen 12/11/23 0640 20 G Right Antecubital 1 day         Peripheral IV - Single Lumen 12/11/23 1853 20 G Posterior;Right Hand <1 day                    Significant Labs:    CBC/Anemia Profile:  Recent Labs   Lab 12/12/23  0329   WBC 6.45   HGB 9.3*   HCT 27.3*      MCV 94   RDW 13.3        Chemistries:  Recent Labs   Lab 12/12/23  0329      K 3.2*      CO2 23   BUN 10   CREATININE 0.8   CALCIUM 7.5*   ALBUMIN 3.0*   PROT 5.3*   BILITOT 0.6   ALKPHOS 42*   ALT 11   AST 28       All pertinent labs within the past 24 hours have been reviewed.    Significant Imaging: I have reviewed all pertinent imaging results/findings within the past 24 hours.

## 2023-12-12 NOTE — H&P
[facility] - Surgical Intensive Care  Critical Care - Surgery  History & Physical    Patient Name: Laura Kent  MRN: [id number]  Admission Date: 2023  Code Status: Full Code  Attending Physician: Reese Barrios DO   Primary Care Provider: Yessy Cruz MD   Principal Problem: Malignant neoplasm of upper-outer quadrant of left breast in female, estrogen receptor negative    Subjective:     HPI:  38y.o. female with a history of IIIC (cT3, cN2(f), cM0, G3, ER-, W[place]-, HER2-)  invasive ductal carcinoma of the left breast, sp left mastectomy and axillary lymph node dissection and TE placement on 2023 with Dr. Weiner. Underwent chemotherapy, 2022 - 2022, managed by Dr. Cruz and radiation therapy 2023 - 2023 with Dr. Bess.     Presented to hospital today for planned right breast prophylactic mastectomy and subsequent KANDICE reconstruction of bilateral breasts. Presented to SICU post op for further management. Upon arrival pt HDS, extubated, vitals within normal limits, not expressing any uncontrolled pain.     Hospital/ICU Course:  No notes on file    Follow-up For: Procedure(s) (LRB):  REMOVAL, TISSUE EXPANDER (Left)  RECONSTRUCTION, BREAST, USING KANDICE FREE FLAP (Bilateral)  MASTECTOMY (Right)  REVISION, SCAR (Right)  spy (N/A)    Post-Operative Day: Day of Surgery     Past Medical History:   Diagnosis Date    Anemia     Anxiety     Breast cancer     Encounter for blood transfusion     Hypertension     Meningitis     Severe obesity 2023    Shortness of breath 2023       Past Surgical History:   Procedure Laterality Date    AXILLARY NODE DISSECTION Left 2023    Procedure: LYMPHADENECTOMY, AXILLARY;  Surgeon: YASMANI Weiner MD;  Location: [facility];  Service: General;  Laterality: Left;    BREAST BIOPSY Left      SECTION      COLONOSCOPY N/A 2022    Procedure: COLONOSCOPY;  Surgeon: Davi Chery MD;  Location: [address]);  Service:  Endoscopy;  Laterality: N/A;  case request entered from referral - Per DR. KATHLEEN Chery Pt to be scheduled for urgent colonoscopy on 9/22/22/ fully vaccinated / prep ins for Sutab on portal - ERW  see micro for negative C-diff- ERW    INJECTION FOR SENTINEL NODE IDENTIFICATION Left 2/27/2023    Procedure: INJECTION, FOR SENTINEL NODE IDENTIFICATION;  Surgeon: YASMANI Weiner MD;  Location: [facility];  Service: General;  Laterality: Left;    INSERTION OF BREAST TISSUE EXPANDER Left 2/27/2023    Procedure: INSERTION, TISSUE EXPANDER, BREAST;  Surgeon: Reese Barrios DO;  Location: [facility];  Service: Plastics;  Laterality: Left;    INSERTION OF TUNNELED CENTRAL VENOUS CATHETER (CVC) WITH SUBCUTANEOUS PORT N/A 4/5/2022    Procedure: [id number];  Surgeon: Deo Sin Jr., MD;  Location: [address];  Service: General;  Laterality: N/A;    INSERTION OF TUNNELED CENTRAL VENOUS CATHETER (CVC) WITH SUBCUTANEOUS PORT N/A 8/1/2022    Procedure: INSERTION, SINGLE LUMEN CATHETER WITH PORT, WITH FLUOROSCOPIC GUIDANCE;  Surgeon: Ryder Dinero MD;  Location: [facility] CATH [facility];  Service: Vascular;  Laterality: N/A;    MASTECTOMY Left 2/27/2023    Procedure: MASTECTOMY;  Surgeon: YASMANI Weiner MD;  Location: [facility];  Service: General;  Laterality: Left;  3.5 TOTAL HOURS / EMAIL SENT 2-23 @ 7:56 [facility]    SENTINEL LYMPH NODE BIOPSY Left 2/27/2023    Procedure: BIOPSY, LYMPH NODE, SENTINEL;  Surgeon: YASMANI Weiner MD;  Location: [facility];  Service: General;  Laterality: Left;  LEFT with radiological marker       Review of patient's allergies indicates:   Allergen Reactions    Strawberries [strawberry] Hives       Family History       Problem Relation (Age of Onset)    Bladder Cancer Paternal Grandmother    Diabetes Father    Hypertension Father    Pancreatic cancer Maternal Uncle          Tobacco Use    Smoking status: Never    Smokeless tobacco: Never   Substance and Sexual Activity    Alcohol use: Not Currently      Comment: social    Drug use: Never    Sexual activity: Not Currently      Review of Systems   Reason unable to perform ROS: Post-operatively sedated.     Objective:     Vital Signs (Most Recent):  Temp: 98.4 °F (36.9 °C) (12/11/23 0636)  Pulse: 83 (12/11/23 1640)  Resp: 18 (12/11/23 0636)  BP: 139/77 (12/11/23 0647) Vital Signs (24h Range):  Temp:  [98.4 °F (36.9 °C)] 98.4 °F (36.9 °C)  Pulse:  [66-83] 83  Resp:  [18] 18  BP: (139)/(77) 139/77        There is no height or weight on file to calculate BMI.      Intake/Output Summary (Last 24 hours) at 12/11/2023 1730  Last data filed at 12/11/2023 1618  Gross per 24 hour   Intake 3900 ml   Output 1175 ml   Net 2725 ml          Physical Exam  Constitutional:       General: She is not in acute distress.     Appearance: Normal appearance.   HENT:      Head: Normocephalic and atraumatic.      Nose: Nose normal.   Eyes:      Extraocular Movements: Extraocular movements intact.      Conjunctiva/sclera: Conjunctivae normal.   Cardiovascular:      Rate and Rhythm: Normal rate and regular rhythm.      Pulses: Normal pulses.      Heart sounds: Normal heart sounds.   Pulmonary:      Effort: Pulmonary effort is normal.      Breath sounds: Normal breath sounds.   Abdominal:      General: Abdomen is flat. Bowel sounds are normal.      Tenderness: There is abdominal tenderness.   Musculoskeletal:         General: No swelling or tenderness. Normal range of motion.      Cervical back: Neck supple.   Skin:     General: Skin is warm and dry.      Capillary Refill: Capillary refill takes less than 2 seconds.      Comments: Bilateral breast dressings CDI under supportive bra   Abdominal binder present, 4 VALENTINA drains with serosanguinous output present   Neurological:      General: No focal deficit present.      Mental Status: She is alert.   Psychiatric:         Mood and Affect: Mood normal.         Behavior: Behavior normal.            Vents:       Lines/Drains/Airways       Central  "Venous Catheter Line  Duration                  PowerPort A Cath Single Lumen 09/05/22 0323 right subclavian 462 days              Drain  Duration                  Closed/Suction Drain 12/11/23 1358 Tube - 1 Left Hip Bulb 15 Fr. <1 day         Closed/Suction Drain 12/11/23 1359 Tube - 2 Right Hip Bulb 15 Fr. <1 day         Closed/Suction Drain 12/11/23 Tube - 1 Lateral;Right Chest Bulb 15 Fr. <1 day         Closed/Suction Drain 12/11/23 Tube - 2 Lateral;Left Chest Bulb 15 Fr. <1 day         Urethral Catheter 12/11/23 0730 Silicone;Temperature probe 14 Fr. <1 day              Peripheral Intravenous Line  Duration                  Peripheral IV - Single Lumen 12/11/23 0640 20 G Right Antecubital <1 day                    Significant Labs:    CBC/Anemia Profile:  No results for input(s): "WBC", "HGB", "HCT", "PLT", "MCV", "RDW", "IRON", "FERRITIN", "RETIC", "FOLATE", "[id number]", "OCCULTBLOOD" in the last 48 hours.     Chemistries:  No results for input(s): "NA", "K", "CL", "CO2", "BUN", "CREATININE", "CALCIUM", "ALBUMIN", "PROT", "BILITOT", "ALKPHOS", "ALT", "AST", "GLUCOSE", "MG", "PHOS" in the last 48 hours.    All pertinent labs within the past 24 hours have been reviewed.    Significant Imaging: I have reviewed all pertinent imaging results/findings within the past 24 hours.  Assessment/Plan:     Oncology  * Malignant neoplasm of upper-outer quadrant of left breast in female, estrogen receptor negative  Laura Kent is a 37 yo female with history of invasive ductal carcinoma of the left breast, sp left mastectomy and axillary lymph node dissection and TE placement on 2/27/2023 with Dr. Weiner. Presents to the SICU sp right prophylactic skin sparing mastectomy, L tissue expander removal, BL KANDICE. Admitted to SICU for further management and flap checks       Neuro/Psych:   -- Sedation: none  -- Pain: mmd, dilaudid 0.5 q3h prn, oxy 5 q4h prn              Cards:   -- HDS  -- MAPs > 65      Pulm:   -- Goal " O2 sat > 90%  -- Wean as able      Renal:  -- Keep hitchcock for strict I/O, plan for dc of hitchcock tomorrow  -- BUN/Cr   Recent Labs   Lab 12/04/23  1402   BUN 16   CREATININE 0.7     -- Urine output: 1.1L in 24 hours       FEN / GI:   -- Net pending  -- Replace lytes as needed  -- Nutrition: CLD, will plan to adv tomorrow      ID:   -- Tm: afebrile  Recent Labs   Lab 12/04/23  1402   WBC 3.65*     -- Abx none      Heme/Onc:  -- H/H pending  -- Daily CBC  Recent Labs   Lab 12/04/23  1402   HGB 11.0*   *         Endo:   -- Gluc goal 140-180  -- LDSSI prn       PPx:   Feeding: Clear liquid diet   Analgesia/Sedation: none / none   Thromboembolic prevention: lovenox tomorrow  HOB >30: yes  Stress Ulcer ppx: not indicated  Glucose control: Critical care goal 140-180 g/dl  Bowel reg: miralax  Invasive Lines/Drains/Airway: hitchcock  Deescalation: n/a        Dispo/Code Status/Palliative:   -- SICU / Full Code            Paula Fabian MD  Critical Care - Surgery  [facility] - Surgical Intensive Care

## 2023-12-12 NOTE — PLAN OF CARE
[facility] - Surgical Intensive Care  Initial Discharge Assessment       Primary Care Provider: Yessy Cruz MD    Admission Diagnosis: Malignant neoplasm of upper-outer quadrant of left breast in female, estrogen receptor negative [C50.412, Z17.1]  Personal history of breast cancer [Z85.3]    Admission Date: 12/11/2023  Expected Discharge Date:     Transition of Care Barriers: Underinsured    Payor: MEDICAID / Plan: [facility]CT / Product Type: Managed Medicaid /     Extended Emergency Contact Information  Primary Emergency Contact: Brielle Winkler  Mobile Phone: [phone]  Relation: Sister  Secondary Emergency Contact: yarelikellen  Mobile Phone: [phone]  Relation: Mother    Discharge Plan A: Home, Home with family  Discharge Plan B: Home with family, Home      [id number] [place], [place] - [address] AT [facility] [address]  [address]  [facility] [address]  Phone: [phone] Fax: [phone]    [facility]  [address] [facility] [address]  Phone: [phone] Fax: [phone]    [facility]  [address] [facility] [address]  [facility] [address]  Phone: [phone] Fax: [phone]      Initial Assessment (most recent)       Adult Discharge Assessment - 12/12/23 1040          Discharge Assessment    Assessment Type Discharge Planning Assessment     Confirmed/corrected address, phone number and insurance Yes     Confirmed Demographics Correct on Facesheet     Source of Information patient     When was your last doctors appointment? 12/04/23     Communicated MRIIAN with patient/caregiver Date not available/Unable to determine     People in Home child(rico), dependent     Do you expect to return to your current living situation? Yes     Do you have help at home or someone to help you manage your care at home? No     Prior to hospitilization cognitive status: No Deficits     Current cognitive status: No Deficits      Walking or Climbing Stairs Difficulty no     Dressing/Bathing Difficulty no     Home Layout Bathroom on 2nd floor;Bedroom on 2nd floor     Equipment Currently Used at Home none     Readmission within 30 days? No     Patient currently being followed by outpatient case management? No     Do you currently have service(s) that help you manage your care at home? No     Do you take prescription medications? Yes     Do you have prescription coverage? Yes     Do you have any problems affording any of your prescribed medications? No     Is the patient taking medications as prescribed? yes     Who is going to help you get home at discharge? FAMILY     How do you get to doctors appointments? family or friend will provide     Are you on dialysis? No     Do you take coumadin? No     Discharge Plan A Home;Home with family     Discharge Plan B Home with family;Home     DME Needed Upon Discharge  none     Discharge Plan discussed with: Patient;Sibling     Transition of Care Barriers Underinsured        Physical Activity    On average, how many days per week do you engage in moderate to strenuous exercise (like a brisk walk)? Patient refused     On average, how many minutes do you engage in exercise at this level? Patient refused        Financial Resource Strain    How hard is it for you to pay for the very basics like food, housing, medical care, and heating? Patient refused        Housing Stability    In the last 12 months, was there a time when you were not able to pay the mortgage or rent on time? Patient refused     In the last 12 months, was there a time when you did not have a steady place to sleep or slept in a shelter (including now)? Patient refused        Transportation Needs    In the past 12 months, has lack of transportation kept you from medical appointments or from getting medications? Patient refused     In the past 12 months, has lack of transportation kept you from meetings, work, or from getting things needed  for daily living? Patient refused        Food Insecurity    Within the past 12 months, you worried that your food would run out before you got the money to buy more. Patient refused     Within the past 12 months, the food you bought just didn't last and you didn't have money to get more. Patient refused        Stress    Do you feel stress - tense, restless, nervous, or anxious, or unable to sleep at night because your mind is troubled all the time - these days? Patient refused        Social Connections    In a typical week, how many times do you talk on the phone with family, friends, or neighbors? Patient refused     How often do you get together with friends or relatives? Patient refused     How often do you attend [employer] or Church services? Patient refused     Do you belong to any clubs or organizations such as [employer] groups, unions, fraternal or athletic groups, or school groups? Patient refused     How often do you attend meetings of the clubs or organizations you belong to? Patient refused     Are you , , , , never , or living with a partner? Patient refused        Alcohol Use    Q1: How often do you have a drink containing alcohol? Patient refused     Q2: How many drinks containing alcohol do you have on a typical day when you are drinking? Patient refused     Q3: How often do you have six or more drinks on one occasion? Patient refused                 Discharge Plan A HOME and Plan B HOME have been determined by review of patient's clinical status, future medical and therapeutic needs, and coverage/benefits for post-acute care in coordination with multidisciplinary team members.  PATIENT LIVES IN A SECOND FLOOR apartment with her children 9 & 11 she is not on dialysis and does not take coumdin she has a ride home

## 2023-12-12 NOTE — OP NOTE
[facility] - Surgery ([facility])  Plastic Surgery  Operative Note    SUMMARY     Date of Procedure: 12/11/2023     Location:  [facility] [address]    Procedure(s): Procedure(s) (LRB):  MASTECTOMY (Right)    Left delayed KANDICE flap reconstruction  Right immediate KANDICE flap reconstruction  Left tissue expander removal with total capsulectomy  ICG angiography to assess flap perfusion  Excision of hypertrophic port site scar with layered closure, 5 cm  Bilateral tap and intercostal rib blocks with Exparel for postoperative pain control      Surgeon(s) and Role:  Panel 1:     * Reese Barrios DO - Primary     * Johnnie Rosario MD - Resident - Assisting     * Mg Lewis MD - Fellow     * Reese Cross MD - Co-Surgeon  Panel 2:     * Eldon Daley MD - Primary     * Samantha Hannon MD - Resident - Assisting    Assistant: ERMA Bacon    Pre-Operative Diagnosis: Malignant neoplasm of upper-outer quadrant of left breast in female, estrogen receptor negative [C50.412, Z17.1]  High-risk for breast cancer  Hypertrophic right upper chest port site scar    Post-Operative Diagnosis: same    Anesthesia: General    Indications for Procedure:  38-year-old woman with a history of left breast cancer.  She underwent chemotherapy and radiation and has a tissue expander placed at the time of her mastectomy.  She presents now for delayed autologous reconstruction.  She also requested contralateral mastectomy for risk reduction and symmetry.  The plan for today was a right mastectomy with bilateral KANDICE flap reconstruction.  She also had a hypertrophic port site scar that was bothersome to her that we also revised.    Operative Findings (including complications, if any):  Right mastectomy 1437 g  Removal of 750 cc tissue expander  Micro information:  Donor site:  Right hemiabdomen  Recipient site:  Left breast  Weight:  877 gm  Number of perforators:  3  Pedicle Artery:  Deep inferior  epigastric  Recipient Artery:  TYLER  Arterial anastomosis size:  2.5 mm  Recipient vein:  IMV  Vein  size:  3 mm  Additional Vein:  None    Micro information:  Donor site:  Left hemiabdomen  Recipient site:  Right breast  Weight:  868 gm  Number of perforators:  3  Pedicle Artery:  Deep inferior epigastric  Recipient Artery:  TYLER  Arterial anastomosis size:  2.5 mm  Recipient vein:  IMV  Vein  size:  3 mm  Additional Vein:  None      Description of Procedures:  Patient was seen in the preoperative holding area.  A conservative long narrow wise pattern was marked on the right breast.  Chest port site was marked.  Also her lower abdominal skin fold and midline of her abdomen were marked.  Patient was taken the operating room.  General anesthesia was induced.  Bilateral KANDICE flaps were drawn on her abdomen in the dominant perforators were marked based on her CTA.  Both breasts and the abdomen were prepped and draped in sterile fashion.  Lidocaine with epinephrine was injected in the planned area of the incisions.    Please see Dr. Daley's note for his portion of the procedure that was the right mastectomy.    I began with elevating the left alma abdominal flap.  Lower abdominal incision was made.  The patient had a previous  and there was no superficial vein.  Dissected straight down to the anterior abdominal wall fascia.  Next the upper border was incised.  I beveled upwards to capture the higher perforators that she had.  The flap was then elevated from lateral to medial.  It was covered 1 large high lateral , 1 more centrally located .  Next using a 15 blade and single hooks I incised around the umbilicus incised the midline between the flaps.  The flap was then elevated from lateral to medial.  2 moderate-sized perforators were discovered medially as well.  I began by opening the fascia between the central and lateral perforators down towards the groin.  There was a short  intramuscular course and the main pedicle was identified.  It was perforators were freed from the fascia  dissection was completed through the muscle.  After those 2 perforators were isolated I then opened the fascia from the lateral incision to the medial perforators.  One seemed to be septal cutaneous wrap around the rectus muscle.  The wound was divided.  There was a 2nd 1 next to it there was a branch of the main pedicle.  Short amount of muscle had to be divided to connect these 2.  The  was completed as well.  The flap was then isolated on these 3 perforators.  Dissected down towards the groin until there was adequate pedicle length.  Windows were created.  The flap was then temporarily stapled into place.      For the right alma abdominal flap was incised the lower and upper borders in the same way.  So elevated lateral to medial then medial to lateral.  I was able to identify 1 large lateral  at the level of the umbilicus and a large medial  few cm below the.  I opened the fascial laterally towards the groin.  There was a small  in line that was also captured.  Both of those 2 lateral perforators were freed from the fascia.  It has a longer intramuscular course.  Before the main pedicle was identified and then dissected down towards the groin.  Maintain that is small  in line.  There was a large branch download that was identified, to the medial .  That had a short intramuscular course and a long superficial course.  The  was also skeletonized and freed from the fascia.  The flap was then isolated on these 3 perforators and windows were created.  The flap was then stapled back down.    The vertical and medial incisions of the patient's previous wise pattern over her tissue expanders were incised.  The capsule was entered and the tissue expander was removed intact.  It was 750 cc.  Next a total capsulectomy was performed to remove  all of the old capsule.  Additional space was opened superiorly medially to accommodate the flap.  Next the pectoralis muscle was incised over the 3rd rib.  The 3rd rib was identified.  The perichondrium was scored.  Next the cartilaginous portion was removed using a rongeur.  Using an elevator and the bipolar the posterior perichondrium and intercostal muscles were removed in block.  This exposed the TYLER in the IMV.  They were skeletonized for a couple cm until we had adequate length and caliber.  4% lidocaine and a wet Ray-Ada were placed over the vessels and moist lap was placed in the pocket.    After mastectomy on the right side similar exposure was done.  There was a large caliber TYLER and IMV on the right side.  Much larger than on the left side which had been radiated.  Then intercostal rib blocks were performed with a mixture of Marcaine and Exparel.  Fifteen [place] Russ drains were placed in the anterior axillary line and sewn into place.      Next ICG angiography was performed.  4 cc of reconstituted ICG was injected by anesthesia.  Using the spy phi system angiography was performed.  There was slow filling of each flap.  On the right alma abdominal flap majority of the filling was higher and more medial.  The lower border and lateral edge of the flap did not feel well.  It was not surprising given we would higher perforators and also she would very large circumflex iliac vessels.  On the left side although the filling was slow the flap filled well except for the lateral edge of the flap.  Both mastectomy flaps were examined.  No areas need to be removed on the new mastectomy.  There was a wedge of medial skin that did not perfused that had to be removed on the left breast.  Smaller flaps were outlined based on the areas of perfusion on the abdomen.  Medial portion were left with the epidermis but the remainder of the flap was de-epithelialized.  Her alma abdominal flaps were much larger than she needed  for her breast reconstruction.      We began by transferring the right alma abdominal flap to the left chest 1st.  Clips were placed on the TYLER and the veins.  The medial vein was used in 2 clips were placed on the lateral vein.  After the flap was removed the vessels were checked for hemostasis.  The flap was irrigated with 10 cc of warm heparinized saline.  The flap was weighed it weighed 877 g.  The flap was then affixed to the left chest in a warm lap pad.  The pedicle vessels were skeletonized.  Additionally some additional skeletal fixation was done on the TYLER and the IMV.  The IMV was oriented with a marking pen.  A green clamp was placed proximally and divided distally with a small clip.  Both veins were measured and a 3 mm  was felt to be appropriate.  That was done without incident.  Next the TYLER was divided.  A double green approximating clamp was used.  End-to-end anastomosis was performed.  A putting and a front wall stitch, the suture caught and tore through the wall.  That anastomosis was then cut out and redone.  End-to-end anastomosis was done with interrupted 9 0 nylon sutures.  When the clamps were removed there was 1 bleeding area that was controlled with a single stitch.  Afterwards there was a strong palpable pulse.  Internal cook Doppler was placed on the artery distal to the anastomosis.  The flap was sutured to the chest with several 2-0 Vicryl sutures and the skin was temporarily stapled closed.    Next the left alma abdominal flap was harvested.  It was similar fashion was clipped.  We chose a double clipped the medial vein as the lateral vein was larger.  The pedicle was checked for hemostasis.  Similarly the flap was irrigated with heparinized saline the pedicle was oriented.  The flap weighed over 791 g.  It was also affixed to the chest.  The vessels were cleaned.  3 mm  was appropriate.  It was similar fashion of venous end-to-end anastomosis was done using a 3.0 mm  .  Next the arterial anastomosis was done using end-to-end fashion with interrupted 9 0 nylon sutures.  The clamps were removed there was no bleeding from the anastomosis.  There was a small branch that was bleeding between the vein was controlled with micro clips..  There was a palpable pulse in the pedicle.  An internal cook Doppler was placed.  The flap was also sewn to the chest using interrupted Vicryl suture.    Next the skin was tacked on both sides.  I chose to leave a U shaped skin paddle at 6:00 a.m. on each breasts.  They were measured and checked for symmetry.  Some additional mastectomy skin was removed and sent for permanent pathology.  Additionally an area of skin the left breast had to be removed because it did not spy well.  The pedicle paddles were fairly symmetric.  They were temporarily stapled.  Both breasts were closed with buried 3-0 Monocryl and running 3-0 Stratafix suture.      While the micro anastomosis it was being done the donor site was closed.  20 cc of Marcaine and Exparel was injected on each side for a tap block.  Fifteen [place] Russ drains were placed bilaterally.  The muscle was reapproximated with 2-0 Vicryl suture.  The anterior abdominal wall fascia was closed with a horizontal mattress 0 Prolene and a running 0 V lock suture.  The abdomen was able to be with minimal flexing of the bed after some uncommon normal.  The inset stapled.  Sarah's layer was closed with a running 0 PDO barbed suture.  Skin was closed with buried 3-0 Monocryl and running 3-0 Strattice horizontal incision was made overlying the umbilicus.  It was brought through the abdominal wall and sutured in place with buried 3-0 Monocryl and running 5 0 plain gut suture    The patient had a hypertrophic scar over her previous Port-A-Cath site.  That was incised in elliptical fashion using a knife.  The incision measured about 5 cm.  That was closed in layers with interrupted 3-0 Monocryl in the deep  dermis followed by running 3-0 Monocryl and a few 5 0 plain gut sutures.      The lower abdominal incision the horizontal portions Of the breast incisions and the Port-A-Cath scar were dressed with Prineo tape.  Dermabond was placed around the skin paddles.  The umbilicus was packed with Xeroform and covered with a Mepilex.  Island dressings were placed over the lower abdominal incisions.  Drains were placed to suction.  Tegaderm were placed over the cook Dopplers.  CHD drain dressings were used.  Patient was padded with ABD pads placed in a postoperative bra and an abdominal binder.      There were no concerns for flap perfusion after the anastomosis.  She tolerated the procedure well and was taken to the ICU , extubated, for q.1h flap checks.    Significant Surgical Tasks Conducted by the Assistant(s), if Applicable: The presence of an additional attending surgeon functioning as a co-surgeon  was required due to the complexity of the procedure relative to any available residents    Estimated Blood Loss (EBL):  200 cc           Implants:   Implant Name Type Inv. Item Serial No.  Lot No. LRB No. Used Action   JOHN SHIN TISSUE EXPANDER    [id number]  [id number] Left 1 Explanted    MICROVAS ANSTMS 3.0MM - [id number]   [employer] ANSTMS 3.0MM  [employer] [id number] Left 1 Implanted    MICROVAS ANSTMS 3.0MM - [id number]   MICROVAS ANSTMS 3.0MM  [employer] [id number] Right 1 Implanted       Specimens:   Specimen (24h ago, onward)       Start     Ordered    12/11/23 1607  Specimen to Pathology, Surgery Breast  Once        Comments: Pre-op Diagnosis: Malignant neoplasm of upper-outer quadrant of left breast in female, estrogen receptor negative [C50.412, Z17.1]Procedure(s):REMOVAL, TISSUE EXPANDERRECONSTRUCTION, BREAST, USING KANDICE FREE FLAPMASTECTOMY Number of specimens: 4Name of specimens: 1. Left tissue expander- gross only 2. Right mastectomy,  short- superior, long - lateral  -perm 3. Left breast capsule -perm 4. Left breast mastectomy skin, hx of cancer - perm     References:    Click here for ordering Quick Tip   Question Answer Comment   Procedure Type: Breast    Specimen Class: Known or suspected malignancy    Which provider would you like to cc? COMFORT ROLON    Release to patient Immediate        12/11/23 0871                            Condition: Good    Disposition: ICU - extubated and stable., q.1h flap checks.  Bedrest and clear liquids overnight    Attestation: I was present and scrubbed for the key portions of the procedure.

## 2023-12-12 NOTE — PROGRESS NOTES
Plastic and Reconstructive Surgery   Progress Note    Subjective:      Patient seen and examined at bedside. No acute event overnight. Patient complains of 8/10 pain but medications are helping. Bilateral dopplers strong biphasic signals. Flaps soft.     Objective:  Vital signs in last 24 hours:  Temp:  [98 °F (36.7 °C)-99.6 °F (37.6 °C)] 98.9 °F (37.2 °C)  Pulse:  [] 102  Resp:  [1-32] 15  SpO2:  [93 %-100 %] 95 %  BP: (116-151)/(56-90) 118/56    Intake/Output last 3 shifts:  I/O last 3 completed shifts:  In: 3900 [I.V.:1000; IV Piggyback:2900]  Out: 1385 [Urine:1325; Drains:60]    Intake/Output this shift:  I/O this shift:  In: 1393.2 [P.O.:500; I.V.:844.4; IV Piggyback:48.8]  Out: 765 [Urine:540; Drains:225]        Physical Exam:  VITAL SIGNS:   Vitals:    23 0545 23 0600 23 0615 23 0630   BP:  (!) 123/59  (!) 118/56   BP Location:  Left arm     Patient Position:  Lying     Pulse: 94 99 101 102   Resp:       Temp:       TempSrc:       SpO2: 95% 96% 95% 95%   Weight:       Height:         TMAX: Temp (24hrs), Av °F (37.2 °C), Min:98 °F (36.7 °C), Max:99.6 °F (37.6 °C)    General: Alert; No acute distress  Cardiovascular: Regular rate   Respiratory: Normal respiratory effort. Chest rise symmetric.   Abdomen: Soft, nontender, nondistended, drains with ss output, dressings c/d/i  Extremity: Moves all extremities equally.  Neurologic: No focal deficit. Speech normal  Breasts: strong dopplers bilaterally, flaps/breast soft, ss output from bilateral drains      Scheduled Medications acetaminophen, 1,000 mg, Q8H  ceFAZolin (ANCEF) IVPB, 2 g, Q8H  docusate sodium, 100 mg, BID  enoxparin, 40 mg, Q24H (treatment, non-standard time)  gabapentin, 300 mg, TID  ibuprofen, 600 mg, Q8H  lipase-protease-amylase 24,000-76,000-120,000 units, 1 capsule, TID WM  methocarbamoL, 500 mg, QID  mupirocin, , BID      PRN Medications HYDROmorphone, ondansetron, oxyCODONE, prochlorperazine    Recent Labs:    Lab Results   Component Value Date    WBC 6.45 12/12/2023    HGB 9.3 (L) 12/12/2023    HCT 27.3 (L) 12/12/2023    MCV 94 12/12/2023     12/12/2023     Lab Results   Component Value Date     (H) 12/12/2023     12/12/2023    K 3.2 (L) 12/12/2023     12/12/2023    BUN 10 12/12/2023         Assessment: 38 y.o. y/o female 1 Day Post-Op s/p Procedure(s):  REMOVAL, TISSUE EXPANDER  RECONSTRUCTION, BREAST, USING KANDICE FREE FLAP  MASTECTOMY  REVISION, SCAR  spy Doing well postoperatively.    Plan  Reg diet  DC IVF  DC hitchcock   MM pain control  OOB to chair/ambulate/IS  Lovenox  Monitor drain output  Flaps Q1H  Likely downgrade this afternoon      Mg Lewis DO- Fellow  Department of Plastic and Reconstructive Surgery  [phone] (office)

## 2023-12-12 NOTE — PROGRESS NOTES
[facility] - Surgical Intensive Care  Critical Care - Surgery  Progress Note    Patient Name: Laura Kent  MRN: [id number]  Admission Date: 2023  Hospital Length of Stay: 1 days  Code Status: Full Code  Attending Provider: Reese Barrios DO  Primary Care Provider: Yessy Cruz MD   Principal Problem: Malignant neoplasm of upper-outer quadrant of left breast in female, estrogen receptor negative    Subjective:     Int hx: NAOE, doppler checks appropriate and flaps soft. Pain controlled with prn's.    Follow-up For: Procedure(s) (LRB):  REMOVAL, TISSUE EXPANDER (Left)  RECONSTRUCTION, BREAST, USING KANDICE FREE FLAP (Bilateral)  MASTECTOMY (Right)  REVISION, SCAR (Right)  spy (N/A)    Post-Operative Day: 1     Past Medical History:   Diagnosis Date    Anemia     Anxiety     Breast cancer     Encounter for blood transfusion     Hypertension     Meningitis     Severe obesity 2023    Shortness of breath 2023       Past Surgical History:   Procedure Laterality Date    AXILLARY NODE DISSECTION Left 2023    Procedure: LYMPHADENECTOMY, AXILLARY;  Surgeon: YASMANI Weiner MD;  Location: [facility];  Service: General;  Laterality: Left;    BREAST BIOPSY Left      SECTION      COLONOSCOPY N/A 2022    Procedure: COLONOSCOPY;  Surgeon: Davi Chery MD;  Location: [address]);  Service: Endoscopy;  Laterality: N/A;  case request entered from referral - Per DR. KATHLEEN Chery Pt to be scheduled for urgent colonoscopy on 22/ fully vaccinated / prep ins for Sutab on portal - ERW  see micro for negative C-diff- ERW    INJECTION FOR SENTINEL NODE IDENTIFICATION Left 2023    Procedure: INJECTION, FOR SENTINEL NODE IDENTIFICATION;  Surgeon: YASMANI Weiner MD;  Location: [facility];  Service: General;  Laterality: Left;    INSERTION OF BREAST TISSUE EXPANDER Left 2023    Procedure: INSERTION, TISSUE EXPANDER, BREAST;  Surgeon: Reese Barrios DO;  Location: [facility];   Service: Plastics;  Laterality: Left;    INSERTION OF TUNNELED CENTRAL VENOUS CATHETER (CVC) WITH SUBCUTANEOUS PORT N/A 4/5/2022    Procedure: [id number];  Surgeon: Deo Sin Jr., MD;  [facility];  Service: General;  Laterality: N/A;    INSERTION OF TUNNELED CENTRAL VENOUS CATHETER (CVC) WITH SUBCUTANEOUS PORT N/A 8/1/2022    Procedure: INSERTION, SINGLE LUMEN CATHETER WITH PORT, WITH FLUOROSCOPIC GUIDANCE;  Surgeon: yRder Dinero MD;  Location: [facility] CATH LAB;  Service: Vascular;  Laterality: N/A;    INTRAVENOUS INJECTION N/A 12/11/2023    Procedure: spy;  Surgeon: Reese Barrios DO;  [facility];  Service: Plastics;  Laterality: N/A;    MASTECTOMY Left 2/27/2023    Procedure: MASTECTOMY;  Surgeon: YASMANI Weiner MD;  Location: [facility];  Service: General;  Laterality: Left;  3.5 TOTAL HOURS / EMAIL SENT 2-23 @ 7:56 CCC    MASTECTOMY Right 12/11/2023    Procedure: MASTECTOMY;  Surgeon: Eldon Daley MD;  Location: [facility] OR [address] [place] [address];  Service: General;  Laterality: Right;    RECONSTRUCTION OF BREAST WITH DEEP INFERIOR EPIGASTRIC ARTERY  (KANDICE) FREE FLAP Bilateral 12/11/2023    Procedure: RECONSTRUCTION, BREAST, USING KANDICE FREE FLAP;  Surgeon: Reese Barrios DO;  Location: [facility] OR [address] [place] [address];  Service: Plastics;  Laterality: Bilateral;  Bilateral KANDICE flap - cosurgeon is Dr Cross -    REVISION OF SCAR Right 12/11/2023    Procedure: REVISION, SCAR;  Surgeon: Reese Barrios DO;  [facility];  Service: Plastics;  Laterality: Right;    SENTINEL LYMPH NODE BIOPSY Left 2/27/2023    Procedure: BIOPSY, LYMPH NODE, SENTINEL;  Surgeon: YASMANI Weiner MD;  Location: [facility];  Service: General;  Laterality: Left;  LEFT with radiological marker    TISSUE EXPANDER REMOVAL Left 12/11/2023    Procedure: REMOVAL, TISSUE EXPANDER;  Surgeon: Reese Barrios DO;  [facility];  Service: Plastics;  Laterality: Left;   removal left tissu expander       Review of patient's allergies indicates:   Allergen Reactions    Strawberries [strawberry] Hives       Family History       Problem Relation (Age of Onset)    Bladder Cancer Paternal Grandmother    Diabetes Father    Hypertension Father    Pancreatic cancer Maternal Uncle          Tobacco Use    Smoking status: Never    Smokeless tobacco: Never   Substance and Sexual Activity    Alcohol use: Not Currently     Comment: social    Drug use: Never    Sexual activity: Not Currently        Objective:     Vital Signs (Most Recent):  Temp: 98.9 °F (37.2 °C) (12/12/23 0423)  Pulse: 102 (12/12/23 0630)  Resp: 15 (12/12/23 0423)  BP: (!) 118/56 (12/12/23 0630)  SpO2: 95 % (12/12/23 0630) Vital Signs (24h Range):  Temp:  [98 °F (36.7 °C)-99.6 °F (37.6 °C)] 98.9 °F (37.2 °C)  Pulse:  [] 102  Resp:  [1-32] 15  SpO2:  [93 %-100 %] 95 %  BP: (116-151)/(56-90) 118/56     Weight: 110.2 kg (242 lb 15.2 oz)  Body mass index is 39.21 kg/m².      Intake/Output Summary (Last 24 hours) at 12/12/2023 0651  Last data filed at 12/12/2023 0600  Gross per 24 hour   Intake 5293.24 ml   Output 2150 ml   Net 3143.24 ml            Physical Exam  Constitutional:       General: She is not in acute distress.     Appearance: Normal appearance.   HENT:      Head: Normocephalic and atraumatic.      Nose: Nose normal.   Eyes:      Extraocular Movements: Extraocular movements intact.      Conjunctiva/sclera: Conjunctivae normal.   Cardiovascular:      Rate and Rhythm: Normal rate and regular rhythm.      Pulses: Normal pulses.      Heart sounds: Normal heart sounds.   Pulmonary:      Effort: Pulmonary effort is normal.      Breath sounds: Normal breath sounds.   Abdominal:      General: Abdomen is flat. Bowel sounds are normal.      Tenderness: There is abdominal tenderness.      Comments: Appropriate post op tenderness   Musculoskeletal:         General: No swelling or tenderness. Normal range of motion.       Cervical back: Neck supple.   Skin:     General: Skin is warm and dry.      Capillary Refill: Capillary refill takes less than 2 seconds.      Comments: Bilateral breast dressings CDI under supportive bra   Abdominal binder present, 4 VALENTINA drains with serosanguinous output present   Neurological:      General: No focal deficit present.      Mental Status: She is alert.   Psychiatric:         Mood and Affect: Mood normal.         Behavior: Behavior normal.            Vents:  Oxygen Concentration (%): 24 (12/12/23 0455)    Lines/Drains/Airways       Central Venous Catheter Line  Duration                  PowerPort A Cath Single Lumen 09/05/22 0323 right subclavian 463 days              Drain  Duration                  Closed/Suction Drain 12/11/23 Tube - 1 Lateral;Right Chest Bulb 15 Fr. 1 day         Closed/Suction Drain 12/11/23 Tube - 2 Lateral;Left Chest Bulb 15 Fr. 1 day         Closed/Suction Drain 12/11/23 1358 Tube - 1 Left Hip Bulb 15 Fr. <1 day         Closed/Suction Drain 12/11/23 1359 Tube - 2 Right Hip Bulb 15 Fr. <1 day         Urethral Catheter 12/11/23 0730 Silicone;Temperature probe 14 Fr. <1 day              Peripheral Intravenous Line  Duration                  Peripheral IV - Single Lumen 12/11/23 0640 20 G Right Antecubital 1 day         Peripheral IV - Single Lumen 12/11/23 1853 20 G Posterior;Right Hand <1 day                    Significant Labs:    CBC/Anemia Profile:  Recent Labs   Lab 12/12/23  0329   WBC 6.45   HGB 9.3*   HCT 27.3*      MCV 94   RDW 13.3        Chemistries:  Recent Labs   Lab 12/12/23  0329      K 3.2*      CO2 23   BUN 10   CREATININE 0.8   CALCIUM 7.5*   ALBUMIN 3.0*   PROT 5.3*   BILITOT 0.6   ALKPHOS 42*   ALT 11   AST 28       All pertinent labs within the past 24 hours have been reviewed.    Significant Imaging: I have reviewed all pertinent imaging results/findings within the past 24 hours.  Assessment/Plan:     Oncology  * Malignant neoplasm of  upper-outer quadrant of left breast in female, estrogen receptor negative  Laura Kent is a 39 yo female with history of invasive ductal carcinoma of the left breast, sp left mastectomy and axillary lymph node dissection and TE placement on 2/27/2023 with Dr. Weiner. Presents to the SICU sp right prophylactic skin sparing mastectomy, L tissue expander removal, BL KANDICE. Admitted to SICU for further management and flap checks. Doing well       Neuro/Psych:   -- Sedation: none  -- Pain: mmd, gabapentin 300 TID, ibuprofen 600 q 8, robaxin 500 q4, tylenol 1000 q8, dilaudid 0.5 q3h prn, oxy 5 q4h prn   -- nausea prn             Cards:   -- HDS  -- MAPs > 65      Pulm:   -- Goal O2 sat > 90%  -- Wean as able      Renal:  -- dc hitchcock today  Recent Labs   Lab 12/12/23  0329   BUN 10   CREATININE 0.8       -- Urine output: 1.8L in 24 hours (1.9 cc/kg/hr)      FEN / GI:   -- Net +3.1L  -- Replace lytes as needed  -- Nutrition: reg diet  -- dc IVF      ID:   -- Tm: afebrile  -- WBC 6.4  -- Abx cefazolin 24 hrs post op      Heme/Onc:  -- H/H 9.3/27.3     MSK:  -- flap checks q1   -- OOB, ambulate       Endo:   -- Gluc goal 140-180  -- No concerns      PPx:   Feeding: reg  Analgesia/Sedation: controlled  Thromboembolic prevention: lovenox   HOB >30: yes  Stress Ulcer ppx: not indicated  Glucose control: Critical care goal 140-180 g/dl  Bowel reg: miralax  Invasive Lines/Drains/Airway: hitchcock  Deescalation: dc hitchcock        Dispo/Code Status/Palliative:   -- SICU / Full Code  --  likely downgrade to floor this pm           Paula Fabian MD  Critical Care - Surgery  [facility] - Surgical Intensive Care

## 2023-12-12 NOTE — ASSESSMENT & PLAN NOTE
Laura Kent is a 39 yo female with history of invasive ductal carcinoma of the left breast, sp left mastectomy and axillary lymph node dissection and TE placement on 2/27/2023 with Dr. Weiner. Presents to the SICU sp right prophylactic skin sparing mastectomy, L tissue expander removal, BL KANDICE. Admitted to SICU for further management and flap checks. Doing well       Neuro/Psych:   -- Sedation: none  -- Pain: mmd, gabapentin 300 TID, ibuprofen 600 q 8, robaxin 500 q4, tylenol 1000 q8, dilaudid 0.5 q3h prn, oxy 5 q4h prn   -- nausea prn             Cards:   -- HDS  -- MAPs > 65      Pulm:   -- Goal O2 sat > 90%  -- Wean as able      Renal:  -- dc coretta today  Recent Labs   Lab 12/12/23  0329   BUN 10   CREATININE 0.8       -- Urine output: 1.8L in 24 hours (1.9 cc/kg/hr)      FEN / GI:   -- Net +3.1L  -- Replace lytes as needed  -- Nutrition: reg diet  -- dc IVF      ID:   -- Tm: afebrile  -- WBC 6.4  -- Abx cefazolin 24 hrs post op      Heme/Onc:  -- H/H 9.3/27.3     MSK:  -- flap checks q1   -- OOB, ambulate       Endo:   -- Gluc goal 140-180  -- No concerns      PPx:   Feeding: reg  Analgesia/Sedation: controlled  Thromboembolic prevention: lovenox   HOB >30: yes  Stress Ulcer ppx: not indicated  Glucose control: Critical care goal 140-180 g/dl  Bowel reg: miralax  Invasive Lines/Drains/Airway: hitchcock  Deescalation: dc coretta        Dispo/Code Status/Palliative:   -- SICU / Full Code  --  likely downgrade to floor this pm

## 2023-12-13 PROCEDURE — 20600001 HC STEP DOWN PRIVATE ROOM

## 2023-12-13 PROCEDURE — 63600175 PHARM REV CODE 636 W HCPCS: Performed by: STUDENT IN AN ORGANIZED HEALTH CARE EDUCATION/TRAINING PROGRAM

## 2023-12-13 PROCEDURE — 63600175 PHARM REV CODE 636 W HCPCS: Performed by: SURGERY

## 2023-12-13 PROCEDURE — 25000003 PHARM REV CODE 250: Performed by: STUDENT IN AN ORGANIZED HEALTH CARE EDUCATION/TRAINING PROGRAM

## 2023-12-13 RX ORDER — FAMOTIDINE 10 MG/ML
20 INJECTION INTRAVENOUS 2 TIMES DAILY
Status: DISCONTINUED | OUTPATIENT
Start: 2023-12-13 | End: 2023-12-14 | Stop reason: HOSPADM

## 2023-12-13 RX ADMIN — IBUPROFEN 600 MG: 600 TABLET, FILM COATED ORAL at 05:12

## 2023-12-13 RX ADMIN — IBUPROFEN 600 MG: 600 TABLET, FILM COATED ORAL at 10:12

## 2023-12-13 RX ADMIN — HYDROMORPHONE HYDROCHLORIDE 0.5 MG: 0.5 INJECTION, SOLUTION INTRAMUSCULAR; INTRAVENOUS; SUBCUTANEOUS at 04:12

## 2023-12-13 RX ADMIN — GABAPENTIN 300 MG: 300 CAPSULE ORAL at 02:12

## 2023-12-13 RX ADMIN — MUPIROCIN: 20 OINTMENT TOPICAL at 08:12

## 2023-12-13 RX ADMIN — OXYCODONE HYDROCHLORIDE 5 MG: 5 TABLET ORAL at 08:12

## 2023-12-13 RX ADMIN — ENOXAPARIN SODIUM 40 MG: 40 INJECTION SUBCUTANEOUS at 08:12

## 2023-12-13 RX ADMIN — HYDROMORPHONE HYDROCHLORIDE 0.5 MG: 0.5 INJECTION, SOLUTION INTRAMUSCULAR; INTRAVENOUS; SUBCUTANEOUS at 10:12

## 2023-12-13 RX ADMIN — IBUPROFEN 600 MG: 600 TABLET, FILM COATED ORAL at 02:12

## 2023-12-13 RX ADMIN — FAMOTIDINE 20 MG: 10 INJECTION, SOLUTION INTRAVENOUS at 06:12

## 2023-12-13 RX ADMIN — FAMOTIDINE 20 MG: 10 INJECTION, SOLUTION INTRAVENOUS at 08:12

## 2023-12-13 RX ADMIN — DOCUSATE SODIUM 100 MG: 100 CAPSULE, LIQUID FILLED ORAL at 08:12

## 2023-12-13 RX ADMIN — ACETAMINOPHEN 1000 MG: 500 TABLET ORAL at 02:12

## 2023-12-13 RX ADMIN — GABAPENTIN 300 MG: 300 CAPSULE ORAL at 08:12

## 2023-12-13 RX ADMIN — OXYCODONE HYDROCHLORIDE 5 MG: 5 TABLET ORAL at 03:12

## 2023-12-13 RX ADMIN — METHOCARBAMOL 500 MG: 500 TABLET ORAL at 05:12

## 2023-12-13 RX ADMIN — ACETAMINOPHEN 1000 MG: 500 TABLET ORAL at 05:12

## 2023-12-13 RX ADMIN — ONDANSETRON 4 MG: 2 INJECTION INTRAMUSCULAR; INTRAVENOUS at 06:12

## 2023-12-13 RX ADMIN — ACETAMINOPHEN 1000 MG: 500 TABLET ORAL at 10:12

## 2023-12-13 RX ADMIN — METHOCARBAMOL 500 MG: 500 TABLET ORAL at 02:12

## 2023-12-13 RX ADMIN — METHOCARBAMOL 500 MG: 500 TABLET ORAL at 08:12

## 2023-12-13 NOTE — NURSING
Patient arrived from ICU to room 1048. Escorted by ICU primary nurse. External doppler in two sites to left breast completed and internal dopplers checked with transferring nurse.     VSS. Denies pain or any needs at this time. Call light in reach.

## 2023-12-13 NOTE — ANESTHESIA POSTPROCEDURE EVALUATION
Anesthesia Post Evaluation    Patient: Laura Kent    Procedure(s) Performed: Procedure(s) (LRB):  REMOVAL, TISSUE EXPANDER (Left)  RECONSTRUCTION, BREAST, USING KANDICE FREE FLAP (Bilateral)  MASTECTOMY (Right)  REVISION, SCAR (Right)  spy (N/A)    Final Anesthesia Type: general      Patient location during evaluation: ICU  Patient participation: Yes- Able to Participate  Level of consciousness: awake and alert and oriented  Post-procedure vital signs: reviewed and stable  Pain management: adequate  Airway patency: patent    PONV status at discharge: No PONV  Anesthetic complications: no      Cardiovascular status: hemodynamically stable  Respiratory status: unassisted, spontaneous ventilation and room air  Hydration status: euvolemic  Follow-up not needed.              Vitals Value Taken Time   /69 12/13/23 1216   Temp 36.9 °C (98.5 °F) 12/13/23 1216   Pulse 97 12/13/23 1216   Resp 18 12/13/23 1216   SpO2 95 % 12/13/23 1216         No case tracking events are documented in the log.      Pain/Martell Score: Pain Rating Prior to Med Admin: 7 (12/13/2023  2:31 PM)  Pain Rating Post Med Admin: 3 (12/13/2023  5:25 AM)

## 2023-12-13 NOTE — PROGRESS NOTES
Plastic and Reconstructive Surgery   Progress Note    Subjective:      Patient seen and examined at bedside. No acute event overnight. Pain controlled. Flaps soft with bilateral dopplers signals the same. Complains of indigestion and nausea which improved with meds. Patient was downgraded to [facility].    Objective:  Vital signs in last 24 hours:  Temp:  [98.1 °F (36.7 °C)-99.3 °F (37.4 °C)] 99 °F (37.2 °C)  Pulse:  [] 105  Resp:  [15-20] 18  SpO2:  [91 %-96 %] 95 %  BP: (119-147)/(58-81) 138/65    Intake/Output last 3 shifts:  I/O last 3 completed shifts:  In: 2306.9 [P.O.:800; I.V.:908.1; IV Piggyback:598.8]  Out: 1680 [Urine:1140; Drains:540]    Intake/Output this shift:  No intake/output data recorded.        Physical Exam:  VITAL SIGNS:   Vitals:    23 0331 23 0455 23 0541 23 0738   BP:   127/64 138/65   BP Location:   Right leg Left leg   Patient Position:   Lying Sitting   Pulse:   102 105   Resp: 15 16 20 18   Temp:   98.1 °F (36.7 °C) 99 °F (37.2 °C)   TempSrc:   Oral Oral   SpO2:   (!) 93% 95%   Weight:       Height:         TMAX: Temp (24hrs), Av.7 °F (37.1 °C), Min:98.1 °F (36.7 °C), Max:99.3 °F (37.4 °C)    General: Alert; No acute distress  Cardiovascular: Regular rate   Respiratory: Normal respiratory effort. Chest rise symmetric.   Abdomen: Soft, nontender, nondistended, drains with ss output, dressings c/d/i  Extremity: Moves all extremities equally.  Neurologic: No focal deficit. Speech normal  Breasts: biphasic dopplers bilaterally, flaps/breast soft, ss output from bilateral drains      Scheduled Medications acetaminophen, 1,000 mg, Q8H  docusate sodium, 100 mg, BID  enoxparin, 40 mg, Q24H (treatment, non-standard time)  famotidine (PF), 20 mg, BID  gabapentin, 300 mg, TID  ibuprofen, 600 mg, Q8H  methocarbamoL, 500 mg, QID  mupirocin, , BID      PRN Medications HYDROmorphone, ondansetron, oxyCODONE, prochlorperazine    Recent Labs:   Lab Results   Component Value  Date    WBC 6.45 12/12/2023    HGB 9.3 (L) 12/12/2023    HCT 27.3 (L) 12/12/2023    MCV 94 12/12/2023     12/12/2023     Lab Results   Component Value Date     (H) 12/12/2023     12/12/2023    K 3.2 (L) 12/12/2023     12/12/2023    BUN 10 12/12/2023         Assessment: 38 y.o. y/o female 2 Days Post-Op s/p Procedure(s):  REMOVAL, TISSUE EXPANDER  RECONSTRUCTION, BREAST, USING KANDICE FREE FLAP  MASTECTOMY  REVISION, SCAR  spy Doing well postoperatively.    Plan  Reg diet   MM pain control  OOB to chair/ambulate/IS  Lovenox  Monitor drain output  Flaps Q4H  Anti-emetics PRN  Pepcid bid  Monitor drain outputs      Mg Lewis DO- Fellow  Department of Plastic and Reconstructive Surgery  [phone] (office)

## 2023-12-13 NOTE — NURSING
Nurses Note -- 4 Eyes      12/13/2023   1:18 PM      Skin assessed during: Transfer      [] No Altered Skin Integrity Present    []Prevention Measures Documented      [x] Yes- Altered Skin Integrity Present or Discovered   [x] LDA Added if Not in Epic (surgical incisions already on LDA)   [] New Altered Skin Integrity was Present on Admit and Documented in LDA    [] Wound Image Taken    Wound Care Consulted? No    Attending Nurse:  Gloria Comer LPN     Second RN/Staff Member: Ariana Kim RN

## 2023-12-14 ENCOUNTER — PATIENT MESSAGE (OUTPATIENT)
Dept: OBSTETRICS AND GYNECOLOGY | Facility: CLINIC | Age: 38
End: 2023-12-14
Payer: MEDICAID

## 2023-12-14 VITALS
HEIGHT: 66 IN | OXYGEN SATURATION: 96 % | DIASTOLIC BLOOD PRESSURE: 60 MMHG | BODY MASS INDEX: 39.04 KG/M2 | WEIGHT: 242.94 LBS | RESPIRATION RATE: 20 BRPM | HEART RATE: 94 BPM | TEMPERATURE: 99 F | SYSTOLIC BLOOD PRESSURE: 128 MMHG

## 2023-12-14 PROCEDURE — 63600175 PHARM REV CODE 636 W HCPCS: Performed by: SURGERY

## 2023-12-14 PROCEDURE — 25000003 PHARM REV CODE 250: Performed by: STUDENT IN AN ORGANIZED HEALTH CARE EDUCATION/TRAINING PROGRAM

## 2023-12-14 PROCEDURE — 63600175 PHARM REV CODE 636 W HCPCS: Performed by: STUDENT IN AN ORGANIZED HEALTH CARE EDUCATION/TRAINING PROGRAM

## 2023-12-14 RX ORDER — ACETAMINOPHEN 500 MG
1000 TABLET ORAL EVERY 8 HOURS
Qty: 42 TABLET | Refills: 0 | Status: SHIPPED | OUTPATIENT
Start: 2023-12-14 | End: 2023-12-21

## 2023-12-14 RX ORDER — IBUPROFEN 600 MG/1
600 TABLET ORAL 3 TIMES DAILY
Qty: 21 TABLET | Refills: 0 | Status: SHIPPED | OUTPATIENT
Start: 2023-12-14 | End: 2023-12-21

## 2023-12-14 RX ORDER — OXYCODONE HYDROCHLORIDE 5 MG/1
5 TABLET ORAL EVERY 4 HOURS PRN
Qty: 10 TABLET | Refills: 0 | Status: SHIPPED | OUTPATIENT
Start: 2023-12-14 | End: 2024-01-02

## 2023-12-14 RX ORDER — GABAPENTIN 300 MG/1
300 CAPSULE ORAL 3 TIMES DAILY
Qty: 21 CAPSULE | Refills: 0 | Status: SHIPPED | OUTPATIENT
Start: 2023-12-14 | End: 2024-03-22

## 2023-12-14 RX ORDER — METHOCARBAMOL 500 MG/1
500 TABLET, FILM COATED ORAL 4 TIMES DAILY
Qty: 40 TABLET | Refills: 0 | Status: SHIPPED | OUTPATIENT
Start: 2023-12-14 | End: 2023-12-24

## 2023-12-14 RX ORDER — GABAPENTIN 300 MG/1
600 CAPSULE ORAL 3 TIMES DAILY
Qty: 42 CAPSULE | Refills: 0 | Status: SHIPPED | OUTPATIENT
Start: 2023-12-14 | End: 2023-12-14 | Stop reason: HOSPADM

## 2023-12-14 RX ADMIN — METHOCARBAMOL 500 MG: 500 TABLET ORAL at 08:12

## 2023-12-14 RX ADMIN — ENOXAPARIN SODIUM 40 MG: 40 INJECTION SUBCUTANEOUS at 08:12

## 2023-12-14 RX ADMIN — ACETAMINOPHEN 1000 MG: 500 TABLET ORAL at 05:12

## 2023-12-14 RX ADMIN — MUPIROCIN: 20 OINTMENT TOPICAL at 08:12

## 2023-12-14 RX ADMIN — HYDROMORPHONE HYDROCHLORIDE 0.5 MG: 0.5 INJECTION, SOLUTION INTRAMUSCULAR; INTRAVENOUS; SUBCUTANEOUS at 02:12

## 2023-12-14 RX ADMIN — METHOCARBAMOL 500 MG: 500 TABLET ORAL at 01:12

## 2023-12-14 RX ADMIN — FAMOTIDINE 20 MG: 10 INJECTION, SOLUTION INTRAVENOUS at 09:12

## 2023-12-14 RX ADMIN — GABAPENTIN 300 MG: 300 CAPSULE ORAL at 08:12

## 2023-12-14 RX ADMIN — IBUPROFEN 600 MG: 600 TABLET, FILM COATED ORAL at 05:12

## 2023-12-14 RX ADMIN — DOCUSATE SODIUM 100 MG: 100 CAPSULE, LIQUID FILLED ORAL at 08:12

## 2023-12-14 RX ADMIN — HYDROMORPHONE HYDROCHLORIDE 0.5 MG: 0.5 INJECTION, SOLUTION INTRAMUSCULAR; INTRAVENOUS; SUBCUTANEOUS at 08:12

## 2023-12-14 RX ADMIN — IBUPROFEN 600 MG: 600 TABLET, FILM COATED ORAL at 01:12

## 2023-12-14 NOTE — PLAN OF CARE
[facility]  Discharge Final Note    Primary Care Provider: Yessy Cruz MD    Expected Discharge Date: 12/14/2023    Final Discharge Note (most recent)       Final Note - 12/14/23 1309          Final Note    Assessment Type Final Discharge Note     Anticipated Discharge Disposition Home or Self Care     Hospital Resources/Appts/Education Provided Provided patient/caregiver with written discharge plan information        Post-Acute Status    Patient choice form signed by patient/caregiver List with quality metrics by geographic area provided     Discharge Delays None known at this time                     Important Message from Medicare               Pt d/c home with family. No d/c needs reported by medical team at this time.     Ghada Gill LCSW  Case Management/[facility]  [phone]

## 2023-12-14 NOTE — PROGRESS NOTES
..[facility] Plan of Care Note  Dx breast ca    Shift Events pain control    Goals of Care: dc    Neuro: intact    Vital Signs: wdl    Respiratory: ra    Diet: reg    Is patient tolerating current diet? yes    GTTS: na    Urine Output/Bowel Movement: adequate, lbm 12/10    Drains/Tubes/Tube Feeds (include total output/shift): 2 jaclyn's L side- breast and hip, 2 jaclyn's R side- breast and hip    Lines: piv      Accuchecks:none    Skin: bilat breast incision, lower transverse abd    Fall Risk Score: 8    Activity level? ind    Any scheduled procedures? none    Any safety concerns? no    Other: n/a

## 2023-12-14 NOTE — NURSING
"/60   Pulse 94   Temp 98.7 °F (37.1 °C)   Resp 20   Ht 5' 6" (1.676 m)   Wt 110.2 kg (242 lb 15.2 oz)   LMP 11/21/2023   SpO2 96%   Breastfeeding No   BMI 39.21 kg/m²     Patient discharged to home. Discharge instructions reviewed with patient. Prescriptions called in. IV removed, pressure held, dressing applied.  Pt wheeled down by transport services.  "

## 2023-12-14 NOTE — DISCHARGE INSTRUCTIONS
Plastic Surgery Discharge Instructions  Enoch Barrios, DO    Wound Care  1. Shower daily  2. Okay to let warm soapy water run over the wound at that time  3. Do not submerge wounds in the bath  4. Leave any skin glue or mesh tape in place    Drain Care  If you have drains, strip tubing and record output when drain bulb becomes half full, or at least twice daily  Record output for each drain and bring to our follow up appointment    Diet  Regular Diet    Activity  Light activity only - no lifting greater than 10 lbs  Avoid strenuous activity that would cause you to get hot or sweaty    Medications  You have been given a prescription for narcotic pain medication, anti-inflammatory pain, muscle relaxer, and nerve pain  Unless otherwise contraindicated by your doctor, take 2 extra strength Tylenol and 600mg of ibuprofen every 8 hours  Please take medications as prescribed     What to call for:  1. Sustained fever > 101.0  2. Changes in color, sensation, temperature or pain at surgical site  3. Redness and/or drainage from the surgical site  4. Any reaction to prescribed medications  5. Continuous bloody drainage from surgical drains  6. Wound vac malfunction  7. Questions related to the procedure    Follow-up  1. Please call [phone] to schedule or confirm your follow up visit at the [facility], [address] on 12/19/2023  2. Call with any questions or concerns.  2. After hours call [phone] - ask to speak with the Plastic Surgery fellow on call

## 2023-12-14 NOTE — DISCHARGE SUMMARY
Discharge Note      SUMMARY     Admit Date: 12/11/2023    Attending Physician: Reese Barrios DO     Discharge Physician: Reese Barrios DO    Discharge Date: 12/14/2023     Final Diagnosis: Malignant neoplasm of upper-outer quadrant of left breast in female, estrogen receptor negative [C50.412, Z17.1]    Hospital Course: Patient presented for bilateral KANDICE flaps for breast reconstruction on 12/11. Patient tolerated the procedure well with no apparent complications. She was admitted post operatively for normal post-operative flap monitoring. POD#1 patient hit all milestones so hitchcock was removed. Strong doppler signals bilaterally. She was placed on regular diet, encouraged to be OOB and ambulate. Patient continued to progress on POD#2 with strong doppler signals. Patient remained inpatient for one more day because of pain control and intermittent heart rate elevation which resolved. On POD#3, Dopplers were removed and patient showered. Later in the day, pain was controlled and patient was feeling very well. At that time, she was deemed medically stable for discharge. Patient discharged with prescriptions and scheduled to follow up with Dr. Barrios in office on 12/19/2023.      Disposition: Home or Self Care    Follow Up/Patient Instructions:   Current Discharge Medication List        START taking these medications    Details   acetaminophen (TYLENOL) 500 MG tablet Take 2 tablets (1,000 mg total) by mouth every 8 (eight) hours. for 7 days  Qty: 42 tablet, Refills: 0      methocarbamoL (ROBAXIN) 500 MG Tab Take 1 tablet (500 mg total) by mouth 4 (four) times daily. for 10 days  Qty: 40 tablet, Refills: 0      oxyCODONE (ROXICODONE) 5 MG immediate release tablet Take 1 tablet (5 mg total) by mouth every 4 (four) hours as needed for Pain.  Qty: 10 tablet, Refills: 0    Comments: Quantity prescribed more than 7 day supply? No           CONTINUE these medications which have CHANGED    Details   gabapentin  (NEURONTIN) 300 MG capsule Take 2 capsules (600 mg total) by mouth 3 (three) times daily. for 7 days  Qty: 42 capsule, Refills: 0      ibuprofen (ADVIL,MOTRIN) 600 MG tablet Take 1 tablet (600 mg total) by mouth 3 (three) times daily. for 7 days  Qty: 21 tablet, Refills: 0           CONTINUE these medications which have NOT CHANGED    Details   ondansetron (ZOFRAN) 8 MG tablet Take 1 tablet (8 mg total) by mouth every 12 (twelve) hours as needed for Nausea.  Qty: 30 tablet, Refills: 2    Associated Diagnoses: Malignant neoplasm of upper-outer quadrant of left breast in female, estrogen receptor negative      !! capecitabine (XELODA) 150 MG tablet Take 1 tablet (150 mg total) by mouth 2 (two) times daily for 14 days followed by 7 days off. Take with 1 other capecitabine prescription for 2,150 mg total.  Qty: 28 tablet, Refills: 2    Associated Diagnoses: Malignant neoplasm of upper-outer quadrant of left breast in female, estrogen receptor negative      !! capecitabine (XELODA) 150 MG tablet Take 1 tablet (150 mg total) by mouth 2 (two) times daily for 14 days followed by 7 days off. Take with 1 other capecitabine prescription for 2,150 mg total.  Qty: 28 tablet, Refills: 2    Associated Diagnoses: Malignant neoplasm of upper-outer quadrant of left breast in female, estrogen receptor negative      !! capecitabine (XELODA) 500 MG Tab Take 4 tablets (2,000 mg total) by mouth 2 (two) times daily for 14 days followed by 7 days off. Take with 1 other capecitabine prescription for 2,150 mg total.  Qty: 112 tablet, Refills: 2    Associated Diagnoses: Malignant neoplasm of upper-outer quadrant of left breast in female, estrogen receptor negative      !! capecitabine (XELODA) 500 MG Tab Take 4 tablets (2,000 mg total) by mouth 2 (two) times daily for 14 days followed by 7 days off. Take with 1 other capecitabine prescription for 2,150 mg total.  Qty: 112 tablet, Refills: 2    Associated Diagnoses: Malignant neoplasm of  upper-outer quadrant of left breast in female, estrogen receptor negative      cetirizine (ZYRTEC) 10 MG tablet Take 10 mg by mouth once daily.      clotrimazole (LOTRIMIN) 1 % cream Apply 1 application topically every evening.      hydrOXYzine HCL (ATARAX) 10 MG Tab Take 10 mg by mouth nightly as needed.      ketoconazole (NIZORAL) 2 % shampoo Apply topically Every 3 (three) days.      metFORMIN (GLUCOPHAGE-XR) 750 MG ER 24hr tablet Take 1 tablet (750 mg total) by mouth daily with breakfast.  Qty: 90 tablet, Refills: 1    Associated Diagnoses: Insulin resistance      mupirocin (BACTROBAN) 2 % ointment Apply topically once daily.  Qty: 30 g, Refills: 0    Associated Diagnoses: Malignant neoplasm of upper-outer quadrant of left breast in female, estrogen receptor negative      nystatin (MYCOSTATIN) ointment Apply topically 2 (two) times daily.  Qty: 15 g, Refills: 1    Associated Diagnoses: Yeast infection      nystatin (MYCOSTATIN) powder Apply topically 2 (two) times daily.  Qty: 30 g, Refills: 1    Associated Diagnoses: Yeast infection      pantoprazole (PROTONIX) 40 MG tablet Take 1 tablet (40 mg total) by mouth once daily.  Qty: 30 tablet, Refills: 1    Associated Diagnoses: Gastroesophageal reflux disease without esophagitis       !! - Potential duplicate medications found. Please discuss with provider.        STOP taking these medications       lipase-protease-amylase 24,000-76,000-120,000 units (CREON) 24,000-76,000 -120,000 unit capsule Comments:   Reason for Stopping:         HYDROcodone-acetaminophen (NORCO) 5-325 mg per tablet Comments:   Reason for Stopping:         traMADoL (ULTRAM) 50 mg tablet Comments:   Reason for Stopping:               Discharge Procedure Orders (must include Diet, Follow-up, Activity)   Discharge Procedure Orders (must include Diet, Follow-up, Activity)   Teach VALENTINA drain care and provide sheet to record output     Wear Surgical Bra and/or Girdle at all times (may remove for short  times to shower)   Order Comments: Wear Surgical Bra and/or Girdle at all times (may remove for short times to shower)     Call MD for:  temperature >100.4     Call MD for:  extreme fatigue     Call MD for:  persistent dizziness or light-headedness     Call MD for:  hives     Call MD for:  redness, tenderness, or signs of infection (pain, swelling, redness, odor or green/yellow discharge around incision site)     Call MD for:  difficulty breathing, headache or visual disturbances     Call MD for:  severe uncontrolled pain     Call MD for:  persistent nausea and vomiting     EKG 12-lead   Standing Status: Future Number of Occurrences: 1 Standing Exp. Date: 11/09/24

## 2023-12-15 LAB
FINAL PATHOLOGIC DIAGNOSIS: NORMAL
GROSS: NORMAL
Lab: NORMAL

## 2023-12-19 ENCOUNTER — OFFICE VISIT (OUTPATIENT)
Dept: PLASTIC SURGERY | Facility: CLINIC | Age: 38
End: 2023-12-19
Payer: MEDICAID

## 2023-12-19 DIAGNOSIS — Z17.1 MALIGNANT NEOPLASM OF UPPER-OUTER QUADRANT OF LEFT BREAST IN FEMALE, ESTROGEN RECEPTOR NEGATIVE: Primary | ICD-10-CM

## 2023-12-19 DIAGNOSIS — C50.412 MALIGNANT NEOPLASM OF UPPER-OUTER QUADRANT OF LEFT BREAST IN FEMALE, ESTROGEN RECEPTOR NEGATIVE: Primary | ICD-10-CM

## 2023-12-19 PROCEDURE — 99024 POSTOP FOLLOW-UP VISIT: CPT | Mod: ,,, | Performed by: SURGERY

## 2023-12-19 PROCEDURE — 99213 OFFICE O/P EST LOW 20 MIN: CPT | Mod: PBBFAC | Performed by: SURGERY

## 2023-12-19 PROCEDURE — 99999 PR PBB SHADOW E&M-EST. PATIENT-LVL III: CPT | Mod: PBBFAC,,, | Performed by: SURGERY

## 2023-12-27 ENCOUNTER — OFFICE VISIT (OUTPATIENT)
Dept: SURGERY | Facility: CLINIC | Age: 38
End: 2023-12-27
Payer: MEDICAID

## 2023-12-27 VITALS
OXYGEN SATURATION: 100 % | DIASTOLIC BLOOD PRESSURE: 67 MMHG | BODY MASS INDEX: 38.89 KG/M2 | SYSTOLIC BLOOD PRESSURE: 111 MMHG | HEIGHT: 66 IN | HEART RATE: 80 BPM | WEIGHT: 242 LBS

## 2023-12-27 DIAGNOSIS — Z90.12 S/P LEFT MASTECTOMY: Primary | ICD-10-CM

## 2023-12-27 PROCEDURE — 3078F DIAST BP <80 MM HG: CPT | Mod: CPTII,,, | Performed by: SURGERY

## 2023-12-27 PROCEDURE — 99214 OFFICE O/P EST MOD 30 MIN: CPT | Mod: PBBFAC | Performed by: SURGERY

## 2023-12-27 PROCEDURE — 99999 PR PBB SHADOW E&M-EST. PATIENT-LVL IV: CPT | Mod: PBBFAC,,, | Performed by: SURGERY

## 2023-12-27 PROCEDURE — 3074F SYST BP LT 130 MM HG: CPT | Mod: CPTII,,, | Performed by: SURGERY

## 2023-12-27 PROCEDURE — 99024 POSTOP FOLLOW-UP VISIT: CPT | Mod: ,,, | Performed by: SURGERY

## 2023-12-27 PROCEDURE — 1160F RVW MEDS BY RX/DR IN RCRD: CPT | Mod: CPTII,,, | Performed by: SURGERY

## 2023-12-27 PROCEDURE — 1159F MED LIST DOCD IN RCRD: CPT | Mod: CPTII,,, | Performed by: SURGERY

## 2023-12-28 ENCOUNTER — OFFICE VISIT (OUTPATIENT)
Dept: SURGERY | Facility: CLINIC | Age: 38
End: 2023-12-28
Payer: MEDICAID

## 2023-12-28 VITALS
HEART RATE: 72 BPM | WEIGHT: 245 LBS | BODY MASS INDEX: 39.54 KG/M2 | SYSTOLIC BLOOD PRESSURE: 155 MMHG | DIASTOLIC BLOOD PRESSURE: 72 MMHG

## 2023-12-28 DIAGNOSIS — Z09 SURGERY FOLLOW-UP: Primary | ICD-10-CM

## 2023-12-28 PROCEDURE — 99213 OFFICE O/P EST LOW 20 MIN: CPT | Mod: PBBFAC | Performed by: SURGERY

## 2023-12-28 PROCEDURE — 1159F MED LIST DOCD IN RCRD: CPT | Mod: CPTII,,, | Performed by: SURGERY

## 2023-12-28 PROCEDURE — 3044F HG A1C LEVEL LT 7.0%: CPT | Mod: CPTII,,, | Performed by: SURGERY

## 2023-12-28 PROCEDURE — 99024 PR POST-OP FOLLOW-UP VISIT: ICD-10-PCS | Mod: ,,, | Performed by: SURGERY

## 2023-12-28 PROCEDURE — 99999 PR PBB SHADOW E&M-EST. PATIENT-LVL III: CPT | Mod: PBBFAC,,, | Performed by: SURGERY

## 2023-12-28 PROCEDURE — 3044F PR MOST RECENT HEMOGLOBIN A1C LEVEL <7.0%: ICD-10-PCS | Mod: CPTII,,, | Performed by: SURGERY

## 2023-12-28 PROCEDURE — 3077F PR MOST RECENT SYSTOLIC BLOOD PRESSURE >= 140 MM HG: ICD-10-PCS | Mod: CPTII,,, | Performed by: SURGERY

## 2023-12-28 PROCEDURE — 3077F SYST BP >= 140 MM HG: CPT | Mod: CPTII,,, | Performed by: SURGERY

## 2023-12-28 PROCEDURE — 3078F DIAST BP <80 MM HG: CPT | Mod: CPTII,,, | Performed by: SURGERY

## 2023-12-28 PROCEDURE — 99999 PR PBB SHADOW E&M-EST. PATIENT-LVL III: ICD-10-PCS | Mod: PBBFAC,,, | Performed by: SURGERY

## 2023-12-28 PROCEDURE — 3078F PR MOST RECENT DIASTOLIC BLOOD PRESSURE < 80 MM HG: ICD-10-PCS | Mod: CPTII,,, | Performed by: SURGERY

## 2023-12-28 PROCEDURE — 1159F PR MEDICATION LIST DOCUMENTED IN MEDICAL RECORD: ICD-10-PCS | Mod: CPTII,,, | Performed by: SURGERY

## 2023-12-28 PROCEDURE — 99024 POSTOP FOLLOW-UP VISIT: CPT | Mod: ,,, | Performed by: SURGERY

## 2023-12-28 NOTE — PROGRESS NOTES
Laura Kent [facility]resents to [facility] for a follow up visit status post bilateral KANDICE flap reconstruction on 12/11/23 along with prophylactic right mastectomy. Patient is doing well today with no issues since her last visit. She has low output from her VALENTINA drains. She is happy with the surgical outcome.      PHYSICAL EXAMINATION  Bilateral breast flap incision(s) well approximated with  a small area of superficial breakdown of the right incision . Triangular skin paddles and IMF incisions.  Flaps are soft   Nipple(s) is/are surgically absent   Abdominal incision is healing well with no issues   Umbilical incision  with superficial scabbing, small area of breakdown    Drains are in place with low output       ASSESSMENT/PLAN  Laura Kent is s/p bilateral KANDICE flaps  - Recommend bacitracin to wound breakdown  - VALENTINA drains removed  - Continue abdominal compression for another week  - Follow up 2 weeks      All questions were answered. The patient was advised to contact the clinic with any questions or concerns prior to their next visit.       Angie Peck PA-C  Plastic and Reconstructive Surgery

## 2024-01-01 NOTE — PROGRESS NOTES
Ms Kent is one-week status post bilateral KANDICE flap reconstruction.  It was delayed on the left and immediately on the right.  She was skin paddles at 6:00 a.m..  She has been doing well drain care.  Her pain is controlled.      Exam she was got a little bit of epidermolysis on the right mastectomy skin T point of the skin paddle.    Both breasts are soft.  Drain outputs were removed.  The right abdominal and left breast drains were able to be removed.  Flap donor site is intact umbilicus is healing well.    Recommended antibiotic ointment for the small area of epidermolysis and around her umbilicus.    She should continue drain care for the right breast and the left abdominal drain.  Continue compression and a supportive bra at all time.    We will see her back next week    Enoch Barrios, DO  Plastic and Reconstructive Surgery

## 2024-01-02 NOTE — PROGRESS NOTES
"Date of Service:10/3/2023    DIAGNOSIS:   This is a 38 y.o. female with a history of IIIC (cT3, cN2(f), cM0, G3, ER-, NV-, HER2-)  invasive ductal carcinoma of the left breast.    TREATMENT:   1. left mastectomy and axillary lymph node dissection and TE placement on 2/27/2023. Dr. Weiner  2. Chemotherapy, 4/13/2022 - 12/31/2022 Dr. Cruz.   3. Radiation therapy 4/27/2023 - 5/31/2023 Dr. Bess.   4. Right risk reducing mastectomy and bilateral KANDICE flap based reconstruction.  12/11/2023 Dr. Daley and Dr. Barrios    HISTORY OF PRESENT ILLNESS:   Laura Kent is a 38 y.o. female comes in for postoperative follow-up.  Healing well from surgery.  Pain well controlled.    Pathology:    Part 1  Left, tissue expander, removal:  For gross identification only, see gross description.      Part 2  Breast, right, mastectomy:  Benign nipple and breast parenchyma, negative for atypia or malignancy    Part 3  Breast, left, capsule, capsulectomy:  Fragments of fibrotic tissue with associated reactive giant cell formation, chronic inflammation and fibroadipose tissue, histologically consistent with breast capsule  Negative for atypia or malignancy      Part 4  Breast, left, mastectomy, skin, excision:  Fragments of benign skin and soft tissue with chronic inflammation, negative for atypia or malignancy        PHYSICAL EXAM:   /67 (BP Location: Left arm, Patient Position: Sitting, BP Method: Large (Automatic))   Pulse 80   Ht 5' 6" (1.676 m)   Wt 109.8 kg (242 lb)   LMP 08/16/2023   SpO2 100%   BMI 39.06 kg/m²   General: The patient appears well and is in no acute distress.   Neuro: Alert & oriented x3.   Breasts:  Bilateral breast incisions well healing without any signs of infection or hematoma.    ASSESSMENT:   This is a 38 y.o. female status post risk reducing right mastectomy, left tissue expander removal and bilateral KANDICE flap based reconstruction.     PLAN:   Healing well from surgery.  No signs of cancer " recurrence on the recent surgery.  We will need to continue long-term follow-up to monitor for breast cancer recurrence.    No routine imaging is now undergone bilateral mastectomy.    Follow up in 6 months for clinical breast exam.

## 2024-01-11 ENCOUNTER — OFFICE VISIT (OUTPATIENT)
Dept: SURGERY | Facility: CLINIC | Age: 39
End: 2024-01-11
Payer: MEDICAID

## 2024-01-11 VITALS
DIASTOLIC BLOOD PRESSURE: 72 MMHG | SYSTOLIC BLOOD PRESSURE: 155 MMHG | BODY MASS INDEX: 37.61 KG/M2 | HEART RATE: 72 BPM | RESPIRATION RATE: 19 BRPM | HEIGHT: 66 IN | WEIGHT: 234 LBS

## 2024-01-11 DIAGNOSIS — Z09 SURGERY FOLLOW-UP: Primary | ICD-10-CM

## 2024-01-11 PROCEDURE — 99024 POSTOP FOLLOW-UP VISIT: CPT | Mod: ,,, | Performed by: SURGERY

## 2024-01-11 PROCEDURE — 99213 OFFICE O/P EST LOW 20 MIN: CPT | Mod: PBBFAC | Performed by: SURGERY

## 2024-01-11 PROCEDURE — 99999 PR PBB SHADOW E&M-EST. PATIENT-LVL III: CPT | Mod: PBBFAC,,, | Performed by: SURGERY

## 2024-01-11 PROCEDURE — 3077F SYST BP >= 140 MM HG: CPT | Mod: CPTII,,, | Performed by: SURGERY

## 2024-01-11 PROCEDURE — 3078F DIAST BP <80 MM HG: CPT | Mod: CPTII,,, | Performed by: SURGERY

## 2024-01-11 PROCEDURE — 1159F MED LIST DOCD IN RCRD: CPT | Mod: CPTII,,, | Performed by: SURGERY

## 2024-01-12 NOTE — PROGRESS NOTES
Laura Kent femalepresents to [facility] for a follow up visit status post  right prophylactic mastectomy and bilateral KANDICE flap breast reconstruction   on 12/11/23. She is here for routine post operative follow up. She noted pain at the sternal borders bilaterally, worse on the right. We discussed this is expected following KANDICE flap recipient vessel preparation. She also notes several small superficial areas of wound breakdown along her right IMF and lower abdominal incisions.      PHYSICAL EXAMINATION  Bilateral breast flap incision(s) well approximated with a small area of superficial breakdown. Triangular skin paddles bilaterally.  Flaps are soft   Nipples are surgically absent   Abdominal incision is well approximated with a small area of skin breakdown   Umbilical incision healing well with no issues   Drains are not present       ASSESSMENT/PLAN  Laura Kent is s/p bilateral KANDICE flaps  - Reassured about pain in chest wall from rib resection in vessel preparation. Discussed OTC analgesia.  - Offered PT, patient declined  - Recommend local wound care with bacitracin to superficial wound breakdown daily until healed.  - Counseled on incision/scar care.  - Follow up 3 months      All questions were answered. The patient was advised to contact the clinic with any questions or concerns prior to their next visit.       Angie Peck PA-C  Plastic and Reconstructive Surgery

## 2024-02-24 ENCOUNTER — PATIENT MESSAGE (OUTPATIENT)
Dept: SURGERY | Facility: CLINIC | Age: 39
End: 2024-02-24
Payer: MEDICAID

## 2024-02-28 DIAGNOSIS — Z17.1 MALIGNANT NEOPLASM OF UPPER-OUTER QUADRANT OF LEFT BREAST IN FEMALE, ESTROGEN RECEPTOR NEGATIVE: Primary | ICD-10-CM

## 2024-02-28 DIAGNOSIS — C50.412 MALIGNANT NEOPLASM OF UPPER-OUTER QUADRANT OF LEFT BREAST IN FEMALE, ESTROGEN RECEPTOR NEGATIVE: Primary | ICD-10-CM

## 2024-03-06 ENCOUNTER — OFFICE VISIT (OUTPATIENT)
Dept: OBSTETRICS AND GYNECOLOGY | Facility: CLINIC | Age: 39
End: 2024-03-06
Payer: MEDICAID

## 2024-03-06 VITALS
DIASTOLIC BLOOD PRESSURE: 77 MMHG | SYSTOLIC BLOOD PRESSURE: 113 MMHG | HEIGHT: 66 IN | HEART RATE: 75 BPM | BODY MASS INDEX: 36.9 KG/M2 | WEIGHT: 229.63 LBS

## 2024-03-06 DIAGNOSIS — C50.412 MALIGNANT NEOPLASM OF UPPER-OUTER QUADRANT OF LEFT BREAST IN FEMALE, ESTROGEN RECEPTOR NEGATIVE: Primary | ICD-10-CM

## 2024-03-06 DIAGNOSIS — E88.819 INSULIN RESISTANCE: ICD-10-CM

## 2024-03-06 DIAGNOSIS — Z17.1 MALIGNANT NEOPLASM OF UPPER-OUTER QUADRANT OF LEFT BREAST IN FEMALE, ESTROGEN RECEPTOR NEGATIVE: Primary | ICD-10-CM

## 2024-03-06 DIAGNOSIS — E88.810 METABOLIC SYNDROME: ICD-10-CM

## 2024-03-06 PROCEDURE — 1159F MED LIST DOCD IN RCRD: CPT | Mod: CPTII,,, | Performed by: PHYSICIAN ASSISTANT

## 2024-03-06 PROCEDURE — 3008F BODY MASS INDEX DOCD: CPT | Mod: CPTII,,, | Performed by: PHYSICIAN ASSISTANT

## 2024-03-06 PROCEDURE — 99213 OFFICE O/P EST LOW 20 MIN: CPT | Mod: PBBFAC | Performed by: PHYSICIAN ASSISTANT

## 2024-03-06 PROCEDURE — 99213 OFFICE O/P EST LOW 20 MIN: CPT | Mod: S$PBB,,, | Performed by: PHYSICIAN ASSISTANT

## 2024-03-06 PROCEDURE — 3074F SYST BP LT 130 MM HG: CPT | Mod: CPTII,,, | Performed by: PHYSICIAN ASSISTANT

## 2024-03-06 PROCEDURE — 3078F DIAST BP <80 MM HG: CPT | Mod: CPTII,,, | Performed by: PHYSICIAN ASSISTANT

## 2024-03-06 PROCEDURE — 99999 PR PBB SHADOW E&M-EST. PATIENT-LVL III: CPT | Mod: PBBFAC,,, | Performed by: PHYSICIAN ASSISTANT

## 2024-03-06 PROCEDURE — 1160F RVW MEDS BY RX/DR IN RCRD: CPT | Mod: CPTII,,, | Performed by: PHYSICIAN ASSISTANT

## 2024-03-06 RX ORDER — METFORMIN HYDROCHLORIDE 750 MG/1
750 TABLET, EXTENDED RELEASE ORAL
Qty: 90 TABLET | Refills: 1 | Status: SHIPPED | OUTPATIENT
Start: 2024-03-06 | End: 2025-03-06

## 2024-03-06 NOTE — PROGRESS NOTES
Subjective:      Laura Kent is a 38 y.o. female with history of triple negative breast cancer who presents for weight loss follow up. She is taking metformin XR 750mg QD. She is tolerating it well without bothersome side effects. Has noticed decreased sugar cravings and reduced amounts of sweets. She has lost about 10lbs since Breast reconstruction on 12/11/2023. Working to increase her protein. Continues to skip breakfast but eating small regular meals throughout the day otherwise. Has not been cleared to restart exercising and know this will help. Previously was running and doing strength workouts. She is starting PT again soon and will consider them for reconditioning if needed. She has one more reconstruction surgery in 6 months.  Reports that her mood is good and denies increased anxiety or stress. Has a great support system. Sleeping 5 hours per night which is her norm. Denies fatigue. No complaints today.    Routine Screening labs: 10/26/2023    Admission on 12/11/2023, Discharged on 12/14/2023   Component Date Value Ref Range Status    POC Preg Test, Ur 12/11/2023 Negative  Negative Final     Acceptable 12/11/2023 Yes   Final    Final Pathologic Diagnosis 12/11/2023    Final                    Value:Part 1   Left, tissue expander, removal:   For gross identification only, see gross description.      Part 2   Breast, right, mastectomy:  Benign nipple and breast parenchyma, negative for atypia or malignancy    Part 3   Breast, left, capsule, capsulectomy:  Fragments of fibrotic tissue with associated reactive giant cell formation, chronic inflammation and fibroadipose tissue, histologically consistent with breast capsule  Negative for atypia or malignancy      Part 4   Breast, left, mastectomy, skin, excision:  Fragments of benign skin and soft tissue with chronic inflammation, negative for atypia or malignancy      Gross 12/11/2023    Final                    Value:Four parts    Part 1  Patient ID/MRN:  [id number]  Pathology ID/MRN: [id number]   Received fresh, labeled &quot;left tissue expander&quot;, is a 750 cc, [place], intact tissue expander.  Gross description only.    Part 2  Patient ID/MRN:  [id number]  Pathology ID/MRN: [id number]   Received fresh and subsequently fixed in formalin, labeled &quot;right mastectomy, short superior, long lateral&quot;, is a 1438.5 g mastectomy which measures 28.1 cm from lateral to medial, 19.5 cm from superior to inferior and 6.5 cm from anterior to   posterior.  Undo anterior aspect the specimen has a 25.6 x 16.9 cm segment of pigmented skin with an eccentric 1.5 x 1.5 x 0.8 cm unremarkable nipple.  The superior margin is inked blue, inferior green and posterior black.  Neither axillary tail nor   muscle is identified attached to the specimen.  On cut sections the specimen is mostly yellow with a few fibrous pink areas (about 10%).  No masses are identified.  The specimen is sectioned and representative sections ar                          e submitted in cassettes   [id number]:  A-B: Nipple and subareolar tissue  C-D: Upper outer quadrant  E-F:  Lower outer quadrant  G-H:  Lower inner quadrant  I-J:  Upper inner quadrant  Note:  The fixation time is greater than 6 hrs and less than 72 hrs.      Part 3   Patient ID/MRN:  [id number]  Pathology ID/MRN: [id number]  Received fresh and subsequently fixed in formalin, labeled &quot;left breast capsule-permanent&quot;, is a 7.5 x 5.5 x 3.0 cm aggregate of rubbery, membranous pink tissue which has a smooth and a rough surface.  Grossly specimen is consistent with a   breast capsule.  The specimen is sectioned and representative sections are submitted in cassettes [id number].    Part 4   Patient ID/MRN:  [id number]  Pathology ID/MRN: [id number]   Received fresh and subsequently fixed in formalin, labeled &quot;left mastectomy skin, history of cancer&quot;, are 3 fragments of unoriented, rubbery, wrinkled  pigmented skin.  In aggregate the specimen measures 6.0 x 2.5 cm.  The sp                          ecimen is sectioned   and representative sections are submitted in cassettes [id number].    Magdaleno RITCHIE ([facility]) cm       Disclaimer 12/11/2023 Unless the case is a 'gross only' or additional testing only, the final diagnosis for each specimen is based on a microscopic examination of appropriate tissue sections.   Final    POCT Glucose 12/11/2023 421 (H)  70 - 110 mg/dL Final    POCT Glucose 12/11/2023 361 (H)  70 - 110 mg/dL Final    POCT Glucose 12/11/2023 114 (H)  70 - 110 mg/dL Final    Sodium 12/12/2023 138  136 - 145 mmol/L Final    Potassium 12/12/2023 3.2 (L)  3.5 - 5.1 mmol/L Final    Chloride 12/12/2023 107  95 - 110 mmol/L Final    CO2 12/12/2023 23  23 - 29 mmol/L Final    Glucose 12/12/2023 132 (H)  70 - 110 mg/dL Final    BUN 12/12/2023 10  6 - 20 mg/dL Final    Creatinine 12/12/2023 0.8  0.5 - 1.4 mg/dL Final    Calcium 12/12/2023 7.5 (L)  8.7 - 10.5 mg/dL Final    Total Protein 12/12/2023 5.3 (L)  6.0 - 8.4 g/dL Final    Albumin 12/12/2023 3.0 (L)  3.5 - 5.2 g/dL Final    Total Bilirubin 12/12/2023 0.6  0.1 - 1.0 mg/dL Final    Alkaline Phosphatase 12/12/2023 42 (L)  55 - 135 U/L Final    AST 12/12/2023 28  10 - 40 U/L Final    ALT 12/12/2023 11  10 - 44 U/L Final    eGFR 12/12/2023 >60.0  >60 mL/min/1.73 m^2 Final    Anion Gap 12/12/2023 8  8 - 16 mmol/L Final    WBC 12/12/2023 6.45  3.90 - 12.70 K/uL Final    RBC 12/12/2023 2.92 (L)  4.00 - 5.40 M/uL Final    Hemoglobin 12/12/2023 9.3 (L)  12.0 - 16.0 g/dL Final    Hematocrit 12/12/2023 27.3 (L)  37.0 - 48.5 % Final    MCV 12/12/2023 94  82 - 98 fL Final    MCH 12/12/2023 31.8 (H)  27.0 - 31.0 pg Final    MCHC 12/12/2023 34.1  32.0 - 36.0 g/dL Final    RDW 12/12/2023 13.3  11.5 - 14.5 % Final    Platelets 12/12/2023 166  150 - 450 K/uL Final    MPV 12/12/2023 10.4  9.2 - 12.9 fL Final    Immature Granulocytes 12/12/2023 0.3  0.0 - 0.5 % Final     Gran # (ANC) 2023 5.4  1.8 - 7.7 K/uL Final    Immature Grans (Abs) 2023 0.02  0.00 - 0.04 K/uL Final    Lymph # 2023 0.5 (L)  1.0 - 4.8 K/uL Final    Mono # 2023 0.5  0.3 - 1.0 K/uL Final    Eos # 2023 0.0  0.0 - 0.5 K/uL Final    Baso # 2023 0.01  0.00 - 0.20 K/uL Final    nRBC 2023 0  0 /100 WBC Final    Gran % 2023 83.1 (H)  38.0 - 73.0 % Final    Lymph % 2023 8.1 (L)  18.0 - 48.0 % Final    Mono % 2023 8.1  4.0 - 15.0 % Final    Eosinophil % 2023 0.2  0.0 - 8.0 % Final    Basophil % 2023 0.2  0.0 - 1.9 % Final    Differential Method 2023 Automated   Final    Group & Rh 2023 O POS   Final    Indirect [place] 2023 NEG   Final    Specimen Outdate 2023 12/15/2023 23:59   Final    Magnesium 2023 1.7  1.6 - 2.6 mg/dL Final    Phosphorus 2023 2.4 (L)  2.7 - 4.5 mg/dL Final       Past Medical History:   Diagnosis Date    Anemia     Anxiety     Breast cancer     Encounter for blood transfusion     Hypertension     Meningitis     Severe obesity 2023    Shortness of breath 2023     Past Surgical History:   Procedure Laterality Date    AXILLARY NODE DISSECTION Left 2023    Procedure: LYMPHADENECTOMY, AXILLARY;  Surgeon: YASMANI Weiner MD;  Location: [facility];  Service: General;  Laterality: Left;    BREAST BIOPSY Left      SECTION      COLONOSCOPY N/A 2022    Procedure: COLONOSCOPY;  Surgeon: Davi Chery MD;  Location: [address];  Service: Endoscopy;  Laterality: N/A;  case request entered from referral - Per DR. KATHLEEN Chery Pt to be scheduled for urgent colonoscopy on 22/ fully vaccinated / prep ins for Sutab on portal - ERW  see micro for negative C-diff- ERW    INJECTION FOR SENTINEL NODE IDENTIFICATION Left 2023    Procedure: INJECTION, FOR SENTINEL NODE IDENTIFICATION;  Surgeon: YASMANI Weiner MD;  Location: [facility];  Service: General;  Laterality: Left;     INSERTION OF BREAST TISSUE EXPANDER Left 2/27/2023    Procedure: INSERTION, TISSUE EXPANDER, BREAST;  Surgeon: Reese Barrios DO;  Location: [facility];  Service: Plastics;  Laterality: Left;    INSERTION OF TUNNELED CENTRAL VENOUS CATHETER (CVC) WITH SUBCUTANEOUS PORT N/A 4/5/2022    Procedure: [id number]-QCHP-A-CATH;  Surgeon: Deo Sin Jr., MD;  [facility];  Service: General;  Laterality: N/A;    INSERTION OF TUNNELED CENTRAL VENOUS CATHETER (CVC) WITH SUBCUTANEOUS PORT N/A 8/1/2022    Procedure: INSERTION, SINGLE LUMEN CATHETER WITH PORT, WITH FLUOROSCOPIC GUIDANCE;  Surgeon: Ryder Dinero MD;  Location: [facility] CATH LAB;  Service: Vascular;  Laterality: N/A;    INTRAVENOUS INJECTION N/A 12/11/2023    Procedure: spy;  Surgeon: Reese Barrios DO;  Location: [facility] OR [facility] FLR;  Service: Plastics;  Laterality: N/A;    MASTECTOMY Left 2/27/2023    Procedure: MASTECTOMY;  Surgeon: YASMANI Weiner MD;  Location: [facility];  Service: General;  Laterality: Left;  3.5 TOTAL HOURS / EMAIL SENT 2-23 @ 7:56 CCC    MASTECTOMY Right 12/11/2023    Procedure: MASTECTOMY;  Surgeon: Eldon Daley MD;  [facility];  Service: General;  Laterality: Right;    RECONSTRUCTION OF BREAST WITH DEEP INFERIOR EPIGASTRIC ARTERY  (KANDICE) FREE FLAP Bilateral 12/11/2023    Procedure: RECONSTRUCTION, BREAST, USING KANDICE FREE FLAP;  Surgeon: Reese Barrios DO;  [facility];  Service: Plastics;  Laterality: Bilateral;  Bilateral KANDICE flap - cosurgeon is Dr Cross -    REVISION OF SCAR Right 12/11/2023    Procedure: REVISION, SCAR;  Surgeon: Reese Barrios DO;  Location: [facility] OR 2ND FLR;  Service: Plastics;  Laterality: Right;    SENTINEL LYMPH NODE BIOPSY Left 2/27/2023    Procedure: BIOPSY, LYMPH NODE, SENTINEL;  Surgeon: YASMANI Weiner MD;  Location: [facility];  Service: General;  Laterality: Left;  LEFT with radiological marker    TISSUE EXPANDER REMOVAL  Left 2023    Procedure: REMOVAL, TISSUE EXPANDER;  Surgeon: Reese Barrios DO;  Location: [facility] OR 2ND FLR;  Service: Plastics;  Laterality: Left;  removal left tissu expander     Social History     Tobacco Use    Smoking status: Never    Smokeless tobacco: Never   Substance Use Topics    Alcohol use: Not Currently     Comment: social    Drug use: Never     Family History   Problem Relation Age of Onset    Hypertension Father     Diabetes Father     Pancreatic cancer Maternal Uncle     Bladder Cancer Paternal Grandmother     Colon cancer Neg Hx     Esophageal cancer Neg Hx      OB History    Para Term  AB Living   3 3 3         SAB IAB Ectopic Multiple Live Births                  # Outcome Date GA Lbr Nicanor/2nd Weight Sex Delivery Anes PTL Lv   3 Term            2 Term            1 Term                Current Outpatient Medications:     hydrOXYzine HCL (ATARAX) 10 MG Tab, Take 10 mg by mouth nightly as needed., Disp: , Rfl:     capecitabine (XELODA) 150 MG tablet, Take 1 tablet (150 mg total) by mouth 2 (two) times daily for 14 days followed by 7 days off. Take with 1 other capecitabine prescription for 2,150 mg total., Disp: 28 tablet, Rfl: 2    capecitabine (XELODA) 150 MG tablet, Take 1 tablet (150 mg total) by mouth 2 (two) times daily for 14 days followed by 7 days off. Take with 1 other capecitabine prescription for 2,150 mg total., Disp: 28 tablet, Rfl: 2    capecitabine (XELODA) 500 MG Tab, Take 4 tablets (2,000 mg total) by mouth 2 (two) times daily for 14 days followed by 7 days off. Take with 1 other capecitabine prescription for 2,150 mg total., Disp: 112 tablet, Rfl: 2    capecitabine (XELODA) 500 MG Tab, Take 4 tablets (2,000 mg total) by mouth 2 (two) times daily for 14 days followed by 7 days off. Take with 1 other capecitabine prescription for 2,150 mg total., Disp: 112 tablet, Rfl: 2    cetirizine (ZYRTEC) 10 MG tablet, Take 10 mg by mouth once daily., Disp: , Rfl:      "clotrimazole (LOTRIMIN) 1 % cream, Apply 1 application topically every evening., Disp: , Rfl:     gabapentin (NEURONTIN) 300 MG capsule, Take 1 capsule (300 mg total) by mouth 3 (three) times daily. for 7 days, Disp: 21 capsule, Rfl: 0    ketoconazole (NIZORAL) 2 % shampoo, Apply topically Every 3 (three) days., Disp: , Rfl:     metFORMIN (GLUCOPHAGE-XR) 750 MG ER 24hr tablet, Take 1 tablet (750 mg total) by mouth daily with breakfast., Disp: 90 tablet, Rfl: 1    mupirocin (BACTROBAN) 2 % ointment, Apply topically once daily., Disp: 30 g, Rfl: 0    ondansetron (ZOFRAN) 8 MG tablet, Take 1 tablet (8 mg total) by mouth every 12 (twelve) hours as needed for Nausea., Disp: 30 tablet, Rfl: 2    pantoprazole (PROTONIX) 40 MG tablet, Take 1 tablet (40 mg total) by mouth once daily., Disp: 30 tablet, Rfl: 1  No current facility-administered medications for this visit.    Facility-Administered Medications Ordered in Other Visits:     ceFAZolin 1 g, gentamicin 80 mg in sodium chloride 0.9% 500 mL irrigation, , Irrigation, On Call Procedure, YASMANI Weiner MD    Review of Systems:  General: No fever, chills.  Chest: No chest pain, shortness of breath, or palpitations.  Breast: No pain, masses, or nipple discharge.  Vulva: No pain, lesions, or itching.  Vagina: No relaxation, itching, discharge, or lesions.  Abdomen: No pain, nausea, vomiting, diarrhea, or constipation.  Urinary: No incontinence, nocturia, frequency, or dysuria.  Extremities:  No leg cramps, edema, or calf pain.  Neurologic: No headaches, dizziness, or visual changes.    Objective:     Vitals:    03/06/24 0841   BP: 113/77   Pulse: 75   Weight: 104.1 kg (229 lb 9.6 oz)   Height: 5' 6" (1.676 m)   PainSc: 0-No pain     Body mass index is 37.06 kg/m².    PHYSICAL EXAM:  APPEARANCE: Well nourished, well developed, in no acute distress.  AFFECT: WNL, alert and oriented x 3      Assessment:      Malignant neoplasm of upper-outer quadrant of left breast in female, " estrogen receptor negative    Insulin resistance  -     metFORMIN (GLUCOPHAGE-XR) 750 MG ER 24hr tablet; Take 1 tablet (750 mg total) by mouth daily with breakfast.  Dispense: 90 tablet; Refill: 1    Metabolic syndrome        Plan:   Continue low glycemic diet with lean protein at each meal.  Again stressed the importance of small, regular meals throughout the day.  Maintain hydration.  Continue Metformin xr 750mg QD  Will restart strength workouts slowly after cleared by plastics  Consider PT for deconditioning if needed.  She feels comfortable with her plan and what she is doing.  Follow up annually or PRN    Instructed patient to call if she experiences any side effects or has any questions.    I spent a total of 25 minutes on the day of the visit.This includes face to face time and non-face to face time preparing to see the patient (eg, review of tests), obtaining and/or reviewing separately obtained history, documenting clinical information in the electronic or other health record, independently interpreting results and communicating results to the patient/family/caregiver, or care coordinator.

## 2024-03-14 ENCOUNTER — OFFICE VISIT (OUTPATIENT)
Dept: SURGERY | Facility: CLINIC | Age: 39
End: 2024-03-14
Payer: MEDICAID

## 2024-03-14 VITALS
OXYGEN SATURATION: 99 % | RESPIRATION RATE: 19 BRPM | DIASTOLIC BLOOD PRESSURE: 77 MMHG | SYSTOLIC BLOOD PRESSURE: 113 MMHG | WEIGHT: 229.5 LBS | HEIGHT: 66 IN | BODY MASS INDEX: 36.88 KG/M2 | HEART RATE: 77 BPM

## 2024-03-14 DIAGNOSIS — I89.0 LYMPHEDEMA: ICD-10-CM

## 2024-03-14 DIAGNOSIS — Z90.12 S/P LEFT MASTECTOMY: Primary | ICD-10-CM

## 2024-03-14 DIAGNOSIS — Z98.890 S/P BREAST RECONSTRUCTION: ICD-10-CM

## 2024-03-14 PROCEDURE — 3008F BODY MASS INDEX DOCD: CPT | Mod: CPTII,,, | Performed by: SURGERY

## 2024-03-14 PROCEDURE — 99999 PR PBB SHADOW E&M-EST. PATIENT-LVL IV: CPT | Mod: PBBFAC,,, | Performed by: SURGERY

## 2024-03-14 PROCEDURE — 99213 OFFICE O/P EST LOW 20 MIN: CPT | Mod: S$PBB,,, | Performed by: SURGERY

## 2024-03-14 PROCEDURE — 3078F DIAST BP <80 MM HG: CPT | Mod: CPTII,,, | Performed by: SURGERY

## 2024-03-14 PROCEDURE — 3074F SYST BP LT 130 MM HG: CPT | Mod: CPTII,,, | Performed by: SURGERY

## 2024-03-14 PROCEDURE — 99214 OFFICE O/P EST MOD 30 MIN: CPT | Mod: PBBFAC | Performed by: SURGERY

## 2024-03-14 PROCEDURE — 1159F MED LIST DOCD IN RCRD: CPT | Mod: CPTII,,, | Performed by: SURGERY

## 2024-03-15 ENCOUNTER — CLINICAL SUPPORT (OUTPATIENT)
Dept: REHABILITATION | Facility: HOSPITAL | Age: 39
End: 2024-03-15
Payer: MEDICAID

## 2024-03-15 DIAGNOSIS — R68.89 DECREASED FUNCTIONAL ACTIVITY TOLERANCE: Primary | ICD-10-CM

## 2024-03-15 DIAGNOSIS — R29.898 WEAKNESS OF BOTH UPPER EXTREMITIES: ICD-10-CM

## 2024-03-15 DIAGNOSIS — Z91.89 AT RISK FOR LYMPHEDEMA: ICD-10-CM

## 2024-03-15 DIAGNOSIS — Z17.1 MALIGNANT NEOPLASM OF UPPER-OUTER QUADRANT OF LEFT BREAST IN FEMALE, ESTROGEN RECEPTOR NEGATIVE: ICD-10-CM

## 2024-03-15 DIAGNOSIS — C50.412 MALIGNANT NEOPLASM OF UPPER-OUTER QUADRANT OF LEFT BREAST IN FEMALE, ESTROGEN RECEPTOR NEGATIVE: ICD-10-CM

## 2024-03-15 DIAGNOSIS — R29.3 ABNORMAL POSTURE: ICD-10-CM

## 2024-03-15 PROCEDURE — 97162 PT EVAL MOD COMPLEX 30 MIN: CPT

## 2024-03-15 NOTE — PLAN OF CARE
"[facility]   Physical Therapy Initial Evaluation      Name: Laura Kent  Clinic Number: [id number]    Therapy Diagnosis:   Encounter Diagnoses   Name Primary?    Decreased functional activity tolerance Yes    Abnormal posture     Weakness of both upper extremities     At risk for lymphedema     Malignant neoplasm of upper-outer quadrant of left breast in female, estrogen receptor negative         Physician: Angie Peck, LUIS ALFREDO    Physician Orders: PT Eval and Treat   Medical Diagnosis from Referral: C50.412,Z17.1 (ICD-10-CM) - Malignant neoplasm of upper-outer quadrant of left breast in female, estrogen receptor negative  Evaluation Date: 3/15/2024  Authorization Period Expiration: 02/27/2025  Plan of Care Expiration: 05/10/2024  Progress Note Due: 04/15/2024  Visit # / Visits Authorized: 1 / 1   FOTO: 1/3    Precautions: standard, cancer, HTN, anemia    Time In: 10:31 AM  Time Out: 11:15 AM  Total Appointment Time (timed & untimed codes): 44 minutes (eval only per [place] Medicaid)    Subjective     Date of Onset: Diagnostic breast imaging on 02/02/2022 identified a mass in the LEFT breast and enlarged axillary lymph nodes. Ultrasound-guided core needle biopsies on 03/14/2022 with pathology revealed Stage IIIC (cT3, cN2(f), cM0, G3, ER-, ME-, HER2-) IDC of the LEFT breast.     History of Current Condition: Laura is a 38 y.o. female that presents to [facility] at the [facility] s/p BILATERAL breast surgery.    Patient reports uneventful healing since KANDICE flap reconstruction and RIGHT mastectomy approx 3 months ago, but patient began noticing "tightness and pain" through LEFT shoulder and trunk as well as slight tightness through RIGHT breast. Patient currently having difficulty lifting, elevating LUE as well as lifting, carrying items.        Cancer-Related Surgery and Date:   - 12/11/2023: BILATERAL KANDICE flap reconstruction with LEFT " TE removal per Myra Barrios and [place] and RIGHT mastectomy per Dr. Daley    - 2023: LEFT mastectomy with SLNB per Dr. Weiner      Treatment:   Chemotherapy: completed neoadjuvantly   Radiation: completed 25 fx (2023 - 2023)  Endocrine Therapy: N/A    Falls: not asked    Prior Therapy: PT following mastectomy (March-2023)  Social History: lives with family  Occupation: , caterer, [employer]   Home Environment: 2-story townhouse; bed/bath on 2nd floor; 1 flight to enter house  Prior Level of Function: difficulty with lifting, carrying reaching  Current Level of Function: independent  Exercise Routine Prior to Onset: prior cardio, weights, crossfit-type workouts  Hand Dominance: left    Pain:  Current: 3/10,  Worst: 10/10,  Best: 0/10   Location: RIGHT anterior chest; LEFT axilla / shoulder  Description: sharp  Aggravating Factors: movement, arm elevation  Easing Factors: rest    Patients Goals: to improve upper extremity active range of motion and strength for return to prior level of function     Medical History:   Past Medical History:   Diagnosis Date    Anemia     Anxiety     Breast cancer     Encounter for blood transfusion     Hypertension     Meningitis     Severe obesity 2023    Shortness of breath 2023       Surgical History:   Laura Kent  has a past surgical history that includes Breast biopsy (Left);  section; Insertion of tunneled central venous catheter (CVC) with subcutaneous port (N/A, 2022); Insertion of tunneled central venous catheter (CVC) with subcutaneous port (N/A, 2022); Colonoscopy (N/A, 2022); Mastectomy (Left, 2023); [place] lymph node biopsy (Left, 2023); Injection for sentinel node identification (Left, 2023); Axillary node dissection (Left, 2023); Insertion of breast tissue expander (Left, 2023); Tissue expander removal (Left, 2023); Reconstruction of breast with deep  "inferior epigastric artery  (KANDICE) free flap (Bilateral, 12/11/2023); Revision of scar (Right, 12/11/2023); Intravenous injection (N/A, 12/11/2023); and Mastectomy (Right, 12/11/2023).    Medications:   Laura has a current medication list which includes the following prescription(s): capecitabine, capecitabine, capecitabine, capecitabine, cetirizine, clotrimazole, gabapentin, hydroxyzine hcl, ketoconazole, metformin, mupirocin, ondansetron, and pantoprazole, and the following Facility-Administered Medications: ceFAZolin 1 g, gentamicin 80 mg in sodium chloride 0.9% 500 mL irrigation.    Allergies:   Review of patient's allergies indicates:   Allergen Reactions    Strawberries [strawberry] Hives        Objective     Mental Status: A/O x 3    Postural Exam / Scapula Alignment: FHP, rounded shoulders    Skin Integrity: not formally assessed during   Scar Location: N/A  Appearance: N/A  Signs of Infection: N/A  Drainage: N/A  Color: N/A    Edema: mild  Location: LEFT upper extremity (distal)    Axillary Web Syndrome / Cording:  Location: none  Degree of Cording: N/A   Number of Cords Present: N/A    Sensation: numbness near incision        Shoulder Range of Motion:   Active ROM LEFT RIGHT   Flexion 140 170   Abduction 80 180   Extension TBA TBA   IR/90deg 90 at 80 deg abd 90   ER/90deg 30 at 80 deg abd 90          Upper Extremity Strength:   (L) UE (R) UE   Shoulder Flexion 3+/5 3+/5   Shoulder Abduction 3+/5 3+/5   Shoulder IR 3+/5 3+/5   Shoulder ER 3+/5 3+/5   Elbow Flexion 3+/5 3+/5   Elbow Extension 3+/5 3+/5    77.8 lbs 87.3 lbs       BUE Circumferential Measurements:  LANDMARK LEFT UE RIGHT UE DIFFERENCE   E + 8" 41.5 cm 40.5 cm 1 cm   E + 6" 40.5 cm 40 cm 0.5 cm   E + 4" 39.5 cm 39 cm 0.5 cm   E + 2" 35.5 cm 34.5 cm 1 cm   Elbow 31 cm 31 cm 0 cm   W+ 8" 33.5 cm 32.5 cm 1 cm   W +  6" 30.5 cm 30 cm 0.5 cm   W + 4" 26 cm 25 cm 1 cm   Wrist 19.5 cm 18.5 cm 1 cm   DPC 22 cm 21.5 cm 0.5 cm   IP Thumb " "7.5 cm 7 cm 0.5 cm       Functional Mobility (bed mobility, transfers): independent    ADLs: increased difficulty with lifting, reaching, carrying ADLs secondary to pain and limited range of motion    Gait Assessment:  - Assistive Device Used: none  - Assistance: independent  - Distance: community distances       Limitation / Restriction for FOTO Shoulder Survey    Therapist reviewed FOTO scores for Laura Kent on 3/15/2024.   FOTO documents entered into [employer] - see Media section.    Limitation Score: 49%         Treatment     Total Treatment Time (time-based codes) Separate from Evaluation: 25 minutes       Laura received the treatments listed below:      Manual therapy techniques to decrease pain as well as increase soft tissue mobility and to decrease pain for 10 minutes:    - Soft tissue mobilization of LEFT pec minor (gentle)      Self-care / home management education regarding lymphedema management for 15 minutes including:    - Lymphedema etiology and management plans.    - Written lymphedema risk reductions and precautions provided    Increased time to discuss current lymphedema status. Patient reports plastic surgeon recently referred patient to [place] for lymphedema "massage." Patient denies significant difference between LEFT (surgical) arm and RIGHT but states occasional "heavy" feeling in LEFT upper extremity.    Therapist advised patient of possible sub-clinical (reversible) status of lymphedema. Also notified patient of lymphedema services available at [facility] for ease of scheduling. Patient verbalized understanding, agreement to all discussed.       Patient Education and Home Exercises     Education Provided Regarding:   - Role of physical therapy in multi-disciplinary team  - Goals for physical therapy  - Physical therapy plan of care, scheduling  - Home exercise program, exercise technique  - Energy conservation techniques    Written Home Exercises Provided: yes. Exercises " were reviewed, and Laura was able to demonstrate them prior to the end of the session. Laura demonstrated good  understanding of the education provided. See EMR under Patient Instructions for exercises provided during therapy sessions.    Assessment     Laura is a 38 y.o. female referred to outpatient Physical Therapy with a medical diagnosis of LEFT breast cancer. Patient presents with decreased BILATERAL upper extremity upper extremity active range of motion, decreased BILATERAL upper extremity strength, abnormal posture, increased risk for lymphedema, and decreased tolerance to functional lifting / carrying / pushing / pulling / reaching activities of daily living.     Patient prognosis is Good.   Patient will benefit from skilled outpatient Physical Therapy to address the deficits stated above and in the chart below, provide patient / family education, and to maximize patient's level of independence.     Plan of Care Discussed with Patient: Yes  Patient's spiritual, cultural, and educational needs considered, and patient is agreeable to the plan of care and goals as stated below:     Anticipated Barriers for Therapy: none anticipated    Medical Necessity is demonstrated by the following:  History  Co-morbidities and personal factors that may impact the plan of care [] LOW: no personal factors / co-morbidities  [] MODERATE: 1-2 personal factors / co-morbidities  [x] HIGH: 3+ personal factors / co-morbidities    Moderate / High Support Documentation:     Co-morbidities affecting plan of care:  - History of cancer  - Hypertension  - Elevated BMI    Personal Factors:   - None     Examination  Body Structures and Functions, activity limitations and participation restrictions that may impact the plan of care [] LOW: addressing 1-2 elements  [] MODERATE: 3+ elements  [x] HIGH: 4+ elements (please support below)    Moderate / High Support Documentation:    Body Regions:  - Upper extremities  - Trunk     Body  Systems:  - Gross symmetry  - Range of motion  - Strength  - Scar formation  - Skin integrity     Mobility:  - Lifting and carrying objects     Domestic Life:  - Doing housework     Life Areas:   - Recreation and leisure     Clinical Presentation [] LOW: stable  [x] MODERATE: Evolving  [] HIGH: Unstable     Decision Making / Complexity Score: moderate     Goals:   Short Term Goals: 4 Weeks  Patient will demonstrate 100% understanding of lymphedema risk reduction practices, including self-monitoring for lymphedema (progressing, not met).  Patient will demonstrate independence with established home exercise program for improved strength, functional mobility, range of motion, posture, and endurance (progressing, not met).   Patient will increase LEFT shoulder FLEXION active range of motion to 160 degrees for improved independence with functional reaching, carrying, pushing, and pulling tasks (progressing, not met).   Patient will increase LEFT shoulder ABDUCTION active range of motion to 130 degrees for improved independence with functional reaching, carrying, pushing, and pulling tasks (progressing, not met).   Patient will increase gross BILATERAL upper extremity musculature to >4/5 for improved independence with functional reaching, carrying, pushing, and pulling activities of daily living (progressing, not met).     Long Term Goals: 8 Weeks   Patient will be independent with updated home exercise program for improved functional mobility, posture, strength, and endurance (progressing, not met).  Patient will increase LEFT shoulder FLEXION active range of motion to 180 degrees for improved independence with functional reaching, carrying, pushing, and pulling tasks (progressing, not met).   Patient will increase LEFT shoulder ABDUCTION active range of motion to 180 degrees for improved independence with functional reaching, carrying, pushing, and pulling tasks (progressing, not met).   Patient will increase gross  BILATERAL upper extremity musculature to 5/5 for improved independence with functional reaching, carrying, pushing, and pulling activities of daily living (progressing, not met).   Patient will report decrease in overall worst pain to 2/10 at discharge for improved tolerance to functional reaching, carrying, pushing, and pulling activities of daily living (progressing, not met).  Patient will report compliance with walking program 5x/week for 10 minutes / day to improve overall cardiovascular function and decrease cancer-related fatigue at discharge (progressing, not met).       Plan     Plan of Care Certification: 03/15/2024 to 05/10/2024.    Outpatient Physical Therapy 2 times weekly for 8 weeks to include the following interventions: Gait Training, Manual Therapy, Neuromuscular Re-ed, Patient Education, Self Care, Therapeutic Activities, Therapeutic Exercise, and IASTM.     Ghada Brooks, PT

## 2024-03-15 NOTE — PROGRESS NOTES
Laura Kent femalepresents to [facility] for a follow up visit status post bilateral KANDICE flaps on 12/11/23. She complains of left breast pain and swelling. She also has sharp stabbing pain around her port scar.      PHYSICAL EXAMINATION  Bilateral breast incision(s) well healed with no issues. Focal area of tenderness left breast 9 o clock, overlying skin is hyperpigmented.  Abdominal scar is well healed.  LUE edema noted.      ASSESSMENT/PLAN  Laura Kent is s/p bilateral KANDICE flaps  - Left breast pain - limited US ordered for assessment. Suspect fat necrosis.  - Port scar pain, recommend topical lidocaine. Offered rx for patch, she would prefer OTC.  - Discussed second stage revisions, will look for a date and bring her back for a preop visit      All questions were answered. The patient was advised to contact the clinic with any questions or concerns prior to their next visit.       Angie Peck PA-C  Plastic and Reconstructive Surgery

## 2024-03-15 NOTE — PATIENT INSTRUCTIONS
POST OP PATIENT EDUCATION    Post Operative Instructions         When to call your doctor:  - If any part of your affected arm or axilla is hot, red, or has increased swelling   - If you develop a temperature over 101 degrees Fahrenheit      Lymphedema - Identification and Prevention     Lymphedema - is the swelling of a body area or extremity caused by the accumulation of lymphatic fluid.  There is a risk for lymphedema with the removal of lymph nodes, trauma or radiation therapy.  Treatment of breast cancer often involves surgery: mastectomy or lumpectomy. Some of the lymph nodes in the underarm (called axillary lymph nodes) may be removed and checked to see if they contain cancer cells.     During breast surgery when axillary lymph nodes are removed (with sentinel node biopsy or axillary dissection) or are treated with radiation therapy, the lymphatic system may become impaired. This may prevent lymphatic fluid from leaving the area therefore, causing lymphedema.     Lymphatic fluid is a normal part of the circulatory system. Its function is to remove waste products and to produce cells vital to fighting infection. Swelling occurs when the vessels become restricted and the lymphatic fluid is unable to freely flow through them.  If lymphedema is left untreated, the affected limb could progressively become more swollen, which could lead to hardening of the skin, bulkiness in the limb, infection and impaired wound healing.     There are things you can do to decrease the chance of developing lymphedema.        [email or web address]            The information presented is intended for general information and educational purposes. It is not intended to replace the advice of your health care provider. Contact your health care provider if you believe you have a health problem.                            Trunk Rotation Stretch:     To Stretch the RIGHT Side:  Rotate knees to  the LEFT  Slowly raise your RIGHT arm up and out to the side, maintaining support on mat / bed  Hold for 10 seconds, then slowly lower RIGHT arm to starting position  Repeat 10 second holds x 10 repetitions     To Stretch the LEFT Side:   Rotate knees to the RIGHT  Slowly raise your LEFT arm up and out to the side, maintaining support on mat / bed  Hold for 10 seconds, then slowly lower LEFT arm to starting position  Repeat 10 second holds x 10 repetitions

## 2024-03-18 ENCOUNTER — PATIENT MESSAGE (OUTPATIENT)
Dept: SURGERY | Facility: CLINIC | Age: 39
End: 2024-03-18
Payer: MEDICAID

## 2024-03-18 ENCOUNTER — CLINICAL SUPPORT (OUTPATIENT)
Dept: REHABILITATION | Facility: HOSPITAL | Age: 39
End: 2024-03-18
Attending: SURGERY
Payer: MEDICAID

## 2024-03-18 ENCOUNTER — TELEPHONE (OUTPATIENT)
Dept: RADIOLOGY | Facility: HOSPITAL | Age: 39
End: 2024-03-18
Payer: MEDICAID

## 2024-03-18 DIAGNOSIS — Z90.12 S/P LEFT MASTECTOMY: ICD-10-CM

## 2024-03-18 DIAGNOSIS — Z91.89 AT RISK FOR LYMPHEDEMA: ICD-10-CM

## 2024-03-18 DIAGNOSIS — I89.0 LYMPHEDEMA: ICD-10-CM

## 2024-03-18 DIAGNOSIS — Z17.1 MALIGNANT NEOPLASM OF UPPER-OUTER QUADRANT OF LEFT BREAST IN FEMALE, ESTROGEN RECEPTOR NEGATIVE: Primary | ICD-10-CM

## 2024-03-18 DIAGNOSIS — C50.412 MALIGNANT NEOPLASM OF UPPER-OUTER QUADRANT OF LEFT BREAST IN FEMALE, ESTROGEN RECEPTOR NEGATIVE: Primary | ICD-10-CM

## 2024-03-18 PROCEDURE — 97166 OT EVAL MOD COMPLEX 45 MIN: CPT

## 2024-03-18 NOTE — PLAN OF CARE
[facility]  Occupational Therapy Initial Evaluation  Lymphedema      Name: Laura Kent  Clinic Number: [id number]    Therapy Diagnosis:   Encounter Diagnoses   Name Primary?    S/P left mastectomy     Lymphedema     Malignant neoplasm of upper-outer quadrant of left breast in female, estrogen receptor negative Yes    At risk for lymphedema      Physician: Reese Barrios, *    Physician Orders: Eval and Treat  Medical Diagnosis: at risk for lymphedema; malignant neoplasm of L breast  Surgical Procedure and Date: 12/10/23, KANDICE flap reconstruction  Evaluation Date: 3/18/2024  Insurance Authorization Period Expiration: 12/31/24  Plan of Care Certification Period: 3/18/24  Date of Return to MD: unknown  Visit # / Visits authorized: 1 / 1  FOTO: see media    Precautions:  Standard, cancer, and anemia , HTN    Time In:11:05  Time Out: 12:00  Total Appointment Time (timed & untimed codes): 55 minutes    Subjective      Date of onset: change in status/ new pain about the L breast/shoulder last 2-3 weeks, getting worse  History of current condition - Laura reports: She had no need for therapy after her reconstructive surgery in December. She reports in the last 2-3 weeks she has had thickening at the superior L breast, pain about the R port site incision, and the pain is increasing. Patient states her doctor told her the thickening may be fat necrosis, and he will remove that when he performs stage 2 reconstruction, as well as cleaning up the incision at the port site.     Diagnosis: at risk for lymphedema  Surgery:   - 12/11/2023: BILATERAL KANDICE flap reconstruction with LEFT TE removal and RIGHT mastectomy    - 02/27/2023: LEFT mastectomy with SLNB       Treatment:   Chemotherapy: completed neoadjuvantly April-December 2022  Radiation: 5 weeks April/May 2023  Endocrine Therapy: N/A  Previous Lymphedema Therapy: recent PT evaluation/education on lymphedema  Wears Compression: no        Pain:  Functional Pain Scale Rating 0-10:   7/10 on average  10/10 at presently  3/10 at at best  Location: L breast, superior, central, and over R port incision  Description: Grabbing, Electric, and Shooting  Aggravating Factors: unpredictable  Easing Factors:  rubbing/holding the hand on the skin    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   Recent falls: none reported    Current Functional Status   Home/Living environment: lives with their family    - 2 story home, 1 flight to enter        Limitation of Functional Status as follows:   ADLs/IADLs:     - Feeding: independent    - Bathing: independent    - Dressing/Grooming: pain with over head clothing    - Home Management: difficulty/pain lifting/reaching (laundry, dishes, cooking)    - Driving: independent       Patient's Goals for Therapy: to have the arm evaluated for lymphedema, to see if that is causing the pain in the breast    Past Medical History/Physical Systems Review:   Laura Kent  has a past medical history of Anemia, Anxiety, Breast cancer, Encounter for blood transfusion, Hypertension, Meningitis, Severe obesity, and Shortness of breath.    Laura Kent  has a past surgical history that includes Breast biopsy (Left);  section; Insertion of tunneled central venous catheter (CVC) with subcutaneous port (N/A, 2022); Insertion of tunneled central venous catheter (CVC) with subcutaneous port (N/A, 2022); Colonoscopy (N/A, 2022); Mastectomy (Left, 2023); [place] lymph node biopsy (Left, 2023); Injection for sentinel node identification (Left, 2023); Axillary node dissection (Left, 2023); Insertion of breast tissue expander (Left, 2023); Tissue expander removal (Left, 2023); Reconstruction of breast with deep inferior epigastric  "artery  (KANDICE) free flap (Bilateral, 12/11/2023); Revision of scar (Right, 12/11/2023); Intravenous injection (N/A, 12/11/2023); and Mastectomy (Right, 12/11/2023).    Laura has a current medication list which includes the following prescription(s): capecitabine, capecitabine, capecitabine, capecitabine, cetirizine, clotrimazole, gabapentin, hydroxyzine hcl, ketoconazole, metformin, mupirocin, ondansetron, and pantoprazole, and the following Facility-Administered Medications: ceFAZolin 1 g, gentamicin 80 mg in sodium chloride 0.9% 500 mL irrigation.    Review of patient's allergies indicates:   Allergen Reactions    Strawberries [strawberry] Hives        Pt denies: CHF,CKD, DVT, infection, severe PAD      Objective      Patient received from waiting room. She ambulated 350 feet to the treatment room with no difficulty  Mental status: alert, oriented to person, place, and time, normal mood, behavior, speech, dress, motor activity, and thought processes  Appearance: Casually dressed  Behavior:  calm, cooperative, and adequate rapport can be established  Attention Span and Concentration:  Normal    Affected Areas: RUE, LUE, and Trunk superior chest/breast  Shape: The BUE's present with natural shaping. The L breast presents with a small raised area central/superior, very sensitive to touch. The incision site from the port at the R superior/lateral breast is wide, sensitive to touch.  Tissue Texture: unremarkable  Skin Integrity: intact  Sensation: patient reports "electric" shocks near and around the port incision have recently started.  Circulation: cbcirculation: intact            UE Girth Measurements: taken in centimeters  [facility] RIGHT UE  3/18/24 LEFT UE  3/18/24 DIFF   at eval         E + 15cm 40.5 cm 41.0 cm 0.5 cm   E + 10cm 39.0 cm 41.0 cm 2.0 cm   Elbow 31.5 cm 32.0 cm 0.5 cm   W +20cm 32.0 cm 33.0 cm 1.0 cm   W + 15cm 29.0 cm 29.5 cm 0.5 cm   W + 10cm 24.5 cm 25.0 cm 0.5 cm   Wrist 18.0 cm 18.0 " "cm 0.0 cm   DPC 22.0 cm 21.5 cm -0.5 cm   IP index 7.0 cm 7.0 cm 0.0 cm     Strength: grossly assessed, 3+/5 throughout BUE's  ROM:   Flexion:  * limited by pulling pain across the L superior breast  Abduction:   *      Treatment      Total Treatment time separate from Evaluation: 15 minutes    Laura received the treatments listed below:    Desensitization techniques were demonstrated for the "shooting/electric" pain felt about the port incision as well as the superior/central L breast. Patient was instructed to use a flat hand and provide gentle, sustained pressure in one area for a count of 5, then move the hand slightly and repeat, covering the area of discomfort slowly, until symptoms have subsided.      Patient was instructed to continue with all exercises discussed with her PT on 3/15/24.    Patient was instructed with physical demonstration and verbal instruction in pendulum therex for L shoulder, leaning gently on R arm. Patient was instructed if increase in pain prevents her from performing her PT therex, she should at the very minimum perform pendulum therex for L shoulder for 3-5 mins, 3-5 times per day until her next PT visit to avoid the L shoulder freezing up.      Patient Education and Home Exercises      Education provided:   1. Educated on definition of lymphedema; signs and symptoms of lymphedema including sensation of heaviness in the arm/and or breast, taught and or shiny skin, decreased natural wrinkles in the skin, pitting marks when leaning on objects  2. Patient encouraged to report any of the above symptoms to her medical team should they occur    Assessment      Laura is a 38 y.o. female referred to outpatient Occupational Therapy with a medical diagnosis of at risk for lymphedema s/p breast cancer. This patient presents with stage 0 lymphedema due to mastectomy, radiation, reconstruction.  Patient's deficits include swelling of the superior/central L breast, " increased pain, increased stiffness in the L >R shoulders, as well as difficulty performing lifting, carrying.   Therapist's recommendation included desensitization techniques.  Laura is not in need of manual lymphatic drainage or compression to the LUE at this time. Baseline measurements of BLE were taken, and patient will self-monitor the BLE's for any signs of pitting, heaviness, decreased skin wrinkles and report them to her medical team.  Patient's ROM, functional and strength deficits will be treated by her ongoing PT.    Plan of care discussed with patient: Yes  Patient's spiritual, cultural and educational needs considered and patient is agreeable to the plan of care to continue to work with her PT at this time, and no need to add OT.    Medical Necessity is demonstrated by the following  Occupational Profile/History  Co-morbidities and personal factors that may impact the plan of care [] LOW: Brief chart review  [x] MODERATE: Expanded chart review   [] HIGH: Extensive chart review    Moderate / High Support Documentation: Patient's surgical history, treatment, co-morbidities reviewed.     Examination  Performance deficits relating to physical, cognitive or psychosocial skills that result in activity limitations and/or participation restrictions  [] LOW: addressing 1-3 Performance deficits  [x] MODERATE: 3-5 Performance deficits  [] HIGH: 5+ Performance deficits (please support below)    Moderate / High Support Documentation:    Physical:  Joint Mobility  Muscle Power/Strength  Skin Integrity/Scar Formation    Cognitive:  No Deficits    Psychosocial:    No Deficits     Treatment Options [] LOW: Limited options  [x] MODERATE: Several options  [] HIGH: Multiple options      Decision Making/ Complexity Score: moderate       The following goals were discussed with the patient and patient is in agreement with them as to be addressed in the treatment plan.     Goals:   Patient will demonstrate self  desensitization techniques to decrease the pain about the L superior breast/R port incision site. MET 3/18/24  Patient will report understanding signs and symptoms of lymphedema in the UE and or breast and will report to medical team if they occur. MET 3/18/24    Plan     Plan of Care Certification: 3/18/2024 to 3/18/24.     Therapy Track: MAINTENANCE AND PREVENTION   Interventions: lymphedema education, desensitization techniques    No further OT will be scheduled at this time. Patient is under the care of PT, and will continue under their care.  Tanja Patten, OT, JORDI      I CERTIFY  THIS PLAN OF TREATMENT AND WHILE UNDER MY CARE   Physician's comments:     Physician's Signature: ___________________________________________________

## 2024-03-18 NOTE — PROGRESS NOTES
"[facility]   Physical Therapy Treatment Note      Name: Laura Kent  Clinic Number: [id number]    Therapy Diagnosis:   Encounter Diagnoses   Name Primary?    Decreased functional activity tolerance Yes    Weakness of both upper extremities     Abnormal posture     At risk for lymphedema      Physician: Angie Peck PA-C    Visit Date: 3/19/2024    Physician Orders: PT Eval and Treat   Medical Diagnosis from Referral: C50.412,Z17.1 (ICD-10-CM) - Malignant neoplasm of upper-outer quadrant of left breast in female, estrogen receptor negative  Evaluation Date: 3/15/2024  Authorization Period Expiration: 05/18/2024  Plan of Care Expiration: 05/10/2024  Progress Note Due: 04/15/2024  Visit # / Visits Authorized: 1 / 12 (plus eval)  FOTO: 1/3    Time In: 8:11 AM (late arrival)  Time Out: 8:45 AM  Total Billable Time: 34 minutes      Subjective     Patient reports: attempted stretches at home with ongoing pain through LEFT breast and lateral chest. Patient also recalls difficulty when attempting to hold, stop young niece using LEFT arm this weekend secondary to pain and post-operative weakness. Lymphedema evaluation yesterday (03/18/2024) recommended continued "massage" through arm to manage reversible lymphedema through forearm and chest / breast. Of note, patient scheduled for breast ultrasound later today (03/19/2024).     She was compliant with home exercise program.    Response to Previous Treatment: no adverse events  Functional Change: no noticeable change since initial evaluation    Pain: 8/10  Location: LEFT chest, axilla    Fatigue: 0/10    Diagnosis: stage IIIC (cT3, cN2(f), cM0, G3, ER-, CO-, HER2-) IDC of the LEFT breast.     Treatment:   Chemotherapy: completed neoadjuvantly 11/22  Radiation: completed 25 fx (04/27/2023 - 05/24/2023)  Endocrine Therapy: N/A    Surgery Date:   - 12/11/2023: BILATERAL KANDICE flap reconstruction with LEFT TE removal per Myra Barrios and " Babycos and RIGHT mastectomy per Dr. Daley     - 02/27/2023: LEFT mastectomy with SLNB per Dr. Weiner      Objective      Objective Measures updated at progress report unless specified.     Treatment     Laura received the treatments listed below:      Therapeutic exercises to increase cardiovascular endurance, flexibility, range of motion, and flexibility for 15 minutes:     Seated Exercises:  - Pulleys (flex, abd)    2 min each  - Physioball roll out (3 ways)   2 min    Mat Exercises:  - PROM, LEFT only (flex, abd)  1 set x 10 reps each      Manual therapy techniques to decrease pain as well as to increase soft tissue mobility, joint mobility, mobility and pliability, and function for 14 minutes:     - Soft tissue mobilization LEFT pec minor  - Manual lateral chest stretch  - Manual anterior chest stretch      Neuromuscular re-education activities to improve kinesthetic sense, proprioception, postural awareness, and scapular stabilization for 5 minutes:      Standing Exercises:  - Shoulder rows (red)    2 sets x 10 reps      Patient Education and Home Exercises       Education Provided Regarding:   - Role of physical therapy in multi-disciplinary team  - Goals for physical therapy, progress towards goals  - Physical therapy plan of care, scheduling  - Home exercise program, exercise technique  - Energy conservation techniques    Written Home Exercises Provided: Patient instructed to cont prior HEP. Exercises were reviewed and Laura was able to demonstrate them prior to the end of the session. Laura demonstrated good  understanding of the education provided. See EMR under Patient Instructions for exercises provided during therapy sessions    Assessment     Patient is responding well to physical therapy. Patient able to perform new exercises and exercise progressions without increase in symptoms prior to leaving the clinic. Patient demonstrated good PROM / AAROM throughout session but verbalized ongoing  tightness through superior, lateral aspect of LEFT chest with movement. Improved soft tissue mobilization following manual techniques; no significant tenderness appreciated throughout. Will progress as tolerated.    Laura Is progressing well towards her goals.   Patient prognosis is Good.     Patient will continue to benefit from skilled outpatient physical therapy to address the deficits listed in the problem list box on initial evaluation, provide patient / family education, and to maximize patient's level of independence in the home and community environment.     Patient's spiritual, cultural, and educational needs considered, and patient agreeable to plan of care and goals.     Anticipated barriers to physical therapy: none anticipated    GOALS:   Short Term Goals: 4 Weeks  Patient will demonstrate 100% understanding of lymphedema risk reduction practices, including self-monitoring for lymphedema (progressing, not met).  Patient will demonstrate independence with established home exercise program for improved strength, functional mobility, range of motion, posture, and endurance (progressing, not met).   Patient will increase LEFT shoulder FLEXION active range of motion to 160 degrees for improved independence with functional reaching, carrying, pushing, and pulling tasks (progressing, not met).   Patient will increase LEFT shoulder ABDUCTION active range of motion to 130 degrees for improved independence with functional reaching, carrying, pushing, and pulling tasks (progressing, not met).   Patient will increase gross BILATERAL upper extremity musculature to >4/5 for improved independence with functional reaching, carrying, pushing, and pulling activities of daily living (progressing, not met).      Long Term Goals: 8 Weeks   Patient will be independent with updated home exercise program for improved functional mobility, posture, strength, and endurance (progressing, not met).  Patient will increase LEFT  shoulder FLEXION active range of motion to 180 degrees for improved independence with functional reaching, carrying, pushing, and pulling tasks (progressing, not met).   Patient will increase LEFT shoulder ABDUCTION active range of motion to 180 degrees for improved independence with functional reaching, carrying, pushing, and pulling tasks (progressing, not met).   Patient will increase gross BILATERAL upper extremity musculature to 5/5 for improved independence with functional reaching, carrying, pushing, and pulling activities of daily living (progressing, not met).   Patient will report decrease in overall worst pain to 2/10 at discharge for improved tolerance to functional reaching, carrying, pushing, and pulling activities of daily living (progressing, not met).  Patient will report compliance with walking program 5x/week for 10 minutes / day to improve overall cardiovascular function and decrease cancer-related fatigue at discharge (progressing, not met).      Plan     Plan of Care Certification: 03/15/2024 to 05/10/2024.     Outpatient Physical Therapy 2 times weekly for 8 weeks to include the following interventions: Gait Training, Manual Therapy, Neuromuscular Re-ed, Patient Education, Self Care, Therapeutic Activities, Therapeutic Exercise, and IASTM.     Ghada Brooks, PT

## 2024-03-19 ENCOUNTER — HOSPITAL ENCOUNTER (OUTPATIENT)
Dept: RADIOLOGY | Facility: HOSPITAL | Age: 39
Discharge: HOME | End: 2024-03-19
Attending: PHYSICIAN ASSISTANT
Payer: MEDICAID

## 2024-03-19 ENCOUNTER — CLINICAL SUPPORT (OUTPATIENT)
Dept: REHABILITATION | Facility: HOSPITAL | Age: 39
End: 2024-03-19
Payer: MEDICAID

## 2024-03-19 DIAGNOSIS — R68.89 DECREASED FUNCTIONAL ACTIVITY TOLERANCE: Primary | ICD-10-CM

## 2024-03-19 DIAGNOSIS — R29.898 WEAKNESS OF BOTH UPPER EXTREMITIES: ICD-10-CM

## 2024-03-19 DIAGNOSIS — Z91.89 AT RISK FOR LYMPHEDEMA: ICD-10-CM

## 2024-03-19 DIAGNOSIS — Z98.890 S/P BREAST RECONSTRUCTION: ICD-10-CM

## 2024-03-19 DIAGNOSIS — R29.3 ABNORMAL POSTURE: ICD-10-CM

## 2024-03-19 PROCEDURE — 76642 ULTRASOUND BREAST LIMITED: CPT | Mod: TC,LT

## 2024-03-19 PROCEDURE — 97110 THERAPEUTIC EXERCISES: CPT

## 2024-03-19 PROCEDURE — 76642 ULTRASOUND BREAST LIMITED: CPT | Mod: 26,LT,, | Performed by: RADIOLOGY

## 2024-03-20 ENCOUNTER — TELEPHONE (OUTPATIENT)
Dept: INTERVENTIONAL RADIOLOGY/VASCULAR | Facility: CLINIC | Age: 39
End: 2024-03-20
Payer: MEDICAID

## 2024-03-20 DIAGNOSIS — N64.89 SEROMA OF BREAST: Primary | ICD-10-CM

## 2024-03-20 NOTE — TELEPHONE ENCOUNTER
Spoke to pt on phone, Pt is scheduled on 3/22/2024 with arrival time 7am for IR procedure at [facility] location. Pt aware and confirmed, Thanks

## 2024-03-21 ENCOUNTER — TELEPHONE (OUTPATIENT)
Dept: PLASTIC SURGERY | Facility: CLINIC | Age: 39
End: 2024-03-21
Payer: MEDICAID

## 2024-03-21 ENCOUNTER — CLINICAL SUPPORT (OUTPATIENT)
Dept: REHABILITATION | Facility: HOSPITAL | Age: 39
End: 2024-03-21
Payer: MEDICAID

## 2024-03-21 DIAGNOSIS — R68.89 DECREASED FUNCTIONAL ACTIVITY TOLERANCE: Primary | ICD-10-CM

## 2024-03-21 DIAGNOSIS — R29.3 ABNORMAL POSTURE: ICD-10-CM

## 2024-03-21 DIAGNOSIS — Z17.1 MALIGNANT NEOPLASM OF UPPER-OUTER QUADRANT OF LEFT BREAST IN FEMALE, ESTROGEN RECEPTOR NEGATIVE: Primary | ICD-10-CM

## 2024-03-21 DIAGNOSIS — R29.898 WEAKNESS OF BOTH UPPER EXTREMITIES: ICD-10-CM

## 2024-03-21 DIAGNOSIS — C50.412 MALIGNANT NEOPLASM OF UPPER-OUTER QUADRANT OF LEFT BREAST IN FEMALE, ESTROGEN RECEPTOR NEGATIVE: Primary | ICD-10-CM

## 2024-03-21 DIAGNOSIS — Z91.89 AT RISK FOR LYMPHEDEMA: ICD-10-CM

## 2024-03-21 PROCEDURE — 97110 THERAPEUTIC EXERCISES: CPT

## 2024-03-21 NOTE — PROGRESS NOTES
"[facility]   Physical Therapy Treatment Note      Name: Laura Kent  Clinic Number: [id number]    Therapy Diagnosis:   Encounter Diagnoses   Name Primary?    Decreased functional activity tolerance Yes    Weakness of both upper extremities     Abnormal posture     At risk for lymphedema      Physician: Angie Peck PA-C    Visit Date: 3/21/2024    Physician Orders: PT Eval and Treat   Medical Diagnosis from Referral: C50.412,Z17.1 (ICD-10-CM) - Malignant neoplasm of upper-outer quadrant of left breast in female, estrogen receptor negative  Evaluation Date: 03/15/2024  Authorization Period Expiration: 05/18/2024  Plan of Care Expiration: 05/10/2024  Progress Note Due: 04/15/2024  Visit # / Visits Authorized: 2 / 12 (plus eval)  FOTO: 1/3    Time In: 9:31 AM   Time Out: 10:10 AM  Total Billable Time: 39 minutes (TE per [place] Medicaid)      Subjective     Patient reports: scheduled for IR procedure tomorrow (03/22/2024) to "drain fluid" from LEFT breast. Patient otherwise still experiencing "tightness" through chest and "fullness" in RIGHT arm, but patient has been performing skin / edema checks reviewed during lymphedema evaluation earlier this week.     She was compliant with home exercise program.    Response to Previous Treatment: no adverse events  Functional Change:     Pain: 8/10  Location: LEFT chest, axilla    Fatigue: 0/10    Diagnosis: stage IIIC (cT3, cN2(f), cM0, G3, ER-, TN-, HER2-) IDC of the LEFT breast.     Treatment:   Chemotherapy: completed neoadjuvantly 11/22  Radiation: completed 25 fx (04/27/2023 - 05/24/2023)  Endocrine Therapy: N/A    Surgery Date:   - 12/11/2023: BILATERAL KANDICE flap reconstruction with LEFT TE removal per Myra Barrios and [place] and RIGHT mastectomy per Dr. Daley     - 02/27/2023: LEFT mastectomy with SLNB per Dr. Weiner      Objective      Objective Measures updated at progress report unless specified.     Treatment     Laura" received the treatments listed below:      Therapeutic exercises to increase cardiovascular endurance, flexibility, range of motion, and flexibility for 18 minutes:     Seated Exercises:  - Pulleys (flex, abd)    2 min each  - Physioball roll out (3 ways)   2 min  - Biceps curls, nya (1#)    2 sets x 10 reps    Mat Exercises:  - PROM, LEFT only (flex, abd)  1 set x 10 reps each      Manual therapy techniques to decrease pain as well as to increase soft tissue mobility, joint mobility, mobility and pliability, and function for 17 minutes:     - Soft tissue mobilization LEFT pec minor (gentle)  - Manual lateral chest stretch (gentle)  - Manual anterior chest stretch (gentle)      Neuromuscular re-education activities to improve kinesthetic sense, proprioception, postural awareness, and scapular stabilization for 4 minutes:      Standing Exercises:  - Shoulder rows (red)    3 sets x 10 reps      Patient Education and Home Exercises       Education Provided Regarding:   - Role of physical therapy in multi-disciplinary team  - Goals for physical therapy, progress towards goals  - Physical therapy plan of care, scheduling  - Home exercise program, exercise technique  - Energy conservation techniques    Written Home Exercises Provided: Patient instructed to cont prior HEP. Exercises were reviewed and Laura was able to demonstrate them prior to the end of the session. Laura demonstrated good  understanding of the education provided. See EMR under Patient Instructions for exercises provided during therapy sessions    Assessment     Patient is responding well to physical therapy. Patient able to perform new exercises and exercise progressions without increase in symptoms prior to leaving the clinic. Added biceps curls for further strengthening / conditioning and increased repetitions of shoulder rows for improved scapular stabilization. Performed gentle soft tissue mobilization techniques as well as PROM without complaint.  Will schedule lymphedema follow-up appointments after IR procedure and will progress as tolerated.     Laura Is progressing well towards her goals.   Patient prognosis is Good.     Patient will continue to benefit from skilled outpatient physical therapy to address the deficits listed in the problem list box on initial evaluation, provide patient / family education, and to maximize patient's level of independence in the home and community environment.     Patient's spiritual, cultural, and educational needs considered, and patient agreeable to plan of care and goals.     Anticipated barriers to physical therapy: none anticipated    GOALS:   Short Term Goals: 4 Weeks  Patient will demonstrate 100% understanding of lymphedema risk reduction practices, including self-monitoring for lymphedema (progressing, not met).  Patient will demonstrate independence with established home exercise program for improved strength, functional mobility, range of motion, posture, and endurance (progressing, not met).   Patient will increase LEFT shoulder FLEXION active range of motion to 160 degrees for improved independence with functional reaching, carrying, pushing, and pulling tasks (progressing, not met).   Patient will increase LEFT shoulder ABDUCTION active range of motion to 130 degrees for improved independence with functional reaching, carrying, pushing, and pulling tasks (progressing, not met).   Patient will increase gross BILATERAL upper extremity musculature to >4/5 for improved independence with functional reaching, carrying, pushing, and pulling activities of daily living (progressing, not met).      Long Term Goals: 8 Weeks   Patient will be independent with updated home exercise program for improved functional mobility, posture, strength, and endurance (progressing, not met).  Patient will increase LEFT shoulder FLEXION active range of motion to 180 degrees for improved independence with functional reaching, carrying,  pushing, and pulling tasks (progressing, not met).   Patient will increase LEFT shoulder ABDUCTION active range of motion to 180 degrees for improved independence with functional reaching, carrying, pushing, and pulling tasks (progressing, not met).   Patient will increase gross BILATERAL upper extremity musculature to 5/5 for improved independence with functional reaching, carrying, pushing, and pulling activities of daily living (progressing, not met).   Patient will report decrease in overall worst pain to 2/10 at discharge for improved tolerance to functional reaching, carrying, pushing, and pulling activities of daily living (progressing, not met).  Patient will report compliance with walking program 5x/week for 10 minutes / day to improve overall cardiovascular function and decrease cancer-related fatigue at discharge (progressing, not met).      Plan     Plan of Care Certification: 03/15/2024 to 05/10/2024.     Outpatient Physical Therapy 2 times weekly for 8 weeks to include the following interventions: Gait Training, Manual Therapy, Neuromuscular Re-ed, Patient Education, Self Care, Therapeutic Activities, Therapeutic Exercise, and IASTM.     Ghada Brooks, PT

## 2024-03-21 NOTE — TELEPHONE ENCOUNTER
Spoke to pt and confirmed  a surgery date 4/18/24 at the [facility] [place], [facility] , 2nd Floor - Pt agreeable. Provided patient with details of pre /post op appts, basic prep for sx, arrival time the day before, triage call from anesthesia dept, and address of the location. Meds and allergies reviewed and meds to hold ( ASA /NSAIDS and MVI's ) call back # to RN navigator given should any questions or concerns arise  All questions and concerns addressed. Pt voiced understanding.

## 2024-03-22 ENCOUNTER — HOSPITAL ENCOUNTER (OUTPATIENT)
Dept: INTERVENTIONAL RADIOLOGY/VASCULAR | Facility: HOSPITAL | Age: 39
Discharge: HOME | End: 2024-03-22
Attending: INTERNAL MEDICINE
Payer: MEDICAID

## 2024-03-22 VITALS
HEIGHT: 66 IN | RESPIRATION RATE: 18 BRPM | TEMPERATURE: 98 F | SYSTOLIC BLOOD PRESSURE: 126 MMHG | HEART RATE: 74 BPM | BODY MASS INDEX: 36.8 KG/M2 | DIASTOLIC BLOOD PRESSURE: 68 MMHG | WEIGHT: 229 LBS | OXYGEN SATURATION: 97 %

## 2024-03-22 DIAGNOSIS — N64.89 SEROMA OF BREAST: ICD-10-CM

## 2024-03-22 LAB
B-HCG UR QL: NEGATIVE
CTP QC/QA: YES
GRAM STN SPEC: NORMAL
GRAM STN SPEC: NORMAL

## 2024-03-22 PROCEDURE — 87075 CULTR BACTERIA EXCEPT BLOOD: CPT | Performed by: INTERNAL MEDICINE

## 2024-03-22 PROCEDURE — 94761 N-INVAS EAR/PLS OXIMETRY MLT: CPT

## 2024-03-22 PROCEDURE — 99900035 HC TECH TIME PER 15 MIN (STAT)

## 2024-03-22 PROCEDURE — 94760 N-INVAS EAR/PLS OXIMETRY 1: CPT

## 2024-03-22 PROCEDURE — 76942 ECHO GUIDE FOR BIOPSY: CPT | Mod: 26,,, | Performed by: RADIOLOGY

## 2024-03-22 PROCEDURE — C1761 OPTIME CATH, TRANSLUMINAL INTRAVASC LITHO, CORONARY: HCPCS

## 2024-03-22 PROCEDURE — 76942 ECHO GUIDE FOR BIOPSY: CPT | Mod: TC | Performed by: RADIOLOGY

## 2024-03-22 PROCEDURE — 87118 MYCOBACTERIC IDENTIFICATION: CPT | Performed by: INTERNAL MEDICINE

## 2024-03-22 PROCEDURE — C1729 CATH, DRAINAGE: HCPCS

## 2024-03-22 PROCEDURE — 87070 CULTURE OTHR SPECIMN AEROBIC: CPT | Performed by: INTERNAL MEDICINE

## 2024-03-22 PROCEDURE — 87205 SMEAR GRAM STAIN: CPT | Performed by: INTERNAL MEDICINE

## 2024-03-22 PROCEDURE — C1769 GUIDE WIRE: HCPCS

## 2024-03-22 PROCEDURE — 20206 BIOPSY MUSCLE PERQ NEEDLE: CPT | Mod: LT | Performed by: RADIOLOGY

## 2024-03-22 PROCEDURE — 81025 URINE PREGNANCY TEST: CPT | Performed by: INTERNAL MEDICINE

## 2024-03-22 RX ORDER — LIDOCAINE HYDROCHLORIDE 10 MG/ML
1 INJECTION, SOLUTION EPIDURAL; INFILTRATION; INTRACAUDAL; PERINEURAL ONCE AS NEEDED
Status: DISCONTINUED | OUTPATIENT
Start: 2024-03-22 | End: 2024-03-23 | Stop reason: HOSPADM

## 2024-03-22 RX ORDER — SODIUM CHLORIDE 9 MG/ML
INJECTION, SOLUTION INTRAVENOUS CONTINUOUS
Status: DISCONTINUED | OUTPATIENT
Start: 2024-03-22 | End: 2024-03-23 | Stop reason: HOSPADM

## 2024-03-22 NOTE — Clinical Note
Left: Breast.   Scrubbed with Chlorohexidine.   Painted with Chlorohexidine.  Hair: N/A.  Skin prep dry before draping.  Prepped by: Jelani Santiago 3/22/2024 8:55 AM.

## 2024-03-22 NOTE — PROCEDURES
Radiology Post-Procedure Note    Pre Op Diagnosis: Breast seroma  Post Op Diagnosis: Same    Procedure: Drain placement    Procedure performed by: Yousuf Etienne MD    Written Informed Consent Obtained: Yes  Specimen Removed: YES 10mL purulent fluid  Estimated Blood Loss: Minimal    Findings:   US guided left breast seroma drain placed.  10mL purulent fluid removed.  No complications.    Patient tolerated procedure well.    Yousuf Etienne MD  Diagnostic and Interventional Radiologist  Department of Radiology  Pager: [phone]

## 2024-03-22 NOTE — PLAN OF CARE
Discharge instructions given and explained to patient and family with verbalization of understanding all instructions.

## 2024-03-22 NOTE — PRE-PROCEDURE INSTRUCTIONS
Sent to patient via [employer] message--      PreOp and Medication Instructions given:   - medication instructions (instructed to hold vitamins, herbal supplements and NSAIDS one week prior to surgery)  - NPO guidelines, unless otherwise instructed by Surgeon's Office  - Arrival place and time to be given by the Surgeon's Office   - Shower with antibacterial soap   - No makeup or moisturizer to face   - No perfume/cologne, powder, lotions, deodorant or aftershave   - Leave all jewelry, including body piercings, and valuables at home.  - Arrange for someone to drive you home following surgery; will not be allowed to leave the surgical facility alone or drive self home following sedation and anesthesia.    Pt instructed to call surgeon's office or POC with any questions or changes.

## 2024-03-22 NOTE — ANESTHESIA PAT ROS NOTE
03/22/2024  Laura Kent is a 38 y.o., female.      Pre-op Assessment          Review of Systems           Anesthesia Assessment: Preoperative EQUATION    Planned Procedure: Procedure(s) (LRB):  REVISION, FLAP, PREVIOUSLY CREATED FOR BREAST RECONSTRUCTION (Bilateral)  LIPOSUCTION, WITH FAT TRANSFER (Bilateral)  Requested Anesthesia Type:General  Surgeon: Reese Barrios DO  Service: Plastics  Known or anticipated Date of Surgery:4/18/2024    Surgeon notes: reviewed    Electronic QUestionnaire Assessment completed via nurse interview with patient.        Triage considerations:         Previous anesthesia records:GETA and No problems  12/11/23 REMOVAL, TISSUE EXPANDER (Left: Breast), RECONSTRUCTION, BREAST, USING KANDICE FREE FLAP (Bilateral: Breast), REVISION, SCAR (Right: Chest), spy (Chest), MASTECTOMY (Right: Breast), gen anes, ASA 3    Intubation     Date/Time: 12/11/2023 7:28 AM     Performed by: Kumar Umanzor MD  Authorized by: Kumar Umanzor MD    Intubation:     Induction:  Intravenous    Intubated:  Postinduction    Mask Ventilation:  Easy mask    Attempts:  1    Attempted By:  Staff anesthesiologist    Method of Intubation:  Direct    Blade:  Patten 2    Laryngeal View Grade: Grade I - full view of cords      Difficult Airway Encountered?: No      Complications:  None    Airway Device:  Oral endotracheal tube    Airway Device Size:  7.0    Style/Cuff Inflation:  Cuffed    Tube secured:  21    Secured at:  The lips    Placement Verified By:  Capnometry    Complicating Factors:  None    Findings Post-Intubation:  BS equal bilateral               Electronically signed by Kumar Umanzor MD at 12/11/2023  7:46 AM         Last PCP note: 3-6 months ago , [facility]   Subspecialty notes: Hematology/Oncology, Plastics    Other important co-morbidities: HTN,  Obesity, and h/o breast cancer and previous breast surgeries, anemia, anxiety, h/o blood transfusion, h/o meningitis, SOB       Tests already available:  No recent tests.             Instructions     Optimization:  Anesthesia Preop Clinic Assessment Indicated    Medical Opinion Indicated       Sub-specialist consult indicated: TBD       Plan:    Testing:  BMP, EKG, and Hematology Profile   Pre-anesthesia  visit       Visit focus: concerns in complex and/or prolonged anesthesia, acute or chronic anxiety concerns, to be determined     Consultation:IM Perioperative Hospitalist       Navigation: Tests Scheduled.              Consults scheduled.             Results will be tracked by Preop Clinic.

## 2024-03-22 NOTE — DISCHARGE SUMMARY
Radiology Discharge Summary      Hospital Course: No complications    Admit Date: 3/22/2024  Discharge Date: 03/22/2024     Instructions Given to Patient: Yes  Diet: Resume prior diet  Activity: activity as tolerated    Description of Condition on Discharge: Stable  Vital Signs (Most Recent): Temp: 97.6 °F (36.4 °C) (03/22/24 0935)  Pulse: 74 (03/22/24 0936)  Resp: 18 (03/22/24 0936)  BP: 126/68 (03/22/24 0945)  SpO2: 97 % (03/22/24 0955)    Discharge Disposition: Home    Discharge Diagnosis: Breast seroma s/p drain placement     Follow-up: Drain management per plastic surgery    Yousuf Etienne MD  Diagnostic and Interventional Radiologist  Department of Radiology  Pager: [phone]

## 2024-03-22 NOTE — H&P
Radiology History & Physical      SUBJECTIVE:     Chief Complaint: Breast seroma    History of Present Illness:  Laura Kent is a 38 y.o. female with left breast seroma who presents for US guided drain placement.    Past Medical History:   Diagnosis Date    Anemia     Anxiety     Breast cancer     Encounter for blood transfusion     Hypertension     Meningitis     Severe obesity 2023    Shortness of breath 2023     Past Surgical History:   Procedure Laterality Date    AXILLARY NODE DISSECTION Left 2023    Procedure: LYMPHADENECTOMY, AXILLARY;  Surgeon: YASMANI Weiner MD;  Location: [facility] OR;  Service: General;  Laterality: Left;    BREAST BIOPSY Left      SECTION      COLONOSCOPY N/A 2022    Procedure: COLONOSCOPY;  Surgeon: Davi Chery MD;  Location: [facility] ENDO (4TH FLR);  Service: Endoscopy;  Laterality: N/A;  case request entered from referral - Per DR. KATHLEEN Chery Pt to be scheduled for urgent colonoscopy on 22/ fully vaccinated / prep ins for Sutab on portal - ERW  see micro for negative C-diff- ERW    INJECTION FOR SENTINEL NODE IDENTIFICATION Left 2023    Procedure: INJECTION, FOR SENTINEL NODE IDENTIFICATION;  Surgeon: YASMANI Weiner MD;  Location: [facility] OR;  Service: General;  Laterality: Left;    INSERTION OF BREAST TISSUE EXPANDER Left 2023    Procedure: INSERTION, TISSUE EXPANDER, BREAST;  Surgeon: Reese Barrios DO;  Location: [facility];  Service: Plastics;  Laterality: Left;    INSERTION OF TUNNELED CENTRAL VENOUS CATHETER (CVC) WITH SUBCUTANEOUS PORT N/A 2022    Procedure: [id number];  Surgeon: Deo Sin Jr., MD;  Location: [facility] 2ND FLR;  Service: General;  Laterality: N/A;    INSERTION OF TUNNELED CENTRAL VENOUS CATHETER (CVC) WITH SUBCUTANEOUS PORT N/A 2022    Procedure: INSERTION, SINGLE LUMEN CATHETER WITH PORT, WITH FLUOROSCOPIC GUIDANCE;  Surgeon: Ryder Dinero MD;  Location: [facility] CATH LAB;  Service:  Vascular;  Laterality: N/A;    INTRAVENOUS INJECTION N/A 12/11/2023    Procedure: spy;  Surgeon: Reese Barrios DO;  Location: [facility] OR 2ND FLR;  Service: Plastics;  Laterality: N/A;    MASTECTOMY Left 2/27/2023    Procedure: MASTECTOMY;  Surgeon: YASMANI Weiner MD;  Location: [facility];  Service: General;  Laterality: Left;  3.5 TOTAL HOURS / EMAIL SENT 2-23 @ 7:56 CCC    MASTECTOMY Right 12/11/2023    Procedure: MASTECTOMY;  Surgeon: Eldon Daley MD;  [facility];  Service: General;  Laterality: Right;    RECONSTRUCTION OF BREAST WITH DEEP INFERIOR EPIGASTRIC ARTERY  (KANDICE) FREE FLAP Bilateral 12/11/2023    Procedure: RECONSTRUCTION, BREAST, USING KANDICE FREE FLAP;  Surgeon: Reese Barrios DO;  Location: [facility] OR [facility] FLR;  Service: Plastics;  Laterality: Bilateral;  Bilateral KANDICE flap - cosurgeon is Dr Cross -    REVISION OF SCAR Right 12/11/2023    Procedure: REVISION, SCAR;  Surgeon: Reese Barrios DO;  [facility];  Service: Plastics;  Laterality: Right;    SENTINEL LYMPH NODE BIOPSY Left 2/27/2023    Procedure: BIOPSY, LYMPH NODE, SENTINEL;  Surgeon: YASMANI Weiner MD;  Location: [facility];  Service: General;  Laterality: Left;  LEFT with radiological marker    TISSUE EXPANDER REMOVAL Left 12/11/2023    Procedure: REMOVAL, TISSUE EXPANDER;  Surgeon: Reese Barrios DO;  [facility];  Service: Plastics;  Laterality: Left;  removal left tissu expander       Home Meds:   Prior to Admission medications    Medication Sig Start Date End Date Taking? Authorizing Provider   gabapentin (NEURONTIN) 300 MG capsule Take 1 capsule (300 mg total) by mouth 3 (three) times daily. for 7 days 12/14/23 3/22/24 Yes Mg Lewis MD   metFORMIN (GLUCOPHAGE-XR) 750 MG ER 24hr tablet Take 1 tablet (750 mg total) by mouth daily with breakfast. 3/6/24 3/6/25 Yes Yumiko Monreal PA-C   capecitabine (XELODA) 150 MG tablet Take 1 tablet (150 mg  total) by mouth 2 (two) times daily for 14 days followed by 7 days off. Take with 1 other capecitabine prescription for 2,150 mg total. 9/5/23 9/4/24  Yessy Cruz MD   capecitabine (XELODA) 150 MG tablet Take 1 tablet (150 mg total) by mouth 2 (two) times daily for 14 days followed by 7 days off. Take with 1 other capecitabine prescription for 2,150 mg total. 11/9/23 11/8/24  Yessy Cruz MD   capecitabine (XELODA) 500 MG Tab Take 4 tablets (2,000 mg total) by mouth 2 (two) times daily for 14 days followed by 7 days off. Take with 1 other capecitabine prescription for 2,150 mg total. 9/5/23 9/4/24  Yessy Cruz MD   capecitabine (XELODA) 500 MG Tab Take 4 tablets (2,000 mg total) by mouth 2 (two) times daily for 14 days followed by 7 days off. Take with 1 other capecitabine prescription for 2,150 mg total. 11/9/23 11/8/24  Yessy Cruz MD   cetirizine (ZYRTEC) 10 MG tablet Take 10 mg by mouth once daily.    Provider, Historical   clotrimazole (LOTRIMIN) 1 % cream Apply 1 application topically every evening. 1/25/23   Provider, Historical   hydrOXYzine HCL (ATARAX) 10 MG Tab Take 10 mg by mouth nightly as needed. 1/25/23   Provider, Historical   ketoconazole (NIZORAL) 2 % shampoo Apply topically Every 3 (three) days. 1/26/23   Provider, Historical   mupirocin (BACTROBAN) 2 % ointment Apply topically once daily. 12/5/22   Cyndie Vyas DO   ondansetron (ZOFRAN) 8 MG tablet Take 1 tablet (8 mg total) by mouth every 12 (twelve) hours as needed for Nausea. 6/26/23 6/25/24  Yessy Cruz MD   pantoprazole (PROTONIX) 40 MG tablet Take 1 tablet (40 mg total) by mouth once daily. 12/29/22 12/29/23  Adonay Cannon NP     Anticoagulants/Antiplatelets: no anticoagulation    Allergies:   Review of patient's allergies indicates:   Allergen Reactions    Strawberries [strawberry] Hives     Sedation History:  no adverse reactions    Review of Systems:   Hematological: no known coagulopathies  Respiratory: no  "shortness of breath  Cardiovascular: no chest pain  Gastrointestinal: no abdominal pain  Genito-Urinary: no dysuria  Musculoskeletal: negative  Neurological: no TIA or stroke symptoms         OBJECTIVE:     Vital Signs (Most Recent)  Temp: 97.5 °F (36.4 °C) (03/22/24 0743)  Pulse: 79 (03/22/24 0743)  Resp: 16 (03/22/24 0743)  BP: (!) 145/82 (03/22/24 0743)  SpO2: 98 % (03/22/24 0801)    Physical Exam:  ASA: n/a  Mallampati: n/a    General: no acute distress  Mental Status: alert and oriented to person, place and time  HEENT: normocephalic, atraumatic  Chest: unlabored breathing  Heart: regular heart rate  Abdomen: nondistended  Extremity: moves all extremities    Laboratory  No results found for: "INR", "PT", "PTT"    Lab Results   Component Value Date    WBC 6.45 12/12/2023    HGB 9.3 (L) 12/12/2023    HCT 27.3 (L) 12/12/2023    MCV 94 12/12/2023     12/12/2023      Lab Results   Component Value Date     (H) 12/12/2023     12/12/2023    K 3.2 (L) 12/12/2023     12/12/2023    CO2 23 12/12/2023    BUN 10 12/12/2023    CREATININE 0.8 12/12/2023    CALCIUM 7.5 (L) 12/12/2023    MG 1.7 12/12/2023    ALT 11 12/12/2023    AST 28 12/12/2023    ALBUMIN 3.0 (L) 12/12/2023    BILITOT 0.6 12/12/2023       ASSESSMENT/PLAN:     Sedation Plan: Local only.  Patient will undergo breast seroma drain placement.    Yousuf Etienne MD  Diagnostic and Interventional Radiologist  Department of Radiology  Pager: [phone]   "

## 2024-03-25 ENCOUNTER — TELEPHONE (OUTPATIENT)
Dept: PREADMISSION TESTING | Facility: HOSPITAL | Age: 39
End: 2024-03-25
Payer: MEDICAID

## 2024-03-25 NOTE — TELEPHONE ENCOUNTER
----- Message from Pepito Luis RN sent at 3/22/2024  2:06 PM CDT -----  04/18 surgery Reese Barrios    Please schedule pt for lab, EKG, and NP or Ck/POC. Thanks!

## 2024-03-26 ENCOUNTER — CLINICAL SUPPORT (OUTPATIENT)
Dept: REHABILITATION | Facility: HOSPITAL | Age: 39
End: 2024-03-26
Payer: MEDICAID

## 2024-03-26 DIAGNOSIS — R29.3 ABNORMAL POSTURE: ICD-10-CM

## 2024-03-26 DIAGNOSIS — Z17.1 MALIGNANT NEOPLASM OF UPPER-OUTER QUADRANT OF LEFT BREAST IN FEMALE, ESTROGEN RECEPTOR NEGATIVE: Primary | ICD-10-CM

## 2024-03-26 DIAGNOSIS — Z91.89 AT RISK FOR LYMPHEDEMA: ICD-10-CM

## 2024-03-26 DIAGNOSIS — R29.898 WEAKNESS OF BOTH UPPER EXTREMITIES: ICD-10-CM

## 2024-03-26 DIAGNOSIS — C50.412 MALIGNANT NEOPLASM OF UPPER-OUTER QUADRANT OF LEFT BREAST IN FEMALE, ESTROGEN RECEPTOR NEGATIVE: Primary | ICD-10-CM

## 2024-03-26 DIAGNOSIS — R68.89 DECREASED FUNCTIONAL ACTIVITY TOLERANCE: Primary | ICD-10-CM

## 2024-03-26 LAB — BACTERIA SPEC ANAEROBE CULT: NORMAL

## 2024-03-26 PROCEDURE — 97110 THERAPEUTIC EXERCISES: CPT

## 2024-03-26 NOTE — PROGRESS NOTES
"[facility]   Physical Therapy Treatment Note      Name: Laura Kent  Clinic Number: [id number]    Therapy Diagnosis:   Encounter Diagnoses   Name Primary?    Decreased functional activity tolerance Yes    Weakness of both upper extremities     Abnormal posture     At risk for lymphedema      Physician: Angie Peck PA-C    Visit Date: 3/26/2024    Physician Orders: PT Eval and Treat   Medical Diagnosis from Referral: C50.412,Z17.1 (ICD-10-CM) - Malignant neoplasm of upper-outer quadrant of left breast in female, estrogen receptor negative  Evaluation Date: 03/15/2024  Authorization Period Expiration: 05/18/2024  Plan of Care Expiration: 05/10/2024  Progress Note Due: 04/15/2024  Visit # / Visits Authorized: 3 / 12 (plus eval)  FOTO: 1/3    Time In: 8:45 AM   Time Out: 9:05 AM  Total Billable Time: 20 minutes (TE per [place] Medicaid)      Subjective     Patient reports: VALENTINA drain placed in LEFT breast during follow-up appointment with plastic surgeon on Friday (03/22/2024). Patient has emptied VALENTINA drain twice since placement and reports good pain relief, and patient is scheduled for next follow-up with plastic surgeon this Thursday (03/28/2024) to possibly remove drain. No change otherwise in "full" feeling in LEFT upper extremity.     She was not compliant with home exercise program (secondary to VALENTINA drain).     Response to Previous Treatment: no adverse events  Functional Change: improved pain level but no significant functional change since previous visit    Pain: 2/10  Location: LEFT breast (drain site)    Fatigue: mild    Diagnosis: stage IIIC (cT3, cN2(f), cM0, G3, ER-, IL-, HER2-) IDC of the LEFT breast.     Treatment:   Chemotherapy: completed neoadjuvantly 11/22  Radiation: completed 25 fx (04/27/2023 - 05/24/2023)  Endocrine Therapy: N/A    Surgery Date:   - 12/11/2023: BILATERAL KANDICE flap reconstruction with LEFT TE removal per Myra Barrios and [place] and RIGHT " mastectomy per Dr. Daley     - 02/27/2023: LEFT mastectomy with SLNB per Dr. Weiner      Objective      Objective Measures updated at progress report unless specified.     Treatment     Laura received the treatments listed below:      Therapeutic exercises to increase cardiovascular endurance, flexibility, range of motion, and flexibility for 0 minutes:     Seated Exercises:  - Pulleys (flex, abd)    2 min each  - Physioball roll out (3 ways)   2 min  - Biceps curls, nya (1#)    2 sets x 10 reps    Mat Exercises:  - PROM, LEFT only (flex, abd)  1 set x 10 reps each      Manual therapy techniques to decrease pain as well as to increase soft tissue mobility, joint mobility, mobility and pliability, and function for 0 minutes:     - Soft tissue mobilization LEFT pec minor (gentle)  - Manual lateral chest stretch (gentle)  - Manual anterior chest stretch (gentle)      Neuromuscular re-education activities to improve kinesthetic sense, proprioception, postural awareness, and scapular stabilization for 0 minutes:      Standing Exercises:  - Shoulder rows (red)    3 sets x 10 reps - held      Self-care / home management education regarding recent VALENTINA drain placement for 20 minutes:    Reviewed the following activity precautions with VALENTINA drain:  - No lifting arms above shoulder height (90 degrees flexion, abduction)  - No lifting items >5 pounds  - No pushing, pulling heavy objects  - No sleeping on stomach or LEFT side    Patient verbalized understanding, agreement to all discussed. Patient also inquired about possible activity / post-op precautions following upcoming planned revision (date not yet scheduled). Therapist agreeable to message plastic surgeon for further insight into scheduling follow-up therapy appointments.    Patient also requesting therapist to assess for possible cording in axilla. Limited assessment performed secondary to range of motion limitations with VALENTINA drain, but no cording palpated in axilla or  LEFT biceps at approx 45 degrees shoulder abduction. Recommended further assessment after VALENTINA drain removal to palpate LEFT upper extremity at end range of motion. Patient verbalized understanding, agreement.       Patient Education and Home Exercises       Education Provided Regarding:   - Role of physical therapy in multi-disciplinary team  - Goals for physical therapy, progress towards goals  - Physical therapy plan of care, scheduling  - Home exercise program, exercise technique  - Energy conservation techniques    Written Home Exercises Provided:  Reviewed activity restrictions with VALENTINA drain (please see above) . Exercises were reviewed and Laura was able to demonstrate them prior to the end of the session. Laura demonstrated good  understanding of the education provided. See EMR under Patient Instructions for exercises provided during therapy sessions    Assessment     Patient is responding well to physical therapy. Limited physical therapy session today secondary to VALENTINA drain in LEFT breast. Reviewed activity restrictions while VALENTINA drain in place (please see above for further details). Cancelled therapy follow-up appointment on 03/28/2024 at 8:00 AM for patient to follow-up with plastic surgeon that afternoon for possible VALENTINA drain removal. Will progress as tolerated.     Laura Is progressing well towards her goals.   Patient prognosis is Good.     Patient will continue to benefit from skilled outpatient physical therapy to address the deficits listed in the problem list box on initial evaluation, provide patient / family education, and to maximize patient's level of independence in the home and community environment.     Patient's spiritual, cultural, and educational needs considered, and patient agreeable to plan of care and goals.     Anticipated barriers to physical therapy: none anticipated    GOALS:   Short Term Goals: 4 Weeks  Patient will demonstrate 100% understanding of lymphedema risk reduction  practices, including self-monitoring for lymphedema (progressing, not met).  Patient will demonstrate independence with established home exercise program for improved strength, functional mobility, range of motion, posture, and endurance (progressing, not met).   Patient will increase LEFT shoulder FLEXION active range of motion to 160 degrees for improved independence with functional reaching, carrying, pushing, and pulling tasks (progressing, not met).   Patient will increase LEFT shoulder ABDUCTION active range of motion to 130 degrees for improved independence with functional reaching, carrying, pushing, and pulling tasks (progressing, not met).   Patient will increase gross BILATERAL upper extremity musculature to >4/5 for improved independence with functional reaching, carrying, pushing, and pulling activities of daily living (progressing, not met).      Long Term Goals: 8 Weeks   Patient will be independent with updated home exercise program for improved functional mobility, posture, strength, and endurance (progressing, not met).  Patient will increase LEFT shoulder FLEXION active range of motion to 180 degrees for improved independence with functional reaching, carrying, pushing, and pulling tasks (progressing, not met).   Patient will increase LEFT shoulder ABDUCTION active range of motion to 180 degrees for improved independence with functional reaching, carrying, pushing, and pulling tasks (progressing, not met).   Patient will increase gross BILATERAL upper extremity musculature to 5/5 for improved independence with functional reaching, carrying, pushing, and pulling activities of daily living (progressing, not met).   Patient will report decrease in overall worst pain to 2/10 at discharge for improved tolerance to functional reaching, carrying, pushing, and pulling activities of daily living (progressing, not met).  Patient will report compliance with walking program 5x/week for 10 minutes / day to  improve overall cardiovascular function and decrease cancer-related fatigue at discharge (progressing, not met).      Plan     Plan of Care Certification: 03/15/2024 to 05/10/2024.     Outpatient Physical Therapy 2 times weekly for 8 weeks to include the following interventions: Gait Training, Manual Therapy, Neuromuscular Re-ed, Patient Education, Self Care, Therapeutic Activities, Therapeutic Exercise, and IASTM.     Ghada Brooks, PT

## 2024-03-26 NOTE — CONSULTS
----- Message from Britney Youngblood sent at 3/26/2024  2:28 PM CDT -----  At last OV not all of the lab results were completed.  He is called for the results.     .[facility] ([place])  Gastroenterology  Consult Note    Patient Name: Laura Kent  MRN: [id number]  Admission Date: 2022  Hospital Length of Stay: 1 days  Code Status: Full Code   Primary Care Physician: Yessy Cruz MD  Principal Problem:Drug-induced acute pancreatitis without infection or necrosis    Inpatient consult to Gastroenterology  Consult performed by: Deo Orellana MD  Consult ordered by: Elan Pardo MD      Subjective:     Chief complaint:  Pancreatitis    HPI:  37-year-old woman being treated for breast cancer.  She has received immunotherapy, and unfortunately developed an immune mediated pancreatitis.  She was hospitalized for 6 days at that time.  She also had a mild colitis.  She was treated with a steroid taper.  She tells me that when the prednisone dose was low or stopped, the pain would start to return.  Therefore, the taper was prolonged.  She tells me she stopped it approximately 1 week ago.  Three days ago, she began developing epigastric abdominal pain.  This became fairly severe.  No associated nausea or vomiting.  No melena, hematochezia, constipation, or diarrhea.  Labs and imaging indicate pancreatitis.      Currently, her pain is minimal but she says she just received pain medication.    Past medical history:  Past Medical History:   Diagnosis Date    Anxiety     Breast cancer     Encounter for blood transfusion     Hypertension     Meningitis        Past surgical history:  Past Surgical History:   Procedure Laterality Date    BREAST BIOPSY Left      SECTION      COLONOSCOPY N/A 2022    Procedure: COLONOSCOPY;  Surgeon: Davi Chery MD;  Location: [address];  Service: Endoscopy;  Laterality: N/A;  case request entered from referral - Per DR. KATHLEEN Chery Pt to be scheduled for urgent colonoscopy on 22/ fully vaccinated / prep ins for Sutab on portal - ERW  see micro for negative C-diff- ERW    INSERTION OF TUNNELED CENTRAL VENOUS CATHETER  (CVC) WITH SUBCUTANEOUS PORT N/A 4/5/2022    Procedure: [id number];  Surgeon: Deo Sin Jr., MD;  Location: [facility] OR [address] [place] [address];  Service: General;  Laterality: N/A;    INSERTION OF TUNNELED CENTRAL VENOUS CATHETER (CVC) WITH SUBCUTANEOUS PORT N/A 8/1/2022    Procedure: INSERTION, SINGLE LUMEN CATHETER WITH PORT, WITH FLUOROSCOPIC GUIDANCE;  Surgeon: Ryder Dinero MD;  Location: [facility] CATH LAB;  Service: Vascular;  Laterality: N/A;       Social history:  Social History     Socioeconomic History    Marital status: Single   Tobacco Use    Smoking status: Never    Smokeless tobacco: Never   Substance and Sexual Activity    Alcohol use: Not Currently     Comment: social    Drug use: Never    Sexual activity: Not Currently     Social Determinants of Health     Financial Resource Strain: Medium Risk    Difficulty of Paying Living Expenses: Somewhat hard   Food Insecurity: No Food Insecurity    Worried About Running Out of Food in the Last Year: Never true    Ran Out of Food in the Last Year: Never true   Transportation Needs: No Transportation Needs    Lack of Transportation (Medical): No    Lack of Transportation (Non-Medical): No   Physical Activity: Insufficiently Active    Days of Exercise per Week: 2 days    Minutes of Exercise per Session: 60 min   Stress: Stress Concern Present    Feeling of Stress : To some extent   Social Connections: Unknown    Frequency of Communication with Friends and Family: More than three times a week    Frequency of Social Gatherings with Friends and Family: Once a week    Active Member of Clubs or Organizations: Yes    Attends Club or Organization Meetings: More than 4 times per year    Marital Status: Never    Housing Stability: High Risk    Unable to Pay for Housing in the Last Year: Yes    Number of Places Lived in the Last Year: 1    Unstable Housing in the Last Year: No       Family history:  Family History   Problem Relation Age of Onset    Diabetes Father      Hypertension Father     Pancreatic cancer Maternal Uncle     Bladder Cancer Paternal Grandmother     Colon cancer Neg Hx     Esophageal cancer Neg Hx        Medications:  Medications Prior to Admission   Medication Sig Dispense Refill Last Dose    nystatin (MYCOSTATIN) cream Apply topically 2 (two) times daily. 15 g 0     nystatin (MYCOSTATIN) powder Apply topically 2 (two) times daily. Apply to affected areas bid 30 g 0     prednisoLONE acetate (PRED FORTE) 1 % DrpS Place 1 drop into both eyes 2 (two) times daily.   Past Week    OLANZapine (ZYPREXA) 5 MG tablet Take 1 tablet (5 mg total) by mouth every evening. While on chemotherapy (Patient not taking: Reported on 11/7/2022) 30 tablet 2     ondansetron (ZOFRAN) 8 MG tablet Take 1 tablet (8 mg total) by mouth every 12 (twelve) hours as needed for Nausea. (Patient not taking: Reported on 11/7/2022) 30 tablet 2     pantoprazole (PROTONIX) 40 MG tablet Take 1 tablet (40 mg total) by mouth once daily while you are taking prednisone. You can stop when you finish your prednisone. (Patient not taking: Reported on 11/7/2022) 30 tablet 11     predniSONE (DELTASONE) 20 MG tablet Take 2 tablets (40 mg total) by mouth once daily. (Patient not taking: Reported on 11/7/2022) 20 tablet 1     promethazine (PHENERGAN) 25 MG tablet Take 1 tablet (25 mg total) by mouth every 6 (six) hours as needed for Nausea. (Patient not taking: Reported on 11/7/2022) 30 tablet 3     venlafaxine (EFFEXOR XR) 37.5 MG 24 hr capsule Take 1 capsule (37.5 mg total) by mouth once daily. (Patient not taking: Reported on 11/7/2022) 30 capsule 6        Allergies:  Review of patient's allergies indicates:   Allergen Reactions    Strawberries [strawberry] Hives       Review of systems:  CONSTITUTIONAL: Negative for fever, chills, weakness, weight loss, weight gain.  HEENT: Negative for hearing or vision changes, nasal congestion, dry mouth, sore throat.  CARDIOVASCULAR: Negative for chest pain or  palpitations.  RESPIRATORY: Negative for SOB or cough.  GASTROINTESTINAL: See HPI  GENITOURINARY: Negative for dysuria or hematuria.  MUSCULOSKELETAL: Negative for joint or muscle pain.  SKIN: Negative for rashes/lesions.  NEUROLOGIC: Negative for headaches, numbness/tingling.  ENDOCRINE: Negative for excessive thirst.  HEMATOLOGIC: Negative for abnormal bruising.  Aside from above positives, complete 10 point review of systems negative.    Objective:     Vital Signs (Most Recent):  Temp: 98.7 °F (37.1 °C) (11/10/22 0804)  Pulse: 79 (11/10/22 0804)  Resp: 18 (11/10/22 1005)  BP: 124/83 (11/10/22 0804)  SpO2: 97 % (11/10/22 0804) Vital Signs (24h Range):  Temp:  [97.7 °F (36.5 °C)-98.9 °F (37.2 °C)] 98.7 °F (37.1 °C)  Pulse:  [79-92] 79  Resp:  [16-18] 18  SpO2:  [97 %-100 %] 97 %  BP: (117-139)/(72-83) 124/83     Physical examination:  General: well developed, well nourished, no apparent distress  HENT: NCAT, hearing grossly intact, no palpable or visible thyroid mass  Eyes:  anicteric sclera  LYMH: No cervical or supraclavicular lymphadenopathy   Cardiovascular: Regular rate and rhythm. No murmurs appreciated.  Lungs: Non-labored respirations. Breath sounds equal.   Abdomen: soft, mild epigastric tenderness, no guarding or rebound, no appreciable masses  Extremities: No C/C/E  Neuro: AA&O x 3, no tremors  Psych: Appropriate mood and affect.   Skin: No jaundice, rashes or lesions  Musculoskeletal: no deformity    Labs:  CBC:   Recent Labs   Lab 11/09/22  0506 11/10/22  0459   WBC 4.80 4.32   HGB 10.7* 10.8*   HCT 30.8* 31.4*    170     CMP:   Recent Labs   Lab 11/10/22  0459      CALCIUM 9.3   ALBUMIN 3.3*   PROT 6.0      K 3.6   CO2 26      BUN 11   CREATININE 0.7   ALKPHOS 44*   ALT 19   AST 17   BILITOT 0.4     Lipase:   Recent Labs   Lab 11/09/22  0506 11/10/22  0459   LIPASE 886* 730*       Imaging:  Imaging results within the past 24 hours have been reviewed.    CT Impression:      Inflammatory stranding adjacent to the pancreas, most notable at the pancreatic head concerning for pancreatitis.  No significant pancreatic ductal dilatation or peripancreatic fluid collection.     There is an IUD which appears to be inferiorly displaced residing within the inferior uterine segment/cervix.  Nonemergent pelvic ultrasound may be obtained for further evaluation      Assessment:   37-year-old woman undergoing breast cancer treatment who received immunotherapy and subsequently developed an immune mediated pancreatitis.  She was treated with steroids and improved, but the pancreatitis has returned after tapering off of them.  Her lipase seems to be slowly improving as are her symptoms.  The question is whether or not to resume steroids.      Plan:   1. Continue supportive care   2. IV hydration  3. Diet as tolerated    4. Will discuss with Dr. Cruz         Thank you for your consult.     Deo Orellana MD  Gastroenterology  [facility] ([place])

## 2024-03-28 ENCOUNTER — OFFICE VISIT (OUTPATIENT)
Dept: SURGERY | Facility: CLINIC | Age: 39
End: 2024-03-28
Payer: MEDICAID

## 2024-03-28 VITALS
BODY MASS INDEX: 36.81 KG/M2 | HEIGHT: 66 IN | HEART RATE: 78 BPM | OXYGEN SATURATION: 99 % | SYSTOLIC BLOOD PRESSURE: 126 MMHG | WEIGHT: 229.06 LBS | RESPIRATION RATE: 19 BRPM | DIASTOLIC BLOOD PRESSURE: 68 MMHG

## 2024-03-28 DIAGNOSIS — Z98.890 S/P BREAST RECONSTRUCTION: Primary | ICD-10-CM

## 2024-03-28 PROCEDURE — 1159F MED LIST DOCD IN RCRD: CPT | Mod: CPTII,,, | Performed by: SURGERY

## 2024-03-28 PROCEDURE — 3008F BODY MASS INDEX DOCD: CPT | Mod: CPTII,,, | Performed by: SURGERY

## 2024-03-28 PROCEDURE — 99212 OFFICE O/P EST SF 10 MIN: CPT | Mod: S$PBB,,, | Performed by: SURGERY

## 2024-03-28 PROCEDURE — 3078F DIAST BP <80 MM HG: CPT | Mod: CPTII,,, | Performed by: SURGERY

## 2024-03-28 PROCEDURE — 99213 OFFICE O/P EST LOW 20 MIN: CPT | Mod: PBBFAC | Performed by: SURGERY

## 2024-03-28 PROCEDURE — 3074F SYST BP LT 130 MM HG: CPT | Mod: CPTII,,, | Performed by: SURGERY

## 2024-03-28 PROCEDURE — 99999 PR PBB SHADOW E&M-EST. PATIENT-LVL III: CPT | Mod: PBBFAC,,, | Performed by: SURGERY

## 2024-03-28 NOTE — PROGRESS NOTES
"[facility]   Physical Therapy Treatment Note      Name: Laura Kent  Clinic Number: [id number]    Therapy Diagnosis:   Encounter Diagnoses   Name Primary?    Decreased functional activity tolerance Yes    Weakness of both upper extremities     Abnormal posture     At risk for lymphedema      Physician: Angie Peck PA-C    Visit Date: 4/2/2024    Physician Orders: PT Eval and Treat   Medical Diagnosis from Referral: C50.412,Z17.1 (ICD-10-CM) - Malignant neoplasm of upper-outer quadrant of left breast in female, estrogen receptor negative  Evaluation Date: 03/15/2024  Authorization Period Expiration: 05/18/2024  Plan of Care Expiration: 05/10/2024  Progress Note Due: 04/15/2024  Visit # / Visits Authorized: 4 / 12 (plus eval)  FOTO: 1/3    Time In: 8:54 AM (late arrival)  Time Out: 9:30 AM  Total Billable Time: 36 minutes (TE per [place] Medicaid)      Subjective     Patient reports: pain through LEFT breast significantly improved since VALENTINA drain was removed last week. Patient reports breast feels "softer" but still notes area of "tightness" on the "outside" (lateral) aspect of breast. Of note, patient also scheduled for next surgical procedure 04/18/2024 and states plastic surgeon "will go in and clean things up."    She was compliant with home exercise program.    Response to Previous Treatment: no adverse events  Functional Change: improved LUE activity tolerance     Pain: 0/10  Location: N/A    Fatigue: mild    Diagnosis: stage IIIC (cT3, cN2(f), cM0, G3, ER-, SD-, HER2-) IDC of the LEFT breast.     Treatment:   Chemotherapy: completed neoadjuvantly 11/22  Radiation: completed 25 fx (04/27/2023 - 05/24/2023)  Endocrine Therapy: N/A    Surgery Date:   - 12/11/2023: BILATERAL KANDICE flap reconstruction with LEFT TE removal per Myra Barrios and [place] and RIGHT mastectomy per Dr. Daley     - 02/27/2023: LEFT mastectomy with SLNB per Dr. Weiner      Objective      Objective " "Measures updated at progress report unless specified.     Treatment     Laura received the treatments listed below:      Therapeutic exercises to increase cardiovascular endurance, flexibility, range of motion, and flexibility for 6 minutes:     Seated Exercises:  - Pulleys (flex, abd)    2 min each  - Physioball roll out (3 ways)   2 min - held  - Biceps curls, nya (1#)    2 sets x 10 reps - held    Mat Exercises:  - AAROM wand flexion   5 sec x 2 min  - AAROM wand abduction, nya  5 sec x 2 min  - PROM, LEFT only (flex, abd)  1 set x 10 reps each      Manual therapy techniques to decrease pain as well as to increase soft tissue mobility, joint mobility, mobility and pliability, and function for 30 minutes:     - Soft tissue mobilization LEFT pec minor (gentle)  - Manual lateral chest stretch (gentle)  - Manual anterior chest stretch (gentle)      Neuromuscular re-education activities to improve kinesthetic sense, proprioception, postural awareness, and scapular stabilization for 0 minutes:      Standing Exercises:  - Shoulder rows (red)    3 sets x 10 reps - held       Patient Education and Home Exercises       Education Provided Regarding:   - Role of physical therapy in multi-disciplinary team  - Goals for physical therapy, progress towards goals  - Physical therapy plan of care, scheduling  - Home exercise program, exercise technique  - Energy conservation techniques    Written Home Exercises Provided: Patient instructed to cont prior HEP. Exercises were reviewed and Laura was able to demonstrate them prior to the end of the session. Laura demonstrated good  understanding of the education provided. See EMR under Patient Instructions for exercises provided during therapy sessions    Assessment     Patient is responding well to physical therapy. Patient able to resume gentle AAROM exercises today without increase in symptoms prior to leaving the clinic. Occasional "electric" pain through LEFT breast with " overhead activity, relieved with brief rest breaks. Significant improvement in soft tissue mobility of LEFT breast noted during gentle manual techniques. Appreciated one area of soft tissue restriction in inferior / lateral aspect of LEFT breast. Will progress as tolerated.     Laura Is progressing well towards her goals.   Patient prognosis is Good.     Patient will continue to benefit from skilled outpatient physical therapy to address the deficits listed in the problem list box on initial evaluation, provide patient / family education, and to maximize patient's level of independence in the home and community environment.     Patient's spiritual, cultural, and educational needs considered, and patient agreeable to plan of care and goals.     Anticipated barriers to physical therapy: none anticipated    GOALS:   Short Term Goals: 4 Weeks  Patient will demonstrate 100% understanding of lymphedema risk reduction practices, including self-monitoring for lymphedema (progressing, not met).  Patient will demonstrate independence with established home exercise program for improved strength, functional mobility, range of motion, posture, and endurance (progressing, not met).   Patient will increase LEFT shoulder FLEXION active range of motion to 160 degrees for improved independence with functional reaching, carrying, pushing, and pulling tasks (progressing, not met).   Patient will increase LEFT shoulder ABDUCTION active range of motion to 130 degrees for improved independence with functional reaching, carrying, pushing, and pulling tasks (progressing, not met).   Patient will increase gross BILATERAL upper extremity musculature to >4/5 for improved independence with functional reaching, carrying, pushing, and pulling activities of daily living (progressing, not met).      Long Term Goals: 8 Weeks   Patient will be independent with updated home exercise program for improved functional mobility, posture, strength, and  endurance (progressing, not met).  Patient will increase LEFT shoulder FLEXION active range of motion to 180 degrees for improved independence with functional reaching, carrying, pushing, and pulling tasks (progressing, not met).   Patient will increase LEFT shoulder ABDUCTION active range of motion to 180 degrees for improved independence with functional reaching, carrying, pushing, and pulling tasks (progressing, not met).   Patient will increase gross BILATERAL upper extremity musculature to 5/5 for improved independence with functional reaching, carrying, pushing, and pulling activities of daily living (progressing, not met).   Patient will report decrease in overall worst pain to 2/10 at discharge for improved tolerance to functional reaching, carrying, pushing, and pulling activities of daily living (progressing, not met).  Patient will report compliance with walking program 5x/week for 10 minutes / day to improve overall cardiovascular function and decrease cancer-related fatigue at discharge (progressing, not met).      Plan     Plan of Care Certification: 03/15/2024 to 05/10/2024.     Outpatient Physical Therapy 2 times weekly for 8 weeks to include the following interventions: Gait Training, Manual Therapy, Neuromuscular Re-ed, Patient Education, Self Care, Therapeutic Activities, Therapeutic Exercise, and IASTM.     Ghada Brooks, PT

## 2024-04-02 ENCOUNTER — CLINICAL SUPPORT (OUTPATIENT)
Dept: REHABILITATION | Facility: HOSPITAL | Age: 39
End: 2024-04-02
Payer: MEDICAID

## 2024-04-02 ENCOUNTER — TELEPHONE (OUTPATIENT)
Dept: INTERNAL MEDICINE | Facility: CLINIC | Age: 39
End: 2024-04-02
Payer: MEDICAID

## 2024-04-02 DIAGNOSIS — Z91.89 AT RISK FOR LYMPHEDEMA: ICD-10-CM

## 2024-04-02 DIAGNOSIS — R29.3 ABNORMAL POSTURE: ICD-10-CM

## 2024-04-02 DIAGNOSIS — R29.898 WEAKNESS OF BOTH UPPER EXTREMITIES: ICD-10-CM

## 2024-04-02 DIAGNOSIS — R68.89 DECREASED FUNCTIONAL ACTIVITY TOLERANCE: Primary | ICD-10-CM

## 2024-04-02 PROCEDURE — 97110 THERAPEUTIC EXERCISES: CPT

## 2024-04-02 NOTE — H&P (VIEW-ONLY)
Laura Kent [facility] for a follow up.  We got an ultrasound of this area of concern on the left breasts.  It showed a small fluid collection.  She had an IR drain placed last week.  It was spoke with the radiologist who thought it was probably fat necrosis but may have been infected.  The cultures ultimately should be noted g positive rods.  She says she felt immediate relief once the drain was placed.  She was having very low drain output now.  Overall she was feeling much better.    She denies any fevers chills or other systemic symptoms.    ROS - negative, other than stated above    PHYSICAL EXAMINATION  Vitals:    03/28/24 1535   BP: 126/68   Pulse: 78   Resp: 19       Gen: NAD, appears stated age  Neuro: normal without focal findings, mental status and speech normal  HEENT: NCAT, neck supple, PEERL  CV: RRR  Pulm: Breathing non-labored, chest wall movement equal bilaterally   Breast:  Left reconstructed breast is soft.  Small amount of induration in the area of the drain  Abdomen: soft, nontender, no guarding  Gu: genitalia not examined  Extremity:normal strength, no cyanosis or edema  Skin: Skin color, texture, turgor normal. No rashes or lesions  Psych: oriented to time, place and person, mood and affect are within normal limits      ASSESSMENT/PLAN  38 y.o. female s/p autologous breast reconstruction with late fluid collection, culture positive and drain placement    I removed her drain today.  She was feeling much better.  I do not think she needs any antibiotics.  I think this fluid collection was treated with drainage.  Her second-stage reconstruction is currently scheduled for April 18th.  We will see her back before for preoperative visit      Enoch Barrios, DO  Plastic and Reconstructive Surgery      I spent a total of 10 minutes on the day of the visit.  This includes face to face time and non-face to face time preparing to see the patient (eg, review of tests),  obtaining and/or reviewing separately obtained history, documenting clinical information in the electronic or other health record, independently interpreting results and communicating results to the patient/family/caregiver, or care coordinator.

## 2024-04-03 ENCOUNTER — OFFICE VISIT (OUTPATIENT)
Dept: INTERNAL MEDICINE | Facility: CLINIC | Age: 39
End: 2024-04-03
Payer: MEDICAID

## 2024-04-03 ENCOUNTER — HOSPITAL ENCOUNTER (OUTPATIENT)
Dept: CARDIOLOGY | Facility: CLINIC | Age: 39
Discharge: HOME | End: 2024-04-03
Payer: MEDICAID

## 2024-04-03 VITALS
OXYGEN SATURATION: 99 % | TEMPERATURE: 98 F | DIASTOLIC BLOOD PRESSURE: 72 MMHG | SYSTOLIC BLOOD PRESSURE: 131 MMHG | BODY MASS INDEX: 39.3 KG/M2 | WEIGHT: 235.88 LBS | HEART RATE: 68 BPM | HEIGHT: 65 IN

## 2024-04-03 DIAGNOSIS — T45.1X5A CHEMOTHERAPY INDUCED NEUTROPENIA: ICD-10-CM

## 2024-04-03 DIAGNOSIS — D70.1 CHEMOTHERAPY INDUCED NEUTROPENIA: ICD-10-CM

## 2024-04-03 DIAGNOSIS — E66.01 SEVERE OBESITY: ICD-10-CM

## 2024-04-03 DIAGNOSIS — Z01.818 PREOP TESTING: ICD-10-CM

## 2024-04-03 DIAGNOSIS — I10 PRIMARY HYPERTENSION: Primary | ICD-10-CM

## 2024-04-03 DIAGNOSIS — D63.0 ANEMIA IN NEOPLASTIC DISEASE: ICD-10-CM

## 2024-04-03 PROBLEM — R19.7 DIARRHEA: Status: RESOLVED | Noted: 2022-09-08 | Resolved: 2024-04-03

## 2024-04-03 PROBLEM — K85.30 DRUG-INDUCED ACUTE PANCREATITIS WITHOUT INFECTION OR NECROSIS: Status: RESOLVED | Noted: 2022-09-04 | Resolved: 2024-04-03

## 2024-04-03 LAB
OHS QRS DURATION: 76 MS
OHS QTC CALCULATION: 392 MS

## 2024-04-03 PROCEDURE — 99215 OFFICE O/P EST HI 40 MIN: CPT | Mod: S$PBB,,, | Performed by: NURSE PRACTITIONER

## 2024-04-03 PROCEDURE — 99214 OFFICE O/P EST MOD 30 MIN: CPT | Mod: PBBFAC,25 | Performed by: NURSE PRACTITIONER

## 2024-04-03 PROCEDURE — 3008F BODY MASS INDEX DOCD: CPT | Mod: CPTII,,, | Performed by: NURSE PRACTITIONER

## 2024-04-03 PROCEDURE — 93010 ELECTROCARDIOGRAM REPORT: CPT | Mod: S$PBB,,, | Performed by: INTERNAL MEDICINE

## 2024-04-03 PROCEDURE — 1159F MED LIST DOCD IN RCRD: CPT | Mod: CPTII,,, | Performed by: NURSE PRACTITIONER

## 2024-04-03 PROCEDURE — 93005 ELECTROCARDIOGRAM TRACING: CPT | Mod: PBBFAC | Performed by: INTERNAL MEDICINE

## 2024-04-03 PROCEDURE — 3078F DIAST BP <80 MM HG: CPT | Mod: CPTII,,, | Performed by: NURSE PRACTITIONER

## 2024-04-03 PROCEDURE — 3075F SYST BP GE 130 - 139MM HG: CPT | Mod: CPTII,,, | Performed by: NURSE PRACTITIONER

## 2024-04-03 PROCEDURE — 99999 PR PBB SHADOW E&M-EST. PATIENT-LVL IV: CPT | Mod: PBBFAC,,, | Performed by: NURSE PRACTITIONER

## 2024-04-03 NOTE — Clinical Note
I am checking with Hem-Onc regarding pt continued anemia and decreased WBC count.  Once they give advice regarding these issues we should be able to let her proceed with surgery.  JOVANA HYDE

## 2024-04-03 NOTE — ASSESSMENT & PLAN NOTE
Current BP  131/72 today.    Taking No meds BP stable  Lifestyle changes to reduce systolic BP:  exercise 30 minutes per day,  5 days per week or 150 minutes weekly; sodium reduction and avoidance of high salt foods such as processed meats, frozen meals and  fast foods.   Keeping a healthy weight/BMI can help with better BP control    BP acceptable for surgery. I recommend monitoring BP during perioperative period as uncontrolled pain can elevate blood pressure.

## 2024-04-03 NOTE — OUTPATIENT SUBJECTIVE & OBJECTIVE
Outpatient Subjective & Objective      Chief Complaint: Preoperative evaulation, perioperative medical management, and complication reduction plan.     Functional Capacity:  Able to climb a flight of stairs without CP SOB or Syncope.  Able to meet 4 METs      Anesthesia issues: None    Difficulty mouth opening:N    Steroid use in the last 12 months:  WITH CHEMO    Dental Issues:N    Family anesthesia difficulty: None     Last monthly period  3/13/24    Family Hx of Thrombosis N    Past Medical History:   Diagnosis Date    Anemia     Anxiety     Breast cancer     Diarrhea 2022    Drug-induced acute pancreatitis without infection or necrosis 2022    Encounter for blood transfusion     Hypertension     Meningitis     Severe obesity 2023    Shortness of breath 2023       Past Surgical History:   Procedure Laterality Date    AXILLARY NODE DISSECTION Left 2023    Procedure: LYMPHADENECTOMY, AXILLARY;  Surgeon: YASMANI Weiner MD;  Location: [facility];  Service: General;  Laterality: Left;    BREAST BIOPSY Left      SECTION      COLONOSCOPY N/A 2022    Procedure: COLONOSCOPY;  Surgeon: Davi Chery MD;  Location: [address]);  Service: Endoscopy;  Laterality: N/A;  case request entered from referral - Per DR. KATHLEEN Chery Pt to be scheduled for urgent colonoscopy on 22/ fully vaccinated / prep ins for Sutab on portal - ERW  see micro for negative C-diff- ERW    INJECTION FOR SENTINEL NODE IDENTIFICATION Left 2023    Procedure: INJECTION, FOR SENTINEL NODE IDENTIFICATION;  Surgeon: YASMANI Weiner MD;  Location: [facility];  Service: General;  Laterality: Left;    INSERTION OF BREAST TISSUE EXPANDER Left 2023    Procedure: INSERTION, TISSUE EXPANDER, BREAST;  Surgeon: Reese Barrios DO;  Location: [facility];  Service: Plastics;  Laterality: Left;    INSERTION OF TUNNELED CENTRAL VENOUS CATHETER (CVC) WITH SUBCUTANEOUS PORT N/A 2022    Procedure:  [id number]-[id number];  Surgeon: Deo Sin Jr., MD;  Location: [facility];  Service: General;  Laterality: N/A;    INSERTION OF TUNNELED CENTRAL VENOUS CATHETER (CVC) WITH SUBCUTANEOUS PORT N/A 8/1/2022    Procedure: INSERTION, SINGLE LUMEN CATHETER WITH PORT, WITH FLUOROSCOPIC GUIDANCE;  Surgeon: Ryder Dinero MD;  Location: [facility] CATH LAB;  Service: Vascular;  Laterality: N/A;    INTRAVENOUS INJECTION N/A 12/11/2023    Procedure: spy;  Surgeon: Reese Barrios DO;  [facility];  Service: Plastics;  Laterality: N/A;    MASTECTOMY Left 2/27/2023    Procedure: MASTECTOMY;  Surgeon: YASMANI Weiner MD;  Location: [facility];  Service: General;  Laterality: Left;  3.5 TOTAL HOURS / EMAIL SENT 2-23 @ 7:56 CCC    MASTECTOMY Right 12/11/2023    Procedure: MASTECTOMY;  Surgeon: Eldon Daley MD;  [facility];  Service: General;  Laterality: Right;    RECONSTRUCTION OF BREAST WITH DEEP INFERIOR EPIGASTRIC ARTERY  (KANDICE) FREE FLAP Bilateral 12/11/2023    Procedure: RECONSTRUCTION, BREAST, USING KANDICE FREE FLAP;  Surgeon: Reese Barrios DO;  Location: [facility] OR [address] [place] [address];  Service: Plastics;  Laterality: Bilateral;  Bilateral KANDICE flap - cosurgeon is Dr Cross -    REVISION OF SCAR Right 12/11/2023    Procedure: REVISION, SCAR;  Surgeon: Reese Barrios DO;  [facility];  Service: Plastics;  Laterality: Right;    SENTINEL LYMPH NODE BIOPSY Left 2/27/2023    Procedure: BIOPSY, LYMPH NODE, SENTINEL;  Surgeon: YASMANI Weiner MD;  Location: [facility];  Service: General;  Laterality: Left;  LEFT with radiological marker    TISSUE EXPANDER REMOVAL Left 12/11/2023    Procedure: REMOVAL, TISSUE EXPANDER;  Surgeon: Reese Barrios DO;  [facility];  Service: Plastics;  Laterality: Left;  removal left tissu expander       Review of Systems   Constitutional:  Negative for chills, fatigue and fever.   HENT:  Negative for trouble  "swallowing and voice change.    Eyes:  Negative for photophobia and visual disturbance.        No acute visual changes   Respiratory:  Negative for apnea, cough, chest tightness, shortness of breath and wheezing.         STOP bang  Score 1  Low risk ELIZABETH     Cardiovascular:  Negative for chest pain, palpitations and leg swelling.   Gastrointestinal:  Negative for abdominal distention, abdominal pain, blood in stool, constipation, diarrhea, nausea and vomiting.        NO FLD, hepatitis, cirrhosis  No BRB or black tarry stool     Genitourinary:  Negative for difficulty urinating, dysuria, flank pain, frequency, hematuria and urgency.   Musculoskeletal:  Negative for arthralgias, back pain, gait problem, joint swelling, myalgias, neck pain and neck stiffness.   Neurological:  Positive for dizziness. Negative for tremors, seizures, syncope, weakness, light-headedness, numbness and headaches.   Psychiatric/Behavioral:  Negative for suicidal ideas.       VITALS  Visit Vitals  /72 (BP Location: Left arm, Patient Position: Sitting)   Pulse 68   Temp 98.1 °F (36.7 °C) (Oral)   Ht 5' 5" (1.651 m)   Wt 107 kg (235 lb 14.3 oz)   LMP 02/27/2024 (Exact Date)   SpO2 99%   BMI 39.25 kg/m²     Physical Exam  Constitutional:       General: She is not in acute distress.     Appearance: Normal appearance. She is well-developed. She is not diaphoretic.   HENT:      Head: Normocephalic.      Right Ear: Hearing normal.      Left Ear: Hearing normal.      Nose: Nose normal.      Mouth/Throat:      Lips: Pink.      Mouth: Mucous membranes are moist.      Pharynx: No oropharyngeal exudate.   Eyes:      General: Lids are normal.         Right eye: No discharge.         Left eye: No discharge.      Conjunctiva/sclera: Conjunctivae normal.      Pupils: Pupils are equal, round, and reactive to light.   Neck:      Thyroid: No thyromegaly.      Vascular: No carotid bruit or JVD.      Trachea: Trachea and phonation normal. No tracheal " deviation.   Cardiovascular:      Rate and Rhythm: Normal rate and regular rhythm.      Pulses: Normal pulses.           Carotid pulses are 2+ on the right side and 2+ on the left side.       Radial pulses are 2+ on the right side and 2+ on the left side.        Posterior tibial pulses are 2+ on the right side and 2+ on the left side.      Heart sounds: Normal heart sounds. No murmur heard.     No friction rub. No gallop.   Pulmonary:      Effort: Pulmonary effort is normal. No respiratory distress.      Breath sounds: Normal breath sounds. No stridor. No wheezing or rales.      Comments: Clear Anterior and Posterior BBS  Abdominal:      General: Abdomen is protuberant. Bowel sounds are normal. There is no distension.      Palpations: Abdomen is soft.      Tenderness: There is no abdominal tenderness. There is no guarding.   Musculoskeletal:         General: No tenderness or deformity. Normal range of motion.      Cervical back: Normal range of motion and neck supple. No rigidity.      Right lower leg: No edema.      Left lower leg: No edema.   Lymphadenopathy:      Head:      Right side of head: No submental, submandibular, tonsillar, preauricular, posterior auricular or occipital adenopathy.      Left side of head: No submental, submandibular, tonsillar, preauricular, posterior auricular or occipital adenopathy.      Cervical: No cervical adenopathy.      Right cervical: No superficial cervical adenopathy.     Left cervical: No superficial cervical adenopathy.   Skin:     General: Skin is warm and dry.      Capillary Refill: Capillary refill takes 2 to 3 seconds.      Coloration: Skin is not pale.      Findings: No erythema or rash.   Neurological:      Mental Status: She is alert and oriented to person, place, and time. Mental status is at baseline.      GCS: GCS eye subscore is 4. GCS verbal subscore is 5. GCS motor subscore is 6.      Motor: No abnormal muscle tone.      Coordination: Coordination normal.    Psychiatric:         Attention and Perception: Attention and perception normal.         Mood and Affect: Mood and affect normal.         Speech: Speech normal.         Behavior: Behavior normal. Behavior is cooperative.         Thought Content: Thought content normal.         Cognition and Memory: Cognition and memory normal.         Judgment: Judgment normal.          Significant Labs:  Lab Results   Component Value Date    WBC 3.22 (L) 04/03/2024    HGB 9.8 (L) 04/03/2024    HCT 31.5 (L) 04/03/2024     04/03/2024    CHOL 162 02/06/2023    TRIG 103 02/06/2023    HDL 32 (L) 02/06/2023    ALT 12 04/03/2024    AST 17 04/03/2024     04/03/2024     04/03/2024    K 4.0 04/03/2024    K 4.0 04/03/2024     04/03/2024     04/03/2024    CREATININE 0.8 04/03/2024    CREATININE 0.8 04/03/2024    BUN 17 04/03/2024    BUN 17 04/03/2024    CO2 24 04/03/2024    CO2 24 04/03/2024    TSH 0.707 10/26/2023    HGBA1C 4.8 10/26/2023       Diagnostic Studies: No relevant studies.    EKG:   Results for orders placed or performed during the hospital encounter of 04/03/24   EKG 12-lead    Collection Time: 04/03/24 10:04 AM   Result Value Ref Range    QRS Duration 76 ms    OHS QTC Calculation 392 ms    Narrative    Test Reason : Z01.818,    Vent. Rate : 061 BPM     Atrial Rate : 061 BPM     P-R Int : 210 ms          QRS Dur : 076 ms      QT Int : 390 ms       P-R-T Axes : 039 -03 015 degrees     QTc Int : 392 ms    Sinus rhythm with 1st degree A-V block  Otherwise normal ECG  When compared with ECG of 12-DEC-2023 17:11,  MI interval has increased  Vent. rate has decreased BY  50 BPM  Nonspecific T wave abnormality no longer evident in Lateral leads  Confirmed by MARIANNE HUERTAS MD ([id number]) on 4/3/2024 11:13:38 AM    Referred By: AUDELIA OSHEA           Confirmed By:MARIANNE HUERATS MD       2D ECHO:  TTE:  Results for orders placed or performed during the hospital encounter of 04/03/23   Echo   Result Value  "Ref Range    BSA 2.12 m2    TDI SEPTAL 0.09 m/s    LV LATERAL E/E' RATIO 6.70 m/s    LV SEPTAL E/E' RATIO 7.44 m/s    LA WIDTH 4.66 cm    TDI LATERAL 0.10 m/s    LVIDd 4.74 3.5 - 6.0 cm    IVS 0.99 0.6 - 1.1 cm    Posterior Wall 0.93 0.6 - 1.1 cm    LVIDs 3.06 2.1 - 4.0 cm    FS 35 28 - 44 %    LA volume 74.76 cm3    Sinus 3.58 cm    STJ 2.89 cm    Ascending aorta 3.00 cm    LV mass 157.77 g    LA size 3.63 cm    RVDD 3.22 cm    TAPSE 2.12 cm    RV S' 11.34 cm/s    Left Ventricle Relative Wall Thickness 0.39 cm    AV mean gradient 3 mmHg    AV valve area 2.72 cm2    AV Velocity Ratio 0.78     AV index (prosthetic) 0.72     MV valve area p 1/2 method 3.47 cm2    E/A ratio 1.43     Mean e' 0.10 m/s    E wave deceleration time 218.47 msec    IVRT 85.63 msec    MV "A" wave duration 17.98 msec    Pulm vein S/D ratio 0.80     LVOT diameter 2.20 cm    LVOT area 3.8 cm2    LVOT peak ian 0.98 m/s    LVOT peak VTI 17.64 cm    Ao peak ian 1.26 m/s    Ao VTI 24.66 cm    LVOT stroke volume 67.02 cm3    AV peak gradient 6 mmHg    E/E' ratio 7.05 m/s    MV Peak E Ian 0.67 m/s    MV stenosis pressure 1/2 time 63.36 ms    MV Peak A Ian 0.47 m/s    PV Peak S Ian 0.35 m/s    PV Peak D Ian 0.44 m/s    LV Systolic Volume 36.74 mL    LV Systolic Volume Index 18.0 mL/m2    LV Diastolic Volume 104.53 mL    LV Diastolic Volume Index 51.24 mL/m2    LA Volume Index 36.6 mL/m2    LV Mass Index 77 g/m2    RA Major Axis 4.48 cm    Left Atrium Minor Axis 5.16 cm    Left Atrium Major Axis 5.24 cm    LA Volume Index (Mod) 37.1 mL/m2    LA volume (mod) 75.75 cm3    RA Width 3.32 cm    Right Atrial Pressure (from IVC) 3 mmHg    QEF 54 %    EF 60 %    Narrative    · The left ventricle is normal in size with normal systolic function. The   estimated ejection fraction is 60%.  · The quantitatively derived ejection fraction is 54%.  · The left ventricular global longitudinal strain is -17.7%  · Normal right ventricular size with normal right ventricular " systolic   function.  · Normal left ventricular diastolic function.  · Mild left atrial enlargement.  · Normal central venous pressure (3 mmHg).          CHLOE:  No results found. However, due to the size of the patient record, not all encounters were searched. Please check Results Review for a complete set of results.     Imaging     Active Cardiac Conditions: None      Revised Cardiac Risk Index   High -Risk Surgery  Intraperitoneal; Intrathoracic; suprainguinal vascular Yes- + 1 No- 0   History of Ischemic Heart Disease   (Hx of MI/positive exercise test/current chest pain due to ischemia/use of nitrate therapy/EKG with pathological Q waves) Yes- + 1 No- 0   History of CHF  (Pulmonary edema/bilateral rales or S3 gallop/PND/CXR showing pulmonary vascular redistribution) Yes- + 1 No- 0   History of CVA   (Prior stroke or TIA) Yes- + 1 No- 0   Pre-operative treatment with insulin Yes- + 1 No- 0   Pre-operative creatinine > 2mg/dl Yes- + 1 No- 0   Total:      Risk Status:  Estimated risk of cardiac complications after non-cardiac surgery using the Revised Cardiac Risk Index for Preoperative risk is 3.9 %      ARISCAT (Canet) risk index: Low: 1.6% risk of post-op pulmonary complications.    American Society of Anesthesiologists Physical Status classification (ASA): 3           No further cardiac workup needed prior to surgery.    Outpatient Subjective & Objective

## 2024-04-03 NOTE — PROGRESS NOTES
[facility]  Progress Note    Patient Name: Laura Kent  MRN: [id number]  Date of Evaluation- 04/09/2024  PCP- Yessy Cruz MD    Future cases for Laura Kent [[id number]]       Case ID Status Date Time [place] Procedure Provider Location    [id number] [facility] 4/18/2024  7:00  REVISION, FLAP, PREVIOUSLY CREATED FOR BREAST RECONSTRUCTION Reese Barrios DO [[id number]] [facility] OR 2ND FLR            HPI:  This is a 38 y.o. female  who presents today for a preoperative evaluation in preparation for REVISION, FLAP, PREVIOUSLY CREATED FOR BREAST RECONSTRUCTION   Surgery is indicated for Malignant neoplasm of upper-outer quadrant of left breast in female, estrogen receptor negative.   Patient is new to me.    The history has been obtained by speaking with the patient and reviewing the electronic medical record and/or outside health information. Significant health conditions for the perioperative period are discussed below in assessment and plan.     Patient reports current health status to be Good.  Denies any new symptoms before surgery.       Subjective/ Objective:     Chief Complaint: Preoperative evaulation, perioperative medical management, and complication reduction plan.     Functional Capacity:  Able to climb a flight of stairs without CP SOB or Syncope.  Able to meet 4 METs      Anesthesia issues: None    Difficulty mouth opening:N    Steroid use in the last 12 months:  WITH CHEMO    Dental Issues:N    Family anesthesia difficulty: None     Last monthly period  3/13/24    Family Hx of Thrombosis N    Past Medical History:   Diagnosis Date    Anemia     Anxiety     Breast cancer     Diarrhea 09/08/2022    Drug-induced acute pancreatitis without infection or necrosis 09/04/2022    Encounter for blood transfusion     Hypertension     Meningitis     Severe obesity 02/02/2023    Shortness of breath 02/02/2023       Past Surgical History:   Procedure Laterality Date    AXILLARY NODE  DISSECTION Left 2023    Procedure: LYMPHADENECTOMY, AXILLARY;  Surgeon: YASMANI Weiner MD;  Location: [facility] OR;  Service: General;  Laterality: Left;    BREAST BIOPSY Left      SECTION      COLONOSCOPY N/A 2022    Procedure: COLONOSCOPY;  Surgeon: Davi Chery MD;  Location: [facility] ENDO (4TH FLR);  Service: Endoscopy;  Laterality: N/A;  case request entered from referral - Per DR. KATHLEEN Chery Pt to be scheduled for urgent colonoscopy on 22/ fully vaccinated / prep ins for Sutab on portal - ERW  see micro for negative C-diff- ERW    INJECTION FOR SENTINEL NODE IDENTIFICATION Left 2023    Procedure: INJECTION, FOR SENTINEL NODE IDENTIFICATION;  Surgeon: YASMANI Weiner MD;  Location: [facility] OR;  Service: General;  Laterality: Left;    INSERTION OF BREAST TISSUE EXPANDER Left 2023    Procedure: INSERTION, TISSUE EXPANDER, BREAST;  Surgeon: Reese Barrios DO;  Location: [facility] OR;  Service: Plastics;  Laterality: Left;    INSERTION OF TUNNELED CENTRAL VENOUS CATHETER (CVC) WITH SUBCUTANEOUS PORT N/A 2022    Procedure: [id number];  Surgeon: Deo Sin Jr., MD;  Location: [facility] OR 2ND FLR;  Service: General;  Laterality: N/A;    INSERTION OF TUNNELED CENTRAL VENOUS CATHETER (CVC) WITH SUBCUTANEOUS PORT N/A 2022    Procedure: INSERTION, SINGLE LUMEN CATHETER WITH PORT, WITH FLUOROSCOPIC GUIDANCE;  Surgeon: Ryder Dinero MD;  Location: [facility] CATH LAB;  Service: Vascular;  Laterality: N/A;    INTRAVENOUS INJECTION N/A 2023    Procedure: spy;  Surgeon: Reese Barrios DO;  Location: [facility] OR 2ND FLR;  Service: Plastics;  Laterality: N/A;    MASTECTOMY Left 2023    Procedure: MASTECTOMY;  Surgeon: YASMANI Weiner MD;  Location: [facility] OR;  Service: General;  Laterality: Left;  3.5 TOTAL HOURS / EMAIL SENT  @ 7:56 CCC    MASTECTOMY Right 2023    Procedure: MASTECTOMY;  Surgeon: Eldon Daley MD;  Location: [facility] OR 2ND FLR;  Service:  General;  Laterality: Right;    RECONSTRUCTION OF BREAST WITH DEEP INFERIOR EPIGASTRIC ARTERY  (KANDICE) FREE FLAP Bilateral 12/11/2023    Procedure: RECONSTRUCTION, BREAST, USING KANDICE FREE FLAP;  Surgeon: Reese Barrios DO;  Location: [facility] OR 2ND FLR;  Service: Plastics;  Laterality: Bilateral;  Bilateral KANDICE flap - cosurgeon is Dr Cross -    REVISION OF SCAR Right 12/11/2023    Procedure: REVISION, SCAR;  Surgeon: Reese Barrios DO;  Location: [facility] OR 2ND FLR;  Service: Plastics;  Laterality: Right;    SENTINEL LYMPH NODE BIOPSY Left 2/27/2023    Procedure: BIOPSY, LYMPH NODE, SENTINEL;  Surgeon: YASMANI Weiner MD;  Location: [facility] [place] OR;  Service: General;  Laterality: Left;  LEFT with radiological marker    TISSUE EXPANDER REMOVAL Left 12/11/2023    Procedure: REMOVAL, TISSUE EXPANDER;  Surgeon: Reese Barrios DO;  Location: [facility] OR 2ND FLR;  Service: Plastics;  Laterality: Left;  removal left tissu expander       Review of Systems   Constitutional:  Negative for chills, fatigue and fever.   HENT:  Negative for trouble swallowing and voice change.    Eyes:  Negative for photophobia and visual disturbance.        No acute visual changes   Respiratory:  Negative for apnea, cough, chest tightness, shortness of breath and wheezing.         STOP bang  Score 1  Low risk ELIZABETH     Cardiovascular:  Negative for chest pain, palpitations and leg swelling.   Gastrointestinal:  Negative for abdominal distention, abdominal pain, blood in stool, constipation, diarrhea, nausea and vomiting.        NO FLD, hepatitis, cirrhosis  No BRB or black tarry stool     Genitourinary:  Negative for difficulty urinating, dysuria, flank pain, frequency, hematuria and urgency.   Musculoskeletal:  Negative for arthralgias, back pain, gait problem, joint swelling, myalgias, neck pain and neck stiffness.   Neurological:  Positive for dizziness. Negative for tremors, seizures, syncope, weakness, light-headedness,  "numbness and headaches.   Psychiatric/Behavioral:  Negative for suicidal ideas.       VITALS  Visit Vitals  /72 (BP Location: Left arm, Patient Position: Sitting)   Pulse 68   Temp 98.1 °F (36.7 °C) (Oral)   Ht 5' 5" (1.651 m)   Wt 107 kg (235 lb 14.3 oz)   LMP 02/27/2024 (Exact Date)   SpO2 99%   BMI 39.25 kg/m²     Physical Exam  Constitutional:       General: She is not in acute distress.     Appearance: Normal appearance. She is well-developed. She is not diaphoretic.   HENT:      Head: Normocephalic.      Right Ear: Hearing normal.      Left Ear: Hearing normal.      Nose: Nose normal.      Mouth/Throat:      Lips: Pink.      Mouth: Mucous membranes are moist.      Pharynx: No oropharyngeal exudate.   Eyes:      General: Lids are normal.         Right eye: No discharge.         Left eye: No discharge.      Conjunctiva/sclera: Conjunctivae normal.      Pupils: Pupils are equal, round, and reactive to light.   Neck:      Thyroid: No thyromegaly.      Vascular: No carotid bruit or JVD.      Trachea: Trachea and phonation normal. No tracheal deviation.   Cardiovascular:      Rate and Rhythm: Normal rate and regular rhythm.      Pulses: Normal pulses.           Carotid pulses are 2+ on the right side and 2+ on the left side.       Radial pulses are 2+ on the right side and 2+ on the left side.        Posterior tibial pulses are 2+ on the right side and 2+ on the left side.      Heart sounds: Normal heart sounds. No murmur heard.     No friction rub. No gallop.   Pulmonary:      Effort: Pulmonary effort is normal. No respiratory distress.      Breath sounds: Normal breath sounds. No stridor. No wheezing or rales.      Comments: Clear Anterior and Posterior BBS  Abdominal:      General: Abdomen is protuberant. Bowel sounds are normal. There is no distension.      Palpations: Abdomen is soft.      Tenderness: There is no abdominal tenderness. There is no guarding.   Musculoskeletal:         General: No tenderness " or deformity. Normal range of motion.      Cervical back: Normal range of motion and neck supple. No rigidity.      Right lower leg: No edema.      Left lower leg: No edema.   Lymphadenopathy:      Head:      Right side of head: No submental, submandibular, tonsillar, preauricular, posterior auricular or occipital adenopathy.      Left side of head: No submental, submandibular, tonsillar, preauricular, posterior auricular or occipital adenopathy.      Cervical: No cervical adenopathy.      Right cervical: No superficial cervical adenopathy.     Left cervical: No superficial cervical adenopathy.   Skin:     General: Skin is warm and dry.      Capillary Refill: Capillary refill takes 2 to 3 seconds.      Coloration: Skin is not pale.      Findings: No erythema or rash.   Neurological:      Mental Status: She is alert and oriented to person, place, and time. Mental status is at baseline.      GCS: GCS eye subscore is 4. GCS verbal subscore is 5. GCS motor subscore is 6.      Motor: No abnormal muscle tone.      Coordination: Coordination normal.   Psychiatric:         Attention and Perception: Attention and perception normal.         Mood and Affect: Mood and affect normal.         Speech: Speech normal.         Behavior: Behavior normal. Behavior is cooperative.         Thought Content: Thought content normal.         Cognition and Memory: Cognition and memory normal.         Judgment: Judgment normal.          Significant Labs:  Lab Results   Component Value Date    WBC 3.22 (L) 04/03/2024    HGB 9.8 (L) 04/03/2024    HCT 31.5 (L) 04/03/2024     04/03/2024    CHOL 162 02/06/2023    TRIG 103 02/06/2023    HDL 32 (L) 02/06/2023    ALT 12 04/03/2024    AST 17 04/03/2024     04/03/2024     04/03/2024    K 4.0 04/03/2024    K 4.0 04/03/2024     04/03/2024     04/03/2024    CREATININE 0.8 04/03/2024    CREATININE 0.8 04/03/2024    BUN 17 04/03/2024    BUN 17 04/03/2024    CO2 24 04/03/2024     "CO2 24 04/03/2024    TSH 0.707 10/26/2023    HGBA1C 4.8 10/26/2023       Diagnostic Studies: No relevant studies.    EKG:   Results for orders placed or performed during the hospital encounter of 04/03/24   EKG 12-lead    Collection Time: 04/03/24 10:04 AM   Result Value Ref Range    QRS Duration 76 ms    OHS QTC Calculation 392 ms    Narrative    Test Reason : Z01.818,    Vent. Rate : 061 BPM     Atrial Rate : 061 BPM     P-R Int : 210 ms          QRS Dur : 076 ms      QT Int : 390 ms       P-R-T Axes : 039 -03 015 degrees     QTc Int : 392 ms    Sinus rhythm with 1st degree A-V block  Otherwise normal ECG  When compared with ECG of 12-DEC-2023 17:11,  CA interval has increased  Vent. rate has decreased BY  50 BPM  Nonspecific T wave abnormality no longer evident in Lateral leads  Confirmed by MARIANNE HUERTAS MD ([id number]) on 4/3/2024 11:13:38 AM    Referred By: AUDELIA OSHEA           Confirmed By:MARIANNE HUERTAS MD       2D ECHO:  TTE:  Results for orders placed or performed during the hospital encounter of 04/03/23   Echo   Result Value Ref Range    BSA 2.12 m2    TDI SEPTAL 0.09 m/s    LV LATERAL E/E' RATIO 6.70 m/s    LV SEPTAL E/E' RATIO 7.44 m/s    LA WIDTH 4.66 cm    TDI LATERAL 0.10 m/s    LVIDd 4.74 3.5 - 6.0 cm    IVS 0.99 0.6 - 1.1 cm    Posterior Wall 0.93 0.6 - 1.1 cm    LVIDs 3.06 2.1 - 4.0 cm    FS 35 28 - 44 %    LA volume 74.76 cm3    Sinus 3.58 cm    STJ 2.89 cm    Ascending aorta 3.00 cm    LV mass 157.77 g    LA size 3.63 cm    RVDD 3.22 cm    TAPSE 2.12 cm    RV S' 11.34 cm/s    Left Ventricle Relative Wall Thickness 0.39 cm    AV mean gradient 3 mmHg    AV valve area 2.72 cm2    AV Velocity Ratio 0.78     AV index (prosthetic) 0.72     MV valve area p 1/2 method 3.47 cm2    E/A ratio 1.43     Mean e' 0.10 m/s    E wave deceleration time 218.47 msec    IVRT 85.63 msec    MV "A" wave duration 17.98 msec    Pulm vein S/D ratio 0.80     LVOT diameter 2.20 cm    LVOT area 3.8 cm2    LVOT peak dante " 0.98 m/s    LVOT peak VTI 17.64 cm    Ao peak ian 1.26 m/s    Ao VTI 24.66 cm    LVOT stroke volume 67.02 cm3    AV peak gradient 6 mmHg    E/E' ratio 7.05 m/s    MV Peak E Ian 0.67 m/s    MV stenosis pressure 1/2 time 63.36 ms    MV Peak A Ian 0.47 m/s    PV Peak S Ian 0.35 m/s    PV Peak D Ian 0.44 m/s    LV Systolic Volume 36.74 mL    LV Systolic Volume Index 18.0 mL/m2    LV Diastolic Volume 104.53 mL    LV Diastolic Volume Index 51.24 mL/m2    LA Volume Index 36.6 mL/m2    LV Mass Index 77 g/m2    RA Major Axis 4.48 cm    Left Atrium Minor Axis 5.16 cm    Left Atrium Major Axis 5.24 cm    LA Volume Index (Mod) 37.1 mL/m2    LA volume (mod) 75.75 cm3    RA Width 3.32 cm    Right Atrial Pressure (from IVC) 3 mmHg    QEF 54 %    EF 60 %    Narrative    · The left ventricle is normal in size with normal systolic function. The   estimated ejection fraction is 60%.  · The quantitatively derived ejection fraction is 54%.  · The left ventricular global longitudinal strain is -17.7%  · Normal right ventricular size with normal right ventricular systolic   function.  · Normal left ventricular diastolic function.  · Mild left atrial enlargement.  · Normal central venous pressure (3 mmHg).          CHLOE:  No results found. However, due to the size of the patient record, not all encounters were searched. Please check Results Review for a complete set of results.     Imaging     Active Cardiac Conditions: None      Revised Cardiac Risk Index   High -Risk Surgery  Intraperitoneal; Intrathoracic; suprainguinal vascular Yes- + 1 No- 0   History of Ischemic Heart Disease   (Hx of MI/positive exercise test/current chest pain due to ischemia/use of nitrate therapy/EKG with pathological Q waves) Yes- + 1 No- 0   History of CHF  (Pulmonary edema/bilateral rales or S3 gallop/PND/CXR showing pulmonary vascular redistribution) Yes- + 1 No- 0   History of CVA   (Prior stroke or TIA) Yes- + 1 No- 0   Pre-operative treatment with insulin  Yes- + 1 No- 0   Pre-operative creatinine > 2mg/dl Yes- + 1 No- 0   Total:      Risk Status:  Estimated risk of cardiac complications after non-cardiac surgery using the Revised Cardiac Risk Index for Preoperative risk is 3.9 %      ARISCAT (Canet) risk index: Low: 1.6% risk of post-op pulmonary complications.    American Society of Anesthesiologists Physical Status classification (ASA): 3           No further cardiac workup needed prior to surgery.        Preoperative cardiac risk assessment-  The patient does not have any active cardiac conditions . Revised cardiac risk index predictors- ---.Functional capacity is more than 4 Mets. She will be undergoing a Breast procedure that carries a Moderate Risk risk     The estimated risk of the rate of adverse cardiac outcomes  3.9    No further cardiac work up is indicated prior to proceeding with the surgery     Orders Placed This Encounter    Comprehensive Metabolic Panel    PHOSPHORUS    Iron and TIBC    Ferritin       American Society of Anesthesiologists Physical status classification ( ASA ) class: 3     Postoperative pulmonary complication risk assessment: 1.6   ARISCAT ( Canet) risk index- risk class -  Low, if duration of surgery is under 3 hours, intermediate, if duration of surgery is over 3 hours          Assessment/Plan:     Primary hypertension  Current BP  131/72 today.    Taking No meds BP stable  Lifestyle changes to reduce systolic BP:  exercise 30 minutes per day,  5 days per week or 150 minutes weekly; sodium reduction and avoidance of high salt foods such as processed meats, frozen meals and  fast foods.   Keeping a healthy weight/BMI can help with better BP control    BP acceptable for surgery. I recommend monitoring BP during perioperative period as uncontrolled pain can elevate blood pressure.         Anemia in neoplastic disease  Component      Latest Ref Rng 4/3/2024   WBC      3.90 - 12.70 K/uL 3.22 (L)    RBC      4.00 - 5.40 M/uL 3.70 (L)     Hemoglobin      12.0 - 16.0 g/dL 9.8 (L)    Hematocrit      37.0 - 48.5 % 31.5 (L)       Legend:  (L) Low    Followed per Heme Onc    Component      Latest Ref Rng 4/9/2024   Iron      30 - 160 ug/dL 54    Transferrin      200 - 375 mg/dL 336    TIBC      250 - 450 ug/dL 497 (H)    Saturated Iron      20 - 50 % 11 (L)       Legend:  (H) High  (L) Low    Component      Latest Ref Rng 4/9/2024   Ferritin      20.0 - 300.0 ng/mL 18 (L)       Legend:  (L) Low    Rec start ferrous Sulfate 325 mg BID- notified patient    Chemotherapy induced neutropenia  WBC 3.22    Severe obesity  BMI 39.25        Preventive perioperative care    Thromboembolic prophylaxis:  Her risk factors for thrombosis include morbid obesity, surgical procedure, age, and reduced mobility.I suggest  thromboembolic prophylaxis ( mechanical/pharmacological, weighing the risk benefits of pharmacological agent use considering eduard procedural bleeding )  during the perioperative period.I suggested being active in the post operative period.      Postoperative pulmonary complication prophylaxis-Risk factors for post operative pulmonary complications include ASA class >2- I suggest incentive spirometry use, early ambulation, and pain control so as to avoid diaphragmatic splinting  Brush teeth twice per day, oral rinses, sleep with the head of the bed up 30 degrees     Renal complication prophylaxis-Risk factors for renal complications include age and hypertension . I suggest keeping her well hydrated and avoidance/ minimizing the use of  NSAID's,RAMOS 2 Inhibitors ,IV contrast if possible in the perioperative period.I suggested drinking 2 litre's of water a day      Surgical site Infection Prophylaxis-I  suggest appropriate antibiotic for Prophylaxis against Surgical site infections Shower with Hibiclens in the night before surgery and the morning of surgery    This visit was focused on Preoperative evaluation, Perioperative Medical management, complication  reduction plans. I suggest that the patient follows up with primary care or relevant sub specialists for ongoing health care.    I appreciate the opportunity to be involved in this patients care. Please feel free to contact me if there were any questions about this consultation.    Patient is optimized    Pt has JACQUE- starting ferrous sulfate 325 mg po BID      I spent a total of 42 minutes on the day of the visit.This includes face to face time and non-face to face time preparing to see the patient (eg, review of tests), obtaining and/or reviewing separately obtained history, documenting clinical information in the electronic or other health record, independently interpreting results and communicating results to the patient/family/caregiver, or care coordinator.     Allen Hernandez NP  Perioperative Medicine  [facility]   Pager [phone]

## 2024-04-03 NOTE — Clinical Note
Ms. Kent is scheduled for breast reconstruction surgery 4/18/24 with Dr. Cross.  She has remained anemic since her last surgery in December with current H&H 9.8 31.5.  Are there any interventions to help with her anemia that we could schedule prior to her surgery?  Also, her WBC count was 3.22- is this acceptable for surgery?  Thank you in advance, [facility]

## 2024-04-03 NOTE — HPI
This is a 38 y.o. female  who presents today for a preoperative evaluation in preparation for REVISION, FLAP, PREVIOUSLY CREATED FOR BREAST RECONSTRUCTION   Surgery is indicated for Malignant neoplasm of upper-outer quadrant of left breast in female, estrogen receptor negative.   Patient is new to me.    The history has been obtained by speaking with the patient and reviewing the electronic medical record and/or outside health information. Significant health conditions for the perioperative period are discussed below in assessment and plan.     Patient reports current health status to be Good.  Denies any new symptoms before surgery.

## 2024-04-03 NOTE — DISCHARGE INSTRUCTIONS
Your surgery has been scheduled for:______4/18/24____________________________________    You should report to:  ____[facility], located on the [place] side of the first floor of the           [facility] ([phone])  __X__[facility], located on the [facility] side of the            Second floor of the [facility] ([phone])  ____3rd Floor [facility] located on the [facility] side of the [facility] [phone]  ____[facility]: located at [address] [facility] [place] [address]. [address]     Please Note   Tell your doctor if you take Aspirin, products containing Aspirin, herbal medications  or blood thinners, such as Coumadin, Ticlid, or Plavix.  (Consult your provider regarding holding or stopping before surgery).  Arrange for someone to drive you home following surgery.  You will not be allowed to leave the surgical facility alone or drive yourself home following sedation and anesthesia.    Before Surgery  Stop taking all herbal medications, vitamins, and supplements 7 days prior to surgery  No Motrin/Advil (Ibuprofen) 7 days before surgery  No Aleve (Naproxen) 7 days before surgery   No Goody's/BC Powder 7 days before surgery  Refrain from drinking alcoholic beverages for 24 hours before and after surgery  Stop or limit smoking at least 24 hours prior to surgery  You may take Tylenol for pain    Night before Surgery  Do not eat or drink after midnight  Take a shower or bath (shower is recommended).  Bathe with Hibiclens soap or an antibacterial soap from the neck down.  If not supplied by your surgeon, hibiclens soap will need to be purchased over the counter in pharmacy.  Rinse soap off thoroughly.  Shampoo your hair with your regular shampoo    The Day of Surgery  Take another bath or shower with hibiclens or any antibacterial soap, to reduce the chance of infection.  Take heart  and blood pressure medications with a small sip of water, as advised by the perioperative team.  Do not take fluid pills  You may brush your teeth and rinse your mouth, but do not swallow any additional water.   Do not apply perfumes, powder, body lotions or deodorant on the day of surgery.  Nail polish should be removed.  Do not wear makeup or moisturizer  Wear comfortable clothes, such as a button front shirt and loose fitting pants.  Leave all jewelry, including body piercings, and valuables at home.    Bring any devices you will need after surgery such as crutches or canes.  If you have sleep apnea, please bring your CPAP machine  In the event that your physical condition changes including the onset of a cold or respiratory illness, or if you have to delay or cancel your surgery, please notify your surgeon.

## 2024-04-03 NOTE — ASSESSMENT & PLAN NOTE
Component      Latest Ref Rng 4/3/2024   WBC      3.90 - 12.70 K/uL 3.22 (L)    RBC      4.00 - 5.40 M/uL 3.70 (L)    Hemoglobin      12.0 - 16.0 g/dL 9.8 (L)    Hematocrit      37.0 - 48.5 % 31.5 (L)       Legend:  (L) Low    Followed per Heme Onc    Component      Latest Ref Rng 4/9/2024   Iron      30 - 160 ug/dL 54    Transferrin      200 - 375 mg/dL 336    TIBC      250 - 450 ug/dL 497 (H)    Saturated Iron      20 - 50 % 11 (L)       Legend:  (H) High  (L) Low    Component      Latest Ref Rng 4/9/2024   Ferritin      20.0 - 300.0 ng/mL 18 (L)       Legend:  (L) Low    Rec start ferrous Sulfate 325 mg BID- notified patient

## 2024-04-03 NOTE — PROGRESS NOTES
"[facility]   Physical Therapy Treatment Note      Name: Laura Kent  Clinic Number: [id number]    Therapy Diagnosis:   Encounter Diagnoses   Name Primary?    Decreased functional activity tolerance Yes    Weakness of both upper extremities     Abnormal posture     At risk for lymphedema      Physician: Angie Peck PA-C    Visit Date: 4/4/2024    Physician Orders: PT Eval and Treat   Medical Diagnosis from Referral: C50.412,Z17.1 (ICD-10-CM) - Malignant neoplasm of upper-outer quadrant of left breast in female, estrogen receptor negative  Evaluation Date: 03/15/2024  Authorization Period Expiration: 05/18/2024  Plan of Care Expiration: 05/10/2024  Progress Note Due: 04/15/2024  Visit # / Visits Authorized: 5 / 12 (plus eval)  FOTO: 1/3    Time In: 8:53 AM   Time Out: 9:34 AM  Total Billable Time: 41 minutes (TE per [place] Medicaid)      Subjective     Patient reports: overall pain through LEFT breast / chest still improved since fluid removed from most recent [place] drain, but ongoing "shocking" pains through breast, attributed to nerves. Nerve pain limiting overall tolerance to reaching with LEFT arm.    She was compliant with home exercise program.    Response to Previous Treatment: no adverse events  Functional Change: gradually improving tolerance to LUE activity below shoulder-height     Pain: 4/10  Location: LEFT axilla / breast    Fatigue: mild    Diagnosis: stage IIIC (cT3, cN2(f), cM0, G3, ER-, ME-, HER2-) IDC of the LEFT breast.     Treatment:   Chemotherapy: completed neoadjuvantly 11/22  Radiation: completed 25 fx (04/27/2023 - 05/24/2023)  Endocrine Therapy: N/A    Surgery Date:   - 12/11/2023: BILATERAL KANDICE flap reconstruction with LEFT TE removal per Myra Barrios and [place] and RIGHT mastectomy per Dr. Daley    - 02/27/2023: LEFT mastectomy with SLNB per Dr. Weiner      Objective      Objective Measures updated at progress report unless specified.     Treatment "     Laura received the treatments listed below:      Therapeutic exercises to increase cardiovascular endurance, flexibility, range of motion, and flexibility for 31 minutes:     Seated Exercises:  - Pulleys (flex, abd)    2 min each  - Physioball roll out (3 ways)   2 min    Mat Exercises:  - Pec stretch (towel roll)   2 min  - Butterfly stretch (forehead)   10 sec x 2 min  - AAROM wand flexion   5 sec x 2 min  - AAROM wand abduction, nya  5 sec x 2 min  - PROM, LEFT only (flex, abd)  1 set x 10 reps each      Manual therapy techniques to decrease pain as well as to increase soft tissue mobility, joint mobility, mobility and pliability, and function for 10 minutes:     - Soft tissue mobilization LEFT pec minor (gentle)      Neuromuscular re-education activities to improve kinesthetic sense, proprioception, postural awareness, and scapular stabilization for 0 minutes:      Standing Exercises:  - Shoulder rows (red)    3 sets x 10 reps - held       Patient Education and Home Exercises       Education Provided Regarding:   - Role of physical therapy in multi-disciplinary team  - Goals for physical therapy, progress towards goals  - Physical therapy plan of care, scheduling  - Home exercise program, exercise technique  - Energy conservation techniques    Written Home Exercises Provided: Patient instructed to cont prior HEP. Exercises were reviewed and Laura was able to demonstrate them prior to the end of the session. Laura demonstrated good  understanding of the education provided. See EMR under Patient Instructions for exercises provided during therapy sessions.    Assessment     Patient is responding well to physical therapy. Patient able to perform new exercises and exercise progressions without increase in symptoms prior to leaving the clinic. Resumed several stretches to improve LEFT upper extremity flexibility and instructed patient to continue performing butterfly stretch as part of home exercise program;  patient verbalized understanding, agreement. Ongoing improvement in soft tissue mobility of LEFT pec minor with gentle manual techniques. Follow-up with patient after lymphedema appointment tomorrow (04/05/2024) and progress / schedule as appropriate.      Laura Is progressing well towards her goals.   Patient prognosis is Good.     Patient will continue to benefit from skilled outpatient physical therapy to address the deficits listed in the problem list box on initial evaluation, provide patient / family education, and to maximize patient's level of independence in the home and community environment.     Patient's spiritual, cultural, and educational needs considered, and patient agreeable to plan of care and goals.     Anticipated barriers to physical therapy: none anticipated    GOALS:   Short Term Goals: 4 Weeks  Patient will demonstrate 100% understanding of lymphedema risk reduction practices, including self-monitoring for lymphedema (progressing, not met).  Patient will demonstrate independence with established home exercise program for improved strength, functional mobility, range of motion, posture, and endurance (progressing, not met).   Patient will increase LEFT shoulder FLEXION active range of motion to 160 degrees for improved independence with functional reaching, carrying, pushing, and pulling tasks (progressing, not met).   Patient will increase LEFT shoulder ABDUCTION active range of motion to 130 degrees for improved independence with functional reaching, carrying, pushing, and pulling tasks (progressing, not met).   Patient will increase gross BILATERAL upper extremity musculature to >4/5 for improved independence with functional reaching, carrying, pushing, and pulling activities of daily living (progressing, not met).      Long Term Goals: 8 Weeks   Patient will be independent with updated home exercise program for improved functional mobility, posture, strength, and endurance (progressing,  not met).  Patient will increase LEFT shoulder FLEXION active range of motion to 180 degrees for improved independence with functional reaching, carrying, pushing, and pulling tasks (progressing, not met).   Patient will increase LEFT shoulder ABDUCTION active range of motion to 180 degrees for improved independence with functional reaching, carrying, pushing, and pulling tasks (progressing, not met).   Patient will increase gross BILATERAL upper extremity musculature to 5/5 for improved independence with functional reaching, carrying, pushing, and pulling activities of daily living (progressing, not met).   Patient will report decrease in overall worst pain to 2/10 at discharge for improved tolerance to functional reaching, carrying, pushing, and pulling activities of daily living (progressing, not met).  Patient will report compliance with walking program 5x/week for 10 minutes / day to improve overall cardiovascular function and decrease cancer-related fatigue at discharge (progressing, not met).      Plan     Plan of Care Certification: 03/15/2024 to 05/10/2024.     Outpatient Physical Therapy 2 times weekly for 8 weeks to include the following interventions: Gait Training, Manual Therapy, Neuromuscular Re-ed, Patient Education, Self Care, Therapeutic Activities, Therapeutic Exercise, and IASTM.     Ghada Brooks, PT

## 2024-04-04 ENCOUNTER — CLINICAL SUPPORT (OUTPATIENT)
Dept: REHABILITATION | Facility: HOSPITAL | Age: 39
End: 2024-04-04
Payer: MEDICAID

## 2024-04-04 ENCOUNTER — TELEPHONE (OUTPATIENT)
Dept: PREADMISSION TESTING | Facility: HOSPITAL | Age: 39
End: 2024-04-04
Payer: MEDICAID

## 2024-04-04 DIAGNOSIS — R68.89 DECREASED FUNCTIONAL ACTIVITY TOLERANCE: Primary | ICD-10-CM

## 2024-04-04 DIAGNOSIS — R29.898 WEAKNESS OF BOTH UPPER EXTREMITIES: ICD-10-CM

## 2024-04-04 DIAGNOSIS — Z91.89 AT RISK FOR LYMPHEDEMA: ICD-10-CM

## 2024-04-04 DIAGNOSIS — R29.3 ABNORMAL POSTURE: ICD-10-CM

## 2024-04-04 PROCEDURE — 97140 MANUAL THERAPY 1/> REGIONS: CPT

## 2024-04-04 PROCEDURE — 97110 THERAPEUTIC EXERCISES: CPT

## 2024-04-05 ENCOUNTER — CLINICAL SUPPORT (OUTPATIENT)
Dept: REHABILITATION | Facility: HOSPITAL | Age: 39
End: 2024-04-05
Payer: MEDICAID

## 2024-04-05 DIAGNOSIS — C50.412 MALIGNANT NEOPLASM OF UPPER-OUTER QUADRANT OF LEFT BREAST IN FEMALE, ESTROGEN RECEPTOR NEGATIVE: ICD-10-CM

## 2024-04-05 DIAGNOSIS — Z17.1 MALIGNANT NEOPLASM OF UPPER-OUTER QUADRANT OF LEFT BREAST IN FEMALE, ESTROGEN RECEPTOR NEGATIVE: ICD-10-CM

## 2024-04-05 DIAGNOSIS — M79.89 LEFT ARM SWELLING: Primary | ICD-10-CM

## 2024-04-05 PROCEDURE — 97162 PT EVAL MOD COMPLEX 30 MIN: CPT

## 2024-04-08 ENCOUNTER — TELEPHONE (OUTPATIENT)
Dept: PREADMISSION TESTING | Facility: HOSPITAL | Age: 39
End: 2024-04-08
Payer: MEDICAID

## 2024-04-08 PROBLEM — M79.89 LEFT ARM SWELLING: Status: ACTIVE | Noted: 2024-04-08

## 2024-04-08 NOTE — PLAN OF CARE
[facility]  Physical Therapy Initial Evaluation- Lymphedema    Name: Laura Kent  Clinic Number: [id number]    Therapy Diagnosis:   Encounter Diagnoses   Name Primary?    Malignant neoplasm of upper-outer quadrant of left breast in female, estrogen receptor negative     Left arm swelling Yes        Physician: Angie Peck, LUIS ALFREDO    Physician Orders: PT Eval and Treat -Lymphedema  Medical Diagnosis from Referral: Malignant neoplasm of upper-outer quadrant of left breast in female, estrogen receptor negative   Evaluation Date: 2024  Authorization Period Expiration: 24  Plan of Care Expiration: 24 (10 weeks)  Progress Note Due: 5/3/24  Visit # / Visits authorized:    FOTO: TBA    Precautions: Standard, cancer, and left arm at risk for lymphedema     Time In: 11:05 AM  Time Out: 12:00 PM  Total Appointment Time (timed & untimed codes): 55 minutes      History   History of Present Illness: Laura is a 38 y.o. female that presents to  [facility] at the [facility] s/p Bilateral breast surgery.     Diagnosis: Malignant neoplasm of upper-outer quadrant of left breast in female, estrogen receptor negative   Chief complaint: Pt endorses tingling, heaviness, tightness in left trunk and upper arm. Unsure if swelling is present. She is concerned about developing lymphedema.  Surgical History:  Laura Kent  has a past surgical history that includes Breast biopsy (Left);  section; Insertion of tunneled central venous catheter (CVC) with subcutaneous port (N/A, 2022); Insertion of tunneled central venous catheter (CVC) with subcutaneous port (N/A, 2022); Colonoscopy (N/A, 2022); Mastectomy (Left, 2023); [place] lymph node biopsy (Left, 2023); Injection for sentinel node identification (Left, 2023); Axillary node dissection (Left, 2023); Insertion of breast tissue  expander (Left, 2/27/2023); Tissue expander removal (Left, 12/11/2023); Reconstruction of breast with deep inferior epigastric artery  (KANDICE) free flap (Bilateral, 12/11/2023); Revision of scar (Right, 12/11/2023); Intravenous injection (N/A, 12/11/2023); and Mastectomy (Right, 12/11/2023).    Cancer-Related Surgery and Date:   - 12/11/2023: BILATERAL KANDICE flap reconstruction with LEFT TE removal per Myra Barrios and [place] and RIGHT mastectomy per Dr. Daley     - 02/27/2023: LEFT mastectomy with SLNB per Dr. Weiner     *pt has pending revision surgery scheduled for 4/18/24    Treatment:   Chemotherapy: completed neoadjuvantly 11/22  Radiation: completed 25 fx (04/27/2023 - 05/24/2023)  Endocrine Therapy: N/A    Past Medical History:   Diagnosis Date    Anemia     Anxiety     Breast cancer     Diarrhea 09/08/2022    Drug-induced acute pancreatitis without infection or necrosis 09/04/2022    Encounter for blood transfusion     Hypertension     Meningitis     Severe obesity 02/02/2023    Shortness of breath 02/02/2023          Medications:  Laura has a current medication list which includes the following prescription(s): capecitabine, capecitabine, capecitabine, capecitabine, cetirizine, clotrimazole, gabapentin, ketoconazole, metformin, mupirocin, ondansetron, and pantoprazole, and the following Facility-Administered Medications: ceFAZolin 1 g, gentamicin 80 mg in sodium chloride 0.9% 500 mL irrigation.    Allergies:  Review of patient's allergies indicates:   Allergen Reactions    Strawberries [strawberry] Hives        Prior Therapy: Yes currently going to PT for ROM and strengthening  Social History: pt lives with their family  Home Environment: 2-story townhouse; bed/bath on 2nd floor; 1 flight to enter house   Durable Medical Equipment: none  Occupation: , caterer, [employer]    Prior Level of Function: did not have tightness/fullness/heaviness prior to surgery and radiation  Current Level  "of Function: independent, but she endorses fullness, heaviness, and tightness in her left trunk and upper arm  Exercise routine prior to onset: prior cardio, weights, crossfit-type workouts   Hand dominance: left      Patient's Goals: make sure I don't get lymphedema or it doesn't get out of control        Subjective   Pt states: Endorses intermittent nerve pains  Pain: 5/10 on VAS currently.   Best: 0/10  Worst: 8/10   Pain location: left breast, lateral chest wall, and under left arm. Pt also endorses  pain at port site.   Objective   Mental status :A+O x 3, pleasant, appropriate    Postural examination/scapular alignment: Rounded shoulder and Slouched posture    Skin Integrity:   Scar Location: B breast and abdomen  Appearance: healed  Signs of infection: No  Drainage:N/A  Color:N/A    Edema: Mild  Location: left upper arm    Axillary Web Syndrome/Cording:   Location: N/A  Degree of Cording: N/A (mild, moderate etc...)   Number of cords present: 0    Sensation: numbness noted in left axilla and lateral chest wall        Range of Motion: not tested, as pt is seeing PT for this.              Strength: manual muscle test grades below - not tested- see PT note        Baseline Measurements of BL UE's for early detection of Lymphedema:     LANDMARK RIGHT UE LEFT UE DIFFERENCE   Date 4/5/24 4/5/24 4/5/24   E + 8" 41.5 cm 43 cm 1.5 cm   E + 6" 40 cm 41.5 cm 1.5 cm   E + 4" 39 cm 40.5 cm 1.5 cm   E + 2" 36 cm 37.5 cm 1.5 cm   Elbow 32 cm 32 cm 0 cm   W+ 8" 33 cm 34 cm 1 cm   W +  6" 30 cm 30.5 cm 0.5 cm   W + 4" 25 cm 26 cm 1 cm   Wrist 18 cm 19 cm 1 cm   DPC 22 cm 22 cm 0 cm   IP Thumb 7.5 cm 7.5  cm 0 cm     Functional Mobility (Bed mobility, transfers)  Mod I to independent    ADL's (Activities of Daily Living):  Mod I to independent    Gait Assessment:   - Assistive device used: none  - Assistance: independent  - Distance: community distances     Patient Education Provided   - role of therapy in multi - disciplinary " team, goals for therapy  -Plan of care, scheduling  - components of complete decongestive therapy    Pt has no cultural, educational or language barriers to learning provided.  Treatment and Instruction of Home Exercise Program      Total Time Separate from Evaluation: 15 minutes (no charges dropped per Medicaid guidelines)      Laura received the following self-care/home management techniques for 15 minutes, including:  -Pt was encouraged to elevate her left arm throughout the day  -reviewed lymphedema precautions  -Pt was provided with tubular bandage: tetragrip size E for left forearm and G for left upper arm. Pt was advised to wear during the day as long as it does not cause pain, numbness, tingling, or increased swelling. Pt can hand wash with gentle detergent and air dry. Pt verbalizes understanding of all topics.       Written Home Exercises Provided: to be provided at future session.        Functional Limitations Reporting        Intake Outcome Measure for FOTO Upper Quadrant Edema Survey    Therapist reviewed FOTO scores for Laura Kent on 4/5/2024.   FOTO documents entered into [employer] - see Media section.    Intake Score: TBA             Assessment   This is a 38 y.o. female referred to outpatient physical therapy and presents with a medical diagnosis of left breast cancer and lymphedema was seen today. Pt presents with stage 0 lymphedema in her left arm. She reports heaviness, tightness, and fullness in her left arm. While she doesn't demo 2cm difference between her left and right arms, she does demo increasing circumference measurements in her left arm compared to prior eval for her shoulder ROM and strength deficits, so she may have lymphedema developing/progressing. Initiated use of light compression with tubular bandage. Will gauge response at next session and progress as tolerated. Will benefit from continued follow-up/monitoring to determine if complete decongestive therapy becomes  appropriate.    Pt will continue to benefit from skilled physical therapy to address the impairments stated in chart below, provide patient/family education and to maximize pt's level of independence in home and community environment     Pt prognosis is Good.    Anticipated barriers to physical therapy: upcoming surgery on 4/18/24     Pt's spiritual, cultural and educational needs considered and pt agreeable to plan of care and goals as stated below:       Medical Necessity is demonstrated by the following:  History  Co-morbidities and personal factors that may impact the plan of care [] LOW: no personal factors / co-morbidities  [] MODERATE: 1-2 personal factors / co-morbidities  [x] HIGH: 3+ personal factors / co-morbidities    Moderate / High Support Documentation:   Co-morbidities affecting plan of care: Cancer, anemia, HTN    Personal Factors:   no deficits     Examination  Body Structures and Functions, activity limitations and participation restrictions that may impact the plan of care [] LOW: addressing 1-2 elements  [x] MODERATE: 3+ elements  [] HIGH: 4+ elements (please support below)    Moderate / High Support Documentation: at risk for lymphedema, swelling in left arm, feeling of tightness and heaviness in left arm     Clinical Presentation [] LOW: stable  [x] MODERATE: Evolving  [] HIGH: Unstable     Decision Making/ Complexity Score: moderate       Goals: Pt agrees with goals set    Short Term Goals (6 weeks)  1. Pt will demo decrease in girth in left upper extremity by >/= 1cm to allow for improved UE symmetry, clothing choice, ROM, and UE function. (progressing, not met)  2. Patient will demonstrate 100% knowledge of lymphedema precautions and signs of infection to allow for reduced risk of lymphedema, reduced risk of infection, and/or exacerbation.  (progressing, not met)  3. Patient will perform self lymph drainage techniques as appropriate/indicated to enhance lymphatic drainage and mobility.   (progressing, not met)  4. Pt will tolerate HEP for improved strength, functional mobility, ROM, posture, and endurance. (progressing, not met)         Long Term Goals: ( 10  weeks)  1. Patient to show decreased girth in left upper extremity by >/= 2cm to allow for improved UE symmetry, clothing choice, ROM, and UE function.  (progressing, not met)  2. Patient will show reduction in density to mild or less with improved contour of limb to allow for improved cosmesis, improved lymphatic drainage, and functional mobility.  (progressing, not met)  3. Patient to chris/doff compression garment as appropriate with daily compliance to support lymphatic mobility and skin elasticity.  (progressing, not met)  4. Pt to show improved postural awareness and alignment for improved functional mobility.  (progressing, not met)  5. Pt to be independent and compliant with HEP to allow for improved ROM, reach and carry with functional endurance and strength.    (progressing, not met)    Plan   Outpatient physical therapy 2x week for 10 weeks to include the following:   Manual Therapy, Neuromuscular Re-ed, Orthotic Management and Training, Patient Education, Self Care, Therapeutic Activities, and Therapeutic Exercise.    Plan of care Certification Period: 4/5/2024 to 6/14/24.    Therapist: Yumiko Suárez, PT  4/5/2024

## 2024-04-08 NOTE — PROGRESS NOTES
[facility]   Physical Therapy Treatment Note      Name: Laura Kent  Clinic Number: [id number]    Therapy Diagnosis:   Encounter Diagnoses   Name Primary?    Left arm swelling Yes    Decreased functional activity tolerance     Weakness of both upper extremities     Abnormal posture     At risk for lymphedema      Physician: Angie Peck PA-C    Visit Date: 4/9/2024    Physician Orders: PT Eval and Treat   Medical Diagnosis from Referral: C50.412,Z17.1 (ICD-10-CM) - Malignant neoplasm of upper-outer quadrant of left breast in female, estrogen receptor negative  Evaluation Date: 03/15/2024  Authorization Period Expiration: 05/18/2024  Plan of Care Expiration: 05/10/2024  Progress Note Due: 04/15/2024  Visit # / Visits Authorized: 6 / 12 (plus eval)  FOTO: 1/3    Time In: 8:46 AM   Time Out: 9:30 AM  Total Billable Time: 44 minutes (TE per [place] [employer])      Subjective     Patient reports: less difficulty and pulling when lifting arms, attributed to moving multiple boxes over weekend.     She was compliant with home exercise program.    Response to Previous Treatment: no adverse events  Functional Change: improved overhead reaching tolerance    Pain: 0/10  Location: N/A    Fatigue: mild    Diagnosis: stage IIIC (cT3, cN2(f), cM0, G3, ER-, VT-, HER2-) IDC of the LEFT breast.     Treatment:   Chemotherapy: completed neoadjuvantly 11/22  Radiation: completed 25 fx (04/27/2023 - 05/24/2023)  Endocrine Therapy: N/A    Surgery Date:   - 12/11/2023: BILATERAL KANDICE flap reconstruction with LEFT TE removal per Myra Barrios and [place] and RIGHT mastectomy per Dr. Daley    - 02/27/2023: LEFT mastectomy with SLNB per Dr. Weiner      Objective      Objective Measures updated at progress report unless specified.     Treatment     Laura received the treatments listed below:      Therapeutic exercises to increase cardiovascular endurance, flexibility, range of motion, and flexibility for 34  minutes:     Seated Exercises:  - Pulleys (flex, abd)    2 min each  - Physioball roll out (3 ways)   2 min  - Thoracic ext stretch (horizontal 1/2 foam) 5 sec x 10 reps    Mat Exercises:  - Pec stretch (towel roll)   2 min  - Butterfly stretch (forehead)   10 sec x 2 min  - AAROM wand flexion   5 sec x 2 min  - AAROM wand abduction, nya  5 sec x 2 min  - PROM, LEFT only (flex, abd)  1 set x 10 reps each      Manual therapy techniques to decrease pain as well as to increase soft tissue mobility, joint mobility, mobility and pliability, and function for 10 minutes:     - Soft tissue mobilization LEFT pec minor (gentle)      Patient Education and Home Exercises       Education Provided Regarding:   - Role of physical therapy in multi-disciplinary team  - Goals for physical therapy, progress towards goals  - Physical therapy plan of care, scheduling  - Home exercise program, exercise technique  - Energy conservation techniques    Written Home Exercises Provided: Patient instructed to cont prior HEP. Exercises were reviewed and Laura was able to demonstrate them prior to the end of the session. Laura demonstrated good  understanding of the education provided. See EMR under Patient Instructions for exercises provided during therapy sessions.    Assessment     Patient is responding well to physical therapy. Patient able to perform new exercises and exercise progressions without increase in symptoms prior to leaving the clinic. Added seated thoracic extension stretch for improved flexibility of anterior chest and mobility through thoracic spine; demonstration required for proper exercise technique (good carryover). Improved soft tissue mobility of LEFT pec minor with manual techniques. Reassess next visit.      Laura Is progressing well towards her goals.   Patient prognosis is Good.     Patient will continue to benefit from skilled outpatient physical therapy to address the deficits listed in the problem list box on  initial evaluation, provide patient / family education, and to maximize patient's level of independence in the home and community environment.     Patient's spiritual, cultural, and educational needs considered, and patient agreeable to plan of care and goals.     Anticipated barriers to physical therapy: none anticipated    GOALS:   Short Term Goals: 4 Weeks  Patient will demonstrate 100% understanding of lymphedema risk reduction practices, including self-monitoring for lymphedema (progressing, not met).  Patient will demonstrate independence with established home exercise program for improved strength, functional mobility, range of motion, posture, and endurance (progressing, not met).   Patient will increase LEFT shoulder FLEXION active range of motion to 160 degrees for improved independence with functional reaching, carrying, pushing, and pulling tasks (progressing, not met).   Patient will increase LEFT shoulder ABDUCTION active range of motion to 130 degrees for improved independence with functional reaching, carrying, pushing, and pulling tasks (progressing, not met).   Patient will increase gross BILATERAL upper extremity musculature to >4/5 for improved independence with functional reaching, carrying, pushing, and pulling activities of daily living (progressing, not met).      Long Term Goals: 8 Weeks   Patient will be independent with updated home exercise program for improved functional mobility, posture, strength, and endurance (progressing, not met).  Patient will increase LEFT shoulder FLEXION active range of motion to 180 degrees for improved independence with functional reaching, carrying, pushing, and pulling tasks (progressing, not met).   Patient will increase LEFT shoulder ABDUCTION active range of motion to 180 degrees for improved independence with functional reaching, carrying, pushing, and pulling tasks (progressing, not met).   Patient will increase gross BILATERAL upper extremity  musculature to 5/5 for improved independence with functional reaching, carrying, pushing, and pulling activities of daily living (progressing, not met).   Patient will report decrease in overall worst pain to 2/10 at discharge for improved tolerance to functional reaching, carrying, pushing, and pulling activities of daily living (progressing, not met).  Patient will report compliance with walking program 5x/week for 10 minutes / day to improve overall cardiovascular function and decrease cancer-related fatigue at discharge (progressing, not met).      Plan     Plan of Care Certification: 03/15/2024 to 05/10/2024.     Outpatient Physical Therapy 2 times weekly for 8 weeks to include the following interventions: Gait Training, Manual Therapy, Neuromuscular Re-ed, Patient Education, Self Care, Therapeutic Activities, Therapeutic Exercise, and IASTM.     Ghada Brooks, PT

## 2024-04-09 ENCOUNTER — LAB VISIT (OUTPATIENT)
Dept: LAB | Facility: HOSPITAL | Age: 39
End: 2024-04-09
Payer: MEDICAID

## 2024-04-09 ENCOUNTER — CLINICAL SUPPORT (OUTPATIENT)
Dept: REHABILITATION | Facility: HOSPITAL | Age: 39
End: 2024-04-09
Payer: MEDICAID

## 2024-04-09 DIAGNOSIS — R68.89 DECREASED FUNCTIONAL ACTIVITY TOLERANCE: ICD-10-CM

## 2024-04-09 DIAGNOSIS — D63.0 ANEMIA IN NEOPLASTIC DISEASE: ICD-10-CM

## 2024-04-09 DIAGNOSIS — Z91.89 AT RISK FOR LYMPHEDEMA: ICD-10-CM

## 2024-04-09 DIAGNOSIS — R29.3 ABNORMAL POSTURE: ICD-10-CM

## 2024-04-09 DIAGNOSIS — R29.898 WEAKNESS OF BOTH UPPER EXTREMITIES: ICD-10-CM

## 2024-04-09 DIAGNOSIS — M79.89 LEFT ARM SWELLING: Primary | ICD-10-CM

## 2024-04-09 LAB
FERRITIN SERPL-MCNC: 18 NG/ML (ref 20–300)
IRON SERPL-MCNC: 54 UG/DL (ref 30–160)
SATURATED IRON: 11 % (ref 20–50)
TOTAL IRON BINDING CAPACITY: 497 UG/DL (ref 250–450)
TRANSFERRIN SERPL-MCNC: 336 MG/DL (ref 200–375)

## 2024-04-09 PROCEDURE — 36415 COLL VENOUS BLD VENIPUNCTURE: CPT | Performed by: NURSE PRACTITIONER

## 2024-04-09 PROCEDURE — 83540 ASSAY OF IRON: CPT | Performed by: NURSE PRACTITIONER

## 2024-04-09 PROCEDURE — 82728 ASSAY OF FERRITIN: CPT | Performed by: NURSE PRACTITIONER

## 2024-04-09 PROCEDURE — 97110 THERAPEUTIC EXERCISES: CPT

## 2024-04-11 ENCOUNTER — CLINICAL SUPPORT (OUTPATIENT)
Dept: REHABILITATION | Facility: HOSPITAL | Age: 39
End: 2024-04-11
Payer: MEDICAID

## 2024-04-11 ENCOUNTER — OFFICE VISIT (OUTPATIENT)
Dept: SURGERY | Facility: CLINIC | Age: 39
End: 2024-04-11
Payer: MEDICAID

## 2024-04-11 VITALS
RESPIRATION RATE: 19 BRPM | DIASTOLIC BLOOD PRESSURE: 72 MMHG | BODY MASS INDEX: 39.25 KG/M2 | HEART RATE: 70 BPM | OXYGEN SATURATION: 100 % | WEIGHT: 235.88 LBS | SYSTOLIC BLOOD PRESSURE: 131 MMHG

## 2024-04-11 DIAGNOSIS — C50.412 MALIGNANT NEOPLASM OF UPPER-OUTER QUADRANT OF LEFT BREAST IN FEMALE, ESTROGEN RECEPTOR NEGATIVE: Primary | ICD-10-CM

## 2024-04-11 DIAGNOSIS — M79.89 LEFT ARM SWELLING: Primary | ICD-10-CM

## 2024-04-11 DIAGNOSIS — Z98.890 S/P BREAST RECONSTRUCTION: ICD-10-CM

## 2024-04-11 DIAGNOSIS — Z17.1 MALIGNANT NEOPLASM OF UPPER-OUTER QUADRANT OF LEFT BREAST IN FEMALE, ESTROGEN RECEPTOR NEGATIVE: Primary | ICD-10-CM

## 2024-04-11 PROCEDURE — 99214 OFFICE O/P EST MOD 30 MIN: CPT | Mod: S$PBB,,, | Performed by: SURGERY

## 2024-04-11 PROCEDURE — 3078F DIAST BP <80 MM HG: CPT | Mod: CPTII,,, | Performed by: SURGERY

## 2024-04-11 PROCEDURE — 97110 THERAPEUTIC EXERCISES: CPT

## 2024-04-11 PROCEDURE — 99999 PR PBB SHADOW E&M-EST. PATIENT-LVL III: CPT | Mod: PBBFAC,,, | Performed by: SURGERY

## 2024-04-11 PROCEDURE — 3008F BODY MASS INDEX DOCD: CPT | Mod: CPTII,,, | Performed by: SURGERY

## 2024-04-11 PROCEDURE — 99213 OFFICE O/P EST LOW 20 MIN: CPT | Mod: PBBFAC | Performed by: SURGERY

## 2024-04-11 PROCEDURE — 3075F SYST BP GE 130 - 139MM HG: CPT | Mod: CPTII,,, | Performed by: SURGERY

## 2024-04-11 NOTE — PROGRESS NOTES
Physical Therapy Daily Treatment Note       Date: 4/11/2024   Name: Laura Kent  Clinic Number: [id number]    Therapy Diagnosis:   Encounter Diagnosis   Name Primary?    Left arm swelling Yes     Physician: Angie Peck PA-C    Physician Orders: PT Eval and Treat -Lymphedema  Medical Diagnosis from Referral: Malignant neoplasm of upper-outer quadrant of left breast in female, estrogen receptor negative   Evaluation Date: 4/5/2024  Authorization Period Expiration: 12/31/24  Plan of Care Expiration: 6/14/24 (10 weeks)  Progress Note Due: 5/3/24  Visit # / Visits authorized: 1/ 1   FOTO: TBA    Precautions: Standard and cancer     Time In: 8:24 AM (late arrival)  Time Out: 9:25 AM  Total Appointment Time (timed & untimed codes): 60 minutes      Subjective     Pt reports: Pt has been wearing the tubigrip and her arm feels a little less tight. Especially in her underarm and lateral chest wall.  She was not not issued a home exercise program.  Response to previous treatment: no adverse reaction  Pain Scale: Laura rates pain on a scale of 0/10 on VAS.   Pain location: N/A     Fatigue: reports some fatigue  Functional change: less tight in her arm    Treatment:   Chemotherapy: completed neoadjuvantly 11/22  Radiation: completed 25 fx (04/27/2023 - 05/24/2023)  Endocrine Therapy: N/A    Cancer-Related Surgery and Date:   - 12/11/2023: BILATERAL KANDICE flap reconstruction with LEFT TE removal per Myra Barrios and [place] and RIGHT mastectomy per Dr. Daley     - 02/27/2023: LEFT mastectomy with SLNB per Dr. Weiner     *pt has pending revision surgery scheduled for 4/18/24    Objective     Objective Measures updated at progress report unless specified.     Treatment         Laura received the following manual therapy techniques: Manual Lymphatic Drainage were applied to the: left arm and trunk  for 45 minutes.     Supine:  Short Neck   Shoulder  Collectors  Diaphragmatic breathing with effleurage x 3  Clear healthy Lymph Nodes: Right Axillary   Open anastomoses: Anterior Axillo-Axillary (AAA)- 3 steps  Clear healthy lymph nodes: Left Inguinal (involved side)  Open anastomoses: Axillo-Inguinal (AI)- 3 steps  Rework: AAA  Side-lying (involved side on top):  Rework: AI and shoulder collectors  Open Posterior Axillo-Axillary (JARAD) anastomoses- 3 steps   Activate bulk flow:   Effleurage  Hand washing  Lateral arm  Rework: JARAD, AI, shoulder collectors               Supine:  Rework AAA, axillary lymph nodes, and lateral arm  Vaso-vasorum  Special Technique at upper arm  Elbow- 3 hand placements  Forearm- Dorsal and ventral side  Hand- dorsum   Rework Arm  Rework: AAA, Axillary lymph nodes, AI, inguinal lymph nodes  Diaphragmatic breathing with effleurage x 3    Side-lying:  Rework: JARAD, AI, shoulder collectors  Final effleurage      Pt participates in self-care/home management for 15 minutes:    Pt was provided with additional set of tubular bandage: tetragrip size E for left forearm and G for left upper arm. Pt was advised to wear during the day as long as it does not cause pain, numbness, tingling, or increased swelling. Pt can hand wash with gentle detergent and air dry. Pt verbalizes understanding of all topics.     Pt was taught 1st 5 steps of sell manual lymph drainage technique. She was provided with verbal and written instructions. Please refer to Media section for details.    Pt was advised to hold on use of tubular bandage and self MLD after her upcoming surgery on 4/1/8/24. Can resume pending clearance from plastic surgeon. Pt verbalizes understanding of all topics      Home Exercise Program and Patient Education   Education provided re:  - role of PT in multi - disciplinary team, goals for PT  - progress towards goals   - plan of care, self MLD    Written Home Exercises Provided: yes.  Exercises were reviewed and Laura was able to demonstrate them  prior to the end of the session.  Laura demonstrated good  understanding of the education provided.     See EMR under Media for exercises provided 4/11/2024.    Pt has no cultural, educational or language barriers to learning provided.    Assessment     Patient is responding well to physical therapy. She endorses feeling less tight in her arm with use of tubular bandage, so PT recommended that pt continue to use as tolerated leading up to surgery on 4/18. She will discontinue after surgery and resume pending clearance from plastic surgeon. Initiated manual lymph drainage today as well as self MLD instruction. Pt tolerated without issue in clinic, and she demo's good technique with self MLD. Will measure at next session to gauge response.  Will progress as tolerated.  Pt prognosis is Good. Pt will continue to benefit from skilled outpatient physical therapy to address the deficits listed in the problem list chart on initial evaluation, provide pt/family education and to maximize pt's level of independence in the home and community environment.     Anticipated barriers to physical therapy: upcoming surgery on 4/1/8/24    Goals as follows:  Short Term Goals (6 weeks)  1. Pt will demo decrease in girth in left upper extremity by >/= 1cm to allow for improved UE symmetry, clothing choice, ROM, and UE function. (progressing, not met)  2. Patient will demonstrate 100% knowledge of lymphedema precautions and signs of infection to allow for reduced risk of lymphedema, reduced risk of infection, and/or exacerbation.  (progressing, not met)  3. Patient will perform self lymph drainage techniques as appropriate/indicated to enhance lymphatic drainage and mobility.  (progressing, not met)  4. Pt will tolerate HEP for improved strength, functional mobility, ROM, posture, and endurance. (progressing, not met)           Long Term Goals: ( 10  weeks)  1. Patient to show decreased girth in left upper extremity by >/= 2cm to allow  for improved UE symmetry, clothing choice, ROM, and UE function.  (progressing, not met)  2. Patient will show reduction in density to mild or less with improved contour of limb to allow for improved cosmesis, improved lymphatic drainage, and functional mobility.  (progressing, not met)  3. Patient to chris/doff compression garment as appropriate with daily compliance to support lymphatic mobility and skin elasticity.  (progressing, not met)  4. Pt to show improved postural awareness and alignment for improved functional mobility.  (progressing, not met)  5. Pt to be independent and compliant with HEP to allow for improved ROM, reach and carry with functional endurance and strength.    (progressing, not met)     Plan   Outpatient physical therapy 2x week for 10 weeks to include the following:   Manual Therapy, Neuromuscular Re-ed, Orthotic Management and Training, Patient Education, Self Care, Therapeutic Activities, and Therapeutic Exercise.     Plan of care Certification Period: 4/5/2024 to 6/14/24.    Therapist: Yumiko Suárez, PT  4/11/2024

## 2024-04-15 ENCOUNTER — CLINICAL SUPPORT (OUTPATIENT)
Dept: REHABILITATION | Facility: HOSPITAL | Age: 39
End: 2024-04-15
Payer: MEDICAID

## 2024-04-15 DIAGNOSIS — Z91.89 AT RISK FOR LYMPHEDEMA: ICD-10-CM

## 2024-04-15 DIAGNOSIS — R29.898 WEAKNESS OF BOTH UPPER EXTREMITIES: ICD-10-CM

## 2024-04-15 DIAGNOSIS — R68.89 DECREASED FUNCTIONAL ACTIVITY TOLERANCE: Primary | ICD-10-CM

## 2024-04-15 DIAGNOSIS — R29.3 ABNORMAL POSTURE: ICD-10-CM

## 2024-04-15 PROCEDURE — 97140 MANUAL THERAPY 1/> REGIONS: CPT

## 2024-04-15 PROCEDURE — 97110 THERAPEUTIC EXERCISES: CPT

## 2024-04-15 NOTE — PATIENT INSTRUCTIONS
POST OP PATIENT EDUCATION    Post Operative Instructions     Range of Motion and Lifting Precautions Following Lumpectomy / Partial Mastectomy Surgery:    The following activities should be avoided as you continue to heal:  - Keep motion of affected arm within comfortable, pain-free range  - Do not lift over 5 lbs  - Do not pull or push heavy objects  - Do not sleep on your stomach or surgery side       After surgery, you may begin self-care tasks including grooming, dressing, feeding and simple hygiene as soon as tolerated.    Schedule your post-op therapy follow-up two weeks after your surgery.            When to call your doctor:  - If any part of the affected arm or armpit region is hot, red, or has increased swelling   - If you develop a temperature over 101 degrees Fahrenheit      POST OP EXERCISES - SAFE TO DO THE FIRST 2 WEEKS AFTER SURGERY UNTIL YOUR FOLLOW UP APPOINTMENT WITH PHYSICAL/OCCUPATIONAL THERAPY    Scapular Retraction      With arms at sides, squeeze shoulder blades together. Do not shrug shoulders and do not hold your breath. Hold 5 seconds. Repeat 10 times, 2-3 sets, 2 x day.       Exaggerated Breathing and Relaxation      Practice deep breathing frequently in the first few days following surgery even before you begin exercising. This exercise helps with tissue extensibility in the chest wall.  Inhale slowly and deeply through the nose and exhale through pursed lips. Concentrate on relaxing as you let the air out of your lungs. Repeat three (3) to four (4) times, remembering to breath in deeply and then relaxing. This exercise helps to ease the sensation of pulling and discomfort that may be experienced while exercising.      Ball Squeeze OR Hand pumps       Perform this exercise three (3) times a day for 1-3 minutes each time.    The ball squeeze or hand pumps helps to prevent or reduce temporary swelling that may occur in the affected arm. This  exercise may be performed standing, sitting or while lying in bed. During this exercise the affected arm should be slightly bent and held upward. Support your arm with a pillow if you are uncomfortable holding it up.    a. Hold a rubber ball in your hand on the affected side and lift your arm upward.  b. Alternate squeezing and relaxing the ball.      AROM: Elbow Flexion / Extension        With left hand palm up, gently bend elbow as far as possible. Then straighten arm as far as possible. Do this in standing.   Repeat 10 times per set. Do 2-3 sets per session. Do 1-3 sessions per day.

## 2024-04-15 NOTE — PROGRESS NOTES
[facility]   Physical Therapy Treatment Note      Name: Laura Kent  Clinic Number: [id number]    Therapy Diagnosis:   Encounter Diagnoses   Name Primary?    Decreased functional activity tolerance Yes    Weakness of both upper extremities     Abnormal posture     At risk for lymphedema      Physician: Angie Peck PA-C    Visit Date: 4/15/2024    Physician Orders: PT Eval and Treat   Medical Diagnosis from Referral: C50.412,Z17.1 (ICD-10-CM) - Malignant neoplasm of upper-outer quadrant of left breast in female, estrogen receptor negative  Evaluation Date: 03/15/2024  Authorization Period Expiration: 05/18/2024  Plan of Care Expiration: 05/10/2024  Progress Note Due: 05/15/2024  Visit # / Visits Authorized: 7 / 12 (plus eval)  FOTO: 2/3    Time In: 10:45 AM (late arrival)   Time Out: 11:15 AM  Total Billable Time: 30 minutes (TE per [place] [employer])      Subjective     Patient reports: significant increase in neck pain for 2 days, partially attributed to moving boxes over weekend.    She  was partially  compliant with home exercise program (secondary to increased neck pain).    Response to Previous Treatment: no adverse events  Functional Change: significant difficulty turning head while driving    Pain: 10/10  Location: neck    Fatigue: mild-moderate    Diagnosis: stage IIIC (cT3, cN2(f), cM0, G3, ER-, NV-, HER2-) IDC of the LEFT breast.     Treatment:   Chemotherapy: completed neoadjuvantly 11/22  Radiation: completed 25 fx (04/27/2023 - 05/24/2023)  Endocrine Therapy: N/A    Surgery Date:   - 12/11/2023: BILATERAL KANDICE flap reconstruction with LEFT TE removal per Myra Barrios and [place] and RIGHT mastectomy per Dr. Daley    - 02/27/2023: LEFT mastectomy with SLNB per Dr. Weiner      Objective      Objective Measures updated at progress report unless specified.       Shoulder Range of Motion: 04/15/2024  Active ROM LEFT* RIGHT*   Flexion 140 170   Abduction 80 180    Extension TBA TBA   IR/90deg 90 at 80 deg abd 90   ER/90deg 30 at 80 deg abd 90    *Pain-limited secondary to acute cervical pain       Upper Extremity Strength: 04/15/2024    (L) UE* (R) UE*   Shoulder Flexion 3+/5 3+/5   Shoulder Abduction 3+/5 3+/5   Shoulder IR 3+/5 3+/5   Shoulder ER 3+/5 3+/5   Elbow Flexion 3+/5 3+/5   Elbow Extension 3+/5 3+/5    47.8 lbs 62.1 lbs    *Pain-limited secondary to acute cervical pain       Limitation / Restriction for FOTO Shoulder Survey    Therapist reviewed FOTO scores for Laura Kent on 4/15/2024.   FOTO documents entered into [employer] - see Media section.    Limitation Score: 43%         Treatment     Laura received the treatments listed below:      Therapeutic exercises to increase cardiovascular endurance, flexibility, range of motion, and flexibility for 15 minutes:     Physical Performance Testing:  - Bilateral upper extremity active range of motion measurements  - Bilateral upper extremity manual muscle testing  - Bilateral upper extremity  dynamometer strength testing  - FOTO survey reassessment    Seated Exercises:  - Pulleys (flex, abd)    2 min each - held  - Physioball roll out (3 ways)   2 min - held  - Thoracic ext stretch (horizontal 1/2 foam) 5 sec x 10 reps - held    Mat Exercises:  - Pec stretch (towel roll)   2 min - held  - Butterfly stretch (forehead)   10 sec x 2 min - held  - AAROM wand flexion   5 sec x 2 min - held  - AAROM wand abduction, nya  5 sec x 2 min - held  - PROM, LEFT only (flex, abd)  1 set x 10 reps each - held      Manual therapy techniques to decrease pain as well as to increase soft tissue mobility, joint mobility, mobility and pliability, and function for 15 minutes:     - Soft tissue mobilization LEFT upper trapezius (seated)  - Active release technique of LEFT upper trapezius (seated)  - Active release technique of LEFT levator scapula (seated)  - IASTM of LEFT shoulder (upper trapezius, posterior  scapula)      Patient Education and Home Exercises       Education Provided Regarding:   - Role of physical therapy in multi-disciplinary team  - Goals for physical therapy, progress towards goals  - Physical therapy plan of care, scheduling  - Home exercise program, exercise technique  - Energy conservation techniques    Written Home Exercises Provided:  yes . Exercises were reviewed and Laura was able to demonstrate them prior to the end of the session. Laura demonstrated good  understanding of the education provided. See EMR under Patient Instructions for exercises provided during therapy sessions.    Assessment     Patient is responding well to physical therapy. Held shoulder stretches and resistance band exercises today secondary to increased cervical pain. Patient reported slight improvement in cervical pain at end of session following manual techniques. Patient otherwise demonstrated 6% increase in FOTO survey limitation score compared to initial evaluation (03/15/2024). Overall upper extremity strength and range of motion improvement limited by acute cervical pain. Of note, therapist agreeable to message PA from plastic surgery for medication recommendations. Follow-up with patient after flap revision this Thursday (04/18/2024).     aLura Is progressing well towards her goals.   Patient prognosis is Good.     Patient will continue to benefit from skilled outpatient physical therapy to address the deficits listed in the problem list box on initial evaluation, provide patient / family education, and to maximize patient's level of independence in the home and community environment.     Patient's spiritual, cultural, and educational needs considered, and patient agreeable to plan of care and goals.     Anticipated barriers to physical therapy: none anticipated    GOALS:   Short Term Goals: 4 Weeks  Patient will demonstrate 100% understanding of lymphedema risk reduction practices, including self-monitoring  for lymphedema (progressing, not met).  Patient will demonstrate independence with established home exercise program for improved strength, functional mobility, range of motion, posture, and endurance (progressing, not met).   Patient will increase LEFT shoulder FLEXION active range of motion to 160 degrees for improved independence with functional reaching, carrying, pushing, and pulling tasks (progressing, not met).   Patient will increase LEFT shoulder ABDUCTION active range of motion to 130 degrees for improved independence with functional reaching, carrying, pushing, and pulling tasks (progressing, not met).   Patient will increase gross BILATERAL upper extremity musculature to >4/5 for improved independence with functional reaching, carrying, pushing, and pulling activities of daily living (progressing, not met).      Long Term Goals: 8 Weeks   Patient will be independent with updated home exercise program for improved functional mobility, posture, strength, and endurance (progressing, not met).  Patient will increase LEFT shoulder FLEXION active range of motion to 180 degrees for improved independence with functional reaching, carrying, pushing, and pulling tasks (progressing, not met).   Patient will increase LEFT shoulder ABDUCTION active range of motion to 180 degrees for improved independence with functional reaching, carrying, pushing, and pulling tasks (progressing, not met).   Patient will increase gross BILATERAL upper extremity musculature to 5/5 for improved independence with functional reaching, carrying, pushing, and pulling activities of daily living (progressing, not met).   Patient will report decrease in overall worst pain to 2/10 at discharge for improved tolerance to functional reaching, carrying, pushing, and pulling activities of daily living (progressing, not met).  Patient will report compliance with walking program 5x/week for 10 minutes / day to improve overall cardiovascular function  and decrease cancer-related fatigue at discharge (progressing, not met).      Plan     Plan of Care Certification: 03/15/2024 to 05/10/2024.     Outpatient Physical Therapy 2 times weekly for 8 weeks to include the following interventions: Gait Training, Manual Therapy, Neuromuscular Re-ed, Patient Education, Self Care, Therapeutic Activities, Therapeutic Exercise, and IASTM.     Ghada Brooks, PT

## 2024-04-16 ENCOUNTER — CLINICAL SUPPORT (OUTPATIENT)
Dept: REHABILITATION | Facility: HOSPITAL | Age: 39
End: 2024-04-16
Payer: MEDICAID

## 2024-04-16 DIAGNOSIS — M79.89 LEFT ARM SWELLING: Primary | ICD-10-CM

## 2024-04-16 PROCEDURE — 97110 THERAPEUTIC EXERCISES: CPT

## 2024-04-16 NOTE — PROGRESS NOTES
Physical Therapy Daily Treatment Note       Date: 4/16/2024   Name: Laura Kent  Clinic Number: [id number]    Therapy Diagnosis:   Encounter Diagnosis   Name Primary?    Left arm swelling Yes     Physician: Angie Peck PA-C    Physician Orders: PT Eval and Treat -Lymphedema  Medical Diagnosis from Referral: Malignant neoplasm of upper-outer quadrant of left breast in female, estrogen receptor negative   Evaluation Date: 4/5/2024  Authorization Period Expiration: 5/18/24  Plan of Care Expiration: 6/14/24 (10 weeks)  Progress Note Due: 5/3/24  Visit # / Visits authorized: 3 for this plan of care pt has used 9/12 PT visits  FOTO: 1/3    Precautions: Standard and cancer     Time In: 8:45 AM   Time Out: 9:33AM  Total Appointment Time (timed & untimed codes): 48 minutes      Subjective     Pt reports: Her neck feels a little better today. She has been compliant with self MLD. She has been intermittent with use of the tubular bandage. Denies pain in arm. Endorse improving neck pain after move over the weekend  She was not not issued a home exercise program.  Response to previous treatment: no adverse reaction  Pain Scale: Laura rates pain on a scale of 5/10 on VAS.   Pain location: Neck    Fatigue: reports some fatigue  Functional change: less tight in her arm    Treatment:   Chemotherapy: completed neoadjuvantly 11/22  Radiation: completed 25 fx (04/27/2023 - 05/24/2023)  Endocrine Therapy: N/A    Cancer-Related Surgery and Date:   - 12/11/2023: BILATERAL KANDICE flap reconstruction with LEFT TE removal per Myra Barrios and [place] and RIGHT mastectomy per Dr. Daley     - 02/27/2023: LEFT mastectomy with SLNB per Dr. Weiner     *pt has pending revision surgery scheduled for 4/18/24    Objective     Objective Measures updated at progress report unless specified.       LANDMARK RIGHT UE LEFT UE DIFF Left UE Diff   Date 4/5/24 4/5/24 4/5/24 4/16/24 4/16/24   E  "+ 8" 41.5 cm 43 cm 1.5 cm 42 cm -1   E + 6" 40 cm 41.5 cm 1.5 cm 41 cm -0.5   E + 4" 39 cm 40.5 cm 1.5 cm 40.5 cm No chg   E + 2" 36 cm 37.5 cm 1.5 cm 37.5 cm No chg   Elbow 32 cm 32 cm 0 cm 31.5 cm -0.5   W+ 8" 33 cm 34 cm 1 cm 34 cm No chg   W +  6" 30 cm 30.5 cm 0.5 cm 31 cm +0.5   W + 4" 25 cm 26 cm 1 cm 27 cm +1   Wrist 18 cm 19 cm 1 cm 19 cm No chg   DPC 22 cm 22 cm 0 cm 22 cm No chg   IP Thumb 7.5 cm 7.5  cm 0 cm 7.5 cm No chg       Treatment         Laura received the following manual therapy techniques: Manual Lymphatic Drainage were applied to the: left arm and trunk  for 40 minutes.     Measurements taken above    Supine:  Short Neck   Shoulder Collectors  Diaphragmatic breathing with effleurage x 3  Clear healthy Lymph Nodes: Right Axillary   Open anastomoses: Anterior Axillo-Axillary (AAA)- 3 steps  Clear healthy lymph nodes: Left Inguinal (involved side)  Open anastomoses: Axillo-Inguinal (AI)- 3 steps  Rework: AAA  Side-lying (involved side on top):  Rework: AI and shoulder collectors  Open Posterior Axillo-Axillary (JARAD) anastomoses- 3 steps   Activate bulk flow:   Effleurage  Hand washing  Lateral arm  Rework: JARAD, AI, shoulder collectors               Supine:  Rework AAA, axillary lymph nodes, AI, inguinal lymph nodes, and lateral arm  Diaphragmatic breathing with effleurage x 3    Final effleurage    Pt dons her tubigrip, which appears to fit well.      Pt participates in self-care/home management for 8 minutes:    PT reviews that pt can continue with MLD and wear the tubigrip during the day and remove at night leading up to her surgery date. After surgery, she should discontinue until cleared by plastic surgeon to resume. We will also request a new therapy referral for lymphedema following surgery. Pt verbalizes understanding of all topics.       Home Exercise Program and Patient Education   Education provided re:  - role of PT in multi - disciplinary team, goals for PT  - progress towards goals "   - plan of care, self MLD    Written Home Exercises Provided: Patient instructed to cont prior HEP.  Exercises were reviewed and Laura was able to demonstrate them prior to the end of the session.  Laura demonstrated good  understanding of the education provided.     See EMR under Media for exercises provided 4/11/2024.    Pt has no cultural, educational or language barriers to learning provided.    Assessment     Patient is responding well to physical therapy. She states she feels like her arm is a little smaller. She demo's mix of reductions and increases in her left arm circumference. Though increases are likely due to the positioning of tubular bandage pt was using. She will discontinue after surgery and resume pending clearance from plastic surgeon.Pt will also need a new PT referral after surgery to resume lymphedema therapy. Pt has met goals as noted below. As pt is scheduled for surgery on 4/18/24, she will be discharged from PT at this time.      Anticipated barriers to physical therapy: upcoming surgery on 4/18/24    Goals as follows:  Short Term Goals (6 weeks)  1. Pt will demo decrease in girth in left upper extremity by >/= 1cm to allow for improved UE symmetry, clothing choice, ROM, and UE function. (Met partially, 4/16/24)  2. Patient will demonstrate 100% knowledge of lymphedema precautions and signs of infection to allow for reduced risk of lymphedema, reduced risk of infection, and/or exacerbation.  (met)  3. Patient will perform self lymph drainage techniques as appropriate/indicated to enhance lymphatic drainage and mobility.  ( met)  4. Pt will tolerate HEP for improved strength, functional mobility, ROM, posture, and endurance. (progressing, not met)           Long Term Goals: ( 10  weeks)  1. Patient to show decreased girth in left upper extremity by >/= 2cm to allow for improved UE symmetry, clothing choice, ROM, and UE function.  (progressing, not met)  2. Patient will show reduction in  density to mild or less with improved contour of limb to allow for improved cosmesis, improved lymphatic drainage, and functional mobility.  (progressing, not met)  3. Patient to chris/doff compression garment as appropriate with daily compliance to support lymphatic mobility and skin elasticity.  (progressing, not met)  4. Pt to show improved postural awareness and alignment for improved functional mobility.  (progressing, not met)  5. Pt to be independent and compliant with HEP to allow for improved ROM, reach and carry with functional endurance and strength.    (progressing, not met)     Plan   Discharge from PT    Therapist: Yumiko Suárez, PT  4/16/2024

## 2024-04-17 ENCOUNTER — TELEPHONE (OUTPATIENT)
Dept: PLASTIC SURGERY | Facility: CLINIC | Age: 39
End: 2024-04-17
Payer: MEDICAID

## 2024-04-17 ENCOUNTER — PATIENT MESSAGE (OUTPATIENT)
Dept: PLASTIC SURGERY | Facility: CLINIC | Age: 39
End: 2024-04-17
Payer: MEDICAID

## 2024-04-17 ENCOUNTER — ANESTHESIA EVENT (OUTPATIENT)
Dept: SURGERY | Facility: HOSPITAL | Age: 39
End: 2024-04-17
Payer: MEDICAID

## 2024-04-17 NOTE — ANESTHESIA PREPROCEDURE EVALUATION
[facility]  Anesthesia Pre-Operative Evaluation         Patient Name: Laura Kent  YOB: 1985  MRN: [id number]    SUBJECTIVE:     Pre-operative evaluation for Procedure(s) (LRB):  REVISION, FLAP, PREVIOUSLY CREATED FOR BREAST RECONSTRUCTION (Bilateral)  LIPOSUCTION, WITH FAT TRANSFER (Bilateral)     04/17/2024    Laura Kent is a 38 y.o. female w/ a significant PMHx of HTN, obesity, and breast cancer.    Patient now presents for the above procedure(s).    TTE: 4/3/23  The left ventricle is normal in size with normal systolic function. The estimated ejection fraction is 60%.  The quantitatively derived ejection fraction is 54%.  The left ventricular global longitudinal strain is -17.7%  Normal right ventricular size with normal right ventricular systolic function.  Normal left ventricular diastolic function.  Mild left atrial enlargement.  Normal central venous pressure (3 mmHg).    LDA:        Closed/Suction Drain 03/22/24 0933 Tube - 1 Left;Superior Breast 8 Fr. (Active)   Site Description Unable to view 03/22/24 1033   Dressing Type Transparent (Tegaderm);Central line dressing 03/22/24 1033   Dressing Status Clean;Dry;Intact 03/22/24 1033   Number of days: 26       Prev airway:  12/11/23  Performed by: Kumar Umanzor MD  Authorized by: Kumar Umanzor MD    Intubation:     Induction:  Intravenous    Intubated:  Postinduction    Mask Ventilation:  Easy mask    Attempts:  1    Attempted By:  Staff anesthesiologist    Method of Intubation:  Direct    Blade:  Patten 2    Laryngeal View Grade: Grade I - full view of cords      Difficult Airway Encountered?: No      Complications:  None    Airway Device:  Oral endotracheal tube    Airway Device Size:  7.0    Style/Cuff Inflation:  Cuffed    Tube secured:  21    Secured at:  The lips    Placement Verified By:  Capnometry    Complicating Factors:  None    Findings Post-Intubation:  BS equal  bilateral    Drips: None documented.    Patient Active Problem List   Diagnosis    Malignant neoplasm of upper-outer quadrant of left breast in female, estrogen receptor negative    Chemotherapy induced neutropenia    Anemia in neoplastic disease    Primary hypertension    IUD (intrauterine device) in place    Severe obesity    Neoplasm related pain    Pancreatic insufficiency    Acute postoperative anemia due to expected blood loss    Decreased functional activity tolerance    Weakness of both upper extremities    Abnormal posture    At risk for lymphedema    Left arm swelling       Review of patient's allergies indicates:   Allergen Reactions    Strawberries [strawberry] Hives       Current Outpatient Medications:  No current facility-administered medications for this encounter.    Current Outpatient Medications:     capecitabine (XELODA) 150 MG tablet, Take 1 tablet (150 mg total) by mouth 2 (two) times daily for 14 days followed by 7 days off. Take with 1 other capecitabine prescription for 2,150 mg total., Disp: 28 tablet, Rfl: 2    capecitabine (XELODA) 150 MG tablet, Take 1 tablet (150 mg total) by mouth 2 (two) times daily for 14 days followed by 7 days off. Take with 1 other capecitabine prescription for 2,150 mg total., Disp: 28 tablet, Rfl: 2    capecitabine (XELODA) 500 MG Tab, Take 4 tablets (2,000 mg total) by mouth 2 (two) times daily for 14 days followed by 7 days off. Take with 1 other capecitabine prescription for 2,150 mg total., Disp: 112 tablet, Rfl: 2    capecitabine (XELODA) 500 MG Tab, Take 4 tablets (2,000 mg total) by mouth 2 (two) times daily for 14 days followed by 7 days off. Take with 1 other capecitabine prescription for 2,150 mg total., Disp: 112 tablet, Rfl: 2    cetirizine (ZYRTEC) 10 MG tablet, Take 10 mg by mouth once daily., Disp: , Rfl:     clotrimazole (LOTRIMIN) 1 % cream, Apply 1 application topically every evening., Disp: , Rfl:     gabapentin (NEURONTIN) 300 MG capsule,  Take 1 capsule (300 mg total) by mouth 3 (three) times daily. for 7 days, Disp: 21 capsule, Rfl: 0    ketoconazole (NIZORAL) 2 % shampoo, Apply topically Every 3 (three) days., Disp: , Rfl:     metFORMIN (GLUCOPHAGE-XR) 750 MG ER 24hr tablet, Take 1 tablet (750 mg total) by mouth daily with breakfast., Disp: 90 tablet, Rfl: 1    mupirocin (BACTROBAN) 2 % ointment, Apply topically once daily., Disp: 30 g, Rfl: 0    ondansetron (ZOFRAN) 8 MG tablet, Take 1 tablet (8 mg total) by mouth every 12 (twelve) hours as needed for Nausea., Disp: 30 tablet, Rfl: 2    pantoprazole (PROTONIX) 40 MG tablet, Take 1 tablet (40 mg total) by mouth once daily., Disp: 30 tablet, Rfl: 1    Facility-Administered Medications Ordered in Other Encounters:     ceFAZolin 1 g, gentamicin 80 mg in sodium chloride 0.9% 500 mL irrigation, , Irrigation, On Call Procedure, YASMANI Weiner MD    Past Surgical History:   Procedure Laterality Date    AXILLARY NODE DISSECTION Left 2023    Procedure: LYMPHADENECTOMY, AXILLARY;  Surgeon: YASMANI Weiner MD;  Location: [facility];  Service: General;  Laterality: Left;    BREAST BIOPSY Left      SECTION      COLONOSCOPY N/A 2022    Procedure: COLONOSCOPY;  Surgeon: Davi Chery MD;  Location: [address];  Service: Endoscopy;  Laterality: N/A;  case request entered from referral - Per DR. KATHLEEN Chery Pt to be scheduled for urgent colonoscopy on 22/ fully vaccinated / prep ins for Sutab on portal - ERW  see micro for negative C-diff- ERW    INJECTION FOR SENTINEL NODE IDENTIFICATION Left 2023    Procedure: INJECTION, FOR SENTINEL NODE IDENTIFICATION;  Surgeon: YASMANI Weiner MD;  Location: [facility];  Service: General;  Laterality: Left;    INSERTION OF BREAST TISSUE EXPANDER Left 2023    Procedure: INSERTION, TISSUE EXPANDER, BREAST;  Surgeon: Reese Barrios DO;  Location: [facility];  Service: Plastics;  Laterality: Left;    INSERTION OF TUNNELED CENTRAL VENOUS  CATHETER (CVC) WITH SUBCUTANEOUS PORT N/A 4/5/2022    Procedure: YREARIVKE-[id number];  Surgeon: Deo Sin Jr., MD;  [facility];  Service: General;  Laterality: N/A;    INSERTION OF TUNNELED CENTRAL VENOUS CATHETER (CVC) WITH SUBCUTANEOUS PORT N/A 8/1/2022    Procedure: INSERTION, SINGLE LUMEN CATHETER WITH PORT, WITH FLUOROSCOPIC GUIDANCE;  Surgeon: Ryder Dinero MD;  Location: [facility] CATH LAB;  Service: Vascular;  Laterality: N/A;    INTRAVENOUS INJECTION N/A 12/11/2023    Procedure: spy;  Surgeon: Reese Barrios DO;  [facility];  Service: Plastics;  Laterality: N/A;    MASTECTOMY Left 2/27/2023    Procedure: MASTECTOMY;  Surgeon: YASMANI Weiner MD;  Location: [facility];  Service: General;  Laterality: Left;  3.5 TOTAL HOURS / EMAIL SENT 2-23 @ 7:56 CCC    MASTECTOMY Right 12/11/2023    Procedure: MASTECTOMY;  Surgeon: Eldon Daley MD;  [facility];  Service: General;  Laterality: Right;    RECONSTRUCTION OF BREAST WITH DEEP INFERIOR EPIGASTRIC ARTERY  (KANDICE) FREE FLAP Bilateral 12/11/2023    Procedure: RECONSTRUCTION, BREAST, USING KANDICE FREE FLAP;  Surgeon: Reese Barrios DO;  [facility];  Service: Plastics;  Laterality: Bilateral;  Bilateral KANDICE flap - cosurgeon is Dr Cross -    REVISION OF SCAR Right 12/11/2023    Procedure: REVISION, SCAR;  Surgeon: Reese Barrios DO;  [facility];  Service: Plastics;  Laterality: Right;    SENTINEL LYMPH NODE BIOPSY Left 2/27/2023    Procedure: BIOPSY, LYMPH NODE, SENTINEL;  Surgeon: YASMANI Weiner MD;  Location: [facility];  Service: General;  Laterality: Left;  LEFT with radiological marker    TISSUE EXPANDER REMOVAL Left 12/11/2023    Procedure: REMOVAL, TISSUE EXPANDER;  Surgeon: Reese Barrios DO;  [facility];  Service: Plastics;  Laterality: Left;  removal left tissu expander       Social History     Socioeconomic History     Marital status: Single    Number of children: 3   Tobacco Use    Smoking status: Never    Smokeless tobacco: Never   Substance and Sexual Activity    Alcohol use: Not Currently     Comment: social    Drug use: Yes     Types: Marijuana    Sexual activity: Not Currently   Social History Narrative    15-20 steps     Social Determinants of Health     Financial Resource Strain: Patient Declined (12/12/2023)    Overall Financial Resource Strain (CARDIA)     Difficulty of Paying Living Expenses: Patient declined   Recent Concern: Financial Resource Strain - Medium Risk (11/23/2023)    Overall Financial Resource Strain (CARDIA)     Difficulty of Paying Living Expenses: Somewhat hard   Food Insecurity: Patient Declined (12/12/2023)    Hunger Vital Sign     Worried About Running Out of Food in the Last Year: Patient declined     Ran Out of Food in the Last Year: Patient declined   Recent Concern: Food Insecurity - Food Insecurity Present (11/23/2023)    Hunger Vital Sign     Worried About Running Out of Food in the Last Year: Never true     Ran Out of Food in the Last Year: Sometimes true   Transportation Needs: Patient Declined (12/12/2023)    PRAPARE - Transportation     Lack of Transportation (Medical): Patient declined     Lack of Transportation (Non-Medical): Patient declined   Physical Activity: Patient Declined (12/12/2023)    Exercise Vital Sign     Days of Exercise per Week: Patient declined     Minutes of Exercise per Session: Patient declined   Stress: Patient Declined (12/12/2023)    [employer] - Occupational Stress Questionnaire     Feeling of Stress : Patient declined   Social Connections: Patient Declined (12/12/2023)    Social Connection and Isolation Panel [NHANES]     Frequency of Communication with Friends and Family: Patient declined     Frequency of Social Gatherings with Friends and Family: Patient declined     Attends Sabianist Services: Patient declined     Active Member  of Clubs or Organizations: Patient declined     Attends Club or Organization Meetings: Patient declined     Marital Status: Patient declined   Housing Stability: Unknown (12/12/2023)    Housing Stability Vital Sign     Unable to Pay for Housing in the Last Year: Patient refused     Number of Places Lived in the Last Year: 2     Unstable Housing in the Last Year: Patient refused   Recent Concern: Housing Stability - High Risk (11/23/2023)    Housing Stability Vital Sign     Unable to Pay for Housing in the Last Year: Yes     Number of Places Lived in the Last Year: 2     Unstable Housing in the Last Year: No       OBJECTIVE:     Vital Signs Range (Last 24H):         Significant Labs:  Lab Results   Component Value Date    WBC 3.22 (L) 04/03/2024    HGB 9.8 (L) 04/03/2024    HCT 31.5 (L) 04/03/2024     04/03/2024    CHOL 162 02/06/2023    TRIG 103 02/06/2023    HDL 32 (L) 02/06/2023    ALT 12 04/03/2024    AST 17 04/03/2024     04/03/2024     04/03/2024    K 4.0 04/03/2024    K 4.0 04/03/2024     04/03/2024     04/03/2024    CREATININE 0.8 04/03/2024    CREATININE 0.8 04/03/2024    BUN 17 04/03/2024    BUN 17 04/03/2024    CO2 24 04/03/2024    CO2 24 04/03/2024    TSH 0.707 10/26/2023    HGBA1C 4.8 10/26/2023       Diagnostic Studies: No relevant studies.    EKG:   Results for orders placed or performed during the hospital encounter of 04/03/24   EKG 12-lead    Collection Time: 04/03/24 10:04 AM   Result Value Ref Range    QRS Duration 76 ms    [facility] QTC Calculation 392 ms    Narrative    Test Reason : Z01.818,    Vent. Rate : 061 BPM     Atrial Rate : 061 BPM     P-R Int : 210 ms          QRS Dur : 076 ms      QT Int : 390 ms       P-R-T Axes : 039 -03 015 degrees     QTc Int : 392 ms    Sinus rhythm with 1st degree A-V block  Otherwise normal ECG  When compared with ECG of 12-DEC-2023 17:11,  OH interval has increased  Vent. rate has decreased BY  50 BPM  Nonspecific T wave abnormality  "no longer evident in Lateral leads  Confirmed by MARIANNE HUERTAS MD ([id number]) on 4/3/2024 11:13:38 AM    Referred By: AUDELIA OSHEA           Confirmed By:MARIANNE HUERTAS MD       2D ECHO:  TTE:  Results for orders placed or performed during the hospital encounter of 04/03/23   Echo   Result Value Ref Range    BSA 2.12 m2    TDI SEPTAL 0.09 m/s    LV LATERAL E/E' RATIO 6.70 m/s    LV SEPTAL E/E' RATIO 7.44 m/s    LA WIDTH 4.66 cm    TDI LATERAL 0.10 m/s    LVIDd 4.74 3.5 - 6.0 cm    IVS 0.99 0.6 - 1.1 cm    Posterior Wall 0.93 0.6 - 1.1 cm    LVIDs 3.06 2.1 - 4.0 cm    FS 35 28 - 44 %    LA volume 74.76 cm3    Sinus 3.58 cm    STJ 2.89 cm    Ascending aorta 3.00 cm    LV mass 157.77 g    LA size 3.63 cm    RVDD 3.22 cm    TAPSE 2.12 cm    RV S' 11.34 cm/s    Left Ventricle Relative Wall Thickness 0.39 cm    AV mean gradient 3 mmHg    AV valve area 2.72 cm2    AV Velocity Ratio 0.78     AV index (prosthetic) 0.72     MV valve area p 1/2 method 3.47 cm2    E/A ratio 1.43     Mean e' 0.10 m/s    E wave deceleration time 218.47 msec    IVRT 85.63 msec    MV "A" wave duration 17.98 msec    Pulm vein S/D ratio 0.80     LVOT diameter 2.20 cm    LVOT area 3.8 cm2    LVOT peak dante 0.98 m/s    LVOT peak VTI 17.64 cm    Ao peak dante 1.26 m/s    Ao VTI 24.66 cm    LVOT stroke volume 67.02 cm3    AV peak gradient 6 mmHg    E/E' ratio 7.05 m/s    MV Peak E Dante 0.67 m/s    MV stenosis pressure 1/2 time 63.36 ms    MV Peak A Dante 0.47 m/s    PV Peak S Dante 0.35 m/s    PV Peak D Dante 0.44 m/s    LV Systolic Volume 36.74 mL    LV Systolic Volume Index 18.0 mL/m2    LV Diastolic Volume 104.53 mL    LV Diastolic Volume Index 51.24 mL/m2    LA Volume Index 36.6 mL/m2    LV Mass Index 77 g/m2    RA Major Axis 4.48 cm    Left Atrium Minor Axis 5.16 cm    Left Atrium Major Axis 5.24 cm    LA Volume Index (Mod) 37.1 mL/m2    LA volume (mod) 75.75 cm3    RA Width 3.32 cm    Right Atrial Pressure (from IVC) 3 mmHg    QEF 54 %    EF 60 %    " Narrative    · The left ventricle is normal in size with normal systolic function. The   estimated ejection fraction is 60%.  · The quantitatively derived ejection fraction is 54%.  · The left ventricular global longitudinal strain is -17.7%  · Normal right ventricular size with normal right ventricular systolic   function.  · Normal left ventricular diastolic function.  · Mild left atrial enlargement.  · Normal central venous pressure (3 mmHg).          CHLOE:  No results found. However, due to the size of the patient record, not all encounters were searched. Please check Results Review for a complete set of results.    ASSESSMENT/PLAN:                                                                                                                  04/17/2024  Laura Kent is a 38 y.o., female.      Pre-op Assessment    I have reviewed the Patient Summary Reports.     I have reviewed the Nursing Notes. I have reviewed the NPO Status.   I have reviewed the Medications.     Review of Systems  Anesthesia Hx:  No problems with previous Anesthesia   History of prior surgery of interest to airway management or planning:          Denies Family Hx of Anesthesia complications.    Denies Personal Hx of Anesthesia complications.                    Hematology/Oncology:                        --  Cancer in past history:                     Cardiovascular:     Hypertension   Denies MI.  Denies CAD.        Denies CHF.                                 Hepatic/GI:   Denies PUD.   Denies GERD.             Endocrine:        Obesity / BMI > 30      Physical Exam  General: Well nourished, Alert, Cooperative and Oriented    Airway:  Mallampati: III / II  Mouth Opening: Normal  TM Distance: Normal  Tongue: Large  Neck ROM: Normal ROM    Dental:  Intact    Chest/Lungs:  Clear to auscultation, Normal Respiratory Rate    Heart:  Rate: Normal  Rhythm: Regular Rhythm        Anesthesia Plan  Type of Anesthesia, risks & benefits  discussed:    Anesthesia Type: Gen ETT  Intra-op Monitoring Plan: Standard ASA Monitors  Post Op Pain Control Plan: multimodal analgesia and IV/PO Opioids PRN  Induction:  IV  Airway Plan: Direct, Post-Induction  Informed Consent: Informed consent signed with the Patient and all parties understand the risks and agree with anesthesia plan.  All questions answered.   ASA Score: 3  Day of Surgery Review of History & Physical: H&P Update referred to the surgeon/provider.    Ready For Surgery From Anesthesia Perspective.     .

## 2024-04-17 NOTE — TELEPHONE ENCOUNTER
Pre op call complete. Advised pt of arrival time for surgery, pt advised to arrive location [facility] [place] [place], second floor [facility], at 0500 for 0700 surgery, NPO status reinforced No Solids after 9 PM; can drink Gatorade /Powerade or water until leave the home  - ride and care giver arranged and verified.Pre op education reinforced. Pt Verbalized understanding, all questions and concerns addressed at this time.

## 2024-04-18 ENCOUNTER — HOSPITAL ENCOUNTER (OUTPATIENT)
Facility: HOSPITAL | Age: 39
Discharge: HOME | End: 2024-04-18
Attending: SURGERY | Admitting: SURGERY
Payer: MEDICAID

## 2024-04-18 ENCOUNTER — ANESTHESIA (OUTPATIENT)
Dept: SURGERY | Facility: HOSPITAL | Age: 39
End: 2024-04-18
Payer: MEDICAID

## 2024-04-18 VITALS
TEMPERATURE: 98 F | HEART RATE: 70 BPM | OXYGEN SATURATION: 98 % | RESPIRATION RATE: 22 BRPM | DIASTOLIC BLOOD PRESSURE: 69 MMHG | SYSTOLIC BLOOD PRESSURE: 126 MMHG

## 2024-04-18 DIAGNOSIS — Z98.890 HISTORY OF BREAST RECONSTRUCTION: Primary | ICD-10-CM

## 2024-04-18 DIAGNOSIS — Z01.818 PREOP TESTING: ICD-10-CM

## 2024-04-18 PROCEDURE — 71000015 HC POSTOP RECOV 1ST HR: Performed by: SURGERY

## 2024-04-18 PROCEDURE — 63600175 PHARM REV CODE 636 W HCPCS

## 2024-04-18 PROCEDURE — 19380 REVJ RECONSTRUCTED BREAST: CPT | Mod: RT,,, | Performed by: SURGERY

## 2024-04-18 PROCEDURE — 37000008 HC ANESTHESIA 1ST 15 MINUTES: Performed by: SURGERY

## 2024-04-18 PROCEDURE — 71000016 HC POSTOP RECOV ADDL HR: Performed by: SURGERY

## 2024-04-18 PROCEDURE — 15772 GRFG AUTOL FAT LIPO EA ADDL: CPT | Mod: ,,, | Performed by: SURGERY

## 2024-04-18 PROCEDURE — 15771 GRFG AUTOL FAT LIPO 50 CC/<: CPT | Mod: ,,, | Performed by: SURGERY

## 2024-04-18 PROCEDURE — 25000003 PHARM REV CODE 250: Performed by: PHYSICIAN ASSISTANT

## 2024-04-18 PROCEDURE — 25000003 PHARM REV CODE 250

## 2024-04-18 PROCEDURE — 36000707: Performed by: SURGERY

## 2024-04-18 PROCEDURE — 63600175 PHARM REV CODE 636 W HCPCS: Performed by: SURGERY

## 2024-04-18 PROCEDURE — 36000706: Performed by: SURGERY

## 2024-04-18 PROCEDURE — 27201423 OPTIME MED/SURG SUP & DEVICES STERILE SUPPLY: Performed by: SURGERY

## 2024-04-18 PROCEDURE — 25000003 PHARM REV CODE 250: Performed by: SURGERY

## 2024-04-18 PROCEDURE — D9220A PRA ANESTHESIA: Mod: ,,, | Performed by: ANESTHESIOLOGY

## 2024-04-18 PROCEDURE — 37000009 HC ANESTHESIA EA ADD 15 MINS: Performed by: SURGERY

## 2024-04-18 PROCEDURE — 71000044 HC DOSC ROUTINE RECOVERY FIRST HOUR: Performed by: SURGERY

## 2024-04-18 PROCEDURE — 11406 EXC TR-EXT B9+MARG >4.0 CM: CPT | Mod: ,,, | Performed by: SURGERY

## 2024-04-18 PROCEDURE — 12036 INTMD RPR S/A/T/EXT 20.1-30: CPT | Mod: 59,,, | Performed by: SURGERY

## 2024-04-18 RX ORDER — ROCURONIUM BROMIDE 10 MG/ML
INJECTION, SOLUTION INTRAVENOUS
Status: DISCONTINUED | OUTPATIENT
Start: 2024-04-18 | End: 2024-04-18

## 2024-04-18 RX ORDER — KETAMINE HCL IN 0.9 % NACL 50 MG/5 ML
SYRINGE (ML) INTRAVENOUS
Status: DISCONTINUED | OUTPATIENT
Start: 2024-04-18 | End: 2024-04-18

## 2024-04-18 RX ORDER — SODIUM CHLORIDE 0.9 % (FLUSH) 0.9 %
10 SYRINGE (ML) INJECTION
Status: ACTIVE | OUTPATIENT
Start: 2024-04-18

## 2024-04-18 RX ORDER — ONDANSETRON HYDROCHLORIDE 2 MG/ML
INJECTION, SOLUTION INTRAVENOUS
Status: DISCONTINUED | OUTPATIENT
Start: 2024-04-18 | End: 2024-04-18

## 2024-04-18 RX ORDER — HALOPERIDOL 5 MG/ML
0.5 INJECTION INTRAMUSCULAR EVERY 10 MIN PRN
Status: DISCONTINUED | OUTPATIENT
Start: 2024-04-18 | End: 2024-04-18 | Stop reason: HOSPADM

## 2024-04-18 RX ORDER — LIDOCAINE HYDROCHLORIDE 20 MG/ML
INJECTION, SOLUTION EPIDURAL; INFILTRATION; INTRACAUDAL; PERINEURAL
Status: DISCONTINUED | OUTPATIENT
Start: 2024-04-18 | End: 2024-04-18

## 2024-04-18 RX ORDER — GABAPENTIN 300 MG/1
300 CAPSULE ORAL 3 TIMES DAILY
Qty: 90 CAPSULE | Refills: 11 | Status: SHIPPED | OUTPATIENT
Start: 2024-04-18 | End: 2025-04-18

## 2024-04-18 RX ORDER — HYDROCODONE BITARTRATE AND ACETAMINOPHEN 5; 325 MG/1; MG/1
1 TABLET ORAL EVERY 4 HOURS PRN
Status: DISCONTINUED | OUTPATIENT
Start: 2024-04-18 | End: 2024-04-18 | Stop reason: HOSPADM

## 2024-04-18 RX ORDER — ACETAMINOPHEN 500 MG
1000 TABLET ORAL ONCE
Status: DISCONTINUED | OUTPATIENT
Start: 2024-04-18 | End: 2024-04-18 | Stop reason: HOSPADM

## 2024-04-18 RX ORDER — DEXMEDETOMIDINE HYDROCHLORIDE 100 UG/ML
INJECTION, SOLUTION INTRAVENOUS
Status: DISCONTINUED | OUTPATIENT
Start: 2024-04-18 | End: 2024-04-18

## 2024-04-18 RX ORDER — PROPOFOL 10 MG/ML
VIAL (ML) INTRAVENOUS
Status: DISCONTINUED | OUTPATIENT
Start: 2024-04-18 | End: 2024-04-18

## 2024-04-18 RX ORDER — MIDAZOLAM HYDROCHLORIDE 1 MG/ML
INJECTION INTRAMUSCULAR; INTRAVENOUS
Status: DISCONTINUED | OUTPATIENT
Start: 2024-04-18 | End: 2024-04-18

## 2024-04-18 RX ORDER — GABAPENTIN 300 MG/1
300 CAPSULE ORAL 3 TIMES DAILY
Qty: 21 CAPSULE | Refills: 0 | Status: SHIPPED | OUTPATIENT
Start: 2024-04-18 | End: 2024-04-25

## 2024-04-18 RX ORDER — TRANEXAMIC ACID 100 MG/ML
INJECTION, SOLUTION INTRAVENOUS
Status: DISCONTINUED | OUTPATIENT
Start: 2024-04-18 | End: 2024-04-18 | Stop reason: HOSPADM

## 2024-04-18 RX ORDER — FENTANYL CITRATE 50 UG/ML
INJECTION, SOLUTION INTRAMUSCULAR; INTRAVENOUS
Status: DISCONTINUED | OUTPATIENT
Start: 2024-04-18 | End: 2024-04-18

## 2024-04-18 RX ORDER — IBUPROFEN 600 MG/1
600 TABLET ORAL EVERY 8 HOURS PRN
Qty: 15 TABLET | Refills: 0 | Status: SHIPPED | OUTPATIENT
Start: 2024-04-18

## 2024-04-18 RX ORDER — ACETAMINOPHEN 500 MG
1000 TABLET ORAL EVERY 8 HOURS
Qty: 24 TABLET | Refills: 0 | Status: SHIPPED | OUTPATIENT
Start: 2024-04-18 | End: 2024-04-22

## 2024-04-18 RX ORDER — ACETAMINOPHEN 10 MG/ML
INJECTION, SOLUTION INTRAVENOUS
Status: DISCONTINUED | OUTPATIENT
Start: 2024-04-18 | End: 2024-04-18

## 2024-04-18 RX ORDER — TRIAMCINOLONE ACETONIDE 40 MG/ML
INJECTION, SUSPENSION INTRA-ARTICULAR; INTRAMUSCULAR
Status: DISCONTINUED | OUTPATIENT
Start: 2024-04-18 | End: 2024-04-18 | Stop reason: HOSPADM

## 2024-04-18 RX ORDER — LIDOCAINE HYDROCHLORIDE 10 MG/ML
INJECTION, SOLUTION EPIDURAL; INFILTRATION; INTRACAUDAL; PERINEURAL
Status: DISCONTINUED | OUTPATIENT
Start: 2024-04-18 | End: 2024-04-18 | Stop reason: HOSPADM

## 2024-04-18 RX ORDER — OXYCODONE HYDROCHLORIDE 5 MG/1
5 TABLET ORAL EVERY 6 HOURS PRN
Qty: 10 TABLET | Refills: 0 | Status: SHIPPED | OUTPATIENT
Start: 2024-04-18

## 2024-04-18 RX ORDER — EPINEPHRINE 1 MG/ML
INJECTION INTRAMUSCULAR; INTRAVENOUS; SUBCUTANEOUS
Status: DISCONTINUED | OUTPATIENT
Start: 2024-04-18 | End: 2024-04-18 | Stop reason: HOSPADM

## 2024-04-18 RX ORDER — MUPIROCIN 20 MG/G
OINTMENT TOPICAL 2 TIMES DAILY
Status: DISCONTINUED | OUTPATIENT
Start: 2024-04-18 | End: 2024-04-18 | Stop reason: HOSPADM

## 2024-04-18 RX ORDER — MUPIROCIN 20 MG/G
OINTMENT TOPICAL
Status: DISPENSED | OUTPATIENT
Start: 2024-04-18

## 2024-04-18 RX ORDER — CEPHALEXIN 500 MG/1
500 CAPSULE ORAL EVERY 8 HOURS
Qty: 21 CAPSULE | Refills: 0 | Status: SHIPPED | OUTPATIENT
Start: 2024-04-18 | End: 2024-04-25

## 2024-04-18 RX ORDER — CEFAZOLIN SODIUM 1 G/3ML
INJECTION, POWDER, FOR SOLUTION INTRAMUSCULAR; INTRAVENOUS
Status: DISCONTINUED | OUTPATIENT
Start: 2024-04-18 | End: 2024-04-18

## 2024-04-18 RX ORDER — HYDROMORPHONE HYDROCHLORIDE 1 MG/ML
0.2 INJECTION, SOLUTION INTRAMUSCULAR; INTRAVENOUS; SUBCUTANEOUS EVERY 5 MIN PRN
Status: DISCONTINUED | OUTPATIENT
Start: 2024-04-18 | End: 2024-04-18 | Stop reason: HOSPADM

## 2024-04-18 RX ORDER — SODIUM CHLORIDE 0.9 % (FLUSH) 0.9 %
10 SYRINGE (ML) INJECTION
Status: DISCONTINUED | OUTPATIENT
Start: 2024-04-18 | End: 2024-04-18 | Stop reason: HOSPADM

## 2024-04-18 RX ORDER — DEXAMETHASONE SODIUM PHOSPHATE 4 MG/ML
INJECTION, SOLUTION INTRA-ARTICULAR; INTRALESIONAL; INTRAMUSCULAR; INTRAVENOUS; SOFT TISSUE
Status: DISCONTINUED | OUTPATIENT
Start: 2024-04-18 | End: 2024-04-18

## 2024-04-18 RX ORDER — HYDROMORPHONE HYDROCHLORIDE 1 MG/ML
INJECTION, SOLUTION INTRAMUSCULAR; INTRAVENOUS; SUBCUTANEOUS
Status: DISCONTINUED | OUTPATIENT
Start: 2024-04-18 | End: 2024-04-18

## 2024-04-18 RX ORDER — LIDOCAINE HYDROCHLORIDE 10 MG/ML
1 INJECTION, SOLUTION EPIDURAL; INFILTRATION; INTRACAUDAL; PERINEURAL ONCE
Status: ACTIVE | OUTPATIENT
Start: 2024-04-18

## 2024-04-18 RX ADMIN — Medication 10 MG: at 12:04

## 2024-04-18 RX ADMIN — FENTANYL CITRATE 50 MCG: 50 INJECTION INTRAMUSCULAR; INTRAVENOUS at 11:04

## 2024-04-18 RX ADMIN — Medication 10 MG: at 01:04

## 2024-04-18 RX ADMIN — HYDROMORPHONE HYDROCHLORIDE 0.5 MG: 1 INJECTION, SOLUTION INTRAMUSCULAR; INTRAVENOUS; SUBCUTANEOUS at 02:04

## 2024-04-18 RX ADMIN — SUGAMMADEX 200 MG: 100 INJECTION, SOLUTION INTRAVENOUS at 01:04

## 2024-04-18 RX ADMIN — PROPOFOL 30 MG: 10 INJECTION, EMULSION INTRAVENOUS at 12:04

## 2024-04-18 RX ADMIN — ROCURONIUM BROMIDE 60 MG: 10 INJECTION, SOLUTION INTRAVENOUS at 11:04

## 2024-04-18 RX ADMIN — Medication 15 MG: at 11:04

## 2024-04-18 RX ADMIN — DEXMEDETOMIDINE 8 MCG: 200 INJECTION, SOLUTION INTRAVENOUS at 02:04

## 2024-04-18 RX ADMIN — FENTANYL CITRATE 100 MCG: 50 INJECTION INTRAMUSCULAR; INTRAVENOUS at 11:04

## 2024-04-18 RX ADMIN — ROCURONIUM BROMIDE 10 MG: 10 INJECTION, SOLUTION INTRAVENOUS at 12:04

## 2024-04-18 RX ADMIN — ACETAMINOPHEN 1000 MG: 10 INJECTION, SOLUTION INTRAVENOUS at 11:04

## 2024-04-18 RX ADMIN — MIDAZOLAM 2 MG: 1 INJECTION INTRAMUSCULAR; INTRAVENOUS at 11:04

## 2024-04-18 RX ADMIN — SODIUM CHLORIDE, SODIUM GLUCONATE, SODIUM ACETATE, POTASSIUM CHLORIDE, MAGNESIUM CHLORIDE, SODIUM PHOSPHATE, DIBASIC, AND POTASSIUM PHOSPHATE: .53; .5; .37; .037; .03; .012; .00082 INJECTION, SOLUTION INTRAVENOUS at 12:04

## 2024-04-18 RX ADMIN — DEXAMETHASONE SODIUM PHOSPHATE 4 MG: 4 INJECTION INTRA-ARTICULAR; INTRALESIONAL; INTRAMUSCULAR; INTRAVENOUS; SOFT TISSUE at 11:04

## 2024-04-18 RX ADMIN — CEFAZOLIN 2 G: 330 INJECTION, POWDER, FOR SOLUTION INTRAMUSCULAR; INTRAVENOUS at 11:04

## 2024-04-18 RX ADMIN — MUPIROCIN: 20 OINTMENT TOPICAL at 06:04

## 2024-04-18 RX ADMIN — ONDANSETRON 4 MG: 2 INJECTION INTRAMUSCULAR; INTRAVENOUS at 01:04

## 2024-04-18 RX ADMIN — PROPOFOL 170 MG: 10 INJECTION, EMULSION INTRAVENOUS at 11:04

## 2024-04-18 RX ADMIN — DEXMEDETOMIDINE 16 MCG: 200 INJECTION, SOLUTION INTRAVENOUS at 02:04

## 2024-04-18 RX ADMIN — FENTANYL CITRATE 50 MCG: 50 INJECTION INTRAMUSCULAR; INTRAVENOUS at 02:04

## 2024-04-18 RX ADMIN — ROCURONIUM BROMIDE 10 MG: 10 INJECTION, SOLUTION INTRAVENOUS at 01:04

## 2024-04-18 RX ADMIN — SODIUM CHLORIDE: 0.9 INJECTION, SOLUTION INTRAVENOUS at 11:04

## 2024-04-18 RX ADMIN — LIDOCAINE HYDROCHLORIDE 100 MG: 20 INJECTION, SOLUTION EPIDURAL; INFILTRATION; INTRACAUDAL at 11:04

## 2024-04-18 NOTE — TRANSFER OF CARE
Anesthesia Transfer of Care Note    Patient: Laura Kent    Procedure(s) Performed: Procedure(s) (LRB):  REVISION, FLAP, PREVIOUSLY CREATED FOR BREAST RECONSTRUCTION (Bilateral)  LIPOSUCTION, WITH FAT TRANSFER (Bilateral)  REVISION, SCAR (Right)    Patient location: PACU    Anesthesia Type: general    Transport from OR: Transported from OR on 6-10 L/min O2 by face mask with adequate spontaneous ventilation    Post pain: adequate analgesia    Post assessment: no apparent anesthetic complications    Post vital signs: stable    Level of consciousness: awake and alert    Nausea/Vomiting: no nausea/vomiting    Complications: none    Transfer of care protocol was followed      Last vitals: Visit Vitals  /73   Pulse 68   Temp 36.6 °C (97.9 °F) (Temporal)   Resp 12   LMP 04/02/2024 (Exact Date)   SpO2 100%   Breastfeeding No

## 2024-04-18 NOTE — BRIEF OP NOTE
[facility] - [facility] ([facility])  Brief Operative Note     SUMMARY     Surgery Date: 4/18/2024     Surgeons and Role:     * Reese Barrios, DO - Primary    Assistant: Eyal Arias MD, Fellow    Pre-op Diagnosis:  Malignant neoplasm of upper-outer quadrant of left breast in female, estrogen receptor negative [C50.412, Z17.1]    Post-op Diagnosis:  Post-Op Diagnosis Codes:     * Malignant neoplasm of upper-outer quadrant of left breast in female, estrogen receptor negative [C50.412, Z17.1]    Procedure(s) (LRB):  REVISION, FLAP, PREVIOUSLY CREATED FOR BREAST RECONSTRUCTION (Bilateral)  LIPOSUCTION, WITH FAT TRANSFER (Bilateral)  REVISION, SCAR (Right)    Anesthesia: General    Description of the findings of the procedure: revision of reconstructive breast (R), fat grafting to reconstructed breast (L), dog ear excision of the breast (L).    Findings/Key Components: See operative report    Estimated Blood Loss: * No values recorded between 4/18/2024 11:47 AM and 4/18/2024  2:32 PM *         Specimens:   Specimen (24h ago, onward)      None            Implants: * No implants in log *    Complications: None    Discharge Note    SUMMARY     Admit Date: 4/18/2024    Discharge Date and Time:  04/18/2024 2:34 PM    Hospital Course: Patient was placed in observation status for the above procedure.  See above.  Postoperatively was discharged home from the PACU once criteria was met.    Final Diagnosis: Post-Op Diagnosis Codes:     * Malignant neoplasm of upper-outer quadrant of left breast in female, estrogen receptor negative [C50.412, Z17.1]    Disposition: Home or Self Care    Follow Up/Patient Instructions:     Medications:  Reconciled Home Medications:      Medication List        START taking these medications      acetaminophen 500 MG tablet  Commonly known as: TYLENOL  Take 2 tablets (1,000 mg total) by mouth every 8 (eight) hours. for 4 days     cephALEXin 500 MG capsule  Commonly known as: KEFLEX  Take 1 capsule (500  mg total) by mouth every 8 (eight) hours. for 7 days     ibuprofen 600 MG tablet  Commonly known as: ADVIL,MOTRIN  Take 1 tablet (600 mg total) by mouth every 8 (eight) hours as needed for Pain.     oxyCODONE 5 MG immediate release tablet  Commonly known as: ROXICODONE  Take 1 tablet (5 mg total) by mouth every 6 (six) hours as needed for Pain.            CHANGE how you take these medications      * gabapentin 300 MG capsule  Commonly known as: NEURONTIN  Take 1 capsule (300 mg total) by mouth 3 (three) times daily.  What changed: You were already taking a medication with the same name, and this prescription was added. Make sure you understand how and when to take each.     * gabapentin 300 MG capsule  Commonly known as: NEURONTIN  Take 1 capsule (300 mg total) by mouth 3 (three) times daily.  What changed: You were already taking a medication with the same name, and this prescription was added. Make sure you understand how and when to take each.     * gabapentin 300 MG capsule  Commonly known as: NEURONTIN  Take 1 capsule (300 mg total) by mouth 3 (three) times daily. for 7 days  What changed: Another medication with the same name was added. Make sure you understand how and when to take each.           * This list has 3 medication(s) that are the same as other medications prescribed for you. Read the directions carefully, and ask your doctor or other care provider to review them with you.                CONTINUE taking these medications      * capecitabine 150 MG tablet  Commonly known as: XELODA  Take 1 tablet (150 mg total) by mouth 2 (two) times daily for 14 days followed by 7 days off. Take with 1 other capecitabine prescription for 2,150 mg total.     * capecitabine 500 MG Tab  Commonly known as: XELODA  Take 4 tablets (2,000 mg total) by mouth 2 (two) times daily for 14 days followed by 7 days off. Take with 1 other capecitabine prescription for 2,150 mg total.     * capecitabine 150 MG tablet  Commonly known  as: XELODA  Take 1 tablet (150 mg total) by mouth 2 (two) times daily for 14 days followed by 7 days off. Take with 1 other capecitabine prescription for 2,150 mg total.     * capecitabine 500 MG Tab  Commonly known as: XELODA  Take 4 tablets (2,000 mg total) by mouth 2 (two) times daily for 14 days followed by 7 days off. Take with 1 other capecitabine prescription for 2,150 mg total.     cetirizine 10 MG tablet  Commonly known as: ZYRTEC  Take 10 mg by mouth once daily.     clotrimazole 1 % cream  Commonly known as: LOTRIMIN  Apply 1 application topically every evening.     ketoconazole 2 % shampoo  Commonly known as: NIZORAL  Apply topically Every 3 (three) days.     metFORMIN 750 MG ER 24hr tablet  Commonly known as: GLUCOPHAGE-XR  Take 1 tablet (750 mg total) by mouth daily with breakfast.     mupirocin 2 % ointment  Commonly known as: BACTROBAN  Apply topically once daily.     ondansetron 8 MG tablet  Commonly known as: ZOFRAN  Take 1 tablet (8 mg total) by mouth every 12 (twelve) hours as needed for Nausea.     pantoprazole 40 MG tablet  Commonly known as: PROTONIX  Take 1 tablet (40 mg total) by mouth once daily.           * This list has 4 medication(s) that are the same as other medications prescribed for you. Read the directions carefully, and ask your doctor or other care provider to review them with you.                Discharge Procedure Orders   Basic Metabolic Panel   Standing Status: Future Number of Occurrences: 1 Standing Exp. Date: 05/21/25     CBC Without Differential   Standing Status: Future Number of Occurrences: 1 Standing Exp. Date: 05/21/25     Diet general     Lifting restrictions   Order Comments: No heavy lifting greater than 5 pounds     Call MD for:  redness, tenderness, or signs of infection (pain, swelling, redness, odor or green/yellow discharge around incision site)     Call MD for:  difficulty breathing, headache or visual disturbances     Call MD for:  severe uncontrolled pain      Leave dressing on - Keep it clean, dry, and intact until clinic visit   Order Comments: Patient can shower in 2 days, replace bra and binder after showr     EKG 12-lead   Standing Status: Future Number of Occurrences: 1 Standing Exp. Date: 03/22/25     Activity as tolerated

## 2024-04-18 NOTE — DISCHARGE INSTRUCTIONS
Plastic Surgery Discharge Instructions  Enoch Barrios DO    Wound Care  1. Shower daily beginning 2 days  after surgery  2. Okay to let warm soapy water run over the wound at that time  3. Do not submerge wounds in the bath  4. Leave any skin glue or mesh tape in place    Drain Care  If you have drains, strip tubing and record output when drain bulb becomes half full, or at least twice daily  Record output for each drain and bring to our follow up appointment    Diet  Heart healthy    Activity  Light activity only - no lifting greater than 10 lbs  Avoid strenuous activity that would cause you to get hot or sweaty    Medications  You have been given a prescription for antibiotics, narcotic pain medication, anti-inflammatory pain, muscle relaxer, and nerve pain  Unless otherwise contraindicated by your doctor, take 2 extra strength Tylenol and 600mg of ibuprofen every 8 hours  Please take medications as prescribed     What to call for:  1. Sustained fever > 101.0  2. Changes in color, sensation, temperature or pain at surgical site  3. Redness and/or drainage from the surgical site  4. Any reaction to prescribed medications  5. Continuous bloody drainage from surgical drains  6. Wound vac malfunction  7. Questions related to the procedure    Follow-up  1. Please call [phone] to schedule or confirm your follow up visit at the  at either [facility] or [facility]  2. Call with any questions or concerns.  2. After hours call [phone] - ask to speak with the Plastic Surgery fellow on call

## 2024-04-19 NOTE — OP NOTE
[facility] - Surgery ([facility])  Plastic Surgery  Operative Note    SUMMARY     Date of Procedure: 4/18/2024     Location:  [facility] [address]    Procedure(s):    Major revision of right reconstructed breast  Right chest scar revision 7 cm  Left lateral chest wall dog-ear excision with closure, 18 cm  Fat grafting to the left reconstructed breast, 190 cc    Surgeons and Role:     * Reese Barrios, DO - Primary    Assistant: Eyal Arias MD, Fellow    Pre-Operative Diagnosis: Malignant neoplasm of upper-outer quadrant of left breast in female, estrogen receptor negative [C50.412, Z17.1]    Post-Operative Diagnosis: same    Anesthesia: General    Indications for Procedure:  38-year-old woman with a history of breast cancer.  She initially underwent bilateral wise pattern skin sparing mastectomy with tissue expander placement followed by delayed KANDICE flap reconstruction after completion of her adjuvant therapies.  Also not 1 month ago she had a left in the necrosis has been.  She presents today for second-stage reconstruction.  The plan was to tighten up the skin envelope right side, fat grafting to left breast for symmetry revise her bilateral dog-ears excised hypertrophic scar    Operative Findings (including complications, if any):   1 L total tumescent solution used 750 cc total lipoaspirate  190 cc of fat was available for injection placed into the left breast   Port-A-Cath scar was revised   lateral dog ears were excised  Kenalog 40 was injected into hypertrophic breast umbilical port scars    Description of Procedures:  Patient was seen in the preoperative holding area.  Surgical sites were marked.  We discussed the plan.  Consents were signed a preoperative visit.  All questions were answered.    The patient was taken to the operating room.  General anesthesia was induced.  Both breasts and the abdomen were prepped and draped in sterile fashion.  We began by making 4 small stab incisions to each along  the lower abdominal incision into the lateral border of the breast incisions.  It was 1 L of tumescent was used to inject the chest wall lateral to the breasts and the central and flanks.  Next with the liposuction.  A 3 mm Mercedes cannula was used it was total of 750 cc of lipoaspirate was obtained, 350 cc of fat was collected in the revolve.  The treatment areas included chest wall lateral to the breast borders and areas of lipodystrophy on the abdomen in the flanks.  Next the fat was washed with 1 L of warm lactated ringers, washed placed into syringes.  A total of 190 cc of fat was available for injection    The patient was then sat up.  The bed was flexed so the right breast could be tailor tacked.  It was significant excess skin on the larger right breast flap.  She had a previous triangular skin paddle the [facility].  She was to make the skin paddle smaller and tighten the breast skin around it.  Also the lateral dog ears of the left chest was marked.  The right breast excision extended out onto the right chest wall.      The patient was laid back flat those areas of tailor tacking were marked.  The patient was Port-A-Cath scar was excised in a elliptical fashion.  The total excision was 6 x 2 cm.  It was then closed with buried 3-0 Monocryl and running 3-0 Monocryl.  The total length of the was 7 cm.  Afterwards it was injected with Kenalog 40    The right breasts after marking the excess skin and skin paddle incision that area was incised full-thickness with a knife.  Skin was removed lower pole of the breasts and a portion of the skin paddle and then extended laterally to the chest wall.  This communicated with the area liposuction laterally.  The skin was then medially advanced and closed with layers she was smaller triangular skin paddle.  It was closed with buried 3-0 Monocryl and 3-0 Stratafix.      On the left chest that tailor tacked area was also marked and excised full-thickness including skin and  subcutaneous fat.  The wound was then temporarily stapled closed in layers with buried 3-0 Monocryl running 3-0 Stratafix incision was 18 cm     Through the lateral breast incision performed fat grafting to the left breast.  The fat was placed into 2 areas of upper pole volume deficiency as well as globally into the left reconstructed breast to help with the size discrepancy to the right breast.  A total of 190 cc of fat grafting was placed in the breast.      Kenalog 40 it was also injected into the umbilicus and hypertrophic breast scars    All the incisions were dressed with Prineo tape.  Lipo sites were closed with 5 0 nylon.  The patient was placed in an abdominal binder postoperative bra.  She tolerated the procedure well taken to recovery in stable condition    Significant Surgical Tasks Conducted by the Assistant(s), if Applicable: The indicated resident served as first assistant for this procedure.    Estimated Blood Loss (EBL): 30mL           Implants: * No implants in log *    Specimens:   Specimen (24h ago, onward)      None                    Condition: Good    Disposition: PACU - hemodynamically stable., DC home, follow up next week    Attestation: I was present and scrubbed for the key portions of the procedure.

## 2024-04-19 NOTE — ANESTHESIA POSTPROCEDURE EVALUATION
[address] [facility], located within [facility] RESIDENCY PROGRAM  DATE OF VISIT : 2/15/2021    Patient : Gin Rahman   Age : 19 y.o.  : 2002   MRN : <[id number]>   ______________________________________________________________________    Chief Complaint :   Chief Complaint   Patient presents with    Vaginitis     discharge, itchy       HPI : Gin Rahman is 19 y.o. female who presented to the clinic today for vaginal itching. Patient treated for yeast infection back in October. Seen again later in October and diagnosed with BV. She was treated both times completely. Patient complete resolution until now. Now experiencing similar itching and discharge as previous. Discharge is described as white creamy, no foul smell. Does endorse feeling of irritation in her vaginal/perineal area. Sexually active with 1 male partner, not using any barrier contraception. I reviewed the patient's past medications, allergies and past medical history during this visit. Past Medical History :    Health Maintenance-   Colonoscopy-  HIV screening-   Hepatitis Screening-   A1C screening-   Diabetic Foot exam-  Diabetic retinal exam-     Ob/Gyn:  Menarche-  LMP-  Last PAP smear-  Ob Hx-   Mammogram-      Past Medical History:   Diagnosis Date    Asthma     Herpes      No past surgical history on file.     Social History :  Social History     Tobacco History     Smoking Status  Never Smoker    Smokeless Tobacco Use  Never Used          Alcohol History     Alcohol Use Status  No          Drug Use     Drug Use Status  No          Sexual Activity     Sexually Active  Yes Partners  Male Comment  one, condoms inconsistently                 Allergies :   No Known Allergies    Medication List :    Current Outpatient Medications   Medication Sig Dispense Refill    metroNIDAZOLE (FLAGYL) 500 MG tablet Take 1 tablet by mouth 2 times daily for 7 days 14 tablet 0 Anesthesia Post Evaluation    Patient: Laura Kent    Procedure(s) Performed: Procedure(s) (LRB):  REVISION, FLAP, PREVIOUSLY CREATED FOR BREAST RECONSTRUCTION (Bilateral)  LIPOSUCTION, WITH FAT TRANSFER (Bilateral)  REVISION, SCAR (Right)    Final Anesthesia Type: general      Patient location during evaluation: PACU  Patient participation: Yes- Able to Participate  Level of consciousness: awake and alert  Post-procedure vital signs: reviewed and stable  Pain management: adequate  Airway patency: patent    PONV status at discharge: No PONV  Anesthetic complications: no      Cardiovascular status: blood pressure returned to baseline  Respiratory status: unassisted  Hydration status: euvolemic  Follow-up not needed.              Vitals Value Taken Time   /70 04/18/24 1630   Temp 36.6 °C (97.9 °F) 04/18/24 1430   Pulse 62 04/18/24 1630   Resp 22 04/18/24 1630   SpO2 95 % 04/18/24 1630         No case tracking events are documented in the log.      Pain/Martell Score: Martell Score: 10 (4/18/2024  3:30 PM)            Probiotic Acidophilus (FLORANEX) TABS Take 1 tablet by mouth 2 times daily 60 tablet 0    naproxen (NAPROSYN) 500 MG tablet Take 1 tablet by mouth 2 times daily as needed for Pain (Patient not taking: Reported on 2/15/2021) 28 tablet 0    valACYclovir (VALTREX) 500 MG tablet Take 500 mg by mouth 3 times daily       No current facility-administered medications for this visit. Review of Systems :  Review of Systems   Constitutional: Negative for chills, fatigue and fever. Respiratory: Negative for cough, shortness of breath and wheezing. Cardiovascular: Negative for chest pain, palpitations and leg swelling. Gastrointestinal: Negative for abdominal pain, constipation, diarrhea, nausea and vomiting. Genitourinary: Positive for vaginal discharge. Negative for dyspareunia, dysuria, frequency, hematuria and urgency. ______________________________________________________________________    Physical Exam :    Vitals: BP (!) 114/56 (Site: Left Upper Arm, Position: Sitting, Cuff Size: Medium Adult)   Pulse 81   Temp 97.8 °F (36.6 °C) (Temporal)   Ht 4' 11\" (1.499 m)   Wt 138 lb (62.6 kg)   LMP 01/25/2021 (Approximate)   SpO2 96%   BMI 27.87 kg/m²   Physical Exam  Constitutional:       General: She is not in acute distress. Appearance: Normal appearance. Cardiovascular:      Rate and Rhythm: Normal rate and regular rhythm. Pulses: Normal pulses. Heart sounds: Normal heart sounds. No murmur. Pulmonary:      Effort: Pulmonary effort is normal. No respiratory distress. Breath sounds: Normal breath sounds. No wheezing. Abdominal:      General: Bowel sounds are normal. There is no distension. Palpations: Abdomen is soft. Tenderness: There is no abdominal tenderness. Genitourinary:     General: Normal vulva. Vagina: No signs of injury. Vaginal discharge (white creamy discharge, no foul smell ) present. Cervix: No friability.    Neurological: Mental Status: She is alert.         ___________________    Assessment & Plan :    1. BV (bacterial vaginosis)  - clue cells noted on wet mount  - discussed with patient treatment with flagyl  - will also send probiotics  - metroNIDAZOLE (FLAGYL) 500 MG tablet; Take 1 tablet by mouth 2 times daily for 7 days  Dispense: 14 tablet; Refill: 0    2. Screening for STD (sexually transmitted disease)  - C.TRACHOMATIS Jolie Coley; Future        Additional plan and future considerations:   RTO PRN    Return to Office: Return if symptoms worsen or fail to improve.     Justin Mendez MD   Case discussed with Dr. Heriberto Escalona

## 2024-04-19 NOTE — PROGRESS NOTES
Plastic Surgery History & Physical    SUBJECTIVE:   Chief complaint: Preoperative visit for revision of autologous breast reconstruction    History of Present Illness:  38 y.o. female presenting to plastic surgery clinic for a preoperative visit. Patient has a history of Left breast cancer s/p BL TE placement, adjuvant radiation, then delayed KANDICE flaps on 23. Her current complaints about her reconstruction include difference in size, lateral dog ears, painful portacath scar.  At the patient's last visit we discussed revision.   Since the last clinic visit there have been no significant changes in the patient's history.    Past Medical History:   Diagnosis Date    Anemia     Anxiety     Breast cancer     Diarrhea 2022    Drug-induced acute pancreatitis without infection or necrosis 2022    Encounter for blood transfusion     Hypertension     Meningitis     Severe obesity 2023    Shortness of breath 2023       Past Surgical History:   Procedure Laterality Date    AXILLARY NODE DISSECTION Left 2023    Procedure: LYMPHADENECTOMY, AXILLARY;  Surgeon: YASMANI Weiner MD;  Location: [facility];  Service: General;  Laterality: Left;    BREAST BIOPSY Left      SECTION      COLONOSCOPY N/A 2022    Procedure: COLONOSCOPY;  Surgeon: Davi Chery MD;  Location: [address]);  Service: Endoscopy;  Laterality: N/A;  case request entered from referral - Per DR. KATHLEEN Chery Pt to be scheduled for urgent colonoscopy on 22/ fully vaccinated / prep ins for Sutab on portal - ERW  see micro for negative C-diff- ERW    INJECTION FOR SENTINEL NODE IDENTIFICATION Left 2023    Procedure: INJECTION, FOR SENTINEL NODE IDENTIFICATION;  Surgeon: YASMANI Weiner MD;  Location: [facility];  Service: General;  Laterality: Left;    INSERTION OF BREAST TISSUE EXPANDER Left 2023    Procedure: INSERTION, TISSUE EXPANDER, BREAST;  Surgeon: Reese Barrios DO;  Location: [facility];   Service: Plastics;  Laterality: Left;    INSERTION OF TUNNELED CENTRAL VENOUS CATHETER (CVC) WITH SUBCUTANEOUS PORT N/A 4/5/2022    Procedure: [id number];  Surgeon: Deo Sin Jr., MD;  Location: [facility] OR [address] [place] [address];  Service: General;  Laterality: N/A;    INSERTION OF TUNNELED CENTRAL VENOUS CATHETER (CVC) WITH SUBCUTANEOUS PORT N/A 8/1/2022    Procedure: INSERTION, SINGLE LUMEN CATHETER WITH PORT, WITH FLUOROSCOPIC GUIDANCE;  Surgeon: Ryder Dinero MD;  Location: [facility] CATH [facility];  Service: Vascular;  Laterality: N/A;    INTRAVENOUS INJECTION N/A 12/11/2023    Procedure: spy;  Surgeon: Reese Barrios DO;  [facility];  Service: Plastics;  Laterality: N/A;    LIPOSUCTION W/ FAT INJECTION Bilateral 4/18/2024    Procedure: LIPOSUCTION, WITH FAT TRANSFER;  Surgeon: Reese Barrios DO;  Location: [facility] OR [address] [place] [address];  Service: Plastics;  Laterality: Bilateral;  Lipo with fat grafting Bilat    MASTECTOMY Left 2/27/2023    Procedure: MASTECTOMY;  Surgeon: YASMANI Weiner MD;  Location: [facility];  Service: General;  Laterality: Left;  3.5 TOTAL HOURS / EMAIL SENT 2-23 @ 7:56 CCC    MASTECTOMY Right 12/11/2023    Procedure: MASTECTOMY;  Surgeon: Eldon Daley MD;  Location: [address];  Service: General;  Laterality: Right;    RECONSTRUCTION OF BREAST WITH DEEP INFERIOR EPIGASTRIC ARTERY  (KANDICE) FREE FLAP Bilateral 12/11/2023    Procedure: RECONSTRUCTION, BREAST, USING KANDICE FREE FLAP;  Surgeon: Reese Barrios DO;  Location: [facility] OR [address] [place] [address];  Service: Plastics;  Laterality: Bilateral;  Bilateral KANDICE flap - cosurgeon is Dr Cross -    REVISION OF SCAR Right 12/11/2023    Procedure: REVISION, SCAR;  Surgeon: Reese Barrios DO;  [facility];  Service: Plastics;  Laterality: Right;    REVISION OF SCAR Right 4/18/2024    Procedure: REVISION, SCAR;  Surgeon: Reese Barrios DO;  [facility];  Service: Plastics;   Laterality: Right;    REVISION, FLAP, PREVIOUSLY CREATED FOR BREAST RECONSTRUCTION Bilateral 4/18/2024    Procedure: REVISION, FLAP, PREVIOUSLY CREATED FOR BREAST RECONSTRUCTION;  Surgeon: Reese Barrios DO;  Location: [facility] OR 2ND FLR;  Service: Plastics;  Laterality: Bilateral;  2nd stage Autologous - Bilat breast revision    SENTINEL LYMPH NODE BIOPSY Left 2/27/2023    Procedure: BIOPSY, LYMPH NODE, SENTINEL;  Surgeon: YASMANI Weiner MD;  Location: [facility] OR;  Service: General;  Laterality: Left;  LEFT with radiological marker    TISSUE EXPANDER REMOVAL Left 12/11/2023    Procedure: REMOVAL, TISSUE EXPANDER;  Surgeon: Reese Barrios DO;  Location: [facility] OR 2ND FLR;  Service: Plastics;  Laterality: Left;  removal left tissu expander       Family History   Problem Relation Name Age of Onset    Hypertension Mother      Hypertension Father      Diabetes Father      Pancreatic cancer Maternal Uncle      Bladder Cancer Paternal Grandmother      Colon cancer Neg Hx      Esophageal cancer Neg Hx         Social History     Socioeconomic History    Marital status: Single    Number of children: 3   Tobacco Use    Smoking status: Never    Smokeless tobacco: Never   Substance and Sexual Activity    Alcohol use: Not Currently     Comment: social    Drug use: Yes     Types: Marijuana    Sexual activity: Not Currently   Social History Narrative    15-20 steps     Social Determinants of Health     Financial Resource Strain: Patient Declined (12/12/2023)    Overall Financial Resource Strain (CARDIA)     Difficulty of Paying Living Expenses: Patient declined   Recent Concern: Financial Resource Strain - Medium Risk (11/23/2023)    Overall Financial Resource Strain (CARDIA)     Difficulty of Paying Living Expenses: Somewhat hard   Food Insecurity: Patient Declined (12/12/2023)    Hunger Vital Sign     Worried About Running Out of Food in the Last Year: Patient declined     Ran Out of Food in the Last Year: Patient  declined   Recent Concern: Food Insecurity - Food Insecurity Present (11/23/2023)    Hunger Vital Sign     Worried About Running Out of Food in the Last Year: Never true     Ran Out of Food in the Last Year: Sometimes true   Transportation Needs: Patient Declined (12/12/2023)    PRAPARE - Transportation     Lack of Transportation (Medical): Patient declined     Lack of Transportation (Non-Medical): Patient declined   Physical Activity: Patient Declined (12/12/2023)    Exercise Vital Sign     Days of Exercise per Week: Patient declined     Minutes of Exercise per Session: Patient declined   Stress: Patient Declined (12/12/2023)    [employer] - Occupational Stress Questionnaire     Feeling of Stress : Patient declined   Social Connections: Patient Declined (12/12/2023)    Social Connection and Isolation Panel [NHANES]     Frequency of Communication with Friends and Family: Patient declined     Frequency of Social Gatherings with Friends and Family: Patient declined     Attends Mandaen Services: Patient declined     Active Member of Clubs or Organizations: Patient declined     Attends Club or Organization Meetings: Patient declined     Marital Status: Patient declined   Housing Stability: Unknown (12/12/2023)    Housing Stability Vital Sign     Unable to Pay for Housing in the Last Year: Patient refused     Number of Places Lived in the Last Year: 2     Unstable Housing in the Last Year: Patient refused   Recent Concern: Housing Stability - High Risk (11/23/2023)    Housing Stability Vital Sign     Unable to Pay for Housing in the Last Year: Yes     Number of Places Lived in the Last Year: 2     Unstable Housing in the Last Year: No       Current Outpatient Medications   Medication Sig Dispense Refill    cetirizine (ZYRTEC) 10 MG tablet Take 10 mg by mouth once daily.      metFORMIN (GLUCOPHAGE-XR) 750 MG ER 24hr tablet Take 1 tablet (750 mg total) by mouth daily with breakfast. 90  tablet 1    ondansetron (ZOFRAN) 8 MG tablet Take 1 tablet (8 mg total) by mouth every 12 (twelve) hours as needed for Nausea. 30 tablet 2    acetaminophen (TYLENOL) 500 MG tablet Take 2 tablets (1,000 mg total) by mouth every 8 (eight) hours. for 4 days 24 tablet 0    capecitabine (XELODA) 150 MG tablet Take 1 tablet (150 mg total) by mouth 2 (two) times daily for 14 days followed by 7 days off. Take with 1 other capecitabine prescription for 2,150 mg total. 28 tablet 2    capecitabine (XELODA) 150 MG tablet Take 1 tablet (150 mg total) by mouth 2 (two) times daily for 14 days followed by 7 days off. Take with 1 other capecitabine prescription for 2,150 mg total. 28 tablet 2    capecitabine (XELODA) 500 MG Tab Take 4 tablets (2,000 mg total) by mouth 2 (two) times daily for 14 days followed by 7 days off. Take with 1 other capecitabine prescription for 2,150 mg total. 112 tablet 2    capecitabine (XELODA) 500 MG Tab Take 4 tablets (2,000 mg total) by mouth 2 (two) times daily for 14 days followed by 7 days off. Take with 1 other capecitabine prescription for 2,150 mg total. 112 tablet 2    cephALEXin (KEFLEX) 500 MG capsule Take 1 capsule (500 mg total) by mouth every 8 (eight) hours. for 7 days 21 capsule 0    clotrimazole (LOTRIMIN) 1 % cream Apply 1 application topically every evening.      gabapentin (NEURONTIN) 300 MG capsule Take 1 capsule (300 mg total) by mouth 3 (three) times daily. 90 capsule 11    gabapentin (NEURONTIN) 300 MG capsule Take 1 capsule (300 mg total) by mouth 3 (three) times daily. 90 capsule 11    gabapentin (NEURONTIN) 300 MG capsule Take 1 capsule (300 mg total) by mouth 3 (three) times daily. for 7 days 21 capsule 0    ibuprofen (ADVIL,MOTRIN) 600 MG tablet Take 1 tablet (600 mg total) by mouth every 8 (eight) hours as needed for Pain. 15 tablet 0    ketoconazole (NIZORAL) 2 % shampoo Apply topically Every 3 (three) days.      mupirocin (BACTROBAN) 2 % ointment Apply topically once  daily. 30 g 0    oxyCODONE (ROXICODONE) 5 MG immediate release tablet Take 1 tablet (5 mg total) by mouth every 6 (six) hours as needed for Pain. 10 tablet 0    pantoprazole (PROTONIX) 40 MG tablet Take 1 tablet (40 mg total) by mouth once daily. 30 tablet 1     No current facility-administered medications for this visit.     Facility-Administered Medications Ordered in Other Visits   Medication Dose Route Frequency Provider Last Rate Last Admin    ceFAZolin 1 g, gentamicin 80 mg in sodium chloride 0.9% 500 mL irrigation   Irrigation On Call Procedure YASMANI Weiner MD        ceFAZolin 2 g in dextrose 5 % in water (D5W) 50 mL IVPB (MB+)  2 g Intravenous On Call Procedure Angie Peck PA-C        LIDOcaine (PF) 10 mg/ml (1%) injection 10 mg  1 mL Intradermal Once Angie Peck PA-C        mupirocin 2 % ointment   Nasal On Call Procedure Angie Peck PA-C   Given at 04/18/24 0609    sodium chloride 0.9% flush 10 mL  10 mL Intravenous PRN Angie Peck PA-C           Review of patient's allergies indicates:   Allergen Reactions    Strawberries [strawberry] Hives           OBJECTIVE:     /72 (BP Location: Left arm, Patient Position: Sitting)   Pulse 70   Resp 19   Wt 107 kg (235 lb 14.3 oz)   LMP 02/27/2024 (Exact Date)   SpO2 100%   BMI 39.25 kg/m²       Physical Exam:  Gen: NAD, appears stated age  Neuro: normal without focal findings, mental status and speech normal  HEENT: NCAT, neck supple, PEERL  CV: RRR  Pulm: Breathing non-labored, chest wall movement equal bilaterally   Breast: Exam deferred, reviewed medical photography  Abdomen: soft, non-tender  Gu: not examined  Extremity:normal strength, no cyanosis or edema  Skin: Skin color, texture, turgor normal. No rashes or lesions  Psych: oriented to time, place and person, mood and affect are within normal limits          ASSESSMENT/PLAN:     Laura Elo Ellis was seen preoperatively today for revision of flap  reconstruction.  The plan for her is BL dog ear excision, fat grafting to the left breast, revision of right skin for shape, and portacath scar revision  Discussed details of surgical procedure and reviewed risks of surgery including bleeding, seroma, hematoma, infection, poor wound healing, wound breakdown, poor cosmetic outcome, need for reoperation, changes to sensation, fat necrosis. Risks of liposuction include hematoma, seroma, contour deformity, dimpling, displeasing cosmesis. Surgical consents obtained. Counseled on VALENTINA drain use in the event drains are needed. Advised patient to wear post operative garment as instructed and that we typically recommend garments after liposuction be worn for 8 weeks.    Reviewed multimodal pain control regimen used in the post operative period. Prescriptions will be sent to the outpatient pharmacy the day of surgery.  The patient was given a packet including general instructions for post-operative care, instructions for the day of surgery, drain care and process to complete FMLA/Disability paperwork.  All questions were answered. The patient was given a business card with contact info and advised to contact the clinic with any questions or concerns before or after surgery.      Reese Barrios  Plastic and Reconstructive Surgery    I spent a total of 30 minutes on the day of the visit.  This includes face to face time and non-face to face time preparing to see the patient (eg, review of tests), obtaining and/or reviewing separately obtained history, documenting clinical information in the electronic or other health record, independently interpreting results and communicating results to the patient/family/caregiver, or care coordinator.

## 2024-04-20 LAB — BACTERIA SPEC AEROBE CULT: ABNORMAL

## 2024-04-25 ENCOUNTER — OFFICE VISIT (OUTPATIENT)
Dept: SURGERY | Facility: CLINIC | Age: 39
End: 2024-04-25
Payer: MEDICAID

## 2024-04-25 VITALS — DIASTOLIC BLOOD PRESSURE: 69 MMHG | SYSTOLIC BLOOD PRESSURE: 126 MMHG | HEART RATE: 72 BPM

## 2024-04-25 DIAGNOSIS — Z09 SURGERY FOLLOW-UP: Primary | ICD-10-CM

## 2024-04-25 PROCEDURE — 99213 OFFICE O/P EST LOW 20 MIN: CPT | Mod: PBBFAC | Performed by: SURGERY

## 2024-04-25 PROCEDURE — 3074F SYST BP LT 130 MM HG: CPT | Mod: CPTII,,, | Performed by: SURGERY

## 2024-04-25 PROCEDURE — 1159F MED LIST DOCD IN RCRD: CPT | Mod: CPTII,,, | Performed by: SURGERY

## 2024-04-25 PROCEDURE — 99024 POSTOP FOLLOW-UP VISIT: CPT | Mod: ,,, | Performed by: SURGERY

## 2024-04-25 PROCEDURE — 3078F DIAST BP <80 MM HG: CPT | Mod: CPTII,,, | Performed by: SURGERY

## 2024-04-25 PROCEDURE — 99999 PR PBB SHADOW E&M-EST. PATIENT-LVL III: CPT | Mod: PBBFAC,,, | Performed by: SURGERY

## 2024-04-25 NOTE — PROGRESS NOTES
Laura Kent presents to Plastic Surgery Clinic for a follow up visit status post major revision of right reconstructed breast, right chest scar revision, left lateral chest wall dog ear excision, fat grafting to left reconstructed breast on 4/18/24. Patient is doing well today with no issues since surgery. Still having some discomfort.      PHYSICAL EXAMINATION  Bilateral breast incision(s) well approximated with a small area of superficial breakdown on the left lateral dog ear incision.  Nipple(s) is/are surgically absent   Drains are not present       ASSESSMENT/PLAN  Laura Kent is s/p KANDICE flap second stage revision  - Lipo site sutures removed  - Left dog ear prineo tape removed, small area of superficial breakdown. Recommend bacitracin BID x 1 week or until healed.  - Continue abdominal compression for 4 weeks day and night, and an additional 4 weeks during the day  - Follow up 2 weeks for prineo tape removal      All questions were answered. The patient was advised to contact the clinic with any questions or concerns prior to their next visit.       Angie Peck PA-C  Plastic and Reconstructive Surgery

## 2024-05-01 ENCOUNTER — PATIENT MESSAGE (OUTPATIENT)
Dept: SURGERY | Facility: CLINIC | Age: 39
End: 2024-05-01
Payer: MEDICAID

## 2024-05-06 ENCOUNTER — TELEPHONE (OUTPATIENT)
Dept: PLASTIC SURGERY | Facility: CLINIC | Age: 39
End: 2024-05-06
Payer: MEDICAID

## 2024-05-09 ENCOUNTER — OFFICE VISIT (OUTPATIENT)
Dept: SURGERY | Facility: CLINIC | Age: 39
End: 2024-05-09
Payer: MEDICAID

## 2024-05-09 ENCOUNTER — TELEPHONE (OUTPATIENT)
Dept: PLASTIC SURGERY | Facility: CLINIC | Age: 39
End: 2024-05-09
Payer: MEDICAID

## 2024-05-09 DIAGNOSIS — C50.412 MALIGNANT NEOPLASM OF UPPER-OUTER QUADRANT OF LEFT BREAST IN FEMALE, ESTROGEN RECEPTOR NEGATIVE: Primary | ICD-10-CM

## 2024-05-09 DIAGNOSIS — Z17.1 MALIGNANT NEOPLASM OF UPPER-OUTER QUADRANT OF LEFT BREAST IN FEMALE, ESTROGEN RECEPTOR NEGATIVE: Primary | ICD-10-CM

## 2024-05-09 PROCEDURE — 99024 POSTOP FOLLOW-UP VISIT: CPT | Mod: ,,, | Performed by: SURGERY

## 2024-05-10 ENCOUNTER — OFFICE VISIT (OUTPATIENT)
Dept: PLASTIC SURGERY | Facility: CLINIC | Age: 39
End: 2024-05-10
Payer: MEDICAID

## 2024-05-10 VITALS
RESPIRATION RATE: 18 BRPM | OXYGEN SATURATION: 100 % | DIASTOLIC BLOOD PRESSURE: 69 MMHG | HEART RATE: 76 BPM | SYSTOLIC BLOOD PRESSURE: 126 MMHG

## 2024-05-10 DIAGNOSIS — Z09 SURGERY FOLLOW-UP: Primary | ICD-10-CM

## 2024-05-10 PROCEDURE — 3074F SYST BP LT 130 MM HG: CPT | Mod: CPTII,,, | Performed by: PHYSICIAN ASSISTANT

## 2024-05-10 PROCEDURE — 1159F MED LIST DOCD IN RCRD: CPT | Mod: CPTII,,, | Performed by: PHYSICIAN ASSISTANT

## 2024-05-10 PROCEDURE — 99213 OFFICE O/P EST LOW 20 MIN: CPT | Mod: PBBFAC | Performed by: PHYSICIAN ASSISTANT

## 2024-05-10 PROCEDURE — 99999 PR PBB SHADOW E&M-EST. PATIENT-LVL III: CPT | Mod: PBBFAC,,, | Performed by: PHYSICIAN ASSISTANT

## 2024-05-10 PROCEDURE — 99024 POSTOP FOLLOW-UP VISIT: CPT | Mod: ,,, | Performed by: PHYSICIAN ASSISTANT

## 2024-05-10 PROCEDURE — 3078F DIAST BP <80 MM HG: CPT | Mod: CPTII,,, | Performed by: PHYSICIAN ASSISTANT

## 2024-05-10 NOTE — PROGRESS NOTES
Laura Kent presents to Plastic Surgery Clinic for a follow up visit status post second stage KANDICE flap revision on 4/18/24. We also revised a right chest scar and injected kenalog due to her history of keloid formation. She reached out to the clinic to report wound dehiscence of the chest wall incision. Her revision is otherwise settling well.      PHYSICAL EXAMINATION  Bilateral breast incision(s) well approximated with no issues.   Nipple(s) is/are surgically absent   Drains are not present   Right upper chest wall incision with dehiscence laterally.     ASSESSMENT/PLAN  Laura Kent is s/p KANDICE flap revision  - Cleaned chest wall incision with chlorhexidine and applied dermabond glue. Counseled patient that this may not hold either and the kenalog can slow or complicate wound healing. We can revise this at a later date.  - Follow up 3 months      All questions were answered. The patient was advised to contact the clinic with any questions or concerns prior to their next visit.       Angie Peck PA-C  Plastic and Reconstructive Surgery

## 2024-05-15 ENCOUNTER — PATIENT MESSAGE (OUTPATIENT)
Dept: PLASTIC SURGERY | Facility: CLINIC | Age: 39
End: 2024-05-15
Payer: MEDICAID

## 2024-05-16 ENCOUNTER — OFFICE VISIT (OUTPATIENT)
Dept: SURGERY | Facility: CLINIC | Age: 39
End: 2024-05-16
Payer: MEDICAID

## 2024-05-16 VITALS — HEART RATE: 78 BPM | RESPIRATION RATE: 19 BRPM | SYSTOLIC BLOOD PRESSURE: 126 MMHG | DIASTOLIC BLOOD PRESSURE: 69 MMHG

## 2024-05-16 DIAGNOSIS — Z09 SURGERY FOLLOW-UP: Primary | ICD-10-CM

## 2024-05-16 PROCEDURE — 3078F DIAST BP <80 MM HG: CPT | Mod: CPTII,,, | Performed by: SURGERY

## 2024-05-16 PROCEDURE — 99024 POSTOP FOLLOW-UP VISIT: CPT | Mod: ,,, | Performed by: SURGERY

## 2024-05-16 PROCEDURE — 1159F MED LIST DOCD IN RCRD: CPT | Mod: CPTII,,, | Performed by: SURGERY

## 2024-05-16 PROCEDURE — 3074F SYST BP LT 130 MM HG: CPT | Mod: CPTII,,, | Performed by: SURGERY

## 2024-05-16 PROCEDURE — 99999 PR PBB SHADOW E&M-EST. PATIENT-LVL III: CPT | Mod: PBBFAC,,, | Performed by: SURGERY

## 2024-05-16 PROCEDURE — 99213 OFFICE O/P EST LOW 20 MIN: CPT | Mod: PBBFAC | Performed by: SURGERY

## 2024-05-17 NOTE — PROGRESS NOTES
Laura Kent presents to Plastic Surgery Clinic for a follow up of right port scar revision with dehiscence. Last week I saw her in the clinic with wound dehiscence from a revision of that port scar, where we also injected kenalog. The incision was re approximated and skin adhesive was applied. Unfortunately the wound continues to remain dehisced. Patient is leaving for a trip to [facility] next week and would like counseling on wound care.     PHYSICAL EXAMINATION  Bilateral breast reconstruction has settled nicely with good symmetry and well healed incisions. Abdominal donor site is nice and smooth.  The right upper chest has a 4cm incision with two areas of widened dehiscence. The chest wall is visible in the area of dehiscence. There is no discharge, surrounding erythema or pain. Some fibrinous exudate in the wound.      ASSESSMENT/PLAN  Laura Kent is s/p second stage KANDICE flap revision and port scar excision  - Counseled on wound care with topical bacitracin and gauze. Advised not to swim with open wound.  - Follow up is already planned in 2 months, keep current appt or reach out sooner with any new or concerning symptoms.      All questions were answered. The patient was advised to contact the clinic with any questions or concerns prior to their next visit.       Angie Peck PA-C  Plastic and Reconstructive Surgery

## 2024-05-21 NOTE — PROGRESS NOTES
Ms Kent 3 weeks status post major revision of bilateral KANDICE flaps with skin revisions of the breasts liposuction and fat grafting.  Also advised her port site scar.  She notices a small wound breakdown centrally on her port site scar.  She has symmetrical shape breasts that she has a different sized skin paddles.  Prineo tape was removed.  Skin is healing well.  Still has some upper pole swelling from the fat grafting.  Small central wound breakdown of her port calf scar revision.      Discussed local wound care for the wound breakdown.  Discussed with the wound it was likely weaker due to the injection Kenalog for her keloid scar.  We had not recommend closing this wound as it has been open and it was contaminated.  We will have her follow up in a couple weeks    Enoch Barrios,   Plastic and Reconstructive Surgery

## 2024-05-27 ENCOUNTER — PATIENT MESSAGE (OUTPATIENT)
Dept: SURGERY | Facility: CLINIC | Age: 39
End: 2024-05-27
Payer: MEDICAID

## 2024-05-30 ENCOUNTER — OFFICE VISIT (OUTPATIENT)
Dept: PLASTIC SURGERY | Facility: CLINIC | Age: 39
End: 2024-05-30
Payer: MEDICAID

## 2024-05-30 VITALS
HEART RATE: 18 BPM | WEIGHT: 235.88 LBS | BODY MASS INDEX: 39.3 KG/M2 | DIASTOLIC BLOOD PRESSURE: 69 MMHG | RESPIRATION RATE: 18 BRPM | HEIGHT: 65 IN | OXYGEN SATURATION: 99 % | SYSTOLIC BLOOD PRESSURE: 126 MMHG

## 2024-05-30 DIAGNOSIS — C50.412 MALIGNANT NEOPLASM OF UPPER-OUTER QUADRANT OF LEFT BREAST IN FEMALE, ESTROGEN RECEPTOR NEGATIVE: Primary | ICD-10-CM

## 2024-05-30 DIAGNOSIS — Z17.1 MALIGNANT NEOPLASM OF UPPER-OUTER QUADRANT OF LEFT BREAST IN FEMALE, ESTROGEN RECEPTOR NEGATIVE: Primary | ICD-10-CM

## 2024-05-30 PROCEDURE — 1159F MED LIST DOCD IN RCRD: CPT | Mod: CPTII,,, | Performed by: SURGERY

## 2024-05-30 PROCEDURE — 99024 POSTOP FOLLOW-UP VISIT: CPT | Mod: ,,, | Performed by: SURGERY

## 2024-05-30 PROCEDURE — 3074F SYST BP LT 130 MM HG: CPT | Mod: CPTII,,, | Performed by: SURGERY

## 2024-05-30 PROCEDURE — 99214 OFFICE O/P EST MOD 30 MIN: CPT | Mod: PBBFAC | Performed by: SURGERY

## 2024-05-30 PROCEDURE — 3078F DIAST BP <80 MM HG: CPT | Mod: CPTII,,, | Performed by: SURGERY

## 2024-05-30 PROCEDURE — 99999 PR PBB SHADOW E&M-EST. PATIENT-LVL IV: CPT | Mod: PBBFAC,,, | Performed by: SURGERY

## 2024-05-31 NOTE — PROGRESS NOTES
Ms. Kent is now 6 weeks status post revision of her breast reconstruction.      We injected a hypertrophic scar when we excised it to prevent keloid recurrence on her right upper chest wall.  Unfortunately wound was broken down.  She has been keeping covered with a moist gauze.  She was very bothered by the scar worried how it was not healed.      On exam she has roughly 4 x 1-1/2 cm opening with a hyper granulated wound base.  There were 2 skin bridges across.  They were sensate.  New line 3 cc of 1% lidocaine with epinephrine was injected circumferentially around the wound.  Using scissors I cut the small skin bridges to aid with wound care.  I dressed it with a wet-to-dry dressing and covered with a Tegaderm.      I instructed her on wound care for this including wet-to-dry dressings or possible Aquacel Ag were similar woven silver dressings.  She was should change that daily and keep it covered with a Tegaderm.    I informed her that the wound will heal.  With the wound healing was delayed by the steroid injection which I think contributed to the wound dehiscence.  Once we have a  wound bed and can do better wound care I think the wound will heal faster.  I will see her back of the end of next week    Enoch Barrios, DO  Plastic and Reconstructive Surgery

## 2024-06-07 ENCOUNTER — OFFICE VISIT (OUTPATIENT)
Dept: PLASTIC SURGERY | Facility: CLINIC | Age: 39
End: 2024-06-07
Payer: MEDICAID

## 2024-06-07 ENCOUNTER — HOSPITAL ENCOUNTER (OUTPATIENT)
Dept: WOUND CARE | Facility: HOSPITAL | Age: 39
Discharge: HOME | End: 2024-06-07
Attending: SURGERY
Payer: MEDICAID

## 2024-06-07 VITALS — SYSTOLIC BLOOD PRESSURE: 144 MMHG | DIASTOLIC BLOOD PRESSURE: 63 MMHG | RESPIRATION RATE: 18 BRPM | HEART RATE: 64 BPM

## 2024-06-07 DIAGNOSIS — Z17.1 MALIGNANT NEOPLASM OF UPPER-OUTER QUADRANT OF LEFT BREAST IN FEMALE, ESTROGEN RECEPTOR NEGATIVE: Primary | ICD-10-CM

## 2024-06-07 DIAGNOSIS — C50.412 MALIGNANT NEOPLASM OF UPPER-OUTER QUADRANT OF LEFT BREAST IN FEMALE, ESTROGEN RECEPTOR NEGATIVE: Primary | ICD-10-CM

## 2024-06-07 DIAGNOSIS — Z17.1 MALIGNANT NEOPLASM OF UPPER-OUTER QUADRANT OF LEFT BREAST IN FEMALE, ESTROGEN RECEPTOR NEGATIVE: ICD-10-CM

## 2024-06-07 DIAGNOSIS — T81.89XA NON-HEALING SURGICAL WOUND, INITIAL ENCOUNTER: ICD-10-CM

## 2024-06-07 DIAGNOSIS — C50.412 MALIGNANT NEOPLASM OF UPPER-OUTER QUADRANT OF LEFT BREAST IN FEMALE, ESTROGEN RECEPTOR NEGATIVE: ICD-10-CM

## 2024-06-07 DIAGNOSIS — L98.492 CHEST WALL ULCER, WITH FAT LAYER EXPOSED: Primary | ICD-10-CM

## 2024-06-07 PROCEDURE — 99212 OFFICE O/P EST SF 10 MIN: CPT | Mod: PBBFAC,27 | Performed by: SURGERY

## 2024-06-07 PROCEDURE — 87186 SC STD MICRODIL/AGAR DIL: CPT | Performed by: PREVENTIVE MEDICINE

## 2024-06-07 PROCEDURE — 99204 OFFICE O/P NEW MOD 45 MIN: CPT

## 2024-06-07 PROCEDURE — 87070 CULTURE OTHR SPECIMN AEROBIC: CPT | Performed by: PREVENTIVE MEDICINE

## 2024-06-07 PROCEDURE — 99999 PR PBB SHADOW E&M-EST. PATIENT-LVL II: CPT | Mod: PBBFAC,,, | Performed by: SURGERY

## 2024-06-07 PROCEDURE — 99024 POSTOP FOLLOW-UP VISIT: CPT | Mod: ,,, | Performed by: SURGERY

## 2024-06-07 PROCEDURE — 87077 CULTURE AEROBIC IDENTIFY: CPT | Performed by: PREVENTIVE MEDICINE

## 2024-06-07 PROCEDURE — 87075 CULTR BACTERIA EXCEPT BLOOD: CPT | Performed by: PREVENTIVE MEDICINE

## 2024-06-07 NOTE — PROGRESS NOTES
Wound Care & [facility] Clinic    Subjective:       Patient ID: Laura Kent is a 38 y.o. female.    Chief Complaint: Wound Consult    New referral for non healing surgical wound to right upper chest. Patient with history of left breast cancer with bilateral KANDICE flap reconstruction. S/P right chest scar revision on 4/18/24.  Presents to clinic today with non healing wound. Previously used wet to dry and medihoney. Wound with red non granulating tissue - culture obtained. Will start silver alginate dressing. Return to clinic in 2 weeks.     Review of Systems   All other systems reviewed and are negative.        Objective:     Vitals:    06/07/24 1420   BP: (!) 144/63   Pulse: 64   Resp: 18         Physical Exam       Wound 06/07/24 1421 Other (comment) Right Chest (Active)   06/07/24 1421   Present on Original Admission:    Primary Wound Type: Other   Side: Right   Orientation:    Location: Chest   Wound Approximate Age at First Assessment (Weeks):    Wound Number:    Is this injury device related?:    Incision Type:    Closure Method:    Wound Description (Comments):    Type:    Additional Comments:    Ankle-Brachial Index:    Pulses:    Removal Indication and Assessment:    Wound Outcome:    Wound Image   06/07/24 1422   Dressing Appearance Moist drainage 06/07/24 1422   Drainage Amount Moderate 06/07/24 1422   Drainage Characteristics/Odor Serosanguineous 06/07/24 1422   Appearance Red;Smooth;Not granulating 06/07/24 1422   Tissue loss description Full thickness 06/07/24 1422   Red (%), Wound Tissue Color 100 % 06/07/24 1422   Periwound Area Intact 06/07/24 1422   Wound Edges Defined 06/07/24 1422   Wound Length (cm) 2.4 cm 06/07/24 1422   Wound Width (cm) 4 cm 06/07/24 1422   Wound Depth (cm) 0.2 cm 06/07/24 1422   Wound Volume (cm^3) 1.92 cm^3 06/07/24 1422   Wound Surface Area (cm^2) 9.6 cm^2 06/07/24 1422   Care Cleansed with:;Antimicrobial agent;Sterile normal  saline;Other (see comments) 06/07/24 1422   Dressing Applied;Silver;Calcium alginate;Island/border 06/07/24 1422   Dressing Change Due 06/09/24 06/07/24 1422         Assessment/Plan:         ICD-10-CM ICD-9-CM   1. Chest wall ulcer, with fat layer exposed  L98.492 707.8   2. Non-healing surgical wound, initial encounter  T81.89XA 998.83   3. Malignant neoplasm of upper-outer quadrant of left breast in female, estrogen receptor negative  C50.412 174.4    Z17.1 V86.1       Wound showing signs of healing. Will protect and keep good moisture balance.    Tissue pathology and/or culture taken:  [x] Yes [] No   Sharp debridement performed:   [] Yes [x] No   Labs ordered this visit:   [] Yes [x] No   Imaging ordered this visit:   [] Yes [x] No           Orders Placed This Encounter   Procedures    CULTURE, AEROBIC  (SPECIFY SOURCE)          CULTURE, ANAEROBE          Ambulatory referral/consult to Wound Clinic     Standing Status:   Standing     Number of Occurrences:   1     Referral Priority:   Routine     Referral Type:   Consultation     Referral Reason:   Specialty Services Required     Requested Specialty:   Wound Care     Number of Visits Requested:   1    Change dressing     Right chest ulcer:   Cleanse wound with:Vashe  Lidocaine:prn  Silver nitrate:prn  Periwound care:maintain dry  Primary dressing:Silver alginate  Secondary dressing:Mepilex border  Frequency:Every other day  Follow-up:2 weeks Dr. Grigsby  Other Orders:Wound culture        Follow up in about 2 weeks (around 6/21/2024) for .            This includes face to face time and non-face to face time preparing to see the patient (eg, review of tests), obtaining and/or reviewing separately obtained history, documenting clinical information in the electronic or other health record, independently interpreting results and communicating results to the patient/family/caregiver, or care coordinator.

## 2024-06-10 ENCOUNTER — TELEPHONE (OUTPATIENT)
Dept: WOUND CARE | Facility: HOSPITAL | Age: 39
End: 2024-06-10
Payer: MEDICAID

## 2024-06-10 PROBLEM — L98.492: Status: ACTIVE | Noted: 2024-06-10

## 2024-06-10 LAB — BACTERIA SPEC AEROBE CULT: ABNORMAL

## 2024-06-10 RX ORDER — CIPROFLOXACIN 500 MG/1
500 TABLET ORAL EVERY 12 HOURS
Qty: 14 TABLET | Refills: 0 | Status: SHIPPED | OUTPATIENT
Start: 2024-06-10 | End: 2024-06-17

## 2024-06-12 LAB — BACTERIA SPEC ANAEROBE CULT: NORMAL

## 2024-06-13 NOTE — PROGRESS NOTES
Laura returns today for a wound check.    Since the skin bridges were debrided over her Port-A-Cath scar she has been doing wet-to-dry dressings.  She was had significant wound healing.  She was placing gauze pads in the wound.  The wound is healing.  It was smaller than the last time I side.      She should continue a silver dressing until the wound is flat.  Once the wound is flat which it was very close to being, she can switch to a Mepilex border dressing or something similar.  Discussed the benefits of the nonadherent with a silicone in the absorbent of the Hydrogel foam.    We will plan on seeing her back in 2 weeks    Enoch Barrios, DO  Plastic and Reconstructive Surgery

## 2024-06-18 DIAGNOSIS — C50.412 MALIGNANT NEOPLASM OF UPPER-OUTER QUADRANT OF LEFT BREAST IN FEMALE, ESTROGEN RECEPTOR NEGATIVE: ICD-10-CM

## 2024-06-18 DIAGNOSIS — Z17.1 MALIGNANT NEOPLASM OF UPPER-OUTER QUADRANT OF LEFT BREAST IN FEMALE, ESTROGEN RECEPTOR NEGATIVE: ICD-10-CM

## 2024-06-19 RX ORDER — ONDANSETRON HYDROCHLORIDE 8 MG/1
8 TABLET, FILM COATED ORAL EVERY 12 HOURS PRN
Qty: 30 TABLET | Refills: 2 | Status: SHIPPED | OUTPATIENT
Start: 2024-06-19 | End: 2025-06-19

## 2024-06-24 ENCOUNTER — HOSPITAL ENCOUNTER (OUTPATIENT)
Dept: WOUND CARE | Facility: HOSPITAL | Age: 39
Discharge: HOME | End: 2024-06-24
Attending: PREVENTIVE MEDICINE
Payer: MEDICAID

## 2024-06-24 VITALS
BODY MASS INDEX: 39.15 KG/M2 | HEART RATE: 92 BPM | SYSTOLIC BLOOD PRESSURE: 141 MMHG | TEMPERATURE: 97 F | DIASTOLIC BLOOD PRESSURE: 98 MMHG | HEIGHT: 65 IN | WEIGHT: 235 LBS

## 2024-06-24 DIAGNOSIS — L98.492 CHEST WALL ULCER, WITH FAT LAYER EXPOSED: Primary | ICD-10-CM

## 2024-06-24 DIAGNOSIS — T81.89XD NON-HEALING SURGICAL WOUND, SUBSEQUENT ENCOUNTER: ICD-10-CM

## 2024-06-24 PROCEDURE — 97597 DBRDMT OPN WND 1ST 20 CM/<: CPT

## 2024-06-24 PROCEDURE — 11042 DBRDMT SUBQ TIS 1ST 20SQCM/<: CPT

## 2024-06-24 NOTE — PROGRESS NOTES
[facility]    Subjective:       Patient ID: Laura Kent is a 38 y.o. female.    Chief Complaint: Non-healing Wound Follow Up    Pt presents for follow up for her right chest wound. States wound is healing well. Alginate stuck to the wound a bit. No other issues.    Review of Systems   All other systems reviewed and are negative.        Objective:     Vitals:    06/24/24 0953   BP: (!) 141/98   Pulse: 92   Temp: 97 °F (36.1 °C)         Physical Exam       Wound 06/07/24 1421 Other (comment) Right Chest (Active)   06/07/24 1421   Present on Original Admission:    Primary Wound Type: Other   Side: Right   Orientation:    Location: Chest   Wound Approximate Age at First Assessment (Weeks):    Wound Number:    Is this injury device related?:    Incision Type:    Closure Method:    Wound Description (Comments):    Type:    Additional Comments:    Ankle-Brachial Index:    Pulses:    Removal Indication and Assessment:    Wound Outcome:    Wound Image   06/24/24 1038   Dressing Appearance Intact;Moist drainage 06/24/24 1038   Drainage Amount Scant 06/24/24 1038   Drainage Characteristics/Odor Serosanguineous 06/24/24 1038   Appearance Intact;Red 06/24/24 1038   Tissue loss description Full thickness 06/24/24 1038   Red (%), Wound Tissue Color 100 % 06/24/24 1038   Periwound Area Intact 06/24/24 1038   Wound Edges Defined 06/24/24 1038   Wound Length (cm) 0.2 cm 06/24/24 1038   Wound Width (cm) 0.2 cm 06/24/24 1038   Wound Depth (cm) 0.1 cm 06/24/24 1038   Wound Volume (cm^3) 0.004 cm^3 06/24/24 1038   Wound Surface Area (cm^2) 0.04 cm^2 06/24/24 1038   Care Cleansed with:;Antimicrobial agent;Soap and water;Debrided 06/24/24 1038   Dressing Applied;Changed;Island/border 06/24/24 1038   Dressing Change Due 06/25/24 06/24/24 1038         Assessment/Plan:         ICD-10-CM ICD-9-CM   1. Chest wall ulcer, with fat layer exposed  L98.492 707.8   2. Non-healing surgical  wound, subsequent encounter  T81.89XD V58.89     998.83       Wound is improving. No signs of infection or necrosis. Pt to continue wound care at home.      Tissue pathology and/or culture taken:  [] Yes [x] No   Sharp debridement performed:   [x] Yes [] No   Labs ordered this visit:   [] Yes [x] No   Imaging ordered this visit:   [] Yes [x] No           Orders Placed This Encounter   Procedures    Change dressing     Healed Wound Instructions:Your wound is healed, it is extremely fragile at this stage; protect it from friction, wash it gently and pat dry.  If the wound should re-open, please call [phone] for further instructions.  Dressing: Mupirocin and border dressing        Follow up if symptoms worsen or fail to improve.            This includes face to face time and non-face to face time preparing to see the patient (eg, review of tests), obtaining and/or reviewing separately obtained history, documenting clinical information in the electronic or other health record, independently interpreting results and communicating results to the patient/family/caregiver, or care coordinator.

## 2024-06-24 NOTE — PROCEDURES
"Debridement    Date/Time: 6/24/2024 9:34 AM    Performed by: Christiano Grigsby MD  Authorized by: Christiano Grigsby MD    Time out: Immediately prior to procedure a "time out" was called to verify the correct patient, procedure, equipment, support staff and site/side marked as required.    Consent Done?:  Yes (Written)  Local anesthesia used?: Yes    Local anesthetic:  Topical anesthetic    Wound Details:    Location:  Chest    Type of Debridement:  Excisional       Length (cm):  0.2       Area (sq cm):  0.1       Width (cm):  0.5       Percent Debrided (%):  100       Depth (cm):  0.1       Total Area Debrided (sq cm):  0.1    Depth of debridement:  Epidermis/Dermis    Devitalized tissue debrided:  Slough    Instruments:  Curette  Bleeding:  Minimal  Hemostasis Achieved: Yes  Method Used:  Pressure  Patient tolerance:  Patient tolerated the procedure well with no immediate complications    "

## 2024-06-26 ENCOUNTER — OFFICE VISIT (OUTPATIENT)
Dept: SURGERY | Facility: CLINIC | Age: 39
End: 2024-06-26
Payer: MEDICAID

## 2024-06-26 VITALS
DIASTOLIC BLOOD PRESSURE: 73 MMHG | HEART RATE: 92 BPM | HEIGHT: 65 IN | SYSTOLIC BLOOD PRESSURE: 121 MMHG | WEIGHT: 235 LBS | BODY MASS INDEX: 39.15 KG/M2

## 2024-06-26 DIAGNOSIS — C50.412 MALIGNANT NEOPLASM OF UPPER-OUTER QUADRANT OF LEFT BREAST IN FEMALE, ESTROGEN RECEPTOR NEGATIVE: Primary | ICD-10-CM

## 2024-06-26 DIAGNOSIS — Z17.1 MALIGNANT NEOPLASM OF UPPER-OUTER QUADRANT OF LEFT BREAST IN FEMALE, ESTROGEN RECEPTOR NEGATIVE: Primary | ICD-10-CM

## 2024-06-26 PROCEDURE — 3008F BODY MASS INDEX DOCD: CPT | Mod: CPTII,,, | Performed by: SURGERY

## 2024-06-26 PROCEDURE — 99213 OFFICE O/P EST LOW 20 MIN: CPT | Mod: PBBFAC | Performed by: SURGERY

## 2024-06-26 PROCEDURE — 99212 OFFICE O/P EST SF 10 MIN: CPT | Mod: S$PBB,,, | Performed by: SURGERY

## 2024-06-26 PROCEDURE — 3074F SYST BP LT 130 MM HG: CPT | Mod: CPTII,,, | Performed by: SURGERY

## 2024-06-26 PROCEDURE — 1159F MED LIST DOCD IN RCRD: CPT | Mod: CPTII,,, | Performed by: SURGERY

## 2024-06-26 PROCEDURE — 99999 PR PBB SHADOW E&M-EST. PATIENT-LVL III: CPT | Mod: PBBFAC,,, | Performed by: SURGERY

## 2024-06-26 PROCEDURE — 1160F RVW MEDS BY RX/DR IN RCRD: CPT | Mod: CPTII,,, | Performed by: SURGERY

## 2024-06-26 PROCEDURE — 3078F DIAST BP <80 MM HG: CPT | Mod: CPTII,,, | Performed by: SURGERY

## 2024-06-26 NOTE — PROGRESS NOTES
Date of Service: 06/26/2024      DIAGNOSIS:   This is a 38 y.o. female with a history of IIIC (cT3, cN2(f), cM0, G3, ER-, AL-, HER2-)  invasive ductal carcinoma of the left breast.    TREATMENT:   1. left mastectomy and axillary lymph node dissection and TE placement on 2/27/2023. Dr. Weiner. S/p revision KANDICE flap 4/18/24.  2. Chemotherapy, 4/13/2022 - 12/31/2022 Dr. Cruz.   3. Radiation therapy 4/27/2023 - 5/31/2023 Dr. Bess.   4. Right risk reducing mastectomy and bilateral KANDICE flap based reconstruction.  12/11/2023 Dr. Daley and Dr. Barrios    HISTORY OF PRESENT ILLNESS:     Interval History 6/26/24:  Presents today for 6mo follow up. Underwent revision KANDICE flaps on 4/18/24. Having some pain in the left breast. Still having issues with port site healing after removal. Does seem to be healing         Pathology:    Part 1  Left, tissue expander, removal:  For gross identification only, see gross description.      Part 2  Breast, right, mastectomy:  Benign nipple and breast parenchyma, negative for atypia or malignancy    Part 3  Breast, left, capsule, capsulectomy:  Fragments of fibrotic tissue with associated reactive giant cell formation, chronic inflammation and fibroadipose tissue, histologically consistent with breast capsule  Negative for atypia or malignancy      Part 4  Breast, left, mastectomy, skin, excision:  Fragments of benign skin and soft tissue with chronic inflammation, negative for atypia or malignancy        PHYSICAL EXAM:   There were no vitals taken for this visit.  General: The patient appears well and is in no acute distress.   Neuro: Alert & oriented x3.   Breasts:  Bilateral breast incisions well healing without any signs of infection or hematoma.    ASSESSMENT:   This is a 38 y.o. female status post risk reducing right mastectomy, left tissue expander removal and bilateral KANDICE flap based reconstruction.     PLAN:   Continuing to hell well from surgery.  No signs of cancer  recurrence on the recent surgery.  We will need to continue long-term follow-up to monitor for breast cancer recurrence.    No routine imaging is now undergone bilateral mastectomy.    Follow up in 1 year for clinical breast exam.  Re-establish with PT.

## 2024-07-02 ENCOUNTER — OFFICE VISIT (OUTPATIENT)
Dept: SURGERY | Facility: CLINIC | Age: 39
End: 2024-07-02
Payer: MEDICAID

## 2024-07-02 ENCOUNTER — OFFICE VISIT (OUTPATIENT)
Dept: PLASTIC SURGERY | Facility: CLINIC | Age: 39
End: 2024-07-02
Payer: MEDICAID

## 2024-07-02 VITALS
DIASTOLIC BLOOD PRESSURE: 62 MMHG | HEART RATE: 82 BPM | HEIGHT: 65 IN | WEIGHT: 235 LBS | SYSTOLIC BLOOD PRESSURE: 123 MMHG | BODY MASS INDEX: 39.15 KG/M2

## 2024-07-02 VITALS
OXYGEN SATURATION: 100 % | HEART RATE: 82 BPM | SYSTOLIC BLOOD PRESSURE: 123 MMHG | RESPIRATION RATE: 19 BRPM | DIASTOLIC BLOOD PRESSURE: 62 MMHG

## 2024-07-02 DIAGNOSIS — Z85.3 PERSONAL HISTORY OF BREAST CANCER: Primary | ICD-10-CM

## 2024-07-02 DIAGNOSIS — Z09 SURGERY FOLLOW-UP: Primary | ICD-10-CM

## 2024-07-02 DIAGNOSIS — Z12.39 SCREENING BREAST EXAMINATION: ICD-10-CM

## 2024-07-02 DIAGNOSIS — Z90.13 S/P MASTECTOMY, BILATERAL: ICD-10-CM

## 2024-07-02 PROCEDURE — 99213 OFFICE O/P EST LOW 20 MIN: CPT | Mod: S$PBB,,, | Performed by: PHYSICIAN ASSISTANT

## 2024-07-02 PROCEDURE — 3078F DIAST BP <80 MM HG: CPT | Mod: CPTII,,, | Performed by: SURGERY

## 2024-07-02 PROCEDURE — 1159F MED LIST DOCD IN RCRD: CPT | Mod: CPTII,,, | Performed by: PHYSICIAN ASSISTANT

## 2024-07-02 PROCEDURE — 3074F SYST BP LT 130 MM HG: CPT | Mod: CPTII,,, | Performed by: SURGERY

## 2024-07-02 PROCEDURE — 99213 OFFICE O/P EST LOW 20 MIN: CPT | Mod: PBBFAC | Performed by: PHYSICIAN ASSISTANT

## 2024-07-02 PROCEDURE — 99999 PR PBB SHADOW E&M-EST. PATIENT-LVL III: CPT | Mod: PBBFAC,,, | Performed by: SURGERY

## 2024-07-02 PROCEDURE — 99024 POSTOP FOLLOW-UP VISIT: CPT | Mod: ,,, | Performed by: SURGERY

## 2024-07-02 PROCEDURE — 3008F BODY MASS INDEX DOCD: CPT | Mod: CPTII,,, | Performed by: PHYSICIAN ASSISTANT

## 2024-07-02 PROCEDURE — 3074F SYST BP LT 130 MM HG: CPT | Mod: CPTII,,, | Performed by: PHYSICIAN ASSISTANT

## 2024-07-02 PROCEDURE — 99999 PR PBB SHADOW E&M-EST. PATIENT-LVL III: CPT | Mod: PBBFAC,,, | Performed by: PHYSICIAN ASSISTANT

## 2024-07-02 PROCEDURE — 3078F DIAST BP <80 MM HG: CPT | Mod: CPTII,,, | Performed by: PHYSICIAN ASSISTANT

## 2024-07-02 PROCEDURE — 99213 OFFICE O/P EST LOW 20 MIN: CPT | Mod: PBBFAC,27 | Performed by: SURGERY

## 2024-07-02 NOTE — PROGRESS NOTES
[facility]  Department of Surgery      Subjective:     Laura Kent presents the [facility] on 7/2/2024 for consultation regarding 3 dimensional nipple tattoo for breast reconstruction. She is status post bilateral breast reconstruction using autologous tissue by Dr. Richter. Her last surgery was 4/18/24. She has a nice result and is pleased with the results of her breast reconstruction. She denies fever, chills, nausea, vomiting, chest pain or shortness of breath.     Review of patient's allergies indicates:   Allergen Reactions    Strawberries [strawberry] Hives     Current Outpatient Medications on File Prior to Visit   Medication Sig Dispense Refill    cetirizine (ZYRTEC) 10 MG tablet Take 10 mg by mouth once daily.      gabapentin (NEURONTIN) 300 MG capsule Take 1 capsule (300 mg total) by mouth 3 (three) times daily. 90 capsule 11    ibuprofen (ADVIL,MOTRIN) 600 MG tablet Take 1 tablet (600 mg total) by mouth every 8 (eight) hours as needed for Pain. 15 tablet 0    metFORMIN (GLUCOPHAGE-XR) 750 MG ER 24hr tablet Take 1 tablet (750 mg total) by mouth daily with breakfast. 90 tablet 1    ondansetron (ZOFRAN) 8 MG tablet Take 1 tablet (8 mg total) by mouth every 12 (twelve) hours as needed for Nausea. 30 tablet 2    capecitabine (XELODA) 150 MG tablet Take 1 tablet (150 mg total) by mouth 2 (two) times daily for 14 days followed by 7 days off. Take with 1 other capecitabine prescription for 2,150 mg total. 28 tablet 2    capecitabine (XELODA) 150 MG tablet Take 1 tablet (150 mg total) by mouth 2 (two) times daily for 14 days followed by 7 days off. Take with 1 other capecitabine prescription for 2,150 mg total. 28 tablet 2    capecitabine (XELODA) 500 MG Tab Take 4 tablets (2,000 mg total) by mouth 2 (two) times daily for 14 days followed by 7 days off. Take with 1 other capecitabine prescription for 2,150 mg total. 112 tablet 2    capecitabine (XELODA) 500 MG Tab Take 4  tablets (2,000 mg total) by mouth 2 (two) times daily for 14 days followed by 7 days off. Take with 1 other capecitabine prescription for 2,150 mg total. 112 tablet 2    clotrimazole (LOTRIMIN) 1 % cream Apply 1 application topically every evening.      gabapentin (NEURONTIN) 300 MG capsule Take 1 capsule (300 mg total) by mouth 3 (three) times daily. 90 capsule 11    ketoconazole (NIZORAL) 2 % shampoo Apply topically Every 3 (three) days.      mupirocin (BACTROBAN) 2 % ointment Apply topically once daily. 30 g 0    oxyCODONE (ROXICODONE) 5 MG immediate release tablet Take 1 tablet (5 mg total) by mouth every 6 (six) hours as needed for Pain. 10 tablet 0    pantoprazole (PROTONIX) 40 MG tablet Take 1 tablet (40 mg total) by mouth once daily. 30 tablet 1     Current Facility-Administered Medications on File Prior to Visit   Medication Dose Route Frequency Provider Last Rate Last Admin    ceFAZolin 1 g, gentamicin 80 mg in sodium chloride 0.9% 500 mL irrigation   Irrigation On Call Procedure YASMANI Weiner MD        ceFAZolin 2 g in dextrose 5 % in water (D5W) 50 mL IVPB (MB+)  2 g Intravenous On Call Procedure Angie Peck PA-C        LIDOcaine (PF) 10 mg/ml (1%) injection 10 mg  1 mL Intradermal Once Angie Peck PA-C        mupirocin 2 % ointment   Nasal On Call Procedure Angie Peck PA-C   Given at 04/18/24 0609    sodium chloride 0.9% flush 10 mL  10 mL Intravenous PRN Angie Peck PA-C         Patient Active Problem List   Diagnosis    Malignant neoplasm of upper-outer quadrant of left breast in female, estrogen receptor negative    Chemotherapy induced neutropenia    Anemia in neoplastic disease    Primary hypertension    IUD (intrauterine device) in place    Severe obesity    Neoplasm related pain    Pancreatic insufficiency    Acute postoperative anemia due to expected blood loss    Decreased functional activity tolerance    Weakness of both upper extremities    Abnormal posture     At risk for lymphedema    Left arm swelling    Chest wall ulcer, with fat layer exposed     Past Surgical History:   Procedure Laterality Date    AXILLARY NODE DISSECTION Left 2023    Procedure: LYMPHADENECTOMY, AXILLARY;  Surgeon: YASMANI Weiner MD;  Location: [facility];  Service: General;  Laterality: Left;    BREAST BIOPSY Left      SECTION      COLONOSCOPY N/A 2022    Procedure: COLONOSCOPY;  Surgeon: Davi Chery MD;  Location: [facility] ENDO (4TH FLR);  Service: Endoscopy;  Laterality: N/A;  case request entered from referral - Per DR. KATHLEEN Chery Pt to be scheduled for urgent colonoscopy on 22/ fully vaccinated / prep ins for Sutab on portal - ERW  see micro for negative C-diff- ERW    INJECTION FOR SENTINEL NODE IDENTIFICATION Left 2023    Procedure: INJECTION, FOR SENTINEL NODE IDENTIFICATION;  Surgeon: YASMANI Weiner MD;  Location: [facility];  Service: General;  Laterality: Left;    INSERTION OF BREAST TISSUE EXPANDER Left 2023    Procedure: INSERTION, TISSUE EXPANDER, BREAST;  Surgeon: Reese Barrios DO;  Location: [facility];  Service: Plastics;  Laterality: Left;    INSERTION OF TUNNELED CENTRAL VENOUS CATHETER (CVC) WITH SUBCUTANEOUS PORT N/A 2022    Procedure: [id number];  Surgeon: Deo Sin Jr., MD;  [address];  Service: General;  Laterality: N/A;    INSERTION OF TUNNELED CENTRAL VENOUS CATHETER (CVC) WITH SUBCUTANEOUS PORT N/A 2022    Procedure: INSERTION, SINGLE LUMEN CATHETER WITH PORT, WITH FLUOROSCOPIC GUIDANCE;  Surgeon: Ryder Dinero MD;  Location: [facility] CATH LAB;  Service: Vascular;  Laterality: N/A;    INTRAVENOUS INJECTION N/A 2023    Procedure: spy;  Surgeon: Reese Barrios DO;  Location: [facility] OR 2ND FLR;  Service: Plastics;  Laterality: N/A;    LIPOSUCTION W/ FAT INJECTION Bilateral 2024    Procedure: LIPOSUCTION, WITH FAT TRANSFER;  Surgeon: Reese Barrios DO;  Location: [facility] OR [facility]  FLR;  Service: Plastics;  Laterality: Bilateral;  Lipo with fat grafting Bilat    MASTECTOMY Left 2/27/2023    Procedure: MASTECTOMY;  Surgeon: YASMANI Weiner MD;  Location: [facility];  Service: General;  Laterality: Left;  3.5 TOTAL HOURS / EMAIL SENT 2-23 @ 7:56 CCC    MASTECTOMY Right 12/11/2023    Procedure: MASTECTOMY;  Surgeon: Eldon Daley MD;  Location: [facility] OR 2ND FLR;  Service: General;  Laterality: Right;    RECONSTRUCTION OF BREAST WITH DEEP INFERIOR EPIGASTRIC ARTERY  (KANDICE) FREE FLAP Bilateral 12/11/2023    Procedure: RECONSTRUCTION, BREAST, USING KANDICE FREE FLAP;  Surgeon: Reese Barrios DO;  Location: [facility] OR 2ND FLR;  Service: Plastics;  Laterality: Bilateral;  Bilateral KANDICE flap - cosurgeon is Dr Cross -    REVISION OF SCAR Right 12/11/2023    Procedure: REVISION, SCAR;  Surgeon: Reese Barrios DO;  Location: [facility] OR 2ND FLR;  Service: Plastics;  Laterality: Right;    REVISION OF SCAR Right 4/18/2024    Procedure: REVISION, SCAR;  Surgeon: Reese Barrios DO;  Location: [facility] OR 2ND FLR;  Service: Plastics;  Laterality: Right;    REVISION, FLAP, PREVIOUSLY CREATED FOR BREAST RECONSTRUCTION Bilateral 4/18/2024    Procedure: REVISION, FLAP, PREVIOUSLY CREATED FOR BREAST RECONSTRUCTION;  Surgeon: Reese Barrios DO;  Location: [facility] OR 2ND FLR;  Service: Plastics;  Laterality: Bilateral;  2nd stage Autologous - Bilat breast revision    SENTINEL LYMPH NODE BIOPSY Left 2/27/2023    Procedure: BIOPSY, LYMPH NODE, SENTINEL;  Surgeon: YASMANI Weiner MD;  Location: [facility] OR;  Service: General;  Laterality: Left;  LEFT with radiological marker    TISSUE EXPANDER REMOVAL Left 12/11/2023    Procedure: REMOVAL, TISSUE EXPANDER;  Surgeon: Reese Barrios DO;  Location: [facility] OR 2ND FLR;  Service: Plastics;  Laterality: Left;  removal left tissu expander     Social History     Socioeconomic History    Marital status: Single    Number of children: 3   Tobacco Use     Smoking status: Never    Smokeless tobacco: Never   Substance and Sexual Activity    Alcohol use: Not Currently     Comment: social    Drug use: Yes     Types: Marijuana    Sexual activity: Not Currently   Social History Narrative    15-20 steps     Social Determinants of Health     Financial Resource Strain: Patient Declined (12/12/2023)    Overall Financial Resource Strain (CARDIA)     Difficulty of Paying Living Expenses: Patient declined   Recent Concern: Financial Resource Strain - Medium Risk (11/23/2023)    Overall Financial Resource Strain (CARDIA)     Difficulty of Paying Living Expenses: Somewhat hard   Food Insecurity: Patient Declined (12/12/2023)    Hunger Vital Sign     Worried About Running Out of Food in the Last Year: Patient declined     Ran Out of Food in the Last Year: Patient declined   Recent Concern: Food Insecurity - Food Insecurity Present (11/23/2023)    Hunger Vital Sign     Worried About Running Out of Food in the Last Year: Never true     Ran Out of Food in the Last Year: Sometimes true   Transportation Needs: Patient Declined (12/12/2023)    PRAPARE - Transportation     Lack of Transportation (Medical): Patient declined     Lack of Transportation (Non-Medical): Patient declined   Physical Activity: Patient Declined (12/12/2023)    Exercise Vital Sign     Days of Exercise per Week: Patient declined     Minutes of Exercise per Session: Patient declined   Stress: Patient Declined (12/12/2023)    [employer] [place] [employer] - Occupational Stress Questionnaire     Feeling of Stress : Patient declined   Housing Stability: Unknown (12/12/2023)    Housing Stability Vital Sign     Unable to Pay for Housing in the Last Year: Patient refused     Number of Places Lived in the Last Year: 2     Unstable Housing in the Last Year: Patient refused   Recent Concern: Housing Stability - High Risk (11/23/2023)    Housing Stability Vital Sign     Unable to Pay for Housing in the Last Year:  Yes     Number of Places Lived in the Last Year: 2     Unstable Housing in the Last Year: No       ROS: status post breast reconstruction      Objective:     Vitals:    07/02/24 1129   BP: 123/62   Pulse: 82       PHYSICAL EXAMINATION:   Physical Exam   Vitals reviewed.  Constitutional: She is oriented to person, place, and time.   HENT:   Head: Normocephalic and atraumatic.   Nose: Nose normal.   Eyes: Pupils are equal, round, and reactive to light. Right eye exhibits no discharge. Left eye exhibits no discharge.   Pulmonary/Chest: Effort normal and breath sounds normal. No stridor. No respiratory distress. She exhibits no mass, no tenderness and no edema. Right breast exhibits no inverted nipple, no mass, no nipple discharge, no skin change and no tenderness. Left breast exhibits no inverted nipple, no mass, no nipple discharge, no skin change and no tenderness. No breast swelling or bleeding. Breasts are symmetrical.   Abdominal: Normal appearance.   Genitourinary: No breast swelling or bleeding.   Neurological: She is alert and oriented to person, place, and time.   Skin: Skin is warm and dry.     Psychiatric: Her behavior is normal. Mood, judgment and thought content normal.        Assessment:     38 y.o. female status post bilateral KANDICE flap mastectomy here to discuss options for 3D nipple tattoo.     Plan:     - Patient is pleased with the outcome of her breast reconstruction and is now interested in 3D nipple tattooing.   - Patient is a good candidate for medical tattooing. She is well healed from her last surgical procedure.   - Procedure discussed in detail with the patient as were the risks and possible complications. She understands that the risks include but are not limited to bleeding, scarring, infection, pain, numbness, asymmetry, deformity, allergic reaction, failure to take, infection, skin necrosis, wound dehiscence and need for second stage tattoo.   - Post op instructions were reviewed with  verbal understanding. Print out provided.   - Patient would like to proceed, office staff to coordinate tattoo procedure date at patients convenience. All questions were answered. The patient was advised to call the clinic with any questions or concerns prior to their next visit.       Total time spent with the patient: 20.  10 minutes of face to face consultation and 10 minutes of chart review and coordination of care.

## 2024-07-02 NOTE — PROGRESS NOTES
Laura Kent presents to Plastic Surgery Clinic for a follow up visit status post bilateral KANDICE flap revision on 4/18/24. This included major revision of the right breast, revision of a right chest scar, left lateral chest dog ear excision and fat grafting to the left breast. She developed wound dehiscence from the right chest scar revision incision, where kenalog was injected. She has been treating this with local wound care and it is now fully healed.     PHYSICAL EXAMINATION  Bilateral breast flap revision demonstrates an improvement in contour.  Abdominal incision well healed with no issues   Abdominal donor site is unchanged   Umbilical incision well healed with no issues   Drains are not present       ASSESSMENT/PLAN  Laura Kent is s/p Bilateral KANDICE flap breast reconstruction, s/p revision  - Recommend continuing with scar care with Mositurizer and massage.  She is well  completely epithelialized  - Will refer for nipple tattoos  - She has some stiffness in the LUE (radiated side) will refer back to lymphedema PT  - Follow up 3 months      All questions were answered. The patient was advised to contact the clinic with any questions or concerns prior to their next visit.       Enoch Barrios, DO  Plastic and Reconstructive Surgery

## 2024-07-10 ENCOUNTER — CLINICAL SUPPORT (OUTPATIENT)
Dept: REHABILITATION | Facility: HOSPITAL | Age: 39
End: 2024-07-10
Payer: MEDICAID

## 2024-07-10 DIAGNOSIS — M25.612 DECREASED RANGE OF MOTION OF SHOULDER, LEFT: ICD-10-CM

## 2024-07-10 DIAGNOSIS — M79.89 LEFT ARM SWELLING: Primary | ICD-10-CM

## 2024-07-10 DIAGNOSIS — Z09 SURGERY FOLLOW-UP: ICD-10-CM

## 2024-07-10 DIAGNOSIS — M79.622 PAIN IN LEFT AXILLA: ICD-10-CM

## 2024-07-10 PROCEDURE — 97166 OT EVAL MOD COMPLEX 45 MIN: CPT

## 2024-07-10 PROCEDURE — 97530 THERAPEUTIC ACTIVITIES: CPT

## 2024-07-12 ENCOUNTER — PATIENT MESSAGE (OUTPATIENT)
Dept: SURGERY | Facility: CLINIC | Age: 39
End: 2024-07-12
Payer: MEDICAID

## 2024-07-14 PROBLEM — M25.612 DECREASED RANGE OF MOTION OF SHOULDER, LEFT: Status: ACTIVE | Noted: 2024-07-14

## 2024-07-14 PROBLEM — M79.622 PAIN IN LEFT AXILLA: Status: ACTIVE | Noted: 2024-07-14

## 2024-07-14 NOTE — PLAN OF CARE
[facility]  Occupational Therapy Initial Evaluation  Lymphedema                 Name: Laura Kent  Clinic Number: [id number]     Therapy Diagnosis:        Encounter Diagnoses   Name Primary?    S/P left mastectomy      Lymphedema      Malignant neoplasm of upper-outer quadrant of left breast in female, estrogen receptor negative Yes    At risk for lymphedema        Physician: Reese Barrios, *     Physician Orders: Eval and Treat  Medical Diagnosis: Z09 (ICD-10-CM) - Surgery follow-up   Surgical Procedure and Date: bilateral KANDICE flap revision on 4/18/24.   Evaluation Date: 7/10/2024  Insurance Authorization Period Expiration: 7/2/2025  Plan of Care Certification Period: 9/30/2024  Date of Return to MD: unknown  Visit # / Visits authorized: 1 / 1  FOTO: see media     Precautions:  Standard, cancer, and anemia , HTN     Time In: 8:20  Time Out: 9:00  Total Appointment Time (timed & untimed codes): 40 minutes (late arrival)     Subjective       Date of onset: Diagnostic breast imaging on 02/02/2022 identified a mass in the LEFT breast and enlarged axillary lymph nodes. Ultrasound-guided core needle biopsies on 03/14/2022 with pathology revealed Stage IIIC (cT3, cN2(f), cM0, G3, ER-, WY-, HER2-) IDC of the LEFT breast.     History of current condition - Laura reports: Last seen by orthopedic and lymphedema PT at [facility] in April. She was referred back for therapy by her plastic surgeon. She denies increased swelling of LUE and has not worn a compression sleeve since since April. She reports pain, stiffness, tightening, heaviness, and numbness of L breast and axilla, extending to midline of chest. Pain is greatest at night and improves with movement. She denies ROM limitations but endorses pain with overhead reaching. Her goal is to reduce stiffness and pain.     Diagnosis: Z09 (ICD-10-CM) - Surgery follow-up   Surgery:   - 12/11/2023: BILATERAL KANDICE flap reconstruction  "with LEFT TE removal and RIGHT mastectomy    - 2023: LEFT mastectomy with SLNB    - 2024: bilateral KANDICE flap revision      Treatment:   Chemotherapy: completed neoadjuvantly April-2022  Radiation: 5 weeks April/May 2023  Endocrine Therapy: N/A  Previous Lymphedema Therapy: yes, [place] with Yumiko Suárez, PT.  Wears Compression: not presently         Pain:  Functional Pain Scale Rating 0-10:   7/10 on average  2/10 at presently  4/10 at at best  Location: L breast, superior, central, and over R port incision  Description: "Soreness, pulling, tight, shooting"  Aggravating Factors: overhead reaching, lying flat  Easing Factors:  sitting up, self massage     Occupation:  [email or web address]         Functional Limitations/Social History:     Previous functional status includes: Independent with all ADLs.   Recent falls: none reported     Current Functional Status              Home/Living environment: lives with their family                          - 2 story home, 1 flight to enter                                                    Limitation of Functional Status as follows:              ADLs/IADLs:                           - Feeding: independent                          - Bathing: independent                          - Dressing/Grooming: pain with over head clothing                          - Home Management: difficulty/pain lifting/reaching (laundry, dishes, cooking)                          - Driving: independent                   Patient's Goals for Therapy: "the tightness and soreness, loosening it up, not feel so stiff all the time."     Past Medical History/Physical Systems Review:   Laura Kent  has a past medical history of Anemia, Anxiety, Breast cancer, Encounter for blood transfusion, Hypertension, Meningitis, Severe obesity, and Shortness of breath.     Laura Kent  has a past surgical history that includes Breast biopsy (Left);  " section; Insertion of tunneled central venous catheter (CVC) with subcutaneous port (N/A, 4/5/2022); Insertion of tunneled central venous catheter (CVC) with subcutaneous port (N/A, 8/1/2022); Colonoscopy (N/A, 9/22/2022); Mastectomy (Left, 2/27/2023); Maynard lymph node biopsy (Left, 2/27/2023); Injection for sentinel node identification (Left, 2/27/2023); Axillary node dissection (Left, 2/27/2023); Insertion of breast tissue expander (Left, 2/27/2023); Tissue expander removal (Left, 12/11/2023); Reconstruction of breast with deep inferior epigastric artery  (KANDICE) free flap (Bilateral, 12/11/2023); Revision of scar (Right, 12/11/2023); Intravenous injection (N/A, 12/11/2023); and Mastectomy (Right, 12/11/2023).     Laura has a current medication list which includes the following prescription(s): capecitabine, capecitabine, capecitabine, capecitabine, cetirizine, clotrimazole, gabapentin, hydroxyzine hcl, ketoconazole, metformin, mupirocin, ondansetron, and pantoprazole, and the following Facility-Administered Medications: ceFAZolin 1 g, gentamicin 80 mg in sodium chloride 0.9% 500 mL irrigation.          Review of patient's allergies indicates:   Allergen Reactions    Strawberries [strawberry] Hives         Pt denies: CHF,CKD, DVT, infection, severe PAD        Objective       Patient received from waiting room. She ambulated 350 feet to the treatment room with no difficulty  Mental status: alert, oriented to person, place, and time, normal mood, behavior, speech, dress, motor activity, and thought processes  Appearance: Casually dressed  Behavior:  calm, cooperative, and adequate rapport can be established  Attention Span and Concentration:  Normal     Affected Areas: LUE and Trunk-superior chest/breast  Shape: unremarkable   Tissue Texture: unremarkable  Skin Integrity: all incisions closed, dry, no signs of infection. R port incision with scabbing- see photo below.  Sensation: reports numbness,  "intermittent shooting/electric pains  Circulation: intact         LANDMARK RIGHT UE LEFT UE DIFF Left UE Diff Left UE   Date 4/5/24 4/5/24 4/5/24 4/16/24 4/16/24 7/10/24   E + 8" 41.5 cm 43 cm 1.5 cm 42 cm -1 44.0 cm   E + 6" 40 cm 41.5 cm 1.5 cm 41 cm -0.5 42.0 cm   E + 4" 39 cm 40.5 cm 1.5 cm 40.5 cm No chg 41.0 cm   E + 2" 36 cm 37.5 cm 1.5 cm 37.5 cm No chg 40.5 cm   Elbow 32 cm 32 cm 0 cm 31.5 cm -0.5 31.5 cm   W+ 8" 33 cm 34 cm 1 cm 34 cm No chg 33.5 cm   W +  6" 30 cm 30.5 cm 0.5 cm 31 cm +0.5 30.5 cm   W + 4" 25 cm 26 cm 1 cm 27 cm +1 25.5 cm   Wrist 18 cm 19 cm 1 cm 19 cm No chg 19.5 cm   DPC 22 cm 22 cm 0 cm 22 cm No chg 22.5 cm   IP Thumb 7.5 cm 7.5  cm 0 cm 7.5 cm No chg -        Strength: grossly assessed, 3+/5 throughout BUE's  ROM:   Not formally assessed this session- ITZ GALO shoulder flexion 80% of available range.        Treatment       Total Treatment time separate from Evaluation: 10 minutes    Therapeutic activity techniques were completed for 10 minutes including:    Soft tissue mobilization   L axilla- lateral, superior, inferior stretch 3 x 10   L axilla- inferior stretch with active shoulder flexion 1 x 10    Pt instructed to perform at home within pain free range.        Patient Education and Home Exercises       Education provided:   1. Educated on definition of lymphedema; signs and symptoms of lymphedema including sensation of heaviness in the arm/and or breast, taught and or shiny skin, decreased natural wrinkles in the skin, pitting marks when leaning on objects  2. Patient encouraged to report any of the above symptoms to her medical team should they occur     Assessment       Laura is a 38 y.o. female referred to outpatient Occupational Therapy with a medical diagnosis of surgery follow up s/p KANDICE revision. This patient presents with stage 1 lymphedema due to mastectomy, radiation, reconstruction.  Patient's deficits include swelling of the superior/central L breast, increased " pain, increased stiffness in the L >R shoulders, as well as difficulty performing lifting, carrying.   Therapist's recommendation includes soft tissue mobilization, scar management, strengthening, flexibility, postural control, elevation, exercise, manual lymphatic drainage, pneumatic compression pump, multi-layer bandaging, and compression garments for 6 weeks to reduce swelling and pain.      Plan of care discussed with patient: Yes  Patient's spiritual, cultural and educational needs considered and patient is agreeable to the plan of care.     Medical Necessity is demonstrated by the following  Occupational Profile/History  Co-morbidities and personal factors that may impact the plan of care [] LOW: Brief chart review  [x] MODERATE: Expanded chart review   [] HIGH: Extensive chart review     Moderate / High Support Documentation: See PMH.      Examination  Performance deficits relating to physical, cognitive or psychosocial skills that result in activity limitations and/or participation restrictions  [] LOW: addressing 1-3 Performance deficits  [x] MODERATE: 3-5 Performance deficits  [] HIGH: 5+ Performance deficits (please support below)     Moderate / High Support Documentation:     Physical:  Joint Mobility  Muscle Power/Strength  Skin Integrity/Scar Formation  Edema     Cognitive:  No Deficits     Psychosocial:    No Deficits      Treatment Options [] LOW: Limited options  [x] MODERATE: Several options  [] HIGH: Multiple options       Decision Making/ Complexity Score: moderate         The following goals were discussed with the patient and patient is in agreement with them as to be addressed in the treatment plan.      Goals:     Short Term Goals: (3 weeks)  Goals: Progressing / MET   1. Patient will demonstrate 100% knowledge of lymphedema precautions and signs of infection to allow for reduced lymphedema risk, infection risk, and/or exacerbation of condition.  NOT MET   2. Patient will tolerate daily  activities wearing multilayered bandaging to allow for lymphatic drainage support, skin elasticity, and reduction in shape and size of limb.  NOT MET   3. Patient and/or caregiver will order/obtain appropriate compression garments to maintain lymphatic and venous support.  NOT MET   4. Patient will show decreased total girth measurement in RUE by up to 1 cm to allow for lower extremity symmetry, shoe and clothing choice, and ability to apply needed compression. NOT MET   5. Patient will decrease pain by a 2 on a scale of 10 with overhead reaching during ADLs/functional activities. NOT MET      Long Term Goals: (4 weeks)  Goals: Progressing / MET   1. Patient and/or caregiver to chris/doff compression garment independently and with daily compliance to assist in lymphedema management, skin elasticity, and tissue density NOT MET   2. Patient and/or caregiver will be independent in use of pneumatic compression pump or self manual lymphatic drainage techniques to areas within reach to enhance lymphatic drainage and skin condition.  NOT MET   3. Patient to be independent and compliant with home exercise program to allow for increased function in affected limb. NOT MET   4. Patient will show decreased total girth measurement in RUE by up to 2 cm  to allow for lower extremity symmetry, shoe and clothing choice, and ability to apply needed compression daily. NOT MET   5. Patient will complete all overhead reaching activities during ADLs/functional activities without pain. NOT MET      Plan      Plan of Care Certification: 7/10/2024 to 9/30/2024        Syeda Rodriguez OT, CLWT        I CERTIFY  THIS PLAN OF TREATMENT AND WHILE UNDER MY CARE   Physician's comments:      Physician's Signature: ___________________________________________________

## 2024-07-25 ENCOUNTER — CLINICAL SUPPORT (OUTPATIENT)
Dept: REHABILITATION | Facility: HOSPITAL | Age: 39
End: 2024-07-25
Payer: MEDICAID

## 2024-07-25 DIAGNOSIS — M79.622 PAIN IN LEFT AXILLA: Primary | ICD-10-CM

## 2024-07-25 DIAGNOSIS — M25.612 DECREASED RANGE OF MOTION OF SHOULDER, LEFT: ICD-10-CM

## 2024-07-25 PROCEDURE — 97530 THERAPEUTIC ACTIVITIES: CPT

## 2024-07-25 NOTE — PROGRESS NOTES
[facility]  Occupational Therapy Treatment Note  Lymphedema    Date: 7/25/2024  Name: Laura Kent  Clinic Number: [id number]    Therapy Diagnosis:   Encounter Diagnoses   Name Primary?    Pain in left axilla Yes    Decreased range of motion of shoulder, left      Physician: Reese Barrios, *       Physician Orders: Eval and Treat  Medical Diagnosis: Z09 (ICD-10-CM) - Surgery follow-up   Surgical Procedure and Date: bilateral KANDICE flap revision on 4/18/24.   Evaluation Date: 7/10/2024  Insurance Authorization Period Expiration: 7/2/2025  Plan of Care Certification Period: 9/30/2024  Date of Return to MD: unknown  Visit # / Visits authorized: 1 / 12  FOTO: see media     Precautions:  Standard, cancer, and anemia , HTN    Time In: 8:00  Time Out: 9:05  Total Billable Time: 65 minutes    SUBJECTIVE     Pt reports: denies swelling. Continued pain with overhead reach and at night in supine and side lying. She is also experiencing pain in feet since starting new job standing all day at [employer].   Laura reported wearing compression outside of therapy visits: No  She was compliant with home exercise program given last session.   Response to previous treatment:no change  Functional change: none    Pain: 5/10  Location: LUE- axilla      OBJECTIVE     Pt arrived in NAD.    Compression garment status: does not own  Compression Rx status: not requested  Pneumatic pump status: not referred    Objective Measures updated at progress report unless specified.    Treatment     Laura received the treatments listed below:     Therapeutic activity techniques were completed for 65 minutes, including:    Chest stretch with foam roller 10 minutes  Pectoralis stretch- supine 3 x 10  Shoulder rolls- forward 3 x 10, backward 3 x 10  Shoulder wings 3 x 10  W stretch 3 x 10  V stretch 3 x 10  Walk walks L shoulder flexion 3 x 10  Soft tissue mobilization L axilla        Patient Education and Home  Exercises      Education provided:   - see above  - Progress towards goals     Written Home Exercises Provided: yes.  Exercises were reviewed and Laura was able to demonstrate them prior to the end of the session.  Laura demonstrated good  understanding of the HEP provided. See EMR under Patient Instructions for exercises provided during therapy sessions.       Assessment     Session focused on pectoralis stretch and scapular strengthening. Pt reported less pain with shoulder flexion/overhead reach at end of session. HEP provided to complete until next session.    Pt would continue to benefit from skilled OT.      Laura is progressing well towards her goals and there are no updates to goals at this time. Pt prognosis is Good.     Pt will continue to benefit from skilled outpatient occupational therapy to address the deficits listed in the problem list on initial evaluation provide pt/family education and to maximize pt's level of independence in the home and community environment.     Pt's spiritual, cultural and educational needs considered and pt agreeable to plan of care and goals.    Anticipated barriers to occupational therapy: none    Goals:    Short Term Goals: (3 weeks)  Goals: Progressing / MET   1. Patient will demonstrate 100% knowledge of lymphedema precautions and signs of infection to allow for reduced lymphedema risk, infection risk, and/or exacerbation of condition.  NOT MET   2. Patient will tolerate daily activities wearing multilayered bandaging to allow for lymphatic drainage support, skin elasticity, and reduction in shape and size of limb.  NOT MET   3. Patient and/or caregiver will order/obtain appropriate compression garments to maintain lymphatic and venous support.  NOT MET   4. Patient will show decreased total girth measurement in RUE by up to 1 cm to allow for lower extremity symmetry, shoe and clothing choice, and ability to apply needed compression. NOT MET   5. Patient will  decrease pain by a 2 on a scale of 10 with overhead reaching during ADLs/functional activities. NOT MET      Long Term Goals: (4 weeks)  Goals: Progressing / MET   1. Patient and/or caregiver to chris/doff compression garment independently and with daily compliance to assist in lymphedema management, skin elasticity, and tissue density NOT MET   2. Patient and/or caregiver will be independent in use of pneumatic compression pump or self manual lymphatic drainage techniques to areas within reach to enhance lymphatic drainage and skin condition.  NOT MET   3. Patient to be independent and compliant with home exercise program to allow for increased function in affected limb. NOT MET   4. Patient will show decreased total girth measurement in RUE by up to 2 cm  to allow for lower extremity symmetry, shoe and clothing choice, and ability to apply needed compression daily. NOT MET   5. Patient will complete all overhead reaching activities during ADLs/functional activities without pain. NOT MET          PLAN     Updates/Grading for next session: none    Syeda Rodriguez, OT, CLWT

## 2024-07-30 ENCOUNTER — CLINICAL SUPPORT (OUTPATIENT)
Dept: REHABILITATION | Facility: HOSPITAL | Age: 39
End: 2024-07-30
Payer: MEDICAID

## 2024-07-30 DIAGNOSIS — M79.622 PAIN IN LEFT AXILLA: Primary | ICD-10-CM

## 2024-07-30 DIAGNOSIS — M25.612 DECREASED RANGE OF MOTION OF SHOULDER, LEFT: ICD-10-CM

## 2024-07-30 PROCEDURE — 97530 THERAPEUTIC ACTIVITIES: CPT

## 2024-07-30 NOTE — PROGRESS NOTES
[facility]  Occupational Therapy Treatment Note  Lymphedema    Date: 7/30/2024  Name: Laura Kent  Clinic Number: [id number]    Therapy Diagnosis:   Encounter Diagnoses   Name Primary?    Pain in left axilla Yes    Decreased range of motion of shoulder, left      Physician: Reese Barrios, *       Physician Orders: Eval and Treat  Medical Diagnosis: Z09 (ICD-10-CM) - Surgery follow-up   Surgical Procedure and Date: bilateral KANDICE flap revision on 4/18/24.   Evaluation Date: 7/10/2024  Insurance Authorization Period Expiration: 7/2/2025  Plan of Care Certification Period: 9/30/2024  Date of Return to MD: unknown  Visit # / Visits authorized: 2 / 12  FOTO: see media     Precautions:  Standard, cancer, and anemia , HTN    Time In: 9:10  Time Out: 9:55  Total Billable Time: 45 minutes    SUBJECTIVE     Pt reports: Pain and stiffness greatly improved since last session  Laura reported wearing compression outside of therapy visits: No  She was compliant with home exercise program given last session: yes  Response to previous treatment:no change  Functional change: none    Pain: did not quantify  Location: LUE- axilla      OBJECTIVE     Pt arrived in NAD.    Compression garment status: does not own  Compression Rx status: not requested  Pneumatic pump status: not referred    Shoulder AROM  Pre-treatment  Flexion: 150 (L) 170 (R)  Abduction: 160 (L) 170 (R)  Post-treatment  Flexion: 170 (L) 170 (R)  Abduction: 170 (L) 170 (R)    Treatment     Laura received the treatments listed below:     Therapeutic activity techniques were completed for 45 minutes, including:    ROM assessed prior to treatment, see objective section.  Soft tissue mobilization completed with deep thumb circles inferior to L lateral incision.  Scar tissue mobilization completed with thumb circles along L lateral incision.   Soft tissue mobilization of L axilla with L shoulder in various planes of motion-  flexion, scaption, abduction, external rotation.  Sensory desensitization of of R port- massage, firm pressure  Soft tissue mobilization of R axilla with R shoulder in various planes of motion- flexion, scaption, abduction, external rotation.  ROM re-assessed after treatment, see objective section.    NOT TODAY:    Chest stretch with foam roller 10 minutes  Pectoralis stretch- supine 3 x 10  Shoulder rolls- forward 3 x 10, backward 3 x 10  Shoulder wings 3 x 10  W stretch 3 x 10  V stretch 3 x 10  Walk walks L shoulder flexion 3 x 10        Patient Education and Home Exercises      Education provided:   - see above  - continue same HEP.  - Progress towards goals     Written Home Exercises Provided: yes.  Exercises were reviewed and Laura was able to demonstrate them prior to the end of the session.  Laura demonstrated good  understanding of the HEP provided. See EMR under Patient Instructions for exercises provided during therapy sessions.       Assessment     Pain and stiffness greatly improved since last session and with HEP. Session focused on soft tissue mobilization of scar management. Shoulder ROM WFL after treatment! Anticipate short rehab course.     Pt would continue to benefit from skilled OT.      Laura is progressing well towards her goals and there are no updates to goals at this time. Pt prognosis is Good.     Pt will continue to benefit from skilled outpatient occupational therapy to address the deficits listed in the problem list on initial evaluation provide pt/family education and to maximize pt's level of independence in the home and community environment.     Pt's spiritual, cultural and educational needs considered and pt agreeable to plan of care and goals.    Anticipated barriers to occupational therapy: none    Goals:    Short Term Goals: (3 weeks)  Goals: Progressing / MET   1. Patient will demonstrate 100% knowledge of lymphedema precautions and signs of infection to allow for reduced  lymphedema risk, infection risk, and/or exacerbation of condition.  NOT MET   2. Patient will tolerate daily activities wearing multilayered bandaging to allow for lymphatic drainage support, skin elasticity, and reduction in shape and size of limb.  NOT MET   3. Patient and/or caregiver will order/obtain appropriate compression garments to maintain lymphatic and venous support.  NOT MET   4. Patient will show decreased total girth measurement in RUE by up to 1 cm to allow for lower extremity symmetry, shoe and clothing choice, and ability to apply needed compression. NOT MET   5. Patient will decrease pain by a 2 on a scale of 10 with overhead reaching during ADLs/functional activities. NOT MET      Long Term Goals: (4 weeks)  Goals: Progressing / MET   1. Patient and/or caregiver to chris/doff compression garment independently and with daily compliance to assist in lymphedema management, skin elasticity, and tissue density NOT MET   2. Patient and/or caregiver will be independent in use of pneumatic compression pump or self manual lymphatic drainage techniques to areas within reach to enhance lymphatic drainage and skin condition.  NOT MET   3. Patient to be independent and compliant with home exercise program to allow for increased function in affected limb. NOT MET   4. Patient will show decreased total girth measurement in RUE by up to 2 cm  to allow for lower extremity symmetry, shoe and clothing choice, and ability to apply needed compression daily. NOT MET   5. Patient will complete all overhead reaching activities during ADLs/functional activities without pain. NOT MET          PLAN     Updates/Grading for next session: none    Syeda Rodriguez, OT, CLWT

## 2024-07-31 ENCOUNTER — PROCEDURE VISIT (OUTPATIENT)
Dept: SURGERY | Facility: CLINIC | Age: 39
End: 2024-07-31
Payer: MEDICAID

## 2024-07-31 VITALS — BODY MASS INDEX: 39.15 KG/M2 | WEIGHT: 235 LBS | HEIGHT: 65 IN

## 2024-07-31 DIAGNOSIS — Z90.13 S/P MASTECTOMY, BILATERAL: ICD-10-CM

## 2024-07-31 DIAGNOSIS — Z85.3 PERSONAL HISTORY OF BREAST CANCER: Primary | ICD-10-CM

## 2024-07-31 DIAGNOSIS — Z42.8 ENCOUNTER FOR OTHER PLASTIC AND RECONSTRUCTIVE SURGERY FOLLOWING MEDICAL PROCEDURE OR HEALED INJURY: ICD-10-CM

## 2024-07-31 DIAGNOSIS — L81.8 TATTOOS: ICD-10-CM

## 2024-07-31 PROCEDURE — 11921 CORRECT SKN COLOR 6.1-20.0CM: CPT | Mod: PBBFAC | Performed by: PHYSICIAN ASSISTANT

## 2024-07-31 PROCEDURE — 11921 CORRECT SKN COLOR 6.1-20.0CM: CPT | Mod: S$PBB,,, | Performed by: PHYSICIAN ASSISTANT

## 2024-07-31 PROCEDURE — 11922 CORRECT SKIN COLOR EA 20.0CM: CPT | Mod: S$PBB,,, | Performed by: PHYSICIAN ASSISTANT

## 2024-07-31 PROCEDURE — 11922 CORRECT SKIN COLOR EA 20.0CM: CPT | Mod: PBBFAC | Performed by: PHYSICIAN ASSISTANT

## 2024-07-31 NOTE — PROCEDURES
[facility]  Department of Surgery        REFERRING PROVIDER: No referring provider defined for this encounter.    Chief Complaint: No chief complaint on file.    Laura Kent presents to [facility] Surgery [facility] on 7/31/2024 for bilateral 3 dimensional nipple tattoo for breast reconstruction. She is doing well today with no issue since her last visit. All incisions well healed. She denies pain, fever, chills, Nausea, vomiting, or other systemic signs of infection. She is pleased with the outcome of her breast reconstruction and would like to proceed with tattoos.     Review of patient's allergies indicates:   Allergen Reactions    Strawberries [strawberry] Hives     Current Outpatient Medications on File Prior to Visit   Medication Sig Dispense Refill    cetirizine (ZYRTEC) 10 MG tablet Take 10 mg by mouth once daily.      gabapentin (NEURONTIN) 300 MG capsule Take 1 capsule (300 mg total) by mouth 3 (three) times daily. 90 capsule 11    ibuprofen (ADVIL,MOTRIN) 600 MG tablet Take 1 tablet (600 mg total) by mouth every 8 (eight) hours as needed for Pain. 15 tablet 0    metFORMIN (GLUCOPHAGE-XR) 750 MG ER 24hr tablet Take 1 tablet (750 mg total) by mouth daily with breakfast. 90 tablet 1    ondansetron (ZOFRAN) 8 MG tablet Take 1 tablet (8 mg total) by mouth every 12 (twelve) hours as needed for Nausea. 30 tablet 2    capecitabine (XELODA) 150 MG tablet Take 1 tablet (150 mg total) by mouth 2 (two) times daily for 14 days followed by 7 days off. Take with 1 other capecitabine prescription for 2,150 mg total. 28 tablet 2    capecitabine (XELODA) 150 MG tablet Take 1 tablet (150 mg total) by mouth 2 (two) times daily for 14 days followed by 7 days off. Take with 1 other capecitabine prescription for 2,150 mg total. 28 tablet 2    capecitabine (XELODA) 500 MG Tab Take 4 tablets (2,000 mg total) by mouth 2 (two) times daily for 14 days followed by 7 days off. Take with 1 other capecitabine prescription  for 2,150 mg total. 112 tablet 2    capecitabine (XELODA) 500 MG Tab Take 4 tablets (2,000 mg total) by mouth 2 (two) times daily for 14 days followed by 7 days off. Take with 1 other capecitabine prescription for 2,150 mg total. 112 tablet 2    pantoprazole (PROTONIX) 40 MG tablet Take 1 tablet (40 mg total) by mouth once daily. 30 tablet 1     Current Facility-Administered Medications on File Prior to Visit   Medication Dose Route Frequency Provider Last Rate Last Admin    ceFAZolin 1 g, gentamicin 80 mg in sodium chloride 0.9% 500 mL irrigation   Irrigation On Call Procedure YASMANI Weiner MD        ceFAZolin 2 g in dextrose 5 % in water (D5W) 50 mL IVPB (MB+)  2 g Intravenous On Call Procedure Angie Peck PA-C        LIDOcaine (PF) 10 mg/ml (1%) injection 10 mg  1 mL Intradermal Once Angie Peck PA-C        mupirocin 2 % ointment   Nasal On Call Procedure Angie Peck PA-C   Given at 04/18/24 0609    sodium chloride 0.9% flush 10 mL  10 mL Intravenous PRN Angie Peck PA-C         Patient Active Problem List   Diagnosis    Malignant neoplasm of upper-outer quadrant of left breast in female, estrogen receptor negative    Chemotherapy induced neutropenia    Anemia in neoplastic disease    Primary hypertension    IUD (intrauterine device) in place    Severe obesity    Neoplasm related pain    Pancreatic insufficiency    Acute postoperative anemia due to expected blood loss    Decreased functional activity tolerance    Weakness of both upper extremities    Abnormal posture    At risk for lymphedema    Left arm swelling    Chest wall ulcer, with fat layer exposed    Pain in left axilla    Decreased range of motion of shoulder, left     [unfilled]  Social History     Socioeconomic History    Marital status: Single    Number of children: 3   Tobacco Use    Smoking status: Never    Smokeless tobacco: Never   Substance and Sexual Activity    Alcohol use: Not Currently     Comment: social    Drug  use: Yes     Types: Marijuana    Sexual activity: Not Currently   Social History Narrative    15-20 steps     Social Determinants of Health     Financial Resource Strain: Patient Declined (12/12/2023)    Overall Financial Resource Strain (CARDIA)     Difficulty of Paying Living Expenses: Patient declined   Recent Concern: Financial Resource Strain - Medium Risk (11/23/2023)    Overall Financial Resource Strain (CARDIA)     Difficulty of Paying Living Expenses: Somewhat hard   Food Insecurity: Patient Declined (12/12/2023)    Hunger Vital Sign     Worried About Running Out of Food in the Last Year: Patient declined     Ran Out of Food in the Last Year: Patient declined   Recent Concern: Food Insecurity - Food Insecurity Present (11/23/2023)    Hunger Vital Sign     Worried About Running Out of Food in the Last Year: Never true     Ran Out of Food in the Last Year: Sometimes true   Transportation Needs: Patient Declined (12/12/2023)    PRAPARE - Transportation     Lack of Transportation (Medical): Patient declined     Lack of Transportation (Non-Medical): Patient declined   Physical Activity: Patient Declined (12/12/2023)    Exercise Vital Sign     Days of Exercise per Week: Patient declined     Minutes of Exercise per Session: Patient declined   Stress: Patient Declined (12/12/2023)    [employer] [place] [employer] - Occupational Stress Questionnaire     Feeling of Stress : Patient declined   Housing Stability: Unknown (12/12/2023)    Housing Stability Vital Sign     Unable to Pay for Housing in the Last Year: Patient refused     Number of Places Lived in the Last Year: 2     Unstable Housing in the Last Year: Patient refused   Recent Concern: Housing Stability - High Risk (11/23/2023)    Housing Stability Vital Sign     Unable to Pay for Housing in the Last Year: Yes     Number of Places Lived in the Last Year: 2     Unstable Housing in the Last Year: No         PROCEDURE NOTE     Procedure for 3  dimensional nipple tattooing was discussed in detail with the patient as were the risks and possible complications. She understands that the risks include but are not limited to bleeding, scarring, infection, pain, asymmetry, allergic reaction, failure to take and need for second stage tattoo. She understands that greater than 50% of patients require a second stage tattoo procedure for better saturation and 3 dimensional shading. After all questions were answered, the patient signed the consent form and that will be scanned into her medical record.     4.4 cm circular guide was used for nipple areolar complex placement on both breast (8.8 cm total), patient visualized placement in exam room mirror and agreed to proceed with placement. Colors for nipple and areola were mixed and tested on skin of breast, patient agreed to proceed with colors tested. The breasts were then prepped with chlora prep. The nipple areola complexes were then tattooed using 3 dimensional technique. There was positive punctate bleeding noted. Breast were cleaned and a tegaderm was applied to tattoo.   Patient tolerated the procedure well. There were no complications.     Post op instructions and care were discussed in detail with the patient. Tegaderm to remain in place for 3 days then removed and washed with soap and water then apply AquaPhor or Eucerin to tattoos as needed for moisturizer twice daily. Patient voiced understanding. A written copy of post op care was also provided. All questions were answered. She will return to clinic in 4 weeks.     The patient was advised to call the clinic with any questions or concerns prior to their next visit.       Lucie Jordan PA-C  Breast Surgery

## 2024-08-05 ENCOUNTER — CLINICAL SUPPORT (OUTPATIENT)
Dept: REHABILITATION | Facility: HOSPITAL | Age: 39
End: 2024-08-05
Payer: MEDICAID

## 2024-08-05 DIAGNOSIS — L03.90 CELLULITIS, UNSPECIFIED CELLULITIS SITE: Primary | ICD-10-CM

## 2024-08-05 DIAGNOSIS — M25.612 DECREASED RANGE OF MOTION OF SHOULDER, LEFT: ICD-10-CM

## 2024-08-05 DIAGNOSIS — M79.622 PAIN IN LEFT AXILLA: Primary | ICD-10-CM

## 2024-08-05 PROCEDURE — 97530 THERAPEUTIC ACTIVITIES: CPT

## 2024-08-05 RX ORDER — SULFAMETHOXAZOLE AND TRIMETHOPRIM 800; 160 MG/1; MG/1
1 TABLET ORAL 2 TIMES DAILY
Qty: 14 TABLET | Refills: 0 | Status: SHIPPED | OUTPATIENT
Start: 2024-08-05 | End: 2024-08-12

## 2024-08-07 ENCOUNTER — TELEPHONE (OUTPATIENT)
Dept: PLASTIC SURGERY | Facility: CLINIC | Age: 39
End: 2024-08-07
Payer: MEDICAID

## 2024-08-09 ENCOUNTER — OFFICE VISIT (OUTPATIENT)
Dept: PLASTIC SURGERY | Facility: CLINIC | Age: 39
End: 2024-08-09
Payer: MEDICAID

## 2024-08-09 VITALS
HEART RATE: 82 BPM | BODY MASS INDEX: 39.15 KG/M2 | HEIGHT: 65 IN | DIASTOLIC BLOOD PRESSURE: 62 MMHG | WEIGHT: 235 LBS | SYSTOLIC BLOOD PRESSURE: 123 MMHG

## 2024-08-09 DIAGNOSIS — C50.412 MALIGNANT NEOPLASM OF UPPER-OUTER QUADRANT OF LEFT BREAST IN FEMALE, ESTROGEN RECEPTOR NEGATIVE: Primary | ICD-10-CM

## 2024-08-09 DIAGNOSIS — Z17.1 MALIGNANT NEOPLASM OF UPPER-OUTER QUADRANT OF LEFT BREAST IN FEMALE, ESTROGEN RECEPTOR NEGATIVE: Primary | ICD-10-CM

## 2024-08-09 DIAGNOSIS — N64.4 BREAST PAIN: ICD-10-CM

## 2024-08-09 PROCEDURE — 99999 PR PBB SHADOW E&M-EST. PATIENT-LVL III: CPT | Mod: PBBFAC,,, | Performed by: PHYSICIAN ASSISTANT

## 2024-08-09 PROCEDURE — 99213 OFFICE O/P EST LOW 20 MIN: CPT | Mod: PBBFAC | Performed by: PHYSICIAN ASSISTANT

## 2024-08-09 RX ORDER — PREGABALIN 75 MG/1
75 CAPSULE ORAL 2 TIMES DAILY PRN
Qty: 60 CAPSULE | Refills: 0 | Status: SHIPPED | OUTPATIENT
Start: 2024-08-09 | End: 2024-09-08

## 2024-08-09 RX ORDER — LIDOCAINE 50 MG/G
1 PATCH TOPICAL DAILY PRN
Qty: 30 PATCH | Refills: 0 | Status: SHIPPED | OUTPATIENT
Start: 2024-08-09

## 2024-08-12 ENCOUNTER — TELEPHONE (OUTPATIENT)
Dept: RADIOLOGY | Facility: HOSPITAL | Age: 39
End: 2024-08-12
Payer: MEDICAID

## 2024-08-12 NOTE — TELEPHONE ENCOUNTER
----- Message from Itzel Zhang RN sent at 8/9/2024 10:21 AM CDT -----  Regarding: U/S Breast  Hi Benson:    Have this lady who needs an U/S - order is put in and wanted to let you know.    Please reach out to her at your earliest convenience. She requested before 10 am if possible any day of the week.        Kindly,  JONNA Robbins BSN   Nurse Navigator  Plastic Surgery Dept.

## 2024-08-13 ENCOUNTER — HOSPITAL ENCOUNTER (OUTPATIENT)
Dept: RADIOLOGY | Facility: HOSPITAL | Age: 39
Discharge: HOME | End: 2024-08-13
Attending: PHYSICIAN ASSISTANT
Payer: MEDICAID

## 2024-08-13 DIAGNOSIS — N64.4 BREAST PAIN: ICD-10-CM

## 2024-08-13 DIAGNOSIS — Z17.1 MALIGNANT NEOPLASM OF UPPER-OUTER QUADRANT OF LEFT BREAST IN FEMALE, ESTROGEN RECEPTOR NEGATIVE: ICD-10-CM

## 2024-08-13 DIAGNOSIS — C50.412 MALIGNANT NEOPLASM OF UPPER-OUTER QUADRANT OF LEFT BREAST IN FEMALE, ESTROGEN RECEPTOR NEGATIVE: ICD-10-CM

## 2024-08-13 PROCEDURE — 76642 ULTRASOUND BREAST LIMITED: CPT | Mod: TC,50

## 2024-08-13 PROCEDURE — 76642 ULTRASOUND BREAST LIMITED: CPT | Mod: 26,50,, | Performed by: RADIOLOGY

## 2024-08-20 ENCOUNTER — TELEPHONE (OUTPATIENT)
Dept: PLASTIC SURGERY | Facility: CLINIC | Age: 39
End: 2024-08-20
Payer: MEDICAID

## 2024-08-20 ENCOUNTER — PATIENT MESSAGE (OUTPATIENT)
Dept: PLASTIC SURGERY | Facility: CLINIC | Age: 39
End: 2024-08-20
Payer: MEDICAID

## 2024-08-20 NOTE — TELEPHONE ENCOUNTER
Called patient to f/u on US results. Discussed the seroma is unlikely to be caused from massage. Recommend she continue lymphedema therapy without restrictions except to avoid areas that cause her pain. She was offered a f/u appointment and will see us this Thursday.

## 2024-08-20 NOTE — TELEPHONE ENCOUNTER
Called patient to give time on this Thursday clinic for 100pm no answer unable to leave voice mail so I left a portal message , thank you  
no

## 2024-08-21 ENCOUNTER — PATIENT MESSAGE (OUTPATIENT)
Dept: PLASTIC SURGERY | Facility: CLINIC | Age: 39
End: 2024-08-21
Payer: MEDICAID

## 2024-08-21 ENCOUNTER — TELEPHONE (OUTPATIENT)
Dept: PLASTIC SURGERY | Facility: CLINIC | Age: 39
End: 2024-08-21
Payer: MEDICAID

## 2024-08-21 NOTE — TELEPHONE ENCOUNTER
Hi , This patient portal messaged me back aware of appointment time change for tomorrow clinic in [facility], thank you

## 2024-08-22 ENCOUNTER — OFFICE VISIT (OUTPATIENT)
Dept: PLASTIC SURGERY | Facility: CLINIC | Age: 39
End: 2024-08-22
Payer: MEDICAID

## 2024-08-22 VITALS
SYSTOLIC BLOOD PRESSURE: 137 MMHG | OXYGEN SATURATION: 98 % | HEART RATE: 75 BPM | DIASTOLIC BLOOD PRESSURE: 76 MMHG | RESPIRATION RATE: 19 BRPM

## 2024-08-22 DIAGNOSIS — Z17.1 MALIGNANT NEOPLASM OF UPPER-OUTER QUADRANT OF LEFT BREAST IN FEMALE, ESTROGEN RECEPTOR NEGATIVE: Primary | ICD-10-CM

## 2024-08-22 DIAGNOSIS — C50.412 MALIGNANT NEOPLASM OF UPPER-OUTER QUADRANT OF LEFT BREAST IN FEMALE, ESTROGEN RECEPTOR NEGATIVE: Primary | ICD-10-CM

## 2024-08-22 PROCEDURE — 3075F SYST BP GE 130 - 139MM HG: CPT | Mod: CPTII,,, | Performed by: SURGERY

## 2024-08-22 PROCEDURE — 3078F DIAST BP <80 MM HG: CPT | Mod: CPTII,,, | Performed by: SURGERY

## 2024-08-22 PROCEDURE — 99999 PR PBB SHADOW E&M-EST. PATIENT-LVL III: CPT | Mod: PBBFAC,,, | Performed by: SURGERY

## 2024-08-22 PROCEDURE — 99213 OFFICE O/P EST LOW 20 MIN: CPT | Mod: S$PBB,,, | Performed by: SURGERY

## 2024-08-22 PROCEDURE — 87070 CULTURE OTHR SPECIMN AEROBIC: CPT | Performed by: SURGERY

## 2024-08-22 PROCEDURE — 87075 CULTR BACTERIA EXCEPT BLOOD: CPT | Performed by: SURGERY

## 2024-08-22 PROCEDURE — 99213 OFFICE O/P EST LOW 20 MIN: CPT | Mod: PBBFAC | Performed by: SURGERY

## 2024-08-22 RX ORDER — SULFAMETHOXAZOLE AND TRIMETHOPRIM 800; 160 MG/1; MG/1
1 TABLET ORAL 2 TIMES DAILY
Qty: 14 TABLET | Refills: 0 | Status: SHIPPED | OUTPATIENT
Start: 2024-08-22 | End: 2024-08-29

## 2024-08-22 NOTE — PROGRESS NOTES
"  Laura Kent presents to Plastic Surgery Clinic for a follow up of chronic breast pain following breast reconstruction. She underwent bilateral KANDICE flap reconstruction on 12/11/23 with revision surgery on 4/18/24. 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 I saw her a couple of weeks ago with recurrence of similar symptoms. An US was performed on 8/13/24 of the "the patient's palpable area of concern at the 10 o'clock position of the left breast 8 cm from the nipple. There is an anechoic fluid collection measuring 5.6 cm x 1.1 cm x 4.1 cm. There is internal debris within this collection however no internal vascularity. There is no edema of the subcutaneous tissues or the skin around this area."    She denies fever, chills, nausea, vomiting or other systemic signs of infection 4/18/24.      ROS - negative, other than stated above    PHYSICAL EXAMINATION  Vitals:    08/22/24 1335   BP: 137/76   Pulse: 75   Resp: 19       Gen: NAD, appears stated age  Neuro: normal without focal findings, mental status and speech normal  HEENT: NCAT, neck supple, PEERL  CV: RRR  Pulm: Breathing non-labored, chest wall movement equal bilaterally   Breast:  Bilateral reconstructed breasts are soft. Nipple tattoos in place. Left breast at 10 oclock there is a swollen hyperpigmented area.  Abdomen: soft, nontender, no guarding  Gu: genitalia not examined  Extremity:normal strength, no cyanosis or edema  Skin: hyperpigmentation and slight erythema of left breast  Psych: oriented to time, place and person      ASSESSMENT/PLAN  38 y.o. female s/p KANDICE flap reconstruction    ***    All questions were answered. The patient was advised to contact the clinic with any questions or concerns prior to their next visit.   RTC x *** week(s), appointment scheduled.    Enoch Barrios, DO  Plastic and Reconstructive Surgery      I spent a total of *** minutes on the day of the " visit.This includes face to face time and non-face to face time preparing to see the patient (eg, review of tests), obtaining and/or reviewing separately obtained history, documenting clinical information in the electronic or other health record, independently interpreting results and communicating results to the patient/family/caregiver, or care coordinator.

## 2024-08-25 LAB — BACTERIA SPEC AEROBE CULT: NO GROWTH

## 2024-08-26 ENCOUNTER — CLINICAL SUPPORT (OUTPATIENT)
Dept: REHABILITATION | Facility: HOSPITAL | Age: 39
End: 2024-08-26
Payer: MEDICAID

## 2024-08-26 DIAGNOSIS — M25.612 DECREASED RANGE OF MOTION OF SHOULDER, LEFT: ICD-10-CM

## 2024-08-26 DIAGNOSIS — M79.622 PAIN IN LEFT AXILLA: Primary | ICD-10-CM

## 2024-08-26 LAB — BACTERIA SPEC ANAEROBE CULT: NORMAL

## 2024-08-26 PROCEDURE — 97530 THERAPEUTIC ACTIVITIES: CPT

## 2024-08-26 NOTE — PROGRESS NOTES
[facility]  Occupational Therapy Progress Note  Lymphedema    Date: 8/26/2024  Name: Laura Kent  Clinic Number: [id number]    Therapy Diagnosis:   Encounter Diagnoses   Name Primary?    Pain in left axilla Yes    Decreased range of motion of shoulder, left      Physician: Reese Barrios, *       Physician Orders: Eval and Treat  Medical Diagnosis: Z09 (ICD-10-CM) - Surgery follow-up   Surgical Procedure and Date: bilateral KANDICE flap revision on 4/18/24.   Evaluation Date: 7/10/2024  Insurance Authorization Period Expiration: 7/2/2025  Plan of Care Certification Period: 9/30/2024  Date of Return to MD: unknown  Visit # / Visits authorized: 4 / 12  FOTO: see media     Precautions:  Standard, cancer, and anemia , HTN    Time In: 9:12  Time Out: 10:00  Total Billable Time: 48 minutes    SUBJECTIVE     Pt reports: She was cleared to return to therapy by plastic surgery team. She continues to have pain across chest and denies any improvement in symptoms. She was placed on an antibiotic.   Laura reported wearing compression outside of therapy visits: No  She was compliant with home exercise program given last session:   Response to previous treatment:no change  Functional change: none    Pain: 7/10  Location: L breast      OBJECTIVE     Pt arrived reporting pain.    Compression garment status: does not own  Compression Rx status: not requested  Pneumatic pump status: not referred        Treatment     Laura received the treatments listed below:     Therapeutic activity techniques were completed for 48 minutes, including:    Soft tissue mobilization completed with deep thumb circles inferior and superior to L lateral incision.  Scar tissue mobilization completed with thumb circles along L lateral incision.   Soft tissue mobilization of L axilla with L shoulder in various planes of motion- flexion, scaption, abduction, external rotation.  Sensory desensitization of of R port-  massage, firm pressure  Soft tissue mobilization of R axilla with R shoulder in various planes of motion- flexion, scaption, abduction, external rotation.  Scar tissue mobilization completed with thumb circles along R lateral incision.     BUE: shoulder flexion wall walks with 10' hold at end range 3 x 10  W stretch 6 x 10' hold           Patient Education and Home Exercises      Education provided:   - complete shoulder exercises in pain free range only  - Progress towards goals     Written Home Exercises Provided: yes.  Exercises were reviewed and Laura was able to demonstrate them prior to the end of the session.  Laura demonstrated good  understanding of the HEP provided. See EMR under Patient Instructions for exercises provided during therapy sessions.       Assessment     Cleared by surgical team to return to therapy without restrictions. Pt continues to report increased pain across chest and stiffness of L shoulder. Session focused on soft tissue and scar mobilization of b/l axillary regions. Deferred manual therapy to chest this session. Pt to resume HEP within pain free range.    Pt would continue to benefit from skilled OT.      Laura is progressing well towards her goals and there are no updates to goals at this time. Pt prognosis is Good.     Pt will continue to benefit from skilled outpatient occupational therapy to address the deficits listed in the problem list on initial evaluation provide pt/family education and to maximize pt's level of independence in the home and community environment.     Pt's spiritual, cultural and educational needs considered and pt agreeable to plan of care and goals.    Anticipated barriers to occupational therapy: none    Goals:    Short Term Goals: (3 weeks)  Goals: Progressing / MET   1. Patient will demonstrate 100% knowledge of lymphedema precautions and signs of infection to allow for reduced lymphedema risk, infection risk, and/or exacerbation of condition.  NOT  MET   2. Patient will tolerate daily activities wearing multilayered bandaging to allow for lymphatic drainage support, skin elasticity, and reduction in shape and size of limb.  NOT MET   3. Patient and/or caregiver will order/obtain appropriate compression garments to maintain lymphatic and venous support.  NOT MET   4. Patient will show decreased total girth measurement in RUE by up to 1 cm to allow for lower extremity symmetry, shoe and clothing choice, and ability to apply needed compression. NOT MET   5. Patient will decrease pain by a 2 on a scale of 10 with overhead reaching during ADLs/functional activities. NOT MET      Long Term Goals: (4 weeks)  Goals: Progressing / MET   1. Patient and/or caregiver to chris/doff compression garment independently and with daily compliance to assist in lymphedema management, skin elasticity, and tissue density NOT MET   2. Patient and/or caregiver will be independent in use of pneumatic compression pump or self manual lymphatic drainage techniques to areas within reach to enhance lymphatic drainage and skin condition.  NOT MET   3. Patient to be independent and compliant with home exercise program to allow for increased function in affected limb. NOT MET   4. Patient will show decreased total girth measurement in RUE by up to 2 cm  to allow for lower extremity symmetry, shoe and clothing choice, and ability to apply needed compression daily. NOT MET   5. Patient will complete all overhead reaching activities during ADLs/functional activities without pain. NOT MET          PLAN     Updates/Grading for next session: none    Syeda Rodriguez, OT, CLWT

## 2024-08-28 ENCOUNTER — CLINICAL SUPPORT (OUTPATIENT)
Dept: REHABILITATION | Facility: HOSPITAL | Age: 39
End: 2024-08-28
Payer: MEDICAID

## 2024-08-28 DIAGNOSIS — M25.612 DECREASED RANGE OF MOTION OF SHOULDER, LEFT: ICD-10-CM

## 2024-08-28 DIAGNOSIS — M79.622 PAIN IN LEFT AXILLA: Primary | ICD-10-CM

## 2024-08-28 PROCEDURE — 97530 THERAPEUTIC ACTIVITIES: CPT

## 2024-08-28 NOTE — PROGRESS NOTES
[facility]  Occupational Therapy Treatment Note  Lymphedema    Date: 8/28/2024  Name: Laura Kent  Clinic Number: [id number]    Therapy Diagnosis:   Encounter Diagnoses   Name Primary?    Pain in left axilla Yes    Decreased range of motion of shoulder, left      Physician: Reese Barrios, *       Physician Orders: Eval and Treat  Medical Diagnosis: Z09 (ICD-10-CM) - Surgery follow-up   Surgical Procedure and Date: bilateral KANDICE flap revision on 4/18/24.   Evaluation Date: 7/10/2024  Insurance Authorization Period Expiration: 7/2/2025  Plan of Care Certification Period: 9/30/2024  Date of Return to MD: unknown  Visit # / Visits authorized: 5 / 12  FOTO: see media     Precautions:  Standard, cancer, and anemia , HTN    Time In: 9:20  Time Out: 10:00  Total Billable Time: 40 minutes (late arrival)    SUBJECTIVE     Pt reports: L shoulder overall feels less stiff. Continued pain and erythema across L breast. She is seeing plastic surgeon tomorrow.   Laura reported wearing compression outside of therapy visits: No  She was compliant with home exercise program given last session:  yes  Response to previous treatment:no change  Functional change: none    Pain: 7/10  Location: L breast      OBJECTIVE     Pt arrived reporting pain but increased flexion of L shoulder.     Compression garment status: does not own  Compression Rx status: not requested  Pneumatic pump status: not referred        Treatment     Laura received the treatments listed below:     Therapeutic activity techniques were completed for 40 minutes, including:    Soft tissue mobilization of L axilla with L shoulder in various planes of motion- flexion, scaption, abduction, external rotation.  Soft tissue mobilization completed with deep thumb circles inferior and superior to L lateral incision.  Scar tissue mobilization completed with thumb circles along L lateral incision.     NOT TODAY:  Sensory  desensitization of of R port- massage, firm pressure  Soft tissue mobilization of R axilla with R shoulder in various planes of motion- flexion, scaption, abduction, external rotation.  Scar tissue mobilization completed with thumb circles along R lateral incision.   BUE: shoulder flexion wall walks with 10' hold at end range 3 x 10  W stretch 6 x 10' hold           Patient Education and Home Exercises      Education provided:   - complete shoulder exercises in pain free range only  - Progress towards goals     Written Home Exercises Provided: yes.  Exercises were reviewed and Laura was able to demonstrate them prior to the end of the session.  Laura demonstrated good  understanding of the HEP provided. See EMR under Patient Instructions for exercises provided during therapy sessions.       Assessment     L shoulder overall less stiff. ROM WFL at end of session though pulling sensation continues. Three cords now visible in L axilla. Plan to spend next session on cording and anticipate continued flexibility and ROM without pulling.     Pt would continue to benefit from skilled OT.      Laura is progressing well towards her goals and there are no updates to goals at this time. Pt prognosis is Good.     Pt will continue to benefit from skilled outpatient occupational therapy to address the deficits listed in the problem list on initial evaluation provide pt/family education and to maximize pt's level of independence in the home and community environment.     Pt's spiritual, cultural and educational needs considered and pt agreeable to plan of care and goals.    Anticipated barriers to occupational therapy: none    Goals:    Short Term Goals: (3 weeks)  Goals: Progressing / MET   1. Patient will demonstrate 100% knowledge of lymphedema precautions and signs of infection to allow for reduced lymphedema risk, infection risk, and/or exacerbation of condition.  NOT MET   2. Patient will tolerate daily activities wearing  multilayered bandaging to allow for lymphatic drainage support, skin elasticity, and reduction in shape and size of limb.  NOT MET   3. Patient and/or caregiver will order/obtain appropriate compression garments to maintain lymphatic and venous support.  NOT MET   4. Patient will show decreased total girth measurement in RUE by up to 1 cm to allow for lower extremity symmetry, shoe and clothing choice, and ability to apply needed compression. NOT MET   5. Patient will decrease pain by a 2 on a scale of 10 with overhead reaching during ADLs/functional activities. NOT MET      Long Term Goals: (4 weeks)  Goals: Progressing / MET   1. Patient and/or caregiver to chris/doff compression garment independently and with daily compliance to assist in lymphedema management, skin elasticity, and tissue density NOT MET   2. Patient and/or caregiver will be independent in use of pneumatic compression pump or self manual lymphatic drainage techniques to areas within reach to enhance lymphatic drainage and skin condition.  NOT MET   3. Patient to be independent and compliant with home exercise program to allow for increased function in affected limb. NOT MET   4. Patient will show decreased total girth measurement in RUE by up to 2 cm  to allow for lower extremity symmetry, shoe and clothing choice, and ability to apply needed compression daily. NOT MET   5. Patient will complete all overhead reaching activities during ADLs/functional activities without pain. NOT MET          PLAN     Updates/Grading for next session: none    Syeda Rodriguez, OT, CLWT

## 2024-08-29 ENCOUNTER — PATIENT MESSAGE (OUTPATIENT)
Dept: OBSTETRICS AND GYNECOLOGY | Facility: CLINIC | Age: 39
End: 2024-08-29
Payer: MEDICAID

## 2024-09-01 ENCOUNTER — HOSPITAL ENCOUNTER (EMERGENCY)
Facility: HOSPITAL | Age: 39
Discharge: HOME | End: 2024-09-01
Attending: STUDENT IN AN ORGANIZED HEALTH CARE EDUCATION/TRAINING PROGRAM
Payer: MEDICAID

## 2024-09-01 VITALS
TEMPERATURE: 98 F | SYSTOLIC BLOOD PRESSURE: 118 MMHG | BODY MASS INDEX: 39.11 KG/M2 | WEIGHT: 235 LBS | RESPIRATION RATE: 16 BRPM | HEART RATE: 81 BPM | OXYGEN SATURATION: 97 % | DIASTOLIC BLOOD PRESSURE: 73 MMHG

## 2024-09-01 DIAGNOSIS — N61.0 CELLULITIS OF LEFT BREAST: Primary | ICD-10-CM

## 2024-09-01 LAB
ALBUMIN SERPL BCP-MCNC: 3.8 G/DL (ref 3.5–5.2)
ALP SERPL-CCNC: 46 U/L (ref 55–135)
ALT SERPL W/O P-5'-P-CCNC: 8 U/L (ref 10–44)
ANION GAP SERPL CALC-SCNC: 8 MMOL/L (ref 8–16)
AST SERPL-CCNC: 20 U/L (ref 10–40)
B-HCG UR QL: NEGATIVE
BASOPHILS # BLD AUTO: 0.02 K/UL (ref 0–0.2)
BASOPHILS NFR BLD: 0.4 % (ref 0–1.9)
BILIRUB SERPL-MCNC: 0.4 MG/DL (ref 0.1–1)
BUN SERPL-MCNC: 13 MG/DL (ref 6–20)
CALCIUM SERPL-MCNC: 9 MG/DL (ref 8.7–10.5)
CHLORIDE SERPL-SCNC: 106 MMOL/L (ref 95–110)
CO2 SERPL-SCNC: 25 MMOL/L (ref 23–29)
CREAT SERPL-MCNC: 0.9 MG/DL (ref 0.5–1.4)
CRP SERPL-MCNC: 11.1 MG/L (ref 0–8.2)
CTP QC/QA: YES
DIFFERENTIAL METHOD BLD: ABNORMAL
EOSINOPHIL # BLD AUTO: 0.1 K/UL (ref 0–0.5)
EOSINOPHIL NFR BLD: 1.2 % (ref 0–8)
ERYTHROCYTE [DISTWIDTH] IN BLOOD BY AUTOMATED COUNT: 14.1 % (ref 11.5–14.5)
EST. GFR  (NO RACE VARIABLE): >60 ML/MIN/1.73 M^2
GLUCOSE SERPL-MCNC: 79 MG/DL (ref 70–110)
HCT VFR BLD AUTO: 31.3 % (ref 37–48.5)
HGB BLD-MCNC: 10.1 G/DL (ref 12–16)
IMM GRANULOCYTES # BLD AUTO: 0.01 K/UL (ref 0–0.04)
IMM GRANULOCYTES NFR BLD AUTO: 0.2 % (ref 0–0.5)
LYMPHOCYTES # BLD AUTO: 0.7 K/UL (ref 1–4.8)
LYMPHOCYTES NFR BLD: 14.2 % (ref 18–48)
MCH RBC QN AUTO: 27.6 PG (ref 27–31)
MCHC RBC AUTO-ENTMCNC: 32.3 G/DL (ref 32–36)
MCV RBC AUTO: 86 FL (ref 82–98)
MONOCYTES # BLD AUTO: 0.4 K/UL (ref 0.3–1)
MONOCYTES NFR BLD: 7.6 % (ref 4–15)
NEUTROPHILS # BLD AUTO: 3.7 K/UL (ref 1.8–7.7)
NEUTROPHILS NFR BLD: 76.4 % (ref 38–73)
NRBC BLD-RTO: 0 /100 WBC
PLATELET # BLD AUTO: 203 K/UL (ref 150–450)
PMV BLD AUTO: 10.5 FL (ref 9.2–12.9)
POTASSIUM SERPL-SCNC: 3.8 MMOL/L (ref 3.5–5.1)
PROT SERPL-MCNC: 6.9 G/DL (ref 6–8.4)
RBC # BLD AUTO: 3.66 M/UL (ref 4–5.4)
SODIUM SERPL-SCNC: 139 MMOL/L (ref 136–145)
WBC # BLD AUTO: 4.85 K/UL (ref 3.9–12.7)

## 2024-09-01 PROCEDURE — 99284 EMERGENCY DEPT VISIT MOD MDM: CPT | Mod: 25

## 2024-09-01 PROCEDURE — 25000003 PHARM REV CODE 250: Performed by: STUDENT IN AN ORGANIZED HEALTH CARE EDUCATION/TRAINING PROGRAM

## 2024-09-01 PROCEDURE — 80053 COMPREHEN METABOLIC PANEL: CPT | Performed by: STUDENT IN AN ORGANIZED HEALTH CARE EDUCATION/TRAINING PROGRAM

## 2024-09-01 PROCEDURE — 81025 URINE PREGNANCY TEST: CPT | Performed by: STUDENT IN AN ORGANIZED HEALTH CARE EDUCATION/TRAINING PROGRAM

## 2024-09-01 PROCEDURE — 85025 COMPLETE CBC W/AUTO DIFF WBC: CPT | Performed by: STUDENT IN AN ORGANIZED HEALTH CARE EDUCATION/TRAINING PROGRAM

## 2024-09-01 PROCEDURE — 86140 C-REACTIVE PROTEIN: CPT | Performed by: STUDENT IN AN ORGANIZED HEALTH CARE EDUCATION/TRAINING PROGRAM

## 2024-09-01 RX ORDER — CLINDAMYCIN HYDROCHLORIDE 300 MG/1
300 CAPSULE ORAL 3 TIMES DAILY
Qty: 21 CAPSULE | Refills: 0 | Status: SHIPPED | OUTPATIENT
Start: 2024-09-01 | End: 2024-09-08

## 2024-09-01 RX ORDER — OXYCODONE HYDROCHLORIDE 5 MG/1
5 TABLET ORAL
Status: COMPLETED | OUTPATIENT
Start: 2024-09-01 | End: 2024-09-01

## 2024-09-01 RX ADMIN — OXYCODONE HYDROCHLORIDE 5 MG: 5 TABLET ORAL at 02:09

## 2024-09-01 NOTE — DISCHARGE INSTRUCTIONS
You were diagnosed today with breast cellulitis.  Please start taking the clindamycin as prescribed.  It was important that you follow up with Plastic surgery as soon as possible.  It was recommended that you call the clinic on Tuesday, 09/03/2024 to schedule a close follow up appointment.  In the meantime, if you develop any worsening pain, swelling, redness, fevers, chills or any other worsening symptoms or concerns, you should immediately return to the emergency department for re-evaluation.

## 2024-09-01 NOTE — ED TRIAGE NOTES
Pt arriving to ED c/o L breast pain, swelling, , increased pain, redness x few weeks. Hx mastectomy

## 2024-09-01 NOTE — ED NOTES
Patient identifiers verified and correct for Laura Kent   LOC: The patient is awake, alert and aware of environment with an appropriate affect, the patient is oriented x 3 and speaking appropriately.   APPEARANCE: Patient appears uncomfortable but in no acute distress  SKIN: The skin is warm and dry, color consistent with ethnicity, patient has normal skin turgor and moist mucus membranes, skin intact  MUSCULOSKELETAL: Patient moving all extremities spontaneously, + L breast pain and swelling  RESPIRATORY: Airway is open and patent, respirations are spontaneous, patient has a normal effort and rate, no accessory muscle use noted, O2 sats noted at 99% on room air.  CARDIAC: Patient has a normal rate , capillary refill < 3 seconds.   GASTRO: Soft and non tender to palpation, no distention noted  : Pt denies any pain or frequency with urination.  NEURO: Pt opens eyes spontaneously, behavior appropriate to situation, follows commands, facial expression symmetrical, bilateral hand grasp equal and even, purposeful motor response noted, normal sensation in all extremities when touched with a finger.

## 2024-09-01 NOTE — ED PROVIDER NOTES
Encounter Date: 2024       History     Chief Complaint   Patient presents with    Breast Problem     L breast pain, swelling, , increased pain, redness x few weeks. Hx mastectomy long ago btu has had recurrent problems since then     HPI  Patient is a 38-year-old female with history of breast cancer previously on capecitabine now s/p bilateral mastectomy with reconstruction and revision on 24 with plastic surgery, HTN who presents for left breast redness, swelling and pain.  This has been ongoing for the past few weeks but has significantly worsened over the past several days.  Patient reports that she followed up in clinic last week where they drained a small seroma from her left breast.  She was put on Bactrim and recently finished this a few days ago.  She states that since finishing the Bactrim she was had increased swelling, pain and redness to the left breast.  No fevers or chills at home.  No substernal chest pain or shortness of breath.    Review of patient's allergies indicates:   Allergen Reactions    Strawberries [strawberry] Hives     Past Medical History:   Diagnosis Date    Anemia     Anxiety     Breast cancer     Diarrhea 2022    Drug-induced acute pancreatitis without infection or necrosis 2022    Encounter for blood transfusion     Hypertension     Meningitis     Severe obesity 2023    Shortness of breath 2023     Past Surgical History:   Procedure Laterality Date    AXILLARY NODE DISSECTION Left 2023    Procedure: LYMPHADENECTOMY, AXILLARY;  Surgeon: YASMANI Weiner MD;  Location: [facility];  Service: General;  Laterality: Left;    BREAST BIOPSY Left     BREAST RECONSTRUCTION       SECTION      COLONOSCOPY N/A 2022    Procedure: COLONOSCOPY;  Surgeon: Davi Chery MD;  Location: [address];  Service: Endoscopy;  Laterality: N/A;  case request entered from referral - Per DR. KATHLEEN Chery Pt to be scheduled for urgent colonoscopy on 22/  fully vaccinated / prep ins for Sutab on portal - ERW  see micro for negative C-diff- ERW    INJECTION FOR SENTINEL NODE IDENTIFICATION Left 02/27/2023    Procedure: INJECTION, FOR SENTINEL NODE IDENTIFICATION;  Surgeon: YASMANI Weiner MD;  Location: [facility];  Service: General;  Laterality: Left;    INSERTION OF BREAST TISSUE EXPANDER Left 02/27/2023    Procedure: INSERTION, TISSUE EXPANDER, BREAST;  Surgeon: Reese Barrios DO;  Location: [facility] OR;  Service: Plastics;  Laterality: Left;    INSERTION OF TUNNELED CENTRAL VENOUS CATHETER (CVC) WITH SUBCUTANEOUS PORT N/A 04/05/2022    Procedure: [id number];  Surgeon: Deo Sin Jr., MD;  [facility];  Service: General;  Laterality: N/A;    INSERTION OF TUNNELED CENTRAL VENOUS CATHETER (CVC) WITH SUBCUTANEOUS PORT N/A 08/01/2022    Procedure: INSERTION, SINGLE LUMEN CATHETER WITH PORT, WITH FLUOROSCOPIC GUIDANCE;  Surgeon: Ryder Dinero MD;  Location: [facility] CATH LAB;  Service: Vascular;  Laterality: N/A;    INTRAVENOUS INJECTION N/A 12/11/2023    Procedure: spy;  Surgeon: Reese Barrios DO;  Location: [facility] OR [facility] FLR;  Service: Plastics;  Laterality: N/A;    LIPOSUCTION W/ FAT INJECTION Bilateral 04/18/2024    Procedure: LIPOSUCTION, WITH FAT TRANSFER;  Surgeon: Reese Barrios DO;  Location: [facility] OR [facility] FLR;  Service: Plastics;  Laterality: Bilateral;  Lipo with fat grafting Bilat    MASTECTOMY Left 02/27/2023    Procedure: MASTECTOMY;  Surgeon: YASMANI Weiner MD;  Location: [facility];  Service: General;  Laterality: Left;  3.5 TOTAL HOURS / EMAIL SENT 2-23 @ 7:56 CCC    MASTECTOMY Right 12/11/2023    Procedure: MASTECTOMY;  Surgeon: Eldon Daley MD;  [facility];  Service: General;  Laterality: Right;    RECONSTRUCTION OF BREAST WITH DEEP INFERIOR EPIGASTRIC ARTERY  (KANDICE) FREE FLAP Bilateral 12/11/2023    Procedure: RECONSTRUCTION, BREAST, USING KANDICE FREE FLAP;  Surgeon: Denis  Reese BYRD DO;  Location: [facility] OR 2ND FLR;  Service: Plastics;  Laterality: Bilateral;  Bilateral KANDICE flap - cosurgeon is Dr Cross -    REVISION OF SCAR Right 12/11/2023    Procedure: REVISION, SCAR;  Surgeon: Reese Barrios DO;  Location: [facility] OR 2ND FLR;  Service: Plastics;  Laterality: Right;    REVISION OF SCAR Right 04/18/2024    Procedure: REVISION, SCAR;  Surgeon: Reese Barrios DO;  Location: [facility] OR 2ND FLR;  Service: Plastics;  Laterality: Right;    REVISION, FLAP, PREVIOUSLY CREATED FOR BREAST RECONSTRUCTION Bilateral 04/18/2024    Procedure: REVISION, FLAP, PREVIOUSLY CREATED FOR BREAST RECONSTRUCTION;  Surgeon: Reese Barrios DO;  Location: [facility] OR 2ND FLR;  Service: Plastics;  Laterality: Bilateral;  2nd stage Autologous - Bilat breast revision    SENTINEL LYMPH NODE BIOPSY Left 02/27/2023    Procedure: BIOPSY, LYMPH NODE, SENTINEL;  Surgeon: YASMANI Weiner MD;  Location: [facility];  Service: General;  Laterality: Left;  LEFT with radiological marker    TISSUE EXPANDER REMOVAL Left 12/11/2023    Procedure: REMOVAL, TISSUE EXPANDER;  Surgeon: Reese Barrios DO;  Location: [facility] OR 2ND FLR;  Service: Plastics;  Laterality: Left;  removal left tissu expander     Family History   Problem Relation Name Age of Onset    Hypertension Mother      Hypertension Father      Diabetes Father      Pancreatic cancer Maternal Uncle      Bladder Cancer Paternal Grandmother      Colon cancer Neg Hx      Esophageal cancer Neg Hx       Social History     Tobacco Use    Smoking status: Never    Smokeless tobacco: Never   Substance Use Topics    Alcohol use: Not Currently     Comment: social    Drug use: Yes     Types: Marijuana     Review of Systems  A full ROS was obtained, see HPI for pertinent positives.     Physical Exam     Initial Vitals [09/01/24 1210]   BP Pulse Resp Temp SpO2   126/66 91 18 98.7 °F (37.1 °C) 99 %      MAP       --         Physical Exam  Constitutional: No acute  distress, nontoxic  HENT: Normocephalic, atraumatic  Respiratory: Non-labored  Cardiovascular: Well perfused, normal rate, regular rhythm  Breast: Exam performed with chaperone present in the room, nipple tattoo present, there is significant warmth, erythema to the left breast that spares the 4 to 8 o'clock position, see image below, there is no crepitus, she does have a small, fluctuant area several cm above the nipple measuring less than 1 cm, the breast is tender to palpation  Gastrointestinal: Soft, non-tender, non-distended  Integumentary: Warm and dry  Musculoskeletal: No deformity  Neurological: Awake and alert  Psychiatric: Cooperative         ED Course   Procedures  Labs Reviewed   CBC W/ AUTO DIFFERENTIAL - Abnormal       Result Value    WBC 4.85      RBC 3.66 (*)     Hemoglobin 10.1 (*)     Hematocrit 31.3 (*)     MCV 86      MCH 27.6      MCHC 32.3      RDW 14.1      Platelets 203      MPV 10.5      Immature Granulocytes 0.2      Gran # (ANC) 3.7      Immature Grans (Abs) 0.01      Lymph # 0.7 (*)     Mono # 0.4      Eos # 0.1      Baso # 0.02      nRBC 0      Gran % 76.4 (*)     Lymph % 14.2 (*)     Mono % 7.6      Eosinophil % 1.2      Basophil % 0.4      Differential Method Automated     COMPREHENSIVE METABOLIC PANEL - Abnormal    Sodium 139      Potassium 3.8      Chloride 106      CO2 25      Glucose 79      BUN 13      Creatinine 0.9      Calcium 9.0      Total Protein 6.9      Albumin 3.8      Total Bilirubin 0.4      Alkaline Phosphatase 46 (*)     AST 20      ALT 8 (*)     eGFR >60.0      Anion Gap 8     C-REACTIVE PROTEIN - Abnormal    CRP 11.1 (*)    POCT URINE PREGNANCY    POC Preg Test, Ur Negative       Acceptable Yes            Imaging Results              US Chest Mediastinum (Final result)  Result time 09/01/24 16:16:07      Final result by London Gonzalez Jr., MD (09/01/24 16:16:07)                   Impression:      Ovoid region in the subcutaneous tissues of the  left breast, possibly reflecting an area of inflammatory tissue and/or granulation tissue, less likely neoplasm.  No definite fluid collections identified.    Electronically signed by resident: Dharmesh Floyd  Date:    09/01/2024  Time:    15:21    Electronically signed by: London Anderson Jr  Date:    09/01/2024  Time:    16:16               Narrative:    EXAMINATION:  US CHEST MEDIASTINUM    CLINICAL HISTORY:  left breast swelling, redness and pain;    TECHNIQUE:  Grayscale and color Doppler ultrasound images were acquired of the left chest.    COMPARISON:  Ultrasound 08/13/2024 and 03/19/2024.    FINDINGS:  Ovoid hypoechoic region in the subcutaneous tissues of the left breast demonstrating granular internal echoes and measuring 7.4 x 4.0 x 1.1 cm.  No associated increased through transmission.  There are patchy areas of increased color Doppler flow.  The technologist reports that this area is painful to palpation.  No definite fluid collections identified.    Right breast demonstrates no sonographic abnormalities.                                       Medications   oxyCODONE immediate release tablet 5 mg (5 mg Oral Given 9/1/24 1412)     Medical Decision Making  Patient is a 38-year-old female with history of breast cancer previously on capecitabine now s/p bilateral mastectomy with reconstruction and revision on 4/18/24 with plastic surgery.  She presents today for left breast redness, swelling and pain.  No systemic signs of infection or fever.  Vitals reassuring here.  Concern for cellulitis versus abscess.  Ultrasound and labs ordered.    Labs and imaging reviewed.  There is no definite fluid collection identified but she does have inflammatory changes.  No leukocytosis.  CRP only mildly elevated.  Discussed with plastic surgery.  They were able to review the patient's chart, imaging and pictures.  They recommend sending her home with clindamycin t.i.d. and to have her call the clinic on Tuesday, 9/3 after  the holiday weekend to schedule a close follow up appointment.    I advised the patient to follow up closely with Plastic surgery and counseled her on return precautions.  She was sent home with Rx for clindamycin.    Amount and/or Complexity of Data Reviewed  Labs: ordered. Decision-making details documented in ED Course.  Radiology: ordered.    Risk  Prescription drug management.               ED Course as of 09/01/24 1803   Sun Sep 01, 2024   1425 Hemoglobin(!): 10.1  stable [NN]   1425 hCG Qualitative, Urine: Negative [NN]   1508 CRP(!): 11.1  Only slightly elevated [NN]   1508 WBC: 4.85 [NN]   1622 Impression:     Ovoid region in the subcutaneous tissues of the left breast, possibly reflecting an area of inflammatory tissue and/or granulation tissue, less likely neoplasm.  No definite fluid collections identified.   [NN]      ED Course User Index  [NN] Lauren Danielle MD                           Clinical Impression:  Final diagnoses:  [N61.0] Cellulitis of left breast (Primary)          ED Disposition Condition    Discharge Stable          ED Prescriptions       Medication Sig Dispense Start Date End Date Auth. Provider    clindamycin (CLEOCIN) 300 MG capsule Take 1 capsule (300 mg total) by mouth 3 (three) times daily. for 7 days 21 capsule 9/1/2024 9/8/2024 Lauren Danielle MD          Follow-up Information       Follow up With Specialties Details Why Contact Info    Yessy Cruz MD Hematology and Oncology, Hematology Schedule an appointment as soon as possible for a visit   [facility] [address]  [phone]      [facility] - Emergency Dept Emergency Medicine  As needed, If symptoms worsen [facility] [id number] [phone]             Lauren Danielle MD  09/01/24 1803

## 2024-09-02 ENCOUNTER — HOSPITAL ENCOUNTER (INPATIENT)
Facility: HOSPITAL | Age: 39
LOS: 5 days | Discharge: HOME | DRG: 601 | End: 2024-09-07
Attending: EMERGENCY MEDICINE | Admitting: SURGERY
Payer: MEDICAID

## 2024-09-02 DIAGNOSIS — Z17.1 MALIGNANT NEOPLASM OF UPPER-OUTER QUADRANT OF LEFT BREAST IN FEMALE, ESTROGEN RECEPTOR NEGATIVE: Primary | ICD-10-CM

## 2024-09-02 DIAGNOSIS — C50.412 MALIGNANT NEOPLASM OF UPPER-OUTER QUADRANT OF LEFT BREAST IN FEMALE, ESTROGEN RECEPTOR NEGATIVE: Primary | ICD-10-CM

## 2024-09-02 DIAGNOSIS — L03.90 CELLULITIS: ICD-10-CM

## 2024-09-02 LAB
ALBUMIN SERPL BCP-MCNC: 4.1 G/DL (ref 3.5–5.2)
ALP SERPL-CCNC: 46 U/L (ref 55–135)
ALT SERPL W/O P-5'-P-CCNC: 11 U/L (ref 10–44)
ANION GAP SERPL CALC-SCNC: 11 MMOL/L (ref 8–16)
AST SERPL-CCNC: 18 U/L (ref 10–40)
B-HCG UR QL: NEGATIVE
BASOPHILS # BLD AUTO: 0.02 K/UL (ref 0–0.2)
BASOPHILS NFR BLD: 0.4 % (ref 0–1.9)
BILIRUB SERPL-MCNC: 0.5 MG/DL (ref 0.1–1)
BUN SERPL-MCNC: 15 MG/DL (ref 6–20)
CALCIUM SERPL-MCNC: 9.6 MG/DL (ref 8.7–10.5)
CHLORIDE SERPL-SCNC: 105 MMOL/L (ref 95–110)
CO2 SERPL-SCNC: 22 MMOL/L (ref 23–29)
CREAT SERPL-MCNC: 0.9 MG/DL (ref 0.5–1.4)
CRP SERPL-MCNC: 13.8 MG/L (ref 0–8.2)
CTP QC/QA: YES
DIFFERENTIAL METHOD BLD: ABNORMAL
EOSINOPHIL # BLD AUTO: 0 K/UL (ref 0–0.5)
EOSINOPHIL NFR BLD: 0.6 % (ref 0–8)
ERYTHROCYTE [DISTWIDTH] IN BLOOD BY AUTOMATED COUNT: 14.1 % (ref 11.5–14.5)
EST. GFR  (NO RACE VARIABLE): >60 ML/MIN/1.73 M^2
GLUCOSE SERPL-MCNC: 101 MG/DL (ref 70–110)
HCT VFR BLD AUTO: 32.6 % (ref 37–48.5)
HCV AB SERPL QL IA: NORMAL
HGB BLD-MCNC: 10.5 G/DL (ref 12–16)
HIV 1+2 AB+HIV1 P24 AG SERPL QL IA: NORMAL
IMM GRANULOCYTES # BLD AUTO: 0.01 K/UL (ref 0–0.04)
IMM GRANULOCYTES NFR BLD AUTO: 0.2 % (ref 0–0.5)
LYMPHOCYTES # BLD AUTO: 0.8 K/UL (ref 1–4.8)
LYMPHOCYTES NFR BLD: 16.8 % (ref 18–48)
MCH RBC QN AUTO: 27.3 PG (ref 27–31)
MCHC RBC AUTO-ENTMCNC: 32.2 G/DL (ref 32–36)
MCV RBC AUTO: 85 FL (ref 82–98)
MONOCYTES # BLD AUTO: 0.3 K/UL (ref 0.3–1)
MONOCYTES NFR BLD: 5.5 % (ref 4–15)
NEUTROPHILS # BLD AUTO: 3.7 K/UL (ref 1.8–7.7)
NEUTROPHILS NFR BLD: 76.5 % (ref 38–73)
NRBC BLD-RTO: 0 /100 WBC
PLATELET # BLD AUTO: 195 K/UL (ref 150–450)
PMV BLD AUTO: 10.2 FL (ref 9.2–12.9)
POTASSIUM SERPL-SCNC: 3.2 MMOL/L (ref 3.5–5.1)
PROT SERPL-MCNC: 7.4 G/DL (ref 6–8.4)
RBC # BLD AUTO: 3.84 M/UL (ref 4–5.4)
SODIUM SERPL-SCNC: 138 MMOL/L (ref 136–145)
WBC # BLD AUTO: 4.77 K/UL (ref 3.9–12.7)

## 2024-09-02 PROCEDURE — 99285 EMERGENCY DEPT VISIT HI MDM: CPT

## 2024-09-02 PROCEDURE — 25000003 PHARM REV CODE 250: Performed by: STUDENT IN AN ORGANIZED HEALTH CARE EDUCATION/TRAINING PROGRAM

## 2024-09-02 PROCEDURE — 20600001 HC STEP DOWN PRIVATE ROOM

## 2024-09-02 PROCEDURE — 86140 C-REACTIVE PROTEIN: CPT | Performed by: EMERGENCY MEDICINE

## 2024-09-02 PROCEDURE — 87075 CULTR BACTERIA EXCEPT BLOOD: CPT | Performed by: STUDENT IN AN ORGANIZED HEALTH CARE EDUCATION/TRAINING PROGRAM

## 2024-09-02 PROCEDURE — 85025 COMPLETE CBC W/AUTO DIFF WBC: CPT | Performed by: EMERGENCY MEDICINE

## 2024-09-02 PROCEDURE — 63600175 PHARM REV CODE 636 W HCPCS: Performed by: STUDENT IN AN ORGANIZED HEALTH CARE EDUCATION/TRAINING PROGRAM

## 2024-09-02 PROCEDURE — 80053 COMPREHEN METABOLIC PANEL: CPT | Performed by: EMERGENCY MEDICINE

## 2024-09-02 PROCEDURE — 87389 HIV-1 AG W/HIV-1&-2 AB AG IA: CPT | Performed by: PHYSICIAN ASSISTANT

## 2024-09-02 PROCEDURE — 63600175 PHARM REV CODE 636 W HCPCS: Performed by: EMERGENCY MEDICINE

## 2024-09-02 PROCEDURE — 87070 CULTURE OTHR SPECIMN AEROBIC: CPT | Performed by: STUDENT IN AN ORGANIZED HEALTH CARE EDUCATION/TRAINING PROGRAM

## 2024-09-02 PROCEDURE — 81025 URINE PREGNANCY TEST: CPT | Performed by: EMERGENCY MEDICINE

## 2024-09-02 PROCEDURE — 86803 HEPATITIS C AB TEST: CPT | Performed by: PHYSICIAN ASSISTANT

## 2024-09-02 RX ORDER — AMOXICILLIN 250 MG
1 CAPSULE ORAL 2 TIMES DAILY
Status: DISCONTINUED | OUTPATIENT
Start: 2024-09-02 | End: 2024-09-07 | Stop reason: HOSPADM

## 2024-09-02 RX ORDER — ACETAMINOPHEN 500 MG
1000 TABLET ORAL 3 TIMES DAILY
Status: DISCONTINUED | OUTPATIENT
Start: 2024-09-02 | End: 2024-09-07 | Stop reason: HOSPADM

## 2024-09-02 RX ORDER — POLYETHYLENE GLYCOL 3350 17 G/17G
17 POWDER, FOR SOLUTION ORAL DAILY
Status: DISCONTINUED | OUTPATIENT
Start: 2024-09-03 | End: 2024-09-07 | Stop reason: HOSPADM

## 2024-09-02 RX ORDER — OXYCODONE HYDROCHLORIDE 5 MG/1
5 TABLET ORAL EVERY 4 HOURS PRN
Status: DISCONTINUED | OUTPATIENT
Start: 2024-09-02 | End: 2024-09-07 | Stop reason: HOSPADM

## 2024-09-02 RX ORDER — SODIUM CHLORIDE 0.9 % (FLUSH) 0.9 %
10 SYRINGE (ML) INJECTION
Status: DISCONTINUED | OUTPATIENT
Start: 2024-09-02 | End: 2024-09-07 | Stop reason: HOSPADM

## 2024-09-02 RX ORDER — IBUPROFEN 600 MG/1
600 TABLET ORAL EVERY 6 HOURS
Status: DISCONTINUED | OUTPATIENT
Start: 2024-09-02 | End: 2024-09-07 | Stop reason: HOSPADM

## 2024-09-02 RX ORDER — IBUPROFEN 400 MG/1
400 TABLET ORAL EVERY 6 HOURS
Status: DISCONTINUED | OUTPATIENT
Start: 2024-09-02 | End: 2024-09-02

## 2024-09-02 RX ORDER — ACETAMINOPHEN 325 MG/1
650 TABLET ORAL EVERY 4 HOURS PRN
Status: DISCONTINUED | OUTPATIENT
Start: 2024-09-02 | End: 2024-09-02

## 2024-09-02 RX ORDER — MORPHINE SULFATE 4 MG/ML
4 INJECTION, SOLUTION INTRAMUSCULAR; INTRAVENOUS
Status: COMPLETED | OUTPATIENT
Start: 2024-09-02 | End: 2024-09-02

## 2024-09-02 RX ORDER — ENOXAPARIN SODIUM 100 MG/ML
40 INJECTION SUBCUTANEOUS EVERY 24 HOURS
Status: DISCONTINUED | OUTPATIENT
Start: 2024-09-02 | End: 2024-09-07 | Stop reason: HOSPADM

## 2024-09-02 RX ADMIN — OXYCODONE HYDROCHLORIDE 5 MG: 5 TABLET ORAL at 09:09

## 2024-09-02 RX ADMIN — ENOXAPARIN SODIUM 40 MG: 40 INJECTION SUBCUTANEOUS at 09:09

## 2024-09-02 RX ADMIN — ACETAMINOPHEN 1000 MG: 500 TABLET ORAL at 09:09

## 2024-09-02 RX ADMIN — VANCOMYCIN HYDROCHLORIDE 2000 MG: 500 INJECTION, POWDER, LYOPHILIZED, FOR SOLUTION INTRAVENOUS at 10:09

## 2024-09-02 RX ADMIN — IBUPROFEN 600 MG: 600 TABLET, FILM COATED ORAL at 09:09

## 2024-09-02 RX ADMIN — MORPHINE SULFATE 4 MG: 4 INJECTION INTRAVENOUS at 06:09

## 2024-09-02 RX ADMIN — SENNOSIDES AND DOCUSATE SODIUM 1 TABLET: 50; 8.6 TABLET ORAL at 09:09

## 2024-09-02 RX ADMIN — PIPERACILLIN SODIUM AND TAZOBACTAM SODIUM 4.5 G: 4; .5 INJECTION, POWDER, FOR SOLUTION INTRAVENOUS at 09:09

## 2024-09-02 NOTE — H&P
.Plastic and Reconstructive Surgery   H&P    Date of Consultation:   2024    Reason for Consultation:  Left breast drainage    History of Present Illness:  38yoF w/ PMI of Left breast cancer s/p b/l mastectomy  with immediate KANDICE reconstruction.  This was complicated by infected fat necrosis requiring IR drain placemetn and underwent revision 24. She had recurrent symptoms last month and US showed a seroma which was drained with negative cultures. Yesterday, patient had pain in left breast only and presented to ED. At the time, US showed no fluid collection and pt was d/c on clindamycin. She was only able to take one dose this morning and she felt the pain increase. She had significant drainage from the superior portion and was very distressed and represented to ED.     Past Medical History:    has a past medical history of Anemia, Anxiety, Breast cancer, Diarrhea (2022), Drug-induced acute pancreatitis without infection or necrosis (2022), Encounter for blood transfusion, Hypertension, Meningitis, Severe obesity (2023), and Shortness of breath (2023).    Past Surgical History:    has a past surgical history that includes Breast biopsy (Left);  section; Insertion of tunneled central venous catheter (CVC) with subcutaneous port (N/A, 2022); Insertion of tunneled central venous catheter (CVC) with subcutaneous port (N/A, 2022); Colonoscopy (N/A, 2022); Mastectomy (Left, 2023); [place] lymph node biopsy (Left, 2023); Injection for sentinel node identification (Left, 2023); Axillary node dissection (Left, 2023); Insertion of breast tissue expander (Left, 2023); Tissue expander removal (Left, 2023); Reconstruction of breast with deep inferior epigastric artery  (KANDICE) free flap (Bilateral, 2023); Revision of scar (Right, 2023); Intravenous injection (N/A, 2023); Mastectomy (Right,  12/11/2023); revision, flap, previously created for breast reconstruction (Bilateral, 04/18/2024); Liposuction w/ fat injection (Bilateral, 04/18/2024); Revision of scar (Right, 04/18/2024); and Breast reconstruction.    Social History:  Social History     Tobacco Use    Smoking status: Never    Smokeless tobacco: Never   Substance Use Topics    Alcohol use: Not Currently     Comment: social     Social History     Substance and Sexual Activity   Drug Use Yes    Types: Marijuana       Family History:  Family History   Problem Relation Name Age of Onset    Hypertension Mother      Hypertension Father      Diabetes Father      Pancreatic cancer Maternal Uncle      Bladder Cancer Paternal Grandmother      Colon cancer Neg Hx      Esophageal cancer Neg Hx         Allergies:  Review of patient's allergies indicates:   Allergen Reactions    Strawberries [strawberry] Hives       Home Medications:  Prior to Admission medications    Medication Sig Start Date End Date Taking? Authorizing Provider   capecitabine (XELODA) 150 MG tablet Take 1 tablet (150 mg total) by mouth 2 (two) times daily for 14 days followed by 7 days off. Take with 1 other capecitabine prescription for 2,150 mg total. 9/5/23 9/4/24  Yessy Cruz MD   capecitabine (XELODA) 150 MG tablet Take 1 tablet (150 mg total) by mouth 2 (two) times daily for 14 days followed by 7 days off. Take with 1 other capecitabine prescription for 2,150 mg total. 11/9/23 11/8/24  Yessy Cruz MD   capecitabine (XELODA) 500 MG Tab Take 4 tablets (2,000 mg total) by mouth 2 (two) times daily for 14 days followed by 7 days off. Take with 1 other capecitabine prescription for 2,150 mg total. 9/5/23 9/4/24  Yessy Cruz MD   capecitabine (XELODA) 500 MG Tab Take 4 tablets (2,000 mg total) by mouth 2 (two) times daily for 14 days followed by 7 days off. Take with 1 other capecitabine prescription for 2,150 mg total. 11/9/23 11/8/24  Yessy Cruz MD   cetirizine (ZYRTEC) 10  "MG tablet Take 10 mg by mouth once daily.    Provider, Historical   clindamycin (CLEOCIN) 300 MG capsule Take 1 capsule (300 mg total) by mouth 3 (three) times daily. for 7 days 24  Lauren Danielle MD   gabapentin (NEURONTIN) 300 MG capsule Take 1 capsule (300 mg total) by mouth 3 (three) times daily. 24  Eyal Arias MD   ibuprofen (ADVIL,MOTRIN) 600 MG tablet Take 1 tablet (600 mg total) by mouth every 8 (eight) hours as needed for Pain. 24   Eyal Arias MD   LIDOcaine (LIDODERM) 5 % Place 1 patch onto the skin daily as needed (pain). Remove & Discard patch within 12 hours or as directed by MD 24   Angie Peck PA-C   metFORMIN (GLUCOPHAGE-XR) 750 MG ER 24hr tablet Take 1 tablet (750 mg total) by mouth daily with breakfast. 3/6/24 3/6/25  Yumiko Monreal PA-C   ondansetron (ZOFRAN) 8 MG tablet Take 1 tablet (8 mg total) by mouth every 12 (twelve) hours as needed for Nausea. 24  Yessy Cruz MD   pantoprazole (PROTONIX) 40 MG tablet Take 1 tablet (40 mg total) by mouth once daily. 22  Adonay Cannon NP   pregabalin (LYRICA) 75 MG capsule Take 1 capsule (75 mg total) by mouth 2 (two) times daily as needed (pain). 24  Angie Peck PA-C       Review of Systems:  Negative except for what is noted in HPI    Physical Exam:  VITAL SIGNS:   Vitals:    24 1510 24 1630 24 1730   BP: (!) 166/84 119/80 128/71   Pulse: 94 91 91   Resp: 18  18   Temp: 99.6 °F (37.6 °C)     TempSrc: Oral     SpO2: 95% 97% 98%   Weight: 98.4 kg (217 lb)     Height: 5' 5" (1.651 m)       TMAX: Temp (24hrs), Av.6 °F (37.6 °C), Min:99.6 °F (37.6 °C), Max:99.6 °F (37.6 °C)    General: Alert; No acute distress  Cardiovascular: Regular rate   Respiratory: Normal respiratory effort. Chest rise symmetric.   Abdomen: Soft, nontender, nondistended  Extremity: Moves all extremities equally.  Neurologic: No focal deficit. Speech " "normal  Left Breast with increasing cellulitis and drainage superiorly              Diagnostic Data:  Recent Results (from the past 336 hour(s))   CBC auto differential    Collection Time: 09/01/24  2:02 PM   Result Value Ref Range    WBC 4.85 3.90 - 12.70 K/uL    Hemoglobin 10.1 (L) 12.0 - 16.0 g/dL    Hematocrit 31.3 (L) 37.0 - 48.5 %    Platelets 203 150 - 450 K/uL     No results found for this or any previous visit (from the past 336 hour(s)).  Lab Results   Component Value Date    ALBUMIN 3.8 09/01/2024     Lab Results   Component Value Date    CRP 11.1 (H) 09/01/2024     No results found for: "INR", "PROTIME"  No results found for: "PTT"    Microbiology Results (last 7 days)       Procedure Component Value Units Date/Time    Culture, Anaerobe [[id number]] Collected: 09/02/24 1758    Order Status: Sent Specimen: Wound from Breast, Left Updated: 09/02/24 1759    Aerobic culture [[id number]] Collected: 09/02/24 1758    Order Status: Sent Specimen: Wound from Breast, Left Updated: 09/02/24 1758            Assessment:  38 y.o.female w/ left breast cellulitis, possible abscess vs lymphoserous drainage        Plan  - will admit to the hospital  - wound care: dry abd pad over wound as needed  - wound cx obtained and sent, pending results  - US of left breast ordered  - Diet: regular   - Abx: vanc/zosyn  - DVT ppx, IS, OOB, ambulate, PT/OT      Patient was discussed with Attending Plastic Surgeon, MD Angela Lovell/[facility]    "

## 2024-09-02 NOTE — ED TRIAGE NOTES
Laura Kent, a 38 y.o. female presents to the ED w/ complaint of post-op problem. History of breast cancer. Recent reconstruction and drainage coming out of the left breast. Was seen here yesterday and was discharged with antibiotics.     Triage note:  Chief Complaint   Patient presents with    Post-op Problem     L breast cancer had reconstruction, seen yest and L breast leaking more     Review of patient's allergies indicates:   Allergen Reactions    Strawberries [strawberry] Hives     Past Medical History:   Diagnosis Date    Anemia     Anxiety     Breast cancer     Diarrhea 09/08/2022    Drug-induced acute pancreatitis without infection or necrosis 09/04/2022    Encounter for blood transfusion     Hypertension     Meningitis     Severe obesity 02/02/2023    Shortness of breath 02/02/2023

## 2024-09-02 NOTE — ED NOTES
Attempted to obtain blood and peripheral IV. Attempt unsuccessful. Patient refused RN to try to attempt for the 2nd time without an US IV. MD notified.

## 2024-09-02 NOTE — ED PROVIDER NOTES
Encounter Date: 9/2/2024       History     Chief Complaint   Patient presents with    Post-op Problem     L breast cancer had reconstruction, seen yest and L breast leaking more     38-year-old female, status post  bilateral KANDICE flap reconstruction on 12/11/23 with revision surgery on 4/18/24. 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  Recent breast ultrasound had showed a small collection, status post drainage and Bactrim treatment by Plastic surgery, presented here yesterday with increasing pain and swelling, ultrasound of her breast was performed which showed a 7 x 4 cm ovoid region in the left breast, unclear etiology.  Plastic surgery was called, patient was discharged home with a prescription for clindamycin.  She has only taken 1 pill today.  She is returning for worsening pain and swelling of the left breast in addition the fact that she has noticed spontaneous drainage from the proximal aspect of the left breast around the 12 o'clock position.  She noticed that she had bloody drainage on her clothes and then realized that it was coming from her breast.  She feels like there is a purulent component to it.        Review of patient's allergies indicates:   Allergen Reactions    Strawberries [strawberry] Hives     Past Medical History:   Diagnosis Date    Anemia     Anxiety     Breast cancer     Diarrhea 09/08/2022    Drug-induced acute pancreatitis without infection or necrosis 09/04/2022    Encounter for blood transfusion     Hypertension     Meningitis     Severe obesity 02/02/2023    Shortness of breath 02/02/2023     Past Surgical History:   Procedure Laterality Date    AXILLARY NODE DISSECTION Left 02/27/2023    Procedure: LYMPHADENECTOMY, AXILLARY;  Surgeon: YASMANI Weiner MD;  Location: [facility];  Service: General;  Laterality: Left;    BREAST BIOPSY Left      BREAST RECONSTRUCTION       SECTION      COLONOSCOPY N/A 2022    Procedure: COLONOSCOPY;  Surgeon: Davi Chery MD;  Location: [facility] ENDO (4TH FLR);  Service: Endoscopy;  Laterality: N/A;  case request entered from referral - Per DR. KATHLEEN Chery Pt to be scheduled for urgent colonoscopy on 22/ fully vaccinated / prep ins for Sutab on portal - ERW  see micro for negative C-diff- ERW    INJECTION FOR SENTINEL NODE IDENTIFICATION Left 2023    Procedure: INJECTION, FOR SENTINEL NODE IDENTIFICATION;  Surgeon: YASMANI Weiner MD;  Location: [facility] OR;  Service: General;  Laterality: Left;    INSERTION OF BREAST TISSUE EXPANDER Left 2023    Procedure: INSERTION, TISSUE EXPANDER, BREAST;  Surgeon: Reese Barrios DO;  Location: [facility];  Service: Plastics;  Laterality: Left;    INSERTION OF TUNNELED CENTRAL VENOUS CATHETER (CVC) WITH SUBCUTANEOUS PORT N/A 2022    Procedure: [id number];  Surgeon: Deo Sin Jr., MD;  Location: [facility] 2ND FLR;  Service: General;  Laterality: N/A;    INSERTION OF TUNNELED CENTRAL VENOUS CATHETER (CVC) WITH SUBCUTANEOUS PORT N/A 2022    Procedure: INSERTION, SINGLE LUMEN CATHETER WITH PORT, WITH FLUOROSCOPIC GUIDANCE;  Surgeon: Ryder Dinero MD;  Location: [facility] CATH LAB;  Service: Vascular;  Laterality: N/A;    INTRAVENOUS INJECTION N/A 2023    Procedure: spy;  Surgeon: Reese Barrios DO;  Location: [facility] OR 2ND FLR;  Service: Plastics;  Laterality: N/A;    LIPOSUCTION W/ FAT INJECTION Bilateral 2024    Procedure: LIPOSUCTION, WITH FAT TRANSFER;  Surgeon: Reese Barrios DO;  Location: [facility] OR 2ND FLR;  Service: Plastics;  Laterality: Bilateral;  Lipo with fat grafting Bilat    MASTECTOMY Left 2023    Procedure: MASTECTOMY;  Surgeon: YASMANI Weiner MD;  Location: [facility];  Service: General;  Laterality: Left;  3.5 TOTAL HOURS / EMAIL SENT  @ 7:56 CCC    MASTECTOMY Right 2023     Procedure: MASTECTOMY;  Surgeon: Eldon Daley MD;  Location: [facility] OR [address] [place] [address];  Service: General;  Laterality: Right;    RECONSTRUCTION OF BREAST WITH DEEP INFERIOR EPIGASTRIC ARTERY  (KANDICE) FREE FLAP Bilateral 12/11/2023    Procedure: RECONSTRUCTION, BREAST, USING KANDICE FREE FLAP;  Surgeon: Reese Barrios DO;  [facility];  Service: Plastics;  Laterality: Bilateral;  Bilateral KANDICE flap - cosurgeon is Dr Cross -    REVISION OF SCAR Right 12/11/2023    Procedure: REVISION, SCAR;  Surgeon: Reese Barrios DO;  [facility];  Service: Plastics;  Laterality: Right;    REVISION OF SCAR Right 04/18/2024    Procedure: REVISION, SCAR;  Surgeon: Reese Barrios DO;  [facility];  Service: Plastics;  Laterality: Right;    REVISION, FLAP, PREVIOUSLY CREATED FOR BREAST RECONSTRUCTION Bilateral 04/18/2024    Procedure: REVISION, FLAP, PREVIOUSLY CREATED FOR BREAST RECONSTRUCTION;  Surgeon: Reese Barrios DO;  [facility];  Service: Plastics;  Laterality: Bilateral;  2nd stage Autologous - Bilat breast revision    SENTINEL LYMPH NODE BIOPSY Left 02/27/2023    Procedure: BIOPSY, LYMPH NODE, SENTINEL;  Surgeon: YASMANI Weiner MD;  Location: [facility];  Service: General;  Laterality: Left;  LEFT with radiological marker    TISSUE EXPANDER REMOVAL Left 12/11/2023    Procedure: REMOVAL, TISSUE EXPANDER;  Surgeon: Reese Barrios DO;  Location: [facility] OR [address] [place] [address];  Service: Plastics;  Laterality: Left;  removal left tissu expander     Family History   Problem Relation Name Age of Onset    Hypertension Mother      Hypertension Father      Diabetes Father      Pancreatic cancer Maternal Uncle      Bladder Cancer Paternal Grandmother      Colon cancer Neg Hx      Esophageal cancer Neg Hx       Social History     Tobacco Use    Smoking status: Never    Smokeless tobacco: Never   Substance Use Topics    Alcohol use: Not Currently      Comment: social    Drug use: Yes     Types: Marijuana     Review of Systems    Physical Exam     Initial Vitals [09/02/24 1510]   BP Pulse Resp Temp SpO2   (!) 166/84 94 18 99.6 °F (37.6 °C) 95 %      MAP       --         Physical Exam    Nursing note and vitals reviewed.  Constitutional: She appears well-developed and well-nourished. She is not diaphoretic. No distress.   HENT:   Mouth/Throat: Oropharynx is clear and moist.   Eyes: Conjunctivae are normal.   Neck: Neck supple.   Cardiovascular:  Normal rate.           Pulmonary/Chest: No respiratory distress.   Left breast exhibits skin change and tenderness.   1-2 mm opening around 11-12 o'clock with serosanguineous drainage  There is significant erythema and swelling noted diffusely across the breast.  There was no nipple drainage.   Musculoskeletal:         General: No edema. Normal range of motion.      Cervical back: Neck supple.     Neurological: She is alert and oriented to person, place, and time.   Skin: Skin is warm.   Psychiatric: She has a normal mood and affect. Thought content normal.           ED Course   Procedures  Labs Reviewed   CBC W/ AUTO DIFFERENTIAL - Abnormal       Result Value    WBC 4.77      RBC 3.84 (*)     Hemoglobin 10.5 (*)     Hematocrit 32.6 (*)     MCV 85      MCH 27.3      MCHC 32.2      RDW 14.1      Platelets 195      MPV 10.2      Immature Granulocytes 0.2      Gran # (ANC) 3.7      Immature Grans (Abs) 0.01      Lymph # 0.8 (*)     Mono # 0.3      Eos # 0.0      Baso # 0.02      nRBC 0      Gran % 76.5 (*)     Lymph % 16.8 (*)     Mono % 5.5      Eosinophil % 0.6      Basophil % 0.4      Differential Method Automated      Narrative:     Release to patient->Immediate   COMPREHENSIVE METABOLIC PANEL - Abnormal    Sodium 138      Potassium 3.2 (*)     Chloride 105      CO2 22 (*)     Glucose 101      BUN 15      Creatinine 0.9      Calcium 9.6      Total Protein 7.4      Albumin 4.1      Total Bilirubin 0.5      Alkaline  Phosphatase 46 (*)     AST 18      ALT 11      eGFR >60.0      Anion Gap 11      Narrative:     Release to patient->Immediate   C-REACTIVE PROTEIN - Abnormal    CRP 13.8 (*)     Narrative:     Release to patient->Immediate   CULTURE, ANAEROBIC   CULTURE, AEROBIC  (SPECIFY SOURCE)   HIV 1 / 2 ANTIBODY    HIV 1/2 Ag/Ab Non-reactive      Narrative:     Release to patient->Immediate   HEPATITIS C ANTIBODY    Hepatitis C Ab Non-reactive      Narrative:     Release to patient->Immediate   POCT URINE PREGNANCY    POC Preg Test, Ur Negative       Acceptable Yes            Imaging Results    None          Medications   sodium chloride 0.9% flush 10 mL (has no administration in time range)   enoxaparin injection 40 mg (40 mg Subcutaneous Given 9/2/24 2104)   oxyCODONE immediate release tablet 5 mg (5 mg Oral Given 9/2/24 2105)   senna-docusate 8.6-50 mg per tablet 1 tablet (1 tablet Oral Given 9/2/24 2104)   polyethylene glycol packet 17 g (has no administration in time range)   acetaminophen tablet 1,000 mg (1,000 mg Oral Given 9/2/24 2104)   vancomycin - pharmacy to dose (has no administration in time range)   piperacillin-tazobactam (ZOSYN) 4.5 g in D5W 100 mL IVPB (MB+) (4.5 g Intravenous New Bag 9/2/24 2104)   ibuprofen tablet 600 mg (600 mg Oral Given 9/2/24 2104)   vancomycin 2 g in dextrose 5 % 500 mL IVPB (has no administration in time range)   morphine injection 4 mg (4 mg Intravenous Given 9/2/24 1805)     Medical Decision Making  Complex surgical history as above, now here with worsening left breast swelling, redness and new drainage.  Patient does not have systemic symptoms, is afebrile here in his well-appearing clinically    Breast cellulitis versus abscess versus seroma    Plan  -lab work ordered, including CBC, CMP and CRP.  Patient had a breast imaging yesterday so I do not think that I need to order emergent imaging again.  Plastic surgery consulted for evaluation.  They have seen the patient  and will admit to their service for IV antibiotics and wound checks.    Amount and/or Complexity of Data Reviewed  External Data Reviewed: labs, radiology and notes.  Labs: ordered. Decision-making details documented in ED Course.    Risk  Prescription drug management.  Decision regarding hospitalization.               ED Course as of 09/02/24 2106   Mon Sep 02, 2024   1807 WBC: 4.77 [AS]      ED Course User Index  [AS] Vera Bateman MD                           Clinical Impression:  Final diagnoses:  [L03.90] Cellulitis          ED Disposition Condition    Admit Stable                Vera Bateman MD  09/02/24 2106

## 2024-09-02 NOTE — ED NOTES
Nurses Note -- 4 Eyes      9/2/2024   5:53 PM      Skin assessed during: Daily Assessment      [] No Altered Skin Integrity Present    []Prevention Measures Documented      [x] Yes- Altered Skin Integrity Present or Discovered   [x] LDA Added if Not in Epic (Describe Wound)   [x] New Altered Skin Integrity was Present on Admit and Documented in LDA   [x] Wound Image Taken    Wound Care Consulted? No    Attending Nurse:  JONNA Murguia    Second RN/Staff Member:   JONNA Novoa

## 2024-09-03 LAB
ANION GAP SERPL CALC-SCNC: 8 MMOL/L (ref 8–16)
BASOPHILS # BLD AUTO: 0.01 K/UL (ref 0–0.2)
BASOPHILS NFR BLD: 0.3 % (ref 0–1.9)
BUN SERPL-MCNC: 16 MG/DL (ref 6–20)
CALCIUM SERPL-MCNC: 8.8 MG/DL (ref 8.7–10.5)
CHLORIDE SERPL-SCNC: 105 MMOL/L (ref 95–110)
CO2 SERPL-SCNC: 24 MMOL/L (ref 23–29)
CREAT SERPL-MCNC: 0.8 MG/DL (ref 0.5–1.4)
DIFFERENTIAL METHOD BLD: ABNORMAL
EOSINOPHIL # BLD AUTO: 0.1 K/UL (ref 0–0.5)
EOSINOPHIL NFR BLD: 1.4 % (ref 0–8)
ERYTHROCYTE [DISTWIDTH] IN BLOOD BY AUTOMATED COUNT: 14 % (ref 11.5–14.5)
EST. GFR  (NO RACE VARIABLE): >60 ML/MIN/1.73 M^2
GLUCOSE SERPL-MCNC: 118 MG/DL (ref 70–110)
HCT VFR BLD AUTO: 30.9 % (ref 37–48.5)
HGB BLD-MCNC: 9.8 G/DL (ref 12–16)
IMM GRANULOCYTES # BLD AUTO: 0.01 K/UL (ref 0–0.04)
IMM GRANULOCYTES NFR BLD AUTO: 0.3 % (ref 0–0.5)
LYMPHOCYTES # BLD AUTO: 1 K/UL (ref 1–4.8)
LYMPHOCYTES NFR BLD: 26.9 % (ref 18–48)
MCH RBC QN AUTO: 27.1 PG (ref 27–31)
MCHC RBC AUTO-ENTMCNC: 31.7 G/DL (ref 32–36)
MCV RBC AUTO: 85 FL (ref 82–98)
MONOCYTES # BLD AUTO: 0.3 K/UL (ref 0.3–1)
MONOCYTES NFR BLD: 8.5 % (ref 4–15)
NEUTROPHILS # BLD AUTO: 2.3 K/UL (ref 1.8–7.7)
NEUTROPHILS NFR BLD: 62.6 % (ref 38–73)
NRBC BLD-RTO: 0 /100 WBC
PLATELET # BLD AUTO: 199 K/UL (ref 150–450)
PMV BLD AUTO: 10.6 FL (ref 9.2–12.9)
POTASSIUM SERPL-SCNC: 3.2 MMOL/L (ref 3.5–5.1)
RBC # BLD AUTO: 3.62 M/UL (ref 4–5.4)
SODIUM SERPL-SCNC: 137 MMOL/L (ref 136–145)
WBC # BLD AUTO: 3.64 K/UL (ref 3.9–12.7)

## 2024-09-03 PROCEDURE — 85025 COMPLETE CBC W/AUTO DIFF WBC: CPT | Performed by: STUDENT IN AN ORGANIZED HEALTH CARE EDUCATION/TRAINING PROGRAM

## 2024-09-03 PROCEDURE — 25000003 PHARM REV CODE 250: Performed by: COMMUNITY HEALTH WORKER

## 2024-09-03 PROCEDURE — 63600175 PHARM REV CODE 636 W HCPCS: Performed by: STUDENT IN AN ORGANIZED HEALTH CARE EDUCATION/TRAINING PROGRAM

## 2024-09-03 PROCEDURE — 80048 BASIC METABOLIC PNL TOTAL CA: CPT | Performed by: STUDENT IN AN ORGANIZED HEALTH CARE EDUCATION/TRAINING PROGRAM

## 2024-09-03 PROCEDURE — 36415 COLL VENOUS BLD VENIPUNCTURE: CPT | Performed by: STUDENT IN AN ORGANIZED HEALTH CARE EDUCATION/TRAINING PROGRAM

## 2024-09-03 PROCEDURE — 99221 1ST HOSP IP/OBS SF/LOW 40: CPT | Mod: ,,, | Performed by: SURGERY

## 2024-09-03 PROCEDURE — 20600001 HC STEP DOWN PRIVATE ROOM

## 2024-09-03 PROCEDURE — 25000003 PHARM REV CODE 250: Performed by: STUDENT IN AN ORGANIZED HEALTH CARE EDUCATION/TRAINING PROGRAM

## 2024-09-03 RX ORDER — HYDROXYZINE HYDROCHLORIDE 25 MG/1
25 TABLET, FILM COATED ORAL 3 TIMES DAILY PRN
Status: DISCONTINUED | OUTPATIENT
Start: 2024-09-03 | End: 2024-09-07 | Stop reason: HOSPADM

## 2024-09-03 RX ADMIN — IBUPROFEN 600 MG: 600 TABLET, FILM COATED ORAL at 01:09

## 2024-09-03 RX ADMIN — IBUPROFEN 600 MG: 600 TABLET, FILM COATED ORAL at 05:09

## 2024-09-03 RX ADMIN — OXYCODONE HYDROCHLORIDE 5 MG: 5 TABLET ORAL at 01:09

## 2024-09-03 RX ADMIN — ACETAMINOPHEN 1000 MG: 500 TABLET ORAL at 03:09

## 2024-09-03 RX ADMIN — PIPERACILLIN SODIUM AND TAZOBACTAM SODIUM 4.5 G: 4; .5 INJECTION, POWDER, FOR SOLUTION INTRAVENOUS at 12:09

## 2024-09-03 RX ADMIN — IBUPROFEN 600 MG: 600 TABLET, FILM COATED ORAL at 06:09

## 2024-09-03 RX ADMIN — IBUPROFEN 600 MG: 600 TABLET, FILM COATED ORAL at 12:09

## 2024-09-03 RX ADMIN — SENNOSIDES AND DOCUSATE SODIUM 1 TABLET: 50; 8.6 TABLET ORAL at 08:09

## 2024-09-03 RX ADMIN — PIPERACILLIN SODIUM AND TAZOBACTAM SODIUM 4.5 G: 4; .5 INJECTION, POWDER, FOR SOLUTION INTRAVENOUS at 08:09

## 2024-09-03 RX ADMIN — ACETAMINOPHEN 1000 MG: 500 TABLET ORAL at 08:09

## 2024-09-03 RX ADMIN — HYDROXYZINE HYDROCHLORIDE 25 MG: 25 TABLET ORAL at 02:09

## 2024-09-03 RX ADMIN — ENOXAPARIN SODIUM 40 MG: 40 INJECTION SUBCUTANEOUS at 05:09

## 2024-09-03 RX ADMIN — PIPERACILLIN SODIUM AND TAZOBACTAM SODIUM 4.5 G: 4; .5 INJECTION, POWDER, FOR SOLUTION INTRAVENOUS at 05:09

## 2024-09-03 RX ADMIN — OXYCODONE HYDROCHLORIDE 5 MG: 5 TABLET ORAL at 07:09

## 2024-09-03 NOTE — PLAN OF CARE
Nurses Note -- 4 Eyes      9/3/2024   8:00 PM      Skin assessed during: Admit      [] No Altered Skin Integrity Present    []Prevention Measures Documented      [x] Yes- Altered Skin Integrity Present or Discovered   [] LDA Added if Not in Epic (Describe Wound)   [x] New Altered Skin Integrity was Present on Admit and Documented in LDA   [] Wound Image Taken    Wound Care Consulted? Yes    Attending Nurse:  JONNA Samaniego BSN    Second RN/Staff Member:  JONNA Quintanilla          Problem: Adult Inpatient Plan of Care  Goal: Plan of Care Review  Outcome: Progressing  Goal: Patient-Specific Goal (Individualized)  Outcome: Progressing  Goal: Absence of Hospital-Acquired Illness or Injury  Outcome: Progressing  Goal: Optimal Comfort and Wellbeing  Outcome: Progressing  Goal: Readiness for Transition of Care  Outcome: Progressing     Problem: Wound  Goal: Optimal Coping  Outcome: Progressing  Goal: Optimal Functional Ability  Outcome: Progressing  Goal: Absence of Infection Signs and Symptoms  Outcome: Progressing  Goal: Improved Oral Intake  Outcome: Progressing  Goal: Optimal Pain Control and Function  Outcome: Progressing  Goal: Skin Health and Integrity  Outcome: Progressing  Goal: Optimal Wound Healing  Outcome: Progressing     Problem: Pain Acute  Goal: Optimal Pain Control and Function  Outcome: Progressing     Problem: Fall Injury Risk  Goal: Absence of Fall and Fall-Related Injury  Outcome: Progressing     Problem: Infection  Goal: Absence of Infection Signs and Symptoms  Outcome: Progressing

## 2024-09-03 NOTE — PROGRESS NOTES
Pharmacokinetic Initial Assessment: IV Vancomycin    Assessment/Plan:    Initiate intravenous vancomycin with loading dose of 2000 mg once followed by a maintenance dose of vancomycin 1250mg IV every 12 hours  Desired empiric serum trough concentration is 10 to 20 mcg/mL  Draw vancomycin trough level 60 min prior to fourth dose on 9/4 at approximately 0900  Pharmacy will continue to follow and monitor vancomycin.      Please contact pharmacy at extension [phone] with any questions regarding this assessment.     Thank you for the consult,   Bobo Perez       Patient brief summary:  Laura Kent is a 38 y.o. female initiated on antimicrobial therapy with IV Vancomycin for treatment of suspected skin & soft tissue infection    Drug Allergies:   Review of patient's allergies indicates:   Allergen Reactions    Strawberries [strawberry] Hives       Actual Body Weight:   98.4 kg    Renal Function:   Estimated Creatinine Clearance: 96.5 mL/min (based on SCr of 0.9 mg/dL).,     Dialysis Method (if applicable):  N/A

## 2024-09-03 NOTE — PROGRESS NOTES
Plastic and Reconstructive Surgery   Progress Note    Subjective:    No issues overnight  Pain better  Small amount of drainage    Cx: Neg to date       Objective:  Vital signs in last 24 hours:  Temp:  [97.5 °F (36.4 °C)-99.6 °F (37.6 °C)] 97.8 °F (36.6 °C)  Pulse:  [61-94] 61  Resp:  [16-18] 18  SpO2:  [95 %-98 %] 96 %  BP: (104-166)/(64-84) 104/64    Intake/Output last 3 shifts:  I/O last 3 completed shifts:  In: 1084.8 [P.O.:500; IV Piggyback:584.8]  Out: -     Intake/Output this shift:  No intake/output data recorded.        Physical Exam:  VITAL SIGNS:   Vitals:    24 2325 24 0121 24 0451 24 0651   BP: 115/73  105/70 104/64   BP Location:    Right arm   Patient Position:    Lying   Pulse: 76  66 61   Resp: 18 18 18 18   Temp: 98 °F (36.7 °C)  97.5 °F (36.4 °C) 97.8 °F (36.6 °C)   TempSrc: Oral  Oral Oral   SpO2: 96%  98% 96%   Weight:       Height:         TMAX: Temp (24hrs), Av.2 °F (36.8 °C), Min:97.5 °F (36.4 °C), Max:99.6 °F (37.6 °C)    General: Alert; No acute distress  Cardiovascular: Regular rate   Respiratory: Normal respiratory effort. Chest rise symmetric.   Abdomen: Soft, nontender, nondistended  Extremity: Moves all extremities equally.  Neurologic: No focal deficit. Speech normal    Left breast cellulitis improving, +TTP but improved, min serosang drainage, lymphedema     Scheduled Medications acetaminophen, 1,000 mg, TID  enoxparin, 40 mg, Daily  ibuprofen, 600 mg, Q6H  piperacillin-tazobactam (Zosyn) IV (PEDS and ADULTS) (extended infusion is not appropriate), 4.5 g, Q8H  polyethylene glycol, 17 g, Daily  senna-docusate 8.6-50 mg, 1 tablet, BID  vancomycin (VANCOCIN) IV (PEDS and ADULTS), 1,250 mg, Q12H      PRN Medications   Current Facility-Administered Medications:     oxyCODONE, 5 mg, Oral, Q4H PRN    sodium chloride 0.9%, 10 mL, Intravenous, PRN    Pharmacy to dose Vancomycin consult, , , Once **AND** vancomycin - pharmacy to dose, , Intravenous, pharmacy to  manage frequency    Recent Labs:   Lab Results   Component Value Date    WBC 3.64 (L) 09/03/2024    HGB 9.8 (L) 09/03/2024    HCT 30.9 (L) 09/03/2024    MCV 85 09/03/2024     09/03/2024     Lab Results   Component Value Date     (H) 09/03/2024     09/03/2024    K 3.2 (L) 09/03/2024     09/03/2024    BUN 16 09/03/2024         Assessment: 38 y.o. y/o female s/p b/l mastectomy 12/23 with immediate KANDICE reconstruction. This was complicated by infected fat necrosis requiring IR drain placemetn and underwent revision 4/18/24.     Now with left breast cellulitis, drainage     Plan:   -wound care: dry abd pad over wound as needed  - wound cx obtained and sent, pending results  - US of left breast ordered, will follow up   - reg diet   - Abx: vanc/zosyn  - Follow up Cultures  - DVT ppx, IS, OOB, ambulate, PT/OT          Kumar Corrales MD  Plastic Surgery Fellow  09/03/2024

## 2024-09-04 LAB
ANION GAP SERPL CALC-SCNC: 9 MMOL/L (ref 8–16)
BUN SERPL-MCNC: 12 MG/DL (ref 6–20)
CALCIUM SERPL-MCNC: 9 MG/DL (ref 8.7–10.5)
CHLORIDE SERPL-SCNC: 109 MMOL/L (ref 95–110)
CO2 SERPL-SCNC: 21 MMOL/L (ref 23–29)
CREAT SERPL-MCNC: 1 MG/DL (ref 0.5–1.4)
ERYTHROCYTE [DISTWIDTH] IN BLOOD BY AUTOMATED COUNT: 14 % (ref 11.5–14.5)
EST. GFR  (NO RACE VARIABLE): >60 ML/MIN/1.73 M^2
GLUCOSE SERPL-MCNC: 80 MG/DL (ref 70–110)
HCT VFR BLD AUTO: 32.9 % (ref 37–48.5)
HGB BLD-MCNC: 10.5 G/DL (ref 12–16)
MCH RBC QN AUTO: 27.6 PG (ref 27–31)
MCHC RBC AUTO-ENTMCNC: 31.9 G/DL (ref 32–36)
MCV RBC AUTO: 87 FL (ref 82–98)
PLATELET # BLD AUTO: 170 K/UL (ref 150–450)
PMV BLD AUTO: 10.4 FL (ref 9.2–12.9)
POTASSIUM SERPL-SCNC: 4 MMOL/L (ref 3.5–5.1)
RBC # BLD AUTO: 3.8 M/UL (ref 4–5.4)
SODIUM SERPL-SCNC: 139 MMOL/L (ref 136–145)
WBC # BLD AUTO: 4.23 K/UL (ref 3.9–12.7)

## 2024-09-04 PROCEDURE — 85027 COMPLETE CBC AUTOMATED: CPT | Performed by: STUDENT IN AN ORGANIZED HEALTH CARE EDUCATION/TRAINING PROGRAM

## 2024-09-04 PROCEDURE — 20600001 HC STEP DOWN PRIVATE ROOM

## 2024-09-04 PROCEDURE — 80048 BASIC METABOLIC PNL TOTAL CA: CPT | Performed by: STUDENT IN AN ORGANIZED HEALTH CARE EDUCATION/TRAINING PROGRAM

## 2024-09-04 PROCEDURE — 63600175 PHARM REV CODE 636 W HCPCS: Performed by: EMERGENCY MEDICINE

## 2024-09-04 PROCEDURE — 63600175 PHARM REV CODE 636 W HCPCS: Performed by: STUDENT IN AN ORGANIZED HEALTH CARE EDUCATION/TRAINING PROGRAM

## 2024-09-04 PROCEDURE — 63600175 PHARM REV CODE 636 W HCPCS

## 2024-09-04 PROCEDURE — 25000003 PHARM REV CODE 250: Performed by: STUDENT IN AN ORGANIZED HEALTH CARE EDUCATION/TRAINING PROGRAM

## 2024-09-04 PROCEDURE — 36415 COLL VENOUS BLD VENIPUNCTURE: CPT | Performed by: SURGERY

## 2024-09-04 PROCEDURE — 25000003 PHARM REV CODE 250: Performed by: EMERGENCY MEDICINE

## 2024-09-04 PROCEDURE — 80202 ASSAY OF VANCOMYCIN: CPT | Performed by: SURGERY

## 2024-09-04 PROCEDURE — 36415 COLL VENOUS BLD VENIPUNCTURE: CPT | Performed by: STUDENT IN AN ORGANIZED HEALTH CARE EDUCATION/TRAINING PROGRAM

## 2024-09-04 RX ORDER — ONDANSETRON HYDROCHLORIDE 2 MG/ML
4 INJECTION, SOLUTION INTRAVENOUS ONCE
Status: COMPLETED | OUTPATIENT
Start: 2024-09-04 | End: 2024-09-04

## 2024-09-04 RX ORDER — ONDANSETRON HYDROCHLORIDE 2 MG/ML
4 INJECTION, SOLUTION INTRAVENOUS ONCE
Status: DISCONTINUED | OUTPATIENT
Start: 2024-09-04 | End: 2024-09-04

## 2024-09-04 RX ADMIN — PIPERACILLIN SODIUM AND TAZOBACTAM SODIUM 4.5 G: 4; .5 INJECTION, POWDER, FOR SOLUTION INTRAVENOUS at 09:09

## 2024-09-04 RX ADMIN — IBUPROFEN 600 MG: 600 TABLET, FILM COATED ORAL at 11:09

## 2024-09-04 RX ADMIN — PIPERACILLIN SODIUM AND TAZOBACTAM SODIUM 4.5 G: 4; .5 INJECTION, POWDER, FOR SOLUTION INTRAVENOUS at 01:09

## 2024-09-04 RX ADMIN — VANCOMYCIN HYDROCHLORIDE 1250 MG: 1.25 INJECTION, POWDER, LYOPHILIZED, FOR SOLUTION INTRAVENOUS at 12:09

## 2024-09-04 RX ADMIN — SENNOSIDES AND DOCUSATE SODIUM 1 TABLET: 50; 8.6 TABLET ORAL at 08:09

## 2024-09-04 RX ADMIN — OXYCODONE HYDROCHLORIDE 5 MG: 5 TABLET ORAL at 05:09

## 2024-09-04 RX ADMIN — OXYCODONE HYDROCHLORIDE 5 MG: 5 TABLET ORAL at 06:09

## 2024-09-04 RX ADMIN — VANCOMYCIN HYDROCHLORIDE 1250 MG: 1.25 INJECTION, POWDER, LYOPHILIZED, FOR SOLUTION INTRAVENOUS at 11:09

## 2024-09-04 RX ADMIN — SENNOSIDES AND DOCUSATE SODIUM 1 TABLET: 50; 8.6 TABLET ORAL at 09:09

## 2024-09-04 RX ADMIN — ENOXAPARIN SODIUM 40 MG: 40 INJECTION SUBCUTANEOUS at 06:09

## 2024-09-04 RX ADMIN — PIPERACILLIN SODIUM AND TAZOBACTAM SODIUM 4.5 G: 4; .5 INJECTION, POWDER, FOR SOLUTION INTRAVENOUS at 05:09

## 2024-09-04 RX ADMIN — ACETAMINOPHEN 1000 MG: 500 TABLET ORAL at 08:09

## 2024-09-04 RX ADMIN — ONDANSETRON 4 MG: 2 INJECTION INTRAMUSCULAR; INTRAVENOUS at 10:09

## 2024-09-04 RX ADMIN — ACETAMINOPHEN 1000 MG: 500 TABLET ORAL at 09:09

## 2024-09-04 NOTE — PLAN OF CARE
Problem: Adult Inpatient Plan of Care  Goal: Plan of Care Review  9/4/2024 0732 by Yamileth Arellano RN  Outcome: Progressing  9/3/2024 1807 by Yamileth Arellano RN  Outcome: Progressing  Goal: Patient-Specific Goal (Individualized)  9/4/2024 0732 by Yamileth Arellano RN  Outcome: Progressing  9/3/2024 1807 by Yamileth Arellano RN  Outcome: Progressing  Goal: Absence of Hospital-Acquired Illness or Injury  9/4/2024 0732 by Yamileth Arellano RN  Outcome: Progressing  9/3/2024 1807 by Yamileth Arellano RN  Outcome: Progressing  Goal: Optimal Comfort and Wellbeing  9/4/2024 0732 by Yamileth Arellano RN  Outcome: Progressing  9/3/2024 1807 by Yamileth Arellano RN  Outcome: Progressing  Goal: Readiness for Transition of Care  9/4/2024 0732 by Yamileth Arellano RN  Outcome: Progressing  9/3/2024 1807 by Yamileth Arellano RN  Outcome: Progressing

## 2024-09-04 NOTE — PROGRESS NOTES
"Plastic and Reconstructive Surgery   Progress Note    Subjective:    US results reviewd with patient, she was upset about findings stating "it was not right"  Overall feels the same. Less erythema and ttp on exam today,.      Objective:  Vital signs in last 24 hours:  Temp:  [97.4 °F (36.3 °C)-98.4 °F (36.9 °C)] 98.4 °F (36.9 °C)  Pulse:  [67-79] 72  Resp:  [17-18] 18  SpO2:  [96 %-100 %] 99 %  BP: (104-138)/(65-84) 134/78    Intake/Output last 3 shifts:  I/O last 3 completed shifts:  In: 1444.8 [P.O.:860; IV Piggyback:584.8]  Out: -     Intake/Output this shift:  No intake/output data recorded.        Physical Exam:  VITAL SIGNS:   Vitals:    24 0429 24 0557 24 0755 24 1201   BP: 127/74  138/84 134/78   BP Location: Right arm  Right arm Right arm   Patient Position: Lying  Lying Lying   Pulse: 67  76 72   Resp: 18 17 18 18   Temp: 98.4 °F (36.9 °C)  97.6 °F (36.4 °C) 98.4 °F (36.9 °C)   TempSrc: Oral  Oral Oral   SpO2: 96%  99% 99%   Weight:       Height:         TMAX: Temp (24hrs), Av.9 °F (36.6 °C), Min:97.4 °F (36.3 °C), Max:98.4 °F (36.9 °C)    General: Alert; No acute distress  Cardiovascular: Regular rate   Respiratory: Normal respiratory effort. Chest rise symmetric.   Abdomen: Soft, nontender, nondistended  Extremity: Moves all extremities equally.  Neurologic: No focal deficit. Speech normal    Left breast cellulitis significantly improved, minimal TTP, min serosang drainage, lymphedema     Scheduled Medications acetaminophen, 1,000 mg, TID  enoxparin, 40 mg, Daily  ibuprofen, 600 mg, Q6H  piperacillin-tazobactam (Zosyn) IV (PEDS and ADULTS) (extended infusion is not appropriate), 4.5 g, Q8H  polyethylene glycol, 17 g, Daily  senna-docusate 8.6-50 mg, 1 tablet, BID  vancomycin (VANCOCIN) IV (PEDS and ADULTS), 1,250 mg, Q12H      PRN Medications   Current Facility-Administered Medications:     hydrOXYzine HCL, 25 mg, Oral, TID PRN    oxyCODONE, 5 mg, Oral, Q4H PRN    sodium " chloride 0.9%, 10 mL, Intravenous, PRN    Pharmacy to dose Vancomycin consult, , , Once **AND** vancomycin - pharmacy to dose, , Intravenous, pharmacy to manage frequency    Recent Labs:   Lab Results   Component Value Date    WBC 4.23 09/04/2024    HGB 10.5 (L) 09/04/2024    HCT 32.9 (L) 09/04/2024    MCV 87 09/04/2024     09/04/2024     Lab Results   Component Value Date    GLU 80 09/04/2024     09/04/2024    K 4.0 09/04/2024     09/04/2024    BUN 12 09/04/2024         Assessment: 38 y.o. y/o female s/p b/l mastectomy 12/23 with immediate KANDICE reconstruction. This was complicated by infected fat necrosis requiring IR drain placemetn and underwent revision 4/18/24.     Now with left breast cellulitis, drainage     Plan:   -wound care: dry abd pad over wound as needed  - wound cx no growth currently  - US jsut shows phlegmon, no drainable collection  - reg diet   - Abx: vanc/zosyn  - Follow up Cultures  - DVT ppx, IS, OOB, ambulate, PT/OT  - witness present in room discussing findings, pt was upset regarding whether surgical mgmt necessary. Dr. Barrios called pt to additionally reinforce that abx alone is the recommendation at this time.    Pt was d/w attending surgeon, Dr. Denis Jones MD  Plastic Surgery Fellow  09/04/2024

## 2024-09-04 NOTE — NURSING
Updated MD that patient had increased redness and a change in baseline to left breast. Md stated the will be by for assesment in the morning. Updated patient

## 2024-09-04 NOTE — PLAN OF CARE
Pt resting in bed. AAOx4. VSS, min-mod complaints of pain throughout shift. Lung sounds clear, room air. Redness, tenderness to L breast. Abd pad to L breast changed throughout shift, serosanguineous drainage noted. Scheduled meds given, see MAR. Safety maintained, pt oob independently. No needs voiced, call light within reach. Report prepared for oncoming nurse, plan of care continues.     Problem: Adult Inpatient Plan of Care  Goal: Plan of Care Review  Outcome: Progressing  Goal: Patient-Specific Goal (Individualized)  Outcome: Progressing     Problem: Wound  Goal: Optimal Functional Ability  Outcome: Progressing  Goal: Optimal Pain Control and Function  Outcome: Progressing  Goal: Skin Health and Integrity  Outcome: Progressing     Problem: Pain Acute  Goal: Optimal Pain Control and Function  Outcome: Progressing     Problem: Fall Injury Risk  Goal: Absence of Fall and Fall-Related Injury  Outcome: Progressing     Problem: Infection  Goal: Absence of Infection Signs and Symptoms  Outcome: Progressing

## 2024-09-04 NOTE — NURSING
Nurses Note -- 4 Eyes      9/4/2024   1:40 AM      Skin assessed during: Daily Assessment      [x] No Altered Skin Integrity Present    [x]Prevention Measures Documented      [] Yes- Altered Skin Integrity Present or Discovered   [] LDA Added if Not in Epic (Describe Wound)   [] New Altered Skin Integrity was Present on Admit and Documented in LDA   [] Wound Image Taken    Wound Care Consulted? No    Attending Nurse:  YASMANI Avalos RN    Second RN/Staff Member:  KATHLEEN Reyes, KEL

## 2024-09-05 LAB
BACTERIA SPEC AEROBE CULT: NO GROWTH
BASOPHILS # BLD AUTO: 0.01 K/UL (ref 0–0.2)
BASOPHILS NFR BLD: 0.3 % (ref 0–1.9)
CREAT SERPL-MCNC: 1.5 MG/DL (ref 0.5–1.4)
CRP SERPL-MCNC: 13.6 MG/L (ref 0–8.2)
DIFFERENTIAL METHOD BLD: ABNORMAL
EOSINOPHIL # BLD AUTO: 0.1 K/UL (ref 0–0.5)
EOSINOPHIL NFR BLD: 1.6 % (ref 0–8)
ERYTHROCYTE [DISTWIDTH] IN BLOOD BY AUTOMATED COUNT: 14 % (ref 11.5–14.5)
EST. GFR  (NO RACE VARIABLE): 44.9 ML/MIN/1.73 M^2
HCT VFR BLD AUTO: 30.6 % (ref 37–48.5)
HGB BLD-MCNC: 9.8 G/DL (ref 12–16)
IMM GRANULOCYTES # BLD AUTO: 0.01 K/UL (ref 0–0.04)
IMM GRANULOCYTES NFR BLD AUTO: 0.3 % (ref 0–0.5)
LYMPHOCYTES # BLD AUTO: 0.8 K/UL (ref 1–4.8)
LYMPHOCYTES NFR BLD: 19.6 % (ref 18–48)
MCH RBC QN AUTO: 27.7 PG (ref 27–31)
MCHC RBC AUTO-ENTMCNC: 32 G/DL (ref 32–36)
MCV RBC AUTO: 86 FL (ref 82–98)
MONOCYTES # BLD AUTO: 0.4 K/UL (ref 0.3–1)
MONOCYTES NFR BLD: 10.2 % (ref 4–15)
NEUTROPHILS # BLD AUTO: 2.6 K/UL (ref 1.8–7.7)
NEUTROPHILS NFR BLD: 68 % (ref 38–73)
NRBC BLD-RTO: 0 /100 WBC
PLATELET # BLD AUTO: 199 K/UL (ref 150–450)
PMV BLD AUTO: 10.6 FL (ref 9.2–12.9)
RBC # BLD AUTO: 3.54 M/UL (ref 4–5.4)
VANCOMYCIN TROUGH SERPL-MCNC: 13.6 UG/ML (ref 10–22)
WBC # BLD AUTO: 3.82 K/UL (ref 3.9–12.7)

## 2024-09-05 PROCEDURE — 85025 COMPLETE CBC W/AUTO DIFF WBC: CPT | Performed by: SURGERY

## 2024-09-05 PROCEDURE — 36415 COLL VENOUS BLD VENIPUNCTURE: CPT | Performed by: SURGERY

## 2024-09-05 PROCEDURE — 82565 ASSAY OF CREATININE: CPT | Performed by: SURGERY

## 2024-09-05 PROCEDURE — 63600175 PHARM REV CODE 636 W HCPCS: Performed by: STUDENT IN AN ORGANIZED HEALTH CARE EDUCATION/TRAINING PROGRAM

## 2024-09-05 PROCEDURE — 94761 N-INVAS EAR/PLS OXIMETRY MLT: CPT

## 2024-09-05 PROCEDURE — 63600175 PHARM REV CODE 636 W HCPCS: Performed by: SURGERY

## 2024-09-05 PROCEDURE — 20600001 HC STEP DOWN PRIVATE ROOM

## 2024-09-05 PROCEDURE — 25000003 PHARM REV CODE 250

## 2024-09-05 PROCEDURE — 86140 C-REACTIVE PROTEIN: CPT | Performed by: SURGERY

## 2024-09-05 PROCEDURE — 25000003 PHARM REV CODE 250: Performed by: STUDENT IN AN ORGANIZED HEALTH CARE EDUCATION/TRAINING PROGRAM

## 2024-09-05 PROCEDURE — 25000003 PHARM REV CODE 250: Performed by: SURGERY

## 2024-09-05 RX ORDER — ONDANSETRON 4 MG/1
4 TABLET, ORALLY DISINTEGRATING ORAL 3 TIMES DAILY PRN
Status: DISCONTINUED | OUTPATIENT
Start: 2024-09-05 | End: 2024-09-07 | Stop reason: HOSPADM

## 2024-09-05 RX ADMIN — ACETAMINOPHEN 1000 MG: 500 TABLET ORAL at 08:09

## 2024-09-05 RX ADMIN — SENNOSIDES AND DOCUSATE SODIUM 1 TABLET: 50; 8.6 TABLET ORAL at 08:09

## 2024-09-05 RX ADMIN — ENOXAPARIN SODIUM 40 MG: 40 INJECTION SUBCUTANEOUS at 04:09

## 2024-09-05 RX ADMIN — PIPERACILLIN SODIUM AND TAZOBACTAM SODIUM 4.5 G: 4; .5 INJECTION, POWDER, FOR SOLUTION INTRAVENOUS at 08:09

## 2024-09-05 RX ADMIN — VANCOMYCIN HYDROCHLORIDE 1250 MG: 1.25 INJECTION, POWDER, LYOPHILIZED, FOR SOLUTION INTRAVENOUS at 12:09

## 2024-09-05 RX ADMIN — IBUPROFEN 600 MG: 600 TABLET, FILM COATED ORAL at 05:09

## 2024-09-05 RX ADMIN — IBUPROFEN 600 MG: 600 TABLET, FILM COATED ORAL at 12:09

## 2024-09-05 RX ADMIN — OXYCODONE HYDROCHLORIDE 5 MG: 5 TABLET ORAL at 04:09

## 2024-09-05 RX ADMIN — IBUPROFEN 600 MG: 600 TABLET, FILM COATED ORAL at 04:09

## 2024-09-05 RX ADMIN — PIPERACILLIN SODIUM AND TAZOBACTAM SODIUM 4.5 G: 4; .5 INJECTION, POWDER, FOR SOLUTION INTRAVENOUS at 04:09

## 2024-09-05 RX ADMIN — OXYCODONE HYDROCHLORIDE 5 MG: 5 TABLET ORAL at 08:09

## 2024-09-05 RX ADMIN — ONDANSETRON 4 MG: 4 TABLET, ORALLY DISINTEGRATING ORAL at 05:09

## 2024-09-05 RX ADMIN — PIPERACILLIN SODIUM AND TAZOBACTAM SODIUM 4.5 G: 4; .5 INJECTION, POWDER, FOR SOLUTION INTRAVENOUS at 12:09

## 2024-09-05 RX ADMIN — IBUPROFEN 600 MG: 600 TABLET, FILM COATED ORAL at 11:09

## 2024-09-05 RX ADMIN — ACETAMINOPHEN 1000 MG: 500 TABLET ORAL at 02:09

## 2024-09-05 NOTE — CONSULTS
Wound care consultation placed by the bedside RN for the beast received. After reviewing the chart, and speaking with the plastics MD, plastics is following with orders in place. Wound care will sign off, re-consult as needed.

## 2024-09-05 NOTE — PROGRESS NOTES
Pharmacokinetic Assessment Follow Up: IV Vancomycin    Vancomycin serum concentration assessment(s):    The trough level was drawn correctly and can be used to guide therapy at this time. The measurement is within the desired definitive target range of 10 to 20 mcg/mL.    Vancomycin Regimen Plan:    Continue regimen to Vancomycin 1250 mg IV every 12 hours with next serum trough concentration measured at 1200 on 9/6    Drug levels (last 3 results):  Recent Labs   Lab Result Units 09/04/24  2329   Vancomycin-Trough ug/mL 13.6       Pharmacy will continue to follow and monitor vancomycin.    Please contact pharmacy at extension [phone] for questions regarding this assessment.    Thank you for the consult,   Arabella Novoa       Patient brief summary:  Laura Kent is a 38 y.o. female initiated on antimicrobial therapy with IV Vancomycin for treatment of skin & soft tissue infection      Drug Allergies:   Review of patient's allergies indicates:   Allergen Reactions    Strawberries [strawberry] Hives       Actual Body Weight:   100.7kg    Renal Function:   Estimated Creatinine Clearance: 88 mL/min (based on SCr of 1 mg/dL).,     Dialysis Method (if applicable):  N/A    CBC (last 72 hours):  Recent Labs   Lab Result Units 09/02/24 1745 09/03/24  0236 09/04/24  1010   WBC K/uL 4.77 3.64* 4.23   Hemoglobin g/dL 10.5* 9.8* 10.5*   Hematocrit % 32.6* 30.9* 32.9*   Platelets K/uL 195 199 170   Gran % % 76.5* 62.6  --    Lymph % % 16.8* 26.9  --    Mono % % 5.5 8.5  --    Eosinophil % % 0.6 1.4  --    Basophil % % 0.4 0.3  --    Differential Method  Automated Automated  --        Metabolic Panel (last 72 hours):  Recent Labs   Lab Result Units 09/02/24  1745 09/03/24  0236 09/04/24  1010   Sodium mmol/L 138 137 139   Potassium mmol/L 3.2* 3.2* 4.0   Chloride mmol/L 105 105 109   CO2 mmol/L 22* 24 21*   Glucose mg/dL 101 118* 80   BUN mg/dL 15 16 12   Creatinine mg/dL 0.9 0.8 1.0   Albumin g/dL 4.1  --   --    Total  Bilirubin mg/dL 0.5  --   --    Alkaline Phosphatase U/L 46*  --   --    AST U/L 18  --   --    ALT U/L 11  --   --        Vancomycin Administrations:  vancomycin given in the last 96 hours                     vancomycin 1,250 mg in D5W 250 mL IVPB (admixture device) (mg) 1,250 mg New Bag 09/04/24 1157     1,250 mg New Bag  0010    vancomycin 2 g in dextrose 5 % 500 mL IVPB (mg) 2,000 mg New Bag 09/02/24 2225                    Microbiologic Results:  Microbiology Results (last 7 days)       Procedure Component Value Units Date/Time    Culture, Anaerobe [[id number]] Collected: 09/02/24 1758    Order Status: Completed Specimen: Wound from Breast, Left Updated: 09/04/24 0931     Anaerobic Culture Culture in progress    Aerobic culture [[id number]] Collected: 09/02/24 1758    Order Status: Completed Specimen: Wound from Breast, Left Updated: 09/03/24 0724     Aerobic Bacterial Culture No growth

## 2024-09-05 NOTE — PROGRESS NOTES
Plastic and Reconstructive Surgery   Progress Note    Subjective:    Resting comfortably on exam. States condition is same. Wbc 3      Objective:  Vital signs in last 24 hours:  Temp:  [97.6 °F (36.4 °C)-98.5 °F (36.9 °C)] 98 °F (36.7 °C)  Pulse:  [68-86] 68  Resp:  [15-18] 18  SpO2:  [97 %-99 %] 98 %  BP: (108-138)/(63-84) 108/63    Intake/Output last 3 shifts:  I/O last 3 completed shifts:  In: 360 [P.O.:360]  Out: -     Intake/Output this shift:  No intake/output data recorded.        Physical Exam:  VITAL SIGNS:   Vitals:    24 2351 24 0343 24 0413   BP: 136/80 120/66 108/63    BP Location: Right arm Left arm Left arm    Patient Position: Lying Lying Lying    Pulse: 86 76 68    Resp: 18 16 18 18   Temp: 98.5 °F (36.9 °C) 98.3 °F (36.8 °C) 98 °F (36.7 °C)    TempSrc: Oral Oral Oral    SpO2: 97% 97% 98%    Weight:       Height:         TMAX: Temp (24hrs), Av.2 °F (36.8 °C), Min:97.6 °F (36.4 °C), Max:98.5 °F (36.9 °C)    General: Alert; No acute distress  Cardiovascular: Regular rate   Respiratory: Normal respiratory effort. Chest rise symmetric.   Abdomen: Soft, nontender, nondistended  Extremity: Moves all extremities equally.  Neurologic: No focal deficit. Speech normal    Left breast cellulitis significantly improved, minimal TTP, min serosang drainage, lymphedema     Scheduled Medications acetaminophen, 1,000 mg, TID  enoxparin, 40 mg, Daily  ibuprofen, 600 mg, Q6H  piperacillin-tazobactam (Zosyn) IV (PEDS and ADULTS) (extended infusion is not appropriate), 4.5 g, Q8H  polyethylene glycol, 17 g, Daily  senna-docusate 8.6-50 mg, 1 tablet, BID  vancomycin (VANCOCIN) IV (PEDS and ADULTS), 1,250 mg, Q12H      PRN Medications   Current Facility-Administered Medications:     hydrOXYzine HCL, 25 mg, Oral, TID PRN    oxyCODONE, 5 mg, Oral, Q4H PRN    sodium chloride 0.9%, 10 mL, Intravenous, PRN    Pharmacy to dose Vancomycin consult, , , Once **AND** vancomycin - pharmacy to dose,  , Intravenous, pharmacy to manage frequency    Recent Labs:   Lab Results   Component Value Date    WBC 3.82 (L) 09/05/2024    HGB 9.8 (L) 09/05/2024    HCT 30.6 (L) 09/05/2024    MCV 86 09/05/2024     09/05/2024     Lab Results   Component Value Date    GLU 80 09/04/2024     09/04/2024    K 4.0 09/04/2024     09/04/2024    BUN 12 09/04/2024         Assessment: 38 y.o. y/o female s/p b/l mastectomy 12/23 with immediate KANDICE reconstruction. This was complicated by infected fat necrosis requiring IR drain placemetn and underwent revision 4/18/24.     Now with left breast cellulitis, drainage     Plan:   -wound care: dry abd pad over wound as needed  - wound cx no growth currently  - US jsut shows phlegmon, no drainable collection  - reg diet   - Abx: vanc/zosyn  - Follow up Cultures  - DVT ppx, IS, OOB, ambulate, PT/OT  - pt was sleeping and on clincal exam, improving. However patient states condition is same. Wbc 3.    Pt was d/w attending surgeon, Dr. Denis Jones MD  Plastic Surgery Fellow  09/05/2024

## 2024-09-05 NOTE — PLAN OF CARE
"[facility] Plan of Care Note    Dx:   Cellulitis [L03.90]    Shift Events: patient rested in bed throughout the shift, pain controlled with current regimen    Goals of Care: get rid of infection, pain control, safety     Neuro: AAOx4    Vital Signs: /63 (BP Location: Left arm, Patient Position: Lying)   Pulse 68   Temp 98 °F (36.7 °C) (Oral)   Resp 18   Ht 5' 5" (1.651 m)   Wt 100.7 kg (222 lb)   LMP 08/04/2024 (Approximate)   SpO2 98%   Breastfeeding No   BMI 36.94 kg/m²     Respiratory: RA    Diet: Diet Adult Regular      Is patient tolerating current diet? yes    GTTS: na    Urine Output/Bowel Movement:   No intake/output data recorded.  Last Bowel Movement: 09/03/24      Drains/Tubes/Tube Feeds (include total output/shift):   No intake/output data recorded.      Lines:       Accuchecks:na    Skin: L breast cellulitis     Fall Risk Score:     Activity level? indep    Any scheduled procedures?     Any safety concerns?     Other:    "

## 2024-09-05 NOTE — PROGRESS NOTES
Pharmacokinetic Assessment Follow Up: IV Vancomycin    Vancomycin serum concentration assessment(s):    SHAYLA, patient's SCr increased from 0.8 > 1.0 > 1.5 mg/dL   Last vancomycin trough resulted at 13.6 mcg/mL after just 3 doses.   Vancomycin goal trough 10-20 mcg/mL     Vancomycin Regimen Plan:    Will initiate pulse dosing due to SHAYLA, vancomycin level ordered for 9/6 0100, ~ 24 hours after the last vancomycin 1250 mg administration.  Re-dose when the random level is less than 20 mcg/mL    Drug levels (last 3 results):  Recent Labs   Lab Result Units 09/04/24  2329   Vancomycin-Trough ug/mL 13.6       Pharmacy will continue to follow and monitor vancomycin.    Please contact pharmacy at extension [phone] for questions regarding this assessment.    Thank you for the consult,   Cheng Snow       Patient brief summary:  Laura Kent is a 38 y.o. female initiated on antimicrobial therapy with IV Vancomycin for treatment of skin & soft tissue infection      Drug Allergies:   Review of patient's allergies indicates:   Allergen Reactions    Strawberries [strawberry] Hives       Actual Body Weight:   100.7 kg     Renal Function:   Estimated Creatinine Clearance: 58.6 mL/min (A) (based on SCr of 1.5 mg/dL (H)).,     Dialysis Method (if applicable):  N/A    CBC (last 72 hours):  Recent Labs   Lab Result Units 09/02/24 1745 09/03/24  0236 09/04/24  1010 09/05/24  0305   WBC K/uL 4.77 3.64* 4.23 3.82*   Hemoglobin g/dL 10.5* 9.8* 10.5* 9.8*   Hematocrit % 32.6* 30.9* 32.9* 30.6*   Platelets K/uL 195 199 170 199   Gran % % 76.5* 62.6  --  68.0   Lymph % % 16.8* 26.9  --  19.6   Mono % % 5.5 8.5  --  10.2   Eosinophil % % 0.6 1.4  --  1.6   Basophil % % 0.4 0.3  --  0.3   Differential Method  Automated Automated  --  Automated       Metabolic Panel (last 72 hours):  Recent Labs   Lab Result Units 09/02/24  1745 09/03/24  0236 09/04/24  1010 09/05/24  0910   Sodium mmol/L 138 137 139  --    Potassium mmol/L 3.2* 3.2*  4.0  --    Chloride mmol/L 105 105 109  --    CO2 mmol/L 22* 24 21*  --    Glucose mg/dL 101 118* 80  --    BUN mg/dL 15 16 12  --    Creatinine mg/dL 0.9 0.8 1.0 1.5*   Albumin g/dL 4.1  --   --   --    Total Bilirubin mg/dL 0.5  --   --   --    Alkaline Phosphatase U/L 46*  --   --   --    AST U/L 18  --   --   --    ALT U/L 11  --   --   --        Vancomycin Administrations:  vancomycin given in the last 96 hours                     vancomycin 1,250 mg in D5W 250 mL IVPB (admixture device) (mg) 1,250 mg New Bag 09/05/24 0052    vancomycin 1,250 mg in D5W 250 mL IVPB (admixture device) (mg) 1,250 mg New Bag 09/04/24 1157     1,250 mg New Bag  0010    vancomycin 2 g in dextrose 5 % 500 mL IVPB (mg) 2,000 mg New Bag 09/02/24 2225                    Microbiologic Results:  Microbiology Results (last 7 days)       Procedure Component Value Units Date/Time    Culture, Anaerobe [[id number]] Collected: 09/02/24 1758    Order Status: Completed Specimen: Wound from Breast, Left Updated: 09/04/24 0931     Anaerobic Culture Culture in progress    Aerobic culture [[id number]] Collected: 09/02/24 1758    Order Status: Completed Specimen: Wound from Breast, Left Updated: 09/03/24 0724     Aerobic Bacterial Culture No growth

## 2024-09-05 NOTE — PLAN OF CARE
[facility]  Initial Discharge Assessment       Primary Care Provider: Yessy Cruz MD    Admission Diagnosis: Cellulitis [L03.90]    Admission Date: 9/2/2024  Expected Discharge Date: 9/6/2024    Transition of Care Barriers: None    Payor: MEDICAID / [facility] [place] [facility] / Product Type: Managed Medicaid /     Extended Emergency Contact Information  Primary Emergency Contact: Brielle Winkler  Mobile Phone: [phone]  Relation: Sister  Secondary Emergency Contact: kellen downs  Mobile Phone: [phone]  Relation: Mother    Discharge Plan A: Home with family  Discharge Plan B: Home Health      [facility]  [address] [facility] [address] [address]  Phone: [phone] Fax: [phone]    [facility]  [address] [address]  Phone: [phone] Fax: [phone]    [facility] [address] [address]  Phone: [phone] Fax: [phone]      Initial Assessment (most recent)       Adult Discharge Assessment - 09/05/24 1549          Discharge Assessment    Assessment Type Discharge Planning Assessment     Confirmed/corrected address, phone number and insurance Yes     Confirmed Demographics Correct on Facesheet     Source of Information patient     Communicated MIRIAN with patient/caregiver Yes     People in Home child(rico), dependent     Do you expect to return to your current living situation? Yes     Do you have help at home or someone to help you manage your care at home? No     Prior to hospitilization cognitive status: Alert/Oriented     Current cognitive status: Alert/Oriented     Home Layout Able to live on 1st floor     Do you take prescription medications? Yes     Do you have prescription coverage? Yes     Do you have any problems affording any of your prescribed medications? No     Is the patient taking medications as prescribed? yes      Who is going to help you get home at discharge? sister     How do you get to doctors appointments? family or friend will provide     Are you on dialysis? No     Do you take coumadin? No     Discharge Plan A Home with family     Discharge Plan B Home Health     Discharge Plan discussed with: Patient     Transition of Care Barriers None     SDOH --   no       Physical Activity    On average, how many days per week do you engage in moderate to strenuous exercise (like a brisk walk)? 0 days     On average, how many minutes do you engage in exercise at this level? 0 min        Financial Resource Strain    How hard is it for you to pay for the very basics like food, housing, medical care, and heating? Not hard at all        Housing Stability    In the last 12 months, was there a time when you were not able to pay the mortgage or rent on time? No     At any time in the past 12 months, were you homeless or living in a shelter (including now)? No        Transportation Needs    Has the lack of transportation kept you from medical appointments, meetings, work or from getting things needed for daily living? No        Food Insecurity    Within the past 12 months, you worried that your food would run out before you got the money to buy more. Never true     Within the past 12 months, the food you bought just didn't last and you didn't have money to get more. Never true        Stress    Do you feel stress - tense, restless, nervous, or anxious, or unable to sleep at night because your mind is troubled all the time - these days? Not at all        Social Isolation    How often do you feel lonely or isolated from those around you?  Never        Alcohol Use    Q1: How often do you have a drink containing alcohol? Never     Q2: How many drinks containing alcohol do you have on a typical day when you are drinking? Patient does not drink     Q3: How often do you have six or more drinks on one occasion? Never        Utilities    In the  past 12 months has the electric, gas, oil, or water company threatened to shut off services in your home? No        Health Literacy    How often do you need to have someone help you when you read instructions, pamphlets, or other written material from your doctor or pharmacy? Never                   The CM met with the patient at bedside to complete the DPA. The CM placed name and contact information on the blackboard in the patient's room.  Use preferred pharmacy / bedside delivery for any necessary  medications at the time of discharge.The patient is independent with all ADLs. The patient is not on Dialysis or Coumadin. The patient's sister will provide assistance to the patient upon discharge. The patient's sister will provide transportation upon discharge.  The CM will continue to follow for course of hospitalization.

## 2024-09-06 LAB
ANION GAP SERPL CALC-SCNC: 6 MMOL/L (ref 8–16)
BUN SERPL-MCNC: 13 MG/DL (ref 6–20)
CALCIUM SERPL-MCNC: 9.3 MG/DL (ref 8.7–10.5)
CHLORIDE SERPL-SCNC: 107 MMOL/L (ref 95–110)
CO2 SERPL-SCNC: 27 MMOL/L (ref 23–29)
CREAT SERPL-MCNC: 1.4 MG/DL (ref 0.5–1.4)
EST. GFR  (NO RACE VARIABLE): 48.8 ML/MIN/1.73 M^2
GLUCOSE SERPL-MCNC: 88 MG/DL (ref 70–110)
POTASSIUM SERPL-SCNC: 4.2 MMOL/L (ref 3.5–5.1)
SODIUM SERPL-SCNC: 140 MMOL/L (ref 136–145)
VANCOMYCIN SERPL-MCNC: 11.2 UG/ML

## 2024-09-06 PROCEDURE — 25000003 PHARM REV CODE 250: Performed by: STUDENT IN AN ORGANIZED HEALTH CARE EDUCATION/TRAINING PROGRAM

## 2024-09-06 PROCEDURE — 63600175 PHARM REV CODE 636 W HCPCS: Performed by: STUDENT IN AN ORGANIZED HEALTH CARE EDUCATION/TRAINING PROGRAM

## 2024-09-06 PROCEDURE — 80202 ASSAY OF VANCOMYCIN: CPT | Performed by: SURGERY

## 2024-09-06 PROCEDURE — 20600001 HC STEP DOWN PRIVATE ROOM

## 2024-09-06 PROCEDURE — 36415 COLL VENOUS BLD VENIPUNCTURE: CPT | Performed by: SURGERY

## 2024-09-06 PROCEDURE — 25000003 PHARM REV CODE 250

## 2024-09-06 PROCEDURE — 63600175 PHARM REV CODE 636 W HCPCS: Performed by: SURGERY

## 2024-09-06 PROCEDURE — 80048 BASIC METABOLIC PNL TOTAL CA: CPT | Performed by: SURGERY

## 2024-09-06 PROCEDURE — 25000003 PHARM REV CODE 250: Performed by: SURGERY

## 2024-09-06 RX ADMIN — ONDANSETRON 4 MG: 4 TABLET, ORALLY DISINTEGRATING ORAL at 12:09

## 2024-09-06 RX ADMIN — ENOXAPARIN SODIUM 40 MG: 40 INJECTION SUBCUTANEOUS at 05:09

## 2024-09-06 RX ADMIN — PIPERACILLIN SODIUM AND TAZOBACTAM SODIUM 4.5 G: 4; .5 INJECTION, POWDER, FOR SOLUTION INTRAVENOUS at 08:09

## 2024-09-06 RX ADMIN — PIPERACILLIN SODIUM AND TAZOBACTAM SODIUM 4.5 G: 4; .5 INJECTION, POWDER, FOR SOLUTION INTRAVENOUS at 01:09

## 2024-09-06 RX ADMIN — ACETAMINOPHEN 1000 MG: 500 TABLET ORAL at 08:09

## 2024-09-06 RX ADMIN — OXYCODONE HYDROCHLORIDE 5 MG: 5 TABLET ORAL at 08:09

## 2024-09-06 RX ADMIN — IBUPROFEN 600 MG: 600 TABLET, FILM COATED ORAL at 05:09

## 2024-09-06 RX ADMIN — SENNOSIDES AND DOCUSATE SODIUM 1 TABLET: 50; 8.6 TABLET ORAL at 08:09

## 2024-09-06 RX ADMIN — IBUPROFEN 600 MG: 600 TABLET, FILM COATED ORAL at 12:09

## 2024-09-06 RX ADMIN — VANCOMYCIN HYDROCHLORIDE 1250 MG: 1.25 INJECTION, POWDER, LYOPHILIZED, FOR SOLUTION INTRAVENOUS at 02:09

## 2024-09-06 RX ADMIN — ACETAMINOPHEN 1000 MG: 500 TABLET ORAL at 02:09

## 2024-09-06 RX ADMIN — PIPERACILLIN SODIUM AND TAZOBACTAM SODIUM 4.5 G: 4; .5 INJECTION, POWDER, FOR SOLUTION INTRAVENOUS at 04:09

## 2024-09-06 NOTE — PLAN OF CARE
"[facility] Plan of Care Note    Dx: Cellulitis [L03.90]    Shift Events: SL Zofran added, Vanc DC'd    Goals of Care: ABX treatment    Neuro: AOx4    Vital Signs: /63 (BP Location: Right arm, Patient Position: Lying)   Pulse 72   Temp 97.8 °F (36.6 °C) (Oral)   Resp 18   Ht 5' 5" (1.651 m)   Wt 100.7 kg (222 lb)   LMP 08/04/2024 (Approximate)   SpO2 98%   Breastfeeding No   BMI 36.94 kg/m²     Respiratory: RA    Diet: Diet Adult Regular      Is patient tolerating current diet? Yes    GTTS: NA    Urine Output/Bowel Movement:   No intake or output data in the 24 hours ending 09/05/24 1935       Drains/Tubes/Tube Feeds (include total output/shift):   Output by Drain (mL) 09/03/24 0701 - 09/03/24 1900 09/03/24 1901 - 09/04/24 0700 09/04/24 0701 - 09/04/24 1900 09/04/24 1901 - 09/05/24 0700 09/05/24 0701 - 09/05/24 1900 09/05/24 1901 - 09/05/24 1935   Patient has no LDAs of requested type attached.          Lines:       Accuchecks:NA    Skin: surgical sites    Fall Risk Score: See flowsheets    Activity level? See flowsheets    Any scheduled procedures? NA    Any safety concerns? Falls    Other: NA   "

## 2024-09-06 NOTE — CONSULTS
VANCOMYCIN DOSING BY PHARMACY DISCONTINUATION NOTE    Laura Kent is a 38 y.o. female who had been consulted for vancomycin dosing.    The pharmacy consult for vancomycin dosing has been discontinued.     Vancomycin Dosing by Pharmacy Consult will sign-off. Please reconsult if necessary. Thank you for allowing us to participate in this patient's care.     Loan Moctezuma, PharmD  [id number]

## 2024-09-06 NOTE — PROGRESS NOTES
Plastic and Reconstructive Surgery   Progress Note    Subjective:    NAOE. Resting comfortably on exam. She reports feeling like the swelling and redness has improved. Continues on vanc/zosyn.      Objective:  Vital signs in last 24 hours:  Temp:  [97.6 °F (36.4 °C)-98.2 °F (36.8 °C)] 98 °F (36.7 °C)  Pulse:  [63-72] 71  Resp:  [16-18] 18  SpO2:  [96 %-98 %] 98 %  BP: (109-152)/(63-89) 152/89    Intake/Output last 3 shifts:  No intake/output data recorded.    Intake/Output this shift:  No intake/output data recorded.        Physical Exam:  VITAL SIGNS:   Vitals:    24 1656 24 1935 24 2344 24 0512   BP:  109/69 129/85 (!) 152/89   BP Location:       Patient Position:       Pulse:  68 63 71   Resp: 18 17 18 18   Temp:  98.2 °F (36.8 °C) 97.8 °F (36.6 °C) 98 °F (36.7 °C)   TempSrc:  Oral Oral Oral   SpO2:  98% 96% 98%   Weight:       Height:         TMAX: Temp (24hrs), Av.9 °F (36.6 °C), Min:97.6 °F (36.4 °C), Max:98.2 °F (36.8 °C)    General: Alert; No acute distress  Cardiovascular: Regular rate   Respiratory: Normal respiratory effort. Chest rise symmetric.   Abdomen: Soft, nontender, nondistended  Extremity: Moves all extremities equally.  Neurologic: No focal deficit. Speech normal    Left breast cellulitis significantly improved, minimal TTP, min serosang drainage, lymphedema     Scheduled Medications acetaminophen, 1,000 mg, TID  enoxparin, 40 mg, Daily  ibuprofen, 600 mg, Q6H  piperacillin-tazobactam (Zosyn) IV (PEDS and ADULTS) (extended infusion is not appropriate), 4.5 g, Q8H  polyethylene glycol, 17 g, Daily  senna-docusate 8.6-50 mg, 1 tablet, BID      PRN Medications   Current Facility-Administered Medications:     hydrOXYzine HCL, 25 mg, Oral, TID PRN    ondansetron, 4 mg, Oral, TID PRN    oxyCODONE, 5 mg, Oral, Q4H PRN    sodium chloride 0.9%, 10 mL, Intravenous, PRN    Pharmacy to dose Vancomycin consult, , , Once **AND** vancomycin - pharmacy to dose, , Intravenous,  pharmacy to manage frequency    Recent Labs:   Lab Results   Component Value Date    WBC 3.82 (L) 09/05/2024    HGB 9.8 (L) 09/05/2024    HCT 30.6 (L) 09/05/2024    MCV 86 09/05/2024     09/05/2024     Lab Results   Component Value Date    GLU 80 09/04/2024     09/04/2024    K 4.0 09/04/2024     09/04/2024    BUN 12 09/04/2024         Assessment: 38 y.o. y/o female s/p b/l mastectomy 12/23 with immediate KANDICE reconstruction. This was complicated by infected fat necrosis requiring IR drain placemetn and underwent revision 4/18/24.     Now with left breast cellulitis, drainage     Plan:   -wound care: dry abd pad over wound as needed  - wound cx no growth currently  - US jsut shows phlegmon, no drainable collection  - reg diet   - Abx: vanc/zosyn  - Follow up Cultures  - DVT ppx, IS, OOB, ambulate, PT/OT  - Breast cellulitis improving with abx therapy. Patient prefers continued antibiotics as opposed to any operative intervention. Will continue antibiotics and monitor.    Pt was d/w attending surgeon, Dr. Denis Villafuerte MD   General Surgery PGY-1  9/6/2024

## 2024-09-06 NOTE — PLAN OF CARE
"[facility] Plan of Care Note    Dx: Cellulitis [L03.90]    Shift Events: no significant events    Goals of Care: Pain Mgmt, continued ABX     Neuro: OAx4    Vital Signs: /80 (BP Location: Left arm, Patient Position: Lying)   Pulse 65   Temp 98.4 °F (36.9 °C) (Oral)   Resp 18   Ht 5' 5" (1.651 m)   Wt 100.7 kg (222 lb)   LMP 08/04/2024 (Approximate)   SpO2 98%   Breastfeeding No   BMI 36.94 kg/m²     Respiratory: RA    Diet: Diet Adult Regular      Is patient tolerating current diet? Yes    GTTS: NA    Urine Output/Bowel Movement:   No intake or output data in the 24 hours ending 09/06/24 1607       Drains/Tubes/Tube Feeds (include total output/shift):   Output by Drain (mL) 09/04/24 0701 - 09/04/24 1900 09/04/24 1901 - 09/05/24 0700 09/05/24 0701 - 09/05/24 1900 09/05/24 1901 - 09/06/24 0700 09/06/24 0701 - 09/06/24 1607   Patient has no LDAs of requested type attached.          Lines:       Accuchecks:NA    Skin: surgical sites    Fall Risk Score: See flowsheets    Activity level? See flowsheets    Any scheduled procedures? NA    Any safety concerns? Falls    Other: NA   "

## 2024-09-06 NOTE — PLAN OF CARE
[facility]  Discharge Reassessment    Primary Care Provider: Yessy Cruz MD    Expected Discharge Date: 9/7/2024    Reassessment (most recent)       Discharge Reassessment - 09/06/24 1203          Discharge Reassessment    Assessment Type Discharge Planning Reassessment     Did the patient's condition or plan change since previous assessment? No     Discharge Plan discussed with: Patient     Communicated MIRIAN with patient/caregiver Yes     Discharge Plan A Home with family     Discharge Plan B Home     Transition of Care Barriers None     Why the patient remains in the hospital Requires continued medical care                     Plan for possible discharge home tomorrow on PO abx per team.

## 2024-09-06 NOTE — PLAN OF CARE
"[facility] Plan of Care Note    Dx:   Cellulitis [L03.90]    Shift Events: patient rested in bed throughout the shift, pain controlled with current regimen    Goals of Care: get rid of infection, pain control, safety     Neuro: AAOx4    Vital Signs: /85   Pulse 63   Temp 97.8 °F (36.6 °C) (Oral)   Resp 18   Ht 5' 5" (1.651 m)   Wt 100.7 kg (222 lb)   LMP 08/04/2024 (Approximate)   SpO2 96%   Breastfeeding No   BMI 36.94 kg/m²     Respiratory: RA    Diet: Diet Adult Regular      Is patient tolerating current diet? yes    GTTS: na    Urine Output/Bowel Movement:   No intake/output data recorded.  Last Bowel Movement: 09/03/24      Drains/Tubes/Tube Feeds (include total output/shift):   No intake/output data recorded.      Lines:       Accuchecks:na    Skin: L breast cellulitis     Fall Risk Score:     Activity level? indep    Any scheduled procedures?     Any safety concerns?     Other:    "

## 2024-09-06 NOTE — PROGRESS NOTES
Pharmacokinetic Assessment Follow Up: IV Vancomycin    Vancomycin serum concentration assessment(s):    The random level was drawn correctly and can be used to guide therapy at this time. The measurement is within the desired definitive target range of 10 to 20 mcg/mL.    Vancomycin Regimen Plan:    Continue regimen to Vancomycin 1250 mg IV once with next serum trough concentration measured at on 09/06/24 with morning labs      Drug levels (last 3 results):  Recent Labs   Lab Result Units 09/04/24  2329 09/06/24  0045   Vancomycin, Random ug/mL  --  11.2   Vancomycin-Trough ug/mL 13.6  --        Pharmacy will continue to follow and monitor vancomycin.    Please contact pharmacy at extension [phone] for questions regarding this assessment.    Thank you for the consult,   Patt Tavares       Patient brief summary:  Laura Kent is a 38 y.o. female initiated on antimicrobial therapy with IV Vancomycin for treatment of skin & soft tissue infection      Drug Allergies:   Review of patient's allergies indicates:   Allergen Reactions    Strawberries [strawberry] Hives       Actual Body Weight:   100.7 kg    Renal Function:   Estimated Creatinine Clearance: 58.6 mL/min (A) (based on SCr of 1.5 mg/dL (H)).,     Dialysis Method (if applicable):  N/A    CBC (last 72 hours):  Recent Labs   Lab Result Units 09/03/24  0236 09/04/24  1010 09/05/24  0305   WBC K/uL 3.64* 4.23 3.82*   Hemoglobin g/dL 9.8* 10.5* 9.8*   Hematocrit % 30.9* 32.9* 30.6*   Platelets K/uL 199 170 199   Gran % % 62.6  --  68.0   Lymph % % 26.9  --  19.6   Mono % % 8.5  --  10.2   Eosinophil % % 1.4  --  1.6   Basophil % % 0.3  --  0.3   Differential Method  Automated  --  Automated       Metabolic Panel (last 72 hours):  Recent Labs   Lab Result Units 09/03/24  0236 09/04/24  1010 09/05/24  0910   Sodium mmol/L 137 139  --    Potassium mmol/L 3.2* 4.0  --    Chloride mmol/L 105 109  --    CO2 mmol/L 24 21*  --    Glucose mg/dL 118* 80  --    BUN  mg/dL 16 12  --    Creatinine mg/dL 0.8 1.0 1.5*       Vancomycin Administrations:  vancomycin given in the last 96 hours                     vancomycin 1,250 mg in D5W 250 mL IVPB (admixture device) (mg) 1,250 mg New Bag 09/05/24 0052    vancomycin 1,250 mg in D5W 250 mL IVPB (admixture device) (mg) 1,250 mg New Bag 09/04/24 1157     1,250 mg New Bag  0010    vancomycin 2 g in dextrose 5 % 500 mL IVPB (mg) 2,000 mg New Bag 09/02/24 2225                    Microbiologic Results:  Microbiology Results (last 7 days)       Procedure Component Value Units Date/Time    Aerobic culture [[id number]] Collected: 09/02/24 1758    Order Status: Completed Specimen: Wound from Breast, Left Updated: 09/05/24 1104     Aerobic Bacterial Culture No growth    Culture, Anaerobe [[id number]] Collected: 09/02/24 1758    Order Status: Completed Specimen: Wound from Breast, Left Updated: 09/04/24 0931     Anaerobic Culture Culture in progress

## 2024-09-07 VITALS
RESPIRATION RATE: 19 BRPM | HEART RATE: 67 BPM | OXYGEN SATURATION: 96 % | HEIGHT: 65 IN | WEIGHT: 222 LBS | SYSTOLIC BLOOD PRESSURE: 129 MMHG | TEMPERATURE: 98 F | DIASTOLIC BLOOD PRESSURE: 86 MMHG | BODY MASS INDEX: 36.99 KG/M2

## 2024-09-07 LAB
ANION GAP SERPL CALC-SCNC: 9 MMOL/L (ref 8–16)
ANION GAP SERPL CALC-SCNC: 9 MMOL/L (ref 8–16)
BUN SERPL-MCNC: 17 MG/DL (ref 6–20)
BUN SERPL-MCNC: 17 MG/DL (ref 6–20)
CALCIUM SERPL-MCNC: 9.2 MG/DL (ref 8.7–10.5)
CALCIUM SERPL-MCNC: 9.2 MG/DL (ref 8.7–10.5)
CHLORIDE SERPL-SCNC: 106 MMOL/L (ref 95–110)
CHLORIDE SERPL-SCNC: 106 MMOL/L (ref 95–110)
CO2 SERPL-SCNC: 24 MMOL/L (ref 23–29)
CO2 SERPL-SCNC: 24 MMOL/L (ref 23–29)
CREAT SERPL-MCNC: 1.3 MG/DL (ref 0.5–1.4)
CREAT SERPL-MCNC: 1.3 MG/DL (ref 0.5–1.4)
ERYTHROCYTE [DISTWIDTH] IN BLOOD BY AUTOMATED COUNT: 13.4 % (ref 11.5–14.5)
EST. GFR  (NO RACE VARIABLE): 53.3 ML/MIN/1.73 M^2
EST. GFR  (NO RACE VARIABLE): 53.3 ML/MIN/1.73 M^2
GLUCOSE SERPL-MCNC: 86 MG/DL (ref 70–110)
GLUCOSE SERPL-MCNC: 86 MG/DL (ref 70–110)
HCT VFR BLD AUTO: 30.7 % (ref 37–48.5)
HGB BLD-MCNC: 9.6 G/DL (ref 12–16)
MCH RBC QN AUTO: 27.1 PG (ref 27–31)
MCHC RBC AUTO-ENTMCNC: 31.3 G/DL (ref 32–36)
MCV RBC AUTO: 87 FL (ref 82–98)
PLATELET # BLD AUTO: 196 K/UL (ref 150–450)
PMV BLD AUTO: 10.3 FL (ref 9.2–12.9)
POTASSIUM SERPL-SCNC: 3.5 MMOL/L (ref 3.5–5.1)
POTASSIUM SERPL-SCNC: 3.5 MMOL/L (ref 3.5–5.1)
RBC # BLD AUTO: 3.54 M/UL (ref 4–5.4)
SODIUM SERPL-SCNC: 139 MMOL/L (ref 136–145)
SODIUM SERPL-SCNC: 139 MMOL/L (ref 136–145)
WBC # BLD AUTO: 3.79 K/UL (ref 3.9–12.7)

## 2024-09-07 PROCEDURE — 36415 COLL VENOUS BLD VENIPUNCTURE: CPT | Performed by: SURGERY

## 2024-09-07 PROCEDURE — 63600175 PHARM REV CODE 636 W HCPCS: Performed by: STUDENT IN AN ORGANIZED HEALTH CARE EDUCATION/TRAINING PROGRAM

## 2024-09-07 PROCEDURE — 80048 BASIC METABOLIC PNL TOTAL CA: CPT | Performed by: SURGERY

## 2024-09-07 PROCEDURE — 25000003 PHARM REV CODE 250: Performed by: STUDENT IN AN ORGANIZED HEALTH CARE EDUCATION/TRAINING PROGRAM

## 2024-09-07 PROCEDURE — 85027 COMPLETE CBC AUTOMATED: CPT | Performed by: STUDENT IN AN ORGANIZED HEALTH CARE EDUCATION/TRAINING PROGRAM

## 2024-09-07 RX ADMIN — IBUPROFEN 600 MG: 600 TABLET, FILM COATED ORAL at 12:09

## 2024-09-07 RX ADMIN — PIPERACILLIN SODIUM AND TAZOBACTAM SODIUM 4.5 G: 4; .5 INJECTION, POWDER, FOR SOLUTION INTRAVENOUS at 04:09

## 2024-09-07 NOTE — PLAN OF CARE
"[facility] Plan of Care Note    Dx:   Cellulitis [L03.90]    Shift Events: patient rested in bed throughout the shift, pain controlled with current regimen    Goals of Care: get rid of infection, pain control, safety     Neuro: AAOx4    Vital Signs: /79   Pulse 64   Temp 98.6 °F (37 °C) (Oral)   Resp 17   Ht 5' 5" (1.651 m)   Wt 100.7 kg (222 lb)   LMP 08/04/2024 (Approximate)   SpO2 97%   Breastfeeding No   BMI 36.94 kg/m²     Respiratory: RA    Diet: Diet Adult Regular      Is patient tolerating current diet? yes    GTTS: na    Urine Output/Bowel Movement:   No intake/output data recorded.  Last Bowel Movement: 09/03/24      Drains/Tubes/Tube Feeds (include total output/shift):   No intake/output data recorded.      Lines:       Accuchecks:na    Skin: L breast cellulitis     Fall Risk Score:     Activity level? indep    Any scheduled procedures?     Any safety concerns?     Other:    "

## 2024-09-07 NOTE — PLAN OF CARE
09/07/24 0940   Post-Acute Status   Post-Acute Authorization Other   Other Status No Post-Acute Service Needs   Discharge Delays None known at this time   Discharge Plan   Discharge Plan A Home with family     Patient cleared for discharge from case management standpoint.      Chart and discharge orders reviewed.  Patient discharged home with no further case management needs.          Lawanda Tilley LMSW  - [facility]

## 2024-09-07 NOTE — DISCHARGE SUMMARY
Plastic and Reconstructive Surgery   Discharge Summary    Subjective:    Patient presented for breast cellultis. Was admitted and placed on IV abx. Drainage decreased, no leukocytosis, afebrile and hemodynamically normal. Clear for DC home today.      Objective:  Vital signs in last 24 hours:  Temp:  [98 °F (36.7 °C)-98.7 °F (37.1 °C)] 98 °F (36.7 °C)  Pulse:  [54-71] 67  Resp:  [17-19] 19  SpO2:  [96 %-98 %] 96 %  BP: (118-151)/(79-89) 129/86    Intake/Output last 3 shifts:  No intake/output data recorded.    Intake/Output this shift:  No intake/output data recorded.        Physical Exam:  VITAL SIGNS:   Vitals:    24 1953 24 0015 24 0355 24 0822   BP: 124/85 (!) 151/89 118/79 129/86   BP Location:    Right arm   Patient Position:    Lying   Pulse: 71 (!) 54 64 67   Resp: 17 17 17 19   Temp: 98.7 °F (37.1 °C) 98.6 °F (37 °C)  98 °F (36.7 °C)   TempSrc: Oral Oral  Oral   SpO2: 98% 98% 97% 96%   Weight:       Height:         TMAX: Temp (24hrs), Av.4 °F (36.9 °C), Min:98 °F (36.7 °C), Max:98.7 °F (37.1 °C)    General: Alert; No acute distress  Cardiovascular: Regular rate   Respiratory: Normal respiratory effort. Chest rise symmetric.   Abdomen: Soft, nontender, nondistended  Extremity: Moves all extremities equally.  Neurologic: No focal deficit. Speech normal    Left breast cellulitis significantly improved, minimal TTP, min serosang drainage, lymphedema     Scheduled Medications     PRN Medications     Recent Labs:   Lab Results   Component Value Date    WBC 3.79 (L) 2024    HGB 9.6 (L) 2024    HCT 30.7 (L) 2024    MCV 87 2024     2024     Lab Results   Component Value Date    GLU 86 2024    GLU 86 2024     2024     2024    K 3.5 2024    K 3.5 2024     2024     2024    BUN 17 2024    BUN 17 2024         Assessment: 38 y.o. y/o female s/p b/l mastectomy  with  immediate KANDICE reconstruction. This was complicated by infected fat necrosis requiring IR drain placemetn and underwent revision 4/18/24.     Now with left breast cellulitis, drainage     Plan:   -wound care: dry abd pad over wound as needed  - wound cx no growth currently  - US jsut shows phlegmon, no drainable collection  - reg diet   - Abx: vanc/zosyn  - Follow up Cultures  - DVT ppx, IS, OOB, ambulate, PT/OT  - No leukocytosis. Cr normalized. DC home today on PO Clindamycin. F/u with Dr. Barrios    Pt was d/w attending surgeon, Dr. Denis Terrell, PGY-6  [facility] Plastic & Reconstructive Surgery Fellow

## 2024-09-07 NOTE — PLAN OF CARE
[facility]  Discharge Final Note    Primary Care Provider: Yessy Cruz MD    Expected Discharge Date: 9/7/2024    Final Discharge Note (most recent)       Final Note - 09/07/24 0943          Final Note    Assessment Type Final Discharge Note     Anticipated Discharge Disposition Home or Self Care     What phone number can be called within the next 1-3 days to see how you are doing after discharge? [phone]     Hospital Resources/Appts/Education Provided Provided patient/caregiver with written discharge plan information        Post-Acute Status    Post-Acute Authorization Other     Other Status No Post-Acute Service Needs     Discharge Delays None known at this time                     Important Message from Medicare             Contact Info       Reese Barrios, DO   Specialty: Plastic Surgery    [address]  [address]  [place] [address]   Phone: [phone]       Next Steps: Follow up in 1 week(s)            Patient medically ready for discharge to home. Family/patient aware of discharge.    Future Appointments   Date Time Provider Department Center   9/9/2024  8:30 AM Yessy Cruz MD [facility] [id number] [facility]   9/10/2024  9:00 AM Reese Barrios DO [facility] PLASTIC [place]   9/26/2024  8:00 AM Yumiko Monreal PA-C [facility] WO WEL [facility]   10/2/2024  2:20 PM Carlyn Zuleta, OD [facility] OPTOMTY [place]   10/3/2024  2:30 PM Reese Barrios DO [facility] TANPLA [facility]   6/25/2025  4:00 PM YASMANI Weiner MD [facility] TEGAN Tilley LMSW  - [facility]

## 2024-09-07 NOTE — PROGRESS NOTES
Plastic and Reconstructive Surgery   Progress Note    Subjective:    NAOE. Resting comfortably on exam. She reports feeling like the swelling and redness has improved. Continues on vanc/zosyn.      Objective:  Vital signs in last 24 hours:  Temp:  [98 °F (36.7 °C)-98.7 °F (37.1 °C)] 98 °F (36.7 °C)  Pulse:  [54-71] 67  Resp:  [17-19] 19  SpO2:  [96 %-98 %] 96 %  BP: (118-151)/(79-89) 129/86    Intake/Output last 3 shifts:  No intake/output data recorded.    Intake/Output this shift:  No intake/output data recorded.        Physical Exam:  VITAL SIGNS:   Vitals:    24 1953 24 0015 24 0355 24 0822   BP: 124/85 (!) 151/89 118/79 129/86   BP Location:    Right arm   Patient Position:    Lying   Pulse: 71 (!) 54 64 67   Resp: 17 17 17 19   Temp: 98.7 °F (37.1 °C) 98.6 °F (37 °C)  98 °F (36.7 °C)   TempSrc: Oral Oral  Oral   SpO2: 98% 98% 97% 96%   Weight:       Height:         TMAX: Temp (24hrs), Av.4 °F (36.9 °C), Min:98 °F (36.7 °C), Max:98.7 °F (37.1 °C)    General: Alert; No acute distress  Cardiovascular: Regular rate   Respiratory: Normal respiratory effort. Chest rise symmetric.   Abdomen: Soft, nontender, nondistended  Extremity: Moves all extremities equally.  Neurologic: No focal deficit. Speech normal    Left breast cellulitis significantly improved, minimal TTP, min serosang drainage, lymphedema     Scheduled Medications     PRN Medications     Recent Labs:   Lab Results   Component Value Date    WBC 3.79 (L) 2024    HGB 9.6 (L) 2024    HCT 30.7 (L) 2024    MCV 87 2024     2024     Lab Results   Component Value Date    GLU 86 2024    GLU 86 2024     2024     2024    K 3.5 2024    K 3.5 2024     2024     2024    BUN 17 2024    BUN 17 2024         Assessment: 38 y.o. y/o female s/p b/l mastectomy  with immediate KANDICE reconstruction. This was complicated by  infected fat necrosis requiring IR drain placemetn and underwent revision 4/18/24.     Now with left breast cellulitis, drainage     Plan:   -wound care: dry abd pad over wound as needed  - wound cx no growth currently  - US jsut shows phlegmon, no drainable collection  - reg diet   - Abx: vanc/zosyn  - Follow up Cultures  - DVT ppx, IS, OOB, ambulate, PT/OT  - No leukocytosis. Cr normalized. DC home today on PO Clinamycin    Pt was d/w attending surgeon, Dr. Denis Terrell, PGY-6  [facility] Plastic & Reconstructive Surgery Fellow          Prednisone Counseling:  I discussed with the patient the risks of prolonged use of prednisone including but not limited to weight gain, insomnia, osteoporosis, mood changes, diabetes, susceptibility to infection, glaucoma and high blood pressure.  In cases where prednisone use is prolonged, patients should be monitored with blood pressure checks, serum glucose levels and an eye exam.  Additionally, the patient may need to be placed on GI prophylaxis, PCP prophylaxis, and calcium and vitamin D supplementation and/or a bisphosphonate.  The patient verbalized understanding of the proper use and the possible adverse effects of prednisone.  All of the patient's questions and concerns were addressed.

## 2024-09-09 ENCOUNTER — RESEARCH ENCOUNTER (OUTPATIENT)
Dept: RESEARCH | Facility: HOSPITAL | Age: 39
End: 2024-09-09
Payer: MEDICAID

## 2024-09-09 ENCOUNTER — OFFICE VISIT (OUTPATIENT)
Dept: HEMATOLOGY/ONCOLOGY | Facility: CLINIC | Age: 39
End: 2024-09-09
Payer: MEDICAID

## 2024-09-09 VITALS
TEMPERATURE: 98 F | DIASTOLIC BLOOD PRESSURE: 77 MMHG | HEIGHT: 65 IN | OXYGEN SATURATION: 97 % | HEART RATE: 90 BPM | BODY MASS INDEX: 37.35 KG/M2 | RESPIRATION RATE: 16 BRPM | SYSTOLIC BLOOD PRESSURE: 122 MMHG | WEIGHT: 224.19 LBS

## 2024-09-09 DIAGNOSIS — C50.412 MALIGNANT NEOPLASM OF UPPER-OUTER QUADRANT OF LEFT BREAST IN FEMALE, ESTROGEN RECEPTOR NEGATIVE: Primary | ICD-10-CM

## 2024-09-09 DIAGNOSIS — T45.1X5A CHEMOTHERAPY INDUCED NEUTROPENIA: ICD-10-CM

## 2024-09-09 DIAGNOSIS — L91.0 KELOID: ICD-10-CM

## 2024-09-09 DIAGNOSIS — Z17.1 MALIGNANT NEOPLASM OF UPPER-OUTER QUADRANT OF LEFT BREAST IN FEMALE, ESTROGEN RECEPTOR NEGATIVE: Primary | ICD-10-CM

## 2024-09-09 DIAGNOSIS — N61.0 BREAST INFECTION: ICD-10-CM

## 2024-09-09 DIAGNOSIS — D70.1 CHEMOTHERAPY INDUCED NEUTROPENIA: ICD-10-CM

## 2024-09-09 DIAGNOSIS — D63.0 ANEMIA IN NEOPLASTIC DISEASE: ICD-10-CM

## 2024-09-09 LAB — BACTERIA SPEC ANAEROBE CULT: NORMAL

## 2024-09-09 PROCEDURE — 3074F SYST BP LT 130 MM HG: CPT | Mod: CPTII,,, | Performed by: INTERNAL MEDICINE

## 2024-09-09 PROCEDURE — 99214 OFFICE O/P EST MOD 30 MIN: CPT | Mod: PBBFAC | Performed by: INTERNAL MEDICINE

## 2024-09-09 PROCEDURE — 99214 OFFICE O/P EST MOD 30 MIN: CPT | Mod: S$PBB,,, | Performed by: INTERNAL MEDICINE

## 2024-09-09 PROCEDURE — 3008F BODY MASS INDEX DOCD: CPT | Mod: CPTII,,, | Performed by: INTERNAL MEDICINE

## 2024-09-09 PROCEDURE — 1159F MED LIST DOCD IN RCRD: CPT | Mod: CPTII,,, | Performed by: INTERNAL MEDICINE

## 2024-09-09 PROCEDURE — G2211 COMPLEX E/M VISIT ADD ON: HCPCS | Mod: S$PBB,,, | Performed by: INTERNAL MEDICINE

## 2024-09-09 PROCEDURE — 99999 PR PBB SHADOW E&M-EST. PATIENT-LVL IV: CPT | Mod: PBBFAC,,, | Performed by: INTERNAL MEDICINE

## 2024-09-09 PROCEDURE — 1160F RVW MEDS BY RX/DR IN RCRD: CPT | Mod: CPTII,,, | Performed by: INTERNAL MEDICINE

## 2024-09-09 PROCEDURE — 1111F DSCHRG MED/CURRENT MED MERGE: CPT | Mod: CPTII,,, | Performed by: INTERNAL MEDICINE

## 2024-09-09 PROCEDURE — 3078F DIAST BP <80 MM HG: CPT | Mod: CPTII,,, | Performed by: INTERNAL MEDICINE

## 2024-09-09 NOTE — PROGRESS NOTES
Subjective     Patient ID: Laura Kent is a 38 y.o. female.    Chief Complaint: Malignant neoplasm of upper-outer quadrant of left breast i    HPI    Returns for follow up  Several missed appointments in the interval due to breast surgery recovery and infection    She was recently admitted from 9/2/2024- 9/7/2024 and received Vancomycin and Zosyn for left breast cellulitis  Now on Clindamycin PO  Hospital course:  Assessment: 38 y.o. y/o female s/p b/l mastectomy 12/23 with immediate KANDICE reconstruction. This was complicated by infected fat necrosis requiring IR drain placemetn and underwent revision 4/18/24.   Now with left breast cellulitis, drainage   Plan:   -wound care: dry abd pad over wound as needed  - wound cx no growth currently  - US just shows phlegmon, no drainable collection  - reg diet   - Abx: vanc/zosyn  - Follow up Cultures  - DVT ppx, IS, OOB, ambulate, PT/OT  - No leukocytosis. Cr normalized. DC home today on PO Clindamycin. F/u with Dr. Barrios     Imaging as below  - 8/13/2023 Breast Ultrasound  Impression:  Left: Today's findings are most compatible with a postoperative seroma.  I had a detailed conversation with the patient.  She showed me where the area was bruised.  She states that she initially developed a seroma after surgery however over time and with some therapy, that seroma resolved.  Approximately three weeks ago during physical therapy, there was robust massage of this area.  Since than, the seroma has returned and there is ecchymosis of the skin.  This is associated with pain.  I believe that the robust massage to this area is what led to the return of her seroma and the cause of her pain.  This is only important to note for future therapy in this area so that it could perhaps be slightly less robust.  Right: Area of interest corresponds with the patient's scar tissue.  No evidence of malignancy or recurrence in either breast.     - 9/1/2024 Breast  "Ultrasound  FINDINGS:  Ovoid hypoechoic region in the subcutaneous tissues of the left breast demonstrating granular internal echoes and measuring 7.4 x 4.0 x 1.1 cm.  No associated increased through transmission.  There are patchy areas of increased color Doppler flow.  The technologist reports that this area is painful to palpation.  No definite fluid collections identified.  Right breast demonstrates no sonographic abnormalities.  Impression:  Ovoid region in the subcutaneous tissues of the left breast, possibly reflecting an area of inflammatory tissue and/or granulation tissue, less likely neoplasm.  No definite fluid collections identified.    Patient reports area "burst: as above on 9/2    - 9/3/2024 Breast Ultrasound  FINDINGS:  Slight interval decrease in ovoid hypoechoic region in the subcutaneous tissues of the left breast with granular internal echoes now measuring 6.2 x 0.9 x 2.5 cm, previously 7.4 x 1.1 x 4.0 cm.  No increased through transmission.  Similar to decreased patchy areas of color Doppler flow.  Mass remains painful to palpation.  No definite drainable fluid collection.  Noted 0.7 x 0.5 cm open wound in the superficial left breast at 12:00 o'clock.  Impression:  Slight interval decrease in size of ovoid hypoechoic region in the left breast without definite fluid collection.  Lesion remains suggestive of inflammatory tissue and/or granulation tissue and less likely neoplasm.  Noted subcentimeter open wound in the superficial left breast.    - Subsequently admitted     Oncology History:  - self detected an area under her left arm and it initially seemed to go away (noted around winter holidays)     - 3/4/2022 Outside Mammogram:  Findings:  The breasts are heterogeneously dense, which may obscure small masses.   Left  There is a 31 mm x 18 mm x 21 mm irregularly shaped mass with microlobulated margins seen in the left breast at 2 o'clock in the posterior depth with associated left axillary " adenopathy. Largest node depicted on the outside study measures 49 x 23 x 29 mm with cortical thickening and effacement of the hilum. Breast mass is reportedly palpable.   Right  There is no evidence of suspicious masses, calcifications, or other abnormal findings in the right breast.  Impression:  Left  Mass: Left breast 31 mm x 18 mm x 21 mm mass at the posterior 2 o'clock position. Assessment: 5 - Highly suggestive of malignancy. Biopsy is recommended.   Right  There is no mammographic evidence of malignancy in the right breast.  BI-RADS Category:   Overall: 5 - Highly Suggestive of Malignancy  Recommendation:  Ultrasound Guided Core Needle Biopsy of the left breast mass and one of the abnormal left axillary nodes is recommended.     - 3/14/2022 Breast biopsy:  1. 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   Comment:  Infiltrating carcinoma occupies the entirety of biopsied material   with the largest continuous focus measuring 13 mm.  Breast biomarkers are   pending and will be issued in a supplemental report.   2. LEFT AXILLARY LYMPH NODE, BIOPSY:   Invasive ductal carcinoma, [place] histologic grade 3 (tubule formation:   3, nuclear pleomorphism:  3, mitotic activity:  3).   Comment:  Infiltrating carcinoma occupies the entirety of biopsied material   with the largest continuous focus measuring 20 mm.  No lymph node tissue is   identified in the sample.  Tumor histology is essentially identical to that   of part 1.  The findings could represent part of a larger intranodal   metastasis, but an extension from the primary tumor cannot be excluded.   Radiographic correlation is advised.   Note:  Dr. Stephanie Rao also reviewed this case and agrees with the   diagnosis.   BREAST BIOMARKER RESULTS   Estrogen receptor (ER):  Negative (0)   Progesterone receptor (ER):  Negative (0)   HER2  IHC:  Negative (1+)   Ki-67 proliferation index:  80%      - 3/17/2022 Breast MRI:  Findings:  The breasts have heterogeneous fibroglandular tissue. The background parenchymal enhancement is minimal.   Left  There is a 38 mm x 33 mm x 33 mm irregularly shaped mass seen in the left breast at 2 o'clock in the posterior depth, 8.4 cm from the nipple and 1.2 cm from the skin. Associated signal void from a twirl biopsy marker; pathology showed invasive ductal carcinoma.   Posterior to the mass there is abnormal non mass enhancement measuring 29 x 28 mm. The NME is 4 mm from the skin and 2.3 cm from the chest wall. In total the mass and NME measure 54 mm in AP extent.  Overlying skin thickening and edema involving the lateral breast, likely from lymphovascular obstruction. No suspicious skin enhancement. Overall increased vascularity to the left breast.   Four abnormal lymph level 1 and level 2 left axillary lymph nodes. The largest lymph node measures 52 mm x 40 mm x 38 mm which previously underwent biopsy showing metastatic disease. There is an associated radar reflector within the biopsied lymph node.  Right  There is no evidence of suspicious masses, abnormal enhancement, or other abnormal findings in the right breast. No enlarged axillary or internal mammary lymph nodes.   Impression:  Left  Mass: Left breast 38 mm x 33 mm x 33 mm mass at the posterior 2 o'clock position. Assessment: 6 - Known biopsy, proven malignancy. Associated 29 mm NME extending posteriorly from the mass. In total the mass and NME measure 54 mm in AP extent.  Lymph Node: Abnormal level 1 and 2 left axillary lymph nodes, the largest of which measures 52 mm x 40 mm x 38 mm lymph node and is known biopsy, proven malignancy.   Right  There is no MR evidence of malignancy in the right breast.  BI-RADS Category:   Overall: 6 - Known Biopsy-Proven Malignancy  Recommendation:  Clinical management of known left breast cancer. Patient is established with  the breast surgery clinic.      - 3/21/2022 CT C/A/P:  FINDINGS:  Base of Neck: No significant abnormality.  Thoracic soft tissue: A proximally 3.2 x 3.6 x 3.7 cm irregular left breast mass within the lateral aspect with internal biopsy marker, corresponding to findings on prior breast MRI and compatible with malignancy.  Enlarged left axillary lymph nodes, largest measuring approximately 4.2 x 3.6 cm, (series 2, image 27).  CHEST:  -Heart: Normal size. No pericardial effusion.  -Pulmonary vasculature: Pulmonary arteries distribute normally.  There are four pulmonary veins.  -Radha/Mediastinum: No pathologic shelly enlargement.  -Trachea and Proximal airways: Trachea is midline.  Central airways are patent.  No significant bronchial wall thickening or bronchiectasis.  -Lungs/Pleura: Symmetrically expanded without focal consolidation, pneumothorax, or mass.  No pleural effusion or thickening.  -Esophagus: Normal course and caliber.  ABDOMEN:  - Liver: Normal in size and attenuation with no focal hepatic abnormality.  - Gallbladder: No calcified gallstones.  No wall thickening or pericholecystic fluid.  - Bile Ducts: No intra or extrahepatic biliary ductal dilatation.  - Stomach/Duodenum: Small hiatal hernia otherwise unremarkable.  - Spleen: Normal size.  No focal parenchymal abnormality.  - Pancreas: No mass lesion.  No pancreatic ductal dilatation.  No peripancreatic fat stranding.  - Adrenals: Unremarkable.  - Kidneys/ureters/urinary bladder: Normal in size and location.  Normal enhancement pattern.  No nephrolithiasis.  Ureters are normal in course and caliber.  No hydroureteronephrosis.  - Retroperitoneum: Scattered nonenlarged retroperitoneal lymph nodes.  PELVIS:  - Reproductive: Intrauterine device present.  A proximally 2.8 cm peripherally enhancing right ovarian cyst, likely corpus luteal cyst.  - Other: No pelvic adenopathy, free fluid, or mass.  BOWEL/MESENTERY:  No evidence of bowel obstruction or  inflammatory process. Appendix is visualized and unremarkable.  VASCULATURE: Left-sided aortic arch with 3 branch vessels.  No aneurysm and no significant atherosclerosis.  Portal vein, splenic vein, and SMV are patent.  BONES: Lucent approximately 0.6 cm right trochanteric lesion, likely synovial herniation pit.  No acute fracture or bony destructive process.  EXTRATHORACIC/EXTRAPERITONEAL SOFT TISSUES: Unremarkable.  Impression:  In this patient with known breast cancer, there is an approximately 3.7 cm irregular left breast mass with left axillary lymphadenopathy.  No evidence of metastatic disease  Small hiatal hernia.  Additional findings as described above.     - 3/21/2022 Bone scan:  FINDINGS:  There is physiologic distribution of the radiopharmaceutical throughout the skeleton.  Mild degenerative uptake within the bilateral shoulders and knees.  There is normal uptake in the genitourinary system and soft tissues.  Impression:  There is no scintigraphic evidence of osteoblastic metastatic disease.     - 3/21/2022 Brain MRI:  FINDINGS:  Please note there is dental metal artifact distorting the examination.  Allowing for artifacts the brain parenchyma is normal in contour.  Developmental variant with cavum septum pellucidum identified.  The ventricles are otherwise normal in size without hydrocephalus.  There is no midline shift or significant mass effect.  The major intracranial T2 flow voids are present.  No restricted diffusion within the non distorted brain parenchyma.  There is severe distortion of the susceptibility imaging no parenchymal susceptibility to suggest parenchymal hemorrhage in the non distorted brain parenchyma.  There is suboptimal evaluation of the cerebellum and posterior fossa.  Impression:  Unremarkable MRI brain allowing for artifacts from dental metal as detailed above specifically without abnormal parenchymal enhancement to suggest intracranial metastatic disease in light of  history.     - Initiated NA therapy on 4/13/2022  Developed pancreatitis post C4 from immunotherapy and treatment held- required 2 hospitalizations     - 10/20/2022 Ultrasound left breast:  Findings:  At the site of the marker from the prior malignant breast biopsy,there is a 6 mm x 7 mm x 3 mm oval, parallel, hypoechoic mass with indistinct margins seen in the left breast at 2 o'clock in the posterior depth, 8 cm from the nipple. This is difficult to distinguish from the Twirl marker itself.  At the site of the previously biopsied metastatic left axillary node, there is a 10 x 7 x 11 mm node with a [place]- marker. Node has mild residual cortical thickening (5 mm), but has decreased in size. There are a few other normal size, normal appearing left axillary nodes.   Impression:  Left  Mass: Left breast 6 mm x 7 mm x 3 mm mass at the posterior 2 o'clock position. Assessment: 6 - Known biopsy, proven malignancy. Known, previously biopsied left breast cancer and metastatic left axillary node have both decreased in size. The breast mass is now difficult to distinguish from the biopsy marker.   BI-RADS Category:   Overall: 6 - Known Biopsy-Proven Malignancy  Recommendation:  Patient is already under the care of the Breast Oncology team. Most recent bilateral mammogram was performed in February 2022.      Resumed NA chemo without immunotherapy 11/21/2022- completed 7/8 cycles     - 12/8/2022 Breast MRI:  Findings:  The breasts have heterogeneous fibroglandular tissue. The background parenchymal enhancement is minimal and symmetric. There is no evidence of suspicious masses, abnormal enhancement, or other abnormal findings.  There has been interval decrease in there has been interval resolution of previously described enhancing mass at the 2:00 axis which is a known biopsy-proven malignancy.    There is no abnormality in the right breast.    There has been significant decrease in size of left axillary lymph nodes which  all are normal in size on current exam.  The largest contains a signal void consistent with previously placed radar reflector at site of biopsy-proven axillary metastasis.  It measures 8 mm in short axis.  Impression:  Interval resolution of enhancing mass at site of known malignancy in the left breast at 2:00  and marked interval decrease in left axillary adenopathy with visualized lymph nodes normal in size on current exam.  Findings are consistent with complete imaging response to therapy.  BI-RADS Category:   Overall: 6 - Known Biopsy-Proven Malignancy     - 2023 Left Mastectomy and SLN Bx, axillary lymphadenectomy  Pathology:  Left breast, mastectomy: No residual malignancy is present within the   mastectomy specimen. Treatment effect is present within the breast at area of prior biopsy   site.   Left axillary sentinel lymph node #1, palpable, excisional biopsy: 1 of 3   lymph nodes are positive for metastatic carcinoma   Greatest dimension of metastatic carcinoma measures 4 mm (0.4 cm).   Confirmed by positive immunohistochemical staining in the metastatic   foci with cytokeratins AE1/AE3, Cam5.2 and wide spectrum keratin with satisfactory positive and negative controls.   Treatment effect is present in lymph node.   Left axillary contents, axillary dissection: 14 benign lymph nodes,   Prior BREAST BIOMARKER RESULTS   Estrogen receptor (ER): Negative (0)   Progesterone receptor (ER): Negative (0)   HER2 IHC: Negative (1+)   Ki-67 proliferation index: 80%      - - completed XRT 2023     -- adjuvant Xeloda- stopped with above     PMH:  HTN- around pregnancies; off of blood pressure medication x 2 years  CHF- ECHOs at [facility]  Gestational DM  Hyperlipidemia when heavier, managed with diet and followed by cardiology  C- sections x 2  Iron deficiency     GynHx:  Menarche- 7   (16 at 1st pregnancy) (17, 7, 4)  Still with periods - last 2022- last 5-7 days, moderate normal flow  + breast  feeding x 1 year  IUD     SH:  Working, , [employer] and [employer]  Single  Good support system  No tobacco  No EtOH  No illicit drugs        Review of Systems   Constitutional:  Negative for activity change, appetite change, chills, fatigue, fever and unexpected weight change.   HENT:  Negative for dental problem and mouth sores.    Eyes:  Negative for visual disturbance.   Respiratory:  Negative for cough, chest tightness and shortness of breath.    Cardiovascular:  Positive for chest pain (breast pain better). Negative for palpitations and leg swelling.   Gastrointestinal:  Positive for constipation. Negative for abdominal distention, abdominal pain, blood in stool, change in bowel habit, diarrhea, nausea and vomiting.   Genitourinary:  Negative for decreased urine volume, dysuria and frequency.   Musculoskeletal:  Negative for arthralgias, back pain and gait problem.   Integumentary:  Negative for rash and breast tenderness.        dry   Neurological:  Negative for weakness and headaches.   Hematological:  Negative for adenopathy. Does not bruise/bleed easily.   Psychiatric/Behavioral:  Negative for dysphoric mood. The patient is not nervous/anxious.    Breast: Negative for tenderness         Objective     Physical Exam  Vitals and nursing note reviewed.   Constitutional:       General: She is not in acute distress.     Appearance: Normal appearance. She is not ill-appearing.      Comments: Presents alone  ECOG= 0  Very pleasant   HENT:      Head: Normocephalic and atraumatic.      Mouth/Throat:      Mouth: Mucous membranes are moist.      Pharynx: No oropharyngeal exudate or posterior oropharyngeal erythema.   Eyes:      General: No scleral icterus.     Extraocular Movements: Extraocular movements intact.      Conjunctiva/sclera: Conjunctivae normal.      Pupils: Pupils are equal, round, and reactive to light.   Cardiovascular:      Rate and Rhythm: Normal rate and regular rhythm.      Heart sounds: Normal  heart sounds. No murmur heard.     No friction rub. No gallop.   Pulmonary:      Effort: Pulmonary effort is normal. No respiratory distress.      Breath sounds: Normal breath sounds. No rhonchi or rales.      Comments: Left breast- XRT color changes; No LAD- cellulitis better  Right breast no masses or LAD  Abdominal:      General: Abdomen is flat. Bowel sounds are normal. There is no distension.      Palpations: Abdomen is soft. There is no mass.      Tenderness: There is no abdominal tenderness. There is no guarding or rebound.      Comments: No organomegaly   Musculoskeletal:         General: No swelling or tenderness. Normal range of motion.      Cervical back: Normal range of motion and neck supple. No tenderness.      Right lower leg: No edema.      Left lower leg: No edema.   Lymphadenopathy:      Cervical: No cervical adenopathy.   Skin:     General: Skin is warm and dry.      Coloration: Skin is not jaundiced or pale.      Findings: No lesion or rash.      Comments: Keloid--port scar   Neurological:      General: No focal deficit present.      Mental Status: She is alert and oriented to person, place, and time. Mental status is at baseline.      Sensory: No sensory deficit.      Motor: No weakness.      Coordination: Coordination normal.      Gait: Gait normal.   Psychiatric:         Mood and Affect: Mood normal.         Behavior: Behavior normal.         Thought Content: Thought content normal.         Judgment: Judgment normal.   Labs - reviewed       Assessment and Plan     1. Malignant neoplasm of upper-outer quadrant of left breast in female, estrogen receptor negative    2. Breast infection    3. Keloid      TNBC  Treatment disruptions as above  Did not complete adjuvant Xeloda  Clinically [facility]  RTC 3 months     Breast infection- managed by Plastics and improving    Keloid- dermatology referral    Route Chart for Scheduling    Med Onc Chart Routing      Follow up with physician 3 months. cbc, cmp and  EP; ID referral   Follow up with ANDREW    Infusion scheduling note    Injection scheduling note    Labs    Imaging    Pharmacy appointment    Other referrals              Therapy Plan Information  PICC DOUBLE LUMEN LINE FLUSH for Malignant neoplasm of upper-outer quadrant of left breast in female, estrogen receptor negative, noted on 3/21/2022  Flushes  heparin, porcine (PF) 100 unit/mL injection flush 500 Units  500 Units, Intravenous, Every visit  sodium chloride 0.9% flush 10 mL  10 mL, Intravenous, Every visit  heparin, porcine (PF) 100 unit/mL injection flush 500 Units  500 Units, Intravenous, Every visit  sodium chloride 0.9% flush 10 mL  10 mL, Intravenous, Every visit      No therapy plan of the specified type found.    No therapy plan of the specified type found.

## 2024-09-09 NOTE — PROGRESS NOTES
Study Title: Clinical and molecular characteristics associated with pathologic complete response among a diverse population with triple-negative breast cancer following neoadjuvant treatment with chemoimmunotherapy  Protocol IRB #: [id number]  IRB Approval Date: 06 June 2024  Sponsor: [employer]  : Isela Guzmán MD    Documentation of Informed Consent    Met with patient today in the hematology/oncology clinic to discuss potential participation in study. The study was explained in detail the patient was given the opportunity to ask questions. Patient willingly agreed to participate in the study.    The ICF was signed by the patient and Mandeep Euceda. Patient was given a copy of the consent. The signed informed consent document is available in the patient's media tab and linked to this research encounter.

## 2024-09-10 ENCOUNTER — OFFICE VISIT (OUTPATIENT)
Dept: PLASTIC SURGERY | Facility: CLINIC | Age: 39
End: 2024-09-10
Payer: MEDICAID

## 2024-09-10 VITALS
HEART RATE: 91 BPM | RESPIRATION RATE: 20 BRPM | SYSTOLIC BLOOD PRESSURE: 148 MMHG | OXYGEN SATURATION: 96 % | DIASTOLIC BLOOD PRESSURE: 83 MMHG

## 2024-09-10 DIAGNOSIS — C50.412 MALIGNANT NEOPLASM OF UPPER-OUTER QUADRANT OF LEFT BREAST IN FEMALE, ESTROGEN RECEPTOR NEGATIVE: Primary | ICD-10-CM

## 2024-09-10 DIAGNOSIS — Z17.1 MALIGNANT NEOPLASM OF UPPER-OUTER QUADRANT OF LEFT BREAST IN FEMALE, ESTROGEN RECEPTOR NEGATIVE: Primary | ICD-10-CM

## 2024-09-10 DIAGNOSIS — N64.4 BREAST PAIN: ICD-10-CM

## 2024-09-10 DIAGNOSIS — N64.89 SEROMA OF BREAST: ICD-10-CM

## 2024-09-10 PROCEDURE — 99999 PR PBB SHADOW E&M-EST. PATIENT-LVL III: CPT | Mod: PBBFAC,,, | Performed by: SURGERY

## 2024-09-10 PROCEDURE — 99212 OFFICE O/P EST SF 10 MIN: CPT | Mod: S$PBB,,, | Performed by: SURGERY

## 2024-09-10 PROCEDURE — 1159F MED LIST DOCD IN RCRD: CPT | Mod: CPTII,,, | Performed by: SURGERY

## 2024-09-10 PROCEDURE — 3079F DIAST BP 80-89 MM HG: CPT | Mod: CPTII,,, | Performed by: SURGERY

## 2024-09-10 PROCEDURE — 3077F SYST BP >= 140 MM HG: CPT | Mod: CPTII,,, | Performed by: SURGERY

## 2024-09-10 PROCEDURE — 99213 OFFICE O/P EST LOW 20 MIN: CPT | Mod: PBBFAC | Performed by: SURGERY

## 2024-09-10 PROCEDURE — 1111F DSCHRG MED/CURRENT MED MERGE: CPT | Mod: CPTII,,, | Performed by: SURGERY

## 2024-09-11 NOTE — PROGRESS NOTES
I am seeing Laura back for a hospital follow up.  She did have an area of redness and spontaneous purulent drainage.  Cultures were negative.  She was treated with IV antibiotics before being transitioned to oral antibiotics and discharged home.    She says that she was continuing to feel better.  She was thinks the redness and pain is better in the left breasts.  She also has less drainage.    On exam she has much improved redness of the left breast.  She does have a small opening but only skin drainage on the gauze pad in her bra  It was tender across the upper pole, there was a little bit of underlying induration but no fluctuance.    She was a few more days of antibiotics left.  I think she should finish this and we will recheck next week to make sure she was still doing well.  Discussed that if she fails antibiotics that the next step would be a washout.    Also discussed that I think the cause of this was maybe some necrosis and liquification of areas of fat grafting the got secondarily infected.  This is also in the breasts with a history of radiation this of the vascularization and healing potential min I be quite normal.    We will see her back next week    Enoch Barrios, DO  Plastic and Reconstructive Surgery

## 2024-09-17 ENCOUNTER — OFFICE VISIT (OUTPATIENT)
Dept: PLASTIC SURGERY | Facility: CLINIC | Age: 39
End: 2024-09-17
Payer: MEDICAID

## 2024-09-17 VITALS
OXYGEN SATURATION: 98 % | RESPIRATION RATE: 20 BRPM | HEART RATE: 79 BPM | DIASTOLIC BLOOD PRESSURE: 83 MMHG | SYSTOLIC BLOOD PRESSURE: 129 MMHG

## 2024-09-17 DIAGNOSIS — N64.4 BREAST PAIN: ICD-10-CM

## 2024-09-17 DIAGNOSIS — C50.412 MALIGNANT NEOPLASM OF UPPER-OUTER QUADRANT OF LEFT BREAST IN FEMALE, ESTROGEN RECEPTOR NEGATIVE: Primary | ICD-10-CM

## 2024-09-17 DIAGNOSIS — Z17.1 MALIGNANT NEOPLASM OF UPPER-OUTER QUADRANT OF LEFT BREAST IN FEMALE, ESTROGEN RECEPTOR NEGATIVE: Primary | ICD-10-CM

## 2024-09-17 PROCEDURE — 99212 OFFICE O/P EST SF 10 MIN: CPT | Mod: S$PBB,,, | Performed by: SURGERY

## 2024-09-17 PROCEDURE — 99213 OFFICE O/P EST LOW 20 MIN: CPT | Mod: PBBFAC | Performed by: SURGERY

## 2024-09-17 PROCEDURE — 99999 PR PBB SHADOW E&M-EST. PATIENT-LVL III: CPT | Mod: PBBFAC,,, | Performed by: SURGERY

## 2024-09-17 PROCEDURE — 3079F DIAST BP 80-89 MM HG: CPT | Mod: CPTII,,, | Performed by: SURGERY

## 2024-09-17 PROCEDURE — 3074F SYST BP LT 130 MM HG: CPT | Mod: CPTII,,, | Performed by: SURGERY

## 2024-09-17 PROCEDURE — 1111F DSCHRG MED/CURRENT MED MERGE: CPT | Mod: CPTII,,, | Performed by: SURGERY

## 2024-09-17 PROCEDURE — 1159F MED LIST DOCD IN RCRD: CPT | Mod: CPTII,,, | Performed by: SURGERY

## 2024-09-17 NOTE — PROGRESS NOTES
Laura eKnt [facility] for a follow up visit status post hospitalization with IV ABX for drain antibiotics were draining left breast abscess.  Overall she states she was feeling better.  She completed her oral antibiotics.  That was few days ago.  She does not have any other drainage.  The redness and pain it was improving.  She does note some peeling skin..   female denies fever, chills, nausea, vomiting or other systemic signs of infection.       ROS - negative, other than stated above    PHYSICAL EXAMINATION  Vitals:    09/17/24 0849   BP: 129/83   Pulse: 79   Resp: 20       Gen: NAD, appears stated age  Neuro: normal without focal findings, mental status and speech normal  HEENT: NCAT, neck supple, PEERL  CV: RRR  Pulm: Breathing non-labored, chest wall movement equal bilaterally   Breast:  She does have decreased edema, still some fullness and induration of the breasts looks much better.  Does have some dry flaking skin.  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  Hypertrophic chest wall port site scar above the right breast  Abdomen: soft, nontender, no guarding  Gu: genitalia not examined  Extremity:normal strength, no cyanosis or edema  Skin:  As above  Psych: oriented to time, place and person, mood and affect are within normal limits      ASSESSMENT/PLAN  38 y.o. female s/p hospitalization for infection of left breast, likely due to fat grafting    Overall she was doing much better.  She was completed her oral antibiotics.  Discussed Kenalog injection for the scar she was not want to proceed with that.  She was seen Dermatology to discuss scar care for her hypertrophic chest wall scar.    I will plan on seeing her back in 1 month, that will be 6 months after her second-stage revision    All questions were answered. The patient was advised to contact the clinic with any questions or concerns prior to their next visit.   RTC x 4 week(s), appointment  scheduled.    Enoch Barrios, DO  Plastic and Reconstructive Surgery      I spent a total of 10 minutes on the day of the visit.  This includes face to face time and non-face to face time preparing to see the patient (eg, review of tests), obtaining and/or reviewing separately obtained history, documenting clinical information in the electronic or other health record, independently interpreting results and communicating results to the patient/family/caregiver, or care coordinator.

## 2024-09-25 DIAGNOSIS — C50.412 MALIGNANT NEOPLASM OF UPPER-OUTER QUADRANT OF LEFT BREAST IN FEMALE, ESTROGEN RECEPTOR NEGATIVE: ICD-10-CM

## 2024-09-25 DIAGNOSIS — Z17.1 MALIGNANT NEOPLASM OF UPPER-OUTER QUADRANT OF LEFT BREAST IN FEMALE, ESTROGEN RECEPTOR NEGATIVE: ICD-10-CM

## 2024-09-25 DIAGNOSIS — K21.9 GASTROESOPHAGEAL REFLUX DISEASE WITHOUT ESOPHAGITIS: ICD-10-CM

## 2024-09-26 ENCOUNTER — OFFICE VISIT (OUTPATIENT)
Dept: OBSTETRICS AND GYNECOLOGY | Facility: CLINIC | Age: 39
End: 2024-09-26
Payer: MEDICAID

## 2024-09-26 VITALS — HEIGHT: 65 IN | WEIGHT: 227 LBS | BODY MASS INDEX: 37.82 KG/M2

## 2024-09-26 DIAGNOSIS — E66.9 OBESITY, UNSPECIFIED CLASSIFICATION, UNSPECIFIED OBESITY TYPE, UNSPECIFIED WHETHER SERIOUS COMORBIDITY PRESENT: ICD-10-CM

## 2024-09-26 DIAGNOSIS — R29.898 MUSCULAR DECONDITIONING: ICD-10-CM

## 2024-09-26 DIAGNOSIS — C50.412 MALIGNANT NEOPLASM OF UPPER-OUTER QUADRANT OF LEFT BREAST IN FEMALE, ESTROGEN RECEPTOR NEGATIVE: Primary | ICD-10-CM

## 2024-09-26 DIAGNOSIS — Z17.1 MALIGNANT NEOPLASM OF UPPER-OUTER QUADRANT OF LEFT BREAST IN FEMALE, ESTROGEN RECEPTOR NEGATIVE: Primary | ICD-10-CM

## 2024-09-26 PROCEDURE — 3008F BODY MASS INDEX DOCD: CPT | Mod: CPTII,95,, | Performed by: PHYSICIAN ASSISTANT

## 2024-09-26 PROCEDURE — 99213 OFFICE O/P EST LOW 20 MIN: CPT | Mod: 95,,, | Performed by: PHYSICIAN ASSISTANT

## 2024-09-26 PROCEDURE — 1159F MED LIST DOCD IN RCRD: CPT | Mod: CPTII,95,, | Performed by: PHYSICIAN ASSISTANT

## 2024-09-26 PROCEDURE — 1111F DSCHRG MED/CURRENT MED MERGE: CPT | Mod: CPTII,95,, | Performed by: PHYSICIAN ASSISTANT

## 2024-09-26 PROCEDURE — 1160F RVW MEDS BY RX/DR IN RCRD: CPT | Mod: CPTII,95,, | Performed by: PHYSICIAN ASSISTANT

## 2024-09-26 RX ORDER — PANTOPRAZOLE SODIUM 40 MG/1
40 TABLET, DELAYED RELEASE ORAL DAILY
Qty: 30 TABLET | Refills: 1 | Status: SHIPPED | OUTPATIENT
Start: 2024-09-26 | End: 2025-09-26

## 2024-09-26 RX ORDER — BUPROPION HYDROCHLORIDE 150 MG/1
150 TABLET ORAL EVERY MORNING
Qty: 30 TABLET | Refills: 2 | Status: SHIPPED | OUTPATIENT
Start: 2024-09-26 | End: 2025-09-26

## 2024-09-26 RX ORDER — ONDANSETRON HYDROCHLORIDE 8 MG/1
8 TABLET, FILM COATED ORAL EVERY 12 HOURS PRN
Qty: 30 TABLET | Refills: 2 | Status: SHIPPED | OUTPATIENT
Start: 2024-09-26 | End: 2025-09-26

## 2024-09-26 NOTE — PROGRESS NOTES
The patient location is: car  The chief complaint leading to consultation is: follow up weight loss    Visit type: audiovisual    Face to Face time with patient: 15 minutes  20 minutes of total time spent on the encounter, which includes face to face time and non-face to face time preparing to see the patient (eg, review of tests), Obtaining and/or reviewing separately obtained history, Documenting clinical information in the electronic or other health record, Independently interpreting results (not separately reported) and communicating results to the patient/family/caregiver, or Care coordination (not separately reported).         Each patient to whom he or she provides medical services by telemedicine is:  (1) informed of the relationship between the physician and patient and the respective role of any other health care provider with respect to management of the patient; and (2) notified that he or she may decline to receive medical services by telemedicine and may withdraw from such care at any time.    Notes:    Subjective:      Laura Kent is a 38 y.o. female with history of triple negative breast cancer who presents for weight loss follow up. She was taking metformin XR 750mg QD but stopped after 3 months because was not seeing results and had some GI upset. Becoming frustrated with her inability to lose weight. She is doing intermittent fasting with black coffee in the AM. Has protein with rice and vegetables or salad for lunch. She often skips dinner because working until after 8PM. Walking for exercise. Was doing strength workouts with  previously but has not been able to restart this because of health issues. Recently admitted for breast infection. Improving, but has been sent to ID. Reports fatigue. Mood is ok. Denies history of bipolar or anxiety. She would like to get back down to 190lb.    Routine Screening labs: 10/26/2023     Admission on 09/02/2024, Discharged on 09/07/2024    Component Date Value Ref Range Status    HIV 1/2 Ag/Ab 09/02/2024 Non-reactive  Non-reactive Final    Hepatitis C Ab 09/02/2024 Non-reactive  Non-reactive Final    WBC 09/02/2024 4.77  3.90 - 12.70 K/uL Final    RBC 09/02/2024 3.84 (L)  4.00 - 5.40 M/uL Final    Hemoglobin 09/02/2024 10.5 (L)  12.0 - 16.0 g/dL Final    Hematocrit 09/02/2024 32.6 (L)  37.0 - 48.5 % Final    MCV 09/02/2024 85  82 - 98 fL Final    MCH 09/02/2024 27.3  27.0 - 31.0 pg Final    MCHC 09/02/2024 32.2  32.0 - 36.0 g/dL Final    RDW 09/02/2024 14.1  11.5 - 14.5 % Final    Platelets 09/02/2024 195  150 - 450 K/uL Final    MPV 09/02/2024 10.2  9.2 - 12.9 fL Final    Immature Granulocytes 09/02/2024 0.2  0.0 - 0.5 % Final    Gran # (ANC) 09/02/2024 3.7  1.8 - 7.7 K/uL Final    Immature Grans (Abs) 09/02/2024 0.01  0.00 - 0.04 K/uL Final    Lymph # 09/02/2024 0.8 (L)  1.0 - 4.8 K/uL Final    Mono # 09/02/2024 0.3  0.3 - 1.0 K/uL Final    Eos # 09/02/2024 0.0  0.0 - 0.5 K/uL Final    Baso # 09/02/2024 0.02  0.00 - 0.20 K/uL Final    nRBC 09/02/2024 0  0 /100 WBC Final    Gran % 09/02/2024 76.5 (H)  38.0 - 73.0 % Final    Lymph % 09/02/2024 16.8 (L)  18.0 - 48.0 % Final    Mono % 09/02/2024 5.5  4.0 - 15.0 % Final    Eosinophil % 09/02/2024 0.6  0.0 - 8.0 % Final    Basophil % 09/02/2024 0.4  0.0 - 1.9 % Final    Differential Method 09/02/2024 Automated   Final    Sodium 09/02/2024 138  136 - 145 mmol/L Final    Potassium 09/02/2024 3.2 (L)  3.5 - 5.1 mmol/L Final    Chloride 09/02/2024 105  95 - 110 mmol/L Final    CO2 09/02/2024 22 (L)  23 - 29 mmol/L Final    Glucose 09/02/2024 101  70 - 110 mg/dL Final    BUN 09/02/2024 15  6 - 20 mg/dL Final    Creatinine 09/02/2024 0.9  0.5 - 1.4 mg/dL Final    Calcium 09/02/2024 9.6  8.7 - 10.5 mg/dL Final    Total Protein 09/02/2024 7.4  6.0 - 8.4 g/dL Final    Albumin 09/02/2024 4.1  3.5 - 5.2 g/dL Final    Total Bilirubin 09/02/2024 0.5  0.1 - 1.0 mg/dL Final    Alkaline Phosphatase 09/02/2024 46 (L)   55 - 135 U/L Final    AST 09/02/2024 18  10 - 40 U/L Final    ALT 09/02/2024 11  10 - 44 U/L Final    eGFR 09/02/2024 >60.0  >60 mL/min/1.73 m^2 Final    Anion Gap 09/02/2024 11  8 - 16 mmol/L Final    CRP 09/02/2024 13.8 (H)  0.0 - 8.2 mg/L Final    POC Preg Test, Ur 09/02/2024 Negative  Negative Final     Acceptable 09/02/2024 Yes   Final    Anaerobic Culture 09/02/2024 No anaerobes isolated   Final    Aerobic Bacterial Culture 09/02/2024 No growth   Final    Sodium 09/03/2024 137  136 - 145 mmol/L Final    Potassium 09/03/2024 3.2 (L)  3.5 - 5.1 mmol/L Final    Chloride 09/03/2024 105  95 - 110 mmol/L Final    CO2 09/03/2024 24  23 - 29 mmol/L Final    Glucose 09/03/2024 118 (H)  70 - 110 mg/dL Final    BUN 09/03/2024 16  6 - 20 mg/dL Final    Creatinine 09/03/2024 0.8  0.5 - 1.4 mg/dL Final    Calcium 09/03/2024 8.8  8.7 - 10.5 mg/dL Final    Anion Gap 09/03/2024 8  8 - 16 mmol/L Final    eGFR 09/03/2024 >60.0  >60 mL/min/1.73 m^2 Final    WBC 09/03/2024 3.64 (L)  3.90 - 12.70 K/uL Final    RBC 09/03/2024 3.62 (L)  4.00 - 5.40 M/uL Final    Hemoglobin 09/03/2024 9.8 (L)  12.0 - 16.0 g/dL Final    Hematocrit 09/03/2024 30.9 (L)  37.0 - 48.5 % Final    MCV 09/03/2024 85  82 - 98 fL Final    MCH 09/03/2024 27.1  27.0 - 31.0 pg Final    MCHC 09/03/2024 31.7 (L)  32.0 - 36.0 g/dL Final    RDW 09/03/2024 14.0  11.5 - 14.5 % Final    Platelets 09/03/2024 199  150 - 450 K/uL Final    MPV 09/03/2024 10.6  9.2 - 12.9 fL Final    Immature Granulocytes 09/03/2024 0.3  0.0 - 0.5 % Final    Gran # (ANC) 09/03/2024 2.3  1.8 - 7.7 K/uL Final    Immature Grans (Abs) 09/03/2024 0.01  0.00 - 0.04 K/uL Final    Lymph # 09/03/2024 1.0  1.0 - 4.8 K/uL Final    Mono # 09/03/2024 0.3  0.3 - 1.0 K/uL Final    Eos # 09/03/2024 0.1  0.0 - 0.5 K/uL Final    Baso # 09/03/2024 0.01  0.00 - 0.20 K/uL Final    nRBC 09/03/2024 0  0 /100 WBC Final    Gran % 09/03/2024 62.6  38.0 - 73.0 % Final    Lymph % 09/03/2024 26.9  18.0 -  48.0 % Final    Mono % 09/03/2024 8.5  4.0 - 15.0 % Final    Eosinophil % 09/03/2024 1.4  0.0 - 8.0 % Final    Basophil % 09/03/2024 0.3  0.0 - 1.9 % Final    Differential Method 09/03/2024 Automated   Final    WBC 09/04/2024 4.23  3.90 - 12.70 K/uL Final    RBC 09/04/2024 3.80 (L)  4.00 - 5.40 M/uL Final    Hemoglobin 09/04/2024 10.5 (L)  12.0 - 16.0 g/dL Final    Hematocrit 09/04/2024 32.9 (L)  37.0 - 48.5 % Final    MCV 09/04/2024 87  82 - 98 fL Final    MCH 09/04/2024 27.6  27.0 - 31.0 pg Final    MCHC 09/04/2024 31.9 (L)  32.0 - 36.0 g/dL Final    RDW 09/04/2024 14.0  11.5 - 14.5 % Final    Platelets 09/04/2024 170  150 - 450 K/uL Final    MPV 09/04/2024 10.4  9.2 - 12.9 fL Final    Sodium 09/04/2024 139  136 - 145 mmol/L Final    Potassium 09/04/2024 4.0  3.5 - 5.1 mmol/L Final    Chloride 09/04/2024 109  95 - 110 mmol/L Final    CO2 09/04/2024 21 (L)  23 - 29 mmol/L Final    Glucose 09/04/2024 80  70 - 110 mg/dL Final    BUN 09/04/2024 12  6 - 20 mg/dL Final    Creatinine 09/04/2024 1.0  0.5 - 1.4 mg/dL Final    Calcium 09/04/2024 9.0  8.7 - 10.5 mg/dL Final    Anion Gap 09/04/2024 9  8 - 16 mmol/L Final    eGFR 09/04/2024 >60.0  >60 mL/min/1.73 m^2 Final    Vancomycin-Trough 09/04/2024 13.6  10.0 - 22.0 ug/mL Final    WBC 09/05/2024 3.82 (L)  3.90 - 12.70 K/uL Final    RBC 09/05/2024 3.54 (L)  4.00 - 5.40 M/uL Final    Hemoglobin 09/05/2024 9.8 (L)  12.0 - 16.0 g/dL Final    Hematocrit 09/05/2024 30.6 (L)  37.0 - 48.5 % Final    MCV 09/05/2024 86  82 - 98 fL Final    MCH 09/05/2024 27.7  27.0 - 31.0 pg Final    MCHC 09/05/2024 32.0  32.0 - 36.0 g/dL Final    RDW 09/05/2024 14.0  11.5 - 14.5 % Final    Platelets 09/05/2024 199  150 - 450 K/uL Final    MPV 09/05/2024 10.6  9.2 - 12.9 fL Final    Immature Granulocytes 09/05/2024 0.3  0.0 - 0.5 % Final    Gran # (ANC) 09/05/2024 2.6  1.8 - 7.7 K/uL Final    Immature Grans (Abs) 09/05/2024 0.01  0.00 - 0.04 K/uL Final    Lymph # 09/05/2024 0.8 (L)  1.0 - 4.8 K/uL  Final    Mono # 09/05/2024 0.4  0.3 - 1.0 K/uL Final    Eos # 09/05/2024 0.1  0.0 - 0.5 K/uL Final    Baso # 09/05/2024 0.01  0.00 - 0.20 K/uL Final    nRBC 09/05/2024 0  0 /100 WBC Final    Gran % 09/05/2024 68.0  38.0 - 73.0 % Final    Lymph % 09/05/2024 19.6  18.0 - 48.0 % Final    Mono % 09/05/2024 10.2  4.0 - 15.0 % Final    Eosinophil % 09/05/2024 1.6  0.0 - 8.0 % Final    Basophil % 09/05/2024 0.3  0.0 - 1.9 % Final    Differential Method 09/05/2024 Automated   Final    CRP 09/05/2024 13.6 (H)  0.0 - 8.2 mg/L Final    Creatinine 09/05/2024 1.5 (H)  0.5 - 1.4 mg/dL Final    eGFR 09/05/2024 44.9 (A)  >60 mL/min/1.73 m^2 Final    Vancomycin, Random 09/06/2024 11.2  Not established ug/mL Final    Sodium 09/06/2024 140  136 - 145 mmol/L Final    Potassium 09/06/2024 4.2  3.5 - 5.1 mmol/L Final    Chloride 09/06/2024 107  95 - 110 mmol/L Final    CO2 09/06/2024 27  23 - 29 mmol/L Final    Glucose 09/06/2024 88  70 - 110 mg/dL Final    BUN 09/06/2024 13  6 - 20 mg/dL Final    Creatinine 09/06/2024 1.4  0.5 - 1.4 mg/dL Final    Calcium 09/06/2024 9.3  8.7 - 10.5 mg/dL Final    Anion Gap 09/06/2024 6 (L)  8 - 16 mmol/L Final    eGFR 09/06/2024 48.8 (A)  >60 mL/min/1.73 m^2 Final    Sodium 09/07/2024 139  136 - 145 mmol/L Final    Potassium 09/07/2024 3.5  3.5 - 5.1 mmol/L Final    Chloride 09/07/2024 106  95 - 110 mmol/L Final    CO2 09/07/2024 24  23 - 29 mmol/L Final    Glucose 09/07/2024 86  70 - 110 mg/dL Final    BUN 09/07/2024 17  6 - 20 mg/dL Final    Creatinine 09/07/2024 1.3  0.5 - 1.4 mg/dL Final    Calcium 09/07/2024 9.2  8.7 - 10.5 mg/dL Final    Anion Gap 09/07/2024 9  8 - 16 mmol/L Final    eGFR 09/07/2024 53.3 (A)  >60 mL/min/1.73 m^2 Final    Sodium 09/07/2024 139  136 - 145 mmol/L Final    Potassium 09/07/2024 3.5  3.5 - 5.1 mmol/L Final    Chloride 09/07/2024 106  95 - 110 mmol/L Final    CO2 09/07/2024 24  23 - 29 mmol/L Final    Glucose 09/07/2024 86  70 - 110 mg/dL Final    BUN 09/07/2024 17  6 -  20 mg/dL Final    Creatinine 09/07/2024 1.3  0.5 - 1.4 mg/dL Final    Calcium 09/07/2024 9.2  8.7 - 10.5 mg/dL Final    Anion Gap 09/07/2024 9  8 - 16 mmol/L Final    eGFR 09/07/2024 53.3 (A)  >60 mL/min/1.73 m^2 Final    WBC 09/07/2024 3.79 (L)  3.90 - 12.70 K/uL Final    RBC 09/07/2024 3.54 (L)  4.00 - 5.40 M/uL Final    Hemoglobin 09/07/2024 9.6 (L)  12.0 - 16.0 g/dL Final    Hematocrit 09/07/2024 30.7 (L)  37.0 - 48.5 % Final    MCV 09/07/2024 87  82 - 98 fL Final    MCH 09/07/2024 27.1  27.0 - 31.0 pg Final    MCHC 09/07/2024 31.3 (L)  32.0 - 36.0 g/dL Final    RDW 09/07/2024 13.4  11.5 - 14.5 % Final    Platelets 09/07/2024 196  150 - 450 K/uL Final    MPV 09/07/2024 10.3  9.2 - 12.9 fL Final   Admission on 09/01/2024, Discharged on 09/01/2024   Component Date Value Ref Range Status    WBC 09/01/2024 4.85  3.90 - 12.70 K/uL Final    RBC 09/01/2024 3.66 (L)  4.00 - 5.40 M/uL Final    Hemoglobin 09/01/2024 10.1 (L)  12.0 - 16.0 g/dL Final    Hematocrit 09/01/2024 31.3 (L)  37.0 - 48.5 % Final    MCV 09/01/2024 86  82 - 98 fL Final    MCH 09/01/2024 27.6  27.0 - 31.0 pg Final    MCHC 09/01/2024 32.3  32.0 - 36.0 g/dL Final    RDW 09/01/2024 14.1  11.5 - 14.5 % Final    Platelets 09/01/2024 203  150 - 450 K/uL Final    MPV 09/01/2024 10.5  9.2 - 12.9 fL Final    Immature Granulocytes 09/01/2024 0.2  0.0 - 0.5 % Final    Gran # (ANC) 09/01/2024 3.7  1.8 - 7.7 K/uL Final    Immature Grans (Abs) 09/01/2024 0.01  0.00 - 0.04 K/uL Final    Lymph # 09/01/2024 0.7 (L)  1.0 - 4.8 K/uL Final    Mono # 09/01/2024 0.4  0.3 - 1.0 K/uL Final    Eos # 09/01/2024 0.1  0.0 - 0.5 K/uL Final    Baso # 09/01/2024 0.02  0.00 - 0.20 K/uL Final    nRBC 09/01/2024 0  0 /100 WBC Final    Gran % 09/01/2024 76.4 (H)  38.0 - 73.0 % Final    Lymph % 09/01/2024 14.2 (L)  18.0 - 48.0 % Final    Mono % 09/01/2024 7.6  4.0 - 15.0 % Final    Eosinophil % 09/01/2024 1.2  0.0 - 8.0 % Final    Basophil % 09/01/2024 0.4  0.0 - 1.9 % Final     Differential Method 2024 Automated   Final    Sodium 2024 139  136 - 145 mmol/L Final    Potassium 2024 3.8  3.5 - 5.1 mmol/L Final    Chloride 2024 106  95 - 110 mmol/L Final    CO2 2024 25  23 - 29 mmol/L Final    Glucose 2024 79  70 - 110 mg/dL Final    BUN 2024 13  6 - 20 mg/dL Final    Creatinine 2024 0.9  0.5 - 1.4 mg/dL Final    Calcium 2024 9.0  8.7 - 10.5 mg/dL Final    Total Protein 2024 6.9  6.0 - 8.4 g/dL Final    Albumin 2024 3.8  3.5 - 5.2 g/dL Final    Total Bilirubin 2024 0.4  0.1 - 1.0 mg/dL Final    Alkaline Phosphatase 2024 46 (L)  55 - 135 U/L Final    AST 2024 20  10 - 40 U/L Final    ALT 2024 8 (L)  10 - 44 U/L Final    eGFR 2024 >60.0  >60 mL/min/1.73 m^2 Final    Anion Gap 2024 8  8 - 16 mmol/L Final    CRP 2024 11.1 (H)  0.0 - 8.2 mg/L Final    POC Preg Test, Ur 2024 Negative  Negative Final     Acceptable 2024 Yes   Final   Office Visit on 2024   Component Date Value Ref Range Status    Aerobic Bacterial Culture 2024 No growth   Final    Anaerobic Culture 2024 No anaerobes isolated   Final       Past Medical History:   Diagnosis Date    Anemia     Anxiety     Breast cancer     Diarrhea 2022    Drug-induced acute pancreatitis without infection or necrosis 2022    Encounter for blood transfusion     Hypertension     Meningitis     Severe obesity 2023    Shortness of breath 2023     Past Surgical History:   Procedure Laterality Date    AXILLARY NODE DISSECTION Left 2023    Procedure: LYMPHADENECTOMY, AXILLARY;  Surgeon: YASMANI Weiner MD;  Location: [facility];  Service: General;  Laterality: Left;    BREAST BIOPSY Left     BREAST RECONSTRUCTION       SECTION      COLONOSCOPY N/A 2022    Procedure: COLONOSCOPY;  Surgeon: Davi Chery MD;  [facility] ([address] [place] [address]);  Service: Endoscopy;   Laterality: N/A;  case request entered from referral - Per DR. KATHLEEN Chery Pt to be scheduled for urgent colonoscopy on 9/22/22/ fully vaccinated / prep ins for Sutab on portal - ERW  see micro for negative C-diff- ERW    INJECTION FOR SENTINEL NODE IDENTIFICATION Left 02/27/2023    Procedure: INJECTION, FOR SENTINEL NODE IDENTIFICATION;  Surgeon: YASMANI Weiner MD;  Location: [facility];  Service: General;  Laterality: Left;    INSERTION OF BREAST TISSUE EXPANDER Left 02/27/2023    Procedure: INSERTION, TISSUE EXPANDER, BREAST;  Surgeon: Reese Barrios DO;  Location: [facility];  Service: Plastics;  Laterality: Left;    INSERTION OF TUNNELED CENTRAL VENOUS CATHETER (CVC) WITH SUBCUTANEOUS PORT N/A 04/05/2022    Procedure: [id number];  Surgeon: Deo Sin Jr., MD;  [facility];  Service: General;  Laterality: N/A;    INSERTION OF TUNNELED CENTRAL VENOUS CATHETER (CVC) WITH SUBCUTANEOUS PORT N/A 08/01/2022    Procedure: INSERTION, SINGLE LUMEN CATHETER WITH PORT, WITH FLUOROSCOPIC GUIDANCE;  Surgeon: Ryder Dinero MD;  Location: [facility] CATH LAB;  Service: Vascular;  Laterality: N/A;    INTRAVENOUS INJECTION N/A 12/11/2023    Procedure: spy;  Surgeon: Reese Barrios DO;  Location: [facility] OR [facility] FLR;  Service: Plastics;  Laterality: N/A;    LIPOSUCTION W/ FAT INJECTION Bilateral 04/18/2024    Procedure: LIPOSUCTION, WITH FAT TRANSFER;  Surgeon: Reese Barrios DO;  Location: [facility] OR [facility];  Service: Plastics;  Laterality: Bilateral;  Lipo with fat grafting Bilat    MASTECTOMY Left 02/27/2023    Procedure: MASTECTOMY;  Surgeon: YASMANI Weiner MD;  Location: [facility] OR;  Service: General;  Laterality: Left;  3.5 TOTAL HOURS / EMAIL SENT 2-23 @ 7:56 CCC    MASTECTOMY Right 12/11/2023    Procedure: MASTECTOMY;  Surgeon: Eldon Daley MD;  [facility];  Service: General;  Laterality: Right;    RECONSTRUCTION OF BREAST WITH DEEP INFERIOR EPIGASTRIC ARTERY   (KANDICE) FREE FLAP Bilateral 2023    Procedure: RECONSTRUCTION, BREAST, USING AKNDICE FREE FLAP;  Surgeon: Reese Barrios DO;  Location: [facility] OR 2ND FLR;  Service: Plastics;  Laterality: Bilateral;  Bilateral KANDICE flap - cosurgeon is Dr Cross -    REVISION OF SCAR Right 2023    Procedure: REVISION, SCAR;  Surgeon: Reese Barrios DO;  Location: [facility] OR 2ND FLR;  Service: Plastics;  Laterality: Right;    REVISION OF SCAR Right 2024    Procedure: REVISION, SCAR;  Surgeon: Reese Barrios DO;  Location: [facility] OR 2ND FLR;  Service: Plastics;  Laterality: Right;    REVISION, FLAP, PREVIOUSLY CREATED FOR BREAST RECONSTRUCTION Bilateral 2024    Procedure: REVISION, FLAP, PREVIOUSLY CREATED FOR BREAST RECONSTRUCTION;  Surgeon: Reese Barrios DO;  Location: [facility] OR 2ND FLR;  Service: Plastics;  Laterality: Bilateral;  2nd stage Autologous - Bilat breast revision    SENTINEL LYMPH NODE BIOPSY Left 2023    Procedure: BIOPSY, LYMPH NODE, SENTINEL;  Surgeon: YASMANI Weiner MD;  Location: [facility];  Service: General;  Laterality: Left;  LEFT with radiological marker    TISSUE EXPANDER REMOVAL Left 2023    Procedure: REMOVAL, TISSUE EXPANDER;  Surgeon: Reese Barrios DO;  Location: [facility] OR 2ND FLR;  Service: Plastics;  Laterality: Left;  removal left tissu expander     Social History     Tobacco Use    Smoking status: Never    Smokeless tobacco: Never   Substance Use Topics    Alcohol use: Not Currently     Comment: social    Drug use: Yes     Types: Marijuana     Family History   Problem Relation Name Age of Onset    Hypertension Mother      Hypertension Father      Diabetes Father      Pancreatic cancer Maternal Uncle      Bladder Cancer Paternal Grandmother      Colon cancer Neg Hx      Esophageal cancer Neg Hx       OB History    Para Term  AB Living   3 3 3         SAB IAB Ectopic Multiple Live Births                  # Outcome Date  GA Lbr Nicanor/2nd Weight Sex Type Anes PTL Lv   3 Term            2 Term            1 Term                Current Outpatient Medications:     buPROPion (WELLBUTRIN XL) 150 MG TB24 tablet, Take 1 tablet (150 mg total) by mouth every morning., Disp: 30 tablet, Rfl: 2    capecitabine (XELODA) 150 MG tablet, Take 1 tablet (150 mg total) by mouth 2 (two) times daily for 14 days followed by 7 days off. Take with 1 other capecitabine prescription for 2,150 mg total., Disp: 28 tablet, Rfl: 2    capecitabine (XELODA) 500 MG Tab, Take 4 tablets (2,000 mg total) by mouth 2 (two) times daily for 14 days followed by 7 days off. Take with 1 other capecitabine prescription for 2,150 mg total., Disp: 112 tablet, Rfl: 2    cetirizine (ZYRTEC) 10 MG tablet, Take 10 mg by mouth once daily., Disp: , Rfl:     gabapentin (NEURONTIN) 300 MG capsule, Take 1 capsule (300 mg total) by mouth 3 (three) times daily., Disp: 90 capsule, Rfl: 11    ibuprofen (ADVIL,MOTRIN) 600 MG tablet, Take 1 tablet (600 mg total) by mouth every 8 (eight) hours as needed for Pain., Disp: 15 tablet, Rfl: 0    LIDOcaine (LIDODERM) 5 %, Place 1 patch onto the skin daily as needed (pain). Remove & Discard patch within 12 hours or as directed by MD, Disp: 30 patch, Rfl: 0    metFORMIN (GLUCOPHAGE-XR) 750 MG ER 24hr tablet, Take 1 tablet (750 mg total) by mouth daily with breakfast., Disp: 90 tablet, Rfl: 1    ondansetron (ZOFRAN) 8 MG tablet, Take 1 tablet (8 mg total) by mouth every 12 (twelve) hours as needed for Nausea., Disp: 30 tablet, Rfl: 2    pantoprazole (PROTONIX) 40 MG tablet, Take 1 tablet (40 mg total) by mouth once daily., Disp: 30 tablet, Rfl: 1    pregabalin (LYRICA) 75 MG capsule, Take 1 capsule (75 mg total) by mouth 2 (two) times daily as needed (pain)., Disp: 60 capsule, Rfl: 0  No current facility-administered medications for this visit.    Facility-Administered Medications Ordered in Other Visits:     ceFAZolin 1 g, gentamicin 80 mg in sodium  "chloride 0.9% 500 mL irrigation, , Irrigation, On Call Procedure, YASMANI Weiner MD    ceFAZolin 2 g in dextrose 5 % in water (D5W) 50 mL IVPB (MB+), 2 g, Intravenous, On Call Procedure, Angie Peck PA-C    LIDOcaine (PF) 10 mg/ml (1%) injection 10 mg, 1 mL, Intradermal, Once, Angie Peck PA-C    mupirocin 2 % ointment, , Nasal, On Call Procedure, Angie Peck PA-C, Given at 04/18/24 0609    sodium chloride 0.9% flush 10 mL, 10 mL, Intravenous, PRN, Angie Peck PA-C    Review of Systems:  General: No fever, chills.  Chest: No chest pain, shortness of breath, or palpitations.  Breast: No pain, masses, or nipple discharge.  Vulva: No pain, lesions, or itching.  Vagina: No relaxation, itching, discharge, or lesions.  Abdomen: No pain, nausea, vomiting, diarrhea, or constipation.  Urinary: No incontinence, nocturia, frequency, or dysuria.  Extremities:  No leg cramps, edema, or calf pain.  Neurologic: No headaches, dizziness, or visual changes.    Objective:     Vitals:    09/26/24 0825   Weight: 103 kg (227 lb)   Height: 5' 5" (1.651 m)     Body mass index is 37.77 kg/m².    PHYSICAL EXAM:  APPEARANCE: Well nourished, well developed, in no acute distress.  AFFECT: WNL, alert and oriented x 3      Assessment:      Malignant neoplasm of upper-outer quadrant of left breast in female, estrogen receptor negative    Obesity, unspecified classification, unspecified obesity type, unspecified whether serious comorbidity present  -     buPROPion (WELLBUTRIN XL) 150 MG TB24 tablet; Take 1 tablet (150 mg total) by mouth every morning.  Dispense: 30 tablet; Refill: 2    Muscular deconditioning  -     Ambulatory referral/consult to Physical/Occupational Therapy; Future; Expected date: 10/03/2024        Plan:   She is not eating enough and needs to increase her lean protein.  Encouraged low glycemic diet with lean protein at each meal.  Protein at least 2-3x per day. Add protein shake in evening while at " work  Maintain hydration.  Continue walking for exercise  Referral to PT to help with reconditioning  Start wellbutrin XL 150mg QAM  Reviewed side effects  Follow up in 8 weeks    Instructed patient to call if she experiences any side effects or has any questions.    I spent a total of 20 minutes on the day of the visit.This includes face to face time and non-face to face time preparing to see the patient (eg, review of tests), obtaining and/or reviewing separately obtained history, documenting clinical information in the electronic or other health record, independently interpreting results and communicating results to the patient/family/caregiver, or care coordinator.

## 2024-09-27 ENCOUNTER — CLINICAL SUPPORT (OUTPATIENT)
Dept: REHABILITATION | Facility: HOSPITAL | Age: 39
End: 2024-09-27
Payer: MEDICAID

## 2024-09-27 DIAGNOSIS — R53.1 GENERALIZED WEAKNESS: ICD-10-CM

## 2024-09-27 DIAGNOSIS — M25.612 DECREASED RANGE OF MOTION OF LEFT SHOULDER: Primary | ICD-10-CM

## 2024-09-27 DIAGNOSIS — Z74.09 DECREASED FUNCTIONAL MOBILITY AND ENDURANCE: ICD-10-CM

## 2024-09-27 DIAGNOSIS — R26.89 BALANCE PROBLEM: ICD-10-CM

## 2024-09-27 DIAGNOSIS — R29.898 MUSCULAR DECONDITIONING: ICD-10-CM

## 2024-09-27 PROBLEM — R29.3 ABNORMAL POSTURE: Status: RESOLVED | Noted: 2024-03-15 | Resolved: 2024-09-27

## 2024-09-27 PROBLEM — Z91.89 AT RISK FOR LYMPHEDEMA: Status: RESOLVED | Noted: 2024-03-15 | Resolved: 2024-09-27

## 2024-09-27 PROBLEM — R68.89 DECREASED FUNCTIONAL ACTIVITY TOLERANCE: Status: RESOLVED | Noted: 2024-03-15 | Resolved: 2024-09-27

## 2024-09-27 PROCEDURE — 97162 PT EVAL MOD COMPLEX 30 MIN: CPT

## 2024-09-27 NOTE — PATIENT INSTRUCTIONS
"  Flexibility: "This Feels Great" Stretch     Holding chair, back away from it, place feet shoulder width apart and tuck head between arms. To end, walk toward chair.  Repeat 5-10 times and hold 30 sec. Do 1-2 times a day.        Supine Pectoralis Stretch ([place])    -Patient lays supine with a foam roll or stack of pillows aligned under spine  -Patient slowly brings her arms into 90 deg horizontal abduction and allows arms to lower to floor (if possible).  -Patient holds position for 30 sec before bringing arms above head while still in horizontal abduction.  -Patient holds for another 30 sec before returning to 90 degrees.  -2 sets  "

## 2024-09-27 NOTE — PLAN OF CARE
[facility]  Physical Therapy Initial Evaluation  Name: Laura Kent  Clinic Number: [id number]    Therapy Diagnosis:   Encounter Diagnoses   Name Primary?    Muscular deconditioning     Decreased range of motion of left shoulder Yes    Generalized weakness     Balance problem         Physician: Yumiko Monreal PA-C    Physician Orders: PT Eval and Treat   Medical Diagnosis from Referral: R29.898 (ICD-10-CM) - Muscular deconditioning   Evaluation Date: 9/27/2024  Authorization Period Expiration: 9/26/2025  Plan of Care Expiration: 11/22/2024  Progress Note Due: 10/25/2024  Visit # / Visits authorized: 1/ 1   FOTO: 1/1    Precautions: Standard and cancer     Time In: 8:15 am  Time Out: 9:05 am  Total Appointment Time (timed & untimed codes): 45 minutes    Subjective   Date of onset: after recent hospitalization 9/2/24  History of current condition - Laura reports: she is having trouble with lifting things overhead, feels a pulling in her left arm when reaching overhead, her endurance is low and she has trouble doing her usual work outs, and has difficulty carrying stuff upstairs as it tires her out. She is getting dizzy a lot especially when stooping down. She is doing a lot of walking and standing at work, but no heavy lifting. She is not wearing any compression on her L arm but does report that it does feel heavy.   Cancer Related Surgery and Date: 12/11/2023: BILATERAL KANDICE flap reconstruction with LEFT TE removal and RIGHT mastectomy    - 02/27/2023: LEFT mastectomy with SLNB    - 4/18/2024: bilateral KANDICE flap revision     Chemotherapy: completed neoadjuvantly April-December 2022   Radiation: 5 weeks April/May 2023   Endocrine Therapy: N/A     Medical History:   Past Medical History:   Diagnosis Date    Anemia     Anxiety     Breast cancer     Diarrhea 09/08/2022    Drug-induced acute pancreatitis without infection or necrosis 09/04/2022    Encounter for blood transfusion      Hypertension     Meningitis     Severe obesity 2023    Shortness of breath 2023       Surgical History:   Laura Kent  has a past surgical history that includes Breast biopsy (Left);  section; Insertion of tunneled central venous catheter (CVC) with subcutaneous port (N/A, 2022); Insertion of tunneled central venous catheter (CVC) with subcutaneous port (N/A, 2022); Colonoscopy (N/A, 2022); Mastectomy (Left, 2023); [place] lymph node biopsy (Left, 2023); Injection for sentinel node identification (Left, 2023); Axillary node dissection (Left, 2023); Insertion of breast tissue expander (Left, 2023); Tissue expander removal (Left, 2023); Reconstruction of breast with deep inferior epigastric artery  (KANDIEC) free flap (Bilateral, 2023); Revision of scar (Right, 2023); Intravenous injection (N/A, 2023); Mastectomy (Right, 2023); revision, flap, previously created for breast reconstruction (Bilateral, 2024); Liposuction w/ fat injection (Bilateral, 2024); Revision of scar (Right, 2024); and Breast reconstruction.    Medications:   Laura has a current medication list which includes the following prescription(s): bupropion, capecitabine, capecitabine, cetirizine, gabapentin, ibuprofen, lidocaine, metformin, ondansetron, pantoprazole, and pregabalin, and the following Facility-Administered Medications: ceFAZolin 1 g, gentamicin 80 mg in sodium chloride 0.9% 500 mL irrigation, ceFAZolin 2 g in dextrose 5 % in water (D5W) 50 mL IVPB (MB+), lidocaine (pf) 10 mg/ml (1%), mupirocin, and sodium chloride 0.9%.    Allergies:   Review of patient's allergies indicates:   Allergen Reactions    Strawberries [strawberry] Hives        Imaging, Ultrasound: Chest Mediastinum 9/3/24  Impression:     Slight interval decrease in size of ovoid hypoechoic region in the left breast without definite fluid  collection.  Lesion remains suggestive of inflammatory tissue and/or granulation tissue and less likely neoplasm.     Noted subcentimeter open wound in the superficial left breast.    Prior Therapy: PT previously for lymphedema, post op B breast surgery and reconstruction  Social History: single story home, 4 steps to enter,  handrails on 1 side; lives with their family  Occupation: , caterer, [employer]    Prior Level of Function: independent  Current Level of Function: Mod A with carry items up stairs  Exercise Routine prior to onset: cardio, weights, crossfit-type workouts   Dominant hand:  left     Pain:  Current 5/10, worst 10/10, best 4/10   Location: left arm into breast, right pec   Description: Throbbing, Tight, Sharp, and pulling, heaviness  Aggravating Factors: Lifting  Easing Factors: nothing    Functional Mobility (Bed mobility, transfers)  Bed mobility: I  Supine to sit: I  Sit to supine: I  Transfers to bed: I  Transfers to toilet: I  Sit to stand:  I  Stand pivot:  I  Car transfers: I  Wheelchair mobility: N/A    ADL's:  Feeding: I  Grooming: I  Hygiene: I  UB Dressing: I  LB Dressing: I  Toileting: I  Bathing: I    Pts goals: get progressive with my work out routines, walking, and cardio. Get the left arm better.      Objective     Lab Values 9/7/24  Hemoglobin: 9.6 (L)  Hematocrit: 30.7 (L)  Platelets: 196  ANC: NT    Mental status: alert, oriented to person, place, and time, normal mood, behavior, speech, dress, motor activity, and thought processes, affect appropriate to mood  Appearance: Casually dressed, Showered, and Well groomed  Behavior:  calm, cooperative, and adequate rapport can be established  Attention Span and Concentration:  Normal    Postural examination/scapula alignment: Rounded shoulder      Sensation:   Light Touch UEs  Intact  Light Touch LEs  Intact  Proprioception: Intact,            ROM:     Active/Passive ROM: (measured in degrees)     Shoulder RUE LUE  Pain/Dysfunction with movement   Flexion 180 160 Pulling in breast   Abduction 170 150 Pulling in breast   Extension 65 65 N/A   ER 80 55 Pulling in breast/pecs   IR 90 90 N/A       Strength: manual muscle test grades below     Upper Extremity Strength  (R) UE  (L) UE    Shoulder flexion: 4+/5 Shoulder flexion: 4+/5   Shoulder Abduction: 5/5 Shoulder abduction: 4+/5   Shoulder ER 4+/5 Shoulder ER 4+/5   Shoulder IR 4+/5 Shoulder IR 4+/5   Elbow flexion: 4+/5 Elbow flexion: 4+/5   Elbow extension: 4+/5 Elbow extension: 4+/5   Wrist flexion: 5/5 Wrist flexion: 5/5   Wrist extension: 5/5 Wrist extension: 5/5         Lower Extremity Strength  Right LE  Left LE    Hip Flexion: 4/5 Hip Flexion: 4+/5   Hip Abduction: 5/5 Hip Abduction: 5/5   Hip Adduction: 5/5 Hip Adduction 5/5   Knee Extension: 5/5 Knee Extension: 5/5   Knee Flexion: 5/5 Knee Flexion: 5/5   Ankle Dorsiflexion: 5/5 Ankle Dorsiflexion: 5/5   Ankle Plantarflexion: 5/5 Ankle Plantarflexion: 5/5     Abdominal Strength: NT    Special Tests      Strength (in pounds, setting II one maximum trial):   Right Left    II 74.5 64.8     Normal  Average Values  Age: Female: Right Left Male: Right Left   20-29 66 lbs 62 lbs         94 lbs 86 lbs   30-39 68 lbs 64 lbs 90 lbs 82 lbs   40-49 64 lbs 62 lbs 80 lbs 74 lbs   50-59 62 lbs 57 lbs 72 lbs 65 lbs   60-69 53 lbs 51 lbs 63 lbs 56 lbs   70+ 44 lbs 42 lbs 54 lbs 48 lbs       Balance Assessment:     Evaluation   Single Limb Stance R LE 24.81 sec  (<10 sec = HIGH FALL RISK)   Single Limb Stance L LE 30 sec  (<10 sec = HIGH FALL RISK)     Normative Values for Single Leg Stance, Eyes Open   Age: Time:   60-69 27 sec   70-79 17.2 sec   80-89 8.5 sec        Evaluation   Timed Up and Go TBA     >14 seconds associated with high fall risk  > 30 seconds predictive of requiring ambulation device & being dependent in ADLs    Table: Population Norms for TUG    Age  Average TUG    60 - 69 years  8.1 seconds    70 - 79 years   9.2 seconds    80 - 99 years  11.3 seconds        Endurance Assessment:    6 Minute Walk Test Distance in meters: 424.09  - AD used: none  - Assistance: none    Normative Values for 6 minute Walk Test, Distance in Meters:  Age in Years Men Women   60-64 558-672m 498-604m   65-69 512-640m 457-581m   70-74 498-622m 439-562m   75-79 430-585m 393-535m   80-84 407-575m 352-494m   85-89 427-521m 311-466m   90-84 279-457m 251-402m          Evaluation   30 second Chair Rise  (adults > 59 y/o) 11 completed with no arms     Normative Scores for 30 sec Chair Rise (arms across chest; full stand and full sitting)   60-64 65-69 70-74 75-79 80-84 85-89 90-94   Range for Men 14-19 12-18 12-17 11-17 10-15 8-14 7-12   Range for Women 12-17 11-16 10-16 10-15 9-14 8-13 4-11       Gait Assessment/Deviations:  Laura displays the following deviations with ambulation: normal gait pattern    Impairments contributing to deviations: N/A    [place]:  L axilla into breast  Intake Outcome Measure for FOTO Cancer Survey    Therapist reviewed FOTO scores for Laura Kent on 9/27/2024.   FOTO documents entered into [employer] - see Media section.    Intake Score: 60%           TREATMENT     Total Treatment time separate from Evaluation: 10 minutes    Laura received therapeutic exercises to develop ROM and flexibility for 5 minutes including:  Standing child's pose  Seated physioball roll outs  Supine pec stretch    Laura received the following manual therapy techniques: Myofacial release and Soft tissue Mobilization were applied to the: L breast and upper arm for 5 minutes, including:  Pec side bending in ER  Gentle stretching and side-bending for cording in L axilla to elbow (proximal to distal and back)  Pectoralis stretch    Home Exercises and Patient Education Provided    Education provided:   - Role of physical therapy in multi-disciplinary team  - Goals for physical therapy, scheduling  - Home exercise program, exercise technique  -  Energy conservation techniques     Written Home Exercises Provided: yes.  Exercises were reviewed and Laura was able to demonstrate them prior to the end of the session.  Laura demonstrated good  understanding of the education provided.     See EMR under Patient Instructions for exercises provided 9/27/2024.    Assessment   Laura is a 38 y.o. female referred to outpatient Physical Therapy with a medical diagnosis of left breast cancer, Muscular deconditioning. Pt presents with decreased ROM of L shoulder, generalized weakness, decreased balance, decreased endurance, and at risk for lymphedema. Due to her decreased ROM, muscle weakness, decreased balance, and decreased endurance she has difficulty with overhead activities, recreational activities, and her usual ADLs.     Pt prognosis is Good.   Pt will benefit from skilled outpatient Physical Therapy to address the deficits stated above and in the chart below, provide pt/family education, and to maximize pt's level of independence.     Plan of care discussed with patient: Yes  Pt's spiritual, cultural and educational needs considered and patient is agreeable to the plan of care and goals as stated below:     Anticipated Barriers for therapy: none anticipated    Medical Necessity is demonstrated by the following  History  Co-morbidities and personal factors that may impact the plan of care [] LOW: no personal factors / co-morbidities  [] MODERATE: 1-2 personal factors / co-morbidities  [x] HIGH: 3+ personal factors / co-morbidities    Moderate / High Support Documentation:   Co-morbidities affecting plan of care: History of cancer, HTN, high BMI, history of lymphedema    Personal Factors:   no deficits     Examination  Body Structures and Functions, activity limitations and participation restrictions that may impact the plan of care [] LOW: addressing 1-2 elements  [] MODERATE: 3+ elements  [x] HIGH: 4+ elements (please support below)    Moderate / High Support  Documentation: decreased ROM, muscle weakness, decreased balance, decreased endurance, difficulty with usual household duties, overhead activities, recreational activities.     Clinical Presentation [] LOW: stable  [x] MODERATE: Evolving  [] HIGH: Unstable     Decision Making/ Complexity Score: moderate         Goals:  Short Term Goals:  4 weeks  1. Patient will demonstrate 100% understanding of lymphedema risk reduction practices to include self monitoring for lymphedema. (progressing, not met)  2. Pt. to demonstrate increased strength of bilateral lower extremities to 5/5  to increase stability during community ambulation (progressing, not met)  3. Pt to demonstrate increased strength of bilateral upper extremities to 4+/5 in order to perform functional activities (progressing, not met)  4. Pt. will improve Single Limb Stance test score to 30 seconds on each LE in order to perform safe transfers and for patient to be in low risk for falls category (progressing, not met)  5. Pt will improve 6 minute walk test score to 498 meters in order ambulate safely in community (progressing, not met)  6 Pt will initiate home exercise program to improve strength, flexibility, endurance, mobility & balance to return pt to PLOF (progressing, not met)  7. Pt will increase AROM/PROM in shoulder flexion to 180 degrees on left to improve functional reach, carry, push, pull pain free. (progressing, not met)  8. Pt will increase AROM/PROM in shoulder abduction ROM to 170 degrees on left to improve functional reach, carry, push, pull pain free. (progressing, not met)    Long Term Goals: 8 weeks  1. Pt will increase AROM/PROM in shoulder external rotation ROM to 90 degrees on left to improve functional reach, carry, push, pull pain free. (progressing, not met)  2 Pt will increase strength of bilateral upper extremities to 5/5  in order to perform functional activities independently  (progressing, not met)  3. Pt will be independent with  HEP to improve ROM, strength, balance,  and independence with ADL's (progressing, not met)  4. Pt. will improve 30 second chair rise test score to 15 completed with no arms in order to transfer independently and for patient to be in low risk for falls category (progressing, not met)  5. Patient will report compliance with walking program 5x week for 30 - 45min each day to improve overall cardiovascular function and decrease cancer related fatigue at discharge. (progressing, not met)    Plan   Plan of care Certification: 9/27/2024 to 11/22/2024.    Outpatient Physical Therapy 2 times weekly for 8 weeks to include the following interventions: Manual Therapy, Neuromuscular Re-ed, Patient Education, Self Care, Therapeutic Activities, and Therapeutic Exercise.     Teresa Hurley, PT

## 2024-09-29 PROBLEM — R53.1 GENERALIZED WEAKNESS: Status: ACTIVE | Noted: 2024-09-29

## 2024-09-29 PROBLEM — Z74.09 DECREASED FUNCTIONAL MOBILITY AND ENDURANCE: Status: ACTIVE | Noted: 2023-03-24

## 2024-09-29 PROBLEM — M25.612 DECREASED RANGE OF MOTION OF LEFT SHOULDER: Status: ACTIVE | Noted: 2024-09-29

## 2024-09-29 PROBLEM — R26.89 BALANCE PROBLEM: Status: ACTIVE | Noted: 2024-09-29

## 2024-10-22 ENCOUNTER — OFFICE VISIT (OUTPATIENT)
Dept: PLASTIC SURGERY | Facility: CLINIC | Age: 39
End: 2024-10-22
Payer: MEDICAID

## 2024-10-22 VITALS — HEART RATE: 77 BPM | DIASTOLIC BLOOD PRESSURE: 85 MMHG | SYSTOLIC BLOOD PRESSURE: 128 MMHG

## 2024-10-22 DIAGNOSIS — Z09 SURGERY FOLLOW-UP: Primary | ICD-10-CM

## 2024-10-22 PROCEDURE — 3079F DIAST BP 80-89 MM HG: CPT | Mod: CPTII,,, | Performed by: SURGERY

## 2024-10-22 PROCEDURE — 99212 OFFICE O/P EST SF 10 MIN: CPT | Mod: S$PBB,,, | Performed by: SURGERY

## 2024-10-22 PROCEDURE — 99999 PR PBB SHADOW E&M-EST. PATIENT-LVL III: CPT | Mod: PBBFAC,,, | Performed by: SURGERY

## 2024-10-22 PROCEDURE — 3074F SYST BP LT 130 MM HG: CPT | Mod: CPTII,,, | Performed by: SURGERY

## 2024-10-22 PROCEDURE — 99213 OFFICE O/P EST LOW 20 MIN: CPT | Mod: PBBFAC | Performed by: SURGERY

## 2024-10-22 NOTE — PROGRESS NOTES
Laura Kent presents to [facility] for a follow up of left reconstructed breast abscess. She was hospitalized in November with an area of infected fat necrosis that drained spontaneously and resolved with antibiotics. She has improved significantly. She denies pain in the affected area. She does have some residual sensitivity overlying her right port scar which previously dehisced and healed widened and thickened.    She denies fever, chills, nausea, vomiting or other systemic signs of infection.       ROS - negative, other than stated above    PHYSICAL EXAMINATION  Vitals:    10/22/24 0911   BP: 128/85   Pulse: 77       Gen: NAD, appears stated age  Neuro: normal without focal findings, mental status and speech normal  HEENT: NCAT, neck supple, PEERL  CV: RRR  Pulm: Breathing non-labored, chest wall movement equal bilaterally   Breast:  Bilateral KANDICE flap breast reconstruction, nipple tatoos. Left breast radiation changes.  Abdomen: soft, nontender, no guarding  Gu: genitalia not examined  Extremity:normal strength, no cyanosis or edema  Skin: Skin color, texture, turgor normal. No rashes or lesions  Psych: oriented to time, place and person      ASSESSMENT/PLAN  38 y.o. female s/p bilateral [facility] with KANDICE flap reconstruction and revision    Area of left breast infection has fully resolved  She is happy with her overall reconstruction  We will see her back in 6 months    Angie Peck PA-C  Plastic and Reconstructive Surgery      This includes face to face time and non-face to face time preparing to see the patient (eg, review of tests), obtaining and/or reviewing separately obtained history, documenting clinical information in the electronic or other health record, independently interpreting results and communicating results to the patient/family/caregiver, or care coordinator.

## 2024-11-18 NOTE — ASSESSMENT & PLAN NOTE
36 years old with HTN, breast cancer  [id number] Taxol on 8/29/2022 f/u with Dr. Cruz presents with worsening abdominal pain, nasuea and vomiting.  On exam she was mod-sever  abdominal pain to palpitation, no peritonitic signs. Labs were significant for elevated lipase >200. CT with findings concerning for pancreatitis wo fluid/ abscess collection. . CT scan with Mild peripancreatic fat stranding. TG levels were normal this admission, low concern for triglyceride induced pancreatitis. CT scan showed no gallstones or GB wall thickening, low concern for gallstone induced pancreatitis. Patient is clinically improving with n/v, po intake, and pain.     Most likely pancreatitis 2/2 chemo, last chemotherapy session 8/29; Taxol    - Pain: morphine IV changed to oxy PO.    - Anti-emetics: scheduled compazine and prn zofran. Improving  -  cc  - Diet advancing as tolerated  - simethicone    Yes

## 2024-11-26 ENCOUNTER — OFFICE VISIT (OUTPATIENT)
Dept: OBSTETRICS AND GYNECOLOGY | Facility: CLINIC | Age: 39
End: 2024-11-26
Payer: MEDICAID

## 2024-11-26 VITALS — BODY MASS INDEX: 36.49 KG/M2 | WEIGHT: 219 LBS | HEIGHT: 65 IN

## 2024-11-26 DIAGNOSIS — Z17.1 MALIGNANT NEOPLASM OF UPPER-OUTER QUADRANT OF LEFT BREAST IN FEMALE, ESTROGEN RECEPTOR NEGATIVE: Primary | ICD-10-CM

## 2024-11-26 DIAGNOSIS — E88.810 METABOLIC SYNDROME: ICD-10-CM

## 2024-11-26 DIAGNOSIS — E66.9 OBESITY, UNSPECIFIED CLASS, UNSPECIFIED OBESITY TYPE, UNSPECIFIED WHETHER SERIOUS COMORBIDITY PRESENT: ICD-10-CM

## 2024-11-26 DIAGNOSIS — C50.412 MALIGNANT NEOPLASM OF UPPER-OUTER QUADRANT OF LEFT BREAST IN FEMALE, ESTROGEN RECEPTOR NEGATIVE: Primary | ICD-10-CM

## 2024-11-26 PROCEDURE — 99213 OFFICE O/P EST LOW 20 MIN: CPT | Mod: 95,,, | Performed by: PHYSICIAN ASSISTANT

## 2024-11-26 PROCEDURE — 3008F BODY MASS INDEX DOCD: CPT | Mod: CPTII,95,, | Performed by: PHYSICIAN ASSISTANT

## 2024-11-26 PROCEDURE — 1160F RVW MEDS BY RX/DR IN RCRD: CPT | Mod: CPTII,95,, | Performed by: PHYSICIAN ASSISTANT

## 2024-11-26 PROCEDURE — 1159F MED LIST DOCD IN RCRD: CPT | Mod: CPTII,95,, | Performed by: PHYSICIAN ASSISTANT

## 2024-11-26 RX ORDER — BUPROPION HYDROCHLORIDE 300 MG/1
300 TABLET ORAL EVERY MORNING
Qty: 90 TABLET | Refills: 1 | Status: SHIPPED | OUTPATIENT
Start: 2024-11-26 | End: 2025-11-26

## 2024-11-26 RX ORDER — BUPROPION HYDROCHLORIDE 300 MG/1
300 TABLET ORAL EVERY MORNING
Qty: 30 TABLET | Refills: 3 | Status: SHIPPED | OUTPATIENT
Start: 2024-11-26 | End: 2024-11-26

## 2024-11-26 NOTE — PROGRESS NOTES
The patient location is: home  The chief complaint leading to consultation is: follow up for weight loss    Visit type: audiovisual    Face to Face time with patient: 10 minutes  15 minutes of total time spent on the encounter, which includes face to face time and non-face to face time preparing to see the patient (eg, review of tests), Obtaining and/or reviewing separately obtained history, Documenting clinical information in the electronic or other health record, Independently interpreting results (not separately reported) and communicating results to the patient/family/caregiver, or Care coordination (not separately reported).         Each patient to whom he or she provides medical services by telemedicine is:  (1) informed of the relationship between the physician and patient and the respective role of any other health care provider with respect to management of the patient; and (2) notified that he or she may decline to receive medical services by telemedicine and may withdraw from such care at any time.    Notes:   Subjective:      Laura Kent is a 39 y.o. female with history of triple negative breast cancer who presents for weight loss follow up. She is taking Wellbutrin XL 150mg QAM. Tolerating the medication well but has not seen improvement in fatigue. Mood is ok. She has lost about 7-8lbs. Eating more regular meals and increasing her protein. She did meet with PT and has learned strength exercises to do at home. Continues to walk for exercise as well.     Routine Screening Labs: 10/9/2023    Past Medical History:   Diagnosis Date    Anemia     Anxiety     Breast cancer     Diarrhea 09/08/2022    Drug-induced acute pancreatitis without infection or necrosis 09/04/2022    Encounter for blood transfusion     Hypertension     Meningitis     Severe obesity 02/02/2023    Shortness of breath 02/02/2023     Past Surgical History:   Procedure Laterality Date    AXILLARY NODE DISSECTION Left 02/27/2023     Procedure: LYMPHADENECTOMY, AXILLARY;  Surgeon: YASMANI Weiner MD;  Location: [facility] OR;  Service: General;  Laterality: Left;    BREAST BIOPSY Left     BREAST RECONSTRUCTION       SECTION      COLONOSCOPY N/A 2022    Procedure: COLONOSCOPY;  Surgeon: Davi Chery MD;  Location: [facility] ENDO (4TH FLR);  Service: Endoscopy;  Laterality: N/A;  case request entered from referral - Per DR. KATHLEEN Chery Pt to be scheduled for urgent colonoscopy on 22/ fully vaccinated / prep ins for Sutab on portal - ERW  see micro for negative C-diff- ERW    INJECTION FOR SENTINEL NODE IDENTIFICATION Left 2023    Procedure: INJECTION, FOR SENTINEL NODE IDENTIFICATION;  Surgeon: YASMANI Weiner MD;  Location: [facility] OR;  Service: General;  Laterality: Left;    INSERTION OF BREAST TISSUE EXPANDER Left 2023    Procedure: INSERTION, TISSUE EXPANDER, BREAST;  Surgeon: Reese Barrios DO;  Location: [facility];  Service: Plastics;  Laterality: Left;    INSERTION OF TUNNELED CENTRAL VENOUS CATHETER (CVC) WITH SUBCUTANEOUS PORT N/A 2022    Procedure: [id number];  Surgeon: Deo Sin Jr., MD;  Location: [facility] 2ND FLR;  Service: General;  Laterality: N/A;    INSERTION OF TUNNELED CENTRAL VENOUS CATHETER (CVC) WITH SUBCUTANEOUS PORT N/A 2022    Procedure: INSERTION, SINGLE LUMEN CATHETER WITH PORT, WITH FLUOROSCOPIC GUIDANCE;  Surgeon: Ryder Dinero MD;  Location: [facility] CATH LAB;  Service: Vascular;  Laterality: N/A;    INTRAVENOUS INJECTION N/A 2023    Procedure: spy;  Surgeon: Reese Barrios DO;  Location: [facility] OR 2ND FLR;  Service: Plastics;  Laterality: N/A;    LIPOSUCTION W/ FAT INJECTION Bilateral 2024    Procedure: LIPOSUCTION, WITH FAT TRANSFER;  Surgeon: Reese Barrios DO;  [facility];  Service: Plastics;  Laterality: Bilateral;  Lipo with fat grafting Bilat    MASTECTOMY Left 2023    Procedure: MASTECTOMY;  Surgeon: YASMANI De Jesus  MD Husam;  Location: [facility];  Service: General;  Laterality: Left;  3.5 TOTAL HOURS / EMAIL SENT 2-23 @ 7:56 CCC    MASTECTOMY Right 12/11/2023    Procedure: MASTECTOMY;  Surgeon: Eldon Daley MD;  Location: [facility] OR 2ND FLR;  Service: General;  Laterality: Right;    RECONSTRUCTION OF BREAST WITH DEEP INFERIOR EPIGASTRIC ARTERY  (KANDICE) FREE FLAP Bilateral 12/11/2023    Procedure: RECONSTRUCTION, BREAST, USING KANDICE FREE FLAP;  Surgeon: Reese Barrios DO;  [facility];  Service: Plastics;  Laterality: Bilateral;  Bilateral KANDICE flap - cosurgeon is Dr Cross -    REVISION OF SCAR Right 12/11/2023    Procedure: REVISION, SCAR;  Surgeon: Reese Barrios DO;  [facility];  Service: Plastics;  Laterality: Right;    REVISION OF SCAR Right 04/18/2024    Procedure: REVISION, SCAR;  Surgeon: Reese Barrios DO;  Location: [facility] OR [facility] FLR;  Service: Plastics;  Laterality: Right;    REVISION, FLAP, PREVIOUSLY CREATED FOR BREAST RECONSTRUCTION Bilateral 04/18/2024    Procedure: REVISION, FLAP, PREVIOUSLY CREATED FOR BREAST RECONSTRUCTION;  Surgeon: Reese Barrios DO;  [facility];  Service: Plastics;  Laterality: Bilateral;  2nd stage Autologous - Bilat breast revision    SENTINEL LYMPH NODE BIOPSY Left 02/27/2023    Procedure: BIOPSY, LYMPH NODE, SENTINEL;  Surgeon: YASMANI Weiner MD;  Location: [facility];  Service: General;  Laterality: Left;  LEFT with radiological marker    TISSUE EXPANDER REMOVAL Left 12/11/2023    Procedure: REMOVAL, TISSUE EXPANDER;  Surgeon: Reese Barrios DO;  [facility];  Service: Plastics;  Laterality: Left;  removal left tissu expander     Social History     Tobacco Use    Smoking status: Never    Smokeless tobacco: Never   Substance Use Topics    Alcohol use: Not Currently     Comment: social    Drug use: Yes     Types: Marijuana     Family History   Problem Relation Name Age of Onset     Hypertension Mother      Hypertension Father      Diabetes Father      Pancreatic cancer Maternal Uncle      Bladder Cancer Paternal Grandmother      Colon cancer Neg Hx      Esophageal cancer Neg Hx       OB History    Para Term  AB Living   3 3 3         SAB IAB Ectopic Multiple Live Births                  # Outcome Date GA Lbr Nicanor/2nd Weight Sex Type Anes PTL Lv   3 Term            2 Term            1 Term                Current Outpatient Medications:     buPROPion (WELLBUTRIN XL) 300 MG 24 hr tablet, Take 1 tablet (300 mg total) by mouth every morning., Disp: 90 tablet, Rfl: 1    cetirizine (ZYRTEC) 10 MG tablet, Take 10 mg by mouth once daily., Disp: , Rfl:     gabapentin (NEURONTIN) 300 MG capsule, Take 1 capsule (300 mg total) by mouth 3 (three) times daily., Disp: 90 capsule, Rfl: 11    ibuprofen (ADVIL,MOTRIN) 600 MG tablet, Take 1 tablet (600 mg total) by mouth every 8 (eight) hours as needed for Pain., Disp: 15 tablet, Rfl: 0    LIDOcaine (LIDODERM) 5 %, Place 1 patch onto the skin daily as needed (pain). Remove & Discard patch within 12 hours or as directed by MD, Disp: 30 patch, Rfl: 0    ondansetron (ZOFRAN) 8 MG tablet, Take 1 tablet (8 mg total) by mouth every 12 (twelve) hours as needed for Nausea., Disp: 30 tablet, Rfl: 2    pantoprazole (PROTONIX) 40 MG tablet, Take 1 tablet (40 mg total) by mouth once daily., Disp: 30 tablet, Rfl: 1    pregabalin (LYRICA) 75 MG capsule, Take 1 capsule (75 mg total) by mouth 2 (two) times daily as needed (pain)., Disp: 60 capsule, Rfl: 0  No current facility-administered medications for this visit.    Facility-Administered Medications Ordered in Other Visits:     ceFAZolin 1 g, gentamicin 80 mg in sodium chloride 0.9% 500 mL irrigation, , Irrigation, On Call Procedure, YASMANI Weiner MD    ceFAZolin 2 g in dextrose 5 % in water (D5W) 50 mL IVPB (MB+), 2 g, Intravenous, On Call Procedure, Angie Peck, LUIS ALFREDO    LIDOcaine (PF) 10 mg/ml (1%)  "injection 10 mg, 1 mL, Intradermal, Once, Angie Peck PA-C    mupirocin 2 % ointment, , Nasal, On Call Procedure, Angie Peck PA-C, Given at 04/18/24 0609    sodium chloride 0.9% flush 10 mL, 10 mL, Intravenous, PRN, Angie Peck PA-C    Review of Systems:  General: No fever, chills.  Chest: No chest pain, shortness of breath, or palpitations.  Breast: No pain, masses, or nipple discharge.  Vulva: No pain, lesions, or itching.  Vagina: No relaxation, itching, discharge, or lesions.  Abdomen: No pain, nausea, vomiting, diarrhea, or constipation.  Urinary: No incontinence, nocturia, frequency, or dysuria.  Extremities:  No leg cramps, edema, or calf pain.  Neurologic: No headaches, dizziness, or visual changes.    Objective:     Vitals:    11/26/24 0907   Weight: 99.3 kg (219 lb)   Height: 5' 5" (1.651 m)     Body mass index is 36.44 kg/m².    PHYSICAL EXAM:  APPEARANCE: Well nourished, well developed, in no acute distress.  AFFECT: WNL, alert and oriented x 3      Assessment:      Malignant neoplasm of upper-outer quadrant of left breast in female, estrogen receptor negative    Metabolic syndrome    Obesity, unspecified class, unspecified obesity type, unspecified whether serious comorbidity present  -     Discontinue: buPROPion (WELLBUTRIN XL) 300 MG 24 hr tablet; Take 1 tablet (300 mg total) by mouth every morning.  Dispense: 30 tablet; Refill: 3  -     buPROPion (WELLBUTRIN XL) 300 MG 24 hr tablet; Take 1 tablet (300 mg total) by mouth every morning.  Dispense: 90 tablet; Refill: 1        Plan:   Continue low glycemic diet with lean protein at each meal.  Avoid skipping meals  Maintain hydration.  Continue walking for exercise  Encouraged to continue strength exercises at home from PT  Increase Wellbutrin XL to 300mg QAM  Follow up in 3 months    Instructed patient to call if she experiences any side effects or has any questions.    I spent a total of 15 minutes on the day of the visit.This " includes face to face time and non-face to face time preparing to see the patient (eg, review of tests), obtaining and/or reviewing separately obtained history, documenting clinical information in the electronic or other health record, independently interpreting results and communicating results to the patient/family/caregiver, or care coordinator.

## 2024-12-02 NOTE — PROGRESS NOTES
"Subjective     Patient ID: Laura Kent is a 39 y.o. female.    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  I had a detailed conversation with the patient.  She showed me where the area was bruised.  She states that she initially developed a seroma after surgery however over time and with some therapy, that seroma resolved.  Approximately three weeks ago during physical therapy, there was robust massage of this area.  Since than, the seroma has returned and there is ecchymosis of the skin.  This is associated with pain.  I believe that the robust massage to this area is what led to the return of her seroma and the cause of her pain.  This is only important to note for future therapy in this area so that it could perhaps be slightly less robust.  Right: Area of interest corresponds with the patient's scar tissue.  No evidence of malignancy or recurrence in either breast.      - 9/1/2024 Breast Ultrasound  FINDINGS:  Ovoid hypoechoic region in the subcutaneous tissues of the left breast demonstrating granular internal echoes and measuring 7.4 x 4.0 x 1.1 cm.  No associated increased through transmission.  There are patchy areas of increased color Doppler flow.  The technologist reports that this area is painful " "to palpation.  No definite fluid collections identified.  Right breast demonstrates no sonographic abnormalities.  Impression:  Ovoid region in the subcutaneous tissues of the left breast, possibly reflecting an area of inflammatory tissue and/or granulation tissue, less likely neoplasm.  No definite fluid collections identified.     Note-- Patient reports area "burst: as above on 9/2     - 9/3/2024 Breast Ultrasound  FINDINGS:  Slight interval decrease in ovoid hypoechoic region in the subcutaneous tissues of the left breast with granular internal echoes now measuring 6.2 x 0.9 x 2.5 cm, previously 7.4 x 1.1 x 4.0 cm.  No increased through transmission.  Similar to decreased patchy areas of color Doppler flow.  Mass remains painful to palpation.  No definite drainable fluid collection.  Noted 0.7 x 0.5 cm open wound in the superficial left breast at 12:00 o'clock.  Impression:  Slight interval decrease in size of ovoid hypoechoic region in the left breast without definite fluid collection.  Lesion remains suggestive of inflammatory tissue and/or granulation tissue and less likely neoplasm.  Noted subcentimeter open wound in the superficial left breast.     - Subsequently admitted and managed with IV Abx     Oncology History:  - self detected an area under her left arm and it initially seemed to go away (noted around winter holidays)     - 3/4/2022 Outside Mammogram:  Findings:  The breasts are heterogeneously dense, which may obscure small masses.   Left  There is a 31 mm x 18 mm x 21 mm irregularly shaped mass with microlobulated margins seen in the left breast at 2 o'clock in the posterior depth with associated left axillary adenopathy. Largest node depicted on the outside study measures 49 x 23 x 29 mm with cortical thickening and effacement of the hilum. Breast mass is reportedly palpable.   Right  There is no evidence of suspicious masses, calcifications, or other abnormal findings in the right " breast.  Impression:  Left  Mass: Left breast 31 mm x 18 mm x 21 mm mass at the posterior 2 o'clock position. Assessment: 5 - Highly suggestive of malignancy. Biopsy is recommended.   Right  There is no mammographic evidence of malignancy in the right breast.  BI-RADS Category:   Overall: 5 - Highly Suggestive of Malignancy  Recommendation:  Ultrasound Guided Core Needle Biopsy of the left breast mass and one of the abnormal left axillary nodes is recommended.     - 3/14/2022 Breast biopsy:  1. LEFT BREAST MASS, 2 O'CLOCK, BIOPSY:   Invasive ductal carcinoma, Larimer histologic grade 3 (tubule formation:   3, nuclear pleomorphism:  3, mitotic activity:  3).   Comment:  Infiltrating carcinoma occupies the entirety of biopsied material   with the largest continuous focus measuring 13 mm.  Breast biomarkers are   pending and will be issued in a supplemental report.   2. LEFT AXILLARY LYMPH NODE, BIOPSY:   Invasive ductal carcinoma, Larimer histologic grade 3 (tubule formation:   3, nuclear pleomorphism:  3, mitotic activity:  3).   Comment:  Infiltrating carcinoma occupies the entirety of biopsied material   with the largest continuous focus measuring 20 mm.  No lymph node tissue is   identified in the sample.  Tumor histology is essentially identical to that   of part 1.  The findings could represent part of a larger intranodal   metastasis, but an extension from the primary tumor cannot be excluded.   Radiographic correlation is advised.   Note:  Dr. Stephanie Rao also reviewed this case and agrees with the   diagnosis.   BREAST BIOMARKER RESULTS   Estrogen receptor (ER):  Negative (0)   Progesterone receptor (ER):  Negative (0)   HER2 IHC:  Negative (1+)   Ki-67 proliferation index:  80%      - 3/17/2022 Breast MRI:  Findings:  The breasts have heterogeneous fibroglandular tissue. The background parenchymal enhancement is minimal.   Left  There is a 38 mm x 33 mm x 33 mm irregularly shaped mass seen in the  left breast at 2 o'clock in the posterior depth, 8.4 cm from the nipple and 1.2 cm from the skin. Associated signal void from a twirl biopsy marker; pathology showed invasive ductal carcinoma.   Posterior to the mass there is abnormal non mass enhancement measuring 29 x 28 mm. The NME is 4 mm from the skin and 2.3 cm from the chest wall. In total the mass and NME measure 54 mm in AP extent.  Overlying skin thickening and edema involving the lateral breast, likely from lymphovascular obstruction. No suspicious skin enhancement. Overall increased vascularity to the left breast.   Four abnormal lymph level 1 and level 2 left axillary lymph nodes. The largest lymph node measures 52 mm x 40 mm x 38 mm which previously underwent biopsy showing metastatic disease. There is an associated radar reflector within the biopsied lymph node.  Right  There is no evidence of suspicious masses, abnormal enhancement, or other abnormal findings in the right breast. No enlarged axillary or internal mammary lymph nodes.   Impression:  Left  Mass: Left breast 38 mm x 33 mm x 33 mm mass at the posterior 2 o'clock position. Assessment: 6 - Known biopsy, proven malignancy. Associated 29 mm NME extending posteriorly from the mass. In total the mass and NME measure 54 mm in AP extent.  Lymph Node: Abnormal level 1 and 2 left axillary lymph nodes, the largest of which measures 52 mm x 40 mm x 38 mm lymph node and is known biopsy, proven malignancy.   Right  There is no MR evidence of malignancy in the right breast.  BI-RADS Category:   Overall: 6 - Known Biopsy-Proven Malignancy  Recommendation:  Clinical management of known left breast cancer. Patient is established with the breast surgery clinic.      - 3/21/2022 CT C/A/P:  FINDINGS:  Base of Neck: No significant abnormality.  Thoracic soft tissue: A proximally 3.2 x 3.6 x 3.7 cm irregular left breast mass within the lateral aspect with internal biopsy marker, corresponding to findings on  prior breast MRI and compatible with malignancy.  Enlarged left axillary lymph nodes, largest measuring approximately 4.2 x 3.6 cm, (series 2, image 27).  CHEST:  -Heart: Normal size. No pericardial effusion.  -Pulmonary vasculature: Pulmonary arteries distribute normally.  There are four pulmonary veins.  -Radha/Mediastinum: No pathologic shelly enlargement.  -Trachea and Proximal airways: Trachea is midline.  Central airways are patent.  No significant bronchial wall thickening or bronchiectasis.  -Lungs/Pleura: Symmetrically expanded without focal consolidation, pneumothorax, or mass.  No pleural effusion or thickening.  -Esophagus: Normal course and caliber.  ABDOMEN:  - Liver: Normal in size and attenuation with no focal hepatic abnormality.  - Gallbladder: No calcified gallstones.  No wall thickening or pericholecystic fluid.  - Bile Ducts: No intra or extrahepatic biliary ductal dilatation.  - Stomach/Duodenum: Small hiatal hernia otherwise unremarkable.  - Spleen: Normal size.  No focal parenchymal abnormality.  - Pancreas: No mass lesion.  No pancreatic ductal dilatation.  No peripancreatic fat stranding.  - Adrenals: Unremarkable.  - Kidneys/ureters/urinary bladder: Normal in size and location.  Normal enhancement pattern.  No nephrolithiasis.  Ureters are normal in course and caliber.  No hydroureteronephrosis.  - Retroperitoneum: Scattered nonenlarged retroperitoneal lymph nodes.  PELVIS:  - Reproductive: Intrauterine device present.  A proximally 2.8 cm peripherally enhancing right ovarian cyst, likely corpus luteal cyst.  - Other: No pelvic adenopathy, free fluid, or mass.  BOWEL/MESENTERY:  No evidence of bowel obstruction or inflammatory process. Appendix is visualized and unremarkable.  VASCULATURE: Left-sided aortic arch with 3 branch vessels.  No aneurysm and no significant atherosclerosis.  Portal vein, splenic vein, and SMV are patent.  BONES: Lucent approximately 0.6 cm right trochanteric lesion,  likely synovial herniation pit.  No acute fracture or bony destructive process.  EXTRATHORACIC/EXTRAPERITONEAL SOFT TISSUES: Unremarkable.  Impression:  In this patient with known breast cancer, there is an approximately 3.7 cm irregular left breast mass with left axillary lymphadenopathy.  No evidence of metastatic disease  Small hiatal hernia.  Additional findings as described above.     - 3/21/2022 Bone scan:  FINDINGS:  There is physiologic distribution of the radiopharmaceutical throughout the skeleton.  Mild degenerative uptake within the bilateral shoulders and knees.  There is normal uptake in the genitourinary system and soft tissues.  Impression:  There is no scintigraphic evidence of osteoblastic metastatic disease.     - 3/21/2022 Brain MRI:  FINDINGS:  Please note there is dental metal artifact distorting the examination.  Allowing for artifacts the brain parenchyma is normal in contour.  Developmental variant with cavum septum pellucidum identified.  The ventricles are otherwise normal in size without hydrocephalus.  There is no midline shift or significant mass effect.  The major intracranial T2 flow voids are present.  No restricted diffusion within the non distorted brain parenchyma.  There is severe distortion of the susceptibility imaging no parenchymal susceptibility to suggest parenchymal hemorrhage in the non distorted brain parenchyma.  There is suboptimal evaluation of the cerebellum and posterior fossa.  Impression:  Unremarkable MRI brain allowing for artifacts from dental metal as detailed above specifically without abnormal parenchymal enhancement to suggest intracranial metastatic disease in light of history.     - Initiated NA therapy on 4/13/2022  Developed pancreatitis post C4 from immunotherapy and treatment held- required 2 hospitalizations     - 10/20/2022 Ultrasound left breast:  Findings:  At the site of the marker from the prior malignant breast biopsy,there is a 6 mm x 7 mm x 3  mm oval, parallel, hypoechoic mass with indistinct margins seen in the left breast at 2 o'clock in the posterior depth, 8 cm from the nipple. This is difficult to distinguish from the Twirl marker itself.  At the site of the previously biopsied metastatic left axillary node, there is a 10 x 7 x 11 mm node with a Uyen- marker. Node has mild residual cortical thickening (5 mm), but has decreased in size. There are a few other normal size, normal appearing left axillary nodes.   Impression:  Left  Mass: Left breast 6 mm x 7 mm x 3 mm mass at the posterior 2 o'clock position. Assessment: 6 - Known biopsy, proven malignancy. Known, previously biopsied left breast cancer and metastatic left axillary node have both decreased in size. The breast mass is now difficult to distinguish from the biopsy marker.   BI-RADS Category:   Overall: 6 - Known Biopsy-Proven Malignancy  Recommendation:  Patient is already under the care of the Breast Oncology team. Most recent bilateral mammogram was performed in February 2022.      Resumed NA chemo without immunotherapy 11/21/2022- completed 7/8 cycles     - 12/8/2022 Breast MRI:  Findings:  The breasts have heterogeneous fibroglandular tissue. The background parenchymal enhancement is minimal and symmetric. There is no evidence of suspicious masses, abnormal enhancement, or other abnormal findings.  There has been interval decrease in there has been interval resolution of previously described enhancing mass at the 2:00 axis which is a known biopsy-proven malignancy.    There is no abnormality in the right breast.    There has been significant decrease in size of left axillary lymph nodes which all are normal in size on current exam.  The largest contains a signal void consistent with previously placed radar reflector at site of biopsy-proven axillary metastasis.  It measures 8 mm in short axis.  Impression:  Interval resolution of enhancing mass at site of known malignancy in the  left breast at 2:00  and marked interval decrease in left axillary adenopathy with visualized lymph nodes normal in size on current exam.  Findings are consistent with complete imaging response to therapy.  BI-RADS Category:   Overall: 6 - Known Biopsy-Proven Malignancy     - 2023 Left Mastectomy and SLN Bx, axillary lymphadenectomy  Pathology:  Left breast, mastectomy: No residual malignancy is present within the   mastectomy specimen. Treatment effect is present within the breast at area of prior biopsy   site.   Left axillary sentinel lymph node #1, palpable, excisional biopsy: 1 of 3   lymph nodes are positive for metastatic carcinoma   Greatest dimension of metastatic carcinoma measures 4 mm (0.4 cm).   Confirmed by positive immunohistochemical staining in the metastatic   foci with cytokeratins AE1/AE3, Cam5.2 and wide spectrum keratin with satisfactory positive and negative controls.   Treatment effect is present in lymph node.   Left axillary contents, axillary dissection: 14 benign lymph nodes,   Prior BREAST BIOMARKER RESULTS   Estrogen receptor (ER): Negative (0)   Progesterone receptor (ER): Negative (0)   HER2 IHC: Negative (1+)   Ki-67 proliferation index: 80%      - - completed XRT 2023     -- adjuvant Xeloda- stopped with above     PMH:  HTN- around pregnancies; off of blood pressure medication x 2 years  CHF- ECHOs at [facility]  Gestational DM  Hyperlipidemia when heavier, managed with diet and followed by cardiology  C- sections x 2  Iron deficiency     GynHx:  Menarche- 7   (16 at 1st pregnancy) (17, 7, 4)  Still with periods - last 2022- last 5-7 days, moderate normal flow  + breast feeding x 1 year  IUD     SH:  Working, , [employer]  Single  Good support system  No tobacco  No EtOH  No illicit drugs    Review of Systems   Constitutional:  Negative for activity change, appetite change, chills, fatigue, fever and unexpected weight change.   HENT:  Negative  for dental problem and mouth sores.    Eyes:  Negative for visual disturbance.   Respiratory:  Negative for cough, chest tightness and shortness of breath.    Cardiovascular:  Negative for chest pain, palpitations and leg swelling.   Gastrointestinal:  Positive for constipation. Negative for abdominal distention, abdominal pain, blood in stool, change in bowel habit, diarrhea, nausea and vomiting.   Genitourinary:  Negative for decreased urine volume, dysuria and frequency.   Musculoskeletal:  Negative for arthralgias, back pain, gait problem and joint swelling.   Integumentary:  Positive for color change and breast tenderness. Negative for rash.        Mild deep cording LUE   Neurological:  Negative for weakness and headaches.   Hematological:  Negative for adenopathy. Does not bruise/bleed easily.   Psychiatric/Behavioral:  Negative for dysphoric mood. The patient is not nervous/anxious.    Breast: Positive for tenderness.         Objective     Physical Exam  Vitals and nursing note reviewed.   Constitutional:       General: She is not in acute distress.     Appearance: Normal appearance. She is not ill-appearing.      Comments: Presents alone  ECOG= 0  Very pleasant   HENT:      Head: Normocephalic and atraumatic.      Mouth/Throat:      Mouth: Mucous membranes are moist.      Pharynx: No oropharyngeal exudate or posterior oropharyngeal erythema.   Eyes:      General: No scleral icterus.     Extraocular Movements: Extraocular movements intact.      Conjunctiva/sclera: Conjunctivae normal.      Pupils: Pupils are equal, round, and reactive to light.   Cardiovascular:      Rate and Rhythm: Normal rate and regular rhythm.      Heart sounds: Normal heart sounds. No murmur heard.     No friction rub. No gallop.   Pulmonary:      Effort: Pulmonary effort is normal. No respiratory distress.      Breath sounds: Normal breath sounds. No rhonchi or rales.      Comments: Left breast-  area of redness  cording  Abdominal:       General: Abdomen is flat. Bowel sounds are normal. There is no distension.      Palpations: Abdomen is soft. There is no mass.      Tenderness: There is no abdominal tenderness. There is no guarding or rebound.      Comments: No organomegaly   Musculoskeletal:         General: No swelling or tenderness. Normal range of motion.      Cervical back: Normal range of motion and neck supple. No tenderness.      Right lower leg: No edema.      Left lower leg: No edema.   Lymphadenopathy:      Cervical: No cervical adenopathy.   Skin:     General: Skin is warm and dry.      Coloration: Skin is not jaundiced or pale.      Findings: No lesion or rash.      Comments: Keloid--port scar   Neurological:      General: No focal deficit present.      Mental Status: She is alert and oriented to person, place, and time. Mental status is at baseline.      Sensory: No sensory deficit.      Motor: No weakness.      Coordination: Coordination normal.      Gait: Gait normal.   Psychiatric:         Mood and Affect: Mood normal.         Behavior: Behavior normal.         Thought Content: Thought content normal.         Judgment: Judgment normal.        Assessment and Plan     Laura was seen today for malignant neoplasm of upper-outer quadrant of left breast i and breast cancer.    Diagnoses and all orders for this visit:    Malignant neoplasm of upper-outer quadrant of left breast in female, estrogen receptor negative    Chemotherapy induced neutropenia    Anemia in neoplastic disease    Pain in left axilla    Breast infection    Keloid    Primary hypertension               TNBC  Treatment disruptions as above  Did not complete adjuvant Xeloda  Clinically OLIVER  RTC 3 months      Breast infection- managed by Plastics in past  Missed ID appointment and needs rescheduling  Area of concern now- Clinda 1 wwek     Keloid- dermatology referral    Early AM appts needed  See in 2 weeks    HTN- continue current meds, compliance    Route Chart for  Scheduling    Med Onc Chart Routing  Urgent    Follow up with physician    Follow up with ANDREW 2 weeks. with PJ or Sheree; needs follow up plastics asap as well   Infusion scheduling note    Injection scheduling note    Labs    Imaging    Pharmacy appointment    Other referrals              Therapy Plan Information  PICC DOUBLE LUMEN LINE FLUSH for Malignant neoplasm of upper-outer quadrant of left breast in female, estrogen receptor negative, noted on 3/21/2022  Flushes  heparin, porcine (PF) 100 unit/mL injection flush 500 Units  500 Units, Intravenous, Every visit  sodium chloride 0.9% flush 10 mL  10 mL, Intravenous, Every visit  heparin, porcine (PF) 100 unit/mL injection flush 500 Units  500 Units, Intravenous, Every visit  sodium chloride 0.9% flush 10 mL  10 mL, Intravenous, Every visit      No therapy plan of the specified type found.    No therapy plan of the specified type found.

## 2024-12-03 ENCOUNTER — LAB VISIT (OUTPATIENT)
Dept: LAB | Facility: HOSPITAL | Age: 39
End: 2024-12-03
Payer: MEDICAID

## 2024-12-03 ENCOUNTER — OFFICE VISIT (OUTPATIENT)
Dept: HEMATOLOGY/ONCOLOGY | Facility: CLINIC | Age: 39
End: 2024-12-03
Payer: MEDICAID

## 2024-12-03 VITALS
BODY MASS INDEX: 36.68 KG/M2 | WEIGHT: 220.13 LBS | HEIGHT: 65 IN | DIASTOLIC BLOOD PRESSURE: 90 MMHG | RESPIRATION RATE: 17 BRPM | TEMPERATURE: 98 F | SYSTOLIC BLOOD PRESSURE: 137 MMHG | OXYGEN SATURATION: 99 % | HEART RATE: 69 BPM

## 2024-12-03 DIAGNOSIS — Z17.1 MALIGNANT NEOPLASM OF UPPER-OUTER QUADRANT OF LEFT BREAST IN FEMALE, ESTROGEN RECEPTOR NEGATIVE: Primary | ICD-10-CM

## 2024-12-03 DIAGNOSIS — L91.0 KELOID: ICD-10-CM

## 2024-12-03 DIAGNOSIS — C50.412 MALIGNANT NEOPLASM OF UPPER-OUTER QUADRANT OF LEFT BREAST IN FEMALE, ESTROGEN RECEPTOR NEGATIVE: Primary | ICD-10-CM

## 2024-12-03 DIAGNOSIS — M79.622 PAIN IN LEFT AXILLA: ICD-10-CM

## 2024-12-03 DIAGNOSIS — D70.1 CHEMOTHERAPY INDUCED NEUTROPENIA: ICD-10-CM

## 2024-12-03 DIAGNOSIS — T45.1X5A CHEMOTHERAPY INDUCED NEUTROPENIA: ICD-10-CM

## 2024-12-03 DIAGNOSIS — D63.0 ANEMIA IN NEOPLASTIC DISEASE: ICD-10-CM

## 2024-12-03 DIAGNOSIS — C50.412 MALIGNANT NEOPLASM OF UPPER-OUTER QUADRANT OF LEFT BREAST IN FEMALE, ESTROGEN RECEPTOR NEGATIVE: ICD-10-CM

## 2024-12-03 DIAGNOSIS — I10 PRIMARY HYPERTENSION: ICD-10-CM

## 2024-12-03 DIAGNOSIS — Z17.1 MALIGNANT NEOPLASM OF UPPER-OUTER QUADRANT OF LEFT BREAST IN FEMALE, ESTROGEN RECEPTOR NEGATIVE: ICD-10-CM

## 2024-12-03 DIAGNOSIS — N61.0 BREAST INFECTION: ICD-10-CM

## 2024-12-03 DIAGNOSIS — E55.9 VITAMIN D DEFICIENCY: ICD-10-CM

## 2024-12-03 LAB
ALBUMIN SERPL BCP-MCNC: 3.9 G/DL (ref 3.5–5.2)
ALP SERPL-CCNC: 55 U/L (ref 40–150)
ALT SERPL W/O P-5'-P-CCNC: 9 U/L (ref 10–44)
ANION GAP SERPL CALC-SCNC: 5 MMOL/L (ref 8–16)
AST SERPL-CCNC: 18 U/L (ref 10–40)
BILIRUB SERPL-MCNC: 0.3 MG/DL (ref 0.1–1)
BUN SERPL-MCNC: 17 MG/DL (ref 6–20)
CALCIUM SERPL-MCNC: 8.9 MG/DL (ref 8.7–10.5)
CHLORIDE SERPL-SCNC: 112 MMOL/L (ref 95–110)
CO2 SERPL-SCNC: 24 MMOL/L (ref 23–29)
CREAT SERPL-MCNC: 0.8 MG/DL (ref 0.5–1.4)
ERYTHROCYTE [DISTWIDTH] IN BLOOD BY AUTOMATED COUNT: 12.5 % (ref 11.5–14.5)
EST. GFR  (NO RACE VARIABLE): >60 ML/MIN/1.73 M^2
GLUCOSE SERPL-MCNC: 100 MG/DL (ref 70–110)
HCT VFR BLD AUTO: 30.5 % (ref 37–48.5)
HGB BLD-MCNC: 9.7 G/DL (ref 12–16)
IMM GRANULOCYTES # BLD AUTO: 0.01 K/UL (ref 0–0.04)
MCH RBC QN AUTO: 28.4 PG (ref 27–31)
MCHC RBC AUTO-ENTMCNC: 31.8 G/DL (ref 32–36)
MCV RBC AUTO: 89 FL (ref 82–98)
NEUTROPHILS # BLD AUTO: 2.1 K/UL (ref 1.8–7.7)
PLATELET # BLD AUTO: 197 K/UL (ref 150–450)
PMV BLD AUTO: 10.3 FL (ref 9.2–12.9)
POTASSIUM SERPL-SCNC: 4 MMOL/L (ref 3.5–5.1)
PROT SERPL-MCNC: 6.8 G/DL (ref 6–8.4)
RBC # BLD AUTO: 3.41 M/UL (ref 4–5.4)
SODIUM SERPL-SCNC: 141 MMOL/L (ref 136–145)
WBC # BLD AUTO: 3.17 K/UL (ref 3.9–12.7)

## 2024-12-03 PROCEDURE — 80053 COMPREHEN METABOLIC PANEL: CPT | Performed by: NURSE PRACTITIONER

## 2024-12-03 PROCEDURE — 99213 OFFICE O/P EST LOW 20 MIN: CPT | Mod: PBBFAC | Performed by: INTERNAL MEDICINE

## 2024-12-03 PROCEDURE — 3008F BODY MASS INDEX DOCD: CPT | Mod: CPTII,,, | Performed by: INTERNAL MEDICINE

## 2024-12-03 PROCEDURE — 1159F MED LIST DOCD IN RCRD: CPT | Mod: CPTII,,, | Performed by: INTERNAL MEDICINE

## 2024-12-03 PROCEDURE — 85027 COMPLETE CBC AUTOMATED: CPT | Performed by: NURSE PRACTITIONER

## 2024-12-03 PROCEDURE — G2211 COMPLEX E/M VISIT ADD ON: HCPCS | Mod: S$PBB,,, | Performed by: INTERNAL MEDICINE

## 2024-12-03 PROCEDURE — 36415 COLL VENOUS BLD VENIPUNCTURE: CPT | Performed by: NURSE PRACTITIONER

## 2024-12-03 PROCEDURE — 3075F SYST BP GE 130 - 139MM HG: CPT | Mod: CPTII,,, | Performed by: INTERNAL MEDICINE

## 2024-12-03 PROCEDURE — 99999 PR PBB SHADOW E&M-EST. PATIENT-LVL III: CPT | Mod: PBBFAC,,, | Performed by: INTERNAL MEDICINE

## 2024-12-03 PROCEDURE — 99215 OFFICE O/P EST HI 40 MIN: CPT | Mod: S$PBB,,, | Performed by: INTERNAL MEDICINE

## 2024-12-03 PROCEDURE — 3080F DIAST BP >= 90 MM HG: CPT | Mod: CPTII,,, | Performed by: INTERNAL MEDICINE

## 2024-12-03 RX ORDER — CLINDAMYCIN HYDROCHLORIDE 150 MG/1
150 CAPSULE ORAL 4 TIMES DAILY
Qty: 28 CAPSULE | Refills: 0 | Status: SHIPPED | OUTPATIENT
Start: 2024-12-03 | End: 2024-12-05 | Stop reason: SDUPTHER

## 2024-12-05 ENCOUNTER — PATIENT MESSAGE (OUTPATIENT)
Dept: PLASTIC SURGERY | Facility: CLINIC | Age: 39
End: 2024-12-05
Payer: MEDICAID

## 2024-12-05 ENCOUNTER — TELEPHONE (OUTPATIENT)
Dept: PLASTIC SURGERY | Facility: CLINIC | Age: 39
End: 2024-12-05
Payer: MEDICAID

## 2024-12-05 ENCOUNTER — PATIENT MESSAGE (OUTPATIENT)
Dept: HEMATOLOGY/ONCOLOGY | Facility: CLINIC | Age: 39
End: 2024-12-05
Payer: MEDICAID

## 2024-12-05 DIAGNOSIS — N61.0 BREAST INFECTION: ICD-10-CM

## 2024-12-05 RX ORDER — CLINDAMYCIN HYDROCHLORIDE 150 MG/1
150 CAPSULE ORAL 4 TIMES DAILY
Qty: 28 CAPSULE | Refills: 0 | Status: SHIPPED | OUTPATIENT
Start: 2024-12-05 | End: 2024-12-12

## 2024-12-05 NOTE — TELEPHONE ENCOUNTER
Called pt in response to message - Pt having breast pain today-  Appt made to see her tomorrow - 1st avail at  [facility], 2nd floor. [address].    Provided patient with appointment time and date, address of the location and call back # to RN navigator. All questions and concerns addressed. Pt verbalized understanding.

## 2024-12-06 ENCOUNTER — TELEPHONE (OUTPATIENT)
Dept: PLASTIC SURGERY | Facility: CLINIC | Age: 39
End: 2024-12-06
Payer: MEDICAID

## 2024-12-06 NOTE — TELEPHONE ENCOUNTER
Called pt because she was not out in the waiting room for her appointment. Pt was informed provider running behind. Pt decided to leave needed to go back to work. Offered first available pt rescheduled to 12/10/24 @ 8:30am. Pt had concerns of tenderness in the breast. Verbalized to reach out for any further concerns leading up to appointment.  Pt verbalized understandings.

## 2024-12-09 NOTE — PROGRESS NOTES
Subjective     Patient ID: Laura Kent is a 39 y.o. female.    Chief Complaint: Malignant neoplasm of upper-outer quadrant of left breast i    HPI    Returns for follow up of TNBC.    Presents today for follow up of cellulitis of the breast. She notes she had her site drained last week with blood, drained by Angie with Dr. Barrios. She is currently on clindamycin for for the cellulitis.  The site is still painful and she feels it has increased in size.  Painful under her left axilla and extends to her left rib cage.   Denies fever and chills and coughing and shortness of breath.   Appetite has been stable .  She does have some in and out of sleep due to hot flashes.   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        Per Dr. Cruz's previous note: - 8/13/2023 Breast Ultrasound  Impression:  Left: Today's findings are most compatible with a postoperative seroma.  I had a detailed conversation with the patient.  She showed me where the area was bruised.  She states that she initially developed a seroma after surgery however over time and with some therapy, that seroma resolved.  Approximately three weeks ago during physical therapy, there was robust massage of this area.  Since than, the seroma has returned and there is ecchymosis of the skin.  This is associated with pain.  I believe that the robust massage to this area is what led to the return of her seroma and the cause of her pain.  This is only important to note for future therapy in this area so that it could perhaps be slightly less robust.  Right: Area of interest corresponds with the patient's scar tissue.  No evidence of malignancy or recurrence in either breast.      - 9/1/2024 Breast Ultrasound  FINDINGS:  Ovoid hypoechoic region in the subcutaneous tissues of the left breast demonstrating granular internal echoes and measuring 7.4 x 4.0 x 1.1 cm.  No associated increased through  "transmission.  There are patchy areas of increased color Doppler flow.  The technologist reports that this area is painful to palpation.  No definite fluid collections identified.  Right breast demonstrates no sonographic abnormalities.  Impression:  Ovoid region in the subcutaneous tissues of the left breast, possibly reflecting an area of inflammatory tissue and/or granulation tissue, less likely neoplasm.  No definite fluid collections identified.     Note-- Patient reports area "burst: as above on 9/2     - 9/3/2024 Breast Ultrasound  FINDINGS:  Slight interval decrease in ovoid hypoechoic region in the subcutaneous tissues of the left breast with granular internal echoes now measuring 6.2 x 0.9 x 2.5 cm, previously 7.4 x 1.1 x 4.0 cm.  No increased through transmission.  Similar to decreased patchy areas of color Doppler flow.  Mass remains painful to palpation.  No definite drainable fluid collection.  Noted 0.7 x 0.5 cm open wound in the superficial left breast at 12:00 o'clock.  Impression:  Slight interval decrease in size of ovoid hypoechoic region in the left breast without definite fluid collection.  Lesion remains suggestive of inflammatory tissue and/or granulation tissue and less likely neoplasm.  Noted subcentimeter open wound in the superficial left breast.     - Subsequently admitted and managed with IV Abx     Oncology History:  - self detected an area under her left arm and it initially seemed to go away (noted around winter holidays)     - 3/4/2022 Outside Mammogram:  Findings:  The breasts are heterogeneously dense, which may obscure small masses.   Left  There is a 31 mm x 18 mm x 21 mm irregularly shaped mass with microlobulated margins seen in the left breast at 2 o'clock in the posterior depth with associated left axillary adenopathy. Largest node depicted on the outside study measures 49 x 23 x 29 mm with cortical thickening and effacement of the hilum. Breast mass is reportedly palpable. "   Right  There is no evidence of suspicious masses, calcifications, or other abnormal findings in the right breast.  Impression:  Left  Mass: Left breast 31 mm x 18 mm x 21 mm mass at the posterior 2 o'clock position. Assessment: 5 - Highly suggestive of malignancy. Biopsy is recommended.   Right  There is no mammographic evidence of malignancy in the right breast.  BI-RADS Category:   Overall: 5 - Highly Suggestive of Malignancy  Recommendation:  Ultrasound Guided Core Needle Biopsy of the left breast mass and one of the abnormal left axillary nodes is recommended.     - 3/14/2022 Breast biopsy:  1. LEFT BREAST MASS, 2 O'CLOCK, BIOPSY:   Invasive ductal carcinoma, Sachse histologic grade 3 (tubule formation:   3, nuclear pleomorphism:  3, mitotic activity:  3).   Comment:  Infiltrating carcinoma occupies the entirety of biopsied material   with the largest continuous focus measuring 13 mm.  Breast biomarkers are   pending and will be issued in a supplemental report.   2. LEFT AXILLARY LYMPH NODE, BIOPSY:   Invasive ductal carcinoma, Sachse histologic grade 3 (tubule formation:   3, nuclear pleomorphism:  3, mitotic activity:  3).   Comment:  Infiltrating carcinoma occupies the entirety of biopsied material   with the largest continuous focus measuring 20 mm.  No lymph node tissue is   identified in the sample.  Tumor histology is essentially identical to that   of part 1.  The findings could represent part of a larger intranodal   metastasis, but an extension from the primary tumor cannot be excluded.   Radiographic correlation is advised.   Note:  Dr. Stephanie Rao also reviewed this case and agrees with the   diagnosis.   BREAST BIOMARKER RESULTS   Estrogen receptor (ER):  Negative (0)   Progesterone receptor (ER):  Negative (0)   HER2 IHC:  Negative (1+)   Ki-67 proliferation index:  80%      - 3/17/2022 Breast MRI:  Findings:  The breasts have heterogeneous fibroglandular tissue. The background  parenchymal enhancement is minimal.   Left  There is a 38 mm x 33 mm x 33 mm irregularly shaped mass seen in the left breast at 2 o'clock in the posterior depth, 8.4 cm from the nipple and 1.2 cm from the skin. Associated signal void from a twirl biopsy marker; pathology showed invasive ductal carcinoma.   Posterior to the mass there is abnormal non mass enhancement measuring 29 x 28 mm. The NME is 4 mm from the skin and 2.3 cm from the chest wall. In total the mass and NME measure 54 mm in AP extent.  Overlying skin thickening and edema involving the lateral breast, likely from lymphovascular obstruction. No suspicious skin enhancement. Overall increased vascularity to the left breast.   Four abnormal lymph level 1 and level 2 left axillary lymph nodes. The largest lymph node measures 52 mm x 40 mm x 38 mm which previously underwent biopsy showing metastatic disease. There is an associated radar reflector within the biopsied lymph node.  Right  There is no evidence of suspicious masses, abnormal enhancement, or other abnormal findings in the right breast. No enlarged axillary or internal mammary lymph nodes.   Impression:  Left  Mass: Left breast 38 mm x 33 mm x 33 mm mass at the posterior 2 o'clock position. Assessment: 6 - Known biopsy, proven malignancy. Associated 29 mm NME extending posteriorly from the mass. In total the mass and NME measure 54 mm in AP extent.  Lymph Node: Abnormal level 1 and 2 left axillary lymph nodes, the largest of which measures 52 mm x 40 mm x 38 mm lymph node and is known biopsy, proven malignancy.   Right  There is no MR evidence of malignancy in the right breast.  BI-RADS Category:   Overall: 6 - Known Biopsy-Proven Malignancy  Recommendation:  Clinical management of known left breast cancer. Patient is established with the breast surgery clinic.      - 3/21/2022 CT C/A/P:  FINDINGS:  Base of Neck: No significant abnormality.  Thoracic soft tissue: A proximally 3.2 x 3.6 x 3.7 cm  irregular left breast mass within the lateral aspect with internal biopsy marker, corresponding to findings on prior breast MRI and compatible with malignancy.  Enlarged left axillary lymph nodes, largest measuring approximately 4.2 x 3.6 cm, (series 2, image 27).  CHEST:  -Heart: Normal size. No pericardial effusion.  -Pulmonary vasculature: Pulmonary arteries distribute normally.  There are four pulmonary veins.  -Radha/Mediastinum: No pathologic shelly enlargement.  -Trachea and Proximal airways: Trachea is midline.  Central airways are patent.  No significant bronchial wall thickening or bronchiectasis.  -Lungs/Pleura: Symmetrically expanded without focal consolidation, pneumothorax, or mass.  No pleural effusion or thickening.  -Esophagus: Normal course and caliber.  ABDOMEN:  - Liver: Normal in size and attenuation with no focal hepatic abnormality.  - Gallbladder: No calcified gallstones.  No wall thickening or pericholecystic fluid.  - Bile Ducts: No intra or extrahepatic biliary ductal dilatation.  - Stomach/Duodenum: Small hiatal hernia otherwise unremarkable.  - Spleen: Normal size.  No focal parenchymal abnormality.  - Pancreas: No mass lesion.  No pancreatic ductal dilatation.  No peripancreatic fat stranding.  - Adrenals: Unremarkable.  - Kidneys/ureters/urinary bladder: Normal in size and location.  Normal enhancement pattern.  No nephrolithiasis.  Ureters are normal in course and caliber.  No hydroureteronephrosis.  - Retroperitoneum: Scattered nonenlarged retroperitoneal lymph nodes.  PELVIS:  - Reproductive: Intrauterine device present.  A proximally 2.8 cm peripherally enhancing right ovarian cyst, likely corpus luteal cyst.  - Other: No pelvic adenopathy, free fluid, or mass.  BOWEL/MESENTERY:  No evidence of bowel obstruction or inflammatory process. Appendix is visualized and unremarkable.  VASCULATURE: Left-sided aortic arch with 3 branch vessels.  No aneurysm and no significant  atherosclerosis.  Portal vein, splenic vein, and SMV are patent.  BONES: Lucent approximately 0.6 cm right trochanteric lesion, likely synovial herniation pit.  No acute fracture or bony destructive process.  EXTRATHORACIC/EXTRAPERITONEAL SOFT TISSUES: Unremarkable.  Impression:  In this patient with known breast cancer, there is an approximately 3.7 cm irregular left breast mass with left axillary lymphadenopathy.  No evidence of metastatic disease  Small hiatal hernia.  Additional findings as described above.     - 3/21/2022 Bone scan:  FINDINGS:  There is physiologic distribution of the radiopharmaceutical throughout the skeleton.  Mild degenerative uptake within the bilateral shoulders and knees.  There is normal uptake in the genitourinary system and soft tissues.  Impression:  There is no scintigraphic evidence of osteoblastic metastatic disease.     - 3/21/2022 Brain MRI:  FINDINGS:  Please note there is dental metal artifact distorting the examination.  Allowing for artifacts the brain parenchyma is normal in contour.  Developmental variant with cavum septum pellucidum identified.  The ventricles are otherwise normal in size without hydrocephalus.  There is no midline shift or significant mass effect.  The major intracranial T2 flow voids are present.  No restricted diffusion within the non distorted brain parenchyma.  There is severe distortion of the susceptibility imaging no parenchymal susceptibility to suggest parenchymal hemorrhage in the non distorted brain parenchyma.  There is suboptimal evaluation of the cerebellum and posterior fossa.  Impression:  Unremarkable MRI brain allowing for artifacts from dental metal as detailed above specifically without abnormal parenchymal enhancement to suggest intracranial metastatic disease in light of history.     - Initiated NA therapy on 4/13/2022  Developed pancreatitis post C4 from immunotherapy and treatment held- required 2 hospitalizations     - 10/20/2022  Ultrasound left breast:  Findings:  At the site of the marker from the prior malignant breast biopsy,there is a 6 mm x 7 mm x 3 mm oval, parallel, hypoechoic mass with indistinct margins seen in the left breast at 2 o'clock in the posterior depth, 8 cm from the nipple. This is difficult to distinguish from the Twirl marker itself.  At the site of the previously biopsied metastatic left axillary node, there is a 10 x 7 x 11 mm node with a Uyen- marker. Node has mild residual cortical thickening (5 mm), but has decreased in size. There are a few other normal size, normal appearing left axillary nodes.   Impression:  Left  Mass: Left breast 6 mm x 7 mm x 3 mm mass at the posterior 2 o'clock position. Assessment: 6 - Known biopsy, proven malignancy. Known, previously biopsied left breast cancer and metastatic left axillary node have both decreased in size. The breast mass is now difficult to distinguish from the biopsy marker.   BI-RADS Category:   Overall: 6 - Known Biopsy-Proven Malignancy  Recommendation:  Patient is already under the care of the Breast Oncology team. Most recent bilateral mammogram was performed in February 2022.      Resumed NA chemo without immunotherapy 11/21/2022- completed 7/8 cycles     - 12/8/2022 Breast MRI:  Findings:  The breasts have heterogeneous fibroglandular tissue. The background parenchymal enhancement is minimal and symmetric. There is no evidence of suspicious masses, abnormal enhancement, or other abnormal findings.  There has been interval decrease in there has been interval resolution of previously described enhancing mass at the 2:00 axis which is a known biopsy-proven malignancy.    There is no abnormality in the right breast.    There has been significant decrease in size of left axillary lymph nodes which all are normal in size on current exam.  The largest contains a signal void consistent with previously placed radar reflector at site of biopsy-proven axillary  metastasis.  It measures 8 mm in short axis.  Impression:  Interval resolution of enhancing mass at site of known malignancy in the left breast at 2:00  and marked interval decrease in left axillary adenopathy with visualized lymph nodes normal in size on current exam.  Findings are consistent with complete imaging response to therapy.  BI-RADS Category:   Overall: 6 - Known Biopsy-Proven Malignancy     - 2023 Left Mastectomy and SLN Bx, axillary lymphadenectomy  Pathology:  Left breast, mastectomy: No residual malignancy is present within the   mastectomy specimen. Treatment effect is present within the breast at area of prior biopsy   site.   Left axillary sentinel lymph node #1, palpable, excisional biopsy: 1 of 3   lymph nodes are positive for metastatic carcinoma   Greatest dimension of metastatic carcinoma measures 4 mm (0.4 cm).   Confirmed by positive immunohistochemical staining in the metastatic   foci with cytokeratins AE1/AE3, Cam5.2 and wide spectrum keratin with satisfactory positive and negative controls.   Treatment effect is present in lymph node.   Left axillary contents, axillary dissection: 14 benign lymph nodes,   Prior BREAST BIOMARKER RESULTS   Estrogen receptor (ER): Negative (0)   Progesterone receptor (ER): Negative (0)   HER2 IHC: Negative (1+)   Ki-67 proliferation index: 80%      - - completed XRT 2023     -- adjuvant Xeloda- stopped with above     PMH:  HTN- around pregnancies; off of blood pressure medication x 2 years  CHF- ECHOs at [facility]  Gestational DM  Hyperlipidemia when heavier, managed with diet and followed by cardiology  C- sections x 2  Iron deficiency     GynHx:  Menarche- 7   (16 at 1st pregnancy) (17, 7, 4)  Still with periods - last 2022- last 5-7 days, moderate normal flow  + breast feeding x 1 year  IUD     SH:  Working, , [employer]  Single  Good support system  No tobacco  No EtOH  No illicit drugs    Review of Systems    Constitutional:  Negative for activity change, appetite change, chills, fatigue, fever and unexpected weight change.   HENT:  Negative for dental problem and mouth sores.    Eyes:  Negative for visual disturbance.   Respiratory:  Negative for cough, chest tightness and shortness of breath.    Cardiovascular:  Negative for chest pain, palpitations and leg swelling.   Gastrointestinal:  Positive for constipation. Negative for abdominal distention, abdominal pain, blood in stool, change in bowel habit, diarrhea, nausea and vomiting.   Genitourinary:  Negative for decreased urine volume, dysuria and frequency.   Musculoskeletal:  Negative for arthralgias, back pain, gait problem and joint swelling.   Integumentary:  Positive for color change and breast tenderness. Negative for rash.   Neurological:  Negative for weakness and headaches.   Hematological:  Negative for adenopathy. Does not bruise/bleed easily.   Psychiatric/Behavioral:  Negative for dysphoric mood. The patient is not nervous/anxious.    Breast: Positive for tenderness.         Objective     Physical Exam  Vitals and nursing note reviewed.   Constitutional:       General: She is not in acute distress.     Appearance: Normal appearance. She is not ill-appearing.      Comments: Presents alone  ECOG= 0  Very pleasant   HENT:      Head: Normocephalic and atraumatic.      Mouth/Throat:      Mouth: Mucous membranes are moist.      Pharynx: No oropharyngeal exudate or posterior oropharyngeal erythema.   Eyes:      General: No scleral icterus.     Extraocular Movements: Extraocular movements intact.      Conjunctiva/sclera: Conjunctivae normal.      Pupils: Pupils are equal, round, and reactive to light.   Cardiovascular:      Rate and Rhythm: Normal rate and regular rhythm.      Heart sounds: Normal heart sounds. No murmur heard.     No friction rub. No gallop.   Pulmonary:      Effort: Pulmonary effort is normal. No respiratory distress.      Breath sounds:  Normal breath sounds. No rhonchi or rales.      Comments: Left breast warm to touch at 12 o'clock  Abdominal:      General: Abdomen is flat. Bowel sounds are normal. There is no distension.      Palpations: Abdomen is soft. There is no mass.      Tenderness: There is no abdominal tenderness. There is no guarding or rebound.      Comments: No organomegaly   Musculoskeletal:         General: No swelling or tenderness. Normal range of motion.      Cervical back: Normal range of motion and neck supple. No tenderness.      Right lower leg: No edema.      Left lower leg: No edema.   Lymphadenopathy:      Cervical: No cervical adenopathy.   Skin:     General: Skin is warm and dry.      Coloration: Skin is not jaundiced or pale.      Findings: No lesion or rash.      Comments: Keloid--port scar   Neurological:      General: No focal deficit present.      Mental Status: She is alert and oriented to person, place, and time. Mental status is at baseline.      Sensory: No sensory deficit.      Motor: No weakness.      Coordination: Coordination normal.      Gait: Gait normal.   Psychiatric:         Mood and Affect: Mood normal.         Behavior: Behavior normal.         Thought Content: Thought content normal.         Judgment: Judgment normal.        Assessment and Plan     Diagnoses and all orders for this visit:    Malignant neoplasm of upper-outer quadrant of left breast in female, estrogen receptor negative    Primary hypertension    Chemotherapy induced neutropenia    Anemia in neoplastic disease    Severe obesity               TNBC  Treatment disruptions as above  Did not complete adjuvant Xeloda  Clinically [facility]  RTC 3 months      Breast infection- managed by Plastics -message sent plastics is reaching out to patient  Missed ID appointment and needs rescheduling     Keloid- dermatology referral    Early AM appts needed  See in 2 weeks    HTN- continue current meds, compliance    Return to clinic in 3 months with MD  appointment.     Patient is in agreement with the proposed treatment plan. All questions were answered to the patient's satisfaction. Patient knows to call clinic for any new or worsening symptoms and if anything is needed before the next clinic visit.          JUICE Chakraborty-C  Hematology & Medical Oncology   [facility] [place] [address]  ph. [phone]  Fax. [phone]    Collaborating physician, Dr. Cruz.    Approximately 20 minutes were spent face-to-face with the patient.  Approximately 30 minutes in total were spent on this encounter, which includes face-to-face time and non-face-to-face time preparing to see the patient (e.g., review of tests), obtaining and/or reviewing separately obtained history, documenting clinical information in the electronic or other health record, independently interpreting results (not separately reported) and communicating results to the patient/family/caregiver, or care coordination (not separately reported).       Route Chart for Scheduling    Med Onc Chart Routing      Follow up with physician 3 months. Dr. Cruz   Follow up with ANDREW    Infusion scheduling note    Injection scheduling note    Labs    Imaging    Pharmacy appointment    Other referrals       Additional referrals needed  Needs to get in with ID       Therapy Plan Information  PICC DOUBLE LUMEN LINE FLUSH for Malignant neoplasm of upper-outer quadrant of left breast in female, estrogen receptor negative, noted on 3/21/2022  Flushes  heparin, porcine (PF) 100 unit/mL injection flush 500 Units  500 Units, Intravenous, Every visit  sodium chloride 0.9% flush 10 mL  10 mL, Intravenous, Every visit  heparin, porcine (PF) 100 unit/mL injection flush 500 Units  500 Units, Intravenous, Every visit  sodium chloride 0.9% flush 10 mL  10 mL, Intravenous, Every visit      No therapy plan of the specified type found.    No therapy plan of the specified type found.

## 2024-12-10 ENCOUNTER — OFFICE VISIT (OUTPATIENT)
Dept: PLASTIC SURGERY | Facility: CLINIC | Age: 39
End: 2024-12-10
Payer: MEDICAID

## 2024-12-10 VITALS
BODY MASS INDEX: 37.28 KG/M2 | HEART RATE: 71 BPM | DIASTOLIC BLOOD PRESSURE: 85 MMHG | SYSTOLIC BLOOD PRESSURE: 124 MMHG | WEIGHT: 223.75 LBS | HEIGHT: 65 IN

## 2024-12-10 DIAGNOSIS — N61.0 BREAST INFECTION: ICD-10-CM

## 2024-12-10 LAB
GRAM STN SPEC: NORMAL
GRAM STN SPEC: NORMAL

## 2024-12-10 PROCEDURE — 87070 CULTURE OTHR SPECIMN AEROBIC: CPT | Performed by: PHYSICIAN ASSISTANT

## 2024-12-10 PROCEDURE — 99213 OFFICE O/P EST LOW 20 MIN: CPT | Mod: S$PBB,,, | Performed by: SURGERY

## 2024-12-10 PROCEDURE — 3008F BODY MASS INDEX DOCD: CPT | Mod: CPTII,,, | Performed by: SURGERY

## 2024-12-10 PROCEDURE — 3079F DIAST BP 80-89 MM HG: CPT | Mod: CPTII,,, | Performed by: SURGERY

## 2024-12-10 PROCEDURE — 87205 SMEAR GRAM STAIN: CPT | Performed by: PHYSICIAN ASSISTANT

## 2024-12-10 PROCEDURE — 3074F SYST BP LT 130 MM HG: CPT | Mod: CPTII,,, | Performed by: SURGERY

## 2024-12-10 PROCEDURE — 87075 CULTR BACTERIA EXCEPT BLOOD: CPT | Performed by: PHYSICIAN ASSISTANT

## 2024-12-10 PROCEDURE — 99213 OFFICE O/P EST LOW 20 MIN: CPT | Mod: PBBFAC | Performed by: SURGERY

## 2024-12-10 PROCEDURE — 99999 PR PBB SHADOW E&M-EST. PATIENT-LVL III: CPT | Mod: PBBFAC,,, | Performed by: SURGERY

## 2024-12-10 RX ORDER — CLINDAMYCIN HYDROCHLORIDE 300 MG/1
300 CAPSULE ORAL 4 TIMES DAILY
Qty: 28 CAPSULE | Refills: 0 | Status: SHIPPED | OUTPATIENT
Start: 2024-12-10 | End: 2024-12-17

## 2024-12-12 LAB — BACTERIA SPEC ANAEROBE CULT: NORMAL

## 2024-12-13 ENCOUNTER — TELEPHONE (OUTPATIENT)
Dept: HEMATOLOGY/ONCOLOGY | Facility: CLINIC | Age: 39
End: 2024-12-13
Payer: MEDICAID

## 2024-12-13 LAB — BACTERIA SPEC AEROBE CULT: NO GROWTH

## 2024-12-17 ENCOUNTER — OFFICE VISIT (OUTPATIENT)
Dept: HEMATOLOGY/ONCOLOGY | Facility: CLINIC | Age: 39
End: 2024-12-17
Payer: MEDICAID

## 2024-12-17 ENCOUNTER — DOCUMENTATION ONLY (OUTPATIENT)
Dept: HEMATOLOGY/ONCOLOGY | Facility: CLINIC | Age: 39
End: 2024-12-17

## 2024-12-17 VITALS
HEART RATE: 72 BPM | OXYGEN SATURATION: 98 % | DIASTOLIC BLOOD PRESSURE: 91 MMHG | BODY MASS INDEX: 36.77 KG/M2 | SYSTOLIC BLOOD PRESSURE: 137 MMHG | HEIGHT: 65 IN | WEIGHT: 220.69 LBS

## 2024-12-17 DIAGNOSIS — I10 PRIMARY HYPERTENSION: ICD-10-CM

## 2024-12-17 DIAGNOSIS — C50.412 MALIGNANT NEOPLASM OF UPPER-OUTER QUADRANT OF LEFT BREAST IN FEMALE, ESTROGEN RECEPTOR NEGATIVE: Primary | ICD-10-CM

## 2024-12-17 DIAGNOSIS — D63.0 ANEMIA IN NEOPLASTIC DISEASE: ICD-10-CM

## 2024-12-17 DIAGNOSIS — T45.1X5A CHEMOTHERAPY INDUCED NEUTROPENIA: ICD-10-CM

## 2024-12-17 DIAGNOSIS — D70.1 CHEMOTHERAPY INDUCED NEUTROPENIA: ICD-10-CM

## 2024-12-17 DIAGNOSIS — Z17.1 MALIGNANT NEOPLASM OF UPPER-OUTER QUADRANT OF LEFT BREAST IN FEMALE, ESTROGEN RECEPTOR NEGATIVE: Primary | ICD-10-CM

## 2024-12-17 DIAGNOSIS — E66.01 SEVERE OBESITY: ICD-10-CM

## 2024-12-17 LAB — BACTERIA SPEC ANAEROBE CULT: NORMAL

## 2024-12-17 PROCEDURE — 99214 OFFICE O/P EST MOD 30 MIN: CPT | Mod: S$PBB,,, | Performed by: NURSE PRACTITIONER

## 2024-12-17 PROCEDURE — 99999 PR PBB SHADOW E&M-EST. PATIENT-LVL III: CPT | Mod: PBBFAC,,, | Performed by: NURSE PRACTITIONER

## 2024-12-17 PROCEDURE — 3008F BODY MASS INDEX DOCD: CPT | Mod: CPTII,,, | Performed by: NURSE PRACTITIONER

## 2024-12-17 PROCEDURE — 3080F DIAST BP >= 90 MM HG: CPT | Mod: CPTII,,, | Performed by: NURSE PRACTITIONER

## 2024-12-17 PROCEDURE — 1160F RVW MEDS BY RX/DR IN RCRD: CPT | Mod: CPTII,,, | Performed by: NURSE PRACTITIONER

## 2024-12-17 PROCEDURE — G2211 COMPLEX E/M VISIT ADD ON: HCPCS | Mod: S$PBB,,, | Performed by: NURSE PRACTITIONER

## 2024-12-17 PROCEDURE — 99213 OFFICE O/P EST LOW 20 MIN: CPT | Mod: PBBFAC | Performed by: NURSE PRACTITIONER

## 2024-12-17 PROCEDURE — 3075F SYST BP GE 130 - 139MM HG: CPT | Mod: CPTII,,, | Performed by: NURSE PRACTITIONER

## 2024-12-17 PROCEDURE — 1159F MED LIST DOCD IN RCRD: CPT | Mod: CPTII,,, | Performed by: NURSE PRACTITIONER

## 2024-12-18 ENCOUNTER — TELEPHONE (OUTPATIENT)
Dept: PLASTIC SURGERY | Facility: CLINIC | Age: 39
End: 2024-12-18
Payer: MEDICAID

## 2024-12-18 NOTE — PROGRESS NOTES
Laura Kent presents to [facility] for a follow up visit status post bilateral mastectomy with KANDICE flap reconstruction.  She presents today with complaint of pain and swelling of the left breast. This area has bothered her since her KANDICE flap and has been aspirated with negative cultures once, another time spontaneously drained during her hospital admission where she was treated with IV antibiotics. The left breast was radiated when she had expanders in prior to her flaps. The current symptoms started about a week ago. She describes swelling, hyperpigmentation of the skin and pain.    Secondary complaint of pain along her left lateral chest incision.  She denies fever, chills, nausea, vomiting or other systemic signs of infection.       ROS - negative, other than stated above    PHYSICAL EXAMINATION  Vitals:    12/10/24 0859   BP: 124/85   Pulse: 71       Gen: NAD, appears stated age  Neuro: normal without focal findings, mental status and speech normal  HEENT: NCAT, neck supple, PEERL  CV: RRR  Pulm: Breathing non-labored, chest wall movement equal bilaterally   Breast:  Soft bilateral flap reconstruction, well healed incision. 3D nipple tattoos in good position. 3cm area of tenderness of the left breast upper inner quadrant.  Hypertrophic scarring of left lateral chest wall.  Abdomen: soft, nontender, no guarding  Gu: genitalia not examined  Extremity:normal strength, no cyanosis or edema  Skin: left breast hyperpigmented in area of concern  Psych: flat affect      ASSESSMENT/PLAN  39 y.o. female s/p bilateral SSM with KANDICE flap reconstruction    Seems to have a recurrent inflammatory process of the left chest which we initially thought was due to fat necrosis. Now with recurrence of symptoms. On bedside point of care US a focal fluid collection was identified and drained with a butterfly needle. The fluid appeared bloody. Cultures sent and pt started on empiric abx.    Offered kenalog  injection of hypertrophic scar, patient declined.    Pending cultures will determine next steps.    Angie Peck PA-C  Plastic and Reconstructive Surgery      I spent a total of 25 minutes on the day of the visit.This includes face to face time and non-face to face time preparing to see the patient (eg, review of tests), obtaining and/or reviewing separately obtained history, documenting clinical information in the electronic or other health record, independently interpreting results and communicating results to the patient/family/caregiver, or care coordinator.

## 2024-12-18 NOTE — TELEPHONE ENCOUNTER
Oncology reached out to let me know that patient had pain and swelling on her left breast similar to her presentation last week when I aspirated fluid from a focal collection there. The cultures were negative. I called patient and she reports recurrence of symptoms with no improvement on clindamycin. I recommend having her seen by IR to aspirate the collection and place a drain. Discussed the ultimate plan may need to be an I&D of the area given the multiple recurrence pattern. She understands. Will see her in 1-2 weeks for follow up of drain placement.

## 2024-12-29 ENCOUNTER — PATIENT MESSAGE (OUTPATIENT)
Dept: HEMATOLOGY/ONCOLOGY | Facility: CLINIC | Age: 39
End: 2024-12-29
Payer: MEDICAID

## 2024-12-29 DIAGNOSIS — R21 RASH: Primary | ICD-10-CM

## 2024-12-31 ENCOUNTER — OFFICE VISIT (OUTPATIENT)
Dept: PLASTIC SURGERY | Facility: CLINIC | Age: 39
End: 2024-12-31
Payer: MEDICAID

## 2024-12-31 VITALS
SYSTOLIC BLOOD PRESSURE: 123 MMHG | WEIGHT: 219 LBS | HEART RATE: 66 BPM | BODY MASS INDEX: 36.49 KG/M2 | HEIGHT: 65 IN | DIASTOLIC BLOOD PRESSURE: 84 MMHG

## 2024-12-31 DIAGNOSIS — Z17.1 MALIGNANT NEOPLASM OF UPPER-OUTER QUADRANT OF LEFT BREAST IN FEMALE, ESTROGEN RECEPTOR NEGATIVE: Primary | ICD-10-CM

## 2024-12-31 DIAGNOSIS — C50.412 MALIGNANT NEOPLASM OF UPPER-OUTER QUADRANT OF LEFT BREAST IN FEMALE, ESTROGEN RECEPTOR NEGATIVE: Primary | ICD-10-CM

## 2024-12-31 DIAGNOSIS — N64.4 BREAST PAIN: ICD-10-CM

## 2024-12-31 DIAGNOSIS — N64.89 SEROMA OF BREAST: ICD-10-CM

## 2024-12-31 PROCEDURE — 3079F DIAST BP 80-89 MM HG: CPT | Mod: CPTII,,, | Performed by: SURGERY

## 2024-12-31 PROCEDURE — 1159F MED LIST DOCD IN RCRD: CPT | Mod: CPTII,,, | Performed by: SURGERY

## 2024-12-31 PROCEDURE — 3008F BODY MASS INDEX DOCD: CPT | Mod: CPTII,,, | Performed by: SURGERY

## 2024-12-31 PROCEDURE — 99999 PR PBB SHADOW E&M-EST. PATIENT-LVL III: CPT | Mod: PBBFAC,,, | Performed by: SURGERY

## 2024-12-31 PROCEDURE — 99213 OFFICE O/P EST LOW 20 MIN: CPT | Mod: PBBFAC | Performed by: SURGERY

## 2024-12-31 PROCEDURE — 99212 OFFICE O/P EST SF 10 MIN: CPT | Mod: S$PBB,,, | Performed by: SURGERY

## 2024-12-31 PROCEDURE — 3074F SYST BP LT 130 MM HG: CPT | Mod: CPTII,,, | Performed by: SURGERY

## 2024-12-31 NOTE — PROGRESS NOTES
Ms Kent is back with persistent pain in the left reconstructed breasts.  Roughly 2 weeks ago she had some thin dark serous type fluid aspirated from this same area on the upper part of the breasts.  That has culture negative.    She complains of pain in the area across the top of her breast also now extend the out into her armpit area and down the inner portion of her left arm  Denies any fevers or chills.    On exam she has bilateral reconstructed breasts.  She has a small skin paddle and nipple tattoo.  The little previous small draining sinuses healed.  Underneath that on the medial side of the breast there feels to be somewhat empty pocket.  It had a little more firm or indurated near the breasts meridian but I do not feel any palpable fluid collection.  No palpable nodules in the axilla.    She does have intact sensation in the distribution of the intercostal brachial nerve    She has had recurrent fluid collections in his area after her revision and fat grafting.  Cultures has been negative from the last several aspirations.  No sign of infection today.  Even if she had a chronic seroma or infected seroma this would not explain the pain towards her axilla were down her arm.  She does not have any skin changes today and I am somewhat under wound with her exam.    I discussed starting with an MRI.  This would give us some imaging of the reconstructed breasts the flap and also the axilla.  Ultimately I could take her back to surgery and explore this area but that would need to be done in the operating room and require a drain to think we would both like to avoid at this point.  We will plan on seeing her back after the MRI    Enoch Barrios, DO  Plastic and Reconstructive Surgery      I spent a total of 10 minutes on the day of the visit.  This includes face to face time and non-face to face time preparing to see the patient (eg, review of tests), obtaining and/or reviewing separately obtained history, documenting  clinical information in the electronic or other health record, independently interpreting results and communicating results to the patient/family/caregiver, or care coordinator.

## 2025-01-02 ENCOUNTER — TELEPHONE (OUTPATIENT)
Dept: INFECTIOUS DISEASES | Facility: CLINIC | Age: 40
End: 2025-01-02
Payer: MEDICAID

## 2025-01-02 ENCOUNTER — TELEPHONE (OUTPATIENT)
Dept: PLASTIC SURGERY | Facility: CLINIC | Age: 40
End: 2025-01-02
Payer: MEDICAID

## 2025-01-02 NOTE — TELEPHONE ENCOUNTER
Spoke to pt - scheduled MRI for breasts -tech stated no need to fast for this test - direct # to scheduling if needs to change times/date  No additional concerns at this time. All questions and concerns addressed. Pt voiced understanding.

## 2025-01-02 NOTE — TELEPHONE ENCOUNTER
----- Message from Med Assistant Perez sent at 12/31/2024 11:58 AM CST -----  Referrals have been placed for ID and Derm for the attached pt.   Can you please reach out to schedule.     Thank you     Abby

## 2025-01-02 NOTE — TELEPHONE ENCOUNTER
Spoke to tomasa in Imaging - scheduled MRI for breasts for patient- tech stated no need to fast for this test

## 2025-01-08 ENCOUNTER — HOSPITAL ENCOUNTER (OUTPATIENT)
Dept: RADIOLOGY | Facility: HOSPITAL | Age: 40
Discharge: HOME | End: 2025-01-08
Attending: SURGERY
Payer: MEDICAID

## 2025-01-08 DIAGNOSIS — C50.412 MALIGNANT NEOPLASM OF UPPER-OUTER QUADRANT OF LEFT BREAST IN FEMALE, ESTROGEN RECEPTOR NEGATIVE: ICD-10-CM

## 2025-01-08 DIAGNOSIS — N64.89 SEROMA OF BREAST: ICD-10-CM

## 2025-01-08 DIAGNOSIS — N64.4 BREAST PAIN: ICD-10-CM

## 2025-01-08 DIAGNOSIS — Z17.1 MALIGNANT NEOPLASM OF UPPER-OUTER QUADRANT OF LEFT BREAST IN FEMALE, ESTROGEN RECEPTOR NEGATIVE: ICD-10-CM

## 2025-01-08 PROCEDURE — 25500020 PHARM REV CODE 255: Performed by: SURGERY

## 2025-01-08 PROCEDURE — A9577 INJ MULTIHANCE: HCPCS | Performed by: SURGERY

## 2025-01-08 PROCEDURE — 77049 MRI BREAST C-+ W/CAD BI: CPT | Mod: 26,,, | Performed by: RADIOLOGY

## 2025-01-08 PROCEDURE — 77049 MRI BREAST C-+ W/CAD BI: CPT | Mod: TC

## 2025-01-08 RX ADMIN — GADOBENATE DIMEGLUMINE 20 ML: 529 INJECTION, SOLUTION INTRAVENOUS at 06:01

## 2025-01-15 ENCOUNTER — OFFICE VISIT (OUTPATIENT)
Dept: INFECTIOUS DISEASES | Facility: CLINIC | Age: 40
End: 2025-01-15
Payer: MEDICAID

## 2025-01-15 VITALS
WEIGHT: 227.31 LBS | DIASTOLIC BLOOD PRESSURE: 78 MMHG | HEIGHT: 65 IN | BODY MASS INDEX: 37.87 KG/M2 | HEART RATE: 90 BPM | TEMPERATURE: 99 F | SYSTOLIC BLOOD PRESSURE: 118 MMHG

## 2025-01-15 DIAGNOSIS — Z17.1 MALIGNANT NEOPLASM OF UPPER-OUTER QUADRANT OF LEFT BREAST IN FEMALE, ESTROGEN RECEPTOR NEGATIVE: Primary | ICD-10-CM

## 2025-01-15 DIAGNOSIS — N61.0 BREAST INFECTION: ICD-10-CM

## 2025-01-15 DIAGNOSIS — C50.412 MALIGNANT NEOPLASM OF UPPER-OUTER QUADRANT OF LEFT BREAST IN FEMALE, ESTROGEN RECEPTOR NEGATIVE: Primary | ICD-10-CM

## 2025-01-15 PROCEDURE — 99213 OFFICE O/P EST LOW 20 MIN: CPT | Mod: PBBFAC | Performed by: INTERNAL MEDICINE

## 2025-01-15 PROCEDURE — 3008F BODY MASS INDEX DOCD: CPT | Mod: CPTII,,, | Performed by: INTERNAL MEDICINE

## 2025-01-15 PROCEDURE — 99999 PR PBB SHADOW E&M-EST. PATIENT-LVL III: CPT | Mod: PBBFAC,,, | Performed by: INTERNAL MEDICINE

## 2025-01-15 PROCEDURE — 99214 OFFICE O/P EST MOD 30 MIN: CPT | Mod: S$PBB,,, | Performed by: INTERNAL MEDICINE

## 2025-01-15 PROCEDURE — 3078F DIAST BP <80 MM HG: CPT | Mod: CPTII,,, | Performed by: INTERNAL MEDICINE

## 2025-01-15 PROCEDURE — 3074F SYST BP LT 130 MM HG: CPT | Mod: CPTII,,, | Performed by: INTERNAL MEDICINE

## 2025-01-15 PROCEDURE — 1159F MED LIST DOCD IN RCRD: CPT | Mod: CPTII,,, | Performed by: INTERNAL MEDICINE

## 2025-01-15 NOTE — PROGRESS NOTES
Infectious Disease Clinic Note  1/15/25      Subjective:       Patient ID: Laura Kent is a 39 y.o. female being seen for an new visit.    Chief Complaint: Follow-up    HPI  Ms. Kent is a 40 y/o F pt with hx of L breast cancer s/p chemo (12/2022), s/p mastectomy 2/27/23, followed by breast reconstructive surgery (removal tissue expander 12/11/23 and stage 2 reconstruction 4/18/24) which has been complicated by recurrent infections who presents for an initial visit.      Initially with infected seroma requiring IR drain 3/22/24-. Breast abscess cx  grew an unidentified aerobic beaded GPR. 6/7/24 grew pan sensistive kleb pna. Subsequent cx 8/22/24, 9/2/24, 12/10/24 were neg.     Reports she continues to have infection on left breast. Says it is tender to touch and gets hard from the top of the breast to under the left arm. Notes she has had a blister pop and burst at work in sept. Looked like gunk. Admitted 9/2-9/7 for IV abx. Labs unremarkable. Cx neg. Sent home on clinda.    Reports night sweats. Denies fevers, chills, malaise.   Not on abx currently.    MRI 1/8: No postoperative fluid collections in this patient is status post bilateral mastectomy with flap reconstruction   Family History   Problem Relation Name Age of Onset    Hypertension Mother      Hypertension Father      Diabetes Father      Pancreatic cancer Maternal Uncle      Bladder Cancer Paternal Grandmother      Colon cancer Neg Hx      Esophageal cancer Neg Hx       Social History     Socioeconomic History    Marital status: Single    Number of children: 3   Tobacco Use    Smoking status: Never    Smokeless tobacco: Never   Substance and Sexual Activity    Alcohol use: Not Currently     Comment: social    Drug use: Yes     Types: Marijuana    Sexual activity: Not Currently   Social History Narrative    15-20 steps     Social Drivers of Health     Financial Resource Strain: Medium Risk (12/17/2024)    Overall Financial Resource Strain  (CARDIA)     Difficulty of Paying Living Expenses: Somewhat hard   Food Insecurity: No Food Insecurity (2024)    Hunger Vital Sign     Worried About Running Out of Food in the Last Year: Never true     Ran Out of Food in the Last Year: Never true   Transportation Needs: No Transportation Needs (2024)    TRANSPORTATION NEEDS     Transportation : No   Physical Activity: Sufficiently Active (2024)    Exercise Vital Sign     Days of Exercise per Week: 6 days     Minutes of Exercise per Session: 60 min   Stress: Stress Concern Present (2024)    [employer] - Occupational Stress Questionnaire     Feeling of Stress : Rather much   Housing Stability: High Risk (2024)    Housing Stability Vital Sign     Unable to Pay for Housing in the Last Year: Yes     Homeless in the Last Year: No     Past Surgical History:   Procedure Laterality Date    AXILLARY NODE DISSECTION Left 2023    Procedure: LYMPHADENECTOMY, AXILLARY;  Surgeon: YASMANI Weiner MD;  Location: [facility];  Service: General;  Laterality: Left;    BREAST BIOPSY Left     BREAST RECONSTRUCTION       SECTION      COLONOSCOPY N/A 2022    Procedure: COLONOSCOPY;  Surgeon: Davi Chery MD;  Location: [address]);  Service: Endoscopy;  Laterality: N/A;  case request entered from referral - Per DR. KATHLEEN Chery Pt to be scheduled for urgent colonoscopy on 22/ fully vaccinated / prep ins for Sutab on portal - ERW  see micro for negative C-diff- ERW    INJECTION FOR SENTINEL NODE IDENTIFICATION Left 2023    Procedure: INJECTION, FOR SENTINEL NODE IDENTIFICATION;  Surgeon: YASMANI Weiner MD;  Location: [facility];  Service: General;  Laterality: Left;    INSERTION OF BREAST TISSUE EXPANDER Left 2023    Procedure: INSERTION, TISSUE EXPANDER, BREAST;  Surgeon: Reese Barrios DO;  Location: [facility];  Service: Plastics;  Laterality: Left;    INSERTION OF TUNNELED CENTRAL VENOUS  CATHETER (CVC) WITH SUBCUTANEOUS PORT N/A 04/05/2022    Procedure: [id number];  Surgeon: Deo Sin Jr., MD;  Location: [address];  Service: General;  Laterality: N/A;    INSERTION OF TUNNELED CENTRAL VENOUS CATHETER (CVC) WITH SUBCUTANEOUS PORT N/A 08/01/2022    Procedure: INSERTION, SINGLE LUMEN CATHETER WITH PORT, WITH FLUOROSCOPIC GUIDANCE;  Surgeon: Ryder Dinero MD;  Location: [facility] CATH LAB;  Service: Vascular;  Laterality: N/A;    INTRAVENOUS INJECTION N/A 12/11/2023    Procedure: spy;  Surgeon: Reese Barrios DO;  [facility];  Service: Plastics;  Laterality: N/A;    LIPOSUCTION W/ FAT INJECTION Bilateral 04/18/2024    Procedure: LIPOSUCTION, WITH FAT TRANSFER;  Surgeon: Reese Barrios DO;  [address];  Service: Plastics;  Laterality: Bilateral;  Lipo with fat grafting Bilat    MASTECTOMY Left 02/27/2023    Procedure: MASTECTOMY;  Surgeon: YASMANI Weiner MD;  Location: [facility];  Service: General;  Laterality: Left;  3.5 TOTAL HOURS / EMAIL SENT 2-23 @ 7:56 CCC    MASTECTOMY Right 12/11/2023    Procedure: MASTECTOMY;  Surgeon: Eldon Daley MD;  Location: [address];  Service: General;  Laterality: Right;    RECONSTRUCTION OF BREAST WITH DEEP INFERIOR EPIGASTRIC ARTERY  (KANDICE) FREE FLAP Bilateral 12/11/2023    Procedure: RECONSTRUCTION, BREAST, USING KANDICE FREE FLAP;  Surgeon: Reese Barrios DO;  [facility];  Service: Plastics;  Laterality: Bilateral;  Bilateral KANDICE flap - cosurgeon is Dr Cross -    REVISION OF SCAR Right 12/11/2023    Procedure: REVISION, SCAR;  Surgeon: Reese Barrios DO;  [address];  Service: Plastics;  Laterality: Right;    REVISION OF SCAR Right 04/18/2024    Procedure: REVISION, SCAR;  Surgeon: Reese Barrios DO;  [facility];  Service: Plastics;  Laterality: Right;    REVISION, FLAP, PREVIOUSLY CREATED FOR BREAST  RECONSTRUCTION Bilateral 04/18/2024    Procedure: REVISION, FLAP, PREVIOUSLY CREATED FOR BREAST RECONSTRUCTION;  Surgeon: Reese Barrios DO;  Location: [facility] OR 2ND FLR;  Service: Plastics;  Laterality: Bilateral;  2nd stage Autologous - Bilat breast revision    SENTINEL LYMPH NODE BIOPSY Left 02/27/2023    Procedure: BIOPSY, LYMPH NODE, SENTINEL;  Surgeon: YASMANI Weiner MD;  Location: [facility] OR;  Service: General;  Laterality: Left;  LEFT with radiological marker    TISSUE EXPANDER REMOVAL Left 12/11/2023    Procedure: REMOVAL, TISSUE EXPANDER;  Surgeon: Reese Barrios DO;  Location: [facility] OR 2ND FLR;  Service: Plastics;  Laterality: Left;  removal left tissu expander       Patient's Medications   New Prescriptions    No medications on file   Previous Medications    BUPROPION (WELLBUTRIN XL) 300 MG 24 HR TABLET    Take 1 tablet (300 mg total) by mouth every morning.    CETIRIZINE (ZYRTEC) 10 MG TABLET    Take 10 mg by mouth once daily.    IBUPROFEN (ADVIL,MOTRIN) 600 MG TABLET    Take 1 tablet (600 mg total) by mouth every 8 (eight) hours as needed for Pain.    LIDOCAINE (LIDODERM) 5 %    Place 1 patch onto the skin daily as needed (pain). Remove & Discard patch within 12 hours or as directed by MD    ONDANSETRON (ZOFRAN) 8 MG TABLET    Take 1 tablet (8 mg total) by mouth every 12 (twelve) hours as needed for Nausea.    PANTOPRAZOLE (PROTONIX) 40 MG TABLET    Take 1 tablet (40 mg total) by mouth once daily.    PREGABALIN (LYRICA) 75 MG CAPSULE    Take 1 capsule (75 mg total) by mouth 2 (two) times daily as needed (pain).   Modified Medications    No medications on file   Discontinued Medications    No medications on file       Patient Active Problem List    Diagnosis Date Noted    Decreased range of motion of left shoulder 09/29/2024    Generalized weakness 09/29/2024    Balance problem 09/29/2024    Breast infection 09/09/2024    Keloid 09/09/2024    Pain in left axilla 07/14/2024    Decreased range  "of motion of shoulder, left 07/14/2024    Chest wall ulcer, with fat layer exposed 06/10/2024    Left arm swelling 04/08/2024    Acute postoperative anemia due to expected blood loss 12/12/2023    Pancreatic insufficiency 06/26/2023    Decreased functional mobility and endurance 03/24/2023    Neoplasm related pain 03/02/2023    Severe obesity 02/02/2023    Primary hypertension 11/07/2022    IUD (intrauterine device) in place 11/07/2022    Anemia in neoplastic disease 09/17/2022    Chemotherapy induced neutropenia 06/13/2022    Malignant neoplasm of upper-outer quadrant of left breast in female, estrogen receptor negative 03/21/2022       Review of Systems   Review of Systems   Constitutional:  Positive for diaphoresis. Negative for fever.   Cardiovascular:         L breat pain present   All other systems reviewed and are negative.          Objective:      /78 (BP Location: Left arm, Patient Position: Sitting)   Pulse 90   Temp 99.1 °F (37.3 °C) (Oral)   Ht 5' 5" (1.651 m)   Wt 103.1 kg (227 lb 4.7 oz)   BMI 37.82 kg/m²   Estimated body mass index is 37.82 kg/m² as calculated from the following:    Height as of this encounter: 5' 5" (1.651 m).    Weight as of this encounter: 103.1 kg (227 lb 4.7 oz).    Physical Exam  Constitutional:       General: She is not in acute distress.     Appearance: She is well-developed.   HENT:      Head: Normocephalic and atraumatic.   Eyes:      Conjunctiva/sclera: Conjunctivae normal.      Pupils: Pupils are equal, round, and reactive to light.   Cardiovascular:      Rate and Rhythm: Normal rate and regular rhythm.      Heart sounds: Normal heart sounds.   Pulmonary:      Effort: Pulmonary effort is normal. No respiratory distress.      Breath sounds: Normal breath sounds.   Abdominal:      General: Bowel sounds are normal. There is no distension.      Palpations: Abdomen is soft.   Musculoskeletal:         General: Normal range of motion.      Cervical back: Normal range " of motion and neck supple.      Comments: L breast- no erythema or skin break down. Palpable induration on superior aspect of breast, but no fluctuance. knot at L axilla w/o erythema.    Skin:     General: Skin is warm and dry.   Neurological:      General: No focal deficit present.      Mental Status: She is alert and oriented to person, place, and time.      Cranial Nerves: No cranial nerve deficit.   Psychiatric:         Mood and Affect: Mood normal.         Behavior: Behavior normal.         Assessment:         1. Malignant neoplasm of upper-outer quadrant of left breast in female, estrogen receptor negative        2. Breast infection              Plan:       Laura was seen today for follow-up.    Diagnoses and all orders for this visit:    Malignant neoplasm of upper-outer quadrant of left breast in female, estrogen receptor negative    Breast infection  40y/o F pt w L breast cancer s/p chemo, mastectomy and reconstruction c/b infections and chronic pain presents for an initial visit.   Currently without systemic signs or symptoms of infection. Breast symptoms have improved. No evidence of active infection on exam and MRI without fluid collections.  Unclear significance of unidentified beaded GPR in culture from 3/22/24 (nocardia, NTM? Would expect actino to grow in the anaerobic cx). Did not grow on subsequent culture, so evacuation of collection may have cleared it.    Plan:  --counseled on signs and symptoms of recurrence of infection and advised to notify me if these were to arise.  --recommend aspiration and cultures (afb, aerobic, anaerobic, cell count with diff) if develops a new abscess    RTC PRN    Naima Chauhan MD  Infectious Disease   Total professional time spent for the encounter: 30 minutes  Time was spent preparing to see the patient, reviewing results of prior testing, obtaining and/or reviewing separately obtained history, performing a medically appropriate examination and  interview, counseling and educating the patient/family, ordering medications/tests/procedures, referring and communicating with other health care professionals, documenting clinical information in the electronic health record, and independently interpreting results.          No follow-ups on file.

## 2025-02-24 ENCOUNTER — OFFICE VISIT (OUTPATIENT)
Dept: OBSTETRICS AND GYNECOLOGY | Facility: CLINIC | Age: 40
End: 2025-02-24
Payer: MEDICAID

## 2025-02-24 VITALS — WEIGHT: 227 LBS | BODY MASS INDEX: 37.82 KG/M2 | HEIGHT: 65 IN

## 2025-02-24 DIAGNOSIS — E88.810 METABOLIC SYNDROME: ICD-10-CM

## 2025-02-24 DIAGNOSIS — C50.412 MALIGNANT NEOPLASM OF UPPER-OUTER QUADRANT OF LEFT BREAST IN FEMALE, ESTROGEN RECEPTOR NEGATIVE: Primary | ICD-10-CM

## 2025-02-24 DIAGNOSIS — Z17.1 MALIGNANT NEOPLASM OF UPPER-OUTER QUADRANT OF LEFT BREAST IN FEMALE, ESTROGEN RECEPTOR NEGATIVE: Primary | ICD-10-CM

## 2025-02-24 DIAGNOSIS — E66.9 OBESITY, UNSPECIFIED CLASS, UNSPECIFIED OBESITY TYPE, UNSPECIFIED WHETHER SERIOUS COMORBIDITY PRESENT: ICD-10-CM

## 2025-02-24 PROCEDURE — 3008F BODY MASS INDEX DOCD: CPT | Mod: CPTII,95,, | Performed by: PHYSICIAN ASSISTANT

## 2025-02-24 PROCEDURE — 98005 SYNCH AUDIO-VIDEO EST LOW 20: CPT | Mod: 95,,, | Performed by: PHYSICIAN ASSISTANT

## 2025-02-24 PROCEDURE — 1159F MED LIST DOCD IN RCRD: CPT | Mod: CPTII,95,, | Performed by: PHYSICIAN ASSISTANT

## 2025-02-24 PROCEDURE — 1160F RVW MEDS BY RX/DR IN RCRD: CPT | Mod: CPTII,95,, | Performed by: PHYSICIAN ASSISTANT

## 2025-02-24 RX ORDER — BUPROPION HYDROCHLORIDE 150 MG/1
150 TABLET ORAL EVERY MORNING
Qty: 30 TABLET | Refills: 3 | Status: SHIPPED | OUTPATIENT
Start: 2025-02-24 | End: 2026-02-24

## 2025-02-24 RX ORDER — PHENTERMINE HYDROCHLORIDE 37.5 MG/1
37.5 TABLET ORAL
Qty: 30 TABLET | Refills: 1 | Status: SHIPPED | OUTPATIENT
Start: 2025-02-24 | End: 2025-03-26

## 2025-02-24 NOTE — PROGRESS NOTES
The patient location is: home  The chief complaint leading to consultation is: follow up weight loss    Visit type: audiovisual    Face to Face time with patient: 10 minutes  15 minutes of total time spent on the encounter, which includes face to face time and non-face to face time preparing to see the patient (eg, review of tests), Obtaining and/or reviewing separately obtained history, Documenting clinical information in the electronic or other health record, Independently interpreting results (not separately reported) and communicating results to the patient/family/caregiver, or Care coordination (not separately reported).         Each patient to whom he or she provides medical services by telemedicine is:  (1) informed of the relationship between the physician and patient and the respective role of any other health care provider with respect to management of the patient; and (2) notified that he or she may decline to receive medical services by telemedicine and may withdraw from such care at any time.    Notes:    Subjective:      Laura Kent is a 39 y.o. female with history of triple negative breast cancer who presents for weight loss follow up. She is taking Wellbutrin XL 300mg QAM. Has not seen any improvement in weight loss with increasing the dose. No adverse side effects. She is frustrated that she is not losing weight. She is meal prepping lunch for work. Eating salads with chicken and increased protein through out the day with added protein shake. She has increased her water intake as well. Outside PCP gave her a medication that has helped with hot flashes and fatigue. She is unsure what it is but not hormonal. Stopped taking it a few weeks ago but still feeling better.   No cardiac history of HTN, arhythmia or palpitations.    Routine Screening Labs: 10/26/2023     Office Visit on 12/10/2024   Component Date Value Ref Range Status    Gram Stain Result 12/10/2024 Many WBC's   Final    Gram  Stain Result 12/10/2024 No organisms seen   Final    Aerobic Bacterial Culture 12/10/2024 No growth   Final    Anaerobic Culture 12/10/2024 No anaerobes isolated   Final   Lab Visit on 12/03/2024   Component Date Value Ref Range Status    WBC 12/03/2024 3.17 (L)  3.90 - 12.70 K/uL Final    RBC 12/03/2024 3.41 (L)  4.00 - 5.40 M/uL Final    Hemoglobin 12/03/2024 9.7 (L)  12.0 - 16.0 g/dL Final    Hematocrit 12/03/2024 30.5 (L)  37.0 - 48.5 % Final    MCV 12/03/2024 89  82 - 98 fL Final    MCH 12/03/2024 28.4  27.0 - 31.0 pg Final    MCHC 12/03/2024 31.8 (L)  32.0 - 36.0 g/dL Final    RDW 12/03/2024 12.5  11.5 - 14.5 % Final    Platelets 12/03/2024 197  150 - 450 K/uL Final    MPV 12/03/2024 10.3  9.2 - 12.9 fL Final    Gran # (ANC) 12/03/2024 2.1  1.8 - 7.7 K/uL Final    Immature Grans (Abs) 12/03/2024 0.01  0.00 - 0.04 K/uL Final    Sodium 12/03/2024 141  136 - 145 mmol/L Final    Potassium 12/03/2024 4.0  3.5 - 5.1 mmol/L Final    Chloride 12/03/2024 112 (H)  95 - 110 mmol/L Final    CO2 12/03/2024 24  23 - 29 mmol/L Final    Glucose 12/03/2024 100  70 - 110 mg/dL Final    BUN 12/03/2024 17  6 - 20 mg/dL Final    Creatinine 12/03/2024 0.8  0.5 - 1.4 mg/dL Final    Calcium 12/03/2024 8.9  8.7 - 10.5 mg/dL Final    Total Protein 12/03/2024 6.8  6.0 - 8.4 g/dL Final    Albumin 12/03/2024 3.9  3.5 - 5.2 g/dL Final    Total Bilirubin 12/03/2024 0.3  0.1 - 1.0 mg/dL Final    Alkaline Phosphatase 12/03/2024 55  40 - 150 U/L Final    AST 12/03/2024 18  10 - 40 U/L Final    ALT 12/03/2024 9 (L)  10 - 44 U/L Final    eGFR 12/03/2024 >60.0  >60 mL/min/1.73 m^2 Final    Anion Gap 12/03/2024 5 (L)  8 - 16 mmol/L Final       Past Medical History:   Diagnosis Date    Anemia     Anxiety     Breast cancer     Diarrhea 09/08/2022    Drug-induced acute pancreatitis without infection or necrosis 09/04/2022    Encounter for blood transfusion     Hypertension     Meningitis     Severe obesity 02/02/2023    Shortness of breath 02/02/2023      Past Surgical History:   Procedure Laterality Date    AXILLARY NODE DISSECTION Left 2023    Procedure: LYMPHADENECTOMY, AXILLARY;  Surgeon: YASMANI Weiner MD;  Location: [facility];  Service: General;  Laterality: Left;    BREAST BIOPSY Left     BREAST RECONSTRUCTION       SECTION      COLONOSCOPY N/A 2022    Procedure: COLONOSCOPY;  Surgeon: Davi Chery MD;  Location: [facility] ENDO (4TH FLR);  Service: Endoscopy;  Laterality: N/A;  case request entered from referral - Per DR. KATHLEEN Chery Pt to be scheduled for urgent colonoscopy on 22/ fully vaccinated / prep ins for Sutab on portal - ERW  see micro for negative C-diff- ERW    INJECTION FOR SENTINEL NODE IDENTIFICATION Left 2023    Procedure: INJECTION, FOR SENTINEL NODE IDENTIFICATION;  Surgeon: YASMANI Weiner MD;  Location: [facility];  Service: General;  Laterality: Left;    INSERTION OF BREAST TISSUE EXPANDER Left 2023    Procedure: INSERTION, TISSUE EXPANDER, BREAST;  Surgeon: Reese Barrios DO;  Location: [facility];  Service: Plastics;  Laterality: Left;    INSERTION OF TUNNELED CENTRAL VENOUS CATHETER (CVC) WITH SUBCUTANEOUS PORT N/A 2022    Procedure: [id number];  Surgeon: Deo Sin Jr., MD;  Location: [facility] 2ND FLR;  Service: General;  Laterality: N/A;    INSERTION OF TUNNELED CENTRAL VENOUS CATHETER (CVC) WITH SUBCUTANEOUS PORT N/A 2022    Procedure: INSERTION, SINGLE LUMEN CATHETER WITH PORT, WITH FLUOROSCOPIC GUIDANCE;  Surgeon: Ryder Dinero MD;  Location: [facility] CATH LAB;  Service: Vascular;  Laterality: N/A;    INTRAVENOUS INJECTION N/A 2023    Procedure: spy;  Surgeon: Reese Barrios DO;  Location: [facility] OR 2ND FLR;  Service: Plastics;  Laterality: N/A;    LIPOSUCTION W/ FAT INJECTION Bilateral 2024    Procedure: LIPOSUCTION, WITH FAT TRANSFER;  Surgeon: Reese Barrios DO;  Location: [facility] OR 2ND FLR;  Service: Plastics;  Laterality: Bilateral;  Lipo  with fat grafting Bilat    MASTECTOMY Left 02/27/2023    Procedure: MASTECTOMY;  Surgeon: YASMANI Weiner MD;  Location: [facility];  Service: General;  Laterality: Left;  3.5 TOTAL HOURS / EMAIL SENT 2-23 @ 7:56 CCC    MASTECTOMY Right 12/11/2023    Procedure: MASTECTOMY;  Surgeon: Eldon Daley MD;  Location: [facility] OR 2ND FLR;  Service: General;  Laterality: Right;    RECONSTRUCTION OF BREAST WITH DEEP INFERIOR EPIGASTRIC ARTERY  (KANDICE) FREE FLAP Bilateral 12/11/2023    Procedure: RECONSTRUCTION, BREAST, USING KANDICE FREE FLAP;  Surgeon: Reese Barrios DO;  Location: [facility] OR 2ND FLR;  Service: Plastics;  Laterality: Bilateral;  Bilateral KANDICE flap - cosurgeon is Dr Cross -    REVISION OF SCAR Right 12/11/2023    Procedure: REVISION, SCAR;  Surgeon: Reese Barrios DO;  Location: [facility] OR 2ND FLR;  Service: Plastics;  Laterality: Right;    REVISION OF SCAR Right 04/18/2024    Procedure: REVISION, SCAR;  Surgeon: Reese Barrios DO;  Location: [facility] OR 2ND FLR;  Service: Plastics;  Laterality: Right;    REVISION, FLAP, PREVIOUSLY CREATED FOR BREAST RECONSTRUCTION Bilateral 04/18/2024    Procedure: REVISION, FLAP, PREVIOUSLY CREATED FOR BREAST RECONSTRUCTION;  Surgeon: Reese Barrios DO;  Location: [facility] OR 2ND FLR;  Service: Plastics;  Laterality: Bilateral;  2nd stage Autologous - Bilat breast revision    SENTINEL LYMPH NODE BIOPSY Left 02/27/2023    Procedure: BIOPSY, LYMPH NODE, SENTINEL;  Surgeon: YASMANI Weiner MD;  Location: [facility];  Service: General;  Laterality: Left;  LEFT with radiological marker    TISSUE EXPANDER REMOVAL Left 12/11/2023    Procedure: REMOVAL, TISSUE EXPANDER;  Surgeon: Reese Barrios DO;  Location: [facility] OR 2ND FLR;  Service: Plastics;  Laterality: Left;  removal left tissu expander     Social History[1]  Family History   Problem Relation Name Age of Onset    Hypertension Mother      Hypertension Father      Diabetes Father      Pancreatic  "cancer Maternal Uncle      Bladder Cancer Paternal Grandmother      Colon cancer Neg Hx      Esophageal cancer Neg Hx       OB History    Para Term  AB Living   3 3 3      SAB IAB Ectopic Multiple Live Births             # Outcome Date GA Lbr Nicanor/2nd Weight Sex Type Anes PTL Lv   3 Term            2 Term            1 Term              Current Medications[2]    Review of Systems:  General: No fever, chills.  Chest: No chest pain, shortness of breath, or palpitations.  Breast: No pain, masses, or nipple discharge.  Vulva: No pain, lesions, or itching.  Vagina: No relaxation, itching, discharge, or lesions.  Abdomen: No pain, nausea, vomiting, diarrhea, or constipation.  Urinary: No incontinence, nocturia, frequency, or dysuria.  Extremities:  No leg cramps, edema, or calf pain.  Neurologic: No headaches, dizziness, or visual changes.    Objective:     Vitals:    25 0903   Weight: 103 kg (227 lb)   Height: 5' 5" (1.651 m)     Body mass index is 37.77 kg/m².    PHYSICAL EXAM:  APPEARANCE: Well nourished, well developed, in no acute distress.  AFFECT: WNL, alert and oriented x 3      Assessment:      Malignant neoplasm of upper-outer quadrant of left breast in female, estrogen receptor negative    Obesity, unspecified class, unspecified obesity type, unspecified whether serious comorbidity present  -     buPROPion (WELLBUTRIN XL) 150 MG TB24 tablet; Take 1 tablet (150 mg total) by mouth every morning.  Dispense: 30 tablet; Refill: 3  -     phentermine (ADIPEX-P) 37.5 mg tablet; Take 1 tablet (37.5 mg total) by mouth before breakfast.  Dispense: 30 tablet; Refill: 1    Metabolic syndrome        Plan:   Continue low glycemic diet with lean protein at each meal.  Maintain hydration.  Reduce wellbutrin to 150mg QAM with plan to taper off  Start phentermine 37.5mg QAM  Reviewed side effects and risks with this medication.  Avoid skipping meals  Will only continue this medication for 3 months  We did " discuss risk of gaining the weight back when stopping.  Follow up in 8 weeks    Instructed patient to call if she experiences any side effects or has any questions.    I spent a total of 15 minutes on the day of the visit.This includes face to face time and non-face to face time preparing to see the patient (eg, review of tests), obtaining and/or reviewing separately obtained history, documenting clinical information in the electronic or other health record, independently interpreting results and communicating results to the patient/family/caregiver, or care coordinator.          [1]   Social History  Tobacco Use    Smoking status: Never    Smokeless tobacco: Never   Substance Use Topics    Alcohol use: Not Currently     Comment: social    Drug use: Yes     Types: Marijuana   [2]   Current Outpatient Medications:     buPROPion (WELLBUTRIN XL) 150 MG TB24 tablet, Take 1 tablet (150 mg total) by mouth every morning., Disp: 30 tablet, Rfl: 3    cetirizine (ZYRTEC) 10 MG tablet, Take 10 mg by mouth once daily., Disp: , Rfl:     ibuprofen (ADVIL,MOTRIN) 600 MG tablet, Take 1 tablet (600 mg total) by mouth every 8 (eight) hours as needed for Pain., Disp: 15 tablet, Rfl: 0    LIDOcaine (LIDODERM) 5 %, Place 1 patch onto the skin daily as needed (pain). Remove & Discard patch within 12 hours or as directed by MD, Disp: 30 patch, Rfl: 0    ondansetron (ZOFRAN) 8 MG tablet, Take 1 tablet (8 mg total) by mouth every 12 (twelve) hours as needed for Nausea., Disp: 30 tablet, Rfl: 2    pantoprazole (PROTONIX) 40 MG tablet, Take 1 tablet (40 mg total) by mouth once daily., Disp: 30 tablet, Rfl: 1    phentermine (ADIPEX-P) 37.5 mg tablet, Take 1 tablet (37.5 mg total) by mouth before breakfast., Disp: 30 tablet, Rfl: 1    pregabalin (LYRICA) 75 MG capsule, Take 1 capsule (75 mg total) by mouth 2 (two) times daily as needed (pain)., Disp: 60 capsule, Rfl: 0  No current facility-administered medications for this  visit.    Facility-Administered Medications Ordered in Other Visits:     ceFAZolin 1 g, gentamicin 80 mg in sodium chloride 0.9% 500 mL irrigation, , Irrigation, On Call Procedure, YASMANI Weiner MD    ceFAZolin 2 g in dextrose 5 % in water (D5W) 50 mL IVPB (MB+), 2 g, Intravenous, On Call Procedure, Angie Peck PA-C    LIDOcaine (PF) 10 mg/ml (1%) injection 10 mg, 1 mL, Intradermal, Once, Angie Peck PA-C    mupirocin 2 % ointment, , Nasal, On Call Procedure, Angie Peck PA-C, Given at 04/18/24 0609    sodium chloride 0.9% flush 10 mL, 10 mL, Intravenous, PRN, Angie Peck, PA-C

## 2025-03-11 NOTE — PROGRESS NOTES
Physical Therapy Daily Treatment Note     Date: 4/12/2023   Name: Laura Kent  Clinic Number: [id number]    Therapy Diagnosis:  Encounter Diagnoses   Name Primary?    Decreased range of motion of left shoulder Yes    Weakness of left upper extremity     Poor posture     At risk for lymphedema     Decreased functional mobility      Physician: YASMANI Weiner MD    Physician Orders: PT Eval and Treat   Medical Diagnosis: C50.412,Z17.1 (ICD-10-CM) - Malignant neoplasm of upper-outer quadrant of left breast in female, estrogen receptor negative  Evaluation Date: 3/23/2023  Authorization Period Expiration: 3/13/2024  Plan of Care Certification Period: 5/19/2023  Visit # / Visits Authorized: 5 / 12 (plus eval)  Insurance: MEDICAID / [employer]  FOTO: 2/3    Time In: 9:03 AM  Time Out: 10:00 AM  Total Billable Time: 57 minutes (TE only per [place] medicaid)    Precautions: Standard and cancer      Subjective     Patient reports: feeling more movement through shoulders. Patient still feels tenderness through L axilla, attributed to discomfort from tissue expander. XRT simulation scheduled for 04/19/2023.    She was compliant with home exercise program.  Response to Previous Treatment: no adverse events    Pain Scale: Laura rates pain on a scale of 3-4/10 on VAS.   Pain Location: L axilla    Fatigue: none  Functional Change: putting clothes on is easier    Treatment:   Chemotherapy: neoadjuvant completed 11/22  Radiation: TBD  Endocrine Therapy: N/A    Surgery Date: LEFT mastectomy with TE placement with LEFT SLNB on 02/27/2023 per Myra Weiner and Denis.       Objective     Objective Measures updated at progress report unless specified.     Shoulder Range of Motion: 03/27/2023  Active ROM Right Left   Flexion 175 165   Abduction 180 125   Extension 85 65   IR/90deg 90 90   ER/90deg 95 90     Axillary Web Syndrome / Cording:   Location: left axilla to mid  Detail Level: Detailed forearm / wrist  Degree of Cording: mild  Number of Cords Present: 1      Treatment     Laura received individual therapeutic exercises to improve postural correction and alignment, stretching and soft tissue mobility, and strengthening for 37 minutes including the following:     Seated Exercises:  - Pulleys (flexion, abduction)   2 min each  - Physioball roll outs (3 ways)   3 min    Mat Exercises:  - Supine pec stretch with towel roll  2 min  - Foam roller series (on towel roll)  1 set x 10 reps each  - Butterfly stretch with LTR    2 min  - AAROM wand flexion, 2#   2 min  - AAROM wand LUE ER, 2#   2 min  - LUE PROM flex, abd, and ER   1 set x 15  each      Laura received the following individual neuromuscular re-education activities to improve coordination, kinesthetic sense, proprioception, and posture for 10 minutes:     Seated Exercises:   - Bilateral shoulder ER, red   2 set x 10 reps  - Bilateral horizontal abduction, red  2 set x 10 reps    Standing Exercises  - Shoulder rows, red    2 set x 10 reps  - Shoulder extension, red   2 set x 10 reps      Laura received the following manual therapy techniques were performed to increased myofascial/soft tissue length, mobility and pliability, increase PROM, AROM and function as well as to decrease pain for 10 minutes:    - Gentle stretching and side-bending for cording in left axilla to mid forearm (proximal to distal and back)      Home Exercise Program and Patient Education     Education Provided Regarding:  - Role of physical therapy in multi-disciplinary team  - Goals for physical therapy, progress towards goals  - Exercise technique, compliance with home exercise program  - Keeping exercises in pain-free range    Written Home Exercises Provided: Patient instructed to cont prior HEP. Exercises were reviewed and Laura was able to demonstrate them prior to the end of the session. Laura demonstrated good  understanding of the education provided.     See EMR  under Patient Instructions for exercises provided  eval and 03/27/2023 .    Patient has no cultural, educational, or language barriers to learning provided.    Assessment     Patient is responding well to physical therapy. Patient able to perform new exercises and exercise progressions without increase in symptoms prior to leaving the clinic. Improved recruitment and strength of scapular stabilizers with increased repetitions of resistance band exercises. Improved range of motion with added foam roller series exercise (using towel roll). Reduced cording through L forearm palpated with manual techniques. Will progress as tolerated.     Patient prognosis is Good. Patient will continue to benefit from skilled outpatient physical therapy to address the deficits listed in the problem list chart on initial evaluation, provide patient / family education, and to maximize patient's level of independence in the home and community environment.     Anticipated barriers to physical therapy: none anticipated    Goals as Follows:  Short Term Goals: 4 weeks  Patient will demonstrate 100% understanding of lymphedema risk reduction practices to include self monitoring for lymphedema (progressing, not met).  Patient will demonstrate independence with Home Exercise program established (progressing, not met).  Patient will increase AROM / PROM in shoulder abduction to 120 degrees on left to improve functional reach, carry, push, pull pain free (exceeded, 03/27/2023).  Patient will increase AROM / PROM in shoulder flexion to 120 degrees on left to improve functional reach, carry, push, pull pain free (exceeded, 03/27/2023).  Patient will increase strength to >/= 4/5 in gross upper extremity musculature to improve tolerance to all functional activities pain free (progressing, not met).     Long Term Goals: 8 weeks   Patient will increase AROM / PROM in shoulder flexion to 175 degrees on left to improve functional reach, carry, push, pull  pain free (progressing, not met).  Patient will increase strength to 5/5 in gross upper extremity musculature to improve tolerance to all functional activities pain free (progressing, not met).  Patient will demonstrate full / maximized tissue mobility to increase range of motion and promote healthy tissue to be pain-free at discharge (progressing, not met).   Patient will increase AROM / PROM in shoulder external rotation to 90 degrees at 90 degrees abduction on left to improve functional reach, carry, push, pull pain free (progressing, not met).  Patient will increase AROM / PROM in shoulder abduction ROM to 180 degrees on left to improve functional reach, carry, push, pull pain free (progressing, not met).  Patient will report compliance with walking program 5x/week for 20-30 minutes/day to improve overall cardiovascular function and decrease cancer-related fatigue at discharge (progressing, not met).     Plan     Outpatient physical therapy 2x week for 8 weeks to include the following: Manual Therapy, Neuromuscular Re-ed, Patient Education, Self Care, Therapeutic Activities, Therapeutic Exercise, and IASTM.     Plan of Care Certification Period: 03/23/2023 to 05/19/2023    Therapist: Ghada Brooks, PT  4/12/2023

## 2025-03-14 ENCOUNTER — TELEPHONE (OUTPATIENT)
Dept: HEMATOLOGY/ONCOLOGY | Facility: CLINIC | Age: 40
End: 2025-03-14

## 2025-03-30 NOTE — ANESTHESIA PROCEDURE NOTES
Intubation    Date/Time: 4/18/2024 11:19 AM    Performed by: Tosha Barrios MD  Authorized by: Cahcorta Davidson MD    Intubation:     Induction:  Intravenous    Intubated:  Postinduction    Mask Ventilation:  Easy with oral airway    Attempts:  1    Attempted By:  Resident anesthesiologist    Method of Intubation:  Direct    Blade:  Veronica 3    Laryngeal View Grade: Grade I - full view of cords      Difficult Airway Encountered?: No      Complications:  None    Airway Device:  Oral endotracheal tube    Airway Device Size:  7.0    Style/Cuff Inflation:  Cuffed (inflated to minimal occlusive pressure)    Tube secured:  22    Secured at:  The lips    Placement Verified By:  Capnometry    Complicating Factors:  None    Findings Post-Intubation:  BS equal bilateral and atraumatic/condition of teeth unchanged      
ROS:  GENERAL: No fever, no chills  EYES: no change in vision  HEENT: no trouble swallowing, no trouble speaking  CARDIAC: no chest pain  PULMONARY: no cough, no shortness of breath  GI: no abdominal pain, no nausea, no vomiting, no diarrhea, no constipation  : No dysuria, no frequency, no change in appearance, or odor of urine  SKIN: no rashes  NEURO: no headache, no weakness  MSK: No joint pain

## 2025-03-31 DIAGNOSIS — E66.9 OBESITY, UNSPECIFIED CLASS, UNSPECIFIED OBESITY TYPE, UNSPECIFIED WHETHER SERIOUS COMORBIDITY PRESENT: ICD-10-CM

## 2025-03-31 RX ORDER — BUPROPION HYDROCHLORIDE 150 MG/1
150 TABLET ORAL EVERY MORNING
Qty: 30 TABLET | Refills: 3 | Status: SHIPPED | OUTPATIENT
Start: 2025-03-31 | End: 2026-03-31

## 2025-03-31 RX ORDER — PHENTERMINE HYDROCHLORIDE 37.5 MG/1
37.5 TABLET ORAL
Qty: 30 TABLET | Refills: 0 | Status: SHIPPED | OUTPATIENT
Start: 2025-03-31 | End: 2025-04-30

## 2025-04-22 ENCOUNTER — OFFICE VISIT (OUTPATIENT)
Dept: PLASTIC SURGERY | Facility: CLINIC | Age: 40
End: 2025-04-22
Payer: MEDICAID

## 2025-04-22 VITALS — SYSTOLIC BLOOD PRESSURE: 129 MMHG | DIASTOLIC BLOOD PRESSURE: 86 MMHG | HEART RATE: 83 BPM

## 2025-04-22 DIAGNOSIS — Z98.890 HISTORY OF BREAST RECONSTRUCTION: ICD-10-CM

## 2025-04-22 DIAGNOSIS — Z09 SURGERY FOLLOW-UP: Primary | ICD-10-CM

## 2025-04-22 PROCEDURE — 99212 OFFICE O/P EST SF 10 MIN: CPT | Mod: S$PBB,,, | Performed by: SURGERY

## 2025-04-22 PROCEDURE — 99999 PR PBB SHADOW E&M-EST. PATIENT-LVL III: CPT | Mod: PBBFAC,,, | Performed by: SURGERY

## 2025-04-22 PROCEDURE — 99213 OFFICE O/P EST LOW 20 MIN: CPT | Mod: PBBFAC | Performed by: SURGERY

## 2025-04-22 PROCEDURE — 3079F DIAST BP 80-89 MM HG: CPT | Mod: CPTII,,, | Performed by: SURGERY

## 2025-04-22 PROCEDURE — 3074F SYST BP LT 130 MM HG: CPT | Mod: CPTII,,, | Performed by: SURGERY

## 2025-04-30 NOTE — PROGRESS NOTES
Laura [facility] for a follow up visit status post bilateral KANDICE flap breast reconstruction with revision on 4/18/24.  She's had some ongoing pain and intermittent swelling/redness of the left chest. She was hospitalized in Sept and treated with IV antibiotics. She hasn't had any recurrence. Overall she's pleased with the result of her reconstruction. She continues to have some discomfort intermittently, especially in the left lateral chest. This is manageable with OTC meds.  She denies fever, chills, nausea, vomiting or other systemic signs of infection.       ROS - negative, other than stated above    PHYSICAL EXAMINATION  Vitals:    04/22/25 0910   BP: 129/86   Pulse: 83       Gen: NAD, appears stated age  Neuro: normal without focal findings, mental status and speech normal  HEENT: NCAT, neck supple, PEERL  CV: RRR  Pulm: Breathing non-labored, chest wall movement equal bilaterally   Breast:  Well healed wise pattern incisions. Soft flaps. Good symmetry.  Abdomen: soft, nontender, no guarding, well healed donor site  Gu: genitalia not examined  Extremity:normal strength, no cyanosis or edema  Skin: Skin color, texture, turgor normal. No rashes or lesions  Psych: oriented to time, place and person      ASSESSMENT/PLAN  39 y.o. female s/p KANDICE flap reconstruction and revision    Well healed with no concerns today. Will refer for nipple tattoos. She will follow up with us as needed.    Angie Peck PA-C  Plastic and Reconstructive Surgery      This includes face to face time and non-face to face time preparing to see the patient (eg, review of tests), obtaining and/or reviewing separately obtained history, documenting clinical information in the electronic or other health record, independently interpreting results and communicating results to the patient/family/caregiver, or care coordinator.

## 2025-05-05 ENCOUNTER — OFFICE VISIT (OUTPATIENT)
Dept: OBSTETRICS AND GYNECOLOGY | Facility: CLINIC | Age: 40
End: 2025-05-05
Payer: MEDICAID

## 2025-05-05 VITALS — HEIGHT: 65 IN | BODY MASS INDEX: 35.65 KG/M2 | WEIGHT: 214 LBS

## 2025-05-05 DIAGNOSIS — Z17.1 MALIGNANT NEOPLASM OF UPPER-OUTER QUADRANT OF LEFT BREAST IN FEMALE, ESTROGEN RECEPTOR NEGATIVE: Primary | ICD-10-CM

## 2025-05-05 DIAGNOSIS — C50.412 MALIGNANT NEOPLASM OF UPPER-OUTER QUADRANT OF LEFT BREAST IN FEMALE, ESTROGEN RECEPTOR NEGATIVE: Primary | ICD-10-CM

## 2025-05-05 DIAGNOSIS — E66.9 OBESITY, UNSPECIFIED CLASS, UNSPECIFIED OBESITY TYPE, UNSPECIFIED WHETHER SERIOUS COMORBIDITY PRESENT: ICD-10-CM

## 2025-05-05 DIAGNOSIS — E88.810 METABOLIC SYNDROME: ICD-10-CM

## 2025-05-05 RX ORDER — PHENTERMINE HYDROCHLORIDE 37.5 MG/1
37.5 TABLET ORAL
Qty: 30 TABLET | Refills: 0 | Status: SHIPPED | OUTPATIENT
Start: 2025-05-05 | End: 2025-06-04

## 2025-05-05 NOTE — PROGRESS NOTES
The patient location is: car  The chief complaint leading to consultation is: follow up    Visit type: audiovisual    Face to Face time with patient: 10 minutes  15 minutes of total time spent on the encounter, which includes face to face time and non-face to face time preparing to see the patient (eg, review of tests), Obtaining and/or reviewing separately obtained history, Documenting clinical information in the electronic or other health record, Independently interpreting results (not separately reported) and communicating results to the patient/family/caregiver, or Care coordination (not separately reported).         Each patient to whom he or she provides medical services by telemedicine is:  (1) informed of the relationship between the physician and patient and the respective role of any other health care provider with respect to management of the patient; and (2) notified that he or she may decline to receive medical services by telemedicine and may withdraw from such care at any time.    Notes:    Subjective:      Laura Kent is a 39 y.o. female with history of triple negative breast cancer who presents for weight loss follow up. She is taking phentermine 37.5mg QAM. She is on her second month of the medication. Reports increased energy and finally seeing weight loss. She has lost about 16-17lbs. Staying hydrated and drinking water throughout the day. Admits to only eating 1-2x per day but states has always been like this. She is adding protein shake in the morning. 15-20 minutes on the bike 2-3x per week for exercise.   She decreased the wellbutrin to 150mg and then discontinued. She is feeling well.     Routine Screening Labs: 10/26/2023     No visits with results within 3 Month(s) from this visit.   Latest known visit with results is:   Office Visit on 12/10/2024   Component Date Value Ref Range Status    Gram Stain Result 12/10/2024 Many WBC's   Final    Gram Stain Result 12/10/2024 No  organisms seen   Final    Aerobic Bacterial Culture 12/10/2024 No growth   Final    Anaerobic Culture 12/10/2024 No anaerobes isolated   Final       Past Medical History:   Diagnosis Date    Anemia     Anxiety     Breast cancer     Diarrhea 2022    Drug-induced acute pancreatitis without infection or necrosis 2022    Encounter for blood transfusion     Hypertension     Meningitis     Severe obesity 2023    Shortness of breath 2023     Past Surgical History:   Procedure Laterality Date    AXILLARY NODE DISSECTION Left 2023    Procedure: LYMPHADENECTOMY, AXILLARY;  Surgeon: YASMANI Weiner MD;  Location: [facility] OR;  Service: General;  Laterality: Left;    BREAST BIOPSY Left     BREAST RECONSTRUCTION       SECTION      COLONOSCOPY N/A 2022    Procedure: COLONOSCOPY;  Surgeon: Davi Chery MD;  Location: [facility] (4TH FLR);  Service: Endoscopy;  Laterality: N/A;  case request entered from referral - Per DR. KATHLEEN Chery Pt to be scheduled for urgent colonoscopy on 22/ fully vaccinated / prep ins for Sutab on portal - ERW  see micro for negative C-diff- ERW    INJECTION FOR SENTINEL NODE IDENTIFICATION Left 2023    Procedure: INJECTION, FOR SENTINEL NODE IDENTIFICATION;  Surgeon: YASMANI Weiner MD;  Location: [facility];  Service: General;  Laterality: Left;    INSERTION OF BREAST TISSUE EXPANDER Left 2023    Procedure: INSERTION, TISSUE EXPANDER, BREAST;  Surgeon: Reese Barrios DO;  Location: [facility];  Service: Plastics;  Laterality: Left;    INSERTION OF TUNNELED CENTRAL VENOUS CATHETER (CVC) WITH SUBCUTANEOUS PORT N/A 2022    Procedure: [id number];  Surgeon: Deo Sin Jr., MD;  Location: [facility] 2ND FLR;  Service: General;  Laterality: N/A;    INSERTION OF TUNNELED CENTRAL VENOUS CATHETER (CVC) WITH SUBCUTANEOUS PORT N/A 2022    Procedure: INSERTION, SINGLE LUMEN CATHETER WITH PORT, WITH FLUOROSCOPIC GUIDANCE;  Surgeon:  Ryder Dinero MD;  Location: [facility] CATH LAB;  Service: Vascular;  Laterality: N/A;    INTRAVENOUS INJECTION N/A 12/11/2023    Procedure: spy;  Surgeon: Reese Barrios DO;  Location: [facility] OR 2ND FLR;  Service: Plastics;  Laterality: N/A;    LIPOSUCTION W/ FAT INJECTION Bilateral 04/18/2024    Procedure: LIPOSUCTION, WITH FAT TRANSFER;  Surgeon: Reese Barrios DO;  Location: [facility] OR [facility] FLR;  Service: Plastics;  Laterality: Bilateral;  Lipo with fat grafting Bilat    MASTECTOMY Left 02/27/2023    Procedure: MASTECTOMY;  Surgeon: YASMANI Weiner MD;  Location: [facility];  Service: General;  Laterality: Left;  3.5 TOTAL HOURS / EMAIL SENT 2-23 @ 7:56 CCC    MASTECTOMY Right 12/11/2023    Procedure: MASTECTOMY;  Surgeon: Eldon Daley MD;  [facility];  Service: General;  Laterality: Right;    RECONSTRUCTION OF BREAST WITH DEEP INFERIOR EPIGASTRIC ARTERY  (KANDICE) FREE FLAP Bilateral 12/11/2023    Procedure: RECONSTRUCTION, BREAST, USING KANDICE FREE FLAP;  Surgeon: Reese Barrios DO;  [facility];  Service: Plastics;  Laterality: Bilateral;  Bilateral KANDICE flap - cosurgeon is Dr Cross -    REVISION OF SCAR Right 12/11/2023    Procedure: REVISION, SCAR;  Surgeon: Reese Barrios DO;  [facility];  Service: Plastics;  Laterality: Right;    REVISION OF SCAR Right 04/18/2024    Procedure: REVISION, SCAR;  Surgeon: Reese Barrios DO;  Location: [facility] OR [facility] FLR;  Service: Plastics;  Laterality: Right;    REVISION, FLAP, PREVIOUSLY CREATED FOR BREAST RECONSTRUCTION Bilateral 04/18/2024    Procedure: REVISION, FLAP, PREVIOUSLY CREATED FOR BREAST RECONSTRUCTION;  Surgeon: Reese Barrios DO;  [facility];  Service: Plastics;  Laterality: Bilateral;  2nd stage Autologous - Bilat breast revision    SENTINEL LYMPH NODE BIOPSY Left 02/27/2023    Procedure: BIOPSY, LYMPH NODE, SENTINEL;  Surgeon: YASMANI Weiner MD;   "Location: [facility] OR;  Service: General;  Laterality: Left;  LEFT with radiological marker    TISSUE EXPANDER REMOVAL Left 2023    Procedure: REMOVAL, TISSUE EXPANDER;  Surgeon: Reese Barrios DO;  Location: [facility] OR 2ND FLR;  Service: Plastics;  Laterality: Left;  removal left tissu expander     Social History[1]  Family History   Problem Relation Name Age of Onset    Hypertension Mother      Hypertension Father      Diabetes Father      Pancreatic cancer Maternal Uncle      Bladder Cancer Paternal Grandmother      Colon cancer Neg Hx      Esophageal cancer Neg Hx       OB History    Para Term  AB Living   3 3 3      SAB IAB Ectopic Multiple Live Births             # Outcome Date GA Lbr Nicanor/2nd Weight Sex Type Anes PTL Lv   3 Term            2 Term            1 Term              Current Medications[2]    Review of Systems:  General: No fever, chills.  Chest: No chest pain, shortness of breath, or palpitations.  Breast: No pain, masses, or nipple discharge.  Vulva: No pain, lesions, or itching.  Vagina: No relaxation, itching, discharge, or lesions.  Abdomen: No pain, nausea, vomiting, diarrhea, or constipation.  Urinary: No incontinence, nocturia, frequency, or dysuria.  Extremities:  No leg cramps, edema, or calf pain.  Neurologic: No headaches, dizziness, or visual changes.    Objective:     Vitals:    25 0839   Weight: 97.1 kg (214 lb)   Height: 5' 5" (1.651 m)     Body mass index is 35.61 kg/m².    PHYSICAL EXAM:  APPEARANCE: Well nourished, well developed, in no acute distress.  AFFECT: WNL, alert and oriented x 3      Assessment:      Malignant neoplasm of upper-outer quadrant of left breast in female, estrogen receptor negative    Obesity, unspecified class, unspecified obesity type, unspecified whether serious comorbidity present  -     phentermine (ADIPEX-P) 37.5 mg tablet; Take 1 tablet (37.5 mg total) by mouth before breakfast.  Dispense: 30 tablet; Refill: 0    Metabolic " syndrome        Plan:   Continue low glycemic diet with lean protein at each meal.  Stressed the importance of protein 3x per day  Maintain hydration.  Continue exercising on the bike for 20 minutes 2-3x per week  Continue phentermine 37.5mg QAM x 3 months total and then discontinue.  Follow up in 2-3 months or sooner PRN    Instructed patient to call if she experiences any side effects or has any questions.    I spent a total of 15 minutes on the day of the visit.This includes face to face time and non-face to face time preparing to see the patient (eg, review of tests), obtaining and/or reviewing separately obtained history, documenting clinical information in the electronic or other health record, independently interpreting results and communicating results to the patient/family/caregiver, or care coordinator.          [1]   Social History  Tobacco Use    Smoking status: Never    Smokeless tobacco: Never   Substance Use Topics    Alcohol use: Not Currently     Comment: social    Drug use: Yes     Types: Marijuana   [2]   Current Outpatient Medications:     buPROPion (WELLBUTRIN XL) 150 MG TB24 tablet, Take 1 tablet (150 mg total) by mouth every morning., Disp: 30 tablet, Rfl: 3    cetirizine (ZYRTEC) 10 MG tablet, Take 10 mg by mouth once daily., Disp: , Rfl:     ibuprofen (ADVIL,MOTRIN) 600 MG tablet, Take 1 tablet (600 mg total) by mouth every 8 (eight) hours as needed for Pain., Disp: 15 tablet, Rfl: 0    LIDOcaine (LIDODERM) 5 %, Place 1 patch onto the skin daily as needed (pain). Remove & Discard patch within 12 hours or as directed by MD, Disp: 30 patch, Rfl: 0    ondansetron (ZOFRAN) 8 MG tablet, Take 1 tablet (8 mg total) by mouth every 12 (twelve) hours as needed for Nausea., Disp: 30 tablet, Rfl: 2    pantoprazole (PROTONIX) 40 MG tablet, Take 1 tablet (40 mg total) by mouth once daily., Disp: 30 tablet, Rfl: 1    phentermine (ADIPEX-P) 37.5 mg tablet, Take 1 tablet (37.5 mg total) by mouth before  breakfast., Disp: 30 tablet, Rfl: 0    pregabalin (LYRICA) 75 MG capsule, Take 1 capsule (75 mg total) by mouth 2 (two) times daily as needed (pain)., Disp: 60 capsule, Rfl: 0  No current facility-administered medications for this visit.    Facility-Administered Medications Ordered in Other Visits:     ceFAZolin 1 g, gentamicin 80 mg in sodium chloride 0.9% 500 mL irrigation, , Irrigation, On Call Procedure, YASMANI Weiner MD    ceFAZolin 2 g in dextrose 5 % in water (D5W) 50 mL IVPB (MB+), 2 g, Intravenous, On Call Procedure, Angie Peck, PA-C    LIDOcaine (PF) 10 mg/ml (1%) injection 10 mg, 1 mL, Intradermal, Once, Angie Peck PA-C    mupirocin 2 % ointment, , Nasal, On Call Procedure, Angie Peck PA-C, Given at 04/18/24 0609    sodium chloride 0.9% flush 10 mL, 10 mL, Intravenous, PRN, Angie Peck, PA-C

## 2025-05-12 ENCOUNTER — OFFICE VISIT (OUTPATIENT)
Dept: HEMATOLOGY/ONCOLOGY | Facility: CLINIC | Age: 40
End: 2025-05-12
Payer: MEDICAID

## 2025-05-12 VITALS
OXYGEN SATURATION: 100 % | SYSTOLIC BLOOD PRESSURE: 131 MMHG | HEART RATE: 76 BPM | RESPIRATION RATE: 18 BRPM | BODY MASS INDEX: 35.74 KG/M2 | DIASTOLIC BLOOD PRESSURE: 79 MMHG | WEIGHT: 214.5 LBS | HEIGHT: 65 IN | TEMPERATURE: 98 F

## 2025-05-12 DIAGNOSIS — I10 PRIMARY HYPERTENSION: ICD-10-CM

## 2025-05-12 DIAGNOSIS — Z17.1 MALIGNANT NEOPLASM OF UPPER-OUTER QUADRANT OF LEFT BREAST IN FEMALE, ESTROGEN RECEPTOR NEGATIVE: Primary | ICD-10-CM

## 2025-05-12 DIAGNOSIS — L91.0 KELOID: ICD-10-CM

## 2025-05-12 DIAGNOSIS — M79.89 LEFT ARM SWELLING: ICD-10-CM

## 2025-05-12 DIAGNOSIS — C50.412 MALIGNANT NEOPLASM OF UPPER-OUTER QUADRANT OF LEFT BREAST IN FEMALE, ESTROGEN RECEPTOR NEGATIVE: Primary | ICD-10-CM

## 2025-05-12 PROCEDURE — 99999 PR PBB SHADOW E&M-EST. PATIENT-LVL IV: CPT | Mod: PBBFAC,,, | Performed by: INTERNAL MEDICINE

## 2025-05-12 PROCEDURE — 1160F RVW MEDS BY RX/DR IN RCRD: CPT | Mod: CPTII,,, | Performed by: INTERNAL MEDICINE

## 2025-05-12 PROCEDURE — 99214 OFFICE O/P EST MOD 30 MIN: CPT | Mod: PBBFAC | Performed by: INTERNAL MEDICINE

## 2025-05-12 PROCEDURE — 3078F DIAST BP <80 MM HG: CPT | Mod: CPTII,,, | Performed by: INTERNAL MEDICINE

## 2025-05-12 PROCEDURE — G2211 COMPLEX E/M VISIT ADD ON: HCPCS | Mod: S$PBB,,, | Performed by: INTERNAL MEDICINE

## 2025-05-12 PROCEDURE — 99215 OFFICE O/P EST HI 40 MIN: CPT | Mod: S$PBB,,, | Performed by: INTERNAL MEDICINE

## 2025-05-12 PROCEDURE — 3075F SYST BP GE 130 - 139MM HG: CPT | Mod: CPTII,,, | Performed by: INTERNAL MEDICINE

## 2025-05-12 PROCEDURE — 1159F MED LIST DOCD IN RCRD: CPT | Mod: CPTII,,, | Performed by: INTERNAL MEDICINE

## 2025-05-12 PROCEDURE — 3008F BODY MASS INDEX DOCD: CPT | Mod: CPTII,,, | Performed by: INTERNAL MEDICINE

## 2025-05-12 NOTE — PROGRESS NOTES
Subjective     Patient ID: Laura Kent is a 39 y.o. female.    Chief Complaint: Breast Cancer    HPI    Returns for follow up of TNBC     Reports feeling well overall  Left arm issues still noted- discomfort with long use  Reports now working at [employer] as well   States exercises at least 2 x per week on a stationary bike; she also has a whole body vibration at home that she uses for 10 minutes per day  Weight stable  No other sites of pain      Oncology History:  - self detected an area under her left arm and it initially seemed to go away (noted around winter holidays)     - 3/4/2022 Outside Mammogram:  Findings:  The breasts are heterogeneously dense, which may obscure small masses.   Left  There is a 31 mm x 18 mm x 21 mm irregularly shaped mass with microlobulated margins seen in the left breast at 2 o'clock in the posterior depth with associated left axillary adenopathy. Largest node depicted on the outside study measures 49 x 23 x 29 mm with cortical thickening and effacement of the hilum. Breast mass is reportedly palpable.   Right  There is no evidence of suspicious masses, calcifications, or other abnormal findings in the right breast.  Impression:  Left  Mass: Left breast 31 mm x 18 mm x 21 mm mass at the posterior 2 o'clock position. Assessment: 5 - Highly suggestive of malignancy. Biopsy is recommended.   Right  There is no mammographic evidence of malignancy in the right breast.  BI-RADS Category:   Overall: 5 - Highly Suggestive of Malignancy  Recommendation:  Ultrasound Guided Core Needle Biopsy of the left breast mass and one of the abnormal left axillary nodes is recommended.     - 3/14/2022 Breast biopsy:  1. 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   Comment:  Infiltrating carcinoma occupies the entirety of biopsied material   with the largest continuous  focus measuring 13 mm.  Breast biomarkers are   pending and will be issued in a supplemental report.   2. LEFT AXILLARY LYMPH NODE, BIOPSY:   Invasive ductal carcinoma, Lev histologic grade 3 (tubule formation:   3, nuclear pleomorphism:  3, mitotic activity:  3).   Comment:  Infiltrating carcinoma occupies the entirety of biopsied material   with the largest continuous focus measuring 20 mm.  No lymph node tissue is   identified in the sample.  Tumor histology is essentially identical to that   of part 1.  The findings could represent part of a larger intranodal   metastasis, but an extension from the primary tumor cannot be excluded.   Radiographic correlation is advised.   Note:  Dr. Stephanie Rao also reviewed this case and agrees with the   diagnosis.   BREAST BIOMARKER RESULTS   Estrogen receptor (ER):  Negative (0)   Progesterone receptor (ER):  Negative (0)   HER2 IHC:  Negative (1+)   Ki-67 proliferation index:  80%      - 3/17/2022 Breast MRI:  Findings:  The breasts have heterogeneous fibroglandular tissue. The background parenchymal enhancement is minimal.   Left  There is a 38 mm x 33 mm x 33 mm irregularly shaped mass seen in the left breast at 2 o'clock in the posterior depth, 8.4 cm from the nipple and 1.2 cm from the skin. Associated signal void from a twirl biopsy marker; pathology showed invasive ductal carcinoma.   Posterior to the mass there is abnormal non mass enhancement measuring 29 x 28 mm. The NME is 4 mm from the skin and 2.3 cm from the chest wall. In total the mass and NME measure 54 mm in AP extent.  Overlying skin thickening and edema involving the lateral breast, likely from lymphovascular obstruction. No suspicious skin enhancement. Overall increased vascularity to the left breast.   Four abnormal lymph level 1 and level 2 left axillary lymph nodes. The largest lymph node measures 52 mm x 40 mm x 38 mm which previously underwent biopsy showing metastatic disease. There is an  associated radar reflector within the biopsied lymph node.  Right  There is no evidence of suspicious masses, abnormal enhancement, or other abnormal findings in the right breast. No enlarged axillary or internal mammary lymph nodes.   Impression:  Left  Mass: Left breast 38 mm x 33 mm x 33 mm mass at the posterior 2 o'clock position. Assessment: 6 - Known biopsy, proven malignancy. Associated 29 mm NME extending posteriorly from the mass. In total the mass and NME measure 54 mm in AP extent.  Lymph Node: Abnormal level 1 and 2 left axillary lymph nodes, the largest of which measures 52 mm x 40 mm x 38 mm lymph node and is known biopsy, proven malignancy.   Right  There is no MR evidence of malignancy in the right breast.  BI-RADS Category:   Overall: 6 - Known Biopsy-Proven Malignancy  Recommendation:  Clinical management of known left breast cancer. Patient is established with the breast surgery clinic.      - 3/21/2022 CT C/A/P:  FINDINGS:  Base of Neck: No significant abnormality.  Thoracic soft tissue: A proximally 3.2 x 3.6 x 3.7 cm irregular left breast mass within the lateral aspect with internal biopsy marker, corresponding to findings on prior breast MRI and compatible with malignancy.  Enlarged left axillary lymph nodes, largest measuring approximately 4.2 x 3.6 cm, (series 2, image 27).  CHEST:  -Heart: Normal size. No pericardial effusion.  -Pulmonary vasculature: Pulmonary arteries distribute normally.  There are four pulmonary veins.  -Radha/Mediastinum: No pathologic shelly enlargement.  -Trachea and Proximal airways: Trachea is midline.  Central airways are patent.  No significant bronchial wall thickening or bronchiectasis.  -Lungs/Pleura: Symmetrically expanded without focal consolidation, pneumothorax, or mass.  No pleural effusion or thickening.  -Esophagus: Normal course and caliber.  ABDOMEN:  - Liver: Normal in size and attenuation with no focal hepatic abnormality.  - Gallbladder: No calcified  gallstones.  No wall thickening or pericholecystic fluid.  - Bile Ducts: No intra or extrahepatic biliary ductal dilatation.  - Stomach/Duodenum: Small hiatal hernia otherwise unremarkable.  - Spleen: Normal size.  No focal parenchymal abnormality.  - Pancreas: No mass lesion.  No pancreatic ductal dilatation.  No peripancreatic fat stranding.  - Adrenals: Unremarkable.  - Kidneys/ureters/urinary bladder: Normal in size and location.  Normal enhancement pattern.  No nephrolithiasis.  Ureters are normal in course and caliber.  No hydroureteronephrosis.  - Retroperitoneum: Scattered nonenlarged retroperitoneal lymph nodes.  PELVIS:  - Reproductive: Intrauterine device present.  A proximally 2.8 cm peripherally enhancing right ovarian cyst, likely corpus luteal cyst.  - Other: No pelvic adenopathy, free fluid, or mass.  BOWEL/MESENTERY:  No evidence of bowel obstruction or inflammatory process. Appendix is visualized and unremarkable.  VASCULATURE: Left-sided aortic arch with 3 branch vessels.  No aneurysm and no significant atherosclerosis.  Portal vein, splenic vein, and SMV are patent.  BONES: Lucent approximately 0.6 cm right trochanteric lesion, likely synovial herniation pit.  No acute fracture or bony destructive process.  EXTRATHORACIC/EXTRAPERITONEAL SOFT TISSUES: Unremarkable.  Impression:  In this patient with known breast cancer, there is an approximately 3.7 cm irregular left breast mass with left axillary lymphadenopathy.  No evidence of metastatic disease  Small hiatal hernia.  Additional findings as described above.     - 3/21/2022 Bone scan:  FINDINGS:  There is physiologic distribution of the radiopharmaceutical throughout the skeleton.  Mild degenerative uptake within the bilateral shoulders and knees.  There is normal uptake in the genitourinary system and soft tissues.  Impression:  There is no scintigraphic evidence of osteoblastic metastatic disease.     - 3/21/2022 Brain MRI:  FINDINGS:  Please  note there is dental metal artifact distorting the examination.  Allowing for artifacts the brain parenchyma is normal in contour.  Developmental variant with cavum septum pellucidum identified.  The ventricles are otherwise normal in size without hydrocephalus.  There is no midline shift or significant mass effect.  The major intracranial T2 flow voids are present.  No restricted diffusion within the non distorted brain parenchyma.  There is severe distortion of the susceptibility imaging no parenchymal susceptibility to suggest parenchymal hemorrhage in the non distorted brain parenchyma.  There is suboptimal evaluation of the cerebellum and posterior fossa.  Impression:  Unremarkable MRI brain allowing for artifacts from dental metal as detailed above specifically without abnormal parenchymal enhancement to suggest intracranial metastatic disease in light of history.     - Initiated NA therapy on 4/13/2022  Developed pancreatitis post C4 from immunotherapy and treatment held- required 2 hospitalizations     - 10/20/2022 Ultrasound left breast:  Findings:  At the site of the marker from the prior malignant breast biopsy,there is a 6 mm x 7 mm x 3 mm oval, parallel, hypoechoic mass with indistinct margins seen in the left breast at 2 o'clock in the posterior depth, 8 cm from the nipple. This is difficult to distinguish from the Twirl marker itself.  At the site of the previously biopsied metastatic left axillary node, there is a 10 x 7 x 11 mm node with a Uyen- marker. Node has mild residual cortical thickening (5 mm), but has decreased in size. There are a few other normal size, normal appearing left axillary nodes.   Impression:  Left  Mass: Left breast 6 mm x 7 mm x 3 mm mass at the posterior 2 o'clock position. Assessment: 6 - Known biopsy, proven malignancy. Known, previously biopsied left breast cancer and metastatic left axillary node have both decreased in size. The breast mass is now difficult to  distinguish from the biopsy marker.   BI-RADS Category:   Overall: 6 - Known Biopsy-Proven Malignancy  Recommendation:  Patient is already under the care of the Breast Oncology team. Most recent bilateral mammogram was performed in February 2022.      Resumed NA chemo without immunotherapy 11/21/2022- completed 7/8 cycles     - 12/8/2022 Breast MRI:  Findings:  The breasts have heterogeneous fibroglandular tissue. The background parenchymal enhancement is minimal and symmetric. There is no evidence of suspicious masses, abnormal enhancement, or other abnormal findings.  There has been interval decrease in there has been interval resolution of previously described enhancing mass at the 2:00 axis which is a known biopsy-proven malignancy.    There is no abnormality in the right breast.    There has been significant decrease in size of left axillary lymph nodes which all are normal in size on current exam.  The largest contains a signal void consistent with previously placed radar reflector at site of biopsy-proven axillary metastasis.  It measures 8 mm in short axis.  Impression:  Interval resolution of enhancing mass at site of known malignancy in the left breast at 2:00  and marked interval decrease in left axillary adenopathy with visualized lymph nodes normal in size on current exam.  Findings are consistent with complete imaging response to therapy.  BI-RADS Category:   Overall: 6 - Known Biopsy-Proven Malignancy     - 2/27/2023 Left Mastectomy and SLN Bx, axillary lymphadenectomy  Pathology:  Left breast, mastectomy: No residual malignancy is present within the   mastectomy specimen. Treatment effect is present within the breast at area of prior biopsy   site.   Left axillary sentinel lymph node #1, palpable, excisional biopsy: 1 of 3   lymph nodes are positive for metastatic carcinoma   Greatest dimension of metastatic carcinoma measures 4 mm (0.4 cm).   Confirmed by positive immunohistochemical staining in the  metastatic   foci with cytokeratins AE1/AE3, Cam5.2 and wide spectrum keratin with satisfactory positive and negative controls.   Treatment effect is present in lymph node.   Left axillary contents, axillary dissection: 14 benign lymph nodes,   Prior BREAST BIOMARKER RESULTS   Estrogen receptor (ER): Negative (0)   Progesterone receptor (ER): Negative (0)   HER2 IHC: Negative (1+)   Ki-67 proliferation index: 80%      - - completed XRT 2023     -- adjuvant Xeloda- stopped with above     PMH:  HTN- around pregnancies; off of blood pressure medication x 2 years  CHF- ECHOs at [facility]  Gestational DM  Hyperlipidemia when heavier, managed with diet and followed by cardiology  C- sections x 2  Iron deficiency     GynHx:  Menarche- 7   (16 at 1st pregnancy) (17, 7, 4)  Still with periods - last 2022- last 5-7 days, moderate normal flow  + breast feeding x 1 year  IUD     SH:  Working, , [employer]  Single  Good support system  No tobacco  No EtOH  No illicit drugs    Review of Systems   Constitutional:  Negative for activity change, appetite change, chills, fatigue, fever and unexpected weight change.   HENT:  Negative for dental problem, rhinorrhea and trouble swallowing.    Eyes:  Negative for visual disturbance.   Respiratory:  Negative for cough, chest tightness, shortness of breath and wheezing.    Cardiovascular:  Negative for chest pain, palpitations and leg swelling.   Gastrointestinal:  Positive for constipation. Negative for abdominal distention, abdominal pain, blood in stool, change in bowel habit, diarrhea, nausea and vomiting.   Genitourinary:  Negative for decreased urine volume, difficulty urinating, dysuria, frequency and urgency.   Musculoskeletal:  Negative for arthralgias, back pain, gait problem and joint swelling.        Lwft arm discomfort, swelling   Integumentary:  Positive for color change. Negative for rash, breast mass and breast tenderness.         Hypertrophic scars and hyperpigmentation   Neurological:  Negative for dizziness, weakness, light-headedness, numbness and headaches.   Hematological:  Negative for adenopathy. Does not bruise/bleed easily.   Psychiatric/Behavioral:  Negative for dysphoric mood. The patient is not nervous/anxious.    Breast: Negative for mass and tenderness         Objective     Physical Exam  Vitals and nursing note reviewed.   Constitutional:       General: She is not in acute distress.     Appearance: Normal appearance. She is not ill-appearing.      Comments: Presents alone  ECOG= 0  Very pleasant   HENT:      Head: Normocephalic and atraumatic.      Mouth/Throat:      Mouth: Mucous membranes are moist.      Pharynx: No oropharyngeal exudate or posterior oropharyngeal erythema.   Eyes:      General: No scleral icterus.     Extraocular Movements: Extraocular movements intact.      Conjunctiva/sclera: Conjunctivae normal.      Pupils: Pupils are equal, round, and reactive to light.   Cardiovascular:      Rate and Rhythm: Normal rate and regular rhythm.      Heart sounds: Normal heart sounds. No murmur heard.     No friction rub. No gallop.   Pulmonary:      Effort: Pulmonary effort is normal. No respiratory distress.      Breath sounds: Normal breath sounds. No rhonchi or rales.      Comments: Left breast-  area of redness  cording  Abdominal:      General: Abdomen is flat. Bowel sounds are normal. There is no distension.      Palpations: Abdomen is soft. There is no mass.      Tenderness: There is no abdominal tenderness. There is no guarding or rebound.      Comments: No organomegaly   Musculoskeletal:         General: No swelling or tenderness. Normal range of motion.      Cervical back: Normal range of motion and neck supple. No tenderness.      Right lower leg: No edema.      Left lower leg: No edema.   Lymphadenopathy:      Cervical: No cervical adenopathy.   Skin:     General: Skin is warm and dry.      Coloration: Skin is  not jaundiced or pale.      Findings: No lesion or rash.      Comments: Keloid--port scar   Neurological:      General: No focal deficit present.      Mental Status: She is alert and oriented to person, place, and time. Mental status is at baseline.      Sensory: No sensory deficit.      Motor: No weakness.      Coordination: Coordination normal.      Gait: Gait normal.   Psychiatric:         Mood and Affect: Mood normal.         Behavior: Behavior normal.         Thought Content: Thought content normal.         Judgment: Judgment normal.     Labs- Reviewed       Assessment and Plan     1. Malignant neoplasm of upper-outer quadrant of left breast in female, estrogen receptor negative    2. Keloid    3. Primary hypertension    4. Left arm swelling      TNBC  Treatment as above completed  Did not complete adjuvant Xeloda with side effects  Clinically [facility]  RTC 3 months   Knows to call for any issues     Left arm swelling  PT referral    Keloid  Dermatology referral    HTN  Continue current medications    Route Chart for Scheduling    Med Onc Chart Routing      Follow up with physician 6 months. cbc, cmp prior; Needs PT referral and dermatology follow up   Follow up with ANDREW 3 months. cbc, cmp prior   Infusion scheduling note    Injection scheduling note    Labs    Imaging    Pharmacy appointment    Other referrals               code applied: patient requires or will require a continuous, longitudinal, and active collaborative plan of care related to this patient's health condition, breast cancer --the management of which requires the direction of a practitioner with specialized clinical knowledge, skill, and expertise.       Therapy Plan Information  PICC DOUBLE LUMEN LINE FLUSH for Malignant neoplasm of upper-outer quadrant of left breast in female, estrogen receptor negative, noted on 3/21/2022  Flushes  heparin, porcine (PF) 100 unit/mL injection flush 500 Units  500 Units, Intravenous, Every visit  sodium  chloride 0.9% flush 10 mL  10 mL, Intravenous, Every visit  heparin, porcine (PF) 100 unit/mL injection flush 500 Units  500 Units, Intravenous, Every visit  sodium chloride 0.9% flush 10 mL  10 mL, Intravenous, Every visit      No therapy plan of the specified type found.    No therapy plan of the specified type found.

## 2025-05-13 DIAGNOSIS — Z17.1 MALIGNANT NEOPLASM OF UPPER-OUTER QUADRANT OF LEFT BREAST IN FEMALE, ESTROGEN RECEPTOR NEGATIVE: Primary | ICD-10-CM

## 2025-05-13 DIAGNOSIS — C50.412 MALIGNANT NEOPLASM OF UPPER-OUTER QUADRANT OF LEFT BREAST IN FEMALE, ESTROGEN RECEPTOR NEGATIVE: Primary | ICD-10-CM

## 2025-06-21 ENCOUNTER — PATIENT MESSAGE (OUTPATIENT)
Dept: PLASTIC SURGERY | Facility: CLINIC | Age: 40
End: 2025-06-21
Payer: MEDICAID

## 2025-06-23 ENCOUNTER — OFFICE VISIT (OUTPATIENT)
Dept: SURGERY | Facility: CLINIC | Age: 40
End: 2025-06-23
Payer: MEDICAID

## 2025-06-23 VITALS
HEIGHT: 65 IN | BODY MASS INDEX: 35.65 KG/M2 | WEIGHT: 214 LBS | SYSTOLIC BLOOD PRESSURE: 119 MMHG | HEART RATE: 83 BPM | DIASTOLIC BLOOD PRESSURE: 83 MMHG

## 2025-06-23 DIAGNOSIS — I89.0 LYMPHEDEMA: Primary | ICD-10-CM

## 2025-06-23 DIAGNOSIS — Z85.3 PERSONAL HISTORY OF BREAST CANCER: ICD-10-CM

## 2025-06-23 DIAGNOSIS — Z90.13 S/P MASTECTOMY, BILATERAL: ICD-10-CM

## 2025-06-23 DIAGNOSIS — J32.0 CHRONIC SINUSITIS OF BOTH MAXILLARY SINUSES: ICD-10-CM

## 2025-06-23 PROCEDURE — 3079F DIAST BP 80-89 MM HG: CPT | Mod: CPTII,,, | Performed by: PHYSICIAN ASSISTANT

## 2025-06-23 PROCEDURE — 99213 OFFICE O/P EST LOW 20 MIN: CPT | Mod: PBBFAC | Performed by: PHYSICIAN ASSISTANT

## 2025-06-23 PROCEDURE — 3008F BODY MASS INDEX DOCD: CPT | Mod: CPTII,,, | Performed by: PHYSICIAN ASSISTANT

## 2025-06-23 PROCEDURE — 99999 PR PBB SHADOW E&M-EST. PATIENT-LVL III: CPT | Mod: PBBFAC,,, | Performed by: PHYSICIAN ASSISTANT

## 2025-06-23 PROCEDURE — 99213 OFFICE O/P EST LOW 20 MIN: CPT | Mod: S$PBB,,, | Performed by: PHYSICIAN ASSISTANT

## 2025-06-23 PROCEDURE — 1159F MED LIST DOCD IN RCRD: CPT | Mod: CPTII,,, | Performed by: PHYSICIAN ASSISTANT

## 2025-06-23 PROCEDURE — 3074F SYST BP LT 130 MM HG: CPT | Mod: CPTII,,, | Performed by: PHYSICIAN ASSISTANT

## 2025-06-23 NOTE — PROGRESS NOTES
"[facility]  Department of Surgery      REFERRING PROVIDER: No referring provider defined for this encounter.    Chief Complaint: 1 Year CBE and Breast Pain      DIAGNOSIS:   This is a 39 y.o. female with a history of IIIC (cT3, cN2(f), cM0, G3, ER-, NV-, HER2-)  invasive ductal carcinoma of the left breast.    TREATMENT:   1. Left mastectomy and axillary lymph node dissection and TE placement on 2/27/2023. Dr. Weiner. S/p revision KANDICE flap 4/18/24.  2. Chemotherapy, 4/13/2022 - 12/31/2022 Dr. Cruz.   3. Radiation therapy 4/27/2023 - 5/31/2023 Dr. Bess.   4. Right risk reducing mastectomy and bilateral KANDICE flap based reconstruction.  12/11/2023 Dr. Daley and Dr. Barrios    HISTORY OF PRESENT ILLNESS:   Patient presents today for follow up evaluation of new left breast pain/fullness. Patient states this new change began about a month ago. Pain is a burning-like sensation. Denies palpable masses. Denies skin changes or fevers/chills. She also notes swelling and fullness of her left UE. Otherwise no other complaints today although she has been dealing with ongoing sinus issues. Was on abx and steroids but denies complete resolution of sxs. Still with considerable sinus pressure.     Pathology:    Part 1  Left, tissue expander, removal:  For gross identification only, see gross description.      Part 2  Breast, right, mastectomy:  Benign nipple and breast parenchyma, negative for atypia or malignancy    Part 3  Breast, left, capsule, capsulectomy:  Fragments of fibrotic tissue with associated reactive giant cell formation, chronic inflammation and fibroadipose tissue, histologically consistent with breast capsule  Negative for atypia or malignancy      Part 4  Breast, left, mastectomy, skin, excision:  Fragments of benign skin and soft tissue with chronic inflammation, negative for atypia or malignancy        PHYSICAL EXAM:   /83   Pulse 83   Ht 5' 5" (1.651 m)   Wt 97.1 kg (214 lb)   BMI 35.61 " kg/m²   Physical Exam   Vitals reviewed.  Constitutional: She is oriented to person, place, and time.   HENT:   Head: Normocephalic and atraumatic.   Nose: Nose normal.   Eyes: Pupils are equal, round, and reactive to light. Right eye exhibits no discharge. Left eye exhibits no discharge.   Pulmonary/Chest: Effort normal and breath sounds normal. No stridor. No respiratory distress. She exhibits no mass, no tenderness and no edema. Right breast exhibits no inverted nipple, no mass, no nipple discharge, no skin change and no tenderness. Left breast exhibits skin change and tenderness. Left breast exhibits no inverted nipple, no mass and no nipple discharge. No breast swelling or bleeding. Breasts are symmetrical.       Abdominal: Normal appearance.   Genitourinary: No breast swelling or bleeding.   Neurological: She is alert and oriented to person, place, and time.   Skin: Skin is warm and dry.     Psychiatric: Her behavior is normal. Mood, judgment and thought content normal.         ASSESSMENT:   This is a 39 y.o. female status post risk reducing right mastectomy, left tissue expander removal and bilateral KANDICE flap based reconstruction.     PLAN:   1. On examination, there is edema noted of the lateral left breast. No exam findings suggestive of UE lymphedema however given the breast changes, I do recommend following up with PT for specifically lymphedema work up and possible lymphatic massage.   2. Will keep plastic surgery team in the loop with possible improvement of these ongoing sxs.  3. Placed referral to ENT for evaluation of her chronic sinusitis.   4. Patient verbalized understanding of plan. All questions asked and answered. Advised to call our office with any new or worsening sxs.

## 2025-06-24 ENCOUNTER — E-CONSULT (OUTPATIENT)
Dept: OTOLARYNGOLOGY | Facility: CLINIC | Age: 40
End: 2025-06-24
Payer: MEDICAID

## 2025-06-24 ENCOUNTER — PATIENT MESSAGE (OUTPATIENT)
Dept: SURGERY | Facility: CLINIC | Age: 40
End: 2025-06-24
Payer: MEDICAID

## 2025-06-24 DIAGNOSIS — D70.1 CHEMOTHERAPY INDUCED NEUTROPENIA: Primary | ICD-10-CM

## 2025-06-24 DIAGNOSIS — T45.1X5A CHEMOTHERAPY INDUCED NEUTROPENIA: Primary | ICD-10-CM

## 2025-06-24 PROCEDURE — 99499 UNLISTED E&M SERVICE: CPT | Mod: S$PBB,,, | Performed by: STUDENT IN AN ORGANIZED HEALTH CARE EDUCATION/TRAINING PROGRAM

## 2025-06-24 NOTE — CONSULTS
"[facility][id number]  Response for E-Consult     Patient Name: Laura Kent  MRN: [id number]  Primary Care Provider: Yessy Cruz MD   Requesting Provider: Lucie Jordan PA-C  E-Consult to ENT  Consult performed by: Cm Santos MD  Consult ordered by: Lucie Jordan PA-C  Assessment/Recommendations: Unclear clinical question "chronic sinusitis" does not pose question to be answered.     If patient needs to be evaluated for sinusitis they can be scheduled in clinic.          Unfortunately, this eConsult has been declined due to: Unclear Question    Unclear Question:  This eConsult submission cannot be completed at this time period. The question provided is unclear and our specialty is unsure how to assist. Please consider providing more information, or a more specific question and resubmitting your eConsult, or contacting our specialty through the usual channels.        Thank you for this eConsult referral.     MD Melecio MorancanPocahontas Community Hospitalct Head/[id number]      "

## 2025-06-25 DIAGNOSIS — J32.0 CHRONIC SINUSITIS OF BOTH MAXILLARY SINUSES: Primary | ICD-10-CM

## 2025-07-24 ENCOUNTER — OFFICE VISIT (OUTPATIENT)
Dept: OBSTETRICS AND GYNECOLOGY | Facility: CLINIC | Age: 40
End: 2025-07-24

## 2025-07-24 VITALS — HEIGHT: 65 IN | BODY MASS INDEX: 35.82 KG/M2 | WEIGHT: 215 LBS

## 2025-07-24 DIAGNOSIS — C50.412 MALIGNANT NEOPLASM OF UPPER-OUTER QUADRANT OF LEFT BREAST IN FEMALE, ESTROGEN RECEPTOR NEGATIVE: Primary | ICD-10-CM

## 2025-07-24 DIAGNOSIS — R29.898 MUSCULAR DECONDITIONING: ICD-10-CM

## 2025-07-24 DIAGNOSIS — Z17.1 MALIGNANT NEOPLASM OF UPPER-OUTER QUADRANT OF LEFT BREAST IN FEMALE, ESTROGEN RECEPTOR NEGATIVE: Primary | ICD-10-CM

## 2025-07-24 DIAGNOSIS — E66.9 OBESITY, UNSPECIFIED CLASS, UNSPECIFIED OBESITY TYPE, UNSPECIFIED WHETHER SERIOUS COMORBIDITY PRESENT: ICD-10-CM

## 2025-07-24 RX ORDER — PHENTERMINE HYDROCHLORIDE 37.5 MG/1
37.5 TABLET ORAL
Qty: 30 TABLET | Refills: 1 | Status: SHIPPED | OUTPATIENT
Start: 2025-07-24 | End: 2025-08-23

## 2025-07-24 NOTE — PROGRESS NOTES
The patient location is: [place]  The chief complaint leading to consultation is: Follow up for weight loss    Visit type: audiovisual    Face to Face time with patient: 10 minutes  20 minutes of total time spent on the encounter, which includes face to face time and non-face to face time preparing to see the patient (eg, review of tests), Obtaining and/or reviewing separately obtained history, Documenting clinical information in the electronic or other health record, Independently interpreting results (not separately reported) and communicating results to the patient/family/caregiver, or Care coordination (not separately reported).         Each patient to whom he or she provides medical services by telemedicine is:  (1) informed of the relationship between the physician and patient and the respective role of any other health care provider with respect to management of the patient; and (2) notified that he or she may decline to receive medical services by telemedicine and may withdraw from such care at any time.    Notes:   Subjective:      Laura Kent is a 39 y.o. female with history of triple negative breast cancer who presents for weight loss follow up. She completed 3 months of phentermine and doing well off. Tolerating mediation well without side effects. She has been able to maintain the 15lb she lost but hit plateau. She is drinking 2 protein shakes per day. She is getting lean protein throughout the day. Denies skipping meals. She is walking for exercise once a week. Deconditioning and unable to exercise like she was prior to treatment. Scheduled with PT but had to cancel due to multiple deaths in her family.    Routine Screening Labs: 10/26/2023     No visits with results within 3 Month(s) from this visit.   Latest known visit with results is:   Office Visit on 12/10/2024   Component Date Value Ref Range Status    Gram Stain Result 12/10/2024 Many WBC's   Final    Gram Stain Result 12/10/2024  No organisms seen   Final    Aerobic Bacterial Culture 12/10/2024 No growth   Final    Anaerobic Culture 12/10/2024 No anaerobes isolated   Final       Past Medical History:   Diagnosis Date    Anemia     Anxiety     Breast cancer     Diarrhea 2022    Drug-induced acute pancreatitis without infection or necrosis 2022    Encounter for blood transfusion     Hypertension     Meningitis     Severe obesity 2023    Shortness of breath 2023     Past Surgical History:   Procedure Laterality Date    AXILLARY NODE DISSECTION Left 2023    Procedure: LYMPHADENECTOMY, AXILLARY;  Surgeon: YASMANI Weiner MD;  Location: [facility] OR;  Service: General;  Laterality: Left;    BREAST BIOPSY Left     BREAST RECONSTRUCTION       SECTION      COLONOSCOPY N/A 2022    Procedure: COLONOSCOPY;  Surgeon: Davi Chery MD;  Location: [facility] (4TH FLR);  Service: Endoscopy;  Laterality: N/A;  case request entered from referral - Per DR. KATHLEEN Chery Pt to be scheduled for urgent colonoscopy on 22/ fully vaccinated / prep ins for Sutab on portal - ERW  see micro for negative C-diff- ERW    INJECTION FOR SENTINEL NODE IDENTIFICATION Left 2023    Procedure: INJECTION, FOR SENTINEL NODE IDENTIFICATION;  Surgeon: YASMANI Weiner MD;  Location: [facility];  Service: General;  Laterality: Left;    INSERTION OF BREAST TISSUE EXPANDER Left 2023    Procedure: INSERTION, TISSUE EXPANDER, BREAST;  Surgeon: Resee Barrios DO;  Location: [facility];  Service: Plastics;  Laterality: Left;    INSERTION OF TUNNELED CENTRAL VENOUS CATHETER (CVC) WITH SUBCUTANEOUS PORT N/A 2022    Procedure: [id number];  Surgeon: Deo Sin Jr., MD;  Location: [facility] OR 2ND FLR;  Service: General;  Laterality: N/A;    INSERTION OF TUNNELED CENTRAL VENOUS CATHETER (CVC) WITH SUBCUTANEOUS PORT N/A 2022    Procedure: INSERTION, SINGLE LUMEN CATHETER WITH PORT, WITH FLUOROSCOPIC GUIDANCE;   Surgeon: Ryder Dinero MD;  Location: [facility] CATH LAB;  Service: Vascular;  Laterality: N/A;    INTRAVENOUS INJECTION N/A 12/11/2023    Procedure: spy;  Surgeon: Reese Barrios DO;  Location: [facility] OR 2ND FLR;  Service: Plastics;  Laterality: N/A;    LIPOSUCTION W/ FAT INJECTION Bilateral 04/18/2024    Procedure: LIPOSUCTION, WITH FAT TRANSFER;  Surgeon: Reese Barrios DO;  Location: [facility] OR [facility] FLR;  Service: Plastics;  Laterality: Bilateral;  Lipo with fat grafting Bilat    MASTECTOMY Left 02/27/2023    Procedure: MASTECTOMY;  Surgeon: YASMANI Weiner MD;  Location: [facility];  Service: General;  Laterality: Left;  3.5 TOTAL HOURS / EMAIL SENT 2-23 @ 7:56 CCC    MASTECTOMY Right 12/11/2023    Procedure: MASTECTOMY;  Surgeon: Eldon Daley MD;  [facility];  Service: General;  Laterality: Right;    RECONSTRUCTION OF BREAST WITH DEEP INFERIOR EPIGASTRIC ARTERY  (KANDICE) FREE FLAP Bilateral 12/11/2023    Procedure: RECONSTRUCTION, BREAST, USING KANDICE FREE FLAP;  Surgeon: Reese Barrios DO;  [facility];  Service: Plastics;  Laterality: Bilateral;  Bilateral KANDICE flap - cosurgeon is Dr Cross -    REVISION OF SCAR Right 12/11/2023    Procedure: REVISION, SCAR;  Surgeon: Reese Barrios DO;  [facility];  Service: Plastics;  Laterality: Right;    REVISION OF SCAR Right 04/18/2024    Procedure: REVISION, SCAR;  Surgeon: Reese Barrios DO;  Location: [facility] OR [facility] FLR;  Service: Plastics;  Laterality: Right;    REVISION, FLAP, PREVIOUSLY CREATED FOR BREAST RECONSTRUCTION Bilateral 04/18/2024    Procedure: REVISION, FLAP, PREVIOUSLY CREATED FOR BREAST RECONSTRUCTION;  Surgeon: Reese Barrios DO;  [facility];  Service: Plastics;  Laterality: Bilateral;  2nd stage Autologous - Bilat breast revision    SENTINEL LYMPH NODE BIOPSY Left 02/27/2023    Procedure: BIOPSY, LYMPH NODE, SENTINEL;  Surgeon: YASMANI Weiner,  "MD;  Location: [facility] OR;  Service: General;  Laterality: Left;  LEFT with radiological marker    TISSUE EXPANDER REMOVAL Left 2023    Procedure: REMOVAL, TISSUE EXPANDER;  Surgeon: Reese Barrios DO;  [facility];  Service: Plastics;  Laterality: Left;  removal left tissu expander     Social History[1]  Family History   Problem Relation Name Age of Onset    Hypertension Mother      Hypertension Father      Diabetes Father      Pancreatic cancer Maternal Uncle      Bladder Cancer Paternal Grandmother      Colon cancer Neg Hx      Esophageal cancer Neg Hx       OB History    Para Term  AB Living   3 3 3      SAB IAB Ectopic Multiple Live Births             # Outcome Date GA Lbr Nicanor/2nd Weight Sex Type Anes PTL Lv   3 Term            2 Term            1 Term              Current Medications[2]    Review of Systems:  General: No fever, chills.  Chest: No chest pain, shortness of breath, or palpitations.  Breast: No pain, masses, or nipple discharge.  Vulva: No pain, lesions, or itching.  Vagina: No relaxation, itching, discharge, or lesions.  Abdomen: No pain, nausea, vomiting, diarrhea, or constipation.  Urinary: No incontinence, nocturia, frequency, or dysuria.  Extremities:  No leg cramps, edema, or calf pain.  Neurologic: No headaches, dizziness, or visual changes.    Objective:     Vitals:    25 0740   Weight: 97.5 kg (215 lb)   Height: 5' 5" (1.651 m)     Body mass index is 35.78 kg/m².    PHYSICAL EXAM:  APPEARANCE: Well nourished, well developed, in no acute distress.  AFFECT: WNL, alert and oriented x 3      Assessment:      Malignant neoplasm of upper-outer quadrant of left breast in female, estrogen receptor negative    Muscular deconditioning  -     Ambulatory Referral/Consult to Physical Therapy    Obesity, unspecified class, unspecified obesity type, unspecified whether serious comorbidity present  -     phentermine (ADIPEX-P) 37.5 mg tablet; Take 1 tablet " (37.5 mg total) by mouth before breakfast.  Dispense: 30 tablet; Refill: 1        Plan:   Continue low glycemic diet with lean protein at each meal.  Increase lean protein and reduce carbohydrates to help push past plateau.  Maintain hydration.  Continue walking and doing workout machine at home once a week  Referral to PT for deconditioning  Can continue Phentermine 37.5mg QAM x 2 more months  Follow up in 3 months    Instructed patient to call if she experiences any side effects or has any questions.    I spent a total of 20 minutes on the day of the visit.This includes face to face time and non-face to face time preparing to see the patient (eg, review of tests), obtaining and/or reviewing separately obtained history, documenting clinical information in the electronic or other health record, independently interpreting results and communicating results to the patient/family/caregiver, or care coordinator.                                [1]   Social History  Tobacco Use    Smoking status: Never    Smokeless tobacco: Never   Substance Use Topics    Alcohol use: Not Currently     Comment: social    Drug use: Yes     Types: Marijuana   [2]   Current Outpatient Medications:     cetirizine (ZYRTEC) 10 MG tablet, Take 10 mg by mouth once daily., Disp: , Rfl:     ibuprofen (ADVIL,MOTRIN) 600 MG tablet, Take 1 tablet (600 mg total) by mouth every 8 (eight) hours as needed for Pain., Disp: 15 tablet, Rfl: 0    LIDOcaine (LIDODERM) 5 %, Place 1 patch onto the skin daily as needed (pain). Remove & Discard patch within 12 hours or as directed by MD, Disp: 30 patch, Rfl: 0    ondansetron (ZOFRAN) 8 MG tablet, Take 1 tablet (8 mg total) by mouth every 12 (twelve) hours as needed for Nausea., Disp: 30 tablet, Rfl: 2    pantoprazole (PROTONIX) 40 MG tablet, Take 1 tablet (40 mg total) by mouth once daily., Disp: 30 tablet, Rfl: 1    phentermine (ADIPEX-P) 37.5 mg tablet, Take 1 tablet (37.5 mg total) by mouth before breakfast.,  Disp: 30 tablet, Rfl: 1    pregabalin (LYRICA) 75 MG capsule, Take 1 capsule (75 mg total) by mouth 2 (two) times daily as needed (pain)., Disp: 60 capsule, Rfl: 0  No current facility-administered medications for this visit.    Facility-Administered Medications Ordered in Other Visits:     ceFAZolin 1 g, gentamicin 80 mg in sodium chloride 0.9% 500 mL irrigation, , Irrigation, On Call Procedure, YASMANI Weiner MD    ceFAZolin 2 g in dextrose 5 % in water (D5W) 50 mL IVPB (MB+), 2 g, Intravenous, On Call Procedure, Angie Peck, PA-C    LIDOcaine (PF) 10 mg/ml (1%) injection 10 mg, 1 mL, Intradermal, Once, Angie Peck, LUIS ALFREDO    mupirocin 2 % ointment, , Nasal, On Call Procedure, Angie Peck PA-C, Given at 04/18/24 0609    sodium chloride 0.9% flush 10 mL, 10 mL, Intravenous, PRN, Angie Peck, PA-C

## 2025-07-28 ENCOUNTER — CLINICAL SUPPORT (OUTPATIENT)
Dept: REHABILITATION | Facility: HOSPITAL | Age: 40
End: 2025-07-28

## 2025-07-28 ENCOUNTER — PATIENT MESSAGE (OUTPATIENT)
Dept: REHABILITATION | Facility: HOSPITAL | Age: 40
End: 2025-07-28

## 2025-07-28 DIAGNOSIS — M25.612 DECREASED ROM OF LEFT SHOULDER: ICD-10-CM

## 2025-07-28 DIAGNOSIS — R29.898 MUSCULAR DECONDITIONING: ICD-10-CM

## 2025-07-28 DIAGNOSIS — R29.898 WEAKNESS OF LEFT SHOULDER: ICD-10-CM

## 2025-07-28 DIAGNOSIS — M25.512 CHRONIC LEFT SHOULDER PAIN: Primary | ICD-10-CM

## 2025-07-28 DIAGNOSIS — G89.29 CHRONIC LEFT SHOULDER PAIN: Primary | ICD-10-CM

## 2025-07-28 PROCEDURE — 97161 PT EVAL LOW COMPLEX 20 MIN: CPT

## 2025-07-28 NOTE — PROGRESS NOTES
Outpatient Rehab    Physical Therapy Evaluation    Patient Name: Laura Kent  MRN: [id number]  YOB: 1985  Encounter Date: 7/28/2025    Therapy Diagnosis:   Encounter Diagnoses   Name Primary?    Chronic left shoulder pain Yes    Weakness of left shoulder     Decreased ROM of left shoulder     Muscular deconditioning      Physician: Yumiko Monreal PA-C    Physician Orders: Eval and Treat  Medical Diagnosis: Muscular deconditioning      Visit # / Visits Authorized:  1 / 1  Insurance Authorization Period: 7/24/2025 to 7/24/2026  Date of Evaluation: 7/28/2025  Plan of Care Certification: 7/28/2025 to 10/20/25    Time In: 0950   Time Out: 1030  Total Time (in minutes): 40       Intake Outcome Measure for FOTO Survey    Therapist reviewed FOTO scores for Laura Kent on 7/28/2025.   FOTO report - see Media section or FOTO account episode details.     Intake Score (%): 63    Precautions:  Standard and History of breast cancer    Subjective   History of Present Illness  Laura is a 39 y.o. female who reports to physical therapy with a chief concern of Left breast pain/fullness which began in May 202.  Pain is a burning-like sensation. She also notes swelling and fullness of her left UE. She feels her generalized strength and stamina is poor and is still well below her status before her breast cancer diagnosis and treatment..     The patient reports a medical diagnosis of Muscular deconditioning (R29.898).      Dominant Hand: Left  History of Present Condition/Illness: Per Chart review:  Diagnosed in 2023 with Malignant neoplasm of upper-outer quadrant of left breast in female, estrogen receptor negative treated with :  1. Left mastectomy and axillary lymph node dissection and TE placement on 2/27/2023. Dr. Weiner; S/p revision KANDICE flap 4/18/24. 2. Chemotherapy, 4/13/2022 - 12/31/2022 Dr. Cruz.  3. Radiation therapy 4/27/2023 - 5/31/2023 Dr. Bess.  4. Right risk reducing  mastectomy and bilateral KANDICE flap based reconstruction 2023 Dr. Daley and Dr. Barrios. Also noted per chart review, 2022 pancreatitis post immunotherapy,required 2 hospitalizations;  2024 hospitalized for a breast infection requiring IV antibiotics.      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     Location: Tightness and pain at left chest, axilla, and upper arm;  painful to reach but painful to touch and to do self-massage;  Pain Qualities: Aching, Pulling, Tenderness, Tightness, Sharp, Needle-like           Past Medical History/Physical Systems Review:   Laura Kent  has a past medical history of Anemia, Anxiety, Breast cancer, Diarrhea, Drug-induced acute pancreatitis without infection or necrosis, Encounter for blood transfusion, Hypertension, Meningitis, Severe obesity, and Shortness of breath.    Laura Kent  has a past surgical history that includes Breast biopsy (Left);  section; Insertion of tunneled central venous catheter (CVC) with subcutaneous port (N/A, 2022); Insertion of tunneled central venous catheter (CVC) with subcutaneous port (N/A, 2022); Colonoscopy (N/A, 2022); Mastectomy (Left, 2023); [place] lymph node biopsy (Left, 2023); Injection for sentinel node identification (Left, 2023); Axillary node dissection (Left, 2023); Insertion of breast tissue expander (Left, 2023); Tissue expander removal (Left, 2023); Reconstruction of breast with deep inferior  epigastric artery  (KANDICE) free flap (Bilateral, 12/11/2023); Revision of scar (Right, 12/11/2023); Intravenous injection (N/A, 12/11/2023); Mastectomy (Right, 12/11/2023); revision, flap, previously created for breast reconstruction (Bilateral, 04/18/2024); Liposuction w/ fat injection (Bilateral, 04/18/2024); Revision of scar (Right, 04/18/2024); and Breast reconstruction.    Laura has a current medication list which includes the following prescription(s): cetirizine, ibuprofen, lidocaine, ondansetron, pantoprazole, phentermine, and pregabalin, and the following Facility-Administered Medications: ceFAZolin 1 g, gentamicin 80 mg in sodium chloride 0.9% 500 mL irrigation, ceFAZolin 2 g in dextrose 5 % in water (D5W) 50 mL IVPB (MB+), lidocaine (pf) 10 mg/ml (1%), mupirocin, and sodium chloride 0.9%.    Review of patient's allergies indicates:   Allergen Reactions    Strawberries [strawberry] Hives        Objective   Posture        Shoulders are Rounded.             Shoulder Range of Motion  Right Shoulder   Active (deg) Pain   Flexion 144 Yes   Extension 40     ABduction 120 Yes   External Rotation (Shoulder ABducted 90 degrees) 70 Yes   Internal Rotation (Shoulder ABducted 90 degrees) 75       Left Shoulder   Active (deg) Pain   Flexion 180     Extension 40     ABduction 180     External Rotation (Shoulder ABducted 90 degrees) 90     Internal Rotation (Shoulder ABducted 90 degrees) 80                   Shoulder Strength - Planes of Motion   Right Strength Left Strength   Flexion 4- 5   Extension 4- 5   ABduction 4- 5   ADduction 4- 5   Internal Rotation 0° 4- 5   External Rotation 0° 4- 5       Elbow Strength   Right Strength Left Strength   Flexion (C6) 4 5   Extension (C7) 4 5          Evaluation   30 second Chair Rise  (for adults > 61 y/o)  10 reps completed with use of arms     Normative Scores for 30 sec Chair Rise (arms across chest; full stand and full sitting)   60-64 65-69 70-74 75-79 80-84  85-89 90-94   Range for Men 14-19 12-18 12-17 11-17 10-15 8-14 7-12   Range for Women 12-17 11-16 10-16 10-15 9-14 8-13 4-11     Treatment:  Therapeutic Exercise  TE 1: chest press with 3# wts x 10  TE 2: LTR with hands clasped behind head x 5  TE 3: hammer curls with 3# wts x 10  TE 4: scapula retraction x 5  Other Activities  Activity 1: Discussed role of PT and plan of care; discussed that she also has a referral to a lymphedema specialist regarding her concerns of pain and swelling at left breast and left UE.    Time Entry(in minutes):  PT Evaluation (Low) Time Entry: 30    Assessment & Plan   Assessment  Laura presents with a condition of Moderate complexity.           Functional Limitations: Activity tolerance, Pain with ADLs/IADLs, Pain when reaching, Range of motion  Impairments: Pain with functional activity, Lack of appropriate home exercise program, Impaired physical strength  Personal Factors Affecting Prognosis: Other (Comment), Pain  Other Personal Factors Affecting Prognosis: fatigue    Patient Goal for Therapy (PT): To regain her strength and stamina; to reduce the pain and heavy feeling at her left breast and left arm  Prognosis: Good  Assessment Details: Laura is referred today for muscular deconditioning s/p left breast cancer treatment which included multiple surgeries, chemotherapy, radiation therapy and immunotherapy as detailed above. She was hospitalized in Sept 2024 for a left breast infection requiring IV antibiotic treatment. She is unsure what exercises she should be doing at this time. She had a recent severe sinus infection and is experiencing dizzyness and will soon see an ENT for assessment.  She also has concern re: lymphedema at her left breast reconstruction and left UE.  She is referred to a lymphedema specialist to address this issue. She presents with decreased strength and ROM at her left UE. She completed the session today without any adverse effects.     Plan  From a  physical therapy perspective, the patient would benefit from: Skilled Rehab Services    Planned therapy interventions include: Therapeutic exercise, Therapeutic activities, Neuromuscular re-education, Manual therapy, and Other (Comment). Patient education           Visit Frequency: 1 times Per Week for 12 Weeks.       This plan was discussed with Patient.   Discussion participants: Agreed Upon Plan of Care  Plan details: Skilled PT 1x/wk for therapeutic exercises, therapeutic activities, neuromuscular re-education, manual therapy, and patient education           The patient's spiritual, cultural, and educational needs were considered, and the patient is agreeable to the plan of care and goals.     Education  Education was done with Patient. The patient's learning style includes Demonstration, Listening, and Pictures/video. The patient Demonstrates understanding and Verbalizes understanding.         Provided printed home exercise program which she can perform daily to her tolerance; I advised her we will increase the program as tolerated;        Goals:   Active       Long term goals        Pt will increase AROM  in  left shoulder flexion to   180  degrees  to improve functional reach, carry, push, pull pain free        Start:  07/28/25    Expected End:  10/20/25            Pt will increase AROM  in left shoulder abduction 180  degrees  to improve functional reach, carry, push, pull pain free        Start:  07/28/25    Expected End:  10/20/25            . Pt will demonstrate full/maximized tissue mobility to increase ROM and promote healthy tissue to be pain free at discharge.        Start:  07/28/25    Expected End:  10/20/25            . Pt will report decrease in overall worst pain to 1/10 or less at discharge.        Start:  07/28/25    Expected End:  10/20/25            . Patient will report walking 10-30 min or more daily to improve overall endurance for ADLs       Start:  07/28/25    Expected End:  10/20/25                short term goals        1  Pt will increase AROM  in shoulder flexion to 170 degrees to improve functional reach, carry, push, pull pain free for ADLs       Start:  07/28/25    Expected End:  10/20/25            2  Pt will increase AROM  in shoulder abduction  to 170 degrees to improve functional reach, carry, push, pull pain free for ADLs       Start:  07/28/25    Expected End:  10/20/25             Patient will demonstrate independence with Home Exercise program established       Start:  07/28/25    Expected End:  10/20/25            30 second chair rise test will = 17 reps or more to indicate adequate conditioning and endurance for ADLS       Start:  07/28/25    Expected End:  10/20/25            Pt will increase strength to >/= 4+ in gross UE musculature to improve tolerance to all functional activities pain free.        Start:  07/28/25    Expected End:  10/20/25                Skyla Isaac, PT

## 2025-07-28 NOTE — PATIENT INSTRUCTIONS
You can do these exercises every day: 10-20 reps each, to your tolerance    Chest Press    Extend both weights up until your elbow is straight, then slowly lower your arms until your elbow reaches the bed             HAMMER CURL    With your arm at your side, bend at your elbow to raise up the weight. Lower back down and repeat. Keep your palm pointed inward towards your body the entire time.               Please put your arms out to the sides for this -like we practiced.  10 times each side            Shoulder blade squeezes- pull shoulder blades down and back 10 times (with or without a resistance band)          Slide left arm up wall, with palm facing in, 10-20 times

## 2025-08-01 ENCOUNTER — PATIENT MESSAGE (OUTPATIENT)
Dept: OBSTETRICS AND GYNECOLOGY | Facility: CLINIC | Age: 40
End: 2025-08-01

## 2025-08-02 PROBLEM — M25.612 DECREASED RANGE OF MOTION OF SHOULDER, LEFT: Status: RESOLVED | Noted: 2024-07-14 | Resolved: 2025-08-02

## 2025-08-02 PROBLEM — G89.29 CHRONIC LEFT SHOULDER PAIN: Status: ACTIVE | Noted: 2025-08-02

## 2025-08-02 PROBLEM — M25.512 CHRONIC LEFT SHOULDER PAIN: Status: ACTIVE | Noted: 2025-08-02

## 2025-08-02 PROBLEM — R29.898 WEAKNESS OF LEFT SHOULDER: Status: ACTIVE | Noted: 2025-07-28

## 2025-08-02 PROBLEM — R53.1 GENERALIZED WEAKNESS: Status: RESOLVED | Noted: 2024-09-29 | Resolved: 2025-08-02

## 2025-08-02 PROBLEM — R26.89 BALANCE PROBLEM: Status: RESOLVED | Noted: 2024-09-29 | Resolved: 2025-08-02

## 2025-08-02 PROBLEM — M25.612 DECREASED ROM OF LEFT SHOULDER: Status: RESOLVED | Noted: 2024-09-29 | Resolved: 2025-08-02

## 2025-08-02 PROBLEM — M25.612 DECREASED ROM OF LEFT SHOULDER: Status: ACTIVE | Noted: 2025-07-28

## 2025-08-02 PROBLEM — Z74.09 DECREASED FUNCTIONAL MOBILITY AND ENDURANCE: Status: RESOLVED | Noted: 2023-03-24 | Resolved: 2025-08-02

## 2025-08-07 ENCOUNTER — CLINICAL SUPPORT (OUTPATIENT)
Dept: REHABILITATION | Facility: HOSPITAL | Age: 40
End: 2025-08-07

## 2025-08-07 DIAGNOSIS — R29.898 WEAKNESS OF LEFT SHOULDER: ICD-10-CM

## 2025-08-07 DIAGNOSIS — M25.612 DECREASED ROM OF LEFT SHOULDER: ICD-10-CM

## 2025-08-07 DIAGNOSIS — M25.512 CHRONIC LEFT SHOULDER PAIN: Primary | ICD-10-CM

## 2025-08-07 DIAGNOSIS — G89.29 CHRONIC LEFT SHOULDER PAIN: Primary | ICD-10-CM

## 2025-08-07 PROCEDURE — 97140 MANUAL THERAPY 1/> REGIONS: CPT

## 2025-08-07 PROCEDURE — 97110 THERAPEUTIC EXERCISES: CPT

## 2025-08-14 ENCOUNTER — LAB VISIT (OUTPATIENT)
Dept: LAB | Facility: HOSPITAL | Age: 40
End: 2025-08-14

## 2025-08-14 ENCOUNTER — OFFICE VISIT (OUTPATIENT)
Dept: HEMATOLOGY/ONCOLOGY | Facility: CLINIC | Age: 40
End: 2025-08-14

## 2025-08-14 VITALS
HEIGHT: 65 IN | TEMPERATURE: 98 F | WEIGHT: 213.88 LBS | DIASTOLIC BLOOD PRESSURE: 72 MMHG | BODY MASS INDEX: 35.63 KG/M2 | HEART RATE: 84 BPM | RESPIRATION RATE: 17 BRPM | OXYGEN SATURATION: 100 % | SYSTOLIC BLOOD PRESSURE: 133 MMHG

## 2025-08-14 DIAGNOSIS — C50.412 MALIGNANT NEOPLASM OF UPPER-OUTER QUADRANT OF LEFT BREAST IN FEMALE, ESTROGEN RECEPTOR NEGATIVE: ICD-10-CM

## 2025-08-14 DIAGNOSIS — Z17.1 MALIGNANT NEOPLASM OF UPPER-OUTER QUADRANT OF LEFT BREAST IN FEMALE, ESTROGEN RECEPTOR NEGATIVE: Primary | ICD-10-CM

## 2025-08-14 DIAGNOSIS — I10 PRIMARY HYPERTENSION: ICD-10-CM

## 2025-08-14 DIAGNOSIS — Z17.1 MALIGNANT NEOPLASM OF UPPER-OUTER QUADRANT OF LEFT BREAST IN FEMALE, ESTROGEN RECEPTOR NEGATIVE: ICD-10-CM

## 2025-08-14 DIAGNOSIS — C50.412 MALIGNANT NEOPLASM OF UPPER-OUTER QUADRANT OF LEFT BREAST IN FEMALE, ESTROGEN RECEPTOR NEGATIVE: Primary | ICD-10-CM

## 2025-08-14 LAB
ABSOLUTE EOSINOPHIL (OHS): 0.03 K/UL
ABSOLUTE MONOCYTE (OHS): 0.35 K/UL (ref 0.3–1)
ABSOLUTE NEUTROPHIL COUNT (OHS): 1.91 K/UL (ref 1.8–7.7)
ALBUMIN SERPL BCP-MCNC: 4.6 G/DL (ref 3.5–5.2)
ALP SERPL-CCNC: 45 UNIT/L (ref 40–150)
ALT SERPL W/O P-5'-P-CCNC: 16 UNIT/L (ref 0–55)
ANION GAP (OHS): 9 MMOL/L (ref 8–16)
AST SERPL-CCNC: 25 UNIT/L (ref 0–50)
BASOPHILS # BLD AUTO: 0.02 K/UL
BASOPHILS NFR BLD AUTO: 0.6 %
BILIRUB SERPL-MCNC: 0.4 MG/DL (ref 0.1–1)
BUN SERPL-MCNC: 18 MG/DL (ref 6–20)
CALCIUM SERPL-MCNC: 9.5 MG/DL (ref 8.7–10.5)
CHLORIDE SERPL-SCNC: 105 MMOL/L (ref 95–110)
CO2 SERPL-SCNC: 24 MMOL/L (ref 23–29)
CREAT SERPL-MCNC: 1 MG/DL (ref 0.5–1.4)
ERYTHROCYTE [DISTWIDTH] IN BLOOD BY AUTOMATED COUNT: 12.2 % (ref 11.5–14.5)
GFR SERPLBLD CREATININE-BSD FMLA CKD-EPI: >60 ML/MIN/1.73/M2
GLUCOSE SERPL-MCNC: 93 MG/DL (ref 70–110)
HCT VFR BLD AUTO: 32.6 % (ref 37–48.5)
HGB BLD-MCNC: 10.9 GM/DL (ref 12–16)
IMM GRANULOCYTES # BLD AUTO: 0.01 K/UL (ref 0–0.04)
IMM GRANULOCYTES NFR BLD AUTO: 0.3 % (ref 0–0.5)
LYMPHOCYTES # BLD AUTO: 1.06 K/UL (ref 1–4.8)
MCH RBC QN AUTO: 28.9 PG (ref 27–31)
MCHC RBC AUTO-ENTMCNC: 33.4 G/DL (ref 32–36)
MCV RBC AUTO: 87 FL (ref 82–98)
NUCLEATED RBC (/100WBC) (OHS): 0 /100 WBC
PLATELET # BLD AUTO: 206 K/UL (ref 150–450)
PMV BLD AUTO: 10 FL (ref 9.2–12.9)
POTASSIUM SERPL-SCNC: 4.1 MMOL/L (ref 3.5–5.1)
PROT SERPL-MCNC: 7.3 GM/DL (ref 6–8.4)
RBC # BLD AUTO: 3.77 M/UL (ref 4–5.4)
RELATIVE EOSINOPHIL (OHS): 0.9 %
RELATIVE LYMPHOCYTE (OHS): 31.4 % (ref 18–48)
RELATIVE MONOCYTE (OHS): 10.4 % (ref 4–15)
RELATIVE NEUTROPHIL (OHS): 56.4 % (ref 38–73)
SODIUM SERPL-SCNC: 138 MMOL/L (ref 136–145)
WBC # BLD AUTO: 3.38 K/UL (ref 3.9–12.7)

## 2025-08-14 PROCEDURE — 36415 COLL VENOUS BLD VENIPUNCTURE: CPT

## 2025-08-14 PROCEDURE — 99999 PR PBB SHADOW E&M-EST. PATIENT-LVL IV: CPT | Mod: PBBFAC,,, | Performed by: NURSE PRACTITIONER

## 2025-08-14 PROCEDURE — 99214 OFFICE O/P EST MOD 30 MIN: CPT | Mod: PBBFAC | Performed by: NURSE PRACTITIONER

## 2025-08-14 PROCEDURE — 85025 COMPLETE CBC W/AUTO DIFF WBC: CPT

## 2025-08-14 PROCEDURE — 80053 COMPREHEN METABOLIC PANEL: CPT

## 2025-08-14 PROCEDURE — 99214 OFFICE O/P EST MOD 30 MIN: CPT | Mod: S$PBB,,, | Performed by: NURSE PRACTITIONER

## 2025-08-14 PROCEDURE — G2211 COMPLEX E/M VISIT ADD ON: HCPCS | Mod: ,,, | Performed by: NURSE PRACTITIONER

## 2025-08-22 ENCOUNTER — OFFICE VISIT (OUTPATIENT)
Dept: OTOLARYNGOLOGY | Facility: CLINIC | Age: 40
End: 2025-08-22

## 2025-08-22 VITALS
SYSTOLIC BLOOD PRESSURE: 118 MMHG | WEIGHT: 212.75 LBS | BODY MASS INDEX: 35.45 KG/M2 | HEART RATE: 81 BPM | HEIGHT: 65 IN | DIASTOLIC BLOOD PRESSURE: 81 MMHG

## 2025-08-22 DIAGNOSIS — Z85.3 HISTORY OF BREAST CANCER: ICD-10-CM

## 2025-08-22 DIAGNOSIS — R09.82 POSTNASAL DRIP: Primary | ICD-10-CM

## 2025-08-22 DIAGNOSIS — H93.8X3 EAR FULLNESS, BILATERAL: ICD-10-CM

## 2025-08-22 DIAGNOSIS — H61.22 EXCESSIVE CERUMEN IN EAR CANAL, LEFT: ICD-10-CM

## 2025-08-22 DIAGNOSIS — R42 DIZZINESS AND GIDDINESS: ICD-10-CM

## 2025-08-22 DIAGNOSIS — J31.0 CHRONIC RHINITIS: ICD-10-CM

## 2025-08-22 PROCEDURE — 99999 PR PBB SHADOW E&M-EST. PATIENT-LVL V: CPT | Mod: PBBFAC,,, | Performed by: OTOLARYNGOLOGY

## 2025-08-22 PROCEDURE — 99215 OFFICE O/P EST HI 40 MIN: CPT | Mod: PBBFAC,PN | Performed by: OTOLARYNGOLOGY

## 2025-08-27 ENCOUNTER — CLINICAL SUPPORT (OUTPATIENT)
Dept: REHABILITATION | Facility: HOSPITAL | Age: 40
End: 2025-08-27

## 2025-08-27 ENCOUNTER — CLINICAL SUPPORT (OUTPATIENT)
Dept: AUDIOLOGY | Facility: CLINIC | Age: 40
End: 2025-08-27

## 2025-08-27 DIAGNOSIS — M25.612 DECREASED ROM OF LEFT SHOULDER: ICD-10-CM

## 2025-08-27 DIAGNOSIS — Z85.3 HISTORY OF BREAST CANCER: ICD-10-CM

## 2025-08-27 DIAGNOSIS — G89.29 CHRONIC LEFT SHOULDER PAIN: Primary | ICD-10-CM

## 2025-08-27 DIAGNOSIS — H93.8X3 EAR FULLNESS, BILATERAL: ICD-10-CM

## 2025-08-27 DIAGNOSIS — R42 DIZZINESS AND GIDDINESS: ICD-10-CM

## 2025-08-27 DIAGNOSIS — M25.512 CHRONIC LEFT SHOULDER PAIN: Primary | ICD-10-CM

## 2025-08-27 DIAGNOSIS — R29.898 WEAKNESS OF LEFT SHOULDER: ICD-10-CM

## 2025-08-27 DIAGNOSIS — H93.293 ABNORMAL AUDITORY PERCEPTION OF BOTH EARS: Primary | ICD-10-CM

## 2025-08-27 PROCEDURE — 97110 THERAPEUTIC EXERCISES: CPT

## 2025-08-27 PROCEDURE — 97140 MANUAL THERAPY 1/> REGIONS: CPT

## 2025-08-27 PROCEDURE — 92567 TYMPANOMETRY: CPT | Mod: PBBFAC

## 2025-08-27 PROCEDURE — 92557 COMPREHENSIVE HEARING TEST: CPT | Mod: PBBFAC

## (undated) DEVICE — SUT 2/0 27IN PDS II VIO MO

## (undated) DEVICE — GLOVE BIOGEL SKINSENSE PI 7.0

## (undated) DEVICE — SYS CLSR DERMABOND PRINEO 22CM

## (undated) DEVICE — HOLDER DRAIN POUCH PINK

## (undated) DEVICE — SUT STRATAFIX 3-0 30CM

## (undated) DEVICE — DRESSING TEGADERM CHG 3.5X4.5

## (undated) DEVICE — DRAPE HALF SURGICAL 40X58IN

## (undated) DEVICE — DRAIN CHANNEL ROUND 15FR

## (undated) DEVICE — BOWL STERILE LARGE 32OZ

## (undated) DEVICE — SUT VICRYL 2-0 36 CT-1

## (undated) DEVICE — BLADE SURG CARBON STEEL #10

## (undated) DEVICE — BANDAGE ROLL COTTN 4.5INX4.1YD

## (undated) DEVICE — NDL 18GA X1 1/2 REG BEVEL

## (undated) DEVICE — POWDER ARISTA AH 1GM

## (undated) DEVICE — SUT VICRYL PLUS 2-0 CT1 18

## (undated) DEVICE — SUT MONOCRYL 4-0 PS-2

## (undated) DEVICE — KIT MEROCEL INSTRUMENT WIPE

## (undated) DEVICE — SYR B-D DISP CONTROL 10CC100/C

## (undated) DEVICE — PACK UNIVERSAL SPLIT II

## (undated) DEVICE — GOWN POLY REINF BRTH SLV 2XL

## (undated) DEVICE — TOWEL OR DISP STRL BLUE 4/PK

## (undated) DEVICE — SUT MONOCRYL 3-0 PS-2 UND

## (undated) DEVICE — ELECTRODE BLADE INSULATED 1 IN

## (undated) DEVICE — CONTAINER SPEC OR STRL 4.5OZ

## (undated) DEVICE — SKINMARKER W/RULER DEVON

## (undated) DEVICE — SUT 2-0 VICRYL / CT-1

## (undated) DEVICE — DRAPE STERI INSTRUMENT 1018

## (undated) DEVICE — POSITIONER IV ARMBOARD FOAM

## (undated) DEVICE — APPLIER LIGACLIP MED 11IN

## (undated) DEVICE — SUT VICRYL 3-0 27 SH

## (undated) DEVICE — APPLIER LIGACLIP SM 9.38IN

## (undated) DEVICE — TRAY CATH FOL SIL URIMTR 16FR

## (undated) DEVICE — SUT 2/0 30IN SILK BLK BRAI

## (undated) DEVICE — NDL HYPO REG 25G X 1 1/2

## (undated) DEVICE — APPLICATOR CHLORAPREP ORN 26ML

## (undated) DEVICE — TIP YANKAUERS BULB NO VENT

## (undated) DEVICE — CUP MEDICINE GRADUATED 1OZ

## (undated) DEVICE — SUT MCRYL PLUS 4-0 PS2 27IN

## (undated) DEVICE — SUT STRATAFIX PDO 2-0 MH

## (undated) DEVICE — ADHESIVE DERMABOND ADVANCED

## (undated) DEVICE — SUT ETHILON 3-0 PS2 18 BLK

## (undated) DEVICE — CORD BIPOLAR 12 FOOT

## (undated) DEVICE — DRAPE CORETEMP FLD WRM 56X62IN

## (undated) DEVICE — SOL NS 1000CC

## (undated) DEVICE — MARKER FN REG DUAL UTIL RULER

## (undated) DEVICE — CLAMP SINGLE MICRO.

## (undated) DEVICE — STAPLER SKIN ROTATING HEAD

## (undated) DEVICE — DRAPE THREE-QTR REINF 53X77IN

## (undated) DEVICE — GOWN AERO CHROME W/ TOWEL XL

## (undated) DEVICE — KIT URINARY CATH URINE METER

## (undated) DEVICE — Device

## (undated) DEVICE — CABLE EXT DOPPLER FLOW PROBE

## (undated) DEVICE — SKIN MARKER DEVON 160

## (undated) DEVICE — SPONGE WEC CEL SPEARS

## (undated) DEVICE — SUT STRATAFIX PGAPCL 3 FS-1

## (undated) DEVICE — NDL 22GA X1 1/2 REG BEVEL

## (undated) DEVICE — GOWN POLY REINF X-LONG XL

## (undated) DEVICE — TRAY MINOR GEN SURG

## (undated) DEVICE — MICRO CLIP

## (undated) DEVICE — KIT DYE SPY ELITE

## (undated) DEVICE — APPLIER CLIP LIAGCLIP 9.375IN

## (undated) DEVICE — PROBE FLOW DOPPLER

## (undated) DEVICE — BOOT AIR FLUID HEEL ADLT STD

## (undated) DEVICE — SOL NACL 0.9% INJ 500ML BG

## (undated) DEVICE — SUT 2-0 VICRYL / SH (J417)

## (undated) DEVICE — EVACUATOR PENCIL SMOKE NEPTUNE

## (undated) DEVICE — SPONGE LAP 18X18 PREWASHED

## (undated) DEVICE — DRESSING ANTIMICROBIAL 1 INCH

## (undated) DEVICE — CATH IV INTROCAN 24G X 3/4

## (undated) DEVICE — TRAY MINOR GEN SURG OMC

## (undated) DEVICE — CONTAINER SPECIMEN OR STER 4OZ

## (undated) DEVICE — SOL NORMAL USPCA 0.9%

## (undated) DEVICE — ADHESIVE MASTISOL VIAL 48/BX

## (undated) DEVICE — CAUTERY BOVIE PENCIL

## (undated) DEVICE — DRESSING XEROFORM NONADH 1X8IN

## (undated) DEVICE — SOL IRRI STRL WATER 1000ML

## (undated) DEVICE — SUT 3/0 30IN ETHILON BLK M

## (undated) DEVICE — SUT MONOCYRL 4-0 PS2 UND

## (undated) DEVICE — DRESSING XEROFORM 1X8IN

## (undated) DEVICE — TUBING 8FT HI VACLIPOSUCTION

## (undated) DEVICE — LUBRICANT SURGILUBE 2 OZ

## (undated) DEVICE — ELECTRODE BLADE TEFLON 6

## (undated) DEVICE — PACK SPY-PHI DRUG DRAPE

## (undated) DEVICE — GLOVE BIOGEL SKINSENSE PI 6.5

## (undated) DEVICE — SYR ONLY LUER LOCK 20CC

## (undated) DEVICE — ELECTRODE REM PLYHSV RETURN 9

## (undated) DEVICE — HOLDER TUBE

## (undated) DEVICE — PAD ABDOMINAL STERILE 8X10IN

## (undated) DEVICE — DECANTER FLUID TRNSF WHITE 9IN

## (undated) DEVICE — KIT SAHARA DRAPE DRAW/LIFT

## (undated) DEVICE — WAVEGUIDE BRITEFIELD DISP

## (undated) DEVICE — POSITIONER HEEL FOAM CONVOLTD

## (undated) DEVICE — SOL 9P NACL IRR PIC IL

## (undated) DEVICE — DRAPE OPTIMA MAJOR PEDIATRIC

## (undated) DEVICE — DRAPE OPMI STERILE

## (undated) DEVICE — SUT SILK 2-0 PS 18IN BLACK

## (undated) DEVICE — PACK DRAPE UNIVERSAL CONVERTOR

## (undated) DEVICE — SOL VASHE INSTILLATION WND 16

## (undated) DEVICE — GUIDE MICRO-GRID SIL GRN

## (undated) DEVICE — BANDAGE CONFORM STRTCH 2X5 ST

## (undated) DEVICE — REMOVER STAPLE SKIN STERILE

## (undated) DEVICE — DRESSING AQUACEL SACRAL 9 X 9

## (undated) DEVICE — CATH IV JELCO SAFETY 18GA JJ30

## (undated) DEVICE — SYR 3CC LUER LOC

## (undated) DEVICE — GOWN SURGICAL X-LARGE

## (undated) DEVICE — UNDERGLOVE BIOGEL PI SZ 6.5 LF

## (undated) DEVICE — DRAPE THYROID WITH ARMBOARD

## (undated) DEVICE — SUCTION SURGICAL STR 7FR

## (undated) DEVICE — SYR CATHETER TIP 60ML

## (undated) DEVICE — BANDAGE BULKEE II 2.25INX3YD

## (undated) DEVICE — CLOSURE SKIN STERI STRIP 1/2X4

## (undated) DEVICE — DRESSING ADH ISLAND 3.6 X 14

## (undated) DEVICE — ELECTRODE EXTENDED BLADE

## (undated) DEVICE — NDL SAFETY 22G X 1.5 ECLIPSE

## (undated) DEVICE — HOOK STAY ELAS 5MM 8EA/PK

## (undated) DEVICE — STAPLER SKIN PROXIMATE WIDE

## (undated) DEVICE — BLADE SURG CARBON STEEL SZ11

## (undated) DEVICE — SUT MCRYL PLUS 3-0 PS2 27IN

## (undated) DEVICE — INSTRUMENT SURG SUCT FRZR W/C

## (undated) DEVICE — SUT MONOCRYL 4-0 UD P-3 18

## (undated) DEVICE — SPONGE PATTY SURGICAL .5X3IN

## (undated) DEVICE — SYR DISP LL 5CC

## (undated) DEVICE — SUT V-LOC GRN 30CM 12IN 2-0

## (undated) DEVICE — GOWN POLY REINF BRTH SLV XL

## (undated) DEVICE — DRESSING TRANS 4X4 TEGADERM

## (undated) DEVICE — EVACUATOR WOUND BULB 100CC

## (undated) DEVICE — SYS REVOLVE FAT PROCESSING

## (undated) DEVICE — CLIP DOUBLE MICRO.

## (undated) DEVICE — MANIFOLD 4 PORT

## (undated) DEVICE — CARTRIDGE MICROCLIP SFINE BLUE

## (undated) DEVICE — SUT CTD VICRYL 4-0 BR PS-2

## (undated) DEVICE — SOL NACL 0.9% INJ PF/50151

## (undated) DEVICE — SUT PROLENE 0 CT-2 BL MONO

## (undated) DEVICE — SUT ETHILON 2-0 PSLX 30IN

## (undated) DEVICE — SYR 10CC LUER LOCK

## (undated) DEVICE — RETRACTOR RADIALUX LIGHTED

## (undated) DEVICE — COVER PROBE US 5.5X58L NON LTX

## (undated) DEVICE — SUT ETHILON 2-0 FS 18IN BLK

## (undated) DEVICE — GAUZE FLUFF XXLG 36X36 2 PLY

## (undated) DEVICE — SOL BETADINE 5%

## (undated) DEVICE — BLADE SURG #15 CARBON STEEL

## (undated) DEVICE — BRA CLASSIC COMFORT 42BLACK

## (undated) DEVICE — DRAPE C ARM 42 X 120 10/BX

## (undated) DEVICE — GOWN NONREINF SET-IN SLV XL

## (undated) DEVICE — KIT PROBE COVER WITH GEL

## (undated) DEVICE — BOVIE SUCTION

## (undated) DEVICE — SUT 9/0 5IN ETHILON BLK MON

## (undated) DEVICE — ELECTRODE BLD EXT INSUL 1

## (undated) DEVICE — SCALPEL #11 BLADE STRL DISP

## (undated) DEVICE — DRESSING ABSRBNT ISLAND 3.6X8

## (undated) DEVICE — BRA CLASSIC COMFORT 40 BLACK

## (undated) DEVICE — PENCIL ELECTROSURG HOLST W/BLD

## (undated) DEVICE — KIT IRR SUCTION HND PIECE